# Patient Record
Sex: MALE | Race: WHITE | NOT HISPANIC OR LATINO | Employment: OTHER | URBAN - METROPOLITAN AREA
[De-identification: names, ages, dates, MRNs, and addresses within clinical notes are randomized per-mention and may not be internally consistent; named-entity substitution may affect disease eponyms.]

---

## 2017-01-09 ENCOUNTER — TRANSCRIBE ORDERS (OUTPATIENT)
Dept: WOUND CARE | Facility: HOSPITAL | Age: 72
End: 2017-01-09

## 2017-01-09 DIAGNOSIS — Z43.2 ATTENTION TO ILEOSTOMY (HCC): Primary | ICD-10-CM

## 2017-01-10 ENCOUNTER — APPOINTMENT (OUTPATIENT)
Dept: ONCOLOGY | Facility: HOSPITAL | Age: 72
End: 2017-01-10

## 2017-01-10 ENCOUNTER — APPOINTMENT (OUTPATIENT)
Dept: LAB | Facility: HOSPITAL | Age: 72
End: 2017-01-10

## 2017-01-10 ENCOUNTER — HOSPITAL ENCOUNTER (OUTPATIENT)
Dept: WOUND CARE | Facility: HOSPITAL | Age: 72
Discharge: HOME | End: 2017-01-10

## 2017-01-10 PROCEDURE — 63710000001 SILVER NITRATE 75-25 % MISC

## 2017-01-10 PROCEDURE — A9270 NON-COVERED ITEM OR SERVICE: HCPCS

## 2017-01-10 NOTE — MR AVS SNAPSHOT
[facility] WOUND OSTOMY CONTINENCE NURSE  [phone]                    Bill Landry   1/10/2017  1:00 PM   OSTOMY    Dept Phone:  [phone]   Encounter #:  [id number]    Provider:  ALEX [facility] DEPT   Department:  [facility] WOUND OSTOMY CONTINENCE NURSE                Your Full Care Plan              Your Updated Medication List      ASK your doctor about these medications     aspirin 81 MG tablet       cetirizine 10 MG tablet   Commonly known as:  zyrTEC   Take 0.5 tablets by mouth Daily.       doxazosin 4 MG tablet   Commonly known as:  CARDURA       febuxostat 80 MG tablet tablet   Commonly known as:  ULORIC       FERROUS SULFATE PO       folic acid 1 MG tablet   Commonly known as:  FOLVITE       furosemide 40 MG tablet   Commonly known as:  LASIX       hydrALAZINE 100 MG tablet   Commonly known as:  APRESOLINE       latanoprost 0.005 % ophthalmic solution   Commonly known as:  XALATAN       nebivolol 2.5 MG tablet   Commonly known as:  BYSTOLIC       OMEGA 3 PO       POTASSIUM CHLORIDE PO       raNITIdine 150 MG tablet   Commonly known as:  ZANTAC       Vitamin B-12 500 MCG sublingual tablet               Instructions     None    Patient Instructions History      Upcoming Appointments     Visit Type Date Time Department    OSTOMY 1/10/2017  1:00 PM [facility] WOUND OSTOMY    LAB 1/12/2017  9:10 AM [facility] ONC CBC LAB [facility]    INJECTION - 15 1/12/2017  9:45 AM [facility] OP INFU CBC [facility]    OSTOMY 1/24/2017  1:00 PM [facility] WOUND OSTOMY    FOLLOW UP 1 UNIT 1/25/2017  8:40 AM [facility] ONC CBC [facility]    LAB 1/25/2017  8:10 AM [facility] ONC CBC LAB [facility]    INJECTION - 15 1/25/2017  9:15 AM [facility] OP INFU CBC K[facility]      MyChart Signup     Our records indicate that your [facility] [employer] account has been deactivated. If you would like to reactivate your account, please email [email or web address] or call [phone] to talk to our [employer] staff.             Other Info from Your Visit              Your Appointments     Jan 12, 2017  9:10 AM EST   Lab with LAB CHAIR [facility] ONCOLOGY CBC LAB ([place])    [address]  [facility] [address] [phone]            Jan 12, 2017  9:45 AM EST   INJECTION with INJECTION CHAIR CBC [facility] INFUSION CBC ([place])    [address]  [facility] [address] [phone]            Jan 24, 2017  1:00 PM EST   OSTOMY with ALEX WOCN DEPT   [facility] WOUND OSTOMY CONTINENCE NURSE ([place])    [address] [address] [phone]            Jan 25, 2017  8:10 AM EST   Lab with LAB CHAIR 4 [facility] ONCOLOGY CBC LAB ([place])    [address] [address] [phone]            Jan 25, 2017  8:40 AM EST   FOLLOW UP with Sharif Mckenzie MD   [facility] CBC GROUP: CONSULTANTS IN BLOOD DISORDERS AND CANCER (CBC [place])    [address]  [facility] [address] [phone]              Allergies     Ace Inhibitors      Iodinated Diagnostic Agents      Keflex [Cephalexin]      Other      IVP Dye      Vital Signs     Smoking Status                   Never Smoker

## 2017-01-10 NOTE — NURSING NOTE
"   01/10/17 1410   Ileostomy ileostomy   No Placement Date or Time found.   Existing LDA: present on admission to this facility  Ileostomy Type: ileostomy   Stoma Appearance rosebud appearance;moist;red;protruding above skin level   Appliance 2-piece;drainable pouch;intact;no leakage;changed   Accessories/Skin Care cleansed with water   Stoma Function stool   Stool Color brown   Peristomal Skin ulcerated;shiny;pink;other (see comments)   Tolerance no signs/symptoms of discomfort     Outpatient consult for Ostomy- patient having bleeding at lower edge of stoma. \"I notice it when my pouch is removed.\" Assessed abdomen- patient has a hernia that is soft. Also his stoma is large, round. It functions at the top. Patient reports he has had the ileostomy (on left lower quadrant) since 1968. There is hyperplasia tissue along lower edge- it looks like patient does not center wafer well when he places it and the edge is rubbing on the tissue. The stoma is low on his abdomen. Patient has irregular abdomen and loose skin folds from prior surgeries and weight loss. Reviewed sizing with patient and his wife. Currently they should cut the opening to 2 1/4\" allowing some extra room for the hyperplasia in the lower right side. It might be helpful for his wife to place the wafer as she can see the bottom of the stoma and make sure that the wafer is not sitting on the edge of the stoma. Also instructed them on use of powder and no sting spray.  Silver nitrate applied to hyperplasia. He may need repeat treatment.  Talked to patient about use of hernia belt. He agreed to be measured for it. [id number] is ordering number (3 1/2 ring, 44 1/2\" length, NuForm Cool Comfort, hernia measures 8\") It may not be the most comfortable as his stoma is low and close to the groin but it will help with the hernia which he states has gotten a little larger. Advised that he may need to see a surgeon if it continues to get larger or if he has complications " related to the hernia. He reports no changes in output or pain related to stoma or hernia.  Patient is going to return in the next couple weeks.

## 2017-01-12 ENCOUNTER — LAB (OUTPATIENT)
Dept: LAB | Facility: HOSPITAL | Age: 72
End: 2017-01-12

## 2017-01-12 ENCOUNTER — INFUSION (OUTPATIENT)
Dept: ONCOLOGY | Facility: HOSPITAL | Age: 72
End: 2017-01-12

## 2017-01-12 DIAGNOSIS — D63.1 ANEMIA DUE TO STAGE 3 (MODERATE) CHRONIC RENAL FAILURE TREATED WITH ERYTHROPOIETIN (HCC): ICD-10-CM

## 2017-01-12 DIAGNOSIS — N18.30 ANEMIA DUE TO STAGE 3 (MODERATE) CHRONIC RENAL FAILURE TREATED WITH ERYTHROPOIETIN (HCC): ICD-10-CM

## 2017-01-12 DIAGNOSIS — D72.819 CHRONIC LEUKOPENIA: ICD-10-CM

## 2017-01-12 DIAGNOSIS — D69.6 THROMBOCYTOPENIA (HCC): ICD-10-CM

## 2017-01-12 LAB
BASOPHILS # BLD AUTO: 0.01 10*3/MM3 (ref 0–0.1)
BASOPHILS NFR BLD AUTO: 0.4 % (ref 0–1.1)
DEPRECATED RDW RBC AUTO: 49 FL (ref 37–49)
EOSINOPHIL # BLD AUTO: 0.07 10*3/MM3 (ref 0–0.36)
EOSINOPHIL NFR BLD AUTO: 2.6 % (ref 1–5)
ERYTHROCYTE [DISTWIDTH] IN BLOOD BY AUTOMATED COUNT: 14.5 % (ref 11.7–14.5)
HCT VFR BLD AUTO: 32.6 % (ref 40–49)
HGB BLD-MCNC: 11 G/DL (ref 13.5–16.5)
IMM GRANULOCYTES # BLD: 0.02 10*3/MM3 (ref 0–0.03)
IMM GRANULOCYTES NFR BLD: 0.7 % (ref 0–0.5)
LYMPHOCYTES # BLD AUTO: 0.86 10*3/MM3 (ref 1–3.5)
LYMPHOCYTES NFR BLD AUTO: 32.2 % (ref 20–49)
MCH RBC QN AUTO: 31 PG (ref 27–33)
MCHC RBC AUTO-ENTMCNC: 33.7 G/DL (ref 32–35)
MCV RBC AUTO: 91.8 FL (ref 83–97)
MONOCYTES # BLD AUTO: 0.2 10*3/MM3 (ref 0.25–0.8)
MONOCYTES NFR BLD AUTO: 7.5 % (ref 4–12)
NEUTROPHILS # BLD AUTO: 1.51 10*3/MM3 (ref 1.5–7)
NEUTROPHILS NFR BLD AUTO: 56.6 % (ref 39–75)
NRBC BLD MANUAL-RTO: 0 /100 WBC (ref 0–0)
PLATELET # BLD AUTO: 81 10*3/MM3 (ref 150–375)
PMV BLD AUTO: 9.9 FL (ref 8.9–12.1)
RBC # BLD AUTO: 3.55 10*6/MM3 (ref 4.3–5.5)
WBC NRBC COR # BLD: 2.67 10*3/MM3 (ref 4–10)

## 2017-01-12 PROCEDURE — 85025 COMPLETE CBC W/AUTO DIFF WBC: CPT | Performed by: INTERNAL MEDICINE

## 2017-01-12 PROCEDURE — 36416 COLLJ CAPILLARY BLOOD SPEC: CPT | Performed by: INTERNAL MEDICINE

## 2017-01-12 NOTE — PROGRESS NOTES
Procrit held today for hgb 11. Copy of labs given to pt.  Pt reports having some sinus congestion.  Denies any fevers.  Suggested he use an over the counter decongestant and if symptoms do not resolve in a few day, or if he develops a fever to call us.  He and his wife v/u.

## 2017-01-24 ENCOUNTER — APPOINTMENT (OUTPATIENT)
Dept: WOUND CARE | Facility: HOSPITAL | Age: 72
End: 2017-01-24

## 2017-01-25 ENCOUNTER — OFFICE VISIT (OUTPATIENT)
Dept: ONCOLOGY | Facility: CLINIC | Age: 72
End: 2017-01-25

## 2017-01-25 ENCOUNTER — APPOINTMENT (OUTPATIENT)
Dept: ONCOLOGY | Facility: HOSPITAL | Age: 72
End: 2017-01-25

## 2017-01-25 ENCOUNTER — LAB (OUTPATIENT)
Dept: LAB | Facility: HOSPITAL | Age: 72
End: 2017-01-25

## 2017-01-25 VITALS
SYSTOLIC BLOOD PRESSURE: 150 MMHG | RESPIRATION RATE: 16 BRPM | BODY MASS INDEX: 29.03 KG/M2 | HEART RATE: 86 BPM | DIASTOLIC BLOOD PRESSURE: 82 MMHG | WEIGHT: 202.8 LBS | HEIGHT: 70 IN | TEMPERATURE: 97.7 F

## 2017-01-25 DIAGNOSIS — N18.30 ANEMIA DUE TO STAGE 3 (MODERATE) CHRONIC RENAL FAILURE TREATED WITH ERYTHROPOIETIN (HCC): ICD-10-CM

## 2017-01-25 DIAGNOSIS — D63.1 ANEMIA DUE TO STAGE 3 (MODERATE) CHRONIC RENAL FAILURE TREATED WITH ERYTHROPOIETIN (HCC): ICD-10-CM

## 2017-01-25 DIAGNOSIS — D72.819 CHRONIC LEUKOPENIA: ICD-10-CM

## 2017-01-25 DIAGNOSIS — D69.6 THROMBOCYTOPENIA (HCC): ICD-10-CM

## 2017-01-25 DIAGNOSIS — D72.819 CHRONIC LEUKOPENIA: Primary | ICD-10-CM

## 2017-01-25 LAB
BASOPHILS # BLD AUTO: 0.01 10*3/MM3 (ref 0–0.1)
BASOPHILS NFR BLD AUTO: 0.4 % (ref 0–1.1)
DEPRECATED RDW RBC AUTO: 47.4 FL (ref 37–49)
EOSINOPHIL # BLD AUTO: 0.07 10*3/MM3 (ref 0–0.36)
EOSINOPHIL NFR BLD AUTO: 2.5 % (ref 1–5)
ERYTHROCYTE [DISTWIDTH] IN BLOOD BY AUTOMATED COUNT: 14.2 % (ref 11.7–14.5)
HCT VFR BLD AUTO: 32.6 % (ref 40–49)
HGB BLD-MCNC: 11 G/DL (ref 13.5–16.5)
IMM GRANULOCYTES # BLD: 0.02 10*3/MM3 (ref 0–0.03)
IMM GRANULOCYTES NFR BLD: 0.7 % (ref 0–0.5)
LYMPHOCYTES # BLD AUTO: 0.77 10*3/MM3 (ref 1–3.5)
LYMPHOCYTES NFR BLD AUTO: 28 % (ref 20–49)
MCH RBC QN AUTO: 30.7 PG (ref 27–33)
MCHC RBC AUTO-ENTMCNC: 33.7 G/DL (ref 32–35)
MCV RBC AUTO: 91.1 FL (ref 83–97)
MONOCYTES # BLD AUTO: 0.22 10*3/MM3 (ref 0.25–0.8)
MONOCYTES NFR BLD AUTO: 8 % (ref 4–12)
NEUTROPHILS # BLD AUTO: 1.66 10*3/MM3 (ref 1.5–7)
NEUTROPHILS NFR BLD AUTO: 60.4 % (ref 39–75)
NRBC BLD MANUAL-RTO: 0 /100 WBC (ref 0–0)
PLATELET # BLD AUTO: 72 10*3/MM3 (ref 150–375)
PMV BLD AUTO: 9.8 FL (ref 8.9–12.1)
RBC # BLD AUTO: 3.58 10*6/MM3 (ref 4.3–5.5)
WBC NRBC COR # BLD: 2.75 10*3/MM3 (ref 4–10)

## 2017-01-25 PROCEDURE — 36416 COLLJ CAPILLARY BLOOD SPEC: CPT

## 2017-01-25 PROCEDURE — 85025 COMPLETE CBC W/AUTO DIFF WBC: CPT

## 2017-01-25 PROCEDURE — 99213 OFFICE O/P EST LOW 20 MIN: CPT | Performed by: INTERNAL MEDICINE

## 2017-01-25 NOTE — PROGRESS NOTES
Subjective  REASONS FOR FOLLOWUP:    1. 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  Since discontinuation of anticoagulants the patient's hemoglobin has remained normal. The patient has associated leukopenia and thrombocytopenia due to chronic splenomegaly and remote history of bone marrow testing that suggested myelodysplasia. Under the present circumstances neither the white count nor platelets are changing and hemoglobin remains stable. There is no abnormality in the differential of his white count. There are no symptoms or signs that would suggest any further progression into myelodysplasia. The patient will remain in observation.            History of Present Illness       The patient is here today in the company of his wife specifically with no complaints. His appetite remains wonderful, he has not had any heartburn or indigestion, no abdominal pain, no jaundice, normal bowel activity through his colostomy. Urination is ongoing with no difficulties. He has had a new creatinine done by Dr. Gordon, his nephrologist, being 2.8. The patient is having minimal fluid accumulation in his ankles. No worse than before. He also has had an echocardiogram that documents normal left ventricular ejection fraction 65% and an element of diastolic dysfunction and minimal evidence of pulmonary hypertension with left atrial dilatation.     The patient denies any clinical bleeding at this time. No hematuria, no melena, no enterorrhagia. No epistaxis. He continues taking his sublingual supplementation of B12.           Since the previous visit the patient has received some Procrit and raising the hemoglobin to 11.0 and staying at that level including today. Under these circumstances that patient will return just on monthly basis for a blood  count and doctor visit in 3 months. I doubt that he will require anymore Procrit in a while that happened years back. His white count and platelet count remain stable with no new infections and no clinical bleeding.                  Past Medical History, Past Surgical HistorySignificant for hypertension and also he has history of atrial fibrillation and was chronically anticoagulated with Eliquis in the past. Since January 2015 the patient is no longer receiving this medicine and moved to aspirin during his persistency of GI bleeding. The patient also has a history of chronic kidney disease with creatinine baseline around 2.5. The patient also has a history of inflammatory bowel disease with status post colectomy with ileostomy many years ago in the [facility]. The patient also has a history of pericardial effusion with an GABRIELLE 1:80 homogenous that has never changed or modified. He also has a positive lupus anticoagulant. He has had chronic leukopenia and thrombocytopenia for which he underwent by Dr. Null 10 years ago, bone marrow testing at [facility]. We have requested this information. The patient in the past has had a normal B12 level of 1094, normal folate level and ferritin level of 617 with an iron level of 34. In the past he has received IV iron in the form of Venofer while being at [facility] in January 2015. He has had serum protein electrophoresis at least on 3 different occasions, failing to show any monoclonal proteins. He also has had a CT scan of the abdomen that shows a general spleen anatomy without any retroperitoneal adenopathies and nothing to suggest portal hypertension or cirrhosis of the liver. Kidney anatomy disclosed thinning of the kidney cortexes consistent with chronic medical illness. He has no hydronephrosis. No retroperitoneal adenopathies.   SOCIAL HISTORY:  The patient lives with his wife in [place], [place]. He is retired. He does not smoke and does not  drink any alcohol.   ONCOLOGIC/HEMATOLOGIC HISTORY     On 04/06/2015 the patient’s clinical status has dramatically improved.  He has not had any new episodes of infection, congestive heart failure, GI bleeding, with excellent improvement in hemoglobin.  The patient has been able to walk longer distances without shortness of breath.   He has been able to climb steps without shortness of breath and he feels dramatically better.  Hemoglobin has been stable for the past few weeks at 12.1.  His white count and platelet count remain on the low side with no infection or clinical bleeding.  We found documentation of his bone marrow biopsy performed in 2002 and read at the [facility].  Explicitly it was documented that the patient could have myelodysplastic syndrome.  Procrit, as I pointed out to the patient, is a useful medicine to treat this condition anyway, and given the fact that in 13 years his white count and his platelet count have not changed, makes this diagnosis dubious.  Reading bone marrows for myelodysplasia is one of the most difficult tasks in Hematology.  I suggested to him at some point, if his blood count change, specifically white count modifies or platelet count modifies, to consider repeating the bone marrow testing, flow cytometry and chromosomes.     On 05/26/2015 he was feeling terrific.  He was not having any GI symptomatology, no melena, no enterorrhagia, no abnormal bleeding.  He was functionally perfectly fine.  Urination was ongoing. Uric acid was elevated and I advised him to go back into his Uloric to control this and minimize formation of kidney stones.    On 07/20/2015 the patient had no issues in relationship with anemia. His white count and platelet count remain low associated with his splenomegaly and no issues of consequences related to this. He was advised to remain in observation. He was advised to remain on his folic acid and B12 supplementation.     On 09/14/2015,  hemoglobin was excellent at 12.9, stable weight count at 3400 and stable platelet count at 70,000.  We asked him to remain on observation.     On 11/09/2015 the patient’s hematological parameters remain normal.  He was feeling terrific.  His atrial fibrillation looked like it was very quiet and he had no issues in regard to his aspirin use.  He had no embolic phenomenon.  He had some element of fluid retention associated with his chronic renal disease.  We advised him to monitor his diet properly in regard to sodium ingestion.  Review of Systems   General: no fever, chills, fatigue, weight changes, or lack of appetite.  Eyes: no epiphora, xerophthalmia,conjunctivitis, pain, glaucoma, blurred vision, blindness, secretion, photophobia, proptosis, diplopia.  Ears: no otorrhea, tinnitus, otorrhagia,SOME deafness, pain, vertigo.  Nose: STATED rhinorrhea, epistaxis, alteration in perception of odors, STATED sinuses pressure, SNEEZING AND ITCHING  Mouth: no alteration in gums or teeth,  ulcers, no difficulty with mastication or deglut ion, no odynophagia.  Neck: no masses or pain, no thyroid alterations, no pain in muscles or arteries, no carotid odynia, no crepitation.  Respiratory: no cough, sputum production, dyspnea, trepopnea, pleuritic pain, hemoptysis.  Heart: no syncope, irregularity, palpitations, angina, orthopnea, paroxysmal nocturnal dyspnea.  Vascular Venous: no tenderness,edema, palpable cords, postphlebitic syndrome, skin changes or ulcerations.  Vascular Arterial: no distal ischemia, claudication, gangrene, neuropathic ischemic pain, skin ulcers, paleness or cyanosis.  GI: no dysphagia, odynophagia, no regurgitation, no heartburn,no indigestion,no nausea,no vomiting,no hematemesis ,no melena,no jaundice,no distention, no obstipation,no enterorrhagia,no proctalgia,no anal  lesions, nochanges in bowel habits.  : no frequency, hesitancy, hematuria, discharge, pain.  Musculoskeletal: no muscle or tendon pain  "or inflammation, joint pain, edema, functional limitation, fasciculations, mass.  Neurologic: no headache, seizures, alterations on Craneal nerves, no motor or senssory deficit, normal coordination, no alteration in memory, orientation, calculation,writting, verbal or written language.  Skin: no rashes, pruritus or localized lesions.  Psychiatric: no anxiety, depression, agitation, delusions, proper insight.A comprehensive 14 point review of systems was performed and was negative except as mentioned.    Medications:  The current medication list was reviewed in the EMR    ALLERGIES:    Allergies   Allergen Reactions   • Ace Inhibitors    • Iodinated Diagnostic Agents    • Keflex [Cephalexin]    • Other      IVP Dye       Objective      Vitals:    01/25/17 0854   BP: 150/82   Pulse: 86   Resp: 16   Temp: 97.7 °F (36.5 °C)   Weight: 202 lb 12.8 oz (92 kg)   Height: 70\" (177.8 cm)   PainSc: 0-No pain     Current Status 12/13/2016   ECOG score 0       Physical Exam    GENERAL:  Well-developed, well-nourished  Patient  in no acute distress.   SKIN:  Warm, dry without rashes, purpura or petechiae.  HEENT:  Pupils were equal and reactive to light and accomodation, conjunctivas non injected, no pterigion, normal extraocular movements, normal visual acuity.   Mouth mucosa was moist, no exudates in oropharynx, normal gum line, normal roof of the mouth and pillars, normal papillations of the tongue.Ear canals were FULL OF WAX, NOT VISUALIZED tympanic membranes, normal hearing acuity.No pain in mastoid area or erythema.  NECK:  Supple with good range of motion; no thyromegaly or masses, no JVD or bruits, no cervical adenopathies.No carotid arteries pain, no carotid abnormal pulsation or arterial dance.  LYMPHATICS:  No cervical, supraclavicular, axillary, epitrochlear or inguinal adenopathy.  CHEST:  Normal excursion of both torrey thoraces, normal voice fremitus, no subcutaneous emphysema, normal axillas, no rashes or acanthosis " nigricans. Lungs clear to percussion and auscultation, normal breath sounds bilaterally, no wheezing, crackles or ronchi, no stridor, no rubs.  CARDIAC AND VASCULAR: PMI not displaced,no thrills, normal rate and irrregular rhythm atrial fibrilation controller VR, without murmurs, rubs or S3 or S4 right or left sided gallops. Normal femoral, popliteal, pedis, brachial and carotid pulses.  ABDOMEN:  Soft, nontender with no organomegaly or masses, no ascites, no collateral circulation,no distention,no Sanders sign, no abdominal pain, no inguinal hernias,no umbilical hernias, no abdominal bruits. Normal bowel sounds.Colostomy in place LLQ.  GENITAL: Not  Performed.  EXTREMITIES  AND SPINE:  No clubbing, cyanosis or edema, no deformities or pain .No kyphosis, scoliosis, deformities or pain in spine, ribs or pelvic bone.  NEUROLOGICAL:  Patient was awake, alert, oriented to time, person and place,normal gait and coordination    RECENT LABS:  Hematology WBC   Date Value Ref Range Status   01/25/2017 2.75 (L) 4.00 - 10.00 10*3/mm3 Final   09/24/2015 4.96 4.50 - 10.70 K/Cumm Final     RBC   Date Value Ref Range Status   01/25/2017 3.58 (L) 4.30 - 5.50 10*6/mm3 Final   09/24/2015 4.24 (L) 4.60 - 6.00 Million Final     HEMOGLOBIN   Date Value Ref Range Status   01/25/2017 11.0 (L) 13.5 - 16.5 g/dL Final   09/24/2015 13.4 (L) 13.7 - 17.6 g/dL Final     HEMATOCRIT   Date Value Ref Range Status   01/25/2017 32.6 (L) 40.0 - 49.0 % Final   09/24/2015 39.5 (L) 40.4 - 52.2 % Final     PLATELETS   Date Value Ref Range Status   01/25/2017 72 (L) 150 - 375 10*3/mm3 Final   09/24/2015 68 (L) 140 - 500 K/Cumm Final      Auto Differential   Order: [id number] - Part of Panel Order [id number]   Status:  Final result   Visible to patient:  No (Not Released) Dx:  Thrombocytopenia; Anemia due to stage...      Ref Range & Units 8:17 AM     WBC 4.00 - 10.00 10*3/mm3 2.75 (L)   RBC 4.30 - 5.50 10*6/mm3 3.58 (L)   Hemoglobin 13.5 - 16.5 g/dL 11.0 (L)    Hematocrit 40.0 - 49.0 % 32.6 (L)   MCV 83.0 - 97.0 fL 91.1   MCH 27.0 - 33.0 pg 30.7   MCHC 32.0 - 35.0 g/dL 33.7   RDW 11.7 - 14.5 % 14.2   RDW-SD 37.0 - 49.0 fl 47.4   MPV 8.9 - 12.1 fL 9.8   Platelets 150 - 375 10*3/mm3 72 (L)   Neutrophil % 39.0 - 75.0 % 60.4   Lymphocyte % 20.0 - 49.0 % 28.0   Monocyte % 4.0 - 12.0 % 8.0   Eosinophil % 1.0 - 5.0 % 2.5   Basophil % 0.0 - 1.1 % 0.4   Immature Grans % 0.0 - 0.5 % 0.7 (H)   Neutrophils, Absolute 1.50 - 7.00 10*3/mm3 1.66   Lymphocytes, Absolute 1.00 - 3.50 10*3/mm3 0.77 (L)   Monocytes, Absolute 0.25 - 0.80 10*3/mm3 0.22 (L)   Eosinophils, Absolute 0.00 - 0.36 10*3/mm3 0.07   Basophils, Absolute 0.00 - 0.10 10*3/mm3 0.01   Immature Grans, Absolute 0.00 - 0.03 10*3/mm3 0.02   nRBC 0.0 - 0.0 /100 WBC 0.0   Resulting Agency   CBC LAB      Specimen Collected: 01/25/17  8:17 AM Last Resulted: 01/25/17  8:30 AM                  Assessment/Plan  : This patient has history of multifactorial anemia as well as leukopenia and thrombocytopenia. He is no longer bleeding in the gastrointestinal tract because he is no longer receiving anticoagulants. He remains on a baby aspirin.   In the previous visit, we measured his ferritin, iron, TIBC, B12, folic acid levels, all being normal. We performed a serum protein electrophoresis that is close, a polyclonal expansion of globulins with no monoclonal protein found. We documented a very low erythropoietin level at 14.     He has received erythropoietin raising the hemoglobin to 11.0 and remaining at that level in absence of any of this medication for a while. The patient actually feels much better and energy is much better. He has no shortness of breath and he is capable of doing anything that he pleases. His white count and platelet count remain stable.     RECOMMENDATIONS: Given the fact that his hemoglobin has improved and the other numbers including white count and platelets are stable, the patient will return on monthly basis  for blood counts, RN check and doctor visit in 3 months.                             1/25/2017      CC:

## 2017-01-25 NOTE — MR AVS SNAPSHOT
Bill Landry   1/25/2017 8:40 AM   Office Visit    Dept Phone:  [phone]   Encounter #:  [id number]    Provider:  Sharif Mckenzie MD   Department:  [facility] CBC GROUP: CONSULTANTS IN BLOOD DISORDERS AND CANCER                Your Full Care Plan              Your Updated Medication List          This list is accurate as of: 1/25/17  9:19 AM.  Always use your most recent med list.                aspirin 81 MG tablet       cetirizine 10 MG tablet   Commonly known as:  zyrTEC   Take 0.5 tablets by mouth Daily.       doxazosin 4 MG tablet   Commonly known as:  CARDURA       febuxostat 80 MG tablet tablet   Commonly known as:  ULORIC       FERROUS SULFATE PO       folic acid 1 MG tablet   Commonly known as:  FOLVITE       furosemide 40 MG tablet   Commonly known as:  LASIX       hydrALAZINE 100 MG tablet   Commonly known as:  APRESOLINE       latanoprost 0.005 % ophthalmic solution   Commonly known as:  XALATAN       nebivolol 2.5 MG tablet   Commonly known as:  BYSTOLIC       OMEGA 3 PO       POTASSIUM CHLORIDE PO       raNITIdine 150 MG tablet   Commonly known as:  ZANTAC       Vitamin B-12 500 MCG sublingual tablet               You Were Diagnosed With        Codes Comments    Chronic leukopenia    -  Primary ICD-10-CM: D72.819  ICD-9-CM: 288.50     Anemia due to stage 3 (moderate) chronic renal failure treated with erythropoietin     ICD-10-CM: N18.3, D63.1  ICD-9-CM: 285.21, 585.3     Thrombocytopenia     ICD-10-CM: D69.6  ICD-9-CM: 287.5       Instructions     None    Patient Instructions History      Upcoming Appointments     Visit Type Date Time Department    FOLLOW UP 1 UNIT 1/25/2017  8:40 AM [facility] ONC CBC [facility]    LAB 1/25/2017  8:10 AM [facility] ONC CBC LAB [facility]    INJECTION - 15 1/25/2017  9:15 AM [facility] OP INFU CBC [facility]    OSTOMY 2/7/2017 11:00 AM [facility]LEX WOUND OSTOMY      MyChart Signup     Our records indicate that you have declined [facility]  "signup. If you would like to sign up for [employer], please email [email or web address] or call [phone] to obtain an activation code.             Other Info from Your Visit           Your Appointments     Feb 07, 2017 11:00 AM EST   OSTOMY with ALEX RUDI DEPT   [facility] WOUND OSTOMY CONTINENCE NURSE ([place])    [address] [facility] [address] [phone]              Allergies     Ace Inhibitors      Iodinated Diagnostic Agents      Keflex [Cephalexin]      Other      IVP Dye      Reason for Visit     Follow-up blood work      Vital Signs     Blood Pressure Pulse Temperature Respirations Height Weight    150/82 86 97.7 °F (36.5 °C) 16 70\" (177.8 cm) 202 lb 12.8 oz (92 kg)    Body Mass Index Smoking Status                29.1 kg/m2 Never Smoker          Problems and Diagnoses Noted     Anemia due to stage 3 (moderate) chronic renal failure treated with erythropoietin    Chronic leukopenia    Decreased platelet count        "

## 2017-02-20 ENCOUNTER — CLINICAL SUPPORT (OUTPATIENT)
Dept: ONCOLOGY | Facility: HOSPITAL | Age: 72
End: 2017-02-20

## 2017-02-20 ENCOUNTER — LAB (OUTPATIENT)
Dept: LAB | Facility: HOSPITAL | Age: 72
End: 2017-02-20

## 2017-02-20 DIAGNOSIS — D69.6 THROMBOCYTOPENIA (HCC): ICD-10-CM

## 2017-02-20 DIAGNOSIS — D63.1 ANEMIA DUE TO STAGE 3 (MODERATE) CHRONIC RENAL FAILURE TREATED WITH ERYTHROPOIETIN (HCC): ICD-10-CM

## 2017-02-20 DIAGNOSIS — D72.819 CHRONIC LEUKOPENIA: ICD-10-CM

## 2017-02-20 DIAGNOSIS — N18.30 ANEMIA DUE TO STAGE 3 (MODERATE) CHRONIC RENAL FAILURE TREATED WITH ERYTHROPOIETIN (HCC): ICD-10-CM

## 2017-02-20 LAB
BASOPHILS # BLD AUTO: 0.02 10*3/MM3 (ref 0–0.1)
BASOPHILS NFR BLD AUTO: 0.8 % (ref 0–1.1)
DEPRECATED RDW RBC AUTO: 45.8 FL (ref 37–49)
EOSINOPHIL # BLD AUTO: 0.06 10*3/MM3 (ref 0–0.36)
EOSINOPHIL NFR BLD AUTO: 2.3 % (ref 1–5)
ERYTHROCYTE [DISTWIDTH] IN BLOOD BY AUTOMATED COUNT: 13.9 % (ref 11.7–14.5)
HCT VFR BLD AUTO: 33.3 % (ref 40–49)
HGB BLD-MCNC: 11.4 G/DL (ref 13.5–16.5)
IMM GRANULOCYTES # BLD: 0.04 10*3/MM3 (ref 0–0.03)
IMM GRANULOCYTES NFR BLD: 1.5 % (ref 0–0.5)
LYMPHOCYTES # BLD AUTO: 0.72 10*3/MM3 (ref 1–3.5)
LYMPHOCYTES NFR BLD AUTO: 27.1 % (ref 20–49)
MCH RBC QN AUTO: 31 PG (ref 27–33)
MCHC RBC AUTO-ENTMCNC: 34.2 G/DL (ref 32–35)
MCV RBC AUTO: 90.5 FL (ref 83–97)
MONOCYTES # BLD AUTO: 0.25 10*3/MM3 (ref 0.25–0.8)
MONOCYTES NFR BLD AUTO: 9.4 % (ref 4–12)
NEUTROPHILS # BLD AUTO: 1.57 10*3/MM3 (ref 1.5–7)
NEUTROPHILS NFR BLD AUTO: 58.9 % (ref 39–75)
NRBC BLD MANUAL-RTO: 0 /100 WBC (ref 0–0)
PLATELET # BLD AUTO: 72 10*3/MM3 (ref 150–375)
PMV BLD AUTO: 9.5 FL (ref 8.9–12.1)
RBC # BLD AUTO: 3.68 10*6/MM3 (ref 4.3–5.5)
WBC NRBC COR # BLD: 2.66 10*3/MM3 (ref 4–10)

## 2017-02-20 PROCEDURE — 85025 COMPLETE CBC W/AUTO DIFF WBC: CPT | Performed by: INTERNAL MEDICINE

## 2017-02-20 PROCEDURE — 36416 COLLJ CAPILLARY BLOOD SPEC: CPT | Performed by: INTERNAL MEDICINE

## 2017-02-20 NOTE — PROGRESS NOTES
CBC reviewed with patient. HGB stable 11.4 platlets and ANC stable. To return one month for labs. Pt denies any complaints.

## 2017-03-20 ENCOUNTER — CLINICAL SUPPORT (OUTPATIENT)
Dept: ONCOLOGY | Facility: HOSPITAL | Age: 72
End: 2017-03-20

## 2017-03-20 ENCOUNTER — LAB (OUTPATIENT)
Dept: LAB | Facility: HOSPITAL | Age: 72
End: 2017-03-20

## 2017-03-20 DIAGNOSIS — N18.30 ANEMIA DUE TO STAGE 3 (MODERATE) CHRONIC RENAL FAILURE TREATED WITH ERYTHROPOIETIN (HCC): ICD-10-CM

## 2017-03-20 DIAGNOSIS — D63.1 ANEMIA DUE TO STAGE 3 (MODERATE) CHRONIC RENAL FAILURE TREATED WITH ERYTHROPOIETIN (HCC): ICD-10-CM

## 2017-03-20 DIAGNOSIS — D72.819 CHRONIC LEUKOPENIA: ICD-10-CM

## 2017-03-20 DIAGNOSIS — D69.6 THROMBOCYTOPENIA (HCC): ICD-10-CM

## 2017-03-20 LAB
BASOPHILS # BLD AUTO: 0.02 10*3/MM3 (ref 0–0.1)
BASOPHILS NFR BLD AUTO: 0.8 % (ref 0–1.1)
DEPRECATED RDW RBC AUTO: 46.1 FL (ref 37–49)
EOSINOPHIL # BLD AUTO: 0.08 10*3/MM3 (ref 0–0.36)
EOSINOPHIL NFR BLD AUTO: 3.2 % (ref 1–5)
ERYTHROCYTE [DISTWIDTH] IN BLOOD BY AUTOMATED COUNT: 14 % (ref 11.7–14.5)
HCT VFR BLD AUTO: 32.1 % (ref 40–49)
HGB BLD-MCNC: 10.7 G/DL (ref 13.5–16.5)
IMM GRANULOCYTES # BLD: 0.03 10*3/MM3 (ref 0–0.03)
IMM GRANULOCYTES NFR BLD: 1.2 % (ref 0–0.5)
LYMPHOCYTES # BLD AUTO: 0.67 10*3/MM3 (ref 1–3.5)
LYMPHOCYTES NFR BLD AUTO: 26.9 % (ref 20–49)
MCH RBC QN AUTO: 30.3 PG (ref 27–33)
MCHC RBC AUTO-ENTMCNC: 33.3 G/DL (ref 32–35)
MCV RBC AUTO: 90.9 FL (ref 83–97)
MONOCYTES # BLD AUTO: 0.24 10*3/MM3 (ref 0.25–0.8)
MONOCYTES NFR BLD AUTO: 9.6 % (ref 4–12)
NEUTROPHILS # BLD AUTO: 1.45 10*3/MM3 (ref 1.5–7)
NEUTROPHILS NFR BLD AUTO: 58.3 % (ref 39–75)
NRBC BLD MANUAL-RTO: 0 /100 WBC (ref 0–0)
PLATELET # BLD AUTO: 73 10*3/MM3 (ref 150–375)
PMV BLD AUTO: 9.5 FL (ref 8.9–12.1)
RBC # BLD AUTO: 3.53 10*6/MM3 (ref 4.3–5.5)
WBC NRBC COR # BLD: 2.49 10*3/MM3 (ref 4–10)

## 2017-03-20 PROCEDURE — 85025 COMPLETE CBC W/AUTO DIFF WBC: CPT | Performed by: INTERNAL MEDICINE

## 2017-03-20 PROCEDURE — 36416 COLLJ CAPILLARY BLOOD SPEC: CPT | Performed by: INTERNAL MEDICINE

## 2017-03-20 NOTE — PROGRESS NOTES
Cbc reviewed with pt. hgb down slightly, denies any bleeding or dark tarry stools. Plt count stable. WBC low, denies fevers. Pt doing well, no questions or concerns at this time. Will recheck in a month as scheduled unless having any issues.

## 2017-04-17 ENCOUNTER — APPOINTMENT (OUTPATIENT)
Dept: LAB | Facility: HOSPITAL | Age: 72
End: 2017-04-17

## 2017-04-24 ENCOUNTER — LAB (OUTPATIENT)
Dept: LAB | Facility: HOSPITAL | Age: 72
End: 2017-04-24

## 2017-04-24 ENCOUNTER — OFFICE VISIT (OUTPATIENT)
Dept: ONCOLOGY | Facility: CLINIC | Age: 72
End: 2017-04-24

## 2017-04-24 VITALS
HEART RATE: 83 BPM | RESPIRATION RATE: 12 BRPM | SYSTOLIC BLOOD PRESSURE: 152 MMHG | TEMPERATURE: 97.7 F | BODY MASS INDEX: 29.29 KG/M2 | OXYGEN SATURATION: 98 % | DIASTOLIC BLOOD PRESSURE: 70 MMHG | HEIGHT: 70 IN | WEIGHT: 204.6 LBS

## 2017-04-24 DIAGNOSIS — D72.819 CHRONIC LEUKOPENIA: ICD-10-CM

## 2017-04-24 DIAGNOSIS — D63.1 ANEMIA DUE TO STAGE 3 (MODERATE) CHRONIC RENAL FAILURE TREATED WITH ERYTHROPOIETIN (HCC): Primary | ICD-10-CM

## 2017-04-24 DIAGNOSIS — N18.30 ANEMIA DUE TO STAGE 3 (MODERATE) CHRONIC RENAL FAILURE TREATED WITH ERYTHROPOIETIN (HCC): Primary | ICD-10-CM

## 2017-04-24 DIAGNOSIS — N18.30 ANEMIA DUE TO STAGE 3 (MODERATE) CHRONIC RENAL FAILURE TREATED WITH ERYTHROPOIETIN (HCC): ICD-10-CM

## 2017-04-24 DIAGNOSIS — D69.6 THROMBOCYTOPENIA (HCC): ICD-10-CM

## 2017-04-24 DIAGNOSIS — D63.1 ANEMIA DUE TO STAGE 3 (MODERATE) CHRONIC RENAL FAILURE TREATED WITH ERYTHROPOIETIN (HCC): ICD-10-CM

## 2017-04-24 LAB
BASOPHILS # BLD AUTO: 0.03 10*3/MM3 (ref 0–0.1)
BASOPHILS NFR BLD AUTO: 1.2 % (ref 0–1.1)
DEPRECATED RDW RBC AUTO: 49.5 FL (ref 37–49)
EOSINOPHIL # BLD AUTO: 0.08 10*3/MM3 (ref 0–0.36)
EOSINOPHIL NFR BLD AUTO: 3.2 % (ref 1–5)
ERYTHROCYTE [DISTWIDTH] IN BLOOD BY AUTOMATED COUNT: 14.4 % (ref 11.7–14.5)
HCT VFR BLD AUTO: 31.5 % (ref 40–49)
HGB BLD-MCNC: 10.3 G/DL (ref 13.5–16.5)
IMM GRANULOCYTES # BLD: 0.04 10*3/MM3 (ref 0–0.03)
IMM GRANULOCYTES NFR BLD: 1.6 % (ref 0–0.5)
LYMPHOCYTES # BLD AUTO: 0.68 10*3/MM3 (ref 1–3.5)
LYMPHOCYTES NFR BLD AUTO: 27.4 % (ref 20–49)
MCH RBC QN AUTO: 30.5 PG (ref 27–33)
MCHC RBC AUTO-ENTMCNC: 32.7 G/DL (ref 32–35)
MCV RBC AUTO: 93.2 FL (ref 83–97)
MONOCYTES # BLD AUTO: 0.18 10*3/MM3 (ref 0.25–0.8)
MONOCYTES NFR BLD AUTO: 7.3 % (ref 4–12)
NEUTROPHILS # BLD AUTO: 1.47 10*3/MM3 (ref 1.5–7)
NEUTROPHILS NFR BLD AUTO: 59.3 % (ref 39–75)
NRBC BLD MANUAL-RTO: 0 /100 WBC (ref 0–0)
PLATELET # BLD AUTO: 84 10*3/MM3 (ref 150–375)
PMV BLD AUTO: 10.5 FL (ref 8.9–12.1)
RBC # BLD AUTO: 3.38 10*6/MM3 (ref 4.3–5.5)
WBC NRBC COR # BLD: 2.48 10*3/MM3 (ref 4–10)

## 2017-04-24 PROCEDURE — 99213 OFFICE O/P EST LOW 20 MIN: CPT | Performed by: INTERNAL MEDICINE

## 2017-04-24 PROCEDURE — 85025 COMPLETE CBC W/AUTO DIFF WBC: CPT

## 2017-04-24 PROCEDURE — 36416 COLLJ CAPILLARY BLOOD SPEC: CPT

## 2017-04-26 ENCOUNTER — APPOINTMENT (OUTPATIENT)
Dept: LAB | Facility: HOSPITAL | Age: 72
End: 2017-04-26

## 2017-04-26 ENCOUNTER — APPOINTMENT (OUTPATIENT)
Dept: ONCOLOGY | Facility: CLINIC | Age: 72
End: 2017-04-26

## 2017-05-15 ENCOUNTER — INFUSION (OUTPATIENT)
Dept: ONCOLOGY | Facility: HOSPITAL | Age: 72
End: 2017-05-15

## 2017-05-15 ENCOUNTER — LAB (OUTPATIENT)
Dept: LAB | Facility: HOSPITAL | Age: 72
End: 2017-05-15

## 2017-05-15 DIAGNOSIS — D63.1 ANEMIA DUE TO STAGE 3 (MODERATE) CHRONIC RENAL FAILURE TREATED WITH ERYTHROPOIETIN (HCC): ICD-10-CM

## 2017-05-15 DIAGNOSIS — N18.30 ANEMIA DUE TO STAGE 3 (MODERATE) CHRONIC RENAL FAILURE TREATED WITH ERYTHROPOIETIN (HCC): ICD-10-CM

## 2017-05-15 LAB
BASOPHILS # BLD AUTO: 0.02 10*3/MM3 (ref 0–0.1)
BASOPHILS NFR BLD AUTO: 0.7 % (ref 0–1.1)
DEPRECATED RDW RBC AUTO: 49.5 FL (ref 37–49)
EOSINOPHIL # BLD AUTO: 0.06 10*3/MM3 (ref 0–0.36)
EOSINOPHIL NFR BLD AUTO: 2.2 % (ref 1–5)
ERYTHROCYTE [DISTWIDTH] IN BLOOD BY AUTOMATED COUNT: 14.5 % (ref 11.7–14.5)
HCT VFR BLD AUTO: 29.6 % (ref 40–49)
HGB BLD-MCNC: 10 G/DL (ref 13.5–16.5)
IMM GRANULOCYTES # BLD: 0.05 10*3/MM3 (ref 0–0.03)
IMM GRANULOCYTES NFR BLD: 1.8 % (ref 0–0.5)
LYMPHOCYTES # BLD AUTO: 0.78 10*3/MM3 (ref 1–3.5)
LYMPHOCYTES NFR BLD AUTO: 28.7 % (ref 20–49)
MCH RBC QN AUTO: 31.8 PG (ref 27–33)
MCHC RBC AUTO-ENTMCNC: 33.8 G/DL (ref 32–35)
MCV RBC AUTO: 94.3 FL (ref 83–97)
MONOCYTES # BLD AUTO: 0.3 10*3/MM3 (ref 0.25–0.8)
MONOCYTES NFR BLD AUTO: 11 % (ref 4–12)
NEUTROPHILS # BLD AUTO: 1.51 10*3/MM3 (ref 1.5–7)
NEUTROPHILS NFR BLD AUTO: 55.6 % (ref 39–75)
NRBC BLD MANUAL-RTO: 0 /100 WBC (ref 0–0)
PLATELET # BLD AUTO: 66 10*3/MM3 (ref 150–375)
PMV BLD AUTO: 9.3 FL (ref 8.9–12.1)
RBC # BLD AUTO: 3.14 10*6/MM3 (ref 4.3–5.5)
WBC NRBC COR # BLD: 2.72 10*3/MM3 (ref 4–10)

## 2017-05-15 PROCEDURE — 36416 COLLJ CAPILLARY BLOOD SPEC: CPT

## 2017-05-15 PROCEDURE — 85025 COMPLETE CBC W/AUTO DIFF WBC: CPT

## 2017-06-05 ENCOUNTER — APPOINTMENT (OUTPATIENT)
Dept: LAB | Facility: HOSPITAL | Age: 72
End: 2017-06-05

## 2017-06-05 ENCOUNTER — APPOINTMENT (OUTPATIENT)
Dept: ONCOLOGY | Facility: HOSPITAL | Age: 72
End: 2017-06-05

## 2017-06-06 ENCOUNTER — APPOINTMENT (OUTPATIENT)
Dept: ONCOLOGY | Facility: HOSPITAL | Age: 72
End: 2017-06-06

## 2017-06-06 ENCOUNTER — APPOINTMENT (OUTPATIENT)
Dept: LAB | Facility: HOSPITAL | Age: 72
End: 2017-06-06

## 2017-06-08 ENCOUNTER — TELEPHONE (OUTPATIENT)
Dept: ONCOLOGY | Facility: CLINIC | Age: 72
End: 2017-06-08

## 2017-06-08 ENCOUNTER — HOSPITAL ENCOUNTER (OUTPATIENT)
Facility: HOSPITAL | Age: 72
Setting detail: HOSPITAL OUTPATIENT SURGERY
End: 2017-06-08
Attending: SURGERY | Admitting: SURGERY

## 2017-06-08 ENCOUNTER — TELEPHONE (OUTPATIENT)
Dept: GENERAL RADIOLOGY | Facility: HOSPITAL | Age: 72
End: 2017-06-08

## 2017-06-08 DIAGNOSIS — D64.9 ANEMIA, UNSPECIFIED TYPE: Primary | ICD-10-CM

## 2017-06-08 NOTE — TELEPHONE ENCOUNTER
S/w brittani at dr. Bill luo office.  She states dr. Mckenzie wanted to know when pt. Was having surgery (placement of shunt for hemodialysis) as he will need granix 3 days prior to surgery.  All d/w dr. Mckenzie, informed him the surgery is sched. For 7/18/17.  Pt. Will need to come in here the week before to see a np with a cbc and discuss receiving granix 300mcg 3 days prior to procedure.  Brittani states pt. Is going for pre-op labs on 7/11/17 and dr. Deal has ordered plts to be given prior to procedure if needed.  Dr. Mckenzie feels he should get the plts if plt are below 70,000.  Will call pt. And let him know the appt desk will be calling him with the appts.  Pt v/u.

## 2017-06-08 NOTE — TELEPHONE ENCOUNTER
----- Message from Beena Randhawa RN sent at 2017 12:43 PM EDT -----  Contact: [phone]  -[birth year]--pt. Needs to see a np on  or  to talk about getting 3 days of granix prior to sched. Surgery on 17 per thompson pineda.  Will need a cbc that day to check plt ct.  needs to be above 70,000 for surgery. Thanks, beena

## 2017-06-09 ENCOUNTER — LAB (OUTPATIENT)
Dept: LAB | Facility: HOSPITAL | Age: 72
End: 2017-06-09

## 2017-06-09 ENCOUNTER — INFUSION (OUTPATIENT)
Dept: ONCOLOGY | Facility: HOSPITAL | Age: 72
End: 2017-06-09

## 2017-06-09 DIAGNOSIS — N18.30 ANEMIA DUE TO STAGE 3 (MODERATE) CHRONIC RENAL FAILURE TREATED WITH ERYTHROPOIETIN (HCC): ICD-10-CM

## 2017-06-09 DIAGNOSIS — D63.1 ANEMIA DUE TO STAGE 3 (MODERATE) CHRONIC RENAL FAILURE TREATED WITH ERYTHROPOIETIN (HCC): ICD-10-CM

## 2017-06-09 LAB
BASOPHILS # BLD AUTO: 0.01 10*3/MM3 (ref 0–0.1)
BASOPHILS NFR BLD AUTO: 0.4 % (ref 0–1.1)
DEPRECATED RDW RBC AUTO: 49.1 FL (ref 37–49)
EOSINOPHIL # BLD AUTO: 0.04 10*3/MM3 (ref 0–0.36)
EOSINOPHIL NFR BLD AUTO: 1.7 % (ref 1–5)
ERYTHROCYTE [DISTWIDTH] IN BLOOD BY AUTOMATED COUNT: 14.1 % (ref 11.7–14.5)
HCT VFR BLD AUTO: 30.5 % (ref 40–49)
HGB BLD-MCNC: 10.1 G/DL (ref 13.5–16.5)
IMM GRANULOCYTES # BLD: 0.01 10*3/MM3 (ref 0–0.03)
IMM GRANULOCYTES NFR BLD: 0.4 % (ref 0–0.5)
LYMPHOCYTES # BLD AUTO: 0.59 10*3/MM3 (ref 1–3.5)
LYMPHOCYTES NFR BLD AUTO: 24.8 % (ref 20–49)
MCH RBC QN AUTO: 31.7 PG (ref 27–33)
MCHC RBC AUTO-ENTMCNC: 33.1 G/DL (ref 32–35)
MCV RBC AUTO: 95.6 FL (ref 83–97)
MONOCYTES # BLD AUTO: 0.24 10*3/MM3 (ref 0.25–0.8)
MONOCYTES NFR BLD AUTO: 10.1 % (ref 4–12)
NEUTROPHILS # BLD AUTO: 1.49 10*3/MM3 (ref 1.5–7)
NEUTROPHILS NFR BLD AUTO: 62.6 % (ref 39–75)
NRBC BLD MANUAL-RTO: 0 /100 WBC (ref 0–0)
PLATELET # BLD AUTO: 65 10*3/MM3 (ref 150–375)
PMV BLD AUTO: 9.5 FL (ref 8.9–12.1)
RBC # BLD AUTO: 3.19 10*6/MM3 (ref 4.3–5.5)
WBC NRBC COR # BLD: 2.38 10*3/MM3 (ref 4–10)

## 2017-06-09 PROCEDURE — 36416 COLLJ CAPILLARY BLOOD SPEC: CPT

## 2017-06-09 PROCEDURE — 85025 COMPLETE CBC W/AUTO DIFF WBC: CPT

## 2017-06-26 ENCOUNTER — LAB (OUTPATIENT)
Dept: LAB | Facility: HOSPITAL | Age: 72
End: 2017-06-26

## 2017-06-26 ENCOUNTER — INFUSION (OUTPATIENT)
Dept: ONCOLOGY | Facility: HOSPITAL | Age: 72
End: 2017-06-26

## 2017-06-26 DIAGNOSIS — N18.30 ANEMIA DUE TO STAGE 3 (MODERATE) CHRONIC RENAL FAILURE TREATED WITH ERYTHROPOIETIN (HCC): ICD-10-CM

## 2017-06-26 DIAGNOSIS — D63.1 ANEMIA DUE TO STAGE 3 (MODERATE) CHRONIC RENAL FAILURE TREATED WITH ERYTHROPOIETIN (HCC): ICD-10-CM

## 2017-06-26 LAB
BASOPHILS # BLD AUTO: 0.03 10*3/MM3 (ref 0–0.1)
BASOPHILS NFR BLD AUTO: 1 % (ref 0–1.1)
DEPRECATED RDW RBC AUTO: 47.1 FL (ref 37–49)
EOSINOPHIL # BLD AUTO: 0.06 10*3/MM3 (ref 0–0.36)
EOSINOPHIL NFR BLD AUTO: 2 % (ref 1–5)
ERYTHROCYTE [DISTWIDTH] IN BLOOD BY AUTOMATED COUNT: 13.6 % (ref 11.7–14.5)
HCT VFR BLD AUTO: 29.9 % (ref 40–49)
HGB BLD-MCNC: 10 G/DL (ref 13.5–16.5)
IMM GRANULOCYTES # BLD: 0.1 10*3/MM3 (ref 0–0.03)
IMM GRANULOCYTES NFR BLD: 3.3 % (ref 0–0.5)
LYMPHOCYTES # BLD AUTO: 0.74 10*3/MM3 (ref 1–3.5)
LYMPHOCYTES NFR BLD AUTO: 24.6 % (ref 20–49)
MCH RBC QN AUTO: 31.5 PG (ref 27–33)
MCHC RBC AUTO-ENTMCNC: 33.4 G/DL (ref 32–35)
MCV RBC AUTO: 94.3 FL (ref 83–97)
MONOCYTES # BLD AUTO: 0.27 10*3/MM3 (ref 0.25–0.8)
MONOCYTES NFR BLD AUTO: 9 % (ref 4–12)
NEUTROPHILS # BLD AUTO: 1.81 10*3/MM3 (ref 1.5–7)
NEUTROPHILS NFR BLD AUTO: 60.1 % (ref 39–75)
NRBC BLD MANUAL-RTO: 1 /100 WBC (ref 0–0)
PLATELET # BLD AUTO: 73 10*3/MM3 (ref 150–375)
PMV BLD AUTO: 9.6 FL (ref 8.9–12.1)
RBC # BLD AUTO: 3.17 10*6/MM3 (ref 4.3–5.5)
WBC NRBC COR # BLD: 3.01 10*3/MM3 (ref 4–10)

## 2017-06-26 PROCEDURE — 36416 COLLJ CAPILLARY BLOOD SPEC: CPT | Performed by: INTERNAL MEDICINE

## 2017-06-26 PROCEDURE — 85025 COMPLETE CBC W/AUTO DIFF WBC: CPT | Performed by: INTERNAL MEDICINE

## 2017-07-07 ENCOUNTER — TELEPHONE (OUTPATIENT)
Dept: ONCOLOGY | Facility: HOSPITAL | Age: 72
End: 2017-07-07

## 2017-07-07 NOTE — TELEPHONE ENCOUNTER
Patient calling about maybe needing procrit? He had labs done yesterday and hgb was 9.8. Pt also has some questions before his upcoming surgery. Informed patient that he sees a nurse practitioner Wednesday and we can review the need for procrit then. Also, pt to call his surgeon about some of his questions about the procedure he is having. If they need us to address anything further pt can call us back. Pt v/u

## 2017-07-11 ENCOUNTER — APPOINTMENT (OUTPATIENT)
Dept: PREADMISSION TESTING | Facility: HOSPITAL | Age: 72
End: 2017-07-11

## 2017-07-11 ENCOUNTER — HOSPITAL ENCOUNTER (OUTPATIENT)
Dept: GENERAL RADIOLOGY | Facility: HOSPITAL | Age: 72
Discharge: HOME | End: 2017-07-11
Admitting: SURGERY

## 2017-07-11 VITALS
HEIGHT: 70 IN | RESPIRATION RATE: 16 BRPM | OXYGEN SATURATION: 97 % | TEMPERATURE: 97.2 F | HEART RATE: 68 BPM | BODY MASS INDEX: 29.26 KG/M2 | WEIGHT: 204.4 LBS | SYSTOLIC BLOOD PRESSURE: 143 MMHG | DIASTOLIC BLOOD PRESSURE: 71 MMHG

## 2017-07-11 LAB
ABO GROUP BLD: NORMAL
ALBUMIN SERPL-MCNC: 3.5 G/DL (ref 3.5–5.2)
ALBUMIN/GLOB SERPL: 0.9 G/DL
ALP SERPL-CCNC: 103 U/L (ref 39–117)
ALT SERPL W P-5'-P-CCNC: 13 U/L (ref 1–41)
ANION GAP SERPL CALCULATED.3IONS-SCNC: 15.2 MMOL/L
APTT PPP: 34.7 SECONDS (ref 22.7–35.4)
AST SERPL-CCNC: 23 U/L (ref 1–40)
BACTERIA UR QL AUTO: ABNORMAL /HPF
BILIRUB SERPL-MCNC: 0.5 MG/DL (ref 0.1–1.2)
BILIRUB UR QL STRIP: NEGATIVE
BLD GP AB SCN SERPL QL: NEGATIVE
BUN BLD-MCNC: 43 MG/DL (ref 8–23)
BUN/CREAT SERPL: 12.6 (ref 7–25)
CALCIUM SPEC-SCNC: 8.9 MG/DL (ref 8.6–10.5)
CHLORIDE SERPL-SCNC: 100 MMOL/L (ref 98–107)
CLARITY UR: CLEAR
CO2 SERPL-SCNC: 20.8 MMOL/L (ref 22–29)
COLOR UR: YELLOW
CREAT BLD-MCNC: 3.4 MG/DL (ref 0.76–1.27)
DEPRECATED RDW RBC AUTO: 47.9 FL (ref 37–54)
ERYTHROCYTE [DISTWIDTH] IN BLOOD BY AUTOMATED COUNT: 13.8 % (ref 11.5–14.5)
GFR SERPL CREATININE-BSD FRML MDRD: 18 ML/MIN/1.73
GLOBULIN UR ELPH-MCNC: 4.1 GM/DL
GLUCOSE BLD-MCNC: 111 MG/DL (ref 65–99)
GLUCOSE UR STRIP-MCNC: NEGATIVE MG/DL
HCT VFR BLD AUTO: 30 % (ref 40.4–52.2)
HGB BLD-MCNC: 10.1 G/DL (ref 13.7–17.6)
HGB UR QL STRIP.AUTO: NEGATIVE
HYALINE CASTS UR QL AUTO: ABNORMAL /LPF
INR PPP: 1.14 (ref 0.9–1.1)
KETONES UR QL STRIP: NEGATIVE
LEUKOCYTE ESTERASE UR QL STRIP.AUTO: NEGATIVE
MCH RBC QN AUTO: 32 PG (ref 27–32.7)
MCHC RBC AUTO-ENTMCNC: 33.7 G/DL (ref 32.6–36.4)
MCV RBC AUTO: 94.9 FL (ref 79.8–96.2)
NITRITE UR QL STRIP: NEGATIVE
PH UR STRIP.AUTO: <=5 [PH] (ref 5–8)
PLATELET # BLD AUTO: 56 10*3/MM3 (ref 140–500)
PMV BLD AUTO: 9.5 FL (ref 6–12)
POTASSIUM BLD-SCNC: 4.1 MMOL/L (ref 3.5–5.2)
PROT SERPL-MCNC: 7.6 G/DL (ref 6–8.5)
PROT UR QL STRIP: ABNORMAL
PROTHROMBIN TIME: 14.2 SECONDS (ref 11.7–14.2)
RBC # BLD AUTO: 3.16 10*6/MM3 (ref 4.6–6)
RBC # UR: ABNORMAL /HPF
REF LAB TEST METHOD: ABNORMAL
RH BLD: POSITIVE
SODIUM BLD-SCNC: 136 MMOL/L (ref 136–145)
SP GR UR STRIP: 1.02 (ref 1–1.03)
SQUAMOUS #/AREA URNS HPF: ABNORMAL /HPF
UROBILINOGEN UR QL STRIP: ABNORMAL
WBC NRBC COR # BLD: 2.43 10*3/MM3 (ref 4.5–10.7)
WBC UR QL AUTO: ABNORMAL /HPF

## 2017-07-11 PROCEDURE — 81001 URINALYSIS AUTO W/SCOPE: CPT | Performed by: SURGERY

## 2017-07-11 PROCEDURE — 85027 COMPLETE CBC AUTOMATED: CPT | Performed by: SURGERY

## 2017-07-11 PROCEDURE — 85730 THROMBOPLASTIN TIME PARTIAL: CPT | Performed by: SURGERY

## 2017-07-11 PROCEDURE — 36415 COLL VENOUS BLD VENIPUNCTURE: CPT

## 2017-07-11 PROCEDURE — 80053 COMPREHEN METABOLIC PANEL: CPT | Performed by: SURGERY

## 2017-07-11 PROCEDURE — 86850 RBC ANTIBODY SCREEN: CPT | Performed by: SURGERY

## 2017-07-11 PROCEDURE — 85610 PROTHROMBIN TIME: CPT | Performed by: SURGERY

## 2017-07-11 PROCEDURE — 86900 BLOOD TYPING SEROLOGIC ABO: CPT | Performed by: SURGERY

## 2017-07-11 PROCEDURE — 71020 HC CHEST PA AND LATERAL: CPT

## 2017-07-11 PROCEDURE — 86901 BLOOD TYPING SEROLOGIC RH(D): CPT | Performed by: SURGERY

## 2017-07-11 NOTE — DISCHARGE INSTRUCTIONS
Take the following medications the morning of surgery with a small sip of water:  CARDURA, HYDRALAZINE, RANITIDINE    ARRIVAL TIME 08:30        General Instructions:  • Do not eat or drink anything after midnight the night before surgery.  • If applicable bring your C-PAP/ BI-PAP machine.  • Bring any papers given to you in the doctor’s office.  • Wear clean comfortable clothes and socks.  • Do not wear contact lenses or make-up.  Bring a case for your glasses.   • Leave all other valuables and jewelry at home.      If you were given a blood bank ID arm band remember to bring it with you the day of surgery.    Preventing a Surgical Site Infection:  • For 2 to 3 days before surgery, avoid shaving with a razor because the razor can irritate skin and make it easier to develop an infection.  • The night prior to surgery sleep in a clean bed with clean clothing.  Do not allow pets to sleep with you.  • Shower on the morning of surgery using a fresh bar of anti-bacterial soap (such as Dial) and clean washcloth.  Dry with a clean towel and dress in clean clothing.  • Ask your surgeon if you will be receiving antibiotics prior to surgery.  • Make sure you, your family, and all healthcare providers clean their hands with soap and water or an alcohol based hand  before caring for you or your wound.    Day of surgery:  Upon arrival, a Pre-op nurse and Anesthesiologist will review your health history, obtain vital signs, and answer questions you may have.  The only belongings needed at this time will be your home medications and if applicable your C-PAP/BI-PAP machine.  If you are staying overnight your family can leave the rest of your belongings in the car and bring them to your room later.  A Pre-op nurse will start an IV and you may receive medication in preparation for surgery, including something to help you relax.  Your family will be able to see you in the Pre-op area.  While you are in surgery your family  should notify the waiting room  if they leave the waiting room area and provide a contact phone number.    Please be aware that surgery does come with discomfort.  We want to make every effort to control your discomfort so please discuss any uncontrolled symptoms with your nurse.   Your doctor will most likely have prescribed pain medications.      If you are going home after surgery you will receive individualized written care instructions before being discharged.  A responsible adult must drive you to and from the hospital on the day of your surgery and stay with you for 24 hours.    If you are staying overnight following surgery, you will be transported to your hospital room following the recovery period.  [facility] has all private rooms.    If you have any questions please call Pre-Admission Testing at [phone].  Deductibles and co-payments are collected on the day of service. Please be prepared to pay the required co-pay, deductible or deposit on the day of service as defined by your plan.

## 2017-07-12 ENCOUNTER — APPOINTMENT (OUTPATIENT)
Dept: ONCOLOGY | Facility: HOSPITAL | Age: 72
End: 2017-07-12

## 2017-07-12 ENCOUNTER — OFFICE VISIT (OUTPATIENT)
Dept: ONCOLOGY | Facility: CLINIC | Age: 72
End: 2017-07-12

## 2017-07-12 ENCOUNTER — APPOINTMENT (OUTPATIENT)
Dept: LAB | Facility: HOSPITAL | Age: 72
End: 2017-07-12

## 2017-07-12 VITALS
WEIGHT: 203 LBS | DIASTOLIC BLOOD PRESSURE: 70 MMHG | RESPIRATION RATE: 16 BRPM | OXYGEN SATURATION: 97 % | SYSTOLIC BLOOD PRESSURE: 140 MMHG | HEIGHT: 70 IN | BODY MASS INDEX: 29.06 KG/M2 | HEART RATE: 82 BPM | TEMPERATURE: 97.9 F

## 2017-07-12 DIAGNOSIS — D69.6 THROMBOCYTOPENIA (HCC): Primary | ICD-10-CM

## 2017-07-12 DIAGNOSIS — D72.819 CHRONIC LEUKOPENIA: ICD-10-CM

## 2017-07-12 LAB
BASOPHILS # BLD AUTO: 0.02 10*3/MM3 (ref 0–0.1)
BASOPHILS NFR BLD AUTO: 0.8 % (ref 0–1.1)
DEPRECATED RDW RBC AUTO: 46.7 FL (ref 37–49)
EOSINOPHIL # BLD AUTO: 0.06 10*3/MM3 (ref 0–0.36)
EOSINOPHIL NFR BLD AUTO: 2.4 % (ref 1–5)
ERYTHROCYTE [DISTWIDTH] IN BLOOD BY AUTOMATED COUNT: 13.4 % (ref 11.7–14.5)
HCT VFR BLD AUTO: 30 % (ref 40–49)
HGB BLD-MCNC: 10.2 G/DL (ref 13.5–16.5)
IMM GRANULOCYTES # BLD: 0.02 10*3/MM3 (ref 0–0.03)
IMM GRANULOCYTES NFR BLD: 0.8 % (ref 0–0.5)
LYMPHOCYTES # BLD AUTO: 0.62 10*3/MM3 (ref 1–3.5)
LYMPHOCYTES NFR BLD AUTO: 24.5 % (ref 20–49)
MCH RBC QN AUTO: 32.4 PG (ref 27–33)
MCHC RBC AUTO-ENTMCNC: 34 G/DL (ref 32–35)
MCV RBC AUTO: 95.2 FL (ref 83–97)
MONOCYTES # BLD AUTO: 0.25 10*3/MM3 (ref 0.25–0.8)
MONOCYTES NFR BLD AUTO: 9.9 % (ref 4–12)
NEUTROPHILS # BLD AUTO: 1.56 10*3/MM3 (ref 1.5–7)
NEUTROPHILS NFR BLD AUTO: 61.6 % (ref 39–75)
NRBC BLD MANUAL-RTO: 0 /100 WBC (ref 0–0)
PLATELET # BLD AUTO: 74 10*3/MM3 (ref 150–375)
PMV BLD AUTO: 9.6 FL (ref 8.9–12.1)
RBC # BLD AUTO: 3.15 10*6/MM3 (ref 4.3–5.5)
WBC NRBC COR # BLD: 2.53 10*3/MM3 (ref 4–10)

## 2017-07-12 PROCEDURE — 99213 OFFICE O/P EST LOW 20 MIN: CPT | Performed by: NURSE PRACTITIONER

## 2017-07-12 PROCEDURE — 36416 COLLJ CAPILLARY BLOOD SPEC: CPT | Performed by: INTERNAL MEDICINE

## 2017-07-12 PROCEDURE — 85025 COMPLETE CBC W/AUTO DIFF WBC: CPT | Performed by: INTERNAL MEDICINE

## 2017-07-12 RX ORDER — SODIUM CHLORIDE 9 MG/ML
250 INJECTION, SOLUTION INTRAVENOUS AS NEEDED
Status: CANCELLED | OUTPATIENT
Start: 2017-07-12

## 2017-07-12 NOTE — PROGRESS NOTES
Subjective    REASONS FOR FOLLOWUP:    1. 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  Since discontinuation of anticoagulants the patient's hemoglobin has remained normal.  2. Leukopenia and thrombocytopenia due to chronic splenomegaly and remote history of bone marrow testing that suggested myelodysplasia. Under the present circumstances neither the white count nor platelets are changing and hemoglobin remains stable. There is no abnormality in the differential of his white count. The patient will remain in observation.      History of Present Illness      The patient is a 71 y.o. , with the above-mentioned history, who presents today for lab review prior to upcoming surgery.  He is followed in our office for anemia, leukopenia, thrombocytopenia.  He is scheduled to undergo hemodialysis shunt placement by Dr. Bill Deal, vascular surgery next Tuesday, 7/18/2017.  He has undergone preoperative workup.  The patient reports he continues to feel well.  He does regularly follow up with Dr. Gordon, nephrology.  We do plan to administer Procrit for hemoglobin less than 10, however, his hemoglobin has remained above 10 of recent.  He denies any fevers or chills.  He denies any signs or symptoms of bleeding.   The patient's wife expresses great concern regarding upcoming procedure, and holding aspirin prior to the procedure.  He continues on aspirin 81 mg daily for atrial fibrillation.  He is not a candidate for anticoagulation due to thrombocytopenia, and GI bleeding.  I have encouraged the patient's wife to call Dr. Deal to discuss this.  I have reassured her that Dr. Mckenzie would agree with holding this medication prior to vascular surgery.    Past Medical History:   Diagnosis Date   • Abnormal serum protein electrophoresis    •  Anemia     Multifactorial   • Atrial fibrillation    • Chronic kidney disease    • Chronic leukopenia and thrombocytopenia    • Crohn disease    • Diverticula of colon    • Fatty liver disease, nonalcoholic    • GERD (gastroesophageal reflux disease)    • GI bleed    • H/O colectomy    • H/O Pericardial effusion    • Hx of renal calculi    • Hypertension    • Ileostomy present    • MGUS (monoclonal gammopathy of unknown significance)    • Sleep apnea     CPAP USED     Past Surgical History:   Procedure Laterality Date   • APPENDECTOMY     • CHOLECYSTECTOMY     • COLECTOMY PARTIAL / TOTAL      History of inflammatory bowel disease with status post colectomy with ileostomy many years ago in the [facility]   • COLON RESECTION WITH COLOSTOMY     • COLON SURGERY     • COLONOSCOPY     • CYSTOSCOPY LITHOLAPAXY BLADDER STONE EXTRACTION         Family History   Problem Relation Age of Onset   • Heart failure Mother    • Hypertension Mother    • Hypertension Father    • Malig Hyperthermia Neg Hx      Social History   Substance Use Topics   • Smoking status: Never Smoker   • Smokeless tobacco: None   • Alcohol use No       ONCOLOGIC/HEMATOLOGIC HISTORY   On 04/06/2015 the patient’s clinical status has dramatically improved.  He has not had any new episodes of infection, congestive heart failure, GI bleeding, with excellent improvement in hemoglobin.  The patient has been able to walk longer distances without shortness of breath.   He has been able to climb steps without shortness of breath and he feels dramatically better.  Hemoglobin has been stable for the past few weeks at 12.1.  His white count and platelet count remain on the low side with no infection or clinical bleeding.  We found documentation of his bone marrow biopsy performed in 2002 and read at the [facility].  Explicitly it was documented that the patient could have myelodysplastic syndrome.  Procrit, as I pointed out to the patient, is a  useful medicine to treat this condition anyway, and given the fact that in 13 years his white count and his platelet count have not changed, makes this diagnosis dubious.  Reading bone marrows for myelodysplasia is one of the most difficult tasks in Hematology.  I suggested to him at some point, if his blood count change, specifically white count modifies or platelet count modifies, to consider repeating the bone marrow testing, flow cytometry and chromosomes.     On 05/26/2015 he was feeling terrific.  He was not having any GI symptomatology, no melena, no enterorrhagia, no abnormal bleeding.  He was functionally perfectly fine.  Urination was ongoing. Uric acid was elevated and I advised him to go back into his Uloric to control this and minimize formation of kidney stones.    On 07/20/2015 the patient had no issues in relationship with anemia. His white count and platelet count remain low associated with his splenomegaly and no issues of consequences related to this. He was advised to remain in observation. He was advised to remain on his folic acid and B12 supplementation.     On 09/14/2015, hemoglobin was excellent at 12.9, stable weight count at 3400 and stable platelet count at 70,000.  We asked him to remain on observation.     On 11/09/2015 the patient’s hematological parameters remain normal.  He was feeling terrific.  His atrial fibrillation looked like it was very quiet and he had no issues in regard to his aspirin use.  He had no embolic phenomenon.  He had some element of fluid retention associated with his chronic renal disease.  We advised him to monitor his diet properly in regard to sodium ingestion.    I have reviewed the patient's medical history in detail and updated the computerized patient record.    Review of Systems   Constitutional: Positive for fatigue. Negative for activity change, appetite change, chills and fever.   HENT: Negative for mouth sores and sore throat.    Eyes: Negative for  "visual disturbance.   Respiratory: Negative for cough and shortness of breath.    Cardiovascular: Negative for chest pain and leg swelling.   Gastrointestinal: Negative for abdominal pain, diarrhea, nausea and vomiting.   Genitourinary: Negative for dysuria and frequency.   Musculoskeletal: Negative for arthralgias and joint swelling.   Skin: Negative for pallor and rash.   Neurological: Negative for dizziness and weakness.   Hematological: Does not bruise/bleed easily.   Psychiatric/Behavioral: The patient is not nervous/anxious.    All other systems reviewed and are negative.     Medications:  The current medication list was reviewed in the EMR    ALLERGIES:    Allergies   Allergen Reactions   • Ace Inhibitors Other (See Comments)     RENAL FAILURE   • Contrast Dye Other (See Comments)     RENAL FAILURE   • Eliquis [Apixaban] Other (See Comments)     BLEEDING ISSUES; PT CANNOT TAKE ANY ANTICOAGULANTS DUE TO LOW PLATELETS EXCEPT ASPIRIN   • Iodinated Diagnostic Agents Other (See Comments)     RENAL FAILURE   • Keflex [Cephalexin] Other (See Comments)     RENAL FAILURE     Objective      Vitals:    07/12/17 1400   BP: 140/70   Pulse: 82   Resp: 16   Temp: 97.9 °F (36.6 °C)   TempSrc: Oral   SpO2: 97%   Weight: 203 lb (92.1 kg)   Height: 70\" (177.8 cm)   PainSc: 0-No pain     Current Status 7/12/2017   ECOG score 0     Physical Exam    GENERAL:  Well-developed, well-nourished in no acute distress.   SKIN:  Warm, dry without rashes, purpura or petechiae.  EYES:  Pupils equal, round.  EOMs intact.  Conjunctivae normal.  EARS:  Hearing intact.  NOSE:  Septum midline.  No excoriations or nasal discharge.  CHEST:  Lungs clear to auscultation. Good airflow.  CARDIAC:  Regular rate and rhythm. Normal S1,S2.  EXTREMITIES:  No clubbing, cyanosis or edema.  NEUROLOGICAL: No focal neurological deficits.  PSYCHIATRIC:  Normal affect and mood.      RECENT LABS:  Lab Results   Component Value Date    WBC 2.53 (L) 07/12/2017    HGB " 10.2 (L) 07/12/2017    HCT 30.0 (L) 07/12/2017    MCV 95.2 07/12/2017    PLT 74 (L) 07/12/2017       Assessment/Plan  :   1. Multifactorial anemia, anemia due to stage III chronic kidney disease.  Previously evaluated ferritin, iron, TIBC, B12, folic acid all which were normal.  Erythropoietin level at 14.0. Consider Procrit for Hbg<10.0.    2.  Atrial fibrillation.  Continuing on aspirin 81 mg daily for prophylaxis.  Due to thrombocytopenia, he reports not tolerant to anticoagulation.      3.  Leukopenia, thrombocytopenia.  Counts relatively stable.  Due to upcoming surgery, hemodialysis shunt placement, we will provide Granix support, as well as platelet transfusion prior to surgery.    PLAN:  1. Granix 480mcg x 3 days beginning Saturday 7/15/2017  2. 1 unit platelets to be transfused in preop Tuesday 7/18/2017  3. Hemodialysis shunt placement per Dr. Deal Tuesday 7/18/2017.  4. No Procirt today for hemoglobin 10.2. Plan to repeat CBC with RN Review Monday    The patient's wife express's concern regarding holding his ASA prior to surgery. Given his history of Afib, she is quite fearful of thrombosis.  I have encouraged the patient to call the surgeon's office to discuss this as ultimately it is his decision.   From a hematologic perspective, holding ASA prior to surgery would be the best practice given his thrombocytopenia.    Mellissa Gonzalez, APRN   7/12/2017      CC:

## 2017-07-14 ENCOUNTER — TRANSCRIBE ORDERS (OUTPATIENT)
Dept: ADMINISTRATIVE | Facility: HOSPITAL | Age: 72
End: 2017-07-14

## 2017-07-14 DIAGNOSIS — D69.6 THROMBOCYTOPENIA (HCC): ICD-10-CM

## 2017-07-14 DIAGNOSIS — D69.6 THROMBOCYTOPENIA (HCC): Primary | ICD-10-CM

## 2017-07-14 NOTE — PROGRESS NOTES
I spoken with several people in the surgery scheduling department, as well as preop.  The patient is scheduled at [facility] Tuesday, 7/18/2017 for hemodialysis shunt placement.  1 unit of platelets has been ordered and is on hold.  Platelets will be administered in the preoperative area.     Additionally, orders for platelets were printed and faxed to [facility] ([phone]).

## 2017-07-15 ENCOUNTER — INFUSION (OUTPATIENT)
Dept: ONCOLOGY | Facility: HOSPITAL | Age: 72
End: 2017-07-15

## 2017-07-15 DIAGNOSIS — D72.819 CHRONIC LEUKOPENIA: Primary | ICD-10-CM

## 2017-07-15 PROCEDURE — 25010000002 TBO-FILGRASTIM 480 MCG/0.8ML SOLUTION PREFILLED SYRINGE: Performed by: INTERNAL MEDICINE

## 2017-07-15 PROCEDURE — 96372 THER/PROPH/DIAG INJ SC/IM: CPT

## 2017-07-15 RX ADMIN — TBO-FILGRASTIM 480 MCG: 480 INJECTION, SOLUTION SUBCUTANEOUS at 13:37

## 2017-07-15 NOTE — PATIENT INSTRUCTIONS
Discussion with pt and spouse concerning Granix injection and possible side effects. Verbalized understanding. Printed info from [employer] given.

## 2017-07-16 ENCOUNTER — INFUSION (OUTPATIENT)
Dept: ONCOLOGY | Facility: HOSPITAL | Age: 72
End: 2017-07-16

## 2017-07-16 VITALS
TEMPERATURE: 98.7 F | HEART RATE: 78 BPM | OXYGEN SATURATION: 98 % | SYSTOLIC BLOOD PRESSURE: 135 MMHG | DIASTOLIC BLOOD PRESSURE: 70 MMHG | RESPIRATION RATE: 18 BRPM

## 2017-07-16 DIAGNOSIS — D72.819 CHRONIC LEUKOPENIA: Primary | ICD-10-CM

## 2017-07-16 PROCEDURE — 99283 EMERGENCY DEPT VISIT LOW MDM: CPT

## 2017-07-16 PROCEDURE — 96372 THER/PROPH/DIAG INJ SC/IM: CPT

## 2017-07-16 PROCEDURE — 25010000002 TBO-FILGRASTIM 480 MCG/0.8ML SOLUTION PREFILLED SYRINGE: Performed by: INTERNAL MEDICINE

## 2017-07-16 PROCEDURE — 36415 COLL VENOUS BLD VENIPUNCTURE: CPT

## 2017-07-16 RX ORDER — SODIUM CHLORIDE 0.9 % (FLUSH) 0.9 %
10 SYRINGE (ML) INJECTION AS NEEDED
Status: DISCONTINUED | OUTPATIENT
Start: 2017-07-16 | End: 2017-07-17 | Stop reason: HOSPADM

## 2017-07-16 RX ADMIN — TBO-FILGRASTIM 480 MCG: 480 INJECTION, SOLUTION SUBCUTANEOUS at 13:18

## 2017-07-17 ENCOUNTER — APPOINTMENT (OUTPATIENT)
Dept: ONCOLOGY | Facility: HOSPITAL | Age: 72
End: 2017-07-17

## 2017-07-17 ENCOUNTER — APPOINTMENT (OUTPATIENT)
Dept: LAB | Facility: HOSPITAL | Age: 72
End: 2017-07-17

## 2017-07-17 ENCOUNTER — TELEPHONE (OUTPATIENT)
Dept: ONCOLOGY | Facility: CLINIC | Age: 72
End: 2017-07-17

## 2017-07-17 ENCOUNTER — HOSPITAL ENCOUNTER (EMERGENCY)
Facility: HOSPITAL | Age: 72
Discharge: HOME | End: 2017-07-17
Attending: EMERGENCY MEDICINE | Admitting: EMERGENCY MEDICINE

## 2017-07-17 ENCOUNTER — APPOINTMENT (OUTPATIENT)
Dept: CT IMAGING | Facility: HOSPITAL | Age: 72
End: 2017-07-17

## 2017-07-17 VITALS
TEMPERATURE: 97.6 F | RESPIRATION RATE: 16 BRPM | DIASTOLIC BLOOD PRESSURE: 85 MMHG | BODY MASS INDEX: 29.2 KG/M2 | HEIGHT: 70 IN | SYSTOLIC BLOOD PRESSURE: 139 MMHG | HEART RATE: 78 BPM | OXYGEN SATURATION: 96 % | WEIGHT: 204 LBS

## 2017-07-17 DIAGNOSIS — K85.90 ACUTE PANCREATITIS WITHOUT INFECTION OR NECROSIS, UNSPECIFIED PANCREATITIS TYPE: Primary | ICD-10-CM

## 2017-07-17 LAB
ALBUMIN SERPL-MCNC: 3.9 G/DL (ref 3.5–5.2)
ALBUMIN/GLOB SERPL: 0.9 G/DL
ALP SERPL-CCNC: 127 U/L (ref 39–117)
ALT SERPL W P-5'-P-CCNC: 16 U/L (ref 1–41)
ANION GAP SERPL CALCULATED.3IONS-SCNC: 15.8 MMOL/L
AST SERPL-CCNC: 25 U/L (ref 1–40)
BACTERIA UR QL AUTO: ABNORMAL /HPF
BASOPHILS # BLD AUTO: 0.02 10*3/MM3 (ref 0–0.2)
BASOPHILS NFR BLD AUTO: 0.1 % (ref 0–1.5)
BILIRUB SERPL-MCNC: 1.2 MG/DL (ref 0.1–1.2)
BILIRUB UR QL STRIP: NEGATIVE
BUN BLD-MCNC: 49 MG/DL (ref 8–23)
BUN/CREAT SERPL: 13.4 (ref 7–25)
CALCIUM SPEC-SCNC: 9.5 MG/DL (ref 8.6–10.5)
CHLORIDE SERPL-SCNC: 98 MMOL/L (ref 98–107)
CLARITY UR: CLEAR
CO2 SERPL-SCNC: 19.2 MMOL/L (ref 22–29)
COLOR UR: YELLOW
CREAT BLD-MCNC: 3.67 MG/DL (ref 0.76–1.27)
D-LACTATE SERPL-SCNC: 1.9 MMOL/L (ref 0.5–2)
DEPRECATED RDW RBC AUTO: 49.2 FL (ref 37–54)
EOSINOPHIL # BLD AUTO: 0.07 10*3/MM3 (ref 0–0.7)
EOSINOPHIL NFR BLD AUTO: 0.5 % (ref 0.3–6.2)
ERYTHROCYTE [DISTWIDTH] IN BLOOD BY AUTOMATED COUNT: 13.7 % (ref 11.5–14.5)
GFR SERPL CREATININE-BSD FRML MDRD: 16 ML/MIN/1.73
GLOBULIN UR ELPH-MCNC: 4.2 GM/DL
GLUCOSE BLD-MCNC: 107 MG/DL (ref 65–99)
GLUCOSE UR STRIP-MCNC: NEGATIVE MG/DL
HCT VFR BLD AUTO: 33 % (ref 40.4–52.2)
HGB BLD-MCNC: 10.7 G/DL (ref 13.7–17.6)
HGB UR QL STRIP.AUTO: NEGATIVE
HOLD SPECIMEN: NORMAL
HOLD SPECIMEN: NORMAL
HYALINE CASTS UR QL AUTO: ABNORMAL /LPF
IMM GRANULOCYTES # BLD: 0.35 10*3/MM3 (ref 0–0.03)
IMM GRANULOCYTES NFR BLD: 2.5 % (ref 0–0.5)
KETONES UR QL STRIP: NEGATIVE
LEUKOCYTE ESTERASE UR QL STRIP.AUTO: ABNORMAL
LIPASE SERPL-CCNC: 144 U/L (ref 13–60)
LYMPHOCYTES # BLD AUTO: 0.77 10*3/MM3 (ref 0.9–4.8)
LYMPHOCYTES NFR BLD AUTO: 5.4 % (ref 19.6–45.3)
MCH RBC QN AUTO: 31.7 PG (ref 27–32.7)
MCHC RBC AUTO-ENTMCNC: 32.4 G/DL (ref 32.6–36.4)
MCV RBC AUTO: 97.6 FL (ref 79.8–96.2)
MONOCYTES # BLD AUTO: 0.94 10*3/MM3 (ref 0.2–1.2)
MONOCYTES NFR BLD AUTO: 6.6 % (ref 5–12)
NEUTROPHILS # BLD AUTO: 12.03 10*3/MM3 (ref 1.9–8.1)
NEUTROPHILS NFR BLD AUTO: 84.9 % (ref 42.7–76)
NITRITE UR QL STRIP: NEGATIVE
PH UR STRIP.AUTO: <=5 [PH] (ref 5–8)
PLATELET # BLD AUTO: 57 10*3/MM3 (ref 140–500)
PMV BLD AUTO: 9.7 FL (ref 6–12)
POTASSIUM BLD-SCNC: 4.6 MMOL/L (ref 3.5–5.2)
PROT SERPL-MCNC: 8.1 G/DL (ref 6–8.5)
PROT UR QL STRIP: ABNORMAL
RBC # BLD AUTO: 3.38 10*6/MM3 (ref 4.6–6)
RBC # UR: ABNORMAL /HPF
REF LAB TEST METHOD: ABNORMAL
SODIUM BLD-SCNC: 133 MMOL/L (ref 136–145)
SP GR UR STRIP: 1.02 (ref 1–1.03)
SQUAMOUS #/AREA URNS HPF: ABNORMAL /HPF
UROBILINOGEN UR QL STRIP: ABNORMAL
WBC NRBC COR # BLD: 14.18 10*3/MM3 (ref 4.5–10.7)
WBC UR QL AUTO: ABNORMAL /HPF
WHOLE BLOOD HOLD SPECIMEN: NORMAL
WHOLE BLOOD HOLD SPECIMEN: NORMAL

## 2017-07-17 PROCEDURE — 87086 URINE CULTURE/COLONY COUNT: CPT | Performed by: EMERGENCY MEDICINE

## 2017-07-17 PROCEDURE — 74176 CT ABD & PELVIS W/O CONTRAST: CPT

## 2017-07-17 PROCEDURE — 85025 COMPLETE CBC W/AUTO DIFF WBC: CPT | Performed by: EMERGENCY MEDICINE

## 2017-07-17 PROCEDURE — 83605 ASSAY OF LACTIC ACID: CPT | Performed by: EMERGENCY MEDICINE

## 2017-07-17 PROCEDURE — 81001 URINALYSIS AUTO W/SCOPE: CPT | Performed by: EMERGENCY MEDICINE

## 2017-07-17 PROCEDURE — 83690 ASSAY OF LIPASE: CPT | Performed by: EMERGENCY MEDICINE

## 2017-07-17 PROCEDURE — 80053 COMPREHEN METABOLIC PANEL: CPT | Performed by: EMERGENCY MEDICINE

## 2017-07-17 RX ORDER — HYDROCODONE BITARTRATE AND ACETAMINOPHEN 5; 325 MG/1; MG/1
1 TABLET ORAL EVERY 6 HOURS PRN
Qty: 15 TABLET | Refills: 0 | Status: SHIPPED | OUTPATIENT
Start: 2017-07-17 | End: 2017-07-24 | Stop reason: SDDI

## 2017-07-17 RX ORDER — ONDANSETRON 4 MG/1
4 TABLET, FILM COATED ORAL EVERY 8 HOURS PRN
Qty: 10 TABLET | Refills: 0 | Status: SHIPPED | OUTPATIENT
Start: 2017-07-17 | End: 2017-07-24 | Stop reason: SDDI

## 2017-07-17 NOTE — ED PROVIDER NOTES
EMERGENCY DEPARTMENT ENCOUNTER    CHIEF COMPLAINT  Chief Complaint: Abdominal pain  History given by: patient   History limited by: nothing  Room Number: 17/17  PMD: Nina Tam MD      HPI:  Pt is a 71 y.o. male who presents complaining of upper abdominal pain w/ radiation to the back onset 1 day s/p filgrastim injection. Pt states his pain was an 8 upon arrival but has resolved to a 5 since being in the ED. Pt denies having N/V/D.  Pt's family states he has an appointment this Tuesday for fistula placement.     Duration:  1 day  Onset: gradual  Timing: constant  Location: upper abd  Radiation: back  Quality: unspecified  Intensity/Severity: moderate  Progression: unchanged  Associated Symptoms: unknown  Aggravating Factors: unknown  Alleviating Factors: unknown  Previous Episodes: unknown  Treatment before arrival: Pt states he had filgrastim injections yesterday and today.    PAST MEDICAL HISTORY  Active Ambulatory Problems     Diagnosis Date Noted   • Anemia due to stage 3 (moderate) chronic renal failure treated with erythropoietin 05/02/2016   • Persistent atrial fibrillation 05/02/2016   • Kidney disease, chronic, stage II (mild, EGFR 60+ ml/min) 05/02/2016   • Thrombocytopenia 05/02/2016   • Chronic leukopenia 05/02/2016     Resolved Ambulatory Problems     Diagnosis Date Noted   • No Resolved Ambulatory Problems     Past Medical History:   Diagnosis Date   • Abnormal serum protein electrophoresis    • Anemia    • Atrial fibrillation    • Chronic kidney disease    • Chronic leukopenia and thrombocytopenia    • Crohn disease    • Diverticula of colon    • Fatty liver disease, nonalcoholic    • GERD (gastroesophageal reflux disease)    • GI bleed    • H/O colectomy    • H/O Pericardial effusion    • Hx of renal calculi    • Hypertension    • Ileostomy present    • MGUS (monoclonal gammopathy of unknown significance)    • Sleep apnea        PAST SURGICAL HISTORY  Past Surgical History:   Procedure  Laterality Date   • APPENDECTOMY     • CHOLECYSTECTOMY     • COLECTOMY PARTIAL / TOTAL      History of inflammatory bowel disease with status post colectomy with ileostomy many years ago in the [facility]   • COLON RESECTION WITH COLOSTOMY     • COLON SURGERY     • COLONOSCOPY     • CYSTOSCOPY LITHOLAPAXY BLADDER STONE EXTRACTION         FAMILY HISTORY  Family History   Problem Relation Age of Onset   • Heart failure Mother    • Hypertension Mother    • Hypertension Father    • Malig Hyperthermia Neg Hx        SOCIAL HISTORY  Social History     Social History   • Marital status:      Spouse name: Gillian   • Number of children: N/A   • Years of education: College     Occupational History   •  Retired     Social History Main Topics   • Smoking status: Never Smoker   • Smokeless tobacco: Not on file   • Alcohol use No   • Drug use: No   • Sexual activity: Not on file     Other Topics Concern   • Not on file     Social History Narrative       ALLERGIES  Ace inhibitors; Contrast dye; Eliquis [apixaban]; Iodinated diagnostic agents; and Keflex [cephalexin]    REVIEW OF SYSTEMS  Review of Systems   Constitutional: Negative for activity change, appetite change and fever.   HENT: Negative for congestion and sore throat.    Eyes: Negative.    Respiratory: Negative for cough and shortness of breath.    Cardiovascular: Negative for chest pain and leg swelling.   Gastrointestinal: Positive for abdominal pain (upper w/ radiation to back). Negative for diarrhea and vomiting.   Endocrine: Negative.    Genitourinary: Negative for decreased urine volume and dysuria.   Musculoskeletal: Negative for neck pain.   Skin: Negative for rash and wound.   Allergic/Immunologic: Negative.    Neurological: Negative for weakness, numbness and headaches.   Hematological: Negative.    Psychiatric/Behavioral: Negative.    All other systems reviewed and are negative.      PHYSICAL EXAM  ED Triage Vitals   Temp Heart Rate Resp BP SpO2    07/16/17 2112 07/16/17 2112 07/16/17 2112 07/16/17 2148 07/16/17 2112   97.6 °F (36.4 °C) 101 16 164/71 97 %      Temp src Heart Rate Source Patient Position BP Location FiO2 (%)   07/16/17 2112 07/16/17 2112 07/16/17 2148 07/16/17 2148 --   Tympanic Monitor Lying Right arm        Physical Exam   Constitutional: He is oriented to person, place, and time and well-developed, well-nourished, and in no distress.   HENT:   Head: Normocephalic and atraumatic.   Eyes: EOM are normal. Pupils are equal, round, and reactive to light.   Neck: Normal range of motion. Neck supple.   Cardiovascular: Normal rate, regular rhythm and normal heart sounds.    Pulmonary/Chest: Effort normal and breath sounds normal. No respiratory distress.   Abdominal: Soft. There is tenderness (moderate) in the epigastric area. There is no rebound and no guarding.   Musculoskeletal: Normal range of motion. He exhibits no edema.   Neurological: He is alert and oriented to person, place, and time. He has normal sensation and normal strength.   Skin: Skin is warm and dry.   Psychiatric: Mood and affect normal.   Nursing note and vitals reviewed.      LAB RESULTS  Lab Results (last 24 hours)     Procedure Component Value Units Date/Time    CBC & Differential [[id number]] Collected:  07/17/17 0018    Specimen:  Blood Updated:  07/17/17 0043    Narrative:       The following orders were created for panel order CBC & Differential.  Procedure                               Abnormality         Status                     ---------                               -----------         ------                     CBC Auto Differential[[id number]]        Abnormal            Final result                 Please view results for these tests on the individual orders.    Comprehensive Metabolic Panel [[id number]]  (Abnormal) Collected:  07/17/17 0018    Specimen:  Blood Updated:  07/17/17 0057     Glucose 107 (H) mg/dL      BUN 49 (H) mg/dL      Creatinine 3.67 (H) mg/dL       Sodium 133 (L) mmol/L      Potassium 4.6 mmol/L      Chloride 98 mmol/L      CO2 19.2 (L) mmol/L      Calcium 9.5 mg/dL      Total Protein 8.1 g/dL      Albumin 3.90 g/dL      ALT (SGPT) 16 U/L      AST (SGOT) 25 U/L      Alkaline Phosphatase 127 (H) U/L      Total Bilirubin 1.2 mg/dL      eGFR Non African Amer 16 (L) mL/min/1.73      Globulin 4.2 gm/dL      A/G Ratio 0.9 g/dL      BUN/Creatinine Ratio 13.4     Anion Gap 15.8 mmol/L     Narrative:       The MDRD GFR formula is only valid for adults with stable renal function between ages 18 and 70.    Lipase [[id number]]  (Abnormal) Collected:  07/17/17 0018    Specimen:  Blood Updated:  07/17/17 0057     Lipase 144 (H) U/L     Urinalysis With / Culture If Indicated [[id number]]  (Abnormal) Collected:  07/17/17 0018    Specimen:  Urine from Urine, Clean Catch Updated:  07/17/17 0037     Color, UA Yellow     Appearance, UA Clear     pH, UA <=5.0     Specific Gravity, UA 1.016     Glucose, UA Negative     Ketones, UA Negative     Bilirubin, UA Negative     Blood, UA Negative     Protein, UA >=300 mg/dL (3+) (A)     Leuk Esterase, UA Moderate (2+) (A)     Nitrite, UA Negative     Urobilinogen, UA 0.2 E.U./dL    Lactic Acid, Plasma [[id number]]  (Normal) Collected:  07/17/17 0018    Specimen:  Blood Updated:  07/17/17 0043     Lactate 1.9 mmol/L     CBC Auto Differential [[id number]]  (Abnormal) Collected:  07/17/17 0018    Specimen:  Blood Updated:  07/17/17 0043     WBC 14.18 (H) 10*3/mm3      RBC 3.38 (L) 10*6/mm3      Hemoglobin 10.7 (L) g/dL      Hematocrit 33.0 (L) %      MCV 97.6 (H) fL      MCH 31.7 pg      MCHC 32.4 (L) g/dL      RDW 13.7 %      RDW-SD 49.2 fl      MPV 9.7 fL      Platelets 57 (L) 10*3/mm3      Neutrophil % 84.9 (H) %      Lymphocyte % 5.4 (L) %      Monocyte % 6.6 %      Eosinophil % 0.5 %      Basophil % 0.1 %      Immature Grans % 2.5 (H) %      Neutrophils, Absolute 12.03 (H) 10*3/mm3      Lymphocytes, Absolute 0.77 (L) 10*3/mm3       Monocytes, Absolute 0.94 10*3/mm3      Eosinophils, Absolute 0.07 10*3/mm3      Basophils, Absolute 0.02 10*3/mm3      Immature Grans, Absolute 0.35 (H) 10*3/mm3     Urinalysis, Microscopic Only [[id number]]  (Abnormal) Collected:  07/17/17 0018    Specimen:  Urine from Urine, Clean Catch Updated:  07/17/17 0039     RBC, UA 0-2 /HPF      WBC, UA 6-12 (A) /HPF      Bacteria, UA None Seen /HPF      Squamous Epithelial Cells, UA 0-2 /HPF      Hyaline Casts, UA 7-12 /LPF      Methodology Automated Microscopy    Urine Culture [[id number]] Collected:  07/17/17 0018    Specimen:  Urine from Urine, Clean Catch Updated:  07/17/17 0036          I ordered the above labs and reviewed the results    RADIOLOGY  CT Abdomen Pelvis Without Contrast   Final Result   1.  Resolved pericardial and bilateral pleural effusions.   2. Small renal lesions as discussed, left nephrolithiasis,   nonobstructing.   3. Liver nodularity suggests cirrhosis.   4. Homogeneous splenic enlargement.   5. Slight enlargement of fatty parastomal hernia without inflammation.   6. Colonic diverticulosis.   7. Moderate prostate gland enlargement                   This study was performed with techniques to keep radiation doses as low   as reasonably achievable (ALARA). Individualized dose reduction   techniques using automated exposure control or adjustment of mA and/or   kV according to the patient size were employed.        This report was finalized on 7/17/2017 3:08 AM by Bill Mccarthy MD.               I ordered the above noted radiological studies. Interpreted by radiologist. . Reviewed by me in PACS.       PROCEDURES  Procedures      PROGRESS AND CONSULTS  ED Course   2115- Ordered labs for further evaluation and IVF for hydration.   0036- Ordered labs for further evaluation  0135- Notified pt of elevated lipase levels on initial encounter  0140- Ordered abdomen/pelvis CT for further evaluation.   0322-BP- 150/87 HR- 79 Temp- 97.6 °F (36.4 °C) (Tympanic)  O2 sat- 98%  Rechecked the patient who is in NAD and is resting comfortably. Notified pt of abnormal CT findings including acute pancreatitis. Discussed plan to discharge w/ pt and family. Pt and family are in agreement and all questions and concerns have been addressed at this time.       MEDICAL DECISION MAKING  Results were reviewed/discussed with the patient and they were also made aware of online access. Pt also made aware that some labs, such as cultures, will not be resulted during ER visit and follow up with PMD is necessary.     MDM  Number of Diagnoses or Management Options  Acute pancreatitis without infection or necrosis, unspecified pancreatitis type:      Amount and/or Complexity of Data Reviewed  Clinical lab tests: ordered and reviewed (BUN 49, Creatinine 3.67, Alkaline Phosphatase 127, eGFR Non African Amer 16, WBC 14.18, RBC 3.38, Hemoglobin 10.7, Hematocrit 33.0, MCV 97.6, Platelets 57, Neutrophil % 84.9,  Lymphocyte % 5.4, Immature Grans %  2.5, Neutrophils, Absolute 12.03, Protein, UA >=300 mg/dL (3+), Leuk Esterase, UA Moderate, WBC UA 6-12,)  Tests in the radiology section of CPT®: ordered and reviewed (Abdomen/Pelvis CT results show resolved pericardial and bilateral pleural effusions,small renal lesions as discussed, left nephrolithiasis, nonobstructing, liver nodularity suggests cirrhosis, homogeneous splenic enlargement, slight enlargement of fatty parastomal hernia without inflammation, colonic diverticulosis, and moderate prostate gland enlargement  )  Decide to obtain previous medical records or to obtain history from someone other than the patient: yes  Review and summarize past medical records: yes  Independent visualization of images, tracings, or specimens: yes    Patient Progress  Patient progress: stable         DIAGNOSIS  Final diagnoses:   Acute pancreatitis without infection or necrosis, unspecified pancreatitis type       DISPOSITION  DISCHARGE    Patient discharged in stable  condition.    Reviewed implications of results, diagnosis, meds, responsibility to follow up, warning signs and symptoms of possible worsening, potential complications and reasons to return to ER.    Patient/Family voiced understanding of above instructions.    Discussed plan for discharge, as there is no emergent indication for admission.  Pt/family is agreeable and understands need for follow up and repeat testing.  Pt is aware that discharge does not mean that nothing is wrong but it indicates no emergency is present that requires admission and they must continue care with follow-up as given below or physician of their choice.     FOLLOW-UP  Nina Tam MD  [address] [address]  [phone]    Call           Medication List      New Prescriptions          HYDROcodone-acetaminophen 5-325 MG per tablet   Commonly known as:  NORCO   Take 1 tablet by mouth Every 6 (Six) Hours As Needed for Moderate Pain .       ondansetron 4 MG tablet   Commonly known as:  ZOFRAN   Take 1 tablet by mouth Every 8 (Eight) Hours As Needed for Nausea.         Changed          cetirizine 10 MG tablet   Commonly known as:  zyrTEC   Take 0.5 tablets by mouth Daily.   What changed:    - when to take this  - reasons to take this               Latest Documented Vital Signs:  As of 3:24 AM  BP- 150/87 HR- 79 Temp- 97.6 °F (36.4 °C) (Tympanic) O2 sat- 98%    --  Documentation assistance provided by best Pastrana for Ck Chirinos EMD.  Information recorded by the scribe was done at my direction and has been verified and validated by me.       Sherry Pastrana  07/17/17 0324       Ck Chirinos MD  07/17/17 0706

## 2017-07-17 NOTE — ED TRIAGE NOTES
Pt c/o lower abd pain that radiates to lower back. Denies n/v/d. Pt states he had a filgrastim injection yesterday and today. With a possible sx Tuesday.

## 2017-07-17 NOTE — TELEPHONE ENCOUNTER
Patients wife calling.  He wan in ER for pancreatitis and his surgery was cancelled.  Therefore he does not need to get his granix today.  Will cancel appt for now.  Has f/u with MD next week.

## 2017-07-18 ENCOUNTER — TELEPHONE (OUTPATIENT)
Dept: ONCOLOGY | Facility: CLINIC | Age: 72
End: 2017-07-18

## 2017-07-18 LAB — BACTERIA SPEC AEROBE CULT: NO GROWTH

## 2017-07-18 NOTE — TELEPHONE ENCOUNTER
Wife notified to resume aspirin.     ----- Message from Sharif Mckenzie MD sent at 7/18/2017  8:57 AM EDT -----  OK TO RESUME ASPIRIN  ----- Message -----     From: Mercedez Nassar RN     Sent: 7/17/2017  12:54 PM       To: MD Dr. Jonah Allen, patients surgery was cancelled due to pancreatitis.  Cancelled his granix today.  They are asking if he should resume his aspirin 81mg.  Surgery has not been rescheduled.  I told him to hold off due to platelet count of 57k today.  Let me know if he should restart it.  Thanks.

## 2017-07-24 ENCOUNTER — OFFICE VISIT (OUTPATIENT)
Dept: ONCOLOGY | Facility: CLINIC | Age: 72
End: 2017-07-24

## 2017-07-24 ENCOUNTER — LAB (OUTPATIENT)
Dept: LAB | Facility: HOSPITAL | Age: 72
End: 2017-07-24

## 2017-07-24 ENCOUNTER — APPOINTMENT (OUTPATIENT)
Dept: ONCOLOGY | Facility: HOSPITAL | Age: 72
End: 2017-07-24

## 2017-07-24 VITALS
TEMPERATURE: 98.4 F | BODY MASS INDEX: 28.37 KG/M2 | HEIGHT: 70 IN | HEART RATE: 76 BPM | RESPIRATION RATE: 16 BRPM | DIASTOLIC BLOOD PRESSURE: 80 MMHG | WEIGHT: 198.2 LBS | SYSTOLIC BLOOD PRESSURE: 142 MMHG

## 2017-07-24 DIAGNOSIS — K74.60 CIRRHOSIS OF LIVER WITHOUT ASCITES, UNSPECIFIED HEPATIC CIRRHOSIS TYPE (HCC): ICD-10-CM

## 2017-07-24 DIAGNOSIS — D72.819 CHRONIC LEUKOPENIA: Primary | ICD-10-CM

## 2017-07-24 DIAGNOSIS — N18.2 KIDNEY DISEASE, CHRONIC, STAGE II (MILD, EGFR 60+ ML/MIN): ICD-10-CM

## 2017-07-24 DIAGNOSIS — D73.2 CONGESTIVE SPLENOMEGALY: ICD-10-CM

## 2017-07-24 DIAGNOSIS — D64.9 ANEMIA, UNSPECIFIED TYPE: ICD-10-CM

## 2017-07-24 DIAGNOSIS — D63.1 ANEMIA DUE TO STAGE 3 (MODERATE) CHRONIC RENAL FAILURE TREATED WITH ERYTHROPOIETIN (HCC): ICD-10-CM

## 2017-07-24 DIAGNOSIS — N18.30 ANEMIA DUE TO STAGE 3 (MODERATE) CHRONIC RENAL FAILURE TREATED WITH ERYTHROPOIETIN (HCC): ICD-10-CM

## 2017-07-24 DIAGNOSIS — D69.6 THROMBOCYTOPENIA (HCC): ICD-10-CM

## 2017-07-24 LAB
BASOPHILS # BLD AUTO: 0.02 10*3/MM3 (ref 0–0.1)
BASOPHILS NFR BLD AUTO: 0.6 % (ref 0–1.1)
DEPRECATED RDW RBC AUTO: 44.9 FL (ref 37–49)
EOSINOPHIL # BLD AUTO: 0.07 10*3/MM3 (ref 0–0.36)
EOSINOPHIL NFR BLD AUTO: 2.3 % (ref 1–5)
ERYTHROCYTE [DISTWIDTH] IN BLOOD BY AUTOMATED COUNT: 13.2 % (ref 11.7–14.5)
HCT VFR BLD AUTO: 29.9 % (ref 40–49)
HGB BLD-MCNC: 10.2 G/DL (ref 13.5–16.5)
IMM GRANULOCYTES # BLD: 0.08 10*3/MM3 (ref 0–0.03)
IMM GRANULOCYTES NFR BLD: 2.6 % (ref 0–0.5)
LYMPHOCYTES # BLD AUTO: 0.67 10*3/MM3 (ref 1–3.5)
LYMPHOCYTES NFR BLD AUTO: 21.5 % (ref 20–49)
MCH RBC QN AUTO: 31.9 PG (ref 27–33)
MCHC RBC AUTO-ENTMCNC: 34.1 G/DL (ref 32–35)
MCV RBC AUTO: 93.4 FL (ref 83–97)
MONOCYTES # BLD AUTO: 0.34 10*3/MM3 (ref 0.25–0.8)
MONOCYTES NFR BLD AUTO: 10.9 % (ref 4–12)
NEUTROPHILS # BLD AUTO: 1.93 10*3/MM3 (ref 1.5–7)
NEUTROPHILS NFR BLD AUTO: 62.1 % (ref 39–75)
NRBC BLD MANUAL-RTO: 0 /100 WBC (ref 0–0)
PLATELET # BLD AUTO: 64 10*3/MM3 (ref 150–375)
PMV BLD AUTO: 9.6 FL (ref 8.9–12.1)
RBC # BLD AUTO: 3.2 10*6/MM3 (ref 4.3–5.5)
WBC NRBC COR # BLD: 3.11 10*3/MM3 (ref 4–10)

## 2017-07-24 PROCEDURE — 85025 COMPLETE CBC W/AUTO DIFF WBC: CPT

## 2017-07-24 PROCEDURE — 36416 COLLJ CAPILLARY BLOOD SPEC: CPT

## 2017-07-24 PROCEDURE — 99214 OFFICE O/P EST MOD 30 MIN: CPT | Performed by: INTERNAL MEDICINE

## 2017-07-24 NOTE — PROGRESS NOTES
Subjective  REASONS FOR FOLLOWUP:    1. 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  Since discontinuation of anticoagulants the patient's hemoglobin has remained normal. The patient has associated leukopenia and thrombocytopenia due to chronic splenomegaly and remote history of bone marrow testing that suggested myelodysplasia. Under the present circumstances neither the white count nor platelets are changing and hemoglobin remains stable. There is no abnormality in the differential of his white count. There are no symptoms or signs that would suggest any further progression into myelodysplasia. The patient will remain in observation.            History of Present Illness       The patient is here today in the company of his wife. He got Granix injections at the dose of 480 µg each one of them before and the patient underwent a fistula for a dialysis catheter.  He did develop very significant epigastric pain with the second injection of Granix he was seen in emergency room and he was told that he could have an episode of pancreatitis even though on clinical grounds and radiologically there was no such an evidence of this condition just with the exception of an elevated lipase has remained elevated since the time that the patient had gallbladder pancreatitis in 2011.  Given the patient was seen in the emergency room was thought that because and ambulates was elevated to 144 had another episode of pancreatitis he was treated the emergency room and release with near complete resolution of his abdominal pain before he will release assay.  The symptoms have subsided completely.  He has a new schedule for his fistula on over state 8.  Is very likely that the epigastric pain that he developed were related to the use of Granix  including bone pain and his spleen enlargement and this medicine will not be utilized again on him.   though will undergo platelet transfusion 1 hour before his planned surgery on August 8.               Past Medical History, Past Surgical HistorySignificant for hypertension and also he has history of atrial fibrillation and was chronically anticoagulated with Eliquis in the past. Since January 2015 the patient is no longer receiving this medicine and moved to aspirin during his persistency of GI bleeding. The patient also has a history of chronic kidney disease with creatinine baseline around 2.5. The patient also has a history of inflammatory bowel disease with status post colectomy with ileostomy many years ago in the [facility]. The patient also has a history of pericardial effusion with an GABRIELLE 1:80 homogenous that has never changed or modified. He also has a positive lupus anticoagulant. He has had chronic leukopenia and thrombocytopenia for which he underwent by Dr. Null 10 years ago, bone marrow testing at [facility]. We have requested this information. The patient in the past has had a normal B12 level of 1094, normal folate level and ferritin level of 617 with an iron level of 34. In the past he has received IV iron in the form of Venofer while being at [facility] in January 2015. He has had serum protein electrophoresis at least on 3 different occasions, failing to show any monoclonal proteins. He also has had a CT scan of the abdomen that shows a general spleen anatomy without any retroperitoneal adenopathies and nothing to suggest portal hypertension or cirrhosis of the liver. Kidney anatomy disclosed thinning of the kidney cortexes consistent with chronic medical illness. He has no hydronephrosis. No retroperitoneal adenopathies.   SOCIAL HISTORY:  The patient lives with his wife in [place], [place]. He is retired. He does not smoke and does not drink any alcohol.    ONCOLOGIC/HEMATOLOGIC HISTORY     On 04/06/2015 the patient’s clinical status has dramatically improved.  He has not had any new episodes of infection, congestive heart failure, GI bleeding, with excellent improvement in hemoglobin.  The patient has been able to walk longer distances without shortness of breath.   He has been able to climb steps without shortness of breath and he feels dramatically better.  Hemoglobin has been stable for the past few weeks at 12.1.  His white count and platelet count remain on the low side with no infection or clinical bleeding.  We found documentation of his bone marrow biopsy performed in 2002 and read at the [facility].  Explicitly it was documented that the patient could have myelodysplastic syndrome.  Procrit, as I pointed out to the patient, is a useful medicine to treat this condition anyway, and given the fact that in 13 years his white count and his platelet count have not changed, makes this diagnosis dubious.  Reading bone marrows for myelodysplasia is one of the most difficult tasks in Hematology.  I suggested to him at some point, if his blood count change, specifically white count modifies or platelet count modifies, to consider repeating the bone marrow testing, flow cytometry and chromosomes.     On 05/26/2015 he was feeling terrific.  He was not having any GI symptomatology, no melena, no enterorrhagia, no abnormal bleeding.  He was functionally perfectly fine.  Urination was ongoing. Uric acid was elevated and I advised him to go back into his Uloric to control this and minimize formation of kidney stones.    On 07/20/2015 the patient had no issues in relationship with anemia. His white count and platelet count remain low associated with his splenomegaly and no issues of consequences related to this. He was advised to remain in observation. He was advised to remain on his folic acid and B12 supplementation.     On 09/14/2015, hemoglobin was  excellent at 12.9, stable weight count at 3400 and stable platelet count at 70,000.  We asked him to remain on observation.     On 11/09/2015 the patient’s hematological parameters remain normal.  He was feeling terrific.  His atrial fibrillation looked like it was very quiet and he had no issues in regard to his aspirin use.  He had no embolic phenomenon.  He had some element of fluid retention associated with his chronic renal disease.  We advised him to monitor his diet properly in regard to sodium ingestion.  Review of Systems   General: no fever, chills, fatigue, weight changes, or lack of appetite.  Eyes: no epiphora, xerophthalmia,conjunctivitis, pain, glaucoma, blurred vision, blindness, secretion, photophobia, proptosis, diplopia.  Ears: no otorrhea, tinnitus, otorrhagia,SOME deafness, pain, vertigo.  Nose: STATED rhinorrhea, epistaxis, alteration in perception of odors, STATED sinuses pressure, SNEEZING AND ITCHING  Mouth: no alteration in gums or teeth,  ulcers, no difficulty with mastication or deglut ion, no odynophagia.  Neck: no masses or pain, no thyroid alterations, no pain in muscles or arteries, no carotid odynia, no crepitation.  Respiratory: no cough, sputum production, dyspnea, trepopnea, pleuritic pain, hemoptysis.  Heart: no syncope, irregularity, palpitations, angina, orthopnea, paroxysmal nocturnal dyspnea.  Vascular Venous: no tenderness,edema, palpable cords, postphlebitic syndrome, skin changes or ulcerations.  Vascular Arterial: no distal ischemia, claudication, gangrene, neuropathic ischemic pain, skin ulcers, paleness or cyanosis.  GI: no dysphagia, odynophagia, no regurgitation, no heartburn,no indigestion,no nausea,no vomiting,no hematemesis ,no melena,no jaundice,no distention, no obstipation,no enterorrhagia,no proctalgia,no anal  lesions, nochanges in bowel habits.  : no frequency, hesitancy, hematuria, discharge, pain.  Musculoskeletal: no muscle or tendon pain or  "inflammation, joint pain, edema, functional limitation, fasciculations, mass.  Neurologic: no headache, seizures, alterations on Craneal nerves, no motor or senssory deficit, normal coordination, no alteration in memory, orientation, calculation,writting, verbal or written language.  Skin: no rashes, pruritus or localized lesions.  Psychiatric: no anxiety, depression, agitation, delusions, proper insight.A comprehensive 14 point review of systems was performed and was negative except as mentioned.    Medications:  The current medication list was reviewed in the EMR    ALLERGIES:    Allergies   Allergen Reactions   • Ace Inhibitors Other (See Comments)     RENAL FAILURE   • Contrast Dye Other (See Comments)     RENAL FAILURE   • Eliquis [Apixaban] Other (See Comments)     BLEEDING ISSUES; PT CANNOT TAKE ANY ANTICOAGULANTS DUE TO LOW PLATELETS EXCEPT ASPIRIN   • Iodinated Diagnostic Agents Other (See Comments)     RENAL FAILURE   • Keflex [Cephalexin] Other (See Comments)     RENAL FAILURE       Objective      Vitals:    07/24/17 1404   BP: 142/80   Pulse: 76   Resp: 16   Temp: 98.4 °F (36.9 °C)   Weight: 198 lb 3.2 oz (89.9 kg)   Height: 70\" (177.8 cm)   PainSc: 0-No pain     Current Status 7/24/2017   ECOG score 0       Physical Exam    GENERAL:  Well-developed, well-nourished  Patient  in no acute distress.   SKIN:  Warm, dry without rashes, purpura or petechiae.  HEENT:  Pupils were equal and reactive to light and accomodation, conjunctivas non injected, no pterigion, normal extraocular movements, normal visual acuity.   Mouth mucosa was moist, no exudates in oropharynx, normal gum line, normal roof of the mouth and pillars, normal papillations of the tongue.Ear canals were FULL OF WAX, NOT VISUALIZED tympanic membranes, normal hearing acuity.No pain in mastoid area or erythema.  NECK:  Supple with good range of motion; no thyromegaly or masses, no JVD or bruits, no cervical adenopathies.No carotid arteries pain, " no carotid abnormal pulsation or arterial dance.  LYMPHATICS:  No cervical, supraclavicular, axillary, epitrochlear or inguinal adenopathy.  CHEST:  Normal excursion of both torrey thoraces, normal voice fremitus, no subcutaneous emphysema, normal axillas, no rashes or acanthosis nigricans. Lungs clear to percussion and auscultation, normal breath sounds bilaterally, no wheezing, crackles or ronchi, no stridor, no rubs.  CARDIAC AND VASCULAR: PMI not displaced,no thrills, normal rate and irrregular rhythm atrial fibrilation controller VR, without murmurs, rubs or S3 or S4 right or left sided gallops. Normal femoral, popliteal, pedis, brachial and carotid pulses.  ABDOMEN:  Soft, nontender with no organomegaly or masses, no ascites, no collateral circulation,no distention,no Contreras sign, no abdominal pain, no inguinal hernias,no umbilical hernias, no abdominal bruits. Normal bowel sounds.Colostomy in place LLQ.  GENITAL: Not  Performed.  EXTREMITIES  AND SPINE:  No clubbing, cyanosis or edema, no deformities or pain .No kyphosis, scoliosis, deformities or pain in spine, ribs or pelvic bone.  NEUROLOGICAL:  Patient was awake, alert, oriented to time, person and place,normal gait and coordination    RECENT LABS:  Hematology WBC   Date Value Ref Range Status   07/24/2017 3.11 (L) 4.00 - 10.00 10*3/mm3 Final     RBC   Date Value Ref Range Status   07/24/2017 3.20 (L) 4.30 - 5.50 10*6/mm3 Final     Hemoglobin   Date Value Ref Range Status   07/24/2017 10.2 (L) 13.5 - 16.5 g/dL Final     Hematocrit   Date Value Ref Range Status   07/24/2017 29.9 (L) 40.0 - 49.0 % Final     Platelets   Date Value Ref Range Status   07/24/2017 64 (L) 150 - 375 10*3/mm3 Final          Assessment/Plan  : This patient has history of multifactorial anemia as well as leukopenia and thrombocytopenia. He is no longer bleeding in the gastrointestinal tract because he is no longer receiving anticoagulants. He remains on a baby aspirin.   In the  previous visit, we measured his ferritin, iron, TIBC, B12, folic acid levels, all being normal. We performed a serum protein electrophoresis that is close, a polyclonal expansion of globulins with no monoclonal protein found. We documented a very low erythropoietin level at 14.     He has received erythropoietin raising the hemoglobin to 10.2 and remaining at that level in absence of any of this medication for a while. The patient actually feels much better and energy is much better. He has no shortness of breath and he is capable of doing anything that he pleases. His white count and platelet count remain stable.     RECOMMENDATIONS: I strongly believe us a stated overall that the abdominal pain that the patient developed were related to the use of Granix.  Under no circumstances this medicine will be utilized on this patient again.  In preparation for his upcoming surgery on August 8 the patient will receive transfusion of platelets 1 unit with no IV premedications while waiting in the previous surgical site.  His will be done in the afternoon to be that his planned time for surgery is to be 2pm.  I will see him back in 2 months with a CBC.  AGAIN no more Granix on him ,and now given the excellent hemoglobin there is no need for any more PROCRIT.  Blood count will be done on August 4 and review by a nurse.                            7/24/2017      CC:

## 2017-07-25 ENCOUNTER — TELEPHONE (OUTPATIENT)
Dept: ONCOLOGY | Facility: HOSPITAL | Age: 72
End: 2017-07-25

## 2017-07-25 NOTE — TELEPHONE ENCOUNTER
Per , pt ok to hold aspirin and fish oil as laid out in previous RN note, and to resume them the day after surgery. Notified wife of this information, v/u, no further questions or concerns.

## 2017-07-25 NOTE — TELEPHONE ENCOUNTER
----- Message from Sharif Mckenzie MD sent at 7/25/2017  8:33 AM EDT -----  Follow surgeon instructions, resume the day after surgery  ----- Message -----     From: Emilee Peoples RN     Sent: 7/25/2017   8:16 AM       To: Sharif Mckenzie MD    Wife called and forgot to ask you questions yesterday. Wants to know if ok for him to stop aspirin 81mg 5 days prior to his surgery and is it ok to stop his omega 3 fish oil 2 weeks prior to his surgery and if so when should he restart them both? These were recommended by the surgeon. Thanks!

## 2017-07-25 NOTE — TELEPHONE ENCOUNTER
Wife wanting to know if ok for pt to stop aspirin 81mg 5 days prior to upcoming surgery and if ok to stop omega 3 fish oil 2 weeks prior to surgery and when should he restart both. Msg sent to  for input.

## 2017-08-01 ENCOUNTER — APPOINTMENT (OUTPATIENT)
Dept: PREADMISSION TESTING | Facility: HOSPITAL | Age: 72
End: 2017-08-01

## 2017-08-01 VITALS
RESPIRATION RATE: 16 BRPM | HEART RATE: 66 BPM | WEIGHT: 198 LBS | SYSTOLIC BLOOD PRESSURE: 144 MMHG | OXYGEN SATURATION: 99 % | DIASTOLIC BLOOD PRESSURE: 66 MMHG | TEMPERATURE: 97.8 F | HEIGHT: 70 IN | BODY MASS INDEX: 28.35 KG/M2

## 2017-08-01 LAB
ABO GROUP BLD: NORMAL
ALBUMIN SERPL-MCNC: 3.7 G/DL (ref 3.5–5.2)
ALBUMIN/GLOB SERPL: 1 G/DL
ALP SERPL-CCNC: 104 U/L (ref 39–117)
ALT SERPL W P-5'-P-CCNC: 14 U/L (ref 1–41)
ANION GAP SERPL CALCULATED.3IONS-SCNC: 15 MMOL/L
APTT PPP: 34.5 SECONDS (ref 22.7–35.4)
AST SERPL-CCNC: 19 U/L (ref 1–40)
BACTERIA UR QL AUTO: ABNORMAL /HPF
BILIRUB SERPL-MCNC: 0.5 MG/DL (ref 0.1–1.2)
BILIRUB UR QL STRIP: NEGATIVE
BLD GP AB SCN SERPL QL: NEGATIVE
BUN BLD-MCNC: 67 MG/DL (ref 8–23)
BUN/CREAT SERPL: 18.3 (ref 7–25)
CALCIUM SPEC-SCNC: 9.1 MG/DL (ref 8.6–10.5)
CHLORIDE SERPL-SCNC: 99 MMOL/L (ref 98–107)
CLARITY UR: CLEAR
CO2 SERPL-SCNC: 18 MMOL/L (ref 22–29)
COLOR UR: YELLOW
CREAT BLD-MCNC: 3.66 MG/DL (ref 0.76–1.27)
DEPRECATED RDW RBC AUTO: 47.5 FL (ref 37–54)
ERYTHROCYTE [DISTWIDTH] IN BLOOD BY AUTOMATED COUNT: 13.6 % (ref 11.5–14.5)
GFR SERPL CREATININE-BSD FRML MDRD: 16 ML/MIN/1.73
GLOBULIN UR ELPH-MCNC: 3.8 GM/DL
GLUCOSE BLD-MCNC: 113 MG/DL (ref 65–99)
GLUCOSE UR STRIP-MCNC: NEGATIVE MG/DL
HCT VFR BLD AUTO: 29.9 % (ref 40.4–52.2)
HGB BLD-MCNC: 9.9 G/DL (ref 13.7–17.6)
HGB UR QL STRIP.AUTO: NEGATIVE
HYALINE CASTS UR QL AUTO: ABNORMAL /LPF
INR PPP: 1.27 (ref 0.9–1.1)
KETONES UR QL STRIP: NEGATIVE
LEUKOCYTE ESTERASE UR QL STRIP.AUTO: ABNORMAL
MCH RBC QN AUTO: 31.9 PG (ref 27–32.7)
MCHC RBC AUTO-ENTMCNC: 33.1 G/DL (ref 32.6–36.4)
MCV RBC AUTO: 96.5 FL (ref 79.8–96.2)
NITRITE UR QL STRIP: NEGATIVE
PH UR STRIP.AUTO: <=5 [PH] (ref 5–8)
PLATELET # BLD AUTO: 70 10*3/MM3 (ref 140–500)
PMV BLD AUTO: 9.5 FL (ref 6–12)
POTASSIUM BLD-SCNC: 4.4 MMOL/L (ref 3.5–5.2)
PROT SERPL-MCNC: 7.5 G/DL (ref 6–8.5)
PROT UR QL STRIP: ABNORMAL
PROTHROMBIN TIME: 15.4 SECONDS (ref 11.7–14.2)
RBC # BLD AUTO: 3.1 10*6/MM3 (ref 4.6–6)
RBC # UR: ABNORMAL /HPF
REF LAB TEST METHOD: ABNORMAL
RH BLD: POSITIVE
SODIUM BLD-SCNC: 132 MMOL/L (ref 136–145)
SP GR UR STRIP: 1.02 (ref 1–1.03)
SQUAMOUS #/AREA URNS HPF: ABNORMAL /HPF
UROBILINOGEN UR QL STRIP: ABNORMAL
WBC NRBC COR # BLD: 2.52 10*3/MM3 (ref 4.5–10.7)
WBC UR QL AUTO: ABNORMAL /HPF

## 2017-08-01 PROCEDURE — 85027 COMPLETE CBC AUTOMATED: CPT | Performed by: SURGERY

## 2017-08-01 PROCEDURE — 86900 BLOOD TYPING SEROLOGIC ABO: CPT | Performed by: SURGERY

## 2017-08-01 PROCEDURE — 85610 PROTHROMBIN TIME: CPT | Performed by: SURGERY

## 2017-08-01 PROCEDURE — 36415 COLL VENOUS BLD VENIPUNCTURE: CPT

## 2017-08-01 PROCEDURE — 80053 COMPREHEN METABOLIC PANEL: CPT | Performed by: SURGERY

## 2017-08-01 PROCEDURE — 85730 THROMBOPLASTIN TIME PARTIAL: CPT | Performed by: SURGERY

## 2017-08-01 PROCEDURE — 81001 URINALYSIS AUTO W/SCOPE: CPT | Performed by: SURGERY

## 2017-08-01 PROCEDURE — 86850 RBC ANTIBODY SCREEN: CPT | Performed by: SURGERY

## 2017-08-01 PROCEDURE — 86901 BLOOD TYPING SEROLOGIC RH(D): CPT | Performed by: SURGERY

## 2017-08-01 NOTE — DISCHARGE INSTRUCTIONS
Take the following medications the morning of surgery with a small sip of water: DOXAZOSIN, HYDRALAZINE        General Instructions:  • Do not eat solid food after midnight the night before surgery.  • You may drink clear liquids day of surgery but must stop at least one hour before your hospital arrival time.  • It is beneficial for you to have a clear drink that contains carbohydrates the day of surgery.  We suggest a 20 ounce bottle of Gatorade or Powerade for non-diabetic patients or a 20 ounce bottle of G2 or Powerade Zero for diabetic patients.     Clear liquids are liquids you can see through.  Nothing red in color.     Plain water                               Sports drinks  Sodas                                   Gelatin (Jell-O)  Fruit juices without pulp such as white grape juice and apple juice  Popsicles that contain no fruit or yogurt  Tea or coffee (no cream or milk added)  Gatorade / Powerade  G2 / Powerade Zero    •   • If applicable bring your C-PAP/ BI-PAP machine.  • Bring any papers given to you in the doctor’s office.  • Wear clean comfortable clothes and socks.  • Do not wear contact lenses or make-up.  Bring a case for your glasses.   • Leave all other valuables and jewelry at home.  • The Pre-Admission Testing nurse will instruct you to bring medications if unable to obtain an accurate list in Pre-Admission Testing.        If you were given a blood bank ID arm band remember to bring it with you the day of surgery.    Preventing a Surgical Site Infection:  • For 2 to 3 days before surgery, avoid shaving with a razor because the razor can irritate skin and make it easier to develop an infection.  • The night prior to surgery sleep in a clean bed with clean clothing.  Do not allow pets to sleep with you.  • Shower on the morning of surgery using a fresh bar of anti-bacterial soap (such as Dial) and clean washcloth.  Dry with a clean towel and dress in clean clothing.  • Ask your surgeon if you  will be receiving antibiotics prior to surgery.  • Make sure you, your family, and all healthcare providers clean their hands with soap and water or an alcohol based hand  before caring for you or your wound.    Day of surgery:8/8/2017. MAIN OR. ARRIVAL TIME FOR SURGERY 1130AM . HAVE'S APPOINTMENT 10 AM 3 [facility]  Upon arrival, a Pre-op nurse and Anesthesiologist will review your health history, obtain vital signs, and answer questions you may have.  The only belongings needed at this time will be your home medications and if applicable your C-PAP/BI-PAP machine.  If you are staying overnight your family can leave the rest of your belongings in the car and bring them to your room later.  A Pre-op nurse will start an IV and you may receive medication in preparation for surgery, including something to help you relax.  Your family will be able to see you in the Pre-op area.  While you are in surgery your family should notify the waiting room  if they leave the waiting room area and provide a contact phone number.    Please be aware that surgery does come with discomfort.  We want to make every effort to control your discomfort so please discuss any uncontrolled symptoms with your nurse.   Your doctor will most likely have prescribed pain medications.      If you are going home after surgery you will receive individualized written care instructions before being discharged.  A responsible adult must drive you to and from the hospital on the day of your surgery and stay with you for 24 hours.        If you have any questions please call Pre-Admission Testing at [phone].  Deductibles and co-payments are collected on the day of service. Please be prepared to pay the required co-pay, deductible or deposit on the day of service as defined by your plan.

## 2017-08-04 ENCOUNTER — LAB (OUTPATIENT)
Dept: LAB | Facility: HOSPITAL | Age: 72
End: 2017-08-04

## 2017-08-04 ENCOUNTER — CLINICAL SUPPORT (OUTPATIENT)
Dept: ONCOLOGY | Facility: HOSPITAL | Age: 72
End: 2017-08-04

## 2017-08-04 DIAGNOSIS — N18.30 ANEMIA DUE TO STAGE 3 (MODERATE) CHRONIC RENAL FAILURE TREATED WITH ERYTHROPOIETIN (HCC): ICD-10-CM

## 2017-08-04 DIAGNOSIS — N18.2 KIDNEY DISEASE, CHRONIC, STAGE II (MILD, EGFR 60+ ML/MIN): ICD-10-CM

## 2017-08-04 DIAGNOSIS — D72.819 CHRONIC LEUKOPENIA: ICD-10-CM

## 2017-08-04 DIAGNOSIS — D63.1 ANEMIA DUE TO STAGE 3 (MODERATE) CHRONIC RENAL FAILURE TREATED WITH ERYTHROPOIETIN (HCC): ICD-10-CM

## 2017-08-04 DIAGNOSIS — D69.6 THROMBOCYTOPENIA (HCC): ICD-10-CM

## 2017-08-04 LAB
BASOPHILS # BLD AUTO: 0.01 10*3/MM3 (ref 0–0.1)
BASOPHILS NFR BLD AUTO: 0.4 % (ref 0–1.1)
DEPRECATED RDW RBC AUTO: 45.6 FL (ref 37–49)
EOSINOPHIL # BLD AUTO: 0.04 10*3/MM3 (ref 0–0.36)
EOSINOPHIL NFR BLD AUTO: 1.5 % (ref 1–5)
ERYTHROCYTE [DISTWIDTH] IN BLOOD BY AUTOMATED COUNT: 13.2 % (ref 11.7–14.5)
HCT VFR BLD AUTO: 30.8 % (ref 40–49)
HGB BLD-MCNC: 10.2 G/DL (ref 13.5–16.5)
IMM GRANULOCYTES # BLD: 0.02 10*3/MM3 (ref 0–0.03)
IMM GRANULOCYTES NFR BLD: 0.7 % (ref 0–0.5)
LYMPHOCYTES # BLD AUTO: 0.71 10*3/MM3 (ref 1–3.5)
LYMPHOCYTES NFR BLD AUTO: 25.8 % (ref 20–49)
MCH RBC QN AUTO: 31 PG (ref 27–33)
MCHC RBC AUTO-ENTMCNC: 33.1 G/DL (ref 32–35)
MCV RBC AUTO: 93.6 FL (ref 83–97)
MONOCYTES # BLD AUTO: 0.35 10*3/MM3 (ref 0.25–0.8)
MONOCYTES NFR BLD AUTO: 12.7 % (ref 4–12)
NEUTROPHILS # BLD AUTO: 1.62 10*3/MM3 (ref 1.5–7)
NEUTROPHILS NFR BLD AUTO: 58.9 % (ref 39–75)
NRBC BLD MANUAL-RTO: 0 /100 WBC (ref 0–0)
PLATELET # BLD AUTO: 76 10*3/MM3 (ref 150–375)
PMV BLD AUTO: 9.4 FL (ref 8.9–12.1)
RBC # BLD AUTO: 3.29 10*6/MM3 (ref 4.3–5.5)
WBC NRBC COR # BLD: 2.75 10*3/MM3 (ref 4–10)

## 2017-08-04 PROCEDURE — 36416 COLLJ CAPILLARY BLOOD SPEC: CPT | Performed by: INTERNAL MEDICINE

## 2017-08-04 PROCEDURE — 85025 COMPLETE CBC W/AUTO DIFF WBC: CPT | Performed by: INTERNAL MEDICINE

## 2017-08-04 NOTE — PROGRESS NOTES
Cbc reviewed with pt. Counts stable. Pt due to have surgery, has pre op orders set up for plt transfusion already. Voices no questions or concerns.

## 2017-08-08 ENCOUNTER — HOSPITAL ENCOUNTER (OUTPATIENT)
Facility: HOSPITAL | Age: 72
Setting detail: HOSPITAL OUTPATIENT SURGERY
Discharge: HOME | End: 2017-08-08
Attending: SURGERY | Admitting: SURGERY

## 2017-08-08 ENCOUNTER — ANESTHESIA (OUTPATIENT)
Dept: PERIOP | Facility: HOSPITAL | Age: 72
End: 2017-08-08

## 2017-08-08 ENCOUNTER — ANESTHESIA EVENT (OUTPATIENT)
Dept: PERIOP | Facility: HOSPITAL | Age: 72
End: 2017-08-08

## 2017-08-08 ENCOUNTER — INFUSION (OUTPATIENT)
Dept: ONCOLOGY | Facility: HOSPITAL | Age: 72
End: 2017-08-08

## 2017-08-08 VITALS
DIASTOLIC BLOOD PRESSURE: 78 MMHG | HEART RATE: 70 BPM | HEIGHT: 70 IN | OXYGEN SATURATION: 97 % | RESPIRATION RATE: 16 BRPM | SYSTOLIC BLOOD PRESSURE: 143 MMHG | WEIGHT: 196 LBS | TEMPERATURE: 97.7 F | BODY MASS INDEX: 28.06 KG/M2

## 2017-08-08 VITALS
OXYGEN SATURATION: 98 % | HEART RATE: 89 BPM | DIASTOLIC BLOOD PRESSURE: 68 MMHG | SYSTOLIC BLOOD PRESSURE: 153 MMHG | TEMPERATURE: 97.8 F | RESPIRATION RATE: 18 BRPM

## 2017-08-08 DIAGNOSIS — D69.6 THROMBOCYTOPENIA (HCC): ICD-10-CM

## 2017-08-08 PROBLEM — N18.4 KIDNEY DISEASE, CHRONIC, STAGE IV (GFR 15-29 ML/MIN): Status: ACTIVE | Noted: 2017-08-08

## 2017-08-08 LAB — GLUCOSE BLDC GLUCOMTR-MCNC: 145 MG/DL (ref 70–130)

## 2017-08-08 PROCEDURE — 25010000002 PROPOFOL 10 MG/ML EMULSION: Performed by: NURSE ANESTHETIST, CERTIFIED REGISTERED

## 2017-08-08 PROCEDURE — 82962 GLUCOSE BLOOD TEST: CPT

## 2017-08-08 PROCEDURE — 25010000002 HEPARIN (PORCINE) PER 1000 UNITS: Performed by: SURGERY

## 2017-08-08 PROCEDURE — 25010000002 VANCOMYCIN PER 500 MG: Performed by: SURGERY

## 2017-08-08 PROCEDURE — 25010000002 FENTANYL CITRATE (PF) 100 MCG/2ML SOLUTION: Performed by: NURSE ANESTHETIST, CERTIFIED REGISTERED

## 2017-08-08 PROCEDURE — 36430 TRANSFUSION BLD/BLD COMPNT: CPT

## 2017-08-08 PROCEDURE — 25010000002 MIDAZOLAM PER 1 MG: Performed by: ANESTHESIOLOGY

## 2017-08-08 PROCEDURE — 25010000002 HEPARIN (PORCINE) PER 1000 UNITS: Performed by: NURSE ANESTHETIST, CERTIFIED REGISTERED

## 2017-08-08 PROCEDURE — P9035 PLATELET PHERES LEUKOREDUCED: HCPCS

## 2017-08-08 PROCEDURE — 25010000002 HYDROCORTISONE SODIUM SUCCINATE 100 MG RECONSTITUTED SOLUTION: Performed by: NURSE PRACTITIONER

## 2017-08-08 PROCEDURE — 96374 THER/PROPH/DIAG INJ IV PUSH: CPT

## 2017-08-08 PROCEDURE — 25010000002 MIDAZOLAM PER 1 MG: Performed by: NURSE ANESTHETIST, CERTIFIED REGISTERED

## 2017-08-08 PROCEDURE — 25010000002 PHENYLEPHRINE PER 1 ML: Performed by: NURSE ANESTHETIST, CERTIFIED REGISTERED

## 2017-08-08 RX ORDER — DIPHENHYDRAMINE HYDROCHLORIDE 50 MG/ML
12.5 INJECTION INTRAMUSCULAR; INTRAVENOUS
Status: DISCONTINUED | OUTPATIENT
Start: 2017-08-08 | End: 2017-08-08 | Stop reason: HOSPADM

## 2017-08-08 RX ORDER — PROMETHAZINE HYDROCHLORIDE 25 MG/1
12.5 TABLET ORAL ONCE AS NEEDED
Status: DISCONTINUED | OUTPATIENT
Start: 2017-08-08 | End: 2017-08-08 | Stop reason: HOSPADM

## 2017-08-08 RX ORDER — SODIUM CHLORIDE 9 MG/ML
9 INJECTION, SOLUTION INTRAVENOUS CONTINUOUS PRN
Status: DISCONTINUED | OUTPATIENT
Start: 2017-08-08 | End: 2017-08-08 | Stop reason: HOSPADM

## 2017-08-08 RX ORDER — OXYCODONE AND ACETAMINOPHEN 7.5; 325 MG/1; MG/1
1 TABLET ORAL ONCE AS NEEDED
Status: DISCONTINUED | OUTPATIENT
Start: 2017-08-08 | End: 2017-08-08 | Stop reason: HOSPADM

## 2017-08-08 RX ORDER — LIDOCAINE HYDROCHLORIDE 20 MG/ML
INJECTION, SOLUTION INFILTRATION; PERINEURAL AS NEEDED
Status: DISCONTINUED | OUTPATIENT
Start: 2017-08-08 | End: 2017-08-08 | Stop reason: SURG

## 2017-08-08 RX ORDER — PROMETHAZINE HYDROCHLORIDE 25 MG/1
12.5 TABLET ORAL ONCE AS NEEDED
Status: CANCELLED | OUTPATIENT
Start: 2017-08-08 | End: 2017-08-09

## 2017-08-08 RX ORDER — MIDAZOLAM HYDROCHLORIDE 1 MG/ML
INJECTION INTRAMUSCULAR; INTRAVENOUS AS NEEDED
Status: DISCONTINUED | OUTPATIENT
Start: 2017-08-08 | End: 2017-08-08 | Stop reason: SURG

## 2017-08-08 RX ORDER — FENTANYL CITRATE 50 UG/ML
50 INJECTION, SOLUTION INTRAMUSCULAR; INTRAVENOUS
Status: DISCONTINUED | OUTPATIENT
Start: 2017-08-08 | End: 2017-08-08 | Stop reason: HOSPADM

## 2017-08-08 RX ORDER — PROMETHAZINE HYDROCHLORIDE 25 MG/1
25 SUPPOSITORY RECTAL ONCE AS NEEDED
Status: DISCONTINUED | OUTPATIENT
Start: 2017-08-08 | End: 2017-08-08 | Stop reason: HOSPADM

## 2017-08-08 RX ORDER — PROMETHAZINE HYDROCHLORIDE 25 MG/1
25 TABLET ORAL ONCE AS NEEDED
Status: DISCONTINUED | OUTPATIENT
Start: 2017-08-08 | End: 2017-08-08 | Stop reason: HOSPADM

## 2017-08-08 RX ORDER — LABETALOL HYDROCHLORIDE 5 MG/ML
5 INJECTION, SOLUTION INTRAVENOUS
Status: CANCELLED | OUTPATIENT
Start: 2017-08-08

## 2017-08-08 RX ORDER — HYDRALAZINE HYDROCHLORIDE 20 MG/ML
5 INJECTION INTRAMUSCULAR; INTRAVENOUS
Status: DISCONTINUED | OUTPATIENT
Start: 2017-08-08 | End: 2017-08-08 | Stop reason: HOSPADM

## 2017-08-08 RX ORDER — PROMETHAZINE HYDROCHLORIDE 25 MG/1
25 SUPPOSITORY RECTAL ONCE AS NEEDED
Status: CANCELLED | OUTPATIENT
Start: 2017-08-08

## 2017-08-08 RX ORDER — PROMETHAZINE HYDROCHLORIDE 25 MG/1
25 TABLET ORAL ONCE AS NEEDED
Status: CANCELLED | OUTPATIENT
Start: 2017-08-08

## 2017-08-08 RX ORDER — MIDAZOLAM HYDROCHLORIDE 1 MG/ML
1 INJECTION INTRAMUSCULAR; INTRAVENOUS
Status: DISCONTINUED | OUTPATIENT
Start: 2017-08-08 | End: 2017-08-08 | Stop reason: HOSPADM

## 2017-08-08 RX ORDER — FLUMAZENIL 0.1 MG/ML
0.2 INJECTION INTRAVENOUS AS NEEDED
Status: CANCELLED | OUTPATIENT
Start: 2017-08-08

## 2017-08-08 RX ORDER — ONDANSETRON 2 MG/ML
4 INJECTION INTRAMUSCULAR; INTRAVENOUS ONCE AS NEEDED
Status: CANCELLED | OUTPATIENT
Start: 2017-08-08

## 2017-08-08 RX ORDER — PROPOFOL 10 MG/ML
VIAL (ML) INTRAVENOUS CONTINUOUS PRN
Status: DISCONTINUED | OUTPATIENT
Start: 2017-08-08 | End: 2017-08-08 | Stop reason: SURG

## 2017-08-08 RX ORDER — EPHEDRINE SULFATE 50 MG/ML
5 INJECTION, SOLUTION INTRAVENOUS ONCE AS NEEDED
Status: CANCELLED | OUTPATIENT
Start: 2017-08-08

## 2017-08-08 RX ORDER — HYDROMORPHONE HYDROCHLORIDE 1 MG/ML
0.5 INJECTION, SOLUTION INTRAMUSCULAR; INTRAVENOUS; SUBCUTANEOUS
Status: DISCONTINUED | OUTPATIENT
Start: 2017-08-08 | End: 2017-08-08 | Stop reason: HOSPADM

## 2017-08-08 RX ORDER — HYDROCODONE BITARTRATE AND ACETAMINOPHEN 7.5; 325 MG/1; MG/1
1 TABLET ORAL ONCE AS NEEDED
Status: COMPLETED | OUTPATIENT
Start: 2017-08-08 | End: 2017-08-08

## 2017-08-08 RX ORDER — NALOXONE HCL 0.4 MG/ML
0.2 VIAL (ML) INJECTION AS NEEDED
Status: DISCONTINUED | OUTPATIENT
Start: 2017-08-08 | End: 2017-08-08 | Stop reason: HOSPADM

## 2017-08-08 RX ORDER — HYDROCODONE BITARTRATE AND ACETAMINOPHEN 5; 325 MG/1; MG/1
1 TABLET ORAL ONCE AS NEEDED
Status: DISCONTINUED | OUTPATIENT
Start: 2017-08-08 | End: 2017-08-08 | Stop reason: HOSPADM

## 2017-08-08 RX ORDER — SODIUM CHLORIDE 9 MG/ML
250 INJECTION, SOLUTION INTRAVENOUS AS NEEDED
Status: DISCONTINUED | OUTPATIENT
Start: 2017-08-08 | End: 2017-08-08 | Stop reason: HOSPADM

## 2017-08-08 RX ORDER — EPHEDRINE SULFATE 50 MG/ML
5 INJECTION, SOLUTION INTRAVENOUS ONCE AS NEEDED
Status: DISCONTINUED | OUTPATIENT
Start: 2017-08-08 | End: 2017-08-08 | Stop reason: HOSPADM

## 2017-08-08 RX ORDER — FENTANYL CITRATE 50 UG/ML
50 INJECTION, SOLUTION INTRAMUSCULAR; INTRAVENOUS
Status: CANCELLED | OUTPATIENT
Start: 2017-08-08

## 2017-08-08 RX ORDER — HYDROCODONE BITARTRATE AND ACETAMINOPHEN 7.5; 325 MG/1; MG/1
1 TABLET ORAL ONCE AS NEEDED
Status: CANCELLED | OUTPATIENT
Start: 2017-08-08 | End: 2017-08-09

## 2017-08-08 RX ORDER — ONDANSETRON 2 MG/ML
4 INJECTION INTRAMUSCULAR; INTRAVENOUS ONCE AS NEEDED
Status: DISCONTINUED | OUTPATIENT
Start: 2017-08-08 | End: 2017-08-08 | Stop reason: HOSPADM

## 2017-08-08 RX ORDER — PROMETHAZINE HYDROCHLORIDE 25 MG/ML
12.5 INJECTION, SOLUTION INTRAMUSCULAR; INTRAVENOUS ONCE AS NEEDED
Status: DISCONTINUED | OUTPATIENT
Start: 2017-08-08 | End: 2017-08-08 | Stop reason: HOSPADM

## 2017-08-08 RX ORDER — LABETALOL HYDROCHLORIDE 5 MG/ML
5 INJECTION, SOLUTION INTRAVENOUS
Status: DISCONTINUED | OUTPATIENT
Start: 2017-08-08 | End: 2017-08-08 | Stop reason: HOSPADM

## 2017-08-08 RX ORDER — HYDRALAZINE HYDROCHLORIDE 20 MG/ML
5 INJECTION INTRAMUSCULAR; INTRAVENOUS
Status: CANCELLED | OUTPATIENT
Start: 2017-08-08

## 2017-08-08 RX ORDER — FAMOTIDINE 10 MG/ML
20 INJECTION, SOLUTION INTRAVENOUS ONCE
Status: COMPLETED | OUTPATIENT
Start: 2017-08-08 | End: 2017-08-08

## 2017-08-08 RX ORDER — VANCOMYCIN HYDROCHLORIDE 500 MG/10ML
500 INJECTION, POWDER, LYOPHILIZED, FOR SOLUTION INTRAVENOUS ONCE
Status: COMPLETED | OUTPATIENT
Start: 2017-08-08 | End: 2017-08-08

## 2017-08-08 RX ORDER — DIPHENHYDRAMINE HYDROCHLORIDE 50 MG/ML
12.5 INJECTION INTRAMUSCULAR; INTRAVENOUS
Status: CANCELLED | OUTPATIENT
Start: 2017-08-08

## 2017-08-08 RX ORDER — PROMETHAZINE HYDROCHLORIDE 25 MG/ML
12.5 INJECTION, SOLUTION INTRAMUSCULAR; INTRAVENOUS ONCE AS NEEDED
Status: CANCELLED | OUTPATIENT
Start: 2017-08-08

## 2017-08-08 RX ORDER — MIDAZOLAM HYDROCHLORIDE 1 MG/ML
2 INJECTION INTRAMUSCULAR; INTRAVENOUS
Status: DISCONTINUED | OUTPATIENT
Start: 2017-08-08 | End: 2017-08-08 | Stop reason: HOSPADM

## 2017-08-08 RX ORDER — FENTANYL CITRATE 50 UG/ML
INJECTION, SOLUTION INTRAMUSCULAR; INTRAVENOUS AS NEEDED
Status: DISCONTINUED | OUTPATIENT
Start: 2017-08-08 | End: 2017-08-08 | Stop reason: SURG

## 2017-08-08 RX ORDER — OXYCODONE AND ACETAMINOPHEN 7.5; 325 MG/1; MG/1
1 TABLET ORAL ONCE AS NEEDED
Status: CANCELLED | OUTPATIENT
Start: 2017-08-08 | End: 2017-08-09

## 2017-08-08 RX ORDER — HYDROMORPHONE HYDROCHLORIDE 1 MG/ML
0.5 INJECTION, SOLUTION INTRAMUSCULAR; INTRAVENOUS; SUBCUTANEOUS
Status: CANCELLED | OUTPATIENT
Start: 2017-08-08

## 2017-08-08 RX ORDER — NALOXONE HCL 0.4 MG/ML
0.2 VIAL (ML) INJECTION AS NEEDED
Status: CANCELLED | OUTPATIENT
Start: 2017-08-08

## 2017-08-08 RX ORDER — FLUMAZENIL 0.1 MG/ML
0.2 INJECTION INTRAVENOUS AS NEEDED
Status: DISCONTINUED | OUTPATIENT
Start: 2017-08-08 | End: 2017-08-08 | Stop reason: HOSPADM

## 2017-08-08 RX ORDER — HYDROCODONE BITARTRATE AND ACETAMINOPHEN 5; 325 MG/1; MG/1
1-2 TABLET ORAL EVERY 4 HOURS PRN
Qty: 40 TABLET | Refills: 0 | Status: SHIPPED | OUTPATIENT
Start: 2017-08-08 | End: 2017-08-28

## 2017-08-08 RX ORDER — SODIUM CHLORIDE 0.9 % (FLUSH) 0.9 %
1-10 SYRINGE (ML) INJECTION AS NEEDED
Status: DISCONTINUED | OUTPATIENT
Start: 2017-08-08 | End: 2017-08-08 | Stop reason: HOSPADM

## 2017-08-08 RX ORDER — HEPARIN SODIUM 1000 [USP'U]/ML
INJECTION, SOLUTION INTRAVENOUS; SUBCUTANEOUS AS NEEDED
Status: DISCONTINUED | OUTPATIENT
Start: 2017-08-08 | End: 2017-08-08 | Stop reason: SURG

## 2017-08-08 RX ADMIN — FENTANYL CITRATE 25 MCG: 50 INJECTION INTRAMUSCULAR; INTRAVENOUS at 12:04

## 2017-08-08 RX ADMIN — LIDOCAINE HYDROCHLORIDE 50 MG: 20 INJECTION, SOLUTION INFILTRATION; PERINEURAL at 11:59

## 2017-08-08 RX ADMIN — PHENYLEPHRINE HYDROCHLORIDE 100 MCG: 10 INJECTION INTRAVENOUS at 12:50

## 2017-08-08 RX ADMIN — PHENYLEPHRINE HYDROCHLORIDE 50 MCG: 10 INJECTION INTRAVENOUS at 12:45

## 2017-08-08 RX ADMIN — VANCOMYCIN HYDROCHLORIDE 500 MG: 500 INJECTION, POWDER, LYOPHILIZED, FOR SOLUTION INTRAVENOUS at 11:59

## 2017-08-08 RX ADMIN — MIDAZOLAM HYDROCHLORIDE 0.5 MG: 1 INJECTION, SOLUTION INTRAMUSCULAR; INTRAVENOUS at 12:04

## 2017-08-08 RX ADMIN — PROPOFOL 180 MCG/KG/MIN: 10 INJECTION, EMULSION INTRAVENOUS at 12:00

## 2017-08-08 RX ADMIN — PHENYLEPHRINE HYDROCHLORIDE 500 MCG: 10 INJECTION INTRAVENOUS at 13:14

## 2017-08-08 RX ADMIN — HYDROCODONE BITARTRATE AND ACETAMINOPHEN 1 TABLET: 7.5; 325 TABLET ORAL at 14:25

## 2017-08-08 RX ADMIN — VANCOMYCIN HYDROCHLORIDE 500 MG: 500 INJECTION, POWDER, LYOPHILIZED, FOR SOLUTION INTRAVENOUS at 13:26

## 2017-08-08 RX ADMIN — PHENYLEPHRINE HYDROCHLORIDE 100 MCG: 10 INJECTION INTRAVENOUS at 13:00

## 2017-08-08 RX ADMIN — FAMOTIDINE 20 MG: 10 INJECTION, SOLUTION INTRAVENOUS at 10:25

## 2017-08-08 RX ADMIN — HEPARIN SODIUM 3000 UNITS: 1000 INJECTION, SOLUTION INTRAVENOUS; SUBCUTANEOUS at 12:35

## 2017-08-08 RX ADMIN — MIDAZOLAM HYDROCHLORIDE 0.5 MG: 1 INJECTION, SOLUTION INTRAMUSCULAR; INTRAVENOUS at 12:01

## 2017-08-08 RX ADMIN — HYDROCORTISONE SODIUM SUCCINATE 100 MG: 100 INJECTION, POWDER, FOR SOLUTION INTRAMUSCULAR; INTRAVENOUS at 07:57

## 2017-08-08 RX ADMIN — MIDAZOLAM 0.5 MG: 1 INJECTION INTRAMUSCULAR; INTRAVENOUS at 10:25

## 2017-08-08 RX ADMIN — PHENYLEPHRINE HYDROCHLORIDE 300 MCG: 10 INJECTION INTRAVENOUS at 13:06

## 2017-08-08 RX ADMIN — SODIUM CHLORIDE 9 ML/HR: 9 INJECTION, SOLUTION INTRAVENOUS at 10:24

## 2017-08-08 NOTE — PLAN OF CARE
Problem: Patient Care Overview (Adult)  Goal: Plan of Care Review  Outcome: Outcome(s) achieved Date Met:  08/08/17  Goal: Adult Individualization and Mutuality  Outcome: Outcome(s) achieved Date Met:  08/08/17  Goal: Discharge Needs Assessment  Outcome: Outcome(s) achieved Date Met:  08/08/17    Problem: Perioperative Period (Adult)  Goal: Signs and Symptoms of Listed Potential Problems Will be Absent or Manageable (Perioperative Period)  Outcome: Outcome(s) achieved Date Met:  08/08/17

## 2017-08-08 NOTE — PLAN OF CARE
Problem: Perioperative Period (Adult)  Goal: Signs and Symptoms of Listed Potential Problems Will be Absent or Manageable (Perioperative Period)  Outcome: Ongoing (interventions implemented as appropriate)    08/08/17 1358   Perioperative Period   Problems Assessed (Perioperative Period) all

## 2017-08-08 NOTE — PLAN OF CARE
Problem: Patient Care Overview (Adult)  Goal: Plan of Care Review  Outcome: Ongoing (interventions implemented as appropriate)    08/08/17 1010   Coping/Psychosocial Response Interventions   Plan Of Care Reviewed With patient   Patient Care Overview   Progress no change       Goal: Adult Individualization and Mutuality  Outcome: Ongoing (interventions implemented as appropriate)    08/08/17 1010   Individualization   Patient Specific Preferences prefers to b called swati         08/08/17 1010   Individualization   Patient Specific Preferences prefers to b called swati   Patient Specific Goals plan to be discharged to home today   Mutuality/Individual Preferences   What Anxieties, Fears or Concerns Do You Have About Your Health or Care? very anxious about surgery       Goal: Discharge Needs Assessment  Outcome: Ongoing (interventions implemented as appropriate)    08/08/17 1010   Discharge Needs Assessment   Concerns To Be Addressed no discharge needs identified;denies needs/concerns at this time   Discharge Planning Comments preparing for future dialysis         Problem: Perioperative Period (Adult)  Goal: Signs and Symptoms of Listed Potential Problems Will be Absent or Manageable (Perioperative Period)  Outcome: Ongoing (interventions implemented as appropriate)    08/08/17 1010   Perioperative Period   Problems Assessed (Perioperative Period) all   Problems Present (Perioperative Period) none

## 2017-08-08 NOTE — OP NOTE
Operative Note  Date of Admission:  8/8/2017  OR Date: 8/8/2017    Pre-op Diagnosis:   Chronic renal  failure stage IV    Post-op Diagnosis:     Same    Procedure:   1) left brachiocephalic AV fistula placement with cephalic vein transposition    Surgeon: Feng Deal MD    Assistant: Fide ZUNIGA    Anesthesia: Monitor Anesthesia Care    Staff:   Circulator: Mallika Benitez RN; Nona Hudson RN  Scrub Person: Innadavid Lagos; Shima West; Virgie Estevez  Assistant: Fide Hastings    Estimated Blood Loss: Minimal    Complications: none    Findings: see dictation    Indications:    The patient is an 71 y.o. male seen for evaluation severe renal insufficiency with the need for possible renal dialysis in the future.  Patient and family understand risks benefits complications of this procedure.  He got platelets pre-op.  We will give him vancomycin as well as he was not able to get his granulex         Procedure:    Patient was prepped and draped sterilely.  Using local anesthetic we incised the antecubital space exposing the cephalic vein brachial artery.  With these controlled patient was given 3000 units of heparin and the cephalic vein was divided suture ligated on its endpoint and then opened and was falling down onto the brachial artery where under clamp control longitudinal arteriotomy was performed and then an end to side anastomosis using a running 6-0 Prolene suture was performed.  At completion there was a widely patent fistula with still a palpable radial pulse and a good thrill.  Patient had no reversal the heparin.  Closure of the deep tissue with 3-0 Vicryl and closure of the skin with 4-0 Vicryl subcuticular closure and skin glue was performed.  Patient tolerated the procedure well.          Active Hospital Problems (** Indicates Principal Problem)    Diagnosis Date Noted   • Kidney disease, chronic, stage IV (GFR 15-29 ml/min) [N18.4] 08/08/2017      Resolved Hospital Problems     Diagnosis Date Noted Date Resolved   No resolved problems to display.      Bill Deal MD     Date: 8/8/2017  Time: 2:01 PM

## 2017-08-08 NOTE — ANESTHESIA PREPROCEDURE EVALUATION
Anesthesia Evaluation     Patient summary reviewed   no history of anesthetic complications:  NPO Solid Status: > 8 hours       Airway   Mallampati: II  TM distance: >3 FB  Neck ROM: full  no difficulty expected  Dental - normal exam     Pulmonary - normal exam   (+) sleep apnea on CPAP,   Cardiovascular   Exercise tolerance: good (4-7 METS)    ECG reviewed  Rhythm: irregular  Rate: abnormal    (+) hypertension, valvular problems/murmurs, dysrhythmias Atrial Fib,       Neuro/Psych- negative ROS  GI/Hepatic/Renal/Endo    (+)  GERD well controlled, liver disease, renal disease CRI and stones,     Musculoskeletal (-) negative ROS    Abdominal  - normal exam   Substance History - negative use     OB/GYN          Other   (+) blood dyscrasia   history of cancer      Other Comment: Hc/o anemia                                Anesthesia Plan    ASA 3     MAC     intravenous induction   Anesthetic plan and risks discussed with patient.    Plan discussed with CRNA and attending.

## 2017-08-08 NOTE — DISCHARGE INSTRUCTIONS
Moderate Conscious Sedation, Adult, Care After  Refer to this sheet in the next few weeks. These instructions provide you with information on caring for yourself after your procedure. Your health care provider may also give you more specific instructions. Your treatment has been planned according to current medical practices, but problems sometimes occur. Call your health care provider if you have any problems or questions after your procedure.  WHAT TO EXPECT AFTER THE PROCEDURE   After your procedure:  · You may feel sleepy, clumsy, and have poor balance for several hours.  · Vomiting may occur if you eat too soon after the procedure.  HOME CARE INSTRUCTIONS  · Do not participate in any activities where you could become injured for at least 24 hours. Do not:    Drive.    Swim.    Ride a bicycle.    Operate heavy machinery.    Cook.    Use power tools.    Climb ladders.    Work from a high place.  · Do not make important decisions or sign legal documents until you are improved.  · If you vomit, drink water, juice, or soup when you can drink without vomiting. Make sure you have little or no nausea before eating solid foods.  · Only take over-the-counter or prescription medicines for pain, discomfort, or fever as directed by your health care provider.  · Make sure you and your family fully understand everything about the medicines given to you, including what side effects may occur.  · You should not drink alcohol, take sleeping pills, or take medicines that cause drowsiness for at least 24 hours.  · If you smoke, do not smoke without supervision.  · If you are feeling better, you may resume normal activities 24 hours after you were sedated.  · Keep all appointments with your health care provider.  · You should have a responsible adult stay with you for the first 24 hours post procedure.  SEEK MEDICAL CARE IF:  · Your skin is pale or bluish in color.  · You continue to feel nauseous or vomit.  · Your pain is getting  worse and is not helped by medicine.  · You have bleeding or swelling.  · You are still sleepy or feeling clumsy after 24 hours.  SEEK IMMEDIATE MEDICAL CARE IF:  · You develop a rash.  · You have difficulty breathing.  · You develop any type of allergic problem.  · You have a fever.  MAKE SURE YOU:  · Understand these instructions.  · Will watch your condition.  · Will get help right away if you are not doing well or get worse.     This information is not intended to replace advice given to you by your health care provider. Make sure you discuss any questions you have with your health care provider.     Document Released: 10/08/2014 Document Revised: 01/08/2016 Document Reviewed: 10/08/2014  [employer] Interactive Patient Education ©2016 [employer]        [facility]  [address], [place], [place], Scherrer Thomas  [address], [address]  [phone]    Post-Operative Instructions for AV Fistula / Graft   Diet: Regular Diet    Medications: Take your regularly scheduled medications on the day of your surgery, unless your doctor has directed you otherwise. You may be sent home with a prescription for pain medication, follow the directions as prescribed.    Activity Restrictions / Driving: Avoid lifting more than 15 pounds or other activities that stress or compress the access area. No driving for the remainder of the day after surgery. You may drive when you no longer are taking narcotic pain medications.    Incision Care: Some bruising is normal. If you have drainage from the incision please notify the office. Dressing should be removed in 48 hours. After dressing is removed, it is OK to shower. Do not submerge incision until cleared by your surgeon (bath or swimming).    Bathing and Showering: You may shower after you remove your dressing.    Follow-up Appointments: You will need to return to the office for a follow-up visit within 1-3 weeks after your surgery. Please make sure  "you have your appointment scheduled, call [phone].    The patient (you) should:  1. Avoid wearing tight constrictive clothing over that arm.  2. Avoid wearing jewelry that is tight, such as a watch on the access arm.  3. Avoid carrying heavy objects.  4. Avoid purse straps over the fistula.  5. Avoid sleeping on the arm or keeping it bent for extended periods of time.  6. Each day, using your opposite hand, feel over the fistula for the \"thrill\" or vibration that is normally present.    Fistula Information / Care:  ·  It is normal to have swelling in the surgical area. To help control this swelling, you should elevate your arm on a pillow.  ·  Wiggle your fingers and clinch your fist 10 times every hour, while awake, for the first 5-7 days. Also, bend and straighten at the elbow to regain normal range of motion. These exercises are designed to promote circulation in the fingers and aid in draining away the excess fluid accumulation in the immediate area.  · No blood pressures or needle sticks in the arm with your access.    Call the office for the followin. Fever greater than 101.0  2. Uncontrolled pain. This is on a scale of 1-10 (10 being the worst pain imaginable) your pain is a level 7 or above.  3. It is important that you notify our office if you are having numbness and significant pain in the extremity in which you have just had surgery!  4. Decreased or absent thrill.  5. Nausea, diarrhea, and/or vomiting that continue for 12-24 hours.  6. Signs of an infection: redness, increased swelling, drainage, fever and/or chills.  7. Chest pain or difficulty breathing.    The fistula or graft CAN NOT be used until the MD has given written approval. Generally, a graft will be ready to use in 2 weeks, and a fistula will be ready to use in 6-8 weeks.     If you have further questions after reading this handout, the office is open from 8:30am to 5:00pm Monday through Friday. Call [phone].  "

## 2017-08-08 NOTE — BRIEF OP NOTE
ARTERIOVENOUS FISTULA FORMATION  Procedure Note    Bill Landry  8/8/2017      Pre-op Diagnosis: ESRD      Post-op Diagnosis:     same  Procedure/CPT® Codes:      Procedure(s):  LEFT BRACHIAL CEPHALIC AV FISTULA FORMATION with cephalic vein transposition    Surgeon(s):  Bill Deal MD    Anesthesia: Monitor Anesthesia [facility]    Staff:   Circulator: Mallika Benitez RN; Nona Hudson RN  Scrub Person: Bk Lagos; Shima West; Virgie Estevez  Assistant: Fide Hastings    Estimated Blood Loss: *No blood loss documented*  Urine Voided: * No values recorded between 8/8/2017 11:52 AM and 8/8/2017  1:12 PM *    Specimens:                * No specimens in log *      Drains:   Ileostomy ileostomy (Active)           Findings: see dict    Complications: none      Bill Deal MD     Date: 8/8/2017  Time: 1:12 PM

## 2017-08-09 LAB
ABO + RH BLD: NORMAL
BH BB BLOOD EXPIRATION DATE: NORMAL
BH BB BLOOD TYPE BARCODE: 8400
BH BB DISPENSE STATUS: NORMAL
BH BB PRODUCT CODE: NORMAL
BH BB UNIT NUMBER: NORMAL
UNIT  ABO: NORMAL
UNIT  RH: NORMAL

## 2017-08-09 NOTE — PERIOPERATIVE NURSING NOTE
Talked to Mrs. Barth states the thrill does not feel as strong as it did yesterday. Requested she call the  Office and let him know. They might want to check him. She states she will call.

## 2017-08-16 ENCOUNTER — APPOINTMENT (OUTPATIENT)
Dept: GENERAL RADIOLOGY | Facility: HOSPITAL | Age: 72
End: 2017-08-16

## 2017-08-16 ENCOUNTER — HOSPITAL ENCOUNTER (EMERGENCY)
Facility: HOSPITAL | Age: 72
Discharge: HOME | End: 2017-08-16
Attending: EMERGENCY MEDICINE | Admitting: EMERGENCY MEDICINE

## 2017-08-16 VITALS
BODY MASS INDEX: 28.35 KG/M2 | TEMPERATURE: 98.5 F | HEIGHT: 70 IN | DIASTOLIC BLOOD PRESSURE: 79 MMHG | SYSTOLIC BLOOD PRESSURE: 154 MMHG | OXYGEN SATURATION: 97 % | WEIGHT: 198 LBS | HEART RATE: 80 BPM | RESPIRATION RATE: 18 BRPM

## 2017-08-16 DIAGNOSIS — R53.81 MALAISE AND FATIGUE: Primary | ICD-10-CM

## 2017-08-16 DIAGNOSIS — R11.0 NAUSEA: ICD-10-CM

## 2017-08-16 DIAGNOSIS — R53.83 MALAISE AND FATIGUE: Primary | ICD-10-CM

## 2017-08-16 LAB
ALBUMIN SERPL-MCNC: 3.6 G/DL (ref 3.5–5.2)
ALBUMIN/GLOB SERPL: 0.9 G/DL
ALP SERPL-CCNC: 120 U/L (ref 39–117)
ALT SERPL W P-5'-P-CCNC: 18 U/L (ref 1–41)
ANION GAP SERPL CALCULATED.3IONS-SCNC: 13.6 MMOL/L
AST SERPL-CCNC: 30 U/L (ref 1–40)
BACTERIA UR QL AUTO: NORMAL /HPF
BASOPHILS # BLD AUTO: 0.01 10*3/MM3 (ref 0–0.2)
BASOPHILS NFR BLD AUTO: 0.4 % (ref 0–1.5)
BILIRUB SERPL-MCNC: 0.5 MG/DL (ref 0.1–1.2)
BILIRUB UR QL STRIP: NEGATIVE
BUN BLD-MCNC: 66 MG/DL (ref 8–23)
BUN/CREAT SERPL: 17.6 (ref 7–25)
CALCIUM SPEC-SCNC: 9.2 MG/DL (ref 8.6–10.5)
CHLORIDE SERPL-SCNC: 99 MMOL/L (ref 98–107)
CLARITY UR: CLEAR
CO2 SERPL-SCNC: 17.4 MMOL/L (ref 22–29)
COLOR UR: YELLOW
CREAT BLD-MCNC: 3.75 MG/DL (ref 0.76–1.27)
DEPRECATED RDW RBC AUTO: 48.9 FL (ref 37–54)
EOSINOPHIL # BLD AUTO: 0.06 10*3/MM3 (ref 0–0.7)
EOSINOPHIL NFR BLD AUTO: 2.2 % (ref 0.3–6.2)
ERYTHROCYTE [DISTWIDTH] IN BLOOD BY AUTOMATED COUNT: 14.1 % (ref 11.5–14.5)
GFR SERPL CREATININE-BSD FRML MDRD: 16 ML/MIN/1.73
GLOBULIN UR ELPH-MCNC: 3.8 GM/DL
GLUCOSE BLD-MCNC: 117 MG/DL (ref 65–99)
GLUCOSE UR STRIP-MCNC: NEGATIVE MG/DL
HBV SURFACE AG SERPL QL IA: NORMAL
HCT VFR BLD AUTO: 28.4 % (ref 40.4–52.2)
HCV AB SER DONR QL: NORMAL
HGB BLD-MCNC: 9.6 G/DL (ref 13.7–17.6)
HGB UR QL STRIP.AUTO: NEGATIVE
HIV1 P24 AG SER QL: NORMAL
HIV1+2 AB SER QL: NORMAL
HYALINE CASTS UR QL AUTO: NORMAL /LPF
IMM GRANULOCYTES # BLD: 0.03 10*3/MM3 (ref 0–0.03)
IMM GRANULOCYTES NFR BLD: 1.1 % (ref 0–0.5)
KETONES UR QL STRIP: NEGATIVE
LEUKOCYTE ESTERASE UR QL STRIP.AUTO: NEGATIVE
LYMPHOCYTES # BLD AUTO: 0.52 10*3/MM3 (ref 0.9–4.8)
LYMPHOCYTES NFR BLD AUTO: 19 % (ref 19.6–45.3)
MCH RBC QN AUTO: 32.1 PG (ref 27–32.7)
MCHC RBC AUTO-ENTMCNC: 33.8 G/DL (ref 32.6–36.4)
MCV RBC AUTO: 95 FL (ref 79.8–96.2)
MONOCYTES # BLD AUTO: 0.23 10*3/MM3 (ref 0.2–1.2)
MONOCYTES NFR BLD AUTO: 8.4 % (ref 5–12)
NEUTROPHILS # BLD AUTO: 1.89 10*3/MM3 (ref 1.9–8.1)
NEUTROPHILS NFR BLD AUTO: 68.9 % (ref 42.7–76)
NITRITE UR QL STRIP: NEGATIVE
PH UR STRIP.AUTO: <=5 [PH] (ref 5–8)
PLATELET # BLD AUTO: 57 10*3/MM3 (ref 140–500)
PMV BLD AUTO: 9.3 FL (ref 6–12)
POTASSIUM BLD-SCNC: 4.2 MMOL/L (ref 3.5–5.2)
PROT SERPL-MCNC: 7.4 G/DL (ref 6–8.5)
PROT UR QL STRIP: ABNORMAL
RBC # BLD AUTO: 2.99 10*6/MM3 (ref 4.6–6)
RBC # UR: NORMAL /HPF
REF LAB TEST METHOD: NORMAL
SODIUM BLD-SCNC: 130 MMOL/L (ref 136–145)
SP GR UR STRIP: 1.01 (ref 1–1.03)
SQUAMOUS #/AREA URNS HPF: NORMAL /HPF
UROBILINOGEN UR QL STRIP: ABNORMAL
WBC NRBC COR # BLD: 2.74 10*3/MM3 (ref 4.5–10.7)
WBC UR QL AUTO: NORMAL /HPF

## 2017-08-16 PROCEDURE — 99284 EMERGENCY DEPT VISIT MOD MDM: CPT

## 2017-08-16 PROCEDURE — 86803 HEPATITIS C AB TEST: CPT | Performed by: EMERGENCY MEDICINE

## 2017-08-16 PROCEDURE — 80053 COMPREHEN METABOLIC PANEL: CPT | Performed by: EMERGENCY MEDICINE

## 2017-08-16 PROCEDURE — 81001 URINALYSIS AUTO W/SCOPE: CPT | Performed by: EMERGENCY MEDICINE

## 2017-08-16 PROCEDURE — 87340 HEPATITIS B SURFACE AG IA: CPT | Performed by: EMERGENCY MEDICINE

## 2017-08-16 PROCEDURE — 93010 ELECTROCARDIOGRAM REPORT: CPT | Performed by: INTERNAL MEDICINE

## 2017-08-16 PROCEDURE — 85025 COMPLETE CBC W/AUTO DIFF WBC: CPT | Performed by: EMERGENCY MEDICINE

## 2017-08-16 PROCEDURE — 93005 ELECTROCARDIOGRAM TRACING: CPT | Performed by: EMERGENCY MEDICINE

## 2017-08-16 PROCEDURE — 87521 HEPATITIS C PROBE&RVRS TRNSC: CPT | Performed by: EMERGENCY MEDICINE

## 2017-08-16 PROCEDURE — 71020 HC CHEST PA AND LATERAL: CPT

## 2017-08-16 PROCEDURE — 87040 BLOOD CULTURE FOR BACTERIA: CPT | Performed by: EMERGENCY MEDICINE

## 2017-08-16 PROCEDURE — 87899 AGENT NOS ASSAY W/OPTIC: CPT | Performed by: EMERGENCY MEDICINE

## 2017-08-16 PROCEDURE — G0432 EIA HIV-1/HIV-2 SCREEN: HCPCS | Performed by: EMERGENCY MEDICINE

## 2017-08-16 RX ORDER — ONDANSETRON 4 MG/1
4 TABLET, FILM COATED ORAL EVERY 4 HOURS PRN
Qty: 15 TABLET | Refills: 0 | Status: SHIPPED | OUTPATIENT
Start: 2017-08-16 | End: 2017-08-28

## 2017-08-16 RX ORDER — ONDANSETRON 2 MG/ML
4 INJECTION INTRAMUSCULAR; INTRAVENOUS ONCE
Status: DISCONTINUED | OUTPATIENT
Start: 2017-08-16 | End: 2017-08-17 | Stop reason: HOSPADM

## 2017-08-16 RX ORDER — SODIUM CHLORIDE 0.9 % (FLUSH) 0.9 %
10 SYRINGE (ML) INJECTION AS NEEDED
Status: DISCONTINUED | OUTPATIENT
Start: 2017-08-16 | End: 2017-08-17 | Stop reason: HOSPADM

## 2017-08-16 NOTE — ED PROVIDER NOTES
EMERGENCY DEPARTMENT ENCOUNTER       CHIEF COMPLAINT  Chief Complaint: Generalized Weakness  History given by: Patient, Spouse  History limited by: N/A  Room Number: 19/19  PMD: Nina Tam MD      HPI:  HPI Comments: Pt reports that he has an ileostomy in place due to h/o Crohn's Disease. Pt is followed by the hematologist due to h/o thrombocytopenia. Pt reports that on 08/08/17, pt had an AV fistula inserted to the left arm due to h/o stage IV renal disease. Pt states that as of yet, he does not undergo dialysis. Pt presents to the ED c/o generalized weakness onset about 2-3 days ago. Pt has also had decreased appetite, sinus drainage, and nausea, but denies cough, focal weakness/numbness, abd pain, speech/visual difficulties, vomiting, dyspnea, and CP. There are no other complaints at this time.       Dr. Gordon - nephrologist   Dr. Mckenzie - hematologist     Patient is a 71 y.o. male presenting with weakness.   Weakness - Generalized   Severity:  Moderate  Onset quality:  Gradual  Duration: onset about 2-3 days ago.  Timing:  Intermittent  Progression:  Waxing and waning  Context comment:  Pt reports that on 08/08/17, pt had an AV fistula inserted to the left arm due to h/o stage IV renal disease.   Relieved by:  Nothing  Worsened by:  Nothing  Associated symptoms: nausea    Associated symptoms: no abdominal pain, no chest pain, no cough, no diarrhea, no dizziness, no headaches, no myalgias, no shortness of breath and no vomiting          PAST MEDICAL HISTORY  Active Ambulatory Problems     Diagnosis Date Noted   • Anemia due to stage 3 (moderate) chronic renal failure treated with erythropoietin 05/02/2016   • Persistent atrial fibrillation 05/02/2016   • Kidney disease, chronic, stage II (mild, EGFR 60+ ml/min) 05/02/2016   • Thrombocytopenia 05/02/2016   • Chronic leukopenia 05/02/2016   • Cirrhosis of liver without ascites 07/24/2017   • Congestive splenomegaly 07/24/2017   • Kidney disease, chronic,  stage IV (GFR 15-29 ml/min) 08/08/2017     Resolved Ambulatory Problems     Diagnosis Date Noted   • No Resolved Ambulatory Problems     Past Medical History:   Diagnosis Date   • Abnormal serum protein electrophoresis    • Anemia    • Atrial fibrillation    • Chronic kidney disease    • Chronic leukopenia and thrombocytopenia    • Crohn disease    • Diverticula of colon    • Fatty liver disease, nonalcoholic    • GERD (gastroesophageal reflux disease)    • GI bleed    • Glaucoma    • H/O colectomy    • H/O Pericardial effusion    • Hx of renal calculi    • Hypertension    • Ileostomy present    • MGUS (monoclonal gammopathy of unknown significance)    • Murmur    • Sleep apnea          PAST SURGICAL HISTORY  Past Surgical History:   Procedure Laterality Date   • APPENDECTOMY     • ARTERIOVENOUS FISTULA/SHUNT SURGERY Left 8/8/2017    Procedure: LEFT BRACHIAL CEPHALIC AV FISTULA FORMATION WITH CEPHALIC VEIN TRANSPOSITION ;  Surgeon: Bill Deal MD;  Location: [facility];  Service:    • CHOLECYSTECTOMY     • COLECTOMY PARTIAL / TOTAL      History of inflammatory bowel disease with status post colectomy with ileostomy many years ago in the [facility]   • COLON RESECTION WITH COLOSTOMY     • COLON SURGERY     • COLONOSCOPY     • CYSTOSCOPY LITHOLAPAXY BLADDER STONE EXTRACTION           FAMILY HISTORY  Family History   Problem Relation Age of Onset   • Heart failure Mother    • Hypertension Mother    • Hypertension Father    • Malig Hyperthermia Neg Hx          SOCIAL HISTORY  Social History     Social History   • Marital status:      Spouse name: Gillian   • Number of children: N/A   • Years of education: College     Occupational History   •  Retired     Social History Main Topics   • Smoking status: Never Smoker   • Smokeless tobacco: Not on file   • Alcohol use No   • Drug use: No   • Sexual activity: Defer     Other Topics Concern   • Not on file     Social History Narrative          ALLERGIES  Ace inhibitors; Contrast dye; Eliquis [apixaban]; Granix [filgrastim]; Iodinated diagnostic agents; and Keflex [cephalexin]        REVIEW OF SYSTEMS  Review of Systems   Constitutional: Positive for appetite change (decreased appetite). Negative for chills.   HENT: Negative for sore throat.         Sinus drainage   Eyes: Negative for pain and visual disturbance.   Respiratory: Negative for cough and shortness of breath.    Cardiovascular: Negative for chest pain, palpitations and leg swelling.   Gastrointestinal: Positive for nausea. Negative for abdominal pain, diarrhea and vomiting.   Genitourinary: Negative for flank pain.   Musculoskeletal: Negative for myalgias, neck pain and neck stiffness.   Skin: Negative for rash.   Neurological: Positive for weakness (generalized; not focal). Negative for dizziness, speech difficulty, light-headedness, numbness and headaches.   Psychiatric/Behavioral: Negative.    All other systems reviewed and are negative.           PHYSICAL EXAM  ED Triage Vitals   Temp Heart Rate Resp BP SpO2   08/16/17 1819 08/16/17 1819 08/16/17 1819 08/16/17 1838 08/16/17 1819   97.8 °F (36.6 °C) 110 18 172/90 98 % WNL      Temp src Heart Rate Source Patient Position BP Location FiO2 (%)   -- 08/16/17 1838 08/16/17 1838 08/16/17 1838 --    Monitor Lying Right arm        Physical Exam   Constitutional: He is oriented to person, place, and time. No distress.   HENT:   Head: Normocephalic.   Mouth/Throat: Mucous membranes are normal.   Eyes: EOM are normal. Pupils are equal, round, and reactive to light.   Neck: Normal range of motion. Neck supple.   Cardiovascular: Normal rate and normal heart sounds.  An irregular rhythm present.   Pulmonary/Chest: Effort normal and breath sounds normal. No respiratory distress. He has no decreased breath sounds. He has no wheezes. He has no rhonchi. He has no rales.   Abdominal: Soft. There is no tenderness. There is no rebound and no guarding.    Musculoskeletal: Normal range of motion.   Neurological: He is alert and oriented to person, place, and time. He has normal sensation.   Skin: Skin is warm and dry. Ecchymosis (to the left arm with wound present (pt recently had an AV fistula inserted to the left arm)) noted.   Psychiatric: Mood and affect normal.   Nursing note and vitals reviewed.          LAB RESULTS  Recent Results (from the past 24 hour(s))   Comprehensive Metabolic Panel    Collection Time: 08/16/17  8:05 PM   Result Value Ref Range    Glucose 117 (H) 65 - 99 mg/dL    BUN 66 (H) 8 - 23 mg/dL    Creatinine 3.75 (H) 0.76 - 1.27 mg/dL    Sodium 130 (L) 136 - 145 mmol/L    Potassium 4.2 3.5 - 5.2 mmol/L    Chloride 99 98 - 107 mmol/L    CO2 17.4 (L) 22.0 - 29.0 mmol/L    Calcium 9.2 8.6 - 10.5 mg/dL    Total Protein 7.4 6.0 - 8.5 g/dL    Albumin 3.60 3.50 - 5.20 g/dL    ALT (SGPT) 18 1 - 41 U/L    AST (SGOT) 30 1 - 40 U/L    Alkaline Phosphatase 120 (H) 39 - 117 U/L    Total Bilirubin 0.5 0.1 - 1.2 mg/dL    eGFR Non African Amer 16 (L) >60 mL/min/1.73    Globulin 3.8 gm/dL    A/G Ratio 0.9 g/dL    BUN/Creatinine Ratio 17.6 7.0 - 25.0    Anion Gap 13.6 mmol/L   CBC Auto Differential    Collection Time: 08/16/17  8:05 PM   Result Value Ref Range    WBC 2.74 (L) 4.50 - 10.70 10*3/mm3    RBC 2.99 (L) 4.60 - 6.00 10*6/mm3    Hemoglobin 9.6 (L) 13.7 - 17.6 g/dL    Hematocrit 28.4 (L) 40.4 - 52.2 %    MCV 95.0 79.8 - 96.2 fL    MCH 32.1 27.0 - 32.7 pg    MCHC 33.8 32.6 - 36.4 g/dL    RDW 14.1 11.5 - 14.5 %    RDW-SD 48.9 37.0 - 54.0 fl    MPV 9.3 6.0 - 12.0 fL    Platelets 57 (L) 140 - 500 10*3/mm3    Neutrophil % 68.9 42.7 - 76.0 %    Lymphocyte % 19.0 (L) 19.6 - 45.3 %    Monocyte % 8.4 5.0 - 12.0 %    Eosinophil % 2.2 0.3 - 6.2 %    Basophil % 0.4 0.0 - 1.5 %    Immature Grans % 1.1 (H) 0.0 - 0.5 %    Neutrophils, Absolute 1.89 (L) 1.90 - 8.10 10*3/mm3    Lymphocytes, Absolute 0.52 (L) 0.90 - 4.80 10*3/mm3    Monocytes, Absolute 0.23 0.20 - 1.20  10*3/mm3    Eosinophils, Absolute 0.06 0.00 - 0.70 10*3/mm3    Basophils, Absolute 0.01 0.00 - 0.20 10*3/mm3    Immature Grans, Absolute 0.03 0.00 - 0.03 10*3/mm3   Urinalysis With / Culture If Indicated    Collection Time: 08/16/17  8:21 PM   Result Value Ref Range    Color, UA Yellow Yellow, Straw    Appearance, UA Clear Clear    pH, UA <=5.0 5.0 - 8.0    Specific Gravity, UA 1.011 1.005 - 1.030    Glucose, UA Negative Negative    Ketones, UA Negative Negative    Bilirubin, UA Negative Negative    Blood, UA Negative Negative    Protein,  mg/dL (2+) (A) Negative    Leuk Esterase, UA Negative Negative    Nitrite, UA Negative Negative    Urobilinogen, UA 0.2 E.U./dL 0.2 - 1.0 E.U./dL   Urinalysis, Microscopic Only    Collection Time: 08/16/17  8:21 PM   Result Value Ref Range    RBC, UA 0-2 None Seen, 0-2 /HPF    WBC, UA 0-2 None Seen, 0-2 /HPF    Bacteria, UA None Seen None Seen /HPF    Squamous Epithelial Cells, UA 0-2 None Seen, 0-2 /HPF    Hyaline Casts, UA 0-2 None Seen /LPF    Methodology Automated Microscopy        Ordered the above labs and reviewed the results.        RADIOLOGY  XR Chest 2 View   There is mild cardiomegaly, similar to the prior examination.  The pulmonary vasculature and interstitium are more prominent as  compared to the prior examination in the perihilar regions bilaterally  and there is mild bibasilar atelectasis/infiltrate. There is no evidence  of consolidation or effusion.    Interpreted by radiologist. Independently viewed by me.                Ordered the above noted radiological studies. Reviewed by me in PACS.       PROCEDURES  Procedures    EKG:           EKG time: 19:47  Rhythm/Rate: A-Fib rate 76  P waves and ME: None present  QRS, axis: Normal QRS   ST and T waves: Normal ST     Interpreted Contemporaneously by me, independently viewed  Unchanged compared to prior 10/02/16          PROGRESS AND CONSULTS  ED Course   Comment By Time   10:06 PM  Patient here for fatigue and  decreased appetite.  No fever.  Wound looks normal.  Labs are baseline.  Has no abdominal pain or tenderness.  Discussed with family.  Will discharge home.  Follow up with PMD.  Trial of zofran.  Understands to return for fever, abdominal pain, redness to wound, other concerns. Brenton Lind MD 08/16 2208     7:04 PM:  UA, blood work, and EKG ordered for further evaluation. Zofran ordered to treat for nausea.     7:22 PM  CXR ordered for further evaluation.     9:55 PM:  Rechecked pt. Pt is resting comfortably and appears in no acute distress. Informed pt that his UA is unremarkable. Creatinine is 3.75 as compared to about 2 weeks ago when it was 3.66. K+ is 4.2. CXR shows no evidence of consolidation or effusion. Pt advised to f/u with PMD. Will prescribe pt with rx for antiemetic. RTER warnings given. Pt and family agree with plan for discharge.    Blood cultures will be obtained.           MEDICAL DECISION MAKING      MDM  Number of Diagnoses or Management Options     Amount and/or Complexity of Data Reviewed  Clinical lab tests: ordered and reviewed (Creatinine is 3.75. K+ is 4.2. )  Tests in the radiology section of CPT®: ordered and reviewed (CXR: There is mild cardiomegaly, similar to the prior examination. The pulmonary vasculature and interstitium are more prominent as compared to the prior examination in the perihilar regions bilaterally and there is mild bibasilar atelectasis/infiltrate. There is no evidence  of consolidation or effusion.)  Tests in the medicine section of CPT®: ordered and reviewed (EKG interpreted.   )  Decide to obtain previous medical records or to obtain history from someone other than the patient: yes  Review and summarize past medical records: yes (On 08/08/17, pt had AV fistula placed in the left arm. Pt's creatinine was 3.66 and Hgb was 9.9 about 2 weeks ago. )    Patient Progress  Patient progress: stable             DIAGNOSIS  Final diagnoses:   Malaise and fatigue   Nausea          DISPOSITION  Pt discharged.    DISCHARGE    Patient discharged in stable condition.    Reviewed implications of results, diagnosis, meds, responsibility to follow up, warning signs and symptoms of possible worsening, potential complications and reasons to return to ER.    Patient/Family voiced understanding of above instructions.    Discussed plan for discharge, as there is no emergent indication for admission.  Pt/family is agreeable and understands need for follow up and repeat testing.  Pt is aware that discharge does not mean that nothing is wrong but it indicates no emergency is present that requires admission and they must continue care with follow-up as given below or physician of their choice.     FOLLOW-UP  Nina Tam MD  [address] [address]  [phone]    Schedule an appointment as soon as possible for a visit           Medication List      New Prescriptions          ondansetron 4 MG tablet   Commonly known as:  ZOFRAN   Take 1 tablet by mouth Every 4 (Four) Hours As Needed for Nausea or   Vomiting.         Changed          cetirizine 10 MG tablet   Commonly known as:  zyrTEC   Take 0.5 tablets by mouth Daily.   What changed:    - when to take this  - reasons to take this         Stop          HYDROcodone-acetaminophen 5-325 MG per tablet   Commonly known as:  NORCO                       Latest Documented Vital Signs:  As of 9:56 PM  BP- 163/80 HR- 80 Temp- 98 °F (36.7 °C) (Oral) O2 sat- 96%      --  Documentation assistance provided by best Urbina for Dr. Diogo MD.  Information recorded by the scribe was done at my direction and has been verified and validated by me.              Alfredito Urbina  08/16/17 [id number]       Brenton Lind MD  08/16/17 0899

## 2017-08-16 NOTE — ED NOTES
Fistula on L upper arm with palpable thrill, ausculated with stethoscope.     Oneil Irvin RN  08/16/17 [id number]

## 2017-08-16 NOTE — ED NOTES
Told the pt we needed a urinalysis, he tole me he could get our sample. Kiya walked in to start a IV, told him I would be back to help him when she is finished.     Rogelio Gonzales

## 2017-08-19 LAB — HEPATITIS C RNA-NAA: NEGATIVE

## 2017-08-21 LAB
BACTERIA SPEC AEROBE CULT: NORMAL
BACTERIA SPEC AEROBE CULT: NORMAL

## 2017-08-25 ENCOUNTER — TELEPHONE (OUTPATIENT)
Dept: GENERAL RADIOLOGY | Facility: HOSPITAL | Age: 72
End: 2017-08-25

## 2017-08-25 ENCOUNTER — TELEPHONE (OUTPATIENT)
Dept: ONCOLOGY | Facility: HOSPITAL | Age: 72
End: 2017-08-25

## 2017-08-25 NOTE — TELEPHONE ENCOUNTER
----- Message from Cynthia Hawley RN sent at 8/25/2017  3:53 PM EDT -----  Please schedule patient for a CBC with NP review next week. Pt is hospital return. THanks

## 2017-08-28 ENCOUNTER — LAB (OUTPATIENT)
Dept: LAB | Facility: HOSPITAL | Age: 72
End: 2017-08-28

## 2017-08-28 ENCOUNTER — OFFICE VISIT (OUTPATIENT)
Dept: ONCOLOGY | Facility: CLINIC | Age: 72
End: 2017-08-28

## 2017-08-28 VITALS
WEIGHT: 199.8 LBS | DIASTOLIC BLOOD PRESSURE: 78 MMHG | OXYGEN SATURATION: 97 % | SYSTOLIC BLOOD PRESSURE: 160 MMHG | HEART RATE: 91 BPM | TEMPERATURE: 98.2 F | HEIGHT: 70 IN | RESPIRATION RATE: 12 BRPM | BODY MASS INDEX: 28.6 KG/M2

## 2017-08-28 DIAGNOSIS — D63.1 ANEMIA DUE TO STAGE 3 (MODERATE) CHRONIC RENAL FAILURE TREATED WITH ERYTHROPOIETIN (HCC): ICD-10-CM

## 2017-08-28 DIAGNOSIS — D69.6 THROMBOCYTOPENIA (HCC): ICD-10-CM

## 2017-08-28 DIAGNOSIS — D69.6 THROMBOCYTOPENIA (HCC): Primary | ICD-10-CM

## 2017-08-28 DIAGNOSIS — N18.30 ANEMIA DUE TO STAGE 3 (MODERATE) CHRONIC RENAL FAILURE TREATED WITH ERYTHROPOIETIN (HCC): ICD-10-CM

## 2017-08-28 DIAGNOSIS — D72.819 CHRONIC LEUKOPENIA: ICD-10-CM

## 2017-08-28 DIAGNOSIS — N18.2 KIDNEY DISEASE, CHRONIC, STAGE II (MILD, EGFR 60+ ML/MIN): ICD-10-CM

## 2017-08-28 LAB
BASOPHILS # BLD AUTO: 0.02 10*3/MM3 (ref 0–0.1)
BASOPHILS NFR BLD AUTO: 0.7 % (ref 0–1.1)
DEPRECATED RDW RBC AUTO: 47.6 FL (ref 37–49)
EOSINOPHIL # BLD AUTO: 0.07 10*3/MM3 (ref 0–0.36)
EOSINOPHIL NFR BLD AUTO: 2.5 % (ref 1–5)
ERYTHROCYTE [DISTWIDTH] IN BLOOD BY AUTOMATED COUNT: 13.9 % (ref 11.7–14.5)
HCT VFR BLD AUTO: 28.7 % (ref 40–49)
HGB BLD-MCNC: 9.7 G/DL (ref 13.5–16.5)
IMM GRANULOCYTES # BLD: 0.03 10*3/MM3 (ref 0–0.03)
IMM GRANULOCYTES NFR BLD: 1.1 % (ref 0–0.5)
LYMPHOCYTES # BLD AUTO: 0.72 10*3/MM3 (ref 1–3.5)
LYMPHOCYTES NFR BLD AUTO: 26 % (ref 20–49)
MCH RBC QN AUTO: 31.7 PG (ref 27–33)
MCHC RBC AUTO-ENTMCNC: 33.8 G/DL (ref 32–35)
MCV RBC AUTO: 93.8 FL (ref 83–97)
MONOCYTES # BLD AUTO: 0.29 10*3/MM3 (ref 0.25–0.8)
MONOCYTES NFR BLD AUTO: 10.5 % (ref 4–12)
NEUTROPHILS # BLD AUTO: 1.64 10*3/MM3 (ref 1.5–7)
NEUTROPHILS NFR BLD AUTO: 59.2 % (ref 39–75)
NRBC BLD MANUAL-RTO: 0 /100 WBC (ref 0–0)
PLATELET # BLD AUTO: 74 10*3/MM3 (ref 150–375)
PMV BLD AUTO: 9.5 FL (ref 8.9–12.1)
RBC # BLD AUTO: 3.06 10*6/MM3 (ref 4.3–5.5)
WBC NRBC COR # BLD: 2.77 10*3/MM3 (ref 4–10)

## 2017-08-28 PROCEDURE — 99212-NC PR NO CHARGE CBC OFFICE OUTPATIENT VISIT 10 MINUTES: Performed by: NURSE PRACTITIONER

## 2017-08-28 PROCEDURE — 36415 COLL VENOUS BLD VENIPUNCTURE: CPT | Performed by: INTERNAL MEDICINE

## 2017-08-28 PROCEDURE — 85025 COMPLETE CBC W/AUTO DIFF WBC: CPT | Performed by: INTERNAL MEDICINE

## 2017-08-28 NOTE — PROGRESS NOTES
Patient here for lab review after an ER visit on 8/16/2017 for weakness and dehydration.  He is previously been on Procrit and was questioning if he should reinitiate this.  Just prior to his ER visit, patient had a left upper extremity fistula placed for possible administration dialysis.     He returns today feeling quite well with no chest pain, shortness of breath, or dark, tarry stools.  His hemoglobin has essentially remained stable at 9.7.  Hemoglobin was 9.6 two week ago.  At this time, patient remains asymptomatic therefore we will delay restart  of Procrit.  He is agreeable with this plan.  He will see Dr. Mckenzie in follow-up on September 18, 2017.  He does have persistent thrombocytopenia, though this is actually improved with a platelet count of 74,000 in the absence of excessive bleeding or bruising.  I've encouraged him to call our office with any changes in symptoms or progressive fatigue.  He is agreeable with this plan.  A copy of all of his most recent labs were provided to him. He has no other concerns today.     Vitals:    08/28/17 1412   BP: 160/78   Pulse: 91   Resp: 12   Temp: 98.2 °F (36.8 °C)   SpO2: 97%     Lab Results   Component Value Date    WBC 2.77 (L) 08/28/2017    HGB 9.7 (L) 08/28/2017    HCT 28.7 (L) 08/28/2017    MCV 93.8 08/28/2017    PLT 74 (L) 08/28/2017     JAME Li

## 2017-09-18 ENCOUNTER — LAB (OUTPATIENT)
Dept: LAB | Facility: HOSPITAL | Age: 72
End: 2017-09-18
Attending: HOSPITALIST

## 2017-09-18 ENCOUNTER — INFUSION (OUTPATIENT)
Dept: ONCOLOGY | Facility: HOSPITAL | Age: 72
End: 2017-09-18

## 2017-09-18 ENCOUNTER — OFFICE VISIT (OUTPATIENT)
Dept: ONCOLOGY | Facility: CLINIC | Age: 72
End: 2017-09-18

## 2017-09-18 VITALS
DIASTOLIC BLOOD PRESSURE: 68 MMHG | OXYGEN SATURATION: 98 % | BODY MASS INDEX: 29.35 KG/M2 | HEIGHT: 70 IN | SYSTOLIC BLOOD PRESSURE: 148 MMHG | RESPIRATION RATE: 14 BRPM | HEART RATE: 76 BPM | WEIGHT: 205 LBS | TEMPERATURE: 98.1 F

## 2017-09-18 DIAGNOSIS — N19 KIDNEY FAILURE: ICD-10-CM

## 2017-09-18 DIAGNOSIS — D73.2 CONGESTIVE SPLENOMEGALY: ICD-10-CM

## 2017-09-18 DIAGNOSIS — N18.30 ANEMIA DUE TO STAGE 3 (MODERATE) CHRONIC RENAL FAILURE TREATED WITH ERYTHROPOIETIN (HCC): ICD-10-CM

## 2017-09-18 DIAGNOSIS — N18.4 CHRONIC KIDNEY DISEASE, STAGE IV (SEVERE) (HCC): ICD-10-CM

## 2017-09-18 DIAGNOSIS — D72.819 CHRONIC LEUKOPENIA: Primary | ICD-10-CM

## 2017-09-18 DIAGNOSIS — D63.1 ANEMIA DUE TO STAGE 3 (MODERATE) CHRONIC RENAL FAILURE TREATED WITH ERYTHROPOIETIN (HCC): ICD-10-CM

## 2017-09-18 DIAGNOSIS — N18.3 CHRONIC KIDNEY DISEASE (CKD), STAGE 3 (MODERATE): Primary | ICD-10-CM

## 2017-09-18 DIAGNOSIS — D69.6 THROMBOCYTOPENIA (HCC): ICD-10-CM

## 2017-09-18 DIAGNOSIS — N18.3 CHRONIC KIDNEY DISEASE (CKD), STAGE 3 (MODERATE): ICD-10-CM

## 2017-09-18 DIAGNOSIS — N18.4 KIDNEY DISEASE, CHRONIC, STAGE IV (GFR 15-29 ML/MIN) (HCC): ICD-10-CM

## 2017-09-18 LAB
BASOPHILS # BLD AUTO: 0.02 10*3/MM3 (ref 0–0.1)
BASOPHILS NFR BLD AUTO: 0.7 % (ref 0–1.1)
DEPRECATED RDW RBC AUTO: 50.8 FL (ref 37–49)
EOSINOPHIL # BLD AUTO: 0.06 10*3/MM3 (ref 0–0.36)
EOSINOPHIL NFR BLD AUTO: 2 % (ref 1–5)
ERYTHROCYTE [DISTWIDTH] IN BLOOD BY AUTOMATED COUNT: 14.6 % (ref 11.7–14.5)
FERRITIN SERPL-MCNC: 140.1 NG/ML (ref 30–400)
HCT VFR BLD AUTO: 27.9 % (ref 40–49)
HGB BLD-MCNC: 9.2 G/DL (ref 13.5–16.5)
IMM GRANULOCYTES # BLD: 0.03 10*3/MM3 (ref 0–0.03)
IMM GRANULOCYTES NFR BLD: 1 % (ref 0–0.5)
IRON 24H UR-MRATE: 54 MCG/DL (ref 59–158)
IRON SATN MFR SERPL: 16 % (ref 14–48)
LYMPHOCYTES # BLD AUTO: 0.71 10*3/MM3 (ref 1–3.5)
LYMPHOCYTES NFR BLD AUTO: 24.2 % (ref 20–49)
MCH RBC QN AUTO: 31.3 PG (ref 27–33)
MCHC RBC AUTO-ENTMCNC: 33 G/DL (ref 32–35)
MCV RBC AUTO: 94.9 FL (ref 83–97)
MONOCYTES # BLD AUTO: 0.31 10*3/MM3 (ref 0.25–0.8)
MONOCYTES NFR BLD AUTO: 10.6 % (ref 4–12)
NEUTROPHILS # BLD AUTO: 1.8 10*3/MM3 (ref 1.5–7)
NEUTROPHILS NFR BLD AUTO: 61.5 % (ref 39–75)
NRBC BLD MANUAL-RTO: 0 /100 WBC (ref 0–0)
PLATELET # BLD AUTO: 72 10*3/MM3 (ref 150–375)
PMV BLD AUTO: 9.6 FL (ref 8.9–12.1)
RBC # BLD AUTO: 2.94 10*6/MM3 (ref 4.3–5.5)
TIBC SERPL-MCNC: 328 MCG/DL (ref 249–505)
TRANSFERRIN SERPL-MCNC: 234 MG/DL (ref 200–360)
WBC NRBC COR # BLD: 2.93 10*3/MM3 (ref 4–10)

## 2017-09-18 PROCEDURE — 36415 COLL VENOUS BLD VENIPUNCTURE: CPT | Performed by: INTERNAL MEDICINE

## 2017-09-18 PROCEDURE — 36416 COLLJ CAPILLARY BLOOD SPEC: CPT | Performed by: INTERNAL MEDICINE

## 2017-09-18 PROCEDURE — 83540 ASSAY OF IRON: CPT

## 2017-09-18 PROCEDURE — 96372 THER/PROPH/DIAG INJ SC/IM: CPT

## 2017-09-18 PROCEDURE — 99213 OFFICE O/P EST LOW 20 MIN: CPT | Performed by: INTERNAL MEDICINE

## 2017-09-18 PROCEDURE — 85025 COMPLETE CBC W/AUTO DIFF WBC: CPT | Performed by: INTERNAL MEDICINE

## 2017-09-18 PROCEDURE — 84466 ASSAY OF TRANSFERRIN: CPT

## 2017-09-18 PROCEDURE — 63510000001 EPOETIN ALFA PER 1000 UNITS: Performed by: INTERNAL MEDICINE

## 2017-09-18 PROCEDURE — 82728 ASSAY OF FERRITIN: CPT | Performed by: INTERNAL MEDICINE

## 2017-09-18 RX ADMIN — ERYTHROPOIETIN 10000 UNITS: 10000 INJECTION, SOLUTION INTRAVENOUS; SUBCUTANEOUS at 14:46

## 2017-10-02 ENCOUNTER — INFUSION (OUTPATIENT)
Dept: ONCOLOGY | Facility: HOSPITAL | Age: 72
End: 2017-10-02

## 2017-10-02 ENCOUNTER — LAB (OUTPATIENT)
Dept: LAB | Facility: HOSPITAL | Age: 72
End: 2017-10-02

## 2017-10-02 VITALS — BODY MASS INDEX: 29.33 KG/M2 | WEIGHT: 204.4 LBS

## 2017-10-02 DIAGNOSIS — N18.30 ANEMIA DUE TO STAGE 3 (MODERATE) CHRONIC RENAL FAILURE TREATED WITH ERYTHROPOIETIN (HCC): ICD-10-CM

## 2017-10-02 DIAGNOSIS — D63.1 ANEMIA DUE TO STAGE 3 (MODERATE) CHRONIC RENAL FAILURE TREATED WITH ERYTHROPOIETIN (HCC): ICD-10-CM

## 2017-10-02 DIAGNOSIS — D72.819 CHRONIC LEUKOPENIA: ICD-10-CM

## 2017-10-02 DIAGNOSIS — D69.6 THROMBOCYTOPENIA (HCC): ICD-10-CM

## 2017-10-02 DIAGNOSIS — N18.4 KIDNEY DISEASE, CHRONIC, STAGE IV (GFR 15-29 ML/MIN) (HCC): Primary | ICD-10-CM

## 2017-10-02 DIAGNOSIS — D73.2 CONGESTIVE SPLENOMEGALY: ICD-10-CM

## 2017-10-02 LAB
BASOPHILS # BLD AUTO: 0.01 10*3/MM3 (ref 0–0.1)
BASOPHILS NFR BLD AUTO: 0.4 % (ref 0–1.1)
DEPRECATED RDW RBC AUTO: 50.9 FL (ref 37–49)
EOSINOPHIL # BLD AUTO: 0.06 10*3/MM3 (ref 0–0.36)
EOSINOPHIL NFR BLD AUTO: 2.4 % (ref 1–5)
ERYTHROCYTE [DISTWIDTH] IN BLOOD BY AUTOMATED COUNT: 14.7 % (ref 11.7–14.5)
HCT VFR BLD AUTO: 26.8 % (ref 40–49)
HGB BLD-MCNC: 8.7 G/DL (ref 13.5–16.5)
IMM GRANULOCYTES # BLD: 0.01 10*3/MM3 (ref 0–0.03)
IMM GRANULOCYTES NFR BLD: 0.4 % (ref 0–0.5)
LYMPHOCYTES # BLD AUTO: 0.59 10*3/MM3 (ref 1–3.5)
LYMPHOCYTES NFR BLD AUTO: 23.1 % (ref 20–49)
MCH RBC QN AUTO: 30.5 PG (ref 27–33)
MCHC RBC AUTO-ENTMCNC: 32.5 G/DL (ref 32–35)
MCV RBC AUTO: 94 FL (ref 83–97)
MONOCYTES # BLD AUTO: 0.27 10*3/MM3 (ref 0.25–0.8)
MONOCYTES NFR BLD AUTO: 10.6 % (ref 4–12)
NEUTROPHILS # BLD AUTO: 1.61 10*3/MM3 (ref 1.5–7)
NEUTROPHILS NFR BLD AUTO: 63.1 % (ref 39–75)
NRBC BLD MANUAL-RTO: 0 /100 WBC (ref 0–0)
PLATELET # BLD AUTO: 83 10*3/MM3 (ref 150–375)
PMV BLD AUTO: 9.1 FL (ref 8.9–12.1)
RBC # BLD AUTO: 2.85 10*6/MM3 (ref 4.3–5.5)
WBC NRBC COR # BLD: 2.55 10*3/MM3 (ref 4–10)

## 2017-10-02 PROCEDURE — 36416 COLLJ CAPILLARY BLOOD SPEC: CPT | Performed by: INTERNAL MEDICINE

## 2017-10-02 PROCEDURE — 63510000001 EPOETIN ALFA PER 1000 UNITS: Performed by: INTERNAL MEDICINE

## 2017-10-02 PROCEDURE — 96372 THER/PROPH/DIAG INJ SC/IM: CPT

## 2017-10-02 PROCEDURE — 85025 COMPLETE CBC W/AUTO DIFF WBC: CPT | Performed by: INTERNAL MEDICINE

## 2017-10-02 RX ADMIN — ERYTHROPOIETIN 10000 UNITS: 10000 INJECTION, SOLUTION INTRAVENOUS; SUBCUTANEOUS at 14:13

## 2017-10-04 PROBLEM — D63.1 ANEMIA DUE TO STAGE 4 CHRONIC KIDNEY DISEASE TREATED WITH ERYTHROPOIETIN: Status: ACTIVE | Noted: 2017-10-04

## 2017-10-04 PROBLEM — N18.4 ANEMIA DUE TO STAGE 4 CHRONIC KIDNEY DISEASE TREATED WITH ERYTHROPOIETIN: Status: ACTIVE | Noted: 2017-10-04

## 2017-10-16 ENCOUNTER — LAB (OUTPATIENT)
Dept: LAB | Facility: HOSPITAL | Age: 72
End: 2017-10-16

## 2017-10-16 ENCOUNTER — INFUSION (OUTPATIENT)
Dept: ONCOLOGY | Facility: HOSPITAL | Age: 72
End: 2017-10-16

## 2017-10-16 VITALS — WEIGHT: 206 LBS | BODY MASS INDEX: 29.56 KG/M2

## 2017-10-16 DIAGNOSIS — D63.1 ANEMIA DUE TO STAGE 3 (MODERATE) CHRONIC RENAL FAILURE TREATED WITH ERYTHROPOIETIN (HCC): ICD-10-CM

## 2017-10-16 DIAGNOSIS — N18.30 ANEMIA DUE TO STAGE 3 (MODERATE) CHRONIC RENAL FAILURE TREATED WITH ERYTHROPOIETIN (HCC): ICD-10-CM

## 2017-10-16 DIAGNOSIS — D72.819 CHRONIC LEUKOPENIA: ICD-10-CM

## 2017-10-16 DIAGNOSIS — N18.4 KIDNEY DISEASE, CHRONIC, STAGE IV (GFR 15-29 ML/MIN) (HCC): Primary | ICD-10-CM

## 2017-10-16 DIAGNOSIS — D73.2 CONGESTIVE SPLENOMEGALY: ICD-10-CM

## 2017-10-16 DIAGNOSIS — D69.6 THROMBOCYTOPENIA (HCC): ICD-10-CM

## 2017-10-16 LAB
BASOPHILS # BLD AUTO: 0.02 10*3/MM3 (ref 0–0.1)
BASOPHILS NFR BLD AUTO: 0.8 % (ref 0–1.1)
DEPRECATED RDW RBC AUTO: 51.4 FL (ref 37–49)
EOSINOPHIL # BLD AUTO: 0.05 10*3/MM3 (ref 0–0.36)
EOSINOPHIL NFR BLD AUTO: 1.9 % (ref 1–5)
ERYTHROCYTE [DISTWIDTH] IN BLOOD BY AUTOMATED COUNT: 14.6 % (ref 11.7–14.5)
HCT VFR BLD AUTO: 27.9 % (ref 40–49)
HGB BLD-MCNC: 9 G/DL (ref 13.5–16.5)
IMM GRANULOCYTES # BLD: 0.02 10*3/MM3 (ref 0–0.03)
IMM GRANULOCYTES NFR BLD: 0.8 % (ref 0–0.5)
LYMPHOCYTES # BLD AUTO: 0.56 10*3/MM3 (ref 1–3.5)
LYMPHOCYTES NFR BLD AUTO: 21.1 % (ref 20–49)
MCH RBC QN AUTO: 30.7 PG (ref 27–33)
MCHC RBC AUTO-ENTMCNC: 32.3 G/DL (ref 32–35)
MCV RBC AUTO: 95.2 FL (ref 83–97)
MONOCYTES # BLD AUTO: 0.33 10*3/MM3 (ref 0.25–0.8)
MONOCYTES NFR BLD AUTO: 12.4 % (ref 4–12)
NEUTROPHILS # BLD AUTO: 1.68 10*3/MM3 (ref 1.5–7)
NEUTROPHILS NFR BLD AUTO: 63 % (ref 39–75)
NRBC BLD MANUAL-RTO: 0.8 /100 WBC (ref 0–0)
PLATELET # BLD AUTO: 75 10*3/MM3 (ref 150–375)
PMV BLD AUTO: 9.3 FL (ref 8.9–12.1)
RBC # BLD AUTO: 2.93 10*6/MM3 (ref 4.3–5.5)
WBC NRBC COR # BLD: 2.66 10*3/MM3 (ref 4–10)

## 2017-10-16 PROCEDURE — 36416 COLLJ CAPILLARY BLOOD SPEC: CPT | Performed by: INTERNAL MEDICINE

## 2017-10-16 PROCEDURE — 85025 COMPLETE CBC W/AUTO DIFF WBC: CPT | Performed by: INTERNAL MEDICINE

## 2017-10-16 PROCEDURE — 96372 THER/PROPH/DIAG INJ SC/IM: CPT | Performed by: INTERNAL MEDICINE

## 2017-10-16 PROCEDURE — 63510000001 EPOETIN ALFA PER 1000 UNITS: Performed by: INTERNAL MEDICINE

## 2017-10-16 RX ADMIN — ERYTHROPOIETIN 10000 UNITS: 10000 INJECTION, SOLUTION INTRAVENOUS; SUBCUTANEOUS at 14:11

## 2017-10-30 ENCOUNTER — LAB (OUTPATIENT)
Dept: LAB | Facility: HOSPITAL | Age: 72
End: 2017-10-30

## 2017-10-30 ENCOUNTER — INFUSION (OUTPATIENT)
Dept: ONCOLOGY | Facility: HOSPITAL | Age: 72
End: 2017-10-30

## 2017-10-30 DIAGNOSIS — N18.30 ANEMIA DUE TO STAGE 3 (MODERATE) CHRONIC RENAL FAILURE TREATED WITH ERYTHROPOIETIN (HCC): ICD-10-CM

## 2017-10-30 DIAGNOSIS — D73.2 CONGESTIVE SPLENOMEGALY: ICD-10-CM

## 2017-10-30 DIAGNOSIS — N18.4 KIDNEY DISEASE, CHRONIC, STAGE IV (GFR 15-29 ML/MIN) (HCC): Primary | ICD-10-CM

## 2017-10-30 DIAGNOSIS — D63.1 ANEMIA DUE TO STAGE 3 (MODERATE) CHRONIC RENAL FAILURE TREATED WITH ERYTHROPOIETIN (HCC): ICD-10-CM

## 2017-10-30 DIAGNOSIS — D72.819 CHRONIC LEUKOPENIA: ICD-10-CM

## 2017-10-30 DIAGNOSIS — D69.6 THROMBOCYTOPENIA (HCC): ICD-10-CM

## 2017-10-30 LAB
BASOPHILS # BLD AUTO: 0.02 10*3/MM3 (ref 0–0.1)
BASOPHILS NFR BLD AUTO: 0.7 % (ref 0–1.1)
DEPRECATED RDW RBC AUTO: 52.2 FL (ref 37–49)
EOSINOPHIL # BLD AUTO: 0.06 10*3/MM3 (ref 0–0.36)
EOSINOPHIL NFR BLD AUTO: 2 % (ref 1–5)
ERYTHROCYTE [DISTWIDTH] IN BLOOD BY AUTOMATED COUNT: 15 % (ref 11.7–14.5)
HCT VFR BLD AUTO: 28.1 % (ref 40–49)
HGB BLD-MCNC: 9 G/DL (ref 13.5–16.5)
IMM GRANULOCYTES # BLD: 0.04 10*3/MM3 (ref 0–0.03)
IMM GRANULOCYTES NFR BLD: 1.3 % (ref 0–0.5)
LYMPHOCYTES # BLD AUTO: 0.53 10*3/MM3 (ref 1–3.5)
LYMPHOCYTES NFR BLD AUTO: 17.5 % (ref 20–49)
MCH RBC QN AUTO: 30.3 PG (ref 27–33)
MCHC RBC AUTO-ENTMCNC: 32 G/DL (ref 32–35)
MCV RBC AUTO: 94.6 FL (ref 83–97)
MONOCYTES # BLD AUTO: 0.34 10*3/MM3 (ref 0.25–0.8)
MONOCYTES NFR BLD AUTO: 11.2 % (ref 4–12)
NEUTROPHILS # BLD AUTO: 2.04 10*3/MM3 (ref 1.5–7)
NEUTROPHILS NFR BLD AUTO: 67.3 % (ref 39–75)
NRBC BLD MANUAL-RTO: 0 /100 WBC (ref 0–0)
PLATELET # BLD AUTO: 75 10*3/MM3 (ref 150–375)
PMV BLD AUTO: 9.5 FL (ref 8.9–12.1)
RBC # BLD AUTO: 2.97 10*6/MM3 (ref 4.3–5.5)
WBC NRBC COR # BLD: 3.03 10*3/MM3 (ref 4–10)

## 2017-10-30 PROCEDURE — 85025 COMPLETE CBC W/AUTO DIFF WBC: CPT | Performed by: INTERNAL MEDICINE

## 2017-10-30 PROCEDURE — 63510000001 EPOETIN ALFA PER 1000 UNITS: Performed by: INTERNAL MEDICINE

## 2017-10-30 PROCEDURE — 96372 THER/PROPH/DIAG INJ SC/IM: CPT

## 2017-10-30 PROCEDURE — 36416 COLLJ CAPILLARY BLOOD SPEC: CPT | Performed by: INTERNAL MEDICINE

## 2017-10-30 RX ADMIN — ERYTHROPOIETIN 10000 UNITS: 10000 INJECTION, SOLUTION INTRAVENOUS; SUBCUTANEOUS at 14:10

## 2017-11-09 ENCOUNTER — TELEPHONE (OUTPATIENT)
Dept: ONCOLOGY | Facility: HOSPITAL | Age: 72
End: 2017-11-09

## 2017-11-09 DIAGNOSIS — D63.1 ANEMIA DUE TO STAGE 4 CHRONIC KIDNEY DISEASE TREATED WITH ERYTHROPOIETIN (HCC): Primary | ICD-10-CM

## 2017-11-09 DIAGNOSIS — N18.4 ANEMIA DUE TO STAGE 4 CHRONIC KIDNEY DISEASE TREATED WITH ERYTHROPOIETIN (HCC): Primary | ICD-10-CM

## 2017-11-09 NOTE — TELEPHONE ENCOUNTER
Pt called stating that his Hgb was 8.0 at his nephrologist on Monday. Pt is feeling slightly more SOA and fatigued. He requested that he come in tomorrow for CBC and RN review. Message sent to scheduling.

## 2017-11-10 ENCOUNTER — LAB (OUTPATIENT)
Dept: LAB | Facility: HOSPITAL | Age: 72
End: 2017-11-10

## 2017-11-10 ENCOUNTER — CLINICAL SUPPORT (OUTPATIENT)
Dept: ONCOLOGY | Facility: HOSPITAL | Age: 72
End: 2017-11-10

## 2017-11-10 DIAGNOSIS — N18.4 ANEMIA DUE TO STAGE 4 CHRONIC KIDNEY DISEASE TREATED WITH ERYTHROPOIETIN (HCC): ICD-10-CM

## 2017-11-10 DIAGNOSIS — D63.1 ANEMIA DUE TO STAGE 4 CHRONIC KIDNEY DISEASE TREATED WITH ERYTHROPOIETIN (HCC): ICD-10-CM

## 2017-11-10 LAB
BASOPHILS # BLD AUTO: 0.03 10*3/MM3 (ref 0–0.1)
BASOPHILS NFR BLD AUTO: 1.1 % (ref 0–1.1)
DEPRECATED RDW RBC AUTO: 49.6 FL (ref 37–49)
EOSINOPHIL # BLD AUTO: 0.07 10*3/MM3 (ref 0–0.36)
EOSINOPHIL NFR BLD AUTO: 2.6 % (ref 1–5)
ERYTHROCYTE [DISTWIDTH] IN BLOOD BY AUTOMATED COUNT: 14.6 % (ref 11.7–14.5)
HCT VFR BLD AUTO: 24.1 % (ref 40–49)
HGB BLD-MCNC: 7.9 G/DL (ref 13.5–16.5)
IMM GRANULOCYTES # BLD: 0.04 10*3/MM3 (ref 0–0.03)
IMM GRANULOCYTES NFR BLD: 1.5 % (ref 0–0.5)
LYMPHOCYTES # BLD AUTO: 0.46 10*3/MM3 (ref 1–3.5)
LYMPHOCYTES NFR BLD AUTO: 16.8 % (ref 20–49)
MCH RBC QN AUTO: 30.3 PG (ref 27–33)
MCHC RBC AUTO-ENTMCNC: 32.8 G/DL (ref 32–35)
MCV RBC AUTO: 92.3 FL (ref 83–97)
MONOCYTES # BLD AUTO: 0.23 10*3/MM3 (ref 0.25–0.8)
MONOCYTES NFR BLD AUTO: 8.4 % (ref 4–12)
NEUTROPHILS # BLD AUTO: 1.9 10*3/MM3 (ref 1.5–7)
NEUTROPHILS NFR BLD AUTO: 69.6 % (ref 39–75)
NRBC BLD MANUAL-RTO: 0 /100 WBC (ref 0–0)
PLATELET # BLD AUTO: 121 10*3/MM3 (ref 150–375)
PMV BLD AUTO: 8.4 FL (ref 8.9–12.1)
RBC # BLD AUTO: 2.61 10*6/MM3 (ref 4.3–5.5)
WBC NRBC COR # BLD: 2.73 10*3/MM3 (ref 4–10)

## 2017-11-10 PROCEDURE — 36415 COLL VENOUS BLD VENIPUNCTURE: CPT | Performed by: INTERNAL MEDICINE

## 2017-11-10 PROCEDURE — 85025 COMPLETE CBC W/AUTO DIFF WBC: CPT | Performed by: INTERNAL MEDICINE

## 2017-11-10 PROCEDURE — 63510000001 EPOETIN ALFA PER 1000 UNITS: Performed by: INTERNAL MEDICINE

## 2017-11-10 PROCEDURE — 96372 THER/PROPH/DIAG INJ SC/IM: CPT | Performed by: INTERNAL MEDICINE

## 2017-11-10 RX ADMIN — ERYTHROPOIETIN 20000 UNITS: 20000 INJECTION, SOLUTION INTRAVENOUS; SUBCUTANEOUS at 14:45

## 2017-11-10 NOTE — PROGRESS NOTES
Lab Results   Component Value Date    WBC 2.73 (L) 11/10/2017    HGB 7.9 (C) 11/10/2017    HCT 24.1 (L) 11/10/2017    MCV 92.3 11/10/2017     (L) 11/10/2017     Pt here today for cbc review.  His HGB is 7.9 today.  Pt denies feeling lightheaded or dizzy, no extreme fatigue, no heart palpatations, and no shortness of breath.    VSS.  T 98.6 HR 86 /77    Reviewed all of the above with Dr. Mckenzie.  Per Dr. Mckenzie, no transfusion today.  Give patient 20,000 or 30,000 units of procrit (however much we can give per guidelines).  R&V LORENZO Estevez RN  Called Jonna LUNDBERG RN.  Per Jonna, since patient had been receiving procrit 10,000 units we could have technically started out at 20,000 therefor we can increase to only 20,000 today.      Procrit 20,000 units ordered and given to patient.  Pt will return to office on Monday to see Dr. Mckenzie.    Educated patient on s/s of low HGB and when to call and/or go to the ER.  He v/u

## 2017-11-13 ENCOUNTER — LAB (OUTPATIENT)
Dept: LAB | Facility: HOSPITAL | Age: 72
End: 2017-11-13

## 2017-11-13 ENCOUNTER — INFUSION (OUTPATIENT)
Dept: ONCOLOGY | Facility: HOSPITAL | Age: 72
End: 2017-11-13

## 2017-11-13 ENCOUNTER — OFFICE VISIT (OUTPATIENT)
Dept: ONCOLOGY | Facility: CLINIC | Age: 72
End: 2017-11-13

## 2017-11-13 VITALS
RESPIRATION RATE: 16 BRPM | HEIGHT: 70 IN | SYSTOLIC BLOOD PRESSURE: 142 MMHG | DIASTOLIC BLOOD PRESSURE: 70 MMHG | TEMPERATURE: 97.7 F | HEART RATE: 86 BPM | BODY MASS INDEX: 29.69 KG/M2 | WEIGHT: 207.4 LBS

## 2017-11-13 DIAGNOSIS — D72.819 CHRONIC LEUKOPENIA: ICD-10-CM

## 2017-11-13 DIAGNOSIS — D63.1 ANEMIA DUE TO STAGE 3 (MODERATE) CHRONIC RENAL FAILURE TREATED WITH ERYTHROPOIETIN (HCC): ICD-10-CM

## 2017-11-13 DIAGNOSIS — D69.6 THROMBOCYTOPENIA (HCC): ICD-10-CM

## 2017-11-13 DIAGNOSIS — N18.30 ANEMIA DUE TO STAGE 3 (MODERATE) CHRONIC RENAL FAILURE TREATED WITH ERYTHROPOIETIN (HCC): ICD-10-CM

## 2017-11-13 DIAGNOSIS — D73.2 CONGESTIVE SPLENOMEGALY: ICD-10-CM

## 2017-11-13 DIAGNOSIS — D72.819 CHRONIC LEUKOPENIA: Primary | ICD-10-CM

## 2017-11-13 LAB
BASOPHILS # BLD AUTO: 0.01 10*3/MM3 (ref 0–0.1)
BASOPHILS NFR BLD AUTO: 0.4 % (ref 0–1.1)
DEPRECATED RDW RBC AUTO: 49.3 FL (ref 37–49)
EOSINOPHIL # BLD AUTO: 0.05 10*3/MM3 (ref 0–0.36)
EOSINOPHIL NFR BLD AUTO: 1.9 % (ref 1–5)
ERYTHROCYTE [DISTWIDTH] IN BLOOD BY AUTOMATED COUNT: 14.6 % (ref 11.7–14.5)
HCT VFR BLD AUTO: 24.1 % (ref 40–49)
HGB BLD-MCNC: 7.9 G/DL (ref 13.5–16.5)
HGB RETIC QN: 29.4 PG (ref 29.8–36.1)
IMM GRANULOCYTES # BLD: 0.05 10*3/MM3 (ref 0–0.03)
IMM GRANULOCYTES NFR BLD: 1.9 % (ref 0–0.5)
IMM RETICS NFR: 29.8 % (ref 3–15.8)
LYMPHOCYTES # BLD AUTO: 0.53 10*3/MM3 (ref 1–3.5)
LYMPHOCYTES NFR BLD AUTO: 20.2 % (ref 20–49)
MCH RBC QN AUTO: 30.3 PG (ref 27–33)
MCHC RBC AUTO-ENTMCNC: 32.8 G/DL (ref 32–35)
MCV RBC AUTO: 92.3 FL (ref 83–97)
MONOCYTES # BLD AUTO: 0.26 10*3/MM3 (ref 0.25–0.8)
MONOCYTES NFR BLD AUTO: 9.9 % (ref 4–12)
NEUTROPHILS # BLD AUTO: 1.73 10*3/MM3 (ref 1.5–7)
NEUTROPHILS NFR BLD AUTO: 65.7 % (ref 39–75)
NRBC BLD MANUAL-RTO: 0 /100 WBC (ref 0–0)
PLATELET # BLD AUTO: 132 10*3/MM3 (ref 150–375)
PMV BLD AUTO: 8.7 FL (ref 8.9–12.1)
RBC # BLD AUTO: 2.61 10*6/MM3 (ref 4.3–5.5)
RETICS/RBC NFR AUTO: 2.33 % (ref 0.6–2)
WBC NRBC COR # BLD: 2.63 10*3/MM3 (ref 4–10)

## 2017-11-13 PROCEDURE — 36416 COLLJ CAPILLARY BLOOD SPEC: CPT | Performed by: INTERNAL MEDICINE

## 2017-11-13 PROCEDURE — 99214 OFFICE O/P EST MOD 30 MIN: CPT | Performed by: INTERNAL MEDICINE

## 2017-11-13 PROCEDURE — 85046 RETICYTE/HGB CONCENTRATE: CPT | Performed by: INTERNAL MEDICINE

## 2017-11-13 PROCEDURE — 85025 COMPLETE CBC W/AUTO DIFF WBC: CPT | Performed by: INTERNAL MEDICINE

## 2017-11-13 PROCEDURE — 36415 COLL VENOUS BLD VENIPUNCTURE: CPT | Performed by: INTERNAL MEDICINE

## 2017-11-13 NOTE — PROGRESS NOTES
Pt on injection schedule today after pt saw MD. Per Dr Mckenzie, pt will be back on Friday 11/17 to receive Procrit.

## 2017-11-13 NOTE — PROGRESS NOTES
Subjective  REASONS FOR FOLLOWUP:    1. 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  Since discontinuation of anticoagulants the patient's hemoglobin has remained normal. The patient has associated leukopenia and thrombocytopenia due to chronic splenomegaly and remote history of bone marrow testing that suggested myelodysplasia. Under the present circumstances neither the white count nor platelets are changing and hemoglobin remains stable. There is no abnormality in the differential of his white count. There are no symptoms or signs that would suggest any further progression into myelodysplasia. The patient will remain in observation.            History of Present Illness       The patient is here today in the company of his wife.The patient is receiving Procrit every other week and this last week he was seen by his urologist, Dr. Gordon, because his hemoglobin was 7.9. The patient denies any recent infections and no hematuria with proper diet and no evidence of GI bleeding according to him including no changes in the color of the stool in his colostomy. The patient has not had any other abnormal issues at any level. He feels fatigued and tired and he has no cardiorespiratory issues at this time with no sensation of fluttering given his atrial fibrillation. He has not had any modification in the degree of swelling in his lower extremities.     For that reason the patient was given last Friday, Procrit, and he will receive from now on Procrit on weekly basis with weekly blood counts. Please review below.                 Past Medical History, Past Surgical HistorySignificant for hypertension and also he has history of atrial fibrillation and was chronically anticoagulated with Eliquis in the past. Since January 2015 the patient is  no longer receiving this medicine and moved to aspirin during his persistency of GI bleeding. The patient also has a history of chronic kidney disease with creatinine baseline around 2.5. The patient also has a history of inflammatory bowel disease with status post colectomy with ileostomy many years ago in the [facility]. The patient also has a history of pericardial effusion with an GABRIELLE 1:80 homogenous that has never changed or modified. He also has a positive lupus anticoagulant. He has had chronic leukopenia and thrombocytopenia for which he underwent by Dr. Null 10 years ago, bone marrow testing at [facility]. We have requested this information. The patient in the past has had a normal B12 level of 1094, normal folate level and ferritin level of 617 with an iron level of 34. In the past he has received IV iron in the form of Venofer while being at [facility] in January 2015. He has had serum protein electrophoresis at least on 3 different occasions, failing to show any monoclonal proteins. He also has had a CT scan of the abdomen that shows a general spleen anatomy without any retroperitoneal adenopathies and nothing to suggest portal hypertension or cirrhosis of the liver. Kidney anatomy disclosed thinning of the kidney cortexes consistent with chronic medical illness. He has no hydronephrosis. No retroperitoneal adenopathies.   SOCIAL HISTORY:  The patient lives with his wife in [place], [place]. He is retired. He does not smoke and does not drink any alCOHOL.      ONCOLOGIC/HEMATOLOGIC HISTORY     On 04/06/2015 the patient’s clinical status has dramatically improved.  He has not had any new episodes of infection, congestive heart failure, GI bleeding, with excellent improvement in hemoglobin.  The patient has been able to walk longer distances without shortness of breath.   He has been able to climb steps without shortness of breath and he feels dramatically better.  Hemoglobin has  been stable for the past few weeks at 12.1.  His white count and platelet count remain on the low side with no infection or clinical bleeding.  We found documentation of his bone marrow biopsy performed in 2002 and read at the [facility].  Explicitly it was documented that the patient could have myelodysplastic syndrome.  Procrit, as I pointed out to the patient, is a useful medicine to treat this condition anyway, and given the fact that in 13 years his white count and his platelet count have not changed, makes this diagnosis dubious.  Reading bone marrows for myelodysplasia is one of the most difficult tasks in Hematology.  I suggested to him at some point, if his blood count change, specifically white count modifies or platelet count modifies, to consider repeating the bone marrow testing, flow cytometry and chromosomes.     On 05/26/2015 he was feeling terrific.  He was not having any GI symptomatology, no melena, no enterorrhagia, no abnormal bleeding.  He was functionally perfectly fine.  Urination was ongoing. Uric acid was elevated and I advised him to go back into his Uloric to control this and minimize formation of kidney stones.    On 07/20/2015 the patient had no issues in relationship with anemia. His white count and platelet count remain low associated with his splenomegaly and no issues of consequences related to this. He was advised to remain in observation. He was advised to remain on his folic acid and B12 supplementation.     On 09/14/2015, hemoglobin was excellent at 12.9, stable weight count at 3400 and stable platelet count at 70,000.  We asked him to remain on observation.     On 11/09/2015 the patient’s hematological parameters remain normal.  He was feeling terrific.  His atrial fibrillation looked like it was very quiet and he had no issues in regard to his aspirin use.  He had no embolic phenomenon.  He had some element of fluid retention associated with his chronic renal  disease.  We advised him to monitor his diet properly in regard to sodium ingestion.      Review of Systems   General: no fever, chills, fatigue, weight changes, or lack of appetite.  Eyes: no epiphora, xerophthalmia,conjunctivitis, pain, glaucoma, blurred vision, blindness, secretion, photophobia, proptosis, diplopia.  Ears: no otorrhea, tinnitus, otorrhagia,SOME deafness, pain, vertigo.  Nose: STATED rhinorrhea, epistaxis, alteration in perception of odors, STATED sinuses pressure, SNEEZING AND ITCHING  Mouth: no alteration in gums or teeth,  ulcers, no difficulty with mastication or deglut ion, no odynophagia.  Neck: no masses or pain, no thyroid alterations, no pain in muscles or arteries, no carotid odynia, no crepitation.  Respiratory: no cough, sputum production, dyspnea, trepopnea, pleuritic pain, hemoptysis.  Heart: no syncope, irregularity, palpitations, angina, orthopnea, paroxysmal nocturnal dyspnea.  Vascular Venous: no tenderness,edema, palpable cords, postphlebitic syndrome, skin changes or ulcerations.  Vascular Arterial: no distal ischemia, claudication, gangrene, neuropathic ischemic pain, skin ulcers, paleness or cyanosis.  GI: no dysphagia, odynophagia, no regurgitation, no heartburn,no indigestion,no nausea,no vomiting,no hematemesis ,no melena,no jaundice,no distention, no obstipation,no enterorrhagia,no proctalgia,no anal  lesions, nochanges in bowel habits.  : no frequency, hesitancy, hematuria, discharge, pain.  Musculoskeletal: no muscle or tendon pain or inflammation, joint pain, edema, functional limitation, fasciculations, mass.  Neurologic: no headache, seizures, alterations on Craneal nerves, no motor or senssory deficit, normal coordination, no alteration in memory, orientation, calculation,writting, verbal or written language.  Skin: no rashes, pruritus or localized lesions.  Psychiatric: no anxiety, depression, agitation, delusions, proper insight.A comprehensive 14 point review  "of systems was performed and was negative except as mentioned.    Medications:  The current medication list was reviewed in the EMR    ALLERGIES:    Allergies   Allergen Reactions   • Ace Inhibitors Other (See Comments)     RENAL FAILURE   • Contrast Dye Other (See Comments)     RENAL FAILURE   • Eliquis [Apixaban] Other (See Comments)     BLEEDING ISSUES; PT CANNOT TAKE ANY ANTICOAGULANTS DUE TO LOW PLATELETS EXCEPT ASPIRIN  BLEEDING ISSUES; PT CANNOT TAKE ANY ANTICOAGULANTS DUE TO LOW PLATELETS EXCEPT ASPIRIN   • Granix [Filgrastim]      Chest pain  Chest pain     • Iodinated Diagnostic Agents Other (See Comments)     RENAL FAILURE   • Keflex [Cephalexin] Other (See Comments)     RENAL FAILURE       Objective      Vitals:    11/13/17 1345   Weight: 207 lb 6.4 oz (94.1 kg)   Height: 70\" (177.8 cm)   PainSc: 0-No pain     Current Status 11/13/2017   ECOG score 0     Physical Exam    GENERAL:  Well-developed, well-nourished  Patient  in no acute distress.   SKIN:  Warm, dry without rashes, purpura or petechiae.  HEENT:  Pupils were equal and reactive to light and accomodation, conjunctivas non injected, no pterigion, normal extraocular movements, normal visual acuity.   Mouth mucosa was moist, no exudates in oropharynx, normal gum line, normal roof of the mouth and pillars, normal papillations of the tongue.Ear canals were FULL OF WAX, NOT VISUALIZED tympanic membranes, normal hearing acuity.No pain in mastoid area or erythema.  NECK:  Supple with good range of motion; no thyromegaly or masses, no JVD or bruits, no cervical adenopathies.No carotid arteries pain, no carotid abnormal pulsation or arterial dance.  LYMPHATICS:  No cervical, supraclavicular, axillary, epitrochlear or inguinal adenopathy.  CHEST:  Normal excursion of both torrey thoraces, normal voice fremitus, no subcutaneous emphysema, normal axillas, no rashes or acanthosis nigricans. Lungs clear to percussion and auscultation, normal breath sounds " bilaterally, no wheezing, crackles or ronchi, no stridor, no rubs.  CARDIAC AND VASCULAR: PMI not displaced,no thrills, normal rate and irrregular rhythm atrial fibrilation controller VR, without murmurs, rubs or S3 or S4 right or left sided gallops. Normal femoral, popliteal, pedis, brachial and carotid pulses.  ABDOMEN:  Soft, nontender with no organomegaly or masses, no ascites, no collateral circulation,no distention,no Lombard sign, no abdominal pain, no inguinal hernias,no umbilical hernias, no abdominal bruits. Normal bowel sounds.Colostomy in place LLQ.  GENITAL: Not  Performed.  EXTREMITIES  AND SPINE:  No clubbing, cyanosis or edema, no deformities or pain .No kyphosis, scoliosis, deformities or pain in spine, ribs or pelvic bone.fistula with excellent thrill lue.  NEUROLOGICAL:  Patient was awake, alert, oriented to time, person and place,normal gait and coordination    RECENT LABS:  Hematology WBC   Date Value Ref Range Status   11/10/2017 2.73 (L) 4.00 - 10.00 10*3/mm3 Final     RBC   Date Value Ref Range Status   11/10/2017 2.61 (L) 4.30 - 5.50 10*6/mm3 Final     Hemoglobin   Date Value Ref Range Status   11/10/2017 7.9 (C) 13.5 - 16.5 g/dL Final     Hematocrit   Date Value Ref Range Status   11/10/2017 24.1 (L) 40.0 - 49.0 % Final     Platelets   Date Value Ref Range Status   11/10/2017 121 (L) 150 - 375 10*3/mm3 Final          10/2/2017 10/16/2017 10/30/2017 11/10/2017   epoetin tip (EPOGEN,PROCRIT) SC 10,000 Units 10,000 Units 10,000 Units 20,000 Units     Component      Latest Ref Rng & Units 10/30/2017 11/10/2017 11/13/2017           1:51 PM  2:04 PM  1:38 PM   WBC      4.00 - 10.00 10*3/mm3 3.03 (L) 2.73 (L) 2.63 (L)   RBC      4.30 - 5.50 10*6/mm3 2.97 (L) 2.61 (L) 2.61 (L)   Hemoglobin      13.5 - 16.5 g/dL 9.0 (L) 7.9 (LL) 7.9 (LL)   Hematocrit      40.0 - 49.0 % 28.1 (L) 24.1 (L) 24.1 (L)   MCV      83.0 - 97.0 fL 94.6 92.3 92.3   MCH      27.0 - 33.0 pg 30.3 30.3 30.3   MCHC      32.0 - 35.0  g/dL 32.0 32.8 32.8   RDW      11.7 - 14.5 % 15.0 (H) 14.6 (H) 14.6 (H)   RDW-SD      37.0 - 49.0 fl 52.2 (H) 49.6 (H) 49.3 (H)   MPV      8.9 - 12.1 fL 9.5 8.4 (L) 8.7 (L)   Platelets      150 - 375 10*3/mm3 75 (L) 121 (L) 132 (L)   Neutrophil %      39.0 - 75.0 % 67.3 69.6 65.7   Lymphocyte %      20.0 - 49.0 % 17.5 (L) 16.8 (L) 20.2   Monocyte %      4.0 - 12.0 % 11.2 8.4 9.9   Eosinophil %      1.0 - 5.0 % 2.0 2.6 1.9   Basophil %      0.0 - 1.1 % 0.7 1.1 0.4   Immature Grans %      0.0 - 0.5 % 1.3 (H) 1.5 (H) 1.9 (H)     Assessment/Plan  : This patient has history of multifactorial anemia as well as leukopenia and thrombocytopenia. He is no longer bleeding in the gastrointestinal tract because he is no longer receiving anticoagulants. He remains on a baby aspirin.  Also, the patient had in July, had a CT scan of the abdomen that suggested splenomegaly and cirrhosis of the liver and this probably explains his leukopenia and thrombocytopenia at the same time.It is difficult today to explain how the patient’s platelet count has spontaneously improved without changing lifestyle or medications of any nature. It also difficult to explain why he has become more anemic in absence of any hemodilution because of any evidence of progressive edema. The patient has no obvious jaundice, no obvious hemolysis and no obvious GI bleeding as far as we can tell. Not noticed any modification in the color of the stool. He received Procrit last week and he will remain on Procrit on weekly basis until we are able to recover hemoglobin to a level that is better for him around 10.5 or 10. The white count remains low and this is related to congestive splenomegaly and cirrhosis of the liver due to fatty liver infiltration.     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 I do not believe the infection is related to anemia. Therefore we will see what  happens with the weekly Procrit. I am planning this Friday for the patient to have a CMP, LDH and also he will have a reticulocyte count. He also will have ferritin, iron, TIBC, B12, folic acid levels. I am going to give him some Hemoccult cards to be brought this Friday to check on the stools. Three cards will be utilized.     I will review him back in a month and see how things are going then. Continue checking on weekly basis with nurse to review his laboratory parameters. I find no need for transfusion at this time given the fact that the patient is not having any major symptoms related to anemia.                  11/13/2017      CC:

## 2017-11-17 ENCOUNTER — LAB (OUTPATIENT)
Dept: LAB | Facility: HOSPITAL | Age: 72
End: 2017-11-17

## 2017-11-17 ENCOUNTER — INFUSION (OUTPATIENT)
Dept: ONCOLOGY | Facility: HOSPITAL | Age: 72
End: 2017-11-17

## 2017-11-17 DIAGNOSIS — K92.1 GASTROINTESTINAL HEMORRHAGE WITH MELENA: Primary | ICD-10-CM

## 2017-11-17 DIAGNOSIS — D72.819 CHRONIC LEUKOPENIA: ICD-10-CM

## 2017-11-17 DIAGNOSIS — D73.2 CONGESTIVE SPLENOMEGALY: ICD-10-CM

## 2017-11-17 DIAGNOSIS — D69.6 THROMBOCYTOPENIA (HCC): ICD-10-CM

## 2017-11-17 DIAGNOSIS — N18.30 ANEMIA DUE TO STAGE 3 (MODERATE) CHRONIC RENAL FAILURE TREATED WITH ERYTHROPOIETIN (HCC): ICD-10-CM

## 2017-11-17 DIAGNOSIS — D63.1 ANEMIA DUE TO STAGE 3 (MODERATE) CHRONIC RENAL FAILURE TREATED WITH ERYTHROPOIETIN (HCC): ICD-10-CM

## 2017-11-17 LAB
ABO GROUP BLD: NORMAL
ALBUMIN SERPL-MCNC: 3.2 G/DL (ref 3.5–5.2)
ALBUMIN/GLOB SERPL: 0.8 G/DL (ref 1.1–2.4)
ALP SERPL-CCNC: 125 U/L (ref 38–116)
ALT SERPL W P-5'-P-CCNC: 12 U/L (ref 0–41)
ANION GAP SERPL CALCULATED.3IONS-SCNC: 14.9 MMOL/L
AST SERPL-CCNC: 23 U/L (ref 0–40)
BASOPHILS # BLD AUTO: 0.01 10*3/MM3 (ref 0–0.1)
BASOPHILS NFR BLD AUTO: 0.4 % (ref 0–1.1)
BILIRUB SERPL-MCNC: 0.5 MG/DL (ref 0.1–1.2)
BLD GP AB SCN SERPL QL: NEGATIVE
BUN BLD-MCNC: 64 MG/DL (ref 6–20)
BUN/CREAT SERPL: 17.5 (ref 7.3–30)
CALCIUM SPEC-SCNC: 8.4 MG/DL (ref 8.5–10.2)
CHLORIDE SERPL-SCNC: 101 MMOL/L (ref 98–107)
CO2 SERPL-SCNC: 19.1 MMOL/L (ref 22–29)
COLLECT DATE SP2 STL: ABNORMAL
COLLECT DATE SP3 STL: ABNORMAL
COLLECT DATE STL: ABNORMAL
CREAT BLD-MCNC: 3.65 MG/DL (ref 0.7–1.3)
DEPRECATED RDW RBC AUTO: 52 FL (ref 37–49)
EOSINOPHIL # BLD AUTO: 0.04 10*3/MM3 (ref 0–0.36)
EOSINOPHIL NFR BLD AUTO: 1.6 % (ref 1–5)
ERYTHROCYTE [DISTWIDTH] IN BLOOD BY AUTOMATED COUNT: 15.1 % (ref 11.7–14.5)
FERRITIN SERPL-MCNC: 112.7 NG/ML (ref 30–400)
FOLATE SERPL-MCNC: >20 NG/ML (ref 4.78–24.2)
GFR SERPL CREATININE-BSD FRML MDRD: 16 ML/MIN/1.73
GLOBULIN UR ELPH-MCNC: 3.8 GM/DL (ref 1.8–3.5)
GLUCOSE BLD-MCNC: 122 MG/DL (ref 74–124)
HCT VFR BLD AUTO: 24.6 % (ref 40–49)
HEMOCCULT STL QL: NEGATIVE
HEMOCCULT STL QL: POSITIVE
HEMOCCULT STL QL: POSITIVE
HGB BLD-MCNC: 7.7 G/DL (ref 13.5–16.5)
IMM GRANULOCYTES # BLD: 0.03 10*3/MM3 (ref 0–0.03)
IMM GRANULOCYTES NFR BLD: 1.2 % (ref 0–0.5)
IRON 24H UR-MRATE: 54 MCG/DL (ref 59–158)
IRON SATN MFR SERPL: 17 % (ref 14–48)
LYMPHOCYTES # BLD AUTO: 0.43 10*3/MM3 (ref 1–3.5)
LYMPHOCYTES NFR BLD AUTO: 17.3 % (ref 20–49)
Lab: 800
MCH RBC QN AUTO: 29.7 PG (ref 27–33)
MCHC RBC AUTO-ENTMCNC: 31.3 G/DL (ref 32–35)
MCV RBC AUTO: 95 FL (ref 83–97)
MONOCYTES # BLD AUTO: 0.23 10*3/MM3 (ref 0.25–0.8)
MONOCYTES NFR BLD AUTO: 9.2 % (ref 4–12)
NEUTROPHILS # BLD AUTO: 1.75 10*3/MM3 (ref 1.5–7)
NEUTROPHILS NFR BLD AUTO: 70.3 % (ref 39–75)
NRBC BLD MANUAL-RTO: 0 /100 WBC (ref 0–0)
PLATELET # BLD AUTO: 135 10*3/MM3 (ref 150–375)
PMV BLD AUTO: 7.6 FL (ref 8.9–12.1)
POTASSIUM BLD-SCNC: 4.2 MMOL/L (ref 3.5–4.7)
PROT SERPL-MCNC: 7 G/DL (ref 6.3–8)
RBC # BLD AUTO: 2.59 10*6/MM3 (ref 4.3–5.5)
RH BLD: POSITIVE
SODIUM BLD-SCNC: 135 MMOL/L (ref 134–145)
TIBC SERPL-MCNC: 319 MCG/DL (ref 249–505)
TRANSFERRIN SERPL-MCNC: 228 MG/DL (ref 200–360)
VIT B12 BLD-MCNC: 287 PG/ML (ref 211–946)
WBC NRBC COR # BLD: 2.49 10*3/MM3 (ref 4–10)

## 2017-11-17 PROCEDURE — 82607 VITAMIN B-12: CPT | Performed by: INTERNAL MEDICINE

## 2017-11-17 PROCEDURE — 83540 ASSAY OF IRON: CPT | Performed by: INTERNAL MEDICINE

## 2017-11-17 PROCEDURE — 80053 COMPREHEN METABOLIC PANEL: CPT | Performed by: INTERNAL MEDICINE

## 2017-11-17 PROCEDURE — 36415 COLL VENOUS BLD VENIPUNCTURE: CPT | Performed by: INTERNAL MEDICINE

## 2017-11-17 PROCEDURE — 85025 COMPLETE CBC W/AUTO DIFF WBC: CPT

## 2017-11-17 PROCEDURE — 86900 BLOOD TYPING SEROLOGIC ABO: CPT

## 2017-11-17 PROCEDURE — 84466 ASSAY OF TRANSFERRIN: CPT | Performed by: INTERNAL MEDICINE

## 2017-11-17 PROCEDURE — 86923 COMPATIBILITY TEST ELECTRIC: CPT

## 2017-11-17 PROCEDURE — 82272 OCCULT BLD FECES 1-3 TESTS: CPT

## 2017-11-17 PROCEDURE — 86901 BLOOD TYPING SEROLOGIC RH(D): CPT

## 2017-11-17 PROCEDURE — 82728 ASSAY OF FERRITIN: CPT | Performed by: INTERNAL MEDICINE

## 2017-11-17 PROCEDURE — 86850 RBC ANTIBODY SCREEN: CPT

## 2017-11-17 PROCEDURE — 82746 ASSAY OF FOLIC ACID SERUM: CPT | Performed by: INTERNAL MEDICINE

## 2017-11-17 RX ORDER — ACETAMINOPHEN 325 MG/1
650 TABLET ORAL ONCE
Status: CANCELLED | OUTPATIENT
Start: 2017-11-19 | End: 2017-11-17

## 2017-11-17 RX ORDER — DIPHENHYDRAMINE HCL 25 MG
25 CAPSULE ORAL ONCE
Status: CANCELLED | OUTPATIENT
Start: 2017-11-19 | End: 2017-11-17

## 2017-11-17 RX ORDER — SODIUM CHLORIDE 9 MG/ML
250 INJECTION, SOLUTION INTRAVENOUS AS NEEDED
Status: CANCELLED | OUTPATIENT
Start: 2017-11-19

## 2017-11-17 NOTE — PROGRESS NOTES
CBC reviewed with Dr. Mckenzie. Patient HBG today 7.7. The 2 of 3 occult stool cards were positive for blood. Order placed in epic to transfuse 2 units. Per Dr. Mckenzie no procrit today. Discussed with patient that he may need to stop his aspirin.

## 2017-11-19 ENCOUNTER — INFUSION (OUTPATIENT)
Dept: ONCOLOGY | Facility: HOSPITAL | Age: 72
End: 2017-11-19

## 2017-11-19 VITALS
HEART RATE: 64 BPM | SYSTOLIC BLOOD PRESSURE: 174 MMHG | TEMPERATURE: 96.9 F | DIASTOLIC BLOOD PRESSURE: 77 MMHG | OXYGEN SATURATION: 99 %

## 2017-11-19 DIAGNOSIS — K92.1 GASTROINTESTINAL HEMORRHAGE WITH MELENA: ICD-10-CM

## 2017-11-19 PROCEDURE — P9016 RBC LEUKOCYTES REDUCED: HCPCS

## 2017-11-19 PROCEDURE — 36415 COLL VENOUS BLD VENIPUNCTURE: CPT

## 2017-11-19 PROCEDURE — 63710000001 DIPHENHYDRAMINE PER 50 MG: Performed by: INTERNAL MEDICINE

## 2017-11-19 PROCEDURE — 36430 TRANSFUSION BLD/BLD COMPNT: CPT

## 2017-11-19 PROCEDURE — 86900 BLOOD TYPING SEROLOGIC ABO: CPT

## 2017-11-19 RX ORDER — DIPHENHYDRAMINE HCL 25 MG
25 CAPSULE ORAL ONCE
Status: COMPLETED | OUTPATIENT
Start: 2017-11-19 | End: 2017-11-19

## 2017-11-19 RX ORDER — SODIUM CHLORIDE 9 MG/ML
250 INJECTION, SOLUTION INTRAVENOUS AS NEEDED
Status: DISCONTINUED | OUTPATIENT
Start: 2017-11-19 | End: 2017-11-19 | Stop reason: HOSPADM

## 2017-11-19 RX ORDER — ACETAMINOPHEN 325 MG/1
650 TABLET ORAL ONCE
Status: COMPLETED | OUTPATIENT
Start: 2017-11-19 | End: 2017-11-19

## 2017-11-19 RX ADMIN — ACETAMINOPHEN 650 MG: 325 TABLET ORAL at 10:36

## 2017-11-19 RX ADMIN — DIPHENHYDRAMINE HYDROCHLORIDE 25 MG: 25 CAPSULE ORAL at 10:36

## 2017-11-20 LAB
ABO + RH BLD: NORMAL
ABO + RH BLD: NORMAL
BH BB BLOOD EXPIRATION DATE: NORMAL
BH BB BLOOD EXPIRATION DATE: NORMAL
BH BB BLOOD TYPE BARCODE: 5100
BH BB BLOOD TYPE BARCODE: 5100
BH BB DISPENSE STATUS: NORMAL
BH BB DISPENSE STATUS: NORMAL
BH BB PRODUCT CODE: NORMAL
BH BB PRODUCT CODE: NORMAL
BH BB UNIT NUMBER: NORMAL
BH BB UNIT NUMBER: NORMAL
UNIT  ABO: NORMAL
UNIT  ABO: NORMAL
UNIT  RH: NORMAL
UNIT  RH: NORMAL

## 2017-11-21 ENCOUNTER — CLINICAL SUPPORT (OUTPATIENT)
Dept: ONCOLOGY | Facility: HOSPITAL | Age: 72
End: 2017-11-21

## 2017-11-21 ENCOUNTER — LAB (OUTPATIENT)
Dept: LAB | Facility: HOSPITAL | Age: 72
End: 2017-11-21

## 2017-11-21 ENCOUNTER — TELEPHONE (OUTPATIENT)
Dept: ONCOLOGY | Facility: HOSPITAL | Age: 72
End: 2017-11-21

## 2017-11-21 DIAGNOSIS — D72.819 CHRONIC LEUKOPENIA: ICD-10-CM

## 2017-11-21 DIAGNOSIS — D69.6 THROMBOCYTOPENIA (HCC): ICD-10-CM

## 2017-11-21 DIAGNOSIS — N18.30 ANEMIA DUE TO STAGE 3 (MODERATE) CHRONIC RENAL FAILURE TREATED WITH ERYTHROPOIETIN (HCC): ICD-10-CM

## 2017-11-21 DIAGNOSIS — D73.2 CONGESTIVE SPLENOMEGALY: ICD-10-CM

## 2017-11-21 DIAGNOSIS — D63.1 ANEMIA DUE TO STAGE 3 (MODERATE) CHRONIC RENAL FAILURE TREATED WITH ERYTHROPOIETIN (HCC): ICD-10-CM

## 2017-11-21 DIAGNOSIS — K29.60 REFLUX GASTRITIS: Primary | ICD-10-CM

## 2017-11-21 LAB
BASOPHILS # BLD AUTO: 0.02 10*3/MM3 (ref 0–0.1)
BASOPHILS NFR BLD AUTO: 0.6 % (ref 0–1.1)
DEPRECATED RDW RBC AUTO: 54 FL (ref 37–49)
EOSINOPHIL # BLD AUTO: 0.05 10*3/MM3 (ref 0–0.36)
EOSINOPHIL NFR BLD AUTO: 1.6 % (ref 1–5)
ERYTHROCYTE [DISTWIDTH] IN BLOOD BY AUTOMATED COUNT: 16.3 % (ref 11.7–14.5)
HCT VFR BLD AUTO: 30.8 % (ref 40–49)
HGB BLD-MCNC: 10 G/DL (ref 13.5–16.5)
IMM GRANULOCYTES # BLD: 0.02 10*3/MM3 (ref 0–0.03)
IMM GRANULOCYTES NFR BLD: 0.6 % (ref 0–0.5)
LYMPHOCYTES # BLD AUTO: 0.64 10*3/MM3 (ref 1–3.5)
LYMPHOCYTES NFR BLD AUTO: 19.9 % (ref 20–49)
MCH RBC QN AUTO: 29.4 PG (ref 27–33)
MCHC RBC AUTO-ENTMCNC: 32.5 G/DL (ref 32–35)
MCV RBC AUTO: 90.6 FL (ref 83–97)
MONOCYTES # BLD AUTO: 0.35 10*3/MM3 (ref 0.25–0.8)
MONOCYTES NFR BLD AUTO: 10.9 % (ref 4–12)
NEUTROPHILS # BLD AUTO: 2.13 10*3/MM3 (ref 1.5–7)
NEUTROPHILS NFR BLD AUTO: 66.4 % (ref 39–75)
NRBC BLD MANUAL-RTO: 0 /100 WBC (ref 0–0)
PLATELET # BLD AUTO: 101 10*3/MM3 (ref 150–375)
PMV BLD AUTO: 9.2 FL (ref 8.9–12.1)
RBC # BLD AUTO: 3.4 10*6/MM3 (ref 4.3–5.5)
WBC NRBC COR # BLD: 3.21 10*3/MM3 (ref 4–10)

## 2017-11-21 PROCEDURE — 36416 COLLJ CAPILLARY BLOOD SPEC: CPT | Performed by: INTERNAL MEDICINE

## 2017-11-21 PROCEDURE — 85025 COMPLETE CBC W/AUTO DIFF WBC: CPT | Performed by: INTERNAL MEDICINE

## 2017-11-21 NOTE — PROGRESS NOTES
Pt came in today with wife because pt was feeling SOA, nauseated, fatigued.  Pt had blood transfusion on Sunday and CBC was done today with Hgb of 10.0.  Pt has history of A-fib.  /70 (pt states that he has history of high BP), HR 76, temp 98.0.  Wife concerned about pt may have GI bleed.  Pt did have 2 positives of occult blood test.  Pt denies having dark or black stools.  No blood in urine.     Claribel KILGORE came to see pt and discussed symptoms with pt and wife.  She also spoke with Dr Mckenzie.  Pt will continue taking Zantac 150mg once a day and will add Prevacid 30mg QD.  Claribel CEDEÑO will send this medication to pt's pharmacy. Reviewed with wife and pt. Wife wrote down new med and dosing and frequency and v/u of med.  Pt was on schedule tomorrow for CBC and RN Review - this appt can be cancelled since pt came in today and pt will need to be placed on RN Review schedule either Monday or Tuesday of next week for CBC review.  Pt and wife sent to scheduling to set up appts. Other appts for Fridays for Procrit will remain the same.

## 2017-11-21 NOTE — PROGRESS NOTES
Patient reviewed in RN office while here for CBC results. Hgb up to 10.0 after transfusion for Hgb of 7.7 with Heme + stools. Patient continues to feel weak and has some SOB with exertion. Patient also noting intermittent nausea and abdominal discomfort, alleviated with crackers or other bland food. Concerned patient may have GI ulcer, causing the blood noted in stools. He is currently on Zantac 150 mg daily. Cannot double that due to Stage IV kidney dz. D/w Dr. Mckenzie. Will add Prevacid 30 mg daily. Also added CBC to early next week to keep a close eye on the Hgb. Patient and wife instructed to call or go to ER over the holiday if he develops new or worsening sx.

## 2017-11-21 NOTE — TELEPHONE ENCOUNTER
Patient's wife called stating patient c/o a little SOA and fatigue.  Had transfusion on Sunday at hospital.  Message sent to scheduling to see about getting patient in earlier for CBC.  Wife instructed, if symptoms persist, proceed to ER.

## 2017-11-22 ENCOUNTER — TELEPHONE (OUTPATIENT)
Dept: ONCOLOGY | Facility: HOSPITAL | Age: 72
End: 2017-11-22

## 2017-11-22 ENCOUNTER — APPOINTMENT (OUTPATIENT)
Dept: LAB | Facility: HOSPITAL | Age: 72
End: 2017-11-22

## 2017-11-22 ENCOUNTER — APPOINTMENT (OUTPATIENT)
Dept: ONCOLOGY | Facility: HOSPITAL | Age: 72
End: 2017-11-22

## 2017-11-22 NOTE — TELEPHONE ENCOUNTER
Patient calling because the prevacid ordered was not going to be covered by his insurance and would be $500. Pt wants to know if ok to take 2 of the prevacid OTC 15mg tabs. Reviewed with Claribel KILGORE, ok for patient to take 2 tabs for 30mg total. Pt v/u

## 2017-11-27 ENCOUNTER — LAB (OUTPATIENT)
Dept: LAB | Facility: HOSPITAL | Age: 72
End: 2017-11-27

## 2017-11-27 ENCOUNTER — APPOINTMENT (OUTPATIENT)
Dept: LAB | Facility: HOSPITAL | Age: 72
End: 2017-11-27

## 2017-11-27 ENCOUNTER — CLINICAL SUPPORT (OUTPATIENT)
Dept: ONCOLOGY | Facility: HOSPITAL | Age: 72
End: 2017-11-27

## 2017-11-27 ENCOUNTER — APPOINTMENT (OUTPATIENT)
Dept: ONCOLOGY | Facility: HOSPITAL | Age: 72
End: 2017-11-27

## 2017-11-27 DIAGNOSIS — D69.6 THROMBOCYTOPENIA (HCC): ICD-10-CM

## 2017-11-27 DIAGNOSIS — N18.30 ANEMIA DUE TO STAGE 3 (MODERATE) CHRONIC RENAL FAILURE TREATED WITH ERYTHROPOIETIN (HCC): ICD-10-CM

## 2017-11-27 DIAGNOSIS — D73.2 CONGESTIVE SPLENOMEGALY: ICD-10-CM

## 2017-11-27 DIAGNOSIS — D63.1 ANEMIA DUE TO STAGE 3 (MODERATE) CHRONIC RENAL FAILURE TREATED WITH ERYTHROPOIETIN (HCC): ICD-10-CM

## 2017-11-27 DIAGNOSIS — D72.819 CHRONIC LEUKOPENIA: ICD-10-CM

## 2017-11-27 LAB
BASOPHILS # BLD AUTO: 0.02 10*3/MM3 (ref 0–0.1)
BASOPHILS NFR BLD AUTO: 0.8 % (ref 0–1.1)
DEPRECATED RDW RBC AUTO: 53.4 FL (ref 37–49)
EOSINOPHIL # BLD AUTO: 0.04 10*3/MM3 (ref 0–0.36)
EOSINOPHIL NFR BLD AUTO: 1.6 % (ref 1–5)
ERYTHROCYTE [DISTWIDTH] IN BLOOD BY AUTOMATED COUNT: 15.9 % (ref 11.7–14.5)
HCT VFR BLD AUTO: 31.1 % (ref 40–49)
HGB BLD-MCNC: 9.9 G/DL (ref 13.5–16.5)
IMM GRANULOCYTES # BLD: 0.02 10*3/MM3 (ref 0–0.03)
IMM GRANULOCYTES NFR BLD: 0.8 % (ref 0–0.5)
LYMPHOCYTES # BLD AUTO: 0.61 10*3/MM3 (ref 1–3.5)
LYMPHOCYTES NFR BLD AUTO: 24 % (ref 20–49)
MCH RBC QN AUTO: 29.2 PG (ref 27–33)
MCHC RBC AUTO-ENTMCNC: 31.8 G/DL (ref 32–35)
MCV RBC AUTO: 91.7 FL (ref 83–97)
MONOCYTES # BLD AUTO: 0.27 10*3/MM3 (ref 0.25–0.8)
MONOCYTES NFR BLD AUTO: 10.6 % (ref 4–12)
NEUTROPHILS # BLD AUTO: 1.58 10*3/MM3 (ref 1.5–7)
NEUTROPHILS NFR BLD AUTO: 62.2 % (ref 39–75)
NRBC BLD MANUAL-RTO: 0 /100 WBC (ref 0–0)
PLATELET # BLD AUTO: 69 10*3/MM3 (ref 150–375)
PMV BLD AUTO: 9.1 FL (ref 8.9–12.1)
RBC # BLD AUTO: 3.39 10*6/MM3 (ref 4.3–5.5)
WBC NRBC COR # BLD: 2.54 10*3/MM3 (ref 4–10)

## 2017-11-27 PROCEDURE — 85025 COMPLETE CBC W/AUTO DIFF WBC: CPT | Performed by: INTERNAL MEDICINE

## 2017-11-27 PROCEDURE — 36416 COLLJ CAPILLARY BLOOD SPEC: CPT | Performed by: INTERNAL MEDICINE

## 2017-11-27 NOTE — PROGRESS NOTES
Lab Results   Component Value Date    WBC 2.54 (L) 11/27/2017    HGB 9.9 (L) 11/27/2017    HCT 31.1 (L) 11/27/2017    MCV 91.7 11/27/2017    PLT 69 (L) 11/27/2017       Pt here for cbc and RN review. Pt denies any bleeding.  States that he is eating smaller more frequent meals and this has helped some.  He c/o abdominal bloating and gas when he eats larger meals.  Pt was started on Prevacid last week and is continuing to take this.    Reviewed labs and the above with Dr. Mckenzie.   is not concerned with the drop in his platelet count.    Pt will return later in the week on Friday.    Pt knows to call with any questions or concerns.

## 2017-11-29 ENCOUNTER — DOCUMENTATION (OUTPATIENT)
Dept: ONCOLOGY | Facility: CLINIC | Age: 72
End: 2017-11-29

## 2017-11-29 NOTE — PROGRESS NOTES
Fax rec from [facility] pts plan will not cover the Prevacid Solutabs. PA needed. I submitted a PA to [employer] through [employer]  Request approved until 12/31/2018. Elsa notified [phone]

## 2017-12-01 ENCOUNTER — LAB (OUTPATIENT)
Dept: LAB | Facility: HOSPITAL | Age: 72
End: 2017-12-01

## 2017-12-01 ENCOUNTER — INFUSION (OUTPATIENT)
Dept: ONCOLOGY | Facility: HOSPITAL | Age: 72
End: 2017-12-01

## 2017-12-01 DIAGNOSIS — N18.4 KIDNEY DISEASE, CHRONIC, STAGE IV (GFR 15-29 ML/MIN) (HCC): Primary | ICD-10-CM

## 2017-12-01 DIAGNOSIS — D73.2 CONGESTIVE SPLENOMEGALY: ICD-10-CM

## 2017-12-01 DIAGNOSIS — D63.1 ANEMIA DUE TO STAGE 3 (MODERATE) CHRONIC RENAL FAILURE TREATED WITH ERYTHROPOIETIN (HCC): ICD-10-CM

## 2017-12-01 DIAGNOSIS — N18.30 ANEMIA DUE TO STAGE 3 (MODERATE) CHRONIC RENAL FAILURE TREATED WITH ERYTHROPOIETIN (HCC): ICD-10-CM

## 2017-12-01 DIAGNOSIS — D69.6 THROMBOCYTOPENIA (HCC): ICD-10-CM

## 2017-12-01 DIAGNOSIS — D72.819 CHRONIC LEUKOPENIA: ICD-10-CM

## 2017-12-01 LAB
BASOPHILS # BLD AUTO: 0.01 10*3/MM3 (ref 0–0.1)
BASOPHILS NFR BLD AUTO: 0.4 % (ref 0–1.1)
DEPRECATED RDW RBC AUTO: 53.6 FL (ref 37–49)
EOSINOPHIL # BLD AUTO: 0.04 10*3/MM3 (ref 0–0.36)
EOSINOPHIL NFR BLD AUTO: 1.6 % (ref 1–5)
ERYTHROCYTE [DISTWIDTH] IN BLOOD BY AUTOMATED COUNT: 15.9 % (ref 11.7–14.5)
HCT VFR BLD AUTO: 31.3 % (ref 40–49)
HGB BLD-MCNC: 10.2 G/DL (ref 13.5–16.5)
IMM GRANULOCYTES # BLD: 0.02 10*3/MM3 (ref 0–0.03)
IMM GRANULOCYTES NFR BLD: 0.8 % (ref 0–0.5)
LYMPHOCYTES # BLD AUTO: 0.56 10*3/MM3 (ref 1–3.5)
LYMPHOCYTES NFR BLD AUTO: 21.9 % (ref 20–49)
MCH RBC QN AUTO: 29.7 PG (ref 27–33)
MCHC RBC AUTO-ENTMCNC: 32.6 G/DL (ref 32–35)
MCV RBC AUTO: 91.3 FL (ref 83–97)
MONOCYTES # BLD AUTO: 0.28 10*3/MM3 (ref 0.25–0.8)
MONOCYTES NFR BLD AUTO: 10.9 % (ref 4–12)
NEUTROPHILS # BLD AUTO: 1.65 10*3/MM3 (ref 1.5–7)
NEUTROPHILS NFR BLD AUTO: 64.4 % (ref 39–75)
NRBC BLD MANUAL-RTO: 0 /100 WBC (ref 0–0)
PLATELET # BLD AUTO: 73 10*3/MM3 (ref 150–375)
PMV BLD AUTO: 9.5 FL (ref 8.9–12.1)
RBC # BLD AUTO: 3.43 10*6/MM3 (ref 4.3–5.5)
WBC NRBC COR # BLD: 2.56 10*3/MM3 (ref 4–10)

## 2017-12-01 PROCEDURE — 36416 COLLJ CAPILLARY BLOOD SPEC: CPT | Performed by: INTERNAL MEDICINE

## 2017-12-01 PROCEDURE — 85025 COMPLETE CBC W/AUTO DIFF WBC: CPT | Performed by: INTERNAL MEDICINE

## 2017-12-01 NOTE — PROGRESS NOTES
hgb 10.2 today, procrit dose held.  Labs printed and reviewed with pt, he continues with zantac and prevacid. No c/o voiced.  Pt to return in one week for labs and possible procrit

## 2017-12-08 ENCOUNTER — LAB (OUTPATIENT)
Dept: LAB | Facility: HOSPITAL | Age: 72
End: 2017-12-08

## 2017-12-08 ENCOUNTER — INFUSION (OUTPATIENT)
Dept: ONCOLOGY | Facility: HOSPITAL | Age: 72
End: 2017-12-08

## 2017-12-08 VITALS — BODY MASS INDEX: 28.98 KG/M2 | WEIGHT: 202 LBS

## 2017-12-08 DIAGNOSIS — D73.2 CONGESTIVE SPLENOMEGALY: ICD-10-CM

## 2017-12-08 DIAGNOSIS — D63.1 ANEMIA DUE TO STAGE 4 CHRONIC KIDNEY DISEASE TREATED WITH ERYTHROPOIETIN (HCC): Primary | ICD-10-CM

## 2017-12-08 DIAGNOSIS — N18.4 ANEMIA DUE TO STAGE 4 CHRONIC KIDNEY DISEASE TREATED WITH ERYTHROPOIETIN (HCC): Primary | ICD-10-CM

## 2017-12-08 DIAGNOSIS — D72.819 CHRONIC LEUKOPENIA: ICD-10-CM

## 2017-12-08 DIAGNOSIS — D58.2 ABNORMAL HEMOGLOBIN (HGB) (HCC): ICD-10-CM

## 2017-12-08 DIAGNOSIS — K92.2 GASTROINTESTINAL HEMORRHAGE, UNSPECIFIED GASTROINTESTINAL HEMORRHAGE TYPE: Primary | ICD-10-CM

## 2017-12-08 DIAGNOSIS — D69.6 THROMBOCYTOPENIA (HCC): ICD-10-CM

## 2017-12-08 DIAGNOSIS — N18.30 ANEMIA DUE TO STAGE 3 (MODERATE) CHRONIC RENAL FAILURE TREATED WITH ERYTHROPOIETIN (HCC): ICD-10-CM

## 2017-12-08 DIAGNOSIS — D63.1 ANEMIA DUE TO STAGE 3 (MODERATE) CHRONIC RENAL FAILURE TREATED WITH ERYTHROPOIETIN (HCC): ICD-10-CM

## 2017-12-08 LAB
BASOPHILS # BLD AUTO: 0.02 10*3/MM3 (ref 0–0.1)
BASOPHILS NFR BLD AUTO: 0.8 % (ref 0–1.1)
DEPRECATED RDW RBC AUTO: 53.9 FL (ref 37–49)
EOSINOPHIL # BLD AUTO: 0.06 10*3/MM3 (ref 0–0.36)
EOSINOPHIL NFR BLD AUTO: 2.4 % (ref 1–5)
ERYTHROCYTE [DISTWIDTH] IN BLOOD BY AUTOMATED COUNT: 16.1 % (ref 11.7–14.5)
HCT VFR BLD AUTO: 30.1 % (ref 40–49)
HGB BLD-MCNC: 9.7 G/DL (ref 13.5–16.5)
IMM GRANULOCYTES # BLD: 0.08 10*3/MM3 (ref 0–0.03)
IMM GRANULOCYTES NFR BLD: 3.2 % (ref 0–0.5)
LYMPHOCYTES # BLD AUTO: 0.55 10*3/MM3 (ref 1–3.5)
LYMPHOCYTES NFR BLD AUTO: 22.1 % (ref 20–49)
MCH RBC QN AUTO: 29.5 PG (ref 27–33)
MCHC RBC AUTO-ENTMCNC: 32.2 G/DL (ref 32–35)
MCV RBC AUTO: 91.5 FL (ref 83–97)
MONOCYTES # BLD AUTO: 0.27 10*3/MM3 (ref 0.25–0.8)
MONOCYTES NFR BLD AUTO: 10.8 % (ref 4–12)
NEUTROPHILS # BLD AUTO: 1.51 10*3/MM3 (ref 1.5–7)
NEUTROPHILS NFR BLD AUTO: 60.7 % (ref 39–75)
NRBC BLD MANUAL-RTO: 0 /100 WBC (ref 0–0)
PLATELET # BLD AUTO: 88 10*3/MM3 (ref 150–375)
PMV BLD AUTO: 9.2 FL (ref 8.9–12.1)
RBC # BLD AUTO: 3.29 10*6/MM3 (ref 4.3–5.5)
WBC NRBC COR # BLD: 2.49 10*3/MM3 (ref 4–10)

## 2017-12-08 PROCEDURE — 85025 COMPLETE CBC W/AUTO DIFF WBC: CPT | Performed by: INTERNAL MEDICINE

## 2017-12-08 PROCEDURE — 96372 THER/PROPH/DIAG INJ SC/IM: CPT

## 2017-12-08 PROCEDURE — 63510000001 EPOETIN ALFA PER 1000 UNITS: Performed by: INTERNAL MEDICINE

## 2017-12-08 PROCEDURE — 36416 COLLJ CAPILLARY BLOOD SPEC: CPT | Performed by: INTERNAL MEDICINE

## 2017-12-08 RX ADMIN — ERYTHROPOIETIN 15000 UNITS: 20000 INJECTION, SOLUTION INTRAVENOUS; SUBCUTANEOUS at 09:22

## 2017-12-08 NOTE — PROGRESS NOTES
Per PERFECTO Walton, RN verbal order Dr Mckenzie referral to GI Dr Herrera for posssible GI bleed due to decrease in HgB

## 2017-12-15 ENCOUNTER — INFUSION (OUTPATIENT)
Dept: ONCOLOGY | Facility: HOSPITAL | Age: 72
End: 2017-12-15

## 2017-12-15 ENCOUNTER — TELEPHONE (OUTPATIENT)
Dept: ONCOLOGY | Facility: CLINIC | Age: 72
End: 2017-12-15

## 2017-12-15 ENCOUNTER — LAB (OUTPATIENT)
Dept: LAB | Facility: HOSPITAL | Age: 72
End: 2017-12-15

## 2017-12-15 ENCOUNTER — OFFICE VISIT (OUTPATIENT)
Dept: ONCOLOGY | Facility: CLINIC | Age: 72
End: 2017-12-15

## 2017-12-15 VITALS
DIASTOLIC BLOOD PRESSURE: 70 MMHG | WEIGHT: 201 LBS | BODY MASS INDEX: 28.77 KG/M2 | SYSTOLIC BLOOD PRESSURE: 142 MMHG | HEIGHT: 70 IN | TEMPERATURE: 97.9 F | HEART RATE: 86 BPM | RESPIRATION RATE: 16 BRPM

## 2017-12-15 DIAGNOSIS — D63.1 ANEMIA DUE TO STAGE 3 (MODERATE) CHRONIC RENAL FAILURE TREATED WITH ERYTHROPOIETIN (HCC): ICD-10-CM

## 2017-12-15 DIAGNOSIS — D63.1 ANEMIA DUE TO STAGE 3 (MODERATE) CHRONIC RENAL FAILURE TREATED WITH ERYTHROPOIETIN (HCC): Primary | ICD-10-CM

## 2017-12-15 DIAGNOSIS — D72.819 CHRONIC LEUKOPENIA: ICD-10-CM

## 2017-12-15 DIAGNOSIS — D73.2 CONGESTIVE SPLENOMEGALY: ICD-10-CM

## 2017-12-15 DIAGNOSIS — D63.1 ANEMIA DUE TO STAGE 4 CHRONIC KIDNEY DISEASE TREATED WITH ERYTHROPOIETIN (HCC): Primary | ICD-10-CM

## 2017-12-15 DIAGNOSIS — D69.6 THROMBOCYTOPENIA (HCC): ICD-10-CM

## 2017-12-15 DIAGNOSIS — N18.30 ANEMIA DUE TO STAGE 3 (MODERATE) CHRONIC RENAL FAILURE TREATED WITH ERYTHROPOIETIN (HCC): Primary | ICD-10-CM

## 2017-12-15 DIAGNOSIS — N18.4 ANEMIA DUE TO STAGE 4 CHRONIC KIDNEY DISEASE TREATED WITH ERYTHROPOIETIN (HCC): Primary | ICD-10-CM

## 2017-12-15 DIAGNOSIS — R94.6 ABNORMAL RESULTS OF THYROID FUNCTION STUDIES: ICD-10-CM

## 2017-12-15 DIAGNOSIS — N18.30 ANEMIA DUE TO STAGE 3 (MODERATE) CHRONIC RENAL FAILURE TREATED WITH ERYTHROPOIETIN (HCC): ICD-10-CM

## 2017-12-15 DIAGNOSIS — K74.60 CIRRHOSIS OF LIVER WITHOUT ASCITES, UNSPECIFIED HEPATIC CIRRHOSIS TYPE (HCC): ICD-10-CM

## 2017-12-15 LAB
ALBUMIN SERPL-MCNC: 3.4 G/DL (ref 3.5–5.2)
ALBUMIN/GLOB SERPL: 0.8 G/DL (ref 1.1–2.4)
ALP SERPL-CCNC: 142 U/L (ref 38–116)
ALT SERPL W P-5'-P-CCNC: 20 U/L (ref 0–41)
ANION GAP SERPL CALCULATED.3IONS-SCNC: 14.7 MMOL/L
AST SERPL-CCNC: 43 U/L (ref 0–40)
BASOPHILS # BLD AUTO: 0.01 10*3/MM3 (ref 0–0.1)
BASOPHILS NFR BLD AUTO: 0.4 % (ref 0–1.1)
BILIRUB SERPL-MCNC: 0.5 MG/DL (ref 0.1–1.2)
BUN BLD-MCNC: 76 MG/DL (ref 6–20)
BUN/CREAT SERPL: 15.4 (ref 7.3–30)
CALCIUM SPEC-SCNC: 8.5 MG/DL (ref 8.5–10.2)
CHLORIDE SERPL-SCNC: 104 MMOL/L (ref 98–107)
CO2 SERPL-SCNC: 14.3 MMOL/L (ref 22–29)
CREAT BLD-MCNC: 4.94 MG/DL (ref 0.7–1.3)
DEPRECATED RDW RBC AUTO: 56.4 FL (ref 37–49)
EOSINOPHIL # BLD AUTO: 0.05 10*3/MM3 (ref 0–0.36)
EOSINOPHIL NFR BLD AUTO: 2.1 % (ref 1–5)
ERYTHROCYTE [DISTWIDTH] IN BLOOD BY AUTOMATED COUNT: 16.8 % (ref 11.7–14.5)
ERYTHROCYTE [SEDIMENTATION RATE] IN BLOOD: 65 MM/HR (ref 0–20)
FERRITIN SERPL-MCNC: 131.3 NG/ML (ref 30–400)
GFR SERPL CREATININE-BSD FRML MDRD: 12 ML/MIN/1.73
GLOBULIN UR ELPH-MCNC: 4.2 GM/DL (ref 1.8–3.5)
GLUCOSE BLD-MCNC: 177 MG/DL (ref 74–124)
HCT VFR BLD AUTO: 29.6 % (ref 40–49)
HGB BLD-MCNC: 9.5 G/DL (ref 13.5–16.5)
HGB RETIC QN: 34.6 PG (ref 29.8–36.1)
IMM GRANULOCYTES # BLD: 0.04 10*3/MM3 (ref 0–0.03)
IMM GRANULOCYTES NFR BLD: 1.7 % (ref 0–0.5)
IMM RETICS NFR: 21.9 % (ref 3–15.8)
IRON 24H UR-MRATE: 54 MCG/DL (ref 59–158)
IRON SATN MFR SERPL: 16 % (ref 14–48)
LDH SERPL-CCNC: 215 U/L (ref 99–259)
LYMPHOCYTES # BLD AUTO: 0.54 10*3/MM3 (ref 1–3.5)
LYMPHOCYTES NFR BLD AUTO: 22.8 % (ref 20–49)
MCH RBC QN AUTO: 29.7 PG (ref 27–33)
MCHC RBC AUTO-ENTMCNC: 32.1 G/DL (ref 32–35)
MCV RBC AUTO: 92.5 FL (ref 83–97)
MONOCYTES # BLD AUTO: 0.19 10*3/MM3 (ref 0.25–0.8)
MONOCYTES NFR BLD AUTO: 8 % (ref 4–12)
NEUTROPHILS # BLD AUTO: 1.54 10*3/MM3 (ref 1.5–7)
NEUTROPHILS NFR BLD AUTO: 65 % (ref 39–75)
NRBC BLD MANUAL-RTO: 0 /100 WBC (ref 0–0)
PLATELET # BLD AUTO: 68 10*3/MM3 (ref 150–375)
PMV BLD AUTO: 9.6 FL (ref 8.9–12.1)
POTASSIUM BLD-SCNC: 4.1 MMOL/L (ref 3.5–4.7)
PROT SERPL-MCNC: 7.6 G/DL (ref 6.3–8)
RBC # BLD AUTO: 3.2 10*6/MM3 (ref 4.3–5.5)
RETICS/RBC NFR AUTO: 2.37 % (ref 0.6–2)
SODIUM BLD-SCNC: 133 MMOL/L (ref 134–145)
TIBC SERPL-MCNC: 343 MCG/DL (ref 249–505)
TRANSFERRIN SERPL-MCNC: 245 MG/DL (ref 200–360)
TSH SERPL DL<=0.05 MIU/L-ACNC: 2.13 MIU/ML (ref 0.27–4.2)
WBC NRBC COR # BLD: 2.37 10*3/MM3 (ref 4–10)

## 2017-12-15 PROCEDURE — 82728 ASSAY OF FERRITIN: CPT | Performed by: INTERNAL MEDICINE

## 2017-12-15 PROCEDURE — 84466 ASSAY OF TRANSFERRIN: CPT | Performed by: INTERNAL MEDICINE

## 2017-12-15 PROCEDURE — 80053 COMPREHEN METABOLIC PANEL: CPT | Performed by: INTERNAL MEDICINE

## 2017-12-15 PROCEDURE — 85652 RBC SED RATE AUTOMATED: CPT | Performed by: INTERNAL MEDICINE

## 2017-12-15 PROCEDURE — 85025 COMPLETE CBC W/AUTO DIFF WBC: CPT | Performed by: INTERNAL MEDICINE

## 2017-12-15 PROCEDURE — 84443 ASSAY THYROID STIM HORMONE: CPT | Performed by: INTERNAL MEDICINE

## 2017-12-15 PROCEDURE — 83540 ASSAY OF IRON: CPT | Performed by: INTERNAL MEDICINE

## 2017-12-15 PROCEDURE — 36415 COLL VENOUS BLD VENIPUNCTURE: CPT | Performed by: INTERNAL MEDICINE

## 2017-12-15 PROCEDURE — 85046 RETICYTE/HGB CONCENTRATE: CPT | Performed by: INTERNAL MEDICINE

## 2017-12-15 PROCEDURE — 63510000001 EPOETIN ALFA PER 1000 UNITS: Performed by: INTERNAL MEDICINE

## 2017-12-15 PROCEDURE — 96372 THER/PROPH/DIAG INJ SC/IM: CPT

## 2017-12-15 PROCEDURE — 36416 COLLJ CAPILLARY BLOOD SPEC: CPT | Performed by: INTERNAL MEDICINE

## 2017-12-15 PROCEDURE — 83615 LACTATE (LD) (LDH) ENZYME: CPT | Performed by: INTERNAL MEDICINE

## 2017-12-15 PROCEDURE — 99214 OFFICE O/P EST MOD 30 MIN: CPT | Performed by: INTERNAL MEDICINE

## 2017-12-15 RX ADMIN — ERYTHROPOIETIN 20000 UNITS: 20000 INJECTION, SOLUTION INTRAVENOUS; SUBCUTANEOUS at 08:54

## 2017-12-15 NOTE — TELEPHONE ENCOUNTER
PHONE WITH PT: CREATININE UP, K IS OK, CO2 DOWN, THESE REPORTS WERE FAXED TO DR HOLLY OFFICE TODAY, TSH NORMAL, OTHER LABS OK, KENDY PENDING, DISCUSSED WITH PT, NURSES TO CALL GI TO SEE THEY CAN SEE PT SOONER, I STOPPED PREVACID BECAUSE POTENTIAL KIDNEY DAMAGE AND LEFT HIM ONLY ON ZANTAC

## 2017-12-15 NOTE — TELEPHONE ENCOUNTER
----- Message from Emilee Peoples RN sent at 12/15/2017  1:01 PM EST -----  Sharif Mckenzie MD             PLEASE CALL MECHELLE LEVY OR LUCIA DOCTORS IF THEY CAN SEE HIM IN 1 OR 2 WEEKS OR NP TO SEE HIM NOT 3 MONTHS, LET WIFE KNOW

## 2017-12-15 NOTE — PROGRESS NOTES
Subjective  REASONS FOR FOLLOWUP:    1. 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  Since discontinuation of anticoagulants the patient's hemoglobin has remained normal. The patient has associated leukopenia and thrombocytopenia due to chronic splenomegaly and remote history of bone marrow testing that suggested myelodysplasia. Under the present circumstances neither the white count nor platelets are changing and hemoglobin remains stable. There is no abnormality in the differential of his white count. There are no symptoms or signs that would suggest any further progression into myelodysplasia. The patient will remain in observation.            History of Present Illness       The patient is here today in the company of his wife.He is here complaining of persistent belching and burping and significant ingestion with morning sickness that is bothering him at this time.  He has not had any hematemesis or melena.  The stool in the colostomy is relatively green with on obvious passage of blood on it.  Urinary output is appropriate.  Minimal swelling in the lower extremities is unchanged and his weight is unchanged.  The patient denies any fevers, chills, or infections. He is under a lot of stressors in the family that have produced tremendous difficulties, especially with a son who is going through the process of divorce.  The patient denies any rashes in the skin, no skeletal pain, and no neuropathy.  He will be seen by gastroenterology very soon in order to pursue an upper GI endoscopy given the fact that we documented heme-positive stools and most of the symptoms are related to the upper gastrointestinal tract.  The question is if the patient has inflammatory bowel disease in the small intestine, H. pylori again, portal  hypertensive gastropathy, varices in the esophagus, or a combination of factors.  The patient remains on Prevacid with some mild improvement in regard to acidity.                  Past Medical History, Past Surgical HistorySignificant for hypertension and also he has history of atrial fibrillation and was chronically anticoagulated with Eliquis in the past. Since January 2015 the patient is no longer receiving this medicine and moved to aspirin during his persistency of GI bleeding. The patient also has a history of chronic kidney disease with creatinine baseline around 2.5. The patient also has a history of inflammatory bowel disease with status post colectomy with ileostomy many years ago in the [facility]. The patient also has a history of pericardial effusion with an GABRIELLE 1:80 homogenous that has never changed or modified. He also has a positive lupus anticoagulant. He has had chronic leukopenia and thrombocytopenia for which he underwent by Dr. Null 10 years ago, bone marrow testing at [facility]. We have requested this information. The patient in the past has had a normal B12 level of 1094, normal folate level and ferritin level of 617 with an iron level of 34. In the past he has received IV iron in the form of Venofer while being at [facility] in January 2015. He has had serum protein electrophoresis at least on 3 different occasions, failing to show any monoclonal proteins. He also has had a CT scan of the abdomen that shows a general spleen anatomy without any retroperitoneal adenopathies and nothing to suggest portal hypertension or cirrhosis of the liver. Kidney anatomy disclosed thinning of the kidney cortexes consistent with chronic medical illness. He has no hydronephrosis. No retroperitoneal adenopathies.   SOCIAL HISTORY:  The patient lives with his wife in [place], [place]. He is retired. He does not smoke and does not drink any alCOHOL.      ONCOLOGIC/HEMATOLOGIC HISTORY      On 04/06/2015 the patient’s clinical status has dramatically improved.  He has not had any new episodes of infection, congestive heart failure, GI bleeding, with excellent improvement in hemoglobin.  The patient has been able to walk longer distances without shortness of breath.   He has been able to climb steps without shortness of breath and he feels dramatically better.  Hemoglobin has been stable for the past few weeks at 12.1.  His white count and platelet count remain on the low side with no infection or clinical bleeding.  We found documentation of his bone marrow biopsy performed in 2002 and read at the [facility].  Explicitly it was documented that the patient could have myelodysplastic syndrome.  Procrit, as I pointed out to the patient, is a useful medicine to treat this condition anyway, and given the fact that in 13 years his white count and his platelet count have not changed, makes this diagnosis dubious.  Reading bone marrows for myelodysplasia is one of the most difficult tasks in Hematology.  I suggested to him at some point, if his blood count change, specifically white count modifies or platelet count modifies, to consider repeating the bone marrow testing, flow cytometry and chromosomes.     On 05/26/2015 he was feeling terrific.  He was not having any GI symptomatology, no melena, no enterorrhagia, no abnormal bleeding.  He was functionally perfectly fine.  Urination was ongoing. Uric acid was elevated and I advised him to go back into his Uloric to control this and minimize formation of kidney stones.    On 07/20/2015 the patient had no issues in relationship with anemia. His white count and platelet count remain low associated with his splenomegaly and no issues of consequences related to this. He was advised to remain in observation. He was advised to remain on his folic acid and B12 supplementation.     On 09/14/2015, hemoglobin was excellent at 12.9, stable weight count  at 3400 and stable platelet count at 70,000.  We asked him to remain on observation.     On 11/09/2015 the patient’s hematological parameters remain normal.  He was feeling terrific.  His atrial fibrillation looked like it was very quiet and he had no issues in regard to his aspirin use.  He had no embolic phenomenon.  He had some element of fluid retention associated with his chronic renal disease.  We advised him to monitor his diet properly in regard to sodium ingestion.      Review of Systems   General: no fever, chills, fatigue, weight changes, or lack of appetite.  Eyes: no epiphora, xerophthalmia,conjunctivitis, pain, glaucoma, blurred vision, blindness, secretion, photophobia, proptosis, diplopia.  Ears: no otorrhea, tinnitus, otorrhagia,SOME deafness, pain, vertigo.  Nose: STATED rhinorrhea, epistaxis, alteration in perception of odors, STATED sinuses pressure, SNEEZING AND ITCHING  Mouth: no alteration in gums or teeth,  ulcers, no difficulty with mastication or deglut ion, no odynophagia.  Neck: no masses or pain, no thyroid alterations, no pain in muscles or arteries, no carotid odynia, no crepitation.  Respiratory: no cough, sputum production, dyspnea, trepopnea, pleuritic pain, hemoptysis.  Heart: no syncope, irregularity, palpitations, angina, orthopnea, paroxysmal nocturnal dyspnea.  Vascular Venous: no tenderness,edema, palpable cords, postphlebitic syndrome, skin changes or ulcerations.  Vascular Arterial: no distal ischemia, claudication, gangrene, neuropathic ischemic pain, skin ulcers, paleness or cyanosis.  GI: no dysphagia, odynophagia, no regurgitation, stated heartburn,and indigestion,and morning nausea,no vomiting,no hematemesis ,no melena,no jaundice,no distention, no obstipation,no enterorrhagia,no proctalgia,no anal  lesions, nochanges in bowel habits.  : no frequency, hesitancy, hematuria, discharge, pain.  Musculoskeletal: no muscle or tendon pain or inflammation, joint pain, edema,  functional limitation, fasciculations, mass.  Neurologic: no headache, seizures, alterations on Craneal nerves, no motor or senssory deficit, normal coordination, no alteration in memory, orientation, calculation,writting, verbal or written language.  Skin: no rashes, pruritus or localized lesions.  Psychiatric: significnat anxiety, no depression, agitation, delusions, proper insight.A comprehensive 14 point review of systems was performed and was negative except as mentioned.    Medications:  The current medication list was reviewed in the EMR    ALLERGIES:    Allergies   Allergen Reactions   • Ace Inhibitors Other (See Comments)     RENAL FAILURE   • Contrast Dye Other (See Comments)     RENAL FAILURE   • Eliquis [Apixaban] Other (See Comments)     BLEEDING ISSUES; PT CANNOT TAKE ANY ANTICOAGULANTS DUE TO LOW PLATELETS EXCEPT ASPIRIN  BLEEDING ISSUES; PT CANNOT TAKE ANY ANTICOAGULANTS DUE TO LOW PLATELETS EXCEPT ASPIRIN   • Granix [Filgrastim]      Chest pain  Chest pain     • Iodinated Diagnostic Agents Other (See Comments)     RENAL FAILURE   • Keflex [Cephalexin] Other (See Comments)     RENAL FAILURE       Objective      There were no vitals filed for this visit.  Current Status 11/13/2017   ECOG score 0     Physical Exam    GENERAL:  Well-developed, well-nourished  Patient  in no acute distress.   SKIN:  Warm, dry without rashes, purpura or petechiae.  HEENT:  Pupils were equal and reactive to light and accomodation, conjunctivas non injected, no pterigion, normal extraocular movements, normal visual acuity.   Mouth mucosa was moist, no exudates in oropharynx, normal gum line, normal roof of the mouth and pillars, normal papillations of the tongue.Ear canals were FULL OF WAX, NOT VISUALIZED tympanic membranes, normal hearing acuity.No pain in mastoid area or erythema.  NECK:  Supple with good range of motion; no thyromegaly or masses, no JVD or bruits, no cervical adenopathies.No carotid arteries pain, no  carotid abnormal pulsation or arterial dance.  LYMPHATICS:  No cervical, supraclavicular, axillary, epitrochlear or inguinal adenopathy.  CHEST:  Normal excursion of both torrey thoraces, normal voice fremitus, no subcutaneous emphysema, normal axillas, no rashes or acanthosis nigricans. Lungs clear to percussion and auscultation, normal breath sounds bilaterally, no wheezing, crackles or ronchi, no stridor, no rubs.  CARDIAC AND VASCULAR: PMI not displaced,no thrills, normal rate and irrregular rhythm atrial fibrilation controller VR, without murmurs, rubs or S3 or S4 right or left sided gallops. Normal femoral, popliteal, pedis, brachial and carotid pulses.  ABDOMEN:  Soft, nontender with no organomegaly or masses, no ascites, no collateral circulation,no distention,no Linneus sign, no abdominal pain, no inguinal hernias,no umbilical hernias, no abdominal bruits. Normal bowel sounds.Colostomy in place LLQ.  GENITAL: Not  Performed.  EXTREMITIES  AND SPINE:  No clubbing, cyanosis or edema, no deformities or pain .No kyphosis, scoliosis, deformities or pain in spine, ribs or pelvic bone.fistula with excellent thrill lue.  NEUROLOGICAL:  Patient was awake, alert, oriented to time, person and place,normal gait and coordination    RECENT LABS:  Hematology WBC   Date Value Ref Range Status   12/08/2017 2.49 (L) 4.00 - 10.00 10*3/mm3 Final     RBC   Date Value Ref Range Status   12/08/2017 3.29 (L) 4.30 - 5.50 10*6/mm3 Final     Hemoglobin   Date Value Ref Range Status   12/08/2017 9.7 (L) 13.5 - 16.5 g/dL Final     Hematocrit   Date Value Ref Range Status   12/08/2017 30.1 (L) 40.0 - 49.0 % Final     Platelets   Date Value Ref Range Status   12/08/2017 88 (L) 150 - 375 10*3/mm3 Final           10/30/2017 11/10/2017 12/8/2017   epoetin tip (EPOGEN,PROCRIT) SC 10,000 Units 20,000 Units 15,000 Units       Component      Latest Ref Rng & Units 11/21/2017 11/27/2017 12/1/2017 12/8/2017           3:36 PM  1:36 PM  3:10 PM  8:46  AM   WBC      4.00 - 10.00 10*3/mm3 3.21 (L) 2.54 (L) 2.56 (L) 2.49 (L)   RBC      4.30 - 5.50 10*6/mm3 3.40 (L) 3.39 (L) 3.43 (L) 3.29 (L)   Hemoglobin      13.5 - 16.5 g/dL 10.0 (L) 9.9 (L) 10.2 (L) 9.7 (L)   Hematocrit      40.0 - 49.0 % 30.8 (L) 31.1 (L) 31.3 (L) 30.1 (L)   MCV      83.0 - 97.0 fL 90.6 91.7 91.3 91.5   MCH      27.0 - 33.0 pg 29.4 29.2 29.7 29.5   MCHC      32.0 - 35.0 g/dL 32.5 31.8 (L) 32.6 32.2   RDW      11.7 - 14.5 % 16.3 (H) 15.9 (H) 15.9 (H) 16.1 (H)   RDW-SD      37.0 - 49.0 fl 54.0 (H) 53.4 (H) 53.6 (H) 53.9 (H)   MPV      8.9 - 12.1 fL 9.2 9.1 9.5 9.2   Platelets      150 - 375 10*3/mm3 101 (L) 69 (L) 73 (L) 88 (L)   Neutrophil %      39.0 - 75.0 % 66.4 62.2 64.4 60.7   Lymphocyte %      20.0 - 49.0 % 19.9 (L) 24.0 21.9 22.1   Monocyte %      4.0 - 12.0 % 10.9 10.6 10.9 10.8   Eosinophil %      1.0 - 5.0 % 1.6 1.6 1.6 2.4   Basophil %      0.0 - 1.1 % 0.6 0.8 0.4 0.8   Immature Grans %      0.0 - 0.5 % 0.6 (H) 0.8 (H) 0.8 (H) 3.2 (H)   Neutrophils, Absolute      1.50 - 7.00 10*3/mm3 2.13 1.58 1.65 1.51   Lymphocytes, Absolute      1.00 - 3.50 10*3/mm3 0.64 (L) 0.61 (L) 0.56 (L) 0.55 (L)   Monocytes, Absolute      0.25 - 0.80 10*3/mm3 0.35 0.27 0.28 0.27     Component      Latest Ref Rng & Units 12/15/2017           7:51 AM   WBC      4.00 - 10.00 10*3/mm3 2.37 (L)   RBC      4.30 - 5.50 10*6/mm3 3.20 (L)   Hemoglobin      13.5 - 16.5 g/dL 9.5 (L)   Hematocrit      40.0 - 49.0 % 29.6 (L)   MCV      83.0 - 97.0 fL 92.5   MCH      27.0 - 33.0 pg 29.7   MCHC      32.0 - 35.0 g/dL 32.1   RDW      11.7 - 14.5 % 16.8 (H)   RDW-SD      37.0 - 49.0 fl 56.4 (H)   MPV      8.9 - 12.1 fL 9.6   Platelets      150 - 375 10*3/mm3 68 (L)   Neutrophil %      39.0 - 75.0 % 65.0   Lymphocyte %      20.0 - 49.0 % 22.8   Monocyte %      4.0 - 12.0 % 8.0   Eosinophil %      1.0 - 5.0 % 2.1   Basophil %      0.0 - 1.1 % 0.4   Immature Grans %      0.0 - 0.5 % 1.7 (H)   Neutrophils, Absolute      1.50 - 7.00  10*3/mm3 1.54   Lymphocytes, Absolute      1.00 - 3.50 10*3/mm3 0.54 (L)   Monocytes, Absolute      0.25 - 0.80 10*3/mm3 0.19 (L)     Assessment/Plan  : This patient has history of multifactorial anemia as well as leukopenia and thrombocytopenia. Recently we documented that the patient had heme-positive stool and he was placed on Prevacid.  He continues having upper GI symptomatology with morning sickness that, in my opinion, is more likely related to his chronic kidney disease that is very close for initiation of dialysis.  He has a lot of belching and burping.  The patient has had, in the past, gastritis.  He has had H. pylori and the stressors that he has in the family makes me wonder if he has developed a peptic ulcer, if he has varices, he has portal hypertensive gastropathy, or he has inflammatory bowel disease in the duodenum or some other area of his intestine.  The only way to know this is pursuing an upper GI endoscopy.  The patient already has been scheduled to be seen by Gastroenterology, [facility], in order to pursue this.  In the meantime today, the patient will remain on weekly Procrit injections.  He will remain on weekly CBC.  I am going to go ahead and recheck a ferritin, iron, TIBC, and I am going to do an LDH, sedimentation rate and check monoclonal proteins.  In the past, the patient has had polyclonal expansion of globulins with no monoclonal protein found, at least in the laboratory testing in 2015 and 2016.      Other than that, the patient will be seen again in 3 weeks.  Hopefully, by then he will have an endoscopy.  I encouraged him to remain on Prevacid.      The patient has some information about how to handle all the stressors he has to see if this is able to calm him and allow him to gain back more perspective in regard to the issues that his son is going through.      A lengthy conversation took place with him, his wife                     12/15/2017      CC:

## 2017-12-18 LAB
ALBUMIN SERPL-MCNC: 3.4 G/DL (ref 2.9–4.4)
ALBUMIN/GLOB SERPL: 1 {RATIO} (ref 0.7–1.7)
ALPHA1 GLOB FLD ELPH-MCNC: 0.2 G/DL (ref 0–0.4)
ALPHA2 GLOB SERPL ELPH-MCNC: 0.6 G/DL (ref 0.4–1)
B-GLOBULIN SERPL ELPH-MCNC: 1 G/DL (ref 0.7–1.3)
GAMMA GLOB SERPL ELPH-MCNC: 1.9 G/DL (ref 0.4–1.8)
GLOBULIN SER CALC-MCNC: 3.7 G/DL (ref 2.2–3.9)
IGA SERPL-MCNC: 282 MG/DL (ref 61–437)
IGG SERPL-MCNC: 1717 MG/DL (ref 700–1600)
IGM SERPL-MCNC: 131 MG/DL (ref 15–143)
INTERPRETATION SERPL IEP-IMP: ABNORMAL
KAPPA LC SERPL-MCNC: 114 MG/L (ref 3.3–19.4)
KAPPA LC/LAMBDA SER: 1.24 {RATIO} (ref 0.26–1.65)
LAMBDA LC FREE SERPL-MCNC: 92 MG/L (ref 5.7–26.3)
Lab: ABNORMAL
M-SPIKE: ABNORMAL G/DL
PROT SERPL-MCNC: 7.1 G/DL (ref 6–8.5)

## 2017-12-22 ENCOUNTER — LAB (OUTPATIENT)
Dept: LAB | Facility: HOSPITAL | Age: 72
End: 2017-12-22

## 2017-12-22 ENCOUNTER — INFUSION (OUTPATIENT)
Dept: ONCOLOGY | Facility: HOSPITAL | Age: 72
End: 2017-12-22

## 2017-12-22 DIAGNOSIS — D69.6 THROMBOCYTOPENIA (HCC): ICD-10-CM

## 2017-12-22 DIAGNOSIS — D63.1 ANEMIA DUE TO STAGE 3 (MODERATE) CHRONIC RENAL FAILURE TREATED WITH ERYTHROPOIETIN (HCC): ICD-10-CM

## 2017-12-22 DIAGNOSIS — N18.30 ANEMIA DUE TO STAGE 3 (MODERATE) CHRONIC RENAL FAILURE TREATED WITH ERYTHROPOIETIN (HCC): ICD-10-CM

## 2017-12-22 DIAGNOSIS — D63.1 ANEMIA DUE TO STAGE 4 CHRONIC KIDNEY DISEASE TREATED WITH ERYTHROPOIETIN (HCC): Primary | ICD-10-CM

## 2017-12-22 DIAGNOSIS — D72.819 CHRONIC LEUKOPENIA: ICD-10-CM

## 2017-12-22 DIAGNOSIS — N18.4 ANEMIA DUE TO STAGE 4 CHRONIC KIDNEY DISEASE TREATED WITH ERYTHROPOIETIN (HCC): Primary | ICD-10-CM

## 2017-12-22 DIAGNOSIS — D73.2 CONGESTIVE SPLENOMEGALY: ICD-10-CM

## 2017-12-22 LAB
BASOPHILS # BLD AUTO: 0.01 10*3/MM3 (ref 0–0.1)
BASOPHILS NFR BLD AUTO: 0.4 % (ref 0–1.1)
DEPRECATED RDW RBC AUTO: 58.2 FL (ref 37–49)
EOSINOPHIL # BLD AUTO: 0.05 10*3/MM3 (ref 0–0.36)
EOSINOPHIL NFR BLD AUTO: 1.8 % (ref 1–5)
ERYTHROCYTE [DISTWIDTH] IN BLOOD BY AUTOMATED COUNT: 17.4 % (ref 11.7–14.5)
HCT VFR BLD AUTO: 29.6 % (ref 40–49)
HGB BLD-MCNC: 9.7 G/DL (ref 13.5–16.5)
IMM GRANULOCYTES # BLD: 0.03 10*3/MM3 (ref 0–0.03)
IMM GRANULOCYTES NFR BLD: 1.1 % (ref 0–0.5)
LYMPHOCYTES # BLD AUTO: 0.55 10*3/MM3 (ref 1–3.5)
LYMPHOCYTES NFR BLD AUTO: 19.4 % (ref 20–49)
MCH RBC QN AUTO: 30 PG (ref 27–33)
MCHC RBC AUTO-ENTMCNC: 32.8 G/DL (ref 32–35)
MCV RBC AUTO: 91.6 FL (ref 83–97)
MONOCYTES # BLD AUTO: 0.32 10*3/MM3 (ref 0.25–0.8)
MONOCYTES NFR BLD AUTO: 11.3 % (ref 4–12)
NEUTROPHILS # BLD AUTO: 1.88 10*3/MM3 (ref 1.5–7)
NEUTROPHILS NFR BLD AUTO: 66 % (ref 39–75)
NRBC BLD MANUAL-RTO: 0 /100 WBC (ref 0–0)
PLATELET # BLD AUTO: 74 10*3/MM3 (ref 150–375)
PMV BLD AUTO: 9.4 FL (ref 8.9–12.1)
RBC # BLD AUTO: 3.23 10*6/MM3 (ref 4.3–5.5)
WBC NRBC COR # BLD: 2.84 10*3/MM3 (ref 4–10)

## 2017-12-22 PROCEDURE — 85025 COMPLETE CBC W/AUTO DIFF WBC: CPT | Performed by: INTERNAL MEDICINE

## 2017-12-22 PROCEDURE — 36416 COLLJ CAPILLARY BLOOD SPEC: CPT | Performed by: INTERNAL MEDICINE

## 2017-12-22 PROCEDURE — 63510000001 EPOETIN ALFA PER 1000 UNITS: Performed by: INTERNAL MEDICINE

## 2017-12-22 PROCEDURE — 96372 THER/PROPH/DIAG INJ SC/IM: CPT | Performed by: INTERNAL MEDICINE

## 2017-12-22 RX ADMIN — ERYTHROPOIETIN 20000 UNITS: 20000 INJECTION, SOLUTION INTRAVENOUS; SUBCUTANEOUS at 14:32

## 2017-12-29 ENCOUNTER — INFUSION (OUTPATIENT)
Dept: ONCOLOGY | Facility: HOSPITAL | Age: 72
End: 2017-12-29

## 2017-12-29 ENCOUNTER — LAB (OUTPATIENT)
Dept: LAB | Facility: HOSPITAL | Age: 72
End: 2017-12-29

## 2017-12-29 DIAGNOSIS — D69.6 THROMBOCYTOPENIA (HCC): ICD-10-CM

## 2017-12-29 DIAGNOSIS — D73.2 CONGESTIVE SPLENOMEGALY: ICD-10-CM

## 2017-12-29 DIAGNOSIS — D72.819 CHRONIC LEUKOPENIA: ICD-10-CM

## 2017-12-29 DIAGNOSIS — D63.1 ANEMIA DUE TO STAGE 3 (MODERATE) CHRONIC RENAL FAILURE TREATED WITH ERYTHROPOIETIN (HCC): ICD-10-CM

## 2017-12-29 DIAGNOSIS — D63.1 ANEMIA DUE TO STAGE 4 CHRONIC KIDNEY DISEASE TREATED WITH ERYTHROPOIETIN (HCC): Primary | ICD-10-CM

## 2017-12-29 DIAGNOSIS — N18.30 ANEMIA DUE TO STAGE 3 (MODERATE) CHRONIC RENAL FAILURE TREATED WITH ERYTHROPOIETIN (HCC): ICD-10-CM

## 2017-12-29 DIAGNOSIS — N18.4 ANEMIA DUE TO STAGE 4 CHRONIC KIDNEY DISEASE TREATED WITH ERYTHROPOIETIN (HCC): Primary | ICD-10-CM

## 2017-12-29 LAB
BASOPHILS # BLD AUTO: 0.02 10*3/MM3 (ref 0–0.1)
BASOPHILS NFR BLD AUTO: 0.7 % (ref 0–1.1)
DEPRECATED RDW RBC AUTO: 62.4 FL (ref 37–49)
EOSINOPHIL # BLD AUTO: 0.06 10*3/MM3 (ref 0–0.36)
EOSINOPHIL NFR BLD AUTO: 2.2 % (ref 1–5)
ERYTHROCYTE [DISTWIDTH] IN BLOOD BY AUTOMATED COUNT: 18 % (ref 11.7–14.5)
HCT VFR BLD AUTO: 30.5 % (ref 40–49)
HGB BLD-MCNC: 9.6 G/DL (ref 13.5–16.5)
IMM GRANULOCYTES # BLD: 0.04 10*3/MM3 (ref 0–0.03)
IMM GRANULOCYTES NFR BLD: 1.5 % (ref 0–0.5)
LYMPHOCYTES # BLD AUTO: 0.35 10*3/MM3 (ref 1–3.5)
LYMPHOCYTES NFR BLD AUTO: 12.8 % (ref 20–49)
MCH RBC QN AUTO: 29.7 PG (ref 27–33)
MCHC RBC AUTO-ENTMCNC: 31.5 G/DL (ref 32–35)
MCV RBC AUTO: 94.4 FL (ref 83–97)
MONOCYTES # BLD AUTO: 0.29 10*3/MM3 (ref 0.25–0.8)
MONOCYTES NFR BLD AUTO: 10.6 % (ref 4–12)
NEUTROPHILS # BLD AUTO: 1.97 10*3/MM3 (ref 1.5–7)
NEUTROPHILS NFR BLD AUTO: 72.2 % (ref 39–75)
NRBC BLD MANUAL-RTO: 1.8 /100 WBC (ref 0–0)
PLATELET # BLD AUTO: 76 10*3/MM3 (ref 150–375)
PMV BLD AUTO: 8.6 FL (ref 8.9–12.1)
RBC # BLD AUTO: 3.23 10*6/MM3 (ref 4.3–5.5)
WBC NRBC COR # BLD: 2.73 10*3/MM3 (ref 4–10)

## 2017-12-29 PROCEDURE — 63510000001 EPOETIN ALFA PER 1000 UNITS: Performed by: INTERNAL MEDICINE

## 2017-12-29 PROCEDURE — 96372 THER/PROPH/DIAG INJ SC/IM: CPT

## 2017-12-29 PROCEDURE — 85025 COMPLETE CBC W/AUTO DIFF WBC: CPT | Performed by: INTERNAL MEDICINE

## 2017-12-29 PROCEDURE — 36415 COLL VENOUS BLD VENIPUNCTURE: CPT | Performed by: INTERNAL MEDICINE

## 2017-12-29 RX ADMIN — ERYTHROPOIETIN 20000 UNITS: 20000 INJECTION, SOLUTION INTRAVENOUS; SUBCUTANEOUS at 14:41

## 2018-01-03 ENCOUNTER — OFFICE VISIT (OUTPATIENT)
Dept: ONCOLOGY | Facility: CLINIC | Age: 73
End: 2018-01-03

## 2018-01-03 ENCOUNTER — LAB (OUTPATIENT)
Dept: LAB | Facility: HOSPITAL | Age: 73
End: 2018-01-03

## 2018-01-03 VITALS
SYSTOLIC BLOOD PRESSURE: 132 MMHG | DIASTOLIC BLOOD PRESSURE: 60 MMHG | TEMPERATURE: 97.4 F | WEIGHT: 204.6 LBS | RESPIRATION RATE: 16 BRPM | HEIGHT: 70 IN | BODY MASS INDEX: 29.29 KG/M2 | HEART RATE: 86 BPM

## 2018-01-03 DIAGNOSIS — N18.30 ANEMIA DUE TO STAGE 3 (MODERATE) CHRONIC RENAL FAILURE TREATED WITH ERYTHROPOIETIN (HCC): Primary | ICD-10-CM

## 2018-01-03 DIAGNOSIS — D72.819 CHRONIC LEUKOPENIA: ICD-10-CM

## 2018-01-03 DIAGNOSIS — N18.30 ANEMIA DUE TO STAGE 3 (MODERATE) CHRONIC RENAL FAILURE TREATED WITH ERYTHROPOIETIN (HCC): ICD-10-CM

## 2018-01-03 DIAGNOSIS — D63.1 ANEMIA DUE TO STAGE 3 (MODERATE) CHRONIC RENAL FAILURE TREATED WITH ERYTHROPOIETIN (HCC): ICD-10-CM

## 2018-01-03 DIAGNOSIS — K74.60 CIRRHOSIS OF LIVER WITHOUT ASCITES, UNSPECIFIED HEPATIC CIRRHOSIS TYPE (HCC): ICD-10-CM

## 2018-01-03 DIAGNOSIS — D63.1 ANEMIA DUE TO STAGE 3 (MODERATE) CHRONIC RENAL FAILURE TREATED WITH ERYTHROPOIETIN (HCC): Primary | ICD-10-CM

## 2018-01-03 DIAGNOSIS — D73.2 CONGESTIVE SPLENOMEGALY: ICD-10-CM

## 2018-01-03 DIAGNOSIS — D69.6 THROMBOCYTOPENIA (HCC): ICD-10-CM

## 2018-01-03 LAB
BASOPHILS # BLD AUTO: 0.01 10*3/MM3 (ref 0–0.1)
BASOPHILS NFR BLD AUTO: 0.3 % (ref 0–1.1)
DEPRECATED RDW RBC AUTO: 61.4 FL (ref 37–49)
EOSINOPHIL # BLD AUTO: 0.03 10*3/MM3 (ref 0–0.36)
EOSINOPHIL NFR BLD AUTO: 0.9 % (ref 1–5)
ERYTHROCYTE [DISTWIDTH] IN BLOOD BY AUTOMATED COUNT: 18.2 % (ref 11.7–14.5)
HCT VFR BLD AUTO: 29.2 % (ref 40–49)
HGB BLD-MCNC: 9.3 G/DL (ref 13.5–16.5)
IMM GRANULOCYTES # BLD: 0.03 10*3/MM3 (ref 0–0.03)
IMM GRANULOCYTES NFR BLD: 0.9 % (ref 0–0.5)
LYMPHOCYTES # BLD AUTO: 0.39 10*3/MM3 (ref 1–3.5)
LYMPHOCYTES NFR BLD AUTO: 11.3 % (ref 20–49)
MCH RBC QN AUTO: 29.7 PG (ref 27–33)
MCHC RBC AUTO-ENTMCNC: 31.8 G/DL (ref 32–35)
MCV RBC AUTO: 93.3 FL (ref 83–97)
MONOCYTES # BLD AUTO: 0.36 10*3/MM3 (ref 0.25–0.8)
MONOCYTES NFR BLD AUTO: 10.5 % (ref 4–12)
NEUTROPHILS # BLD AUTO: 2.62 10*3/MM3 (ref 1.5–7)
NEUTROPHILS NFR BLD AUTO: 76.1 % (ref 39–75)
NRBC BLD MANUAL-RTO: 0 /100 WBC (ref 0–0)
PLATELET # BLD AUTO: 87 10*3/MM3 (ref 150–375)
PMV BLD AUTO: 8.9 FL (ref 8.9–12.1)
RBC # BLD AUTO: 3.13 10*6/MM3 (ref 4.3–5.5)
WBC NRBC COR # BLD: 3.44 10*3/MM3 (ref 4–10)

## 2018-01-03 PROCEDURE — 36416 COLLJ CAPILLARY BLOOD SPEC: CPT | Performed by: INTERNAL MEDICINE

## 2018-01-03 PROCEDURE — 99214 OFFICE O/P EST MOD 30 MIN: CPT | Performed by: INTERNAL MEDICINE

## 2018-01-03 PROCEDURE — 85025 COMPLETE CBC W/AUTO DIFF WBC: CPT | Performed by: INTERNAL MEDICINE

## 2018-01-05 ENCOUNTER — INFUSION (OUTPATIENT)
Dept: ONCOLOGY | Facility: HOSPITAL | Age: 73
End: 2018-01-05

## 2018-01-05 DIAGNOSIS — N18.4 ANEMIA DUE TO STAGE 4 CHRONIC KIDNEY DISEASE TREATED WITH ERYTHROPOIETIN (HCC): Primary | ICD-10-CM

## 2018-01-05 DIAGNOSIS — D63.1 ANEMIA DUE TO STAGE 4 CHRONIC KIDNEY DISEASE TREATED WITH ERYTHROPOIETIN (HCC): Primary | ICD-10-CM

## 2018-01-05 PROCEDURE — 96372 THER/PROPH/DIAG INJ SC/IM: CPT

## 2018-01-05 PROCEDURE — 63510000001 EPOETIN ALFA PER 1000 UNITS: Performed by: INTERNAL MEDICINE

## 2018-01-05 RX ADMIN — ERYTHROPOIETIN 20000 UNITS: 20000 INJECTION, SOLUTION INTRAVENOUS; SUBCUTANEOUS at 14:53

## 2018-01-08 NOTE — PROGRESS NOTES
Subjective  REASONS FOR FOLLOWUP:    1. 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  Since discontinuation of anticoagulants the patient's hemoglobin has remained normal. The patient has associated leukopenia and thrombocytopenia due to chronic splenomegaly and remote history of bone marrow testing that suggested myelodysplasia. Under the present circumstances neither the white count nor platelets are changing and hemoglobin remains stable. There is no abnormality in the differential of his white count. There are no symptoms or signs that would suggest any further progression into myelodysplasia. The patient will remain in observation.            History of Present Illness       The patient is here today in the company of his wife.He is here complaining of persistent belching and burping and significant ingestion with morning sickness that is bothering him at this time.  He has not had any hematemesis or melena.  The stool in the colostomy is relatively green with on obvious passage of blood on it.  Urinary output is appropriate.  Minimal swelling in the lower extremities is unchanged and his weight is unchanged.  The patient denies any fevers, chills, or infections. He is under a lot of stressors in the family that have produced tremendous difficulties, especially with a son who is going through the process of divorce.  The patient denies any rashes in the skin, no skeletal pain, and no neuropathy.  He will be seen by gastroenterology very soon in order to pursue an upper GI endoscopy given the fact that we documented heme-positive stools and most of the symptoms are related to the upper gastrointestinal tract.  The question is if the patient has inflammatory bowel disease in the small intestine, H. pylori again, portal  hypertensive gastropathy, varices in the esophagus, or a combination of factors.  The patient remains on Prevacid with some mild improvement in regard to acidity.                  Past Medical History, Past Surgical HistorySignificant for hypertension and also he has history of atrial fibrillation and was chronically anticoagulated with Eliquis in the past. Since January 2015 the patient is no longer receiving this medicine and moved to aspirin during his persistency of GI bleeding. The patient also has a history of chronic kidney disease with creatinine baseline around 2.5. The patient also has a history of inflammatory bowel disease with status post colectomy with ileostomy many years ago in the [facility]. The patient also has a history of pericardial effusion with an GABRIELLE 1:80 homogenous that has never changed or modified. He also has a positive lupus anticoagulant. He has had chronic leukopenia and thrombocytopenia for which he underwent by Dr. Null 10 years ago, bone marrow testing at [facility]. We have requested this information. The patient in the past has had a normal B12 level of 1094, normal folate level and ferritin level of 617 with an iron level of 34. In the past he has received IV iron in the form of Venofer while being at [facility] in January 2015. He has had serum protein electrophoresis at least on 3 different occasions, failing to show any monoclonal proteins. He also has had a CT scan of the abdomen that shows a general spleen anatomy without any retroperitoneal adenopathies and nothing to suggest portal hypertension or cirrhosis of the liver. Kidney anatomy disclosed thinning of the kidney cortexes consistent with chronic medical illness. He has no hydronephrosis. No retroperitoneal adenopathies.   SOCIAL HISTORY:  The patient lives with his wife in [place], [place]. He is retired. He does not smoke and does not drink any alCOHOL.      ONCOLOGIC/HEMATOLOGIC HISTORY      On 04/06/2015 the patient’s clinical status has dramatically improved.  He has not had any new episodes of infection, congestive heart failure, GI bleeding, with excellent improvement in hemoglobin.  The patient has been able to walk longer distances without shortness of breath.   He has been able to climb steps without shortness of breath and he feels dramatically better.  Hemoglobin has been stable for the past few weeks at 12.1.  His white count and platelet count remain on the low side with no infection or clinical bleeding.  We found documentation of his bone marrow biopsy performed in 2002 and read at the [facility].  Explicitly it was documented that the patient could have myelodysplastic syndrome.  Procrit, as I pointed out to the patient, is a useful medicine to treat this condition anyway, and given the fact that in 13 years his white count and his platelet count have not changed, makes this diagnosis dubious.  Reading bone marrows for myelodysplasia is one of the most difficult tasks in Hematology.  I suggested to him at some point, if his blood count change, specifically white count modifies or platelet count modifies, to consider repeating the bone marrow testing, flow cytometry and chromosomes.     On 05/26/2015 he was feeling terrific.  He was not having any GI symptomatology, no melena, no enterorrhagia, no abnormal bleeding.  He was functionally perfectly fine.  Urination was ongoing. Uric acid was elevated and I advised him to go back into his Uloric to control this and minimize formation of kidney stones.    On 07/20/2015 the patient had no issues in relationship with anemia. His white count and platelet count remain low associated with his splenomegaly and no issues of consequences related to this. He was advised to remain in observation. He was advised to remain on his folic acid and B12 supplementation.     On 09/14/2015, hemoglobin was excellent at 12.9, stable weight count  at 3400 and stable platelet count at 70,000.  We asked him to remain on observation.     On 11/09/2015 the patient’s hematological parameters remain normal.  He was feeling terrific.  His atrial fibrillation looked like it was very quiet and he had no issues in regard to his aspirin use.  He had no embolic phenomenon.  He had some element of fluid retention associated with his chronic renal disease.  We advised him to monitor his diet properly in regard to sodium ingestion.      Review of Systems   General: no fever, chills, fatigue, weight changes, or lack of appetite.  Eyes: no epiphora, xerophthalmia,conjunctivitis, pain, glaucoma, blurred vision, blindness, secretion, photophobia, proptosis, diplopia.  Ears: no otorrhea, tinnitus, otorrhagia,SOME deafness, pain, vertigo.  Nose: STATED rhinorrhea, epistaxis, alteration in perception of odors, STATED sinuses pressure, SNEEZING AND ITCHING  Mouth: no alteration in gums or teeth,  ulcers, no difficulty with mastication or deglut ion, no odynophagia.  Neck: no masses or pain, no thyroid alterations, no pain in muscles or arteries, no carotid odynia, no crepitation.  Respiratory: no cough, sputum production, dyspnea, trepopnea, pleuritic pain, hemoptysis.  Heart: no syncope, irregularity, palpitations, angina, orthopnea, paroxysmal nocturnal dyspnea.  Vascular Venous: no tenderness,edema, palpable cords, postphlebitic syndrome, skin changes or ulcerations.  Vascular Arterial: no distal ischemia, claudication, gangrene, neuropathic ischemic pain, skin ulcers, paleness or cyanosis.  GI: no dysphagia, odynophagia, no regurgitation, stated heartburn,and indigestion,and morning nausea,no vomiting,no hematemesis ,no melena,no jaundice,no distention, no obstipation,no enterorrhagia,no proctalgia,no anal  lesions, nochanges in bowel habits.  : no frequency, hesitancy, hematuria, discharge, pain.  Musculoskeletal: no muscle or tendon pain or inflammation, joint pain, edema,  "functional limitation, fasciculations, mass.  Neurologic: no headache, seizures, alterations on Craneal nerves, no motor or senssory deficit, normal coordination, no alteration in memory, orientation, calculation,writting, verbal or written language.  Skin: no rashes, pruritus or localized lesions.  Psychiatric: significnat anxiety, no depression, agitation, delusions, proper insight.A comprehensive 14 point review of systems was performed and was negative except as mentioned.    Medications:  The current medication list was reviewed in the EMR    ALLERGIES:    Allergies   Allergen Reactions   • Ace Inhibitors Other (See Comments)     RENAL FAILURE   • Contrast Dye Other (See Comments)     RENAL FAILURE   • Eliquis [Apixaban] Other (See Comments)     BLEEDING ISSUES; PT CANNOT TAKE ANY ANTICOAGULANTS DUE TO LOW PLATELETS EXCEPT ASPIRIN  BLEEDING ISSUES; PT CANNOT TAKE ANY ANTICOAGULANTS DUE TO LOW PLATELETS EXCEPT ASPIRIN   • Granix [Filgrastim]      Chest pain  Chest pain     • Iodinated Diagnostic Agents Other (See Comments)     RENAL FAILURE   • Keflex [Cephalexin] Other (See Comments)     RENAL FAILURE       Objective      Vitals:    01/03/18 1016   BP: 132/60   Pulse: 86   Resp: 16   Temp: 97.4 °F (36.3 °C)   Weight: 92.8 kg (204 lb 9.6 oz)   Height: 177.8 cm (70\")   PainSc: 0-No pain     Current Status 1/3/2018   ECOG score 0     Physical Exam    GENERAL:  Well-developed, well-nourished  Patient  in no acute distress.   SKIN:  Warm, dry without rashes, purpura or petechiae.  HEENT:  Pupils were equal and reactive to light and accomodation, conjunctivas non injected, no pterigion, normal extraocular movements, normal visual acuity.   Mouth mucosa was moist, no exudates in oropharynx, normal gum line, normal roof of the mouth and pillars, normal papillations of the tongue.Ear canals were FULL OF WAX, NOT VISUALIZED tympanic membranes, normal hearing acuity.No pain in mastoid area or erythema.  NECK:  Supple with " good range of motion; no thyromegaly or masses, no JVD or bruits, no cervical adenopathies.No carotid arteries pain, no carotid abnormal pulsation or arterial dance.  LYMPHATICS:  No cervical, supraclavicular, axillary, epitrochlear or inguinal adenopathy.  CHEST:  Normal excursion of both torrey thoraces, normal voice fremitus, no subcutaneous emphysema, normal axillas, no rashes or acanthosis nigricans. Lungs clear to percussion and auscultation, normal breath sounds bilaterally, no wheezing, crackles or ronchi, no stridor, no rubs.  CARDIAC AND VASCULAR: PMI not displaced,no thrills, normal rate and irrregular rhythm atrial fibrilation controller VR, without murmurs, rubs or S3 or S4 right or left sided gallops. Normal femoral, popliteal, pedis, brachial and carotid pulses.  ABDOMEN:  Soft, nontender with no organomegaly or masses, no ascites, no collateral circulation,no distention,no Contreras sign, no abdominal pain, no inguinal hernias,no umbilical hernias, no abdominal bruits. Normal bowel sounds.Colostomy in place LLQ.  GENITAL: Not  Performed.  EXTREMITIES  AND SPINE:  No clubbing, cyanosis or edema, no deformities or pain .No kyphosis, scoliosis, deformities or pain in spine, ribs or pelvic bone.fistula with excellent thrill lue.  NEUROLOGICAL:  Patient was awake, alert, oriented to time, person and place,normal gait and coordination    RECENT LABS:  Hematology WBC   Date Value Ref Range Status   01/03/2018 3.44 (L) 4.00 - 10.00 10*3/mm3 Final     RBC   Date Value Ref Range Status   01/03/2018 3.13 (L) 4.30 - 5.50 10*6/mm3 Final     Hemoglobin   Date Value Ref Range Status   01/03/2018 9.3 (L) 13.5 - 16.5 g/dL Final     Hematocrit   Date Value Ref Range Status   01/03/2018 29.2 (L) 40.0 - 49.0 % Final     Platelets   Date Value Ref Range Status   01/03/2018 87 (L) 150 - 375 10*3/mm3 Final           10/30/2017 11/10/2017 12/8/2017   epoetin tip (EPOGEN,PROCRIT) SC 10,000 Units 20,000 Units 15,000 Units      Ferritin   Order: [id number]   Status:  Final result   Visible to patient:  No (Not Released) Dx:  Anemia due to stage 3 (moderate) ...      Ref Range & Units 3wk ago     Ferritin 30.00 - 400.00 ng/mL 131.30   Resulting Agency   CBC LAB      Specimen Collected: 12/15/17  8:41 AM Last Resulted: 12/15              Iron Profile   Order: [id number]   Status:  Final result   Visible to patient:  No (Not Released) Dx:  Anemia due to stage 3 (moderate) ...      Ref Range & Units 3wk ago     Iron 59 - 158 mcg/dL 54 (L)   Iron Saturation 14 - 48 % 16   Transferrin 200 - 360 mg/dL 245   TIBC 249 - 505 mcg/dL 343   Resulting Agency   CBC LAB      Specimen Collected: 12/15/17  8:41 AM Last Resulted: 12/15/17                KENDY, PE & Free LT Chains, Ser   Order: [id number]   Status:  Final result   Visible to patient:  No (Not Released) Dx:  Anemia due to stage 3 (moderate) ...      Ref Range & Units 3wk ago     IgG 700 - 1600 mg/dL 1717 (H)   IgA 61 - 437 mg/dL 282   IgM 15 - 143 mg/dL 131   Total Protein 6.0 - 8.5 g/dL 7.1   Albumin 2.9 - 4.4 g/dL 3.4   Alpha-1-Globulin 0.0 - 0.4 g/dL 0.2   Alpha-2-Globulin 0.4 - 1.0 g/dL 0.6   Beta Globulin 0.7 - 1.3 g/dL 1.0   Gamma Globulin 0.4 - 1.8 g/dL 1.9 (H)   M-Yosi Not Observed g/dL Not Observed   Globulin 2.2 - 3.9 g/dL 3.7   A/G Ratio 0.7 - 1.7 1.0   Immunofixation Reflex, Serum  Comment   Comments: No monoclonality detected.   Please note  Comment   Comments: Protein electrophoresis scan will follow via computer, mail, or    delivery.   Free Light Chain, Kappa 3.3 - 19.4 mg/L 114.0 (H)   Free Lambda Light Chains 5.7 - 26.3 mg/L 92.0 (H)   Kappa/Lambda Ratio 0.26 - 1.65 1.24               Assessment/Plan  : This patient has history of multifactorial anemia as well as leukopenia and thrombocytopenia. Recently we documented that the patient had heme-positive stool and he was placed on Prevacid.  He continues having upper GI symptomatology with morning  sickness that, in my opinion, is more likely related to his chronic kidney disease that is very close for initiation of dialysis.  He has a lot of belching and burping.  The patient has had, in the past, gastritis.  He has had H. pylori and the stressors that he has in the family makes me wonder if he has developed a peptic ulcer, if he has varices, he has portal hypertensive gastropathy, or he has inflammatory bowel disease in the duodenum or some other area of his intestine.  The only way to know this is pursuing an upper GI endoscopy.  The patient already has been scheduled to be seen by Gastroenterology, [facility], in order to pursue this.  In the meantime today, the patient will remain on weekly Procrit injections.  He will remain on weekly CBC.Other than that, the patient will be seen again in 4 weeks.  Hopefully, by then he will have an endoscopy.  I encouraged him to remain on Prevacid.        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 This is very much unchanged in comparison with previous monoclonal protein measurement on him. Therefore, under these premises I find no need for this patient to pursue bone marrow testing.     I do not believe the patient will benefit from IV iron infusion at this time given his ferritin and iron profile.     The patient will remain on weekly Procrit.     He will see his nephrologist in the next couple of weeks.     He will proceed with his endoscopy hopefully in the next 2-3 weeks.        A lengthy conversation took place with him, his wife                     1/8/2018      CC:

## 2018-01-09 ENCOUNTER — TELEPHONE (OUTPATIENT)
Dept: ONCOLOGY | Facility: HOSPITAL | Age: 73
End: 2018-01-09

## 2018-01-09 NOTE — TELEPHONE ENCOUNTER
Patients wife called concerned about his hgb. Went to nephrologist office today and hgb was 8.7 per wife. Pt denies any increased fatigue or SOB. Due to come in Friday for labs and procrit. Told wife we would recheck Friday, wouldn't be concerned right now. Denies black tarry stools. Getting in with GI doc Friday. Told wife if he becomes symptomatic let us know. Wife v/u.

## 2018-01-10 ENCOUNTER — APPOINTMENT (OUTPATIENT)
Dept: LAB | Facility: HOSPITAL | Age: 73
End: 2018-01-10

## 2018-01-10 ENCOUNTER — OFFICE VISIT (OUTPATIENT)
Dept: ONCOLOGY | Facility: CLINIC | Age: 73
End: 2018-01-10

## 2018-01-10 VITALS
HEART RATE: 78 BPM | WEIGHT: 202.8 LBS | TEMPERATURE: 97.8 F | DIASTOLIC BLOOD PRESSURE: 62 MMHG | RESPIRATION RATE: 14 BRPM | OXYGEN SATURATION: 97 % | HEIGHT: 70 IN | BODY MASS INDEX: 29.03 KG/M2 | SYSTOLIC BLOOD PRESSURE: 144 MMHG

## 2018-01-10 DIAGNOSIS — N18.4 KIDNEY DISEASE, CHRONIC, STAGE IV (GFR 15-29 ML/MIN) (HCC): ICD-10-CM

## 2018-01-10 DIAGNOSIS — D69.6 THROMBOCYTOPENIA (HCC): ICD-10-CM

## 2018-01-10 DIAGNOSIS — K74.60 CIRRHOSIS OF LIVER WITHOUT ASCITES, UNSPECIFIED HEPATIC CIRRHOSIS TYPE (HCC): ICD-10-CM

## 2018-01-10 DIAGNOSIS — D73.2 CONGESTIVE SPLENOMEGALY: ICD-10-CM

## 2018-01-10 DIAGNOSIS — D72.819 CHRONIC LEUKOPENIA: ICD-10-CM

## 2018-01-10 DIAGNOSIS — N18.30 ANEMIA DUE TO STAGE 3 (MODERATE) CHRONIC RENAL FAILURE TREATED WITH ERYTHROPOIETIN (HCC): Primary | ICD-10-CM

## 2018-01-10 DIAGNOSIS — D63.1 ANEMIA DUE TO STAGE 3 (MODERATE) CHRONIC RENAL FAILURE TREATED WITH ERYTHROPOIETIN (HCC): Primary | ICD-10-CM

## 2018-01-10 PROCEDURE — 99213 OFFICE O/P EST LOW 20 MIN: CPT | Performed by: INTERNAL MEDICINE

## 2018-01-10 PROCEDURE — G0463 HOSPITAL OUTPT CLINIC VISIT: HCPCS | Performed by: INTERNAL MEDICINE

## 2018-01-10 NOTE — PROGRESS NOTES
Subjective  REASONS FOR FOLLOWUP:    1. 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  Since discontinuation of anticoagulants the patient's hemoglobin has remained normal. The patient has associated leukopenia and thrombocytopenia due to chronic splenomegaly and remote history of bone marrow testing that suggested myelodysplasia. Under the present circumstances neither the white count nor platelets are changing and hemoglobin remains stable. There is no abnormality in the differential of his white count. There are no symptoms or signs that would suggest any further progression into myelodysplasia. The patient will remain in observation.            History of Present Illness       The patient is here today in the company of his wife.He is here complaining of persistent belching and burping and significant ingestion with morning sickness that is bothering him at this time.  He has not had any hematemesis or melena.  The stool in the colostomy is relatively green with on obvious passage of blood on it.  Urinary output is appropriate.  Minimal swelling in the lower extremities is unchanged and his weight is unchanged.  The patient denies any fevers, chills, or infections. He is under a lot of stressors in the family that have produced tremendous difficulties, especially with a son who is going through the process of divorce.  The patient denies any rashes in the skin, no skeletal pain, and no neuropathy.  He will be seen by gastroenterology very soon in order to pursue an upper GI endoscopy given the fact that we documented heme-positive stools and most of the symptoms are related to the upper gastrointestinal tract.  The question is if the patient has inflammatory bowel disease in the small intestine, H. pylori again, portal  hypertensive gastropathy, varices in the esophagus, or a combination of factors.  The patient remains on Prevacid with some mild improvement in regard to acidity.       On 01/10/2018, the patient decided to stop along with his wife on the way home because he is more scared about his most recent reading of creatinine and the continued rising and he has been seen by Dr. Gordon. He already has been indicated to proceed with initiation of dialysis in the next 2 weeks. The patient will have his fistula issue corrected this coming Monday and he will have follow-up appointment with Gastroenterology by the end of next week. He continues having morning sickness typical of patients who have chronic renal disease. He has not developed any high potassium and neither has the patient developed fluid retention in his lower extremities. His weight is very much unchanged. On the other hand, the hemoglobin has mildly dropped and I wonder if this is the initial stages of hemodilution associated with kidney disease and inability to remove water out of the system. He has proper urinary output.               Past Medical History, Past Surgical HistorySignificant for hypertension and also he has history of atrial fibrillation and was chronically anticoagulated with Eliquis in the past. Since January 2015 the patient is no longer receiving this medicine and moved to aspirin during his persistency of GI bleeding. The patient also has a history of chronic kidney disease with creatinine baseline around 2.5. The patient also has a history of inflammatory bowel disease with status post colectomy with ileostomy many years ago in the [facility]. The patient also has a history of pericardial effusion with an GABRIELLE 1:80 homogenous that has never changed or modified. He also has a positive lupus anticoagulant. He has had chronic leukopenia and thrombocytopenia for which he underwent by Dr. Null 10 years ago, bone marrow testing at [facility]  [facility]. We have requested this information. The patient in the past has had a normal B12 level of 1094, normal folate level and ferritin level of 617 with an iron level of 34. In the past he has received IV iron in the form of Venofer while being at [facility] in January 2015. He has had serum protein electrophoresis at least on 3 different occasions, failing to show any monoclonal proteins. He also has had a CT scan of the abdomen that shows a general spleen anatomy without any retroperitoneal adenopathies and nothing to suggest portal hypertension or cirrhosis of the liver. Kidney anatomy disclosed thinning of the kidney cortexes consistent with chronic medical illness. He has no hydronephrosis. No retroperitoneal adenopathies.   SOCIAL HISTORY:  The patient lives with his wife in [place], [place]. He is retired. He does not smoke and does not drink any alCOHOL.      ONCOLOGIC/HEMATOLOGIC HISTORY     On 04/06/2015 the patient’s clinical status has dramatically improved.  He has not had any new episodes of infection, congestive heart failure, GI bleeding, with excellent improvement in hemoglobin.  The patient has been able to walk longer distances without shortness of breath.   He has been able to climb steps without shortness of breath and he feels dramatically better.  Hemoglobin has been stable for the past few weeks at 12.1.  His white count and platelet count remain on the low side with no infection or clinical bleeding.  We found documentation of his bone marrow biopsy performed in 2002 and read at the [facility].  Explicitly it was documented that the patient could have myelodysplastic syndrome.  Procrit, as I pointed out to the patient, is a useful medicine to treat this condition anyway, and given the fact that in 13 years his white count and his platelet count have not changed, makes this diagnosis dubious.  Reading bone marrows for myelodysplasia is one of the most difficult  tasks in Hematology.  I suggested to him at some point, if his blood count change, specifically white count modifies or platelet count modifies, to consider repeating the bone marrow testing, flow cytometry and chromosomes.     On 05/26/2015 he was feeling terrific.  He was not having any GI symptomatology, no melena, no enterorrhagia, no abnormal bleeding.  He was functionally perfectly fine.  Urination was ongoing. Uric acid was elevated and I advised him to go back into his Uloric to control this and minimize formation of kidney stones.    On 07/20/2015 the patient had no issues in relationship with anemia. His white count and platelet count remain low associated with his splenomegaly and no issues of consequences related to this. He was advised to remain in observation. He was advised to remain on his folic acid and B12 supplementation.     On 09/14/2015, hemoglobin was excellent at 12.9, stable weight count at 3400 and stable platelet count at 70,000.  We asked him to remain on observation.     On 11/09/2015 the patient’s hematological parameters remain normal.  He was feeling terrific.  His atrial fibrillation looked like it was very quiet and he had no issues in regard to his aspirin use.  He had no embolic phenomenon.  He had some element of fluid retention associated with his chronic renal disease.  We advised him to monitor his diet properly in regard to sodium ingestion.      Review of Systems   General: no fever, chills, fatigue, weight changes, or lack of appetite.  Eyes: no epiphora, xerophthalmia,conjunctivitis, pain, glaucoma, blurred vision, blindness, secretion, photophobia, proptosis, diplopia.  Ears: no otorrhea, tinnitus, otorrhagia,SOME deafness, pain, vertigo.  Nose: STATED rhinorrhea, epistaxis, alteration in perception of odors, STATED sinuses pressure, SNEEZING AND ITCHING  Mouth: no alteration in gums or teeth,  ulcers, no difficulty with mastication or deglut ion, no odynophagia.  Neck:  no masses or pain, no thyroid alterations, no pain in muscles or arteries, no carotid odynia, no crepitation.  Respiratory: no cough, sputum production, dyspnea, trepopnea, pleuritic pain, hemoptysis.  Heart: no syncope, irregularity, palpitations, angina, orthopnea, paroxysmal nocturnal dyspnea.  Vascular Venous: no tenderness,edema, palpable cords, postphlebitic syndrome, skin changes or ulcerations.  Vascular Arterial: no distal ischemia, claudication, gangrene, neuropathic ischemic pain, skin ulcers, paleness or cyanosis.  GI: no dysphagia, odynophagia, no regurgitation, stated heartburn,and indigestion,and morning nausea,no vomiting,no hematemesis ,no melena,no jaundice,no distention, no obstipation,no enterorrhagia,no proctalgia,no anal  lesions, nochanges in bowel habits.  : no frequency, hesitancy, hematuria, discharge, pain.  Musculoskeletal: no muscle or tendon pain or inflammation, joint pain, edema, functional limitation, fasciculations, mass.  Neurologic: no headache, seizures, alterations on Craneal nerves, no motor or senssory deficit, normal coordination, no alteration in memory, orientation, calculation,writting, verbal or written language.  Skin: no rashes, pruritus or localized lesions.  Psychiatric: significnat anxiety, no depression, agitation, delusions, proper insight.A comprehensive 14 point review of systems was performed and was negative except as mentioned.    Medications:  The current medication list was reviewed in the EMR    ALLERGIES:    Allergies   Allergen Reactions   • Ace Inhibitors Other (See Comments)     RENAL FAILURE   • Contrast Dye Other (See Comments)     RENAL FAILURE   • Eliquis [Apixaban] Other (See Comments)     BLEEDING ISSUES; PT CANNOT TAKE ANY ANTICOAGULANTS DUE TO LOW PLATELETS EXCEPT ASPIRIN  BLEEDING ISSUES; PT CANNOT TAKE ANY ANTICOAGULANTS DUE TO LOW PLATELETS EXCEPT ASPIRIN   • Granix [Filgrastim]      Chest pain  Chest pain     • Iodinated Diagnostic Agents  Other (See Comments)     RENAL FAILURE   • Keflex [Cephalexin] Other (See Comments)     RENAL FAILURE       Objective      There were no vitals filed for this visit.  Current Status 1/3/2018   ECOG score 0     Physical Exam    GENERAL:  Well-developed, well-nourished  Patient  in no acute distress.   SKIN:  Warm, dry without rashes, purpura or petechiae.  HEENT:  Pupils were equal and reactive to light and accomodation, conjunctivas non injected, no pterigion, normal extraocular movements, normal visual acuity.   Mouth mucosa was moist, no exudates in oropharynx, normal gum line, normal roof of the mouth and pillars, normal papillations of the tongue.Ear canals were FULL OF WAX, NOT VISUALIZED tympanic membranes, normal hearing acuity.No pain in mastoid area or erythema.  NECK:  Supple with good range of motion; no thyromegaly or masses, no JVD or bruits, no cervical adenopathies.No carotid arteries pain, no carotid abnormal pulsation or arterial dance.  LYMPHATICS:  No cervical, supraclavicular, axillary, epitrochlear or inguinal adenopathy.  CHEST:  Normal excursion of both torrey thoraces, normal voice fremitus, no subcutaneous emphysema, normal axillas, no rashes or acanthosis nigricans. Lungs clear to percussion and auscultation, normal breath sounds bilaterally, no wheezing, crackles or ronchi, no stridor, no rubs.  CARDIAC AND VASCULAR: PMI not displaced,no thrills, normal rate and irrregular rhythm atrial fibrilation controller VR, without murmurs, rubs or S3 or S4 right or left sided gallops. Normal femoral, popliteal, pedis, brachial and carotid pulses.  ABDOMEN:  Soft, nontender with no organomegaly or masses, no ascites, no collateral circulation,no distention,no [employer] sign, no abdominal pain, no inguinal hernias,no umbilical hernias, no abdominal bruits. Normal bowel sounds.Colostomy in place LLQ.  GENITAL: Not  Performed.  EXTREMITIES  AND SPINE:  No clubbing, cyanosis or edema, no deformities or pain  .No kyphosis, scoliosis, deformities or pain in spine, ribs or pelvic bone.fistula with excellent thrill lue.  NEUROLOGICAL:  Patient was awake, alert, oriented to time, person and place,normal gait and coordination    RECENT LABS:  Hematology WBC   Date Value Ref Range Status   01/03/2018 3.44 (L) 4.00 - 10.00 10*3/mm3 Final     RBC   Date Value Ref Range Status   01/03/2018 3.13 (L) 4.30 - 5.50 10*6/mm3 Final     Hemoglobin   Date Value Ref Range Status   01/03/2018 9.3 (L) 13.5 - 16.5 g/dL Final     Hematocrit   Date Value Ref Range Status   01/03/2018 29.2 (L) 40.0 - 49.0 % Final     Platelets   Date Value Ref Range Status   01/03/2018 87 (L) 150 - 375 10*3/mm3 Final                Assessment/Plan  : This patient has history of multifactorial anemia as well as leukopenia and thrombocytopenia. Recently we documented that the patient had heme-positive stool and he was placed on Prevacid.  He continues having upper GI symptomatology with morning sickness that, in my opinion, is more likely related to his chronic kidney disease that is very close for initiation of dialysis.  He has a lot of belching and burping.  The patient has had, in the past, gastritis.  He has had H. pylori and the stressors that he has in the family makes me wonder if he has developed a peptic ulcer, if he has varices, he has portal hypertensive gastropathy, or he has inflammatory bowel disease in the duodenum or some other area of his intestine.  The only way to know this is pursuing an upper GI endoscopy.  The patient already has been scheduled to be seen by Gastroenterology, [facility], in order to pursue this.  In the meantime today, the patient will remain on weekly Procrit injections.  He will remain on weekly CBC.Other than that, the patient will be seen again in 4 weeks.  Hopefully, by then he will have an endoscopy.  I encouraged him to remain on Prevacid.       On 01/10/2018, the patient is almost ready for initiation  of dialysis in 2 weeks after being seen by Nephrology. I pointed out to him that he will be initiating Procrit once that he has dialysis with the nephrologist. He will not need to come to the office for anymore Procrit.     The patient is coming for his Procrit this Friday and if the hemoglobin is below 8, the patient will be scheduled to have transfusion of 1 unit of red cells. I feel afraid of giving him 2 units under the present circumstances of transition from normal volume status to probably hypervolemia associated with inability to eliminate all the waste and water out of his body. The patient agrees with that. He is aware again that the Procrit will be initiated by Nephrology and continued by them in the future. He will follow up with Gastroenterology at the proper timing of the next appointment available. He will remain on his  H2 blocker.                        1/10/2018      CC:

## 2018-01-12 ENCOUNTER — INFUSION (OUTPATIENT)
Dept: ONCOLOGY | Facility: HOSPITAL | Age: 73
End: 2018-01-12

## 2018-01-12 ENCOUNTER — OFFICE VISIT (OUTPATIENT)
Dept: GASTROENTEROLOGY | Facility: CLINIC | Age: 73
End: 2018-01-12

## 2018-01-12 ENCOUNTER — TELEPHONE (OUTPATIENT)
Dept: ONCOLOGY | Facility: HOSPITAL | Age: 73
End: 2018-01-12

## 2018-01-12 ENCOUNTER — LAB (OUTPATIENT)
Dept: LAB | Facility: HOSPITAL | Age: 73
End: 2018-01-12

## 2018-01-12 VITALS
DIASTOLIC BLOOD PRESSURE: 58 MMHG | BODY MASS INDEX: 28.86 KG/M2 | WEIGHT: 201.6 LBS | HEIGHT: 70 IN | SYSTOLIC BLOOD PRESSURE: 138 MMHG | TEMPERATURE: 97.4 F

## 2018-01-12 DIAGNOSIS — N18.4 ANEMIA DUE TO STAGE 4 CHRONIC KIDNEY DISEASE TREATED WITH ERYTHROPOIETIN (HCC): Primary | ICD-10-CM

## 2018-01-12 DIAGNOSIS — D63.1 ANEMIA DUE TO STAGE 3 (MODERATE) CHRONIC RENAL FAILURE TREATED WITH ERYTHROPOIETIN (HCC): ICD-10-CM

## 2018-01-12 DIAGNOSIS — R68.81 EARLY SATIETY: ICD-10-CM

## 2018-01-12 DIAGNOSIS — D72.819 CHRONIC LEUKOPENIA: ICD-10-CM

## 2018-01-12 DIAGNOSIS — K74.60 CIRRHOSIS OF LIVER WITHOUT ASCITES, UNSPECIFIED HEPATIC CIRRHOSIS TYPE (HCC): ICD-10-CM

## 2018-01-12 DIAGNOSIS — K30 FUNCTIONAL DYSPEPSIA: ICD-10-CM

## 2018-01-12 DIAGNOSIS — R14.0 ABDOMINAL BLOATING: ICD-10-CM

## 2018-01-12 DIAGNOSIS — D69.6 THROMBOCYTOPENIA (HCC): ICD-10-CM

## 2018-01-12 DIAGNOSIS — D73.2 CONGESTIVE SPLENOMEGALY: ICD-10-CM

## 2018-01-12 DIAGNOSIS — D64.9 ANEMIA, UNSPECIFIED TYPE: Primary | ICD-10-CM

## 2018-01-12 DIAGNOSIS — D63.1 ANEMIA DUE TO STAGE 4 CHRONIC KIDNEY DISEASE TREATED WITH ERYTHROPOIETIN (HCC): Primary | ICD-10-CM

## 2018-01-12 DIAGNOSIS — N18.30 ANEMIA DUE TO STAGE 3 (MODERATE) CHRONIC RENAL FAILURE TREATED WITH ERYTHROPOIETIN (HCC): ICD-10-CM

## 2018-01-12 LAB
ABO GROUP BLD: NORMAL
ALBUMIN SERPL-MCNC: 3 G/DL (ref 3.5–5.2)
ALBUMIN/GLOB SERPL: 0.8 G/DL (ref 1.1–2.4)
ALP SERPL-CCNC: 136 U/L (ref 38–116)
ALT SERPL W P-5'-P-CCNC: 7 U/L (ref 0–41)
ANION GAP SERPL CALCULATED.3IONS-SCNC: 22 MMOL/L
ANTI-C: NORMAL
ANTI-K: NORMAL
AST SERPL-CCNC: 25 U/L (ref 0–40)
BASOPHILS # BLD AUTO: 0.01 10*3/MM3 (ref 0–0.1)
BASOPHILS NFR BLD AUTO: 0.2 % (ref 0–1.1)
BILIRUB SERPL-MCNC: 1.4 MG/DL (ref 0.1–1.2)
BLD GP AB SCN SERPL QL: POSITIVE
BUN BLD-MCNC: 121 MG/DL (ref 6–20)
BUN/CREAT SERPL: 18.7 (ref 7.3–30)
C AG RBC QL: NEGATIVE
CALCIUM SPEC-SCNC: 8.6 MG/DL (ref 8.5–10.2)
CHLORIDE SERPL-SCNC: 99 MMOL/L (ref 98–107)
CO2 SERPL-SCNC: 10 MMOL/L (ref 22–29)
CREAT BLD-MCNC: 6.47 MG/DL (ref 0.7–1.3)
DAT POLY-SP REAG RBC QL: NEGATIVE
DEPRECATED RDW RBC AUTO: 58.8 FL (ref 37–49)
EOSINOPHIL # BLD AUTO: 0.01 10*3/MM3 (ref 0–0.36)
EOSINOPHIL NFR BLD AUTO: 0.2 % (ref 1–5)
ERYTHROCYTE [DISTWIDTH] IN BLOOD BY AUTOMATED COUNT: 17.3 % (ref 11.7–14.5)
GFR SERPL CREATININE-BSD FRML MDRD: 9 ML/MIN/1.73
GLOBULIN UR ELPH-MCNC: 4 GM/DL (ref 1.8–3.5)
GLUCOSE BLD-MCNC: 206 MG/DL (ref 74–124)
HCT VFR BLD AUTO: 25.1 % (ref 40–49)
HGB BLD-MCNC: 8 G/DL (ref 13.5–16.5)
IMM GRANULOCYTES # BLD: 0.05 10*3/MM3 (ref 0–0.03)
IMM GRANULOCYTES NFR BLD: 1.1 % (ref 0–0.5)
KELL: NEGATIVE
LYMPHOCYTES # BLD AUTO: 0.16 10*3/MM3 (ref 1–3.5)
LYMPHOCYTES NFR BLD AUTO: 3.6 % (ref 20–49)
MCH RBC QN AUTO: 29.4 PG (ref 27–33)
MCHC RBC AUTO-ENTMCNC: 31.9 G/DL (ref 32–35)
MCV RBC AUTO: 92.3 FL (ref 83–97)
MONOCYTES # BLD AUTO: 0.3 10*3/MM3 (ref 0.25–0.8)
MONOCYTES NFR BLD AUTO: 6.8 % (ref 4–12)
NEUTROPHILS # BLD AUTO: 3.87 10*3/MM3 (ref 1.5–7)
NEUTROPHILS NFR BLD AUTO: 88.1 % (ref 39–75)
NONSPECIFIC GEL REACTION: NORMAL
NRBC BLD MANUAL-RTO: 0 /100 WBC (ref 0–0)
PLATELET # BLD AUTO: 106 10*3/MM3 (ref 150–375)
PMV BLD AUTO: 8.1 FL (ref 8.9–12.1)
POTASSIUM BLD-SCNC: 4.3 MMOL/L (ref 3.5–4.7)
PROT SERPL-MCNC: 7 G/DL (ref 6.3–8)
RBC # BLD AUTO: 2.72 10*6/MM3 (ref 4.3–5.5)
RH BLD: POSITIVE
SODIUM BLD-SCNC: 131 MMOL/L (ref 134–145)
WBC NRBC COR # BLD: 4.4 10*3/MM3 (ref 4–10)

## 2018-01-12 PROCEDURE — 86901 BLOOD TYPING SEROLOGIC RH(D): CPT | Performed by: INTERNAL MEDICINE

## 2018-01-12 PROCEDURE — 85025 COMPLETE CBC W/AUTO DIFF WBC: CPT | Performed by: INTERNAL MEDICINE

## 2018-01-12 PROCEDURE — 99213 OFFICE O/P EST LOW 20 MIN: CPT | Performed by: INTERNAL MEDICINE

## 2018-01-12 PROCEDURE — 86870 RBC ANTIBODY IDENTIFICATION: CPT

## 2018-01-12 PROCEDURE — 86880 COOMBS TEST DIRECT: CPT

## 2018-01-12 PROCEDURE — 86900 BLOOD TYPING SEROLOGIC ABO: CPT | Performed by: INTERNAL MEDICINE

## 2018-01-12 PROCEDURE — 36415 COLL VENOUS BLD VENIPUNCTURE: CPT | Performed by: INTERNAL MEDICINE

## 2018-01-12 PROCEDURE — 86922 COMPATIBILITY TEST ANTIGLOB: CPT

## 2018-01-12 PROCEDURE — 86850 RBC ANTIBODY SCREEN: CPT | Performed by: INTERNAL MEDICINE

## 2018-01-12 PROCEDURE — 80053 COMPREHEN METABOLIC PANEL: CPT | Performed by: INTERNAL MEDICINE

## 2018-01-12 PROCEDURE — 86920 COMPATIBILITY TEST SPIN: CPT

## 2018-01-12 PROCEDURE — 86905 BLOOD TYPING RBC ANTIGENS: CPT

## 2018-01-12 PROCEDURE — 63510000001 EPOETIN ALFA PER 1000 UNITS: Performed by: INTERNAL MEDICINE

## 2018-01-12 PROCEDURE — 96372 THER/PROPH/DIAG INJ SC/IM: CPT | Performed by: INTERNAL MEDICINE

## 2018-01-12 PROCEDURE — 86902 BLOOD TYPE ANTIGEN DONOR EA: CPT

## 2018-01-12 RX ORDER — ACETAMINOPHEN 325 MG/1
650 TABLET ORAL ONCE
Status: CANCELLED | OUTPATIENT
Start: 2018-01-12 | End: 2018-01-12

## 2018-01-12 RX ORDER — SODIUM CHLORIDE 9 MG/ML
250 INJECTION, SOLUTION INTRAVENOUS AS NEEDED
Status: CANCELLED | OUTPATIENT
Start: 2018-01-12

## 2018-01-12 RX ORDER — DIPHENHYDRAMINE HCL 25 MG
25 CAPSULE ORAL ONCE
Status: CANCELLED | OUTPATIENT
Start: 2018-01-12 | End: 2018-01-12

## 2018-01-12 RX ADMIN — ERYTHROPOIETIN 20000 UNITS: 20000 INJECTION, SOLUTION INTRAVENOUS; SUBCUTANEOUS at 10:10

## 2018-01-12 NOTE — PROGRESS NOTES
Chief Complaint   Patient presents with   • Rectal Bleeding       Bill Landry is a  72 y.o. male here for a follow up visit for Crohn's disease of the large intestine status post total proctocolectomy, iron deficiency anemia, indigestion    HPI Comments: Dyspepsia, indigestion 6 months.  Worsens with worsening renal function.  Better after taking Zantac.  Takes Zantac once a day.  No vomiting.  No weight loss.  He is about to initiate dialysis.  He has chronic iron deficiency anemia.  His chronic mixed anemia.  Etiology likely from GI tract and renal dysfunction, possible cirrhosis by CT scan.  No abdominal pain.  Positive abdominal bloating.    EGD and ileoscopy -3 years ago.  Capsule endoscopy was never done.      Past Medical History:   Diagnosis Date   • Abnormal serum protein electrophoresis    • Anemia     Multifactorial   • Atrial fibrillation    • Chronic kidney disease     STAGE 4   • Chronic leukopenia and thrombocytopenia    • Crohn disease    • Diverticula of colon    • Fatty liver disease, nonalcoholic    • GERD (gastroesophageal reflux disease)    • GI bleed    • Glaucoma    • H/O colectomy    • H/O Pericardial effusion    • Hx of renal calculi    • Hypertension    • Ileostomy present    • MGUS (monoclonal gammopathy of unknown significance)    • Murmur     FROM RHEUMATIC FEVER AS CHIILD   • Sleep apnea     CPAP USED       Past Surgical History:   Procedure Laterality Date   • APPENDECTOMY     • ARTERIOVENOUS FISTULA/SHUNT SURGERY Left 8/8/2017    Procedure: LEFT BRACHIAL CEPHALIC AV FISTULA FORMATION WITH CEPHALIC VEIN TRANSPOSITION ;  Surgeon: Bill Deal MD;  Location: [facility];  Service:    • CHOLECYSTECTOMY     • COLECTOMY PARTIAL / TOTAL      History of inflammatory bowel disease with status post colectomy with ileostomy many years ago in the [facility]   • COLON RESECTION WITH COLOSTOMY     • COLON SURGERY     • COLONOSCOPY     • CYSTOSCOPY LITHOLAPAXY BLADDER STONE  EXTRACTION     • ENDOSCOPY  01/16/2015    gastritis   • ILEOSCOPY  01/16/2015    normal       Scheduled Meds:    Continuous Infusions:  No current facility-administered medications for this visit.     PRN Meds:.    Allergies   Allergen Reactions   • Ace Inhibitors Other (See Comments)     RENAL FAILURE   • Contrast Dye Other (See Comments)     RENAL FAILURE   • Eliquis [Apixaban] Other (See Comments)     BLEEDING ISSUES; PT CANNOT TAKE ANY ANTICOAGULANTS DUE TO LOW PLATELETS EXCEPT ASPIRIN  BLEEDING ISSUES; PT CANNOT TAKE ANY ANTICOAGULANTS DUE TO LOW PLATELETS EXCEPT ASPIRIN   • Granix [Filgrastim]      Chest pain  Chest pain     • Iodinated Diagnostic Agents Other (See Comments)     RENAL FAILURE   • Keflex [Cephalexin] Other (See Comments)     RENAL FAILURE       Social History     Social History   • Marital status:      Spouse name: Gillian   • Number of children: N/A   • Years of education: College     Occupational History   •  Retired     Social History Main Topics   • Smoking status: Never Smoker   • Smokeless tobacco: Not on file   • Alcohol use No   • Drug use: No   • Sexual activity: Defer     Other Topics Concern   • Not on file     Social History Narrative       Family History   Problem Relation Age of Onset   • Heart failure Mother    • Hypertension Mother    • Hypertension Father    • Malig Hyperthermia Neg Hx        Review of Systems   Gastrointestinal: Positive for abdominal distention, blood in stool and nausea. Negative for constipation and diarrhea.   All other systems reviewed and are negative.      Vitals:    01/12/18 0808   BP: 138/58   Temp: 97.4 °F (36.3 °C)       Physical Exam   Constitutional: He is oriented to person, place, and time. He appears well-developed and well-nourished.   HENT:   Head: Normocephalic and atraumatic.   Eyes: Conjunctivae and EOM are normal.   Neck: Normal range of motion. No tracheal deviation present.   Cardiovascular: Normal rate and regular rhythm.     Pulmonary/Chest: Effort normal and breath sounds normal. No respiratory distress.   Abdominal: Soft. Bowel sounds are normal. He exhibits no distension and no mass. There is no tenderness. There is no rebound and no guarding.   Ostomy appears normal   Musculoskeletal: Normal range of motion.   Neurological: He is alert and oriented to person, place, and time.   Skin: Skin is warm and dry.   Psychiatric: He has a normal mood and affect. Judgment normal.   Nursing note and vitals reviewed.      No images are attached to the encounter.  Problem list    Dyspepsia, indigestion, query worsening uremia  Heartburn, not controlled with once is a Zantac  Iron deficiency anemia  Possible cirrhosis  Pancytopenia  Nausea  Early satiety  Anxiety and stress    Assessment/Plan    We are going to schedule EGD and ileoscopy  I want to look for Crohn's disease, peptic ulcer disease, esophageal varices etc.  I will also see him back in 3 months for routine blood work to include ultrasound of the right upper quadrant, AFP  I'm going to increase his Zantac to twice a day dosing    In the end he does tell me that stress and anxiety has worsened his dyspepsia recently, we do not find anything on EGD and he is not improved after the initiation of dialysis, mood stabilizer in the future may be appropriate

## 2018-01-12 NOTE — PROGRESS NOTES
Patient here for procrit.  Hgb down to 8.  Patient insisted I contact Dr. Mckenzie about hgb to make a decision about transfusion even though his dictation was very clear.  Patient wanted transfusion.  Per Dr. Mckenzie ok for 1 unit prbc's per patient request.

## 2018-01-12 NOTE — TELEPHONE ENCOUNTER
Attempted to fax cmp results x 4.  Called Dr. Gordon's office and confirmed fax # of [phone].  They said to just keep faxing it because that is the only fax # they have.  Then they told me their office closes at 1230.    Called patient and spoke to wife, she stated she had already talked to Dr. Gordon and that he received the labs through [facility].

## 2018-01-13 PROCEDURE — 86870 RBC ANTIBODY IDENTIFICATION: CPT

## 2018-01-14 ENCOUNTER — INFUSION (OUTPATIENT)
Dept: ONCOLOGY | Facility: HOSPITAL | Age: 73
End: 2018-01-14

## 2018-01-14 VITALS
SYSTOLIC BLOOD PRESSURE: 110 MMHG | DIASTOLIC BLOOD PRESSURE: 60 MMHG | TEMPERATURE: 96.2 F | HEART RATE: 72 BPM | RESPIRATION RATE: 18 BRPM | OXYGEN SATURATION: 96 %

## 2018-01-14 DIAGNOSIS — D63.1 ANEMIA DUE TO STAGE 4 CHRONIC KIDNEY DISEASE TREATED WITH ERYTHROPOIETIN (HCC): ICD-10-CM

## 2018-01-14 DIAGNOSIS — N18.4 ANEMIA DUE TO STAGE 4 CHRONIC KIDNEY DISEASE TREATED WITH ERYTHROPOIETIN (HCC): ICD-10-CM

## 2018-01-14 PROCEDURE — P9016 RBC LEUKOCYTES REDUCED: HCPCS

## 2018-01-14 PROCEDURE — 63710000001 DIPHENHYDRAMINE PER 50 MG: Performed by: INTERNAL MEDICINE

## 2018-01-14 PROCEDURE — 36430 TRANSFUSION BLD/BLD COMPNT: CPT

## 2018-01-14 PROCEDURE — 86900 BLOOD TYPING SEROLOGIC ABO: CPT

## 2018-01-14 RX ORDER — ACETAMINOPHEN 325 MG/1
650 TABLET ORAL ONCE
Status: COMPLETED | OUTPATIENT
Start: 2018-01-14 | End: 2018-01-14

## 2018-01-14 RX ORDER — SODIUM CHLORIDE 9 MG/ML
250 INJECTION, SOLUTION INTRAVENOUS AS NEEDED
Status: DISCONTINUED | OUTPATIENT
Start: 2018-01-14 | End: 2018-01-14 | Stop reason: HOSPADM

## 2018-01-14 RX ORDER — DIPHENHYDRAMINE HCL 25 MG
25 CAPSULE ORAL ONCE
Status: COMPLETED | OUTPATIENT
Start: 2018-01-14 | End: 2018-01-14

## 2018-01-14 RX ADMIN — ACETAMINOPHEN 650 MG: 325 TABLET ORAL at 09:54

## 2018-01-14 RX ADMIN — DIPHENHYDRAMINE HYDROCHLORIDE 25 MG: 25 CAPSULE ORAL at 09:54

## 2018-01-15 LAB
ABO + RH BLD: NORMAL
BH BB BLOOD EXPIRATION DATE: NORMAL
BH BB BLOOD TYPE BARCODE: 9500
BH BB DISPENSE STATUS: NORMAL
BH BB PRODUCT CODE: NORMAL
BH BB UNIT NUMBER: NORMAL
CROSSMATCH INTERPRETATION: NORMAL
UNIT  ABO: NORMAL
UNIT  RH: NORMAL

## 2018-01-17 ENCOUNTER — ANESTHESIA (OUTPATIENT)
Dept: GASTROENTEROLOGY | Facility: HOSPITAL | Age: 73
End: 2018-01-17

## 2018-01-17 ENCOUNTER — ANESTHESIA EVENT (OUTPATIENT)
Dept: GASTROENTEROLOGY | Facility: HOSPITAL | Age: 73
End: 2018-01-17

## 2018-01-17 ENCOUNTER — HOSPITAL ENCOUNTER (OUTPATIENT)
Facility: HOSPITAL | Age: 73
Setting detail: HOSPITAL OUTPATIENT SURGERY
Discharge: HOME | End: 2018-01-17
Attending: INTERNAL MEDICINE | Admitting: INTERNAL MEDICINE

## 2018-01-17 VITALS
TEMPERATURE: 97.6 F | OXYGEN SATURATION: 95 % | WEIGHT: 199.13 LBS | BODY MASS INDEX: 28.51 KG/M2 | RESPIRATION RATE: 16 BRPM | HEIGHT: 70 IN | HEART RATE: 86 BPM | SYSTOLIC BLOOD PRESSURE: 122 MMHG | DIASTOLIC BLOOD PRESSURE: 59 MMHG

## 2018-01-17 DIAGNOSIS — D64.9 ANEMIA, UNSPECIFIED TYPE: ICD-10-CM

## 2018-01-17 PROCEDURE — 88305 TISSUE EXAM BY PATHOLOGIST: CPT | Performed by: INTERNAL MEDICINE

## 2018-01-17 PROCEDURE — 43239 EGD BIOPSY SINGLE/MULTIPLE: CPT | Performed by: INTERNAL MEDICINE

## 2018-01-17 PROCEDURE — 88312 SPECIAL STAINS GROUP 1: CPT | Performed by: INTERNAL MEDICINE

## 2018-01-17 PROCEDURE — 25010000002 PROPOFOL 10 MG/ML EMULSION: Performed by: ANESTHESIOLOGY

## 2018-01-17 PROCEDURE — 87102 FUNGUS ISOLATION CULTURE: CPT | Performed by: INTERNAL MEDICINE

## 2018-01-17 PROCEDURE — 87252 VIRUS INOCULATION TISSUE: CPT | Performed by: INTERNAL MEDICINE

## 2018-01-17 PROCEDURE — 44380 SMALL BOWEL ENDOSCOPY BR/WA: CPT | Performed by: INTERNAL MEDICINE

## 2018-01-17 RX ORDER — PROPOFOL 10 MG/ML
VIAL (ML) INTRAVENOUS AS NEEDED
Status: DISCONTINUED | OUTPATIENT
Start: 2018-01-17 | End: 2018-01-17 | Stop reason: SURG

## 2018-01-17 RX ORDER — LIDOCAINE HYDROCHLORIDE 20 MG/ML
INJECTION, SOLUTION INFILTRATION; PERINEURAL AS NEEDED
Status: DISCONTINUED | OUTPATIENT
Start: 2018-01-17 | End: 2018-01-17 | Stop reason: SURG

## 2018-01-17 RX ORDER — SODIUM CHLORIDE 9 MG/ML
30 INJECTION, SOLUTION INTRAVENOUS CONTINUOUS PRN
Status: DISCONTINUED | OUTPATIENT
Start: 2018-01-17 | End: 2018-01-17 | Stop reason: HOSPADM

## 2018-01-17 RX ORDER — SODIUM BICARBONATE 650 MG/1
1300 TABLET ORAL 2 TIMES DAILY
COMMUNITY
End: 2019-11-19

## 2018-01-17 RX ORDER — SODIUM CHLORIDE, SODIUM LACTATE, POTASSIUM CHLORIDE, CALCIUM CHLORIDE 600; 310; 30; 20 MG/100ML; MG/100ML; MG/100ML; MG/100ML
30 INJECTION, SOLUTION INTRAVENOUS CONTINUOUS PRN
Status: DISCONTINUED | OUTPATIENT
Start: 2018-01-17 | End: 2018-01-17 | Stop reason: HOSPADM

## 2018-01-17 RX ADMIN — PROPOFOL 50 MG: 10 INJECTION, EMULSION INTRAVENOUS at 07:20

## 2018-01-17 RX ADMIN — PROPOFOL 50 MG: 10 INJECTION, EMULSION INTRAVENOUS at 07:15

## 2018-01-17 RX ADMIN — LIDOCAINE HYDROCHLORIDE 60 MG: 20 INJECTION, SOLUTION INFILTRATION; PERINEURAL at 07:10

## 2018-01-17 RX ADMIN — PROPOFOL 100 MG: 10 INJECTION, EMULSION INTRAVENOUS at 07:10

## 2018-01-17 RX ADMIN — SODIUM CHLORIDE 30 ML/HR: 9 INJECTION, SOLUTION INTRAVENOUS at 06:53

## 2018-01-17 NOTE — H&P (VIEW-ONLY)
Chief Complaint   Patient presents with   • Rectal Bleeding       Bill Landry is a  72 y.o. male here for a follow up visit for Crohn's disease of the large intestine status post total proctocolectomy, iron deficiency anemia, indigestion    HPI Comments: Dyspepsia, indigestion 6 months.  Worsens with worsening renal function.  Better after taking Zantac.  Takes Zantac once a day.  No vomiting.  No weight loss.  He is about to initiate dialysis.  He has chronic iron deficiency anemia.  His chronic mixed anemia.  Etiology likely from GI tract and renal dysfunction, possible cirrhosis by CT scan.  No abdominal pain.  Positive abdominal bloating.    EGD and ileoscopy -3 years ago.  Capsule endoscopy was never done.      Past Medical History:   Diagnosis Date   • Abnormal serum protein electrophoresis    • Anemia     Multifactorial   • Atrial fibrillation    • Chronic kidney disease     STAGE 4   • Chronic leukopenia and thrombocytopenia    • Crohn disease    • Diverticula of colon    • Fatty liver disease, nonalcoholic    • GERD (gastroesophageal reflux disease)    • GI bleed    • Glaucoma    • H/O colectomy    • H/O Pericardial effusion    • Hx of renal calculi    • Hypertension    • Ileostomy present    • MGUS (monoclonal gammopathy of unknown significance)    • Murmur     FROM RHEUMATIC FEVER AS CHIILD   • Sleep apnea     CPAP USED       Past Surgical History:   Procedure Laterality Date   • APPENDECTOMY     • ARTERIOVENOUS FISTULA/SHUNT SURGERY Left 8/8/2017    Procedure: LEFT BRACHIAL CEPHALIC AV FISTULA FORMATION WITH CEPHALIC VEIN TRANSPOSITION ;  Surgeon: Bill Deal MD;  Location: [facility];  Service:    • CHOLECYSTECTOMY     • COLECTOMY PARTIAL / TOTAL      History of inflammatory bowel disease with status post colectomy with ileostomy many years ago in the [facility]   • COLON RESECTION WITH COLOSTOMY     • COLON SURGERY     • COLONOSCOPY     • CYSTOSCOPY LITHOLAPAXY BLADDER STONE  EXTRACTION     • ENDOSCOPY  01/16/2015    gastritis   • ILEOSCOPY  01/16/2015    normal       Scheduled Meds:    Continuous Infusions:  No current facility-administered medications for this visit.     PRN Meds:.    Allergies   Allergen Reactions   • Ace Inhibitors Other (See Comments)     RENAL FAILURE   • Contrast Dye Other (See Comments)     RENAL FAILURE   • Eliquis [Apixaban] Other (See Comments)     BLEEDING ISSUES; PT CANNOT TAKE ANY ANTICOAGULANTS DUE TO LOW PLATELETS EXCEPT ASPIRIN  BLEEDING ISSUES; PT CANNOT TAKE ANY ANTICOAGULANTS DUE TO LOW PLATELETS EXCEPT ASPIRIN   • Granix [Filgrastim]      Chest pain  Chest pain     • Iodinated Diagnostic Agents Other (See Comments)     RENAL FAILURE   • Keflex [Cephalexin] Other (See Comments)     RENAL FAILURE       Social History     Social History   • Marital status:      Spouse name: Gillian   • Number of children: N/A   • Years of education: College     Occupational History   •  Retired     Social History Main Topics   • Smoking status: Never Smoker   • Smokeless tobacco: Not on file   • Alcohol use No   • Drug use: No   • Sexual activity: Defer     Other Topics Concern   • Not on file     Social History Narrative       Family History   Problem Relation Age of Onset   • Heart failure Mother    • Hypertension Mother    • Hypertension Father    • Malig Hyperthermia Neg Hx        Review of Systems   Gastrointestinal: Positive for abdominal distention, blood in stool and nausea. Negative for constipation and diarrhea.   All other systems reviewed and are negative.      Vitals:    01/12/18 0808   BP: 138/58   Temp: 97.4 °F (36.3 °C)       Physical Exam   Constitutional: He is oriented to person, place, and time. He appears well-developed and well-nourished.   HENT:   Head: Normocephalic and atraumatic.   Eyes: Conjunctivae and EOM are normal.   Neck: Normal range of motion. No tracheal deviation present.   Cardiovascular: Normal rate and regular rhythm.     Pulmonary/Chest: Effort normal and breath sounds normal. No respiratory distress.   Abdominal: Soft. Bowel sounds are normal. He exhibits no distension and no mass. There is no tenderness. There is no rebound and no guarding.   Ostomy appears normal   Musculoskeletal: Normal range of motion.   Neurological: He is alert and oriented to person, place, and time.   Skin: Skin is warm and dry.   Psychiatric: He has a normal mood and affect. Judgment normal.   Nursing note and vitals reviewed.      No images are attached to the encounter.  Problem list    Dyspepsia, indigestion, query worsening uremia  Heartburn, not controlled with once is a Zantac  Iron deficiency anemia  Possible cirrhosis  Pancytopenia  Nausea  Early satiety  Anxiety and stress    Assessment/Plan    We are going to schedule EGD and ileoscopy  I want to look for Crohn's disease, peptic ulcer disease, esophageal varices etc.  I will also see him back in 3 months for routine blood work to include ultrasound of the right upper quadrant, AFP  I'm going to increase his Zantac to twice a day dosing    In the end he does tell me that stress and anxiety has worsened his dyspepsia recently, we do not find anything on EGD and he is not improved after the initiation of dialysis, mood stabilizer in the future may be appropriate

## 2018-01-17 NOTE — PLAN OF CARE
Problem: Patient Care Overview (Adult)  Goal: Plan of Care Review  Outcome: Ongoing (interventions implemented as appropriate)   01/17/18 0621   Coping/Psychosocial Response Interventions   Plan Of Care Reviewed With patient   Patient Care Overview   Progress no change     Goal: Adult Individualization and Mutuality  Outcome: Ongoing (interventions implemented as appropriate)    Goal: Discharge Needs Assessment  Outcome: Ongoing (interventions implemented as appropriate)   01/17/18 0621   Discharge Needs Assessment   Concerns To Be Addressed basic needs concerns   Equipment Needed After Discharge bipap/ cpap   Discharge Disposition home or self-care   Living Environment   Transportation Available car   Self-Care   Equipment Currently Used at Home bipap/ cpap       Problem: GI Endoscopy (Adult)  Goal: Signs and Symptoms of Listed Potential Problems Will be Absent or Manageable (GI Endoscopy)  Outcome: Ongoing (interventions implemented as appropriate)   01/17/18 0621   GI Endoscopy   Problems Assessed (GI Endoscopy) pain   Problems Present (GI Endoscopy) none

## 2018-01-17 NOTE — ANESTHESIA PREPROCEDURE EVALUATION
Anesthesia Evaluation     Patient summary reviewed and Nursing notes reviewed          Airway   Mallampati: I  TM distance: <3 FB  Neck ROM: full  no difficulty expected  Dental - normal exam   (+) poor dentition    Pulmonary - normal exam   (+) sleep apnea,   Cardiovascular - normal exam    Rhythm: irregular    (+) hypertension, valvular problems/murmurs, dysrhythmias, murmur,       Neuro/Psych- negative ROS  GI/Hepatic/Renal/Endo    (+)  GERD, liver disease, renal disease CRI,     Musculoskeletal (-) negative ROS    Abdominal  - normal exam    Bowel sounds: normal.   Substance History - negative use     OB/GYN negative ob/gyn ROS         Other      history of cancer                                            Anesthesia Plan    ASA 3     MAC     Anesthetic plan and risks discussed with patient.

## 2018-01-17 NOTE — ANESTHESIA POSTPROCEDURE EVALUATION
"Patient: Bill Landry    Procedure Summary     Date Anesthesia Start Anesthesia Stop Room / Location    01/17/18 0701 0738  [facility]       Procedure Diagnosis Surgeon Provider    ILEOSCOPY (N/A ); ESOPHAGOGASTRODUODENOSCOPY (Esophagus) Anemia, unspecified type  (Anemia, unspecified type [D64.9]) MD Aguila Mclaughlin MD          Anesthesia Type: MAC  Last vitals  BP   122/59 (01/17/18 0755)   Temp   36.4 °C (97.6 °F) (01/17/18 0633)   Pulse   86 (01/17/18 0755)   Resp   16 (01/17/18 0755)     SpO2   95 % (01/17/18 0755)     Post Anesthesia Care and Evaluation    Patient location during evaluation: PACU  Patient participation: complete - patient participated  Level of consciousness: awake  Pain score: 0  Pain management: adequate  Airway patency: patent  Anesthetic complications: No anesthetic complications  PONV Status: none  Cardiovascular status: acceptable  Respiratory status: acceptable  Hydration status: acceptable    Comments: /59 (BP Location: Right arm, Patient Position: Lying)  Pulse 86  Temp 36.4 °C (97.6 °F) (Oral)   Resp 16  Ht 177.8 cm (70\")  Wt 90.3 kg (199 lb 2 oz)  SpO2 95%  BMI 28.57 kg/m2      "

## 2018-01-18 ENCOUNTER — TELEPHONE (OUTPATIENT)
Dept: GASTROENTEROLOGY | Facility: CLINIC | Age: 73
End: 2018-01-18

## 2018-01-18 ENCOUNTER — PREP FOR SURGERY (OUTPATIENT)
Dept: OTHER | Facility: HOSPITAL | Age: 73
End: 2018-01-18

## 2018-01-18 DIAGNOSIS — K22.9 ESOPHAGEAL ABNORMALITY: Primary | ICD-10-CM

## 2018-01-18 LAB
CYTO UR: NORMAL
LAB AP CASE REPORT: NORMAL
Lab: NORMAL
PATH REPORT.FINAL DX SPEC: NORMAL
PATH REPORT.GROSS SPEC: NORMAL

## 2018-01-18 RX ORDER — FLUCONAZOLE 100 MG/1
100 TABLET ORAL DAILY
Qty: 14 TABLET | Refills: 0 | Status: SHIPPED | OUTPATIENT
Start: 2018-01-18 | End: 2018-01-25 | Stop reason: HOSPADM

## 2018-01-18 NOTE — TELEPHONE ENCOUNTER
----- Message from Warner Neville MD sent at 1/18/2018 11:07 AM EST -----  Fungal esophagitis, call in Candida 100 mg by mouth daily for 14 days  Office visit nurse practitioner 6 weeks  EGD please schedule 3 months, request is in  Questions per patient's spouse: PT IS TAKING ZANTAC. HE HAD ALSO MENTIONED HIM TAKING NEXIUM. PT KIDNEY DR DID SAY IT'S OK FOR PT TO TAKE NEXIUM. PT IS TAKING ZANTAC BID. IF HE START THE NEXIUM WOULD HE CONTINUE WITH THE ZANTAC AND IF SO WOULD GET A SCRIPT OR USE OTC

## 2018-01-18 NOTE — TELEPHONE ENCOUNTER
Patient called, spoke with spouse Gillian. Advised as per Dr. Neville's note, advised he does not need to take Nexium, taking Zantack 150 mg one tablet twice a day will be okay. Advised Diflucan 100 mg once a day for 14 days will be used to treat the patient's fungal infection. She verb understanding and will relay message to the patient.

## 2018-01-18 NOTE — PROGRESS NOTES
Fungal esophagitis, call in Candida 100 mg by mouth daily for 14 days  Office visit nurse practitioner 6 weeks  EGD please schedule 3 months, request is in

## 2018-01-19 ENCOUNTER — HOSPITAL ENCOUNTER (INPATIENT)
Facility: HOSPITAL | Age: 73
LOS: 6 days | Discharge: HOME HEALTH | End: 2018-01-25
Attending: EMERGENCY MEDICINE | Admitting: HOSPITALIST

## 2018-01-19 ENCOUNTER — APPOINTMENT (OUTPATIENT)
Dept: ONCOLOGY | Facility: HOSPITAL | Age: 73
End: 2018-01-19

## 2018-01-19 ENCOUNTER — APPOINTMENT (OUTPATIENT)
Dept: LAB | Facility: HOSPITAL | Age: 73
End: 2018-01-19

## 2018-01-19 ENCOUNTER — APPOINTMENT (OUTPATIENT)
Dept: GENERAL RADIOLOGY | Facility: HOSPITAL | Age: 73
End: 2018-01-19

## 2018-01-19 DIAGNOSIS — D72.819 CHRONIC LEUKOPENIA: ICD-10-CM

## 2018-01-19 DIAGNOSIS — N18.9 ANEMIA IN CHRONIC KIDNEY DISEASE, UNSPECIFIED CKD STAGE: ICD-10-CM

## 2018-01-19 DIAGNOSIS — D69.6 THROMBOCYTOPENIA (HCC): ICD-10-CM

## 2018-01-19 DIAGNOSIS — D63.1 ANEMIA IN CHRONIC KIDNEY DISEASE, UNSPECIFIED CKD STAGE: ICD-10-CM

## 2018-01-19 DIAGNOSIS — M62.81 MUSCLE WEAKNESS (GENERALIZED): ICD-10-CM

## 2018-01-19 DIAGNOSIS — N18.4 ANEMIA DUE TO STAGE 4 CHRONIC KIDNEY DISEASE TREATED WITH ERYTHROPOIETIN (HCC): ICD-10-CM

## 2018-01-19 DIAGNOSIS — D63.1 ANEMIA DUE TO STAGE 4 CHRONIC KIDNEY DISEASE TREATED WITH ERYTHROPOIETIN (HCC): ICD-10-CM

## 2018-01-19 DIAGNOSIS — N18.9 CHRONIC KIDNEY DISEASE, UNSPECIFIED CKD STAGE: ICD-10-CM

## 2018-01-19 DIAGNOSIS — J18.9 PNEUMONIA OF BOTH LUNGS DUE TO INFECTIOUS ORGANISM, UNSPECIFIED PART OF LUNG: Primary | ICD-10-CM

## 2018-01-19 LAB
ABO GROUP BLD: NORMAL
ALBUMIN SERPL-MCNC: 2.8 G/DL (ref 3.5–5.2)
ALBUMIN/GLOB SERPL: 0.7 G/DL
ALP SERPL-CCNC: 136 U/L (ref 39–117)
ALT SERPL W P-5'-P-CCNC: 17 U/L (ref 1–41)
ANION GAP SERPL CALCULATED.3IONS-SCNC: 23.7 MMOL/L
ANTI-C: NORMAL
ANTI-K: NORMAL
APTT PPP: 39 SECONDS (ref 22.7–35.4)
AST SERPL-CCNC: 33 U/L (ref 1–40)
BASOPHILS # BLD AUTO: 0.01 10*3/MM3 (ref 0–0.2)
BASOPHILS NFR BLD AUTO: 0.1 % (ref 0–1.5)
BILIRUB SERPL-MCNC: 1.8 MG/DL (ref 0.1–1.2)
BLD GP AB SCN SERPL QL: POSITIVE
BUN BLD-MCNC: 141 MG/DL (ref 8–23)
BUN/CREAT SERPL: 19 (ref 7–25)
CALCIUM SPEC-SCNC: 8.1 MG/DL (ref 8.6–10.5)
CHLORIDE SERPL-SCNC: 89 MMOL/L (ref 98–107)
CO2 SERPL-SCNC: 10.3 MMOL/L (ref 22–29)
CREAT BLD-MCNC: 7.44 MG/DL (ref 0.76–1.27)
D-LACTATE SERPL-SCNC: 1.7 MMOL/L (ref 0.5–2)
D-LACTATE SERPL-SCNC: 3.7 MMOL/L (ref 0.5–2)
DAT POLY-SP REAG RBC QL: NEGATIVE
DEPRECATED RDW RBC AUTO: 55 FL (ref 37–54)
EOSINOPHIL # BLD AUTO: 0.01 10*3/MM3 (ref 0–0.7)
EOSINOPHIL NFR BLD AUTO: 0.1 % (ref 0.3–6.2)
ERYTHROCYTE [DISTWIDTH] IN BLOOD BY AUTOMATED COUNT: 17.3 % (ref 11.5–14.5)
GFR SERPL CREATININE-BSD FRML MDRD: 7 ML/MIN/1.73
GLOBULIN UR ELPH-MCNC: 4.2 GM/DL
GLUCOSE BLD-MCNC: 165 MG/DL (ref 65–99)
HBV SURFACE AG SERPL QL IA: NORMAL
HCT VFR BLD AUTO: 22.9 % (ref 40.4–52.2)
HGB BLD-MCNC: 7.7 G/DL (ref 13.7–17.6)
HOLD SPECIMEN: NORMAL
IMM GRANULOCYTES # BLD: 0.09 10*3/MM3 (ref 0–0.03)
IMM GRANULOCYTES NFR BLD: 1 % (ref 0–0.5)
INR PPP: 1.91 (ref 0.9–1.1)
LYMPHOCYTES # BLD AUTO: 0.08 10*3/MM3 (ref 0.9–4.8)
LYMPHOCYTES NFR BLD AUTO: 0.9 % (ref 19.6–45.3)
MCH RBC QN AUTO: 29.4 PG (ref 27–32.7)
MCHC RBC AUTO-ENTMCNC: 33.6 G/DL (ref 32.6–36.4)
MCV RBC AUTO: 87.4 FL (ref 79.8–96.2)
MONOCYTES # BLD AUTO: 0.49 10*3/MM3 (ref 0.2–1.2)
MONOCYTES NFR BLD AUTO: 5.5 % (ref 5–12)
NEUTROPHILS # BLD AUTO: 8.25 10*3/MM3 (ref 1.9–8.1)
NEUTROPHILS NFR BLD AUTO: 92.4 % (ref 42.7–76)
NONSPECIFIC GEL REACTION: NORMAL
NT-PROBNP SERPL-MCNC: ABNORMAL PG/ML (ref 0–900)
PLATELET # BLD AUTO: 82 10*3/MM3 (ref 140–500)
PMV BLD AUTO: 8.6 FL (ref 6–12)
POTASSIUM BLD-SCNC: 4.6 MMOL/L (ref 3.5–5.2)
PROCALCITONIN SERPL-MCNC: 1.69 NG/ML (ref 0.1–0.25)
PROT SERPL-MCNC: 7 G/DL (ref 6–8.5)
PROTHROMBIN TIME: 21.3 SECONDS (ref 11.7–14.2)
RBC # BLD AUTO: 2.62 10*6/MM3 (ref 4.6–6)
RH BLD: POSITIVE
SODIUM BLD-SCNC: 123 MMOL/L (ref 136–145)
TROPONIN T SERPL-MCNC: 0.09 NG/ML (ref 0–0.03)
WBC NRBC COR # BLD: 8.93 10*3/MM3 (ref 4.5–10.7)

## 2018-01-19 PROCEDURE — 86850 RBC ANTIBODY SCREEN: CPT | Performed by: INTERNAL MEDICINE

## 2018-01-19 PROCEDURE — 86920 COMPATIBILITY TEST SPIN: CPT

## 2018-01-19 PROCEDURE — 87581 M.PNEUMON DNA AMP PROBE: CPT | Performed by: HOSPITALIST

## 2018-01-19 PROCEDURE — 86901 BLOOD TYPING SEROLOGIC RH(D): CPT | Performed by: INTERNAL MEDICINE

## 2018-01-19 PROCEDURE — 36415 COLL VENOUS BLD VENIPUNCTURE: CPT

## 2018-01-19 PROCEDURE — 93010 ELECTROCARDIOGRAM REPORT: CPT | Performed by: INTERNAL MEDICINE

## 2018-01-19 PROCEDURE — 83880 ASSAY OF NATRIURETIC PEPTIDE: CPT | Performed by: EMERGENCY MEDICINE

## 2018-01-19 PROCEDURE — 71046 X-RAY EXAM CHEST 2 VIEWS: CPT

## 2018-01-19 PROCEDURE — 25010000002 LEVOFLOXACIN PER 250 MG: Performed by: EMERGENCY MEDICINE

## 2018-01-19 PROCEDURE — 86880 COOMBS TEST DIRECT: CPT | Performed by: INTERNAL MEDICINE

## 2018-01-19 PROCEDURE — 87040 BLOOD CULTURE FOR BACTERIA: CPT | Performed by: EMERGENCY MEDICINE

## 2018-01-19 PROCEDURE — 85610 PROTHROMBIN TIME: CPT | Performed by: EMERGENCY MEDICINE

## 2018-01-19 PROCEDURE — 83605 ASSAY OF LACTIC ACID: CPT | Performed by: EMERGENCY MEDICINE

## 2018-01-19 PROCEDURE — 86922 COMPATIBILITY TEST ANTIGLOB: CPT

## 2018-01-19 PROCEDURE — 87486 CHLMYD PNEUM DNA AMP PROBE: CPT | Performed by: HOSPITALIST

## 2018-01-19 PROCEDURE — 80053 COMPREHEN METABOLIC PANEL: CPT | Performed by: EMERGENCY MEDICINE

## 2018-01-19 PROCEDURE — 99285 EMERGENCY DEPT VISIT HI MDM: CPT

## 2018-01-19 PROCEDURE — 87633 RESP VIRUS 12-25 TARGETS: CPT | Performed by: HOSPITALIST

## 2018-01-19 PROCEDURE — 85025 COMPLETE CBC W/AUTO DIFF WBC: CPT | Performed by: EMERGENCY MEDICINE

## 2018-01-19 PROCEDURE — 86870 RBC ANTIBODY IDENTIFICATION: CPT | Performed by: INTERNAL MEDICINE

## 2018-01-19 PROCEDURE — 5A1D70Z PERFORMANCE OF URINARY FILTRATION, INTERMITTENT, LESS THAN 6 HOURS PER DAY: ICD-10-PCS | Performed by: INTERNAL MEDICINE

## 2018-01-19 PROCEDURE — 87798 DETECT AGENT NOS DNA AMP: CPT | Performed by: HOSPITALIST

## 2018-01-19 PROCEDURE — 94799 UNLISTED PULMONARY SVC/PX: CPT

## 2018-01-19 PROCEDURE — 85730 THROMBOPLASTIN TIME PARTIAL: CPT | Performed by: EMERGENCY MEDICINE

## 2018-01-19 PROCEDURE — 87340 HEPATITIS B SURFACE AG IA: CPT | Performed by: INTERNAL MEDICINE

## 2018-01-19 PROCEDURE — 84484 ASSAY OF TROPONIN QUANT: CPT | Performed by: EMERGENCY MEDICINE

## 2018-01-19 PROCEDURE — 84145 PROCALCITONIN (PCT): CPT | Performed by: EMERGENCY MEDICINE

## 2018-01-19 PROCEDURE — 93005 ELECTROCARDIOGRAM TRACING: CPT | Performed by: EMERGENCY MEDICINE

## 2018-01-19 PROCEDURE — 94640 AIRWAY INHALATION TREATMENT: CPT

## 2018-01-19 PROCEDURE — 86900 BLOOD TYPING SEROLOGIC ABO: CPT | Performed by: INTERNAL MEDICINE

## 2018-01-19 RX ORDER — HYDRALAZINE HYDROCHLORIDE 50 MG/1
100 TABLET, FILM COATED ORAL 3 TIMES DAILY
Status: DISCONTINUED | OUTPATIENT
Start: 2018-01-19 | End: 2018-01-25 | Stop reason: HOSPADM

## 2018-01-19 RX ORDER — TERAZOSIN 5 MG/1
5 CAPSULE ORAL NIGHTLY
Status: DISCONTINUED | OUTPATIENT
Start: 2018-01-19 | End: 2018-01-25 | Stop reason: HOSPADM

## 2018-01-19 RX ORDER — SODIUM BICARBONATE 650 MG/1
650 TABLET ORAL 2 TIMES DAILY
Status: DISCONTINUED | OUTPATIENT
Start: 2018-01-19 | End: 2018-01-24

## 2018-01-19 RX ORDER — ASPIRIN 81 MG/1
81 TABLET ORAL DAILY
Status: DISCONTINUED | OUTPATIENT
Start: 2018-01-20 | End: 2018-01-21

## 2018-01-19 RX ORDER — ASPIRIN 81 MG/1
81 TABLET, CHEWABLE ORAL DAILY
Status: DISCONTINUED | OUTPATIENT
Start: 2018-01-19 | End: 2018-01-19 | Stop reason: CLARIF

## 2018-01-19 RX ORDER — GUAIFENESIN 600 MG/1
600 TABLET, EXTENDED RELEASE ORAL EVERY 12 HOURS SCHEDULED
Status: DISCONTINUED | OUTPATIENT
Start: 2018-01-19 | End: 2018-01-25 | Stop reason: HOSPADM

## 2018-01-19 RX ORDER — MANNITOL 250 MG/ML
12.5 INJECTION, SOLUTION INTRAVENOUS AS NEEDED
Status: ACTIVE | OUTPATIENT
Start: 2018-01-19 | End: 2018-01-20

## 2018-01-19 RX ORDER — FOLIC ACID 1 MG/1
1 TABLET ORAL DAILY
Status: DISCONTINUED | OUTPATIENT
Start: 2018-01-19 | End: 2018-01-25 | Stop reason: HOSPADM

## 2018-01-19 RX ORDER — SODIUM CHLORIDE 0.9 % (FLUSH) 0.9 %
10 SYRINGE (ML) INJECTION AS NEEDED
Status: DISCONTINUED | OUTPATIENT
Start: 2018-01-19 | End: 2018-01-25 | Stop reason: HOSPADM

## 2018-01-19 RX ORDER — CETIRIZINE HYDROCHLORIDE 10 MG/1
5 TABLET ORAL DAILY
Status: DISCONTINUED | OUTPATIENT
Start: 2018-01-19 | End: 2018-01-23

## 2018-01-19 RX ORDER — LEVOFLOXACIN 5 MG/ML
750 INJECTION, SOLUTION INTRAVENOUS ONCE
Status: COMPLETED | OUTPATIENT
Start: 2018-01-19 | End: 2018-01-19

## 2018-01-19 RX ORDER — LATANOPROST 50 UG/ML
1 SOLUTION/ DROPS OPHTHALMIC NIGHTLY
Status: DISCONTINUED | OUTPATIENT
Start: 2018-01-19 | End: 2018-01-25 | Stop reason: HOSPADM

## 2018-01-19 RX ORDER — FERROUS SULFATE 325(65) MG
325 TABLET ORAL
Status: DISCONTINUED | OUTPATIENT
Start: 2018-01-19 | End: 2018-01-25 | Stop reason: HOSPADM

## 2018-01-19 RX ORDER — IPRATROPIUM BROMIDE AND ALBUTEROL SULFATE 2.5; .5 MG/3ML; MG/3ML
3 SOLUTION RESPIRATORY (INHALATION)
Status: DISCONTINUED | OUTPATIENT
Start: 2018-01-19 | End: 2018-01-25 | Stop reason: HOSPADM

## 2018-01-19 RX ORDER — NYSTATIN 100000 [USP'U]/G
POWDER TOPICAL EVERY 12 HOURS SCHEDULED
Status: DISCONTINUED | OUTPATIENT
Start: 2018-01-19 | End: 2018-01-25 | Stop reason: HOSPADM

## 2018-01-19 RX ORDER — NEBIVOLOL 5 MG/1
2.5 TABLET ORAL AS NEEDED
Status: DISCONTINUED | OUTPATIENT
Start: 2018-01-19 | End: 2018-01-25

## 2018-01-19 RX ORDER — FLUCONAZOLE 100 MG/1
100 TABLET ORAL DAILY
Status: COMPLETED | OUTPATIENT
Start: 2018-01-19 | End: 2018-01-23

## 2018-01-19 RX ORDER — CHOLECALCIFEROL (VITAMIN D3) 125 MCG
1000 CAPSULE ORAL DAILY
Status: DISCONTINUED | OUTPATIENT
Start: 2018-01-19 | End: 2018-01-25 | Stop reason: HOSPADM

## 2018-01-19 RX ORDER — LEVOFLOXACIN 5 MG/ML
750 INJECTION, SOLUTION INTRAVENOUS
Status: DISCONTINUED | OUTPATIENT
Start: 2018-01-19 | End: 2018-01-19

## 2018-01-19 RX ORDER — LEVOFLOXACIN 5 MG/ML
500 INJECTION, SOLUTION INTRAVENOUS
Status: COMPLETED | OUTPATIENT
Start: 2018-01-21 | End: 2018-01-23

## 2018-01-19 RX ORDER — FEBUXOSTAT 40 MG/1
80 TABLET, FILM COATED ORAL DAILY
Status: DISCONTINUED | OUTPATIENT
Start: 2018-01-19 | End: 2018-01-24

## 2018-01-19 RX ORDER — FAMOTIDINE 20 MG/1
20 TABLET, FILM COATED ORAL 2 TIMES DAILY
Status: DISCONTINUED | OUTPATIENT
Start: 2018-01-19 | End: 2018-01-24

## 2018-01-19 RX ADMIN — TERAZOSIN HYDROCHLORIDE 5 MG: 5 CAPSULE ORAL at 20:24

## 2018-01-19 RX ADMIN — FAMOTIDINE 20 MG: 20 TABLET, FILM COATED ORAL at 14:26

## 2018-01-19 RX ADMIN — NYSTATIN: 100000 POWDER TOPICAL at 20:24

## 2018-01-19 RX ADMIN — IPRATROPIUM BROMIDE AND ALBUTEROL SULFATE 3 ML: .5; 3 SOLUTION RESPIRATORY (INHALATION) at 20:56

## 2018-01-19 RX ADMIN — FAMOTIDINE 20 MG: 20 TABLET, FILM COATED ORAL at 20:24

## 2018-01-19 RX ADMIN — CYANOCOBALAMIN TAB 500 MCG 500 MCG: 500 TAB at 18:24

## 2018-01-19 RX ADMIN — LATANOPROST 1 DROP: 50 SOLUTION OPHTHALMIC at 20:24

## 2018-01-19 RX ADMIN — FEBUXOSTAT 80 MG: 40 TABLET ORAL at 18:21

## 2018-01-19 RX ADMIN — LEVOFLOXACIN 750 MG: 5 INJECTION, SOLUTION INTRAVENOUS at 03:28

## 2018-01-19 RX ADMIN — FOLIC ACID 1 MG: 1 TABLET ORAL at 18:24

## 2018-01-19 RX ADMIN — HYDRALAZINE HYDROCHLORIDE 100 MG: 50 TABLET, FILM COATED ORAL at 23:16

## 2018-01-19 RX ADMIN — GUAIFENESIN 600 MG: 600 TABLET, EXTENDED RELEASE ORAL at 20:24

## 2018-01-19 RX ADMIN — ASPIRIN 81 MG: 81 TABLET, CHEWABLE ORAL at 14:26

## 2018-01-19 RX ADMIN — SODIUM BICARBONATE 650 MG: 650 TABLET ORAL at 20:24

## 2018-01-19 RX ADMIN — FLUCONAZOLE 100 MG: 100 TABLET ORAL at 18:23

## 2018-01-19 RX ADMIN — HYDRALAZINE HYDROCHLORIDE 100 MG: 50 TABLET, FILM COATED ORAL at 18:21

## 2018-01-19 RX ADMIN — FERROUS SULFATE TAB 325 MG (65 MG ELEMENTAL FE) 325 MG: 325 (65 FE) TAB at 18:22

## 2018-01-19 NOTE — CONSULTS
Referring Provider: Marcellus Osborne MD  Reason for Consultation: Management of patient with advanced to the chronic kidney disease needing dialysis    Subjective     Chief complaint   Chief Complaint   Patient presents with   • Shortness of Breath       History of present illness:    This is a very pleasant 72-year-old  male who was admitted with increasing shortness of breath.  Did not have any chest pain associated with that the patient has history of the stage V chronic kidney disease stage the point having uremic symptoms and hemodialysis was to be initiated early this week as an outpatient but it was postponed at the end ~today but he presented with the above noted symptoms and subsequently was admitted.  He had the upper endoscopy and was found to have fungal esophagitis and some bleeding on 1/17/2018.  Also he had yesterday a fistulogram and angioplasty done.  Was found to have pneumonia and subsequently admitted.  Patient has chronic anemia with pancytopenia suggestive probably of myelodysplastic syndrome he has history of abnormal the electrophoresis.  He has A. fib, Crohn's disease, fatty liver, GERD, glaucoma, prior prior colectomy, history of pericardial effusion, prior history of kidney stone, hypertension, ileostomy.  Obstructive sleep apnea.  He has been having decreased appetite and lethargy the metallic taste to the period and shortness of breath as noted above      Past Medical History:   Diagnosis Date   • Abnormal serum protein electrophoresis    • Anemia     Multifactorial   • Atrial fibrillation    • Chronic kidney disease     STAGE 4   • Chronic leukopenia and thrombocytopenia    • Crohn disease    • Diverticula of colon    • Fatty liver disease, nonalcoholic    • GERD (gastroesophageal reflux disease)    • GI bleed    • Glaucoma    • H/O colectomy    • H/O Pericardial effusion    • Hx of renal calculi    • Hypertension    • Ileostomy present    • MGUS (monoclonal gammopathy of unknown  significance)    • Murmur     FROM RHEUMATIC FEVER AS CHIILD   • Sleep apnea     CPAP USED     Past Surgical History:   Procedure Laterality Date   • APPENDECTOMY     • ARTERIOVENOUS FISTULA/SHUNT SURGERY Left 8/8/2017    Procedure: LEFT BRACHIAL CEPHALIC AV FISTULA FORMATION WITH CEPHALIC VEIN TRANSPOSITION ;  Surgeon: Bill Deal MD;  Location: [facility] MAIN OR;  Service:    • CHOLECYSTECTOMY     • COLECTOMY PARTIAL / TOTAL      History of inflammatory bowel disease with status post colectomy with ileostomy many years ago in the [facility]   • COLON RESECTION WITH COLOSTOMY     • COLON SURGERY     • COLONOSCOPY     • CYSTOSCOPY LITHOLAPAXY BLADDER STONE EXTRACTION     • ENDOSCOPY  01/16/2015    gastritis   • ENDOSCOPY  1/17/2018    Procedure: ESOPHAGOGASTRODUODENOSCOPY;  Surgeon: Warner Neville MD;  Location: [facility] ENDOSCOPY;  Service:    • ILEOSCOPY  01/16/2015    normal   • ILEOSCOPY N/A 1/17/2018    Procedure: ILEOSCOPY;  Surgeon: Warner Neville MD;  Location: [facility] ENDOSCOPY;  Service:      Family History   Problem Relation Age of Onset   • Heart failure Mother    • Hypertension Mother    • Hypertension Father    • Malig Hyperthermia Neg Hx      Negative family history for kidney  Social History   Substance Use Topics   • Smoking status: Never Smoker   • Smokeless tobacco: None   • Alcohol use No     Prescriptions Prior to Admission   Medication Sig Dispense Refill Last Dose   • aspirin 81 MG tablet Take 81 mg by mouth Daily. To stop 5 days prior to surgery   1/17/2018 at Unknown time   • Cyanocobalamin (VITAMIN B-12) 500 MCG sublingual tablet Place 1 tablet under the tongue Daily.   1/15/2018   • doxazosin (CARDURA) 4 MG tablet Take 4 mg by mouth 2 (two) times a day.   1/17/2018 at Unknown time   • epoetin tip (PROCRIT) 80547 UNIT/ML injection Inject  under the skin As Needed.   1/12/2018   • febuxostat (ULORIC) 80 MG tablet tablet Take 80 mg by mouth daily.   1/16/2018 at Unknown time   •  "FERROUS SULFATE PO Take 65 mg by mouth daily.   1/16/2018   • fluconazole (DIFLUCAN) 100 MG tablet Take 1 tablet by mouth Daily. 14 tablet 0    • folic acid (FOLVITE) 1 MG tablet Take 1 mg by mouth daily.   1/16/2018 at Unknown time   • hydrALAZINE (APRESOLINE) 100 MG tablet Take 100 mg by mouth 3 (three) times a day.   1/17/2018 at Unknown time   • latanoprost (XALATAN) 0.005 % ophthalmic solution Administer 1 drop to both eyes Every Night. 1 drop each eye    1/16/2018 at Unknown time   • Loratadine (CLARITIN PO) Take  by mouth As Needed. Pt states he is uncertain of dosage   1/3/2018   • nebivolol (BYSTOLIC) 2.5 MG tablet Take 2.5 mg by mouth As Needed (if heart rate stays above 90; PT HASN'T TAKEN FOR 2 YEARS).   Taking   • ranitidine (ZANTAC) 150 MG tablet Take 150 mg by mouth 2 (Two) Times a Day.   1/16/2018 at Unknown time   • sodium bicarbonate 650 MG tablet Take 650 mg by mouth 2 (Two) Times a Day.   1/16/2018 at Unknown time     Allergies:  Ace inhibitors; Contrast dye; Eliquis [apixaban]; Granix [filgrastim]; Iodinated diagnostic agents; and Keflex [cephalexin]    Review of Systems  14 points review of system was essentially negative other than what noted above in the history of present illness    Objective     Vital Signs  Temp:  [95.4 °F (35.2 °C)-97.8 °F (36.6 °C)] 97.3 °F (36.3 °C)  Heart Rate:  [68-83] 75  Resp:  [18-20] 18  BP: (115-142)/(52-71) 121/60    Flowsheet Rows         First Filed Value    Admission Height  177.8 cm (70\") Documented at 01/19/2018 0037    Admission Weight  90.7 kg (200 lb) Documented at 01/19/2018 0037           I/O this shift:  In: 360 [P.O.:360]  Out: 350 [Urine:350]  I/O last 3 completed shifts:  In: 150 [IV Piggyback:150]  Out: -     Intake/Output Summary (Last 24 hours) at 01/19/18 1359  Last data filed at 01/19/18 1336   Gross per 24 hour   Intake              510 ml   Output              350 ml   Net              160 ml       Physical Exam:  General Appearance: alert, " oriented x 3, no acute distress, Appears to be chronically ill,   Skin: warm and dry, pale  HEENT: pupils round and reactive to light, oral mucosa normal,   Neck: supple, no JVD, trachea midline  Lungs: Bilateral rhonchi, unlabored breathing effort  Heart: Irregularly irregular, no rub  Abdomen: soft, non-tender, functional ileoscopy, present bowel sounds to auscultation  : no palpable bladder  Lymphatics: No cervical or supraclavicular lymphadenopathy  Joints: No significant deformities noted, no crepitation over the knees or the ankles  Extremities: no edema, cyanosis or clubbing, functional AV fistula in the left upper  Neuro: normal speech and mental status    Results Review:  Results for orders placed or performed during the hospital encounter of 01/19/18   Blood Culture - Blood,   Result Value Ref Range    Blood Culture No growth at less than 24 hours    Comprehensive Metabolic Panel   Result Value Ref Range    Glucose 165 (H) 65 - 99 mg/dL     (H) 8 - 23 mg/dL    Creatinine 7.44 (H) 0.76 - 1.27 mg/dL    Sodium 123 (L) 136 - 145 mmol/L    Potassium 4.6 3.5 - 5.2 mmol/L    Chloride 89 (L) 98 - 107 mmol/L    CO2 10.3 (L) 22.0 - 29.0 mmol/L    Calcium 8.1 (L) 8.6 - 10.5 mg/dL    Total Protein 7.0 6.0 - 8.5 g/dL    Albumin 2.80 (L) 3.50 - 5.20 g/dL    ALT (SGPT) 17 1 - 41 U/L    AST (SGOT) 33 1 - 40 U/L    Alkaline Phosphatase 136 (H) 39 - 117 U/L    Total Bilirubin 1.8 (H) 0.1 - 1.2 mg/dL    eGFR Non African Amer 7 (L) >60 mL/min/1.73    Globulin 4.2 gm/dL    A/G Ratio 0.7 g/dL    BUN/Creatinine Ratio 19.0 7.0 - 25.0    Anion Gap 23.7 mmol/L   aPTT   Result Value Ref Range    PTT 39.0 (H) 22.7 - 35.4 seconds   Protime-INR   Result Value Ref Range    Protime 21.3 (H) 11.7 - 14.2 Seconds    INR 1.91 (H) 0.90 - 1.10   Troponin   Result Value Ref Range    Troponin T 0.088 (H) 0.000 - 0.030 ng/mL   BNP   Result Value Ref Range    proBNP 64750.0 (H) 0.0 - 900.0 pg/mL   CBC Auto Differential   Result Value Ref  Range    WBC 8.93 4.50 - 10.70 10*3/mm3    RBC 2.62 (L) 4.60 - 6.00 10*6/mm3    Hemoglobin 7.7 (L) 13.7 - 17.6 g/dL    Hematocrit 22.9 (L) 40.4 - 52.2 %    MCV 87.4 79.8 - 96.2 fL    MCH 29.4 27.0 - 32.7 pg    MCHC 33.6 32.6 - 36.4 g/dL    RDW 17.3 (H) 11.5 - 14.5 %    RDW-SD 55.0 (H) 37.0 - 54.0 fl    MPV 8.6 6.0 - 12.0 fL    Platelets 82 (L) 140 - 500 10*3/mm3    Neutrophil % 92.4 (H) 42.7 - 76.0 %    Lymphocyte % 0.9 (L) 19.6 - 45.3 %    Monocyte % 5.5 5.0 - 12.0 %    Eosinophil % 0.1 (L) 0.3 - 6.2 %    Basophil % 0.1 0.0 - 1.5 %    Immature Grans % 1.0 (H) 0.0 - 0.5 %    Neutrophils, Absolute 8.25 (H) 1.90 - 8.10 10*3/mm3    Lymphocytes, Absolute 0.08 (L) 0.90 - 4.80 10*3/mm3    Monocytes, Absolute 0.49 0.20 - 1.20 10*3/mm3    Eosinophils, Absolute 0.01 0.00 - 0.70 10*3/mm3    Basophils, Absolute 0.01 0.00 - 0.20 10*3/mm3    Immature Grans, Absolute 0.09 (H) 0.00 - 0.03 10*3/mm3   Lactic Acid, Plasma   Result Value Ref Range    Lactate 3.7 (C) 0.5 - 2.0 mmol/L   Procalcitonin   Result Value Ref Range    Procalcitonin 1.69 (C) 0.10 - 0.25 ng/mL   Lactic Acid, Reflex Timer   Result Value Ref Range    Extra Tube Hold for add-ons.    Lactic Acid, Reflex   Result Value Ref Range    Lactate 1.7 0.5 - 2.0 mmol/L   Hepatitis B Surface Antigen   Result Value Ref Range    Hepatitis B Surface Ag Non-Reactive Non-Reactive     Imaging Results (last 72 hours)     Procedure Component Value Units Date/Time    XR Chest 2 View [[id number]] Collected:  01/19/18 0111     Updated:  01/19/18 0116    Narrative:       PA AND LATERAL CHEST X-RAY     HISTORY: soa     COMPARISON: 08/16/2017.     FINDINGS: PA and lateral views of the chest were obtained. There are  extensive airspace opacities diffusely bilaterally, more pronounced in  the upper lung zones compared to the lung bases most likely related to  extensive pneumonia. Stable cardiomegaly. Vascularity appears normal.  There are probable tiny effusions posteriorly.              Impression:       Extensive upper lobe predominant pulmonary opacities likely  related to diffuse pneumonia     This report was finalized on 1/19/2018 1:13 AM by Bill Mccarthy MD.                 aspirin 81 mg Oral Daily   cetirizine 5 mg Oral Daily   famotidine 20 mg Oral BID   febuxostat 80 mg Oral Daily   ferrous sulfate 325 mg Oral Daily With Breakfast   fluconazole 100 mg Oral Daily   folic acid 1 mg Oral Daily   guaiFENesin 600 mg Oral Q12H   hydrALAZINE 100 mg Oral TID   ipratropium-albuterol 3 mL Nebulization Q4H - RT   latanoprost 1 drop Both Eyes Nightly   [START ON 1/21/2018] levoFLOXacin 500 mg Intravenous Q48H   sodium bicarbonate 650 mg Oral BID   terazosin 5 mg Oral Nightly   vitamin B-12 1,000 mcg Oral Daily          Assessment/Plan   1.  Chronic kidney disease stage V now end-stage renal disease with uremic symptoms.  2.  Hypertension reasonably controlled.  3.  Anemia and thrombocytopenia, picture suggestive of myelodysplastic syndrome  4.  Crohn's disease with prior colectomy and currently has an ileostomy  5.  History of nephrolithiasis  6.  Pneumonia      Plan:  1.  We'll plan to initiate dialysis today.  2.  Transfuse the 2 units of packed RBCs when blood is available  3.  Surveillance lab  4.  Outpatient dialysis is already arranged and every Monday, Wednesday and Friday at the [facility] dialysis clinic.    Thank you Dr. Osborne for asking me to see this patient in consultation      I discussed the patients findings and my recommendations with the patient and his wife at the bedside.    Kvng Monsivais MD  01/19/18  1:59 PM

## 2018-01-19 NOTE — H&P
History and physical    Primary care physician  Dr. Saha    Chief complaint  Shortness of breath    History of present illness  Pt is a 72 y.o. male who presents complaining of SOA that began tonight at 21:00. Pt denies CP but complains of generalized fatigue. Pt is scheduled to start dialysis tomorrow. Pt has an EGD on 1/17/18, and was dx with a fungal infection and esophageal bleeding. Pt was started on Diflucan at that time. Pt also had a fistulagram on 1/15/18, and was given Valium at that time. Afterwards, they were informed by the pt's nephrologist that the pt may have a hard time metabolizing the medication. On arrival to ED, pt had room air sat's of 88%.   patient alert oriented denies any productive cough fever chills night sweats or weight loss.  Patient stated that he has gained some weight    PAST MEDICAL HISTORY    • Abnormal serum protein electrophoresis     • Anemia     • Atrial fibrillation     • Chronic kidney disease     • Chronic leukopenia and thrombocytopenia     • Crohn disease     • Diverticula of colon     • Fatty liver disease, nonalcoholic     • GERD (gastroesophageal reflux disease)     • GI bleed     • Glaucoma     • H/O colectomy     • H/O Pericardial effusion     • Hx of renal calculi     • Hypertension     • Ileostomy present     • MGUS (monoclonal gammopathy of unknown significance)     • Murmur     • Sleep apnea           PAST SURGICAL HISTORY   Surgical History          Past Surgical History:   Procedure Laterality Date   • APPENDECTOMY       • ARTERIOVENOUS FISTULA/SHUNT SURGERY Left 8/8/2017     Procedure: LEFT BRACHIAL CEPHALIC AV FISTULA FORMATION WITH CEPHALIC VEIN TRANSPOSITION ;  Surgeon: Bill Deal MD;  Location: [facility];  Service:    • CHOLECYSTECTOMY       • COLECTOMY PARTIAL / TOTAL         History of inflammatory bowel disease with status post colectomy with ileostomy many years ago in the [facility]   • COLON RESECTION WITH COLOSTOMY       •  COLON SURGERY       • COLONOSCOPY       • CYSTOSCOPY LITHOLAPAXY BLADDER STONE EXTRACTION       • ENDOSCOPY   01/16/2015     gastritis   • ENDOSCOPY   1/17/2018     Procedure: ESOPHAGOGASTRODUODENOSCOPY;  Surgeon: Warner Neville MD;  Location: [facility] ENDOSCOPY;  Service:    • ILEOSCOPY   01/16/2015     normal   • ILEOSCOPY N/A 1/17/2018     Procedure: ILEOSCOPY;  Surgeon: Warner Neville MD;  Location: [facility] ENDOSCOPY;  Service:             FAMILY HISTORY        Family History   Problem Relation Age of Onset   • Heart failure Mother     • Hypertension Mother     • Hypertension Father     • Malig Hyperthermia Neg Hx           SOCIAL HISTORY   Social History    Social History            Social History   • Marital status:        Spouse name: Gillian   • Number of children: N/A   • Years of education: College           Occupational History   •   Retired           Social History Main Topics   • Smoking status: Never Smoker   • Smokeless tobacco: Not on file   • Alcohol use No   • Drug use: No   • Sexual activity: Defer           Other Topics Concern   • Not on file      Social History Narrative            ALLERGIES  Ace inhibitors; Contrast dye; Eliquis [apixaban]; Granix [filgrastim]; Iodinated diagnostic agents; and Keflex [cephalexin]    Home medications reviewed     REVIEW OF SYSTEMS  Review of Systems   Constitutional: Positive for fatigue. Negative for chills and fever.   HENT: Negative for congestion and sore throat.    Eyes: Negative.    Respiratory: Positive for shortness of breath. Negative for cough.    Cardiovascular: Negative for chest pain and leg swelling.   Gastrointestinal: Negative for abdominal pain, diarrhea and vomiting.   Genitourinary: Negative for difficulty urinating and dysuria.   Musculoskeletal: Negative for back pain and neck pain.   Skin: Negative for rash and wound.   Allergic/Immunologic: Negative.    Neurological: Negative for dizziness, weakness, numbness and headaches.  "  Psychiatric/Behavioral: Negative.    All other systems reviewed and are negative.     PHYSICAL EXAM  Blood pressure 125/63, pulse 69, temperature 97.3 °F (36.3 °C), temperature source Oral, resp. rate 18, height 177.8 cm (70\"), weight 90.7 kg (200 lb), SpO2 96 %.    Constitutional: He is oriented to person, place, and time and well-developed, well-nourished, and in no distress.   Head: Normocephalic and atraumatic.   Eyes: EOM are normal. Pupils are equal, round, and reactive to light.   Neck: Normal range of motion. Neck supple.   Cardiovascular: Normal heart sounds.  An irregularly irregular rhythm present. Tachycardia present.    Pulmonary/Chest: Effort normal. No respiratory distress. He has rhonchi in the left lower field.   Abdominal: Soft. There is no tenderness. There is no rebound and no guarding.   Musculoskeletal: Normal range of motion. He exhibits no edema.   Neurological: He is alert and oriented to person, place, and time. He has normal sensation and normal strength.   Skin: Skin is warm and dry.   Psychiatric: Mood and affect normal.     LAB RESULTS  Lab Results (last 24 hours)     Procedure Component Value Units Date/Time    CBC & Differential [[id number]] Collected:  01/19/18 0054    Specimen:  Blood Updated:  01/19/18 0112    Narrative:       The following orders were created for panel order CBC & Differential.  Procedure                               Abnormality         Status                     ---------                               -----------         ------                     CBC Auto Differential[[id number]]        Abnormal            Final result                 Please view results for these tests on the individual orders.    CBC Auto Differential [[id number]]  (Abnormal) Collected:  01/19/18 0054    Specimen:  Blood Updated:  01/19/18 0112     WBC 8.93 10*3/mm3      RBC 2.62 (L) 10*6/mm3      Hemoglobin 7.7 (L) g/dL      Hematocrit 22.9 (L) %      MCV 87.4 fL      MCH 29.4 pg      MCHC " 33.6 g/dL      RDW 17.3 (H) %      RDW-SD 55.0 (H) fl      MPV 8.6 fL      Platelets 82 (L) 10*3/mm3      Neutrophil % 92.4 (H) %      Lymphocyte % 0.9 (L) %      Monocyte % 5.5 %      Eosinophil % 0.1 (L) %      Basophil % 0.1 %      Immature Grans % 1.0 (H) %      Neutrophils, Absolute 8.25 (H) 10*3/mm3      Lymphocytes, Absolute 0.08 (L) 10*3/mm3      Monocytes, Absolute 0.49 10*3/mm3      Eosinophils, Absolute 0.01 10*3/mm3      Basophils, Absolute 0.01 10*3/mm3      Immature Grans, Absolute 0.09 (H) 10*3/mm3     aPTT [[id number]]  (Abnormal) Collected:  01/19/18 0054    Specimen:  Blood Updated:  01/19/18 0116     PTT 39.0 (H) seconds     Protime-INR [[id number]]  (Abnormal) Collected:  01/19/18 0054    Specimen:  Blood Updated:  01/19/18 0116     Protime 21.3 (H) Seconds      INR 1.91 (H)    Comprehensive Metabolic Panel [[id number]]  (Abnormal) Collected:  01/19/18 0054    Specimen:  Blood Updated:  01/19/18 0137     Glucose 165 (H) mg/dL       (H) mg/dL      Creatinine 7.44 (H) mg/dL      Sodium 123 (L) mmol/L      Potassium 4.6 mmol/L      Chloride 89 (L) mmol/L      CO2 10.3 (L) mmol/L      Calcium 8.1 (L) mg/dL      Total Protein 7.0 g/dL      Albumin 2.80 (L) g/dL      ALT (SGPT) 17 U/L      AST (SGOT) 33 U/L      Alkaline Phosphatase 136 (H) U/L      Total Bilirubin 1.8 (H) mg/dL      eGFR Non African Amer 7 (L) mL/min/1.73      Globulin 4.2 gm/dL      A/G Ratio 0.7 g/dL      BUN/Creatinine Ratio 19.0     Anion Gap 23.7 mmol/L     Narrative:       The MDRD GFR formula is only valid for adults with stable renal function between ages 18 and 70.    Blood Culture - Blood, [[id number]] Collected:  01/19/18 0120    Specimen:  Blood from Arm, Left Updated:  01/19/18 0138    BNP [[id number]]  (Abnormal) Collected:  01/19/18 0054    Specimen:  Blood Updated:  01/19/18 0141     proBNP 58564.0 (H) pg/mL     Narrative:       Among patients with dyspnea, NT-proBNP is highly sensitive for the detection of acute  "congestive heart failure. In addition NT-proBNP of <300 pg/ml effectively rules out acute congestive heart failure with 99% negative predictive value.    Troponin [[id number]]  (Abnormal) Collected:  01/19/18 0054    Specimen:  Blood Updated:  01/19/18 0142     Troponin T 0.088 (H) ng/mL     Narrative:       Troponin T Reference Ranges:  Less than 0.03 ng/mL:    Negative for AMI  0.03 to 0.09 ng/mL:      Indeterminant for AMI  Greater than 0.09 ng/mL: Positive for AMI    Lactic Acid, Plasma [[id number]]  (Abnormal) Collected:  01/19/18 0120    Specimen:  Blood Updated:  01/19/18 0156     Lactate 3.7 (C) mmol/L     Blood Culture - Blood, [[id number]] Collected:  01/19/18 0134    Specimen:  Blood from Arm, Left Updated:  01/19/18 0205    Procalcitonin [[id number]]  (Abnormal) Collected:  01/19/18 0054    Specimen:  Blood Updated:  01/19/18 0253     Procalcitonin 1.69 (C) ng/mL     Narrative:       As a Marker for Sepsis (Non-Neonates):   1. <0.5 ng/mL represents a low risk of severe sepsis and/or septic shock.  1. >2 ng/mL represents a high risk of severe sepsis and/or septic shock.    As a Marker for Lower Respiratory Tract Infections that require antibiotic therapy:  PCT on Admission     Antibiotic Therapy             6-12 Hrs later  > 0.5                Strongly Recommended            >0.25 - <0.5         Recommended  0.1 - 0.25           Discouraged                   Remeasure/reassess PCT  <0.1                 Strongly Discouraged          Remeasure/reassess PCT      As 28 day mortality risk marker: \"Change in Procalcitonin Result\" (> 80 % or <=80 %) if Day 0 (or Day 1) and Day 4 values are available. Refer to http://www.[employer]-pct-calculator.com/   Change in PCT <=80 %   A decrease of PCT levels below or equal to 80 % defines a positive change in PCT test result representing a higher risk for 28-day all-cause mortality of patients diagnosed with severe sepsis or septic shock.  Change in PCT > 80 %   A decrease of " PCT levels of more than 80 % defines a negative change in PCT result representing a lower risk for 28-day all-cause mortality of patients diagnosed with severe sepsis or septic shock.                Lactic Acid, Reflex Timer [[id number]] Collected:  01/19/18 0120    Specimen:  Blood Updated:  01/19/18 0501     Extra Tube Hold for add-ons.      Auto resulted.       Lactic Acid, Reflex [[id number]]  (Normal) Collected:  01/19/18 0534    Specimen:  Blood from Arm, Right Updated:  01/19/18 0557     Lactate 1.7 mmol/L         Imaging Results (last 24 hours)     Procedure Component Value Units Date/Time    XR Chest 2 View [[id number]] Collected:  01/19/18 0111     Updated:  01/19/18 0116    Narrative:       PA AND LATERAL CHEST X-RAY     HISTORY: soa     COMPARISON: 08/16/2017.     FINDINGS: PA and lateral views of the chest were obtained. There are  extensive airspace opacities diffusely bilaterally, more pronounced in  the upper lung zones compared to the lung bases most likely related to  extensive pneumonia. Stable cardiomegaly. Vascularity appears normal.  There are probable tiny effusions posteriorly.             Impression:       Extensive upper lobe predominant pulmonary opacities likely  related to diffuse pneumonia     This report was finalized on 1/19/2018 1:13 AM by Bill Mccarthy MD.                EKG                                                  Rhythm/Rate: Afib 71  P waves and FL: absent   QRS, axis: nml   ST and T waves: nml       Current Facility-Administered Medications:   •  aspirin chewable tablet 81 mg, 81 mg, Oral, Daily, Marcellus Osborne MD  •  cetirizine (zyrTEC) tablet 5 mg, 5 mg, Oral, Daily, Marcellus Osborne MD  •  famotidine (PEPCID) tablet 20 mg, 20 mg, Oral, BID, Marcellus Osborne MD  •  febuxostat (ULORIC) tablet 80 mg, 80 mg, Oral, Daily, Marcellus Osborne MD  •  ferrous sulfate tablet 325 mg, 325 mg, Oral, Daily With Breakfast, Marcellus Osborne MD  •  fluconazole (DIFLUCAN) tablet 100 mg, 100 mg, Oral, Daily,  Lucinda Osborne MD  •  folic acid (FOLVITE) tablet 1 mg, 1 mg, Oral, Daily, Lucinda Osborne MD  •  guaiFENesin (MUCINEX) 12 hr tablet 600 mg, 600 mg, Oral, Q12H, Lucinda Osobrne MD  •  hydrALAZINE (APRESOLINE) tablet 100 mg, 100 mg, Oral, TID, Lucinda Osborne MD  •  ipratropium-albuterol (DUO-NEB) nebulizer solution 3 mL, 3 mL, Nebulization, Q4H - RT, Lucinda Osborne MD  •  latanoprost (XALATAN) 0.005 % ophthalmic solution 1 drop, 1 drop, Both Eyes, Nightly, Lucinda Osborne MD  •  levoFLOXacin (LEVAQUIN) 750 mg/150 mL D5W (premix) (LEVAQUIN) 750 mg, 750 mg, Intravenous, Q48H, Lucinda Osborne MD  •  nebivolol (BYSTOLIC) tablet 2.5 mg, 2.5 mg, Oral, PRN, Lucinda Osborne MD  •  sodium bicarbonate tablet 650 mg, 650 mg, Oral, BID, Lucinda Osborne MD  •  Insert peripheral IV, , , Once **AND** sodium chloride 0.9 % flush 10 mL, 10 mL, Intravenous, PRN, Rick Pisano MD  •  terazosin (HYTRIN) capsule 5 mg, 5 mg, Oral, Nightly, Lucinda Osborne MD  •  vitamin B-12 (CYANOCOBALAMIN) tablet 1,000 mcg, 1,000 mcg, Oral, Daily, Lucinda Osborne MD     ASSESSMENT  Bilateral pneumonia with sepsis  Chronic kidney disease getting close to end-stage renal disease start on hemodialysis on this admission  Chronic anemia  Hypertension  Gastroesophageal reflux disease  Candida esophagitis  COPD  BPH    PLAN  Admit  Start supplement oxygen nebulizer IV antibiotics  Continue home medications including Diflucan  Nephrology consult to start hemodialysis  Supportive care  Stress ulcer DVT prophylaxis  Check viral panel  Follow closely further recommendation according to hospital course    LUCINDA OSBORNE MD

## 2018-01-19 NOTE — ED PROVIDER NOTES
EMERGENCY DEPARTMENT ENCOUNTER    CHIEF COMPLAINT  Chief Complaint: Shortness of air   History given by: patient, family   History limited by: n/a  Room Number: 12/12  PMD: MD Dr Evan Peraza, nephrology  Dr Alex, CBC    HPI:  Pt is a 72 y.o. male who presents complaining of SOA that began tonight at 21:00. Pt denies CP but complains of generalized fatigue. Pt is scheduled to start dialysis tomorrow. Pt has an EGD on 1/17/18, and was dx with a fungal infection and esophageal bleeding. Pt was started on Diflucan at that time. Pt also had a fistulagram on 1/15/18, and was given Valium at that time. Afterwards, they were informed by the pt's nephrologist that the pt may have a hard time metabolizing the medication. On arrival to ED, pt had room air sat's of 88%.      Duration:  5 hours  Onset: gradual  Timing: constant   Location: respiratory   Radiation: none  Quality: SOA  Intensity/Severity: moderate  Progression: unchanged   Associated Symptoms: fatigue   Aggravating Factors: none  Alleviating Factors: none  Treatment before arrival: none    PAST MEDICAL HISTORY  Active Ambulatory Problems     Diagnosis Date Noted   • Anemia due to stage 3 (moderate) chronic renal failure treated with erythropoietin 05/02/2016   • Persistent atrial fibrillation 05/02/2016   • Kidney disease, chronic, stage II (mild, EGFR 60+ ml/min) 05/02/2016   • Thrombocytopenia 05/02/2016   • Chronic leukopenia 05/02/2016   • Cirrhosis of liver without ascites 07/24/2017   • Congestive splenomegaly 07/24/2017   • Kidney disease, chronic, stage IV (GFR 15-29 ml/min) 08/08/2017   • Anemia due to stage 4 chronic kidney disease treated with erythropoietin 10/04/2017   • Anemia 01/12/2018   • Esophageal abnormality 01/18/2018     Resolved Ambulatory Problems     Diagnosis Date Noted   • No Resolved Ambulatory Problems     Past Medical History:   Diagnosis Date   • Abnormal serum protein electrophoresis    • Anemia    • Atrial  fibrillation    • Chronic kidney disease    • Chronic leukopenia and thrombocytopenia    • Crohn disease    • Diverticula of colon    • Fatty liver disease, nonalcoholic    • GERD (gastroesophageal reflux disease)    • GI bleed    • Glaucoma    • H/O colectomy    • H/O Pericardial effusion    • Hx of renal calculi    • Hypertension    • Ileostomy present    • MGUS (monoclonal gammopathy of unknown significance)    • Murmur    • Sleep apnea        PAST SURGICAL HISTORY  Past Surgical History:   Procedure Laterality Date   • APPENDECTOMY     • ARTERIOVENOUS FISTULA/SHUNT SURGERY Left 8/8/2017    Procedure: LEFT BRACHIAL CEPHALIC AV FISTULA FORMATION WITH CEPHALIC VEIN TRANSPOSITION ;  Surgeon: Bill Deal MD;  Location: [facility] OR;  Service:    • CHOLECYSTECTOMY     • COLECTOMY PARTIAL / TOTAL      History of inflammatory bowel disease with status post colectomy with ileostomy many years ago in the [facility]   • COLON RESECTION WITH COLOSTOMY     • COLON SURGERY     • COLONOSCOPY     • CYSTOSCOPY LITHOLAPAXY BLADDER STONE EXTRACTION     • ENDOSCOPY  01/16/2015    gastritis   • ENDOSCOPY  1/17/2018    Procedure: ESOPHAGOGASTRODUODENOSCOPY;  Surgeon: Warner Neville MD;  Location: [facility] ENDOSCOPY;  Service:    • ILEOSCOPY  01/16/2015    normal   • ILEOSCOPY N/A 1/17/2018    Procedure: ILEOSCOPY;  Surgeon: Warner Neville MD;  Location: [facility] ENDOSCOPY;  Service:        FAMILY HISTORY  Family History   Problem Relation Age of Onset   • Heart failure Mother    • Hypertension Mother    • Hypertension Father    • Malig Hyperthermia Neg Hx        SOCIAL HISTORY  Social History     Social History   • Marital status:      Spouse name: Gillian   • Number of children: N/A   • Years of education: College     Occupational History   •  Retired     Social History Main Topics   • Smoking status: Never Smoker   • Smokeless tobacco: Not on file   • Alcohol use No   • Drug use: No   • Sexual activity: Defer      Other Topics Concern   • Not on file     Social History Narrative       ALLERGIES  Ace inhibitors; Contrast dye; Eliquis [apixaban]; Granix [filgrastim]; Iodinated diagnostic agents; and Keflex [cephalexin]    REVIEW OF SYSTEMS  Review of Systems   Constitutional: Positive for fatigue. Negative for chills and fever.   HENT: Negative for congestion and sore throat.    Eyes: Negative.    Respiratory: Positive for shortness of breath. Negative for cough.    Cardiovascular: Negative for chest pain and leg swelling.   Gastrointestinal: Negative for abdominal pain, diarrhea and vomiting.   Genitourinary: Negative for difficulty urinating and dysuria.   Musculoskeletal: Negative for back pain and neck pain.   Skin: Negative for rash and wound.   Allergic/Immunologic: Negative.    Neurological: Negative for dizziness, weakness, numbness and headaches.   Psychiatric/Behavioral: Negative.    All other systems reviewed and are negative.      PHYSICAL EXAM  ED Triage Vitals   Temp Heart Rate Resp BP SpO2   01/19/18 0037 01/19/18 0037 01/19/18 0037 01/19/18 0050 01/19/18 0037   95.4 °F (35.2 °C) 83 20 128/55 90 %      Temp src Heart Rate Source Patient Position BP Location FiO2 (%)   01/19/18 0037 -- 01/19/18 0050 01/19/18 0050 --   Tympanic  Lying Right arm        Physical Exam   Constitutional: He is oriented to person, place, and time and well-developed, well-nourished, and in no distress.   HENT:   Head: Normocephalic and atraumatic.   Eyes: EOM are normal. Pupils are equal, round, and reactive to light.   Neck: Normal range of motion. Neck supple.   Cardiovascular: Normal heart sounds.  An irregularly irregular rhythm present. Tachycardia present.    Pulmonary/Chest: Effort normal. No respiratory distress. He has rhonchi in the left lower field.   Abdominal: Soft. There is no tenderness. There is no rebound and no guarding.   Musculoskeletal: Normal range of motion. He exhibits no edema.   Neurological: He is alert and  oriented to person, place, and time. He has normal sensation and normal strength.   Skin: Skin is warm and dry.   Psychiatric: Mood and affect normal.   Nursing note and vitals reviewed.      LAB RESULTS  Lab Results (last 24 hours)     Procedure Component Value Units Date/Time    CBC & Differential [[id number]] Collected:  01/19/18 0054    Specimen:  Blood Updated:  01/19/18 0112    Narrative:       The following orders were created for panel order CBC & Differential.  Procedure                               Abnormality         Status                     ---------                               -----------         ------                     CBC Auto Differential[[id number]]        Abnormal            Final result                 Please view results for these tests on the individual orders.    Comprehensive Metabolic Panel [[id number]]  (Abnormal) Collected:  01/19/18 0054    Specimen:  Blood Updated:  01/19/18 0137     Glucose 165 (H) mg/dL       (H) mg/dL      Creatinine 7.44 (H) mg/dL      Sodium 123 (L) mmol/L      Potassium 4.6 mmol/L      Chloride 89 (L) mmol/L      CO2 10.3 (L) mmol/L      Calcium 8.1 (L) mg/dL      Total Protein 7.0 g/dL      Albumin 2.80 (L) g/dL      ALT (SGPT) 17 U/L      AST (SGOT) 33 U/L      Alkaline Phosphatase 136 (H) U/L      Total Bilirubin 1.8 (H) mg/dL      eGFR Non African Amer 7 (L) mL/min/1.73      Globulin 4.2 gm/dL      A/G Ratio 0.7 g/dL      BUN/Creatinine Ratio 19.0     Anion Gap 23.7 mmol/L     Narrative:       The MDRD GFR formula is only valid for adults with stable renal function between ages 18 and 70.    aPTT [[id number]]  (Abnormal) Collected:  01/19/18 0054    Specimen:  Blood Updated:  01/19/18 0116     PTT 39.0 (H) seconds     Protime-INR [[id number]]  (Abnormal) Collected:  01/19/18 0054    Specimen:  Blood Updated:  01/19/18 0116     Protime 21.3 (H) Seconds      INR 1.91 (H)    Troponin [[id number]]  (Abnormal) Collected:  01/19/18 0054    Specimen:  Blood  Updated:  01/19/18 0142     Troponin T 0.088 (H) ng/mL     Narrative:       Troponin T Reference Ranges:  Less than 0.03 ng/mL:    Negative for AMI  0.03 to 0.09 ng/mL:      Indeterminant for AMI  Greater than 0.09 ng/mL: Positive for AMI    BNP [[id number]]  (Abnormal) Collected:  01/19/18 0054    Specimen:  Blood Updated:  01/19/18 0141     proBNP 92250.0 (H) pg/mL     Narrative:       Among patients with dyspnea, NT-proBNP is highly sensitive for the detection of acute congestive heart failure. In addition NT-proBNP of <300 pg/ml effectively rules out acute congestive heart failure with 99% negative predictive value.    CBC Auto Differential [[id number]]  (Abnormal) Collected:  01/19/18 0054    Specimen:  Blood Updated:  01/19/18 0112     WBC 8.93 10*3/mm3      RBC 2.62 (L) 10*6/mm3      Hemoglobin 7.7 (L) g/dL      Hematocrit 22.9 (L) %      MCV 87.4 fL      MCH 29.4 pg      MCHC 33.6 g/dL      RDW 17.3 (H) %      RDW-SD 55.0 (H) fl      MPV 8.6 fL      Platelets 82 (L) 10*3/mm3      Neutrophil % 92.4 (H) %      Lymphocyte % 0.9 (L) %      Monocyte % 5.5 %      Eosinophil % 0.1 (L) %      Basophil % 0.1 %      Immature Grans % 1.0 (H) %      Neutrophils, Absolute 8.25 (H) 10*3/mm3      Lymphocytes, Absolute 0.08 (L) 10*3/mm3      Monocytes, Absolute 0.49 10*3/mm3      Eosinophils, Absolute 0.01 10*3/mm3      Basophils, Absolute 0.01 10*3/mm3      Immature Grans, Absolute 0.09 (H) 10*3/mm3     Procalcitonin [[id number]]  (Abnormal) Collected:  01/19/18 0054    Specimen:  Blood Updated:  01/19/18 0253     Procalcitonin 1.69 (C) ng/mL     Narrative:       As a Marker for Sepsis (Non-Neonates):   1. <0.5 ng/mL represents a low risk of severe sepsis and/or septic shock.  1. >2 ng/mL represents a high risk of severe sepsis and/or septic shock.    As a Marker for Lower Respiratory Tract Infections that require antibiotic therapy:  PCT on Admission     Antibiotic Therapy             6-12 Hrs later  > 0.5                 "Strongly Recommended            >0.25 - <0.5         Recommended  0.1 - 0.25           Discouraged                   Remeasure/reassess PCT  <0.1                 Strongly Discouraged          Remeasure/reassess PCT      As 28 day mortality risk marker: \"Change in Procalcitonin Result\" (> 80 % or <=80 %) if Day 0 (or Day 1) and Day 4 values are available. Refer to http://[email or web address]The Backscratcherspct-calculator.com/   Change in PCT <=80 %   A decrease of PCT levels below or equal to 80 % defines a positive change in PCT test result representing a higher risk for 28-day all-cause mortality of patients diagnosed with severe sepsis or septic shock.  Change in PCT > 80 %   A decrease of PCT levels of more than 80 % defines a negative change in PCT result representing a lower risk for 28-day all-cause mortality of patients diagnosed with severe sepsis or septic shock.                Blood Culture - Blood, [[id number]] Collected:  01/19/18 0120    Specimen:  Blood from Arm, Left Updated:  01/19/18 0138    Lactic Acid, Plasma [[id number]]  (Abnormal) Collected:  01/19/18 0120    Specimen:  Blood Updated:  01/19/18 0156     Lactate 3.7 (C) mmol/L     Lactic Acid, Reflex Timer [[id number]] Collected:  01/19/18 0120    Specimen:  Blood Updated:  01/19/18 0501     Extra Tube Hold for add-ons.      Auto resulted.       Blood Culture - Blood, [[id number]] Collected:  01/19/18 0134    Specimen:  Blood from Arm, Left Updated:  01/19/18 0205    Lactic Acid, Reflex [[id number]] Collected:  01/19/18 0534    Specimen:  Blood from Arm, Right Updated:  01/19/18 0541          I ordered the above labs and reviewed the results    RADIOLOGY  XR Chest 2 View   Final Result   Extensive upper lobe predominant pulmonary opacities likely   related to diffuse pneumonia       This report was finalized on 1/19/2018 1:13 AM by Bill Mccarthy MD.               I ordered the above noted radiological studies. Interpreted by radiologist. Reviewed by me in PACS. "       PROCEDURES  Procedures    EKG           EKG time: 03:13  Rhythm/Rate: Afib 71  P waves and NH: absent   QRS, axis: nml   ST and T waves: nml     Interpreted Contemporaneously by me, independently viewed  Unchanged compared to prior 8/16/17    PROGRESS AND CONSULTS  ED Course     00:48  BNP, troponin, pt/inr, APTT, CMP, CBC, CXR, and EKG ordered.     01;15  BP- 128/55 HR- 77 Temp- 95.4 °F (35.2 °C) (Tympanic) O2 sat- 93% on room air  Advised pt of the plan for additional labs. Informed pt and family of the need for admission. Pt understands and agrees with the plan, all questions answered.    01:19  Procalcitonin, lactic acid, and blood cultures ordered.     01:52  BP- 128/55 HR- 77 Temp- 95.4 °F (35.2 °C) (Tympanic) O2 sat- 93%  Rechecked the patient who is in NAD and is resting comfortably. Advised pt and family that the CXR shows pneumonia. Informed them of the plan for admission. Pt understands and agrees with the plan, all questions answered.    03:18  Call placed to [facility] for admission. Levaquin ordered for abx coverage.     04:15  Discussed pt's case with Dr Osborne ([facility]), who agrees to admit the pt.     MEDICAL DECISION MAKING  Results were reviewed/discussed with the patient and they were also made aware of online access. Pt also made aware that some labs, such as cultures, will not be resulted during ER visit and follow up with PMD is necessary.     MDM  Number of Diagnoses or Management Options  Anemia in chronic kidney disease, unspecified CKD stage:   Chronic kidney disease, unspecified CKD stage:   Pneumonia of both lungs due to infectious organism, unspecified part of lung:      Amount and/or Complexity of Data Reviewed  Clinical lab tests: reviewed and ordered (Lactate=3.7, Hgb=7.7, pro calcitonin=1.69)  Tests in the radiology section of CPT®: ordered and reviewed (CXR shows extensive upper lobe predominant pulmonary opacities likely related to diffuse pneumonia)  Tests in the medicine section  of CPT®: ordered and reviewed (See EKG procedure note. )  Decide to obtain previous medical records or to obtain history from someone other than the patient: yes  Review and summarize past medical records: yes (Hgb was 8.0 on 1/12/18. Pt underwent EGD on 1/17/18 secondary to anemia. )  Discuss the patient with other providers: yes (Dr Osborne, [facility])    Patient Progress  Patient progress: stable         DIAGNOSIS  Final diagnoses:   Anemia in chronic kidney disease, unspecified CKD stage   Chronic kidney disease, unspecified CKD stage   Pneumonia of both lungs due to infectious organism, unspecified part of lung       DISPOSITION  ADMISSION    Discussed treatment plan and reason for admission with pt/family and admitting physician.  Pt/family voiced understanding of the plan for admission for further testing/treatment as needed.     Latest Documented Vital Signs:  As of 5:54 AM  BP- 122/52 HR- 76 Temp- 95.8 °F (35.4 °C) (Oral) O2 sat- 93%    --  Documentation assistance provided by best Mckeon for Dr Pisano.  Information recorded by the scribe was done at my direction and has been verified and validated by me.          Brianne Mckeon  01/19/18 9770       Rick Pisano MD  01/19/18 1600

## 2018-01-19 NOTE — ED NOTES
Pt complains of SOA that started 1/18/18 @ 2100. Pt has been more fatigued and confused since his EGD and Ileoscopy on 1/17/18. Pt is scheduled to start Dialysis Friday, 1/19/2018 at 1700. Testing showed bleeding in his esophagus along with a fungal infection. He was placed on Diflucan generic med which he started on 1/18/18 at 1900.     Pt has had a fistula placed since August - fistulagram was performed 1/15/18  to assist with his Dialysis. Dr Gordon informed family that he maybe should not have had the Valium because he would have a hard time metabolizing it.       Abbey Pisano, RN  01/19/18 0114

## 2018-01-19 NOTE — NURSING NOTE
01/19/18 1345   Ileostomy ileostomy   No Placement Date or Time found.   Ileostomy Type: (c) ileostomy   Stoma Appearance round;moist;red;protruding above skin level;other (see comments)  (patient has a large stoma)   Appliance 2-piece;drainable pouch;intact;changed;per protocol/policy   Accessories/Skin Care cleansed with water   Stoma Function stool   Stool Color brown   Peristomal Skin pink;ulcerated;other (see comments)  (ulcer at 9oclock- filled with stomahesive powder)   Tolerance no signs/symptoms of discomfort   Stool output (mL) 100     CWOCN consult. Changed appliance per patient and wife's request. They wanted an ostomy RN to assess pouching and system. I don't see anything they are doing wrong. Stoma is large but healthy. No change in recommendations at this time.

## 2018-01-20 LAB
ALBUMIN SERPL-MCNC: 2.3 G/DL (ref 3.5–5.2)
ALBUMIN/GLOB SERPL: 0.6 G/DL
ALP SERPL-CCNC: 122 U/L (ref 39–117)
ALT SERPL W P-5'-P-CCNC: 20 U/L (ref 1–41)
ANION GAP SERPL CALCULATED.3IONS-SCNC: 19.7 MMOL/L
AST SERPL-CCNC: 36 U/L (ref 1–40)
B PERT DNA SPEC QL NAA+PROBE: NOT DETECTED
BASOPHILS # BLD AUTO: 0 10*3/MM3 (ref 0–0.2)
BASOPHILS NFR BLD AUTO: 0 % (ref 0–1.5)
BILIRUB SERPL-MCNC: 2 MG/DL (ref 0.1–1.2)
BUN BLD-MCNC: 109 MG/DL (ref 8–23)
BUN/CREAT SERPL: 20.7 (ref 7–25)
C PNEUM DNA NPH QL NAA+NON-PROBE: NOT DETECTED
CALCIUM SPEC-SCNC: 7.9 MG/DL (ref 8.6–10.5)
CHLORIDE SERPL-SCNC: 91 MMOL/L (ref 98–107)
CHOLEST SERPL-MCNC: 67 MG/DL (ref 0–200)
CO2 SERPL-SCNC: 14.3 MMOL/L (ref 22–29)
CREAT BLD-MCNC: 5.27 MG/DL (ref 0.76–1.27)
DEPRECATED RDW RBC AUTO: 53.2 FL (ref 37–54)
EOSINOPHIL # BLD AUTO: 0.02 10*3/MM3 (ref 0–0.7)
EOSINOPHIL NFR BLD AUTO: 0.3 % (ref 0.3–6.2)
ERYTHROCYTE [DISTWIDTH] IN BLOOD BY AUTOMATED COUNT: 17.1 % (ref 11.5–14.5)
FLUAV H1 2009 PAND RNA NPH QL NAA+PROBE: NOT DETECTED
FLUAV H1 HA GENE NPH QL NAA+PROBE: NOT DETECTED
FLUAV H3 RNA NPH QL NAA+PROBE: NOT DETECTED
FLUAV SUBTYP SPEC NAA+PROBE: NOT DETECTED
FLUBV RNA ISLT QL NAA+PROBE: NOT DETECTED
GFR SERPL CREATININE-BSD FRML MDRD: 11 ML/MIN/1.73
GLOBULIN UR ELPH-MCNC: 4.1 GM/DL
GLUCOSE BLD-MCNC: 99 MG/DL (ref 65–99)
HADV DNA SPEC NAA+PROBE: NOT DETECTED
HBA1C MFR BLD: 4.8 % (ref 4.8–5.6)
HCOV 229E RNA SPEC QL NAA+PROBE: NOT DETECTED
HCOV HKU1 RNA SPEC QL NAA+PROBE: NOT DETECTED
HCOV NL63 RNA SPEC QL NAA+PROBE: NOT DETECTED
HCOV OC43 RNA SPEC QL NAA+PROBE: NOT DETECTED
HCT VFR BLD AUTO: 18.5 % (ref 40.4–52.2)
HDLC SERPL-MCNC: 28 MG/DL (ref 40–60)
HGB BLD-MCNC: 6.4 G/DL (ref 13.7–17.6)
HMPV RNA NPH QL NAA+NON-PROBE: NOT DETECTED
HPIV1 RNA SPEC QL NAA+PROBE: NOT DETECTED
HPIV2 RNA SPEC QL NAA+PROBE: NOT DETECTED
HPIV3 RNA NPH QL NAA+PROBE: NOT DETECTED
HPIV4 P GENE NPH QL NAA+PROBE: NOT DETECTED
IMM GRANULOCYTES # BLD: 0.05 10*3/MM3 (ref 0–0.03)
IMM GRANULOCYTES NFR BLD: 0.8 % (ref 0–0.5)
LDLC SERPL CALC-MCNC: 31 MG/DL (ref 0–100)
LDLC/HDLC SERPL: 1.09 {RATIO}
LYMPHOCYTES # BLD AUTO: 0.23 10*3/MM3 (ref 0.9–4.8)
LYMPHOCYTES NFR BLD AUTO: 3.8 % (ref 19.6–45.3)
M PNEUMO IGG SER IA-ACNC: NOT DETECTED
MCH RBC QN AUTO: 29.4 PG (ref 27–32.7)
MCHC RBC AUTO-ENTMCNC: 34.6 G/DL (ref 32.6–36.4)
MCV RBC AUTO: 84.9 FL (ref 79.8–96.2)
MONOCYTES # BLD AUTO: 0.3 10*3/MM3 (ref 0.2–1.2)
MONOCYTES NFR BLD AUTO: 5 % (ref 5–12)
NEUTROPHILS # BLD AUTO: 5.46 10*3/MM3 (ref 1.9–8.1)
NEUTROPHILS NFR BLD AUTO: 90.1 % (ref 42.7–76)
NT-PROBNP SERPL-MCNC: ABNORMAL PG/ML (ref 5–900)
PHOSPHATE SERPL-MCNC: 7.2 MG/DL (ref 2.5–4.5)
PLATELET # BLD AUTO: 80 10*3/MM3 (ref 140–500)
PMV BLD AUTO: 8.7 FL (ref 6–12)
POTASSIUM BLD-SCNC: 4 MMOL/L (ref 3.5–5.2)
PROT SERPL-MCNC: 6.4 G/DL (ref 6–8.5)
RBC # BLD AUTO: 2.18 10*6/MM3 (ref 4.6–6)
RHINOVIRUS RNA SPEC NAA+PROBE: NOT DETECTED
RSV RNA NPH QL NAA+NON-PROBE: NOT DETECTED
SODIUM BLD-SCNC: 125 MMOL/L (ref 136–145)
TRIGL SERPL-MCNC: 42 MG/DL (ref 0–150)
TSH SERPL DL<=0.05 MIU/L-ACNC: 2.14 MIU/ML (ref 0.27–4.2)
VLDLC SERPL-MCNC: 8.4 MG/DL (ref 5–40)
WBC NRBC COR # BLD: 6.06 10*3/MM3 (ref 4.5–10.7)

## 2018-01-20 PROCEDURE — 84443 ASSAY THYROID STIM HORMONE: CPT | Performed by: HOSPITALIST

## 2018-01-20 PROCEDURE — 5A1D70Z PERFORMANCE OF URINARY FILTRATION, INTERMITTENT, LESS THAN 6 HOURS PER DAY: ICD-10-PCS | Performed by: INTERNAL MEDICINE

## 2018-01-20 PROCEDURE — 80053 COMPREHEN METABOLIC PANEL: CPT | Performed by: HOSPITALIST

## 2018-01-20 PROCEDURE — 94799 UNLISTED PULMONARY SVC/PX: CPT

## 2018-01-20 PROCEDURE — 80061 LIPID PANEL: CPT | Performed by: HOSPITALIST

## 2018-01-20 PROCEDURE — P9016 RBC LEUKOCYTES REDUCED: HCPCS

## 2018-01-20 PROCEDURE — 83036 HEMOGLOBIN GLYCOSYLATED A1C: CPT | Performed by: HOSPITALIST

## 2018-01-20 PROCEDURE — 84100 ASSAY OF PHOSPHORUS: CPT | Performed by: INTERNAL MEDICINE

## 2018-01-20 PROCEDURE — 85025 COMPLETE CBC W/AUTO DIFF WBC: CPT | Performed by: INTERNAL MEDICINE

## 2018-01-20 PROCEDURE — 86900 BLOOD TYPING SEROLOGIC ABO: CPT

## 2018-01-20 PROCEDURE — 83880 ASSAY OF NATRIURETIC PEPTIDE: CPT | Performed by: HOSPITALIST

## 2018-01-20 RX ADMIN — CYANOCOBALAMIN TAB 500 MCG 1000 MCG: 500 TAB at 08:44

## 2018-01-20 RX ADMIN — ASPIRIN 81 MG: 81 TABLET ORAL at 08:44

## 2018-01-20 RX ADMIN — FOLIC ACID 1 MG: 1 TABLET ORAL at 08:44

## 2018-01-20 RX ADMIN — GUAIFENESIN 600 MG: 600 TABLET, EXTENDED RELEASE ORAL at 08:44

## 2018-01-20 RX ADMIN — SODIUM BICARBONATE 650 MG: 650 TABLET ORAL at 20:41

## 2018-01-20 RX ADMIN — IPRATROPIUM BROMIDE AND ALBUTEROL SULFATE 3 ML: .5; 3 SOLUTION RESPIRATORY (INHALATION) at 19:19

## 2018-01-20 RX ADMIN — NYSTATIN: 100000 POWDER TOPICAL at 20:42

## 2018-01-20 RX ADMIN — NYSTATIN: 100000 POWDER TOPICAL at 08:45

## 2018-01-20 RX ADMIN — FEBUXOSTAT 80 MG: 40 TABLET ORAL at 08:44

## 2018-01-20 RX ADMIN — HYDRALAZINE HYDROCHLORIDE 100 MG: 50 TABLET, FILM COATED ORAL at 18:40

## 2018-01-20 RX ADMIN — FLUCONAZOLE 100 MG: 100 TABLET ORAL at 08:44

## 2018-01-20 RX ADMIN — IPRATROPIUM BROMIDE AND ALBUTEROL SULFATE 3 ML: .5; 3 SOLUTION RESPIRATORY (INHALATION) at 08:07

## 2018-01-20 RX ADMIN — FAMOTIDINE 20 MG: 20 TABLET, FILM COATED ORAL at 08:45

## 2018-01-20 RX ADMIN — IPRATROPIUM BROMIDE AND ALBUTEROL SULFATE 3 ML: .5; 3 SOLUTION RESPIRATORY (INHALATION) at 00:25

## 2018-01-20 RX ADMIN — SODIUM BICARBONATE 650 MG: 650 TABLET ORAL at 08:44

## 2018-01-20 RX ADMIN — FAMOTIDINE 20 MG: 20 TABLET, FILM COATED ORAL at 20:41

## 2018-01-20 RX ADMIN — GUAIFENESIN 600 MG: 600 TABLET, EXTENDED RELEASE ORAL at 20:41

## 2018-01-20 RX ADMIN — IPRATROPIUM BROMIDE AND ALBUTEROL SULFATE 3 ML: .5; 3 SOLUTION RESPIRATORY (INHALATION) at 03:49

## 2018-01-20 RX ADMIN — TERAZOSIN HYDROCHLORIDE 5 MG: 5 CAPSULE ORAL at 20:41

## 2018-01-20 RX ADMIN — IPRATROPIUM BROMIDE AND ALBUTEROL SULFATE 3 ML: .5; 3 SOLUTION RESPIRATORY (INHALATION) at 11:32

## 2018-01-20 RX ADMIN — LATANOPROST 1 DROP: 50 SOLUTION OPHTHALMIC at 20:41

## 2018-01-20 RX ADMIN — FERROUS SULFATE TAB 325 MG (65 MG ELEMENTAL FE) 325 MG: 325 (65 FE) TAB at 08:52

## 2018-01-20 NOTE — PLAN OF CARE
Problem: Patient Care Overview (Adult)  Goal: Plan of Care Review  Outcome: Ongoing (interventions implemented as appropriate)      Problem: Respiratory Insufficiency (Adult)  Goal: Identify Related Risk Factors and Signs and Symptoms  Outcome: Ongoing (interventions implemented as appropriate)      Problem: Fall Risk (Adult)  Goal: Identify Related Risk Factors and Signs and Symptoms  Outcome: Ongoing (interventions implemented as appropriate)    Goal: Absence of Falls  Outcome: Ongoing (interventions implemented as appropriate)

## 2018-01-20 NOTE — PLAN OF CARE
Problem: Patient Care Overview (Adult)  Goal: Plan of Care Review  Outcome: Ongoing (interventions implemented as appropriate)   01/19/18 2100 01/20/18 0022   Coping/Psychosocial Response Interventions   Plan Of Care Reviewed With --  patient   Patient Care Overview   Progress progress toward functional goals as expected --    Outcome Evaluation   Outcome Summary/Follow up Plan --  Pt has no complaints of pain. Pt has kept ice on dialysis shunt site where it inflitrated during dialysis today. Wound care nurse saw pt today for ileostomy. Bag was changed and has been emptied several times on my shift. VSS. Continue to monitor. Safety maintained.        Problem: Respiratory Insufficiency (Adult)  Goal: Identify Related Risk Factors and Signs and Symptoms  Outcome: Outcome(s) achieved Date Met: 01/20/18    Goal: Acid/Base Balance  Outcome: Ongoing (interventions implemented as appropriate)    Goal: Effective Ventilation  Outcome: Ongoing (interventions implemented as appropriate)      Problem: Fall Risk (Adult)  Goal: Identify Related Risk Factors and Signs and Symptoms  Outcome: Outcome(s) achieved Date Met: 01/20/18    Goal: Absence of Falls  Outcome: Ongoing (interventions implemented as appropriate)

## 2018-01-20 NOTE — PROGRESS NOTES
"   LOS: 1 day    Patient Care Team:  Nina Tam MD as PCP - General (Internal Medicine)  Rodolfo Gordon MD as PCP - Claims Attributed  Sharif Mckenzie MD as Consulting Physician (Hematology and Oncology)  Marcellus Osborne MD as Referring Physician (Internal Medicine)  Brenton Aiken MD as Consulting Physician (Cardiology)  Ken Moseley MD as Consulting Physician (Pulmonary Disease)  Cooper Virgen MD as Consulting Physician (Urology)    Chief Complaint:    Chief Complaint   Patient presents with   • Shortness of Breath       Subjective     Interval History:   Seen on hemodialysis.  Receiving blood transfusion.    Review of Systems:    All systems were reviewed and negative except for:  Constitution:  positive for fatigue    Objective     Vital Signs  Temp:  [97.2 °F (36.2 °C)-98.3 °F (36.8 °C)] 97.7 °F (36.5 °C)  Heart Rate:  [] 84  Resp:  [18-22] 21  BP: (120-157)/(59-84) 135/69    Flowsheet Rows         First Filed Value    Admission Height  177.8 cm (70\") Documented at 01/19/2018 0037    Admission Weight  90.7 kg (200 lb) Documented at 01/19/2018 0037          I/O this shift:  In: 660 [P.O.:360; Blood:300]  Out: 200 [Stool:200]  I/O last 3 completed shifts:  In: 870 [P.O.:720; IV Piggyback:150]  Out: 1450 [Urine:750; Stool:700]    Intake/Output Summary (Last 24 hours) at 01/20/18 1539  Last data filed at 01/20/18 1528   Gross per 24 hour   Intake             1020 ml   Output             1200 ml   Net             -180 ml       Physical Exam:  Skin: warm and dry, pale  HEENT: pupils round and reactive to light, oral mucosa normal,   Neck: supple, no JVD, trachea midline  Lungs: Bilateral rhonchi, unlabored breathing effort  Heart: Irregularly irregular, no rub  Abdomen: soft, non-tender, functional ileoscopy, present bowel sounds to auscultation  : no palpable bladder  Lymphatics: No cervical or supraclavicular lymphadenopathy  Joints: No significant deformities noted, no crepitation over the " knees or the ankles  Extremities: no edema, cyanosis or clubbing, functional AV fistula in the left upper  Neuro: normal speech and mental status     Results Review:      Results from last 7 days  Lab Units 01/20/18  0524 01/19/18  0054   SODIUM mmol/L 125* 123*   POTASSIUM mmol/L 4.0 4.6   CHLORIDE mmol/L 91* 89*   CO2 mmol/L 14.3* 10.3*   BUN mg/dL 109* 141*   CREATININE mg/dL 5.27* 7.44*   CALCIUM mg/dL 7.9* 8.1*   BILIRUBIN mg/dL 2.0* 1.8*   ALK PHOS U/L 122* 136*   ALT (SGPT) U/L 20 17   AST (SGOT) U/L 36 33   GLUCOSE mg/dL 99 165*       Estimated Creatinine Clearance: 14.3 mL/min (by C-G formula based on Cr of 5.27).      Results from last 7 days  Lab Units 01/20/18  0524   PHOSPHORUS mg/dL 7.2*               Results from last 7 days  Lab Units 01/20/18  0524 01/19/18  0054   WBC 10*3/mm3 6.06 8.93   HEMOGLOBIN g/dL 6.4* 7.7*   PLATELETS 10*3/mm3 80* 82*         Results from last 7 days  Lab Units 01/19/18  0054   INR  1.91*         Imaging Results (last 24 hours)     ** No results found for the last 24 hours. **          aspirin 81 mg Oral Daily   cetirizine 5 mg Oral Daily   famotidine 20 mg Oral BID   febuxostat 80 mg Oral Daily   ferrous sulfate 325 mg Oral Daily With Breakfast   fluconazole 100 mg Oral Daily   folic acid 1 mg Oral Daily   guaiFENesin 600 mg Oral Q12H   hydrALAZINE 100 mg Oral TID   ipratropium-albuterol 3 mL Nebulization Q4H - RT   latanoprost 1 drop Both Eyes Nightly   [START ON 1/21/2018] levoFLOXacin 500 mg Intravenous Q48H   nystatin  Topical Q12H   sodium bicarbonate 650 mg Oral BID   terazosin 5 mg Oral Nightly   vitamin B-12 1,000 mcg Oral Daily          Medication Review:   Current Facility-Administered Medications   Medication Dose Route Frequency Provider Last Rate Last Dose   • aspirin EC tablet 81 mg  81 mg Oral Daily Marcellus Osborne MD   81 mg at 01/20/18 0844   • cetirizine (zyrTEC) tablet 5 mg  5 mg Oral Daily Marcellus Osborne MD       • famotidine (PEPCID) tablet 20 mg  20 mg  Oral BID Marcellus Osborne MD   20 mg at 01/20/18 0845   • febuxostat (ULORIC) tablet 80 mg  80 mg Oral Daily Marcellus Osborne MD   80 mg at 01/20/18 0844   • ferrous sulfate tablet 325 mg  325 mg Oral Daily With Breakfast Marcellus Osborne MD   325 mg at 01/20/18 0852   • fluconazole (DIFLUCAN) tablet 100 mg  100 mg Oral Daily Marcellus Osborne MD   100 mg at 01/20/18 0844   • folic acid (FOLVITE) tablet 1 mg  1 mg Oral Daily Marcellus Osborne MD   1 mg at 01/20/18 0844   • guaiFENesin (MUCINEX) 12 hr tablet 600 mg  600 mg Oral Q12H Marcellus Osborne MD   600 mg at 01/20/18 0844   • hydrALAZINE (APRESOLINE) tablet 100 mg  100 mg Oral TID Marcellus Osborne MD   100 mg at 01/19/18 2316   • ipratropium-albuterol (DUO-NEB) nebulizer solution 3 mL  3 mL Nebulization Q4H - RT Marcellus Osborne MD   3 mL at 01/20/18 1132   • latanoprost (XALATAN) 0.005 % ophthalmic solution 1 drop  1 drop Both Eyes Nightly Marcellus Osborne MD   1 drop at 01/19/18 2024   • [START ON 1/21/2018] levoFLOXacin (LEVAQUIN) 500 mg/100 mL D5W (premix) (LEVAQUIN) 500 mg  500 mg Intravenous Q48H Kvng Monsivais MD       • mannitol 25% (RENAL) injection  12.5 g Intravenous PRN Kvng Monsivais MD       • nebivolol (BYSTOLIC) tablet 2.5 mg  2.5 mg Oral PRN Marcellus Osborne MD       • nystatin (MYCOSTATIN) powder   Topical Q12H Marcellus Osborne MD       • sodium bicarbonate tablet 650 mg  650 mg Oral BID Marcellus Osborne MD   650 mg at 01/20/18 0844   • sodium chloride 0.9 % bolus 1,000 mL  1,000 mL Intravenous PRN Kvng Monsivais MD       • sodium chloride 0.9 % flush 10 mL  10 mL Intravenous PRN Rick Pisano MD       • terazosin (HYTRIN) capsule 5 mg  5 mg Oral Nightly Marcellus Osborne MD   5 mg at 01/19/18 2024   • vitamin B-12 (CYANOCOBALAMIN) tablet 1,000 mcg  1,000 mcg Oral Daily Marcellus Osborne MD   1,000 mcg at 01/20/18 0897       Assessment/Plan       Active Problems:    Pneumonia of both lungs due to infectious organism    1.  New end-stage renal disease with uremic symptoms.  2.   Hypertension reasonably controlled.  3.  Anemia and thrombocytopenia, picture suggestive of myelodysplastic syndrome and gastritis  4.  Crohn's disease with prior colectomy and currently has an ileostomy  5.  History of nephrolithiasis  6.  Pneumonia  7.  Metabolic acidosis:  Correct with dialysis, and on oral sodium bicarbonate  8.  Hyponatremia:  Due to free water excretion deficit:  Correct with dialysis        Plan:  1.   2 units of packed RBCs with hemodialysis ongoing  2.  Surveillance lab  3.  Outpatient dialysis is already arranged and every Monday, Wednesday and Friday at the [facility] dialysis clinic.             Lisa Osorio MD  01/20/18  3:39 PM

## 2018-01-20 NOTE — PROGRESS NOTES
"Daily progress note    Chief complaint  Doing same  Started on hemodialysis yesterday  No new complaints    History of present illness  Pt is a 72 y.o. male who presents complaining of SOA that began tonight at 21:00. Pt denies CP but complains of generalized fatigue. Pt is scheduled to start dialysis tomorrow. Pt has an EGD on 1/17/18, and was dx with a fungal infection and esophageal bleeding. Pt was started on Diflucan at that time. Pt also had a fistulagram on 1/15/18, and was given Valium at that time. Afterwards, they were informed by the pt's nephrologist that the pt may have a hard time metabolizing the medication. On arrival to ED, pt had room air sat's of 88%.   patient alert oriented denies any productive cough fever chills night sweats or weight loss.  Patient stated that he has gained some weight     REVIEW OF SYSTEMS  Review of Systems   Constitutional: Positive for fatigue. Negative for chills and fever.   HENT: Negative for congestion and sore throat.    Eyes: Negative.    Respiratory: Positive for shortness of breath. Negative for cough.    Cardiovascular: Negative for chest pain and leg swelling.   Gastrointestinal: Negative for abdominal pain, diarrhea and vomiting.   Genitourinary: Negative for difficulty urinating and dysuria.   Musculoskeletal: Negative for back pain and neck pain.   Skin: Negative for rash and wound.   Allergic/Immunologic: Negative.    Neurological: Negative for dizziness, weakness, numbness and headaches.   Psychiatric/Behavioral: Negative.    All other systems reviewed and are negative.     PHYSICAL EXAM  Blood pressure 130/73, pulse 87, temperature 97.5 °F (36.4 °C), temperature source Oral, resp. rate 22, height 177.8 cm (70\"), weight 89.6 kg (197 lb 8 oz), SpO2 95 %.    Constitutional: He is oriented to person, place, and time and well-developed, well-nourished, and in no distress.   Head: Normocephalic and atraumatic.   Eyes: EOM are normal. Pupils are equal, round, and " reactive to light.   Neck: Normal range of motion. Neck supple.   Cardiovascular: Normal heart sounds.  An irregularly irregular rhythm present. Tachycardia present.    Pulmonary/Chest: Effort normal. No respiratory distress. He has rhonchi in the left lower field.   Abdominal: Soft. There is no tenderness. There is no rebound and no guarding.   Musculoskeletal: Normal range of motion. He exhibits no edema.   Neurological: He is alert and oriented to person, place, and time. He has normal sensation and normal strength.   Skin: Skin is warm and dry.   Psychiatric: Mood and affect normal.     LAB RESULTS  Lab Results (last 24 hours)     Procedure Component Value Units Date/Time    Blood Culture - Blood, [[id number]]  (Normal) Collected:  01/19/18 0120    Specimen:  Blood from Arm, Left Updated:  01/20/18 0146     Blood Culture No growth at 24 hours    Blood Culture - Blood, [[id number]]  (Normal) Collected:  01/19/18 0134    Specimen:  Blood from Arm, Left Updated:  01/20/18 0217     Blood Culture No growth at 24 hours    Respiratory Panel, PCR - Swab, Nasopharynx [[id number]]  (Normal) Collected:  01/19/18 2030    Specimen:  Swab from Nasopharynx Updated:  01/20/18 0238     ADENOVIRUS, PCR Not Detected     Coronavirus 229E Not Detected     Coronavirus HKU1 Not Detected     Coronavirus NL63 Not Detected     Coronavirus OC43 Not Detected     Human Metapneumovirus Not Detected     Human Rhinovirus/Enterovirus Not Detected     Influenza B PCR Not Detected     Parainfluenza Virus 1 Not Detected     Parainfluenza Virus 2 Not Detected     Parainfluenza Virus 3 Not Detected     Parainfluenza Virus 4 Not Detected     Bordetella pertussis pcr Not Detected     Influenza A H1N1 2009 PCR Not Detected     Chlamydophila pneumoniae PCR Not Detected     Mycoplasma pneumo by PCR Not Detected     Influenza A PCR Not Detected     Influenza A H3 Not Detected     Influenza A H1 Not Detected     RSV, PCR Not Detected    Lipid Panel  [[id number]]  (Abnormal) Collected:  01/20/18 0524    Specimen:  Blood Updated:  01/20/18 0616     Total Cholesterol 67 mg/dL      Triglycerides 42 mg/dL      HDL Cholesterol 28 (L) mg/dL      LDL Cholesterol  31 mg/dL      VLDL Cholesterol 8.4 mg/dL      LDL/HDL Ratio 1.09    Narrative:       Cholesterol Reference Ranges  (U.S[place] Department of Health and Human Services ATP III Classifications)    Desirable          <200 mg/dL  Borderline High    200-239 mg/dL  High Risk          >240 mg/dL      Triglyceride Reference Ranges  (U.S. Department of Health and Human Services ATP III Classifications)    Normal           <150 mg/dL  Borderline High  150-199 mg/dL  High             200-499 mg/dL  Very High        >500 mg/dL    HDL Reference Ranges  (U.S. Department of Health and Human Services ATP III Classifcations)    Low     <40 mg/dl (major risk factor for CHD)  High    >60 mg/dl ('negative' risk factor for CHD)        LDL Reference Ranges  (U.S. Department of Health and Human Services ATP III Classifcations)    Optimal          <100 mg/dL  Near Optimal     100-129 mg/dL  Borderline High  130-159 mg/dL  High             160-189 mg/dL  Very High        >189 mg/dL    Phosphorus [[id number]]  (Abnormal) Collected:  01/20/18 0524    Specimen:  Blood Updated:  01/20/18 0616     Phosphorus 7.2 (H) mg/dL     CBC & Differential [[id number]] Collected:  01/20/18 0524    Specimen:  Blood Updated:  01/20/18 0617    Narrative:       The following orders were created for panel order CBC & Differential.  Procedure                               Abnormality         Status                     ---------                               -----------         ------                     CBC Auto Differential[[id number]]        Abnormal            Final result                 Please view results for these tests on the individual orders.    CBC Auto Differential [[id number]]  (Abnormal) Collected:  01/20/18 0524    Specimen:  Blood Updated:  01/20/18  0617     WBC 6.06 10*3/mm3      RBC 2.18 (L) 10*6/mm3      Hemoglobin 6.4 (C) g/dL      Hematocrit 18.5 (C) %      MCV 84.9 fL      MCH 29.4 pg      MCHC 34.6 g/dL      RDW 17.1 (H) %      RDW-SD 53.2 fl      MPV 8.7 fL      Platelets 80 (L) 10*3/mm3      Neutrophil % 90.1 (H) %      Lymphocyte % 3.8 (L) %      Monocyte % 5.0 %      Eosinophil % 0.3 %      Basophil % 0.0 %      Immature Grans % 0.8 (H) %      Neutrophils, Absolute 5.46 10*3/mm3      Lymphocytes, Absolute 0.23 (L) 10*3/mm3      Monocytes, Absolute 0.30 10*3/mm3      Eosinophils, Absolute 0.02 10*3/mm3      Basophils, Absolute 0.00 10*3/mm3      Immature Grans, Absolute 0.05 (H) 10*3/mm3     BNP [[id number]]  (Abnormal) Collected:  01/20/18 0524    Specimen:  Blood Updated:  01/20/18 0628     proBNP 26406.0 (H) pg/mL     Narrative:       Among patients with dyspnea, NT-proBNP is highly sensitive for the detection of acute congestive heart failure. In addition NT-proBNP of <300 pg/ml effectively rules out acute congestive heart failure with 99% negative predictive value.    TSH [[id number]]  (Normal) Collected:  01/20/18 0524    Specimen:  Blood Updated:  01/20/18 0628     TSH 2.140 mIU/mL     Comprehensive Metabolic Panel [[id number]]  (Abnormal) Collected:  01/20/18 0524    Specimen:  Blood Updated:  01/20/18 0631     Glucose 99 mg/dL       (H) mg/dL      Creatinine 5.27 (H) mg/dL      Sodium 125 (L) mmol/L      Potassium 4.0 mmol/L      Chloride 91 (L) mmol/L      CO2 14.3 (L) mmol/L      Calcium 7.9 (L) mg/dL      Total Protein 6.4 g/dL      Albumin 2.30 (L) g/dL      ALT (SGPT) 20 U/L      AST (SGOT) 36 U/L      Alkaline Phosphatase 122 (H) U/L      Total Bilirubin 2.0 (H) mg/dL      eGFR Non African Amer 11 (L) mL/min/1.73      Globulin 4.1 gm/dL      A/G Ratio 0.6 g/dL      BUN/Creatinine Ratio 20.7     Anion Gap 19.7 mmol/L     Narrative:       The MDRD GFR formula is only valid for adults with stable renal function between ages 18 and 70.     Hemoglobin A1c [[id number]]  (Normal) Collected:  01/20/18 0524    Specimen:  Blood Updated:  01/20/18 0728     Hemoglobin A1C 4.80 %     Narrative:       Hemoglobin A1C Ranges:    Increased Risk for Diabetes  5.7% to 6.4%  Diabetes                     >= 6.5%  Diabetic Goal                < 7.0%        Imaging Results (last 24 hours)     ** No results found for the last 24 hours. **             EKG                                                  Rhythm/Rate: Afib 71  P waves and UT: absent   QRS, axis: nml   ST and T waves: nml       Current Facility-Administered Medications:   •  aspirin EC tablet 81 mg, 81 mg, Oral, Daily, Marcellus Osborne MD, 81 mg at 01/20/18 0844  •  cetirizine (zyrTEC) tablet 5 mg, 5 mg, Oral, Daily, Marcellus Osborne MD  •  famotidine (PEPCID) tablet 20 mg, 20 mg, Oral, BID, Marcellus Osborne MD, 20 mg at 01/20/18 0845  •  febuxostat (ULORIC) tablet 80 mg, 80 mg, Oral, Daily, Marcellus Osborne MD, 80 mg at 01/20/18 0844  •  ferrous sulfate tablet 325 mg, 325 mg, Oral, Daily With Breakfast, Marcellus Osborne MD, 325 mg at 01/20/18 0852  •  fluconazole (DIFLUCAN) tablet 100 mg, 100 mg, Oral, Daily, Marcellus Osborne MD, 100 mg at 01/20/18 0844  •  folic acid (FOLVITE) tablet 1 mg, 1 mg, Oral, Daily, Marcellus Osborne MD, 1 mg at 01/20/18 0844  •  guaiFENesin (MUCINEX) 12 hr tablet 600 mg, 600 mg, Oral, Q12H, Marcellus Osborne MD, 600 mg at 01/20/18 0844  •  hydrALAZINE (APRESOLINE) tablet 100 mg, 100 mg, Oral, TID, Marcellus Osborne MD, 100 mg at 01/19/18 2316  •  ipratropium-albuterol (DUO-NEB) nebulizer solution 3 mL, 3 mL, Nebulization, Q4H - RT, Marcellus Osborne MD, 3 mL at 01/20/18 1132  •  latanoprost (XALATAN) 0.005 % ophthalmic solution 1 drop, 1 drop, Both Eyes, Nightly, Marcellus Osborne MD, 1 drop at 01/19/18 2024  •  [START ON 1/21/2018] levoFLOXacin (LEVAQUIN) 500 mg/100 mL D5W (premix) (LEVAQUIN) 500 mg, 500 mg, Intravenous, Q48H, Kvng Monsivais MD  •  mannitol 25% (RENAL) injection, 12.5 g, Intravenous, PRN, Kvng Jones  MD Yodit  •  nebivolol (BYSTOLIC) tablet 2.5 mg, 2.5 mg, Oral, PRN, Lucinda Osborne MD  •  nystatin (MYCOSTATIN) powder, , Topical, Q12H, Lucinda Osborne MD  •  sodium bicarbonate tablet 650 mg, 650 mg, Oral, BID, Lucinda Osborne MD, 650 mg at 01/20/18 0844  •  sodium chloride 0.9 % bolus 1,000 mL, 1,000 mL, Intravenous, PRN, Kvng Monsivais MD  •  Insert peripheral IV, , , Once **AND** sodium chloride 0.9 % flush 10 mL, 10 mL, Intravenous, PRN, Rick Pisano MD  •  terazosin (HYTRIN) capsule 5 mg, 5 mg, Oral, Nightly, Lucinda Osborne MD, 5 mg at 01/19/18 2024  •  vitamin B-12 (CYANOCOBALAMIN) tablet 1,000 mcg, 1,000 mcg, Oral, Daily, Lucinda Osborne MD, 1,000 mcg at 01/20/18 0844     ASSESSMENT  Bilateral pneumonia with sepsis  End-stage renal disease started on hemodialysis on this admission  Acute on Chronic anemia  Hypertension  Gastroesophageal reflux disease  Candida esophagitis  COPD  BPH    PLAN  CPM  Supplement oxygen nebulizer IV antibiotics  Transfuse 2 units packed RBC  Continue home medications including Diflucan  Nephrology consult appreciated  Hemodialysis 3 times a week  Supportive care  Stress ulcer DVT prophylaxis  PT/OT  Follow closely further recommendation according to hospital course    LUCINDA OSBORNE MD

## 2018-01-21 LAB
ABO + RH BLD: NORMAL
ABO + RH BLD: NORMAL
ANION GAP SERPL CALCULATED.3IONS-SCNC: 16.9 MMOL/L
BASOPHILS # BLD AUTO: 0 10*3/MM3 (ref 0–0.2)
BASOPHILS NFR BLD AUTO: 0 % (ref 0–1.5)
BH BB BLOOD EXPIRATION DATE: NORMAL
BH BB BLOOD EXPIRATION DATE: NORMAL
BH BB BLOOD TYPE BARCODE: 5100
BH BB BLOOD TYPE BARCODE: 5100
BH BB DISPENSE STATUS: NORMAL
BH BB DISPENSE STATUS: NORMAL
BH BB PRODUCT CODE: NORMAL
BH BB PRODUCT CODE: NORMAL
BH BB UNIT NUMBER: NORMAL
BH BB UNIT NUMBER: NORMAL
BUN BLD-MCNC: 81 MG/DL (ref 8–23)
BUN/CREAT SERPL: 19.1 (ref 7–25)
CALCIUM SPEC-SCNC: 7.9 MG/DL (ref 8.6–10.5)
CHLORIDE SERPL-SCNC: 94 MMOL/L (ref 98–107)
CO2 SERPL-SCNC: 17.1 MMOL/L (ref 22–29)
CREAT BLD-MCNC: 4.24 MG/DL (ref 0.76–1.27)
CROSSMATCH INTERPRETATION: NORMAL
CROSSMATCH INTERPRETATION: NORMAL
DEPRECATED RDW RBC AUTO: 52.4 FL (ref 37–54)
EOSINOPHIL # BLD AUTO: 0.02 10*3/MM3 (ref 0–0.7)
EOSINOPHIL NFR BLD AUTO: 0.4 % (ref 0.3–6.2)
ERYTHROCYTE [DISTWIDTH] IN BLOOD BY AUTOMATED COUNT: 16.9 % (ref 11.5–14.5)
GFR SERPL CREATININE-BSD FRML MDRD: 14 ML/MIN/1.73
GLUCOSE BLD-MCNC: 97 MG/DL (ref 65–99)
HCT VFR BLD AUTO: 22.1 % (ref 40.4–52.2)
HCT VFR BLD AUTO: 22.6 % (ref 40.4–52.2)
HGB BLD-MCNC: 7.6 G/DL (ref 13.7–17.6)
HGB BLD-MCNC: 7.8 G/DL (ref 13.7–17.6)
IMM GRANULOCYTES # BLD: 0.07 10*3/MM3 (ref 0–0.03)
IMM GRANULOCYTES NFR BLD: 1.2 % (ref 0–0.5)
LYMPHOCYTES # BLD AUTO: 0.28 10*3/MM3 (ref 0.9–4.8)
LYMPHOCYTES NFR BLD AUTO: 4.9 % (ref 19.6–45.3)
MCH RBC QN AUTO: 29.1 PG (ref 27–32.7)
MCHC RBC AUTO-ENTMCNC: 34.4 G/DL (ref 32.6–36.4)
MCV RBC AUTO: 84.7 FL (ref 79.8–96.2)
MONOCYTES # BLD AUTO: 0.34 10*3/MM3 (ref 0.2–1.2)
MONOCYTES NFR BLD AUTO: 6 % (ref 5–12)
NEUTROPHILS # BLD AUTO: 4.99 10*3/MM3 (ref 1.9–8.1)
NEUTROPHILS NFR BLD AUTO: 87.5 % (ref 42.7–76)
PLATELET # BLD AUTO: 67 10*3/MM3 (ref 140–500)
PMV BLD AUTO: 9.1 FL (ref 6–12)
POTASSIUM BLD-SCNC: 3.9 MMOL/L (ref 3.5–5.2)
RBC # BLD AUTO: 2.61 10*6/MM3 (ref 4.6–6)
SODIUM BLD-SCNC: 128 MMOL/L (ref 136–145)
UNIT  ABO: NORMAL
UNIT  ABO: NORMAL
UNIT  RH: NORMAL
UNIT  RH: NORMAL
WBC NRBC COR # BLD: 5.7 10*3/MM3 (ref 4.5–10.7)

## 2018-01-21 PROCEDURE — 85018 HEMOGLOBIN: CPT | Performed by: INTERNAL MEDICINE

## 2018-01-21 PROCEDURE — 85025 COMPLETE CBC W/AUTO DIFF WBC: CPT | Performed by: HOSPITALIST

## 2018-01-21 PROCEDURE — 94799 UNLISTED PULMONARY SVC/PX: CPT

## 2018-01-21 PROCEDURE — 0W3P7ZZ CONTROL BLEEDING IN GASTROINTESTINAL TRACT, VIA NATURAL OR ARTIFICIAL OPENING: ICD-10-PCS | Performed by: SURGERY

## 2018-01-21 PROCEDURE — 80048 BASIC METABOLIC PNL TOTAL CA: CPT | Performed by: HOSPITALIST

## 2018-01-21 PROCEDURE — 25010000002 LEVOFLOXACIN PER 250 MG: Performed by: INTERNAL MEDICINE

## 2018-01-21 PROCEDURE — 99223 1ST HOSP IP/OBS HIGH 75: CPT | Performed by: INTERNAL MEDICINE

## 2018-01-21 PROCEDURE — 85014 HEMATOCRIT: CPT | Performed by: INTERNAL MEDICINE

## 2018-01-21 RX ORDER — SEVELAMER CARBONATE 800 MG/1
800 TABLET, FILM COATED ORAL
Status: DISCONTINUED | OUTPATIENT
Start: 2018-01-21 | End: 2018-01-25 | Stop reason: HOSPADM

## 2018-01-21 RX ORDER — SEVELAMER CARBONATE FOR ORAL SUSPENSION 800 MG/1
800 POWDER, FOR SUSPENSION ORAL
Status: DISCONTINUED | OUTPATIENT
Start: 2018-01-21 | End: 2018-01-21

## 2018-01-21 RX ORDER — PANTOPRAZOLE SODIUM 40 MG/10ML
40 INJECTION, POWDER, LYOPHILIZED, FOR SOLUTION INTRAVENOUS
Status: DISCONTINUED | OUTPATIENT
Start: 2018-01-21 | End: 2018-01-23 | Stop reason: ALTCHOICE

## 2018-01-21 RX ORDER — LIDOCAINE HYDROCHLORIDE AND EPINEPHRINE 10; 10 MG/ML; UG/ML
20 INJECTION, SOLUTION INFILTRATION; PERINEURAL ONCE
Status: COMPLETED | OUTPATIENT
Start: 2018-01-21 | End: 2018-01-21

## 2018-01-21 RX ORDER — CALCITRIOL 0.25 UG/1
0.25 CAPSULE, LIQUID FILLED ORAL DAILY
Status: DISCONTINUED | OUTPATIENT
Start: 2018-01-21 | End: 2018-01-25 | Stop reason: HOSPADM

## 2018-01-21 RX ADMIN — FERROUS SULFATE TAB 325 MG (65 MG ELEMENTAL FE) 325 MG: 325 (65 FE) TAB at 09:26

## 2018-01-21 RX ADMIN — NYSTATIN: 100000 POWDER TOPICAL at 09:29

## 2018-01-21 RX ADMIN — HYDRALAZINE HYDROCHLORIDE 100 MG: 50 TABLET, FILM COATED ORAL at 20:58

## 2018-01-21 RX ADMIN — GUAIFENESIN 600 MG: 600 TABLET, EXTENDED RELEASE ORAL at 20:58

## 2018-01-21 RX ADMIN — NYSTATIN 1 APPLICATION: 100000 POWDER TOPICAL at 20:58

## 2018-01-21 RX ADMIN — CYANOCOBALAMIN TAB 500 MCG 1000 MCG: 500 TAB at 09:26

## 2018-01-21 RX ADMIN — SODIUM BICARBONATE 650 MG: 650 TABLET ORAL at 09:26

## 2018-01-21 RX ADMIN — IPRATROPIUM BROMIDE AND ALBUTEROL SULFATE 3 ML: .5; 3 SOLUTION RESPIRATORY (INHALATION) at 19:27

## 2018-01-21 RX ADMIN — LATANOPROST 1 DROP: 50 SOLUTION OPHTHALMIC at 20:58

## 2018-01-21 RX ADMIN — LIDOCAINE HYDROCHLORIDE AND EPINEPHRINE 20 ML: 10; 10 INJECTION, SOLUTION INFILTRATION; PERINEURAL at 17:16

## 2018-01-21 RX ADMIN — IPRATROPIUM BROMIDE AND ALBUTEROL SULFATE 3 ML: .5; 3 SOLUTION RESPIRATORY (INHALATION) at 08:12

## 2018-01-21 RX ADMIN — IPRATROPIUM BROMIDE AND ALBUTEROL SULFATE 3 ML: .5; 3 SOLUTION RESPIRATORY (INHALATION) at 00:33

## 2018-01-21 RX ADMIN — FAMOTIDINE 20 MG: 20 TABLET, FILM COATED ORAL at 20:58

## 2018-01-21 RX ADMIN — SODIUM BICARBONATE 650 MG: 650 TABLET ORAL at 20:58

## 2018-01-21 RX ADMIN — CALCITRIOL 0.25 MCG: 0.25 CAPSULE, LIQUID FILLED ORAL at 13:31

## 2018-01-21 RX ADMIN — IPRATROPIUM BROMIDE AND ALBUTEROL SULFATE 3 ML: .5; 3 SOLUTION RESPIRATORY (INHALATION) at 14:52

## 2018-01-21 RX ADMIN — HYDRALAZINE HYDROCHLORIDE 100 MG: 50 TABLET, FILM COATED ORAL at 17:24

## 2018-01-21 RX ADMIN — HYDRALAZINE HYDROCHLORIDE 100 MG: 50 TABLET, FILM COATED ORAL at 09:26

## 2018-01-21 RX ADMIN — FLUCONAZOLE 100 MG: 100 TABLET ORAL at 09:26

## 2018-01-21 RX ADMIN — FOLIC ACID 1 MG: 1 TABLET ORAL at 09:26

## 2018-01-21 RX ADMIN — GUAIFENESIN 600 MG: 600 TABLET, EXTENDED RELEASE ORAL at 09:26

## 2018-01-21 RX ADMIN — IPRATROPIUM BROMIDE AND ALBUTEROL SULFATE 3 ML: .5; 3 SOLUTION RESPIRATORY (INHALATION) at 04:07

## 2018-01-21 RX ADMIN — TERAZOSIN HYDROCHLORIDE 5 MG: 5 CAPSULE ORAL at 20:58

## 2018-01-21 RX ADMIN — FAMOTIDINE 20 MG: 20 TABLET, FILM COATED ORAL at 09:26

## 2018-01-21 RX ADMIN — PANTOPRAZOLE SODIUM 40 MG: 40 INJECTION, POWDER, FOR SOLUTION INTRAVENOUS at 17:24

## 2018-01-21 RX ADMIN — IPRATROPIUM BROMIDE AND ALBUTEROL SULFATE 3 ML: .5; 3 SOLUTION RESPIRATORY (INHALATION) at 11:04

## 2018-01-21 RX ADMIN — LEVOFLOXACIN 500 MG: 5 INJECTION, SOLUTION INTRAVENOUS at 13:29

## 2018-01-21 RX ADMIN — FEBUXOSTAT 80 MG: 40 TABLET ORAL at 09:26

## 2018-01-21 RX ADMIN — SEVELAMER CARBONATE 800 MG: 800 TABLET, FILM COATED ORAL at 17:24

## 2018-01-21 NOTE — PLAN OF CARE
Problem: Patient Care Overview (Adult)  Goal: Plan of Care Review  Outcome: Ongoing (interventions implemented as appropriate)   01/20/18 1934   Coping/Psychosocial Response Interventions   Plan Of Care Reviewed With patient   Patient Care Overview   Progress no change   Outcome Evaluation   Outcome Summary/Follow up Plan Dialysis today with 2 units PRBC's. Tolerated well. Monitor ileostomy. Monitor vital signs.     Goal: Adult Individualization and Mutuality  Outcome: Ongoing (interventions implemented as appropriate)    Goal: Discharge Needs Assessment  Outcome: Ongoing (interventions implemented as appropriate)

## 2018-01-21 NOTE — CONSULTS
[facility]/[place]              Initial Inpatient Consult Note  Referring Provider: [place]   Reason for Consultation: GI bleed    Subjective     History of present illness:  This is a gentleman with a history of iron deficiency anemia.  He has a remote history of inflammatory bowel disease and had undergone total proctocolectomy years ago.  He underwent EGD and ileoscopy 1/17 per Dr. Neville.  This examination demonstrated gastric retention, fungal esophagitis, and the ileum was normal ×80 cm.  He was admitted on 1/19 with pneumonia.  Plans had been made to initiate hemodialysis on the 20th anyway and he is undergone 2 fairly short dialysis sessions over the past couple days.  He was noted to have black ileostomy outputs over the past day.  His hemoglobin reached a low of 6.4 yesterday and he received transfusions and currently his hemoglobin is in the mid 7 range.  He was noted on prior workups last year to have a somewhat nodular liver and he was believed to likely have cirrhosis at that time.  He is currently not experiencing any abdominal discomfort and he says his breathing is considerably improved from when he was admitted.    Past Medical History:  Past Medical History:   Diagnosis Date   • Abnormal serum protein electrophoresis    • Anemia     Multifactorial   • Atrial fibrillation    • Chronic kidney disease     STAGE 4   • Chronic leukopenia and thrombocytopenia    • Crohn disease    • Diverticula of colon    • Fatty liver disease, nonalcoholic    • GERD (gastroesophageal reflux disease)    • GI bleed    • Glaucoma    • H/O colectomy    • H/O Pericardial effusion    • Hx of renal calculi    • Hypertension    • Ileostomy present    • MGUS (monoclonal gammopathy of unknown significance)    • Murmur     FROM RHEUMATIC FEVER AS CHIILD   • Sleep apnea     CPAP USED       Past Surgical History:  Past Surgical History:   Procedure Laterality Date   • APPENDECTOMY     • ARTERIOVENOUS  FISTULA/SHUNT SURGERY Left 8/8/2017    Procedure: LEFT BRACHIAL CEPHALIC AV FISTULA FORMATION WITH CEPHALIC VEIN TRANSPOSITION ;  Surgeon: Bill Deal MD;  Location: [facility] MAIN OR;  Service:    • CHOLECYSTECTOMY     • COLECTOMY PARTIAL / TOTAL      History of inflammatory bowel disease with status post colectomy with ileostomy many years ago in the [facility]   • COLON RESECTION WITH COLOSTOMY     • COLON SURGERY     • COLONOSCOPY     • CYSTOSCOPY LITHOLAPAXY BLADDER STONE EXTRACTION     • ENDOSCOPY  01/16/2015    gastritis   • ENDOSCOPY  1/17/2018    Procedure: ESOPHAGOGASTRODUODENOSCOPY;  Surgeon: Warner Neville MD;  Location: [facility] ENDOSCOPY;  Service:    • ILEOSCOPY  01/16/2015    normal   • ILEOSCOPY N/A 1/17/2018    Procedure: ILEOSCOPY;  Surgeon: Warner Neville MD;  Location: [facility] ENDOSCOPY;  Service:         Social History:   Social History   Substance Use Topics   • Smoking status: Never Smoker   • Smokeless tobacco: Not on file   • Alcohol use No        Family History:  Family History   Problem Relation Age of Onset   • Heart failure Mother    • Hypertension Mother    • Hypertension Father    • Malig Hyperthermia Neg Hx        Home Meds:  Prescriptions Prior to Admission   Medication Sig Dispense Refill Last Dose   • aspirin 81 MG tablet Take 81 mg by mouth Daily. To stop 5 days prior to surgery   1/17/2018 at Unknown time   • Cyanocobalamin (VITAMIN B-12) 500 MCG sublingual tablet Place 1 tablet under the tongue Daily.   1/15/2018   • doxazosin (CARDURA) 4 MG tablet Take 4 mg by mouth 2 (two) times a day.   1/17/2018 at Unknown time   • epoetin tip (PROCRIT) 65574 UNIT/ML injection Inject  under the skin As Needed.   1/12/2018   • febuxostat (ULORIC) 80 MG tablet tablet Take 80 mg by mouth daily.   1/16/2018 at Unknown time   • FERROUS SULFATE PO Take 65 mg by mouth daily.   1/16/2018   • fluconazole (DIFLUCAN) 100 MG tablet Take 1 tablet by mouth Daily. 14 tablet 0    • folic acid  (FOLVITE) 1 MG tablet Take 1 mg by mouth daily.   1/16/2018 at Unknown time   • hydrALAZINE (APRESOLINE) 100 MG tablet Take 100 mg by mouth 3 (three) times a day.   1/17/2018 at Unknown time   • latanoprost (XALATAN) 0.005 % ophthalmic solution Administer 1 drop to both eyes Every Night. 1 drop each eye    1/16/2018 at Unknown time   • Loratadine (CLARITIN PO) Take  by mouth As Needed. Pt states he is uncertain of dosage   1/3/2018   • nebivolol (BYSTOLIC) 2.5 MG tablet Take 2.5 mg by mouth As Needed (if heart rate stays above 90; PT HASN'T TAKEN FOR 2 YEARS).   Taking   • ranitidine (ZANTAC) 150 MG tablet Take 150 mg by mouth 2 (Two) Times a Day.   1/16/2018 at Unknown time   • sodium bicarbonate 650 MG tablet Take 650 mg by mouth 2 (Two) Times a Day.   1/16/2018 at Unknown time       Current Meds:     calcitriol 0.25 mcg Oral Daily   cetirizine 5 mg Oral Daily   epoetin tip 10,000 Units Intravenous Once per day on Mon Wed Fri   famotidine 20 mg Oral BID   febuxostat 80 mg Oral Daily   ferrous sulfate 325 mg Oral Daily With Breakfast   fluconazole 100 mg Oral Daily   folic acid 1 mg Oral Daily   guaiFENesin 600 mg Oral Q12H   hydrALAZINE 100 mg Oral TID   ipratropium-albuterol 3 mL Nebulization Q4H - RT   latanoprost 1 drop Both Eyes Nightly   levoFLOXacin 500 mg Intravenous Q48H   nystatin  Topical Q12H   sevelamer 800 mg Oral TID With Meals   sodium bicarbonate 650 mg Oral BID   terazosin 5 mg Oral Nightly   vitamin B-12 1,000 mcg Oral Daily       Allergies:  Allergies   Allergen Reactions   • Ace Inhibitors Other (See Comments)     RENAL FAILURE   • Contrast Dye Other (See Comments)     RENAL FAILURE   • Eliquis [Apixaban] Other (See Comments)     BLEEDING ISSUES; PT CANNOT TAKE ANY ANTICOAGULANTS DUE TO LOW PLATELETS EXCEPT ASPIRIN  BLEEDING ISSUES; PT CANNOT TAKE ANY ANTICOAGULANTS DUE TO LOW PLATELETS EXCEPT ASPIRIN   • Granix [Filgrastim]      Chest pain  Chest pain     • Iodinated Diagnostic Agents Other  (See Comments)     RENAL FAILURE   • Keflex [Cephalexin] Other (See Comments)     RENAL FAILURE       Review of Systems  Pertinent items are noted in HPI, all other systems reviewed and negative    Objective     Vital Signs  Temp:  [97.5 °F (36.4 °C)-98.7 °F (37.1 °C)] 98.1 °F (36.7 °C)  Heart Rate:  [77-98] 81  Resp:  [18-21] 20  BP: (122-155)/(63-84) 152/63    Physical Exam:     Pale-appearing white male.  No acute distress.   PERRLA.  Pale conjunctivae.   There is nose and mouth grossly normal in appearance.  Hearing appears to be adequate.   Neck is supple.  There is no thyromegaly.   He is in no respiratory distress.  Auscultation demonstrated scattered rales.   No rubs or gallops.   Abdominal examination demonstrates ileostomy just left of midline.  There is a peristomal hernia present.  Bag is full of black liquid.   There is no palpable lymphadenopathy of the neck or groin.   Skin demonstrates no definite rash or crepitus.   Neurologic examination demonstrates that he appears alert and oriented and appears to have a good memory.                                                                          Results Review:   I reviewed the patient's new clinical results.    WBC No results found for: WBCS   HGB Hemoglobin   Date Value Ref Range Status   01/21/2018 7.8 (L) 13.7 - 17.6 g/dL Final   01/21/2018 7.6 (L) 13.7 - 17.6 g/dL Final   01/20/2018 6.4 (C) 13.7 - 17.6 g/dL Final   01/19/2018 7.7 (L) 13.7 - 17.6 g/dL Final      HCT Hematocrit   Date Value Ref Range Status   01/21/2018 22.6 (L) 40.4 - 52.2 % Final   01/21/2018 22.1 (L) 40.4 - 52.2 % Final   01/20/2018 18.5 (C) 40.4 - 52.2 % Final   01/19/2018 22.9 (L) 40.4 - 52.2 % Final      Platlets No results found for: LABPLAT   MCV MCV   Date Value Ref Range Status   01/21/2018 84.7 79.8 - 96.2 fL Final   01/20/2018 84.9 79.8 - 96.2 fL Final   01/19/2018 87.4 79.8 - 96.2 fL Final          Sodium Sodium   Date Value Ref Range Status   01/21/2018 128 (L) 136 -  145 mmol/L Final   01/20/2018 125 (L) 136 - 145 mmol/L Final   01/19/2018 123 (L) 136 - 145 mmol/L Final      Potassium Potassium   Date Value Ref Range Status   01/21/2018 3.9 3.5 - 5.2 mmol/L Final   01/20/2018 4.0 3.5 - 5.2 mmol/L Final   01/19/2018 4.6 3.5 - 5.2 mmol/L Final      Chloride Chloride   Date Value Ref Range Status   01/21/2018 94 (L) 98 - 107 mmol/L Final   01/20/2018 91 (L) 98 - 107 mmol/L Final   01/19/2018 89 (L) 98 - 107 mmol/L Final      CO2 CO2   Date Value Ref Range Status   01/21/2018 17.1 (L) 22.0 - 29.0 mmol/L Final   01/20/2018 14.3 (L) 22.0 - 29.0 mmol/L Final   01/19/2018 10.3 (L) 22.0 - 29.0 mmol/L Final      BUN BUN   Date Value Ref Range Status   01/21/2018 81 (H) 8 - 23 mg/dL Final   01/20/2018 109 (H) 8 - 23 mg/dL Final   01/19/2018 141 (H) 8 - 23 mg/dL Final      Creatinine Creatinine   Date Value Ref Range Status   01/21/2018 4.24 (H) 0.76 - 1.27 mg/dL Final   01/20/2018 5.27 (H) 0.76 - 1.27 mg/dL Final   01/19/2018 7.44 (H) 0.76 - 1.27 mg/dL Final      Calcium Calcium   Date Value Ref Range Status   01/21/2018 7.9 (L) 8.6 - 10.5 mg/dL Final   01/20/2018 7.9 (L) 8.6 - 10.5 mg/dL Final   01/19/2018 8.1 (L) 8.6 - 10.5 mg/dL Final      Albumin Albumin   Date Value Ref Range Status   01/20/2018 2.30 (L) 3.50 - 5.20 g/dL Final   01/19/2018 2.80 (L) 3.50 - 5.20 g/dL Final      AST  ALT  PT/INR:   AST (SGOT)   Date Value Ref Range Status   01/20/2018 36 1 - 40 U/L Final   01/19/2018 33 1 - 40 U/L Final     ALT (SGPT)   Date Value Ref Range Status   01/20/2018 20 1 - 41 U/L Final   01/19/2018 17 1 - 41 U/L Final     Protime   Date Value Ref Range Status   01/19/2018 21.3 (H) 11.7 - 14.2 Seconds Final   /  INR   Date Value Ref Range Status   01/19/2018 1.91 (H) 0.90 - 1.10 Final            Imaging Results (last 72 hours)     Procedure Component Value Units Date/Time    XR Chest 2 View [[id number]] Collected:  01/19/18 0111     Updated:  01/19/18 0116    Narrative:       PA AND LATERAL  CHEST X-RAY     HISTORY: soa     COMPARISON: 08/16/2017.     FINDINGS: PA and lateral views of the chest were obtained. There are  extensive airspace opacities diffusely bilaterally, more pronounced in  the upper lung zones compared to the lung bases most likely related to  extensive pneumonia. Stable cardiomegaly. Vascularity appears normal.  There are probable tiny effusions posteriorly.             Impression:       Extensive upper lobe predominant pulmonary opacities likely  related to diffuse pneumonia     This report was finalized on 1/19/2018 1:13 AM by Bill Mccarthy MD.             Assessment/Plan     Active Problems:    Pneumonia of both lungs due to infectious organism      The patient appears to have had recent active GI bleeding.  The recent EGD was suboptimal due to gastric retention.      Ordinarily I would be in favor proceeding straight to EGD.  However, he is been on a solid diet all day today.  In addition, given his recent bout of pneumonia his wife and he are understandably quite hesitant to have an EGD repeated.    For now I would treat him with PPI therapy.  I will have him be nothing by mouth after midnight but I will not actually schedule him for an EGD.  I will let the receiving gastroenterologist make that decision upon review tomorrow morning.      I discussed the patients findings and my recommendations with patient and family    Theo Freedman MD  [facility]  01/21/18  3:40 PM

## 2018-01-21 NOTE — PROGRESS NOTES
"   LOS: 2 days    Patient Care Team:  Nina Tam MD as PCP - General (Internal Medicine)  Rodolfo Gordon MD as PCP - Claims Attributed  Sharif Mckenzie MD as Consulting Physician (Hematology and Oncology)  Marcellus Osborne MD as Referring Physician (Internal Medicine)  Brenton Aiken MD as Consulting Physician (Cardiology)  Ken Moseley MD as Consulting Physician (Pulmonary Disease)  Cooper Virgen MD as Consulting Physician (Urology)    Chief Complaint:    Chief Complaint   Patient presents with   • Shortness of Breath       Subjective     Interval History:   Patient is feels better.  There was blood in ileostomy.      Objective     Vital Signs  Temp:  [97.5 °F (36.4 °C)-98.7 °F (37.1 °C)] 98.6 °F (37 °C)  Heart Rate:  [77-98] 88  Resp:  [20-22] 20  BP: (120-155)/(66-84) 155/74    Flowsheet Rows         First Filed Value    Admission Height  177.8 cm (70\") Documented at 01/19/2018 0037    Admission Weight  90.7 kg (200 lb) Documented at 01/19/2018 0037          I/O this shift:  In: -   Out: 975 [Urine:400; Stool:575]  I/O last 3 completed shifts:  In: 1180 [P.O.:580; Blood:600]  Out: 2025 [Urine:525; Stool:1500]    Intake/Output Summary (Last 24 hours) at 01/21/18 1006  Last data filed at 01/21/18 0934   Gross per 24 hour   Intake              940 ml   Output             1800 ml   Net             -860 ml       Physical Exam:   No jvd, no lad.    Chest:  ctab  Cardiac:  s1s2 irreg  abd soft nontender bs+  Ext no c/c/e     Results Review:      Results from last 7 days  Lab Units 01/21/18  0547 01/20/18  0524 01/19/18  0054   SODIUM mmol/L 128* 125* 123*   POTASSIUM mmol/L 3.9 4.0 4.6   CHLORIDE mmol/L 94* 91* 89*   CO2 mmol/L 17.1* 14.3* 10.3*   BUN mg/dL 81* 109* 141*   CREATININE mg/dL 4.24* 5.27* 7.44*   CALCIUM mg/dL 7.9* 7.9* 8.1*   BILIRUBIN mg/dL  --  2.0* 1.8*   ALK PHOS U/L  --  122* 136*   ALT (SGPT) U/L  --  20 17   AST (SGOT) U/L  --  36 33   GLUCOSE mg/dL 97 99 165*       Estimated " Creatinine Clearance: 17.8 mL/min (by C-G formula based on Cr of 4.24).      Results from last 7 days  Lab Units 01/20/18  0524   PHOSPHORUS mg/dL 7.2*               Results from last 7 days  Lab Units 01/21/18  0547 01/20/18 0524 01/19/18 0054   WBC 10*3/mm3 5.70 6.06 8.93   HEMOGLOBIN g/dL 7.6* 6.4* 7.7*   PLATELETS 10*3/mm3 67* 80* 82*         Results from last 7 days  Lab Units 01/19/18  0054   INR  1.91*         Imaging Results (last 24 hours)     ** No results found for the last 24 hours. **          aspirin 81 mg Oral Daily   cetirizine 5 mg Oral Daily   famotidine 20 mg Oral BID   febuxostat 80 mg Oral Daily   ferrous sulfate 325 mg Oral Daily With Breakfast   fluconazole 100 mg Oral Daily   folic acid 1 mg Oral Daily   guaiFENesin 600 mg Oral Q12H   hydrALAZINE 100 mg Oral TID   ipratropium-albuterol 3 mL Nebulization Q4H - RT   latanoprost 1 drop Both Eyes Nightly   levoFLOXacin 500 mg Intravenous Q48H   nystatin  Topical Q12H   sodium bicarbonate 650 mg Oral BID   terazosin 5 mg Oral Nightly   vitamin B-12 1,000 mcg Oral Daily          Medication Review:   Current Facility-Administered Medications   Medication Dose Route Frequency Provider Last Rate Last Dose   • aspirin EC tablet 81 mg  81 mg Oral Daily Marcellus Osborne MD   81 mg at 01/21/18 0926   • cetirizine (zyrTEC) tablet 5 mg  5 mg Oral Daily Marcellus Osborne MD       • famotidine (PEPCID) tablet 20 mg  20 mg Oral BID Marcellus Osborne MD   20 mg at 01/21/18 0926   • febuxostat (ULORIC) tablet 80 mg  80 mg Oral Daily Marcellus Osobrne MD   80 mg at 01/21/18 0926   • ferrous sulfate tablet 325 mg  325 mg Oral Daily With Breakfast Marcellus Osborne MD   325 mg at 01/21/18 0926   • fluconazole (DIFLUCAN) tablet 100 mg  100 mg Oral Daily Marcellus Osborne MD   100 mg at 01/21/18 0926   • folic acid (FOLVITE) tablet 1 mg  1 mg Oral Daily Marcellus Osborne MD   1 mg at 01/21/18 0926   • guaiFENesin (MUCINEX) 12 hr tablet 600 mg  600 mg Oral Q12H Marcellus MD [place]   600 mg at 01/21/18  0926   • hydrALAZINE (APRESOLINE) tablet 100 mg  100 mg Oral TID Marcellus Osborne MD   100 mg at 01/21/18 0926   • ipratropium-albuterol (DUO-NEB) nebulizer solution 3 mL  3 mL Nebulization Q4H - RT Marcellus Osborne MD   3 mL at 01/21/18 0812   • latanoprost (XALATAN) 0.005 % ophthalmic solution 1 drop  1 drop Both Eyes Nightly Marcellus Osborne MD   1 drop at 01/20/18 2041   • levoFLOXacin (LEVAQUIN) 500 mg/100 mL D5W (premix) (LEVAQUIN) 500 mg  500 mg Intravenous Q48H Kvng Monsivais MD       • nebivolol (BYSTOLIC) tablet 2.5 mg  2.5 mg Oral PRN Marcellus Osborne MD       • nystatin (MYCOSTATIN) powder   Topical Q12H Marcellus Osborne MD       • sodium bicarbonate tablet 650 mg  650 mg Oral BID Marcellus Osborne MD   650 mg at 01/21/18 0926   • sodium chloride 0.9 % flush 10 mL  10 mL Intravenous PRN Rick Pisano MD       • terazosin (HYTRIN) capsule 5 mg  5 mg Oral Nightly Marcellus Osborne MD   5 mg at 01/20/18 2041   • vitamin B-12 (CYANOCOBALAMIN) tablet 1,000 mcg  1,000 mcg Oral Daily Marcellus Osborne MD   1,000 mcg at 01/21/18 0926       Assessment/Plan       Active Problems:    Pneumonia of both lungs due to infectious organism    1.  New end-stage renal disease with uremic symptoms.  2.  Hypertension reasonably controlled.  3.  Anemia and thrombocytopenia, picture suggestive of myelodysplastic syndrome and gastritis.  hgb only came up one gram despite giving two units yesterday.  Asa held.  Start procrit  4.  Crohn's disease with prior colectomy and currently has an ileostomy  5.  History of nephrolithiasis  6.  Pneumonia  7.  Metabolic acidosis:  Correct with dialysis, and on oral sodium bicarbonate  8.  Hyponatremia:  Due to free water excretion deficit:  Correct with dialysis  9.  Hyperphosphatemia:  Gently start binder, add calcitriol          Plan:  1.   Recheck stat hgb, if dropping plan 2 units prbcs with dialysis  2.  Start renvela, calcitriol.  3.  Add protein supplements  4.  Outpatient dialysis is already arranged and  every Monday, Wednesday and Friday at the [facility] dialysis clinic.          Lisa Osorio MD  01/21/18  10:06 AM

## 2018-01-21 NOTE — SIGNIFICANT NOTE
"   01/21/18 1515   Rehab Treatment   Discipline physical therapist   Rehab Evaluation   Evaluation Not Performed other (see comments)  (RN Tess states that the pt is \"bleeding,\" thus PT to hold until tomorrow if pt more appropriate. )   Recommendation   PT - Next Appointment 01/22/18     "

## 2018-01-21 NOTE — CONSULTS
Bleeding into ostomy appliance. Recently had endoscopic evaluation that was negative.  There is about 200 mL of blood and clot in an appliance that was replaced 3 hours ago.    There is a 2 cm ulcerated area to the right lower edge of the stoma that is oozing. I sutured it closed with 3-0 chromic suture.  Hemostasis was good.

## 2018-01-21 NOTE — PROGRESS NOTES
"Daily progress note    Chief complaint  Complain of blood in the ileostomy bag    History of present illness  Pt is a 72 y.o. male who presents complaining of SOA that began tonight at 21:00. Pt denies CP but complains of generalized fatigue. Pt is scheduled to start dialysis tomorrow. Pt has an EGD on 1/17/18, and was dx with a fungal infection and esophageal bleeding. Pt was started on Diflucan at that time. Pt also had a fistulagram on 1/15/18, and was given Valium at that time. Afterwards, they were informed by the pt's nephrologist that the pt may have a hard time metabolizing the medication. On arrival to ED, pt had room air sat's of 88%.   patient alert oriented denies any productive cough fever chills night sweats or weight loss.  Patient stated that he has gained some weight     REVIEW OF SYSTEMS  Review of Systems   Constitutional: Positive for fatigue. Negative for chills and fever.   HENT: Negative for congestion and sore throat.    Eyes: Negative.    Respiratory: Positive for shortness of breath. Negative for cough.    Cardiovascular: Negative for chest pain and leg swelling.   Gastrointestinal: Negative for abdominal pain, diarrhea and vomiting.   Genitourinary: Negative for difficulty urinating and dysuria.   Musculoskeletal: Negative for back pain and neck pain.   Skin: Negative for rash and wound.   Allergic/Immunologic: Negative.    Neurological: Negative for dizziness, weakness, numbness and headaches.   Psychiatric/Behavioral: Negative.    All other systems reviewed and are negative.     PHYSICAL EXAM  Blood pressure 152/63, pulse 81, temperature 98.1 °F (36.7 °C), temperature source Oral, resp. rate 20, height 177.8 cm (70\"), weight 89.9 kg (198 lb 4.8 oz), SpO2 96 %.    Constitutional: He is oriented to person, place, and time and well-developed, well-nourished, and in no distress.   Head: Normocephalic and atraumatic.   Eyes: EOM are normal. Pupils are equal, round, and reactive to light. "   Neck: Normal range of motion. Neck supple.   Cardiovascular: Normal heart sounds.  An irregularly irregular rhythm present. Tachycardia present.    Pulmonary/Chest: Effort normal. No respiratory distress. He has rhonchi in the left lower field.   Abdominal: Soft. There is no tenderness. There is no rebound and no guarding.   Musculoskeletal: Normal range of motion. He exhibits no edema.   Neurological: He is alert and oriented to person, place, and time. He has normal sensation and normal strength.   Skin: Skin is warm and dry.   Psychiatric: Mood and affect normal.     LAB RESULTS  Lab Results (last 24 hours)     Procedure Component Value Units Date/Time    Blood Culture - Blood, [[id number]]  (Normal) Collected:  01/19/18 0120    Specimen:  Blood from Arm, Left Updated:  01/21/18 0146     Blood Culture No growth at 2 days    Blood Culture - Blood, [[id number]]  (Normal) Collected:  01/19/18 0134    Specimen:  Blood from Arm, Left Updated:  01/21/18 0217     Blood Culture No growth at 2 days    CBC & Differential [[id number]] Collected:  01/21/18 0547    Specimen:  Blood Updated:  01/21/18 0710    Narrative:       The following orders were created for panel order CBC & Differential.  Procedure                               Abnormality         Status                     ---------                               -----------         ------                     CBC Auto Differential[[id number]]        Abnormal            Final result                 Please view results for these tests on the individual orders.    CBC Auto Differential [[id number]]  (Abnormal) Collected:  01/21/18 0547    Specimen:  Blood Updated:  01/21/18 0710     WBC 5.70 10*3/mm3      RBC 2.61 (L) 10*6/mm3      Hemoglobin 7.6 (L) g/dL      Hematocrit 22.1 (L) %      MCV 84.7 fL      MCH 29.1 pg      MCHC 34.4 g/dL      RDW 16.9 (H) %      RDW-SD 52.4 fl      MPV 9.1 fL      Platelets 67 (L) 10*3/mm3      Neutrophil % 87.5 (H) %      Lymphocyte % 4.9 (L) %       Monocyte % 6.0 %      Eosinophil % 0.4 %      Basophil % 0.0 %      Immature Grans % 1.2 (H) %      Neutrophils, Absolute 4.99 10*3/mm3      Lymphocytes, Absolute 0.28 (L) 10*3/mm3      Monocytes, Absolute 0.34 10*3/mm3      Eosinophils, Absolute 0.02 10*3/mm3      Basophils, Absolute 0.00 10*3/mm3      Immature Grans, Absolute 0.07 (H) 10*3/mm3     Basic Metabolic Panel [[id number]]  (Abnormal) Collected:  01/21/18 0547    Specimen:  Blood Updated:  01/21/18 0723     Glucose 97 mg/dL      BUN 81 (H) mg/dL      Creatinine 4.24 (H) mg/dL      Sodium 128 (L) mmol/L      Potassium 3.9 mmol/L      Chloride 94 (L) mmol/L      CO2 17.1 (L) mmol/L      Calcium 7.9 (L) mg/dL      eGFR Non African Amer 14 (L) mL/min/1.73      BUN/Creatinine Ratio 19.1     Anion Gap 16.9 mmol/L     Narrative:       The MDRD GFR formula is only valid for adults with stable renal function between ages 18 and 70.    Hemoglobin & Hematocrit, Blood [[id number]]  (Abnormal) Collected:  01/21/18 1042    Specimen:  Blood Updated:  01/21/18 1053     Hemoglobin 7.8 (L) g/dL      Hematocrit 22.6 (L) %         Imaging Results (last 24 hours)     ** No results found for the last 24 hours. **             EKG                                                  Rhythm/Rate: Afib 71  P waves and OR: absent   QRS, axis: nml   ST and T waves: nml       Current Facility-Administered Medications:   •  aspirin EC tablet 81 mg, 81 mg, Oral, Daily, Marcellus Osborne MD, 81 mg at 01/20/18 0844  •  calcitriol (ROCALTROL) capsule 0.25 mcg, 0.25 mcg, Oral, Daily, Lisa Osorio MD, 0.25 mcg at 01/21/18 1331  •  cetirizine (zyrTEC) tablet 5 mg, 5 mg, Oral, Daily, Marcellus Osborne MD  •  epoetin tip (EPOGEN,PROCRIT) injection 10,000 Units, 10,000 Units, Intravenous, Once per day on Mon Wed Lisa Adam MD  •  famotidine (PEPCID) tablet 20 mg, 20 mg, Oral, BID, Marcellus Osborne MD, 20 mg at 01/21/18 0926  •  febuxostat (ULORIC) tablet 80 mg, 80 mg, Oral,  Daily, Marcellus Osborne MD, 80 mg at 01/21/18 0926  •  ferrous sulfate tablet 325 mg, 325 mg, Oral, Daily With Breakfast, Marcellus Osborne MD, 325 mg at 01/21/18 0926  •  fluconazole (DIFLUCAN) tablet 100 mg, 100 mg, Oral, Daily, Marcellus Osborne MD, 100 mg at 01/21/18 0926  •  folic acid (FOLVITE) tablet 1 mg, 1 mg, Oral, Daily, Marcellus Osborne MD, 1 mg at 01/21/18 0926  •  guaiFENesin (MUCINEX) 12 hr tablet 600 mg, 600 mg, Oral, Q12H, Marcellus Osborne MD, 600 mg at 01/21/18 0926  •  hydrALAZINE (APRESOLINE) tablet 100 mg, 100 mg, Oral, TID, Marcellus Osborne MD, 100 mg at 01/21/18 0926  •  ipratropium-albuterol (DUO-NEB) nebulizer solution 3 mL, 3 mL, Nebulization, Q4H - RT, Marcellus Osborne MD, 3 mL at 01/21/18 1452  •  latanoprost (XALATAN) 0.005 % ophthalmic solution 1 drop, 1 drop, Both Eyes, Nightly, Marcellus Osborne MD, 1 drop at 01/20/18 2041  •  levoFLOXacin (LEVAQUIN) 500 mg/100 mL D5W (premix) (LEVAQUIN) 500 mg, 500 mg, Intravenous, Q48H, Kvng Monsivais MD, 500 mg at 01/21/18 1329  •  nebivolol (BYSTOLIC) tablet 2.5 mg, 2.5 mg, Oral, PRN, Marcellus Osborne MD  •  nystatin (MYCOSTATIN) powder, , Topical, Q12H, Marcellus Osborne MD  •  sevelamer (RENVELA) tablet 800 mg, 800 mg, Oral, TID With Meals, Lisa Osorio MD  •  sodium bicarbonate tablet 650 mg, 650 mg, Oral, BID, Marcellus Osborne MD, 650 mg at 01/21/18 0926  •  Insert peripheral IV, , , Once **AND** sodium chloride 0.9 % flush 10 mL, 10 mL, Intravenous, PRN, Rick Pisano MD  •  terazosin (HYTRIN) capsule 5 mg, 5 mg, Oral, Nightly, Marcellus Osborne MD, 5 mg at 01/20/18 2041  •  vitamin B-12 (CYANOCOBALAMIN) tablet 1,000 mcg, 1,000 mcg, Oral, Daily, Marcellus Osborne MD, 1,000 mcg at 01/21/18 0926     ASSESSMENT  Bilateral pneumonia with sepsis  End-stage renal disease started on hemodialysis on this admission  Acute on Chronic anemia  Hypertension  Gastroesophageal reflux disease  Candida esophagitis  COPD  BPH    PLAN  CPM  Supplement oxygen nebulizer IV antibiotics  Transfuse 2  units packed RBC with hemodialysis in a.m.  Continue home medications   Gastroenterology consult  Hemodialysis TIW  Supportive care  Stress ulcer DVT prophylaxis  PT/OT  Follow closely further recommendation according to hospital course    LUCINDA RUANO MD

## 2018-01-22 LAB
ANION GAP SERPL CALCULATED.3IONS-SCNC: 17.4 MMOL/L
BASOPHILS # BLD AUTO: 0 10*3/MM3 (ref 0–0.2)
BASOPHILS NFR BLD AUTO: 0 % (ref 0–1.5)
BUN BLD-MCNC: 94 MG/DL (ref 8–23)
BUN/CREAT SERPL: 20.1 (ref 7–25)
CALCIUM SPEC-SCNC: 7.8 MG/DL (ref 8.6–10.5)
CHLORIDE SERPL-SCNC: 95 MMOL/L (ref 98–107)
CO2 SERPL-SCNC: 16.6 MMOL/L (ref 22–29)
CREAT BLD-MCNC: 4.67 MG/DL (ref 0.76–1.27)
DEPRECATED RDW RBC AUTO: 53.3 FL (ref 37–54)
EOSINOPHIL # BLD AUTO: 0.05 10*3/MM3 (ref 0–0.7)
EOSINOPHIL NFR BLD AUTO: 0.6 % (ref 0.3–6.2)
ERYTHROCYTE [DISTWIDTH] IN BLOOD BY AUTOMATED COUNT: 17.1 % (ref 11.5–14.5)
GFR SERPL CREATININE-BSD FRML MDRD: 12 ML/MIN/1.73
GLUCOSE BLD-MCNC: 91 MG/DL (ref 65–99)
HCT VFR BLD AUTO: 22.3 % (ref 40.4–52.2)
HGB BLD-MCNC: 7.5 G/DL (ref 13.7–17.6)
IMM GRANULOCYTES # BLD: 0.06 10*3/MM3 (ref 0–0.03)
IMM GRANULOCYTES NFR BLD: 0.8 % (ref 0–0.5)
LYMPHOCYTES # BLD AUTO: 0.25 10*3/MM3 (ref 0.9–4.8)
LYMPHOCYTES NFR BLD AUTO: 3.2 % (ref 19.6–45.3)
MCH RBC QN AUTO: 28.7 PG (ref 27–32.7)
MCHC RBC AUTO-ENTMCNC: 33.6 G/DL (ref 32.6–36.4)
MCV RBC AUTO: 85.4 FL (ref 79.8–96.2)
MONOCYTES # BLD AUTO: 0.46 10*3/MM3 (ref 0.2–1.2)
MONOCYTES NFR BLD AUTO: 6 % (ref 5–12)
NEUTROPHILS # BLD AUTO: 6.89 10*3/MM3 (ref 1.9–8.1)
NEUTROPHILS NFR BLD AUTO: 89.4 % (ref 42.7–76)
PLATELET # BLD AUTO: 67 10*3/MM3 (ref 140–500)
PMV BLD AUTO: 8.9 FL (ref 6–12)
POTASSIUM BLD-SCNC: 4.4 MMOL/L (ref 3.5–5.2)
RBC # BLD AUTO: 2.61 10*6/MM3 (ref 4.6–6)
SODIUM BLD-SCNC: 129 MMOL/L (ref 136–145)
WBC NRBC COR # BLD: 7.71 10*3/MM3 (ref 4.5–10.7)

## 2018-01-22 PROCEDURE — 85025 COMPLETE CBC W/AUTO DIFF WBC: CPT | Performed by: HOSPITALIST

## 2018-01-22 PROCEDURE — 94799 UNLISTED PULMONARY SVC/PX: CPT

## 2018-01-22 PROCEDURE — 5A1D70Z PERFORMANCE OF URINARY FILTRATION, INTERMITTENT, LESS THAN 6 HOURS PER DAY: ICD-10-PCS | Performed by: INTERNAL MEDICINE

## 2018-01-22 PROCEDURE — P9016 RBC LEUKOCYTES REDUCED: HCPCS

## 2018-01-22 PROCEDURE — 99232 SBSQ HOSP IP/OBS MODERATE 35: CPT | Performed by: INTERNAL MEDICINE

## 2018-01-22 PROCEDURE — 86900 BLOOD TYPING SEROLOGIC ABO: CPT

## 2018-01-22 PROCEDURE — 63510000001 EPOETIN ALFA PER 1000 UNITS: Performed by: INTERNAL MEDICINE

## 2018-01-22 PROCEDURE — 80048 BASIC METABOLIC PNL TOTAL CA: CPT | Performed by: HOSPITALIST

## 2018-01-22 RX ORDER — ASPIRIN 81 MG/1
81 TABLET, CHEWABLE ORAL DAILY
Status: DISCONTINUED | OUTPATIENT
Start: 2018-01-22 | End: 2018-01-25 | Stop reason: HOSPADM

## 2018-01-22 RX ORDER — LIDOCAINE AND PRILOCAINE 25; 25 MG/G; MG/G
CREAM TOPICAL ONCE
Status: COMPLETED | OUTPATIENT
Start: 2018-01-22 | End: 2018-01-22

## 2018-01-22 RX ORDER — PANTOPRAZOLE SODIUM 40 MG/1
40 TABLET, DELAYED RELEASE ORAL
Status: CANCELLED | OUTPATIENT
Start: 2018-01-22

## 2018-01-22 RX ADMIN — CETIRIZINE HYDROCHLORIDE 5 MG: 10 TABLET, FILM COATED ORAL at 18:03

## 2018-01-22 RX ADMIN — FAMOTIDINE 20 MG: 20 TABLET, FILM COATED ORAL at 21:28

## 2018-01-22 RX ADMIN — SODIUM BICARBONATE 650 MG: 650 TABLET ORAL at 10:24

## 2018-01-22 RX ADMIN — IPRATROPIUM BROMIDE AND ALBUTEROL SULFATE 3 ML: .5; 3 SOLUTION RESPIRATORY (INHALATION) at 11:36

## 2018-01-22 RX ADMIN — CYANOCOBALAMIN TAB 500 MCG 1000 MCG: 500 TAB at 18:04

## 2018-01-22 RX ADMIN — FLUCONAZOLE 100 MG: 100 TABLET ORAL at 18:04

## 2018-01-22 RX ADMIN — GUAIFENESIN 600 MG: 600 TABLET, EXTENDED RELEASE ORAL at 10:25

## 2018-01-22 RX ADMIN — HYDRALAZINE HYDROCHLORIDE 100 MG: 50 TABLET, FILM COATED ORAL at 10:24

## 2018-01-22 RX ADMIN — SEVELAMER CARBONATE 800 MG: 800 TABLET, FILM COATED ORAL at 10:24

## 2018-01-22 RX ADMIN — IPRATROPIUM BROMIDE AND ALBUTEROL SULFATE 3 ML: .5; 3 SOLUTION RESPIRATORY (INHALATION) at 19:16

## 2018-01-22 RX ADMIN — FEBUXOSTAT 80 MG: 40 TABLET ORAL at 18:03

## 2018-01-22 RX ADMIN — NYSTATIN 1 APPLICATION: 100000 POWDER TOPICAL at 10:26

## 2018-01-22 RX ADMIN — NYSTATIN: 100000 POWDER TOPICAL at 21:31

## 2018-01-22 RX ADMIN — LIDOCAINE AND PRILOCAINE 1 APPLICATION: 25; 25 CREAM TOPICAL at 11:43

## 2018-01-22 RX ADMIN — ASPIRIN 81 MG: 81 TABLET, CHEWABLE ORAL at 21:28

## 2018-01-22 RX ADMIN — HYDRALAZINE HYDROCHLORIDE 100 MG: 50 TABLET, FILM COATED ORAL at 18:04

## 2018-01-22 RX ADMIN — TERAZOSIN HYDROCHLORIDE 5 MG: 5 CAPSULE ORAL at 21:28

## 2018-01-22 RX ADMIN — SODIUM BICARBONATE 650 MG: 650 TABLET ORAL at 21:27

## 2018-01-22 RX ADMIN — GUAIFENESIN 600 MG: 600 TABLET, EXTENDED RELEASE ORAL at 21:28

## 2018-01-22 RX ADMIN — LATANOPROST 1 DROP: 50 SOLUTION OPHTHALMIC at 21:37

## 2018-01-22 RX ADMIN — IPRATROPIUM BROMIDE AND ALBUTEROL SULFATE 3 ML: .5; 3 SOLUTION RESPIRATORY (INHALATION) at 07:39

## 2018-01-22 RX ADMIN — ERYTHROPOIETIN 10000 UNITS: 10000 INJECTION, SOLUTION INTRAVENOUS; SUBCUTANEOUS at 18:29

## 2018-01-22 RX ADMIN — PANTOPRAZOLE SODIUM 40 MG: 40 INJECTION, POWDER, FOR SOLUTION INTRAVENOUS at 18:05

## 2018-01-22 RX ADMIN — PANTOPRAZOLE SODIUM 40 MG: 40 INJECTION, POWDER, FOR SOLUTION INTRAVENOUS at 07:41

## 2018-01-22 RX ADMIN — FAMOTIDINE 20 MG: 20 TABLET, FILM COATED ORAL at 10:24

## 2018-01-22 RX ADMIN — HYDRALAZINE HYDROCHLORIDE 100 MG: 50 TABLET, FILM COATED ORAL at 21:28

## 2018-01-22 RX ADMIN — FERROUS SULFATE TAB 325 MG (65 MG ELEMENTAL FE) 325 MG: 325 (65 FE) TAB at 18:04

## 2018-01-22 RX ADMIN — FOLIC ACID 1 MG: 1 TABLET ORAL at 18:04

## 2018-01-22 RX ADMIN — CALCITRIOL 0.25 MCG: 0.25 CAPSULE, LIQUID FILLED ORAL at 18:03

## 2018-01-22 RX ADMIN — SEVELAMER CARBONATE 800 MG: 800 TABLET, FILM COATED ORAL at 18:04

## 2018-01-22 NOTE — SIGNIFICANT NOTE
01/22/18 0836   Rehab Treatment   Discipline occupational therapist   Rehab Evaluation   Evaluation Not Performed patient/family declined evaluation  (Pt and spouse decline OT eval this a.m. States possible having procedure today and has not had anything to eat. States also going for dialysis. Request hold OT at this time 562)

## 2018-01-22 NOTE — SIGNIFICANT NOTE
01/22/18 0859   Rehab Evaluation   Evaluation Not Performed patient/family declined evaluation  (Pt./family declining P.T. this AM reporting patient has not been able to eat and will likely be going for procedure and dialysis this day.  Pt./family would like P.T. to check back this PM once they have seen his MD. Will follow up this afternoon.)   Recommendation   PT - Next Appointment 01/22/18

## 2018-01-22 NOTE — SIGNIFICANT NOTE
01/22/18 1536   Rehab Treatment   Discipline occupational therapist   Rehab Evaluation   Evaluation Not Performed patient unavailable for evaluation  (dialysis 1536)

## 2018-01-22 NOTE — PLAN OF CARE
Problem: Patient Care Overview (Adult)  Goal: Plan of Care Review  Outcome: Ongoing (interventions implemented as appropriate)   01/21/18 7826   Coping/Psychosocial Response Interventions   Plan Of Care Reviewed With patient;spouse   Patient Care Overview   Progress no change   Outcome Evaluation   Outcome Summary/Follow up Plan Bleeding from around stoma site. Surgery consulted. Dr. Howard sutured site. Monitor site. Will have dialysis tomorrow and blood to be given with dialysis Monitor labs and monitor for bleeding.     Goal: Adult Individualization and Mutuality  Outcome: Ongoing (interventions implemented as appropriate)    Goal: Discharge Needs Assessment  Outcome: Ongoing (interventions implemented as appropriate)      Problem: Respiratory Insufficiency (Adult)  Goal: Acid/Base Balance  Outcome: Ongoing (interventions implemented as appropriate)    Goal: Effective Ventilation  Outcome: Ongoing (interventions implemented as appropriate)      Problem: Fall Risk (Adult)  Goal: Absence of Falls  Outcome: Ongoing (interventions implemented as appropriate)

## 2018-01-22 NOTE — PROGRESS NOTES
Discharge Planning Assessment  [facility]     Patient Name: Bill Landry  MRN: [id number]  Today's Date: 1/22/2018    Admit Date: 1/19/2018          Discharge Needs Assessment       01/22/18 1401    Discharge Needs Assessment    Equipment Currently Used at Home colostomy/ostomy supplies;bipap/ cpap   ostomy supplies for [facility] in [place]      01/22/18 1355    Living Environment    Lives With spouse    Living Arrangements house    Home Accessibility no concerns    Stair Railings at Home none    Type of Financial/Environmental Concern none    Transportation Available car    Living Environment    Provides Primary Care For no one    Quality Of Family Relationships helpful;involved;supportive    Discharge Needs Assessment    Concerns To Be Addressed no discharge needs identified;denies needs/concerns at this time    Readmission Within The Last 30 Days no previous admission in last 30 days    Equipment Currently Used at Home none            Discharge Plan       01/22/18 1355    Case Management/Social Work Plan    Plan Home with spouse assist    Patient/Family In Agreement With Plan yes    Additional Comments Facesheet verified.  IMM documented.  Patient lives in a house with his spouse.  He is IADLs.  Has no DME or rehab history.  He plans home at DC with his spouse.  Agrees with plan for CCP to assist with needs and DC planning as needed...........................Angelica Hudson RN        Discharge Placement     No information found                Demographic Summary       01/22/18 1354    Primary Care Physician Information    Name Nina Tam MD    Phone [phone]      01/22/18 135    Referral Information    Admission Type inpatient    Arrived From home or self-care    Record Reviewed medical record;history and physical    Contact Information    Permission Granted to Share Information With family/designee    Comments spouse, Gillian Landry            Functional Status       01/22/18 4517     Functional Status Current    Ambulation 2-->assistive person    Transferring 2-->assistive person    Toileting 2-->assistive person    Bathing 2-->assistive person    Dressing 0-->independent    Eating 0-->independent    Communication 0-->understands/communicates without difficulty    Functional Status Prior    Ambulation 0-->independent    Transferring 0-->independent    Toileting 0-->independent    Bathing 0-->independent    Dressing 0-->independent    Eating 0-->independent    Communication 0-->understands/communicates without difficulty    Swallowing 0-->swallows foods/liquids without difficulty    IADL    Medications independent    Meal Preparation independent    Housekeeping independent    Laundry independent    Shopping independent    Oral Care independent    Activity Tolerance    Usual Activity Tolerance good    Current Activity Tolerance fair    Cognitive/Perceptual/Developmental    Current Mental Status/Cognitive Functioning no deficits noted            Psychosocial     None            Abuse/Neglect     None            Legal     None            Substance Abuse     None            Patient Forms     None          Angelica Hudson, RN

## 2018-01-22 NOTE — SIGNIFICANT NOTE
01/22/18 1554   Rehab Evaluation   Evaluation Not Performed patient unavailable for evaluation  (Checked x 2 this PM and pt. off the floor for Dialysis treatment. Will have to follow up tomorrow.)   Recommendation   PT - Next Appointment 01/23/18

## 2018-01-22 NOTE — PROGRESS NOTES
Continued Stay Note  [facility]     Patient Name: Bill Landry  MRN: [id number]  Today's Date: 1/22/2018    Admit Date: 1/19/2018          Discharge Plan       01/22/18 1355    Case Management/Social Work Plan    Plan Home with spouse assist    Patient/Family In Agreement With Plan yes    Additional Comments Facesheet verified.  IMM documented.  Patient lives in a house with his spouse.  He is IADLs.  Has no DME or rehab history.  He plans home at DC with his spouse.  Agrees with plan for CCP to assist with needs and DC planning as needed...........................Angelica Hudson RN              Discharge Codes     None            Angelica Hudson RN

## 2018-01-22 NOTE — PLAN OF CARE
Problem: Patient Care Overview (Adult)  Goal: Plan of Care Review  Outcome: Ongoing (interventions implemented as appropriate)   01/21/18 2055 01/22/18 0335   Coping/Psychosocial Response Interventions   Plan Of Care Reviewed With patient;son --    Patient Care Overview   Progress --  improving   Outcome Evaluation   Outcome Summary/Follow up Plan --  Bleeding discovered around pt's stoma site that had been bleeding into ostomy bag. Dr. Howard sutured the site yesterday-since then no bleeding noticed from stoma site and no blood observed in stoma bag, emptied twice-stool output was brown. Pt to have dialysis tomorrow. Fistula site red and ecchymotic but surrounding tissue feels soft and strong bruit/thrill. Monitor H&H-pt to get 2 units PRBC during dialysis tomorrow. Nystatin powder to abdominal skin folds-area of blanchable redness on L buttock-freqeunt turns. ASA was D/C'd yesterday r/t pt's bleeding. VSS. GI to eval for possibility of EGD today...continue to monitor.      Goal: Adult Individualization and Mutuality  Outcome: Ongoing (interventions implemented as appropriate)      Problem: Respiratory Insufficiency (Adult)  Goal: Acid/Base Balance  Outcome: Ongoing (interventions implemented as appropriate)   01/22/18 0335   Respiratory Insufficiency (Adult)   Acid/Base Balance making progress toward outcome     Goal: Effective Ventilation  Outcome: Ongoing (interventions implemented as appropriate)   01/22/18 0335   Respiratory Insufficiency (Adult)   Effective Ventilation making progress toward outcome  (O2 VIA N/C DURING AWAKE HOURS, BIPAP AT NIGHT)       Problem: Fall Risk (Adult)  Goal: Absence of Falls  Outcome: Outcome(s) achieved Date Met: 01/22/18 01/22/18 0335   Fall Risk (Adult)   Absence of Falls achieves outcome  (SAFETY MAINTAINED-BED ALARM/FAMILY AT BEDSIDE)

## 2018-01-22 NOTE — PROGRESS NOTES
"   LOS: 3 days    Patient Care Team:  Nina Tam MD as PCP - General (Internal Medicine)  Rodolfo Gordon MD as PCP - Claims Attributed  Sharif Mckenzie MD as Consulting Physician (Hematology and Oncology)  Marcellus Osborne MD as Referring Physician (Internal Medicine)  Brenton Aiken MD as Consulting Physician (Cardiology)  Ken Moseley MD as Consulting Physician (Pulmonary Disease)  Cooper Virgen MD as Consulting Physician (Urology)    Chief Complaint:    Chief Complaint   Patient presents with   • Shortness of Breath       Subjective follow-up end-stage renal disease    Interval History:   Patient was found to have bleeding from an ulcer near the stoma of his ileostomy, bleeding has stopped.  He's feeling better, denies any chest pain or shortness of air, no nausea or vomiting.  No dysuria or gross hematuria..      Objective     Vital Signs  Temp:  [98.1 °F (36.7 °C)-98.5 °F (36.9 °C)] 98.3 °F (36.8 °C)  Heart Rate:  [81-93] 90  Resp:  [18-20] 20  BP: (126-152)/(58-75) 126/69    Flowsheet Rows         First Filed Value    Admission Height  177.8 cm (70\") Documented at 01/19/2018 0037    Admission Weight  90.7 kg (200 lb) Documented at 01/19/2018 0037             I/O last 3 completed shifts:  In: 680 [P.O.:680]  Out: 3345 [Urine:1220; Stool:2125]    Intake/Output Summary (Last 24 hours) at 01/22/18 1035  Last data filed at 01/22/18 0538   Gross per 24 hour   Intake              440 ml   Output             1920 ml   Net            -1480 ml       Physical Exam:  Gen. Appearance: Awake and alert, no acute distress  Skin: warm and dry, pale  HEENT: Nonicteric sclerae, oral mucosa normal,   Neck: supple, no JVD, trachea midline  Lungs: Bilateral rhonchi, unlabored breathing effort  Heart: Irregularly irregular, no rub  Abdomen: soft, non-tender, functional ileoscopy, present bowel sounds to auscultation  : no palpable bladder  Extremities: no edema, cyanosis or clubbing, functional AV fistula in the " left upper  Neuro: normal speech and mental status       Results Review:      Results from last 7 days  Lab Units 01/22/18  0350 01/21/18  0547 01/20/18  0524 01/19/18  0054   SODIUM mmol/L 129* 128* 125* 123*   POTASSIUM mmol/L 4.4 3.9 4.0 4.6   CHLORIDE mmol/L 95* 94* 91* 89*   CO2 mmol/L 16.6* 17.1* 14.3* 10.3*   BUN mg/dL 94* 81* 109* 141*   CREATININE mg/dL 4.67* 4.24* 5.27* 7.44*   CALCIUM mg/dL 7.8* 7.9* 7.9* 8.1*   BILIRUBIN mg/dL  --   --  2.0* 1.8*   ALK PHOS U/L  --   --  122* 136*   ALT (SGPT) U/L  --   --  20 17   AST (SGOT) U/L  --   --  36 33   GLUCOSE mg/dL 91 97 99 165*       Estimated Creatinine Clearance: 17.4 mL/min (by C-G formula based on Cr of 4.67).      Results from last 7 days  Lab Units 01/20/18  0524   PHOSPHORUS mg/dL 7.2*               Results from last 7 days  Lab Units 01/22/18  0350 01/21/18  1042 01/21/18  0547 01/20/18  0524 01/19/18  0054   WBC 10*3/mm3 7.71  --  5.70 6.06 8.93   HEMOGLOBIN g/dL 7.5* 7.8* 7.6* 6.4* 7.7*   PLATELETS 10*3/mm3 67*  --  67* 80* 82*         Results from last 7 days  Lab Units 01/19/18  0054   INR  1.91*         Imaging Results (last 24 hours)     ** No results found for the last 24 hours. **          calcitriol 0.25 mcg Oral Daily   cetirizine 5 mg Oral Daily   epoetin tip 10,000 Units Intravenous Once per day on Mon Wed Fri   famotidine 20 mg Oral BID   febuxostat 80 mg Oral Daily   ferrous sulfate 325 mg Oral Daily With Breakfast   fluconazole 100 mg Oral Daily   folic acid 1 mg Oral Daily   guaiFENesin 600 mg Oral Q12H   hydrALAZINE 100 mg Oral TID   ipratropium-albuterol 3 mL Nebulization Q4H - RT   latanoprost 1 drop Both Eyes Nightly   levoFLOXacin 500 mg Intravenous Q48H   nystatin  Topical Q12H   pantoprazole 40 mg Intravenous BID AC   sevelamer 800 mg Oral TID With Meals   sodium bicarbonate 650 mg Oral BID   terazosin 5 mg Oral Nightly   vitamin B-12 1,000 mcg Oral Daily          Medication Review:   Current Facility-Administered Medications    Medication Dose Route Frequency Provider Last Rate Last Dose   • calcitriol (ROCALTROL) capsule 0.25 mcg  0.25 mcg Oral Daily Lisa Osorio MD   0.25 mcg at 01/21/18 1331   • cetirizine (zyrTEC) tablet 5 mg  5 mg Oral Daily Marcellus Osborne MD       • epoetin tip (EPOGEN,PROCRIT) injection 10,000 Units  10,000 Units Intravenous Once per day on Mon Wed Fri Kvng Monsivais MD       • famotidine (PEPCID) tablet 20 mg  20 mg Oral BID Marcellus Osborne MD   20 mg at 01/22/18 1024   • febuxostat (ULORIC) tablet 80 mg  80 mg Oral Daily Marcellus Osborne MD   80 mg at 01/21/18 0926   • ferrous sulfate tablet 325 mg  325 mg Oral Daily With Breakfast Marcellus Osborne MD   325 mg at 01/21/18 0926   • fluconazole (DIFLUCAN) tablet 100 mg  100 mg Oral Daily Marcellus Osborne MD   100 mg at 01/21/18 0926   • folic acid (FOLVITE) tablet 1 mg  1 mg Oral Daily Marcellus Osborne MD   1 mg at 01/21/18 0926   • guaiFENesin (MUCINEX) 12 hr tablet 600 mg  600 mg Oral Q12H Marcellus Osborne MD   600 mg at 01/22/18 1025   • hydrALAZINE (APRESOLINE) tablet 100 mg  100 mg Oral TID Marcellus Osborne MD   100 mg at 01/22/18 1024   • ipratropium-albuterol (DUO-NEB) nebulizer solution 3 mL  3 mL Nebulization Q4H - RT Marcellus Osborne MD   3 mL at 01/22/18 0739   • latanoprost (XALATAN) 0.005 % ophthalmic solution 1 drop  1 drop Both Eyes Nightly Marcellus Osborne MD   1 drop at 01/21/18 2058   • levoFLOXacin (LEVAQUIN) 500 mg/100 mL D5W (premix) (LEVAQUIN) 500 mg  500 mg Intravenous Q48H Kvng Monsivais MD   500 mg at 01/21/18 1329   • nebivolol (BYSTOLIC) tablet 2.5 mg  2.5 mg Oral PRN Marcellus Osborne MD       • nystatin (MYCOSTATIN) powder   Topical Q12H Marcellus Osborne MD   1 application at 01/22/18 1026   • pantoprazole (PROTONIX) injection 40 mg  40 mg Intravenous BID AC Theo Freedman MD   40 mg at 01/22/18 0741   • sevelamer (RENVELA) tablet 800 mg  800 mg Oral TID With Meals Lisa Osorio MD   800 mg at 01/22/18 1024   • sodium bicarbonate tablet 650 mg   650 mg Oral BID Marcellus Osborne MD   650 mg at 01/22/18 1024   • sodium chloride 0.9 % flush 10 mL  10 mL Intravenous PRN Rick Pisano MD       • terazosin (HYTRIN) capsule 5 mg  5 mg Oral Nightly Marcellus Osborne MD   5 mg at 01/21/18 2058   • vitamin B-12 (CYANOCOBALAMIN) tablet 1,000 mcg  1,000 mcg Oral Daily Marcellus Osborne MD   1,000 mcg at 01/21/18 0926       Assessment/Plan   1.  New end-stage renal disease, Anemic symptoms has resolved after the first with dialysis treatment.  He is scheduled for outpatient dialysis every Monday, Wednesday and Friday  2.  Hypertension reasonably controlled.  3.  Anemia and thrombocytopenia, picture suggestive of myelodysplastic syndrome and gastritis.  Also a component of GI bleed related to the ulcer near the stoma.    4.  Crohn's disease with prior colectomy and currently has an ileostomy  5.  History of nephrolithiasis  6.  Pneumonia  7.  Metabolic acidosis:   associated with chronic kidney disease and ileostomy on sodium bicarbonate supplement  8.  Hyponatremia:   sodium 129  9.  Hyperphosphatemia:  Treated          Plan:  1.   Hemodialysis today and transfuse 2 units of packed RBCs  2.  Surveillance labs  3.  Outpatient dialysis is already arranged and every Monday, Wednesday and Friday at the [facility] dialysis clinic.          Kvng Monsivais MD  01/22/18  10:35 AM

## 2018-01-22 NOTE — PROGRESS NOTES
[facility]  Inpatient Progress Note    Reason for Follow Up:  GI bleeding    Subjective     Interval History:   Surgery notes reviewed - 2 cm ulcerated area near stoma edge that was oozing - now sutured closed per Dr Howard.    Nursing reports bag changed twice overnight and stool was brown w/o blood.  He denies any abd pain. No soa or cough    Current Facility-Administered Medications:   •  calcitriol (ROCALTROL) capsule 0.25 mcg, 0.25 mcg, Oral, Daily, Lisa Osorio MD, 0.25 mcg at 01/21/18 1331  •  cetirizine (zyrTEC) tablet 5 mg, 5 mg, Oral, Daily, Marcellus Osborne MD  •  epoetin tip (EPOGEN,PROCRIT) injection 10,000 Units, 10,000 Units, Intravenous, Once per day on Mon Wed Fri, Kvng Monsivais MD  •  famotidine (PEPCID) tablet 20 mg, 20 mg, Oral, BID, Marcellus Osborne MD, 20 mg at 01/21/18 2058  •  febuxostat (ULORIC) tablet 80 mg, 80 mg, Oral, Daily, Marcellus Osborne MD, 80 mg at 01/21/18 0926  •  ferrous sulfate tablet 325 mg, 325 mg, Oral, Daily With Breakfast, Marcellus Osborne MD, 325 mg at 01/21/18 0926  •  fluconazole (DIFLUCAN) tablet 100 mg, 100 mg, Oral, Daily, Marcellus Osborne MD, 100 mg at 01/21/18 0926  •  folic acid (FOLVITE) tablet 1 mg, 1 mg, Oral, Daily, Marcellus Osborne MD, 1 mg at 01/21/18 0926  •  guaiFENesin (MUCINEX) 12 hr tablet 600 mg, 600 mg, Oral, Q12H, Marcellus Osborne MD, 600 mg at 01/21/18 2058  •  hydrALAZINE (APRESOLINE) tablet 100 mg, 100 mg, Oral, TID, Marcellus Osborne MD, 100 mg at 01/21/18 2058  •  ipratropium-albuterol (DUO-NEB) nebulizer solution 3 mL, 3 mL, Nebulization, Q4H - RT, Marcellus Osborne MD, 3 mL at 01/22/18 0773  •  latanoprost (XALATAN) 0.005 % ophthalmic solution 1 drop, 1 drop, Both Eyes, Nightly, Marcellus MD [place], 1 drop at 01/21/18 2089  •  levoFLOXacin (LEVAQUIN) 500 mg/100 mL D5W (premix) (LEVAQUIN) 500 mg, 500 mg, Intravenous, Q48H, Kvng Monsivais MD, 500 mg at 01/21/18 1329  •  nebivolol (BYSTOLIC) tablet 2.5 mg, 2.5 mg, Oral, PRN, Marcellus  MD [place]  •  nystatin (MYCOSTATIN) powder, , Topical, Q12H, Marcellus Osborne MD, 1 application at 01/21/18 2058  •  pantoprazole (PROTONIX) injection 40 mg, 40 mg, Intravenous, BID AC, Theo Freedman MD, 40 mg at 01/22/18 0741  •  sevelamer (RENVELA) tablet 800 mg, 800 mg, Oral, TID With Meals, Lisa Osorio MD, 800 mg at 01/21/18 1724  •  sodium bicarbonate tablet 650 mg, 650 mg, Oral, BID, Marcellus Osborne MD, 650 mg at 01/21/18 2058  •  Insert peripheral IV, , , Once **AND** sodium chloride 0.9 % flush 10 mL, 10 mL, Intravenous, PRN, Rick Pisano MD  •  terazosin (HYTRIN) capsule 5 mg, 5 mg, Oral, Nightly, Marcellus Osborne MD, 5 mg at 01/21/18 2058  •  vitamin B-12 (CYANOCOBALAMIN) tablet 1,000 mcg, 1,000 mcg, Oral, Daily, Marcellus Osborne MD, 1,000 mcg at 01/21/18 0926  Review of Systems:    The following systems were reviewed and negative;  respiratory and cardiovascular    Objective     Vital Signs  Temp:  [98.1 °F (36.7 °C)-98.5 °F (36.9 °C)] 98.3 °F (36.8 °C)  Heart Rate:  [81-93] 90  Resp:  [18-20] 20  BP: (126-152)/(58-75) 126/69  Body mass index is 27.25 kg/(m^2).    Intake/Output Summary (Last 24 hours) at 01/22/18 0945  Last data filed at 01/22/18 0538   Gross per 24 hour   Intake              440 ml   Output             1920 ml   Net            -1480 ml           Physical Exam:   General: patient awake, alert and cooperative   Eyes: Normal lids and lashes, no scleral icterus   Neck: supple, normal ROM   Skin: warm and dry, not jaundiced   Abdomen: soft, nontender, nondistended, llq ostomy ; normal bowel sounds   Psychiatric: Normal mood and behavior; memory intact     Results Review:     I reviewed the patient's new clinical results.      Results from last 7 days  Lab Units 01/22/18  0350 01/21/18  1042 01/21/18  0547 01/20/18  0524   WBC 10*3/mm3 7.71  --  5.70 6.06   HEMOGLOBIN g/dL 7.5* 7.8* 7.6* 6.4*   HEMATOCRIT % 22.3* 22.6* 22.1* 18.5*   PLATELETS 10*3/mm3 67*  --  67* 80*       Results from  last 7 days  Lab Units 01/22/18  0350 01/21/18  0547 01/20/18  0524 01/19/18  0054   SODIUM mmol/L 129* 128* 125* 123*   POTASSIUM mmol/L 4.4 3.9 4.0 4.6   CHLORIDE mmol/L 95* 94* 91* 89*   CO2 mmol/L 16.6* 17.1* 14.3* 10.3*   BUN mg/dL 94* 81* 109* 141*   CREATININE mg/dL 4.67* 4.24* 5.27* 7.44*   CALCIUM mg/dL 7.8* 7.9* 7.9* 8.1*   BILIRUBIN mg/dL  --   --  2.0* 1.8*   ALK PHOS U/L  --   --  122* 136*   ALT (SGPT) U/L  --   --  20 17   AST (SGOT) U/L  --   --  36 33   GLUCOSE mg/dL 91 97 99 165*       Results from last 7 days  Lab Units 01/19/18  0054   INR  1.91*       Lab Results  Lab Value Date/Time   LIPASE 144 (H) 07/17/2017 0018   LIPASE 63 (H) 10/02/2016 0753   LIPASE 79 (H) 09/24/2015 1702   LIPASE 150 (H) 02/05/2015 1618       Radiology:  XR Chest 2 View   Final Result   Extensive upper lobe predominant pulmonary opacities likely   related to diffuse pneumonia       This report was finalized on 1/19/2018 1:13 AM by Bill Mccarthy MD.              Assessment/Plan     Patient Active Problem List   Diagnosis   • Anemia due to stage 3 (moderate) chronic renal failure treated with erythropoietin   • Persistent atrial fibrillation   • Kidney disease, chronic, stage II (mild, EGFR 60+ ml/min)   • Thrombocytopenia   • Chronic leukopenia   • Cirrhosis of liver without ascites   • Congestive splenomegaly   • Kidney disease, chronic, stage IV (GFR 15-29 ml/min)   • Anemia due to stage 4 chronic kidney disease treated with erythropoietin   • Anemia   • Esophageal abnormality   • Pneumonia of both lungs due to infectious organism     Impression:  1. GI bleeding - due to ulcer near stoma - sutured closed by Dr Howard 1/21 - recend neg egd slightly limited by retained gastric contents  2. Anemia  3. esrd  4. Hx IBD s/p total proctocolectomy   5. pneumonia  6. Cirrhosis  7. gerd with esophagitis on recent egd    Plan:  - bleeding has stopped - Hb stable - continue to follow  - cont bid PPI given esophagitis on recent  egd, fungal and viral cx neg to date but fungal organisms seen on bx  - continue fluconazole      I discussed the patients findings and my recommendations with patient, family and nursing staff.    Magali Tamez MD

## 2018-01-23 ENCOUNTER — APPOINTMENT (OUTPATIENT)
Dept: GENERAL RADIOLOGY | Facility: HOSPITAL | Age: 73
End: 2018-01-23
Attending: HOSPITALIST

## 2018-01-23 ENCOUNTER — TELEPHONE (OUTPATIENT)
Dept: ONCOLOGY | Facility: CLINIC | Age: 73
End: 2018-01-23

## 2018-01-23 LAB
ABO + RH BLD: NORMAL
ABO + RH BLD: NORMAL
ALBUMIN SERPL-MCNC: 2.1 G/DL (ref 3.5–5.2)
ANION GAP SERPL CALCULATED.3IONS-SCNC: 15.5 MMOL/L
BASOPHILS # BLD AUTO: 0 10*3/MM3 (ref 0–0.2)
BASOPHILS NFR BLD AUTO: 0 % (ref 0–1.5)
BH BB BLOOD EXPIRATION DATE: NORMAL
BH BB BLOOD EXPIRATION DATE: NORMAL
BH BB BLOOD TYPE BARCODE: 5100
BH BB BLOOD TYPE BARCODE: 5100
BH BB DISPENSE STATUS: NORMAL
BH BB DISPENSE STATUS: NORMAL
BH BB PRODUCT CODE: NORMAL
BH BB PRODUCT CODE: NORMAL
BH BB UNIT NUMBER: NORMAL
BH BB UNIT NUMBER: NORMAL
BUN BLD-MCNC: 64 MG/DL (ref 8–23)
BUN/CREAT SERPL: 18.6 (ref 7–25)
CALCIUM SPEC-SCNC: 7.8 MG/DL (ref 8.6–10.5)
CHLORIDE SERPL-SCNC: 95 MMOL/L (ref 98–107)
CO2 SERPL-SCNC: 20.5 MMOL/L (ref 22–29)
CREAT BLD-MCNC: 3.44 MG/DL (ref 0.76–1.27)
CROSSMATCH INTERPRETATION: NORMAL
CROSSMATCH INTERPRETATION: NORMAL
DEPRECATED RDW RBC AUTO: 51.7 FL (ref 37–54)
EOSINOPHIL # BLD AUTO: 0.05 10*3/MM3 (ref 0–0.7)
EOSINOPHIL NFR BLD AUTO: 0.7 % (ref 0.3–6.2)
ERYTHROCYTE [DISTWIDTH] IN BLOOD BY AUTOMATED COUNT: 16.7 % (ref 11.5–14.5)
GFR SERPL CREATININE-BSD FRML MDRD: 18 ML/MIN/1.73
GLUCOSE BLD-MCNC: 79 MG/DL (ref 65–99)
HCT VFR BLD AUTO: 26.7 % (ref 40.4–52.2)
HGB BLD-MCNC: 9.1 G/DL (ref 13.7–17.6)
IMM GRANULOCYTES # BLD: 0.05 10*3/MM3 (ref 0–0.03)
IMM GRANULOCYTES NFR BLD: 0.7 % (ref 0–0.5)
LYMPHOCYTES # BLD AUTO: 0.3 10*3/MM3 (ref 0.9–4.8)
LYMPHOCYTES NFR BLD AUTO: 4.1 % (ref 19.6–45.3)
MCH RBC QN AUTO: 29.2 PG (ref 27–32.7)
MCHC RBC AUTO-ENTMCNC: 34.1 G/DL (ref 32.6–36.4)
MCV RBC AUTO: 85.6 FL (ref 79.8–96.2)
MONOCYTES # BLD AUTO: 0.39 10*3/MM3 (ref 0.2–1.2)
MONOCYTES NFR BLD AUTO: 5.3 % (ref 5–12)
NEUTROPHILS # BLD AUTO: 6.53 10*3/MM3 (ref 1.9–8.1)
NEUTROPHILS NFR BLD AUTO: 89.2 % (ref 42.7–76)
PHOSPHATE SERPL-MCNC: 5.2 MG/DL (ref 2.5–4.5)
PLATELET # BLD AUTO: 61 10*3/MM3 (ref 140–500)
PMV BLD AUTO: 9.6 FL (ref 6–12)
POTASSIUM BLD-SCNC: 4.1 MMOL/L (ref 3.5–5.2)
RBC # BLD AUTO: 3.12 10*6/MM3 (ref 4.6–6)
SODIUM BLD-SCNC: 131 MMOL/L (ref 136–145)
UNIT  ABO: NORMAL
UNIT  ABO: NORMAL
UNIT  RH: NORMAL
UNIT  RH: NORMAL
WBC NRBC COR # BLD: 7.32 10*3/MM3 (ref 4.5–10.7)

## 2018-01-23 PROCEDURE — 97166 OT EVAL MOD COMPLEX 45 MIN: CPT | Performed by: OCCUPATIONAL THERAPIST

## 2018-01-23 PROCEDURE — 25010000002 LEVOFLOXACIN PER 250 MG: Performed by: INTERNAL MEDICINE

## 2018-01-23 PROCEDURE — 81403 MOPATH PROCEDURE LEVEL 4: CPT

## 2018-01-23 PROCEDURE — 97110 THERAPEUTIC EXERCISES: CPT

## 2018-01-23 PROCEDURE — 71045 X-RAY EXAM CHEST 1 VIEW: CPT

## 2018-01-23 PROCEDURE — 99231 SBSQ HOSP IP/OBS SF/LOW 25: CPT | Performed by: INTERNAL MEDICINE

## 2018-01-23 PROCEDURE — 85025 COMPLETE CBC W/AUTO DIFF WBC: CPT | Performed by: INTERNAL MEDICINE

## 2018-01-23 PROCEDURE — 97162 PT EVAL MOD COMPLEX 30 MIN: CPT

## 2018-01-23 PROCEDURE — 80069 RENAL FUNCTION PANEL: CPT | Performed by: INTERNAL MEDICINE

## 2018-01-23 PROCEDURE — 94799 UNLISTED PULMONARY SVC/PX: CPT

## 2018-01-23 RX ORDER — ECHINACEA PURPUREA EXTRACT 125 MG
1 TABLET ORAL
Status: DISCONTINUED | OUTPATIENT
Start: 2018-01-23 | End: 2018-01-25

## 2018-01-23 RX ORDER — FLUCONAZOLE 100 MG/1
100 TABLET ORAL DAILY
Status: DISCONTINUED | OUTPATIENT
Start: 2018-01-23 | End: 2018-01-23

## 2018-01-23 RX ORDER — LEVOFLOXACIN 750 MG/1
750 TABLET ORAL
Status: DISCONTINUED | OUTPATIENT
Start: 2018-01-23 | End: 2018-01-23

## 2018-01-23 RX ORDER — LEVOFLOXACIN 5 MG/ML
500 INJECTION, SOLUTION INTRAVENOUS
Status: DISCONTINUED | OUTPATIENT
Start: 2018-01-23 | End: 2018-01-23

## 2018-01-23 RX ORDER — PANTOPRAZOLE SODIUM 40 MG/1
40 TABLET, DELAYED RELEASE ORAL
Status: DISCONTINUED | OUTPATIENT
Start: 2018-01-23 | End: 2018-01-25 | Stop reason: HOSPADM

## 2018-01-23 RX ORDER — LEVOFLOXACIN 500 MG/1
500 TABLET, FILM COATED ORAL
Status: DISCONTINUED | OUTPATIENT
Start: 2018-01-25 | End: 2018-01-25 | Stop reason: HOSPADM

## 2018-01-23 RX ADMIN — HYDRALAZINE HYDROCHLORIDE 100 MG: 50 TABLET, FILM COATED ORAL at 21:10

## 2018-01-23 RX ADMIN — FEBUXOSTAT 80 MG: 40 TABLET ORAL at 08:58

## 2018-01-23 RX ADMIN — ASPIRIN 81 MG: 81 TABLET, CHEWABLE ORAL at 08:57

## 2018-01-23 RX ADMIN — HYDRALAZINE HYDROCHLORIDE 100 MG: 50 TABLET, FILM COATED ORAL at 15:50

## 2018-01-23 RX ADMIN — CETIRIZINE HYDROCHLORIDE 5 MG: 10 TABLET, FILM COATED ORAL at 08:58

## 2018-01-23 RX ADMIN — CYANOCOBALAMIN TAB 500 MCG 1000 MCG: 500 TAB at 08:58

## 2018-01-23 RX ADMIN — CALCITRIOL 0.25 MCG: 0.25 CAPSULE, LIQUID FILLED ORAL at 08:58

## 2018-01-23 RX ADMIN — PANTOPRAZOLE SODIUM 40 MG: 40 INJECTION, POWDER, FOR SOLUTION INTRAVENOUS at 08:59

## 2018-01-23 RX ADMIN — FAMOTIDINE 20 MG: 20 TABLET, FILM COATED ORAL at 08:58

## 2018-01-23 RX ADMIN — IPRATROPIUM BROMIDE AND ALBUTEROL SULFATE 3 ML: .5; 3 SOLUTION RESPIRATORY (INHALATION) at 16:15

## 2018-01-23 RX ADMIN — SODIUM BICARBONATE 650 MG: 650 TABLET ORAL at 21:10

## 2018-01-23 RX ADMIN — LATANOPROST 1 DROP: 50 SOLUTION OPHTHALMIC at 21:11

## 2018-01-23 RX ADMIN — IPRATROPIUM BROMIDE AND ALBUTEROL SULFATE 3 ML: .5; 3 SOLUTION RESPIRATORY (INHALATION) at 12:18

## 2018-01-23 RX ADMIN — FERROUS SULFATE TAB 325 MG (65 MG ELEMENTAL FE) 325 MG: 325 (65 FE) TAB at 08:58

## 2018-01-23 RX ADMIN — SEVELAMER CARBONATE 800 MG: 800 TABLET, FILM COATED ORAL at 09:00

## 2018-01-23 RX ADMIN — IPRATROPIUM BROMIDE AND ALBUTEROL SULFATE 3 ML: .5; 3 SOLUTION RESPIRATORY (INHALATION) at 07:59

## 2018-01-23 RX ADMIN — FLUCONAZOLE 100 MG: 100 TABLET ORAL at 08:59

## 2018-01-23 RX ADMIN — TERAZOSIN HYDROCHLORIDE 5 MG: 5 CAPSULE ORAL at 21:10

## 2018-01-23 RX ADMIN — LEVOFLOXACIN 500 MG: 5 INJECTION, SOLUTION INTRAVENOUS at 08:59

## 2018-01-23 RX ADMIN — PANTOPRAZOLE SODIUM 40 MG: 40 TABLET, DELAYED RELEASE ORAL at 18:03

## 2018-01-23 RX ADMIN — NYSTATIN: 100000 POWDER TOPICAL at 21:11

## 2018-01-23 RX ADMIN — HYDRALAZINE HYDROCHLORIDE 100 MG: 50 TABLET, FILM COATED ORAL at 08:58

## 2018-01-23 RX ADMIN — GUAIFENESIN 600 MG: 600 TABLET, EXTENDED RELEASE ORAL at 21:10

## 2018-01-23 RX ADMIN — SEVELAMER CARBONATE 800 MG: 800 TABLET, FILM COATED ORAL at 18:03

## 2018-01-23 RX ADMIN — IPRATROPIUM BROMIDE AND ALBUTEROL SULFATE 3 ML: .5; 3 SOLUTION RESPIRATORY (INHALATION) at 19:28

## 2018-01-23 RX ADMIN — FOLIC ACID 1 MG: 1 TABLET ORAL at 08:58

## 2018-01-23 RX ADMIN — SEVELAMER CARBONATE 800 MG: 800 TABLET, FILM COATED ORAL at 12:02

## 2018-01-23 RX ADMIN — SODIUM BICARBONATE 650 MG: 650 TABLET ORAL at 08:58

## 2018-01-23 RX ADMIN — FAMOTIDINE 20 MG: 20 TABLET, FILM COATED ORAL at 21:10

## 2018-01-23 RX ADMIN — NYSTATIN: 100000 POWDER TOPICAL at 09:00

## 2018-01-23 RX ADMIN — IPRATROPIUM BROMIDE AND ALBUTEROL SULFATE 3 ML: .5; 3 SOLUTION RESPIRATORY (INHALATION) at 23:43

## 2018-01-23 RX ADMIN — GUAIFENESIN 600 MG: 600 TABLET, EXTENDED RELEASE ORAL at 08:58

## 2018-01-23 NOTE — PROGRESS NOTES
[facility]  Inpatient Progress Note    Reason for Follow Up:  GI bleeding    Subjective     Interval History:   No further bleeding - stool is brown - no abd pain or nausea    Current Facility-Administered Medications:   •  aspirin chewable tablet 81 mg, 81 mg, Oral, Daily, Luiz Horan MD, 81 mg at 01/22/18 2128  •  calcitriol (ROCALTROL) capsule 0.25 mcg, 0.25 mcg, Oral, Daily, Lisa Osorio MD, 0.25 mcg at 01/22/18 1803  •  cetirizine (zyrTEC) tablet 5 mg, 5 mg, Oral, Daily, Marcellus Osborne MD, 5 mg at 01/22/18 1803  •  epoetin tip (EPOGEN,PROCRIT) injection 10,000 Units, 10,000 Units, Intravenous, Once per day on Mon Wed Fri, Kvng Monsivais MD  •  famotidine (PEPCID) tablet 20 mg, 20 mg, Oral, BID, Marcellus Osborne MD, 20 mg at 01/22/18 2128  •  febuxostat (ULORIC) tablet 80 mg, 80 mg, Oral, Daily, Marcellus Osborne MD, 80 mg at 01/22/18 1803  •  ferrous sulfate tablet 325 mg, 325 mg, Oral, Daily With Breakfast, Marcellus Osborne MD, 325 mg at 01/22/18 1804  •  fluconazole (DIFLUCAN) tablet 100 mg, 100 mg, Oral, Daily, Marcellus Osborne MD, 100 mg at 01/22/18 1804  •  folic acid (FOLVITE) tablet 1 mg, 1 mg, Oral, Daily, Marcellus Osborne MD, 1 mg at 01/22/18 1804  •  guaiFENesin (MUCINEX) 12 hr tablet 600 mg, 600 mg, Oral, Q12H, Marcellus Osborne MD, 600 mg at 01/22/18 2128  •  hydrALAZINE (APRESOLINE) tablet 100 mg, 100 mg, Oral, TID, Marcellus Osborne MD, 100 mg at 01/22/18 2128  •  ipratropium-albuterol (DUO-NEB) nebulizer solution 3 mL, 3 mL, Nebulization, Q4H - RT, Marcellus Osborne MD, 3 mL at 01/23/18 0335  •  latanoprost (XALATAN) 0.005 % ophthalmic solution 1 drop, 1 drop, Both Eyes, Nightly, Marcellus Osborne MD, 1 drop at 01/22/18 9244  •  levoFLOXacin (LEVAQUIN) 500 mg/100 mL D5W (premix) (LEVAQUIN) 500 mg, 500 mg, Intravenous, Q48H, Kvng Monsivais MD, 500 mg at 01/21/18 1329  •  nebivolol (BYSTOLIC) tablet 2.5 mg, 2.5 mg, Oral, PRN, Marcellus Osborne MD  •  nystatin (MYCOSTATIN) powder, , Topical,  Q12H, Marcellus Osborne MD  •  pantoprazole (PROTONIX) injection 40 mg, 40 mg, Intravenous, BID AC, Theo Freedman MD, 40 mg at 01/22/18 1805  •  sevelamer (RENVELA) tablet 800 mg, 800 mg, Oral, TID With Meals, Lisa Osorio MD, 800 mg at 01/22/18 1804  •  sodium bicarbonate tablet 650 mg, 650 mg, Oral, BID, Marcellus Osborne MD, 650 mg at 01/22/18 2127  •  sodium chloride (OCEAN) nasal spray 1 spray, 1 spray, Each Nare, 5x Daily PRN, Marcellus Osborne MD  •  Insert peripheral IV, , , Once **AND** sodium chloride 0.9 % flush 10 mL, 10 mL, Intravenous, PRN, Rick Pisano MD  •  terazosin (HYTRIN) capsule 5 mg, 5 mg, Oral, Nightly, Marcellus Osborne MD, 5 mg at 01/22/18 2128  •  vitamin B-12 (CYANOCOBALAMIN) tablet 1,000 mcg, 1,000 mcg, Oral, Daily, Marcellus Osborne MD, 1,000 mcg at 01/22/18 1804  Review of Systems:    The following systems were reviewed and negative;  respiratory and cardiovascular    Objective     Vital Signs  Temp:  [98 °F (36.7 °C)-99 °F (37.2 °C)] 98 °F (36.7 °C)  Heart Rate:  [73-91] 88  Resp:  [18-20] 20  BP: (123-141)/(61-79) 141/79  Body mass index is 27.1 kg/(m^2).    Intake/Output Summary (Last 24 hours) at 01/23/18 0847  Last data filed at 01/23/18 0819   Gross per 24 hour   Intake              900 ml   Output             2075 ml   Net            -1175 ml     I/O this shift:  In: 210 [P.O.:210]  Out: 150 [Stool:150]     Physical Exam:   General: patient awake, alert and cooperative   Eyes: Normal lids and lashes, no scleral icterus   Skin: not jaundiced   Abdomen: nondistended    Psychiatric: Normal mood and behavior; memory intact     Results Review:     I reviewed the patient's new clinical results.      Results from last 7 days  Lab Units 01/23/18  0356 01/22/18  0350 01/21/18  1042 01/21/18  0547   WBC 10*3/mm3 7.32 7.71  --  5.70   HEMOGLOBIN g/dL 9.1* 7.5* 7.8* 7.6*   HEMATOCRIT % 26.7* 22.3* 22.6* 22.1*   PLATELETS 10*3/mm3 61* 67*  --  67*       Results from last 7 days  Lab Units  01/23/18  0356 01/22/18  0350 01/21/18  0547 01/20/18  0524 01/19/18  0054   SODIUM mmol/L 131* 129* 128* 125* 123*   POTASSIUM mmol/L 4.1 4.4 3.9 4.0 4.6   CHLORIDE mmol/L 95* 95* 94* 91* 89*   CO2 mmol/L 20.5* 16.6* 17.1* 14.3* 10.3*   BUN mg/dL 64* 94* 81* 109* 141*   CREATININE mg/dL 3.44* 4.67* 4.24* 5.27* 7.44*   CALCIUM mg/dL 7.8* 7.8* 7.9* 7.9* 8.1*   BILIRUBIN mg/dL  --   --   --  2.0* 1.8*   ALK PHOS U/L  --   --   --  122* 136*   ALT (SGPT) U/L  --   --   --  20 17   AST (SGOT) U/L  --   --   --  36 33   GLUCOSE mg/dL 79 91 97 99 165*       Results from last 7 days  Lab Units 01/19/18  0054   INR  1.91*       Lab Results  Lab Value Date/Time   LIPASE 144 (H) 07/17/2017 0018   LIPASE 63 (H) 10/02/2016 0753   LIPASE 79 (H) 09/24/2015 1702   LIPASE 150 (H) 02/05/2015 1618       Radiology:  XR Chest 2 View   Final Result   Extensive upper lobe predominant pulmonary opacities likely   related to diffuse pneumonia       This report was finalized on 1/19/2018 1:13 AM by Bill Mccarthy MD.              Assessment/Plan     Patient Active Problem List   Diagnosis   • Anemia due to stage 3 (moderate) chronic renal failure treated with erythropoietin   • Persistent atrial fibrillation   • Kidney disease, chronic, stage II (mild, EGFR 60+ ml/min)   • Thrombocytopenia   • Chronic leukopenia   • Cirrhosis of liver without ascites   • Congestive splenomegaly   • Kidney disease, chronic, stage IV (GFR 15-29 ml/min)   • Anemia due to stage 4 chronic kidney disease treated with erythropoietin   • Anemia   • Esophageal abnormality   • Pneumonia of both lungs due to infectious organism     Impression:  1. GI bleeding - due to ulcer near stoma - sutured closed by Dr Howard 1/21 - recend neg egd slightly limited by retained gastric contents  2. Anemia  3. esrd  4. Hx IBD s/p total proctocolectomy   5. pneumonia  6. Cirrhosis  7. gerd with esophagitis on recent egd     Plan:  - bleeding has stopped - Hb stable   - cont bid  PPI given esophagitis on recent egd, fungal and viral cx neg to date but fungal organisms seen on bx  - continue fluconazole for 14 days total  - no further recs at this point - will sign off, available prn      I discussed the patients findings and my recommendations with patient, family and nursing staff.    Magali Tamez MD

## 2018-01-23 NOTE — THERAPY EVALUATION
Acute Care - Physical Therapy Initial Evaluation  [facility]     Patient Name: Bill Landry  : 1945  MRN: [id number]  Today's Date: 2018   Onset of Illness/Injury or Date of Surgery Date: 18  Date of Referral to PT: 18  Referring Physician: Marcellus Richards      Admit Date: 2018     Visit Dx:    ICD-10-CM ICD-9-CM   1. Pneumonia of both lungs due to infectious organism, unspecified part of lung J18.9 483.8   2. Anemia in chronic kidney disease, unspecified CKD stage N18.9 285.21    D63.1    3. Chronic kidney disease, unspecified CKD stage N18.9 585.9   4. Muscle weakness (generalized) M62.81 728.87     Patient Active Problem List   Diagnosis   • Anemia due to stage 3 (moderate) chronic renal failure treated with erythropoietin   • Persistent atrial fibrillation   • Kidney disease, chronic, stage II (mild, EGFR 60+ ml/min)   • Thrombocytopenia   • Chronic leukopenia   • Cirrhosis of liver without ascites   • Congestive splenomegaly   • Kidney disease, chronic, stage IV (GFR 15-29 ml/min)   • Anemia due to stage 4 chronic kidney disease treated with erythropoietin   • Anemia   • Esophageal abnormality   • Pneumonia of both lungs due to infectious organism     Past Medical History:   Diagnosis Date   • Abnormal serum protein electrophoresis    • Anemia     Multifactorial   • Atrial fibrillation    • Chronic kidney disease     STAGE 4   • Chronic leukopenia and thrombocytopenia    • Crohn disease    • Diverticula of colon    • Fatty liver disease, nonalcoholic    • GERD (gastroesophageal reflux disease)    • GI bleed    • Glaucoma    • H/O colectomy    • H/O Pericardial effusion    • Hx of renal calculi    • Hypertension    • Ileostomy present    • MGUS (monoclonal gammopathy of unknown significance)    • Murmur     FROM RHEUMATIC FEVER AS CHIILD   • Sleep apnea     CPAP USED     Past Surgical History:   Procedure Laterality Date   • APPENDECTOMY     • ARTERIOVENOUS FISTULA/SHUNT  "SURGERY Left 8/8/2017    Procedure: LEFT BRACHIAL CEPHALIC AV FISTULA FORMATION WITH CEPHALIC VEIN TRANSPOSITION ;  Surgeon: Bill Deal MD;  Location: [facility] MAIN OR;  Service:    • CHOLECYSTECTOMY     • COLECTOMY PARTIAL / TOTAL      History of inflammatory bowel disease with status post colectomy with ileostomy many years ago in the [facility]   • COLON RESECTION WITH COLOSTOMY     • COLON SURGERY     • COLONOSCOPY     • CYSTOSCOPY LITHOLAPAXY BLADDER STONE EXTRACTION     • ENDOSCOPY  01/16/2015    gastritis   • ENDOSCOPY  1/17/2018    Procedure: ESOPHAGOGASTRODUODENOSCOPY;  Surgeon: Warner Neville MD;  Location: [facility] ENDOSCOPY;  Service:    • ILEOSCOPY  01/16/2015    normal   • ILEOSCOPY N/A 1/17/2018    Procedure: ILEOSCOPY;  Surgeon: Warner Neville MD;  Location: [facility] ENDOSCOPY;  Service:           PT ASSESSMENT (last 72 hours)      PT Evaluation       01/23/18 0840 01/22/18 1554    Rehab Evaluation    Document Type evaluation  -MS     Subjective Information agree to therapy;complains of;weakness;fatigue  -MS     Evaluation Not Performed  patient unavailable for evaluation   Checked x 2 this PM and pt. off the floor for Dialysis treatment. Will have to follow up tomorrow.  -MS    Patient Effort, Rehab Treatment adequate  -MS     Symptoms Noted Comment Pt. reports increased fatigue, weakness, as well as Bilateral L.E. \"tightness\" from immobility over the past couple of days.  -MS     General Information    Onset of Illness/Injury or Date of Surgery Date 01/19/18  -MS     Referring Physician Marcellus Richards  -MS     Pertinent History Of Current Problem Pt. admitted with PNA (Bilateral lungs); Bleeding around colostomy stoma site  -MS     Precautions/Limitations fall precautions   Exit alarm; Colostomy  -MS     Prior Level of Function independent:  -MS     Equipment Currently Used at Home none  -MS     Plans/Goals Discussed With patient and family;agreed upon  -MS     Risks Reviewed patient and " family:  -MS     Benefits Reviewed patient and family:  -MS     Barriers to Rehab none identified  -MS     Living Environment    Lives With spouse  -MS     Living Arrangements house  -MS     Home Accessibility bed and bath on same level  -MS     Clinical Impression    Date of Referral to PT 01/20/18  -MS     Criteria for Skilled Therapeutic Interventions Met treatment indicated  -MS     Rehab Potential good, to achieve stated therapy goals  -MS     Vital Signs    Pre SpO2 (%) 94  -MS     O2 Delivery Pre Treatment supplemental O2   2 liters oyxgen  -MS     Post SpO2 (%) 94  -MS     O2 Delivery Post Treatment supplemental O2   2 liters oxygen  -MS     Pain Assessment    Pain Assessment No/denies pain   No verbal/visual signs of pain.  -MS     Cognitive Assessment/Intervention    Current Cognitive/Communication Assessment functional  -MS     Orientation Status oriented x 4  -MS     Follows Commands/Answers Questions 100% of the time  -MS     Personal Safety WNL/WFL  -MS     Personal Safety Interventions fall prevention program maintained;gait belt;nonskid shoes/slippers when out of bed;supervised activity  -MS     ROM (Range of Motion)    General ROM no range of motion deficits identified  -MS     MMT (Manual Muscle Testing)    General MMT Assessment --   BUE/LE MMT (3+/5)  -MS     Bed Mobility, Assessment/Treatment    Bed Mob, Supine to Sit, [place] contact guard assist  -MS     Transfer Assessment/Treatment    Transfers, Sit-Stand [place] contact guard assist;2 person assist required;1 person + 1 person to manage equipment  -MS     Transfers, Stand-Sit [place] contact guard assist;2 person assist required;1 person + 1 person to manage equipment  -MS     Gait Assessment/Treatment    Gait, [place] Level minimum assist (75% patient effort);2 person assist required;1 person + 1 person to manage equipment  -MS     Gait, Distance (Feet) 60  -MS     Gait, Gait Deviations baldomero decreased;narrow  base;step length decreased  -MS     Gait, Safety Issues balance decreased during turns;step length decreased;supplemental O2   Pt. on 2 liters oxygen.  -MS     Gait, Comment Limited by fatigue, weakness due to recent immobility.  -MS     Therapy Exercises    Bilateral Lower Extremities AROM:;10 reps;sitting;ankle pumps/circles;hip flexion;LAQ   Encouraged pt. to continue ther. ex. on his own.  -MS     Positioning and Restraints    Pre-Treatment Position in bed  -MS     Post Treatment Position chair  -MS     In Chair notified nsg;sitting;call light within reach;encouraged to call for assist;exit alarm on;with family/caregiver   All lines intact. V.S.S.  -MS       01/22/18 1536 01/22/18 1401    Rehab Evaluation    Evaluation Not Performed patient unavailable for evaluation   dialysis 1536  -SG     General Information    Equipment Currently Used at Home  colostomy/ostomy supplies;bipap/ cpap   ostomy supplies for [facility] in [place]  -ED      01/22/18 1355 01/22/18 0859    Rehab Evaluation    Evaluation Not Performed  patient/family declined evaluation   Pt./family declining P.T. this AM reporting patient has not been able to eat and will likely be going for procedure and dialysis this day.  Pt./family would like P.T. to check back this PM once they have seen his MD. Will follow up this afternoon.  -MS    General Information    Equipment Currently Used at Home none  -ED     Living Environment    Lives With spouse  -ED     Living Arrangements house  -ED     Home Accessibility no concerns  -ED     Stair Railings at Home none  -ED     Type of Financial/Environmental Concern none  -ED     Transportation Available car  -ED       01/22/18 0836 01/21/18 1515    Rehab Evaluation    Evaluation Not Performed patient/family declined evaluation   Pt and spouse decline OT eval this a.m. States possible having procedure today and has not had anything to eat. States also going for dialysis. Request hold OT at this time 835  -SG other  "(see comments)   RN Tess states that the pt is \"bleeding,\" thus PT to hold until tomorrow if pt more appropriate.   -AA      User Key  (r) = Recorded By, (t) = Taken By, (c) = Cosigned By    Initials Name Provider Type    SG Brittani Mena, OTR Occupational Therapist    TISH Hudson, RN Case Manager    MS Bill Pereyra, PT Physical Therapist    EDGAR Negron, PT Physical Therapist          Physical Therapy Education     Title: PT OT SLP Therapies (Done)     Topic: Physical Therapy (Done)     Point: Mobility training (Done)    Learning Progress Summary    Learner Readiness Method Response Comment Documented by Status   Patient Acceptance E,D VU,NR  MS 01/23/18 0850 Done               Point: Home exercise program (Done)    Learning Progress Summary    Learner Readiness Method Response Comment Documented by Status   Patient Acceptance E,D VU,NR  MS 01/23/18 0850 Done               Point: Body mechanics (Done)    Learning Progress Summary    Learner Readiness Method Response Comment Documented by Status   Patient Acceptance E,D VU,NR  MS 01/23/18 0850 Done               Point: Precautions (Done)    Learning Progress Summary    Learner Readiness Method Response Comment Documented by Status   Patient Acceptance E,D VU,NR  MS 01/23/18 0850 Done                      User Key     Initials Effective Dates Name Provider Type Discipline    MS 12/01/15 -  Bill Pereyra, PT Physical Therapist PT                PT Recommendation and Plan  Anticipated Discharge Disposition: home with assist, home with home health  Planned Therapy Interventions: balance training, bed mobility training, gait training, home exercise program, patient/family education, postural re-education, strengthening, transfer training  PT Frequency: daily  Plan of Care Review  Plan Of Care Reviewed With: patient, family  Outcome Summary/Follow up Plan: Pt. will benefit from skilled inpt. P.T. to address his functional deficits and to assist " pt. in regaining his independence with functional mobility.          IP PT Goals       01/23/18 0851          Bed Mobility PT LTG    Bed Mobility PT LTG, Date Established 01/23/18  -MS      Bed Mobility PT LTG, Time to Achieve 1 wk  -MS      Bed Mobility PT LTG, Activity Type all bed mobility  -MS      Bed Mobility PT LTG, [place] Level independent  -MS      Transfer Training PT LTG    Transfer Training PT LTG, Date Established 01/23/18  -MS      Transfer Training PT LTG, Time to Achieve 1 wk  -MS      Transfer Training PT LTG, Activity Type all transfers  -MS      Transfer Training PT LTG, [place] Level independent  -MS      Gait Training PT LTG    Gait Training Goal PT LTG, Date Established 01/23/18  -MS      Gait Training Goal PT LTG, Time to Achieve 1 wk  -MS      Gait Training Goal PT LTG, [place] Level independent  -MS      Gait Training Goal PT LTG, Distance to Achieve 300 feet  -MS        User Key  (r) = Recorded By, (t) = Taken By, (c) = Cosigned By    Initials Name Provider Type    MS Bill Pereyra, PT Physical Therapist                Outcome Measures       01/23/18 0800          How much help from another person do you currently need...    Turning from your back to your side while in flat bed without using bedrails? 4  -MS      Moving from lying on back to sitting on the side of a flat bed without bedrails? 3  -MS      Moving to and from a bed to a chair (including a wheelchair)? 3  -MS      Standing up from a chair using your arms (e.g., wheelchair, bedside chair)? 3  -MS      Climbing 3-5 steps with a railing? 3  -MS      To walk in hospital room? 3  -MS      AM-PAC 6 Clicks Score 19  -MS      Functional Assessment    Outcome Measure Options AM-PAC 6 Clicks Basic Mobility (PT)  -MS        User Key  (r) = Recorded By, (t) = Taken By, (c) = Cosigned By    Initials Name Provider Type    MS Bill Pereyra, PT Physical Therapist           Time Calculation:         PT Charges        01/23/18 0852          Time Calculation    Start Time 0820  -MS      Stop Time 0838  -MS      Time Calculation (min) 18 min  -MS      PT Received On 01/23/18  -MS      PT - Next Appointment 01/24/18  -MS      PT Goal Re-Cert Due Date 01/30/18  -MS        User Key  (r) = Recorded By, (t) = Taken By, (c) = Cosigned By    Initials Name Provider Type    MS Bill Pereyra, PT Physical Therapist          Therapy Charges for Today     Code Description Service Date Service Provider Modifiers Qty    54343888128 HC PT EVAL MOD COMPLEXITY 2 1/23/2018 Bill Pereyra, PT GP 1    74913576044 HC PT THER PROC EA 15 MIN 1/23/2018 Bill Pereyra, PT GP 1    76953080826 HC PT THER SUPP EA 15 MIN 1/23/2018 Bill Pereyra, PT GP 1          PT G-Codes  Outcome Measure Options: AM-PAC 6 Clicks Basic Mobility (PT)      Bill Pereyra, PT  1/23/2018

## 2018-01-23 NOTE — PLAN OF CARE
Problem: Patient Care Overview (Adult)  Goal: Plan of Care Review  Outcome: Ongoing (interventions implemented as appropriate)   01/23/18 0314   Coping/Psychosocial Response Interventions   Plan Of Care Reviewed With patient;spouse   Patient Care Overview   Progress progress towards functional goals is fair   Outcome Evaluation   Outcome Summary/Follow up Plan NO ACUTE DISTRESS NOTED THIS SHIFT, PT COMPLIANT WITH CPAP AT HS. PT AND WIFE NEED LOTS OF TEACHING, REASSURANCE AND REINFORCEMENT      Goal: Adult Individualization and Mutuality  Outcome: Ongoing (interventions implemented as appropriate)    Goal: Discharge Needs Assessment  Outcome: Ongoing (interventions implemented as appropriate)      Problem: Respiratory Insufficiency (Adult)  Goal: Acid/Base Balance  Outcome: Ongoing (interventions implemented as appropriate)    Goal: Effective Ventilation  Outcome: Ongoing (interventions implemented as appropriate)      Problem: Fall Risk (Adult)  Goal: Identify Related Risk Factors and Signs and Symptoms  Outcome: Outcome(s) achieved Date Met: 01/23/18    Goal: Absence of Falls  Outcome: Ongoing (interventions implemented as appropriate)

## 2018-01-23 NOTE — PLAN OF CARE
Problem: Patient Care Overview (Adult)  Goal: Plan of Care Review   01/23/18 1535   Coping/Psychosocial Response Interventions   Plan Of Care Reviewed With patient   Outcome Evaluation   Outcome Summary/Follow up Plan Pt presents with PNA and overeall generalized weakness and poor endurance. Pt may benefit from OT to address ADLs and UE.       Problem: Inpatient Occupational Therapy  Goal: Transfer Training Goal 1 LTG- OT   01/23/18 1535   Transfer Training OT LTG   Transfer Training OT LTG, Date Established 01/23/18   Transfer Training OT LTG, Time to Achieve 1 wk   Transfer Training OT LTG, Activity Type sit to stand/stand to sit;toilet   Transfer Training OT LTG, [place] Level supervision required     Goal: Strength Goal LTG- OT   01/23/18 1535   Strength OT LTG   Strength Goal OT LTG, Date Established 01/23/18   Strength Goal OT LTG, Time to Achieve 1 wk   Strength Goal OT LTG, Measure to Achieve Pt to increase UE strength to 4/5 to assist with ADLs.     Goal: ADL Goal LTG- OT   01/23/18 1535   ADL OT LTG   ADL OT LTG, Date Established 01/23/18   ADL OT LTG, Time to Achieve 1 wk   ADL OT LTG, Activity Type ADL skills   ADL OT LTG, [place] Level standby assist

## 2018-01-23 NOTE — CONSULTS
"Adult Nutrition  Assessment/PES    Patient Name:  Bill Landry  YOB: 1945  MRN: [id number]  Admit Date:  1/19/2018    Assessment Date:  1/23/2018    Education provided regarding renal diet; provided nutrition therapy- went over ESRD diet-how to watch sodium, potassium and phosphorus intake. Gave education material for reference          Reason for Assessment       01/23/18 1504    Reason for Assessment    Reason For Assessment/Visit patient request;education    Diagnosis   Pneumonia                Anthropometrics       01/23/18 1505    Anthropometrics (Special Considerations)    Height Used for Calculations 1.778 m (5' 10\")    Weight Used for Calculations 85.3 kg (188 lb)    RD Calculated IBW 73    RD Calculated %     RD Calculated BMI (kg/m2) 27.1    Body Mass Index (BMI)    BMI Grade 25 - 29.9 - overweight            Labs/Tests/Procedures/Meds       01/23/18 1505    Labs/Tests/Procedures/Meds    Diagnostic Test/Procedure Review reviewed    Labs/Tests Review Reviewed;Na+;Creat;BUN;Platlets;Phos    Medication Review Reviewed, pertinent   pepcid, iron PPI, vitamin B12    Significant Vitals reviewed            Physical Findings       01/23/18 1506    Physical Findings/Assessment    Additional Documentation --   B=19              Nutrition Prescription Ordered       01/23/18 1506    Nutrition Prescription PO    Common Modifiers Renal            Evaluation of Received Nutrient/Fluid Intake       01/23/18 1506    PO Evaluation    Number of Meals 4    % PO Intake 50            Problem/Interventions:        Problem 1       01/23/18 1506    Nutrition Diagnoses Problem 1    Problem 1 Limited Adherence to Diet Modifications    Etiology (related to) MNT for Treatment/Condition    Signs/Symptoms (evidenced by) Demonstrated Information Deficit;Reported  Information Deficit                    Intervention Goal       01/23/18 1506    Intervention Goal    General Maintain nutrition;Meet nutritional needs " for age/condition    PO Tolerate PO;Maintain intake    Weight Maintain weight            Nutrition Intervention       01/23/18 1506    Nutrition Intervention    RD/Tech Action Interview for preference;Follow Tx progress;Care plan reviewd;Encourage intake            Nutrition Prescription       01/23/18 1506    Nutrition Prescription PO    PO Prescription Begin/change supplement    Supplement Nepro    Supplement Frequency Daily    New PO Prescription Ordered? Yes            Education/Evaluation       01/23/18 1506    Education    Education Provided education regarding    Education Topics Renal diet    Monitor/Evaluation    Monitor Per protocol    Education Follow-up Reinforce PRN        Electronically signed by:  Maile Jose RD  01/23/18 3:07 PM

## 2018-01-23 NOTE — THERAPY EVALUATION
Acute Care - Occupational Therapy Initial Evaluation  [facility]     Patient Name: Bill Landry  : 1945  MRN: [id number]  Today's Date: 2018  Onset of Illness/Injury or Date of Surgery Date: 18  Date of Referral to OT: 18  Referring Physician: [place]    Admit Date: 2018       ICD-10-CM ICD-9-CM   1. Pneumonia of both lungs due to infectious organism, unspecified part of lung J18.9 483.8   2. Anemia in chronic kidney disease, unspecified CKD stage N18.9 285.21    D63.1    3. Chronic kidney disease, unspecified CKD stage N18.9 585.9   4. Muscle weakness (generalized) M62.81 728.87     Patient Active Problem List   Diagnosis   • Anemia due to stage 3 (moderate) chronic renal failure treated with erythropoietin   • Persistent atrial fibrillation   • Kidney disease, chronic, stage II (mild, EGFR 60+ ml/min)   • Thrombocytopenia   • Chronic leukopenia   • Cirrhosis of liver without ascites   • Congestive splenomegaly   • Kidney disease, chronic, stage IV (GFR 15-29 ml/min)   • Anemia due to stage 4 chronic kidney disease treated with erythropoietin   • Anemia   • Esophageal abnormality   • Pneumonia of both lungs due to infectious organism     Past Medical History:   Diagnosis Date   • Abnormal serum protein electrophoresis    • Anemia     Multifactorial   • Atrial fibrillation    • Chronic kidney disease     STAGE 4   • Chronic leukopenia and thrombocytopenia    • Crohn disease    • Diverticula of colon    • Fatty liver disease, nonalcoholic    • GERD (gastroesophageal reflux disease)    • GI bleed    • Glaucoma    • H/O colectomy    • H/O Pericardial effusion    • Hx of renal calculi    • Hypertension    • Ileostomy present    • MGUS (monoclonal gammopathy of unknown significance)    • Murmur     FROM RHEUMATIC FEVER AS CHIILD   • Sleep apnea     CPAP USED     Past Surgical History:   Procedure Laterality Date   • APPENDECTOMY     • ARTERIOVENOUS FISTULA/SHUNT SURGERY Left 2017     "Procedure: LEFT BRACHIAL CEPHALIC AV FISTULA FORMATION WITH CEPHALIC VEIN TRANSPOSITION ;  Surgeon: Bill Deal MD;  Location: [facility] MAIN OR;  Service:    • CHOLECYSTECTOMY     • COLECTOMY PARTIAL / TOTAL      History of inflammatory bowel disease with status post colectomy with ileostomy many years ago in the [facility]   • COLON RESECTION WITH COLOSTOMY     • COLON SURGERY     • COLONOSCOPY     • CYSTOSCOPY LITHOLAPAXY BLADDER STONE EXTRACTION     • ENDOSCOPY  01/16/2015    gastritis   • ENDOSCOPY  1/17/2018    Procedure: ESOPHAGOGASTRODUODENOSCOPY;  Surgeon: Warner Neville MD;  Location: [facility] ENDOSCOPY;  Service:    • ILEOSCOPY  01/16/2015    normal   • ILEOSCOPY N/A 1/17/2018    Procedure: ILEOSCOPY;  Surgeon: Warner Neville MD;  Location: [facility] ENDOSCOPY;  Service:           OT ASSESSMENT FLOWSHEET (last 72 hours)      OT Evaluation       01/23/18 1527 01/23/18 0840 01/22/18 1554 01/22/18 1536 01/22/18 1401    Rehab Evaluation    Document Type evaluation  -SO evaluation  -MS       Subjective Information no complaints;agree to therapy  -SO agree to therapy;complains of;weakness;fatigue  -MS       Evaluation Not Performed   patient unavailable for evaluation   Checked x 2 this PM and pt. off the floor for Dialysis treatment. Will have to follow up tomorrow.  -MS patient unavailable for evaluation   dialysis 1536  -SG     Patient Effort, Rehab Treatment adequate  -SO adequate  -MS       Symptoms Noted During/After Treatment none  -SO        Symptoms Noted Comment  Pt. reports increased fatigue, weakness, as well as Bilateral L.E. \"tightness\" from immobility over the past couple of days.  -MS       General Information    Patient Profile Review yes  -SO        Onset of Illness/Injury or Date of Surgery Date  01/19/18  -MS       Referring Physician [place]  -SO Dr. Osborne, Marcellus  -MS       General Observations Pt sitting up in chair  -SO        Pertinent History Of Current Problem PNA  -SO Pt. admitted " with PNA (Bilateral lungs); Bleeding around colostomy stoma site  -MS       Precautions/Limitations fall precautions  -SO fall precautions   Exit alarm; Colostomy  -MS       Prior Level of Function independent:;ADL's  -SO independent:  -MS       Equipment Currently Used at Home none  -SO none  -MS   colostomy/ostomy supplies;bipap/ cpap   ostomy supplies for [employer] in [place]  -ED    Plans/Goals Discussed With patient and family;agreed upon  -SO patient and family;agreed upon  -MS       Risks Reviewed  patient and family:  -MS       Benefits Reviewed  patient and family:  -MS       Barriers to Rehab  none identified  -MS       Living Environment    Lives With spouse  -SO spouse  -MS       Living Arrangements house  -SO house  -MS       Home Accessibility  bed and bath on same level  -MS       Clinical Impression    Date of Referral to OT 01/23/18  -SO        Criteria for Skilled Therapeutic Interventions Met yes  -SO        Rehab Potential good, to achieve stated therapy goals  -SO        Therapy Frequency 3-5 times/wk  -SO        Anticipated Discharge Disposition home with assist  -SO        Vital Signs    Pre SpO2 (%)  94  -MS       O2 Delivery Pre Treatment  supplemental O2   2 liters oyxgen  -MS       Post SpO2 (%)  94  -MS       O2 Delivery Post Treatment  supplemental O2   2 liters oxygen  -MS       Pain Assessment    Pain Assessment No/denies pain  -SO No/denies pain   No verbal/visual signs of pain.  -MS       Cognitive Assessment/Intervention    Current Cognitive/Communication Assessment functional  -SO functional  -MS       Orientation Status oriented x 4  -SO oriented x 4  -MS       Follows Commands/Answers Questions 100% of the time  -% of the time  -MS       Personal Safety  WNL/WFL  -MS       Personal Safety Interventions  fall prevention program maintained;gait belt;nonskid shoes/slippers when out of bed;supervised activity  -MS       ROM (Range of Motion)    General ROM no range of motion  deficits identified  -SO no range of motion deficits identified  -MS       General ROM Detail BUE WFL  -SO        MMT (Manual Muscle Testing)    General MMT Assessment  --   BUE/LE MMT (3+/5)  -MS       General MMT Assessment Detail Generalized UE weakness  -SO        Bed Mobility, Assessment/Treatment    Bed Mob, Supine to Sit, [place] not tested  -SO contact guard assist  -MS       Bed Mob, Sit to Supine, [place] other (see comments)  -SO        Transfer Assessment/Treatment    Transfers, Sit-Stand [place] contact guard assist  -SO contact guard assist;2 person assist required;1 person + 1 person to manage equipment  -MS       Transfers, Stand-Sit [place] contact guard assist  -SO contact guard assist;2 person assist required;1 person + 1 person to manage equipment  -MS       Toilet Transfer, [place] contact guard assist  -SO        Functional Mobility    Functional Mobility- Ind. Level contact guard assist  -SO        Functional Mobility-Distance (Feet) 15  -SO        Functional Mobility- Comment No AD, 1 LOB  -SO        Lower Body Dressing Assessment/Training    LB Dressing Assess/Train, Clothing Type doffing:;donning:;slipper socks  -SO        LB Dressing Assess/Train, Position sitting  -SO        LB Dressing Assess/Train, [place] minimum assist (75% patient effort)  -SO        Therapy Exercises    Bilateral Lower Extremities  AROM:;10 reps;sitting;ankle pumps/circles;hip flexion;LAQ   Encouraged pt. to continue ther. ex. on his own.  -MS       Bilateral Upper Extremity AROM:;10 reps;sitting;elbow flexion/extension;shoulder extension/flexion   3x10  -SO        Positioning and Restraints    Pre-Treatment Position sitting in chair/recliner  -SO in bed  -MS       Post Treatment Position chair  -SO chair  -MS       In Chair sitting;call light within reach;encouraged to call for assist;exit alarm on;with family/caregiver  -SO notified nsg;sitting;call light within reach;encouraged to  "call for assist;exit alarm on;with family/caregiver   All lines intact. V.S.S.  -MS         01/22/18 1355 01/22/18 1354 01/22/18 0859 01/22/18 0836 01/21/18 1515    Rehab Evaluation    Evaluation Not Performed   patient/family declined evaluation   Pt./family declining P.T. this AM reporting patient has not been able to eat and will likely be going for procedure and dialysis this day.  Pt./family would like P.T. to check back this PM once they have seen his MD. Will follow up this afternoon.  -MS patient/family declined evaluation   Pt and spouse decline OT eval this a.m. States possible having procedure today and has not had anything to eat. States also going for dialysis. Request hold OT at this time 835  -SG other (see comments)   NINOSKA Pulido states that the pt is \"bleeding,\" thus PT to hold until tomorrow if pt more appropriate.   -AA    General Information    Equipment Currently Used at Home none  -ED        Living Environment    Lives With spouse  -ED        Living Arrangements house  -ED        Home Accessibility no concerns  -ED        Stair Railings at Home none  -ED        Type of Financial/Environmental Concern none  -ED        Transportation Available car  -ED        Functional Level Prior    Ambulation  0-->independent  -ED       Transferring  0-->independent  -ED       Toileting  0-->independent  -ED       Bathing  0-->independent  -ED       Dressing  0-->independent  -ED       Eating  0-->independent  -ED       Communication  0-->understands/communicates without difficulty  -ED       Swallowing  0-->swallows foods/liquids without difficulty  -ED         User Key  (r) = Recorded By, (t) = Taken By, (c) = Cosigned By    Initials Name Effective Dates    SG Brittani Trina, OTR 04/13/15 -     SO Viridiana Mendez, OTR 04/13/15 -     ED Angelica Hudson, RN 07/28/16 -     MS Bill Pereyra, PT 12/01/15 -     AA Shannan Negron, PT 09/05/17 -            Occupational Therapy Education     Title: PT OT " SLP Therapies (Active)     Topic: Occupational Therapy (Active)     Point: ADL training (Done)    Description: Instruct learner(s) on proper safety adaptation and remediation techniques during self care or transfers.   Instruct in proper use of assistive devices.    Learning Progress Summary    Learner Readiness Method Response Comment Documented by Status   Patient Acceptance E VU  SO 01/23/18 1534 Done                      User Key     Initials Effective Dates Name Provider Type Discipline    SO 04/13/15 -  MAAME Cisneros Occupational Therapist OT                  OT Recommendation and Plan  Anticipated Discharge Disposition: home with assist  Therapy Frequency: 3-5 times/wk  Plan of Care Review  Plan Of Care Reviewed With: patient  Outcome Summary/Follow up Plan: Pt presents with PNA and overeall generalized weakness and poor endurance. Pt may benefit from OT to address ADLs and UE.          OT Goals       01/23/18 1535          Transfer Training OT LTG    Transfer Training OT LTG, Date Established 01/23/18  -SO      Transfer Training OT LTG, Time to Achieve 1 wk  -SO      Transfer Training OT LTG, Activity Type sit to stand/stand to sit;toilet  -SO      Transfer Training OT LTG, [place] Level supervision required  -SO      Strength OT LTG    Strength Goal OT LTG, Date Established 01/23/18  -SO      Strength Goal OT LTG, Time to Achieve 1 wk  -SO      Strength Goal OT LTG, Measure to Achieve Pt to increase UE strength to 4/5 to assist with ADLs.  -SO      ADL OT LTG    ADL OT LTG, Date Established 01/23/18  -SO      ADL OT LTG, Time to Achieve 1 wk  -SO      ADL OT LTG, Activity Type ADL skills  -SO      ADL OT LTG, [place] Level standby assist  -SO        User Key  (r) = Recorded By, (t) = Taken By, (c) = Cosigned By    Initials Name Provider Type    SO MAAME Cisneros Occupational Therapist                Outcome Measures       01/23/18 1500 01/23/18 0800       How much help  from another person do you currently need...    Turning from your back to your side while in flat bed without using bedrails?  4  -MS     Moving from lying on back to sitting on the side of a flat bed without bedrails?  3  -MS     Moving to and from a bed to a chair (including a wheelchair)?  3  -MS     Standing up from a chair using your arms (e.g., wheelchair, bedside chair)?  3  -MS     Climbing 3-5 steps with a railing?  3  -MS     To walk in hospital room?  3  -MS     AM-PAC 6 Clicks Score  19  -MS     How much help from another is currently needed...    Putting on and taking off regular lower body clothing? 3  -SO      Bathing (including washing, rinsing, and drying) 3  -SO      Toileting (which includes using toilet bed pan or urinal) 3  -SO      Putting on and taking off regular upper body clothing 3  -SO      Taking care of personal grooming (such as brushing teeth) 3  -SO      Eating meals 4  -SO      Score 19  -SO      Functional Assessment    Outcome Measure Options AM-PAC 6 Clicks Daily Activity (OT)  -SO AM-PAC 6 Clicks Basic Mobility (PT)  -MS       User Key  (r) = Recorded By, (t) = Taken By, (c) = Cosigned By    Initials Name Provider Type    SO Viridiana Mendez OTR Occupational Therapist    MS Bill Pereyra, PT Physical Therapist          Time Calculation:   OT Start Time: 1305  OT Stop Time: 1317  OT Time Calculation (min): 12 min    Therapy Charges for Today     Code Description Service Date Service Provider Modifiers Qty    [id number]  OT EVAL MOD COMPLEXITY 2 1/23/2018 MAAME Cisneros GO 1               MAAME Cisneros  1/23/2018

## 2018-01-23 NOTE — PROGRESS NOTES
"Daily progress note    Chief complaint  Doing better  No new complaints  No more bleeding    History of present illness  Pt is a 72 y.o. male who presents complaining of SOA that began tonight at 21:00. Pt denies CP but complains of generalized fatigue. Pt is scheduled to start dialysis tomorrow. Pt has an EGD on 1/17/18, and was dx with a fungal infection and esophageal bleeding. Pt was started on Diflucan at that time. Pt also had a fistulagram on 1/15/18, and was given Valium at that time. Afterwards, they were informed by the pt's nephrologist that the pt may have a hard time metabolizing the medication. On arrival to ED, pt had room air sat's of 88%.   patient alert oriented denies any productive cough fever chills night sweats or weight loss.  Patient stated that he has gained some weight     REVIEW OF SYSTEMS  Review of Systems   Constitutional: Positive for fatigue. Negative for chills and fever.   HENT: Negative for congestion and sore throat.    Eyes: Negative.    Respiratory: Positive for shortness of breath. Negative for cough.    Cardiovascular: Negative for chest pain and leg swelling.   Gastrointestinal: Negative for abdominal pain, diarrhea and vomiting.   Genitourinary: Negative for difficulty urinating and dysuria.   Musculoskeletal: Negative for back pain and neck pain.   Skin: Negative for rash and wound.   Allergic/Immunologic: Negative.    Neurological: Negative for dizziness, weakness, numbness and headaches.   Psychiatric/Behavioral: Negative.    All other systems reviewed and are negative.     PHYSICAL EXAM  Blood pressure 125/60, pulse 81, temperature 97.6 °F (36.4 °C), temperature source Oral, resp. rate 20, height 177.8 cm (70\"), weight 85.7 kg (188 lb 14.4 oz), SpO2 90 %.    Constitutional: He is oriented to person, place, and time and well-developed, well-nourished, and in no distress.   Head: Normocephalic and atraumatic.   Eyes: EOM are normal. Pupils are equal, round, and reactive to " light.   Neck: Normal range of motion. Neck supple.   Cardiovascular: Normal heart sounds.  An irregularly irregular rhythm present. Tachycardia present.    Pulmonary/Chest: Effort normal. No respiratory distress. He has rhonchi in the left lower field.   Abdominal: Soft. There is no tenderness. There is no rebound and no guarding.   Musculoskeletal: Normal range of motion. He exhibits no edema.   Neurological: He is alert and oriented to person, place, and time. He has normal sensation and normal strength.   Skin: Skin is warm and dry.   Psychiatric: Mood and affect normal.     LAB RESULTS  Lab Results (last 24 hours)     Procedure Component Value Units Date/Time    Blood Culture - Blood, [[id number]]  (Normal) Collected:  01/19/18 0120    Specimen:  Blood from Arm, Left Updated:  01/23/18 0146     Blood Culture No growth at 4 days    Blood Culture - Blood, [[id number]]  (Normal) Collected:  01/19/18 0134    Specimen:  Blood from Arm, Left Updated:  01/23/18 0216     Blood Culture No growth at 4 days    CBC & Differential [[id number]] Collected:  01/23/18 0356    Specimen:  Blood Updated:  01/23/18 0520    Narrative:       The following orders were created for panel order CBC & Differential.  Procedure                               Abnormality         Status                     ---------                               -----------         ------                     CBC Auto Differential[[id number]]        Abnormal            Final result                 Please view results for these tests on the individual orders.    CBC Auto Differential [[id number]]  (Abnormal) Collected:  01/23/18 0356    Specimen:  Blood Updated:  01/23/18 0520     WBC 7.32 10*3/mm3      RBC 3.12 (L) 10*6/mm3      Hemoglobin 9.1 (L) g/dL      Hematocrit 26.7 (L) %      MCV 85.6 fL      MCH 29.2 pg      MCHC 34.1 g/dL      RDW 16.7 (H) %      RDW-SD 51.7 fl      MPV 9.6 fL      Platelets 61 (L) 10*3/mm3      Neutrophil % 89.2 (H) %      Lymphocyte %  4.1 (L) %      Monocyte % 5.3 %      Eosinophil % 0.7 %      Basophil % 0.0 %      Immature Grans % 0.7 (H) %      Neutrophils, Absolute 6.53 10*3/mm3      Lymphocytes, Absolute 0.30 (L) 10*3/mm3      Monocytes, Absolute 0.39 10*3/mm3      Eosinophils, Absolute 0.05 10*3/mm3      Basophils, Absolute 0.00 10*3/mm3      Immature Grans, Absolute 0.05 (H) 10*3/mm3     Renal Function Panel [[id number]]  (Abnormal) Collected:  01/23/18 0356    Specimen:  Blood Updated:  01/23/18 0543     Glucose 79 mg/dL      BUN 64 (H) mg/dL      Creatinine 3.44 (H) mg/dL      Sodium 131 (L) mmol/L      Potassium 4.1 mmol/L      Chloride 95 (L) mmol/L      CO2 20.5 (L) mmol/L      Calcium 7.8 (L) mg/dL      Albumin 2.10 (L) g/dL      Phosphorus 5.2 (H) mg/dL      Anion Gap 15.5 mmol/L      BUN/Creatinine Ratio 18.6     eGFR Non African Amer 18 (L) mL/min/1.73     Narrative:       The MDRD GFR formula is only valid for adults with stable renal function between ages 18 and 70.        Imaging Results (last 24 hours)     ** No results found for the last 24 hours. **             EKG                                                  Rhythm/Rate: Afib 71  P waves and MA: absent   QRS, axis: nml   ST and T waves: nml       Current Facility-Administered Medications:   •  aspirin chewable tablet 81 mg, 81 mg, Oral, Daily, Luiz Horan MD, 81 mg at 01/23/18 0857  •  calcitriol (ROCALTROL) capsule 0.25 mcg, 0.25 mcg, Oral, Daily, Lisa Osorio MD, 0.25 mcg at 01/23/18 0858  •  cetirizine (zyrTEC) tablet 5 mg, 5 mg, Oral, Daily, Marcellus Osborne MD, 5 mg at 01/23/18 0858  •  epoetin tip (EPOGEN,PROCRIT) injection 10,000 Units, 10,000 Units, Intravenous, Once per day on Mon Wed Fri, Kvng Monsivais MD  •  famotidine (PEPCID) tablet 20 mg, 20 mg, Oral, BID, Marcellus Osborne MD, 20 mg at 01/23/18 0858  •  febuxostat (ULORIC) tablet 80 mg, 80 mg, Oral, Daily, Marcellus Osborne MD, 80 mg at 01/23/18 0858  •  ferrous sulfate tablet 325 mg, 325 mg,  Oral, Daily With Breakfast, Marcellus Osborne MD, 325 mg at 01/23/18 0858  •  folic acid (FOLVITE) tablet 1 mg, 1 mg, Oral, Daily, Marcellus Osborne MD, 1 mg at 01/23/18 0858  •  guaiFENesin (MUCINEX) 12 hr tablet 600 mg, 600 mg, Oral, Q12H, Marcellus Osborne MD, 600 mg at 01/23/18 0858  •  hydrALAZINE (APRESOLINE) tablet 100 mg, 100 mg, Oral, TID, Marcellus Osborne MD, 100 mg at 01/23/18 0858  •  ipratropium-albuterol (DUO-NEB) nebulizer solution 3 mL, 3 mL, Nebulization, Q4H - RT, Marcellus Osborne MD, 3 mL at 01/23/18 1218  •  latanoprost (XALATAN) 0.005 % ophthalmic solution 1 drop, 1 drop, Both Eyes, Nightly, Marcellus Osborne MD, 1 drop at 01/22/18 2137  •  nebivolol (BYSTOLIC) tablet 2.5 mg, 2.5 mg, Oral, PRN, Marcellus Osborne MD  •  nystatin (MYCOSTATIN) powder, , Topical, Q12H, Marcellus Osborne MD  •  pantoprazole (PROTONIX) EC tablet 40 mg, 40 mg, Oral, BID AC, Theo Freedman MD  •  sevelamer (RENVELA) tablet 800 mg, 800 mg, Oral, TID With Meals, Lisa Osorio MD, 800 mg at 01/23/18 1202  •  sodium bicarbonate tablet 650 mg, 650 mg, Oral, BID, Marcellus Osborne MD, 650 mg at 01/23/18 0858  •  sodium chloride (OCEAN) nasal spray 1 spray, 1 spray, Each Nare, 5x Daily PRN, Marcellus Osborne MD  •  Insert peripheral IV, , , Once **AND** sodium chloride 0.9 % flush 10 mL, 10 mL, Intravenous, PRN, Rick Pisano MD  •  terazosin (HYTRIN) capsule 5 mg, 5 mg, Oral, Nightly, Marcellus Osborne MD, 5 mg at 01/22/18 2128  •  vitamin B-12 (CYANOCOBALAMIN) tablet 1,000 mcg, 1,000 mcg, Oral, Daily, Marcellus Osborne MD, 1,000 mcg at 01/23/18 0858     ASSESSMENT  Bilateral pneumonia with sepsis  End-stage renal disease started on hemodialysis on this admission  Acute on Chronic anemia  Hypertension  Gastroesophageal reflux disease  Candida esophagitis  COPD  BPH  Ulcerated right lower lobe and of the stoma status post suturing    PLAN  CPM  Supplement oxygen nebulizer  antibiotics  Transfuse PRN  Continue home medications   Hemodialysis TIW  Supportive  care  Stress ulcer DVT prophylaxis  PT/OT  Discharge home on Thursday    LUCINDA RUANO MD

## 2018-01-23 NOTE — NURSING NOTE
CWOCN follow up on ostomy. Patient and wife concerned about supplies as their supply company said they will not longer be carrying this supplies. I showed them examples of different options of wafers and bags and provided some for them to take home. I provided booklets with the ordering numbers and a  at Located within [facility]. Also, I emailed reps from [facility] and [place] for samples to be sent to the home.  Patient and wife thankful and agreeable to the above. They can call me if questions arise or they have problems with supplies.

## 2018-01-23 NOTE — PLAN OF CARE
Problem: Patient Care Overview (Adult)  Goal: Plan of Care Review   01/23/18 0851   Coping/Psychosocial Response Interventions   Plan Of Care Reviewed With patient;family   Outcome Evaluation   Outcome Summary/Follow up Plan Pt. will benefit from skilled inpt. P.T. to address his functional deficits and to assist pt. in regaining his independence with functional mobility.       Problem: Inpatient Physical Therapy  Goal: Bed Mobility Goal LTG- PT   01/23/18 0851   Bed Mobility PT LTG   Bed Mobility PT LTG, Date Established 01/23/18   Bed Mobility PT LTG, Time to Achieve 1 wk   Bed Mobility PT LTG, Activity Type all bed mobility   Bed Mobility PT LTG, [place] Level independent     Goal: Transfer Training Goal 1 LTG- PT   01/23/18 0851   Transfer Training PT LTG   Transfer Training PT LTG, Date Established 01/23/18   Transfer Training PT LTG, Time to Achieve 1 wk   Transfer Training PT LTG, Activity Type all transfers   Transfer Training PT LTG, [place] Level independent     Goal: Gait Training Goal LTG- PT   01/23/18 0851   Gait Training PT LTG   Gait Training Goal PT LTG, Date Established 01/23/18   Gait Training Goal PT LTG, Time to Achieve 1 wk   Gait Training Goal PT LTG, [place] Level independent   Gait Training Goal PT LTG, Distance to Achieve 300 feet

## 2018-01-23 NOTE — TELEPHONE ENCOUNTER
Phone with pt's wife 2 days in a row: pt making progress , undergoing dialysis, transfusions, ostomy stitched up because brisk bleeding.

## 2018-01-23 NOTE — PLAN OF CARE
Problem: Patient Care Overview (Adult)  Goal: Plan of Care Review  Outcome: Ongoing (interventions implemented as appropriate)   01/22/18 1930   Coping/Psychosocial Response Interventions   Plan Of Care Reviewed With patient;spouse   Patient Care Overview   Progress improving   Outcome Evaluation   Outcome Summary/Follow up Plan No further bleeding around stoma site, stools green/brown. Recvd 2 units of PRBC's today, cont to monitor labs, vs.       Problem: Respiratory Insufficiency (Adult)  Goal: Acid/Base Balance  Outcome: Ongoing (interventions implemented as appropriate)    Goal: Effective Ventilation  Outcome: Ongoing (interventions implemented as appropriate)      Problem: Fall Risk (Adult)  Goal: Identify Related Risk Factors and Signs and Symptoms  Outcome: Ongoing (interventions implemented as appropriate)    Goal: Absence of Falls  Outcome: Ongoing (interventions implemented as appropriate)

## 2018-01-24 LAB
ANION GAP SERPL CALCULATED.3IONS-SCNC: 17.9 MMOL/L
BACTERIA SPEC AEROBE CULT: NORMAL
BACTERIA SPEC AEROBE CULT: NORMAL
BASOPHILS # BLD AUTO: 0 10*3/MM3 (ref 0–0.2)
BASOPHILS NFR BLD AUTO: 0 % (ref 0–1.5)
BUN BLD-MCNC: 80 MG/DL (ref 8–23)
BUN/CREAT SERPL: 19 (ref 7–25)
CALCIUM SPEC-SCNC: 8.2 MG/DL (ref 8.6–10.5)
CHLORIDE SERPL-SCNC: 92 MMOL/L (ref 98–107)
CO2 SERPL-SCNC: 18.1 MMOL/L (ref 22–29)
CREAT BLD-MCNC: 4.22 MG/DL (ref 0.76–1.27)
DEPRECATED RDW RBC AUTO: 53.2 FL (ref 37–54)
EOSINOPHIL # BLD AUTO: 0.09 10*3/MM3 (ref 0–0.7)
EOSINOPHIL NFR BLD AUTO: 1 % (ref 0.3–6.2)
ERYTHROCYTE [DISTWIDTH] IN BLOOD BY AUTOMATED COUNT: 16.9 % (ref 11.5–14.5)
GFR SERPL CREATININE-BSD FRML MDRD: 14 ML/MIN/1.73
GLUCOSE BLD-MCNC: 118 MG/DL (ref 65–99)
HCT VFR BLD AUTO: 27.9 % (ref 40.4–52.2)
HGB BLD-MCNC: 9.4 G/DL (ref 13.7–17.6)
IMM GRANULOCYTES # BLD: 0.08 10*3/MM3 (ref 0–0.03)
IMM GRANULOCYTES NFR BLD: 0.9 % (ref 0–0.5)
LYMPHOCYTES # BLD AUTO: 0.18 10*3/MM3 (ref 0.9–4.8)
LYMPHOCYTES NFR BLD AUTO: 2 % (ref 19.6–45.3)
MCH RBC QN AUTO: 29 PG (ref 27–32.7)
MCHC RBC AUTO-ENTMCNC: 33.7 G/DL (ref 32.6–36.4)
MCV RBC AUTO: 86.1 FL (ref 79.8–96.2)
MONOCYTES # BLD AUTO: 0.45 10*3/MM3 (ref 0.2–1.2)
MONOCYTES NFR BLD AUTO: 5 % (ref 5–12)
NEUTROPHILS # BLD AUTO: 8.28 10*3/MM3 (ref 1.9–8.1)
NEUTROPHILS NFR BLD AUTO: 91.1 % (ref 42.7–76)
PLATELET # BLD AUTO: 65 10*3/MM3 (ref 140–500)
PMV BLD AUTO: 10.1 FL (ref 6–12)
POTASSIUM BLD-SCNC: 4.3 MMOL/L (ref 3.5–5.2)
RBC # BLD AUTO: 3.24 10*6/MM3 (ref 4.6–6)
SODIUM BLD-SCNC: 128 MMOL/L (ref 136–145)
WBC NRBC COR # BLD: 9.08 10*3/MM3 (ref 4.5–10.7)

## 2018-01-24 PROCEDURE — 5A1D70Z PERFORMANCE OF URINARY FILTRATION, INTERMITTENT, LESS THAN 6 HOURS PER DAY: ICD-10-PCS | Performed by: INTERNAL MEDICINE

## 2018-01-24 PROCEDURE — 85025 COMPLETE CBC W/AUTO DIFF WBC: CPT | Performed by: HOSPITALIST

## 2018-01-24 PROCEDURE — 80048 BASIC METABOLIC PNL TOTAL CA: CPT | Performed by: HOSPITALIST

## 2018-01-24 PROCEDURE — 63510000001 EPOETIN ALFA PER 1000 UNITS: Performed by: INTERNAL MEDICINE

## 2018-01-24 PROCEDURE — 94799 UNLISTED PULMONARY SVC/PX: CPT

## 2018-01-24 RX ORDER — LORATADINE 10 MG/1
10 TABLET ORAL DAILY PRN
Status: DISCONTINUED | OUTPATIENT
Start: 2018-01-24 | End: 2018-01-25

## 2018-01-24 RX ORDER — FEBUXOSTAT 40 MG/1
40 TABLET, FILM COATED ORAL DAILY
Status: DISCONTINUED | OUTPATIENT
Start: 2018-01-25 | End: 2018-01-25 | Stop reason: HOSPADM

## 2018-01-24 RX ORDER — MANNITOL 250 MG/ML
12.5 INJECTION, SOLUTION INTRAVENOUS AS NEEDED
Status: DISCONTINUED | OUTPATIENT
Start: 2018-01-24 | End: 2018-01-25

## 2018-01-24 RX ORDER — ALBUMIN (HUMAN) 12.5 G/50ML
12.5 SOLUTION INTRAVENOUS AS NEEDED
Status: DISCONTINUED | OUTPATIENT
Start: 2018-01-24 | End: 2018-01-25

## 2018-01-24 RX ORDER — SODIUM BICARBONATE 650 MG/1
650 TABLET ORAL 3 TIMES DAILY
Status: DISCONTINUED | OUTPATIENT
Start: 2018-01-24 | End: 2018-01-25 | Stop reason: HOSPADM

## 2018-01-24 RX ADMIN — SEVELAMER CARBONATE 800 MG: 800 TABLET, FILM COATED ORAL at 16:14

## 2018-01-24 RX ADMIN — SODIUM BICARBONATE 650 MG: 650 TABLET ORAL at 16:14

## 2018-01-24 RX ADMIN — SEVELAMER CARBONATE 800 MG: 800 TABLET, FILM COATED ORAL at 08:47

## 2018-01-24 RX ADMIN — CALCITRIOL 0.25 MCG: 0.25 CAPSULE, LIQUID FILLED ORAL at 08:47

## 2018-01-24 RX ADMIN — FERROUS SULFATE TAB 325 MG (65 MG ELEMENTAL FE) 325 MG: 325 (65 FE) TAB at 08:47

## 2018-01-24 RX ADMIN — HYDRALAZINE HYDROCHLORIDE 100 MG: 50 TABLET, FILM COATED ORAL at 16:14

## 2018-01-24 RX ADMIN — FOLIC ACID 1 MG: 1 TABLET ORAL at 08:47

## 2018-01-24 RX ADMIN — SODIUM BICARBONATE 650 MG: 650 TABLET ORAL at 21:45

## 2018-01-24 RX ADMIN — ERYTHROPOIETIN 10000 UNITS: 10000 INJECTION, SOLUTION INTRAVENOUS; SUBCUTANEOUS at 12:10

## 2018-01-24 RX ADMIN — GUAIFENESIN 600 MG: 600 TABLET, EXTENDED RELEASE ORAL at 08:47

## 2018-01-24 RX ADMIN — SODIUM BICARBONATE 650 MG: 650 TABLET ORAL at 08:47

## 2018-01-24 RX ADMIN — PANTOPRAZOLE SODIUM 40 MG: 40 TABLET, DELAYED RELEASE ORAL at 09:01

## 2018-01-24 RX ADMIN — FEBUXOSTAT 80 MG: 40 TABLET ORAL at 08:47

## 2018-01-24 RX ADMIN — IPRATROPIUM BROMIDE AND ALBUTEROL SULFATE 3 ML: .5; 3 SOLUTION RESPIRATORY (INHALATION) at 15:38

## 2018-01-24 RX ADMIN — GUAIFENESIN 600 MG: 600 TABLET, EXTENDED RELEASE ORAL at 21:45

## 2018-01-24 RX ADMIN — NYSTATIN: 100000 POWDER TOPICAL at 16:17

## 2018-01-24 RX ADMIN — LATANOPROST 1 DROP: 50 SOLUTION OPHTHALMIC at 21:42

## 2018-01-24 RX ADMIN — PANTOPRAZOLE SODIUM 40 MG: 40 TABLET, DELAYED RELEASE ORAL at 17:47

## 2018-01-24 RX ADMIN — IPRATROPIUM BROMIDE AND ALBUTEROL SULFATE 3 ML: .5; 3 SOLUTION RESPIRATORY (INHALATION) at 20:16

## 2018-01-24 RX ADMIN — IPRATROPIUM BROMIDE AND ALBUTEROL SULFATE 3 ML: .5; 3 SOLUTION RESPIRATORY (INHALATION) at 07:47

## 2018-01-24 RX ADMIN — ASPIRIN 81 MG: 81 TABLET, CHEWABLE ORAL at 08:47

## 2018-01-24 RX ADMIN — SEVELAMER CARBONATE 800 MG: 800 TABLET, FILM COATED ORAL at 21:45

## 2018-01-24 RX ADMIN — HYDRALAZINE HYDROCHLORIDE 100 MG: 50 TABLET, FILM COATED ORAL at 21:45

## 2018-01-24 RX ADMIN — FAMOTIDINE 20 MG: 20 TABLET, FILM COATED ORAL at 08:42

## 2018-01-24 RX ADMIN — CYANOCOBALAMIN TAB 500 MCG 1000 MCG: 500 TAB at 08:47

## 2018-01-24 RX ADMIN — NYSTATIN 1 APPLICATION: 100000 POWDER TOPICAL at 21:42

## 2018-01-24 RX ADMIN — LORATADINE 10 MG: 10 TABLET ORAL at 19:03

## 2018-01-24 NOTE — PROGRESS NOTES
"Daily progress note    Chief complaint  Doing better  No new complaints    History of present illness  Pt is a 72 y.o. male who presents complaining of SOA that began tonight at 21:00. Pt denies CP but complains of generalized fatigue. Pt is scheduled to start dialysis tomorrow. Pt has an EGD on 1/17/18, and was dx with a fungal infection and esophageal bleeding. Pt was started on Diflucan at that time. Pt also had a fistulagram on 1/15/18, and was given Valium at that time. Afterwards, they were informed by the pt's nephrologist that the pt may have a hard time metabolizing the medication. On arrival to ED, pt had room air sat's of 88%.   patient alert oriented denies any productive cough fever chills night sweats or weight loss.  Patient stated that he has gained some weight     REVIEW OF SYSTEMS  Review of Systems   Constitutional: Positive for fatigue. Negative for chills and fever.   HENT: Negative for congestion and sore throat.    Eyes: Negative.    Respiratory: Positive for shortness of breath. Negative for cough.    Cardiovascular: Negative for chest pain and leg swelling.   Gastrointestinal: Negative for abdominal pain, diarrhea and vomiting.   Genitourinary: Negative for difficulty urinating and dysuria.   Musculoskeletal: Negative for back pain and neck pain.   Skin: Negative for rash and wound.   Allergic/Immunologic: Negative.    Neurological: Negative for dizziness, weakness, numbness and headaches.   Psychiatric/Behavioral: Negative.    All other systems reviewed and are negative.     PHYSICAL EXAM  Blood pressure 130/62, pulse 84, temperature 97.2 °F (36.2 °C), temperature source Oral, resp. rate 18, height 177.8 cm (70\"), weight 86 kg (189 lb 11.2 oz), SpO2 95 %.    Constitutional: He is oriented to person, place, and time and well-developed, well-nourished, and in no distress.   Head: Normocephalic and atraumatic.   Eyes: EOM are normal. Pupils are equal, round, and reactive to light.   Neck: " Normal range of motion. Neck supple.   Cardiovascular: Normal heart sounds.  An irregularly irregular rhythm present. Tachycardia present.    Pulmonary/Chest: Effort normal. No respiratory distress. He has rhonchi in the left lower field.   Abdominal: Soft. There is no tenderness. There is no rebound and no guarding.   Musculoskeletal: Normal range of motion. He exhibits no edema.   Neurological: He is alert and oriented to person, place, and time. He has normal sensation and normal strength.   Skin: Skin is warm and dry.   Psychiatric: Mood and affect normal.     LAB RESULTS  Lab Results (last 24 hours)     Procedure Component Value Units Date/Time    Blood Culture - Blood, [[id number]]  (Normal) Collected:  01/19/18 0120    Specimen:  Blood from Arm, Left Updated:  01/24/18 0146     Blood Culture No growth at 5 days    Blood Culture - Blood, [[id number]]  (Normal) Collected:  01/19/18 0134    Specimen:  Blood from Arm, Left Updated:  01/24/18 0216     Blood Culture No growth at 5 days    CBC & Differential [[id number]] Collected:  01/24/18 0339    Specimen:  Blood Updated:  01/24/18 0417    Narrative:       The following orders were created for panel order CBC & Differential.  Procedure                               Abnormality         Status                     ---------                               -----------         ------                     CBC Auto Differential[[id number]]        Abnormal            Final result                 Please view results for these tests on the individual orders.    CBC Auto Differential [[id number]]  (Abnormal) Collected:  01/24/18 0339    Specimen:  Blood Updated:  01/24/18 0417     WBC 9.08 10*3/mm3      RBC 3.24 (L) 10*6/mm3      Hemoglobin 9.4 (L) g/dL      Hematocrit 27.9 (L) %      MCV 86.1 fL      MCH 29.0 pg      MCHC 33.7 g/dL      RDW 16.9 (H) %      RDW-SD 53.2 fl      MPV 10.1 fL      Platelets 65 (L) 10*3/mm3      Neutrophil % 91.1 (H) %      Lymphocyte % 2.0 (L) %       Monocyte % 5.0 %      Eosinophil % 1.0 %      Basophil % 0.0 %      Immature Grans % 0.9 (H) %      Neutrophils, Absolute 8.28 (H) 10*3/mm3      Lymphocytes, Absolute 0.18 (L) 10*3/mm3      Monocytes, Absolute 0.45 10*3/mm3      Eosinophils, Absolute 0.09 10*3/mm3      Basophils, Absolute 0.00 10*3/mm3      Immature Grans, Absolute 0.08 (H) 10*3/mm3     Basic Metabolic Panel [[id number]]  (Abnormal) Collected:  01/24/18 0339    Specimen:  Blood Updated:  01/24/18 0435     Glucose 118 (H) mg/dL      BUN 80 (H) mg/dL      Creatinine 4.22 (H) mg/dL      Sodium 128 (L) mmol/L      Potassium 4.3 mmol/L      Chloride 92 (L) mmol/L      CO2 18.1 (L) mmol/L      Calcium 8.2 (L) mg/dL      eGFR Non African Amer 14 (L) mL/min/1.73      BUN/Creatinine Ratio 19.0     Anion Gap 17.9 mmol/L     Narrative:       The MDRD GFR formula is only valid for adults with stable renal function between ages 18 and 70.        Imaging Results (last 24 hours)     Procedure Component Value Units Date/Time    XR Chest 1 View [[id number]] Collected:  01/23/18 1611     Updated:  01/23/18 1634    Narrative:       ONE-VIEW PORTABLE CHEST     HISTORY: Follow-up of extensive pneumonia. Shortness of breath.     FINDINGS:  There is considerable generalized pneumonia, particularly in  the upper lobes and this shows improvement from 4 days ago. The heart  remains mildly enlarged.     This report was finalized on 1/23/2018 4:31 PM by Dr. Jason Flores MD.                EKG                                                  Rhythm/Rate: Afib 71  P waves and [place]: absent   QRS, axis: nml   ST and T waves: nml       Current Facility-Administered Medications:   •  albumin human 25 % IV SOLN 12.5 g, 12.5 g, Intravenous, PRN, Keisha Freed MD  •  aspirin chewable tablet 81 mg, 81 mg, Oral, Daily, Luiz Horan MD, 81 mg at 01/24/18 0847  •  calcitriol (ROCALTROL) capsule 0.25 mcg, 0.25 mcg, Oral, Daily, Lisa Osorio MD, 0.25 mcg at 01/24/18  0847  •  epoetin tip (EPOGEN,PROCRIT) injection 10,000 Units, 10,000 Units, Intravenous, Once per day on Mon Wed Fri, Kvng Monsivais MD, 10,000 Units at 01/24/18 1210  •  [START ON 1/25/2018] febuxostat (ULORIC) tablet 40 mg, 40 mg, Oral, Daily, Keisha Freed MD  •  ferrous sulfate tablet 325 mg, 325 mg, Oral, Daily With Breakfast, Marcellus Osborne MD, 325 mg at 01/24/18 0847  •  folic acid (FOLVITE) tablet 1 mg, 1 mg, Oral, Daily, Marcellus Osborne MD, 1 mg at 01/24/18 0847  •  guaiFENesin (MUCINEX) 12 hr tablet 600 mg, 600 mg, Oral, Q12H, Marcellus Osborne MD, 600 mg at 01/24/18 0847  •  hydrALAZINE (APRESOLINE) tablet 100 mg, 100 mg, Oral, TID, Marcellus Osborne MD, 100 mg at 01/23/18 2110  •  ipratropium-albuterol (DUO-NEB) nebulizer solution 3 mL, 3 mL, Nebulization, Q4H - RT, Marcellus Osborne MD, 3 mL at 01/24/18 1538  •  latanoprost (XALATAN) 0.005 % ophthalmic solution 1 drop, 1 drop, Both Eyes, Nightly, Marcellus Osborne MD, 1 drop at 01/23/18 2111  •  [START ON 1/25/2018] levoFLOXacin (LEVAQUIN) tablet 500 mg, 500 mg, Oral, Q48H, Marcellus Osborne MD  •  loratadine (CLARITIN) tablet 10 mg, 10 mg, Oral, Daily PRN, Marcellus Osborne MD  •  mannitol 25% (RENAL) injection, 12.5 g, Intravenous, PRN, Kvng Monsivais MD  •  nebivolol (BYSTOLIC) tablet 2.5 mg, 2.5 mg, Oral, PRN, Marcellus Osborne MD  •  nystatin (MYCOSTATIN) powder, , Topical, Q12H, Marcellus Osborne MD  •  pantoprazole (PROTONIX) EC tablet 40 mg, 40 mg, Oral, BID AC, Theo Freedman MD, 40 mg at 01/24/18 0901  •  sevelamer (RENVELA) tablet 800 mg, 800 mg, Oral, TID With Meals, Lisa Osorio MD, 800 mg at 01/24/18 0847  •  sodium bicarbonate tablet 650 mg, 650 mg, Oral, TID, Keisha Freed MD  •  sodium chloride (OCEAN) nasal spray 1 spray, 1 spray, Each Nare, 5x Daily PRN, Marcellus Osborne MD  •  sodium chloride 0.9 % bolus 1,000 mL, 1,000 mL, Intravenous, PRN, Kvng Monsivais MD  •  Insert peripheral IV, , , Once **AND** sodium chloride 0.9 % flush 10 mL, 10  mL, Intravenous, PRN, Rick Pisano MD  •  terazosin (HYTRIN) capsule 5 mg, 5 mg, Oral, Nightly, Lucinda Osborne MD, 5 mg at 01/23/18 2110  •  vitamin B-12 (CYANOCOBALAMIN) tablet 1,000 mcg, 1,000 mcg, Oral, Daily, Lucinda Osborne MD, 1,000 mcg at 01/24/18 0847     ASSESSMENT  Bilateral pneumonia with sepsis  End-stage renal disease started on hemodialysis on this admission  Acute on Chronic anemia  Hypertension  Gastroesophageal reflux disease  Candida esophagitis  COPD  BPH  Ulcerated right lower lobe and of the stoma status post suturing    PLAN  CPM  Supplement oxygen nebulizer  antibiotics  Transfuse PRN  Continue home medications   Hemodialysis TIW  Supportive care  Stress ulcer DVT prophylaxis  PT/OT  Discharge home in AM    LUCINDA OSBORNE MD

## 2018-01-24 NOTE — PROGRESS NOTES
Continued Stay Note  [facility]     Patient Name: Bill Landry  MRN: [id number]  Today's Date: 1/24/2018    Admit Date: 1/19/2018          Discharge Plan       01/24/18 1630    Case Management/Social Work Plan    Plan Plans are home with spouse.  Is starting HD @ [facility] on M-W-F 1230 chair time.      Additional Comments Spoke with pt and wife @ bedside to discuss need for HH.  They state they already see a nurse @  [facility], and do not feel HH is needed.  Plans are to start HD at [facility] on M-W-F @ 1230.  Pt had been ready to start prior to adm, spoke with clinic and they will get updates from adm.               Discharge Codes     None            Keisha Galindo RN

## 2018-01-24 NOTE — PROGRESS NOTES
"   LOS: 4 days    Patient Care Team:  Nina Tam MD as PCP - General (Internal Medicine)  Rodolfo Gordon MD as PCP - Claims Attributed  Sharif Mckenzie MD as Consulting Physician (Hematology and Oncology)  Marcellus Osborne MD as Referring Physician (Internal Medicine)  Brenton Aiken MD as Consulting Physician (Cardiology)  Ken Moseley MD as Consulting Physician (Pulmonary Disease)  Cooper Virgen MD as Consulting Physician (Urology)    Chief Complaint:    Chief Complaint   Patient presents with   • Shortness of Breath       Subjective follow-up end-stage renal disease    Interval History:   Feels better.  Eating ok, but nepro shakes too filling.  Still urinating.     Objective     Vital Signs  Temp:  [97.6 °F (36.4 °C)-99 °F (37.2 °C)] 97.6 °F (36.4 °C)  Heart Rate:  [74-88] 82  Resp:  [18-20] 20  BP: (125-141)/(60-79) 127/66    Flowsheet Rows         First Filed Value    Admission Height  177.8 cm (70\") Documented at 01/19/2018 0037    Admission Weight  90.7 kg (200 lb) Documented at 01/19/2018 0037             I/O last 3 completed shifts:  In: 1260 [P.O.:910; Blood:350]  Out: 2575 [Urine:900; Other:500; Stool:1175]    Intake/Output Summary (Last 24 hours) at 01/23/18 1954  Last data filed at 01/23/18 1705   Gross per 24 hour   Intake              570 ml   Output             1150 ml   Net             -580 ml       Physical Exam:  Gen. Appearance: Awake and alert, no acute distress  Skin: warm and dry, pale  HEENT: Nonicteric sclerae, oral mucosa normal,   Neck: supple, no JVD, trachea midline  Lungs: Bilateral rhonchi, unlabored breathing effort  Heart: Irregularly irregular, no rub  Abdomen: soft, non-tender, functional ileostomy, + bowel sounds to auscultation  Extremities: 1+ lower ext  edema, cyanosis or clubbing, functional AV fistula in the left upper arm,  Bruised.  Thrill and bruit.   Neuro: normal speech and mental status       Results Review:      Results from last 7 days  Lab Units " 01/23/18  0356 01/22/18  0350 01/21/18  0547 01/20/18  0524 01/19/18  0054   SODIUM mmol/L 131* 129* 128* 125* 123*   POTASSIUM mmol/L 4.1 4.4 3.9 4.0 4.6   CHLORIDE mmol/L 95* 95* 94* 91* 89*   CO2 mmol/L 20.5* 16.6* 17.1* 14.3* 10.3*   BUN mg/dL 64* 94* 81* 109* 141*   CREATININE mg/dL 3.44* 4.67* 4.24* 5.27* 7.44*   CALCIUM mg/dL 7.8* 7.8* 7.9* 7.9* 8.1*   BILIRUBIN mg/dL  --   --   --  2.0* 1.8*   ALK PHOS U/L  --   --   --  122* 136*   ALT (SGPT) U/L  --   --   --  20 17   AST (SGOT) U/L  --   --   --  36 33   GLUCOSE mg/dL 79 91 97 99 165*       Estimated Creatinine Clearance: 23.5 mL/min (by C-G formula based on Cr of 3.44).      Results from last 7 days  Lab Units 01/23/18  0356 01/20/18  0524   PHOSPHORUS mg/dL 5.2* 7.2*               Results from last 7 days  Lab Units 01/23/18  0356 01/22/18  0350 01/21/18  1042 01/21/18  0547 01/20/18  0524 01/19/18  0054   WBC 10*3/mm3 7.32 7.71  --  5.70 6.06 8.93   HEMOGLOBIN g/dL 9.1* 7.5* 7.8* 7.6* 6.4* 7.7*   PLATELETS 10*3/mm3 61* 67*  --  67* 80* 82*         Results from last 7 days  Lab Units 01/19/18  0054   INR  1.91*         Imaging Results (last 24 hours)     Procedure Component Value Units Date/Time    XR Chest 1 View [[id number]] Collected:  01/23/18 1611     Updated:  01/23/18 1634    Narrative:       ONE-VIEW PORTABLE CHEST     HISTORY: Follow-up of extensive pneumonia. Shortness of breath.     FINDINGS:  There is considerable generalized pneumonia, particularly in  the upper lobes and this shows improvement from 4 days ago. The heart  remains mildly enlarged.     This report was finalized on 1/23/2018 4:31 PM by Dr. Jason Flores MD.             aspirin 81 mg Oral Daily   calcitriol 0.25 mcg Oral Daily   epoetin tip 10,000 Units Intravenous Once per day on Mon Wed Fri   famotidine 20 mg Oral BID   febuxostat 80 mg Oral Daily   ferrous sulfate 325 mg Oral Daily With Breakfast   folic acid 1 mg Oral Daily   guaiFENesin 600 mg Oral Q12H   hydrALAZINE 100  mg Oral TID   ipratropium-albuterol 3 mL Nebulization Q4H - RT   latanoprost 1 drop Both Eyes Nightly   [START ON 1/25/2018] levoFLOXacin 500 mg Oral Q48H   nystatin  Topical Q12H   pantoprazole 40 mg Oral BID AC   sevelamer 800 mg Oral TID With Meals   sodium bicarbonate 650 mg Oral BID   terazosin 5 mg Oral Nightly   vitamin B-12 1,000 mcg Oral Daily          Medication Review:   Current Facility-Administered Medications   Medication Dose Route Frequency Provider Last Rate Last Dose   • aspirin chewable tablet 81 mg  81 mg Oral Daily Luiz Horan MD   81 mg at 01/23/18 0857   • calcitriol (ROCALTROL) capsule 0.25 mcg  0.25 mcg Oral Daily Lisa Osorio MD   0.25 mcg at 01/23/18 0858   • epoetin tip (EPOGEN,PROCRIT) injection 10,000 Units  10,000 Units Intravenous Once per day on Mon Wed Fri Calderon Karen Monsivais MD       • famotidine (PEPCID) tablet 20 mg  20 mg Oral BID Marcellus Osborne MD   20 mg at 01/23/18 0858   • febuxostat (ULORIC) tablet 80 mg  80 mg Oral Daily Marcellus Osborne MD   80 mg at 01/23/18 0858   • ferrous sulfate tablet 325 mg  325 mg Oral Daily With Breakfast aMrcellus Osborne MD   325 mg at 01/23/18 0858   • folic acid (FOLVITE) tablet 1 mg  1 mg Oral Daily Marcellus Osborne MD   1 mg at 01/23/18 0858   • guaiFENesin (MUCINEX) 12 hr tablet 600 mg  600 mg Oral Q12H Marcellus Osborne MD   600 mg at 01/23/18 0858   • hydrALAZINE (APRESOLINE) tablet 100 mg  100 mg Oral TID Marcellus Osborne MD   100 mg at 01/23/18 1550   • ipratropium-albuterol (DUO-NEB) nebulizer solution 3 mL  3 mL Nebulization Q4H - RT Marcellus Osborne MD   3 mL at 01/23/18 1928   • latanoprost (XALATAN) 0.005 % ophthalmic solution 1 drop  1 drop Both Eyes Nightly Marcellus Osborne MD   1 drop at 01/22/18 2137   • [START ON 1/25/2018] levoFLOXacin (LEVAQUIN) tablet 500 mg  500 mg Oral Q48H Marcellus Osborne MD       • nebivolol (BYSTOLIC) tablet 2.5 mg  2.5 mg Oral PRN Marcellus Osborne MD       • nystatin (MYCOSTATIN) powder   Topical Q12H Marcellus Osborne MD        • pantoprazole (PROTONIX) EC tablet 40 mg  40 mg Oral BID AC Theo Freedman MD   40 mg at 01/23/18 1803   • sevelamer (RENVELA) tablet 800 mg  800 mg Oral TID With Meals Lisa Osorio MD   800 mg at 01/23/18 1803   • sodium bicarbonate tablet 650 mg  650 mg Oral BID Marcellus Osborne MD   650 mg at 01/23/18 0858   • sodium chloride (OCEAN) nasal spray 1 spray  1 spray Each Nare 5x Daily PRN Marcellus Osborne MD       • sodium chloride 0.9 % flush 10 mL  10 mL Intravenous PRN Rick Pisano MD       • terazosin (HYTRIN) capsule 5 mg  5 mg Oral Nightly Marcellus Osborne MD   5 mg at 01/22/18 2128   • vitamin B-12 (CYANOCOBALAMIN) tablet 1,000 mcg  1,000 mcg Oral Daily Marcellus Osborne MD   1,000 mcg at 01/23/18 0858       Assessment/Plan   1.  New end-stage renal disease, plan dialysis tomorrow.   He is scheduled for outpatient dialysis every Monday, Wednesday and Friday  2.  Hypertension  controlled.  3.  Anemia and thrombocytopenia, picture suggestive of myelodysplastic syndrome and gastritis.  Also a component of GI bleed related to the ulcer near the stoma.  Hg better after PRBC  4.  Crohn's disease with prior colectomy ,ileostomy  5.  History of nephrolithiasis. On uloric.   6.  Pneumonia  7.  Metabolic acidosis:   associated with chronic kidney disease and ileostomy on sodium bicarbonate supplement            Plan:  1.   Hemodialysis tomorrow  2.  Surveillance labs  3.  Outpatient dialysis is already arranged and every Monday, Wednesday and Friday at the [facility] dialysis clinic.          Keisha Freed MD  01/23/18  7:54 PM

## 2018-01-24 NOTE — PLAN OF CARE
Problem: Patient Care Overview (Adult)  Goal: Plan of Care Review  Outcome: Ongoing (interventions implemented as appropriate)   01/23/18 1943   Coping/Psychosocial Response Interventions   Plan Of Care Reviewed With patient;spouse   Patient Care Overview   Progress improving   Outcome Evaluation   Outcome Summary/Follow up Plan UP IN CHAIR , WALKED GOLDBER[place] X1 WITH ASSIST OF RN AND WIFE, CONT WITH WEAKNESS, BUT STATES HE IS IMPROVING.     Goal: Discharge Needs Assessment  Outcome: Ongoing (interventions implemented as appropriate)      Problem: Respiratory Insufficiency (Adult)  Goal: Acid/Base Balance  Outcome: Ongoing (interventions implemented as appropriate)    Goal: Effective Ventilation  Outcome: Ongoing (interventions implemented as appropriate)      Problem: Fall Risk (Adult)  Goal: Absence of Falls  Outcome: Ongoing (interventions implemented as appropriate)

## 2018-01-24 NOTE — SIGNIFICANT NOTE
01/24/18 1517   Rehab Treatment   Discipline physical therapy assistant   Treatment Not Performed patient unavailable for treatment  (Pt off floor at dialysis this pm. Will check back tomorrow.)   Recommendation   PT - Next Appointment 01/25/18

## 2018-01-24 NOTE — SIGNIFICANT NOTE
01/24/18 0824   Rehab Treatment   Discipline physical therapy assistant   Treatment Not Performed patient/family declined treatment  (Pt refused PT this am due to nausea and feeling weak. Tried to encourage pt to participate and explained that pt may not be available later due to dialysis, but still declined. Notified NINOSKA Eid.)   Recommendation   PT - Next Appointment 01/24/18

## 2018-01-24 NOTE — PLAN OF CARE
Problem: Patient Care Overview (Adult)  Goal: Plan of Care Review  Outcome: Ongoing (interventions implemented as appropriate)   01/24/18 0234   Coping/Psychosocial Response Interventions   Plan Of Care Reviewed With patient;spouse   Patient Care Overview   Progress progress towards functional goals is fair   Outcome Evaluation   Outcome Summary/Follow up Plan NO ACUTE DISTRESS NOTED THIS SHIFT, VSS AND WNL, NO C/O PAIN, NO RESP DISTRESS, CONTINUE PLAN OF CARE, DIALYSIS SCHEDULED FOR [facility]     Goal: Adult Individualization and Mutuality  Outcome: Ongoing (interventions implemented as appropriate)    Goal: Discharge Needs Assessment  Outcome: Ongoing (interventions implemented as appropriate)      Problem: Respiratory Insufficiency (Adult)  Goal: Acid/Base Balance  Outcome: Ongoing (interventions implemented as appropriate)    Goal: Effective Ventilation  Outcome: Ongoing (interventions implemented as appropriate)      Problem: Fall Risk (Adult)  Goal: Absence of Falls  Outcome: Ongoing (interventions implemented as appropriate)

## 2018-01-25 ENCOUNTER — TELEPHONE (OUTPATIENT)
Dept: ONCOLOGY | Facility: CLINIC | Age: 73
End: 2018-01-25

## 2018-01-25 VITALS
OXYGEN SATURATION: 92 % | HEIGHT: 70 IN | TEMPERATURE: 97.7 F | HEART RATE: 74 BPM | RESPIRATION RATE: 18 BRPM | WEIGHT: 190.5 LBS | BODY MASS INDEX: 27.27 KG/M2 | SYSTOLIC BLOOD PRESSURE: 113 MMHG | DIASTOLIC BLOOD PRESSURE: 58 MMHG

## 2018-01-25 LAB
ANION GAP SERPL CALCULATED.3IONS-SCNC: 15.2 MMOL/L
BACTERIA UR QL AUTO: NORMAL /HPF
BASOPHILS # BLD AUTO: 0 10*3/MM3 (ref 0–0.2)
BASOPHILS NFR BLD AUTO: 0 % (ref 0–1.5)
BILIRUB UR QL STRIP: NEGATIVE
BUN BLD-MCNC: 49 MG/DL (ref 8–23)
BUN/CREAT SERPL: 16.3 (ref 7–25)
CALCIUM SPEC-SCNC: 8.1 MG/DL (ref 8.6–10.5)
CHLORIDE SERPL-SCNC: 95 MMOL/L (ref 98–107)
CLARITY UR: ABNORMAL
CO2 SERPL-SCNC: 20.8 MMOL/L (ref 22–29)
COLOR UR: ABNORMAL
CREAT BLD-MCNC: 3.01 MG/DL (ref 0.76–1.27)
DEPRECATED RDW RBC AUTO: 54.1 FL (ref 37–54)
EOSINOPHIL # BLD AUTO: 0.12 10*3/MM3 (ref 0–0.7)
EOSINOPHIL NFR BLD AUTO: 1.5 % (ref 0.3–6.2)
ERYTHROCYTE [DISTWIDTH] IN BLOOD BY AUTOMATED COUNT: 17.2 % (ref 11.5–14.5)
GFR SERPL CREATININE-BSD FRML MDRD: 21 ML/MIN/1.73
GLUCOSE BLD-MCNC: 116 MG/DL (ref 65–99)
GLUCOSE UR STRIP-MCNC: NEGATIVE MG/DL
HCT VFR BLD AUTO: 26.8 % (ref 40.4–52.2)
HGB BLD-MCNC: 8.9 G/DL (ref 13.7–17.6)
HGB UR QL STRIP.AUTO: NEGATIVE
HYALINE CASTS UR QL AUTO: NORMAL /LPF
IMM GRANULOCYTES # BLD: 0.07 10*3/MM3 (ref 0–0.03)
IMM GRANULOCYTES NFR BLD: 0.9 % (ref 0–0.5)
KETONES UR QL STRIP: NEGATIVE
LEUKOCYTE ESTERASE UR QL STRIP.AUTO: NEGATIVE
LYMPHOCYTES # BLD AUTO: 0.22 10*3/MM3 (ref 0.9–4.8)
LYMPHOCYTES NFR BLD AUTO: 2.7 % (ref 19.6–45.3)
MCH RBC QN AUTO: 28.9 PG (ref 27–32.7)
MCHC RBC AUTO-ENTMCNC: 33.2 G/DL (ref 32.6–36.4)
MCV RBC AUTO: 87 FL (ref 79.8–96.2)
MONOCYTES # BLD AUTO: 0.47 10*3/MM3 (ref 0.2–1.2)
MONOCYTES NFR BLD AUTO: 5.8 % (ref 5–12)
NEUTROPHILS # BLD AUTO: 7.26 10*3/MM3 (ref 1.9–8.1)
NEUTROPHILS NFR BLD AUTO: 89.1 % (ref 42.7–76)
NITRITE UR QL STRIP: NEGATIVE
PH UR STRIP.AUTO: <=5 [PH] (ref 5–8)
PLATELET # BLD AUTO: 65 10*3/MM3 (ref 140–500)
PMV BLD AUTO: 10.1 FL (ref 6–12)
POTASSIUM BLD-SCNC: 3.8 MMOL/L (ref 3.5–5.2)
PROT UR QL STRIP: ABNORMAL
RBC # BLD AUTO: 3.08 10*6/MM3 (ref 4.6–6)
RBC # UR: NORMAL /HPF
REF LAB TEST METHOD: NORMAL
SODIUM BLD-SCNC: 131 MMOL/L (ref 136–145)
SP GR UR STRIP: 1.01 (ref 1–1.03)
SQUAMOUS #/AREA URNS HPF: NORMAL /HPF
UROBILINOGEN UR QL STRIP: ABNORMAL
VIRAL CULTURE, GENERAL: NORMAL
WBC NRBC COR # BLD: 8.14 10*3/MM3 (ref 4.5–10.7)
WBC UR QL AUTO: NORMAL /HPF

## 2018-01-25 PROCEDURE — 97110 THERAPEUTIC EXERCISES: CPT

## 2018-01-25 PROCEDURE — 85025 COMPLETE CBC W/AUTO DIFF WBC: CPT | Performed by: HOSPITALIST

## 2018-01-25 PROCEDURE — 81001 URINALYSIS AUTO W/SCOPE: CPT | Performed by: INTERNAL MEDICINE

## 2018-01-25 PROCEDURE — 94799 UNLISTED PULMONARY SVC/PX: CPT

## 2018-01-25 PROCEDURE — 87086 URINE CULTURE/COLONY COUNT: CPT | Performed by: INTERNAL MEDICINE

## 2018-01-25 PROCEDURE — 80048 BASIC METABOLIC PNL TOTAL CA: CPT | Performed by: HOSPITALIST

## 2018-01-25 PROCEDURE — 99222 1ST HOSP IP/OBS MODERATE 55: CPT | Performed by: INTERNAL MEDICINE

## 2018-01-25 RX ORDER — GUAIFENESIN 600 MG/1
600 TABLET, EXTENDED RELEASE ORAL EVERY 12 HOURS SCHEDULED
Qty: 60 TABLET | Refills: 0 | Status: SHIPPED | OUTPATIENT
Start: 2018-01-25 | End: 2018-02-22

## 2018-01-25 RX ORDER — CALCITRIOL 0.25 UG/1
0.25 CAPSULE, LIQUID FILLED ORAL DAILY
Qty: 30 CAPSULE | Refills: 0 | Status: SHIPPED | OUTPATIENT
Start: 2018-01-26 | End: 2018-02-22

## 2018-01-25 RX ORDER — LEVOFLOXACIN 500 MG/1
500 TABLET, FILM COATED ORAL
Qty: 2 TABLET | Refills: 0 | Status: SHIPPED | OUTPATIENT
Start: 2018-01-27 | End: 2018-01-30

## 2018-01-25 RX ORDER — SEVELAMER CARBONATE 800 MG/1
800 TABLET, FILM COATED ORAL
Qty: 90 TABLET | Refills: 0 | Status: SHIPPED | OUTPATIENT
Start: 2018-01-25 | End: 2018-02-24

## 2018-01-25 RX ADMIN — NYSTATIN: 100000 POWDER TOPICAL at 09:00

## 2018-01-25 RX ADMIN — HYDRALAZINE HYDROCHLORIDE 100 MG: 50 TABLET, FILM COATED ORAL at 08:58

## 2018-01-25 RX ADMIN — CYANOCOBALAMIN TAB 500 MCG 1000 MCG: 500 TAB at 08:59

## 2018-01-25 RX ADMIN — IPRATROPIUM BROMIDE AND ALBUTEROL SULFATE 3 ML: .5; 3 SOLUTION RESPIRATORY (INHALATION) at 07:50

## 2018-01-25 RX ADMIN — SODIUM BICARBONATE 650 MG: 650 TABLET ORAL at 16:45

## 2018-01-25 RX ADMIN — SEVELAMER CARBONATE 800 MG: 800 TABLET, FILM COATED ORAL at 08:58

## 2018-01-25 RX ADMIN — CALCITRIOL 0.25 MCG: 0.25 CAPSULE, LIQUID FILLED ORAL at 08:59

## 2018-01-25 RX ADMIN — FERROUS SULFATE TAB 325 MG (65 MG ELEMENTAL FE) 325 MG: 325 (65 FE) TAB at 08:58

## 2018-01-25 RX ADMIN — IPRATROPIUM BROMIDE AND ALBUTEROL SULFATE 3 ML: .5; 3 SOLUTION RESPIRATORY (INHALATION) at 14:59

## 2018-01-25 RX ADMIN — LEVOFLOXACIN 500 MG: 500 TABLET, FILM COATED ORAL at 11:52

## 2018-01-25 RX ADMIN — PANTOPRAZOLE SODIUM 40 MG: 40 TABLET, DELAYED RELEASE ORAL at 09:10

## 2018-01-25 RX ADMIN — FOLIC ACID 1 MG: 1 TABLET ORAL at 08:58

## 2018-01-25 RX ADMIN — HYDRALAZINE HYDROCHLORIDE 100 MG: 50 TABLET, FILM COATED ORAL at 16:45

## 2018-01-25 RX ADMIN — ASPIRIN 81 MG: 81 TABLET, CHEWABLE ORAL at 08:59

## 2018-01-25 RX ADMIN — PANTOPRAZOLE SODIUM 40 MG: 40 TABLET, DELAYED RELEASE ORAL at 16:48

## 2018-01-25 RX ADMIN — SODIUM BICARBONATE 650 MG: 650 TABLET ORAL at 08:59

## 2018-01-25 RX ADMIN — FEBUXOSTAT 40 MG: 40 TABLET ORAL at 08:59

## 2018-01-25 RX ADMIN — SEVELAMER CARBONATE 800 MG: 800 TABLET, FILM COATED ORAL at 11:52

## 2018-01-25 RX ADMIN — IPRATROPIUM BROMIDE AND ALBUTEROL SULFATE 3 ML: .5; 3 SOLUTION RESPIRATORY (INHALATION) at 00:08

## 2018-01-25 RX ADMIN — GUAIFENESIN 600 MG: 600 TABLET, EXTENDED RELEASE ORAL at 08:58

## 2018-01-25 RX ADMIN — SEVELAMER CARBONATE 800 MG: 800 TABLET, FILM COATED ORAL at 16:45

## 2018-01-25 NOTE — PLAN OF CARE
Problem: Patient Care Overview (Adult)  Goal: Plan of Care Review  Outcome: Ongoing (interventions implemented as appropriate)   01/24/18 2145 01/25/18 0502   Coping/Psychosocial Response Interventions   Plan Of Care Reviewed With patient;spouse --    Patient Care Overview   Progress --  improving   Outcome Evaluation   Outcome Summary/Follow up Plan --  Pt has been voiding about 125cc at a time, per pt this is about normal for him. UA sent per Dr. Freed's orders, results do not seem indicative of a UTI. Output from ostomy has been brown and liquid. No signs of bleeding. No new complaints from pt. Possible D/C today.      Goal: Adult Individualization and Mutuality  Outcome: Ongoing (interventions implemented as appropriate)      Problem: Respiratory Insufficiency (Adult)  Goal: Acid/Base Balance  Outcome: Ongoing (interventions implemented as appropriate)   01/25/18 0502   Respiratory Insufficiency (Adult)   Acid/Base Balance making progress toward outcome     Goal: Effective Ventilation  Outcome: Ongoing (interventions implemented as appropriate)   01/25/18 0502   Respiratory Insufficiency (Adult)   Effective Ventilation making progress toward outcome  (O2 SATS STABLE ON RA)       Problem: Fall Risk (Adult)  Goal: Absence of Falls  Outcome: Ongoing (interventions implemented as appropriate)   01/25/18 0502   Fall Risk (Adult)   Absence of Falls achieves outcome  (SAFETY MAINTAINED)

## 2018-01-25 NOTE — TELEPHONE ENCOUNTER
Rec. Message to call pt. About hospital records and has questions. 1:05pm-  Attempted to call cell # at [phone] which just gives a busy signal.  L/m on pt. Home # at [phone] to call office and ask for triage.

## 2018-01-25 NOTE — THERAPY TREATMENT NOTE
Acute Care - Physical Therapy Treatment Note  [facility]     Patient Name: Bill Landry  : 1945  MRN: [id number]  Today's Date: 2018  Onset of Illness/Injury or Date of Surgery Date: 18  Date of Referral to PT: 18  Referring Physician: [place]    Admit Date: 2018    Visit Dx:    ICD-10-CM ICD-9-CM   1. Pneumonia of both lungs due to infectious organism, unspecified part of lung J18.9 483.8   2. Anemia in chronic kidney disease, unspecified CKD stage N18.9 285.21    D63.1    3. Chronic kidney disease, unspecified CKD stage N18.9 585.9   4. Muscle weakness (generalized) M62.81 728.87     Patient Active Problem List   Diagnosis   • Anemia due to stage 3 (moderate) chronic renal failure treated with erythropoietin   • Persistent atrial fibrillation   • Kidney disease, chronic, stage II (mild, EGFR 60+ ml/min)   • Thrombocytopenia   • Chronic leukopenia   • Cirrhosis of liver without ascites   • Congestive splenomegaly   • Kidney disease, chronic, stage IV (GFR 15-29 ml/min)   • Anemia due to stage 4 chronic kidney disease treated with erythropoietin   • Anemia   • Esophageal abnormality   • Pneumonia of both lungs due to infectious organism               Adult Rehabilitation Note       18 0800          Rehab Assessment/Intervention    Discipline (P)  physical therapy assistant   student  -      Document Type (P)  therapy note (daily note)  -      Subjective Information (P)  agree to therapy;complains of;fatigue  -SH      Patient Effort, Rehab Treatment (P)  good  -SH      Symptoms Noted During/After Treatment (P)  none  -SH      Precautions/Limitations (P)  fall precautions  -SH      Precautions/Limitations, Vision (P)  WFL  -SH      Recorded by [SH] Shi Kendall, PT Student      Pain Assessment    Pain Assessment (P)  No/denies pain  -SH      Recorded by [SH] Shi Kendall, PT Student      Cognitive Assessment/Intervention    Current Cognitive/Communication Assessment (P)   functional  -      Orientation Status (P)  oriented x 4  -      Follows Commands/Answers Questions (P)  100% of the time  -      Personal Safety (P)  WNL/WFL  -      Personal Safety Interventions (P)  fall prevention program maintained;gait belt;nonskid shoes/slippers when out of bed  -      Recorded by [] Shi Kendall, ERROL Student      Bed Mobility, Assessment/Treatment    Bed Mobility, Assistive Device (P)  bed rails;head of bed elevated  -      Bed Mob, Supine to Sit, [place] (P)  contact guard assist  -      Bed Mob, Sit to Supine, [place] (P)  not tested  -      Bed Mobility, Impairments (P)  strength decreased  -      Bed Mobility, Comment (P)  Pt. up in chair  -      Recorded by [] Shi Kendall PT Student      Transfer Assessment/Treatment    Transfers, Sit-Stand [place] (P)  contact guard assist;verbal cues required  -      Transfers, Stand-Sit [place] (P)  contact guard assist;verbal cues required;nonverbal cues required (demo/gesture)  -      Transfer, Safety Issues (P)  weight-shifting ability decreased  -      Transfer, Impairments (P)  strength decreased  -      Recorded by [] Shi Kendall PT Student      Gait Assessment/Treatment    Gait, [place] Level (P)  minimum assist (75% patient effort);hand held assist  -      Gait, Distance (Feet) (P)  70  -      Gait, Gait Deviations (P)  baldomero decreased;narrow base  -      Gait, Safety Issues (P)  balance decreased during turns;weight-shifting ability decreased  -      Gait, Impairments (P)  strength decreased  -      Gait, Comment (P)  Pt. anxious about falling and required HHA unilaterally.  -      Recorded by [] Shi Kendall PT Student      Therapy Exercises    Bilateral Lower Extremities (P)  PROM:;hamstring stretch;AROM:;10 reps;sitting;ankle pumps/circles;hip flexion;LAQ  -      Recorded by [] Shi Kendall PT Student      Positioning and Restraints    Pre-Treatment  Position (P)  in bed  -SH      Post Treatment Position (P)  chair  -      In Chair (P)  sitting;call light within reach;encouraged to call for assist;exit alarm on;with family/caregiver  -      Recorded by [] Shi Kendall PT Student        User Key  (r) = Recorded By, (t) = Taken By, (c) = Cosigned By    Initials Name Effective Dates     Shi Kendall, PT Student 01/08/18 -                 IP PT Goals       01/23/18 0851          Bed Mobility PT LTG    Bed Mobility PT LTG, Date Established 01/23/18  -MS      Bed Mobility PT LTG, Time to Achieve 1 wk  -MS      Bed Mobility PT LTG, Activity Type all bed mobility  -MS      Bed Mobility PT LTG, [place] Level independent  -MS      Transfer Training PT LTG    Transfer Training PT LTG, Date Established 01/23/18  -MS      Transfer Training PT LTG, Time to Achieve 1 wk  -MS      Transfer Training PT LTG, Activity Type all transfers  -MS      Transfer Training PT LTG, [place] Level independent  -MS      Gait Training PT LTG    Gait Training Goal PT LTG, Date Established 01/23/18  -MS      Gait Training Goal PT LTG, Time to Achieve 1 wk  -MS      Gait Training Goal PT LTG, [place] Level independent  -MS      Gait Training Goal PT LTG, Distance to Achieve 300 feet  -MS        User Key  (r) = Recorded By, (t) = Taken By, (c) = Cosigned By    Initials Name Provider Type    MS Bill PETER Hafsa, PT Physical Therapist          Physical Therapy Education     Title: PT OT SLP Therapies (Active)     Topic: Physical Therapy (Done)     Point: Mobility training (Done)    Learning Progress Summary    Learner Readiness Method Response Comment Documented by Status   Patient Acceptance LOPEZ BERKOWITZ DU   01/25/18 0854 Done    Acceptance LORNA BERKOWITZ NR  MS 01/23/18 0850 Done   Significant Other Acceptance LOPEZ BERKOWITZ DU   01/25/18 0854 Done               Point: Home exercise program (Done)    Learning Progress Summary    Learner Readiness Method Response Comment Documented by  Status   Patient Acceptance E,TB VU,DU   01/25/18 0854 Done    Acceptance E,D VU,NR  MS 01/23/18 0850 Done   Significant Other Acceptance E,TB VU,MARIA LUZ   01/25/18 0854 Done               Point: Body mechanics (Done)    Learning Progress Summary    Learner Readiness Method Response Comment Documented by Status   Patient Acceptance E,TB VU,MARIA LUZ   01/25/18 0854 Done    Acceptance E,D VU,NR  MS 01/23/18 0850 Done   Significant Other Acceptance E,TB VU,MARIA LUZ   01/25/18 0854 Done               Point: Precautions (Done)    Learning Progress Summary    Learner Readiness Method Response Comment Documented by Status   Patient Acceptance E,TB VU,MARIA LUZ   01/25/18 0854 Done    Acceptance E,D VU,NR  MS 01/23/18 0850 Done   Significant Other Acceptance E,TB VU,MARIA LUZ   01/25/18 0854 Done                      User Key     Initials Effective Dates Name Provider Type Discipline    MS 12/01/15 -  Bill Pereyra, PT Physical Therapist PT     01/08/18 -  Shi Kendall, PT Student PT Student PT                    PT Recommendation and Plan  Anticipated Discharge Disposition: home with assist, home with home health  Planned Therapy Interventions: balance training, bed mobility training, gait training, home exercise program, patient/family education, postural re-education, strengthening, transfer training  PT Frequency: daily  Plan of Care Review  Plan Of Care Reviewed With: (P) patient  Outcome Summary/Follow up Plan: (P) Pt. was somewhat anxious about falling while getting up with PT today but was able to ambulate in [place] with HHA on R side; no c/o SOA, but limited by fatigue. Pt. does well with the stability given from HHA, so PT recommended an AD for home use; pt. and family do not think he needs one (john has a walker at home) but understand recommendation. PT also recommended HH for pt., but pt. and wife feel that they could not fit that in with all of his other medical appointments.            Outcome Measures       01/25/18 0800  01/23/18 1500 01/23/18 0800    How much help from another person do you currently need...    Turning from your back to your side while in flat bed without using bedrails? (P)  4  -SH  4  -MS    Moving from lying on back to sitting on the side of a flat bed without bedrails? (P)  3  -SH  3  -MS    Moving to and from a bed to a chair (including a wheelchair)? (P)  4  -SH  3  -MS    Standing up from a chair using your arms (e.g., wheelchair, bedside chair)? (P)  4  -SH  3  -MS    Climbing 3-5 steps with a railing? (P)  3  -SH  3  -MS    To walk in hospital room? (P)  3  -SH  3  -MS    AM-PAC 6 Clicks Score (P)  21  -RW (r) SH (t)  19  -MS    How much help from another is currently needed...    Putting on and taking off regular lower body clothing?  3  -SO     Bathing (including washing, rinsing, and drying)  3  -SO     Toileting (which includes using toilet bed pan or urinal)  3  -SO     Putting on and taking off regular upper body clothing  3  -SO     Taking care of personal grooming (such as brushing teeth)  3  -SO     Eating meals  4  -SO     Score  19  -SO     Functional Assessment    Outcome Measure Options (P)  AM-PAC 6 Clicks Basic Mobility (PT)  -SH AM-PAC 6 Clicks Daily Activity (OT)  -SO AM-PAC 6 Clicks Basic Mobility (PT)  -MS      User Key  (r) = Recorded By, (t) = Taken By, (c) = Cosigned By    Initials Name Provider Type    SO Viridiana Mendez, OTR Occupational Therapist    MS Bill Pereyra, PT Physical Therapist    RW Emilee Diane, PTA Physical Therapy Assistant    REGAN Kendall, PT Student PT Student           Time Calculation:         PT Charges       01/25/18 0852          Time Calculation    Start Time (P)  0831  -SH      Stop Time (P)  0850  -SH      Time Calculation (min) (P)  19 min  -      PT Received On (P)  01/25/18  -      PT - Next Appointment (P)  01/26/18  -        User Key  (r) = Recorded By, (t) = Taken By, (c) = Cosigned By    Initials Name Provider Type    REGAN Osullivan  Enoch, PT Student PT Student          Therapy Charges for Today     Code Description Service Date Service Provider Modifiers Qty    11304563769 HC PT THER SUPP EA 15 MIN 1/25/2018 Shi Kendall, PT Student GP 1    73224141816 HC PT THER PROC EA 15 MIN 1/25/2018 Shi Kendall PT Student GP 1          PT G-Codes  Outcome Measure Options: (P) AM-PAC 6 Clicks Basic Mobility (PT)    Shi Kendall PT Student  1/25/2018

## 2018-01-25 NOTE — PROGRESS NOTES
Continued Stay Note  [facility]     Patient Name: Bill Landry  MRN: [id number]  Today's Date: 1/25/2018    Admit Date: 1/19/2018          Discharge Plan       01/25/18 1448    Case Management/Social Work Plan    Plan Home with wife.     Patient/Family In Agreement With Plan yes    Additional Comments Plan discharge today, family or friends to transport. Patient and wife deny additional needs. [place].               Discharge Codes     None        Expected Discharge Date and Time     Expected Discharge Date Expected Discharge Time    Jan 25, 2018             Dimple Adames RN

## 2018-01-25 NOTE — DISCHARGE SUMMARY
Discharge summary    Date of admission 1/19/2018  Date of discharge 1/25/2018    Final diagnosis  Bilateral pneumonia with sepsis  End-stage renal disease started on hemodialysis   Acute on Chronic anemia  Hypertension  Gastroesophageal reflux disease  Candida esophagitis  COPD  BPH  Ulcerated right lower edge of the stoma status post suturing    Discharge medications    Current Facility-Administered Medications:   •  aspirin chewable tablet 81 mg, 81 mg, Oral, Daily, Luiz Horan MD, 81 mg at 01/25/18 0859  •  calcitriol (ROCALTROL) capsule 0.25 mcg, 0.25 mcg, Oral, Daily, Lisa Osorio MD, 0.25 mcg at 01/25/18 0859  •  epoetin tip (EPOGEN,PROCRIT) injection 10,000 Units, 10,000 Units, Intravenous, Once per day on Mon Wed Fri, Kvng Monsivais MD, 10,000 Units at 01/24/18 1210  •  febuxostat (ULORIC) tablet 40 mg, 40 mg, Oral, Daily, Keisha Freed MD, 40 mg at 01/25/18 0859  •  ferrous sulfate tablet 325 mg, 325 mg, Oral, Daily With Breakfast, Marcellus Osborne MD, 325 mg at 01/25/18 0858  •  folic acid (FOLVITE) tablet 1 mg, 1 mg, Oral, Daily, Marcellus Osborne MD, 1 mg at 01/25/18 0858  •  guaiFENesin (MUCINEX) 12 hr tablet 600 mg, 600 mg, Oral, Q12H, Marcellus Osborne MD, 600 mg at 01/25/18 0858  •  hydrALAZINE (APRESOLINE) tablet 100 mg, 100 mg, Oral, TID, Marcellus Osborne MD, 100 mg at 01/25/18 0858  •  ipratropium-albuterol (DUO-NEB) nebulizer solution 3 mL, 3 mL, Nebulization, Q4H - RT, Marcellus Osborne MD, 3 mL at 01/25/18 0750  •  latanoprost (XALATAN) 0.005 % ophthalmic solution 1 drop, 1 drop, Both Eyes, Nightly, Marcellus Osborne MD, 1 drop at 01/24/18 4644  •  levoFLOXacin (LEVAQUIN) tablet 500 mg, 500 mg, Oral, Q48H, Marcellus Osborne MD, 500 mg at 01/25/18 1152  •  nystatin (MYCOSTATIN) powder, , Topical, Q12H, Marcellus Osborne MD  •  pantoprazole (PROTONIX) EC tablet 40 mg, 40 mg, Oral, BID AC, Theo Freedman MD, 40 mg at 01/25/18 0910  •  sevelamer (RENVELA) tablet 800 mg, 800 mg, Oral, TID With Meals,  Lisa Osorio MD, 800 mg at 01/25/18 1152  •  sodium bicarbonate tablet 650 mg, 650 mg, Oral, TID, Keisha Freed MD, 650 mg at 01/25/18 0859  •  Insert peripheral IV, , , Once **AND** sodium chloride 0.9 % flush 10 mL, 10 mL, Intravenous, PRN, Rick Pisano MD  •  terazosin (HYTRIN) capsule 5 mg, 5 mg, Oral, Nightly, Lucinda Osborne MD, 5 mg at 01/23/18 2110  •  vitamin B-12 (CYANOCOBALAMIN) tablet 1,000 mcg, 1,000 mcg, Oral, Daily, Lucinda Osborne MD, 1,000 mcg at 01/25/18 0859     Consult obtained  Nephrology  Gastroenterology  Neurosurgery  Hematology oncology  Nutrition    Procedure  Status post suturing of stoma secondary to bleeding    Hospital course  72-year-old white male who is well-known to her service admitted through emergency room with shortness of breath.  Patient workup revealed bilateral pneumonia with sepsis admitted for management.  Patient admitted treated with antibiotics and he is getting close to end-stage renal disease started on hemodialysis on this admission.  She'll also have chronic anemia requiring blood transfusion and he was bleeding from the ileostomy site.  Bleeding stopped after suturing.  Patient hemoglobin stable but patient and family wants to see hematology oncology prior to discharge.  Patient was followed by nephrology and they will continue doing hemodialysis 3 times a week and spot is available.  Patient is afebrile vital signs are stable and will be discharged on above medication.      Discharge diet regular    Activity as tolerated    Medication as above    Follow-up with primary doctor in 1 week and follow with nephrology per their instruction and keep the hemodialysis appointment didn't take medications directed    LUCINDA OSBORNE MD

## 2018-01-25 NOTE — PLAN OF CARE
Problem: Patient Care Overview (Adult)  Goal: Plan of Care Review  Outcome: Ongoing (interventions implemented as appropriate)   01/25/18 0900   Coping/Psychosocial Response Interventions   Plan Of Care Reviewed With patient   Outcome Evaluation   Outcome Summary/Follow up Plan Pt. was somewhat anxious about falling while getting up with PT today, but was able to ambulate in [place] with HHA on R side. Pt. had no c/o SOA, but is limited by fatigue. Pt. does well with the stability given from HHA, so PT recommended an AD for home use; pt. and family do not think he needs one (wife has a walker at home) but understand recommendation. PT also recommended HH for pt., but pt. and wife feel that they could not fit that in with all of his other medical appointments.

## 2018-01-25 NOTE — CONSULTS
[facility] CBC GROUP INITIAL INPATIENT CONSULTATION NOTE    REASON FOR CONSULTATION:  Anemia    RECORDS OBTAINED: Records of the patients history including those obtained from the referring provider were reviewed and summarized in detail.    HISTORY OF PRESENT ILLNESS:  Bill Landry is a 72 y.o. male who we are asked to see today in consultation for anemia.  Patient follows with my partner, Dr. Mckenzie in the CBC office for anemia of chronic disease.  He has been maintained on Procrit but Dr. Mckenzie had discussed with the patient that once he initiated dialysis appropriate would be given by nephrology.  Patient also has a leukopenia and thrombocytopenia that's felt chronic due to splenomegaly and remote history of bone marrow testing that suggested myelodysplasia.    Patient presented on January 19th to ED with complaints of chest pain.  Two days before his presentation on the 19th, he had an EGD and was diagnosed with fungal esophagitis and was initiated on Diflucan.  When he presented it was felt that he was presenting with uremic type symptoms and sepsis from pneumonia.  He was noted to be anemic and was transfused 2 units of packed RBCs. He was started on antibiotics and hemodialysis was initiated.  Patient was also found to have a 2 cm ulcer area of the right lower edge of the stoma that was sutured and further bleeding has stopped.    We've been consulted to see patient prior to his discharge home today.  His labs since being here are showing a fairly normal white blood cell count, chronic changes consistent with his anemia with a hemoglobin as low as 6.4 on 1/20/2018 but that's 8.9 today.  His platelet count has been in the 60-80 range which is not far off from his baseline.  He seems to have a chronic waxing and waning pattern.  He is afebrile and is feeling much better than he did when he presented.     Patient and his wife are worried about recurrence of stomal bleeding and recurrent iron deficiency  anemia.  Feeling much better however than he did when he came in.  He complains of significant ecchymosis on the left arm at the dialysis access site.  He has no bleeding from that site.      Past Medical   Past Medical History:   Diagnosis Date   • Abnormal serum protein electrophoresis    • Anemia     Multifactorial   • Atrial fibrillation    • Chronic kidney disease     STAGE 4   • Chronic leukopenia and thrombocytopenia    • Crohn disease    • Diverticula of colon    • Fatty liver disease, nonalcoholic    • GERD (gastroesophageal reflux disease)    • GI bleed    • Glaucoma    • H/O colectomy    • H/O Pericardial effusion    • Hx of renal calculi    • Hypertension    • Ileostomy present    • MGUS (monoclonal gammopathy of unknown significance)    • Murmur     FROM RHEUMATIC FEVER AS CHIILD   • Sleep apnea     CPAP USED     Social History   Social History     Social History   • Marital status:      Spouse name: Gillian   • Number of children: N/A   • Years of education: College     Occupational History   •  Retired     Social History Main Topics   • Smoking status: Never Smoker   • Smokeless tobacco: Not on file   • Alcohol use No   • Drug use: No   • Sexual activity: Defer     Other Topics Concern   • Not on file     Social History Narrative     Family History  Family History   Problem Relation Age of Onset   • Heart failure Mother    • Hypertension Mother    • Hypertension Father    • Malig Hyperthermia Neg Hx      Medications    Current Facility-Administered Medications:   •  aspirin chewable tablet 81 mg, 81 mg, Oral, Daily, Luiz Horan MD, 81 mg at 01/25/18 0859  •  calcitriol (ROCALTROL) capsule 0.25 mcg, 0.25 mcg, Oral, Daily, Lisa Osorio MD, 0.25 mcg at 01/25/18 0859  •  epoetin tip (EPOGEN,PROCRIT) injection 10,000 Units, 10,000 Units, Intravenous, Once per day on Mon Wed Fri, Kvng Monsivais MD, 10,000 Units at 01/24/18 1210  •  febuxostat (ULORIC) tablet 40 mg, 40 mg,  Oral, Daily, Keisha Freed MD, 40 mg at 01/25/18 0859  •  ferrous sulfate tablet 325 mg, 325 mg, Oral, Daily With Breakfast, Marcellus Osborne MD, 325 mg at 01/25/18 0858  •  folic acid (FOLVITE) tablet 1 mg, 1 mg, Oral, Daily, Marcellus Osborne MD, 1 mg at 01/25/18 0858  •  guaiFENesin (MUCINEX) 12 hr tablet 600 mg, 600 mg, Oral, Q12H, Marcellus Osborne MD, 600 mg at 01/25/18 0858  •  hydrALAZINE (APRESOLINE) tablet 100 mg, 100 mg, Oral, TID, Marcellus Osborne MD, 100 mg at 01/25/18 0858  •  ipratropium-albuterol (DUO-NEB) nebulizer solution 3 mL, 3 mL, Nebulization, Q4H - RT, Marcellus Osborne MD, 3 mL at 01/25/18 1459  •  latanoprost (XALATAN) 0.005 % ophthalmic solution 1 drop, 1 drop, Both Eyes, Nightly, Marcellus Osborne MD, 1 drop at 01/24/18 2142  •  levoFLOXacin (LEVAQUIN) tablet 500 mg, 500 mg, Oral, Q48H, Marcellus Osborne MD, 500 mg at 01/25/18 1152  •  nystatin (MYCOSTATIN) powder, , Topical, Q12H, Marcellus Osborne MD  •  pantoprazole (PROTONIX) EC tablet 40 mg, 40 mg, Oral, BID AC, Theo Freedman MD, 40 mg at 01/25/18 0910  •  sevelamer (RENVELA) tablet 800 mg, 800 mg, Oral, TID With Meals, Lisa Osorio MD, 800 mg at 01/25/18 1152  •  sodium bicarbonate tablet 650 mg, 650 mg, Oral, TID, Keisha Freed MD, 650 mg at 01/25/18 0859  •  Insert peripheral IV, , , Once **AND** sodium chloride 0.9 % flush 10 mL, 10 mL, Intravenous, PRN, Rick Pisano MD  •  terazosin (HYTRIN) capsule 5 mg, 5 mg, Oral, Nightly, Marcellus Osborne MD, 5 mg at 01/23/18 2110  •  vitamin B-12 (CYANOCOBALAMIN) tablet 1,000 mcg, 1,000 mcg, Oral, Daily, Marcellus Osborne MD, 1,000 mcg at 01/25/18 0859  Allergies  Allergies   Allergen Reactions   • Ace Inhibitors Other (See Comments)     RENAL FAILURE   • Contrast Dye Other (See Comments)     RENAL FAILURE   • Eliquis [Apixaban] Other (See Comments)     BLEEDING ISSUES; PT CANNOT TAKE ANY ANTICOAGULANTS DUE TO LOW PLATELETS EXCEPT ASPIRIN  BLEEDING ISSUES; PT CANNOT TAKE ANY ANTICOAGULANTS DUE TO LOW  PLATELETS EXCEPT ASPIRIN   • Granix [Filgrastim]      Chest pain  Chest pain     • Iodinated Diagnostic Agents Other (See Comments)     RENAL FAILURE   • Keflex [Cephalexin] Other (See Comments)     RENAL FAILURE     Review of Systems  Fourteen point review of systems performed.  Positive pertinents identified above in history of presenting illness; all remaining systems negative.       Objective:     Vitals:    01/25/18 1459   BP:    Pulse: 74   Resp: 18   Temp:    SpO2:      GENERAL:  Well-developed, well-nourished male in no acute distress.   SKIN:  Warm, dry without rashes,  Petechiae. Significant bruising on left arm around fistula  HEAD:  Normocephalic.  EYES:  Pupils equal, round and reactive to light.  EOMs intact.  Conjunctivae normal.  EARS:  Hearing intact.  NOSE:  Septum midline.  No excoriations or nasal discharge.  MOUTH:  Tongue is well-papillated; no stomatitis or ulcers.  Lips normal.  THROAT:  Oropharynx without lesions or exudates.  NECK:  Supple with good range of motion; no thyromegaly or masses.  LYMPHATICS:  No cervical, supraclavicular adenopathy.  CHEST:  Lungs clear to percussion and auscultation. Good airflow.  CARDIAC:  Regular rate and rhythm without murmurs, rubs or gallops. Normal S1,S2.  ABDOMEN:  Soft, nontender, no hepatosplenomegaly  EXTREMITIES:  No clubbing, cyanosis or edema.  NEUROLOGICAL:  Cranial Nerves II-XII grossly intact.  No focal neurological deficits.  PSYCHIATRIC:  Normal affect and mood.        DIAGNOSTIC DATA:    Results from last 7 days  Lab Units 01/25/18 0358 01/24/18 0339 01/23/18 0356   WBC 10*3/mm3 8.14 9.08 7.32   HEMOGLOBIN g/dL 8.9* 9.4* 9.1*   HEMATOCRIT % 26.8* 27.9* 26.7*   PLATELETS 10*3/mm3 65* 65* 61*       Results from last 7 days  Lab Units 01/25/18 0358 01/24/18 0339 01/23/18 0356  01/20/18  0524 01/19/18  0054   SODIUM mmol/L 131* 128* 131*  < > 125* 123*   POTASSIUM mmol/L 3.8 4.3 4.1  < > 4.0 4.6   CHLORIDE mmol/L 95* 92* 95*  < > 91* 89*    CO2 mmol/L 20.8* 18.1* 20.5*  < > 14.3* 10.3*   BUN mg/dL 49* 80* 64*  < > 109* 141*   CREATININE mg/dL 3.01* 4.22* 3.44*  < > 5.27* 7.44*   CALCIUM mg/dL 8.1* 8.2* 7.8*  < > 7.9* 8.1*   BILIRUBIN mg/dL  --   --   --   --  2.0* 1.8*   ALK PHOS U/L  --   --   --   --  122* 136*   ALT (SGPT) U/L  --   --   --   --  20 17   AST (SGOT) U/L  --   --   --   --  36 33   GLUCOSE mg/dL 116* 118* 79  < > 99 165*   < > = values in this interval not displayed.        Assessment/Plan   Assessment/Plan:   This is a 72 y.o. male with:    1. Chronic anemia secondary to end stage renal disease but also with exacerbation from recent GI stromal bleeding and possible myelodysplasia   * Continue procrit with HD now. Patient and wife will monitor stoma. Offered reassurance today. Will repeat iron studies when he sees Dr Mckenzie.     2. Chronic thrombocytopenia. Waxing and waning pattern felt due to splenomegaly and possible myelodysplasia on prior bone marrow. No significant change.     3. ESRD on hemodialysis  4. Sepsis with bilateral pneumonia. Clinically improving.     OK for discharge from hematology standpoint. Patient has follow up with Dr Mckenzie arranged for 2/1/18.          Ariadna Cartagena MD

## 2018-01-25 NOTE — PROGRESS NOTES
"Daily progress note    Chief complaint  Doing better  No new complaints    History of present illness  Pt is a 72 y.o. male who presents complaining of SOA that began tonight at 21:00. Pt denies CP but complains of generalized fatigue. Pt is scheduled to start dialysis tomorrow. Pt has an EGD on 1/17/18, and was dx with a fungal infection and esophageal bleeding. Pt was started on Diflucan at that time. Pt also had a fistulagram on 1/15/18, and was given Valium at that time. Afterwards, they were informed by the pt's nephrologist that the pt may have a hard time metabolizing the medication. On arrival to ED, pt had room air sat's of 88%.   patient alert oriented denies any productive cough fever chills night sweats or weight loss.  Patient stated that he has gained some weight     REVIEW OF SYSTEMS  Review of Systems   Constitutional: Positive for fatigue. Negative for chills and fever.   HENT: Negative for congestion and sore throat.    Eyes: Negative.    Respiratory: Positive for shortness of breath. Negative for cough.    Cardiovascular: Negative for chest pain and leg swelling.   Gastrointestinal: Negative for abdominal pain, diarrhea and vomiting.   Genitourinary: Negative for difficulty urinating and dysuria.   Musculoskeletal: Negative for back pain and neck pain.   Skin: Negative for rash and wound.   Allergic/Immunologic: Negative.    Neurological: Negative for dizziness, weakness, numbness and headaches.   Psychiatric/Behavioral: Negative.    All other systems reviewed and are negative.     PHYSICAL EXAM  Blood pressure 133/61, pulse 91, temperature 98.1 °F (36.7 °C), temperature source Oral, resp. rate 18, height 177.8 cm (70\"), weight 86.4 kg (190 lb 8 oz), SpO2 96 %.    Constitutional: He is oriented to person, place, and time and well-developed, well-nourished, and in no distress.   Head: Normocephalic and atraumatic.   Eyes: EOM are normal. Pupils are equal, round, and reactive to light.   Neck: " Normal range of motion. Neck supple.   Cardiovascular: Normal heart sounds.  An irregularly irregular rhythm present. Tachycardia present.    Pulmonary/Chest: Effort normal. No respiratory distress. He has rhonchi in the left lower field.   Abdominal: Soft. There is no tenderness. There is no rebound and no guarding.   Musculoskeletal: Normal range of motion. He exhibits no edema.   Neurological: He is alert and oriented to person, place, and time. He has normal sensation and normal strength.   Skin: Skin is warm and dry.   Psychiatric: Mood and affect normal.     LAB RESULTS  Lab Results (last 24 hours)     Procedure Component Value Units Date/Time    Urine Culture - Urine, Urine, Clean Catch [[id number]] Collected:  01/25/18 0425    Specimen:  Urine from Urine, Clean Catch Updated:  01/25/18 0435    Urinalysis With / Culture If Indicated - Urine, Clean Catch [[id number]]  (Abnormal) Collected:  01/25/18 0425    Specimen:  Urine from Urine, Clean Catch Updated:  01/25/18 0443     Color, UA Dark Yellow (A)     Appearance, UA Cloudy (A)     pH, UA <=5.0     Specific Gravity, UA 1.013     Glucose, UA Negative     Ketones, UA Negative     Bilirubin, UA Negative     Blood, UA Negative     Protein, UA 30 mg/dL (1+) (A)     Leuk Esterase, UA Negative     Nitrite, UA Negative     Urobilinogen, UA 0.2 E.U./dL    Urinalysis, Microscopic Only - Urine, Clean Catch [[id number]] Collected:  01/25/18 0425    Specimen:  Urine from Urine, Clean Catch Updated:  01/25/18 0505     RBC, UA 0-2 /HPF      WBC, UA 0-2 /HPF      Bacteria, UA None Seen /HPF      Squamous Epithelial Cells, UA 0-2 /HPF      Hyaline Casts, UA 0-2 /LPF      Methodology Manual Light Microscopy    CBC & Differential [[id number]] Collected:  01/25/18 0358    Specimen:  Blood Updated:  01/25/18 0508    Narrative:       The following orders were created for panel order CBC & Differential.  Procedure                               Abnormality         Status                      ---------                               -----------         ------                     CBC Auto Differential[[id number]]        Abnormal            Final result                 Please view results for these tests on the individual orders.    CBC Auto Differential [[id number]]  (Abnormal) Collected:  01/25/18 0358    Specimen:  Blood Updated:  01/25/18 0508     WBC 8.14 10*3/mm3      RBC 3.08 (L) 10*6/mm3      Hemoglobin 8.9 (L) g/dL      Hematocrit 26.8 (L) %      MCV 87.0 fL      MCH 28.9 pg      MCHC 33.2 g/dL      RDW 17.2 (H) %      RDW-SD 54.1 (H) fl      MPV 10.1 fL      Platelets 65 (L) 10*3/mm3      Neutrophil % 89.1 (H) %      Lymphocyte % 2.7 (L) %      Monocyte % 5.8 %      Eosinophil % 1.5 %      Basophil % 0.0 %      Immature Grans % 0.9 (H) %      Neutrophils, Absolute 7.26 10*3/mm3      Lymphocytes, Absolute 0.22 (L) 10*3/mm3      Monocytes, Absolute 0.47 10*3/mm3      Eosinophils, Absolute 0.12 10*3/mm3      Basophils, Absolute 0.00 10*3/mm3      Immature Grans, Absolute 0.07 (H) 10*3/mm3     Basic Metabolic Panel [[id number]]  (Abnormal) Collected:  01/25/18 0358    Specimen:  Blood Updated:  01/25/18 0516     Glucose 116 (H) mg/dL      BUN 49 (H) mg/dL      Creatinine 3.01 (H) mg/dL      Sodium 131 (L) mmol/L      Potassium 3.8 mmol/L      Chloride 95 (L) mmol/L      CO2 20.8 (L) mmol/L      Calcium 8.1 (L) mg/dL      eGFR Non African Amer 21 (L) mL/min/1.73      BUN/Creatinine Ratio 16.3     Anion Gap 15.2 mmol/L     Narrative:       The MDRD GFR formula is only valid for adults with stable renal function between ages 18 and 70.        Imaging Results (last 24 hours)     ** No results found for the last 24 hours. **             EKG                                                  Rhythm/Rate: Afib 71  P waves and VA: absent   QRS, axis: nml   ST and T waves: nml       Current Facility-Administered Medications:   •  albumin human 25 % IV SOLN 12.5 g, 12.5 g, Intravenous, PRN, Keisha Freed,  MD  •  aspirin chewable tablet 81 mg, 81 mg, Oral, Daily, Luiz Horan MD, 81 mg at 01/25/18 0859  •  calcitriol (ROCALTROL) capsule 0.25 mcg, 0.25 mcg, Oral, Daily, Lisa Osorio MD, 0.25 mcg at 01/25/18 0859  •  epoetin tip (EPOGEN,PROCRIT) injection 10,000 Units, 10,000 Units, Intravenous, Once per day on Mon Wed Fri, Kvng Monsivais MD, 10,000 Units at 01/24/18 1210  •  febuxostat (ULORIC) tablet 40 mg, 40 mg, Oral, Daily, Keisha Freed MD, 40 mg at 01/25/18 0859  •  ferrous sulfate tablet 325 mg, 325 mg, Oral, Daily With Breakfast, Marcellus Osborne MD, 325 mg at 01/25/18 0858  •  folic acid (FOLVITE) tablet 1 mg, 1 mg, Oral, Daily, Marcellus Osborne MD, 1 mg at 01/25/18 0858  •  guaiFENesin (MUCINEX) 12 hr tablet 600 mg, 600 mg, Oral, Q12H, Marcellus Osborne MD, 600 mg at 01/25/18 0858  •  hydrALAZINE (APRESOLINE) tablet 100 mg, 100 mg, Oral, TID, Marcellus Osborne MD, 100 mg at 01/25/18 0858  •  ipratropium-albuterol (DUO-NEB) nebulizer solution 3 mL, 3 mL, Nebulization, Q4H - RT, Marcellus Osborne MD, 3 mL at 01/25/18 0750  •  latanoprost (XALATAN) 0.005 % ophthalmic solution 1 drop, 1 drop, Both Eyes, Nightly, Marcellus Osborne MD, 1 drop at 01/24/18 2142  •  levoFLOXacin (LEVAQUIN) tablet 500 mg, 500 mg, Oral, Q48H, Marcellus Osborne MD, 500 mg at 01/25/18 1152  •  loratadine (CLARITIN) tablet 10 mg, 10 mg, Oral, Daily PRN, Marcellus Osborne MD, 10 mg at 01/24/18 1903  •  mannitol 25% (RENAL) injection, 12.5 g, Intravenous, PRN, Kvng Monsivais MD  •  nebivolol (BYSTOLIC) tablet 2.5 mg, 2.5 mg, Oral, PRN, Marcellus Osborne MD  •  nystatin (MYCOSTATIN) powder, , Topical, Q12H, Marcellus Osborne MD  •  pantoprazole (PROTONIX) EC tablet 40 mg, 40 mg, Oral, BID AC, Theo Freedman MD, 40 mg at 01/25/18 0910  •  sevelamer (RENVELA) tablet 800 mg, 800 mg, Oral, TID With Meals, Lisa Osorio MD, 800 mg at 01/25/18 1152  •  sodium bicarbonate tablet 650 mg, 650 mg, Oral, TID, Keisha Freed MD, 650 mg at 01/25/18  0859  •  sodium chloride (OCEAN) nasal spray 1 spray, 1 spray, Each Nare, 5x Daily PRN, Lucinda Osobrne MD  •  Insert peripheral IV, , , Once **AND** sodium chloride 0.9 % flush 10 mL, 10 mL, Intravenous, PRN, Rick Pisano MD  •  terazosin (HYTRIN) capsule 5 mg, 5 mg, Oral, Nightly, Lucinda Osborne MD, 5 mg at 01/23/18 2110  •  vitamin B-12 (CYANOCOBALAMIN) tablet 1,000 mcg, 1,000 mcg, Oral, Daily, Lucinda Osborne MD, 1,000 mcg at 01/25/18 0859     ASSESSMENT  Bilateral pneumonia with sepsis  End-stage renal disease started on hemodialysis   Acute on Chronic anemia  Hypertension  Gastroesophageal reflux disease  Candida esophagitis  COPD  BPH  Ulcerated right lower lobe and of the stoma status post suturing    PLAN  Discharge home after seen by hematology/oncology and okay with them  Discharge summary dictated    LUCINDA OSBORNE MD

## 2018-01-25 NOTE — PLAN OF CARE
Problem: Patient Care Overview (Adult)  Goal: Plan of Care Review  Outcome: Ongoing (interventions implemented as appropriate)      Problem: Respiratory Insufficiency (Adult)  Goal: Effective Ventilation  Outcome: Ongoing (interventions implemented as appropriate)      Problem: Fall Risk (Adult)  Goal: Absence of Falls  Outcome: Ongoing (interventions implemented as appropriate)

## 2018-01-26 ENCOUNTER — APPOINTMENT (OUTPATIENT)
Dept: LAB | Facility: HOSPITAL | Age: 73
End: 2018-01-26

## 2018-01-26 ENCOUNTER — APPOINTMENT (OUTPATIENT)
Dept: ONCOLOGY | Facility: HOSPITAL | Age: 73
End: 2018-01-26

## 2018-01-26 ENCOUNTER — APPOINTMENT (OUTPATIENT)
Dept: ONCOLOGY | Facility: CLINIC | Age: 73
End: 2018-01-26

## 2018-01-26 LAB — BACTERIA SPEC AEROBE CULT: NO GROWTH

## 2018-01-26 NOTE — PROGRESS NOTES
Continued Stay Note  [facility]     Patient Name: Bill Landry  MRN: [id number]  Today's Date: 1/26/2018    Admit Date: 1/19/2018          Discharge Plan       01/26/18 1441    Case Management/Social Work Plan    Plan short term rehab    Additional Comments Referrals to [facility] and [facility] for onsite dialysis.  [facility] does not have any beds available.  [facility] does not have a dialysis chair but could accept and transport if they can arrange today.  Referrals to [facility] and [facility] as they have the ability to transport.  Await retrun call from [facility].  [facility] can accept and transport.  Spoke with wife by cell phone ([phone]) and they were at dialysis at that time.  Wife Rachel has already spoke with Ally at [facility] and is aware of location.  Wife was going to speak with spouse after he finishes dialysis regarding placement.  Wife asked if they could admit on Saturday and I informed her that she would need to discuss with the facility but as patient has such difficulties last pm that it would be safer to admit today.                Discharge Codes     None        Expected Discharge Date and Time     Expected Discharge Date Expected Discharge Time    Jan 25, 2018             Gabriela Selby RN

## 2018-01-26 NOTE — PROGRESS NOTES
Discharge Planning Assessment  [facility]     Patient Name: Bill Landry  MRN: [id number]  Today's Date: 1/25/2018    Admit Date: 1/19/2018          Discharge Needs Assessment     None            Discharge Plan       01/25/18 2053    Case Management/Social Work Plan    Additional Comments Contacted spouse at home, who stated that her and her sons could not get pt off floor. Advised wife to call 911 if not able to get pt off the floor. Wife asking questions re: rehab placement. Gave wife the case management phone numbetr to contact in am, Continued to advise wife to call 911 if unable to get pt off of floor.        Discharge Placement     No information found        Expected Discharge Date and Time     Expected Discharge Date Expected Discharge Time    Jan 25, 2018               Demographic Summary     None            Functional Status     None            Psychosocial     None            Abuse/Neglect     None            Legal     None            Substance Abuse     None            Patient Forms     None          Brittani Redd RN

## 2018-01-26 NOTE — PROGRESS NOTES
Discharge Planning Assessment  [facility]     Patient Name: Bill Landry  MRN: [id number]  Today's Date: 1/25/2018    Admit Date: 1/19/2018          Discharge Needs Assessment     None            Discharge Plan       01/25/18 2243    Case Management/Social Work Plan    Additional Comments Called wife to check on status of . Spoke with son who stated that they were able to get Mr. Landry up and he is using walker and seems to be better at this time. Informed family that if any further needs arise to contact [facility] for assistance. Family still would like for pt to go to short term rehab that has a dialysis unit. [facility] number given to family to contact on 1-26-18.      01/25/18 2053    Case Management/Social Work Plan    Additional Comments Contacted spouse at home, who stated that her and her sons could not get pt off floor. Advised wife to call 911 if not able to get pt off the floor. Wife asking questions re: rehab placement. Gave wife the case management phone numbetr to contact in am, Continued to advise wife to call 911 if unable to get pt off of floor.        Discharge Placement     No information found        Expected Discharge Date and Time     Expected Discharge Date Expected Discharge Time    Jan 25, 2018               Demographic Summary     None            Functional Status     None            Psychosocial     None            Abuse/Neglect     None            Legal     None            Substance Abuse     None            Patient Forms     None          Brittani Redd RN

## 2018-01-26 NOTE — PROGRESS NOTES
Case Management Discharge Note    Final Note: Home but considering rehab/HH    Discharge Placement     Facility/Agency Request Status Selected? Address Phone Number Fax Number    [facility] Accepted     [address] [facility] [address] [phone]    [facility] Accepted     [address] [facility] [address] [phone]    [facility] Pending - Request Sent     [address] [facility] [address] [phone]    [facility] Declined     [address] [facility] [address] [phone]        Gabriela Selby RN 1/26/2018 12:38    No beds available                   Other: Other (Car)    Discharge Codes: 01  Discharge to home

## 2018-01-26 NOTE — PROGRESS NOTES
Continued Stay Note  [facility]     Patient Name: Bill Landry  MRN: [id number]  Today's Date: 1/26/2018    Admit Date: 1/19/2018          Discharge Plan       01/26/18 1607    Case Management/Social Work Plan    Plan [facility]     Patient/Family In Agreement With Plan yes    Additional Comments [facility]  has obtained orders from PCP and will try to see patient over weekend. Wife does not want a visit on Monday because of dialysis.  Notified facilities of patient/spouse decision.  Wife Rachel ([phone] , [phone])  aware that  she is available to contact [facility] if placement is necessary over weekend.      01/26/18 1543    Case Management/Social Work Plan    Plan     Additional Comments Wife called and now prefers  and would like Rakesh Aguilar with [facility] notified and await acceptance.      01/26/18 1508    Case Management/Social Work Plan    Plan Went home but now considering rehab    Patient/Family In Agreement With Plan yes    Additional Comments Awaiting final disposition. Patient went home with family. but called back to office and considering HH vs rehab.     Final Note    Final Note Home but considering rehab/HH      01/26/18 1441    Case Management/Social Work Plan    Plan short term rehab    Additional Comments Referrals to [facility] and [facility] for onsite dialysis.  [facility] does not have any beds available.  [facility] does not have a dialysis chair but could accept and transport if they can arrange today.  Referrals to [facility] and [facility] as they have the ability to transport.  Await retrun call from [facility].  [facility] can accept and transport.  Spoke with wife by cell phone ([phone]) and they were at dialysis at that time.  Wife Rachel has already spoke with Kelsi at [facility] and is aware of location.  Wife was going to speak with spouse after he finishes dialysis regarding placement.  Wife asked if they could admit on Saturday and  I informed her that she would need to discuss with the facility but as patient has such difficulties last pm that it would be safer to admit today.                Discharge Codes     None        Expected Discharge Date and Time     Expected Discharge Date Expected Discharge Time    Jan 25, 2018             Gabriela Selby RN

## 2018-01-26 NOTE — NURSING NOTE
Gillian Lonnon called and stated  really weak and legs collapsed out from under him after getting home. She is afraid they will not be able to get him to Dialysis tomorrow. She stated he will be ok for the night. Instructed her to call CCP in am for instructions for possible rehab.     Called ER CCP RN Brittani and she is going to call patient tonight and let them know what to do.

## 2018-01-26 NOTE — DISCHARGE PLACEMENT REQUEST
"Bill Landry (72 y.o. Male)     Date of Birth Social Security Number Address Home Phone MRN    1945  [id number] [address] [phone] [id number]    Orthodoxy Marital Status          Unknown        Admission Date Admission Type Admitting Provider Attending Provider Department, Room/Bed    1/19/18 Emergency Marcellus Osborne MD  [address], 436/1    Discharge Date Discharge Disposition Discharge Destination        1/25/2018 Home or Self Care             Attending Provider: (none)    Allergies:  Ace Inhibitors, Contrast Dye, Eliquis [Apixaban], Granix [Filgrastim], Iodinated Diagnostic Agents, Keflex [Cephalexin]    Isolation:  None   Infection:  None   Code Status:  Not on file    Ht:  177.8 cm (70\")   Wt:  86.4 kg (190 lb 8 oz)    Admission Cmt:  None   Principal Problem:  None                Active Insurance as of 1/19/2018     Primary Coverage     Payor Plan Insurance Group Employer/Plan Group    MEDICARE MEDICARE A & B      Payor Plan Address Payor Plan Phone Number Effective From Effective To    [address] [phone] 10/1/2010     [place] [address]       Subscriber Name Subscriber Birth Date Member ID       BILL LANDRY 1945 [id number]           Secondary Coverage     Payor Plan Insurance Group Employer/Plan Group    [employer] [place] [employer] [place] [employer]     Payor Plan Address Payor Plan Phone Number Effective From Effective To    [employer] [place] [address] [phone] 10/1/2010     [place] [address]       Subscriber Name Subscriber Birth Date Member ID       BILL LANDRY 1945 [id number]                 Emergency Contacts      (Rel.) Home Phone Work Phone Mobile Phone    Gillian Landry (Spouse) [phone] -- [phone]            "

## 2018-01-29 LAB
FUNGUS WND CULT: ABNORMAL
FUNGUS WND CULT: ABNORMAL

## 2018-01-31 ENCOUNTER — TELEPHONE (OUTPATIENT)
Dept: ONCOLOGY | Facility: HOSPITAL | Age: 73
End: 2018-01-31

## 2018-01-31 NOTE — TELEPHONE ENCOUNTER
Patient wife calling. He needs to cancel appointment tomorrow with Dr. Mckenzie and reschedule. Had labs today at dialysis and they faxed them here. In basket message sent to scheduling.

## 2018-02-01 ENCOUNTER — APPOINTMENT (OUTPATIENT)
Dept: LAB | Facility: HOSPITAL | Age: 73
End: 2018-02-01

## 2018-02-01 ENCOUNTER — APPOINTMENT (OUTPATIENT)
Dept: ONCOLOGY | Facility: CLINIC | Age: 73
End: 2018-02-01

## 2018-02-07 ENCOUNTER — TELEPHONE (OUTPATIENT)
Dept: GASTROENTEROLOGY | Facility: CLINIC | Age: 73
End: 2018-02-07

## 2018-02-22 ENCOUNTER — APPOINTMENT (OUTPATIENT)
Dept: LAB | Facility: HOSPITAL | Age: 73
End: 2018-02-22

## 2018-02-22 ENCOUNTER — OFFICE VISIT (OUTPATIENT)
Dept: ONCOLOGY | Facility: CLINIC | Age: 73
End: 2018-02-22

## 2018-02-22 ENCOUNTER — APPOINTMENT (OUTPATIENT)
Dept: ONCOLOGY | Facility: HOSPITAL | Age: 73
End: 2018-02-22

## 2018-02-22 VITALS
BODY MASS INDEX: 25.65 KG/M2 | HEART RATE: 82 BPM | TEMPERATURE: 97.9 F | HEIGHT: 70 IN | WEIGHT: 179.2 LBS | SYSTOLIC BLOOD PRESSURE: 120 MMHG | RESPIRATION RATE: 16 BRPM | DIASTOLIC BLOOD PRESSURE: 60 MMHG

## 2018-02-22 DIAGNOSIS — D63.1 ANEMIA DUE TO STAGE 4 CHRONIC KIDNEY DISEASE TREATED WITH ERYTHROPOIETIN (HCC): Primary | ICD-10-CM

## 2018-02-22 DIAGNOSIS — D72.819 CHRONIC LEUKOPENIA: ICD-10-CM

## 2018-02-22 DIAGNOSIS — K74.60 CIRRHOSIS OF LIVER WITHOUT ASCITES, UNSPECIFIED HEPATIC CIRRHOSIS TYPE (HCC): ICD-10-CM

## 2018-02-22 DIAGNOSIS — D73.2 CONGESTIVE SPLENOMEGALY: ICD-10-CM

## 2018-02-22 DIAGNOSIS — N18.4 ANEMIA DUE TO STAGE 4 CHRONIC KIDNEY DISEASE TREATED WITH ERYTHROPOIETIN (HCC): Primary | ICD-10-CM

## 2018-02-22 DIAGNOSIS — D69.6 THROMBOCYTOPENIA (HCC): ICD-10-CM

## 2018-02-22 PROCEDURE — 99213 OFFICE O/P EST LOW 20 MIN: CPT | Performed by: INTERNAL MEDICINE

## 2018-02-22 PROCEDURE — G0463 HOSPITAL OUTPT CLINIC VISIT: HCPCS | Performed by: INTERNAL MEDICINE

## 2018-02-22 RX ORDER — LIDOCAINE AND PRILOCAINE 25; 25 MG/G; MG/G
1 CREAM TOPICAL
Refills: 3 | COMMUNITY
Start: 2018-02-05

## 2018-02-22 NOTE — PROGRESS NOTES
Subjective  REASONS FOR FOLLOWUP:    1. 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  Since discontinuation of anticoagulants the patient's hemoglobin has remained normal. The patient has associated leukopenia and thrombocytopenia due to chronic splenomegaly and remote history of bone marrow testing that suggested myelodysplasia. Under the present circumstances neither the white count nor platelets are changing and hemoglobin remains stable. There is no abnormality in the differential of his white count. There are no symptoms or signs that would suggest any further progression into myelodysplasia. The patient will remain in observation.            History of Present Illness   This patient is here today in the office in company of his wife after he has had a very difficult time during the last several weeks after he required hospitalization on emergency basis at [facility] with pneumonia and respiratory insufficiency that was triggered both by aspiration pneumonia and pulmonary edema in the background of anasarca and in the background of chronic kidney disease that became worse and requiring initiation of dialysis on an urgent basis. He was treated for the pneumonia with antibiotics and at the same time he had massive GI bleeding associated with vascular alteration in the colostomy site. He required transfusion of red cells on many occasions. He had surgery for the colostomy by Dr. Howard stitching the area that was bleeding. Since then he has been dialyzed 3 times a week and he is slowly returning to normality. His energy level is coming back, his appetite is terrific. His bowel function is normal through the colostomy with minimal oozing of blood that will require further assessment by colostomy nurse. He  still has some urinary output. The fistula in the left upper extremity is working well. He has no infection. He has not had any other new problems. He is walking and driving the car and just recovering a little bit at a time.                    Past Medical History, Past Surgical HistorySignificant for hypertension and also he has history of atrial fibrillation and was chronically anticoagulated with Eliquis in the past. Since January 2015 the patient is no longer receiving this medicine and moved to aspirin during his persistency of GI bleeding. The patient also has a history of chronic kidney disease with creatinine baseline around 2.5. The patient also has a history of inflammatory bowel disease with status post colectomy with ileostomy many years ago in the [facility]. The patient also has a history of pericardial effusion with an GABRIELLE 1:80 homogenous that has never changed or modified. He also has a positive lupus anticoagulant. He has had chronic leukopenia and thrombocytopenia for which he underwent by Dr. Null 10 years ago, bone marrow testing at [facility]. We have requested this information. The patient in the past has had a normal B12 level of 1094, normal folate level and ferritin level of 617 with an iron level of 34. In the past he has received IV iron in the form of Venofer while being at [facility] in January 2015. He has had serum protein electrophoresis at least on 3 different occasions, failing to show any monoclonal proteins. He also has had a CT scan of the abdomen that shows a general spleen anatomy without any retroperitoneal adenopathies and nothing to suggest portal hypertension or cirrhosis of the liver. Kidney anatomy disclosed thinning of the kidney cortexes consistent with chronic medical illness. He has no hydronephrosis. No retroperitoneal adenopathies.   SOCIAL HISTORY:  The patient lives with his wife in [place], [place]. He is retired. He does not smoke and  does not drink any alCOHOL.      ONCOLOGIC/HEMATOLOGIC HISTORY     On 04/06/2015 the patient’s clinical status has dramatically improved.  He has not had any new episodes of infection, congestive heart failure, GI bleeding, with excellent improvement in hemoglobin.  The patient has been able to walk longer distances without shortness of breath.   He has been able to climb steps without shortness of breath and he feels dramatically better.  Hemoglobin has been stable for the past few weeks at 12.1.  His white count and platelet count remain on the low side with no infection or clinical bleeding.  We found documentation of his bone marrow biopsy performed in 2002 and read at the [facility].  Explicitly it was documented that the patient could have myelodysplastic syndrome.  Procrit, as I pointed out to the patient, is a useful medicine to treat this condition anyway, and given the fact that in 13 years his white count and his platelet count have not changed, makes this diagnosis dubious.  Reading bone marrows for myelodysplasia is one of the most difficult tasks in Hematology.  I suggested to him at some point, if his blood count change, specifically white count modifies or platelet count modifies, to consider repeating the bone marrow testing, flow cytometry and chromosomes.     On 05/26/2015 he was feeling terrific.  He was not having any GI symptomatology, no melena, no enterorrhagia, no abnormal bleeding.  He was functionally perfectly fine.  Urination was ongoing. Uric acid was elevated and I advised him to go back into his Uloric to control this and minimize formation of kidney stones.    On 07/20/2015 the patient had no issues in relationship with anemia. His white count and platelet count remain low associated with his splenomegaly and no issues of consequences related to this. He was advised to remain in observation. He was advised to remain on his folic acid and B12 supplementation.     On  09/14/2015, hemoglobin was excellent at 12.9, stable weight count at 3400 and stable platelet count at 70,000.  We asked him to remain on observation.     On 11/09/2015 the patient’s hematological parameters remain normal.  He was feeling terrific.  His atrial fibrillation looked like it was very quiet and he had no issues in regard to his aspirin use.  He had no embolic phenomenon.  He had some element of fluid retention associated with his chronic renal disease.  We advised him to monitor his diet properly in regard to sodium ingestion.      Review of Systems   General: no fever, chills, fatigue, weight changes, or lack of appetite.  Eyes: no epiphora, xerophthalmia,conjunctivitis, pain, glaucoma, blurred vision, blindness, secretion, photophobia, proptosis, diplopia.  Ears: no otorrhea, tinnitus, otorrhagia, deafness, pain, vertigo.  Nose: no rhinorrhea, epistaxis, alteration in perception of odors, sinuses pressure.  Mouth: no alteration in gums or teeth,  ulcers, no difficulty with mastication or deglut ion, no odynophagia.  Neck: no masses or pain, no thyroid alterations, no pain in muscles or arteries, no carotid odynia, no crepitation.  Respiratory: no cough, sputum production, dyspnea, trepopnea, pleuritic pain, hemoptysis.  Heart: no syncope, irregularity, palpitations, angina, orthopnea, paroxysmal nocturnal dyspnea.  Vascular Venous: no tenderness,edema, palpable cords, postphlebitic syndrome, skin changes or ulcerations.  Vascular Arterial: no distal ischemia, claudication, gangrene, neuropathic ischemic pain, skin ulcers, paleness or cyanosis.  GI: no dysphagia, odynophagia, no regurgitation, no heartburn,no indigestion,no nausea,no vomiting,no hematemesis ,no melena,no jaundice,no distention, no obstipation,no enterorrhagia,no proctalgia,no anal  lesions, nochanges in bowel habits.  : no frequency, hesitancy, hematuria, discharge, pain.still uo   Musculoskeletal: no muscle or tendon pain or  "inflammation, joint pain, edema, functional limitation, fasciculations, mass.  Neurologic: no headache, seizures, alterations on Craneal nerves, no motor or senssory deficit, normal coordination, no alteration in memory, orientation, calculation,writting, verbal or written language.  Skin: no rashes, pruritus or localized lesions.  Psychiatric: no anxiety, depression, agitation, delusions, proper insight.  Medications:  The current medication list was reviewed in the EMR    ALLERGIES:    Allergies   Allergen Reactions   • Ace Inhibitors Other (See Comments)     RENAL FAILURE   • Contrast Dye Other (See Comments)     RENAL FAILURE   • Eliquis [Apixaban] Other (See Comments)     BLEEDING ISSUES; PT CANNOT TAKE ANY ANTICOAGULANTS DUE TO LOW PLATELETS EXCEPT ASPIRIN  BLEEDING ISSUES; PT CANNOT TAKE ANY ANTICOAGULANTS DUE TO LOW PLATELETS EXCEPT ASPIRIN   • Granix [Filgrastim]      Chest pain  Chest pain     • Iodinated Diagnostic Agents Other (See Comments)     RENAL FAILURE   • Keflex [Cephalexin] Other (See Comments)     RENAL FAILURE       Objective      Vitals:    02/22/18 0856   BP: 120/60   Pulse: 82   Resp: 16   Temp: 97.9 °F (36.6 °C)   Weight: 81.3 kg (179 lb 3.2 oz)   Height: 177.8 cm (70\")   PainSc: 0-No pain     Current Status 1/10/2018   ECOG score 0     Physical Exam    GENERAL:  Well-developed, well-nourished  Patient  in no acute distress.   SKIN:  Warm, dry without rashes, purpura or petechiae.  HEENT:  Pupils were equal and reactive to light and accomodation, conjunctivas non injected, no pterigion, normal extraocular movements, normal visual acuity.   Mouth mucosa was moist, no exudates in oropharynx, normal gum line, normal roof of the mouth and pillars, normal papillations of the tongue.Ear canals were FULL OF WAX, NOT VISUALIZED tympanic membranes, normal hearing acuity.No pain in mastoid area or erythema.  NECK:  Supple with good range of motion; no thyromegaly or masses, no JVD or bruits, no " cervical adenopathies.No carotid arteries pain, no carotid abnormal pulsation or arterial dance.  LYMPHATICS:  No cervical, supraclavicular, axillary, epitrochlear or inguinal adenopathy.  CHEST:  Normal excursion of both torrey thoraces, normal voice fremitus, no subcutaneous emphysema, normal axillas, no rashes or acanthosis nigricans. Lungs clear to percussion and auscultation, normal breath sounds bilaterally, no wheezing, crackles or ronchi, no stridor, no rubs.  CARDIAC AND VASCULAR: PMI not displaced,no thrills, normal rate and irrregular rhythm atrial fibrilation controller VR, without murmurs, rubs or S3 or S4 right or left sided gallops. Normal femoral, popliteal, pedis, brachial and carotid pulses.  ABDOMEN:  Soft, nontender with no organomegaly or masses, no ascites, no collateral circulation,no distention,no Contreras sign, no abdominal pain, no inguinal hernias,no umbilical hernias, no abdominal bruits. Normal bowel sounds.Colostomy in place LLQ.  GENITAL: Not  Performed.  EXTREMITIES  AND SPINE:  No clubbing, cyanosis or edema, no deformities or pain .No kyphosis, scoliosis, deformities or pain in spine, ribs or pelvic bone.fistula with excellent thrill lue.  NEUROLOGICAL:  Patient was awake, alert, oriented to time, person and place,normal gait and coordination    RECENT LABS:  Hematology WBC   Date Value Ref Range Status   01/25/2018 8.14 4.50 - 10.70 10*3/mm3 Final     RBC   Date Value Ref Range Status   01/25/2018 3.08 (L) 4.60 - 6.00 10*6/mm3 Final     Hemoglobin   Date Value Ref Range Status   01/25/2018 8.9 (L) 13.7 - 17.6 g/dL Final     Hematocrit   Date Value Ref Range Status   01/25/2018 26.8 (L) 40.4 - 52.2 % Final     Platelets   Date Value Ref Range Status   01/25/2018 65 (L) 140 - 500 10*3/mm3 Final                Assessment/Plan  : 1. This patient has had multifactorial anemia basically on the basis of GI bleeding as well as anemia and chronic renal disease. He is undergoing at this time  Procrit by nephrology on a weekly basis at the time that he gets his dialysis and he has received IV iron by Dr. Monsivais as well. His ferritin level recently has been in the 900 category.  2. The patient has had GI bleeding in the colostomy site requiring stitches by Dr. Howard and will require further cauterization by colostomy nurse.   3. 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 These numbers are not going to change and in my opinion it will be okay if necessary to use heparin while on dialysis to minimize any problems in clotting in the vasculature.   4. From my point of view it will be okay for the patient to come back every 3 months for review just seeing how things are going in general. He looks remarkably better today.     The same statement that I made in the past many years that I have been his doctor there is no role for anticoagulation in this patient in the background of chronic atrial fibrillation given the massive GI bleeding that he had before when he was receiving anticoagulants. 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 The patient recognized this and this has been discussed on multiple occasions in the last many years.                       2/22/2018      CC:

## 2018-06-21 ENCOUNTER — LAB (OUTPATIENT)
Dept: LAB | Facility: HOSPITAL | Age: 73
End: 2018-06-21

## 2018-06-21 ENCOUNTER — OFFICE VISIT (OUTPATIENT)
Dept: ONCOLOGY | Facility: CLINIC | Age: 73
End: 2018-06-21

## 2018-06-21 VITALS
HEART RATE: 81 BPM | DIASTOLIC BLOOD PRESSURE: 60 MMHG | RESPIRATION RATE: 16 BRPM | SYSTOLIC BLOOD PRESSURE: 110 MMHG | HEIGHT: 70 IN | OXYGEN SATURATION: 98 % | WEIGHT: 189.2 LBS | TEMPERATURE: 99 F | BODY MASS INDEX: 27.09 KG/M2

## 2018-06-21 DIAGNOSIS — D69.6 THROMBOCYTOPENIA (HCC): ICD-10-CM

## 2018-06-21 DIAGNOSIS — N18.4 ANEMIA DUE TO STAGE 4 CHRONIC KIDNEY DISEASE TREATED WITH ERYTHROPOIETIN (HCC): ICD-10-CM

## 2018-06-21 DIAGNOSIS — I48.21 PERMANENT ATRIAL FIBRILLATION (HCC): ICD-10-CM

## 2018-06-21 DIAGNOSIS — D63.1 ANEMIA DUE TO STAGE 4 CHRONIC KIDNEY DISEASE TREATED WITH ERYTHROPOIETIN (HCC): ICD-10-CM

## 2018-06-21 DIAGNOSIS — D63.1 ANEMIA DUE TO STAGE 3 (MODERATE) CHRONIC RENAL FAILURE TREATED WITH ERYTHROPOIETIN (HCC): ICD-10-CM

## 2018-06-21 DIAGNOSIS — N18.30 ANEMIA DUE TO STAGE 3 (MODERATE) CHRONIC RENAL FAILURE TREATED WITH ERYTHROPOIETIN (HCC): ICD-10-CM

## 2018-06-21 DIAGNOSIS — D72.819 CHRONIC LEUKOPENIA: ICD-10-CM

## 2018-06-21 DIAGNOSIS — D73.2 CONGESTIVE SPLENOMEGALY: Primary | ICD-10-CM

## 2018-06-21 DIAGNOSIS — D73.2 CONGESTIVE SPLENOMEGALY: ICD-10-CM

## 2018-06-21 DIAGNOSIS — K74.60 CIRRHOSIS OF LIVER WITHOUT ASCITES, UNSPECIFIED HEPATIC CIRRHOSIS TYPE (HCC): ICD-10-CM

## 2018-06-21 PROBLEM — J18.9 PNEUMONIA OF BOTH LUNGS DUE TO INFECTIOUS ORGANISM: Status: RESOLVED | Noted: 2018-01-19 | Resolved: 2018-06-21

## 2018-06-21 PROCEDURE — 36415 COLL VENOUS BLD VENIPUNCTURE: CPT | Performed by: INTERNAL MEDICINE

## 2018-06-21 PROCEDURE — 99214 OFFICE O/P EST MOD 30 MIN: CPT | Performed by: INTERNAL MEDICINE

## 2018-06-21 NOTE — PROGRESS NOTES
Subjective  REASONS FOR FOLLOWUP:    1. 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  Since discontinuation of anticoagulants the patient's hemoglobin has remained normal. The patient has associated leukopenia and thrombocytopenia due to chronic splenomegaly and remote history of bone marrow testing that suggested myelodysplasia. Under the present circumstances neither the white count nor platelets are changing and hemoglobin remains stable. There is no abnormality in the differential of his white count. There are no symptoms or signs that would suggest any further progression into myelodysplasia. The patient will remain in observation.            History of Present Illness   This patient returns today to the office accompanied by his wife stating that he has not had any issues in regard to dialysis and actually he has been feeling dramatically better since the initiation of this. He has not encountered any major problems with his fistula. He has had fistulograms on 2 different occasions. His appetite is terrific. His weight is stable. He has no fluid accumulation and he has not developed any bowel or urinary difficulties. He has minimal urinary output. He has not had any passage of blood in the stool and he has not had any clinical bleeding. He remains in atrial fibrillation. Dr. Aiken, his cardiologist who is retiring, has suggested some sort of procedure for his heart to keep him from developing embolic phenomenon systemically. 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 Under  no circumstances will this patient be able to handle anticoagulation. Please review below, conversation with Dr. Aiken.                   Past Medical History, Past Surgical HistorySignificant for hypertension and also he has history of atrial fibrillation and was chronically anticoagulated with Eliquis in the past. Since January 2015 the patient is no longer receiving this medicine and moved to aspirin during his persistency of GI bleeding. The patient also has a history of chronic kidney disease with creatinine baseline around 2.5. The patient also has a history of inflammatory bowel disease with status post colectomy with ileostomy many years ago in the [facility]. The patient also has a history of pericardial effusion with an GABRIELLE 1:80 homogenous that has never changed or modified. He also has a positive lupus anticoagulant. He has had chronic leukopenia and thrombocytopenia for which he underwent by Dr. Null 10 years ago, bone marrow testing at [facility]. We have requested this information. The patient in the past has had a normal B12 level of 1094, normal folate level and ferritin level of 617 with an iron level of 34. In the past he has received IV iron in the form of Venofer while being at [facility] in January 2015. He has had serum protein electrophoresis at least on 3 different occasions, failing to show any monoclonal proteins. He also has had a CT scan of the abdomen that shows a general spleen anatomy without any retroperitoneal adenopathies and nothing to suggest portal hypertension or cirrhosis of the liver. Kidney anatomy disclosed thinning of the kidney cortexes consistent with chronic medical illness. He has no hydronephrosis. No retroperitoneal adenopathies.   SOCIAL HISTORY:  The patient lives with his wife in [place], [place]. He is retired. He does not smoke and does not drink any alCOHOL.      ONCOLOGIC/HEMATOLOGIC HISTORY     On 04/06/2015 the patient’s clinical  status has dramatically improved.  He has not had any new episodes of infection, congestive heart failure, GI bleeding, with excellent improvement in hemoglobin.  The patient has been able to walk longer distances without shortness of breath.   He has been able to climb steps without shortness of breath and he feels dramatically better.  Hemoglobin has been stable for the past few weeks at 12.1.  His white count and platelet count remain on the low side with no infection or clinical bleeding.  We found documentation of his bone marrow biopsy performed in 2002 and read at the [facility].  Explicitly it was documented that the patient could have myelodysplastic syndrome.  Procrit, as I pointed out to the patient, is a useful medicine to treat this condition anyway, and given the fact that in 13 years his white count and his platelet count have not changed, makes this diagnosis dubious.  Reading bone marrows for myelodysplasia is one of the most difficult tasks in Hematology.  I suggested to him at some point, if his blood count change, specifically white count modifies or platelet count modifies, to consider repeating the bone marrow testing, flow cytometry and chromosomes.     On 05/26/2015 he was feeling terrific.  He was not having any GI symptomatology, no melena, no enterorrhagia, no abnormal bleeding.  He was functionally perfectly fine.  Urination was ongoing. Uric acid was elevated and I advised him to go back into his Uloric to control this and minimize formation of kidney stones.    On 07/20/2015 the patient had no issues in relationship with anemia. His white count and platelet count remain low associated with his splenomegaly and no issues of consequences related to this. He was advised to remain in observation. He was advised to remain on his folic acid and B12 supplementation.     On 09/14/2015, hemoglobin was excellent at 12.9, stable weight count at 3400 and stable platelet count at  70,000.  We asked him to remain on observation.     On 11/09/2015 the patient’s hematological parameters remain normal.  He was feeling terrific.  His atrial fibrillation looked like it was very quiet and he had no issues in regard to his aspirin use.  He had no embolic phenomenon.  He had some element of fluid retention associated with his chronic renal disease.  We advised him to monitor his diet properly in regard to sodium ingestion.      Review of Systems   General: no fever, chills, fatigue, weight changes, or lack of appetite.  Eyes: no epiphora, xerophthalmia,conjunctivitis, pain, glaucoma, blurred vision, blindness, secretion, photophobia, proptosis, diplopia.  Ears: no otorrhea, tinnitus, otorrhagia, deafness, pain, vertigo.  Nose: no rhinorrhea, epistaxis, alteration in perception of odors, sinuses pressure.  Mouth: no alteration in gums or teeth,  ulcers, no difficulty with mastication or deglut ion, no odynophagia.  Neck: no masses or pain, no thyroid alterations, no pain in muscles or arteries, no carotid odynia, no crepitation.  Respiratory: no cough, sputum production, dyspnea, trepopnea, pleuritic pain, hemoptysis.  Heart: no syncope, irregularity, palpitations, angina, orthopnea, paroxysmal nocturnal dyspnea.  Vascular Venous: no tenderness,edema, palpable cords, postphlebitic syndrome, skin changes or ulcerations.  Vascular Arterial: no distal ischemia, claudication, gangrene, neuropathic ischemic pain, skin ulcers, paleness or cyanosis.  GI: no dysphagia, odynophagia, no regurgitation, no heartburn,no indigestion,no nausea,no vomiting,no hematemesis ,no melena,no jaundice,no distention, no obstipation,no enterorrhagia,no proctalgia,no anal  lesions, nochanges in bowel habits.  : no frequency, hesitancy, hematuria, discharge, pain.  Musculoskeletal: no muscle or tendon pain or inflammation, joint pain, edema, functional limitation, fasciculations, mass.  Neurologic: no headache, seizures,  "alterations on Craneal nerves, no motor or senssory deficit, normal coordination, no alteration in memory, orientation, calculation,writting, verbal or written language.  Skin: no rashes, pruritus or localized lesions.  Psychiatric: no anxiety, depression, agitation, delusions, proper insight.  GPsychiatric: no anxiety, depression, agitation, delusions, proper insight.  Medications:  The current medication list was reviewed in the EMR    ALLERGIES:    Allergies   Allergen Reactions   • Ace Inhibitors Other (See Comments)     RENAL FAILURE   • Contrast Dye Other (See Comments)     RENAL FAILURE   • Eliquis [Apixaban] Other (See Comments)     BLEEDING ISSUES; PT CANNOT TAKE ANY ANTICOAGULANTS DUE TO LOW PLATELETS EXCEPT ASPIRIN  BLEEDING ISSUES; PT CANNOT TAKE ANY ANTICOAGULANTS DUE TO LOW PLATELETS EXCEPT ASPIRIN   • Granix [Filgrastim]      Chest pain  Chest pain     • Iodinated Diagnostic Agents Other (See Comments)     RENAL FAILURE   • Keflex [Cephalexin] Other (See Comments)     RENAL FAILURE       Objective      Vitals:    06/21/18 1430   BP: 110/60   Pulse: 81   Resp: 16   Temp: 99 °F (37.2 °C)   SpO2: 98%  Comment: at rest   Weight: 85.8 kg (189 lb 3.2 oz)   Height: 177.8 cm (70\")   PainSc: 0-No pain     Current Status 6/21/2018   ECOG score 0     Physical Exam    GENERAL:  Well-developed, well-nourished  Patient  in no acute distress.   SKIN:  Warm, dry without rashes, purpura or petechiae.  HEENT:  Pupils were equal and reactive to light and accomodation, conjunctivas non injected, no pterigion, normal extraocular movements, normal visual acuity.   Mouth mucosa was moist, no exudates in oropharynx, normal gum line, normal roof of the mouth and pillars, normal papillations of the tongue  NECK:  Supple with good range of motion; no thyromegaly or masses, no JVD or bruits, no cervical adenopathies.No carotid arteries pain, no carotid abnormal pulsation or arterial dance.  LYMPHATICS:  No cervical, " supraclavicular, axillary, epitrochlear or inguinal adenopathy.  CHEST:  Normal excursion of both torrey thoraces, normal voice fremitus, no subcutaneous emphysema, normal axillas, no rashes or acanthosis nigricans. Lungs clear to percussion and auscultation, normal breath sounds bilaterally, no wheezing, crackles or ronchi, no stridor, no rubs.  CARDIAC AND VASCULAR:normal rate and IRRregular rhythm A FIB CVR, without murmurs, rubs or S3 or S4 right or left sided gallops. Normal femoral, popliteal, pedis, brachial and carotid pulses.  ABDOMEN:  Soft, nontender with no organomegaly or masses, no ascites, no collateral circulation,no distention,no Contreras sign, no abdominal pain, no inguinal hernias,no umbilical hernias, no abdominal bruits. Normal bowel sounds.  GENITAL: Not  Performed.  EXTREMITIES  AND SPINE:  No clubbing, cyanosis or edema, no deformities or pain .No kyphosis, scoliosis, deformities or pain in spine, ribs or pelvic bone.FISTULA THRILL LUE  NEUROLOGICAL:  Patient was awake, alert, oriented to time, person and place      RECENT LABS:  Hematology WBC   Date Value Ref Range Status   01/25/2018 8.14 4.50 - 10.70 10*3/mm3 Final     RBC   Date Value Ref Range Status   01/25/2018 3.08 (L) 4.60 - 6.00 10*6/mm3 Final     Hemoglobin   Date Value Ref Range Status   01/25/2018 8.9 (L) 13.7 - 17.6 g/dL Final     Hematocrit   Date Value Ref Range Status   01/25/2018 26.8 (L) 40.4 - 52.2 % Final     Platelets   Date Value Ref Range Status   01/25/2018 65 (L) 140 - 500 10*3/mm3 Final                Assessment/Plan  : 1. This patient has had multifactorial anemia basically on the basis of GI bleeding as well as anemia and chronic renal disease. He is undergoing at this time Procrit by nephrology on a weekly basis at the time that he gets his dialysis and he has received IV iron by Dr. Monsivais as well. His ferritin level recently has been in the 700 category.  2. The patient has had GI bleeding in the colostomy site  requiring stitches by Dr. Howard and will require further cauterization by colostomy nurse.   3. 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 These numbers are not going to change and in my opinion it will be okay if necessary to use heparin while on dialysis to minimize any problems in clotting in the vasculature.   4. From my point of view it will be okay for the patient to come back every 3 months for review just seeing how things are going in general. He looks remarkably better today.     The same statement that I made in the past many years that I have been his doctor there is no role for anticoagulation in this patient in the background of chronic atrial fibrillation given the massive GI bleeding that he had before when he was receiving anticoagulants. 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 The patient recognized this and this has been discussed on multiple occasions in the last many years. I placed a phone call to Dr. Aiken and I discussed these facts with him to discourage any other procedures from the point of view of his AFib and discourage any form of anticoagulation on him. Now that he recognizes this risk, he agrees with me.     I spent quite amount of time with the patient and his wife going through the conversation and the logistics of all this.     I reviewed the laboratory parameters provided to the patient and wife that he gets on weekly basis on dialysis and all his numbers including his white count, hemoglobin and platelets remain stable. White count was 4000, hemoglobin 11.7, platelet count 87,000. A recent ferritin was in the 700 category.                          6/21/2018      CC:

## 2018-09-13 ENCOUNTER — APPOINTMENT (OUTPATIENT)
Dept: LAB | Facility: HOSPITAL | Age: 73
End: 2018-09-13

## 2018-09-13 ENCOUNTER — APPOINTMENT (OUTPATIENT)
Dept: ONCOLOGY | Facility: CLINIC | Age: 73
End: 2018-09-13

## 2018-09-14 NOTE — PROGRESS NOTES
Date of Office Visit: 2018  Encounter Provider: Dale Vázquez MD  Place of Service: [facility] CARDIOLOGY  Patient Name: Bill Landry  :1945    Chief Complaint   Patient presents with   • Atrial Fibrillation   :     HPI: Bill Landry is a 72 y.o. male who presents today to establish care. He was previously followed by Dr. Aiken, who has retired.    He has numerous chronic medical conditions.  He has Crohns and had a colectomy/ileostomy in .  He has ESRD which is multifactorial (Crohns, huge kidney stones, hypertension).  He is on hemodialysis.  He has chronic leukopenia, anemia, and thrombocytopenia.  He has permanent atrial fibrillation and has a history of a slow GI bleed when anticoagulated.  He has never had a TIA or stroke.  He previously was diagnosed with diastolic CHF and pulmonary hypertension, but it would seem that this was in the setting of volume overload from CKD prior to being started on dialysis.  Since this occurred, he has had no problems with this, and his RVSP has normalized.      An echo in May 2018 showed normal LVSF, 55-60%, mild RVE, normal RVSP, and no significant valvular disease (there was aortic sclerosis).    He feels well.  He denies chest pain, dyspnea, orthopnea, edema, palpitations, or syncope.     Past Medical History:   Diagnosis Date   • Abnormal serum protein electrophoresis    • Anemia     Multifactorial   • Chronic leukopenia and thrombocytopenia    • Cirrhosis of liver without ascites (CMS/HCC) 2017   • Congestive splenomegaly 2017   • Crohn disease (CMS/HCC)     with ileostomy   • Diastolic CHF (CMS/HCC)     although it was likely from volume overload due to ESRD   • Diverticula of colon    • ESRD on hemodialysis (CMS/HCC)    • Fatty liver disease, nonalcoholic    • GERD (gastroesophageal reflux disease)    • GI bleed    • Glaucoma    • H/O colectomy    • H/O Gallstone pancreatitis    • H/O Pericardial effusion     • H/O sinus bradycardia    • History of hypertension     resolved   • Hx of renal calculi    • Hyperlipidemia    • Ileostomy present (CMS/HCC)    • Iron deficiency    • MGUS (monoclonal gammopathy of unknown significance)    • Permanent atrial fibrillation (CMS/HCC)    • Sleep apnea     CPAP USED   • Thrombocytopenia (CMS/HCC)    • Type 2 diabetes mellitus (CMS/HCC)        Past Surgical History:   Procedure Laterality Date   • APPENDECTOMY     • ARTERIOVENOUS FISTULA/SHUNT SURGERY Left 8/8/2017    Procedure: LEFT BRACHIAL CEPHALIC AV FISTULA FORMATION WITH CEPHALIC VEIN TRANSPOSITION ;  Surgeon: Bill Deal MD;  Location: [facility] MAIN OR;  Service:    • CHOLECYSTECTOMY     • COLECTOMY PARTIAL / TOTAL      History of inflammatory bowel disease with status post colectomy with ileostomy many years ago in the [facility]   • COLON RESECTION WITH COLOSTOMY     • COLON SURGERY     • COLONOSCOPY     • CYSTOSCOPY LITHOLAPAXY BLADDER STONE EXTRACTION     • ENDOSCOPY  01/16/2015    gastritis   • ENDOSCOPY  1/17/2018    Procedure: ESOPHAGOGASTRODUODENOSCOPY;  Surgeon: Warner Neville MD;  Location: [facility] ENDOSCOPY;  Service:    • ILEOSCOPY  01/16/2015    normal   • ILEOSCOPY N/A 1/17/2018    Procedure: ILEOSCOPY;  Surgeon: Warner Neville MD;  Location: [facility] ENDOSCOPY;  Service:        Social History     Social History   • Marital status:      Spouse name: Gillian   • Number of children: 2   • Years of education: College     Occupational History   •  Retired     Social History Main Topics   • Smoking status: Never Smoker   • Smokeless tobacco: Never Used      Comment: caffeine use: none   • Alcohol use No   • Drug use: No   • Sexual activity: Defer     Other Topics Concern   • Not on file     Social History Narrative   • No narrative on file       Family History   Problem Relation Age of Onset   • Heart failure Mother    • Hypertension Mother    • Heart disease Mother    • Hyperlipidemia Mother    •  Hypertension Father    • Malig Hyperthermia Neg Hx        Review of Systems   Cardiovascular: Negative for chest pain and palpitations.   Respiratory: Positive for snoring. Negative for shortness of breath.    Skin: Positive for unusual hair distribution.   Neurological: Negative for dizziness and light-headedness.   All other systems reviewed and are negative.      Allergies   Allergen Reactions   • Ace Inhibitors Other (See Comments)     RENAL FAILURE   • Contrast Dye Other (See Comments)     RENAL FAILURE   • Eliquis [Apixaban] Other (See Comments)     BLEEDING ISSUES; PT CANNOT TAKE ANY ANTICOAGULANTS DUE TO LOW PLATELETS EXCEPT ASPIRIN  BLEEDING ISSUES; PT CANNOT TAKE ANY ANTICOAGULANTS DUE TO LOW PLATELETS EXCEPT ASPIRIN   • Granix [Filgrastim]      Chest pain  Chest pain     • Iodinated Diagnostic Agents Other (See Comments)     RENAL FAILURE   • Keflex [Cephalexin] Other (See Comments)     RENAL FAILURE         Current Outpatient Prescriptions:   •  aspirin 81 MG tablet, Take 81 mg by mouth Daily. To stop 5 days prior to surgery, Disp: , Rfl:   •  B Complex-C-Folic Acid (RENAL-FREDDY PO), Take  by mouth., Disp: , Rfl:   •  febuxostat (ULORIC) 40 MG tablet, Take 40 mg by mouth Daily., Disp: , Rfl:   •  Iron Sucrose (VENOFER IV), Infuse  into a venous catheter., Disp: , Rfl:   •  latanoprost (XALATAN) 0.005 % ophthalmic solution, Administer 1 drop to both eyes Every Night. 1 drop each eye , Disp: , Rfl:   •  lidocaine-prilocaine (EMLA) 2.5-2.5 % cream, APPLY A SMALL AMOUNT TO DIALYSIS ACCESS SITE 1 HOUR PRIOR AND COVER WITH AN OCCLUSIVE DRESSING, Disp: , Rfl: 3  •  Loratadine (CLARITIN PO), Take  by mouth As Needed. Pt states he is uncertain of dosage, Disp: , Rfl:   •  Methoxy PEG-Epoetin Beta (MIRCERA IJ), Inject  as directed., Disp: , Rfl:   •  ranitidine (ZANTAC) 150 MG tablet, Take 150 mg by mouth Daily., Disp: , Rfl:   •  sodium bicarbonate 650 MG tablet, Take 650 mg by mouth 2 (Two) Times a Day. 2 tabs in  "the morning and 2 tabs in the evening, Disp: , Rfl:   •  Sucroferric Oxyhydroxide (VELPHORO PO), Take  by mouth., Disp: , Rfl:       Objective:     Vitals:    09/20/18 1420   BP: 122/72   BP Location: Left arm   Pulse: 84   Weight: 88 kg (194 lb)   Height: 177.8 cm (70\")     Body mass index is 27.84 kg/m².    Physical Exam   Constitutional: He is oriented to person, place, and time. He appears well-developed and well-nourished.   HENT:   Head: Normocephalic.   Nose: Nose normal.   Mouth/Throat: Oropharynx is clear and moist.   Eyes: Pupils are equal, round, and reactive to light. Conjunctivae and EOM are normal.   Neck: Normal range of motion. No JVD present.   Cardiovascular: Normal rate, regular rhythm, normal heart sounds and intact distal pulses.    No murmur heard.  Pulmonary/Chest: Effort normal and breath sounds normal.   Abdominal: Soft. He exhibits no mass. There is no tenderness.   Ostomy in place   Musculoskeletal: Normal range of motion. He exhibits no edema.   Lymphadenopathy:     He has no cervical adenopathy.   Neurological: He is alert and oriented to person, place, and time. No cranial nerve deficit.   Skin: Skin is warm and dry. No rash noted.   Psychiatric: He has a normal mood and affect. His behavior is normal. Judgment and thought content normal.   Vitals reviewed.        ECG 12 Lead  Date/Time: 9/20/2018 4:23 PM  Performed by: NIKITA MAGANA  Authorized by: NIKITA MAGANA   Comparison: compared with previous ECG   Similar to previous ECG  Rhythm: atrial fibrillation  Conduction: conduction normal  ST Segments: ST segments normal  T Waves: T waves normal  QRS axis: normal  Other: no other findings  Clinical impression: abnormal ECG              Assessment:       Diagnosis Plan   1. Persistent atrial fibrillation (CMS/HCC)     2. Diastolic congestive heart failure, unspecified congestive heart failure chronicity (CMS/HCC)     3. ESRD (end stage renal disease) (CMS/HCC)            Plan:       1.  " Atrial Fibrillation and Atrial Flutter  Assessment  • The patient has permanent atrial fibrillation  • This is non-valvular in etiology  • The patient's CHADS2-VASc score is 2  • A [id number] score of 2 or more is considered a high risk for a thromboembolic event    Plan  • Continue in atrial fibrillation with rate control  • He is rate controlled on his own.  He cannot be anticoagulated due to GI AVM/GI bleeding/thrombocytopenia.  A left atrial occluder has been considered, but it is not felt to be safe to have him on DAPT or anticoagulation for even the short while after the procedure.  He knows to call 911 for ANY neurological symptoms.    2/3.  It would seem that he had volume overload from progressive renal dysfunction.  He is euvolemic on dialysis.  His last echo showed normal systolic function and normal RVSP.    Sincerely,       Dale Vázquez MD

## 2018-09-20 ENCOUNTER — OFFICE VISIT (OUTPATIENT)
Dept: CARDIOLOGY | Facility: CLINIC | Age: 73
End: 2018-09-20

## 2018-09-20 VITALS
BODY MASS INDEX: 27.77 KG/M2 | WEIGHT: 194 LBS | HEART RATE: 84 BPM | DIASTOLIC BLOOD PRESSURE: 72 MMHG | HEIGHT: 70 IN | SYSTOLIC BLOOD PRESSURE: 122 MMHG

## 2018-09-20 DIAGNOSIS — I50.30 DIASTOLIC CONGESTIVE HEART FAILURE, UNSPECIFIED CONGESTIVE HEART FAILURE CHRONICITY: ICD-10-CM

## 2018-09-20 DIAGNOSIS — I48.19 PERSISTENT ATRIAL FIBRILLATION (HCC): Primary | ICD-10-CM

## 2018-09-20 DIAGNOSIS — N18.6 ESRD (END STAGE RENAL DISEASE) (HCC): ICD-10-CM

## 2018-09-20 PROCEDURE — 99204 OFFICE O/P NEW MOD 45 MIN: CPT | Performed by: INTERNAL MEDICINE

## 2018-09-20 PROCEDURE — 93000 ELECTROCARDIOGRAM COMPLETE: CPT | Performed by: INTERNAL MEDICINE

## 2018-09-20 RX ORDER — FEBUXOSTAT 40 MG/1
40 TABLET, FILM COATED ORAL DAILY
COMMUNITY
End: 2019-11-19

## 2018-10-23 ENCOUNTER — OFFICE VISIT (OUTPATIENT)
Dept: ONCOLOGY | Facility: CLINIC | Age: 73
End: 2018-10-23

## 2018-10-23 ENCOUNTER — APPOINTMENT (OUTPATIENT)
Dept: LAB | Facility: HOSPITAL | Age: 73
End: 2018-10-23

## 2018-10-23 VITALS
BODY MASS INDEX: 27.8 KG/M2 | OXYGEN SATURATION: 97 % | HEIGHT: 70 IN | SYSTOLIC BLOOD PRESSURE: 118 MMHG | TEMPERATURE: 98.5 F | RESPIRATION RATE: 14 BRPM | HEART RATE: 88 BPM | WEIGHT: 194.2 LBS | DIASTOLIC BLOOD PRESSURE: 64 MMHG

## 2018-10-23 DIAGNOSIS — K74.60 CIRRHOSIS OF LIVER WITHOUT ASCITES, UNSPECIFIED HEPATIC CIRRHOSIS TYPE (HCC): ICD-10-CM

## 2018-10-23 DIAGNOSIS — D72.819 CHRONIC LEUKOPENIA: Primary | ICD-10-CM

## 2018-10-23 DIAGNOSIS — D73.2 CONGESTIVE SPLENOMEGALY: ICD-10-CM

## 2018-10-23 DIAGNOSIS — D69.6 THROMBOCYTOPENIA (HCC): ICD-10-CM

## 2018-10-23 LAB
BASOPHILS # BLD AUTO: 0.05 10*3/MM3 (ref 0–0.1)
BASOPHILS NFR BLD AUTO: 1 % (ref 0–1.1)
DEPRECATED RDW RBC AUTO: 46.5 FL (ref 37–49)
EOSINOPHIL # BLD AUTO: 0.13 10*3/MM3 (ref 0–0.36)
EOSINOPHIL NFR BLD AUTO: 2.6 % (ref 1–5)
ERYTHROCYTE [DISTWIDTH] IN BLOOD BY AUTOMATED COUNT: 12.9 % (ref 11.7–14.5)
HCT VFR BLD AUTO: 33.1 % (ref 40–49)
HGB BLD-MCNC: 11.1 G/DL (ref 13.5–16.5)
IMM GRANULOCYTES # BLD: 0.04 10*3/MM3 (ref 0–0.03)
IMM GRANULOCYTES NFR BLD: 0.8 % (ref 0–0.5)
LYMPHOCYTES # BLD AUTO: 1.56 10*3/MM3 (ref 1–3.5)
LYMPHOCYTES NFR BLD AUTO: 31.1 % (ref 20–49)
MCH RBC QN AUTO: 33.1 PG (ref 27–33)
MCHC RBC AUTO-ENTMCNC: 33.5 G/DL (ref 32–35)
MCV RBC AUTO: 98.8 FL (ref 83–97)
MONOCYTES # BLD AUTO: 0.6 10*3/MM3 (ref 0.25–0.8)
MONOCYTES NFR BLD AUTO: 12 % (ref 4–12)
NEUTROPHILS # BLD AUTO: 2.64 10*3/MM3 (ref 1.5–7)
NEUTROPHILS NFR BLD AUTO: 52.5 % (ref 39–75)
NRBC BLD MANUAL-RTO: 0 /100 WBC (ref 0–0)
PLATELET # BLD AUTO: 103 10*3/MM3 (ref 150–375)
PMV BLD AUTO: 9.4 FL (ref 8.9–12.1)
RBC # BLD AUTO: 3.35 10*6/MM3 (ref 4.3–5.5)
WBC NRBC COR # BLD: 5.02 10*3/MM3 (ref 4–10)

## 2018-10-23 PROCEDURE — 99214 OFFICE O/P EST MOD 30 MIN: CPT | Performed by: INTERNAL MEDICINE

## 2018-10-23 PROCEDURE — 36415 COLL VENOUS BLD VENIPUNCTURE: CPT | Performed by: INTERNAL MEDICINE

## 2018-10-23 PROCEDURE — 85025 COMPLETE CBC W/AUTO DIFF WBC: CPT | Performed by: INTERNAL MEDICINE

## 2018-10-23 RX ORDER — FEBUXOSTAT 80 MG/1
40 TABLET ORAL DAILY
Refills: 10 | COMMUNITY
Start: 2018-10-16 | End: 2019-11-19

## 2018-10-23 NOTE — PROGRESS NOTES
Subjective  REASONS FOR FOLLOWUP:    1. 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  Since discontinuation of anticoagulants the patient's hemoglobin has remained normal. The patient has associated leukopenia and thrombocytopenia due to chronic splenomegaly and remote history of bone marrow testing that suggested myelodysplasia. Under the present circumstances neither the white count nor platelets are changing and hemoglobin remains stable. There is no abnormality in the differential of his white count. There are no symptoms or signs that would suggest any further progression into myelodysplasia. The patient will remain in observation.            History of Present Illness   This patient returns today to the office accompanied by his wife stating that he has not had any issues in regard to dialysis and actually he has been feeling dramatically better since the initiation of this. He has not encountered any major problems with his fistula. He has had fistulograms on 2 different occasions. His appetite is terrific. His weight is stable. He has no fluid accumulation and he has not developed any bowel or urinary difficulties. He has minimal urinary output. He has not had any passage of blood in the stool and he has not had any clinical bleeding. He remains in atrial fibrillation. Dr. Aiken, his cardiologist who is retiring, has suggested some sort of procedure for his heart to keep him from developing embolic phenomenon systemically. 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 Under  no circumstances will this patient be able to handle anticoagulation. Please review below, conversation with Dr. Aiken.                   Past Medical History, Past Surgical HistorySignificant for hypertension and also he has history of atrial fibrillation and was chronically anticoagulated with Eliquis in the past. Since January 2015 the patient is no longer receiving this medicine and moved to aspirin during his persistency of GI bleeding. The patient also has a history of chronic kidney disease with creatinine baseline around 2.5. The patient also has a history of inflammatory bowel disease with status post colectomy with ileostomy many years ago in the [facility]. The patient also has a history of pericardial effusion with an GABRIELLE 1:80 homogenous that has never changed or modified. He also has a positive lupus anticoagulant. He has had chronic leukopenia and thrombocytopenia for which he underwent by Dr. Null 10 years ago, bone marrow testing at [facility]. We have requested this information. The patient in the past has had a normal B12 level of 1094, normal folate level and ferritin level of 617 with an iron level of 34. In the past he has received IV iron in the form of Venofer while being at [facility] in January 2015. He has had serum protein electrophoresis at least on 3 different occasions, failing to show any monoclonal proteins. He also has had a CT scan of the abdomen that shows a general spleen anatomy without any retroperitoneal adenopathies and nothing to suggest portal hypertension or cirrhosis of the liver. Kidney anatomy disclosed thinning of the kidney cortexes consistent with chronic medical illness. He has no hydronephrosis. No retroperitoneal adenopathies.   SOCIAL HISTORY:  The patient lives with his wife in [place], [place]. He is retired. He does not smoke and does not drink any alCOHOL.      ONCOLOGIC/HEMATOLOGIC HISTORY     On 04/06/2015 the patient’s clinical  status has dramatically improved.  He has not had any new episodes of infection, congestive heart failure, GI bleeding, with excellent improvement in hemoglobin.  The patient has been able to walk longer distances without shortness of breath.   He has been able to climb steps without shortness of breath and he feels dramatically better.  Hemoglobin has been stable for the past few weeks at 12.1.  His white count and platelet count remain on the low side with no infection or clinical bleeding.  We found documentation of his bone marrow biopsy performed in 2002 and read at the [facility].  Explicitly it was documented that the patient could have myelodysplastic syndrome.  Procrit, as I pointed out to the patient, is a useful medicine to treat this condition anyway, and given the fact that in 13 years his white count and his platelet count have not changed, makes this diagnosis dubious.  Reading bone marrows for myelodysplasia is one of the most difficult tasks in Hematology.  I suggested to him at some point, if his blood count change, specifically white count modifies or platelet count modifies, to consider repeating the bone marrow testing, flow cytometry and chromosomes.     On 05/26/2015 he was feeling terrific.  He was not having any GI symptomatology, no melena, no enterorrhagia, no abnormal bleeding.  He was functionally perfectly fine.  Urination was ongoing. Uric acid was elevated and I advised him to go back into his Uloric to control this and minimize formation of kidney stones.    On 07/20/2015 the patient had no issues in relationship with anemia. His white count and platelet count remain low associated with his splenomegaly and no issues of consequences related to this. He was advised to remain in observation. He was advised to remain on his folic acid and B12 supplementation.     On 09/14/2015, hemoglobin was excellent at 12.9, stable weight count at 3400 and stable platelet count at  70,000.  We asked him to remain on observation.     On 11/09/2015 the patient’s hematological parameters remain normal.  He was feeling terrific.  His atrial fibrillation looked like it was very quiet and he had no issues in regard to his aspirin use.  He had no embolic phenomenon.  He had some element of fluid retention associated with his chronic renal disease.  We advised him to monitor his diet properly in regard to sodium ingestion.      Review of Systems     General: no fever, no chills, no fatigue,no weight changes, no lack of appetite.  Eyes: no epiphora, xerophthalmia,conjunctivitis, pain, glaucoma, blurred vision, blindness, secretion, photophobia, proptosis, diplopia.  Ears: no otorrhea, tinnitus, otorrhagia, deafness, pain, vertigo.  Nose: no rhinorrhea, no epistaxis, no alteration in perception of odors, no sinuses pressure.  Mouth: no alteration in gums or teeth,  No ulcers, no difficulty with mastication or deglut ion, no odynophagia.  Neck: no masses or pain, no thyroid alterations, no pain in muscles or arteries, no carotid odynia, no crepitation.  Respiratory: no cough, no sputum production,no dyspnea,no trepopnea, no pleuritic pain,no hemoptysis.  Heart: no syncope, no irregularity, no palpitations, no angina,no orthopnea,no paroxysmal nocturnal dyspnea.  Vascular Venous: no tenderness,no edema,no palpable cords,no postphlebitic syndrome, no skin changes no ulcerations.  Vascular Arterial: no distal ischemia, noclaudication, no gangrene, no neuropathic ischemic pain, no skin ulcers, no paleness no cyanosis.  GI: no dysphagia, no odynophagia, no regurgitation, no heartburn,no indigestion,no nausea,no vomiting,no hematemesis ,no melena,no jaundice,no distention, no obstipation,no enterorrhagia,no proctalgia,no anal  lesions, no changes in bowel habits.  : no frequency, no hesitancy, no hematuria, no discharge,no  pain.  Musculoskeletal: no muscle or tendon pain or inflammation,no  joint pain, no  "edema, no functional limitation,no fasciculations, no mass.  Neurologic: no headache, no seizures, noalterations on Craneal nerves, no motor deficit, no sensory deficit, normal coordination, no alteration in memory,normal orientation, calculation,normal writting, verbal and written language.  Skin: no rashes,no pruritus no localized lesions.  Psychiatric: no anxiety, no depression,no agitation, no delusions, proper insight.    Medications:  The current medication list was reviewed in the EMR    ALLERGIES:    Allergies   Allergen Reactions   • Ace Inhibitors Other (See Comments)     RENAL FAILURE   • Contrast Dye Other (See Comments)     RENAL FAILURE   • Eliquis [Apixaban] Other (See Comments)     BLEEDING ISSUES; PT CANNOT TAKE ANY ANTICOAGULANTS DUE TO LOW PLATELETS EXCEPT ASPIRIN  BLEEDING ISSUES; PT CANNOT TAKE ANY ANTICOAGULANTS DUE TO LOW PLATELETS EXCEPT ASPIRIN   • Granix [Filgrastim]      Chest pain  Chest pain     • Iodinated Diagnostic Agents Other (See Comments)     RENAL FAILURE   • Keflex [Cephalexin] Other (See Comments)     RENAL FAILURE       Objective      Vitals:    10/23/18 1516   BP: 118/64   Pulse: 88   Resp: 14   Temp: 98.5 °F (36.9 °C)   TempSrc: Oral   SpO2: 97%   Weight: 88.1 kg (194 lb 3.2 oz)   Height: 177.8 cm (70\")   PainSc: 0-No pain     Current Status 10/23/2018   ECOG score 0     Physical Exam    GENERAL:  Well-developed, well-nourished  Patient  in no acute distress.   SKIN:  Warm, dry without rashes, purpura or petechiae.  HEENT:  Pupils were equal and reactive to light and accomodation, conjunctivas non injected, no pterigion, normal extraocular movements, normal visual acuity.   Mouth mucosa was moist, no exudates in oropharynx, normal gum line, normal roof of the mouth and pillars, normal papillations of the tongue  NECK:  Supple with good range of motion; no thyromegaly or masses, no JVD or bruits, no cervical adenopathies.No carotid arteries pain, no carotid abnormal pulsation " or arterial dance.  LYMPHATICS:  No cervical, supraclavicular, axillary, epitrochlear or inguinal adenopathy.  CHEST:  Normal excursion of both torrey thoraces, normal voice fremitus, no subcutaneous emphysema, normal axillas, no rashes or acanthosis nigricans. Lungs clear to percussion and auscultation, normal breath sounds bilaterally, no wheezing, crackles or ronchi, no stridor, no rubs.  CARDIAC AND VASCULAR:normal rate and IRRregular rhythm A FIB CVR, without murmurs, rubs or S3 or S4 right or left sided gallops. Normal femoral, popliteal, pedis, brachial and carotid pulses.  ABDOMEN:  Soft, nontender with no organomegaly or masses, no ascites, no collateral circulation,no distention,no [place] sign, no abdominal pain, no inguinal hernias,no umbilical hernias, no abdominal bruits. Normal bowel sounds.  GENITAL: Not  Performed.  EXTREMITIES  AND SPINE:  No clubbing, cyanosis or edema, no deformities or pain .No kyphosis, scoliosis, deformities or pain in spine, ribs or pelvic bone.FISTULA THRILL LUE  NEUROLOGICAL:  Patient was awake, alert, oriented to time, person and place      RECENT LABS:  Hematology WBC   Date Value Ref Range Status   10/23/2018 5.02 4.00 - 10.00 10*3/mm3 Final     RBC   Date Value Ref Range Status   10/23/2018 3.35 (L) 4.30 - 5.50 10*6/mm3 Final     Hemoglobin   Date Value Ref Range Status   10/23/2018 11.1 (L) 13.5 - 16.5 g/dL Final     Hematocrit   Date Value Ref Range Status   10/23/2018 33.1 (L) 40.0 - 49.0 % Final     Platelets   Date Value Ref Range Status   10/23/2018 103 (L) 150 - 375 10*3/mm3 Final                Assessment/Plan  : 1. This patient has had multifactorial anemia basically on the basis of GI bleeding as well as anemia and chronic renal disease. He is undergoing at this time Procrit by nephrology on a weekly basis at the time that he gets his dialysis and he has received IV iron by Dr. Monsivais as well. His ferritin level recently has been in the 700 category.  2. The  patient has had GI bleeding in the colostomy site requiring stitches by Dr. Howard and will require further cauterization by colostomy nurse.   3. 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 These numbers are not going to change and in my opinion it will be okay if necessary to use heparin while on dialysis to minimize any problems in clotting in the vasculature.   4. From my point of view it will be okay for the patient to come back every 3 months for review just seeing how things are going in general. He looks remarkably better today.     The same statement that I made in the past many years that I have been his doctor there is no role for anticoagulation in this patient in the background of chronic atrial fibrillation given the massive GI bleeding that he had before when he was receiving anticoagulants. 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 The patient recognized this and this has been discussed on multiple occasions in the last many years. I placed a phone call to Dr. Aiken and I discussed these facts with him to discourage any other procedures from the point of view of his AFib and discourage any form of anticoagulation on him. Now that he recognizes this risk, he agrees with me.     I spent quite amount of time with the patient and his wife going through the conversation and the logistics of all this.     I reviewed the laboratory parameters provided to the patient and wife that he gets on weekly basis on dialysis and all his numbers including his white count, hemoglobin and platelets remain stable. White count was 4000, hemoglobin 11.7, platelet count 87,000. A recent ferritin was in the 700 category.                          10/23/2018      CC:

## 2018-10-23 NOTE — PROGRESS NOTES
Subjective  REASONS FOR FOLLOWUP:    1. 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  Since discontinuation of anticoagulants the patient's hemoglobin has remained normal. The patient has associated leukopenia and thrombocytopenia due to chronic splenomegaly and remote history of bone marrow testing that suggested BUT NOT CONFIRMED myelodysplasia. Under the present circumstances neither the white count nor platelets are changing and hemoglobin remains stable. There is no abnormality in the differential of his white count. There are no symptoms or signs that would suggest any further progression into myelodysplasia. The patient will remain in observation. ALL THIS IN THE BACKGROUND OF CIRRHOSIS AND SPLENOMEGALY.           History of Present Illness   This patient returns today to the office in company of his wife stating that he has had a great 4 months undergoing dialysis 3 times a week, not having any infections, any clinical bleeding. Excellent control of volume and blood pressure and no issues in regard to his chronic atrial fibrillation. He has not had any embolic phenomenon. He remains on aspirin. As I mentioned to him before, he is not a candidate to take any form of anticoagulation given the vascular malformation of the gastrointestinal tract, portal hypertensive gastropathy and thrombocytopenia associated with cirrhosis. Other than that, the patient feels well and he has gained energy, strength and ability to function dramatically.                   Past Medical History, Past Surgical HistorySignificant for hypertension and also he has history of atrial fibrillation and was chronically anticoagulated with Eliquis in the past. Since January 2015 the patient is no longer receiving this medicine and moved to aspirin during  his persistency of GI bleeding. The patient also has a history of chronic kidney disease with creatinine baseline around 2.5. The patient also has a history of inflammatory bowel disease with status post colectomy with ileostomy many years ago in the [facility]. The patient also has a history of pericardial effusion with an GABRIELLE 1:80 homogenous that has never changed or modified. He also has a positive lupus anticoagulant. He has had chronic leukopenia and thrombocytopenia for which he underwent by Dr. Null 10 years ago, bone marrow testing at [facility]. We have requested this information. The patient in the past has had a normal B12 level of 1094, normal folate level and ferritin level of 617 with an iron level of 34. In the past he has received IV iron in the form of Venofer while being at [facility] in January 2015. He has had serum protein electrophoresis at least on 3 different occasions, failing to show any monoclonal proteins. He also has had a CT scan of the abdomen that shows a general spleen anatomy without any retroperitoneal adenopathies and nothing to suggest portal hypertension or cirrhosis of the liver. Kidney anatomy disclosed thinning of the kidney cortexes consistent with chronic medical illness. He has no hydronephrosis. No retroperitoneal adenopathies.   SOCIAL HISTORY:  The patient lives with his wife in [place], [place]. He is retired. He does not smoke and does not drink any alCOHOL.      ONCOLOGIC/HEMATOLOGIC HISTORY     On 04/06/2015 the patient’s clinical status has dramatically improved.  He has not had any new episodes of infection, congestive heart failure, GI bleeding, with excellent improvement in hemoglobin.  The patient has been able to walk longer distances without shortness of breath.   He has been able to climb steps without shortness of breath and he feels dramatically better.  Hemoglobin has been stable for the past few weeks at 12.1.  His white count  and platelet count remain on the low side with no infection or clinical bleeding.  We found documentation of his bone marrow biopsy performed in 2002 and read at the [facility].  Explicitly it was documented that the patient could have myelodysplastic syndrome.  Procrit, as I pointed out to the patient, is a useful medicine to treat this condition anyway, and given the fact that in 13 years his white count and his platelet count have not changed, makes this diagnosis dubious.  Reading bone marrows for myelodysplasia is one of the most difficult tasks in Hematology.  I suggested to him at some point, if his blood count change, specifically white count modifies or platelet count modifies, to consider repeating the bone marrow testing, flow cytometry and chromosomes.     On 05/26/2015 he was feeling terrific.  He was not having any GI symptomatology, no melena, no enterorrhagia, no abnormal bleeding.  He was functionally perfectly fine.  Urination was ongoing. Uric acid was elevated and I advised him to go back into his Uloric to control this and minimize formation of kidney stones.    On 07/20/2015 the patient had no issues in relationship with anemia. His white count and platelet count remain low associated with his splenomegaly and no issues of consequences related to this. He was advised to remain in observation. He was advised to remain on his folic acid and B12 supplementation.     On 09/14/2015, hemoglobin was excellent at 12.9, stable weight count at 3400 and stable platelet count at 70,000.  We asked him to remain on observation.     On 11/09/2015 the patient’s hematological parameters remain normal.  He was feeling terrific.  His atrial fibrillation looked like it was very quiet and he had no issues in regard to his aspirin use.  He had no embolic phenomenon.  He had some element of fluid retention associated with his chronic renal disease.  We advised him to monitor his diet properly in regard  to sodium ingestion.      Review of Systems   General: no fever, no chills, no fatigue,no weight changes, no lack of appetite.  Eyes: no epiphora, xerophthalmia,conjunctivitis, pain, glaucoma, blurred vision, blindness, secretion, photophobia, proptosis, diplopia.  Ears: no otorrhea, tinnitus, otorrhagia, deafness, pain, vertigo.  Nose: no rhinorrhea, no epistaxis, no alteration in perception of odors, no sinuses pressure.  Mouth: no alteration in gums or teeth,  No ulcers, no difficulty with mastication or deglut ion, no odynophagia.  Neck: no masses or pain, no thyroid alterations, no pain in muscles or arteries, no carotid odynia, no crepitation.  Respiratory: no cough, no sputum production,no dyspnea,no trepopnea, no pleuritic pain,no hemoptysis.  Heart: no syncope, no irregularity, no palpitations, no angina,no orthopnea,no paroxysmal nocturnal dyspnea.  Vascular Venous: no tenderness,no edema,no palpable cords,no postphlebitic syndrome, no skin changes no ulcerations.  Vascular Arterial: no distal ischemia, noclaudication, no gangrene, no neuropathic ischemic pain, no skin ulcers, no paleness no cyanosis.  GI: no dysphagia, no odynophagia, no regurgitation, no heartburn,no indigestion,no nausea,no vomiting,no hematemesis ,no melena,no jaundice,no distention, no obstipation,no enterorrhagia,no proctalgia,no anal  lesions, no changes in bowel habits.  : no frequency, no hesitancy, no hematuria, no discharge,no  pain.  Musculoskeletal: no muscle or tendon pain or inflammation,no  joint pain, no edema, no functional limitation,no fasciculations, no mass.  Neurologic: no headache, no seizures, noalterations on Craneal nerves, no motor deficit, no sensory deficit, normal coordination, no alteration in memory,normal orientation, calculation,normal writting, verbal and written language.  Skin: no rashes,no pruritus no localized lesions.  Psychiatric: no anxiety, no depression,no agitation, no delusions, proper  "insight.    Medications:  The current medication list was reviewed in the EMR    ALLERGIES:    Allergies   Allergen Reactions   • Ace Inhibitors Other (See Comments)     RENAL FAILURE   • Contrast Dye Other (See Comments)     RENAL FAILURE   • Eliquis [Apixaban] Other (See Comments)     BLEEDING ISSUES; PT CANNOT TAKE ANY ANTICOAGULANTS DUE TO LOW PLATELETS EXCEPT ASPIRIN  BLEEDING ISSUES; PT CANNOT TAKE ANY ANTICOAGULANTS DUE TO LOW PLATELETS EXCEPT ASPIRIN   • Granix [Filgrastim]      Chest pain  Chest pain     • Iodinated Diagnostic Agents Other (See Comments)     RENAL FAILURE   • Keflex [Cephalexin] Other (See Comments)     RENAL FAILURE       Objective      Vitals:    10/23/18 1516   BP: 118/64   Pulse: 88   Resp: 14   Temp: 98.5 °F (36.9 °C)   TempSrc: Oral   SpO2: 97%   Weight: 88.1 kg (194 lb 3.2 oz)   Height: 177.8 cm (70\")   PainSc: 0-No pain     Current Status 10/23/2018   ECOG score 0     Physical Exam    GENERAL:  Well-developed, well-nourished  Patient  in no acute distress.   SKIN:  Warm, dry ,NO rashes,NO purpura ,NO petechiae.  HEENT:  Pupils were equal and reactive to light and accomodation, conjunctivas non injected, no pterigion, normal extraocular movements, normal visual acuity.   Mouth mucosa was moist, no exudates in oropharynx, normal gum line, normal roof of the mouth and pillars, normal papillations of the tongue.  NECK:  Supple with good range of motion; no thyromegaly or masses, no JVD or bruits, no cervical adenopathies.No carotid arteries pain, no carotid abnormal pulsation , NO arterial dance.  LYMPHATICS:  No cervical, NO supraclavicular, NO axillary,NO epitrochlear , NO inguinal adenopathy.  CHEST:  Normal excursion of both torrey thoraces, normal voice fremitus, no subcutaneous emphysema, normal axillas, no rashes or acanthosis nigricans. Lungs clear to percussion and auscultation, normal breath sounds bilaterally, no wheezing,NO crackles NO ronchi, NO stridor, NO rubs.  CARDIAC AND " VASCULAR:  normal rate and irregular rhythm a fib cvr, without murmurs,NO rubs NO S3 NO S4 right or left . Normal femoral, popliteal, pedis, brachial and carotid pulses.  ABDOMEN:  Soft, nontender with no organomegaly or masses, no ascites, no collateral circulation,no distention,no Contreras sign, no abdominal pain, no inguinal hernias,no umbilical hernia, no abdominal bruits. Normal bowel sounds.  GENITAL: Not  Performed.  EXTREMITIES  AND SPINE:  No clubbing, cyanosis or edema, no deformities or pain .No kyphosis, scoliosis, deformities or pain in spine, ribs or pelvic bone. Dialysis fistula thrill is excellent  NEUROLOGICAL:  Patient was awake, alert, oriented to time, person and place.          RECENT LABS:  Hematology WBC   Date Value Ref Range Status   10/23/2018 5.02 4.00 - 10.00 10*3/mm3 Final     RBC   Date Value Ref Range Status   10/23/2018 3.35 (L) 4.30 - 5.50 10*6/mm3 Final     Hemoglobin   Date Value Ref Range Status   10/23/2018 11.1 (L) 13.5 - 16.5 g/dL Final     Hematocrit   Date Value Ref Range Status   10/23/2018 33.1 (L) 40.0 - 49.0 % Final     Platelets   Date Value Ref Range Status   10/23/2018 103 (L) 150 - 375 10*3/mm3 Final                Assessment/Plan  : This patient has history of multifactorial anemia associated with chronic renal disease and also chronic gastrointestinal blood loss when he used to have anticoagulation in the background of atrial fibrillation. Since anticoagulation has been discontinued and the GI bleeding has stopped, the patient’s hemoglobin has remained very stable. He continues receiving Procrit through dialysis and he has not had any issues. Today he has an excellent hemoglobin.   2. The patient has had leukopenia and thrombocytopenia on the basis of passive congestion associated with splenomegaly, portal hypertension and cirrhosis of the liver. These numbers remain about the same and I do not expect that they will ever change. He will remain in observation from this  point of view.   3. Previous remote history of bone marrow testing suggesting myelodysplasia. I do not believe that diagnosis. The reason why not is because he has had this bone marrow test done more than 10 years ago. His white count differential never has changed. The hemoglobin has remained stable. The platelet count has remained stable and doubt that somebody will have myelodysplasia for 10 years and not have any major modifications in his hematological parameters. Therefore, to me that diagnosis was a fluke and that bone marrow was done in [facility] when the patient used to be seen by another hematologist.     RECOMMENDATIONS: I discussed all these facts with the patient and his wife today in detail; reviewed his laboratory parameters. I think he is looking and feeling terrific and I advised him to remain in observation, returning to see me back in 4 months.      As I pointed out to the patient and wife today, never this patient will be a candidate to receive anticoagulants of any nature given the fact that he has vascular malformation and portal hypertensive gastropathy in the gastrointestinal tract. Any form of anticoagulant like we tested on him before put him in the hospital on repetitive occasions with dramatic amount of melena and terrible bleeding with profound anemia requiring frequent transfusions. We are not going to go to that crossroads again. The patient is aware of that. I do believe that he is doing fantastic and I will review him back in 4 months as stated. I discussed all these facts with the patient and his wife.                         10/23/2018      CC:

## 2019-03-21 ENCOUNTER — OFFICE VISIT (OUTPATIENT)
Dept: CARDIOLOGY | Facility: CLINIC | Age: 74
End: 2019-03-21

## 2019-03-21 VITALS
WEIGHT: 202.4 LBS | HEART RATE: 80 BPM | SYSTOLIC BLOOD PRESSURE: 122 MMHG | BODY MASS INDEX: 28.98 KG/M2 | DIASTOLIC BLOOD PRESSURE: 60 MMHG | HEIGHT: 70 IN

## 2019-03-21 DIAGNOSIS — Z91.89 AT RISK FOR FLUID VOLUME OVERLOAD: ICD-10-CM

## 2019-03-21 DIAGNOSIS — N18.6 ESRD (END STAGE RENAL DISEASE) (HCC): ICD-10-CM

## 2019-03-21 DIAGNOSIS — I48.21 PERMANENT ATRIAL FIBRILLATION (HCC): Primary | ICD-10-CM

## 2019-03-21 PROCEDURE — 99213 OFFICE O/P EST LOW 20 MIN: CPT | Performed by: INTERNAL MEDICINE

## 2019-03-21 PROCEDURE — 93000 ELECTROCARDIOGRAM COMPLETE: CPT | Performed by: INTERNAL MEDICINE

## 2019-03-21 NOTE — PROGRESS NOTES
Date of Office Visit: 2019  Encounter Provider: Dale Vázquez MD  Place of Service: [facility] CARDIOLOGY  Patient Name: Bill Landry  :1945    Chief Complaint   Patient presents with   • Atrial Fibrillation   :     HPI: Bill Landry is a 73 y.o. male who presents today to follow up.  He established care with me in 2018.     He has numerous chronic medical conditions.  He has Crohns and had a colectomy/ileostomy in .  He has ESRD which is multifactorial (Crohns, huge kidney stones, hypertension).  He is on hemodialysis.  He has chronic leukopenia, anemia, and thrombocytopenia.  He has permanent atrial fibrillation and has a history of a slow GI bleed when anticoagulated.  He has never had a TIA or stroke.  He previously was diagnosed with diastolic CHF and pulmonary hypertension, but it would seem that this was in the setting of volume overload from CKD prior to being started on dialysis.  Since this occurred, he has had no problems with this, and his RVSP has normalized.      An echo in May 2018 showed normal LVSF, 55-60%, mild RVE, normal RVSP, and no significant valvular disease (there was aortic sclerosis).    He feels well.  He denies chest pain, dyspnea, orthopnea, edema, palpitations, or syncope.     Past Medical History:   Diagnosis Date   • Abnormal serum protein electrophoresis    • Anemia     Multifactorial   • Chronic leukopenia and thrombocytopenia    • Cirrhosis of liver without ascites (CMS/HCC) 2017   • Congestive splenomegaly 2017   • Crohn disease (CMS/HCC)     with ileostomy   • Diastolic CHF (CMS/HCC)     although it was likely from volume overload due to ESRD   • Diverticula of colon    • ESRD on hemodialysis (CMS/HCC)    • Fatty liver disease, nonalcoholic    • GERD (gastroesophageal reflux disease)    • GI bleed    • Glaucoma    • H/O colectomy    • H/O Gallstone pancreatitis    • H/O Pericardial effusion    • H/O sinus  bradycardia    • History of hypertension     resolved   • Hx of renal calculi    • Hyperlipidemia    • Ileostomy present (CMS/HCC)    • Iron deficiency    • MGUS (monoclonal gammopathy of unknown significance)    • Permanent atrial fibrillation (CMS/HCC)    • Sleep apnea     CPAP USED   • Thrombocytopenia (CMS/HCC)    • Type 2 diabetes mellitus (CMS/HCC)        Past Surgical History:   Procedure Laterality Date   • APPENDECTOMY     • ARTERIOVENOUS FISTULA/SHUNT SURGERY Left 8/8/2017    Procedure: LEFT BRACHIAL CEPHALIC AV FISTULA FORMATION WITH CEPHALIC VEIN TRANSPOSITION ;  Surgeon: Bill Deal MD;  Location: [facility] OR;  Service:    • CHOLECYSTECTOMY     • COLECTOMY PARTIAL / TOTAL      History of inflammatory bowel disease with status post colectomy with ileostomy many years ago in the [facility]   • COLON RESECTION WITH COLOSTOMY     • COLON SURGERY     • COLONOSCOPY     • CYSTOSCOPY LITHOLAPAXY BLADDER STONE EXTRACTION     • ENDOSCOPY  01/16/2015    gastritis   • ENDOSCOPY  1/17/2018    Procedure: ESOPHAGOGASTRODUODENOSCOPY;  Surgeon: Warner Neville MD;  Location: [facility] ENDOSCOPY;  Service:    • ILEOSCOPY  01/16/2015    normal   • ILEOSCOPY N/A 1/17/2018    Procedure: ILEOSCOPY;  Surgeon: Warner Neville MD;  Location: [facility] ENDOSCOPY;  Service:        Social History     Socioeconomic History   • Marital status:      Spouse name: Gillian   • Number of children: 2   • Years of education: College   • Highest education level: Not on file   Occupational History     Employer: RETIRED   Tobacco Use   • Smoking status: Never Smoker   • Smokeless tobacco: Never Used   Substance and Sexual Activity   • Alcohol use: No     Comment: caffeine use: none   • Drug use: No   • Sexual activity: Defer       Family History   Problem Relation Age of Onset   • Heart failure Mother    • Hypertension Mother    • Heart disease Mother    • Hyperlipidemia Mother    • Hypertension Father    • Malig Hyperthermia  Neg Hx        Review of Systems   Cardiovascular: Negative for chest pain and palpitations.   Respiratory: Positive for snoring. Negative for shortness of breath.    Neurological: Negative for dizziness and light-headedness.   All other systems reviewed and are negative.      Allergies   Allergen Reactions   • Ace Inhibitors Other (See Comments)     RENAL FAILURE   • Contrast Dye Other (See Comments)     RENAL FAILURE   • Eliquis [Apixaban] Other (See Comments)     BLEEDING ISSUES; PT CANNOT TAKE ANY ANTICOAGULANTS DUE TO LOW PLATELETS EXCEPT ASPIRIN  BLEEDING ISSUES; PT CANNOT TAKE ANY ANTICOAGULANTS DUE TO LOW PLATELETS EXCEPT ASPIRIN   • Granix [Filgrastim]      Chest pain  Chest pain     • Iodinated Diagnostic Agents Other (See Comments)     RENAL FAILURE   • Keflex [Cephalexin] Other (See Comments)     RENAL FAILURE         Current Outpatient Medications:   •  aspirin 81 MG tablet, Take 81 mg by mouth Daily. To stop 5 days prior to surgery, Disp: , Rfl:   •  B Complex-C-Folic Acid (RENAL-FREDDY PO), Take  by mouth., Disp: , Rfl:   •  Cholecalciferol (VITAMIN D3) 1000 units capsule, Take  by mouth Daily., Disp: , Rfl:   •  latanoprost (XALATAN) 0.005 % ophthalmic solution, Administer 1 drop to both eyes Every Night. 1 drop each eye , Disp: , Rfl:   •  lidocaine-prilocaine (EMLA) 2.5-2.5 % cream, APPLY A SMALL AMOUNT TO DIALYSIS ACCESS SITE 1 HOUR PRIOR AND COVER WITH AN OCCLUSIVE DRESSING, Disp: , Rfl: 3  •  Loratadine (CLARITIN PO), Take  by mouth As Needed. Pt states he is uncertain of dosage, Disp: , Rfl:   •  ranitidine (ZANTAC) 150 MG tablet, Take 150 mg by mouth Daily., Disp: , Rfl:   •  sodium bicarbonate 650 MG tablet, Take 650 mg by mouth 2 (Two) Times a Day. 2 tabs in the morning and 2 tabs in the evening, Disp: , Rfl:   •  Sucroferric Oxyhydroxide (VELPHORO PO), Take  by mouth., Disp: , Rfl:   •  ULORIC 80 MG tablet tablet, Take  by mouth Daily., Disp: , Rfl: 10  •  febuxostat (ULORIC) 40 MG tablet,  "Take 40 mg by mouth Daily., Disp: , Rfl:   •  Iron Sucrose (VENOFER IV), Infuse  into a venous catheter., Disp: , Rfl:   •  Methoxy PEG-Epoetin Beta (MIRCERA IJ), Inject  as directed., Disp: , Rfl:       Objective:     Vitals:    03/21/19 1342   BP: 122/60   BP Location: Right arm   Pulse: 80   Weight: 91.8 kg (202 lb 6.4 oz)   Height: 177.8 cm (70\")     Body mass index is 29.04 kg/m².    Physical Exam   Constitutional: He is oriented to person, place, and time. He appears well-developed and well-nourished.   HENT:   Head: Normocephalic.   Nose: Nose normal.   Mouth/Throat: Oropharynx is clear and moist.   Eyes: Conjunctivae and EOM are normal. Pupils are equal, round, and reactive to light.   Neck: Normal range of motion. No JVD present.   Cardiovascular: Normal rate and intact distal pulses. An irregularly irregular rhythm present.   Murmur heard.   Systolic murmur is present with a grade of 1/6 at the upper left sternal border.  Pulmonary/Chest: Effort normal and breath sounds normal.   Abdominal: Soft. There is no tenderness.   Ostomy in place   Musculoskeletal: Normal range of motion. He exhibits no edema.   Lymphadenopathy:     He has no cervical adenopathy.   Neurological: He is alert and oriented to person, place, and time. No cranial nerve deficit.   Skin: Skin is warm and dry. No rash noted.   Psychiatric: He has a normal mood and affect. His behavior is normal. Judgment and thought content normal.   Vitals reviewed.        ECG 12 Lead  Date/Time: 3/21/2019 3:09 PM  Performed by: Dale Vázquez MD  Authorized by: Dale Vázquez MD   Comparison: compared with previous ECG   Similar to previous ECG  Rhythm: atrial fibrillation  Conduction: conduction normal  ST Segments: ST segments normal  T Waves: T waves normal  Other: no other findings    Clinical impression: abnormal EKG              Assessment:       Diagnosis Plan   1. Permanent atrial fibrillation (CMS/HCC)     2. At risk for fluid volume overload   "   3. ESRD (end stage renal disease) (CMS/[facility])            Plan:       1.  Atrial Fibrillation and Atrial Flutter  Assessment  • The patient has permanent atrial fibrillation  • This is non-valvular in etiology  • The patient's CHADS2-VASc score is 2  • A [id number] score of 2 or more is considered a high risk for a thromboembolic event    Plan  • Continue in atrial fibrillation with rate control  • He is rate controlled on his own.  He cannot be anticoagulated due to GI AVM/GI bleeding/thrombocytopenia.  A left atrial occluder has been considered, but it is not felt to be safe to have him on DAPT or anticoagulation for even the short while after the procedure.  He knows to call 911 for ANY neurological symptoms.    2/3.  It would seem that he had volume overload from progressive renal dysfunction.  He is euvolemic on dialysis.  His last echo showed normal systolic function and normal RVSP.  His wife asked about repeating it as it was previously performed yearly; as the last one was in May 2018 and was very close to normal, I don't think this is indicated per ACC guidelines.      Sincerely,       Dale Vázquez MD

## 2019-04-16 ENCOUNTER — OFFICE VISIT (OUTPATIENT)
Dept: ONCOLOGY | Facility: CLINIC | Age: 74
End: 2019-04-16

## 2019-04-16 ENCOUNTER — LAB (OUTPATIENT)
Dept: LAB | Facility: HOSPITAL | Age: 74
End: 2019-04-16

## 2019-04-16 VITALS
SYSTOLIC BLOOD PRESSURE: 117 MMHG | DIASTOLIC BLOOD PRESSURE: 65 MMHG | HEIGHT: 70 IN | OXYGEN SATURATION: 95 % | BODY MASS INDEX: 29.02 KG/M2 | TEMPERATURE: 98.4 F | RESPIRATION RATE: 16 BRPM | HEART RATE: 87 BPM | WEIGHT: 202.7 LBS

## 2019-04-16 DIAGNOSIS — D72.819 CHRONIC LEUKOPENIA: ICD-10-CM

## 2019-04-16 DIAGNOSIS — D73.2 CONGESTIVE SPLENOMEGALY: ICD-10-CM

## 2019-04-16 DIAGNOSIS — N18.4 ANEMIA DUE TO STAGE 4 CHRONIC KIDNEY DISEASE TREATED WITH ERYTHROPOIETIN (HCC): ICD-10-CM

## 2019-04-16 DIAGNOSIS — K74.60 CIRRHOSIS OF LIVER WITHOUT ASCITES, UNSPECIFIED HEPATIC CIRRHOSIS TYPE (HCC): ICD-10-CM

## 2019-04-16 DIAGNOSIS — D69.6 THROMBOCYTOPENIA (HCC): ICD-10-CM

## 2019-04-16 DIAGNOSIS — D63.1 ANEMIA DUE TO STAGE 4 CHRONIC KIDNEY DISEASE TREATED WITH ERYTHROPOIETIN (HCC): ICD-10-CM

## 2019-04-16 DIAGNOSIS — D72.819 CHRONIC LEUKOPENIA: Primary | ICD-10-CM

## 2019-04-16 LAB
BASOPHILS # BLD AUTO: 0.05 10*3/MM3 (ref 0–0.2)
BASOPHILS NFR BLD AUTO: 0.9 % (ref 0–1.5)
DEPRECATED RDW RBC AUTO: 46.1 FL (ref 37–54)
EOSINOPHIL # BLD AUTO: 0.08 10*3/MM3 (ref 0–0.4)
EOSINOPHIL NFR BLD AUTO: 1.4 % (ref 0.3–6.2)
ERYTHROCYTE [DISTWIDTH] IN BLOOD BY AUTOMATED COUNT: 13 % (ref 12.3–15.4)
HCT VFR BLD AUTO: 33.1 % (ref 37.5–51)
HGB BLD-MCNC: 11.6 G/DL (ref 13–17.7)
IMM GRANULOCYTES # BLD AUTO: 0.04 10*3/MM3 (ref 0–0.05)
IMM GRANULOCYTES NFR BLD AUTO: 0.7 % (ref 0–0.5)
LYMPHOCYTES # BLD AUTO: 1.54 10*3/MM3 (ref 0.7–3.1)
LYMPHOCYTES NFR BLD AUTO: 27.7 % (ref 19.6–45.3)
MCH RBC QN AUTO: 34 PG (ref 26.6–33)
MCHC RBC AUTO-ENTMCNC: 35 G/DL (ref 31.5–35.7)
MCV RBC AUTO: 97.1 FL (ref 79–97)
MONOCYTES # BLD AUTO: 0.54 10*3/MM3 (ref 0.1–0.9)
MONOCYTES NFR BLD AUTO: 9.7 % (ref 5–12)
NEUTROPHILS # BLD AUTO: 3.31 10*3/MM3 (ref 1.4–7)
NEUTROPHILS NFR BLD AUTO: 59.6 % (ref 42.7–76)
NRBC BLD AUTO-RTO: 0 /100 WBC (ref 0–0)
PLATELET # BLD AUTO: 101 10*3/MM3 (ref 140–450)
PMV BLD AUTO: 9.6 FL (ref 6–12)
RBC # BLD AUTO: 3.41 10*6/MM3 (ref 4.14–5.8)
WBC NRBC COR # BLD: 5.56 10*3/MM3 (ref 3.4–10.8)

## 2019-04-16 PROCEDURE — 85025 COMPLETE CBC W/AUTO DIFF WBC: CPT | Performed by: INTERNAL MEDICINE

## 2019-04-16 PROCEDURE — 36415 COLL VENOUS BLD VENIPUNCTURE: CPT | Performed by: INTERNAL MEDICINE

## 2019-04-16 PROCEDURE — 99213 OFFICE O/P EST LOW 20 MIN: CPT | Performed by: INTERNAL MEDICINE

## 2019-04-16 RX ORDER — FOLIC ACID/VIT B COMPLEX AND C 0.8 MG
1 TABLET ORAL DAILY
Refills: 3 | COMMUNITY
Start: 2019-02-27

## 2019-04-16 NOTE — PROGRESS NOTES
Subjective  REASONS FOR FOLLOWUP:    1. 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  Since discontinuation of anticoagulants the patient's hemoglobin has remained normal. The patient has associated leukopenia and thrombocytopenia due to chronic splenomegaly and remote history of bone marrow testing that suggested BUT NOT CONFIRMED myelodysplasia. Under the present circumstances neither the white count nor platelets are changing and hemoglobin remains stable. There is no abnormality in the differential of his white count. There are no symptoms or signs that would suggest any further progression into myelodysplasia. The patient will remain in observation. ALL THIS IN THE BACKGROUND OF CIRRHOSIS AND SPLENOMEGALY.           History of Present Illness   This patient returns today to the office in company of his wife stating dialysis has made a miracle on him. He has not had any further fluid accumulation. His lifestyle has modified positively. He has energy that he did not have for months and he feels the best that he has felt in the last couple of years. He has not had any complications from dialysis. His fistula is working well and he has not had any infections. He has not had any clinical bleeding. His appetite is good. His colostomy is working well and he has no urinary output. He has no cardiovascular or respiratory issues. Otherwise, no other new problems.                       Past Medical History, Past Surgical HistorySignificant for hypertension and also he has history of atrial fibrillation and was chronically anticoagulated with Eliquis in the past. Since January 2015 the patient is no longer receiving this medicine and moved to aspirin during his persistency of GI bleeding. The patient also has a history of chronic  kidney disease with creatinine baseline around 2.5. The patient also has a history of inflammatory bowel disease with status post colectomy with ileostomy many years ago in the [facility]. The patient also has a history of pericardial effusion with an GABRIELLE 1:80 homogenous that has never changed or modified. He also has a positive lupus anticoagulant. He has had chronic leukopenia and thrombocytopenia for which he underwent by Dr. Null 10 years ago, bone marrow testing at [facility]. We have requested this information. The patient in the past has had a normal B12 level of 1094, normal folate level and ferritin level of 617 with an iron level of 34. In the past he has received IV iron in the form of Venofer while being at [facility] in January 2015. He has had serum protein electrophoresis at least on 3 different occasions, failing to show any monoclonal proteins. He also has had a CT scan of the abdomen that shows a general spleen anatomy without any retroperitoneal adenopathies and nothing to suggest portal hypertension or cirrhosis of the liver. Kidney anatomy disclosed thinning of the kidney cortexes consistent with chronic medical illness. He has no hydronephrosis. No retroperitoneal adenopathies.   SOCIAL HISTORY:  The patient lives with his wife in [place], [place]. He is retired. He does not smoke and does not drink any alCOHOL.      ONCOLOGIC/HEMATOLOGIC HISTORY     On 04/06/2015 the patient’s clinical status has dramatically improved.  He has not had any new episodes of infection, congestive heart failure, GI bleeding, with excellent improvement in hemoglobin.  The patient has been able to walk longer distances without shortness of breath.   He has been able to climb steps without shortness of breath and he feels dramatically better.  Hemoglobin has been stable for the past few weeks at 12.1.  His white count and platelet count remain on the low side with no infection or clinical  bleeding.  We found documentation of his bone marrow biopsy performed in 2002 and read at the [facility].  Explicitly it was documented that the patient could have myelodysplastic syndrome.  Procrit, as I pointed out to the patient, is a useful medicine to treat this condition anyway, and given the fact that in 13 years his white count and his platelet count have not changed, makes this diagnosis dubious.  Reading bone marrows for myelodysplasia is one of the most difficult tasks in Hematology.  I suggested to him at some point, if his blood count change, specifically white count modifies or platelet count modifies, to consider repeating the bone marrow testing, flow cytometry and chromosomes.     On 05/26/2015 he was feeling terrific.  He was not having any GI symptomatology, no melena, no enterorrhagia, no abnormal bleeding.  He was functionally perfectly fine.  Urination was ongoing. Uric acid was elevated and I advised him to go back into his Uloric to control this and minimize formation of kidney stones.    On 07/20/2015 the patient had no issues in relationship with anemia. His white count and platelet count remain low associated with his splenomegaly and no issues of consequences related to this. He was advised to remain in observation. He was advised to remain on his folic acid and B12 supplementation.     On 09/14/2015, hemoglobin was excellent at 12.9, stable weight count at 3400 and stable platelet count at 70,000.  We asked him to remain on observation.     On 11/09/2015 the patient’s hematological parameters remain normal.  He was feeling terrific.  His atrial fibrillation looked like it was very quiet and he had no issues in regard to his aspirin use.  He had no embolic phenomenon.  He had some element of fluid retention associated with his chronic renal disease.  We advised him to monitor his diet properly in regard to sodium ingestion.      Review of Systems     General: no fever, no  chills, no fatigue,no weight changes, no lack of appetite.  Eyes: no epiphora, xerophthalmia,conjunctivitis, pain, glaucoma, blurred vision, blindness, secretion, photophobia, proptosis, diplopia.  Ears: no otorrhea, tinnitus, otorrhagia, deafness, pain, vertigo.  Nose: no rhinorrhea, no epistaxis, no alteration in perception of odors, no sinuses pressure.  Mouth: no alteration in gums or teeth,  No ulcers, no difficulty with mastication or deglut ion, no odynophagia.  Neck: no masses or pain, no thyroid alterations, no pain in muscles or arteries, no carotid odynia, no crepitation.  Respiratory: no cough, no sputum production,no dyspnea,no trepopnea, no pleuritic pain,no hemoptysis.  Heart: no syncope, no irregularity, no palpitations, no angina,no orthopnea,no paroxysmal nocturnal dyspnea.  Vascular Venous: no tenderness,no edema,no palpable cords,no postphlebitic syndrome, no skin changes no ulcerations.  Vascular Arterial: no distal ischemia, noclaudication, no gangrene, no neuropathic ischemic pain, no skin ulcers, no paleness no cyanosis.  GI: no dysphagia, no odynophagia, no regurgitation, no heartburn,no indigestion,no nausea,no vomiting,no hematemesis ,no melena,no jaundice,no distention, no obstipation,no enterorrhagia,no proctalgia,no anal  lesions, no changes in bowel habits.  Musculoskeletal: no muscle or tendon pain or inflammation,no  joint pain, no edema, no functional limitation,no fasciculations, no mass.  Neurologic: no headache, no seizures, noalterations on Craneal nerves, no motor deficit, no sensory deficit, normal coordination, no alteration in memory,normal orientation, calculation,normal writting, verbal and written language.  Skin: no rashes,no pruritus no localized lesions.  Psychiatric: no anxiety, no depression,no agitation, no delusions, proper insight.      Medications:  The current medication list was reviewed in the EMR    ALLERGIES:    Allergies   Allergen Reactions   • Ace  Inhibitors Other (See Comments)     RENAL FAILURE   • Contrast Dye Other (See Comments)     RENAL FAILURE   • Eliquis [Apixaban] Other (See Comments)     BLEEDING ISSUES; PT CANNOT TAKE ANY ANTICOAGULANTS DUE TO LOW PLATELETS EXCEPT ASPIRIN  BLEEDING ISSUES; PT CANNOT TAKE ANY ANTICOAGULANTS DUE TO LOW PLATELETS EXCEPT ASPIRIN   • Granix [Filgrastim]      Chest pain  Chest pain     • Iodinated Diagnostic Agents Other (See Comments)     RENAL FAILURE   • Keflex [Cephalexin] Other (See Comments)     RENAL FAILURE       Objective      There were no vitals filed for this visit.  Current Status 10/23/2018   ECOG score 0     Physical Exam    GENERAL:  Well-developed, well-nourished  Patient  in no acute distress.   SKIN:  Warm, dry ,NO rashes,NO purpura ,NO petechiae.  HEENT:  Pupils were equal and reactive to light and accomodation, conjunctivas non injected, no pterigion, normal extraocular movements, normal visual acuity.   Mouth mucosa was moist, no exudates in oropharynx, normal gum line, normal roof of the mouth and pillars, normal papillations of the tongue.  NECK:  Supple with good range of motion; no thyromegaly or masses, no JVD or bruits, no cervical adenopathies.No carotid arteries pain, no carotid abnormal pulsation , NO arterial dance.  LYMPHATICS:  No cervical, NO supraclavicular, NO axillary,NO epitrochlear , NO inguinal adenopathy.  CHEST:  Normal excursion of both torrey thoraces, normal voice fremitus, no subcutaneous emphysema, normal axillas, no rashes or acanthosis nigricans. Lungs clear to percussion and auscultation, normal breath sounds bilaterally, no wheezing,NO crackles NO ronchi, NO stridor, NO rubs.  CARDIAC AND VASCULAR:  normal rate and regular rhythm, without murmurs,NO rubs NO S3 NO S4 right or left . Normal femoral, popliteal, pedis, brachial and carotid pulses.  ABDOMEN:  Soft, nontender with no organomegaly or masses, no ascites, no collateral circulation,no distention,no Contreras sign, no  abdominal pain, no inguinal hernias,no umbilical hernia, no abdominal bruits. Normal bowel sounds.OSTOMY LLQ  GENITAL: Not  Performed.  EXTREMITIES  AND SPINE:  No clubbing, cyanosis or edema, no deformities or pain .No kyphosis, scoliosis, deformities or pain in spine, ribs or pelvic bone.  NEUROLOGICAL:  Patient was awake, alert, oriented to time, person and place.              RECENT LABS:  Hematology WBC   Date Value Ref Range Status   10/23/2018 5.02 4.00 - 10.00 10*3/mm3 Final     RBC   Date Value Ref Range Status   10/23/2018 3.35 (L) 4.30 - 5.50 10*6/mm3 Final     Hemoglobin   Date Value Ref Range Status   10/23/2018 11.1 (L) 13.5 - 16.5 g/dL Final     Hematocrit   Date Value Ref Range Status   10/23/2018 33.1 (L) 40.0 - 49.0 % Final     Platelets   Date Value Ref Range Status   10/23/2018 103 (L) 150 - 375 10*3/mm3 Final                Assessment/Plan  : This patient has history of multifactorial anemia associated with chronic renal disease and also chronic gastrointestinal blood loss when he used to have anticoagulation in the background of atrial fibrillation. Since anticoagulation has been discontinued and the GI bleeding has stopped, the patient’s hemoglobin has remained very stable. He continues receiving Procrit through dialysis and he has not had any issues. Today he has an excellent hemoglobin.   2. The patient has had leukopenia and thrombocytopenia on the basis of passive congestion associated with splenomegaly, portal hypertension and cirrhosis of the liver. These numbers remain about the same and I do not expect that they will ever change. He will remain in observation from this point of view.   3. Previous remote history of bone marrow testing suggesting myelodysplasia. I do not believe that diagnosis. The reason why not is because he has had this bone marrow test done more than 10 years ago. His white count differential never has changed. The hemoglobin has remained stable. The platelet count  has remained stable and doubt that somebody will have myelodysplasia for 10 years and not have any major modifications in his hematological parameters. Therefore, to me that diagnosis was a fluke and that bone marrow was done in [facility] when the patient used to be seen by another hematologist.     RECOMMENDATIONS: I discussed all these facts with the patient and his wife today in detail; reviewed his laboratory parameters. I think he is looking and feeling terrific and I advised him to remain in observation, returning to see me back in 6 months.      As I pointed out to the patient and wife today, never this patient will be a candidate to receive anticoagulants of any nature given the fact that he has vascular malformation and portal hypertensive gastropathy in the gastrointestinal tract. Any form of anticoagulant like we tested on him before put him in the hospital on repetitive occasions with dramatic amount of melena and terrible bleeding with profound anemia requiring frequent transfusions. We are not going to go to that crossroads again.                          4/16/2019      CC:

## 2019-05-21 ENCOUNTER — OFFICE VISIT (OUTPATIENT)
Dept: FAMILY MEDICINE CLINIC | Facility: CLINIC | Age: 74
End: 2019-05-21

## 2019-05-21 VITALS
SYSTOLIC BLOOD PRESSURE: 104 MMHG | RESPIRATION RATE: 20 BRPM | HEART RATE: 94 BPM | DIASTOLIC BLOOD PRESSURE: 66 MMHG | WEIGHT: 201 LBS | BODY MASS INDEX: 28.77 KG/M2 | OXYGEN SATURATION: 98 % | TEMPERATURE: 98.5 F | HEIGHT: 70 IN

## 2019-05-21 DIAGNOSIS — Z00.00 MEDICARE ANNUAL WELLNESS VISIT, SUBSEQUENT: Primary | ICD-10-CM

## 2019-05-21 DIAGNOSIS — S93.401A SPRAIN OF RIGHT ANKLE, UNSPECIFIED LIGAMENT, INITIAL ENCOUNTER: ICD-10-CM

## 2019-05-21 PROBLEM — H40.89 OTHER SPECIFIED GLAUCOMA: Status: ACTIVE | Noted: 2019-05-21

## 2019-05-21 PROCEDURE — G0439 PPPS, SUBSEQ VISIT: HCPCS | Performed by: FAMILY MEDICINE

## 2019-05-21 PROCEDURE — 99213 OFFICE O/P EST LOW 20 MIN: CPT | Performed by: FAMILY MEDICINE

## 2019-05-21 NOTE — PROGRESS NOTES
QUICK REFERENCE INFORMATION:  The ABCs of the Annual Wellness Visit    Subsequent Medicare Wellness Visit     HEALTH RISK ASSESSMENT    : 1945    Recent Hospitalizations:  No hospitalization(s) within the last year..  ccc  Previously seen by Dr. Dowell    Current Medical Providers:  Patient Care Team:  Mecca Roe MD as PCP - General (Family Medicine)  Sharif Mckenzie MD as PCP - Claims Attributed  Sharif Mckenzie MD as Consulting Physician (Hematology and Oncology)  Marcellus Osborne MD as Referring Physician (Internal Medicine)  Brenton Aiken MD as Consulting Physician (Cardiology)  Ken Moseley MD as Consulting Physician (Pulmonary Disease)  Cooper Virgen MD as Consulting Physician (Urology)  Kvng Monsivais MD as Consulting Physician (Nephrology)        Smoking Status:  Social History     Tobacco Use   Smoking Status Never Smoker   Smokeless Tobacco Never Used       Alcohol Consumption:  Social History     Substance and Sexual Activity   Alcohol Use No    Comment: caffeine use: none       Depression Screen:   PHQ-2/PHQ-9 Depression Screening 2019   Little interest or pleasure in doing things 0   Feeling down, depressed, or hopeless 0   Total Score 0       Health Habits and Functional and Cognitive Screening:  Functional & Cognitive Status 2019   Do you have difficulty preparing food and eating? No   Do you have difficulty bathing yourself, getting dressed or grooming yourself? No   Do you have difficulty using the toilet? No   Do you have difficulty moving around from place to place? No   Do you have trouble with steps or getting out of a bed or a chair? No   In the past year have you fallen or experienced a near fall? No   Current Diet Well Balanced Diet   Dental Exam Not up to date   Eye Exam Up to date   Exercise (times per week) 7 times per week   Current Exercise Activities Include Housecleaning   Do you need help using the phone?  No   Are you deaf or do you have  serious difficulty hearing?  No   Do you need help with transportation? No   Do you need help shopping? No   Do you need help preparing meals?  No   Do you need help with housework?  No   Do you need help with laundry? No   Do you need help taking your medications? No   Do you need help managing money? No   Do you ever drive or ride in a car without wearing a seat belt? No   Have you felt unusual stress, anger or loneliness in the last month? No   Who do you live with? Spouse   If you need help, do you have trouble finding someone available to you? No   Have you been bothered in the last four weeks by sexual problems? No   Do you have difficulty concentrating, remembering or making decisions? No           Does the patient have evidence of cognitive impairment? No    Asiprin use counseling: Taking ASA appropriately as indicated      Recent Lab Results:       Lab Results   Component Value Date    HGBA1C 4.80 01/20/2018     Lab Results   Component Value Date    CHOL 67 01/20/2018    TRIG 42 01/20/2018    HDL 28 (L) 01/20/2018    VLDL 8.4 01/20/2018    LDLHDL 1.09 01/20/2018           Age-appropriate Screening Schedule:  Refer to the list below for future screening recommendations based on patient's age, sex and/or medical conditions. Orders for these recommended tests are listed in the plan section. The patient has been provided with a written plan.    Health Maintenance   Topic Date Due   • TDAP/TD VACCINES (1 - Tdap) 10/10/1964   • ZOSTER VACCINE (1 of 2) 10/10/1995   • DIABETIC FOOT EXAM  04/20/2017   • COLONOSCOPY  04/20/2017   • URINE MICROALBUMIN  08/16/2017   • HEMOGLOBIN A1C  07/20/2018   • LIPID PANEL  01/20/2019   • PNEUMOCOCCAL VACCINE (65+ HIGH RISK) (2 of 2 - PCV13) 02/21/2019   • DIABETIC EYE EXAM  04/26/2019   • INFLUENZA VACCINE  08/01/2019        Subjective   History of Present Illness    Bill Landry is a 73 y.o. male who presents for an Annual Wellness Visit.    The following portions of the  patient's history were reviewed and updated as appropriate: allergies, current medications, past family history, past medical history, past social history, past surgical history and problem list.    Outpatient Medications Prior to Visit   Medication Sig Dispense Refill   • aspirin 81 MG tablet Take 81 mg by mouth Daily. To stop 5 days prior to surgery     • B Complex-C-Folic Acid (EMILIE-FREDDY) tablet Take 1 tablet by mouth Daily.  3   • Cholecalciferol (VITAMIN D3) 1000 units capsule Take  by mouth Daily.     • febuxostat (ULORIC) 40 MG tablet Take 40 mg by mouth Daily.     • latanoprost (XALATAN) 0.005 % ophthalmic solution Administer 1 drop to both eyes Every Night. 1 drop each eye      • lidocaine-prilocaine (EMLA) 2.5-2.5 % cream APPLY A SMALL AMOUNT TO DIALYSIS ACCESS SITE 1 HOUR PRIOR AND COVER WITH AN OCCLUSIVE DRESSING  3   • Loratadine (CLARITIN PO) Take  by mouth As Needed. Pt states he is uncertain of dosage     • ranitidine (ZANTAC) 150 MG tablet Take 150 mg by mouth Daily.     • sodium bicarbonate 650 MG tablet Take 1,300 mg by mouth 2 (Two) Times a Day. 2 tabs in the morning and 2 tabs in the evening     • Sucroferric Oxyhydroxide (VELPHORO PO) Take 2 tablets by mouth 3 (Three) Times a Day.     • ULORIC 80 MG tablet tablet Take 40 mg by mouth Daily.  10   • Iron Sucrose (VENOFER IV) Infuse  into a venous catheter.     • Methoxy PEG-Epoetin Beta (MIRCERA IJ) Inject  as directed.     • B Complex-C-Folic Acid (RENAL-FREDDY PO) Take  by mouth.       No facility-administered medications prior to visit.        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    Review of Systems   Constitutional: Negative for activity change, appetite change and unexpected weight change.   Respiratory: Negative for shortness of breath.    Cardiovascular: Negative for chest pain and leg swelling.   Gastrointestinal: Negative for blood in stool.        Ileostomy output is liquid, mostly clear, sometimes has whole pieces of vegetables. No blood.   Musculoskeletal: Positive for arthralgias.   Psychiatric/Behavioral: Negative for sleep disturbance.       Compared to one year ago, the patient feels his physical health is better.  Compared to one year ago, the patient feels his mental health is better.    Objective     Physical Exam   Constitutional: He is oriented to person, place, and time. He appears well-nourished. No distress.   HENT:   Right Ear: External ear normal.   Left Ear: External ear normal.   Nose: Nose normal.   Mouth/Throat: Oropharynx is clear and moist. No oropharyngeal exudate.   Bilateral ear canals and tympanic membranes appear normal.   Eyes: Conjunctivae and EOM are normal. Right eye exhibits no discharge. Left eye exhibits no discharge. No scleral icterus.   Neck: Neck supple. No thyromegaly present.   Cardiovascular: Normal rate, regular rhythm, normal heart sounds and intact distal pulses. Exam reveals no gallop and no friction rub.   No murmur heard.  Pulmonary/Chest: Effort normal and breath sounds normal. No respiratory distress. He has no wheezes. He has no rales. He exhibits no tenderness.   Abdominal: Soft. Bowel sounds are normal. He exhibits no distension and no mass. There is no tenderness. There is no guarding.   Musculoskeletal: He exhibits edema and tenderness. He exhibits no deformity.  "  Lymphadenopathy:     He has no cervical adenopathy.   Neurological: He is alert and oriented to person, place, and time. He exhibits normal muscle tone. Coordination normal.   Normal gait.   Skin: Skin is warm and dry.   Psychiatric: He has a normal mood and affect. His behavior is normal.   Vitals reviewed.      Vitals:    05/21/19 1452   BP: 104/66   BP Location: Right arm   Patient Position: Sitting   Cuff Size: Adult   Pulse: 94   Resp: 20   Temp: 98.5 °F (36.9 °C)   TempSrc: Oral   SpO2: 98%   Weight: 91.2 kg (201 lb)   Height: 177.8 cm (70\")       Patient's Body mass index is 28.84 kg/m². BMI is above normal parameters. Recommendations include: exercise counseling and nutrition counseling.  [place]    Assessment/Plan   Patient Self-Management and Personalized Health Advice  The patient has been provided with information about: diet, exercise, weight management and designing advance directives and preventive services including:   · Advance directive, Exercise counseling provided, Fall Risk assessment done, Nutrition counseling provided.    Hep B 4/11/18, 5/9/18, 6/13/18  10/10/18  prevnar 13 7/17/18  TdaP 11/16/12    Visit Diagnoses:    ICD-10-CM ICD-9-CM   1. Medicare annual wellness visit, subsequent Z00.00 V70.0   2. Sprain of right ankle, unspecified ligament, initial encounter S93.401A 845.00       Orders Placed This Encounter   Procedures   • Orthotics Lower Limb Ankle-Foot     Order Specific Question:   Orthotics lower limb ankle-foot type(s)     Answer:    Multilagamentous Ankle Support     Order Specific Question:   Length of Need (99 Months = Lifetime)     Answer:   99 Months = Lifetime       Outpatient Encounter Medications as of 5/21/2019   Medication Sig Dispense Refill   • aspirin 81 MG tablet Take 81 mg by mouth Daily. To stop 5 days prior to surgery     • B Complex-C-Folic Acid (EMILIE-FREDDY) tablet Take 1 tablet by mouth Daily.  3   • Cholecalciferol (VITAMIN D3) 1000 units capsule Take  by " mouth Daily.     • febuxostat (ULORIC) 40 MG tablet Take 40 mg by mouth Daily.     • latanoprost (XALATAN) 0.005 % ophthalmic solution Administer 1 drop to both eyes Every Night. 1 drop each eye      • lidocaine-prilocaine (EMLA) 2.5-2.5 % cream APPLY A SMALL AMOUNT TO DIALYSIS ACCESS SITE 1 HOUR PRIOR AND COVER WITH AN OCCLUSIVE DRESSING  3   • Loratadine (CLARITIN PO) Take  by mouth As Needed. Pt states he is uncertain of dosage     • ranitidine (ZANTAC) 150 MG tablet Take 150 mg by mouth Daily.     • sodium bicarbonate 650 MG tablet Take 1,300 mg by mouth 2 (Two) Times a Day. 2 tabs in the morning and 2 tabs in the evening     • Sucroferric Oxyhydroxide (VELPHORO PO) Take 2 tablets by mouth 3 (Three) Times a Day.     • ULORIC 80 MG tablet tablet Take 40 mg by mouth Daily.  10   • Iron Sucrose (VENOFER IV) Infuse  into a venous catheter.     • Methoxy PEG-Epoetin Beta (MIRCERA IJ) Inject  as directed.     • [DISCONTINUED] B Complex-C-Folic Acid (RENAL-FREDDY PO) Take  by mouth.       No facility-administered encounter medications on file as of 5/21/2019.        Reviewed use of high risk medication in the elderly: yes  Reviewed for potential of harmful drug interactions in the elderly: yes  Avoid all NSAIDs and anticoagulants per hem onc and cardiology related to severe anemia 2/2 GI bleeding. He has chronic a fib that is managed with low dose asa and rate controlled without medication.  Follow Up:  No Follow-up on file.     An After Visit Summary and PPPS with all of these plans were given to the patient.         PCP care he will need ostomy supplies. Through [facility]. They have the number so we can continue with getting his supplies. He is going to call with list of supplies and will notify that we are seeing now and can write the supplies. He does have ostomy. Was followed by Dr. Neville for his scopes but not by routine. He is not on any medication for Crohn's since the ileostomy.    Right ankle pain for a bout 2  weeks. No major injury but thought he rolled the ankle. He has had intermittent swelling but no bruising, when swollen gets pink but both improve with elevation.  He does not have to walk differently, no problem with bearing weight but gets the pain and swelling over the medial malleolus when on his feet for long periods of time. He has on compression stockings to his knees. There is mild swelling and tenderness over the right medial malleolus vs left foot. We will get ankle bracing like lace up for support ordered from [facility]. I don't think he needs imaging as he has been weight bearing without known injury for two weeks now.     Reviewed records from Dr. Vázquez with cardiology and Dr. Mckenzie with hem-onc  He sees Dr. Monsivais with nephrology. Pt does make urine but very little. He is restricted to 32 oz of fluids daily. I have his most recently labs from 5/10/19 and will have them scanned in, will have to request notes. And he sees Dr. Deal with vascular, did his AV fistula.

## 2019-06-14 ENCOUNTER — TELEPHONE (OUTPATIENT)
Dept: ONCOLOGY | Facility: HOSPITAL | Age: 74
End: 2019-06-14

## 2019-06-14 NOTE — TELEPHONE ENCOUNTER
Wife called concerned bc patients platelets this month were in the 160s and usually are right around 100 and  has told them lower is better for him since he cannot be on a blood thinner. And also pt is in a walking boot from foot fx so limited mobility currently. I told them they should request another cbc earlier than a month if they are concerned but also sent a msg to  for his opinion.     ----- Message from Brittani Ramirez sent at 6/14/2019  2:16 PM EDT -----  [phone]   having several issues and  Blood is in normal range

## 2019-09-30 ENCOUNTER — TELEPHONE (OUTPATIENT)
Dept: GASTROENTEROLOGY | Facility: CLINIC | Age: 74
End: 2019-09-30

## 2019-09-30 NOTE — TELEPHONE ENCOUNTER
Returned patient's phone call. He states he saw on TV he needs to stop taking Ranitidine because it could cause cancer and wants to know what medication he can take. Advised since he has not been seen since 1/2018 he will need to schedule an office visit with the NP to discuss his options. Patient states he will call back to schedule.

## 2019-09-30 NOTE — TELEPHONE ENCOUNTER
----- Message from Yoly Seaman sent at 9/30/2019  2:24 PM EDT -----  Regarding: meds  Contact: [phone]  Pt is calling because he seen the zantac causes cancer and wants to know what else he can take.

## 2019-10-15 ENCOUNTER — APPOINTMENT (OUTPATIENT)
Dept: ONCOLOGY | Facility: CLINIC | Age: 74
End: 2019-10-15

## 2019-10-15 ENCOUNTER — APPOINTMENT (OUTPATIENT)
Dept: LAB | Facility: HOSPITAL | Age: 74
End: 2019-10-15

## 2019-10-31 ENCOUNTER — OFFICE VISIT (OUTPATIENT)
Dept: ONCOLOGY | Facility: CLINIC | Age: 74
End: 2019-10-31

## 2019-10-31 ENCOUNTER — LAB (OUTPATIENT)
Dept: LAB | Facility: HOSPITAL | Age: 74
End: 2019-10-31

## 2019-10-31 ENCOUNTER — APPOINTMENT (OUTPATIENT)
Dept: LAB | Facility: HOSPITAL | Age: 74
End: 2019-10-31

## 2019-10-31 VITALS
BODY MASS INDEX: 28.93 KG/M2 | OXYGEN SATURATION: 96 % | SYSTOLIC BLOOD PRESSURE: 135 MMHG | TEMPERATURE: 98.7 F | WEIGHT: 202.1 LBS | HEART RATE: 88 BPM | DIASTOLIC BLOOD PRESSURE: 72 MMHG | RESPIRATION RATE: 16 BRPM | HEIGHT: 70 IN

## 2019-10-31 DIAGNOSIS — D69.6 THROMBOCYTOPENIA (HCC): ICD-10-CM

## 2019-10-31 DIAGNOSIS — D63.1 ANEMIA DUE TO STAGE 4 CHRONIC KIDNEY DISEASE TREATED WITH ERYTHROPOIETIN (HCC): ICD-10-CM

## 2019-10-31 DIAGNOSIS — K74.60 CIRRHOSIS OF LIVER WITHOUT ASCITES, UNSPECIFIED HEPATIC CIRRHOSIS TYPE (HCC): ICD-10-CM

## 2019-10-31 DIAGNOSIS — N18.4 ANEMIA DUE TO STAGE 4 CHRONIC KIDNEY DISEASE TREATED WITH ERYTHROPOIETIN (HCC): ICD-10-CM

## 2019-10-31 DIAGNOSIS — D69.6 THROMBOCYTOPENIA (HCC): Primary | ICD-10-CM

## 2019-10-31 DIAGNOSIS — D72.819 CHRONIC LEUKOPENIA: ICD-10-CM

## 2019-10-31 DIAGNOSIS — D73.2 CONGESTIVE SPLENOMEGALY: ICD-10-CM

## 2019-10-31 LAB
BASOPHILS # BLD AUTO: 0.03 10*3/MM3 (ref 0–0.2)
BASOPHILS NFR BLD AUTO: 0.7 % (ref 0–1.5)
DEPRECATED RDW RBC AUTO: 46.4 FL (ref 37–54)
EOSINOPHIL # BLD AUTO: 0.07 10*3/MM3 (ref 0–0.4)
EOSINOPHIL NFR BLD AUTO: 1.5 % (ref 0.3–6.2)
ERYTHROCYTE [DISTWIDTH] IN BLOOD BY AUTOMATED COUNT: 12.9 % (ref 12.3–15.4)
HCT VFR BLD AUTO: 36.3 % (ref 37.5–51)
HGB BLD-MCNC: 12.2 G/DL (ref 13–17.7)
IMM GRANULOCYTES # BLD AUTO: 0.03 10*3/MM3 (ref 0–0.05)
IMM GRANULOCYTES NFR BLD AUTO: 0.7 % (ref 0–0.5)
LYMPHOCYTES # BLD AUTO: 1.14 10*3/MM3 (ref 0.7–3.1)
LYMPHOCYTES NFR BLD AUTO: 24.8 % (ref 19.6–45.3)
MCH RBC QN AUTO: 33.2 PG (ref 26.6–33)
MCHC RBC AUTO-ENTMCNC: 33.6 G/DL (ref 31.5–35.7)
MCV RBC AUTO: 98.9 FL (ref 79–97)
MONOCYTES # BLD AUTO: 0.52 10*3/MM3 (ref 0.1–0.9)
MONOCYTES NFR BLD AUTO: 11.3 % (ref 5–12)
NEUTROPHILS # BLD AUTO: 2.8 10*3/MM3 (ref 1.7–7)
NEUTROPHILS NFR BLD AUTO: 61 % (ref 42.7–76)
NRBC BLD AUTO-RTO: 0 /100 WBC (ref 0–0.2)
PLATELET # BLD AUTO: 102 10*3/MM3 (ref 140–450)
PMV BLD AUTO: 8.9 FL (ref 6–12)
RBC # BLD AUTO: 3.67 10*6/MM3 (ref 4.14–5.8)
WBC NRBC COR # BLD: 4.59 10*3/MM3 (ref 3.4–10.8)

## 2019-10-31 PROCEDURE — 36416 COLLJ CAPILLARY BLOOD SPEC: CPT

## 2019-10-31 PROCEDURE — 85025 COMPLETE CBC W/AUTO DIFF WBC: CPT

## 2019-10-31 PROCEDURE — G0463 HOSPITAL OUTPT CLINIC VISIT: HCPCS | Performed by: INTERNAL MEDICINE

## 2019-10-31 PROCEDURE — 99213 OFFICE O/P EST LOW 20 MIN: CPT | Performed by: INTERNAL MEDICINE

## 2019-10-31 NOTE — PROGRESS NOTES
Subjective  REASONS FOR FOLLOWUP:    1. 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  Since discontinuation of anticoagulants the patient's hemoglobin has remained normal. The patient has associated leukopenia and thrombocytopenia due to chronic splenomegaly and remote history of bone marrow testing that suggested BUT NOT CONFIRMED myelodysplasia. Under the present circumstances neither the white count nor platelets are changing and hemoglobin remains stable. There is no abnormality in the differential of his white count. There are no symptoms or signs that would suggest any further progression into myelodysplasia. The patient will remain in observation. ALL THIS IN THE BACKGROUND OF CIRRHOSIS AND SPLENOMEGALY.           History of Present Illness                        Past Medical History, Past Surgical HistorySignificant for hypertension and also he has history of atrial fibrillation and was chronically anticoagulated with Eliquis in the past. Since January 2015 the patient is no longer receiving this medicine and moved to aspirin during his persistency of GI bleeding. The patient also has a history of chronic kidney disease with creatinine baseline around 2.5. The patient also has a history of inflammatory bowel disease with status post colectomy with ileostomy many years ago in the [facility]. The patient also has a history of pericardial effusion with an GABRIELLE 1:80 homogenous that has never changed or modified. He also has a positive lupus anticoagulant. He has had chronic leukopenia and thrombocytopenia for which he underwent by Dr. Null 10 years ago, bone marrow testing at [facility]. We have requested this information. The patient in the past has had a normal B12 level of 1094, normal folate level and  ferritin level of 617 with an iron level of 34. In the past he has received IV iron in the form of Venofer while being at [facility] in January 2015. He has had serum protein electrophoresis at least on 3 different occasions, failing to show any monoclonal proteins. He also has had a CT scan of the abdomen that shows a general spleen anatomy without any retroperitoneal adenopathies and nothing to suggest portal hypertension or cirrhosis of the liver. Kidney anatomy disclosed thinning of the kidney cortexes consistent with chronic medical illness. He has no hydronephrosis. No retroperitoneal adenopathies.   SOCIAL HISTORY:  The patient lives with his wife in [place], [place]. He is retired. He does not smoke and does not drink any alCOHOL.      ONCOLOGIC/HEMATOLOGIC HISTORY     On 04/06/2015 the patient’s clinical status has dramatically improved.  He has not had any new episodes of infection, congestive heart failure, GI bleeding, with excellent improvement in hemoglobin.  The patient has been able to walk longer distances without shortness of breath.   He has been able to climb steps without shortness of breath and he feels dramatically better.  Hemoglobin has been stable for the past few weeks at 12.1.  His white count and platelet count remain on the low side with no infection or clinical bleeding.  We found documentation of his bone marrow biopsy performed in 2002 and read at the [facility].  Explicitly it was documented that the patient could have myelodysplastic syndrome.  Procrit, as I pointed out to the patient, is a useful medicine to treat this condition anyway, and given the fact that in 13 years his white count and his platelet count have not changed, makes this diagnosis dubious.  Reading bone marrows for myelodysplasia is one of the most difficult tasks in Hematology.  I suggested to him at some point, if his blood count change, specifically white count modifies or platelet  count modifies, to consider repeating the bone marrow testing, flow cytometry and chromosomes.     On 05/26/2015 he was feeling terrific.  He was not having any GI symptomatology, no melena, no enterorrhagia, no abnormal bleeding.  He was functionally perfectly fine.  Urination was ongoing. Uric acid was elevated and I advised him to go back into his Uloric to control this and minimize formation of kidney stones.    On 07/20/2015 the patient had no issues in relationship with anemia. His white count and platelet count remain low associated with his splenomegaly and no issues of consequences related to this. He was advised to remain in observation. He was advised to remain on his folic acid and B12 supplementation.     On 09/14/2015, hemoglobin was excellent at 12.9, stable weight count at 3400 and stable platelet count at 70,000.  We asked him to remain on observation.     On 11/09/2015 the patient’s hematological parameters remain normal.  He was feeling terrific.  His atrial fibrillation looked like it was very quiet and he had no issues in regard to his aspirin use.  He had no embolic phenomenon.  He had some element of fluid retention associated with his chronic renal disease.  We advised him to monitor his diet properly in regard to sodium ingestion.      Review of Systems       General: no fever, no chills, no fatigue,no weight changes, no lack of appetite.  Eyes: no epiphora, xerophthalmia,conjunctivitis, pain, glaucoma, blurred vision, blindness, secretion, photophobia, proptosis, diplopia.  Ears: no otorrhea, tinnitus, otorrhagia, deafness, pain, vertigo.  Nose: no rhinorrhea, no epistaxis, no alteration in perception of odors, no sinuses pressure.  Mouth: no alteration in gums or teeth,  No ulcers, no difficulty with mastication or deglut ion, no odynophagia.  Neck: no masses or pain, no thyroid alterations, no pain in muscles or arteries, no carotid odynia, no crepitation.  Respiratory: no cough, no  sputum production,no dyspnea,no trepopnea, no pleuritic pain,no hemoptysis.  Heart: no syncope, no irregularity, no palpitations, no angina,no orthopnea,no paroxysmal nocturnal dyspnea.  Vascular Venous: no tenderness,no edema,no palpable cords,no postphlebitic syndrome, no skin changes no ulcerations.  Vascular Arterial: no distal ischemia, noclaudication, no gangrene, no neuropathic ischemic pain, no skin ulcers, no paleness no cyanosis.  GI: no dysphagia, no odynophagia, no regurgitation, no heartburn,no indigestion,no nausea,no vomiting,no hematemesis ,no melena,no jaundice,no distention, no obstipation,no enterorrhagia,no proctalgia,no anal  lesions, no changes in bowel habits.  : no frequency, no hesitancy, no hematuria, no discharge,no  pain.  Musculoskeletal: no muscle or tendon pain or inflammation,no  joint pain, no edema, no functional limitation,no fasciculations, no mass.  Neurologic: no headache, no seizures, noalterations on Craneal nerves, no motor deficit, no sensory deficit, normal coordination, no alteration in memory,normal orientation, calculation,normal writting, verbal and written language.  Skin: no rashes,no pruritus no localized lesions.  Psychiatric: no anxiety, no depression,no agitation, no delusions, proper insight.        Medications:  The current medication list was reviewed in the EMR    ALLERGIES:    Allergies   Allergen Reactions   • Ace Inhibitors Other (See Comments)     RENAL FAILURE   • Contrast Dye Other (See Comments)     RENAL FAILURE   • Eliquis [Apixaban] Other (See Comments)     BLEEDING ISSUES; PT CANNOT TAKE ANY ANTICOAGULANTS DUE TO LOW PLATELETS EXCEPT ASPIRIN  BLEEDING ISSUES; PT CANNOT TAKE ANY ANTICOAGULANTS DUE TO LOW PLATELETS EXCEPT ASPIRIN   • Granix [Filgrastim]      Chest pain  Chest pain     • Iodinated Diagnostic Agents Other (See Comments)     RENAL FAILURE   • Keflex [Cephalexin] Other (See Comments)     RENAL FAILURE       Objective      There were no  vitals filed for this visit.  Current Status 4/16/2019   ECOG score 0     Physical Exam                RECENT LABS:  Hematology WBC   Date Value Ref Range Status   10/31/2019 4.59 3.40 - 10.80 10*3/mm3 Final     RBC   Date Value Ref Range Status   10/31/2019 3.67 (L) 4.14 - 5.80 10*6/mm3 Final     Hemoglobin   Date Value Ref Range Status   10/31/2019 12.2 (L) 13.0 - 17.7 g/dL Final     Hematocrit   Date Value Ref Range Status   10/31/2019 36.3 (L) 37.5 - 51.0 % Final     Platelets   Date Value Ref Range Status   10/31/2019 102 (L) 140 - 450 10*3/mm3 Final                Assessment/Plan  : This patient has history of multifactorial anemia associated with chronic renal disease and also chronic gastrointestinal blood loss when he used to have anticoagulation in the background of atrial fibrillation. Since anticoagulation has been discontinued and the GI bleeding has stopped, the patient’s hemoglobin has remained very stable. He continues receiving Procrit through dialysis and he has not had any issues. Today he has an excellent hemoglobin.   2. The patient has had leukopenia and thrombocytopenia on the basis of passive congestion associated with splenomegaly, portal hypertension and cirrhosis of the liver. These numbers remain about the same and I do not expect that they will ever change. He will remain in observation from this point of view.   3. Previous remote history of bone marrow testing suggesting myelodysplasia. I do not believe that diagnosis. The reason why not is because he has had this bone marrow test done more than 10 years ago. His white count differential never has changed. The hemoglobin has remained stable. The platelet count has remained stable and doubt that somebody will have myelodysplasia for 10 years and not have any major modifications in his hematological parameters. Therefore, to me that diagnosis was a fluke and that bone marrow was done in [facility] when the patient used to be  seen by another hematologist.     RECOMMENDATIONS: I discussed all these facts with the patient and his wife today in detail; reviewed his laboratory parameters. I think he is looking and feeling terrific and I advised him to remain in observation, returning to see me back in 6 months.      As I pointed out to the patient and wife today, never this patient will be a candidate to receive anticoagulants of any nature given the fact that he has vascular malformation and portal hypertensive gastropathy in the gastrointestinal tract. Any form of anticoagulant like we tested on him before put him in the hospital on repetitive occasions with dramatic amount of melena and terrible bleeding with profound anemia requiring frequent transfusions. We are not going to go to that crossroads again.                          10/31/2019      CC:

## 2019-10-31 NOTE — PROGRESS NOTES
Subjective  REASONS FOR FOLLOWUP:    1. 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  Since discontinuation of anticoagulants the patient's hemoglobin has remained normal. The patient has associated leukopenia and thrombocytopenia due to chronic splenomegaly and remote history of bone marrow testing that suggested BUT NOT CONFIRMED myelodysplasia. Under the present circumstances neither the white count nor platelets are changing and hemoglobin remains stable. There is no abnormality in the differential of his white count. There are no symptoms or signs that would suggest any further progression into myelodysplasia. The patient will remain in observation. ALL THIS IN THE BACKGROUND OF CIRRHOSIS AND SPLENOMEGALY.           History of Present Illness     This patient returns today to the office for followup in company of his wife stating that he has had a very good 6 months. His appetite has been good, his weight is stable, bowel function and urination remain normal. Obvious no urinary output minimal because of his dialysis. He had a stress fracture in his tibia resolved with a cast. He has not had any other health issues since the previous visit. His hemoglobin, platelet count and white count remain stable, no infections, no bleeding and no need for transfusions.                          Past Medical History, Past Surgical HistorySignificant for hypertension and also he has history of atrial fibrillation and was chronically anticoagulated with Eliquis in the past. Since January 2015 the patient is no longer receiving this medicine and moved to aspirin during his persistency of GI bleeding. The patient also has a history of chronic kidney disease with creatinine baseline around 2.5. The patient also has a history of inflammatory  bowel disease with status post colectomy with ileostomy many years ago in the [facility]. The patient also has a history of pericardial effusion with an GABRIELLE 1:80 homogenous that has never changed or modified. He also has a positive lupus anticoagulant. He has had chronic leukopenia and thrombocytopenia for which he underwent by Dr. Null 10 years ago, bone marrow testing at [facility]. We have requested this information. The patient in the past has had a normal B12 level of 1094, normal folate level and ferritin level of 617 with an iron level of 34. In the past he has received IV iron in the form of Venofer while being at [facility] in January 2015. He has had serum protein electrophoresis at least on 3 different occasions, failing to show any monoclonal proteins. He also has had a CT scan of the abdomen that shows a ENLARGED spleen anatomy without any retroperitoneal adenopathies and nothing to suggest portal hypertension  Kidney anatomy disclosed thinning of the kidney cortexes consistent with chronic medical illness. He has no hydronephrosis. No retroperitoneal adenopathies.   SOCIAL HISTORY:  The patient lives with his wife in [place], [place]. He is retired. He does not smoke and does not drink any alCOHOL.      ONCOLOGIC/HEMATOLOGIC HISTORY     On 04/06/2015 the patient’s clinical status has dramatically improved.  He has not had any new episodes of infection, congestive heart failure, GI bleeding, with excellent improvement in hemoglobin.  The patient has been able to walk longer distances without shortness of breath.   He has been able to climb steps without shortness of breath and he feels dramatically better.  Hemoglobin has been stable for the past few weeks at 12.1.  His white count and platelet count remain on the low side with no infection or clinical bleeding.  We found documentation of his bone marrow biopsy performed in 2002 and read at the [facility].   Explicitly it was documented that the patient could have myelodysplastic syndrome.  Procrit, as I pointed out to the patient, is a useful medicine to treat this condition anyway, and given the fact that in 13 years his white count and his platelet count have not changed, makes this diagnosis dubious.  Reading bone marrows for myelodysplasia is one of the most difficult tasks in Hematology.  I suggested to him at some point, if his blood count change, specifically white count modifies or platelet count modifies, to consider repeating the bone marrow testing, flow cytometry and chromosomes.     On 05/26/2015 he was feeling terrific.  He was not having any GI symptomatology, no melena, no enterorrhagia, no abnormal bleeding.  He was functionally perfectly fine.  Urination was ongoing. Uric acid was elevated and I advised him to go back into his Uloric to control this and minimize formation of kidney stones.    On 07/20/2015 the patient had no issues in relationship with anemia. His white count and platelet count remain low associated with his splenomegaly and no issues of consequences related to this. He was advised to remain in observation. He was advised to remain on his folic acid and B12 supplementation.     On 09/14/2015, hemoglobin was excellent at 12.9, stable weight count at 3400 and stable platelet count at 70,000.  We asked him to remain on observation.     On 11/09/2015 the patient’s hematological parameters remain normal.  He was feeling terrific.  His atrial fibrillation looked like it was very quiet and he had no issues in regard to his aspirin use.  He had no embolic phenomenon.  He had some element of fluid retention associated with his chronic renal disease.  We advised him to monitor his diet properly in regard to sodium ingestion.      Review of Systems         General: no fever, no chills, no fatigue,no weight changes, no lack of appetite.  Eyes: no epiphora, xerophthalmia,conjunctivitis, pain,  glaucoma, blurred vision, blindness, secretion, photophobia, proptosis, diplopia.  Ears: no otorrhea, tinnitus, otorrhagia, deafness, pain, vertigo.  Nose: no rhinorrhea, no epistaxis, no alteration in perception of odors, no sinuses pressure.  Mouth: no alteration in gums or teeth,  No ulcers, no difficulty with mastication or deglut ion, no odynophagia.  Neck: no masses or pain, no thyroid alterations, no pain in muscles or arteries, no carotid odynia, no crepitation.  Respiratory: no cough, no sputum production,no dyspnea,no trepopnea, no pleuritic pain,no hemoptysis.  Heart: no syncope, no irregularity, no palpitations, no angina,no orthopnea,no paroxysmal nocturnal dyspnea.  Vascular Venous: no tenderness,no edema,no palpable cords,no postphlebitic syndrome, no skin changes no ulcerations.  Vascular Arterial: no distal ischemia, noclaudication, no gangrene, no neuropathic ischemic pain, no skin ulcers, no paleness no cyanosis.  GI: no dysphagia, no odynophagia, no regurgitation, no heartburn,no indigestion,no nausea,no vomiting,no hematemesis ,no melena,no jaundice,no distention, no obstipation,no enterorrhagia,no proctalgia,no anal  lesions, no changes in bowel habits.  : no frequency, no hesitancy, no hematuria, no discharge,no  pain.  Musculoskeletal: no muscle or tendon pain or inflammation,no  joint pain, no edema, no functional limitation,no fasciculations, no mass.  Neurologic: no headache, no seizures, noalterations on Craneal nerves, no motor deficit, no sensory deficit, normal coordination, no alteration in memory,normal orientation, calculation,normal writting, verbal and written language.  Skin: no rashes,no pruritus no localized lesions.  Psychiatric: no anxiety, no depression,no agitation, no delusions, proper insight.      Medications:  The current medication list was reviewed in the EMR    ALLERGIES:    Allergies   Allergen Reactions   • Ace Inhibitors Other (See Comments)     RENAL FAILURE  "  • Contrast Dye Other (See Comments)     RENAL FAILURE   • Eliquis [Apixaban] Other (See Comments)     BLEEDING ISSUES; PT CANNOT TAKE ANY ANTICOAGULANTS DUE TO LOW PLATELETS EXCEPT ASPIRIN  BLEEDING ISSUES; PT CANNOT TAKE ANY ANTICOAGULANTS DUE TO LOW PLATELETS EXCEPT ASPIRIN   • Granix [Filgrastim]      Chest pain  Chest pain     • Iodinated Diagnostic Agents Other (See Comments)     RENAL FAILURE   • Keflex [Cephalexin] Other (See Comments)     RENAL FAILURE       Objective      Vitals:    10/31/19 0806   BP: 135/72   Pulse: 88   Resp: 16   Temp: 98.7 °F (37.1 °C)   TempSrc: Oral   SpO2: 96%   Weight: 91.7 kg (202 lb 1.6 oz)   Height: 178 cm (70.08\")  Comment: new height   PainSc: 0-No pain     Current Status 10/31/2019   ECOG score 0     Physical Exam        GENERAL:  Well-developed, well-nourished  Patient  in no acute distress.   SKIN:  Warm, dry ,NO rashes,NO purpura ,NO petechiae.  HEENT:  Pupils were equal and reactive to light and accomodation, conjunctivas non injected, no pterigion, normal extraocular movements, normal visual acuity.   Mouth mucosa was moist, no exudates in oropharynx, normal gum line, normal roof of the mouth and pillars, normal papillations of the tongue.  NECK:  Supple with good range of motion; no thyromegaly or masses, no JVD or bruits, no cervical adenopathies.No carotid arteries pain, no carotid abnormal pulsation , NO arterial dance.  LYMPHATICS:  No cervical, NO supraclavicular, NO axillary,NO epitrochlear , NO inguinal adenopathy.  CHEST:  Normal excursion of both torrey thoraces, normal voice fremitus, no subcutaneous emphysema, normal axillas, no rashes or acanthosis nigricans. Lungs clear to percussion and auscultation, normal breath sounds bilaterally, no wheezing,NO crackles NO ronchi, NO stridor, NO rubs.  CARDIAC AND VASCULAR:  normal rate and regular rhythm, without murmurs,NO rubs NO S3 NO S4 right or left . Normal femoral, popliteal, pedis, brachial and carotid " pulses.  ABDOMEN:  Soft, nontender with no organomegaly or masses, no ascites, no collateral circulation,no distention,no [place] sign, no abdominal pain, no inguinal hernias,no umbilical hernia, no abdominal bruits. Normal bowel sounds.  GENITAL: Not  Performed.  EXTREMITIES  AND SPINE:  No clubbing, cyanosis or edema, no deformities or pain .No kyphosis, scoliosis, deformities or pain in spine, ribs or pelvic bone.  NEUROLOGICAL:  Patient was awake, alert, oriented to time, person and place.              RECENT LABS:  Hematology WBC   Date Value Ref Range Status   10/31/2019 4.59 3.40 - 10.80 10*3/mm3 Final     RBC   Date Value Ref Range Status   10/31/2019 3.67 (L) 4.14 - 5.80 10*6/mm3 Final     Hemoglobin   Date Value Ref Range Status   10/31/2019 12.2 (L) 13.0 - 17.7 g/dL Final     Hematocrit   Date Value Ref Range Status   10/31/2019 36.3 (L) 37.5 - 51.0 % Final     Platelets   Date Value Ref Range Status   10/31/2019 102 (L) 140 - 450 10*3/mm3 Final                Assessment/Plan  : This patient has history of multifactorial anemia associated with chronic renal disease and also chronic gastrointestinal blood loss when he used to have anticoagulation in the background of atrial fibrillation. Since anticoagulation has been discontinued and the GI bleeding has stopped, the patient’s hemoglobin has remained very stable. He continues receiving Procrit through dialysis and he has not had any issues. Today he has an excellent hemoglobin.   2. The patient has had leukopenia and thrombocytopenia on the basis of passive congestion associated with splenomegaly, portal hypertension and cirrhosis of the liver. These numbers remain about the same and I do not expect that they will ever change. He will remain in observation from this point of view.   3. Previous remote history of bone marrow testing suggesting myelodysplasia. I do not believe that diagnosis. The reason why not is because he has had this bone marrow test done  more than 10 years ago. His white count differential never has changed. The hemoglobin has remained stable. The platelet count has remained stable and doubt that somebody will have myelodysplasia for 10 years and not have any major modifications in his hematological parameters. Therefore, to me that diagnosis was a fluke and that bone marrow was done in [facility] when the patient used to be seen by another hematologist.     RECOMMENDATIONS: I discussed all these facts with the patient and his wife today in detail; reviewed his laboratory parameters. I think he is looking and feeling terrific and I advised him to remain in observation, returning to see me back in 6 months.      As I pointed out to the patient and wife today, never this patient will be a candidate to receive anticoagulants of any nature given the fact that he has vascular malformation and portal hypertensive gastropathy in the gastrointestinal tract. Any form of anticoagulant like we tested on him before put him in the hospital on repetitive occasions with dramatic amount of melena and terrible bleeding with profound anemia requiring frequent transfusions. We are not going to go to that crossroads again.                          10/31/2019      CC:

## 2019-11-19 ENCOUNTER — OFFICE VISIT (OUTPATIENT)
Dept: FAMILY MEDICINE CLINIC | Facility: CLINIC | Age: 74
End: 2019-11-19

## 2019-11-19 VITALS
OXYGEN SATURATION: 98 % | HEART RATE: 78 BPM | TEMPERATURE: 98.4 F | WEIGHT: 202.8 LBS | BODY MASS INDEX: 29.03 KG/M2 | DIASTOLIC BLOOD PRESSURE: 70 MMHG | HEIGHT: 70 IN | RESPIRATION RATE: 13 BRPM | SYSTOLIC BLOOD PRESSURE: 110 MMHG

## 2019-11-19 DIAGNOSIS — N18.6 ESRD (END STAGE RENAL DISEASE) (HCC): ICD-10-CM

## 2019-11-19 DIAGNOSIS — K21.9 GASTROESOPHAGEAL REFLUX DISEASE, ESOPHAGITIS PRESENCE NOT SPECIFIED: Primary | ICD-10-CM

## 2019-11-19 PROCEDURE — 99213 OFFICE O/P EST LOW 20 MIN: CPT | Performed by: FAMILY MEDICINE

## 2019-11-19 RX ORDER — FAMOTIDINE 10 MG
10 TABLET ORAL AS NEEDED
COMMUNITY

## 2019-11-19 RX ORDER — FEBUXOSTAT 40 MG/1
40 TABLET, FILM COATED ORAL DAILY
Qty: 30 TABLET | Refills: 5 | Status: SHIPPED | OUTPATIENT
Start: 2019-11-19 | End: 2020-05-19 | Stop reason: SDUPTHER

## 2019-11-19 RX ORDER — FEBUXOSTAT 40 MG/1
40 TABLET, FILM COATED ORAL DAILY
Qty: 90 TABLET | Refills: 1 | Status: SHIPPED | OUTPATIENT
Start: 2019-11-19 | End: 2019-11-19 | Stop reason: SDUPTHER

## 2019-11-19 NOTE — PROGRESS NOTES
"Chief Complaint   Patient presents with   • Heartburn       Subjective   Bill Landry is a 74 y.o. male.     History of Present Illness   GERD   Smaller meals not after 6 pm. He can take pepcid has only needed one time related to lie down too soon after eating. He really is controlling for the most part with his habits and he no longer takes the ranitidine since the recall. He does have hx of GI bleed but this is related to the pt going on chronic AC for a fib and he also has AVM and portal hypertension and hx of thrombocytopenia. He is recommended to never be on AC again.    ESRD  He is on dialysis. He follows with Dr. Monsivais. Pt reported he is doing well. Has good tolerance. already has gotten his flu shot. Presenting with his two most recent sets of lab work. Weight is stable.     The following portions of the patient's history were reviewed and updated as appropriate: allergies, current medications, past medical history, past social history and problem list.    Review of Systems   Respiratory: Negative.    Cardiovascular: Negative.    Neurological: Negative.        /70 (BP Location: Left arm, Patient Position: Sitting, Cuff Size: Adult)   Pulse 78   Temp 98.4 °F (36.9 °C) (Oral)   Resp 13   Ht 178 cm (70.08\")   Wt 92 kg (202 lb 12.8 oz)   SpO2 98%   BMI 29.03 kg/m²       Objective   Physical Exam   Constitutional: He is oriented to person, place, and time. He appears well-nourished. No distress.   Eyes: Conjunctivae are normal. Right eye exhibits no discharge. Left eye exhibits no discharge. No scleral icterus.   Cardiovascular: Normal rate, regular rhythm, normal heart sounds and intact distal pulses. Exam reveals no gallop and no friction rub.   No murmur heard.  Pulmonary/Chest: Effort normal and breath sounds normal. No respiratory distress. He has no wheezes.   Musculoskeletal: He exhibits no edema.   Neurological: He is alert and oriented to person, place, and time.   Psychiatric: He has a " normal mood and affect. His behavior is normal.   Vitals reviewed.      Assessment/Plan   Bill was seen today for heartburn.    Diagnoses and all orders for this visit:    Gastroesophageal reflux disease, esophagitis presence not specified    ESRD (end stage renal disease) (CMS/[facility])    Other orders  -     Discontinue: febuxostat (ULORIC) 40 MG tablet; Take 1 tablet by mouth Daily.  -     febuxostat (ULORIC) 40 MG tablet; Take 1 tablet by mouth Daily.      He is doing great. No changes. Labs reviewed. Copied and ready to scan to chart.

## 2020-01-09 ENCOUNTER — CLINICAL SUPPORT (OUTPATIENT)
Dept: FAMILY MEDICINE CLINIC | Facility: CLINIC | Age: 75
End: 2020-01-09

## 2020-01-09 DIAGNOSIS — Z20.828 EXPOSURE TO THE FLU: Primary | ICD-10-CM

## 2020-01-09 LAB
EXPIRATION DATE: NORMAL
FLUAV AG NPH QL: NEGATIVE
FLUBV AG NPH QL: NEGATIVE
INTERNAL CONTROL: NORMAL
Lab: NORMAL

## 2020-01-09 PROCEDURE — 87804 INFLUENZA ASSAY W/OPTIC: CPT | Performed by: FAMILY MEDICINE

## 2020-01-09 RX ORDER — OSELTAMIVIR PHOSPHATE 30 MG/1
CAPSULE ORAL
Qty: 4 CAPSULE | Refills: 0 | Status: SHIPPED | OUTPATIENT
Start: 2020-01-09 | End: 2020-05-19

## 2020-05-07 ENCOUNTER — APPOINTMENT (OUTPATIENT)
Dept: LAB | Facility: HOSPITAL | Age: 75
End: 2020-05-07

## 2020-05-07 ENCOUNTER — OFFICE VISIT (OUTPATIENT)
Dept: ONCOLOGY | Facility: CLINIC | Age: 75
End: 2020-05-07

## 2020-05-07 VITALS
DIASTOLIC BLOOD PRESSURE: 80 MMHG | SYSTOLIC BLOOD PRESSURE: 120 MMHG | HEART RATE: 87 BPM | TEMPERATURE: 97.6 F | OXYGEN SATURATION: 99 %

## 2020-05-07 DIAGNOSIS — D61.818 ACQUIRED PANCYTOPENIA (HCC): Primary | ICD-10-CM

## 2020-05-07 DIAGNOSIS — K74.60 CIRRHOSIS OF LIVER WITHOUT ASCITES, UNSPECIFIED HEPATIC CIRRHOSIS TYPE (HCC): ICD-10-CM

## 2020-05-07 DIAGNOSIS — D69.6 THROMBOCYTOPENIA (HCC): ICD-10-CM

## 2020-05-07 DIAGNOSIS — D72.819 CHRONIC LEUKOPENIA: ICD-10-CM

## 2020-05-07 DIAGNOSIS — D73.2 CONGESTIVE SPLENOMEGALY: ICD-10-CM

## 2020-05-07 PROCEDURE — 99441 PR PHYS/QHP TELEPHONE EVALUATION 5-10 MIN: CPT | Performed by: INTERNAL MEDICINE

## 2020-05-07 NOTE — PROGRESS NOTES
Subjective  REASONS FOR FOLLOWUP:    1. 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  Since discontinuation of anticoagulants the patient's hemoglobin has remained normal. The patient has associated leukopenia and thrombocytopenia due to chronic splenomegaly and remote history of bone marrow testing that suggested BUT NOT CONFIRMED myelodysplasia. Under the present circumstances neither the white count nor platelets are changing and hemoglobin remains stable. There is no abnormality in the differential of his white count. There are no symptoms or signs that would suggest any further progression into myelodysplasia. The patient will remain in observation. ALL THIS IN THE BACKGROUND OF CIRRHOSIS AND SPLENOMEGALY.           History of Present Illness       I HAVE CALLED THIS PATIENT ON THE PHONE TODAY AND VERBALLY HAS CONSENTED ABOUT TELEMEDICINE VISIT           I have reviewed this patient on the telephone today. He states that he has been feeling terrific going through his hemodialysis 3 times a week with no difficulties or problems, no volume overload. He has been replaced with IV iron and oral iron in that facility and his laboratory parameters remain stable. He has not had any GI bleeding. He is not anticoagulated at this time. His appetite and weight are stable, bowel function is normal. Minimal urinary output. His dialysis catheter is not giving him any difficulties. He has not developed any infections. He is functioning perfectly fine. He has no pain.                Past Medical History, Past Surgical HistorySignificant for hypertension and also he has history of atrial fibrillation and was chronically anticoagulated with Eliquis in the past. Since January 2015 the patient is no longer receiving this medicine and moved to  aspirin during his persistency of GI bleeding. The patient also has a history of chronic kidney disease with creatinine baseline around 2.5. The patient also has a history of inflammatory bowel disease with status post colectomy with ileostomy many years ago in the [facility]. The patient also has a history of pericardial effusion with an GABRIELLE 1:80 homogenous that has never changed or modified. He also has a positive lupus anticoagulant. He has had chronic leukopenia and thrombocytopenia for which he underwent by Dr. Null 10 years ago, bone marrow testing at [facility]. We have requested this information. The patient in the past has had a normal B12 level of 1094, normal folate level and ferritin level of 617 with an iron level of 34. In the past he has received IV iron in the form of Venofer while being at [facility] in January 2015. He has had serum protein electrophoresis at least on 3 different occasions, failing to show any monoclonal proteins. He also has had a CT scan of the abdomen that shows a ENLARGED spleen anatomy without any retroperitoneal adenopathies and nothing to suggest portal hypertension  Kidney anatomy disclosed thinning of the kidney cortexes consistent with chronic medical illness. He has no hydronephrosis. No retroperitoneal adenopathies.   SOCIAL HISTORY:  The patient lives with his wife in [place], [place]. He is retired. He does not smoke and does not drink any alCOHOL.      ONCOLOGIC/HEMATOLOGIC HISTORY     On 04/06/2015 the patient’s clinical status has dramatically improved.  He has not had any new episodes of infection, congestive heart failure, GI bleeding, with excellent improvement in hemoglobin.  The patient has been able to walk longer distances without shortness of breath.   He has been able to climb steps without shortness of breath and he feels dramatically better.  Hemoglobin has been stable for the past few weeks at 12.1.  His white count and  platelet count remain on the low side with no infection or clinical bleeding.  We found documentation of his bone marrow biopsy performed in 2002 and read at the [facility].  Explicitly it was documented that the patient could have myelodysplastic syndrome.  Procrit, as I pointed out to the patient, is a useful medicine to treat this condition anyway, and given the fact that in 13 years his white count and his platelet count have not changed, makes this diagnosis dubious.  Reading bone marrows for myelodysplasia is one of the most difficult tasks in Hematology.  I suggested to him at some point, if his blood count change, specifically white count modifies or platelet count modifies, to consider repeating the bone marrow testing, flow cytometry and chromosomes.     On 05/26/2015 he was feeling terrific.  He was not having any GI symptomatology, no melena, no enterorrhagia, no abnormal bleeding.  He was functionally perfectly fine.  Urination was ongoing. Uric acid was elevated and I advised him to go back into his Uloric to control this and minimize formation of kidney stones.    On 07/20/2015 the patient had no issues in relationship with anemia. His white count and platelet count remain low associated with his splenomegaly and no issues of consequences related to this. He was advised to remain in observation. He was advised to remain on his folic acid and B12 supplementation.     On 09/14/2015, hemoglobin was excellent at 12.9, stable weight count at 3400 and stable platelet count at 70,000.  We asked him to remain on observation.     On 11/09/2015 the patient’s hematological parameters remain normal.  He was feeling terrific.  His atrial fibrillation looked like it was very quiet and he had no issues in regard to his aspirin use.  He had no embolic phenomenon.  He had some element of fluid retention associated with his chronic renal disease.  We advised him to monitor his diet properly in regard to  sodium ingestion.      Review of Systems             General: no fever, no chills, no fatigue,no weight changes, no lack of appetite.  Eyes: no epiphora, xerophthalmia,conjunctivitis, pain, glaucoma, blurred vision, blindness, secretion, photophobia, proptosis, diplopia.  Ears: no otorrhea, tinnitus, otorrhagia, deafness, pain, vertigo.  Nose: no rhinorrhea, no epistaxis, no alteration in perception of odors, no sinuses pressure.  Mouth: no alteration in gums or teeth,  No ulcers, no difficulty with mastication or deglut ion, no odynophagia.  Neck: no masses or pain, no thyroid alterations, no pain in muscles or arteries, no carotid odynia, no crepitation.  Respiratory: no cough, no sputum production,no dyspnea,no trepopnea, no pleuritic pain,no hemoptysis.  Heart: no syncope, no irregularity, no palpitations, no angina,no orthopnea,no paroxysmal nocturnal dyspnea.  Vascular Venous: no tenderness,no edema,no palpable cords,no postphlebitic syndrome, no skin changes no ulcerations.  Vascular Arterial: no distal ischemia, noclaudication, no gangrene, no neuropathic ischemic pain, no skin ulcers, no paleness no cyanosis.  GI: no dysphagia, no odynophagia, no regurgitation, no heartburn,no indigestion,no nausea,no vomiting,no hematemesis ,no melena,no jaundice,no distention, no obstipation,no enterorrhagia,no proctalgia,no anal  lesions, no changes in bowel habits.  : no frequency, no hesitancy, no hematuria, no discharge,no  pain.  Musculoskeletal: no muscle or tendon pain or inflammation,no  joint pain, no edema, no functional limitation,no fasciculations, no mass.  Neurologic: no headache, no seizures, noalterations on Craneal nerves, no motor deficit, no sensory deficit, normal coordination, no alteration in memory,normal orientation, calculation,normal writting, verbal and written language.  Skin: no rashes,no pruritus no localized lesions.  Psychiatric: no anxiety, no depression,no agitation, no delusions,  proper insight.      Medications:  The current medication list was reviewed in the EMR    ALLERGIES:    Allergies   Allergen Reactions   • Ace Inhibitors Other (See Comments)     RENAL FAILURE/ raised creatinine   • Contrast Dye Other (See Comments)     RENAL FAILURE   • Eliquis [Apixaban] Arrhythmia     BLEEDING ISSUES; PT CANNOT TAKE ANY ANTICOAGULANTS DUE TO LOW PLATELETS EXCEPT ASPIRIN  BLEEDING ISSUES; PT CANNOT TAKE ANY ANTICOAGULANTS DUE TO LOW PLATELETS EXCEPT ASPIRIN   • Granix [Filgrastim] GI Intolerance     Chest pain  Chest pain     • Keflex [Cephalexin] Diarrhea     RENAL FAILURE       Objective      Vitals:    05/07/20 1108   BP: 120/80   Pulse: 87   Temp: 97.6 °F (36.4 °C)   SpO2: 99%   PainSc: 0-No pain     Current Status 10/31/2019   ECOG score 0     Physical Exam    He sounds perfectly normal on the telephone, he is doing normal activities of daily living with no limitations. He is driving the car. He is going to dialysis on his own and he is taking care of his wife and family at this time.                       RECENT LABS:  Hematology WBC   Date Value Ref Range Status   10/31/2019 4.59 3.40 - 10.80 10*3/mm3 Final     RBC   Date Value Ref Range Status   10/31/2019 3.67 (L) 4.14 - 5.80 10*6/mm3 Final     Hemoglobin   Date Value Ref Range Status   10/31/2019 12.2 (L) 13.0 - 17.7 g/dL Final     Hematocrit   Date Value Ref Range Status   10/31/2019 36.3 (L) 37.5 - 51.0 % Final     Platelets   Date Value Ref Range Status   10/31/2019 102 (L) 140 - 450 10*3/mm3 Final                Assessment/Plan  : This patient has history of multifactorial anemia associated with chronic renal disease and also chronic gastrointestinal blood loss when he used to have anticoagulation in the background of atrial fibrillation. Since anticoagulation has been discontinued and the GI bleeding has stopped, the patient’s hemoglobin has remained very stable. He continues receiving Procrit through dialysis and he has not had any  issues. Today he has an excellent hemoglobin.   2. The patient has had leukopenia and thrombocytopenia on the basis of passive congestion associated with splenomegaly, portal hypertension and cirrhosis of the liver. These numbers remain about the same and I do not expect that they will ever change. He will remain in observation from this point of view.   3. Previous remote history of bone marrow testing suggesting myelodysplasia. I do not believe that diagnosis. The reason why not is because he has had this bone marrow test done more than 10 years ago. His white count differential never has changed. The hemoglobin has remained stable. The platelet count has remained stable and doubt that somebody will have myelodysplasia for 10 years and not have any major modifications in his hematological parameters. Therefore, to me that diagnosis was a fluke and that bone marrow was done in [facility] when the patient used to be seen by another hematologist.       The patient was reviewed in telemedicine on 05/07/2020. His chronic kidney disease is being treated with hemodialysis with no difficulties whatsoever. He has not developed any infections. He remains fully functional driving himself to dialysis, taking care of his family and doing all activities at home. He has not had any issues pertinent to coronavirus infection neither anybody in his family has had issues pertinent to this.     I would like to review him back in 3 months with a CBC.     I reviewed his laboratory parameters coming from [facility] and his white count, hemoglobin and platelets are according to what I expected given his previous history of leukopenia and thrombocytopenia associated with splenomegaly and this is associated with cirrhosis of the liver.     I reviewed the medications the patient is taking, a new preparation of iron that is called sucroferric oxyhydroxide and he is tolerating this fine and the hemoglobin is  fantastic    DURATION OF PHONE CONVERSATION 7 MIN 30 SECS.    I DISCUSSED WITH PATIENT IN DETAIL FORMS TO DECREASE CHANCES OF CORONAVIRUS INFECTION INCLUDING ISOLATION, PROPER HAND HIGIENE, AVOID PUBLIC PLACES  WITH CROWDS, FOLLOW  CDC RECOMENDATIONS, AND KEEP PERSONAL AND SOCIAL RESPONSIBILITY, WARE A MASK IN PUBLIC PLACES.  PATIENT IS AWARE THIS INFECTION COULD HAVE SEVERE CONSEQUENCES TO PERSONAL HEALTH AND FAMILY RAMIFICATIONS OF THIS.                     5/7/2020      CC:

## 2020-05-19 ENCOUNTER — OFFICE VISIT (OUTPATIENT)
Dept: FAMILY MEDICINE CLINIC | Facility: CLINIC | Age: 75
End: 2020-05-19

## 2020-05-19 DIAGNOSIS — E78.1 PURE HYPERTRIGLYCERIDEMIA: Primary | ICD-10-CM

## 2020-05-19 DIAGNOSIS — D69.6 THROMBOCYTOPENIA (HCC): ICD-10-CM

## 2020-05-19 PROCEDURE — 99442 PR PHYS/QHP TELEPHONE EVALUATION 11-20 MIN: CPT | Performed by: FAMILY MEDICINE

## 2020-05-19 RX ORDER — FEBUXOSTAT 40 MG/1
40 TABLET, FILM COATED ORAL DAILY
Qty: 30 TABLET | Refills: 5 | Status: SHIPPED | OUTPATIENT
Start: 2020-05-19 | End: 2020-12-22

## 2020-05-19 NOTE — PROGRESS NOTES
Subjective   Bill Landry is a 74 y.o. male.     Chief Complaint   Patient presents with   • Hyperlipidemia        History of Present Illness  Says his weight is 198 lbs today. He keeps track for dialysis.   125/78, HR 74 standing BP yesterday at dialysis.   Different readings as dialysis progressed. Normally goes low and he tolerates this well.   124/82 HR 74   112/64  108/64  102/64  He is watching his water intake. Drinking less than he used to. This helps a lot with his energy and  BP during dialysis. They don't have to pull as much off.  He is able to drive himself home and he used to not do that because he was tired. Also because his wife's legs are not as good he has been doing more around the house to help with laundry and taking things up and down the stairs. Before she didn't let him and he didn't know he could do it so well. He is feeling good and more active at home.   Says he is feeling really good. He has his labs from 5/13/20 and asked them to be faxed but they are not yet available in his chart.     HLD  Hypertriglyceridemia is one of the things he is worried about. Level most recently was 260. He used to be on fish oil with prior PCP but was d/c'd because of his low plt and being on dialysis. Did not want anything for thinning blood further.   Asking about his diet. He used to drink the norvosource energy drinks as he calls them, recommended by renal dietician. He stopped taking them 2/2 diarrhea. He should be taking about 3 times per week. Since stopped his albumin is going down again to about 3. He discussed with them trying 1/2 can twice daily.     The following portions of the patient's history were reviewed and updated as appropriate: allergies, current medications, past medical history, past social history and problem list.      Review of Systems   Constitutional: Negative for activity change, appetite change and unexpected weight change.   Respiratory: Negative for shortness of breath.     Cardiovascular: Negative for chest pain and leg swelling.   Gastrointestinal: Negative for blood in stool, constipation and diarrhea.   Musculoskeletal: Negative for gait problem.       Objective   There were no vitals taken for this visit.  Physical Exam  Not available phone visit.   Assessment/Plan   Bill was seen today for hyperlipidemia.    Diagnoses and all orders for this visit:    Pure hypertriglyceridemia    Thrombocytopenia (CMS/HCC)    Other orders  -     febuxostat (ULORIC) 40 MG tablet; Take 1 tablet by mouth Daily.    he is feeling quite well.   Discussed his lipid profile. Looks quite good with , HDL 38 and TC 77. We discussed activity and exercise as well as omega three fatty acids in the diet can improve the HDL and TAGs, maintaining weight. His TAGs aren't to the level where they require treatment and treatment for him is a risk related to his low normal platelet count that sometimes goes below 100.   We did discuss diet and safe proteins for renal diet.     Patient gave consent today for a telehealth telephone visit as following recommendations of our governor and CDC during the COVID-19 pandemic.    30 min was spent in discussion with pt and greater than 50% of that time was spent counseling.

## 2020-08-04 ENCOUNTER — TELEPHONE (OUTPATIENT)
Dept: ONCOLOGY | Facility: CLINIC | Age: 75
End: 2020-08-04

## 2020-08-04 NOTE — TELEPHONE ENCOUNTER
Caller: Bill Landry    Relationship to patient: Self    Best call back number: [phone]    Chief complaint: Pt asked to change appt for 8.6.20    Type of visit: F/U and Lab    Requested date:  8.6.20    Additional notes:  Pt had labs sent from June and July and also would like confirmation that labs were receiv'd for 8.6.20 appt

## 2020-08-06 ENCOUNTER — OFFICE VISIT (OUTPATIENT)
Dept: ONCOLOGY | Facility: CLINIC | Age: 75
End: 2020-08-06

## 2020-08-06 ENCOUNTER — APPOINTMENT (OUTPATIENT)
Dept: LAB | Facility: HOSPITAL | Age: 75
End: 2020-08-06

## 2020-08-06 VITALS
WEIGHT: 193 LBS | DIASTOLIC BLOOD PRESSURE: 80 MMHG | TEMPERATURE: 97.9 F | HEART RATE: 80 BPM | SYSTOLIC BLOOD PRESSURE: 130 MMHG | BODY MASS INDEX: 27.63 KG/M2

## 2020-08-06 DIAGNOSIS — K74.60 CIRRHOSIS OF LIVER WITHOUT ASCITES, UNSPECIFIED HEPATIC CIRRHOSIS TYPE (HCC): ICD-10-CM

## 2020-08-06 DIAGNOSIS — D50.8 OTHER IRON DEFICIENCY ANEMIA: ICD-10-CM

## 2020-08-06 DIAGNOSIS — D73.2 CONGESTIVE SPLENOMEGALY: Primary | ICD-10-CM

## 2020-08-06 PROCEDURE — 99442 PR PHYS/QHP TELEPHONE EVALUATION 11-20 MIN: CPT | Performed by: INTERNAL MEDICINE

## 2020-09-03 ENCOUNTER — OFFICE VISIT (OUTPATIENT)
Dept: CARDIOLOGY | Facility: CLINIC | Age: 75
End: 2020-09-03

## 2020-09-03 VITALS
WEIGHT: 198.5 LBS | HEART RATE: 67 BPM | DIASTOLIC BLOOD PRESSURE: 65 MMHG | BODY MASS INDEX: 28.42 KG/M2 | HEIGHT: 70 IN | SYSTOLIC BLOOD PRESSURE: 126 MMHG

## 2020-09-03 DIAGNOSIS — Z91.89 AT RISK FOR FLUID VOLUME OVERLOAD: ICD-10-CM

## 2020-09-03 DIAGNOSIS — I48.21 PERMANENT ATRIAL FIBRILLATION (HCC): Primary | ICD-10-CM

## 2020-09-03 DIAGNOSIS — Z99.2 DEPENDENCE ON RENAL DIALYSIS (HCC): ICD-10-CM

## 2020-09-03 DIAGNOSIS — N18.6 ESRD (END STAGE RENAL DISEASE) (HCC): ICD-10-CM

## 2020-09-03 PROCEDURE — 99441 PR PHYS/QHP TELEPHONE EVALUATION 5-10 MIN: CPT | Performed by: NURSE PRACTITIONER

## 2020-09-03 NOTE — PROGRESS NOTES
Telehealth Visit    Date of Visit: 2020  Encounter Provider: JAME Key  Place of Service: [facility] CARDIOLOGY  Patient Name: Bill Landry  :1945  Primary Cardiologist: Dr. Dale Vázquez     Chief Complaint   Patient presents with   • Atrial Fibrillation   • Follow-up   :     Dear Dr. Davis,     HPI: Bill Landry is a pleasant 74 y.o. male who is an established patient of our practice. Due to COVID-19 virus, I am conducting a telehealth visit via telephone with patient and he has consented to this visit today. He is a new patient to me and his previous records have been reviewed.    He has Crohn's disease and in  he underwent colectomy/ileostomy.  He has been diagnosed with end-stage renal disease which is multifactorial from Crohn's, kidney stones, and hypertension.  He is on hemodialysis.  He has been diagnosed with chronic leukopenia, anemia, and thrombocytopenia.    We see him for a history of permanent atrial fibrillation.  He has had a history of slow GI bleed when anticoagulated and never had a TIA or stroke.  He has known diastolic heart failure and pulmonary hypertension previously in the setting of volume overload from chronic kidney disease.  In May 2018 echocardiogram showed normal EF of 55-60% and aortic valve sclerosis.    In 2019, he followed up in the office with Dr. Dale Vázquez and was doing well at that time.  Dr. Vázquez did not feel that he needed a repeat echocardiogram.    He states that he is doing well overall.  He stayed very safe with COVID-19 and never goes out except for his dialysis sessions.  He denies any symptoms of fluid volume overload and his weights have been stable.  He has dark stools from 1 of his medications.  He denies any bleeding.  He further denies chest pain, shortness of breath, palpitations, edema, dizziness, or syncope.  His blood pressure and heart rate are both normal today.  I reviewed his lipid  panel from March 2020.    Past Medical History:   Diagnosis Date   • Abnormal serum protein electrophoresis    • Anemia     Multifactorial   • Chronic leukopenia and thrombocytopenia    • Cirrhosis of liver without ascites (CMS/HCC) 7/24/2017   • Congestive splenomegaly 7/24/2017   • Crohn disease (CMS/HCC)     with ileostomy   • Diastolic CHF (CMS/HCC)     although it was likely from volume overload due to ESRD   • Diverticula of colon    • ESRD on hemodialysis (CMS/HCC)    • Fatty liver disease, nonalcoholic    • GERD (gastroesophageal reflux disease)    • GI bleed    • Glaucoma    • H/O colectomy    • H/O Gallstone pancreatitis    • H/O Pericardial effusion    • H/O sinus bradycardia    • History of hypertension     resolved   • Hx of renal calculi    • Hyperlipidemia    • Ileostomy present (CMS/HCC)    • Iron deficiency    • MGUS (monoclonal gammopathy of unknown significance)    • Permanent atrial fibrillation (CMS/HCC)    • Sleep apnea     CPAP USED   • Thrombocytopenia (CMS/HCC)    • Type 2 diabetes mellitus (CMS/HCC)        Past Surgical History:   Procedure Laterality Date   • APPENDECTOMY     • ARTERIOVENOUS FISTULA/SHUNT SURGERY Left 8/8/2017    Procedure: LEFT BRACHIAL CEPHALIC AV FISTULA FORMATION WITH CEPHALIC VEIN TRANSPOSITION ;  Surgeon: Bill Deal MD;  Location: [facility] OR;  Service:    • CHOLECYSTECTOMY     • COLECTOMY PARTIAL / TOTAL      History of inflammatory bowel disease with status post colectomy with ileostomy many years ago in the [facility]   • COLON RESECTION WITH COLOSTOMY     • COLON SURGERY     • COLONOSCOPY     • CYSTOSCOPY LITHOLAPAXY BLADDER STONE EXTRACTION     • ENDOSCOPY  01/16/2015    gastritis   • ENDOSCOPY  1/17/2018    Procedure: ESOPHAGOGASTRODUODENOSCOPY;  Surgeon: Warner Neville MD;  Location: [facility] ENDOSCOPY;  Service:    • ILEOSCOPY  01/16/2015    normal   • ILEOSCOPY N/A 1/17/2018    Procedure: ILEOSCOPY;  Surgeon: Warner Neville MD;  Location:   [facility] ENDOSCOPY;  Service:        Social History     Socioeconomic History   • Marital status:      Spouse name: Gillian   • Number of children: 2   • Years of education: College   • Highest education level: Not on file   Occupational History     Employer: RETIRED   Tobacco Use   • Smoking status: Never Smoker   • Smokeless tobacco: Never Used   Substance and Sexual Activity   • Alcohol use: No     Comment: caffeine use: none   • Drug use: No   • Sexual activity: Defer       Family History   Problem Relation Age of Onset   • Heart failure Mother    • Hypertension Mother    • Heart disease Mother    • Hyperlipidemia Mother    • Hypertension Father    • Malig Hyperthermia Neg Hx        The following portion of the patient's history were reviewed and updated as appropriate: past medical history, past surgical history, past social history, past family history, allergies, current medications, and problem list.    Review of Systems   Constitution: Negative for chills, diaphoresis, fever, malaise/fatigue, night sweats, weight gain and weight loss.   HENT: Negative for hearing loss, nosebleeds, sore throat and tinnitus.    Eyes: Negative for blurred vision, double vision, pain and visual disturbance.   Cardiovascular: Negative for chest pain, claudication, cyanosis, dyspnea on exertion, irregular heartbeat, leg swelling, near-syncope, orthopnea, palpitations, paroxysmal nocturnal dyspnea and syncope.   Respiratory: Negative for cough, hemoptysis, shortness of breath, snoring and wheezing.    Endocrine: Negative for cold intolerance, heat intolerance and polyuria.   Hematologic/Lymphatic: Negative for bleeding problem. Does not bruise/bleed easily.   Skin: Negative for color change, dry skin, flushing and itching.   Musculoskeletal: Negative for falls, joint pain, joint swelling, muscle cramps, muscle weakness and myalgias.   Gastrointestinal: Negative for abdominal pain, constipation, heartburn, melena, nausea and  vomiting.   Genitourinary: Negative for dysuria and hematuria.   Neurological: Negative for excessive daytime sleepiness, dizziness, light-headedness, loss of balance, numbness, paresthesias, seizures and vertigo.   Psychiatric/Behavioral: Negative for altered mental status, depression, memory loss and substance abuse. The patient does not have insomnia and is not nervous/anxious.    Allergic/Immunologic: Negative for environmental allergies.       Allergies   Allergen Reactions   • Ace Inhibitors Other (See Comments)     RENAL FAILURE/ raised creatinine   • Contrast Dye Other (See Comments)     RENAL FAILURE   • Eliquis [Apixaban] Arrhythmia     BLEEDING ISSUES; PT CANNOT TAKE ANY ANTICOAGULANTS DUE TO LOW PLATELETS EXCEPT ASPIRIN  BLEEDING ISSUES; PT CANNOT TAKE ANY ANTICOAGULANTS DUE TO LOW PLATELETS EXCEPT ASPIRIN   • Granix [Filgrastim] GI Intolerance     Chest pain  Chest pain     • Keflex [Cephalexin] Diarrhea     RENAL FAILURE         Current Outpatient Medications:   •  aspirin 81 MG tablet, Take 81 mg by mouth Daily. To stop 5 days prior to surgery, Disp: , Rfl:   •  B Complex-C-Folic Acid (EMILIE-FREDDY) tablet, Take 1 tablet by mouth Daily., Disp: , Rfl: 3  •  Cholecalciferol (VITAMIN D3) 1000 units capsule, Take  by mouth Daily., Disp: , Rfl:   •  famotidine (PEPCID) 10 MG tablet, Take 10 mg by mouth As Needed for Heartburn., Disp: , Rfl:   •  febuxostat (ULORIC) 40 MG tablet, Take 1 tablet by mouth Daily., Disp: 30 tablet, Rfl: 5  •  Iron Sucrose (VENOFER IV), Infuse  into a venous catheter As Needed., Disp: , Rfl:   •  latanoprost (XALATAN) 0.005 % ophthalmic solution, Administer 1 drop to both eyes Every Night. 1 drop each eye , Disp: , Rfl:   •  lidocaine-prilocaine (EMLA) 2.5-2.5 % cream, APPLY A SMALL AMOUNT TO DIALYSIS ACCESS SITE 1 HOUR PRIOR AND COVER WITH AN OCCLUSIVE DRESSING, Disp: , Rfl: 3  •  Loratadine (CLARITIN PO), Take  by mouth As Needed. Pt states he is uncertain of dosage, Disp: , Rfl:  "  •  Methoxy PEG-Epoetin Beta (MIRCERA IJ), Inject  as directed. Taken as need thru dialysis, Disp: , Rfl:   •  Sucroferric Oxyhydroxide (VELPHORO PO), Take 2 tablets by mouth 3 (Three) Times a Day., Disp: , Rfl:         Objective:     Vitals:    09/03/20 0910 09/03/20 0939   BP: 143/73 126/65   Pulse: 71 67   Weight: 90 kg (198 lb 8 oz)    Height: 178 cm (70.08\")      Body mass index is 28.42 kg/m².    Due to telehealth visit, there was no EKG, vitals, or weight performed in our office.  Vitals/Weight were reported by the patient and conducted at home.       Assessment:       Diagnosis Plan   1. Permanent atrial fibrillation (CMS/HCC)     2. At risk for fluid volume overload     3. ESRD (end stage renal disease) (CMS/HCC)     4. Dependence on renal dialysis (CMS/[facility])            Plan:       1.  Permanent Atrial Fibrillation: We did not obtain an EKG today because is a telehealth visit.  He says his heart rates and blood pressures have been well controlled on dialysis.  He has a [id number] score of 3 putting him at risk for stroke. He cannot be anticoagulated due to history of GI bleed and thrombocytopenia.  A left atrial occluder was considered, but not felt to be safe because he cannot be on anticoagulation before the procedure.  We reviewed potential strokelike symptoms and to call 911 immediately.    2/3/4.  He has a history of fluid volume overload due to his kidney disease before he was on dialysis.  He has been stable and dialysis is going very well.    5.  Hypertension: Blood pressure is been well controlled.    6.  I have recommended a one-year follow-up visit with Dr. Dale Vázquez.    This patient has consented to a telehealth visit via telephone.  The visit was scheduled as a telephone visit to comply with patient safety concerns in accordance with CDC recommendations.  All vitals recorded within this visit are reported by the patient.  I spent 25 minutes in total including but not limited to the 10 minutes " spent in direct conversation with this patient.      As always, it has been a pleasure to participate in your patient's care. Thank you.       Sincerely,         JAME Solis        Dictated utilizing Dragon dictation

## 2020-12-02 NOTE — PROGRESS NOTES
Subjective  REASONS FOR FOLLOWUP:    1. 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  Since discontinuation of anticoagulants the patient's hemoglobin has remained normal. The patient has associated leukopenia and thrombocytopenia due to chronic splenomegaly and remote history of bone marrow testing that suggested BUT NOT CONFIRMED myelodysplasia. Under the present circumstances neither the white count nor platelets are changing and hemoglobin remains stable. There is no abnormality in the differential of his white count. There are no symptoms or signs that would suggest any further progression into myelodysplasia. The patient will remain in observation. ALL THIS IN THE BACKGROUND OF CIRRHOSIS AND SPLENOMEGALY.           History of Present Illness         I HAVE CALLED THIS PATIENT ON THE PHONE TODAY AND VERBALLY HAS CONSENTED ABOUT TELEMEDICINE VISIT     This patient states today that he has been feeling terrific. He has found perfect balance in regard volume status in his dialysis and his blood pressure remains under excellent control as well as his energy level. His appetite is good, his weight is stable, he has no swelling in his lower extremities. He has not had a fever or infection. No problems in regards to clots in the fistula. He is not having any other new health issues or problems. He is supposed to see his ophthalmologist next week for glaucoma check and he is extremely scared about being tested in that office with different material that have been utilized on other people. Please review below.         Past Medical History:   Diagnosis Date   • Abnormal serum protein electrophoresis    • Anemia     Multifactorial   • Chronic leukopenia and thrombocytopenia    • Cirrhosis of liver without ascites (CMS/HCC) 7/24/2017    • Congestive splenomegaly 7/24/2017   • Crohn disease (CMS/HCC)     with ileostomy   • Diastolic CHF (CMS/HCC)     although it was likely from volume overload due to ESRD   • Diverticula of colon    • ESRD on hemodialysis (CMS/HCC)    • Fatty liver disease, nonalcoholic    • GERD (gastroesophageal reflux disease)    • GI bleed    • Glaucoma    • H/O colectomy    • H/O Gallstone pancreatitis    • H/O Pericardial effusion    • H/O sinus bradycardia    • History of hypertension     resolved   • Hx of renal calculi    • Hyperlipidemia    • Ileostomy present (CMS/HCC)    • Iron deficiency    • MGUS (monoclonal gammopathy of unknown significance)    • Permanent atrial fibrillation (CMS/HCC)    • Sleep apnea     CPAP USED   • Thrombocytopenia (CMS/HCC)    • Type 2 diabetes mellitus (CMS/HCC)      Social History     Socioeconomic History   • Marital status:      Spouse name: Gillian   • Number of children: 2   • Years of education: College   • Highest education level: Not on file   Occupational History     Employer: RETIRED   Tobacco Use   • Smoking status: Never Smoker   • Smokeless tobacco: Never Used   Substance and Sexual Activity   • Alcohol use: No     Comment: caffeine use: none   • Drug use: No   • Sexual activity: Defer     Family History   Problem Relation Age of Onset   • Heart failure Mother    • Hypertension Mother    • Heart disease Mother    • Hyperlipidemia Mother    • Hypertension Father    • Malig Hyperthermia Neg Hx      Current Outpatient Medications on File Prior to Visit   Medication Sig Dispense Refill   • aspirin 81 MG tablet Take 81 mg by mouth Daily. To stop 5 days prior to surgery     • B Complex-C-Folic Acid (EMILIE-FREDDY) tablet Take 1 tablet by mouth Daily.  3   • Cholecalciferol (VITAMIN D3) 1000 units capsule Take  by mouth Daily.     • famotidine (PEPCID) 10 MG tablet Take 10 mg by mouth As Needed for Heartburn.     • febuxostat (ULORIC) 40 MG tablet Take 1 tablet by mouth Daily. 30  tablet 5   • Iron Sucrose (VENOFER IV) Infuse  into a venous catheter As Needed.     • latanoprost (XALATAN) 0.005 % ophthalmic solution Administer 1 drop to both eyes Every Night. 1 drop each eye      • lidocaine-prilocaine (EMLA) 2.5-2.5 % cream APPLY A SMALL AMOUNT TO DIALYSIS ACCESS SITE 1 HOUR PRIOR AND COVER WITH AN OCCLUSIVE DRESSING  3   • Loratadine (CLARITIN PO) Take  by mouth As Needed. Pt states he is uncertain of dosage     • Methoxy PEG-Epoetin Beta (MIRCERA IJ) Inject  as directed. Taken as need thru dialysis     • Sucroferric Oxyhydroxide (VELPHORO PO) Take 2 tablets by mouth 3 (Three) Times a Day.       No current facility-administered medications on file prior to visit.                 ONCOLOGIC/HEMATOLOGIC HISTORY     On 04/06/2015 the patient’s clinical status has dramatically improved.  He has not had any new episodes of infection, congestive heart failure, GI bleeding, with excellent improvement in hemoglobin.  The patient has been able to walk longer distances without shortness of breath.   He has been able to climb steps without shortness of breath and he feels dramatically better.  Hemoglobin has been stable for the past few weeks at 12.1.  His white count and platelet count remain on the low side with no infection or clinical bleeding.  We found documentation of his bone marrow biopsy performed in 2002 and read at the [facility].  Explicitly it was documented that the patient could have myelodysplastic syndrome.  Procrit, as I pointed out to the patient, is a useful medicine to treat this condition anyway, and given the fact that in 13 years his white count and his platelet count have not changed, makes this diagnosis dubious.  Reading bone marrows for myelodysplasia is one of the most difficult tasks in Hematology.  I suggested to him at some point, if his blood count change, specifically white count modifies or platelet count modifies, to consider repeating the bone marrow  testing, flow cytometry and chromosomes.           Review of Systems                 General: no fever, no chills, no fatigue,no weight changes, no lack of appetite.  Eyes: no epiphora, xerophthalmia,conjunctivitis, pain, glaucoma, blurred vision, blindness, secretion, photophobia, proptosis, diplopia.  Ears: no otorrhea, tinnitus, otorrhagia, deafness, pain, vertigo.  Nose: no rhinorrhea, no epistaxis, no alteration in perception of odors, no sinuses pressure.  Mouth: no alteration in gums or teeth,  No ulcers, no difficulty with mastication or deglut ion, no odynophagia.  Neck: no masses or pain, no thyroid alterations, no pain in muscles or arteries, no carotid odynia, no crepitation.  Respiratory: no cough, no sputum production,no dyspnea,no trepopnea, no pleuritic pain,no hemoptysis.  Heart: no syncope, no irregularity, no palpitations, no angina,no orthopnea,no paroxysmal nocturnal dyspnea.  Vascular Venous: no tenderness,no edema,no palpable cords,no postphlebitic syndrome, no skin changes no ulcerations.  Vascular Arterial: no distal ischemia, noclaudication, no gangrene, no neuropathic ischemic pain, no skin ulcers, no paleness no cyanosis.  GI: no dysphagia, no odynophagia, no regurgitation, no heartburn,no indigestion,no nausea,no vomiting,no hematemesis ,no melena,no jaundice,no distention, no obstipation,no enterorrhagia,no proctalgia,no anal  lesions, no changes in bowel habits.  : no frequency, no hesitancy, no hematuria, no discharge,no  pain.  Musculoskeletal: no muscle or tendon pain or inflammation,no  joint pain, no edema, no functional limitation,no fasciculations, no mass.  Neurologic: no headache, no seizures, noalterations on Craneal nerves, no motor deficit, no sensory deficit, normal coordination, no alteration in memory,normal orientation, calculation,normal writting, verbal and written language.  Skin: no rashes,no pruritus no localized lesions.  Psychiatric: no anxiety, no  depression,no agitation, no delusions, proper insight.            ALLERGIES:    Allergies   Allergen Reactions   • Ace Inhibitors Other (See Comments)     RENAL FAILURE/ raised creatinine   • Contrast Dye Other (See Comments)     RENAL FAILURE   • Eliquis [Apixaban] Arrhythmia     BLEEDING ISSUES; PT CANNOT TAKE ANY ANTICOAGULANTS DUE TO LOW PLATELETS EXCEPT ASPIRIN  BLEEDING ISSUES; PT CANNOT TAKE ANY ANTICOAGULANTS DUE TO LOW PLATELETS EXCEPT ASPIRIN   • Granix [Filgrastim] GI Intolerance     Chest pain  Chest pain     • Keflex [Cephalexin] Diarrhea     RENAL FAILURE       Objective      There were no vitals filed for this visit.  Current Status 10/31/2019   ECOG score 0     Physical Exam    NoneHe sounds completely normal on the telephone with normal speech, no shortness of breath, no pain, no fever, no infections, stable vital signs. He is driving his car after dialysis with no difficulties whatsoever.                       RECENT LABS:  Hematology WBC   Date Value Ref Range Status   10/31/2019 4.59 3.40 - 10.80 10*3/mm3 Final     RBC   Date Value Ref Range Status   10/31/2019 3.67 (L) 4.14 - 5.80 10*6/mm3 Final     Hemoglobin   Date Value Ref Range Status   10/31/2019 12.2 (L) 13.0 - 17.7 g/dL Final     Hematocrit   Date Value Ref Range Status   10/31/2019 36.3 (L) 37.5 - 51.0 % Final     Platelets   Date Value Ref Range Status   10/31/2019 102 (L) 140 - 450 10*3/mm3 Final                Assessment/Plan  : This patient has history of multifactorial anemia associated with chronic renal disease and also chronic gastrointestinal blood loss when he used to have anticoagulation in the background of atrial fibrillation. Since anticoagulation has been discontinued and the GI bleeding has stopped, the patient’s hemoglobin has remained very stable. He continues receiving Procrit through dialysis and he has not had any issues. Today he has an excellent hemoglobin.   2. The patient has had leukopenia and thrombocytopenia  on the basis of passive congestion associated with splenomegaly, portal hypertension and cirrhosis of the liver. These numbers remain about the same and I do not expect that they will ever change. He will remain in observation from this point of view.   3. Previous remote history of bone marrow testing suggesting myelodysplasia. I do not believe that diagnosis. The reason why not is because he has had this bone marrow test done more than 10 years ago. His white count differential never has changed. The hemoglobin has remained stable. The platelet count has remained stable and doubt that somebody will have myelodysplasia for 10 years and not have any major modifications in his hematological parameters. Therefore, to me that diagnosis was a fluke and that bone marrow was done in [facility] when the patient used to be seen by another hematologist.         The patient was reviewed by Tatiana on 08/06/2020. His clinical circumstances are stable in regard to dialysis. He has not had any infections, his fistula remains functional, his volume status is normal. The reviewed white count, hemoglobin and platelets remain stable for him.     The patient raised the question about visiting his ophthalmologist next week in regard to glaucoma check. I asked him to call them in regard what is the protocol they are using to take care of patients and measure eye pressure with drops that have been used in different individuals.    I discussed with the patient the fact that he would like to be reviewed again every 6 months. He will be sure that his laboratory workup is sent to me on a monthly basis.    Otherwise no other intervention from my point of view. Total duration of the phone visit 11 minutes 20 seconds.       I DISCUSSED WITH PATIENT IN DETAIL FORMS TO DECREASE CHANCES OF CORONAVIRUS INFECTION INCLUDING ISOLATION, PROPER HAND HIGIENE, AVOID PUBLIC PLACES  WITH CROWDS, FOLLOW  CDC RECOMENDATIONS, AND KEEP PERSONAL AND  SOCIAL RESPONSIBILITY, WARE A MASK IN PUBLIC PLACES.  PATIENT IS AWARE THIS INFECTION COULD HAVE SEVERE CONSEQUENCES TO PERSONAL HEALTH AND FAMILY RAMIFICATIONS OF THIS.                     8/6/2020      CC:           no

## 2020-12-17 ENCOUNTER — TELEPHONE (OUTPATIENT)
Dept: ONCOLOGY | Facility: CLINIC | Age: 75
End: 2020-12-17

## 2020-12-17 NOTE — TELEPHONE ENCOUNTER
Caller: Bill     Relationship to patient: Pt    Best call back number: [phone]    Type of visit: 6 month f/u Televisit    Requested date: February, Tuesday or Thursday except for 02/23

## 2020-12-22 RX ORDER — FEBUXOSTAT 40 MG/1
40 TABLET, FILM COATED ORAL DAILY
Qty: 30 TABLET | Refills: 5 | Status: SHIPPED | OUTPATIENT
Start: 2020-12-22 | End: 2021-05-06 | Stop reason: SDUPTHER

## 2021-02-03 ENCOUNTER — TELEPHONE (OUTPATIENT)
Dept: ONCOLOGY | Facility: HOSPITAL | Age: 76
End: 2021-02-03

## 2021-02-03 ENCOUNTER — TELEPHONE (OUTPATIENT)
Dept: ONCOLOGY | Facility: CLINIC | Age: 76
End: 2021-02-03

## 2021-02-03 NOTE — TELEPHONE ENCOUNTER
Per NINOSKA Drew, it is ok with Dr Mckenzie for pt to get the COVID vaccine. Spoke with pt's wife and made aware.

## 2021-02-03 NOTE — TELEPHONE ENCOUNTER
Spoke with this patient's wife and told her, as a general rule, Dr Mckenzie is allowing his patient's to get the vaccine. She said he is on dialysis and renal said it was ok from their standpoint, but they have questions regarding his Crohn's disease. She said since his Crohns elevates his immune system, would the vaccine cause a worse immune response? Message sent to NINOSKA Drew for Dr Mckenzie.

## 2021-02-03 NOTE — TELEPHONE ENCOUNTER
Caller: PT    Relationship to patient:   PT  Best call back number: [phone]  [phone]    PT HAS SEVERAL QUESTIONS REGARDING COVID VACCINE AND HIS OWN HEALTH.    PLEASE RETURN CALL THANK YOU

## 2021-02-09 ENCOUNTER — IMMUNIZATION (OUTPATIENT)
Dept: VACCINE CLINIC | Facility: HOSPITAL | Age: 76
End: 2021-02-09

## 2021-02-09 PROCEDURE — 91300 HC SARSCOV02 VAC 30MCG/0.3ML IM: CPT | Performed by: INTERNAL MEDICINE

## 2021-02-09 PROCEDURE — 0001A: CPT | Performed by: INTERNAL MEDICINE

## 2021-02-25 ENCOUNTER — OFFICE VISIT (OUTPATIENT)
Dept: ONCOLOGY | Facility: CLINIC | Age: 76
End: 2021-02-25

## 2021-02-25 ENCOUNTER — TELEPHONE (OUTPATIENT)
Dept: ONCOLOGY | Facility: CLINIC | Age: 76
End: 2021-02-25

## 2021-02-25 DIAGNOSIS — D69.6 THROMBOCYTOPENIA (HCC): ICD-10-CM

## 2021-02-25 DIAGNOSIS — D73.2 CONGESTIVE SPLENOMEGALY: Primary | ICD-10-CM

## 2021-02-25 PROCEDURE — 99441 PR PHYS/QHP TELEPHONE EVALUATION 5-10 MIN: CPT | Performed by: INTERNAL MEDICINE

## 2021-02-25 NOTE — PROGRESS NOTES
Subjective  REASONS FOR FOLLOWUP:    1. 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  Since discontinuation of anticoagulants the patient's hemoglobin has remained normal. The patient has associated leukopenia and thrombocytopenia due to chronic splenomegaly and remote history of bone marrow testing that suggested BUT NOT CONFIRMED myelodysplasia. Under the present circumstances neither the white count nor platelets are changing and hemoglobin remains stable. There is no abnormality in the differential of his white count. There are no symptoms or signs that would suggest any further progression into myelodysplasia. The patient will remain in observation. ALL THIS IN THE BACKGROUND OF CIRRHOSIS AND SPLENOMEGALY.           History of Present Illness     I HAVE CALLED THIS PATIENT ON THE PHONE TODAY AND VERBALLY HAS CONSENTED ABOUT TELEMEDICINE VISIT     This patient has continued his dialysis ongoing with no difficulties whatsoever with volume status or alteration in his hematological parameters. No bleeding or thrombosis or difficulties with his fistula. He has not had any infections. He will have his 1st dose of COVID vaccination next week. The patient otherwise feels well. He is fully functional and he is able to drive himself to dialysis center. His family remains in good shape with no COVID infection and no other new issues.        Past Medical History:   Diagnosis Date   • Abnormal serum protein electrophoresis    • Anemia     Multifactorial   • Chronic leukopenia and thrombocytopenia    • Cirrhosis of liver without ascites (CMS/HCC) 7/24/2017   • Congestive splenomegaly 7/24/2017   • Crohn disease (CMS/HCC)     with ileostomy   • Diastolic CHF (CMS/HCC)     although it was likely from volume overload due to ESRD   •  Diverticula of colon    • ESRD on hemodialysis (CMS/HCC)    • Fatty liver disease, nonalcoholic    • GERD (gastroesophageal reflux disease)    • GI bleed    • Glaucoma    • H/O colectomy    • H/O Gallstone pancreatitis    • H/O Pericardial effusion    • H/O sinus bradycardia    • History of hypertension     resolved   • Hx of renal calculi    • Hyperlipidemia    • Ileostomy present (CMS/HCC)    • Iron deficiency    • MGUS (monoclonal gammopathy of unknown significance)    • Permanent atrial fibrillation (CMS/HCC)    • Sleep apnea     CPAP USED   • Thrombocytopenia (CMS/HCC)    • Type 2 diabetes mellitus (CMS/HCC)      Social History     Socioeconomic History   • Marital status:      Spouse name: Gillian   • Number of children: 2   • Years of education: College   • Highest education level: Not on file   Occupational History     Employer: RETIRED   Tobacco Use   • Smoking status: Never Smoker   • Smokeless tobacco: Never Used   Substance and Sexual Activity   • Alcohol use: No     Comment: caffeine use: none   • Drug use: No   • Sexual activity: Defer     Family History   Problem Relation Age of Onset   • Heart failure Mother    • Hypertension Mother    • Heart disease Mother    • Hyperlipidemia Mother    • Hypertension Father    • Malig Hyperthermia Neg Hx      Current Outpatient Medications on File Prior to Visit   Medication Sig Dispense Refill   • aspirin 81 MG tablet Take 81 mg by mouth Daily. To stop 5 days prior to surgery     • B Complex-C-Folic Acid (EMILIE-FREDDY) tablet Take 1 tablet by mouth Daily.  3   • Cholecalciferol (VITAMIN D3) 1000 units capsule Take  by mouth Daily.     • famotidine (PEPCID) 10 MG tablet Take 10 mg by mouth As Needed for Heartburn.     • febuxostat (ULORIC) 40 MG tablet TAKE 1 TABLET BY MOUTH DAILY 30 tablet 5   • Iron Sucrose (VENOFER IV) Infuse  into a venous catheter As Needed.     • latanoprost (XALATAN) 0.005 % ophthalmic solution Administer 1 drop to both eyes Every  Night. 1 drop each eye      • lidocaine-prilocaine (EMLA) 2.5-2.5 % cream APPLY A SMALL AMOUNT TO DIALYSIS ACCESS SITE 1 HOUR PRIOR AND COVER WITH AN OCCLUSIVE DRESSING  3   • Loratadine (CLARITIN PO) Take  by mouth As Needed. Pt states he is uncertain of dosage     • Methoxy PEG-Epoetin Beta (MIRCERA IJ) Inject  as directed. Taken as need thru dialysis     • Sucroferric Oxyhydroxide (VELPHORO PO) Take 2 tablets by mouth 3 (Three) Times a Day.       No current facility-administered medications on file prior to visit.                 ONCOLOGIC/HEMATOLOGIC HISTORY     On 04/06/2015 the patient’s clinical status has dramatically improved.  He has not had any new episodes of infection, congestive heart failure, GI bleeding, with excellent improvement in hemoglobin.  The patient has been able to walk longer distances without shortness of breath.   He has been able to climb steps without shortness of breath and he feels dramatically better.  Hemoglobin has been stable for the past few weeks at 12.1.  His white count and platelet count remain on the low side with no infection or clinical bleeding.  We found documentation of his bone marrow biopsy performed in 2002 and read at the [facility].  Explicitly it was documented that the patient could have myelodysplastic syndrome.  Procrit, as I pointed out to the patient, is a useful medicine to treat this condition anyway, and given the fact that in 13 years his white count and his platelet count have not changed, makes this diagnosis dubious.  Reading bone marrows for myelodysplasia is one of the most difficult tasks in Hematology.  I suggested to him at some point, if his blood count change, specifically white count modifies or platelet count modifies, to consider repeating the bone marrow testing, flow cytometry and chromosomes.               ALLERGIES:    Allergies   Allergen Reactions   • Ace Inhibitors Other (See Comments)     RENAL FAILURE/ raised  creatinine   • Contrast Dye Other (See Comments)     RENAL FAILURE   • Eliquis [Apixaban] Arrhythmia     BLEEDING ISSUES; PT CANNOT TAKE ANY ANTICOAGULANTS DUE TO LOW PLATELETS EXCEPT ASPIRIN  BLEEDING ISSUES; PT CANNOT TAKE ANY ANTICOAGULANTS DUE TO LOW PLATELETS EXCEPT ASPIRIN   • Granix [Filgrastim] GI Intolerance     Chest pain  Chest pain     • Keflex [Cephalexin] Diarrhea     RENAL FAILURE       Objective      There were no vitals filed for this visit.  Current Status 10/31/2019   ECOG score 0     Physical Exam    NoneHe sounds completely normal on the telephone with normal speech, no shortness of breath, no pain, no fever, no infections, stable vital signs. He is driving his car after dialysis with no difficulties whatsoever.                       RECENT LABS:  Hematology WBC   Date Value Ref Range Status   10/31/2019 4.59 3.40 - 10.80 10*3/mm3 Final     RBC   Date Value Ref Range Status   10/31/2019 3.67 (L) 4.14 - 5.80 10*6/mm3 Final     Hemoglobin   Date Value Ref Range Status   10/31/2019 12.2 (L) 13.0 - 17.7 g/dL Final     Hematocrit   Date Value Ref Range Status   10/31/2019 36.3 (L) 37.5 - 51.0 % Final     Platelets   Date Value Ref Range Status   10/31/2019 102 (L) 140 - 450 10*3/mm3 Final        SCANNED - LABS [[id number]] (Order [id number])  Order  Date: 2/4/2021 Department: [facility] PRIMARY CARE Ordering: Interface, Scans Incoming Authorizing: Mecca Roe MD   Reprint Order Requisition    SCANNED - LABS (Order #[id number]) on 2/4/21       SCANNED - LABS  Order: [id number]  Status:  Final result   Visible to patient:  No (not released)   Next appt:  03/02/2021 at 09:35 AM in Family Medicine (C19 VACCINE CLIN ALEX 2ND DOSE)        Last Resulted: 02/04/21   Order Details View Encounter Lab and Collection Details Routing Result History         Scans on Order [id number]    Scan on 2/4/2021 by Mecca Roe MD: OUTSIDE LAB RESULTS, 02/04/2021               SCANNED - LABS [[id number]] (Order  [id number])  Order  Date: 2/10/2021 Department: [facility] HEMATOLOGY AND ONCOLOGY Ordering: Interface, Scans Incoming Authorizing: Sharif Mckenzie MD   Reprint Order Requisition    SCANNED - LABS (Order #[id number]) on 2/10/21       SCANNED - LABS  Order: [id number]  Status:  Final result   Visible to patient:  No (not released)   Next appt:  03/02/2021 at 09:35 AM in Family Medicine (C19 VACCINE CLIN ALEX 2ND DOSE)        Last Resulted: 02/10/21   Order Details View Encounter Lab and Collection Details Routing Result History         Scans on Order [id number]    Scan on 2/10/2021 by Sharif Mckenzie MD: OUTSIDE LAB RESULTS             Routing History    Priority Sent On From To Message Type    2/19/2021  8:23 AM Interface, Scans Incoming Sharif Mckenzie MD Results   All Reviewers List    Sharif Mckenzie MD on 2/19/2021 08:36   Lab Component SmartPhrase Guide    SCANNED - LABS (Order #[id number]) on 2/10/21                 Assessment/Plan  : This patient has history of multifactorial anemia associated with chronic renal disease and also chronic gastrointestinal blood loss when he used to have anticoagulation in the background of atrial fibrillation. Since anticoagulation has been discontinued and the GI bleeding has stopped, the patient’s hemoglobin has remained very stable. He continues receiving Procrit through dialysis and he has not had any issues. Today he has an excellent hemoglobin.   2. The patient has had leukopenia and thrombocytopenia on the basis of passive congestion associated with splenomegaly, portal hypertension and cirrhosis of the liver. These numbers remain about the same and I do not expect that they will ever change. He will remain in observation from this point of view.   3. Previous remote history of bone marrow testing suggesting myelodysplasia. I do not believe that diagnosis. The reason why not is because he has had this bone marrow test done more than 10 years ago. His  white count differential never has changed. The hemoglobin has remained stable. The platelet count has remained stable and doubt that somebody will have myelodysplasia for 10 years and not have any major modifications in his hematological parameters. Therefore, to me that diagnosis was a fluke and that bone marrow was done in [facility] when the patient used to be seen by another hematologist.     The patient was reviewed by Telemedicine on 02/25/2021. He sounds terrific. He is full of energy. He has a good appetite, stable weight. His fistula for dialysis is working fine. He has not had any bleeding or thrombosis. No recent infections of any nature. His white count is 4000, hemoglobin is 11, platelet count is 100,000. Those numbers are terrific for him knowing that he has splenomegaly sequestration, fatty liver infiltration.     I do believe that the patient will be glad to come back and check us by VideoMedicine or Telemedicine in 6 months. Otherwise, no other intervention from my point of view.           Total duration of the phone visit 10 MINUTES      I DISCUSSED WITH PATIENT IN DETAIL FORMS TO DECREASE CHANCES OF CORONAVIRUS INFECTION INCLUDING ISOLATION, PROPER HAND HIGIENE, AVOID PUBLIC PLACES  WITH CROWDS, FOLLOW  CDC RECOMENDATIONS, AND KEEP PERSONAL AND SOCIAL RESPONSIBILITY, WARE A MASK IN PUBLIC PLACES.  PATIENT IS AWARE THIS INFECTION COULD HAVE SEVERE CONSEQUENCES TO PERSONAL HEALTH AND FAMILY RAMIFICATIONS OF THIS.                     2/25/2021      CC:

## 2021-02-25 NOTE — TELEPHONE ENCOUNTER
Pt had labs drawn at his dialysis clinic.  They were to fax results to Dr. Mckenzie. (Jan and Feb)  He wants to be sure that Dr. Mckenzie has these results for his telephone visit this afternoon.    [phone]

## 2021-03-02 ENCOUNTER — IMMUNIZATION (OUTPATIENT)
Dept: VACCINE CLINIC | Facility: HOSPITAL | Age: 76
End: 2021-03-02

## 2021-03-02 PROCEDURE — 0002A: CPT | Performed by: INTERNAL MEDICINE

## 2021-03-02 PROCEDURE — 91300 HC SARSCOV02 VAC 30MCG/0.3ML IM: CPT | Performed by: INTERNAL MEDICINE

## 2021-04-10 NOTE — PLAN OF CARE
Problem: Respiratory Insufficiency (Adult)  Goal: Acid/Base Balance  Outcome: Ongoing (interventions implemented as appropriate)    Goal: Effective Ventilation  Outcome: Ongoing (interventions implemented as appropriate)      Problem: Fall Risk (Adult)  Goal: Absence of Falls  Outcome: Ongoing (interventions implemented as appropriate)         Pregnancy

## 2021-04-15 ENCOUNTER — TELEPHONE (OUTPATIENT)
Dept: ONCOLOGY | Facility: CLINIC | Age: 76
End: 2021-04-15

## 2021-04-15 NOTE — TELEPHONE ENCOUNTER
Called patients wife back after reviewing with Dr. Mckenzie to let her know that he will be ok to receive the hep b booster. She v/u. No further questions.

## 2021-04-15 NOTE — TELEPHONE ENCOUNTER
Caller: AI    Relationship: WIFE    Best call back number: [phone]    What is the best time to reach you: ANYTIME    What was the call regarding: GAEL HAD BLOOD WORK DONE AT THE DIALYSIS CLINIC IN MARCH WHICH THEY FOUND HIS ANTIBODIES WERE LOW SO THEY'D LIKE TO GIVE HIM A HEPATITIS B BOOSTER VACCINE. SHE'S WANTING TO KNOW WHAT DR COBIAN THINKS ABOUT HIM GETTING THE BOOSTER SINCE HE HAD HIS 2ND COVID PFIZER VACCINE ON 03/02    Do you require a callback: YES

## 2021-05-06 ENCOUNTER — OFFICE VISIT (OUTPATIENT)
Dept: FAMILY MEDICINE CLINIC | Facility: CLINIC | Age: 76
End: 2021-05-06

## 2021-05-06 VITALS
RESPIRATION RATE: 17 BRPM | WEIGHT: 199.5 LBS | BODY MASS INDEX: 28.56 KG/M2 | DIASTOLIC BLOOD PRESSURE: 74 MMHG | HEIGHT: 70 IN | OXYGEN SATURATION: 99 % | SYSTOLIC BLOOD PRESSURE: 134 MMHG | TEMPERATURE: 96.9 F | HEART RATE: 74 BPM

## 2021-05-06 DIAGNOSIS — E11.29 TYPE 2 DIABETES MELLITUS WITH OTHER DIABETIC KIDNEY COMPLICATION (HCC): ICD-10-CM

## 2021-05-06 DIAGNOSIS — I10 ESSENTIAL (PRIMARY) HYPERTENSION: ICD-10-CM

## 2021-05-06 DIAGNOSIS — E78.1 PURE HYPERTRIGLYCERIDEMIA: ICD-10-CM

## 2021-05-06 DIAGNOSIS — Z00.00 MEDICARE ANNUAL WELLNESS VISIT, SUBSEQUENT: Primary | ICD-10-CM

## 2021-05-06 PROBLEM — E53.8 DEFICIENCY OF OTHER SPECIFIED B GROUP VITAMINS: Status: ACTIVE | Noted: 2018-01-19

## 2021-05-06 PROBLEM — G47.30 SLEEP APNEA, UNSPECIFIED: Status: ACTIVE | Noted: 2018-01-18

## 2021-05-06 PROBLEM — Z87.442 HISTORY OF URINARY STONE: Status: ACTIVE | Noted: 2018-01-18

## 2021-05-06 PROBLEM — I48.91 UNSPECIFIED ATRIAL FIBRILLATION: Status: ACTIVE | Noted: 2018-01-19

## 2021-05-06 PROBLEM — I31.39 PERICARDIAL EFFUSION (NONINFLAMMATORY): Status: ACTIVE | Noted: 2018-01-18

## 2021-05-06 PROBLEM — K50.90 CROHN'S DISEASE, UNSPECIFIED, WITHOUT COMPLICATIONS: Status: ACTIVE | Noted: 2018-01-18

## 2021-05-06 PROBLEM — H02.839 DERMATOCHALASIS OF EYELID: Status: ACTIVE | Noted: 2021-05-05

## 2021-05-06 PROBLEM — N25.81 SECONDARY HYPERPARATHYROIDISM OF RENAL ORIGIN (HCC): Status: ACTIVE | Noted: 2018-01-18

## 2021-05-06 PROBLEM — Z23 ENCOUNTER FOR IMMUNIZATION: Status: ACTIVE | Noted: 2018-02-19

## 2021-05-06 PROBLEM — T78.40XA ALLERGY, UNSPECIFIED, INITIAL ENCOUNTER: Status: ACTIVE | Noted: 2020-10-09

## 2021-05-06 PROBLEM — R16.1 SPLENOMEGALY, NOT ELSEWHERE CLASSIFIED: Status: ACTIVE | Noted: 2018-01-18

## 2021-05-06 PROBLEM — H26.40 SECONDARY CATARACT: Status: ACTIVE | Noted: 2021-05-05

## 2021-05-06 PROBLEM — E21.3 HYPERPARATHYROIDISM, UNSPECIFIED: Status: ACTIVE | Noted: 2018-01-19

## 2021-05-06 PROBLEM — E83.39 OTHER DISORDERS OF PHOSPHORUS METABOLISM: Status: ACTIVE | Noted: 2021-02-11

## 2021-05-06 PROBLEM — N25.0 RENAL OSTEODYSTROPHY: Status: ACTIVE | Noted: 2018-01-18

## 2021-05-06 PROBLEM — D50.8 OTHER IRON DEFICIENCY ANEMIAS: Status: ACTIVE | Noted: 2018-02-10

## 2021-05-06 PROBLEM — T78.2XXA ANAPHYLACTIC SHOCK, UNSPECIFIED, INITIAL ENCOUNTER: Status: ACTIVE | Noted: 2020-10-09

## 2021-05-06 PROBLEM — L30.9 DERMATITIS, UNSPECIFIED: Status: ACTIVE | Noted: 2018-01-18

## 2021-05-06 PROBLEM — H40.1132 PRIMARY OPEN-ANGLE GLAUCOMA, BILATERAL, MODERATE STAGE: Status: ACTIVE | Noted: 2021-05-05

## 2021-05-06 PROBLEM — E44.0 MODERATE PROTEIN-CALORIE MALNUTRITION (HCC): Status: ACTIVE | Noted: 2020-12-16

## 2021-05-06 PROBLEM — H35.372 PUCKERING OF MACULA, LEFT EYE: Status: ACTIVE | Noted: 2021-05-05

## 2021-05-06 PROBLEM — I13.10 HYPERTENSIVE HEART AND CHRONIC KIDNEY DISEASE WITHOUT HEART FAILURE, WITH STAGE 1 THROUGH STAGE 4 CHRONIC KIDNEY DISEASE, OR UNSPECIFIED CHRONIC KIDNEY DISEASE: Status: ACTIVE | Noted: 2019-10-18

## 2021-05-06 PROBLEM — Z96.1 BILATERAL PSEUDOPHAKIA: Status: ACTIVE | Noted: 2021-05-05

## 2021-05-06 PROCEDURE — G0439 PPPS, SUBSEQ VISIT: HCPCS | Performed by: FAMILY MEDICINE

## 2021-05-06 RX ORDER — FEBUXOSTAT 40 MG/1
40 TABLET, FILM COATED ORAL DAILY
Qty: 30 TABLET | Refills: 5 | Status: SHIPPED | OUTPATIENT
Start: 2021-05-06 | End: 2022-01-17

## 2021-08-25 ENCOUNTER — TELEPHONE (OUTPATIENT)
Dept: ONCOLOGY | Facility: CLINIC | Age: 76
End: 2021-08-25

## 2021-08-25 NOTE — TELEPHONE ENCOUNTER
Caller: Tiago Landry    Relationship: Emergency Contact    Best call back number: [phone]    IF CANT GET THRU PHONE   CAN CALL CELL [phone]     What is the best time to reach you:   ANYTIME    Who are you requesting to speak with (clinical staff, provider,  specific staff member): DR COBIAN OR NURSE    Do you know the name of the person who called: TIAGO (HUGH)    What was the call regarding:   HAD THE PAST 6 MONTHS OF BLOOD WORK SENT TO DR COBIAN OFFICE  FROM [employer], WANTED TO CHECK MAKES SURE WAS RECEIVED, DID NOT GET SENT OVER UNTIL 08/23 MON OF THIS WEEK OR Tuesday 08/24. WANTED TO MAKE SURE HAD PRIOR TO TOMORROWS APPT    Do you require a callback: YES

## 2021-08-25 NOTE — TELEPHONE ENCOUNTER
Patient wife wanting to know is latest labs have been scanned in for past 4 months  I instructed her that the labs have been scanned in up to the end of July  She V/U

## 2021-08-26 ENCOUNTER — OFFICE VISIT (OUTPATIENT)
Dept: ONCOLOGY | Facility: CLINIC | Age: 76
End: 2021-08-26

## 2021-08-26 DIAGNOSIS — D69.6 THROMBOCYTOPENIA (HCC): Primary | ICD-10-CM

## 2021-08-26 DIAGNOSIS — D50.8 OTHER IRON DEFICIENCY ANEMIAS: ICD-10-CM

## 2021-08-26 DIAGNOSIS — R16.1 SPLENOMEGALY, NOT ELSEWHERE CLASSIFIED: ICD-10-CM

## 2021-08-26 PROBLEM — E44.0 MODERATE PROTEIN-CALORIE MALNUTRITION (HCC): Status: RESOLVED | Noted: 2020-12-16 | Resolved: 2021-08-26

## 2021-08-26 PROCEDURE — 99214 OFFICE O/P EST MOD 30 MIN: CPT | Performed by: INTERNAL MEDICINE

## 2021-08-26 NOTE — PROGRESS NOTES
Subjective  REASONS FOR FOLLOWUP:    1. 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  Since discontinuation of anticoagulants the patient's hemoglobin has remained normal. The patient has associated leukopenia and thrombocytopenia due to chronic splenomegaly and remote history of bone marrow testing that suggested BUT NOT CONFIRMED myelodysplasia. Under the present circumstances neither the white count nor platelets are changing and hemoglobin remains stable. There is no abnormality in the differential of his white count. There are no symptoms or signs that would suggest any further progression into myelodysplasia. The patient will remain in observation. ALL THIS IN THE BACKGROUND OF CIRRHOSIS AND SPLENOMEGALY.           History of Present Illness     I HAVE CALLED THIS PATIENT ON THE PHONE TODAY AND VERBALLY HAS CONSENTED ABOUT TELEMEDICINE VISIT       This patient has been reviewed today by Telemedicine. They have been very concerned given the fact that his white count seems to be dropping but unfortunately his dialysis center has not seen us those laboratory parameters. He says that his white count is around 3.1 in the last month or so every week. The platelet count has remained stable around 90,000 to 100,000. He has not had any fever or infections. He is not taking any medicines or alcohol that would be suppressing the production of white cells. He is not having any joint pain or bone pain. Dialysis is ongoing with no inconveniences. His appetite is good. His weight is stable. His bowel function is normal. Urination with minimal volume.     He has not noticed any changes in his overall performance status. He has had new boosters for hepatitis B vaccination. His level of antibody was not appropriate.        Past Medical  History:   Diagnosis Date   • Abnormal serum protein electrophoresis    • Anemia     Multifactorial   • Chronic leukopenia and thrombocytopenia    • Cirrhosis of liver without ascites (CMS/HCC) 7/24/2017   • Congestive splenomegaly 7/24/2017   • Crohn disease (CMS/HCC)     with ileostomy   • Diastolic CHF (CMS/HCC)     although it was likely from volume overload due to ESRD   • Diverticula of colon    • ESRD on hemodialysis (CMS/HCC)    • Fatty liver disease, nonalcoholic    • GERD (gastroesophageal reflux disease)    • GI bleed    • Glaucoma    • H/O colectomy    • H/O Gallstone pancreatitis    • H/O Pericardial effusion    • H/O sinus bradycardia    • History of hypertension     resolved   • Hx of renal calculi    • Hyperlipidemia    • Ileostomy present (CMS/HCC)    • Iron deficiency    • MGUS (monoclonal gammopathy of unknown significance)    • Permanent atrial fibrillation (CMS/HCC)    • Sleep apnea     CPAP USED   • Thrombocytopenia (CMS/HCC)    • Type 2 diabetes mellitus (CMS/HCC)      Social History     Socioeconomic History   • Marital status:      Spouse name: Gillian   • Number of children: 2   • Years of education: College   • Highest education level: Not on file   Tobacco Use   • Smoking status: Never Smoker   • Smokeless tobacco: Never Used   Substance and Sexual Activity   • Alcohol use: No     Comment: caffeine use: none   • Drug use: No   • Sexual activity: Defer     Family History   Problem Relation Age of Onset   • Heart failure Mother    • Hypertension Mother    • Heart disease Mother    • Hyperlipidemia Mother    • Hypertension Father    • Malig Hyperthermia Neg Hx      Current Outpatient Medications on File Prior to Visit   Medication Sig Dispense Refill   • aspirin 81 MG tablet Take 81 mg by mouth Daily. To stop 5 days prior to surgery     • B Complex-C-Folic Acid (EMILIE-FREDDY) tablet Take 1 tablet by mouth Daily. 0.8 mg  3   • Calcium Citrate (CALCITRATE PO) Take  by mouth. Takes at  dialysis     • Cholecalciferol (VITAMIN D3) 1000 units capsule Take  by mouth Daily.     • famotidine (PEPCID) 10 MG tablet Take 10 mg by mouth As Needed for Heartburn.     • febuxostat (ULORIC) 40 MG tablet Take 1 tablet by mouth Daily. 30 tablet 5   • Iron Sucrose (VENOFER IV) Infuse  into a venous catheter As Needed.     • latanoprost (XALATAN) 0.005 % ophthalmic solution Administer 1 drop to both eyes Every Night. 1 drop each eye      • lidocaine-prilocaine (EMLA) 2.5-2.5 % cream APPLY A SMALL AMOUNT TO DIALYSIS ACCESS SITE 1 HOUR PRIOR AND COVER WITH AN OCCLUSIVE DRESSING  3   • Loratadine (CLARITIN PO) Take  by mouth As Needed. Pt states he is uncertain of dosage     • Methoxy PEG-Epoetin Beta (MIRCERA IJ) 30 mcg Every 28 (Twenty-Eight) Days.     • Sucroferric Oxyhydroxide (VELPHORO PO) Take 2 tablets by mouth 3 (Three) Times a Day.       No current facility-administered medications on file prior to visit.                ONCOLOGIC/HEMATOLOGIC HISTORY     On 04/06/2015 the patient’s clinical status has dramatically improved.  He has not had any new episodes of infection, congestive heart failure, GI bleeding, with excellent improvement in hemoglobin.  The patient has been able to walk longer distances without shortness of breath.   He has been able to climb steps without shortness of breath and he feels dramatically better.  Hemoglobin has been stable for the past few weeks at 12.1.  His white count and platelet count remain on the low side with no infection or clinical bleeding.  We found documentation of his bone marrow biopsy performed in 2002 and read at the [facility].  Explicitly it was documented that the patient could have myelodysplastic syndrome.  Procrit, as I pointed out to the patient, is a useful medicine to treat this condition anyway, and given the fact that in 13 years his white count and his platelet count have not changed, makes this diagnosis dubious.  Reading bone marrows for  myelodysplasia is one of the most difficult tasks in Hematology.  I suggested to him at some point, if his blood count change, specifically white count modifies or platelet count modifies, to consider repeating the bone marrow testing, flow cytometry and chromosomes.               ALLERGIES:    Allergies   Allergen Reactions   • Ace Inhibitors Other (See Comments)     RENAL FAILURE/ raised creatinine   • Contrast Dye Other (See Comments)     RENAL FAILURE   • Eliquis [Apixaban] Arrhythmia     BLEEDING ISSUES; PT CANNOT TAKE ANY ANTICOAGULANTS DUE TO LOW PLATELETS EXCEPT ASPIRIN  BLEEDING ISSUES; PT CANNOT TAKE ANY ANTICOAGULANTS DUE TO LOW PLATELETS EXCEPT ASPIRIN   • Granix [Filgrastim] GI Intolerance     Chest pain  Chest pain     • Keflex [Cephalexin] Diarrhea     RENAL FAILURE       Objective      There were no vitals filed for this visit.  Current Status 10/31/2019   ECOG score 0     Physical Exam      none                  RECENT LABS:requested                  Assessment/Plan  : This patient has history of multifactorial anemia associated with chronic renal disease and also chronic gastrointestinal blood loss when he used to have anticoagulation in the background of atrial fibrillation. Since anticoagulation has been discontinued and the GI bleeding has stopped, the patient’s hemoglobin has remained very stable. He continues receiving Procrit through dialysis and he has not had any issues. Today he has an excellent hemoglobin.   As far as I can tell on 08/26/2021 the last hemoglobin that we are aware of was on 07/26/2021 and was 10.6. I am sure that Dr. Monsivais, his nephrologist, is giving him IV iron and also Procrit or equivalent. We will ask the local dialysis center to provide laboratory testing for us in the next month or so.    ·   2. The patient has had leukopenia and thrombocytopenia on the basis of passive congestion associated with splenomegaly, portal hypertension and cirrhosis of the liver. These  numbers remain about the same and I do not expect that they will ever change. He will remain in observation from this point of view.   The patient is very concerned about leukopenia that he has had before and now it seems to be a little bit worse. We have requested this information. He is not having any infection and neither is he taking any medicine that will trigger this phenomenon. I would not be surprised if we need to see him in the office in a couple of weeks in order to review his status and review his peripheral blood. I would not be surprised either if we need to pursue an ultrasound of his abdomen in order to review the status of his splenomegaly.    ·   3. Previous remote history of bone marrow testing suggesting myelodysplasia. I do not believe that diagnosis. The reason why not is because he has had this bone marrow test done more than 10 years ago. His white count differential never has changed. The hemoglobin has remained stable. The platelet count has remained stable and doubt that somebody will have myelodysplasia for 10 years and not have any major modifications in his hematological parameters. Therefore, to me that diagnosis was a fluke and that bone marrow was done in [facility] when the patient used to be seen by another hematologist.   Given the recent leukopenia and the modification of his white count I think I will require to see him in the office in a couple of weeks in order to see where we are and how to proceed thereafter. He has had bone marrow test a long time ago as stated above and maybe we will need to do further analysis depending on what the numbers look like.      We will collect the information from his dialysis center in regard to his laboratory parameters but no matter what I will need to review him in the office in 2 weeks in order to proceed with laboratory testing here, peripheral blood examination and go from there.     I discussed all these facts with the patient  and also most importantly with his wife who was present in 3 different locations during the visit today.    Laboratory tests requested for the visit in 2 weeks will include a CBC with differential, serum protein immunoelectrophoresis, immunofixation, B12 and folic acid levels as well as quantitative immunoglobulins.    LONG DISCUSSION ABOUT COVID VACCINATION BOOSTER TOOK PLACE AND CONVERSATION ABOUT IMMUNOSUPPRESSION, I POINT OUT HE IS IMMUNOSUPPRESSED BECAUSE HIS CHRONIC KIDNEY DISEASE.    DURATION OF THE PHONE VISIT 25 MINUTES                  8/26/2021      CC:

## 2021-09-20 ENCOUNTER — TELEPHONE (OUTPATIENT)
Dept: ONCOLOGY | Facility: CLINIC | Age: 76
End: 2021-09-20

## 2021-09-20 ENCOUNTER — TELEPHONE (OUTPATIENT)
Dept: GENERAL RADIOLOGY | Facility: HOSPITAL | Age: 76
End: 2021-09-20

## 2021-09-20 NOTE — TELEPHONE ENCOUNTER
----- Message from Sharif Mckenzie MD sent at 9/19/2021  1:30 PM EDT -----  CALL HIM I REVIEWED ALL HIS LAB FROM [facility], THEY LOOK GREAT, NO NEED TO REPEAT ANY OTHER FOR NOW

## 2021-09-20 NOTE — TELEPHONE ENCOUNTER
----- Message from Mellissa Kyle RN sent at 9/20/2021 10:29 AM EDT -----  Please change appt on Tuesday to Televisit.    Thank you

## 2021-09-20 NOTE — TELEPHONE ENCOUNTER
Reviewed Dr Mckenzie note with pt's wife. She v/u. She requested Tuesday appt be changed to televisit. Message to appt desk to change appt.

## 2021-09-21 NOTE — TELEPHONE ENCOUNTER
Caller: TIAGO    Relationship to patient: WIFE    Best call back number: [phone] (HOME) [phone] (CELL)    Patient is needing: TO MAKE SURE THIS APPT ON 9- IS A TELEPHONE VISIT. HE DOES NOT HAVE MYCHART.

## 2021-09-21 NOTE — TELEPHONE ENCOUNTER
Called pt back to let them know that this would be a telephone visit as pt does not have a mychart set up.

## 2021-09-23 ENCOUNTER — OFFICE VISIT (OUTPATIENT)
Dept: CARDIOLOGY | Facility: CLINIC | Age: 76
End: 2021-09-23

## 2021-09-23 VITALS
HEIGHT: 70 IN | HEART RATE: 93 BPM | BODY MASS INDEX: 27.8 KG/M2 | SYSTOLIC BLOOD PRESSURE: 172 MMHG | WEIGHT: 194.2 LBS | DIASTOLIC BLOOD PRESSURE: 70 MMHG

## 2021-09-23 DIAGNOSIS — N18.6 ESRD (END STAGE RENAL DISEASE) (HCC): ICD-10-CM

## 2021-09-23 DIAGNOSIS — I10 ESSENTIAL (PRIMARY) HYPERTENSION: ICD-10-CM

## 2021-09-23 DIAGNOSIS — I48.21 PERMANENT ATRIAL FIBRILLATION (HCC): Primary | ICD-10-CM

## 2021-09-23 PROBLEM — T78.2XXA ANAPHYLACTIC SHOCK, UNSPECIFIED, INITIAL ENCOUNTER: Status: RESOLVED | Noted: 2020-10-09 | Resolved: 2021-09-23

## 2021-09-23 PROCEDURE — 93000 ELECTROCARDIOGRAM COMPLETE: CPT | Performed by: INTERNAL MEDICINE

## 2021-09-23 PROCEDURE — 99214 OFFICE O/P EST MOD 30 MIN: CPT | Performed by: INTERNAL MEDICINE

## 2021-09-23 RX ORDER — ATENOLOL 25 MG/1
25 TABLET ORAL DAILY
Qty: 90 TABLET | Refills: 1 | Status: SHIPPED | OUTPATIENT
Start: 2021-09-23 | End: 2022-02-15 | Stop reason: HOSPADM

## 2021-09-23 NOTE — PROGRESS NOTES
Date of Office Visit: 2021  Encounter Provider: Dale Vázquez MD  Place of Service: [facility] CARDIOLOGY  Patient Name: Bill Landry  :1945    Chief Complaint   Patient presents with   • Atrial Fibrillation   :     HPI: Bill Landry is a 75 y.o. male who presents today to follow up.  He established care with me in 2018. I have reviewed prior notes and there are no changes except for any new updates described below. I have also reviewed any information entered into the medical record by the patient or by ancillary staff.     He has numerous chronic medical conditions.  He has Crohns and had a colectomy/ileostomy in .  He has ESRD which is multifactorial (Crohns, huge kidney stones, hypertension).  He is on hemodialysis.  He has chronic leukopenia, anemia, and thrombocytopenia.  He has permanent atrial fibrillation and has a history of a slow GI bleed when anticoagulated.  He has never had a TIA or stroke.  He previously was diagnosed with diastolic CHF and pulmonary hypertension, but it would seem that this was in the setting of volume overload from CKD prior to being started on dialysis.  Since this occurred, he has had no problems with this, and his RVSP has normalized.      An echo in May 2018 showed normal LVSF, 55-60%, mild RVE, normal RVSP, and no significant valvular disease (there was aortic sclerosis).    He feels well.  He denies chest pain, dyspnea, orthopnea, edema, palpitations, or syncope. His BP has been creeping up, though, which concerns him.     Past Medical History:   Diagnosis Date   • Abnormal serum protein electrophoresis    • Anemia     Multifactorial   • Chronic leukopenia and thrombocytopenia    • Cirrhosis of liver without ascites (CMS/HCC) 2017   • Congestive splenomegaly 2017   • Crohn disease (CMS/HCC)     with ileostomy   • Diastolic CHF (CMS/HCC)     although it was likely from volume overload due to ESRD   •  Diverticula of colon    • ESRD on hemodialysis (CMS/HCC)    • Fatty liver disease, nonalcoholic    • GERD (gastroesophageal reflux disease)    • GI bleed    • Glaucoma    • H/O colectomy    • H/O Gallstone pancreatitis    • H/O Pericardial effusion    • H/O sinus bradycardia    • History of hypertension     resolved   • Hx of renal calculi    • Hyperlipidemia    • Ileostomy present (CMS/HCC)    • Iron deficiency    • MGUS (monoclonal gammopathy of unknown significance)    • Permanent atrial fibrillation (CMS/HCC)    • Sleep apnea     CPAP USED   • Thrombocytopenia (CMS/HCC)    • Type 2 diabetes mellitus (CMS/HCC)        Past Surgical History:   Procedure Laterality Date   • APPENDECTOMY     • ARTERIOVENOUS FISTULA/SHUNT SURGERY Left 8/8/2017    Procedure: LEFT BRACHIAL CEPHALIC AV FISTULA FORMATION WITH CEPHALIC VEIN TRANSPOSITION ;  Surgeon: Bill Deal MD;  Location: [facility] OR;  Service:    • CHOLECYSTECTOMY     • COLECTOMY PARTIAL / TOTAL      History of inflammatory bowel disease with status post colectomy with ileostomy many years ago in the [facility]   • COLON RESECTION WITH COLOSTOMY     • COLON SURGERY     • COLONOSCOPY     • CYSTOSCOPY LITHOLAPAXY BLADDER STONE EXTRACTION     • ENDOSCOPY  01/16/2015    gastritis   • ENDOSCOPY  1/17/2018    Procedure: ESOPHAGOGASTRODUODENOSCOPY;  Surgeon: Warner Neville MD;  Location: [facility] ENDOSCOPY;  Service:    • ILEOSCOPY  01/16/2015    normal   • ILEOSCOPY N/A 1/17/2018    Procedure: ILEOSCOPY;  Surgeon: Warner Neville MD;  Location: [facility] ENDOSCOPY;  Service:        Social History     Socioeconomic History   • Marital status:      Spouse name: Gillian   • Number of children: 2   • Years of education: College   • Highest education level: Not on file   Tobacco Use   • Smoking status: Never Smoker   • Smokeless tobacco: Never Used   Vaping Use   • Vaping Use: Never used   Substance and Sexual Activity   • Alcohol use: No     Comment: caffeine  use: none   • Drug use: No   • Sexual activity: Defer       Family History   Problem Relation Age of Onset   • Heart failure Mother    • Hypertension Mother    • Heart disease Mother    • Hyperlipidemia Mother    • Hypertension Father    • Malig Hyperthermia Neg Hx        Review of Systems   Cardiovascular: Negative for chest pain and palpitations.   Respiratory: Positive for snoring. Negative for shortness of breath.    Neurological: Negative for dizziness and light-headedness.   All other systems reviewed and are negative.      Allergies   Allergen Reactions   • Ace Inhibitors Other (See Comments)     RENAL FAILURE/ raised creatinine   • Contrast Dye Other (See Comments)     RENAL FAILURE   • Iodine Anaphylaxis   • Angiotensin Receptor Blockers Swelling   • Eliquis [Apixaban] Arrhythmia     BLEEDING ISSUES; PT CANNOT TAKE ANY ANTICOAGULANTS DUE TO LOW PLATELETS EXCEPT ASPIRIN  BLEEDING ISSUES; PT CANNOT TAKE ANY ANTICOAGULANTS DUE TO LOW PLATELETS EXCEPT ASPIRIN   • Granix [Filgrastim] GI Intolerance     Chest pain  Chest pain     • Keflex [Cephalexin] Diarrhea     RENAL FAILURE         Current Outpatient Medications:   •  aspirin 81 MG tablet, Take 81 mg by mouth Daily. To stop 5 days prior to surgery, Disp: , Rfl:   •  B Complex-C-Folic Acid (EMILIE-FREDDY) tablet, Take 1 tablet by mouth Daily. 0.8 mg, Disp: , Rfl: 3  •  Calcium Citrate (CALCITRATE PO), Take  by mouth. Takes at dialysis, Disp: , Rfl:   •  Cholecalciferol (VITAMIN D3) 1000 units capsule, Take  by mouth Daily., Disp: , Rfl:   •  famotidine (PEPCID) 10 MG tablet, Take 10 mg by mouth As Needed for Heartburn., Disp: , Rfl:   •  febuxostat (ULORIC) 40 MG tablet, Take 1 tablet by mouth Daily., Disp: 30 tablet, Rfl: 5  •  Iron Sucrose (VENOFER IV), Infuse  into a venous catheter As Needed., Disp: , Rfl:   •  latanoprost (XALATAN) 0.005 % ophthalmic solution, Administer 1 drop to both eyes Every Night. 1 drop each eye , Disp: , Rfl:   •  lidocaine-prilocaine  "(EMLA) 2.5-2.5 % cream, APPLY A SMALL AMOUNT TO DIALYSIS ACCESS SITE 1 HOUR PRIOR AND COVER WITH AN OCCLUSIVE DRESSING, Disp: , Rfl: 3  •  Loratadine (CLARITIN PO), Take  by mouth As Needed. Pt states he is uncertain of dosage, Disp: , Rfl:   •  Methoxy PEG-Epoetin Beta (MIRCERA IJ), 30 mcg Every 28 (Twenty-Eight) Days., Disp: , Rfl:   •  Sucroferric Oxyhydroxide (VELPHORO PO), Take 2 tablets by mouth 3 (Three) Times a Day., Disp: , Rfl:   •  atenolol (TENORMIN) 25 MG tablet, Take 1 tablet by mouth Daily., Disp: 90 tablet, Rfl: 1      Objective:     Vitals:    09/23/21 0912   BP: 172/70   BP Location: Right arm   Pulse: 93   Weight: 88.1 kg (194 lb 3.2 oz)   Height: 178 cm (70.08\")     Body mass index is 27.8 kg/m².    Physical Exam  Vitals reviewed.   Constitutional:       Appearance: He is well-developed.   HENT:      Head: Normocephalic.      Nose: Nose normal.   Eyes:      Conjunctiva/sclera: Conjunctivae normal.   Neck:      Vascular: No JVD.   Cardiovascular:      Rate and Rhythm: Normal rate. Rhythm irregularly irregular.      Pulses: Normal pulses and intact distal pulses.      Heart sounds: Murmur heard.   Systolic murmur is present with a grade of 1/6 at the upper left sternal border.     Pulmonary:      Effort: Pulmonary effort is normal.      Breath sounds: Normal breath sounds.   Abdominal:      Palpations: Abdomen is soft.      Tenderness: There is no abdominal tenderness.      Comments: Ostomy in place   Musculoskeletal:         General: Normal range of motion.      Cervical back: Normal range of motion.   Lymphadenopathy:      Cervical: No cervical adenopathy.   Skin:     General: Skin is warm and dry.      Findings: No rash.   Neurological:      General: No focal deficit present.      Mental Status: He is alert and oriented to person, place, and time.      Cranial Nerves: No cranial nerve deficit.   Psychiatric:         Mood and Affect: Mood normal.         Behavior: Behavior normal.         Thought " Content: Thought content normal.           ECG 12 Lead    Date/Time: 9/23/2021 12:37 PM  Performed by: Dale Vázquez MD  Authorized by: Dale Vázquez MD   Comparison: compared with previous ECG   Similar to previous ECG  Rhythm: atrial fibrillation  Conduction: conduction normal  ST Segments: ST segments normal  T Waves: T waves normal  QRS axis: normal  Other findings: left ventricular hypertrophy    Clinical impression: abnormal EKG              Assessment:       Diagnosis Plan   1. Permanent atrial fibrillation (CMS/HCC)     2. Essential (primary) hypertension     3. ESRD (end stage renal disease) (CMS/HCC)            Plan:       1.  Atrial Fibrillation and Atrial Flutter  Assessment  • The patient has permanent atrial fibrillation  • This is non-valvular in etiology  • The patient's CHADS2-VASc score is 4  • A [id number] score of 2 or more is considered a high risk for a thromboembolic event    Plan  • Continue in atrial fibrillation with rate control    He is rate controlled on his own.  He cannot be anticoagulated due to GI AVM/GI bleeding/thrombocytopenia.  A left atrial occluder has been considered, but it is not felt to be safe to have him on DAPT or anticoagulation for even the short while after the procedure.  He knows to call 911 for ANY neurological symptoms.    2/3. His BP is too high.  I started atenolol 25mg daily.     Sincerely,       Dale Vázquez MD

## 2021-09-28 ENCOUNTER — TELEPHONE (OUTPATIENT)
Dept: CARDIOLOGY | Facility: CLINIC | Age: 76
End: 2021-09-28

## 2021-09-28 ENCOUNTER — APPOINTMENT (OUTPATIENT)
Dept: LAB | Facility: HOSPITAL | Age: 76
End: 2021-09-28

## 2021-09-28 ENCOUNTER — TELEMEDICINE (OUTPATIENT)
Dept: ONCOLOGY | Facility: CLINIC | Age: 76
End: 2021-09-28

## 2021-09-28 DIAGNOSIS — D73.2 CONGESTIVE SPLENOMEGALY: ICD-10-CM

## 2021-09-28 DIAGNOSIS — D69.6 THROMBOCYTOPENIA (HCC): Primary | ICD-10-CM

## 2021-09-28 DIAGNOSIS — D50.8 OTHER IRON DEFICIENCY ANEMIA: ICD-10-CM

## 2021-09-28 DIAGNOSIS — K74.60 CIRRHOSIS OF LIVER WITHOUT ASCITES, UNSPECIFIED HEPATIC CIRRHOSIS TYPE (HCC): ICD-10-CM

## 2021-09-28 DIAGNOSIS — D61.818 ACQUIRED PANCYTOPENIA (HCC): ICD-10-CM

## 2021-09-28 DIAGNOSIS — R16.1 SPLENOMEGALY, NOT ELSEWHERE CLASSIFIED: ICD-10-CM

## 2021-09-28 PROCEDURE — 99214 OFFICE O/P EST MOD 30 MIN: CPT | Performed by: INTERNAL MEDICINE

## 2021-09-28 NOTE — TELEPHONE ENCOUNTER
Patient called this evening---recently prescribed atenolol 25 mg daily---did not start it until today because did not want to start it on a dialysis day---did not check b/p prior to taking but has checked it multiple times since taking dose this morning and b/p was initially about 130 systolic last several readings 107-108 systolic---he feels fine--maybe a little tired this afternoon but they were concerned about him taking it tomorrow and going to dialysis, she had called a left a message with Dr. Vázquez's MA earlier but had not heard back. Told them it was fine to hold until they heard back from Dr. Vázquez.     She also mentioned that HR was 40-50's but again no dizziness or near syncope.     She also said they failed to mention that he was having some trouble with CPAP and Dr. Moseley is evaluating and she wonders if they may have been part of why his b/p was elevated when he was seen.     Told her to hold am atenolol and I would forward message to Dr. Vázquez.

## 2021-09-28 NOTE — TELEPHONE ENCOUNTER
Received the message.  Pt's wife states all in previous encounter.  She also states that the CPAP issue has been corrected and they fix his machine.  It wasn't working properly which was increasing his bp as well.  Pt would like to know if he should continue with this medication based off these measurements.

## 2021-09-28 NOTE — PROGRESS NOTES
Subjective         REASONS FOR FOLLOWUP:   ACQUIRED PANCYTOPENIA FROM SPLENOMEGALY AND LIVER CIRRHOSIS, IRON DEF ANEMIA FROM PREVIOUS GI BLEEDING, PATIENT CAN NOT TAKE ANTICOAGULANTS DUE TO TRIGGERING OF SEVERE GI BLEEDING  I HAVE CALLED THIS PATIENT ON THE PHONE TODAY AND VERBALLY HAS CONSENTED ABOUT TELEMEDICINE VISIT   HISTORY OF PRESENT ILLNESS:  The patient is a 75 y.o. year old male  who is here for follow-up with the above-mentioned history.  The proper blood count that he has had done at the dialysis center and these numbers have been repeated and sent to me in order to review them today. Physically the only new problem that he has is hypertension. His cardiologist has suggested for him to take Atenolol, he has not started the medicine yet. He is also adjusting his CPAP machine because he is still having sleep apnea and maybe adjustment of the CPAP machine will decrease the sleep apnea and will help the blood pressure to be under better control. In any event his appetite is good, his bowel activity and urination are fine, the urine is minimal because of his chronic kidney disease. He has not had any hematuria. He has not had any clinical bleeding and he has not had any infection. He feels well, he is able to function at normality, driving the car and going with his wife to different chores. They remain isolated given the pandemia and the only place where he goes is his dialysis center. He also raised the question in regard further vaccination for COVID.         Past Medical History:   Diagnosis Date   • Abnormal serum protein electrophoresis    • Anemia     Multifactorial   • Chronic leukopenia and thrombocytopenia    • Cirrhosis of liver without ascites (CMS/HCC) 7/24/2017   • Congestive splenomegaly 7/24/2017   • Crohn disease (CMS/HCC)     with ileostomy   • Diastolic CHF (CMS/HCC)     although it was likely from volume overload due to ESRD   • Diverticula of colon    • ESRD on hemodialysis (CMS/HCC)     • Fatty liver disease, nonalcoholic    • GERD (gastroesophageal reflux disease)    • GI bleed    • Glaucoma    • H/O colectomy    • H/O Gallstone pancreatitis    • H/O Pericardial effusion    • H/O sinus bradycardia    • History of hypertension     resolved   • Hx of renal calculi    • Hyperlipidemia    • Ileostomy present (CMS/HCC)    • Iron deficiency    • MGUS (monoclonal gammopathy of unknown significance)    • Permanent atrial fibrillation (CMS/HCC)    • Sleep apnea     CPAP USED   • Thrombocytopenia (CMS/HCC)    • Type 2 diabetes mellitus (CMS/HCC)         Past Surgical History:   Procedure Laterality Date   • APPENDECTOMY     • ARTERIOVENOUS FISTULA/SHUNT SURGERY Left 8/8/2017    Procedure: LEFT BRACHIAL CEPHALIC AV FISTULA FORMATION WITH CEPHALIC VEIN TRANSPOSITION ;  Surgeon: Bill Deal MD;  Location: [facility] OR;  Service:    • CHOLECYSTECTOMY     • COLECTOMY PARTIAL / TOTAL      History of inflammatory bowel disease with status post colectomy with ileostomy many years ago in the [facility]   • COLON RESECTION WITH COLOSTOMY     • COLON SURGERY     • COLONOSCOPY     • CYSTOSCOPY LITHOLAPAXY BLADDER STONE EXTRACTION     • ENDOSCOPY  01/16/2015    gastritis   • ENDOSCOPY  1/17/2018    Procedure: ESOPHAGOGASTRODUODENOSCOPY;  Surgeon: Warner Neville MD;  Location: [facility] ENDOSCOPY;  Service:    • ILEOSCOPY  01/16/2015    normal   • ILEOSCOPY N/A 1/17/2018    Procedure: ILEOSCOPY;  Surgeon: Warner Neville MD;  Location: [facility] ENDOSCOPY;  Service:         Current Outpatient Medications on File Prior to Visit   Medication Sig Dispense Refill   • aspirin 81 MG tablet Take 81 mg by mouth Daily. To stop 5 days prior to surgery     • atenolol (TENORMIN) 25 MG tablet Take 1 tablet by mouth Daily. 90 tablet 1   • B Complex-C-Folic Acid (EMILIE-FREDDY) tablet Take 1 tablet by mouth Daily. 0.8 mg  3   • Calcium Citrate (CALCITRATE PO) Take  by mouth. Takes at dialysis     • Cholecalciferol (VITAMIN D3)  1000 units capsule Take  by mouth Daily.     • famotidine (PEPCID) 10 MG tablet Take 10 mg by mouth As Needed for Heartburn.     • febuxostat (ULORIC) 40 MG tablet Take 1 tablet by mouth Daily. 30 tablet 5   • Iron Sucrose (VENOFER IV) Infuse  into a venous catheter As Needed.     • latanoprost (XALATAN) 0.005 % ophthalmic solution Administer 1 drop to both eyes Every Night. 1 drop each eye      • lidocaine-prilocaine (EMLA) 2.5-2.5 % cream APPLY A SMALL AMOUNT TO DIALYSIS ACCESS SITE 1 HOUR PRIOR AND COVER WITH AN OCCLUSIVE DRESSING  3   • Loratadine (CLARITIN PO) Take  by mouth As Needed. Pt states he is uncertain of dosage     • Methoxy PEG-Epoetin Beta (MIRCERA IJ) 30 mcg Every 28 (Twenty-Eight) Days.     • Sucroferric Oxyhydroxide (VELPHORO PO) Take 2 tablets by mouth 3 (Three) Times a Day.       No current facility-administered medications on file prior to visit.        ALLERGIES:    Allergies   Allergen Reactions   • Ace Inhibitors Other (See Comments)     RENAL FAILURE/ raised creatinine   • Contrast Dye Other (See Comments)     RENAL FAILURE   • Iodine Anaphylaxis   • Angiotensin Receptor Blockers Swelling   • Eliquis [Apixaban] Arrhythmia     BLEEDING ISSUES; PT CANNOT TAKE ANY ANTICOAGULANTS DUE TO LOW PLATELETS EXCEPT ASPIRIN  BLEEDING ISSUES; PT CANNOT TAKE ANY ANTICOAGULANTS DUE TO LOW PLATELETS EXCEPT ASPIRIN   • Granix [Filgrastim] GI Intolerance     Chest pain  Chest pain     • Keflex [Cephalexin] Diarrhea     RENAL FAILURE        Social History     Socioeconomic History   • Marital status:      Spouse name: Gillian   • Number of children: 2   • Years of education: College   • Highest education level: Not on file   Tobacco Use   • Smoking status: Never Smoker   • Smokeless tobacco: Never Used   Vaping Use   • Vaping Use: Never used   Substance and Sexual Activity   • Alcohol use: No     Comment: caffeine use: none   • Drug use: No   • Sexual activity: Defer        Family History   Problem  Relation Age of Onset   • Heart failure Mother    • Hypertension Mother    • Heart disease Mother    • Hyperlipidemia Mother    • Hypertension Father    • Malig Hyperthermia Neg Hx           Objective     There were no vitals filed for this visit.  Current Status 10/31/2019   ECOG score 0       Physical Exam  NONE    RECENT LABS:SCANNED - LABS (09/08/2021)        Recent imaging:    No results found.     Assessment/Plan     Diagnoses and all orders for this visit:    1. Thrombocytopenia (HCC) (Primary)    2. Splenomegaly, not elsewhere classified    3. Congestive splenomegaly    4. Cirrhosis of liver without ascites, unspecified hepatic cirrhosis type (HCC)    5. Other iron deficiency anemia    6. Acquired pancytopenia (HCC)         I reviewed the recent laboratory parameters on this patient and his white count remains around 300, his platelet count was 104,000, the hemoglobin around 11.  These numbers for him are very good. He has received IV iron and his ferritin level and iron profile are appropriate at this time. He has no clinical bleeding.     He raised the question about these numbers. He knows that these numbers are related to his chronic liver disease and splenomegaly. This has been present for years and years since he has been my patient. These numbers fluctuate but never have changed substantially. I made him aware that if he has a white count less than 2000 and platelet count less than 30,000 he will need to call me immediately.    He raised the question about the Atenolol if this is prudent to take. I pointed out to him that it could be beneficial to control his blood pressure, it will be beneficial to decrease his portal pressure too and it could be beneficial to minimize in case of any atrial fibrillation, rapid ventricular response. He would like to adjust his CPAP machine first to see if he has lesser episodes of sleep apnea and if this does not correct the problem he will start the blood pressure  medication under the direction of cardiology.     Finally they raises the question him and his wife in regard to COVID vaccination booster shot. He is ready from this point of view from my situation. He has as we know patient's on dialysis chronic kidney disease are immunosuppressed, he must have his COVID vaccination booster and I asked him to go ahead and proceed with this at this time. Three to 4 weeks later he would like to proceed with his flu shot.     Otherwise we will review him by telemedicine phone visit in 6 months.    He will continue sending reports of his laboratory parameters from his dialysis center.     Duration of the phone visit 12 minutes.

## 2021-09-29 NOTE — TELEPHONE ENCOUNTER
Have them continue to hold it, and see how his BP is in the next week. If it stays controlled, then we don't need it!  If it creeps back up, we can always resume it at 1/2 tab daily.    JEYSON

## 2021-09-29 NOTE — TELEPHONE ENCOUNTER
I spoke to the pt and he is aware to hold it, and see how his BP is in the next week. If it stays controlled, then we don't need it!  If it creeps back up, we can always resume it at 1/2 tab daily.    PT #: [phone]

## 2021-11-02 ENCOUNTER — IMMUNIZATION (OUTPATIENT)
Dept: VACCINE CLINIC | Facility: HOSPITAL | Age: 76
End: 2021-11-02

## 2021-11-02 PROCEDURE — 91300 HC SARSCOV02 VAC 30MCG/0.3ML IM: CPT | Performed by: INTERNAL MEDICINE

## 2021-11-02 PROCEDURE — 0004A ADM SARSCOV2 30MCG/0.3ML BOOSTER: CPT | Performed by: INTERNAL MEDICINE

## 2022-01-17 RX ORDER — FEBUXOSTAT 40 MG/1
40 TABLET, FILM COATED ORAL DAILY
Qty: 30 TABLET | Refills: 5 | Status: SHIPPED | OUTPATIENT
Start: 2022-01-17 | End: 2022-07-28

## 2022-02-14 ENCOUNTER — TRANSCRIBE ORDERS (OUTPATIENT)
Dept: ADMINISTRATIVE | Facility: HOSPITAL | Age: 77
End: 2022-02-14

## 2022-02-14 ENCOUNTER — HOSPITAL ENCOUNTER (OUTPATIENT)
Dept: CARDIOLOGY | Facility: HOSPITAL | Age: 77
Discharge: HOME | End: 2022-02-14

## 2022-02-14 ENCOUNTER — HOSPITAL ENCOUNTER (OUTPATIENT)
Dept: GENERAL RADIOLOGY | Facility: HOSPITAL | Age: 77
Discharge: HOME | End: 2022-02-14

## 2022-02-14 ENCOUNTER — LAB (OUTPATIENT)
Dept: LAB | Facility: HOSPITAL | Age: 77
End: 2022-02-14

## 2022-02-14 DIAGNOSIS — N18.6 END STAGE RENAL DISEASE: ICD-10-CM

## 2022-02-14 DIAGNOSIS — N18.6 END STAGE RENAL DISEASE: Primary | ICD-10-CM

## 2022-02-14 DIAGNOSIS — Z01.818 PRE-OP TESTING: ICD-10-CM

## 2022-02-14 LAB
ALBUMIN SERPL-MCNC: 3.8 G/DL (ref 3.5–5.2)
ALBUMIN/GLOB SERPL: 0.8 G/DL
ALP SERPL-CCNC: 170 U/L (ref 39–117)
ALT SERPL W P-5'-P-CCNC: 16 U/L (ref 1–41)
ANION GAP SERPL CALCULATED.3IONS-SCNC: 16 MMOL/L (ref 5–15)
APTT PPP: 34.3 SECONDS (ref 22.7–35.4)
AST SERPL-CCNC: 22 U/L (ref 1–40)
BILIRUB SERPL-MCNC: 0.7 MG/DL (ref 0–1.2)
BUN SERPL-MCNC: 73 MG/DL (ref 8–23)
BUN/CREAT SERPL: 9.3 (ref 7–25)
CALCIUM SPEC-SCNC: 8.9 MG/DL (ref 8.6–10.5)
CHLORIDE SERPL-SCNC: 97 MMOL/L (ref 98–107)
CO2 SERPL-SCNC: 22 MMOL/L (ref 22–29)
CREAT SERPL-MCNC: 7.86 MG/DL (ref 0.76–1.27)
DEPRECATED RDW RBC AUTO: 45.6 FL (ref 37–54)
ERYTHROCYTE [DISTWIDTH] IN BLOOD BY AUTOMATED COUNT: 12.8 % (ref 12.3–15.4)
GFR SERPL CREATININE-BSD FRML MDRD: 7 ML/MIN/1.73
GFR SERPL CREATININE-BSD FRML MDRD: ABNORMAL ML/MIN/{1.73_M2}
GLOBULIN UR ELPH-MCNC: 4.6 GM/DL
GLUCOSE SERPL-MCNC: 97 MG/DL (ref 65–99)
HCT VFR BLD AUTO: 34.3 % (ref 37.5–51)
HGB BLD-MCNC: 11.4 G/DL (ref 13–17.7)
INR PPP: 1.22 (ref 0.9–1.1)
MCH RBC QN AUTO: 32.8 PG (ref 26.6–33)
MCHC RBC AUTO-ENTMCNC: 33.2 G/DL (ref 31.5–35.7)
MCV RBC AUTO: 98.6 FL (ref 79–97)
PLATELET # BLD AUTO: 91 10*3/MM3 (ref 140–450)
PMV BLD AUTO: 8.9 FL (ref 6–12)
POTASSIUM SERPL-SCNC: 5.2 MMOL/L (ref 3.5–5.2)
PROT SERPL-MCNC: 8.4 G/DL (ref 6–8.5)
PROTHROMBIN TIME: 15.4 SECONDS (ref 11.7–14.2)
QT INTERVAL: 378 MS
RBC # BLD AUTO: 3.48 10*6/MM3 (ref 4.14–5.8)
SARS-COV-2 RNA RESP QL NAA+PROBE: NOT DETECTED
SODIUM SERPL-SCNC: 135 MMOL/L (ref 136–145)
WBC NRBC COR # BLD: 4.02 10*3/MM3 (ref 3.4–10.8)

## 2022-02-14 PROCEDURE — U0003 INFECTIOUS AGENT DETECTION BY NUCLEIC ACID (DNA OR RNA); SEVERE ACUTE RESPIRATORY SYNDROME CORONAVIRUS 2 (SARS-COV-2) (CORONAVIRUS DISEASE [COVID-19]), AMPLIFIED PROBE TECHNIQUE, MAKING USE OF HIGH THROUGHPUT TECHNOLOGIES AS DESCRIBED BY CMS-2020-01-R: HCPCS

## 2022-02-14 PROCEDURE — 85027 COMPLETE CBC AUTOMATED: CPT

## 2022-02-14 PROCEDURE — 71046 X-RAY EXAM CHEST 2 VIEWS: CPT

## 2022-02-14 PROCEDURE — 93005 ELECTROCARDIOGRAM TRACING: CPT | Performed by: SURGERY

## 2022-02-14 PROCEDURE — 85610 PROTHROMBIN TIME: CPT

## 2022-02-14 PROCEDURE — U0005 INFEC AGEN DETEC AMPLI PROBE: HCPCS

## 2022-02-14 PROCEDURE — 80053 COMPREHEN METABOLIC PANEL: CPT

## 2022-02-14 PROCEDURE — 36415 COLL VENOUS BLD VENIPUNCTURE: CPT

## 2022-02-14 PROCEDURE — C9803 HOPD COVID-19 SPEC COLLECT: HCPCS

## 2022-02-14 PROCEDURE — 85730 THROMBOPLASTIN TIME PARTIAL: CPT

## 2022-02-14 PROCEDURE — 93010 ELECTROCARDIOGRAM REPORT: CPT | Performed by: INTERNAL MEDICINE

## 2022-02-15 ENCOUNTER — APPOINTMENT (OUTPATIENT)
Dept: GENERAL RADIOLOGY | Facility: HOSPITAL | Age: 77
End: 2022-02-15

## 2022-02-15 ENCOUNTER — HOSPITAL ENCOUNTER (OUTPATIENT)
Facility: HOSPITAL | Age: 77
Setting detail: HOSPITAL OUTPATIENT SURGERY
Discharge: HOME | End: 2022-02-15
Attending: SURGERY | Admitting: SURGERY

## 2022-02-15 ENCOUNTER — ANESTHESIA EVENT (OUTPATIENT)
Dept: PERIOP | Facility: HOSPITAL | Age: 77
End: 2022-02-15

## 2022-02-15 ENCOUNTER — ANESTHESIA (OUTPATIENT)
Dept: PERIOP | Facility: HOSPITAL | Age: 77
End: 2022-02-15

## 2022-02-15 VITALS
SYSTOLIC BLOOD PRESSURE: 130 MMHG | RESPIRATION RATE: 16 BRPM | WEIGHT: 186.07 LBS | OXYGEN SATURATION: 96 % | TEMPERATURE: 96.9 F | HEART RATE: 76 BPM | HEIGHT: 70 IN | DIASTOLIC BLOOD PRESSURE: 83 MMHG | BODY MASS INDEX: 26.64 KG/M2

## 2022-02-15 DIAGNOSIS — T82.898A AV FISTULA OCCLUSION, INITIAL ENCOUNTER: Primary | ICD-10-CM

## 2022-02-15 LAB
GLUCOSE BLDC GLUCOMTR-MCNC: 100 MG/DL (ref 70–130)
GLUCOSE BLDC GLUCOMTR-MCNC: 94 MG/DL (ref 70–130)

## 2022-02-15 PROCEDURE — C1894 INTRO/SHEATH, NON-LASER: HCPCS | Performed by: SURGERY

## 2022-02-15 PROCEDURE — 25010000002 PROTAMINE SULFATE PER 10 MG: Performed by: ANESTHESIOLOGY

## 2022-02-15 PROCEDURE — 25010000002 HEPARIN (PORCINE) PER 1000 UNITS: Performed by: STUDENT IN AN ORGANIZED HEALTH CARE EDUCATION/TRAINING PROGRAM

## 2022-02-15 PROCEDURE — 82962 GLUCOSE BLOOD TEST: CPT

## 2022-02-15 PROCEDURE — C1769 GUIDE WIRE: HCPCS | Performed by: SURGERY

## 2022-02-15 PROCEDURE — 25010000002 VANCOMYCIN PER 500 MG: Performed by: SURGERY

## 2022-02-15 PROCEDURE — C1757 CATH, THROMBECTOMY/EMBOLECT: HCPCS | Performed by: SURGERY

## 2022-02-15 PROCEDURE — 25010000002 HEPARIN (PORCINE) PER 1000 UNITS: Performed by: SURGERY

## 2022-02-15 PROCEDURE — 25010000002 PROPOFOL 10 MG/ML EMULSION: Performed by: STUDENT IN AN ORGANIZED HEALTH CARE EDUCATION/TRAINING PROGRAM

## 2022-02-15 PROCEDURE — C1874 STENT, COATED/COV W/DEL SYS: HCPCS | Performed by: SURGERY

## 2022-02-15 PROCEDURE — 0 IOPAMIDOL PER 1 ML: Performed by: SURGERY

## 2022-02-15 PROCEDURE — C1725 CATH, TRANSLUMIN NON-LASER: HCPCS | Performed by: SURGERY

## 2022-02-15 PROCEDURE — 25010000002 PHENYLEPHRINE 10 MG/ML SOLUTION: Performed by: ANESTHESIOLOGY

## 2022-02-15 PROCEDURE — 25010000002 FENTANYL CITRATE (PF) 50 MCG/ML SOLUTION: Performed by: STUDENT IN AN ORGANIZED HEALTH CARE EDUCATION/TRAINING PROGRAM

## 2022-02-15 PROCEDURE — C1889 IMPLANT/INSERT DEVICE, NOC: HCPCS | Performed by: SURGERY

## 2022-02-15 PROCEDURE — 25010000002 MIDAZOLAM PER 1 MG: Performed by: ANESTHESIOLOGY

## 2022-02-15 PROCEDURE — 25010000002 ONDANSETRON PER 1 MG: Performed by: ANESTHESIOLOGY

## 2022-02-15 PROCEDURE — 0 IOTHALAMATE 60 % SOLUTION: Performed by: SURGERY

## 2022-02-15 DEVICE — LIGACLIP MCA MULTIPLE CLIP APPLIERS, 20 SMALL CLIPS
Type: IMPLANTABLE DEVICE | Site: ARM | Status: FUNCTIONAL
Brand: LIGACLIP

## 2022-02-15 DEVICE — STENTGR ENDOPROSTH VIABAHN RO HEP 7F 8MM 10X120CM: Type: IMPLANTABLE DEVICE | Site: ARM | Status: FUNCTIONAL

## 2022-02-15 RX ORDER — HYDROCODONE BITARTRATE AND ACETAMINOPHEN 5; 325 MG/1; MG/1
1 TABLET ORAL ONCE AS NEEDED
Status: DISCONTINUED | OUTPATIENT
Start: 2022-02-15 | End: 2022-02-15 | Stop reason: HOSPADM

## 2022-02-15 RX ORDER — FAMOTIDINE 10 MG/ML
20 INJECTION, SOLUTION INTRAVENOUS ONCE
Status: COMPLETED | OUTPATIENT
Start: 2022-02-15 | End: 2022-02-15

## 2022-02-15 RX ORDER — DIPHENHYDRAMINE HYDROCHLORIDE 50 MG/ML
12.5 INJECTION INTRAMUSCULAR; INTRAVENOUS
Status: DISCONTINUED | OUTPATIENT
Start: 2022-02-15 | End: 2022-02-15 | Stop reason: HOSPADM

## 2022-02-15 RX ORDER — LABETALOL HYDROCHLORIDE 5 MG/ML
5 INJECTION, SOLUTION INTRAVENOUS
Status: DISCONTINUED | OUTPATIENT
Start: 2022-02-15 | End: 2022-02-15 | Stop reason: HOSPADM

## 2022-02-15 RX ORDER — PROMETHAZINE HYDROCHLORIDE 25 MG/1
25 SUPPOSITORY RECTAL ONCE AS NEEDED
Status: DISCONTINUED | OUTPATIENT
Start: 2022-02-15 | End: 2022-02-15 | Stop reason: HOSPADM

## 2022-02-15 RX ORDER — PHENYLEPHRINE HYDROCHLORIDE 10 MG/ML
INJECTION INTRAVENOUS AS NEEDED
Status: DISCONTINUED | OUTPATIENT
Start: 2022-02-15 | End: 2022-02-15 | Stop reason: SURG

## 2022-02-15 RX ORDER — PROMETHAZINE HYDROCHLORIDE 25 MG/1
25 TABLET ORAL ONCE AS NEEDED
Status: DISCONTINUED | OUTPATIENT
Start: 2022-02-15 | End: 2022-02-15 | Stop reason: HOSPADM

## 2022-02-15 RX ORDER — FLUMAZENIL 0.1 MG/ML
0.2 INJECTION INTRAVENOUS AS NEEDED
Status: DISCONTINUED | OUTPATIENT
Start: 2022-02-15 | End: 2022-02-15 | Stop reason: HOSPADM

## 2022-02-15 RX ORDER — OXYCODONE AND ACETAMINOPHEN 7.5; 325 MG/1; MG/1
1 TABLET ORAL EVERY 4 HOURS PRN
Status: DISCONTINUED | OUTPATIENT
Start: 2022-02-15 | End: 2022-02-15 | Stop reason: HOSPADM

## 2022-02-15 RX ORDER — SODIUM CHLORIDE 0.9 % (FLUSH) 0.9 %
3-10 SYRINGE (ML) INJECTION AS NEEDED
Status: DISCONTINUED | OUTPATIENT
Start: 2022-02-15 | End: 2022-02-15 | Stop reason: HOSPADM

## 2022-02-15 RX ORDER — HYDROCODONE BITARTRATE AND ACETAMINOPHEN 5; 325 MG/1; MG/1
1-2 TABLET ORAL EVERY 6 HOURS PRN
Qty: 30 TABLET | Refills: 0 | Status: SHIPPED | OUTPATIENT
Start: 2022-02-15 | End: 2022-04-26 | Stop reason: ALTCHOICE

## 2022-02-15 RX ORDER — FENTANYL CITRATE 50 UG/ML
50 INJECTION, SOLUTION INTRAMUSCULAR; INTRAVENOUS
Status: DISCONTINUED | OUTPATIENT
Start: 2022-02-15 | End: 2022-02-15 | Stop reason: HOSPADM

## 2022-02-15 RX ORDER — ONDANSETRON 2 MG/ML
4 INJECTION INTRAMUSCULAR; INTRAVENOUS ONCE AS NEEDED
Status: DISCONTINUED | OUTPATIENT
Start: 2022-02-15 | End: 2022-02-15 | Stop reason: HOSPADM

## 2022-02-15 RX ORDER — SODIUM CHLORIDE 9 MG/ML
9 INJECTION, SOLUTION INTRAVENOUS CONTINUOUS PRN
Status: DISCONTINUED | OUTPATIENT
Start: 2022-02-15 | End: 2022-02-15 | Stop reason: HOSPADM

## 2022-02-15 RX ORDER — HYDROCODONE BITARTRATE AND ACETAMINOPHEN 7.5; 325 MG/1; MG/1
1 TABLET ORAL ONCE AS NEEDED
Status: DISCONTINUED | OUTPATIENT
Start: 2022-02-15 | End: 2022-02-15 | Stop reason: HOSPADM

## 2022-02-15 RX ORDER — DIPHENHYDRAMINE HCL 25 MG
25 CAPSULE ORAL
Status: DISCONTINUED | OUTPATIENT
Start: 2022-02-15 | End: 2022-02-15 | Stop reason: HOSPADM

## 2022-02-15 RX ORDER — MIDAZOLAM HYDROCHLORIDE 1 MG/ML
0.5 INJECTION INTRAMUSCULAR; INTRAVENOUS
Status: DISCONTINUED | OUTPATIENT
Start: 2022-02-15 | End: 2022-02-15 | Stop reason: HOSPADM

## 2022-02-15 RX ORDER — HYDRALAZINE HYDROCHLORIDE 20 MG/ML
5 INJECTION INTRAMUSCULAR; INTRAVENOUS
Status: DISCONTINUED | OUTPATIENT
Start: 2022-02-15 | End: 2022-02-15 | Stop reason: HOSPADM

## 2022-02-15 RX ORDER — LIDOCAINE HYDROCHLORIDE 10 MG/ML
0.5 INJECTION, SOLUTION EPIDURAL; INFILTRATION; INTRACAUDAL; PERINEURAL ONCE AS NEEDED
Status: DISCONTINUED | OUTPATIENT
Start: 2022-02-15 | End: 2022-02-15 | Stop reason: HOSPADM

## 2022-02-15 RX ORDER — PROPOFOL 10 MG/ML
VIAL (ML) INTRAVENOUS AS NEEDED
Status: DISCONTINUED | OUTPATIENT
Start: 2022-02-15 | End: 2022-02-15 | Stop reason: SURG

## 2022-02-15 RX ORDER — HYDROMORPHONE HYDROCHLORIDE 1 MG/ML
0.5 INJECTION, SOLUTION INTRAMUSCULAR; INTRAVENOUS; SUBCUTANEOUS
Status: DISCONTINUED | OUTPATIENT
Start: 2022-02-15 | End: 2022-02-15 | Stop reason: HOSPADM

## 2022-02-15 RX ORDER — EPHEDRINE SULFATE 50 MG/ML
INJECTION, SOLUTION INTRAVENOUS AS NEEDED
Status: DISCONTINUED | OUTPATIENT
Start: 2022-02-15 | End: 2022-02-15 | Stop reason: SURG

## 2022-02-15 RX ORDER — SODIUM CHLORIDE 0.9 % (FLUSH) 0.9 %
3 SYRINGE (ML) INJECTION EVERY 12 HOURS SCHEDULED
Status: DISCONTINUED | OUTPATIENT
Start: 2022-02-15 | End: 2022-02-15 | Stop reason: HOSPADM

## 2022-02-15 RX ORDER — LIDOCAINE HYDROCHLORIDE 20 MG/ML
INJECTION, SOLUTION INFILTRATION; PERINEURAL AS NEEDED
Status: DISCONTINUED | OUTPATIENT
Start: 2022-02-15 | End: 2022-02-15 | Stop reason: SURG

## 2022-02-15 RX ORDER — VANCOMYCIN HYDROCHLORIDE 1 G/200ML
1 INJECTION, SOLUTION INTRAVENOUS ONCE
Status: COMPLETED | OUTPATIENT
Start: 2022-02-15 | End: 2022-02-15

## 2022-02-15 RX ORDER — EPHEDRINE SULFATE 50 MG/ML
5 INJECTION, SOLUTION INTRAVENOUS ONCE AS NEEDED
Status: DISCONTINUED | OUTPATIENT
Start: 2022-02-15 | End: 2022-02-15 | Stop reason: HOSPADM

## 2022-02-15 RX ORDER — PROTAMINE SULFATE 10 MG/ML
INJECTION, SOLUTION INTRAVENOUS AS NEEDED
Status: DISCONTINUED | OUTPATIENT
Start: 2022-02-15 | End: 2022-02-15 | Stop reason: SURG

## 2022-02-15 RX ORDER — HEPARIN SODIUM 1000 [USP'U]/ML
INJECTION, SOLUTION INTRAVENOUS; SUBCUTANEOUS AS NEEDED
Status: DISCONTINUED | OUTPATIENT
Start: 2022-02-15 | End: 2022-02-15 | Stop reason: SURG

## 2022-02-15 RX ORDER — NALOXONE HCL 0.4 MG/ML
0.2 VIAL (ML) INJECTION AS NEEDED
Status: DISCONTINUED | OUTPATIENT
Start: 2022-02-15 | End: 2022-02-15 | Stop reason: HOSPADM

## 2022-02-15 RX ORDER — FENTANYL CITRATE 50 UG/ML
INJECTION, SOLUTION INTRAMUSCULAR; INTRAVENOUS AS NEEDED
Status: DISCONTINUED | OUTPATIENT
Start: 2022-02-15 | End: 2022-02-15 | Stop reason: SURG

## 2022-02-15 RX ORDER — IBUPROFEN 600 MG/1
600 TABLET ORAL ONCE AS NEEDED
Status: DISCONTINUED | OUTPATIENT
Start: 2022-02-15 | End: 2022-02-15 | Stop reason: HOSPADM

## 2022-02-15 RX ORDER — ONDANSETRON 2 MG/ML
INJECTION INTRAMUSCULAR; INTRAVENOUS AS NEEDED
Status: DISCONTINUED | OUTPATIENT
Start: 2022-02-15 | End: 2022-02-15 | Stop reason: SURG

## 2022-02-15 RX ADMIN — FENTANYL CITRATE 25 MCG: 0.05 INJECTION, SOLUTION INTRAMUSCULAR; INTRAVENOUS at 16:18

## 2022-02-15 RX ADMIN — EPHEDRINE SULFATE 10 MG: 50 INJECTION INTRAVENOUS at 15:19

## 2022-02-15 RX ADMIN — SODIUM CHLORIDE 9 ML/HR: 9 INJECTION, SOLUTION INTRAVENOUS at 12:12

## 2022-02-15 RX ADMIN — FENTANYL CITRATE 25 MCG: 0.05 INJECTION, SOLUTION INTRAMUSCULAR; INTRAVENOUS at 15:40

## 2022-02-15 RX ADMIN — HEPARIN SODIUM 10000 UNITS: 1000 INJECTION INTRAVENOUS; SUBCUTANEOUS at 15:09

## 2022-02-15 RX ADMIN — PROPOFOL 200 MG: 10 INJECTION, EMULSION INTRAVENOUS at 14:35

## 2022-02-15 RX ADMIN — ONDANSETRON 4 MG: 2 INJECTION INTRAMUSCULAR; INTRAVENOUS at 16:22

## 2022-02-15 RX ADMIN — FENTANYL CITRATE 25 MCG: 0.05 INJECTION, SOLUTION INTRAMUSCULAR; INTRAVENOUS at 15:00

## 2022-02-15 RX ADMIN — PHENYLEPHRINE HYDROCHLORIDE 100 MCG: 10 INJECTION, SOLUTION INTRAVENOUS at 15:16

## 2022-02-15 RX ADMIN — LIDOCAINE HYDROCHLORIDE 100 MG: 20 INJECTION, SOLUTION INFILTRATION; PERINEURAL at 14:35

## 2022-02-15 RX ADMIN — PROTAMINE SULFATE 40 MG: 10 INJECTION, SOLUTION INTRAVENOUS at 16:04

## 2022-02-15 RX ADMIN — PHENYLEPHRINE HYDROCHLORIDE 100 MCG: 10 INJECTION, SOLUTION INTRAVENOUS at 16:08

## 2022-02-15 RX ADMIN — FAMOTIDINE 20 MG: 10 INJECTION INTRAVENOUS at 12:12

## 2022-02-15 RX ADMIN — VANCOMYCIN HYDROCHLORIDE 1 G: 1 INJECTION, SOLUTION INTRAVENOUS at 13:38

## 2022-02-15 RX ADMIN — FENTANYL CITRATE 25 MCG: 0.05 INJECTION, SOLUTION INTRAMUSCULAR; INTRAVENOUS at 15:45

## 2022-02-15 RX ADMIN — IOPAMIDOL 25 ML: 510 INJECTION, SOLUTION INTRAVASCULAR at 16:29

## 2022-02-15 RX ADMIN — HYDROCODONE BITARTRATE AND ACETAMINOPHEN 1 TABLET: 5; 325 TABLET ORAL at 17:33

## 2022-02-15 RX ADMIN — PHENYLEPHRINE HYDROCHLORIDE 100 MCG: 10 INJECTION, SOLUTION INTRAVENOUS at 16:06

## 2022-02-15 RX ADMIN — PHENYLEPHRINE HYDROCHLORIDE 100 MCG: 10 INJECTION, SOLUTION INTRAVENOUS at 16:04

## 2022-02-15 RX ADMIN — MIDAZOLAM 0.5 MG: 1 INJECTION INTRAMUSCULAR; INTRAVENOUS at 13:38

## 2022-02-15 RX ADMIN — PROTAMINE SULFATE 10 MG: 10 INJECTION, SOLUTION INTRAVENOUS at 16:13

## 2022-02-15 NOTE — H&P
[facility]   HISTORY AND PHYSICAL    Patient Name:Bill Landry  : 1945  MRN: [id number]  Primary Care Physician: Mecca Roe MD  Date of admission: 2/15/2022    Subjective   Subjective     Chief Complaint: Thrombosed left AV fistula    History of Present Illness   Bill Landry is a 76 y.o. 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  Likely cephalic arch thrombosis is the cause given recurrent stenoses within the duplicated cephalic arch system.  We are unable to open the cephalic arch then possible jump graft to the axillary vein will be considered.  Attempts to salvage the the fistula are being made with the possibility of tunnel catheter placement also.  Patient understands risk benefits complications and agrees to procedure    Review of Systems patient is anxious at baseline    Personal History     Past Medical History:   Diagnosis Date   • Abnormal serum protein electrophoresis    • Anemia     Multifactorial   • Chronic leukopenia and thrombocytopenia    • Cirrhosis of liver without ascites (HCC) 2017   • Congestive splenomegaly 2017   • Crohn disease (HCC)     with ileostomy   • Diastolic CHF (HCC)     although it was likely from volume overload due to ESRD   • Diverticula of colon    • ESRD on hemodialysis (HCC)    • Fatty liver disease, nonalcoholic    • GERD (gastroesophageal reflux disease)    • GI bleed    • Glaucoma    • H/O colectomy    • H/O Gallstone pancreatitis    • H/O Pericardial effusion    • H/O sinus bradycardia    • History of hypertension     resolved   • Hx of renal calculi    • Hyperlipidemia    • Ileostomy present (HCC)    • Iron deficiency    • MGUS (monoclonal gammopathy of unknown significance)    • Permanent atrial fibrillation (HCC)    • Sleep apnea     CPAP USED   • Thrombocytopenia (HCC)    • Type 2 diabetes mellitus (HCC)        Past Surgical History:   Procedure Laterality Date   • APPENDECTOMY     •  ARTERIOVENOUS FISTULA/SHUNT SURGERY Left 8/8/2017    Procedure: LEFT BRACHIAL CEPHALIC AV FISTULA FORMATION WITH CEPHALIC VEIN TRANSPOSITION ;  Surgeon: Bill Deal MD;  Location: [facility] OR;  Service:    • CHOLECYSTECTOMY     • COLECTOMY PARTIAL / TOTAL      History of inflammatory bowel disease with status post colectomy with ileostomy many years ago in the [facility]   • COLON RESECTION WITH COLOSTOMY     • COLON SURGERY     • COLONOSCOPY     • CYSTOSCOPY LITHOLAPAXY BLADDER STONE EXTRACTION     • ENDOSCOPY  01/16/2015    gastritis   • ENDOSCOPY  1/17/2018    Procedure: ESOPHAGOGASTRODUODENOSCOPY;  Surgeon: Warner Neville MD;  Location: [facility] ENDOSCOPY;  Service:    • ILEOSCOPY  01/16/2015    normal   • ILEOSCOPY N/A 1/17/2018    Procedure: ILEOSCOPY;  Surgeon: Warner Neville MD;  Location: [facility] ENDOSCOPY;  Service:        Family History: His family history includes Heart disease in his mother; Heart failure in his mother; Hyperlipidemia in his mother; Hypertension in his father and mother.     Social History: He  reports that he has never smoked. He has never used smokeless tobacco. He reports that he does not drink alcohol and does not use drugs.    Home Medications:  Calcium Citrate, Iron Sucrose, Loratadine, Methoxy PEG-Epoetin Beta, Umm-Peter, Sucroferric Oxyhydroxide, Vitamin D3, aspirin, atenolol, famotidine, febuxostat, latanoprost, and lidocaine-prilocaine    Allergies:  He is allergic to ace inhibitors, contrast dye, iodine, angiotensin receptor blockers, eliquis [apixaban], granix [filgrastim], and keflex [cephalexin].    Objective    Objective     Vitals:    Temp:  [98 °F (36.7 °C)] 98 °F (36.7 °C)  Heart Rate:  [81] 81  Resp:  [17] 17  BP: (180)/(101) 180/101  Output by Drain (mL) 02/14/22 0701 - 02/14/22 1900 02/14/22 1901 - 02/15/22 0700 02/15/22 0701 - 02/15/22 1340 Range Total   Requested LDAs do not have output data documented.       Physical Exam   Lungs clear and  equal  Heart regular rate and rhythm  Abdomen benign  Left AV fistula still pulsatile proximally.  Large aneurysmal changes noted.    Result Review    Result Review:  I have personally reviewed the results from the time of this admission to 2/15/2022 13:40 EST and agree with these findings:  []  Laboratory  []  Microbiology  []  Radiology  []  EKG/Telemetry   []  Cardiology/Vascular   []  Pathology  []  Old records  []  Other:  Most notable findings include: Platelet count of 91    Assessment/Plan   Assessment / Plan     Brief Patient Summary:  Bill Landry is a 76 y.o. male with thrombosed left AV fistula undergoing thrombectomy and possible revision possible tunnel catheter placement    Active Hospital Problems:  Active Hospital Problems    Diagnosis    • AV fistula occlusion, initial encounter ([facility])    • ESRD (end stage renal disease) ([facility])    • Thrombocytopenia ([facility])      Plan:   Left AV fistula open thrombectomy with possible revision and possible tunneled dialysis catheter placement    DVT prophylaxis:  Intraoperative heparin    Bill Deal MD

## 2022-02-15 NOTE — ANESTHESIA PROCEDURE NOTES
Airway  Urgency: elective    Date/Time: 2/15/2022 2:37 PM  Airway not difficult    General Information and Staff    Patient location during procedure: OR  Anesthesiologist: Reginald Arreaga MD  CRNA: Garrett Harvey CRNA    Indications and Patient Condition  Indications for airway management: airway protection    Preoxygenated: yes  MILS not maintained throughout  Mask difficulty assessment: 0 - not attempted    Final Airway Details  Final airway type: supraglottic airway      Successful airway: classic  Size 5    Number of attempts at approach: 1  Assessment: lips, teeth, and gum same as pre-op

## 2022-02-15 NOTE — ANESTHESIA PREPROCEDURE EVALUATION
Anesthesia Evaluation     Patient summary reviewed and Nursing notes reviewed                Airway   Mallampati: II  TM distance: >3 FB  Neck ROM: limited  Dental      Pulmonary    (+) recent URI, sleep apnea on CPAP,   Cardiovascular     ECG reviewed  PT is on anticoagulation therapy  Patient on routine beta blocker and Beta blocker given within 24 hours of surgery  Rhythm: irregular  Rate: normal    (+) hypertension, dysrhythmias Atrial Fib, CHF , pericardial effusion, hyperlipidemia,       Neuro/Psych- negative ROS  GI/Hepatic/Renal/Endo    (+)  GERD, GI bleeding , liver disease fatty liver disease, renal disease stones, diabetes mellitus type 2,     Musculoskeletal (-) negative ROS    Abdominal    Substance History - negative use     OB/GYN negative ob/gyn ROS         Other                        Anesthesia Plan    ASA 4     general   (I have reviewed the patient's history with the patient and the chart, including all pertinent laboratory results and imaging. I have explained the risks of anesthesia including but not limited to dental damage, corneal abrasion, nerve injury, MI, stroke, and death. Questions asked and answered. Anesthetic plan discussed with patient and team as indicated. Patient expressed understanding of the above.  )  intravenous induction     Anesthetic plan, all risks, benefits, and alternatives have been provided, discussed and informed consent has been obtained with: patient.        CODE STATUS:

## 2022-02-15 NOTE — OP NOTE
Operative Note  Date of Admission:  2/15/2022  OR Date: 2/15/2022    Pre-op Diagnosis:   Thrombosed left arteriovenous fistula    Post-Op Diagnosis Codes:   same    Procedure:   1) open thrombectomy of thrombosed left AV fistula with AV fistulogram, cephalic arch vein angioplasty and Viabahn stent graft placement, secondary thrombectomy site for revision thrombectomy of aneurysmal segments of mid AV fistula.    Surgeon: Feng Deal MD    Assistant: Renee Valente RN and they provided critical assistance during the case including suctioning, exposure, retraction, and reduction of blood loss.    Anesthesia: Monitored Anesthesia Care with Regional    Staff:   Circulator: Jes Mallory RN; Ame Perry RN  Scrub Person: Renee Valente; Eddie Velez  Vascular Radiology Technician: Mark Wsahington    Estimated Blood Loss: 200ml    Specimens: * No orders in the log *     Complications: none    Findings: see dict    Indications:    The patient is an 76 y.o. male seen for evaluation of thrombosed arteriovenous fistula.  Thrombectomy is being attempted through open technique with possibility of tunnel catheter if needed.  Patient understands risk benefits complications and agrees to procedure.       Procedure:    The patient was prepped and draped sterilely.  Using incision above the largest of the more distal aneurysmal segments of the arm we exposed the thrombosed cephalic vein segment of outflow and performed transverse venotomy.  Using a 4 and 5 Eva thrombectomy catheters we thrombectomized the venous outflow over the cephalic arch into the subclavian veins.  Subclavian and central veins are widely patent.  The cephalic arch was highly stenosed and diseased.  We balloon angioplastied it with 8 mm x 80 mm [employer] balloon to 26 jia and then used a Bard conquest 8 x 40 balloon all the way to 30 jia without ability to relieving the complete stenosis but 90% improvement was seen we then stent grafted the area  with a Viabahn stent graft 8 mm x 10 cm with postdilatation to 8 mm.  With the outflow widely patent thrombectomy of the 2 aneurysmal segments of the AV fistula were performed through a cutdown in the mid segment of the fistula at the aneurysm junction where we open the artery with pressure control of the inflow and physically thrombectomized massive amounts of thrombus and mural clot from these aneurysmal segments.  With this removed we primarily closed the area of venotomy with 5-0 Prolene and 4-0 Prolene sutures.  We then completed the thrombectomy proximally and repaired the cephalic vein vein anatomy with 6-0 Prolene suture.  Following this reversal heparin effect was given with 50 of protamine the 10,000 units of been given intraoperatively.  On table duplex confirmed wide patency and there was good thrill in the proximal and distal fistulas.  The patient had FloSeal placed and closure of the deep tissue with 3-0 Vicryl followed by closure subcutaneous tissue with 3-0 Vicryl and closure of the skin with 4-0 Vicryl subcuticular closure and skin glue was performed.  Patient tolerated the procedure well was awakened from LMA anesthetic.    Radiographic Findings:  Angiographic findings include cephalic arch thrombectomy with flow remaining but large amounts of scar throughout the entire cephalic arch duplicate arch was not seen but the primary arch was crossed with a wire.  Outflow from the subclavian and innominate veins appear widely patent.  Retained thrombus in the cephalic arch is also noted.  Subsequent angioplasty with 8 mm balloons relieves 90% of the stenosis with some retained thrombus remaining.  Stent graft placement with Viabahn 8 mm x 10 cm segment and postdilatation leaves no significant residual stenosis and good venous outflow from the proximal portion of the AV fistula seen.  No central venous disease is seen.      Active Hospital Problems    Diagnosis  POA   • AV fistula occlusion, initial  encounter (HCC) [T82.898A]  Unknown   • ESRD (end stage renal disease) (HCC) [N18.6]  Yes   • Thrombocytopenia (HCC) [D69.6]  Yes      Resolved Hospital Problems   No resolved problems to display.      Bill Deal MD     Date: 2/15/2022  Time: 16:45 EST

## 2022-02-15 NOTE — DISCHARGE INSTRUCTIONS
[facility]  Byron Hunt, Akanksha Deal Scherrer ,[place], [place]  [address], [address]  [phone]    Post-Operative Instructions for AV Fistula / Graft   Diet: Regular Diet    Medications: Take your regularly scheduled medications on the day of your surgery, unless your doctor has directed you otherwise. You may be sent home with a prescription for pain medication, follow the directions as prescribed.    Activity Restrictions / Driving: Avoid lifting more than 15 pounds or other activities that stress or compress the access area. No driving for the remainder of the day after surgery. You may drive when you no longer are taking narcotic pain medications. If a nerve block was done to numb your arm for surgery, you will be placed in an arm sling.  This numbness and inability to move the arm can last for as little as 6 hours but as many as 18.  The sling should be used during this time but can be removed when sensation and movement of your arm is normal and does not need to be used after that. Use of the arm is encouraged after the surgery.    Incision Care: Some bruising is normal. If you have drainage from the incision please notify the office. Dressing should be removed in 48 hours. After dressing is removed, it is OK to shower. Do not submerge incision until cleared by your surgeon (bath or swimming).    Bathing and Showering: You may shower after you remove your dressing.    Follow-up Appointments: You will need to return to the office for a follow-up visit within 1-3 weeks after your surgery. Please make sure you have your appointment scheduled, call [phone].    The patient (you) should:  1. Avoid wearing tight constrictive clothing over that arm.  2. Avoid wearing jewelry that is tight, such as a watch on the access arm.  3. Avoid carrying heavy objects.  4. Avoid purse straps over the fistula.  5. Avoid sleeping on the arm or keeping it bent for extended periods of time.  6.  "Each day, using your opposite hand, feel over the fistula for the \"thrill\" or vibration that is normally present.    Fistula Information / Care:  ·  It is normal to have swelling in the surgical area. To help control this swelling, you should elevate your arm on a pillow.  ·  Wiggle your fingers and clinch your fist 10 times every hour, while awake, for the first 5-7 days. Also, bend and straighten at the elbow to regain normal range of motion. These exercises are designed to promote circulation in the fingers and aid in draining away the excess fluid accumulation in the immediate area.  · No blood pressures or needle sticks in the arm with your access.    Call the office for the followin. Fever greater than 101.0  2. Uncontrolled pain. This is on a scale of 1-10 (10 being the worst pain imaginable) your pain is a level 7 or above.  3. It is important that you notify our office if you are having numbness and significant pain in the extremity in which you have just had surgery!  4. Decreased or absent thrill.  5. Nausea, diarrhea, and/or vomiting that continue for 12-24 hours.  6. Signs of an infection: redness, increased swelling, drainage, fever and/or chills.  7. Chest pain or difficulty breathing.    The fistula or graft CAN NOT be used until the MD has given written approval. Generally, a graft will be ready to use in 2 weeks, and a fistula will be ready to use in 6-8 weeks.     If you have further questions after reading this handout, the office is open from 8:30am to 5:00pm Monday through Friday. Call [phone].  "

## 2022-02-15 NOTE — ANESTHESIA POSTPROCEDURE EVALUATION
Patient: Bill Landry    Procedure Summary     Date: 02/15/22 [facility] / [facility]    Anesthesia Start: 1430 Anesthesia Stop: 1640    Procedure: OPEN LEFT ARM ARTERIOVENOUS FISTULA THROMBECTOMY WITH CEPHALIC VEIN ARTHROPLASTY AND STENT/GRAFT PLACEMENT (Left ) Diagnosis:     Surgeons: Bill Deal MD Provider: Ken Forman MD    Anesthesia Type: general ASA Status: 4          Anesthesia Type: general    Vitals  Vitals Value Taken Time   /109 02/15/22 1701   Temp 36.1 °C (96.9 °F) 02/15/22 1640   Pulse 92 02/15/22 1710   Resp 20 02/15/22 1640   SpO2 91 % 02/15/22 1710   Vitals shown include unvalidated device data.        Post Anesthesia Care and Evaluation    Patient location during evaluation: bedside  Patient participation: complete - patient participated  Level of consciousness: awake  Pain management: adequate  Airway patency: patent  Anesthetic complications: No anesthetic complications  PONV Status: none  Cardiovascular status: acceptable  Respiratory status: acceptable  Hydration status: acceptable  Post Neuraxial Block status: Motor and sensory function returned to baseline

## 2022-03-11 ENCOUNTER — TELEPHONE (OUTPATIENT)
Dept: CARDIOLOGY | Facility: CLINIC | Age: 77
End: 2022-03-11

## 2022-03-11 ENCOUNTER — APPOINTMENT (OUTPATIENT)
Dept: GENERAL RADIOLOGY | Facility: HOSPITAL | Age: 77
End: 2022-03-11

## 2022-03-11 ENCOUNTER — HOSPITAL ENCOUNTER (EMERGENCY)
Facility: HOSPITAL | Age: 77
Discharge: HOME | End: 2022-03-11
Attending: EMERGENCY MEDICINE | Admitting: EMERGENCY MEDICINE

## 2022-03-11 VITALS
HEIGHT: 70 IN | HEART RATE: 82 BPM | BODY MASS INDEX: 26.63 KG/M2 | TEMPERATURE: 99.5 F | WEIGHT: 186 LBS | OXYGEN SATURATION: 93 % | DIASTOLIC BLOOD PRESSURE: 71 MMHG | SYSTOLIC BLOOD PRESSURE: 151 MMHG | RESPIRATION RATE: 17 BRPM

## 2022-03-11 DIAGNOSIS — Z99.2 ESRD ON HEMODIALYSIS: ICD-10-CM

## 2022-03-11 DIAGNOSIS — N18.6 ESRD ON HEMODIALYSIS: ICD-10-CM

## 2022-03-11 DIAGNOSIS — I48.21 PERMANENT ATRIAL FIBRILLATION: ICD-10-CM

## 2022-03-11 DIAGNOSIS — R09.89 LABILE BLOOD PRESSURE: Primary | ICD-10-CM

## 2022-03-11 LAB
ALBUMIN SERPL-MCNC: 3.6 G/DL (ref 3.5–5.2)
ALBUMIN/GLOB SERPL: 0.8 G/DL
ALP SERPL-CCNC: 193 U/L (ref 39–117)
ALT SERPL W P-5'-P-CCNC: 20 U/L (ref 1–41)
ANION GAP SERPL CALCULATED.3IONS-SCNC: 12 MMOL/L (ref 5–15)
AST SERPL-CCNC: 36 U/L (ref 1–40)
BASOPHILS # BLD AUTO: 0.01 10*3/MM3 (ref 0–0.2)
BASOPHILS NFR BLD AUTO: 0.2 % (ref 0–1.5)
BILIRUB SERPL-MCNC: 0.6 MG/DL (ref 0–1.2)
BUN SERPL-MCNC: 37 MG/DL (ref 8–23)
BUN/CREAT SERPL: 9.3 (ref 7–25)
CALCIUM SPEC-SCNC: 8.7 MG/DL (ref 8.6–10.5)
CHLORIDE SERPL-SCNC: 95 MMOL/L (ref 98–107)
CO2 SERPL-SCNC: 27 MMOL/L (ref 22–29)
CREAT SERPL-MCNC: 3.96 MG/DL (ref 0.76–1.27)
DEPRECATED RDW RBC AUTO: 43.1 FL (ref 37–54)
EGFRCR SERPLBLD CKD-EPI 2021: 15 ML/MIN/1.73
EOSINOPHIL # BLD AUTO: 0.01 10*3/MM3 (ref 0–0.4)
EOSINOPHIL NFR BLD AUTO: 0.2 % (ref 0.3–6.2)
ERYTHROCYTE [DISTWIDTH] IN BLOOD BY AUTOMATED COUNT: 12.7 % (ref 12.3–15.4)
GLOBULIN UR ELPH-MCNC: 4.4 GM/DL
GLUCOSE SERPL-MCNC: 162 MG/DL (ref 65–99)
HCT VFR BLD AUTO: 29.6 % (ref 37.5–51)
HGB BLD-MCNC: 10.2 G/DL (ref 13–17.7)
IMM GRANULOCYTES # BLD AUTO: 0.04 10*3/MM3 (ref 0–0.05)
IMM GRANULOCYTES NFR BLD AUTO: 0.7 % (ref 0–0.5)
LYMPHOCYTES # BLD AUTO: 0.4 10*3/MM3 (ref 0.7–3.1)
LYMPHOCYTES NFR BLD AUTO: 7.3 % (ref 19.6–45.3)
MCH RBC QN AUTO: 33 PG (ref 26.6–33)
MCHC RBC AUTO-ENTMCNC: 34.5 G/DL (ref 31.5–35.7)
MCV RBC AUTO: 95.8 FL (ref 79–97)
MONOCYTES # BLD AUTO: 0.51 10*3/MM3 (ref 0.1–0.9)
MONOCYTES NFR BLD AUTO: 9.3 % (ref 5–12)
NEUTROPHILS NFR BLD AUTO: 4.5 10*3/MM3 (ref 1.7–7)
NEUTROPHILS NFR BLD AUTO: 82.3 % (ref 42.7–76)
NRBC BLD AUTO-RTO: 0 /100 WBC (ref 0–0.2)
PLATELET # BLD AUTO: 86 10*3/MM3 (ref 140–450)
PMV BLD AUTO: 9.4 FL (ref 6–12)
POTASSIUM SERPL-SCNC: 4.1 MMOL/L (ref 3.5–5.2)
PROT SERPL-MCNC: 8 G/DL (ref 6–8.5)
QT INTERVAL: 333 MS
RBC # BLD AUTO: 3.09 10*6/MM3 (ref 4.14–5.8)
SODIUM SERPL-SCNC: 134 MMOL/L (ref 136–145)
WBC NRBC COR # BLD: 5.47 10*3/MM3 (ref 3.4–10.8)

## 2022-03-11 PROCEDURE — 36415 COLL VENOUS BLD VENIPUNCTURE: CPT

## 2022-03-11 PROCEDURE — 80053 COMPREHEN METABOLIC PANEL: CPT | Performed by: EMERGENCY MEDICINE

## 2022-03-11 PROCEDURE — 99283 EMERGENCY DEPT VISIT LOW MDM: CPT

## 2022-03-11 PROCEDURE — 93005 ELECTROCARDIOGRAM TRACING: CPT | Performed by: EMERGENCY MEDICINE

## 2022-03-11 PROCEDURE — 93010 ELECTROCARDIOGRAM REPORT: CPT | Performed by: INTERNAL MEDICINE

## 2022-03-11 PROCEDURE — 93005 ELECTROCARDIOGRAM TRACING: CPT

## 2022-03-11 PROCEDURE — 71045 X-RAY EXAM CHEST 1 VIEW: CPT

## 2022-03-11 PROCEDURE — 87040 BLOOD CULTURE FOR BACTERIA: CPT | Performed by: EMERGENCY MEDICINE

## 2022-03-11 PROCEDURE — 85025 COMPLETE CBC W/AUTO DIFF WBC: CPT | Performed by: EMERGENCY MEDICINE

## 2022-03-11 RX ORDER — ALFUZOSIN HYDROCHLORIDE 10 MG/1
10 TABLET, EXTENDED RELEASE ORAL EVERY OTHER DAY
COMMUNITY

## 2022-03-11 RX ORDER — SODIUM CHLORIDE 0.9 % (FLUSH) 0.9 %
10 SYRINGE (ML) INJECTION AS NEEDED
Status: DISCONTINUED | OUTPATIENT
Start: 2022-03-11 | End: 2022-03-11 | Stop reason: HOSPADM

## 2022-03-11 NOTE — TELEPHONE ENCOUNTER
I also discussed this with patient's wife. She verbalized understanding.    Thank you,    Claribel Cosme RN  Triage [facility]

## 2022-03-11 NOTE — TELEPHONE ENCOUNTER
Dr. Vázquez,    Pt's wife called this afternoon. She said two weeks ago, pt had surgery on his dialysis fistula. After the surgery he could not empty his bladder. So, they started pt on Alfuzosin ER 10 mg and placed a catheter. Pt was finally able to void this week and the catheter was removed, but they kept him on the medication.    Today pt is struggling with HTN and tachycardia.    140/87,   161/75, HR 99  176/89,     His wife spoke to one of the vascular surgery nurses. She told pt's wife that Alfuzosin can cause and increased HR and B/P. Pt's wife wants to know if you recommend him staying on that medication or coming off of the med.    Thank you,    Claribel Cosme, RN  Triage [facility]

## 2022-03-11 NOTE — TELEPHONE ENCOUNTER
No, alfuzosin causes DROPS in blood pressure, not an increase. Urinary retention will raise both HR and BP but it sounds as though that is improved. If this is symptomatic, or if it persists over 24 hours, he should go to the ED given his high HR.    luann

## 2022-03-12 NOTE — ED PROVIDER NOTES
EMERGENCY DEPARTMENT ENCOUNTER    Room Number:  15/15  Date seen:  3/11/2022  PCP: Mecca Roe MD  Historian: Patient, spouse      HPI:  Chief Complaint: High heart rate and high blood pressure  A complete HPI/ROS/PMH/PSH/SH/FH are unobtainable due to: Nothing  Context: Bill Landry is a 76 y.o. male who presents to the ED c/o episode of high heart rate and high blood pressure that occurred at home today.  He reports that he left his hemodialysis session this afternoon and afterwards he felt really drained.  Upon returning home his heart rate apparently went up as did his blood pressure.  Blood pressure was reportedly in the 160s over 90s.  He also reports that he felt really cold and shaky.  He called his cardiologist and neurologist who are both concerned that he may have acute urinary retention causing his high blood pressure and high heart rate and advised him to come to the emergency department.  He reports that he recently had an episode of urinary retention requiring a Khan catheter replaced.  He was recently placed on medication after having his Khan catheter removed to help with urination.  He has ESRD on hemodialysis and only voids once or twice a day.  He had voided just prior to coming in today.  He denies dysuria.  He denies cough.  He reports a little nausea but no abdominal pain, no diarrhea.  He had recent revision of a left upper extremity fistula due to development of stenosis.  He denies chest pain or shortness of breath.  He has a history of atrial fibrillation but is not anticoagulated due to history of GI bleeding on anticoagulation.            PAST MEDICAL HISTORY  Active Ambulatory Problems     Diagnosis Date Noted   • Permanent atrial fibrillation (HCC) 05/02/2016   • Thrombocytopenia (HCC) 05/02/2016   • Chronic leukopenia 05/02/2016   • Cirrhosis of liver without ascites (HCC) 07/24/2017   • Congestive splenomegaly 07/24/2017   • Anemia due to stage 4 chronic kidney disease  treated with erythropoietin ([facility]) 10/04/2017   • Anemia 01/12/2018   • Esophageal abnormality 01/18/2018   • At risk for fluid volume overload 03/21/2019   • ESRD (end stage renal disease) ([facility]) 03/21/2019   • Other specified glaucoma 05/21/2019   • Dependence on renal dialysis ([facility]) 09/03/2020   • Allergy, unspecified, initial encounter 10/09/2020   • Crohn's disease, unspecified, without complications ([facility]) 01/18/2018   • Deficiency of other specified B group vitamins 01/19/2018   • Dermatitis, unspecified 01/18/2018   • Encounter for immunization 02/19/2018   • Essential (primary) hypertension 01/18/2018   • History of urinary stone 01/18/2018   • Hyperparathyroidism, unspecified ([facility]) 01/19/2018   • 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, with stage 1 through stage 4 chronic kidney disease, or unspecified chronic kidney disease 10/18/2019   • Other disorders of phosphorus metabolism 02/11/2021   • Other iron deficiency anemias 02/10/2018   • Pericardial effusion (noninflammatory) 01/18/2018   • Pure hypertriglyceridemia 01/18/2018   • Renal osteodystrophy 01/18/2018   • Secondary hyperparathyroidism of renal origin ([facility]) 01/18/2018   • Sleep apnea, unspecified 01/18/2018   • Type 2 diabetes mellitus with other diabetic kidney complication ([facility]) 01/19/2018   • Splenomegaly, not elsewhere classified 01/18/2018   • Unspecified atrial fibrillation ([facility]) 01/19/2018   • Bilateral pseudophakia 05/05/2021   • Dermatochalasis of eyelid 05/05/2021   • Primary open-angle glaucoma, bilateral, moderate stage 05/05/2021   • Puckering of macula, left eye 05/05/2021   • Secondary cataract 05/05/2021   • AV fistula occlusion, initial encounter ([facility]) 02/15/2022     Resolved Ambulatory Problems     Diagnosis Date Noted   • Pneumonia of both lungs due to infectious organism 01/19/2018   • Anaphylactic shock, unspecified, initial encounter 10/09/2020   • Moderate protein-calorie malnutrition ([facility])  12/16/2020     Past Medical History:   Diagnosis Date   • Abnormal serum protein electrophoresis    • Crohn disease (HCC)    • Diastolic CHF (HCC)    • Diverticula of colon    • ESRD on hemodialysis (HCC)    • Fatty liver disease, nonalcoholic    • GERD (gastroesophageal reflux disease)    • GI bleed    • Glaucoma    • H/O colectomy    • H/O Gallstone pancreatitis    • H/O Pericardial effusion    • H/O sinus bradycardia    • History of hypertension    • Hx of renal calculi    • Hyperlipidemia    • Ileostomy present (HCC)    • Iron deficiency    • MGUS (monoclonal gammopathy of unknown significance)    • Sleep apnea    • Type 2 diabetes mellitus (HCC)          PAST SURGICAL HISTORY  Past Surgical History:   Procedure Laterality Date   • APPENDECTOMY     • ARTERIOVENOUS FISTULA/SHUNT SURGERY Left 8/8/2017    Procedure: LEFT BRACHIAL CEPHALIC AV FISTULA FORMATION WITH CEPHALIC VEIN TRANSPOSITION ;  Surgeon: Bill Deal MD;  Location: [facility] OR;  Service:    • ARTERIOVENOUS FISTULA/SHUNT SURGERY W/ HEMODIALYSIS CATHETER INSERTION Left 2/15/2022    Procedure: OPEN LEFT ARM ARTERIOVENOUS FISTULA THROMBECTOMY WITH CEPHALIC VEIN ARTHROPLASTY AND STENT/GRAFT PLACEMENT;  Surgeon: Bill Deal MD;  Location: [facility] OR 18/19;  Service: Vascular;  Laterality: Left;   • CHOLECYSTECTOMY     • COLECTOMY PARTIAL / TOTAL      History of inflammatory bowel disease with status post colectomy with ileostomy many years ago in the [facility]   • COLON RESECTION WITH COLOSTOMY     • COLON SURGERY     • COLONOSCOPY     • CYSTOSCOPY LITHOLAPAXY BLADDER STONE EXTRACTION     • ENDOSCOPY  01/16/2015    gastritis   • ENDOSCOPY  1/17/2018    Procedure: ESOPHAGOGASTRODUODENOSCOPY;  Surgeon: Warner Neville MD;  Location: [facility] ENDOSCOPY;  Service:    • ILEOSCOPY  01/16/2015    normal   • ILEOSCOPY N/A 1/17/2018    Procedure: ILEOSCOPY;  Surgeon: Warner Neville MD;  Location: [facility] ENDOSCOPY;  Service:          FAMILY  HISTORY  Family History   Problem Relation Age of Onset   • Heart failure Mother    • Hypertension Mother    • Heart disease Mother    • Hyperlipidemia Mother    • Hypertension Father    • Malig Hyperthermia Neg Hx          SOCIAL HISTORY  Social History     Socioeconomic History   • Marital status:      Spouse name: Gillian   • Number of children: 2   • Years of education: College   Tobacco Use   • Smoking status: Never Smoker   • Smokeless tobacco: Never Used   Vaping Use   • Vaping Use: Never used   Substance and Sexual Activity   • Alcohol use: No     Comment: caffeine use: none   • Drug use: No   • Sexual activity: Defer         ALLERGIES  Ace inhibitors, Contrast dye, Iodine, Angiotensin receptor blockers, Eliquis [apixaban], Granix [filgrastim], and Keflex [cephalexin]        REVIEW OF SYSTEMS  Review of Systems   Review of all 14 systems is negative other than stated in the HPI above.      PHYSICAL EXAM  ED Triage Vitals   Temp Heart Rate Resp BP SpO2   03/11/22 1814 03/11/22 1814 03/11/22 1814 03/11/22 1830 03/11/22 1814   100 °F (37.8 °C) 100 18 160/69 98 %      Temp src Heart Rate Source Patient Position BP Location FiO2 (%)   03/11/22 2045 03/11/22 1901 03/11/22 1901 03/11/22 1901 --   Tympanic Monitor Lying Right arm          GENERAL: Awake and alert, well-appearing, no acute distress  HENT: nares patent  EYES: no scleral icterus, pupils 3 mm reactive bilaterally  CV: irregular rhythm, normal rate  RESPIRATORY: normal effort, lungs clear auscultation bilaterally  ABDOMEN: soft, nondistended, ileostomy present, nontender throughout  MUSCULOSKELETAL: no deformity  NEURO: alert, moves all extremities, follows commands, cranial nerves II through XII grossly intact  PSYCH:  calm, cooperative  SKIN: warm, dry, normal to inspection, no rash    Vital signs and nursing notes reviewed.          LAB RESULTS  Recent Results (from the past 24 hour(s))   ECG 12 Lead    Collection Time: 03/11/22  6:19 PM    Result Value Ref Range    QT Interval 333 ms   Comprehensive Metabolic Panel    Collection Time: 03/11/22  6:45 PM    Specimen: Blood   Result Value Ref Range    Glucose 162 (H) 65 - 99 mg/dL    BUN 37 (H) 8 - 23 mg/dL    Creatinine 3.96 (H) 0.76 - 1.27 mg/dL    Sodium 134 (L) 136 - 145 mmol/L    Potassium 4.1 3.5 - 5.2 mmol/L    Chloride 95 (L) 98 - 107 mmol/L    CO2 27.0 22.0 - 29.0 mmol/L    Calcium 8.7 8.6 - 10.5 mg/dL    Total Protein 8.0 6.0 - 8.5 g/dL    Albumin 3.60 3.50 - 5.20 g/dL    ALT (SGPT) 20 1 - 41 U/L    AST (SGOT) 36 1 - 40 U/L    Alkaline Phosphatase 193 (H) 39 - 117 U/L    Total Bilirubin 0.6 0.0 - 1.2 mg/dL    Globulin 4.4 gm/dL    A/G Ratio 0.8 g/dL    BUN/Creatinine Ratio 9.3 7.0 - 25.0    Anion Gap 12.0 5.0 - 15.0 mmol/L    eGFR 15.0 (L) >60.0 mL/min/1.73   CBC Auto Differential    Collection Time: 03/11/22  6:45 PM    Specimen: Blood   Result Value Ref Range    WBC 5.47 3.40 - 10.80 10*3/mm3    RBC 3.09 (L) 4.14 - 5.80 10*6/mm3    Hemoglobin 10.2 (L) 13.0 - 17.7 g/dL    Hematocrit 29.6 (L) 37.5 - 51.0 %    MCV 95.8 79.0 - 97.0 fL    MCH 33.0 26.6 - 33.0 pg    MCHC 34.5 31.5 - 35.7 g/dL    RDW 12.7 12.3 - 15.4 %    RDW-SD 43.1 37.0 - 54.0 fl    MPV 9.4 6.0 - 12.0 fL    Platelets 86 (L) 140 - 450 10*3/mm3    Neutrophil % 82.3 (H) 42.7 - 76.0 %    Lymphocyte % 7.3 (L) 19.6 - 45.3 %    Monocyte % 9.3 5.0 - 12.0 %    Eosinophil % 0.2 (L) 0.3 - 6.2 %    Basophil % 0.2 0.0 - 1.5 %    Immature Grans % 0.7 (H) 0.0 - 0.5 %    Neutrophils, Absolute 4.50 1.70 - 7.00 10*3/mm3    Lymphocytes, Absolute 0.40 (L) 0.70 - 3.10 10*3/mm3    Monocytes, Absolute 0.51 0.10 - 0.90 10*3/mm3    Eosinophils, Absolute 0.01 0.00 - 0.40 10*3/mm3    Basophils, Absolute 0.01 0.00 - 0.20 10*3/mm3    Immature Grans, Absolute 0.04 0.00 - 0.05 10*3/mm3    nRBC 0.0 0.0 - 0.2 /100 WBC       Ordered the above labs and reviewed the results.        RADIOLOGY  XR Chest 1 View    Result Date: 3/11/2022  Portable chest radiograph   HISTORY:Chills  TECHNIQUE: Single AP portable radiograph of the chest  COMPARISON:Chest radiograph dating back to 01/20/2018      FINDINGS AND IMPRESSION: Severe asymmetric elevation right hemidiaphragm is present and has mildly worsened since 02/14/2022. Overlying atelectasis and/or pleural parenchymal scarring is present, as before. No new pulmonary consolidation, pleural effusion or pneumothorax is seen. A vascular stent overlies the left axilla. There are right quadrant surgical clips. Cardiac silhouette is mild to moderately enlarged. A rounded hyperdensity projects over the thoracic inlet and has been seen since 01/23/2018, likely benign.  This report was finalized on 3/11/2022 8:19 PM by Dr. Marvin Longoria M.D.        Ordered the above noted radiological studies. Reviewed by me in PACS.            PROCEDURES  Procedures              MEDICATIONS GIVEN IN ER  Medications   sodium chloride 0.9 % flush 10 mL (has no administration in time range)                   MEDICAL DECISION MAKING, PROGRESS, and CONSULTS    All labs have been independently reviewed by me.  All radiology studies have been reviewed by me and discussed with radiologist dictating the report.   EKG's independently viewed and interpreted by me.  Discussion below represents my analysis of pertinent findings related to patient's condition, differential diagnosis, treatment plan and final disposition.      Differential diagnosis includes but is not limited to:  Sepsis  Urinary retention  Dysautonomia  Pneumonia  Acute UTI  Viral syndrome      ED Course as of 03/11/22 2142   Fri Mar 11, 2022   1837 EKG interpreted by me:  Time: 1819  Rate and rhythm: A. fib, 91  AK: N/A  QRS: Borderline left axis  ST T waves: No acute ischemic changes [JR]   2043 Chest x-ray reviewed in PACS.  There is mild elevation of the right hemidiaphragm, no infiltrate, no pleural effusion. [JR]   2108 EKG          EKG time: 1819  Rhythm/Rate: A. fib, 91  P waves and AK:  N/A  QRS, axis: Borderline left axis  ST and T waves: No acute ischemic changes    Interpreted Contemporaneously by me, independently viewed  Similar compared to prior 2/14/2022       [JR]   2111 Bedside bladder ultrasound performed and shows no distention of the urinary bladder.  T-max here is 100.  Sound like he may have had chills prior to arrival.  Is unclear why his blood pressure and heart rate spiked.  They have returned to normal now.  He is in atrial fibrillation but rate controlled.  Chest x-ray shows chronic elevation of right diaphragm without infiltrate.  I elected not to attempt to obtain catheterized urine specimen is in his urinary bladder is collapsed and he voided just prior to arrival.  He does not make much urine being on hemodialysis.  I discussed plan to obtain blood cultures prior to discharge out of precaution.  My blood cell count within normal limits at 5.4 however he expressed concern as his normal blood count is between 3 and 4.  Patient [place] discharge home at this time with return precautions. [JR]      ED Course User Index  [JR] Mark Escobar MD              I wore an N95 mask, face shield, and gloves during this patient encounter.  Patient also wearing a surgical mask.  Hand hygeine performed before and after seeing the patient.    DIAGNOSIS  Final diagnoses:   Labile blood pressure   Permanent atrial fibrillation (HCC)   ESRD on hemodialysis (HCC)         DISPOSITION  DISCHARGE    Patient discharged in stable condition.    Reviewed implications of results, diagnosis, meds, responsibility to follow up, warning signs and symptoms of possible worsening, potential complications and reasons to return to ER.    Patient/Family voiced understanding of above instructions.    Discussed plan for discharge, as there is no emergent indication for admission. Patient referred to primary care provider for BP management due to today's BP. Pt/family is agreeable and understands need for  follow up and repeat testing.  Pt is aware that discharge does not mean that nothing is wrong but it indicates no emergency is present that requires admission and they must continue care with follow-up as given below or physician of their choice.     FOLLOW-UP  Mecca Roe MD  [address] [id number] [phone]    Schedule an appointment as soon as possible for a visit            Medication List      No changes were made to your prescriptions during this visit.                   Latest Documented Vital Signs:  As of 21:42 EST  BP- 151/71 HR- 82 Temp- 99.5 °F (37.5 °C) (Tympanic) O2 sat- 93%        --    Please note that portions of this were completed with a voice recognition program.          Mark Escobar MD  03/11/22 7425

## 2022-03-15 ENCOUNTER — OFFICE VISIT (OUTPATIENT)
Dept: ONCOLOGY | Facility: CLINIC | Age: 77
End: 2022-03-15

## 2022-03-15 DIAGNOSIS — D50.8 OTHER IRON DEFICIENCY ANEMIA: ICD-10-CM

## 2022-03-15 DIAGNOSIS — N18.4 ANEMIA DUE TO STAGE 4 CHRONIC KIDNEY DISEASE TREATED WITH ERYTHROPOIETIN: ICD-10-CM

## 2022-03-15 DIAGNOSIS — K74.60 CIRRHOSIS OF LIVER WITHOUT ASCITES, UNSPECIFIED HEPATIC CIRRHOSIS TYPE: ICD-10-CM

## 2022-03-15 DIAGNOSIS — Z91.89 AT RISK FOR FLUID VOLUME OVERLOAD: ICD-10-CM

## 2022-03-15 DIAGNOSIS — D63.1 ANEMIA DUE TO STAGE 4 CHRONIC KIDNEY DISEASE TREATED WITH ERYTHROPOIETIN: ICD-10-CM

## 2022-03-15 DIAGNOSIS — D72.819 CHRONIC LEUKOPENIA: ICD-10-CM

## 2022-03-15 DIAGNOSIS — D69.6 THROMBOCYTOPENIA: Primary | ICD-10-CM

## 2022-03-15 DIAGNOSIS — R16.1 SPLENOMEGALY, NOT ELSEWHERE CLASSIFIED: ICD-10-CM

## 2022-03-15 PROCEDURE — 99214 OFFICE O/P EST MOD 30 MIN: CPT | Performed by: INTERNAL MEDICINE

## 2022-03-15 NOTE — PROGRESS NOTES
Subjective         REASONS FOR FOLLOWUP:   ACQUIRED PANCYTOPENIA FROM SPLENOMEGALY AND LIVER CIRRHOSIS, IRON DEF ANEMIA FROM PREVIOUS GI BLEEDING, PATIENT CAN NOT TAKE ANTICOAGULANTS DUE TO TRIGGERING OF SEVERE GI BLEEDING    HISTORY OF PRESENT ILLNESS:   I have called this patient on 03/15/2022 and he has accepted Telemedicine visit. He has provided me with acceptance. The patient overall states that he has had vascular surgery by Dr. Bill Deal a few weeks ago, placing a stent in the left subclavian area, trying to keep his fistula from further clogging. He has continued dialysis with no inconveniences through this. He had a full bladder a few days ago requiring Khan catheter placement in the emergency room and therapy with Uroxatral by urologist. The Khan catheter is now removed. He had low-grade temperature for 24 hours, this disappeared. He is undergoing dialysis and his urinary output is very minimal otherwise. The patient otherwise has not had any problems with appetite. His weight remains stable at 175 more or less. His wife is keeping him on a diet. His bowel activity is normal. He has no cardiovascular or respiratory issues. No recent infections and no other new health problems.          Past Medical History:   Diagnosis Date   • Abnormal serum protein electrophoresis    • Anemia     Multifactorial   • Chronic leukopenia and thrombocytopenia    • Cirrhosis of liver without ascites (HCC) 7/24/2017   • Congestive splenomegaly 7/24/2017   • Crohn disease (HCC)     with ileostomy   • Diastolic CHF (HCC)     although it was likely from volume overload due to ESRD   • Diverticula of colon    • ESRD on hemodialysis (HCC)    • Fatty liver disease, nonalcoholic    • GERD (gastroesophageal reflux disease)    • GI bleed    • Glaucoma    • H/O colectomy    • H/O Gallstone pancreatitis    • H/O Pericardial effusion    • H/O sinus bradycardia    • History of hypertension     resolved   • Hx of renal calculi    •  Hyperlipidemia    • Ileostomy present (HCC)    • Iron deficiency    • MGUS (monoclonal gammopathy of unknown significance)    • Permanent atrial fibrillation (HCC)    • Sleep apnea     CPAP USED   • Thrombocytopenia (HCC)    • Type 2 diabetes mellitus (HCC)         Past Surgical History:   Procedure Laterality Date   • APPENDECTOMY     • ARTERIOVENOUS FISTULA/SHUNT SURGERY Left 8/8/2017    Procedure: LEFT BRACHIAL CEPHALIC AV FISTULA FORMATION WITH CEPHALIC VEIN TRANSPOSITION ;  Surgeon: Bill Deal MD;  Location: [facility] OR;  Service:    • ARTERIOVENOUS FISTULA/SHUNT SURGERY W/ HEMODIALYSIS CATHETER INSERTION Left 2/15/2022    Procedure: OPEN LEFT ARM ARTERIOVENOUS FISTULA THROMBECTOMY WITH CEPHALIC VEIN ARTHROPLASTY AND STENT/GRAFT PLACEMENT;  Surgeon: Bill Deal MD;  Location: [facility] OR 18/19;  Service: Vascular;  Laterality: Left;   • CHOLECYSTECTOMY     • COLECTOMY PARTIAL / TOTAL      History of inflammatory bowel disease with status post colectomy with ileostomy many years ago in the [facility]   • COLON RESECTION WITH COLOSTOMY     • COLON SURGERY     • COLONOSCOPY     • CYSTOSCOPY LITHOLAPAXY BLADDER STONE EXTRACTION     • ENDOSCOPY  01/16/2015    gastritis   • ENDOSCOPY  1/17/2018    Procedure: ESOPHAGOGASTRODUODENOSCOPY;  Surgeon: Warner Neville MD;  Location: [facility] ENDOSCOPY;  Service:    • ILEOSCOPY  01/16/2015    normal   • ILEOSCOPY N/A 1/17/2018    Procedure: ILEOSCOPY;  Surgeon: Warner Neville MD;  Location: [facility] ENDOSCOPY;  Service:         Current Outpatient Medications on File Prior to Visit   Medication Sig Dispense Refill   • alfuzosin (UROXATRAL) 10 MG 24 hr tablet Take 10 mg by mouth Daily.     • aspirin 81 MG tablet Take 81 mg by mouth Daily.     • B Complex-C-Folic Acid (EMILIE-FREDDY) tablet Take 1 tablet by mouth Daily. 0.8 mg  3   • Cholecalciferol (VITAMIN D3) 1000 units capsule Take 1,000 Units by mouth Daily.     • famotidine (PEPCID) 10 MG tablet Take 10  mg by mouth As Needed for Heartburn.     • febuxostat (ULORIC) 40 MG tablet TAKE 1 TABLET BY MOUTH DAILY 30 tablet 5   • HYDROcodone-acetaminophen (NORCO) 5-325 MG per tablet Take 1-2 tablets by mouth Every 6 (Six) Hours As Needed for Severe Pain  (Pain). 30 tablet 0   • Iron Sucrose (VENOFER IV) Infuse  into a venous catheter As Needed.     • latanoprost (XALATAN) 0.005 % ophthalmic solution Administer 1 drop to both eyes Every Night. 1 drop each eye      • lidocaine-prilocaine (EMLA) 2.5-2.5 % cream APPLY A SMALL AMOUNT TO DIALYSIS ACCESS SITE 1 HOUR PRIOR AND COVER WITH AN OCCLUSIVE DRESSING  3   • Loratadine (CLARITIN PO) Take 1 tablet by mouth As Needed. Pt states he is uncertain of dosage      • Sucroferric Oxyhydroxide (VELPHORO PO) Take 2 tablets by mouth 3 (Three) Times a Day.       No current facility-administered medications on file prior to visit.        ALLERGIES:    Allergies   Allergen Reactions   • Ace Inhibitors Other (See Comments)     RENAL FAILURE/ raised creatinine   • Contrast Dye Other (See Comments)     RENAL FAILURE   • Iodine Anaphylaxis   • Angiotensin Receptor Blockers Swelling   • Eliquis [Apixaban] Arrhythmia     BLEEDING ISSUES; PT CANNOT TAKE ANY ANTICOAGULANTS DUE TO LOW PLATELETS EXCEPT ASPIRIN  BLEEDING ISSUES; PT CANNOT TAKE ANY ANTICOAGULANTS DUE TO LOW PLATELETS EXCEPT ASPIRIN   • Granix [Filgrastim] GI Intolerance     Chest pain  Chest pain     • Keflex [Cephalexin] Diarrhea     RENAL FAILURE        Social History     Socioeconomic History   • Marital status:      Spouse name: Gillian   • Number of children: 2   • Years of education: College   Tobacco Use   • Smoking status: Never Smoker   • Smokeless tobacco: Never Used   Vaping Use   • Vaping Use: Never used   Substance and Sexual Activity   • Alcohol use: No     Comment: caffeine use: none   • Drug use: No   • Sexual activity: Defer        Family History   Problem Relation Age of Onset   • Heart failure Mother    •  Hypertension Mother    • Heart disease Mother    • Hyperlipidemia Mother    • Hypertension Father    • Malig Hyperthermia Neg Hx           Objective     There were no vitals filed for this visit.  Current Status 10/31/2019   ECOG score 0       Physical Exam  NONE    RECENT LABS:SCANNED - LABS (09/08/2021)        Recent imaging:    XR Chest 2 View    Result Date: 2/14/2022  Narrative: TWO-VIEW CHEST  HISTORY: Preop for surgery. Cirrhosis. Hypertension.  FINDINGS: There is slight elevation of the right hemidiaphragm when compared to the study of 01/23/2018 and there is some associated atelectasis and possible tiny pleural effusion at the right base. The lungs are otherwise clear. The heart is mildly enlarged without change.  This report was finalized on 2/14/2022 2:37 PM by Dr. Jason Flores M.D.      XR Chest 1 View    Result Date: 3/11/2022  Narrative: Portable chest radiograph  HISTORY:Chills  TECHNIQUE: Single AP portable radiograph of the chest  COMPARISON:Chest radiograph dating back to 01/20/2018      Impression: FINDINGS AND IMPRESSION: Severe asymmetric elevation right hemidiaphragm is present and has mildly worsened since 02/14/2022. Overlying atelectasis and/or pleural parenchymal scarring is present, as before. No new pulmonary consolidation, pleural effusion or pneumothorax is seen. A vascular stent overlies the left axilla. There are right quadrant surgical clips. Cardiac silhouette is mild to moderately enlarged. A rounded hyperdensity projects over the thoracic inlet and has been seen since 01/23/2018, likely benign.  This report was finalized on 3/11/2022 8:19 PM by Dr. Marvin Longoria M.D.      Arteriogram (Autofinalize)    Result Date: 2/15/2022  Narrative: This procedure was auto-finalized with no dictation required.       Assessment/Plan     Diagnoses and all orders for this visit:    1. Thrombocytopenia (HCC) (Primary)    2. Chronic leukopenia    3. Cirrhosis of liver without ascites, unspecified  hepatic cirrhosis type (HCC)    4. At risk for fluid volume overload    5. Splenomegaly, not elsewhere classified    6. Other iron deficiency anemia    7. Anemia due to stage 4 chronic kidney disease treated with erythropoietin (HCC)         I reviewed the recent laboratory parameters on this patient and his white count remains around 300, his platelet count was 104,000, the hemoglobin around 11.  These numbers for him are very good. He has received IV iron and his ferritin level and iron profile are appropriate at this time. He has no clinical bleeding.     He raised the question about these numbers. He knows that these numbers are related to his chronic liver disease and splenomegaly. This has been present for years and years since he has been my patient. These numbers fluctuate but never have changed substantially. I made him aware that if he has a white count less than 2000 and platelet count less than 30,000 he will need to call me immediately.    He raised the question about the Atenolol if this is prudent to take. I pointed out to him that it could be beneficial to control his blood pressure, it will be beneficial to decrease his portal pressure too and it could be beneficial to minimize in case of any atrial fibrillation, rapid ventricular response. He would like to adjust his CPAP machine first to see if he has lesser episodes of sleep apnea and if this does not correct the problem he will start the blood pressure medication under the direction of cardiology.     Finally they raises the question him and his wife in regard to COVID vaccination booster shot. He is ready from this point of view from my situation. He has as we know patient's on dialysis chronic kidney disease are immunosuppressed, he must have his COVID vaccination booster and I asked him to go ahead and proceed with this at this time. Three to 4 weeks later he would like to proceed with his flu shot.     Otherwise we will review him by  telemedicine phone visit in 6 months.    He will continue sending reports of his laboratory parameters from his dialysis center.     Duration of the phone visit 12 minutes.   The patient was reviewed by Telemedicine on 03/15/2022. Since the previous visit the patient has undergone the repair of his fistula in the left upper extremity and a stent was placed in the left subclavian vein. This is visible in the most recent chest x-ray. Now the fistula for dialysis is working fine. He had also an episode of urine collection in the bladder, phenomenon that is unusual in chronic kidney disease. He had probably some discomfort and minimal fever. He had a Khan catheter placed, now has been removed by Urology. He is no longer having a fever and he has minimal urinary output. The patient otherwise feels well. The most recent laboratory testing that has been reviewed above shows chronic leukopenia and chronic thrombocytopenia without any changes. His white count is typically around 3500, platelet count around 110,000, stable hemoglobin. Given the above circumstances, I do not believe that we need to pursue any other intervention. The dialysis team, Dr. Monsivais and associates, will continue doing his laboratory testing and they will let us know if any need arises in regard to any other intervention.     I related to the patient and his wife his numbers are doing well and I find no need for any other intervention. I will review him back here in the office in 4 months with a CBC.    Duration of the phone visit: 15 minutes.    ·

## 2022-03-16 ENCOUNTER — TELEPHONE (OUTPATIENT)
Dept: ONCOLOGY | Facility: CLINIC | Age: 77
End: 2022-03-16

## 2022-03-16 LAB
BACTERIA SPEC AEROBE CULT: NORMAL
BACTERIA SPEC AEROBE CULT: NORMAL

## 2022-03-16 NOTE — TELEPHONE ENCOUNTER
Caller: Gillian Landry    Relationship to patient: Emergency Contact    Best call back number: [phone]    Chief complaint: WANTS EARLIER TIME   HUB UNABLE TO RESCHEDULE DUE TO PATIENT BEING IN ACTIVE TREATMENT    Type of visit: LAB AND FOLLOW UP    Requested date: 7-12-22 EARLY IN THE MORNING     If rescheduling, when is the original appointment: 7-12-22

## 2022-03-18 NOTE — TELEPHONE ENCOUNTER
Caller: Gillian Landry    Relationship: Emergency Contact    Best call back number: [phone]    What is the best time to reach you: ANYTIME    Who are you requesting to speak with (clinical staff, provider,  specific staff member): SCHEDULING    What was the call regarding: PTS WIFE CALLING TO CHECK TO SEE IF PT HAD BEEN R/S YET. THEY WOULD LIKE A TUES OR THURS EARLY MORNING APPT FOR PTS NEXT F/U IN July. PLEASE R/S THE 7/12 APPT, PT DOES NOT WANT TO COME IN AT 1250, HE PREFERS EARLY MORNING. CAN CHANGE DAY IF NEEDED AS LONG AS IT IS TUES OR THURS EARLY MORNING. THEY CALLED 3/16 TO R/S THIS APPT AND STILL HADNT RECVD A CALLBACK YET.    Do you require a callback: YES

## 2022-04-26 ENCOUNTER — OFFICE VISIT (OUTPATIENT)
Dept: CARDIOLOGY | Facility: CLINIC | Age: 77
End: 2022-04-26

## 2022-04-26 VITALS
HEART RATE: 82 BPM | HEIGHT: 70 IN | BODY MASS INDEX: 26.05 KG/M2 | SYSTOLIC BLOOD PRESSURE: 140 MMHG | WEIGHT: 182 LBS | DIASTOLIC BLOOD PRESSURE: 70 MMHG

## 2022-04-26 DIAGNOSIS — I10 ESSENTIAL (PRIMARY) HYPERTENSION: ICD-10-CM

## 2022-04-26 DIAGNOSIS — I35.8 AORTIC VALVE SCLEROSIS: ICD-10-CM

## 2022-04-26 DIAGNOSIS — N18.6 ESRD (END STAGE RENAL DISEASE): ICD-10-CM

## 2022-04-26 DIAGNOSIS — I48.21 PERMANENT ATRIAL FIBRILLATION: Primary | ICD-10-CM

## 2022-04-26 DIAGNOSIS — G47.33 OSA (OBSTRUCTIVE SLEEP APNEA): ICD-10-CM

## 2022-04-26 PROBLEM — G47.30 SLEEP APNEA, UNSPECIFIED: Status: RESOLVED | Noted: 2018-01-18 | Resolved: 2022-04-26

## 2022-04-26 PROBLEM — D64.9 ANEMIA: Status: RESOLVED | Noted: 2018-01-12 | Resolved: 2022-04-26

## 2022-04-26 PROBLEM — I48.91 UNSPECIFIED ATRIAL FIBRILLATION: Status: RESOLVED | Noted: 2018-01-19 | Resolved: 2022-04-26

## 2022-04-26 PROCEDURE — 99214 OFFICE O/P EST MOD 30 MIN: CPT | Performed by: NURSE PRACTITIONER

## 2022-04-26 PROCEDURE — 93000 ELECTROCARDIOGRAM COMPLETE: CPT | Performed by: NURSE PRACTITIONER

## 2022-04-26 NOTE — PROGRESS NOTES
Date of Office Visit: 2022  Encounter Provider: JAME Key  Place of Service: [facility] CARDIOLOGY  Patient Name: Bill Landry  :1945  Primary Cardiologist: Dr. Dale Vázquez     Chief Complaint   Patient presents with   • Atrial Fibrillation   • Follow-up   :     HPI: Bill Landry is a pleasant 76 y.o. male who presents today for follow-up on atrial fibrillation.  I am meeting him for the first time in the office.  In , I conducted a telehealth visit with him via phone.    He has a long history of Crohn's disease and underwent colectomy/ileostomy in .  He was diagnosed with ESRD from Crohn's disease, kidney stones, and hypertension and has been on hemodialysis.  He has chronic leukopenia, thrombocytopenia, and anemia.  At one point he had a GI bleed and he is not anticoagulated.    He has known permanent atrial fibrillation.  He had an episode of diastolic heart failure and pulmonary hypertension in the setting of volume overload from his chronic kidney disease.      In May 2018, echocardiogram showed normal EF, mild RVE, and aortic valve sclerosis.    In 2022, patient's wife called our office stating that he had just had surgery on his dialysis fistula.  He was having trouble emptying his bladder and was started on alfuzosin and received a Khan catheter.  His blood pressure and heart rates were elevated.  His wife felt like that was a result from the alfuzosin and Dr. Vázquez explained that medication actually drops his blood pressure not increases it.    He presents today for his follow-up visit.  He is accompanied today by his wife and unfortunately she fell and hit her head in the parking lot.  She will be going to the ED.  Patient explained that his atenolol was discontinued by Dr. Monsivais because his blood pressure was getting too low.  He still urinates while on dialysis.  He denies chest pain, shortness of air, palpitations, edema,  dizziness, syncope, or bleeding.  He has obstructive sleep apnea and is waiting for a new CPAP machine.      Past Medical History:   Diagnosis Date   • Abnormal serum protein electrophoresis    • Anemia     Multifactorial   • Chronic leukopenia and thrombocytopenia    • Cirrhosis of liver without ascites (HCC) 7/24/2017   • Congestive splenomegaly 7/24/2017   • Crohn disease (HCC)     with ileostomy   • Diastolic CHF (HCC)     although it was likely from volume overload due to ESRD   • Diverticula of colon    • ESRD on hemodialysis (HCC)    • Fatty liver disease, nonalcoholic    • GERD (gastroesophageal reflux disease)    • GI bleed    • Glaucoma    • H/O colectomy    • H/O Gallstone pancreatitis    • H/O Pericardial effusion    • H/O sinus bradycardia    • History of hypertension     resolved   • Hx of renal calculi    • Hyperlipidemia    • Ileostomy present (HCC)    • Iron deficiency    • MGUS (monoclonal gammopathy of unknown significance)    • TATE (obstructive sleep apnea)    • Permanent atrial fibrillation (HCC)    • Sleep apnea     CPAP USED   • Thrombocytopenia (HCC)    • Type 2 diabetes mellitus (HCC)        Past Surgical History:   Procedure Laterality Date   • APPENDECTOMY     • ARTERIOVENOUS FISTULA/SHUNT SURGERY Left 8/8/2017    Procedure: LEFT BRACHIAL CEPHALIC AV FISTULA FORMATION WITH CEPHALIC VEIN TRANSPOSITION ;  Surgeon: Bill Deal MD;  Location: [facility];  Service:    • ARTERIOVENOUS FISTULA/SHUNT SURGERY W/ HEMODIALYSIS CATHETER INSERTION Left 2/15/2022    Procedure: OPEN LEFT ARM ARTERIOVENOUS FISTULA THROMBECTOMY WITH CEPHALIC VEIN ARTHROPLASTY AND STENT/GRAFT PLACEMENT;  Surgeon: Bill Deal MD;  Location: [facility] OR 18/19;  Service: Vascular;  Laterality: Left;   • CHOLECYSTECTOMY     • COLECTOMY PARTIAL / TOTAL      History of inflammatory bowel disease with status post colectomy with ileostomy many years ago in the [facility]   • COLON RESECTION WITH COLOSTOMY      • COLON SURGERY     • COLONOSCOPY     • CYSTOSCOPY LITHOLAPAXY BLADDER STONE EXTRACTION     • ENDOSCOPY  01/16/2015    gastritis   • ENDOSCOPY  1/17/2018    Procedure: ESOPHAGOGASTRODUODENOSCOPY;  Surgeon: Warner Neville MD;  Location: [facility] ENDOSCOPY;  Service:    • ILEOSCOPY  01/16/2015    normal   • ILEOSCOPY N/A 1/17/2018    Procedure: ILEOSCOPY;  Surgeon: Warner Neville MD;  Location: [facility] ENDOSCOPY;  Service:        Social History     Socioeconomic History   • Marital status:      Spouse name: Gillian   • Number of children: 2   • Years of education: College   Tobacco Use   • Smoking status: Never Smoker   • Smokeless tobacco: Never Used   Vaping Use   • Vaping Use: Never used   Substance and Sexual Activity   • Alcohol use: No     Comment: caffeine use: none   • Drug use: No   • Sexual activity: Defer       Family History   Problem Relation Age of Onset   • Heart failure Mother    • Hypertension Mother    • Heart disease Mother    • Hyperlipidemia Mother    • Hypertension Father    • Malig Hyperthermia Neg Hx        The following portion of the patient's history were reviewed and updated as appropriate: past medical history, past surgical history, past social history, past family history, allergies, current medications, and problem list.    Review of Systems   Constitutional: Negative.   Cardiovascular: Negative.    Respiratory: Negative.    Hematologic/Lymphatic: Negative.    Neurological: Negative.        Allergies   Allergen Reactions   • Ace Inhibitors Other (See Comments)     RENAL FAILURE/ raised creatinine   • Contrast Dye Other (See Comments)     RENAL FAILURE   • Iodine Anaphylaxis   • Angiotensin Receptor Blockers Swelling   • Eliquis [Apixaban] Arrhythmia     BLEEDING ISSUES; PT CANNOT TAKE ANY ANTICOAGULANTS DUE TO LOW PLATELETS EXCEPT ASPIRIN  BLEEDING ISSUES; PT CANNOT TAKE ANY ANTICOAGULANTS DUE TO LOW PLATELETS EXCEPT ASPIRIN   • Granix [Filgrastim] GI Intolerance     Chest  "pain  Chest pain     • Keflex [Cephalexin] Diarrhea     RENAL FAILURE         Current Outpatient Medications:   •  alfuzosin (UROXATRAL) 10 MG 24 hr tablet, Take 10 mg by mouth Daily., Disp: , Rfl:   •  aspirin 81 MG tablet, Take 81 mg by mouth Daily., Disp: , Rfl:   •  B Complex-C-Folic Acid (EMILIE-FREDDY) tablet, Take 1 tablet by mouth Daily. 0.8 mg, Disp: , Rfl: 3  •  Cholecalciferol (VITAMIN D3) 1000 units capsule, Take 1,000 Units by mouth Daily., Disp: , Rfl:   •  famotidine (PEPCID) 10 MG tablet, Take 10 mg by mouth As Needed for Heartburn., Disp: , Rfl:   •  febuxostat (ULORIC) 40 MG tablet, TAKE 1 TABLET BY MOUTH DAILY, Disp: 30 tablet, Rfl: 5  •  Iron Sucrose (VENOFER IV), Infuse  into a venous catheter As Needed., Disp: , Rfl:   •  latanoprost (XALATAN) 0.005 % ophthalmic solution, Administer 1 drop to both eyes Every Night. 1 drop each eye, Disp: , Rfl:   •  lidocaine-prilocaine (EMLA) 2.5-2.5 % cream, APPLY A SMALL AMOUNT TO DIALYSIS ACCESS SITE 1 HOUR PRIOR AND COVER WITH AN OCCLUSIVE DRESSING, Disp: , Rfl: 3  •  Loratadine (CLARITIN PO), Take 1 tablet by mouth As Needed. Pt states he is uncertain of dosage, Disp: , Rfl:   •  Methoxy PEG-Epoetin Beta (MIRCERA IJ), 50 mcg Every 28 (Twenty-Eight) Days., Disp: , Rfl:   •  Sucroferric Oxyhydroxide (VELPHORO PO), Take 2 tablets by mouth 3 (Three) Times a Day., Disp: , Rfl:         Objective:     Vitals:    04/26/22 0828   BP: 140/70   BP Location: Right arm   Pulse: 82   Weight: 82.6 kg (182 lb)   Height: 177.8 cm (70\")     Body mass index is 26.11 kg/m².    PHYSICAL EXAM:    Vitals Reviewed.   General Appearance: No acute distress, well developed and well nourished.   Eyes: Conjunctiva and lids: No erythema, swelling, or discharge. Sclera non-icteric. Glasses.   HENT: Atraumatic, normocephalic. External eyes, ears, and nose normal. No hearing loss noted. Mucous membranes normal. Lips not cyanotic. Neck supple with no tenderness. Wearing mask.   Respiratory: " No signs of respiratory distress. Respiration rhythm and depth normal.   Clear to auscultation. No rales, crackles, rhonchi, or wheezing auscultated.   Cardiovascular:  Jugular Venous Pressure: Normal  Heart Rate and Rhythm: Irregularly, irregular.  Heart Sounds: Normal S1 and S2. No S3 or S4 noted.  Murmurs: No murmurs noted. No rubs, thrills, or gallops.   Lower Extremities: No edema noted.  Gastrointestinal:  Abdomen soft, non-distended, non-tender.    Musculoskeletal: Normal movement of extremities.  Skin and Nails: General appearance normal. No pallor, cyanosis, diaphoresis. Skin temperature normal. No clubbing of fingernails.   Psychiatric: Patient alert and oriented to person, place, and time. Speech and behavior appropriate. Normal mood and affect.       ECG 12 Lead    Date/Time: 4/26/2022 8:40 AM  Performed by: Radha Rojo APRN  Authorized by: Radha Rojo APRN   Comparison: compared with previous ECG from 3/11/2022  Similar to previous ECG  Rhythm: atrial fibrillation  Rate: normal  BPM: 82  Conduction: conduction normal  ST Segments: ST segments normal  T Waves: T waves normal  QRS axis: normal    Clinical impression: abnormal EKG              Assessment:       Diagnosis Plan   1. Permanent atrial fibrillation (HCC)  Adult Transthoracic Echo Complete W/ Cont if Necessary Per Protocol   2. Aortic valve sclerosis  Adult Transthoracic Echo Complete W/ Cont if Necessary Per Protocol   3. ESRD (end stage renal disease) (HCC)     4. Essential (primary) hypertension     5. TATE (obstructive sleep apnea)            Plan:       1.  Permanent Atrial Fibrillation: Naturally rate controlled and asymptomatic.  He is not on anticoagulation due to history of GI bleed and blood abnormalities.    Atrial Fibrillation and Atrial Flutter  Assessment  • The patient has permanent atrial fibrillation  • This is non-valvular in etiology  • The patient's CHADS2-VASc score is 3  • A [id number] score of 2 or more is  considered a high risk for a thromboembolic event      2.  Aortic Sclerosis: Noted on last echocardiogram.  The patient's wife said that Dr. Vázquez likes him to have an echocardiogram completed every 5 years so he will be due in 2023.    3.  ESRD: On hemodialysis.    4.  Hypertension: Followed by nephrology.    5.  Obstructive Sleep Apnea: Compliant with CPAP machine and he is waiting on a new machine.    6.  I recommended a 1 year follow-up visit with Dr. Vázquez and same-day echocardiogram.    His wife was taken to the first floor of our building via wheelchair.  Her  will drive her around to the ED.  I dressed her hands.  I talked with the triage ED nurse so that she was aware.  A safe report was filled out by Dimple MATAMOROS.    ADDENDUM 7/20/2022:    Patient was seen in the office in April 2022.  I ordered a echocardiogram to be completed in 1 year.      Patient actually had it completed yesterday with the following results:      Echocardiogram Interpretation Summary    · Estimated left ventricular EF = 60% Left ventricular systolic function is normal. Normal global longitudinal LV strain (GLS) = -22.1%. Left ventricle strain data was reviewed by the physician and found to be accurate. Normal left ventricular cavity size noted. Left ventricular wall thickness is consistent with borderline concentric hypertrophy. All left ventricular wall segments contract normally. Left ventricular diastolic function was indeterminate.  · The left atrial cavity is moderately dilated.  · The aortic valve is abnormal in structure. A functionally bicuspid aortic valve. There is moderate to severe thickening of the aortic valve. Trace aortic valve regurgitation is present. Mild aortic valve stenosis is present.     Echocardiogram stable.  He will follow-up with Dr. Vázquez in 2023.        As always, it has been a pleasure to participate in your patient's care. Thank you.       Sincerely,         Radha Rojo [facility]  [facility]      · Dictated utilizing Dragon Dictation  · COVID-19 Precautions - Patient was compliant in wearing a mask. When I saw the patient, I used appropriate personal protective equipment (PPE) including mask and eye shield (standard procedure).  Additionally, I used gown and gloves if indicated.  Hand hygiene was completed before and after seeing the patient.  · I spent 30 minutes reviewing her medical records/testing/previous office notes/labs, face-to-face interaction with patient, physical examination, formulating the plan of care, and discussion of plan of care with patient.

## 2022-05-25 ENCOUNTER — TELEPHONE (OUTPATIENT)
Dept: ONCOLOGY | Facility: CLINIC | Age: 77
End: 2022-05-25

## 2022-05-25 ENCOUNTER — PRE-ADMISSION TESTING (OUTPATIENT)
Dept: PREADMISSION TESTING | Facility: HOSPITAL | Age: 77
End: 2022-05-25

## 2022-05-25 VITALS
RESPIRATION RATE: 20 BRPM | HEART RATE: 97 BPM | SYSTOLIC BLOOD PRESSURE: 116 MMHG | WEIGHT: 183.3 LBS | TEMPERATURE: 99.5 F | OXYGEN SATURATION: 96 % | HEIGHT: 70 IN | BODY MASS INDEX: 26.24 KG/M2 | DIASTOLIC BLOOD PRESSURE: 59 MMHG

## 2022-05-25 LAB
ANION GAP SERPL CALCULATED.3IONS-SCNC: 15 MMOL/L (ref 5–15)
BUN SERPL-MCNC: 64 MG/DL (ref 8–23)
BUN/CREAT SERPL: 9.5 (ref 7–25)
CALCIUM SPEC-SCNC: 8.7 MG/DL (ref 8.6–10.5)
CHLORIDE SERPL-SCNC: 99 MMOL/L (ref 98–107)
CO2 SERPL-SCNC: 21 MMOL/L (ref 22–29)
CREAT SERPL-MCNC: 6.71 MG/DL (ref 0.76–1.27)
DEPRECATED RDW RBC AUTO: 50.1 FL (ref 37–54)
EGFRCR SERPLBLD CKD-EPI 2021: 7.9 ML/MIN/1.73
ERYTHROCYTE [DISTWIDTH] IN BLOOD BY AUTOMATED COUNT: 13.9 % (ref 12.3–15.4)
GLUCOSE SERPL-MCNC: 165 MG/DL (ref 65–99)
HCT VFR BLD AUTO: 32.4 % (ref 37.5–51)
HGB BLD-MCNC: 11 G/DL (ref 13–17.7)
MCH RBC QN AUTO: 33.1 PG (ref 26.6–33)
MCHC RBC AUTO-ENTMCNC: 34 G/DL (ref 31.5–35.7)
MCV RBC AUTO: 97.6 FL (ref 79–97)
PLATELET # BLD AUTO: 75 10*3/MM3 (ref 140–450)
PMV BLD AUTO: 9.1 FL (ref 6–12)
POTASSIUM SERPL-SCNC: 4.3 MMOL/L (ref 3.5–5.2)
RBC # BLD AUTO: 3.32 10*6/MM3 (ref 4.14–5.8)
SARS-COV-2 ORF1AB RESP QL NAA+PROBE: NOT DETECTED
SODIUM SERPL-SCNC: 135 MMOL/L (ref 136–145)
WBC NRBC COR # BLD: 3.07 10*3/MM3 (ref 3.4–10.8)

## 2022-05-25 PROCEDURE — 36415 COLL VENOUS BLD VENIPUNCTURE: CPT

## 2022-05-25 PROCEDURE — 80048 BASIC METABOLIC PNL TOTAL CA: CPT

## 2022-05-25 PROCEDURE — C9803 HOPD COVID-19 SPEC COLLECT: HCPCS

## 2022-05-25 PROCEDURE — 85027 COMPLETE CBC AUTOMATED: CPT

## 2022-05-25 PROCEDURE — U0005 INFEC AGEN DETEC AMPLI PROBE: HCPCS

## 2022-05-25 PROCEDURE — U0004 COV-19 TEST NON-CDC HGH THRU: HCPCS

## 2022-05-25 RX ORDER — ECHINACEA PURPUREA EXTRACT 125 MG
2 TABLET ORAL NIGHTLY
COMMUNITY

## 2022-05-25 RX ORDER — SUCROFERRIC OXYHYDROXIDE 500 MG/1
1000 TABLET, CHEWABLE ORAL 3 TIMES DAILY
COMMUNITY

## 2022-05-25 NOTE — TELEPHONE ENCOUNTER
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    PATIENT RECEIVED LABS FROM KIDNEY SPECIALIST TAKEN 5/11/22.    PATIENT PLATELETS WERE AT 55   AND WBC WAS 2.59.    PATIENT IS CONCERNED WITH HOW SURGERY MAY AFFECT COUNTS ON OR AFTER Friday AND ARE LOOKING TO VERIFY WHAT SHOULD BE CONSIDERED IN PREPARATION FOR THIS PROCEDURE    Do you require a callback: YES

## 2022-05-25 NOTE — TELEPHONE ENCOUNTER
Returned call to patient's wife who is concerned because his Platelets and WBC count are down again.  WBC is 2.59 Platelet count is 55.  Patient is scheduled to have repeat lab work today and to have a fistula aneurysm repair with re-routing of some blood vessels to restore his dialysis fistula.  His wife is concerned because his lab work is low.  I explained that the lab work from today, will be a better indicator of how he is doing, she wants Dr. Mckenzie to be aware of the labs from 5/11/2022.  If he needs to call her back, call her cell phone at [phone].  This phone does not have voicemail set up.  I also explained to her that Dr. Deal will be the one who will need that lab work also.  She v/u.  She just wants to make sure Dr. Mckenzie is aware of the situation.

## 2022-05-25 NOTE — DISCHARGE INSTRUCTIONS
Take the following medications the morning of surgery:  PEPCID    ARRIVE TO MAIN OR DESK 5/27/22 AT 7:30AM      If you are on prescription narcotic pain medication to control your pain you may also take that medication the morning of surgery.    General Instructions:  Do not eat solid food after midnight the night before surgery.  You may drink clear liquids day of surgery but must stop at least one hour before your hospital arrival time.  It is beneficial for you to have a clear drink that contains carbohydrates the day of surgery.  We suggest a 12 to 20 ounce bottle of Gatorade or Powerade for non-diabetic patients or a 12 to 20 ounce bottle of G2 or Powerade Zero for diabetic patients. (Pediatric patients, are not advised to drink a 12 to 20 ounce carbohydrate drink)    Clear liquids are liquids you can see through.  Nothing red in color.     Plain water                               Sports drinks  Sodas                                   Gelatin (Jell-O)  Fruit juices without pulp such as white grape juice and apple juice  Popsicles that contain no fruit or yogurt  Tea or coffee (no cream or milk added)  Gatorade / Powerade  G2 / Powerade Zero    Infants may have breast milk up to four hours before surgery.  Infants drinking formula may drink formula up to six hours before surgery.   Patients who avoid smoking, chewing tobacco and alcohol for 4 weeks prior to surgery have a reduced risk of post-operative complications.  Quit smoking as many days before surgery as you can.  Do not smoke, use chewing tobacco or drink alcohol the day of surgery.   If applicable bring your C-PAP/ BI-PAP machine.  Bring any papers given to you in the doctor’s office.  Wear clean comfortable clothes.  Do not wear contact lenses, false eyelashes or make-up.  Bring a case for your glasses.   Bring crutches or walker if applicable.  Remove all piercings.  Leave jewelry and any other valuables at home.  Hair extensions with metal clips  must be removed prior to surgery.  The Pre-Admission Testing nurse will instruct you to bring medications if unable to obtain an accurate list in Pre-Admission Testing.        Preventing a Surgical Site Infection:  For 2 to 3 days before surgery, avoid shaving with a razor because the razor can irritate skin and make it easier to develop an infection.    Any areas of open skin can increase the risk of a post-operative wound infection by allowing bacteria to enter and travel throughout the body.  Notify your surgeon if you have any skin wounds / rashes even if it is not near the expected surgical site.  The area will need assessed to determine if surgery should be delayed until it is healed.  The night prior to surgery shower using a fresh bar of anti-bacterial soap (such as Dial) and clean washcloth.  Sleep in a clean bed with clean clothing.  Do not allow pets to sleep with you.  Shower on the morning of surgery using a fresh bar of anti-bacterial soap (such as Dial) and clean washcloth.  Dry with a clean towel and dress in clean clothing.  Ask your surgeon if you will be receiving antibiotics prior to surgery.  Make sure you, your family, and all healthcare providers clean their hands with soap and water or an alcohol based hand  before caring for you or your wound.    Day of surgery:  Your arrival time is approximately two hours before your scheduled surgery time.  Upon arrival, a Pre-op nurse and Anesthesiologist will review your health history, obtain vital signs, and answer questions you may have.  The only belongings needed at this time will be a list of your home medications and if applicable your C-PAP/BI-PAP machine.  A Pre-op nurse will start an IV and you may receive medication in preparation for surgery, including something to help you relax.     Please be aware that surgery does come with discomfort.  We want to make every effort to control your discomfort so please discuss any uncontrolled  symptoms with your nurse.   Your doctor will most likely have prescribed pain medications.      If you are going home after surgery you will receive individualized written care instructions before being discharged.  A responsible adult must drive you to and from the hospital on the day of your surgery and stay with you for 24 hours.  Discharge prescriptions can be filled by the hospital pharmacy during regular pharmacy hours.  If you are having surgery late in the day/evening your prescription may be e-prescribed to your pharmacy.  Please verify your pharmacy hours or chose a 24 hour pharmacy to avoid not having access to your prescription because your pharmacy has closed for the day.    If you are staying overnight following surgery, you will be transported to your hospital room following the recovery period.  [facility] has all private rooms.    If you have any questions please call Pre-Admission Testing at [phone].  Deductibles and co-payments are collected on the day of service. Please be prepared to pay the required co-pay, deductible or deposit on the day of service as defined by your plan.    Patient Education for Self-Quarantine Process    Following your COVID testing, we strongly recommend that you wear a mask when you are with other people and practice social distancing.   Limit your activities to only required outings.  Wash your hands with soap and water frequently for at least 20 seconds.   Avoid touching your eyes, nose and mouth with unwashed hands.  Do not share anything - utensils, drinking glasses, food from the same bowl.   Sanitize household surfaces daily. Include all high touch areas (door handles, light switches, phones, countertops, etc.)    Call your surgeon immediately if you experience any of the following symptoms:  Sore Throat  Shortness of Breath or difficulty breathing  Cough  Chills  Body soreness or muscle pain  Headache  Fever  New loss of taste or smell  Do not  arrive for your surgery ill.  Your procedure will need to be rescheduled to another time.  You will need to call your physician before the day of surgery to avoid any unnecessary exposure to hospital staff as well as other patients.       CHLORHEXIDINE CLOTH INSTRUCTIONS  The morning of surgery follow these instructions using the Chlorhexidine cloths you've been given.  These steps reduce bacteria on the body.  Do not use the cloths near your eyes, ears mouth, genitalia or on open wounds.  Throw the cloths away after use but do not try to flush them down a toilet.      Open and remove one cloth at a time from the package.    Leave the cloth unfolded and begin the bathing.  Massage the skin with the cloths using gentle pressure to remove bacteria.  Do not scrub harshly.   Follow the steps below with one 2% CHG cloth per area (6 total cloths).  One cloth for neck, shoulders and chest.  One cloth for both arms, hands, fingers and underarms (do underarms last).  One cloth for the abdomen followed by groin.  One cloth for right leg and foot including between the toes.  One cloth for left leg and foot including between the toes.  The last cloth is to be used for the back of the neck, back and buttocks.    Allow the CHG to air dry 3 minutes on the skin which will give it time to work and decrease the chance of irritation.  The skin may feel sticky until it is dry.  Do not rinse with water or any other liquid or you will lose the beneficial effects of the CHG.  If mild skin irritation occurs, do rinse the skin to remove the CHG.  Report this to the nurse at time of admission.  Do not apply lotions, creams, ointments, deodorants or perfumes after using the clothes. Dress in clean clothes before coming to the hospital.

## 2022-05-26 ENCOUNTER — ANESTHESIA EVENT (OUTPATIENT)
Dept: PERIOP | Facility: HOSPITAL | Age: 77
End: 2022-05-26

## 2022-05-26 ENCOUNTER — TELEPHONE (OUTPATIENT)
Dept: ONCOLOGY | Facility: CLINIC | Age: 77
End: 2022-05-26

## 2022-05-26 DIAGNOSIS — T82.898A AV FISTULA OCCLUSION, INITIAL ENCOUNTER: ICD-10-CM

## 2022-05-26 DIAGNOSIS — D69.6 THROMBOCYTOPENIA: Primary | ICD-10-CM

## 2022-05-26 NOTE — TELEPHONE ENCOUNTER
Placed call out to Dr. Deal and gave Dr. Mckenzie cell phone number so they could discuss patient. Akila Bauman RN

## 2022-05-26 NOTE — TELEPHONE ENCOUNTER
Spoke with Johanna in pre-op. Faxed orders for one unit of platelets to [phone]. Akila Bauman RN

## 2022-05-26 NOTE — TELEPHONE ENCOUNTER
Spoke w/ pt and his wife and informed them of plan moving forward. Pt will get one unit of platelets in pre-op. Dr. Mckenzie will also speak with Dr. Deal today. Pt and his wife v/u and appreciation. Akila Bauman RN

## 2022-05-26 NOTE — PROGRESS NOTES
PATIENT NAME:  Bill Landry  YOB: 1945  PATIENT'S SEX:  male    PATIENT'S ADDRESS:  [address] [place] [address]  PROVIDER:      Encounter Diagnoses   Name Primary?   • Thrombocytopenia (HCC) Yes   • AV fistula occlusion, initial encounter (HCC)      Allergies   Allergen Reactions   • Ace Inhibitors Other (See Comments)     RENAL FAILURE/ raised creatinine   • Contrast Dye Other (See Comments)     RENAL FAILURE   • Iodine Anaphylaxis   • Angiotensin Receptor Blockers Swelling   • Eliquis [Apixaban] Arrhythmia     BLEEDING ISSUES; PT CANNOT TAKE ANY ANTICOAGULANTS DUE TO LOW PLATELETS EXCEPT ASPIRIN     • Granix [Filgrastim] GI Intolerance     Chest pain  Chest pain     • Keflex [Cephalexin] Diarrhea     RENAL FAILURE         SURGERY ORDERS    DATE      5/26/2022     Please transfuse 1 unit of platelets one hour prior to surgery on 5/27/22.    Electronically Signed By: Sharif Mckenzie MD  (NPI: [id number])

## 2022-05-26 NOTE — TELEPHONE ENCOUNTER
Wants Dr. Mckenzie to look at blood work and let them know if it's ok for him to have surgery tomorrow.

## 2022-05-27 ENCOUNTER — ANESTHESIA (OUTPATIENT)
Dept: PERIOP | Facility: HOSPITAL | Age: 77
End: 2022-05-27

## 2022-05-27 ENCOUNTER — HOSPITAL ENCOUNTER (OUTPATIENT)
Facility: HOSPITAL | Age: 77
Setting detail: HOSPITAL OUTPATIENT SURGERY
Discharge: HOME | End: 2022-05-27
Attending: SURGERY | Admitting: SURGERY

## 2022-05-27 VITALS
OXYGEN SATURATION: 93 % | WEIGHT: 180 LBS | HEART RATE: 91 BPM | TEMPERATURE: 98.5 F | HEIGHT: 70 IN | BODY MASS INDEX: 25.77 KG/M2 | SYSTOLIC BLOOD PRESSURE: 150 MMHG | DIASTOLIC BLOOD PRESSURE: 88 MMHG | RESPIRATION RATE: 16 BRPM

## 2022-05-27 DIAGNOSIS — I72.1: Primary | ICD-10-CM

## 2022-05-27 LAB
ABO GROUP BLD: NORMAL
ANION GAP SERPL CALCULATED.3IONS-SCNC: 14.6 MMOL/L (ref 5–15)
BASOPHILS # BLD AUTO: 0.02 10*3/MM3 (ref 0–0.2)
BASOPHILS NFR BLD AUTO: 0.7 % (ref 0–1.5)
BLD GP AB SCN SERPL QL: NEGATIVE
BUN SERPL-MCNC: 48 MG/DL (ref 8–23)
BUN/CREAT SERPL: 9.6 (ref 7–25)
CALCIUM SPEC-SCNC: 9.2 MG/DL (ref 8.6–10.5)
CHLORIDE SERPL-SCNC: 95 MMOL/L (ref 98–107)
CO2 SERPL-SCNC: 22.4 MMOL/L (ref 22–29)
CREAT SERPL-MCNC: 5.01 MG/DL (ref 0.76–1.27)
DEPRECATED RDW RBC AUTO: 51.7 FL (ref 37–54)
EGFRCR SERPLBLD CKD-EPI 2021: 11.3 ML/MIN/1.73
EOSINOPHIL # BLD AUTO: 0.04 10*3/MM3 (ref 0–0.4)
EOSINOPHIL NFR BLD AUTO: 1.4 % (ref 0.3–6.2)
ERYTHROCYTE [DISTWIDTH] IN BLOOD BY AUTOMATED COUNT: 14.3 % (ref 12.3–15.4)
GLUCOSE BLDC GLUCOMTR-MCNC: 133 MG/DL (ref 70–130)
GLUCOSE SERPL-MCNC: 150 MG/DL (ref 65–99)
HCT VFR BLD AUTO: 34.2 % (ref 37.5–51)
HGB BLD-MCNC: 11.4 G/DL (ref 13–17.7)
IMM GRANULOCYTES # BLD AUTO: 0.01 10*3/MM3 (ref 0–0.05)
IMM GRANULOCYTES NFR BLD AUTO: 0.3 % (ref 0–0.5)
LYMPHOCYTES # BLD AUTO: 0.54 10*3/MM3 (ref 0.7–3.1)
LYMPHOCYTES NFR BLD AUTO: 18.7 % (ref 19.6–45.3)
MCH RBC QN AUTO: 32.8 PG (ref 26.6–33)
MCHC RBC AUTO-ENTMCNC: 33.3 G/DL (ref 31.5–35.7)
MCV RBC AUTO: 98.3 FL (ref 79–97)
MONOCYTES # BLD AUTO: 0.28 10*3/MM3 (ref 0.1–0.9)
MONOCYTES NFR BLD AUTO: 9.7 % (ref 5–12)
NEUTROPHILS NFR BLD AUTO: 2 10*3/MM3 (ref 1.7–7)
NEUTROPHILS NFR BLD AUTO: 69.2 % (ref 42.7–76)
NRBC BLD AUTO-RTO: 0 /100 WBC (ref 0–0.2)
PLATELET # BLD AUTO: 75 10*3/MM3 (ref 140–450)
PMV BLD AUTO: 9.1 FL (ref 6–12)
POTASSIUM SERPL-SCNC: 3.9 MMOL/L (ref 3.5–5.2)
RBC # BLD AUTO: 3.48 10*6/MM3 (ref 4.14–5.8)
RH BLD: POSITIVE
SODIUM SERPL-SCNC: 132 MMOL/L (ref 136–145)
T&S EXPIRATION DATE: NORMAL
WBC NRBC COR # BLD: 2.89 10*3/MM3 (ref 3.4–10.8)

## 2022-05-27 PROCEDURE — 25010000002 HEPARIN (PORCINE) PER 1000 UNITS

## 2022-05-27 PROCEDURE — 86922 COMPATIBILITY TEST ANTIGLOB: CPT

## 2022-05-27 PROCEDURE — 25010000002 CEFAZOLIN PER 500 MG: Performed by: SURGERY

## 2022-05-27 PROCEDURE — 86900 BLOOD TYPING SEROLOGIC ABO: CPT | Performed by: SURGERY

## 2022-05-27 PROCEDURE — 25010000002 ONDANSETRON PER 1 MG

## 2022-05-27 PROCEDURE — 25010000002 HEPARIN (PORCINE) PER 1000 UNITS: Performed by: SURGERY

## 2022-05-27 PROCEDURE — 86850 RBC ANTIBODY SCREEN: CPT | Performed by: SURGERY

## 2022-05-27 PROCEDURE — 25010000002 DEXAMETHASONE PER 1 MG

## 2022-05-27 PROCEDURE — 25010000002 PROPOFOL 10 MG/ML EMULSION

## 2022-05-27 PROCEDURE — 82962 GLUCOSE BLOOD TEST: CPT

## 2022-05-27 PROCEDURE — 86901 BLOOD TYPING SEROLOGIC RH(D): CPT | Performed by: SURGERY

## 2022-05-27 PROCEDURE — 25010000002 FENTANYL CITRATE (PF) 50 MCG/ML SOLUTION

## 2022-05-27 PROCEDURE — 36430 TRANSFUSION BLD/BLD COMPNT: CPT

## 2022-05-27 PROCEDURE — 25010000002 PHENYLEPHRINE 10 MG/ML SOLUTION

## 2022-05-27 PROCEDURE — P9035 PLATELET PHERES LEUKOREDUCED: HCPCS

## 2022-05-27 PROCEDURE — 85025 COMPLETE CBC W/AUTO DIFF WBC: CPT | Performed by: SURGERY

## 2022-05-27 PROCEDURE — 80048 BASIC METABOLIC PNL TOTAL CA: CPT | Performed by: SURGERY

## 2022-05-27 PROCEDURE — P9100 PATHOGEN TEST FOR PLATELETS: HCPCS

## 2022-05-27 PROCEDURE — P9037 PLATE PHERES LEUKOREDU IRRAD: HCPCS

## 2022-05-27 PROCEDURE — 25010000002 PROTAMINE SULFATE PER 10 MG

## 2022-05-27 PROCEDURE — 25010000002 CEFAZOLIN IN DEXTROSE 2-4 GM/100ML-% SOLUTION: Performed by: SURGERY

## 2022-05-27 PROCEDURE — 86920 COMPATIBILITY TEST SPIN: CPT

## 2022-05-27 PROCEDURE — C1768 GRAFT, VASCULAR: HCPCS | Performed by: SURGERY

## 2022-05-27 PROCEDURE — 86902 BLOOD TYPE ANTIGEN DONOR EA: CPT

## 2022-05-27 DEVICE — IMPLANTABLE DEVICE: Type: IMPLANTABLE DEVICE | Site: ARM | Status: FUNCTIONAL

## 2022-05-27 DEVICE — FLOSEAL HEMOSTATIC MATRIX, 10ML
Type: IMPLANTABLE DEVICE | Site: ARM | Status: FUNCTIONAL
Brand: FLOSEAL HEMOSTATIC MATRIX

## 2022-05-27 DEVICE — ABSORBABLE HEMOSTAT (OXIDIZED REGENERATED CELLULOSE, U.S.P.)
Type: IMPLANTABLE DEVICE | Site: ARM | Status: FUNCTIONAL
Brand: SURGICEL

## 2022-05-27 RX ORDER — HYDRALAZINE HYDROCHLORIDE 20 MG/ML
5 INJECTION INTRAMUSCULAR; INTRAVENOUS
Status: DISCONTINUED | OUTPATIENT
Start: 2022-05-27 | End: 2022-05-27 | Stop reason: HOSPADM

## 2022-05-27 RX ORDER — HYDROMORPHONE HYDROCHLORIDE 1 MG/ML
0.5 INJECTION, SOLUTION INTRAMUSCULAR; INTRAVENOUS; SUBCUTANEOUS
Status: DISCONTINUED | OUTPATIENT
Start: 2022-05-27 | End: 2022-05-27 | Stop reason: HOSPADM

## 2022-05-27 RX ORDER — MAGNESIUM HYDROXIDE 1200 MG/15ML
LIQUID ORAL AS NEEDED
Status: DISCONTINUED | OUTPATIENT
Start: 2022-05-27 | End: 2022-05-27 | Stop reason: HOSPADM

## 2022-05-27 RX ORDER — SODIUM CHLORIDE, SODIUM LACTATE, POTASSIUM CHLORIDE, CALCIUM CHLORIDE 600; 310; 30; 20 MG/100ML; MG/100ML; MG/100ML; MG/100ML
9 INJECTION, SOLUTION INTRAVENOUS CONTINUOUS
Status: DISCONTINUED | OUTPATIENT
Start: 2022-05-27 | End: 2022-05-27 | Stop reason: HOSPADM

## 2022-05-27 RX ORDER — LIDOCAINE HYDROCHLORIDE 20 MG/ML
INJECTION, SOLUTION INFILTRATION; PERINEURAL AS NEEDED
Status: DISCONTINUED | OUTPATIENT
Start: 2022-05-27 | End: 2022-05-27 | Stop reason: SURG

## 2022-05-27 RX ORDER — DEXAMETHASONE SODIUM PHOSPHATE 10 MG/ML
INJECTION INTRAMUSCULAR; INTRAVENOUS AS NEEDED
Status: DISCONTINUED | OUTPATIENT
Start: 2022-05-27 | End: 2022-05-27 | Stop reason: SURG

## 2022-05-27 RX ORDER — HEPARIN SODIUM 1000 [USP'U]/ML
INJECTION, SOLUTION INTRAVENOUS; SUBCUTANEOUS AS NEEDED
Status: DISCONTINUED | OUTPATIENT
Start: 2022-05-27 | End: 2022-05-27 | Stop reason: SURG

## 2022-05-27 RX ORDER — HYDROCODONE BITARTRATE AND ACETAMINOPHEN 5; 325 MG/1; MG/1
1-2 TABLET ORAL EVERY 6 HOURS PRN
Qty: 40 TABLET | Refills: 0 | Status: SHIPPED | OUTPATIENT
Start: 2022-05-27 | End: 2022-08-25

## 2022-05-27 RX ORDER — FENTANYL CITRATE 50 UG/ML
50 INJECTION, SOLUTION INTRAMUSCULAR; INTRAVENOUS
Status: DISCONTINUED | OUTPATIENT
Start: 2022-05-27 | End: 2022-05-27 | Stop reason: HOSPADM

## 2022-05-27 RX ORDER — PHENYLEPHRINE HYDROCHLORIDE 10 MG/ML
INJECTION INTRAVENOUS AS NEEDED
Status: DISCONTINUED | OUTPATIENT
Start: 2022-05-27 | End: 2022-05-27 | Stop reason: SURG

## 2022-05-27 RX ORDER — FAMOTIDINE 10 MG/ML
20 INJECTION, SOLUTION INTRAVENOUS ONCE
Status: COMPLETED | OUTPATIENT
Start: 2022-05-27 | End: 2022-05-27

## 2022-05-27 RX ORDER — DIPHENHYDRAMINE HYDROCHLORIDE 50 MG/ML
12.5 INJECTION INTRAMUSCULAR; INTRAVENOUS
Status: DISCONTINUED | OUTPATIENT
Start: 2022-05-27 | End: 2022-05-27 | Stop reason: HOSPADM

## 2022-05-27 RX ORDER — NALOXONE HCL 0.4 MG/ML
0.2 VIAL (ML) INJECTION AS NEEDED
Status: DISCONTINUED | OUTPATIENT
Start: 2022-05-27 | End: 2022-05-27 | Stop reason: HOSPADM

## 2022-05-27 RX ORDER — PROTAMINE SULFATE 10 MG/ML
INJECTION, SOLUTION INTRAVENOUS AS NEEDED
Status: DISCONTINUED | OUTPATIENT
Start: 2022-05-27 | End: 2022-05-27 | Stop reason: SURG

## 2022-05-27 RX ORDER — CEFAZOLIN SODIUM 2 G/100ML
2 INJECTION, SOLUTION INTRAVENOUS ONCE
Status: COMPLETED | OUTPATIENT
Start: 2022-05-27 | End: 2022-05-27

## 2022-05-27 RX ORDER — GLYCOPYRROLATE 0.2 MG/ML
INJECTION INTRAMUSCULAR; INTRAVENOUS AS NEEDED
Status: DISCONTINUED | OUTPATIENT
Start: 2022-05-27 | End: 2022-05-27 | Stop reason: SURG

## 2022-05-27 RX ORDER — SODIUM CHLORIDE 0.9 % (FLUSH) 0.9 %
3-10 SYRINGE (ML) INJECTION AS NEEDED
Status: DISCONTINUED | OUTPATIENT
Start: 2022-05-27 | End: 2022-05-27 | Stop reason: HOSPADM

## 2022-05-27 RX ORDER — FENTANYL CITRATE 50 UG/ML
INJECTION, SOLUTION INTRAMUSCULAR; INTRAVENOUS AS NEEDED
Status: DISCONTINUED | OUTPATIENT
Start: 2022-05-27 | End: 2022-05-27 | Stop reason: SURG

## 2022-05-27 RX ORDER — ONDANSETRON 2 MG/ML
4 INJECTION INTRAMUSCULAR; INTRAVENOUS ONCE AS NEEDED
Status: DISCONTINUED | OUTPATIENT
Start: 2022-05-27 | End: 2022-05-27 | Stop reason: HOSPADM

## 2022-05-27 RX ORDER — ONDANSETRON 2 MG/ML
INJECTION INTRAMUSCULAR; INTRAVENOUS AS NEEDED
Status: DISCONTINUED | OUTPATIENT
Start: 2022-05-27 | End: 2022-05-27 | Stop reason: SURG

## 2022-05-27 RX ORDER — HYDROCODONE BITARTRATE AND ACETAMINOPHEN 7.5; 325 MG/1; MG/1
1 TABLET ORAL ONCE AS NEEDED
Status: DISCONTINUED | OUTPATIENT
Start: 2022-05-27 | End: 2022-05-27 | Stop reason: HOSPADM

## 2022-05-27 RX ORDER — LABETALOL HYDROCHLORIDE 5 MG/ML
5 INJECTION, SOLUTION INTRAVENOUS
Status: DISCONTINUED | OUTPATIENT
Start: 2022-05-27 | End: 2022-05-27 | Stop reason: HOSPADM

## 2022-05-27 RX ORDER — OXYCODONE AND ACETAMINOPHEN 7.5; 325 MG/1; MG/1
1 TABLET ORAL EVERY 4 HOURS PRN
Status: DISCONTINUED | OUTPATIENT
Start: 2022-05-27 | End: 2022-05-27 | Stop reason: HOSPADM

## 2022-05-27 RX ORDER — DIPHENHYDRAMINE HCL 25 MG
25 CAPSULE ORAL
Status: DISCONTINUED | OUTPATIENT
Start: 2022-05-27 | End: 2022-05-27 | Stop reason: HOSPADM

## 2022-05-27 RX ORDER — LIDOCAINE HYDROCHLORIDE 10 MG/ML
0.5 INJECTION, SOLUTION EPIDURAL; INFILTRATION; INTRACAUDAL; PERINEURAL ONCE AS NEEDED
Status: DISCONTINUED | OUTPATIENT
Start: 2022-05-27 | End: 2022-05-27 | Stop reason: HOSPADM

## 2022-05-27 RX ORDER — MIDAZOLAM HYDROCHLORIDE 1 MG/ML
0.5 INJECTION INTRAMUSCULAR; INTRAVENOUS
Status: DISCONTINUED | OUTPATIENT
Start: 2022-05-27 | End: 2022-05-27 | Stop reason: HOSPADM

## 2022-05-27 RX ORDER — IBUPROFEN 600 MG/1
600 TABLET ORAL ONCE AS NEEDED
Status: DISCONTINUED | OUTPATIENT
Start: 2022-05-27 | End: 2022-05-27 | Stop reason: HOSPADM

## 2022-05-27 RX ORDER — FLUMAZENIL 0.1 MG/ML
0.2 INJECTION INTRAVENOUS AS NEEDED
Status: DISCONTINUED | OUTPATIENT
Start: 2022-05-27 | End: 2022-05-27 | Stop reason: HOSPADM

## 2022-05-27 RX ORDER — SODIUM CHLORIDE 0.9 % (FLUSH) 0.9 %
3 SYRINGE (ML) INJECTION EVERY 12 HOURS SCHEDULED
Status: DISCONTINUED | OUTPATIENT
Start: 2022-05-27 | End: 2022-05-27 | Stop reason: HOSPADM

## 2022-05-27 RX ORDER — PROPOFOL 10 MG/ML
VIAL (ML) INTRAVENOUS AS NEEDED
Status: DISCONTINUED | OUTPATIENT
Start: 2022-05-27 | End: 2022-05-27 | Stop reason: SURG

## 2022-05-27 RX ORDER — SODIUM CHLORIDE 9 MG/ML
INJECTION, SOLUTION INTRAVENOUS CONTINUOUS PRN
Status: DISCONTINUED | OUTPATIENT
Start: 2022-05-27 | End: 2022-05-27 | Stop reason: SURG

## 2022-05-27 RX ORDER — EPHEDRINE SULFATE 50 MG/ML
5 INJECTION, SOLUTION INTRAVENOUS ONCE AS NEEDED
Status: DISCONTINUED | OUTPATIENT
Start: 2022-05-27 | End: 2022-05-27 | Stop reason: HOSPADM

## 2022-05-27 RX ADMIN — FENTANYL CITRATE 50 MCG: 0.05 INJECTION, SOLUTION INTRAMUSCULAR; INTRAVENOUS at 10:40

## 2022-05-27 RX ADMIN — PROTAMINE SULFATE 40 MG: 10 INJECTION, SOLUTION INTRAVENOUS at 11:59

## 2022-05-27 RX ADMIN — FENTANYL CITRATE 25 MCG: 0.05 INJECTION, SOLUTION INTRAMUSCULAR; INTRAVENOUS at 12:15

## 2022-05-27 RX ADMIN — PHENYLEPHRINE HYDROCHLORIDE 100 MCG: 10 INJECTION, SOLUTION INTRAVENOUS at 10:47

## 2022-05-27 RX ADMIN — LIDOCAINE HYDROCHLORIDE 100 MG: 20 INJECTION, SOLUTION INFILTRATION; PERINEURAL at 10:10

## 2022-05-27 RX ADMIN — SODIUM CHLORIDE: 9 INJECTION, SOLUTION INTRAVENOUS at 10:00

## 2022-05-27 RX ADMIN — CEFAZOLIN SODIUM 2 G: 2 INJECTION, SOLUTION INTRAVENOUS at 09:56

## 2022-05-27 RX ADMIN — PROPOFOL 200 MG: 10 INJECTION, EMULSION INTRAVENOUS at 10:10

## 2022-05-27 RX ADMIN — PHENYLEPHRINE HYDROCHLORIDE 100 MCG: 10 INJECTION, SOLUTION INTRAVENOUS at 10:51

## 2022-05-27 RX ADMIN — DEXAMETHASONE SODIUM PHOSPHATE 8 MG: 10 INJECTION INTRAMUSCULAR; INTRAVENOUS at 10:13

## 2022-05-27 RX ADMIN — PHENYLEPHRINE HYDROCHLORIDE 100 MCG: 10 INJECTION, SOLUTION INTRAVENOUS at 11:00

## 2022-05-27 RX ADMIN — FAMOTIDINE 20 MG: 10 INJECTION INTRAVENOUS at 08:58

## 2022-05-27 RX ADMIN — ONDANSETRON 4 MG: 2 INJECTION INTRAMUSCULAR; INTRAVENOUS at 12:20

## 2022-05-27 RX ADMIN — HEPARIN SODIUM 5000 UNITS: 1000 INJECTION INTRAVENOUS; SUBCUTANEOUS at 10:59

## 2022-05-27 RX ADMIN — FENTANYL CITRATE 25 MCG: 0.05 INJECTION, SOLUTION INTRAMUSCULAR; INTRAVENOUS at 11:33

## 2022-05-27 RX ADMIN — PHENYLEPHRINE HYDROCHLORIDE 100 MCG: 10 INJECTION, SOLUTION INTRAVENOUS at 11:59

## 2022-05-27 RX ADMIN — GLYCOPYRROLATE 0.2 MG: 0.2 INJECTION INTRAMUSCULAR; INTRAVENOUS at 10:13

## 2022-05-27 RX ADMIN — FENTANYL CITRATE 50 MCG: 0.05 INJECTION, SOLUTION INTRAMUSCULAR; INTRAVENOUS at 10:10

## 2022-05-27 NOTE — ANESTHESIA PREPROCEDURE EVALUATION
Anesthesia Evaluation     Patient summary reviewed and Nursing notes reviewed   NPO Solid Status: > 8 hours             Airway   Mallampati: II  TM distance: >3 FB  Neck ROM: limited  Dental      Pulmonary    (+) recent URI, sleep apnea on CPAP,   Cardiovascular     ECG reviewed  PT is on anticoagulation therapy  Patient on routine beta blocker and Beta blocker given within 24 hours of surgery  Rhythm: irregular  Rate: normal    (+) hypertension, dysrhythmias Atrial Fib, CHF , pericardial effusion, hyperlipidemia,       Neuro/Psych- negative ROS  GI/Hepatic/Renal/Endo    (+)  GERD, GI bleeding , liver disease fatty liver disease, renal disease stones, diabetes mellitus type 2,     Musculoskeletal (-) negative ROS    Abdominal    Substance History - negative use     OB/GYN negative ob/gyn ROS         Other                          Anesthesia Plan    ASA 4     general   (I have reviewed the patient's history with the patient and the chart, including all pertinent laboratory results and imaging. I have explained the risks of anesthesia including but not limited to dental damage, corneal abrasion, nerve injury, MI, stroke, and death. Questions asked and answered. Anesthetic plan discussed with patient and team as indicated. Patient expressed understanding of the above.  )    Anesthetic plan, all risks, benefits, and alternatives have been provided, discussed and informed consent has been obtained with: patient.        CODE STATUS:

## 2022-05-27 NOTE — ANESTHESIA PROCEDURE NOTES
Airway  Urgency: elective    Date/Time: 5/27/2022 10:12 AM  Airway not difficult    General Information and Staff    Patient location during procedure: OR  Anesthesiologist: Ken Keys MD  CRNA/CAA: Luisa Forde CRNA    Indications and Patient Condition  Indications for airway management: airway protection    Preoxygenated: yes  MILS not maintained throughout  Mask difficulty assessment: 0 - not attempted    Final Airway Details  Final airway type: supraglottic airway      Successful airway: LMA and unique  Size 5    Number of attempts at approach: 1  Assessment: lips, teeth, and gum same as pre-op and atraumatic intubation    Additional Comments  Airway exam prior to airway device insertion. Teeth/lips inspected. Preoxygenated with 100% O2; sniffing position, easy mask ventilation. Eyes taped. Atraumatic device insertion. Airway device connected to anesthesia machine. Confirmed EBBS, +EtCO2. Lips and teeth intact, no damage.

## 2022-05-27 NOTE — ANESTHESIA POSTPROCEDURE EVALUATION
"Patient: Bill Landry    Procedure Summary     Date: 05/27/22 Room / Location: [facility] OR  / [facility] MAIN OR    Anesthesia Start: 1001 Anesthesia Stop: 1247    Procedure: OPEN REVISION LEFT ARTERIOVENOUS INTERPOSITION GRAFT PLACEMENT (Left Head) Diagnosis:     Surgeons: Bill Deal MD Provider: Ken Keys MD    Anesthesia Type: regional ASA Status: 4          Anesthesia Type: regional    Vitals  Vitals Value Taken Time   /76 05/27/22 1401   Temp 36.9 °C (98.5 °F) 05/27/22 1245   Pulse 90 05/27/22 1411   Resp 18 05/27/22 1400   SpO2 91 % 05/27/22 1411   Vitals shown include unvalidated device data.        Post Anesthesia Care and Evaluation    Patient location during evaluation: bedside  Patient participation: complete - patient participated  Level of consciousness: awake and alert  Pain management: adequate  Airway patency: patent  Anesthetic complications: No anesthetic complications    Cardiovascular status: acceptable  Respiratory status: acceptable  Hydration status: acceptable    Comments: /88   Pulse 91   Temp 36.9 °C (98.5 °F) (Oral)   Resp 16   Ht 177.8 cm (70\")   Wt 81.6 kg (180 lb)   SpO2 93%   BMI 25.83 kg/m²       "

## 2022-05-28 LAB
BH BB BLOOD EXPIRATION DATE: NORMAL
BH BB BLOOD TYPE BARCODE: 7300
BH BB DISPENSE STATUS: NORMAL
BH BB PRODUCT CODE: NORMAL
BH BB UNIT NUMBER: NORMAL
UNIT  ABO: NORMAL
UNIT  RH: NORMAL

## 2022-05-31 LAB
BH BB BLOOD EXPIRATION DATE: NORMAL
BH BB BLOOD EXPIRATION DATE: NORMAL
BH BB BLOOD TYPE BARCODE: 5100
BH BB BLOOD TYPE BARCODE: 9500
BH BB DISPENSE STATUS: NORMAL
BH BB DISPENSE STATUS: NORMAL
BH BB PRODUCT CODE: NORMAL
BH BB PRODUCT CODE: NORMAL
BH BB UNIT NUMBER: NORMAL
BH BB UNIT NUMBER: NORMAL
CROSSMATCH INTERPRETATION: NORMAL
CROSSMATCH INTERPRETATION: NORMAL
UNIT  ABO: NORMAL
UNIT  ABO: NORMAL
UNIT  RH: NORMAL
UNIT  RH: NORMAL

## 2022-07-12 ENCOUNTER — APPOINTMENT (OUTPATIENT)
Dept: LAB | Facility: HOSPITAL | Age: 77
End: 2022-07-12

## 2022-07-19 ENCOUNTER — HOSPITAL ENCOUNTER (OUTPATIENT)
Dept: CARDIOLOGY | Facility: HOSPITAL | Age: 77
Discharge: HOME | End: 2022-07-19
Admitting: NURSE PRACTITIONER

## 2022-07-19 VITALS
SYSTOLIC BLOOD PRESSURE: 106 MMHG | BODY MASS INDEX: 25.77 KG/M2 | WEIGHT: 180 LBS | HEIGHT: 70 IN | DIASTOLIC BLOOD PRESSURE: 60 MMHG | HEART RATE: 76 BPM

## 2022-07-19 DIAGNOSIS — I48.21 PERMANENT ATRIAL FIBRILLATION: ICD-10-CM

## 2022-07-19 DIAGNOSIS — I35.8 AORTIC VALVE SCLEROSIS: ICD-10-CM

## 2022-07-19 LAB
AORTIC ARCH: 2.7 CM
AORTIC DIMENSIONLESS INDEX: 0.4 (DI)
ASCENDING AORTA: 3.4 CM
BH CV ECHO LEFT VENTRICLE GLOBAL LONGITUDINAL STRAIN: -22.1 %
BH CV ECHO MEAS - ACS: 1.26 CM
BH CV ECHO MEAS - AO MAX PG: 21.3 MMHG
BH CV ECHO MEAS - AO MEAN PG: 12.6 MMHG
BH CV ECHO MEAS - AO ROOT DIAM: 3.4 CM
BH CV ECHO MEAS - AO V2 MAX: 230.8 CM/SEC
BH CV ECHO MEAS - AO V2 VTI: 52.1 CM
BH CV ECHO MEAS - AVA(I,D): 1.28 CM2
BH CV ECHO MEAS - EDV(CUBED): 113.9 ML
BH CV ECHO MEAS - EDV(MOD-SP2): 81 ML
BH CV ECHO MEAS - EDV(MOD-SP4): 97 ML
BH CV ECHO MEAS - EF(MOD-BP): 58.3 %
BH CV ECHO MEAS - EF(MOD-SP2): 56.8 %
BH CV ECHO MEAS - EF(MOD-SP4): 59.8 %
BH CV ECHO MEAS - EF_3D-VOL: 62 %
BH CV ECHO MEAS - ESV(CUBED): 29.5 ML
BH CV ECHO MEAS - ESV(MOD-SP2): 35 ML
BH CV ECHO MEAS - ESV(MOD-SP4): 39 ML
BH CV ECHO MEAS - FS: 36.3 %
BH CV ECHO MEAS - IVS/LVPW: 0.98 CM
BH CV ECHO MEAS - IVSD: 1.16 CM
BH CV ECHO MEAS - LA 3D VOL INDEX: 41
BH CV ECHO MEAS - LAT PEAK E' VEL: 15.6 CM/SEC
BH CV ECHO MEAS - LV DIASTOLIC VOL/BSA (35-75): 48.6 CM2
BH CV ECHO MEAS - LV MASS(C)D: 216.2 GRAMS
BH CV ECHO MEAS - LV MAX PG: 3.2 MMHG
BH CV ECHO MEAS - LV MEAN PG: 2.1 MMHG
BH CV ECHO MEAS - LV SYSTOLIC VOL/BSA (12-30): 19.5 CM2
BH CV ECHO MEAS - LV V1 MAX: 89.7 CM/SEC
BH CV ECHO MEAS - LV V1 VTI: 19.7 CM
BH CV ECHO MEAS - LVIDD: 4.8 CM
BH CV ECHO MEAS - LVIDS: 3.1 CM
BH CV ECHO MEAS - LVOT AREA: 3.4 CM2
BH CV ECHO MEAS - LVOT DIAM: 2.07 CM
BH CV ECHO MEAS - LVPWD: 1.19 CM
BH CV ECHO MEAS - MED PEAK E' VEL: 13.7 CM/SEC
BH CV ECHO MEAS - MV DEC SLOPE: 590.4 CM/SEC2
BH CV ECHO MEAS - MV DEC TIME: 0.19 MSEC
BH CV ECHO MEAS - MV E MAX VEL: 135 CM/SEC
BH CV ECHO MEAS - MV MAX PG: 7.2 MMHG
BH CV ECHO MEAS - MV MEAN PG: 2.6 MMHG
BH CV ECHO MEAS - MV P1/2T: 66.7 MSEC
BH CV ECHO MEAS - MV V2 VTI: 28.2 CM
BH CV ECHO MEAS - MVA(P1/2T): 3.3 CM2
BH CV ECHO MEAS - MVA(VTI): 2.37 CM2
BH CV ECHO MEAS - PA ACC TIME: 0.1 SEC
BH CV ECHO MEAS - PA PR(ACCEL): 33.8 MMHG
BH CV ECHO MEAS - PA V2 MAX: 88.2 CM/SEC
BH CV ECHO MEAS - QP/QS: 0.63
BH CV ECHO MEAS - RAP SYSTOLE: 3 MMHG
BH CV ECHO MEAS - RV MAX PG: 2.41 MMHG
BH CV ECHO MEAS - RV V1 MAX: 77.6 CM/SEC
BH CV ECHO MEAS - RV V1 VTI: 17.3 CM
BH CV ECHO MEAS - RVOT DIAM: 1.75 CM
BH CV ECHO MEAS - SI(MOD-SP2): 23 ML/M2
BH CV ECHO MEAS - SI(MOD-SP4): 29.1 ML/M2
BH CV ECHO MEAS - SV(LVOT): 66.7 ML
BH CV ECHO MEAS - SV(MOD-SP2): 46 ML
BH CV ECHO MEAS - SV(MOD-SP4): 58 ML
BH CV ECHO MEAS - SV(RVOT): 41.7 ML
BH CV ECHO MEAS - TAPSE (>1.6): 2.04 CM
BH CV ECHO MEAS - TR MAX PG: 33.2 MMHG
BH CV ECHO MEAS - TR MAX VEL: 288.1 CM/SEC
BH CV ECHO MEASUREMENTS AVERAGE E/E' RATIO: 9.22
BH CV XLRA - RV BASE: 3.8 CM
BH CV XLRA - RV LENGTH: 7.6 CM
BH CV XLRA - RV MID: 2.9 CM
BH CV XLRA - TDI S': 14.9 CM/SEC
LEFT ATRIUM VOLUME INDEX: 42.4 ML/M2
LV EF 2D ECHO EST: 60 %
MAXIMAL PREDICTED HEART RATE: 144 BPM
SINUS: 3 CM
STJ: 2.8 CM
STRESS TARGET HR: 122 BPM

## 2022-07-19 PROCEDURE — 93356 MYOCRD STRAIN IMG SPCKL TRCK: CPT

## 2022-07-19 PROCEDURE — 93356 MYOCRD STRAIN IMG SPCKL TRCK: CPT | Performed by: INTERNAL MEDICINE

## 2022-07-19 PROCEDURE — 93306 TTE W/DOPPLER COMPLETE: CPT | Performed by: INTERNAL MEDICINE

## 2022-07-19 PROCEDURE — 93306 TTE W/DOPPLER COMPLETE: CPT

## 2022-07-20 ENCOUNTER — TELEPHONE (OUTPATIENT)
Dept: CARDIOLOGY | Facility: CLINIC | Age: 77
End: 2022-07-20

## 2022-07-20 PROBLEM — Q23.1 BICUSPID AORTIC VALVE: Status: ACTIVE | Noted: 2022-07-20

## 2022-07-20 PROBLEM — I35.0 AORTIC STENOSIS, MILD: Status: ACTIVE | Noted: 2022-07-20

## 2022-07-20 PROBLEM — Q23.81 BICUSPID AORTIC VALVE: Status: ACTIVE | Noted: 2022-07-20

## 2022-07-20 NOTE — TELEPHONE ENCOUNTER
Patient was seen in the office in April 2022.  I ordered a echocardiogram to be completed in 1 year.      Patient actually had it completed yesterday with the following results:      Echocardiogram Interpretation Summary    · Estimated left ventricular EF = 60% Left ventricular systolic function is normal. Normal global longitudinal LV strain (GLS) = -22.1%. Left ventricle strain data was reviewed by the physician and found to be accurate. Normal left ventricular cavity size noted. Left ventricular wall thickness is consistent with borderline concentric hypertrophy. All left ventricular wall segments contract normally. Left ventricular diastolic function was indeterminate.  · The left atrial cavity is moderately dilated.  · The aortic valve is abnormal in structure. A functionally bicuspid aortic valve. There is moderate to severe thickening of the aortic valve. Trace aortic valve regurgitation is present. Mild aortic valve stenosis is present.     Echocardiogram stable.  He will follow-up with Dr. Vázquez in 2023.

## 2022-07-26 ENCOUNTER — IMMUNIZATION (OUTPATIENT)
Dept: VACCINE CLINIC | Facility: HOSPITAL | Age: 77
End: 2022-07-26

## 2022-07-26 DIAGNOSIS — Z23 NEED FOR VACCINATION: Primary | ICD-10-CM

## 2022-07-26 PROCEDURE — 0054A HC ADM SARSCV2 30MCG TRS-SUCR BOOSTER: CPT | Performed by: INTERNAL MEDICINE

## 2022-07-26 PROCEDURE — 91305 HC SARSCOV2 VAC 30 MCG TRS-SUCR PFIZER: CPT | Performed by: INTERNAL MEDICINE

## 2022-07-28 RX ORDER — FEBUXOSTAT 40 MG/1
40 TABLET, FILM COATED ORAL EVERY EVENING
Qty: 30 TABLET | Refills: 0 | Status: SHIPPED | OUTPATIENT
Start: 2022-07-28 | End: 2022-07-29 | Stop reason: SDUPTHER

## 2022-07-28 NOTE — TELEPHONE ENCOUNTER
Rx Refill Note  Requested Prescriptions     Pending Prescriptions Disp Refills   • febuxostat (ULORIC) 40 MG tablet [Pharmacy Med Name: FEBUXOSTAT 40MG TABLETS] 30 tablet 5     Sig: TAKE 1 TABLET BY MOUTH DAILY      Last office visit with prescribing clinician: 5/6/2021      Next office visit with prescribing clinician: Visit date not found            Briana Beauchamp LPN  07/28/22, 16:23 EDT

## 2022-07-29 ENCOUNTER — TELEPHONE (OUTPATIENT)
Dept: FAMILY MEDICINE CLINIC | Facility: CLINIC | Age: 77
End: 2022-07-29

## 2022-07-29 RX ORDER — FEBUXOSTAT 40 MG/1
40 TABLET, FILM COATED ORAL EVERY EVENING
Qty: 90 TABLET | Refills: 1 | Status: SHIPPED | OUTPATIENT
Start: 2022-07-29

## 2022-07-29 NOTE — TELEPHONE ENCOUNTER
Pt is scheduled for AWV on Jonathan 3, 2023. He would like to be added to Jyothi's cancellation list.    He would like the medicine sent to the Pharmacy on file.    [facility] [place] [address]/ [place] [phone]    Please advise

## 2022-08-18 ENCOUNTER — LAB (OUTPATIENT)
Dept: LAB | Facility: HOSPITAL | Age: 77
End: 2022-08-18

## 2022-08-18 ENCOUNTER — OFFICE VISIT (OUTPATIENT)
Dept: ONCOLOGY | Facility: CLINIC | Age: 77
End: 2022-08-18

## 2022-08-18 VITALS
HEART RATE: 80 BPM | TEMPERATURE: 97.8 F | WEIGHT: 184.3 LBS | BODY MASS INDEX: 26.39 KG/M2 | RESPIRATION RATE: 16 BRPM | DIASTOLIC BLOOD PRESSURE: 70 MMHG | SYSTOLIC BLOOD PRESSURE: 133 MMHG | HEIGHT: 70 IN | OXYGEN SATURATION: 96 %

## 2022-08-18 DIAGNOSIS — D73.2 CONGESTIVE SPLENOMEGALY: ICD-10-CM

## 2022-08-18 DIAGNOSIS — D69.6 THROMBOCYTOPENIA: ICD-10-CM

## 2022-08-18 DIAGNOSIS — D72.819 CHRONIC LEUKOPENIA: Primary | ICD-10-CM

## 2022-08-18 DIAGNOSIS — R16.1 SPLENOMEGALY, NOT ELSEWHERE CLASSIFIED: ICD-10-CM

## 2022-08-18 DIAGNOSIS — D50.8 OTHER IRON DEFICIENCY ANEMIAS: ICD-10-CM

## 2022-08-18 DIAGNOSIS — K74.60 CIRRHOSIS OF LIVER WITHOUT ASCITES, UNSPECIFIED HEPATIC CIRRHOSIS TYPE: ICD-10-CM

## 2022-08-18 LAB
BASOPHILS # BLD AUTO: 0.01 10*3/MM3 (ref 0–0.2)
BASOPHILS NFR BLD AUTO: 0.3 % (ref 0–1.5)
DEPRECATED RDW RBC AUTO: 52.1 FL (ref 37–54)
EOSINOPHIL # BLD AUTO: 0.13 10*3/MM3 (ref 0–0.4)
EOSINOPHIL NFR BLD AUTO: 3.7 % (ref 0.3–6.2)
ERYTHROCYTE [DISTWIDTH] IN BLOOD BY AUTOMATED COUNT: 13.6 % (ref 12.3–15.4)
HCT VFR BLD AUTO: 34.4 % (ref 37.5–51)
HGB BLD-MCNC: 11.3 G/DL (ref 13–17.7)
IMM GRANULOCYTES # BLD AUTO: 0.02 10*3/MM3 (ref 0–0.05)
IMM GRANULOCYTES NFR BLD AUTO: 0.6 % (ref 0–0.5)
LYMPHOCYTES # BLD AUTO: 0.97 10*3/MM3 (ref 0.7–3.1)
LYMPHOCYTES NFR BLD AUTO: 27.3 % (ref 19.6–45.3)
MCH RBC QN AUTO: 33.9 PG (ref 26.6–33)
MCHC RBC AUTO-ENTMCNC: 32.8 G/DL (ref 31.5–35.7)
MCV RBC AUTO: 103.3 FL (ref 79–97)
MONOCYTES # BLD AUTO: 0.37 10*3/MM3 (ref 0.1–0.9)
MONOCYTES NFR BLD AUTO: 10.4 % (ref 5–12)
NEUTROPHILS NFR BLD AUTO: 2.05 10*3/MM3 (ref 1.7–7)
NEUTROPHILS NFR BLD AUTO: 57.7 % (ref 42.7–76)
NRBC BLD AUTO-RTO: 0 /100 WBC (ref 0–0.2)
PLATELET # BLD AUTO: 85 10*3/MM3 (ref 140–450)
PMV BLD AUTO: 8.3 FL (ref 6–12)
RBC # BLD AUTO: 3.33 10*6/MM3 (ref 4.14–5.8)
VIT B12 BLD-MCNC: 499 PG/ML (ref 211–946)
WBC NRBC COR # BLD: 3.55 10*3/MM3 (ref 3.4–10.8)

## 2022-08-18 PROCEDURE — 36415 COLL VENOUS BLD VENIPUNCTURE: CPT

## 2022-08-18 PROCEDURE — 99213 OFFICE O/P EST LOW 20 MIN: CPT | Performed by: INTERNAL MEDICINE

## 2022-08-18 PROCEDURE — 82607 VITAMIN B-12: CPT | Performed by: INTERNAL MEDICINE

## 2022-08-18 PROCEDURE — 85025 COMPLETE CBC W/AUTO DIFF WBC: CPT

## 2022-08-18 NOTE — PROGRESS NOTES
Subjective         REASONS FOR FOLLOWUP:   ACQUIRED PANCYTOPENIA FROM SPLENOMEGALY AND LIVER CIRRHOSIS, IRON DEF ANEMIA FROM PREVIOUS GI BLEEDING, PATIENT CAN NOT TAKE ANTICOAGULANTS DUE TO TRIGGERING OF SEVERE GI BLEEDING    HISTORY OF PRESENT ILLNESS:   On 08/18/2022 I have seen this patient in the office along with his wife, a 76-year-old male who has history of pancytopenia associated with hypersplenism and anemia associated with chronic kidney disease receiving Procrit administration and IV iron through the Nephrology team. The patient fortunately has not had any new areas of bleeding even though he has undergone surgery with a bovine implant in the fistula in his left upper extremity that was necessary given all the complications of his access for dialysis. He had no abnormal bleeding through the procedure or after the procedure.     He developed after this a urinary tract infection requiring a Khan catheter. This was treated aggressively with resolution. He was placed on medication to try to relax the prostate. He has still urinary output even though he is on dialysis and it seems that this has triggered some component of hypotension. On the day that he has dialysis if his blood pressure is low they need to decrease the amount of volume removed and they need to provide him probably some infusion with saline to compensate. Besides this he is eating well. His weight is stable. His bowel activity is ongoing through his colostomy and again he continues having some urinary output. He has no swelling in his lower extremities. He has not had any cardiovascular or lung problems.          Past Medical History:   Diagnosis Date   • Abnormal serum protein electrophoresis    • Anemia     Multifactorial   • Bicuspid aortic valve 7/20/2022   • Chronic leukopenia and thrombocytopenia    • Cirrhosis of liver without ascites (HCC) 07/24/2017   • Congestive splenomegaly 07/24/2017   • Crohn disease (HCC)     with ileostomy    • Diastolic CHF (HCC)     although it was likely from volume overload due to ESRD   • Diverticula of colon    • ESRD on hemodialysis (HCC)     M-W-F [facility]   • Fatty liver disease, nonalcoholic    • GERD (gastroesophageal reflux disease)    • GI bleed    • Glaucoma    • H/O Gallstone pancreatitis    • H/O Pericardial effusion    • H/O sinus bradycardia    • Heart murmur    • History of hypertension     resolved   • History of UTI    • Hx of renal calculi    • Ileostomy present (HCC)    • Iron deficiency    • MGUS (monoclonal gammopathy of unknown significance)    • TATE (obstructive sleep apnea)    • Permanent atrial fibrillation (HCC)    • Sleep apnea     CPAP USED   • Type 2 diabetes mellitus (HCC)     CURRENTLY TAKES NO MED        Past Surgical History:   Procedure Laterality Date   • APPENDECTOMY     • ARTERIOVENOUS FISTULA/SHUNT SURGERY Left 08/08/2017    Procedure: LEFT BRACHIAL CEPHALIC AV FISTULA FORMATION WITH CEPHALIC VEIN TRANSPOSITION ;  Surgeon: Bill Deal MD;  Location: [facility];  Service:    • ARTERIOVENOUS FISTULA/SHUNT SURGERY Left 5/27/2022    Procedure: OPEN REVISION LEFT ARTERIOVENOUS INTERPOSITION GRAFT PLACEMENT;  Surgeon: Bill Deal MD;  Location: [facility];  Service: Vascular;  Laterality: Left;   • ARTERIOVENOUS FISTULA/SHUNT SURGERY W/ HEMODIALYSIS CATHETER INSERTION Left 02/15/2022    Procedure: OPEN LEFT ARM ARTERIOVENOUS FISTULA THROMBECTOMY WITH CEPHALIC VEIN ARTHROPLASTY AND STENT/GRAFT PLACEMENT;  Surgeon: Bill Deal MD;  Location: [facility] 18/19;  Service: Vascular;  Laterality: Left;   • CATARACT EXTRACTION WITH INTRAOCULAR LENS IMPLANT Bilateral    • CHOLECYSTECTOMY     • COLECTOMY PARTIAL / TOTAL      History of inflammatory bowel disease with status post colectomy with ileostomy many years ago in the [facility],  18 YEARS OF AGE   • COLONOSCOPY     • CYSTOSCOPY LITHOLAPAXY BLADDER STONE EXTRACTION     • ENDOSCOPY  01/16/2015     gastritis   • ENDOSCOPY  01/17/2018    Procedure: ESOPHAGOGASTRODUODENOSCOPY;  Surgeon: Warner Neville MD;  Location: [facility] ENDOSCOPY;  Service:    • ILEOSCOPY  01/16/2015    normal   • ILEOSCOPY N/A 01/17/2018    Procedure: ILEOSCOPY;  Surgeon: Warner Neville MD;  Location: [facility] ENDOSCOPY;  Service:    • TONSILLECTOMY          Current Outpatient Medications on File Prior to Visit   Medication Sig Dispense Refill   • ALFUZOSIN HCL ER PO Take 10 mg by mouth.     • aspirin 81 MG tablet Take 81 mg by mouth Daily. INSTRUCTED TO CONTINUE THIS FOR SURGERY PER DR. BERMEO'S OFFICE     • B Complex-C-Folic Acid (EMILIE-FREDDY) tablet Take 1 tablet by mouth Daily. 0.8 mg  3   • Chlorhexidine Gluconate 2 % pads Apply  topically. AS DIRECTED PAT     • Cholecalciferol (VITAMIN D3) 1000 units capsule Take 1,000 Units by mouth Every Night.     • famotidine (PEPCID) 10 MG tablet Take 10 mg by mouth As Needed for Heartburn.     • HYDROcodone-acetaminophen (NORCO) 5-325 MG per tablet Take 1-2 tablets by mouth Every 6 (Six) Hours As Needed (Pain). 40 tablet 0   • Iron Sucrose (VENOFER IV) Infuse  into a venous catheter As Needed. USUALLY ONCE MONTH     • latanoprost (XALATAN) 0.005 % ophthalmic solution Administer 1 drop to both eyes Every Night. 1 drop each eye     • lidocaine-prilocaine (EMLA) 2.5-2.5 % cream Apply 1 application topically to the appropriate area as directed. Kxlfee-Btnflyals-Sybvmp:  ONE HOUR PRIOR TO DIALYSIS  3   • Loratadine (CLARITIN PO) Take 1 tablet by mouth Every Evening. Pt states he is uncertain of dosage     • Methoxy PEG-Epoetin Beta (MIRCERA IJ) 50 mcg Every 28 (Twenty-Eight) Days. AT DIALYSIS     • Methoxy PEG-Epoetin Beta (MIRCERA IJ) 50 mcg Every 28 (Twenty-Eight) Days.     • sodium chloride 0.65 % nasal spray 2 sprays into the nostril(s) as directed by provider Every Night.     • Sucroferric Oxyhydroxide (Velphoro) 500 MG chewable tablet Chew 1,000 mg 3 (Three) Times a Day. AFTER EACH  MEAL     •  "alfuzosin (UROXATRAL) 10 MG 24 hr tablet Take 10 mg by mouth Daily With Dinner.     • febuxostat (ULORIC) 40 MG tablet Take 1 tablet by mouth Every Evening. 90 tablet 1     No current facility-administered medications on file prior to visit.        ALLERGIES:    Allergies   Allergen Reactions   • Ace Inhibitors Other (See Comments)     RENAL FAILURE/ raised creatinine   • Contrast Dye Other (See Comments)     RENAL FAILURE   • Iodine Anaphylaxis   • Angiotensin Receptor Blockers Swelling   • Eliquis [Apixaban] Arrhythmia     BLEEDING ISSUES; PT CANNOT TAKE ANY ANTICOAGULANTS DUE TO LOW PLATELETS EXCEPT ASPIRIN     • Granix [Filgrastim] GI Intolerance     Chest pain  Chest pain     • Keflex [Cephalexin] Diarrhea     RENAL FAILURE        Social History     Socioeconomic History   • Marital status:      Spouse name: Gillian   • Number of children: 2   • Years of education: College   Tobacco Use   • Smoking status: Never Smoker   • Smokeless tobacco: Never Used   Vaping Use   • Vaping Use: Never used   Substance and Sexual Activity   • Alcohol use: No     Comment: caffeine use: none   • Drug use: No   • Sexual activity: Defer        Family History   Problem Relation Age of Onset   • Heart failure Mother    • Hypertension Mother    • Heart disease Mother    • Hyperlipidemia Mother    • Hypertension Father    • Malig Hyperthermia Neg Hx           Objective     Vitals:    08/18/22 1050   BP: 133/70   Pulse: 80   Resp: 16   Temp: 97.8 °F (36.6 °C)   TempSrc: Temporal   SpO2: 96%   Weight: 83.6 kg (184 lb 4.8 oz)   Height: 177 cm (69.69\")   PainSc: 0-No pain     Current Status 8/18/2022   ECOG score 0       Physical Exam        GENERAL:  Well-developed, well-nourished  Patient  in no acute distress.   SKIN:  Warm, dry ,NO purpura ,no rash.  HEENT:  Pupils were equal and reactive to light and accomodation, conjunctivae noninjected, normal extraocular movements, normal visual acuity.   NECK:  Supple with good range of " motion; no thyromegaly , no JVD or bruits,.No carotid artery pain, no carotid abnormal pulsation   LYMPHATICS:  No cervical, NO supraclavicular, NO axillary, NO inguinal adenopathies.  CARDIAC   normal rate , regular rhythm, without murmur,NO rubs NO S3 NO S4   LUNGS: normal breath sounds bilateral, no wheezing, NO rhonchi, NO crackles ,NO rubs.  VASCULAR VENOUS: no cyanosis, NO collateral circulation, NO varicosities, NO edema, NO palpable cords, NO pain,NO erythema, NO pigmentation of the skin.  ABDOMEN:  Soft, NO pain,no hepatomegaly, no splenomegaly,no masses, no ascites, no collateral circulation,no distention.COLOSTOMT LLQ  EXTREMITIES  AND SPINE:  No clubbing, no cyanosis ,no deformities , no pain .No kyphosis,  no pain in spine, no pain in ribs , no pain in pelvic bone.FISTULA FOR DIALYSIS LUE  NEUROLOGICAL:  Patient was awake, alert, oriented to time, person and place.        RECENT LABS:  Lab Results   Component Value Date    WBC 3.55 08/18/2022    HGB 11.3 (L) 08/18/2022    HCT 34.4 (L) 08/18/2022    .3 (H) 08/18/2022    PLT 85 (L) 08/18/2022           Assessment & Plan     Diagnoses and all orders for this visit:    1. Chronic leukopenia (Primary)  -     CBC & Differential; Future  -     Retic With IRF & RET-He; Future    2. Thrombocytopenia (HCC)  -     CBC & Differential; Future  -     Retic With IRF & RET-He; Future    3. Cirrhosis of liver without ascites, unspecified hepatic cirrhosis type (HCC)  -     CBC & Differential; Future  -     Retic With IRF & RET-He; Future    4. Congestive splenomegaly  -     CBC & Differential; Future  -     Retic With IRF & RET-He; Future         I reviewed the recent laboratory parameters on this patient and his white count remains around 300, his platelet count was 104,000, the hemoglobin around 11.  These numbers for him are very good. He has received IV iron and his ferritin level and iron profile are appropriate at this time. He has no clinical bleeding.      He raised the question about these numbers. He knows that these numbers are related to his chronic liver disease and splenomegaly. This has been present for years and years since he has been my patient. These numbers fluctuate but never have changed substantially. I made him aware that if he has a white count less than 2000 and platelet count less than 30,000 he will need to call me immediately.    He raised the question about the Atenolol if this is prudent to take. I pointed out to him that it could be beneficial to control his blood pressure, it will be beneficial to decrease his portal pressure too and it could be beneficial to minimize in case of any atrial fibrillation, rapid ventricular response. He would like to adjust his CPAP machine first to see if he has lesser episodes of sleep apnea and if this does not correct the problem he will start the blood pressure medication under the direction of cardiology.     Finally they raises the question him and his wife in regard to COVID vaccination booster shot. He is ready from this point of view from my situation. He has as we know patient's on dialysis chronic kidney disease are immunosuppressed, he must have his COVID vaccination booster and I asked him to go ahead and proceed with this at this time. Three to 4 weeks later he would like to proceed with his flu shot.     Otherwise we will review him by telemedicine phone visit in 6 months.    He will continue sending reports of his laboratory parameters from his dialysis center.     Duration of the phone visit 12 minutes.   The patient was reviewed by Telemedicine on 03/15/2022. Since the previous visit the patient has undergone the repair of his fistula in the left upper extremity and a stent was placed in the left subclavian vein. This is visible in the most recent chest x-ray. Now the fistula for dialysis is working fine. He had also an episode of urine collection in the bladder, phenomenon that is unusual  in chronic kidney disease. He had probably some discomfort and minimal fever. He had a Khan catheter placed, now has been removed by Urology. He is no longer having a fever and he has minimal urinary output. The patient otherwise feels well. The most recent laboratory testing that has been reviewed above shows chronic leukopenia and chronic thrombocytopenia without any changes. His white count is typically around 3500, platelet count around 110,000, stable hemoglobin. Given the above circumstances, I do not believe that we need to pursue any other intervention. The dialysis team, Dr. Monsivais and associates, will continue doing his laboratory testing and they will let us know if any need arises in regard to any other intervention.     I related to the patient and his wife his numbers are doing well and I find no need for any other intervention. I will review him back here in the office in 4 months with a CBC.    Duration of the phone visit: 15 minutes.  The patient was reviewed on 08/18/2022. Since the previous visit he was treated for a urinary tract infection successfully. He has not had any further issues there. He has not had any bleeding after surgical intervention to correct issues in regard to his fistula for dialysis in the left upper extremity by Vascular Surgery. We provided platelet transfusion for that.     His white count, hemoglobin and platelets today are no different than what they have been for the last many years. His white count is low and low platelet count is evading to splenomegaly and hypersplenism with sequestration. Actually the hemoglobin is very good for somebody undergoing dialysis because he also receives erythropoietin and he receives iron therapy. I encouraged him to remain on this.     He raised the question in regard of what to do with the alfuzosin that he receives every night at dinner time in regard to prostate relaxation and allow him to have proper urination. I pointed out to him  that if this medicine triggers low blood pressure he could avoid this medication utilization the night before dialysis and that way he only takes it 4 days of the week instead of every day of the week. Maybe this will be corrected.     Otherwise I would like to review him back in 4 months with a CBC here in the office.     I discussed all these facts with the patient and his wife. They provided me multiple laboratories from the nephrology team that will be scanned into Epic system, all of them consistent more or less with laboratory testing that we have today including proper ferritin level.       ·

## 2022-08-19 LAB
ALBUMIN SERPL ELPH-MCNC: 3.4 G/DL (ref 2.9–4.4)
ALBUMIN/GLOB SERPL: 0.9 {RATIO} (ref 0.7–1.7)
ALPHA1 GLOB SERPL ELPH-MCNC: 0.2 G/DL (ref 0–0.4)
ALPHA2 GLOB SERPL ELPH-MCNC: 0.6 G/DL (ref 0.4–1)
B-GLOBULIN SERPL ELPH-MCNC: 0.9 G/DL (ref 0.7–1.3)
FOLATE BLD-MCNC: 415 NG/ML
FOLATE RBC-MCNC: 1224 NG/ML
GAMMA GLOB SERPL ELPH-MCNC: 2.4 G/DL (ref 0.4–1.8)
GLOBULIN SER-MCNC: 4 G/DL (ref 2.2–3.9)
HCT VFR BLD AUTO: 33.9 % (ref 37.5–51)
IGA SERPL-MCNC: 471 MG/DL (ref 61–437)
IGG SERPL-MCNC: 2445 MG/DL (ref 603–1613)
IGM SERPL-MCNC: 195 MG/DL (ref 15–143)
INTERPRETATION SERPL IEP-IMP: ABNORMAL
KAPPA LC FREE SER-MCNC: 264.7 MG/L (ref 3.3–19.4)
KAPPA LC FREE/LAMBDA FREE SER: 1.01 {RATIO} (ref 0.26–1.65)
LABORATORY COMMENT REPORT: ABNORMAL
LAMBDA LC FREE SERPL-MCNC: 261.1 MG/L (ref 5.7–26.3)
M PROTEIN SERPL ELPH-MCNC: ABNORMAL G/DL
PROT SERPL-MCNC: 7.4 G/DL (ref 6–8.5)

## 2022-08-25 ENCOUNTER — OFFICE VISIT (OUTPATIENT)
Dept: FAMILY MEDICINE CLINIC | Facility: CLINIC | Age: 77
End: 2022-08-25

## 2022-08-25 VITALS
BODY MASS INDEX: 26.37 KG/M2 | RESPIRATION RATE: 18 BRPM | WEIGHT: 184.2 LBS | SYSTOLIC BLOOD PRESSURE: 124 MMHG | DIASTOLIC BLOOD PRESSURE: 58 MMHG | HEIGHT: 70 IN | TEMPERATURE: 97.1 F | HEART RATE: 83 BPM | OXYGEN SATURATION: 97 %

## 2022-08-25 DIAGNOSIS — Z00.00 MEDICARE ANNUAL WELLNESS VISIT, SUBSEQUENT: Primary | ICD-10-CM

## 2022-08-25 DIAGNOSIS — E11.29 TYPE 2 DIABETES MELLITUS WITH OTHER DIABETIC KIDNEY COMPLICATION: ICD-10-CM

## 2022-08-25 PROBLEM — H52.4 PRESBYOPIA: Status: ACTIVE | Noted: 2021-05-13

## 2022-08-25 PROBLEM — R06.02 SHORTNESS OF BREATH: Status: ACTIVE | Noted: 2022-02-19

## 2022-08-25 PROBLEM — E79.0 HYPERURICEMIA WITHOUT SIGNS OF INFLAMMATORY ARTHRITIS AND TOPHACEOUS DISEASE: Status: ACTIVE | Noted: 2022-08-03

## 2022-08-25 PROCEDURE — 1170F FXNL STATUS ASSESSED: CPT | Performed by: FAMILY MEDICINE

## 2022-08-25 PROCEDURE — 1160F RVW MEDS BY RX/DR IN RCRD: CPT | Performed by: FAMILY MEDICINE

## 2022-08-25 PROCEDURE — G0439 PPPS, SUBSEQ VISIT: HCPCS | Performed by: FAMILY MEDICINE

## 2022-08-25 NOTE — PROGRESS NOTES
The ABCs of the Annual Wellness Visit  Subsequent Medicare Wellness Visit    Chief Complaint   Patient presents with   • Medicare Wellness-subsequent      Subjective    History of Present Illness:  Bill Landry is a 76 y.o. male who presents for a Subsequent Medicare Wellness Visit.    The following portions of the patient's history were reviewed and   updated as appropriate: allergies, current medications, past family history, past medical history, past social history, past surgical history and problem list.    Compared to one year ago, the patient feels his physical   health is the same.    Compared to one year ago, the patient feels his mental   health is the same.    Recent Hospitalizations:  He was admitted within the past 365 days at [facility]. Fistula revision complicated by urinary retention, then new fistula.      Current Medical Providers:  Patient Care Team:  Mecca Roe MD as PCP - General (Family Medicine)  Sharif Mckenzie MD as Consulting Physician (Hematology and Oncology)  Ken Moseley MD as Consulting Physician (Pulmonary Disease)  Cooper Virgen MD as Consulting Physician (Urology)  Dale Vázquez MD as Consulting Physician (Cardiology)  Myra Moore MD as Consulting Physician (Nephrology)    Outpatient Medications Prior to Visit   Medication Sig Dispense Refill   • alfuzosin (UROXATRAL) 10 MG 24 hr tablet Take 10 mg by mouth Daily With Dinner. Does not take on tues sun or thurs     • aspirin 81 MG tablet Take 81 mg by mouth Daily. INSTRUCTED TO CONTINUE THIS FOR SURGERY PER DR. BERMEO'S OFFICE     • B Complex-C-Folic Acid (EMILIE-FREDDY) tablet Take 1 tablet by mouth Daily. 0.8 mg  3   • Cholecalciferol (VITAMIN D3) 1000 units capsule Take 1,000 Units by mouth Every Night.     • famotidine (PEPCID) 10 MG tablet Take 10 mg by mouth As Needed for Heartburn.     • febuxostat (ULORIC) 40 MG tablet Take 1 tablet by mouth Every Evening. 90 tablet 1   • latanoprost (XALATAN) 0.005  % ophthalmic solution Administer 1 drop to both eyes Every Night. 1 drop each eye     • lidocaine-prilocaine (EMLA) 2.5-2.5 % cream Apply 1 application topically to the appropriate area as directed. Piooqo-Nfiitzjel-Gcviqh:  ONE HOUR PRIOR TO DIALYSIS  3   • Loratadine (CLARITIN PO) Take 1 tablet by mouth As Needed. Pt states he is uncertain of dosage     • Methoxy PEG-Epoetin Beta (MIRCERA IJ) 50 mcg Every 28 (Twenty-Eight) Days. AT DIALYSIS     • sodium chloride 0.65 % nasal spray 2 sprays into the nostril(s) as directed by provider Every Night.     • Sucroferric Oxyhydroxide (Velphoro) 500 MG chewable tablet Chew 1,000 mg 3 (Three) Times a Day. AFTER EACH  MEAL     • ALFUZOSIN HCL ER PO Take 10 mg by mouth.     • Chlorhexidine Gluconate 2 % pads Apply  topically. AS DIRECTED PAT     • HYDROcodone-acetaminophen (NORCO) 5-325 MG per tablet Take 1-2 tablets by mouth Every 6 (Six) Hours As Needed (Pain). 40 tablet 0   • Iron Sucrose (VENOFER IV) Infuse  into a venous catheter As Needed. USUALLY ONCE MONTH     • Methoxy PEG-Epoetin Beta (MIRCERA IJ) 50 mcg Every 28 (Twenty-Eight) Days.       No facility-administered medications prior to visit.       Opioid medication/s are on active medication list.  and I have evaluated his active treatment plan and pain score trends (see table).  Vitals:    08/25/22 0804   PainSc: 0-No pain     I have reviewed the chart for potential of high risk medication and harmful drug interactions in the elderly.            Aspirin is on active medication list. Aspirin use is indicated based on review of current medical condition/s. Pros and cons of this therapy have been discussed today. Benefits of this medication outweigh potential harm.  Patient has been encouraged to continue taking this medication.  .      Patient Active Problem List   Diagnosis   • Permanent atrial fibrillation (HCC)   • Thrombocytopenia (HCC)   • Chronic leukopenia   • Cirrhosis of liver without ascites (HCC)   •  Congestive splenomegaly   • Anemia due to stage 4 chronic kidney disease treated with erythropoietin ([facility])   • Esophageal abnormality   • At risk for fluid volume overload   • End stage kidney disease ([facility])   • Other specified glaucoma   • Dependence on renal dialysis ([facility])   • Allergy, unspecified, initial encounter   • Crohn's disease, unspecified, without complications ([facility])   • Deficiency of other specified B group vitamins   • Dermatitis, unspecified   • Encounter for immunization   • Essential (primary) hypertension   • History of urinary stone   • Hyperparathyroidism, unspecified ([facility])   • 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, with stage 1 through stage 4 chronic kidney disease, or unspecified chronic kidney disease   • Other disorders of phosphorus metabolism   • Other iron deficiency anemias   • Pericardial effusion (noninflammatory)   • Pure hypertriglyceridemia   • Renal osteodystrophy   • Secondary hyperparathyroidism of renal origin ([facility])   • Type 2 diabetes mellitus with other diabetic kidney complication ([facility])   • Splenomegaly, not elsewhere classified   • Bilateral pseudophakia   • Dermatochalasis of eyelid   • Primary open-angle glaucoma, bilateral, moderate stage   • Puckering of macula, left eye   • Secondary cataract   • AV fistula occlusion, initial encounter ([facility])   • TATE (obstructive sleep apnea)   • 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  ACP discussion was held with the patient during this visit. Patient does not have an advance directive, declines further assistance.          Objective    Vitals:    08/25/22 0804   BP: 124/58   BP Location: Right arm   Patient Position: Sitting   Cuff Size: Adult   Pulse: 83   Resp: 18   Temp: 97.1 °F  "(36.2 °C)   TempSrc: Infrared   SpO2: 97%   Weight: 83.6 kg (184 lb 3.2 oz)   Height: 177 cm (69.69\")   PainSc: 0-No pain     Estimated body mass index is 26.67 kg/m² as calculated from the following:    Height as of this encounter: 177 cm (69.69\").    Weight as of this encounter: 83.6 kg (184 lb 3.2 oz).    BMI is >= 25 and <30. (Overweight) The following options were offered after discussion;: follows renal diet      Does the patient have evidence of cognitive impairment? No    Physical Exam  Vitals reviewed.   Constitutional:       General: He is not in acute distress.     Appearance: Normal appearance. He is not ill-appearing or toxic-appearing.   HENT:      Head: Normocephalic and atraumatic.      Right Ear: Tympanic membrane, ear canal and external ear normal. There is no impacted cerumen.      Left Ear: Tympanic membrane, ear canal and external ear normal. There is no impacted cerumen.      Nose: Nose normal.      Mouth/Throat:      Mouth: Mucous membranes are moist.      Pharynx: Oropharynx is clear. No oropharyngeal exudate.   Eyes:      General: No scleral icterus.        Right eye: No discharge.         Left eye: No discharge.      Conjunctiva/sclera: Conjunctivae normal.   Neck:      Thyroid: No thyromegaly.      Vascular: No carotid bruit.   Cardiovascular:      Rate and Rhythm: Normal rate and regular rhythm.      Heart sounds: Normal heart sounds. No murmur heard.    No friction rub. No gallop.   Pulmonary:      Effort: Pulmonary effort is normal. No respiratory distress.      Breath sounds: Normal breath sounds. No wheezing or rales.   Chest:      Chest wall: No tenderness.   Abdominal:      General: Bowel sounds are normal. There is no distension.      Palpations: Abdomen is soft. There is no mass.      Tenderness: There is no abdominal tenderness. There is no guarding.      Comments: Ileostomy    Musculoskeletal:         General: No swelling or deformity.      Cervical back: Neck supple.      Right " lower leg: No edema.      Left lower leg: No edema.   Lymphadenopathy:      Cervical: No cervical adenopathy.   Skin:     Coloration: Skin is not jaundiced or pale.   Neurological:      Mental Status: He is alert and oriented to person, place, and time.      Motor: No abnormal muscle tone.      Coordination: Coordination normal.   Psychiatric:         Mood and Affect: Mood normal.         Behavior: Behavior normal.                 HEALTH RISK ASSESSMENT    Smoking Status:  Social History     Tobacco Use   Smoking Status Never Smoker   Smokeless Tobacco Never Used     Alcohol Consumption:  Social History     Substance and Sexual Activity   Alcohol Use No    Comment: caffeine use: none     Fall Risk Screen:    [facility] Fall Risk Assessment has not been completed.    Depression Screening:  PHQ-2/PHQ-9 Depression Screening 8/25/2022   Retired PHQ-9 Total Score -   Retired Total Score -   Little Interest or Pleasure in Doing Things -   Feeling Down, Depressed or Hopeless 0-->not at all   PHQ-9: Brief Depression Severity Measure Score 0       Health Habits and Functional and Cognitive Screening:  Functional & Cognitive Status 8/25/2022   Do you have difficulty preparing food and eating? No   Do you have difficulty bathing yourself, getting dressed or grooming yourself? No   Do you have difficulty using the toilet? No   Do you have difficulty moving around from place to place? No   Do you have trouble with steps or getting out of a bed or a chair? No   Current Diet Well Balanced Diet   Dental Exam Not up to date   Eye Exam Up to date   Exercise (times per week) 7 times per week   Current Exercises Include House Cleaning   Current Exercise Activities Include -   Do you need help using the phone?  No   Are you deaf or do you have serious difficulty hearing?  No   Do you need help with transportation? No   Do you need help shopping? No   Do you need help preparing meals?  No   Do you need help with housework?  No   Do you need  help with laundry? No   Do you need help taking your medications? No   Do you need help managing money? No   Do you ever drive or ride in a car without wearing a seat belt? No   Have you felt unusual stress, anger or loneliness in the last month? No   Who do you live with? Spouse   If you need help, do you have trouble finding someone available to you? No   Have you been bothered in the last four weeks by sexual problems? No   Do you have difficulty concentrating, remembering or making decisions? No       Age-appropriate Screening Schedule:  Refer to the list below for future screening recommendations based on patient's age, sex and/or medical conditions. Orders for these recommended tests are listed in the plan section. The patient has been provided with a written plan.    Health Maintenance   Topic Date Due   • COLONOSCOPY  Never done   • TDAP/TD VACCINES (1 - Tdap) Never done   • ZOSTER VACCINE (1 of 2) Never done   • DIABETIC FOOT EXAM  Never done   • URINE MICROALBUMIN  Never done   • HEMOGLOBIN A1C  07/20/2018   • LIPID PANEL  09/08/2022   • INFLUENZA VACCINE  10/01/2022   • DIABETIC EYE EXAM  08/02/2023              Assessment & Plan   CMS Preventative Services Quick Reference  Risk Factors Identified During Encounter  Cardiovascular Disease  Immunizations Discussed/Encouraged (specific Immunizations; Shingrix  Inactivity/Sedentary  The above risks/problems have been discussed with the patient.  Follow up actions/plans if indicated are seen below in the Assessment/Plan Section.  Pertinent information has been shared with the patient in the After Visit Summary.    There are no diagnoses linked to this encounter.    Follow Up:   No follow-ups on file.     An After Visit Summary and PPPS were made available to the patient.               Mr. Bill Arana is 76 years old and has a history of chronic kidney disease, end-stage renal failure dependent on dialysis, open-angle glaucoma, permanent A. fib and aortic  valve sclerosis followed by nephrology, ophthalmology, and cardiology.  He was recently seen by all groups.  He has an updated echo on the chart that is stable.    Off uloric  Trouble with sleeping  Has GERD symptoms

## 2022-11-07 ENCOUNTER — TELEPHONE (OUTPATIENT)
Dept: FAMILY MEDICINE CLINIC | Facility: CLINIC | Age: 77
End: 2022-11-07

## 2022-11-07 NOTE — TELEPHONE ENCOUNTER
Caller: Gillian Landry    Relationship: Emergency Contact    Best call back number: [phone]    What orders are you requesting (i.e. lab or imaging): TRIGLYCERIDE, CHOLESTEROL, A1C    In what timeframe would the patient need to come in: ASAP    Where will you receive your lab/imaging services: IN OFFICE     Additional notes: PATIENT IS NEEDING TO HAVE THESE ORDERS DONE IN OFFICE. PATIENT'S DIALYSIS PLACE NO LONGER OFFERS THESE LABS. PATIENT'S WIFE IS COMING IN FOR AN APPOINTMENT ON 11.10.22 AT 7:45 AM. PATIENT IS WONDERING IF HE IS ABLE TO HAVE THESE LABS DONE WHILE THEY ARE IN THE OFFICE THAT DAY. SPOUSE STATES IT IS A FARTHER DRIVE FOR THEM.     PLEASE CALL PATIENT TO LET HIM KNOW IF IT'S POSSIBLE OR NOT

## 2022-11-07 NOTE — TELEPHONE ENCOUNTER
Spoke with wife in detail. Patient only has A1C ordered. Is needing lipid panel ordered as dialysis is no longer providing this service. Please advise.

## 2022-11-08 DIAGNOSIS — E11.29 TYPE 2 DIABETES MELLITUS WITH OTHER DIABETIC KIDNEY COMPLICATION: Primary | ICD-10-CM

## 2022-11-08 DIAGNOSIS — E78.1 PURE HYPERTRIGLYCERIDEMIA: ICD-10-CM

## 2023-01-06 NOTE — PROGRESS NOTES
VSS, afebrile, PO medications given. POC reviewed with mother. DC instructions reviewed. EEG removed. Tele and pulse ox removed. DC 1/6.   hgb 10.1  Procrit held per guidelines  No c/o noted  Copy of labs to pt

## 2023-02-23 ENCOUNTER — LAB (OUTPATIENT)
Dept: LAB | Facility: HOSPITAL | Age: 78
End: 2023-02-23
Payer: MEDICARE

## 2023-02-23 ENCOUNTER — OFFICE VISIT (OUTPATIENT)
Dept: ONCOLOGY | Facility: CLINIC | Age: 78
End: 2023-02-23
Payer: MEDICARE

## 2023-02-23 VITALS
TEMPERATURE: 97.1 F | DIASTOLIC BLOOD PRESSURE: 65 MMHG | BODY MASS INDEX: 26.14 KG/M2 | HEART RATE: 73 BPM | OXYGEN SATURATION: 97 % | HEIGHT: 70 IN | SYSTOLIC BLOOD PRESSURE: 119 MMHG | WEIGHT: 182.6 LBS | RESPIRATION RATE: 16 BRPM

## 2023-02-23 DIAGNOSIS — D72.819 CHRONIC LEUKOPENIA: ICD-10-CM

## 2023-02-23 DIAGNOSIS — D69.6 THROMBOCYTOPENIA: ICD-10-CM

## 2023-02-23 DIAGNOSIS — D69.6 THROMBOCYTOPENIA: Primary | ICD-10-CM

## 2023-02-23 DIAGNOSIS — K74.60 CIRRHOSIS OF LIVER WITHOUT ASCITES, UNSPECIFIED HEPATIC CIRRHOSIS TYPE: ICD-10-CM

## 2023-02-23 DIAGNOSIS — D73.2 CONGESTIVE SPLENOMEGALY: ICD-10-CM

## 2023-02-23 LAB
BASOPHILS # BLD AUTO: 0.02 10*3/MM3 (ref 0–0.2)
BASOPHILS NFR BLD AUTO: 0.5 % (ref 0–1.5)
DEPRECATED RDW RBC AUTO: 48.4 FL (ref 37–54)
EOSINOPHIL # BLD AUTO: 0.08 10*3/MM3 (ref 0–0.4)
EOSINOPHIL NFR BLD AUTO: 2.2 % (ref 0.3–6.2)
ERYTHROCYTE [DISTWIDTH] IN BLOOD BY AUTOMATED COUNT: 13.4 % (ref 12.3–15.4)
HCT VFR BLD AUTO: 35.3 % (ref 37.5–51)
HGB BLD-MCNC: 12 G/DL (ref 13–17.7)
HGB RETIC QN AUTO: 37.7 PG (ref 29.8–36.1)
IMM GRANULOCYTES # BLD AUTO: 0.03 10*3/MM3 (ref 0–0.05)
IMM GRANULOCYTES NFR BLD AUTO: 0.8 % (ref 0–0.5)
IMM RETICS NFR: 10.9 % (ref 3–15.8)
LYMPHOCYTES # BLD AUTO: 0.93 10*3/MM3 (ref 0.7–3.1)
LYMPHOCYTES NFR BLD AUTO: 25.3 % (ref 19.6–45.3)
MCH RBC QN AUTO: 33.3 PG (ref 26.6–33)
MCHC RBC AUTO-ENTMCNC: 34 G/DL (ref 31.5–35.7)
MCV RBC AUTO: 98.1 FL (ref 79–97)
MONOCYTES # BLD AUTO: 0.42 10*3/MM3 (ref 0.1–0.9)
MONOCYTES NFR BLD AUTO: 11.4 % (ref 5–12)
NEUTROPHILS NFR BLD AUTO: 2.2 10*3/MM3 (ref 1.7–7)
NEUTROPHILS NFR BLD AUTO: 59.8 % (ref 42.7–76)
NRBC BLD AUTO-RTO: 0 /100 WBC (ref 0–0.2)
PLATELET # BLD AUTO: 79 10*3/MM3 (ref 140–450)
PMV BLD AUTO: 9.5 FL (ref 6–12)
RBC # BLD AUTO: 3.6 10*6/MM3 (ref 4.14–5.8)
RETICS # AUTO: 0.05 10*6/MM3 (ref 0.02–0.13)
RETICS/RBC NFR AUTO: 1.38 % (ref 0.7–1.9)
WBC NRBC COR # BLD: 3.68 10*3/MM3 (ref 3.4–10.8)

## 2023-02-23 PROCEDURE — 99213 OFFICE O/P EST LOW 20 MIN: CPT | Performed by: INTERNAL MEDICINE

## 2023-02-23 PROCEDURE — 85025 COMPLETE CBC W/AUTO DIFF WBC: CPT

## 2023-02-23 PROCEDURE — 36415 COLL VENOUS BLD VENIPUNCTURE: CPT

## 2023-02-23 PROCEDURE — 85046 RETICYTE/HGB CONCENTRATE: CPT

## 2023-02-23 NOTE — PROGRESS NOTES
Subjective         REASONS FOR FOLLOWUP:   ACQUIRED PANCYTOPENIA FROM SPLENOMEGALY AND LIVER CIRRHOSIS, IRON DEF ANEMIA FROM PREVIOUS GI BLEEDING, PATIENT CAN NOT TAKE ANTICOAGULANTS DUE TO TRIGGERING OF SEVERE GI BLEEDING    HISTORY OF PRESENT ILLNESS:     On 02/23/2023 this 77-year-old who has history of splenomegaly due to cirrhosis ad associated leukopenia and thrombocytopenia returns to the office. He is undergoing hemodialysis at this time 3 times a week with no complications. He has not developed any new vascular events or infections of any nature. His fistula has been recreated by vascular surgeon in the left upper extremity after the previous one failed. He has had a good appetite, no fluid retention, normal bowel activity through his colostomy. Urination is sometimes present, sometimes is not. He has not had anymore kidney stones. Besides this no other new problems.             Past Medical History:   Diagnosis Date   • Abnormal serum protein electrophoresis    • Anemia     Multifactorial   • Bicuspid aortic valve 7/20/2022   • Chronic leukopenia and thrombocytopenia    • Cirrhosis of liver without ascites (HCC) 07/24/2017   • Congestive splenomegaly 07/24/2017   • Crohn disease (HCC)     with ileostomy   • Diastolic CHF (HCC)     although it was likely from volume overload due to ESRD   • Diverticula of colon    • ESRD on hemodialysis (HCC)     M-W-F FRESENIUS   • Fatty liver disease, nonalcoholic    • GERD (gastroesophageal reflux disease)    • GI bleed    • Glaucoma    • H/O Gallstone pancreatitis    • H/O Pericardial effusion    • H/O sinus bradycardia    • Heart murmur    • History of hypertension     resolved   • History of UTI    • Hx of renal calculi    • Ileostomy present (HCC)    • Iron deficiency    • MGUS (monoclonal gammopathy of unknown significance)    • TATE (obstructive sleep apnea)    • Permanent atrial fibrillation (HCC)    • Sleep apnea     CPAP USED   • Type 2 diabetes mellitus (HCC)      CURRENTLY TAKES NO MED        Past Surgical History:   Procedure Laterality Date   • APPENDECTOMY     • ARTERIOVENOUS FISTULA/SHUNT SURGERY Left 08/08/2017    Procedure: LEFT BRACHIAL CEPHALIC AV FISTULA FORMATION WITH CEPHALIC VEIN TRANSPOSITION ;  Surgeon: Bill Bermeo MD;  Location: [facility] OR;  Service:    • ARTERIOVENOUS FISTULA/SHUNT SURGERY Left 5/27/2022    Procedure: OPEN REVISION LEFT ARTERIOVENOUS INTERPOSITION GRAFT PLACEMENT;  Surgeon: Blil Bermeo MD;  Location: [facility] OR;  Service: Vascular;  Laterality: Left;   • ARTERIOVENOUS FISTULA/SHUNT SURGERY W/ HEMODIALYSIS CATHETER INSERTION Left 02/15/2022    Procedure: OPEN LEFT ARM ARTERIOVENOUS FISTULA THROMBECTOMY WITH CEPHALIC VEIN ARTHROPLASTY AND STENT/GRAFT PLACEMENT;  Surgeon: Bill Bermeo MD;  Location: [facility] OR 18/19;  Service: Vascular;  Laterality: Left;   • CATARACT EXTRACTION WITH INTRAOCULAR LENS IMPLANT Bilateral    • CHOLECYSTECTOMY     • COLECTOMY PARTIAL / TOTAL      History of inflammatory bowel disease with status post colectomy with ileostomy many years ago in the [facility],  18 YEARS OF AGE   • COLONOSCOPY     • CYSTOSCOPY LITHOLAPAXY BLADDER STONE EXTRACTION     • ENDOSCOPY  01/16/2015    gastritis   • ENDOSCOPY  01/17/2018    Procedure: ESOPHAGOGASTRODUODENOSCOPY;  Surgeon: Warner Neville MD;  Location: [facility] ENDOSCOPY;  Service:    • ILEOSCOPY  01/16/2015    normal   • ILEOSCOPY N/A 01/17/2018    Procedure: ILEOSCOPY;  Surgeon: Warner Neville MD;  Location: [facility] ENDOSCOPY;  Service:    • TONSILLECTOMY          Current Outpatient Medications on File Prior to Visit   Medication Sig Dispense Refill   • alfuzosin (UROXATRAL) 10 MG 24 hr tablet Take 10 mg by mouth Every Other Day. Does not take on tues sun or thurs     • aspirin 81 MG tablet Take 81 mg by mouth Daily. INSTRUCTED TO CONTINUE THIS FOR SURGERY PER DR. BERMEO'S OFFICE     • B Complex-C-Folic Acid (EMILIE-FREDDY) tablet Take 1 tablet by  mouth Daily. 0.8 mg  3   • famotidine (PEPCID) 10 MG tablet Take 10 mg by mouth As Needed for Heartburn.     • Iron Sucrose (VENOFER IV) Infuse  into a venous catheter As Needed. USUALLY ONCE MONTH     • latanoprost (XALATAN) 0.005 % ophthalmic solution Administer 1 drop to both eyes Every Night. 1 drop each eye     • lidocaine-prilocaine (EMLA) 2.5-2.5 % cream Apply 1 application topically to the appropriate area as directed. [id number]-[id number]-[id number]:  ONE HOUR PRIOR TO DIALYSIS  3   • Loratadine (CLARITIN PO) Take 1 tablet by mouth As Needed. Pt states he is uncertain of dosage     • Methoxy PEG-Epoetin Beta (MIRCERA IJ) 50 mcg Every 28 (Twenty-Eight) Days. AT DIALYSIS     • sodium chloride 0.65 % nasal spray 2 sprays into the nostril(s) as directed by provider Every Night.     • Sucroferric Oxyhydroxide (Velphoro) 500 MG chewable tablet Chew 1,000 mg 3 (Three) Times a Day. AFTER EACH  MEAL     • Cholecalciferol (VITAMIN D3) 1000 units capsule Take 1,000 Units by mouth Every Night.     • febuxostat (ULORIC) 40 MG tablet Take 1 tablet by mouth Every Evening. 90 tablet 1     No current facility-administered medications on file prior to visit.        ALLERGIES:    Allergies   Allergen Reactions   • Ace Inhibitors Other (See Comments)     RENAL FAILURE/ raised creatinine   • Contrast Dye (Echo Or Unknown Ct/Mr) Other (See Comments)     RENAL FAILURE   • Iodine Anaphylaxis   • Angiotensin Receptor Blockers Swelling   • Eliquis [Apixaban] Arrhythmia     BLEEDING ISSUES; PT CANNOT TAKE ANY ANTICOAGULANTS DUE TO LOW PLATELETS EXCEPT ASPIRIN     • Granix [Filgrastim] GI Intolerance     Chest pain  Chest pain     • Keflex [Cephalexin] Diarrhea     RENAL FAILURE        Social History     Socioeconomic History   • Marital status:      Spouse name: Gillian   • Number of children: 2   • Years of education: College   Tobacco Use   • Smoking status: Never   • Smokeless tobacco: Never   Vaping Use   • Vaping Use: Never used  "  Substance and Sexual Activity   • Alcohol use: No     Comment: caffeine use: none   • Drug use: No   • Sexual activity: Defer        Family History   Problem Relation Age of Onset   • Heart failure Mother    • Hypertension Mother    • Heart disease Mother    • Hyperlipidemia Mother    • Hypertension Father    • Malig Hyperthermia Neg Hx           Objective     Vitals:    02/23/23 1434   BP: 119/65   Pulse: 73   Resp: 16   Temp: 97.1 °F (36.2 °C)   TempSrc: Temporal   SpO2: 97%   Weight: 82.8 kg (182 lb 9.6 oz)   Height: 177 cm (69.69\")   PainSc: 0-No pain     Current Status 2/23/2023   ECOG score 0       Physical Exam              GENERAL:  Well-developed, Patient  in no acute distress.   SKIN:  Warm, dry ,NO purpura ,no rash.  HEENT:  Pupils were equal and reactive to light and accomodation, conjunctivae noninjected,  normal visual acuity.   NECK:  Supple with good range of motion; no thyromegaly , no JVD or bruits,.No carotid artery pain, no carotid abnormal pulsation   LYMPHATICS:  No cervical, NO supraclavicular, NO axillary, NO inguinal adenopathies.  CARDIAC   normal rate , regular rhythm, without murmur,NO rubs NO S3 NO S4   LUNGS: normal breath sounds bilateral, no wheezing, NO rhonchi, NO crackles ,NO rubs.  VASCULAR VENOUS: no cyanosis, NO collateral circulation, NO varicosities, NO edema, NO palpable cords, NO pain,NO erythema, NO pigmentation of the skin.  ABDOMEN:  Soft, NO pain,no hepatomegaly, no splenomegaly,no masses, no ascites, no collateral circulation,no distention.colostomy llq  EXTREMITIES  AND SPINE:  No clubbing, no cyanosis ,no deformities , no pain .No kyphosis,  no pain in spine, no pain in ribs , no pain in pelvic bone.Fistula for dialysis left upper extremity with positive thrill.      NEUROLOGICAL:  Patient was awake, alert, oriented to time, person and place.          RECENT LABS:  Lab Results   Component Value Date    WBC 3.68 02/23/2023    HGB 12.0 (L) 02/23/2023    HCT 35.3 (L) " 02/23/2023    MCV 98.1 (H) 02/23/2023    PLT 79 (L) 02/23/2023           Assessment & Plan     Diagnoses and all orders for this visit:    1. Thrombocytopenia (HCC) (Primary)    2. Chronic leukopenia    3. Cirrhosis of liver without ascites, unspecified hepatic cirrhosis type (HCC)    4. Congestive splenomegaly         I reviewed the recent laboratory parameters on this patient and his white count remains around 300, his platelet count was 104,000, the hemoglobin around 11.  These numbers for him are very good. He has received IV iron and his ferritin level and iron profile are appropriate at this time. He has no clinical bleeding.     He raised the question about these numbers. He knows that these numbers are related to his chronic liver disease and splenomegaly. This has been present for years and years since he has been my patient. These numbers fluctuate but never have changed substantially. I made him aware that if he has a white count less than 2000 and platelet count less than 30,000 he will need to call me immediately.    He raised the question about the Atenolol if this is prudent to take. I pointed out to him that it could be beneficial to control his blood pressure, it will be beneficial to decrease his portal pressure too and it could be beneficial to minimize in case of any atrial fibrillation, rapid ventricular response. He would like to adjust his CPAP machine first to see if he has lesser episodes of sleep apnea and if this does not correct the problem he will start the blood pressure medication under the direction of cardiology.     Finally they raises the question him and his wife in regard to COVID vaccination booster shot. He is ready from this point of view from my situation. He has as we know patient's on dialysis chronic kidney disease are immunosuppressed, he must have his COVID vaccination booster and I asked him to go ahead and proceed with this at this time. Three to 4 weeks later he would  like to proceed with his flu shot.     Otherwise we will review him by telemedicine phone visit in 6 months.    He will continue sending reports of his laboratory parameters from his dialysis center.     Duration of the phone visit 12 minutes.   The patient was reviewed by Telemedicine on 03/15/2022. Since the previous visit the patient has undergone the repair of his fistula in the left upper extremity and a stent was placed in the left subclavian vein. This is visible in the most recent chest x-ray. Now the fistula for dialysis is working fine. He had also an episode of urine collection in the bladder, phenomenon that is unusual in chronic kidney disease. He had probably some discomfort and minimal fever. He had a Khan catheter placed, now has been removed by Urology. He is no longer having a fever and he has minimal urinary output. The patient otherwise feels well. The most recent laboratory testing that has been reviewed above shows chronic leukopenia and chronic thrombocytopenia without any changes. His white count is typically around 3500, platelet count around 110,000, stable hemoglobin. Given the above circumstances, I do not believe that we need to pursue any other intervention. The dialysis team, Dr. Monsivais and associates, will continue doing his laboratory testing and they will let us know if any need arises in regard to any other intervention.     I related to the patient and his wife his numbers are doing well and I find no need for any other intervention. I will review him back here in the office in 4 months with a CBC.    Duration of the phone visit: 15 minutes.  The patient was reviewed on 08/18/2022. Since the previous visit he was treated for a urinary tract infection successfully. He has not had any further issues there. He has not had any bleeding after surgical intervention to correct issues in regard to his fistula for dialysis in the left upper extremity by Vascular Surgery. We provided  platelet transfusion for that.     His white count, hemoglobin and platelets today are no different than what they have been for the last many years. His white count is low and low platelet count is evading to splenomegaly and hypersplenism with sequestration. Actually the hemoglobin is very good for somebody undergoing dialysis because he also receives erythropoietin and he receives iron therapy. I encouraged him to remain on this.     He raised the question in regard of what to do with the alfuzosin that he receives every night at dinner time in regard to prostate relaxation and allow him to have proper urination. I pointed out to him that if this medicine triggers low blood pressure he could avoid this medication utilization the night before dialysis and that way he only takes it 4 days of the week instead of every day of the week. Maybe this will be corrected.     Otherwise I would like to review him back in 4 months with a CBC here in the office.     I discussed all these facts with the patient and his wife. They provided me multiple laboratories from the nephrology team that will be scanned into Epic system, all of them consistent more or less with laboratory testing that we have today including proper ferritin level.   On 02/23/2023 the patient feels very well today. He looks good. Clinical examination disclosed normal sinus, clear lung auscultation, normal colostomy location in the left lower quadrant, no abdominal ascites, no collateral circulation, no palpable splenomegaly. His white count, hemoglobin and platelets are no different than before. He has no clinical bleeding and no infection.    The rest of his chemistry profiles have been provided to me. Most of his labs are doing very well through dialysis including his uric acid.     I recommended for him to come back and see us in 6 months. Otherwise he will remain under the direction of the Nephrology team through his dialysis.         ·

## 2023-04-18 ENCOUNTER — OFFICE VISIT (OUTPATIENT)
Dept: FAMILY MEDICINE CLINIC | Facility: CLINIC | Age: 78
End: 2023-04-18
Payer: MEDICARE

## 2023-04-18 VITALS
TEMPERATURE: 97.3 F | RESPIRATION RATE: 18 BRPM | OXYGEN SATURATION: 98 % | WEIGHT: 176 LBS | DIASTOLIC BLOOD PRESSURE: 62 MMHG | SYSTOLIC BLOOD PRESSURE: 132 MMHG | HEIGHT: 70 IN | HEART RATE: 92 BPM | BODY MASS INDEX: 25.2 KG/M2

## 2023-04-18 DIAGNOSIS — R09.89 BRUIT OF RIGHT CAROTID ARTERY: Primary | ICD-10-CM

## 2023-04-18 PROCEDURE — 90715 TDAP VACCINE 7 YRS/> IM: CPT | Performed by: FAMILY MEDICINE

## 2023-04-18 PROCEDURE — 3078F DIAST BP <80 MM HG: CPT | Performed by: FAMILY MEDICINE

## 2023-04-18 PROCEDURE — 3075F SYST BP GE 130 - 139MM HG: CPT | Performed by: FAMILY MEDICINE

## 2023-04-18 PROCEDURE — 90471 IMMUNIZATION ADMIN: CPT | Performed by: FAMILY MEDICINE

## 2023-04-18 PROCEDURE — 99214 OFFICE O/P EST MOD 30 MIN: CPT | Performed by: FAMILY MEDICINE

## 2023-04-18 NOTE — PROGRESS NOTES
"Chief Complaint  No chief complaint on file.    Subjective        Bill Landry presents to [facility]  History of Present Illness  Mr. Landry is 77 years old with past medical history significant for end-stage chronic kidney disease dialysis dependent, permanent A-fib, aortic valve sclerosis, and open angle glaucoma.  He also has diabetes that became significantly well controlled after 100 pound weight loss.  He continues to be diet controlled.  He follows with nephrology, cardiology, and ophthalmology.  Also follows with hematology related to congestive splenomegaly thrombocytopenia and chronic leukopenia.  Recently there with hematology, about a month ago, and labs are stable.  Hemoglobin is mildly improved but still anemia.    Dr. Virgen is his urologist. He is gettnig CT scan to make sure on his kidney stones. Had a kidney stone that was the size of a goose egg. Promise is going to go into research and pt will then go to see Dr. Aramis Kelsey.     Objective   Vital Signs:  /62 (BP Location: Right arm, Patient Position: Sitting, Cuff Size: Adult)   Pulse 92   Temp 97.3 °F (36.3 °C) (Infrared)   Resp 18   Ht 177 cm (69.69\")   Wt 79.8 kg (176 lb)   SpO2 98%   BMI 25.48 kg/m²   Estimated body mass index is 25.48 kg/m² as calculated from the following:    Height as of this encounter: 177 cm (69.69\").    Weight as of this encounter: 79.8 kg (176 lb).             Physical Exam  Vitals reviewed.   Constitutional:       General: He is not in acute distress.     Appearance: Normal appearance. He is normal weight. He is not ill-appearing or toxic-appearing.   HENT:      Mouth/Throat:      Mouth: Mucous membranes are moist.      Pharynx: Oropharynx is clear. No oropharyngeal exudate.   Eyes:      General: No scleral icterus.        Right eye: No discharge.         Left eye: No discharge.      Conjunctiva/sclera: Conjunctivae normal.   Neck:      Vascular: Carotid bruit present. "   Cardiovascular:      Rate and Rhythm: Normal rate and regular rhythm.      Heart sounds: Normal heart sounds. No murmur heard.  Pulmonary:      Effort: Pulmonary effort is normal. No respiratory distress.      Breath sounds: Normal breath sounds. No wheezing.   Abdominal:      Comments: Ileostomy bag.   Musculoskeletal:      Cervical back: Neck supple. No muscular tenderness.      Right lower leg: No edema.      Left lower leg: No edema.   Lymphadenopathy:      Cervical: No cervical adenopathy.      Right cervical: No superficial or deep cervical adenopathy.     Left cervical: No superficial or deep cervical adenopathy.   Neurological:      General: No focal deficit present.      Mental Status: He is alert and oriented to person, place, and time.      Motor: No abnormal muscle tone.   Psychiatric:         Mood and Affect: Mood normal.         Behavior: Behavior normal.        Result Review :                   Assessment and Plan   Diagnoses and all orders for this visit:    1. Bruit of right carotid artery (Primary)  -     Duplex Carotid Ultrasound CAR; Future    Other orders  -     Tdap Vaccine Greater Than or Equal To 8yo IM             Follow Up   No follow-ups on file.  Patient was given instructions and counseling regarding his condition or for health maintenance advice. Please see specific information pulled into the AVS if appropriate.     Discussed labs not drawn by nephro or hem. Cholesterol. Has been well controlled. No overt concerns. Can wait for when convenient to draw.   We will go ahead and order the carotid doppler. New bruit on exam. He is not on a statin. Depending on findings would change recommendation for that.     Also giving TDaP. He is expecting a grandbaby.

## 2023-04-22 NOTE — ANESTHESIA POSTPROCEDURE EVALUATION
"Patient: Bill Landry    Procedure Summary     Date Anesthesia Start Anesthesia Stop Room / Location    08/08/17 1152 1336 [facility] OR [facility] / [facility]       Procedure Diagnosis Surgeon Provider    LEFT BRACHIAL CEPHALIC AV FISTULA FORMATION WITH CEPHALIC VEIN TRANSPOSITION  (Left ) No diagnosis on file. MD Feng Santamaria MD          Anesthesia Type: MAC  Last vitals  BP   143/78 (08/08/17 1540)    Temp   36.5 °C (97.7 °F) (08/08/17 1510)    Pulse   70 (08/08/17 1540)   Resp   16 (08/08/17 1540)    SpO2   97 % (08/08/17 1540)      Post Anesthesia Care and Evaluation    Patient location during evaluation: PACU  Patient participation: complete - patient participated  Level of consciousness: sleepy but conscious  Pain score: 0  Pain management: adequate  Airway patency: patent  Anesthetic complications: No anesthetic complications    Cardiovascular status: acceptable  Respiratory status: acceptable  Hydration status: acceptable    Comments: /78  Pulse 70  Temp 36.5 °C (97.7 °F) (Oral)   Resp 16  Ht 70\" (177.8 cm)  Wt 196 lb (88.9 kg)  SpO2 97%  BMI 28.12 kg/m2        " n/a n/a

## 2023-04-25 ENCOUNTER — TELEPHONE (OUTPATIENT)
Dept: CARDIOLOGY | Facility: CLINIC | Age: 78
End: 2023-04-25
Payer: MEDICARE

## 2023-04-25 NOTE — TELEPHONE ENCOUNTER
Received a fax from [facility] requesting a cardiac/surgery clearance for pt to have an extracorporeal shock wave lithotripsy on 5/15/23 @[facility].  They would like the pt to be off his aspirin for 7 days prior to the procedure.    LOV 4/26/22 with SVETLANA

## 2023-04-25 NOTE — PROGRESS NOTES
RM:________     PCP: Mecca Roe MD    : 1945  AGE: 77 y.o.  EST PATIENT   REASON FOR VISIT/  CC:    BP Readings from Last 3 Encounters:   23 132/62   23 119/65   22 124/58        WT: ____________ BP: __________L __________R HR______    CHEST PAIN: _____________    SOA: _____________PALPS: _______________     LIGHTHEADED: ___________FATIGUE: ________________ EDEMA __________    ALLERGIES:Ace inhibitors, Contrast dye (echo or unknown ct/mr), Iodine, Angiotensin receptor blockers, Eliquis [apixaban], Granix [filgrastim], and Keflex [cephalexin] SMOKING HISTORY:  Social History     Tobacco Use   • Smoking status: Never   • Smokeless tobacco: Never   Vaping Use   • Vaping Use: Never used   Substance Use Topics   • Alcohol use: No     Comment: caffeine use: none   • Drug use: No     CAFFEINE USE_________________  ALCOHOL ______________________    Below is the patient's most recent value for Albumin, ALT, AST, BUN, Calcium, Chloride, Cholesterol, CO2, Creatinine, GFR, Glucose, HDL, Hematocrit, Hemoglobin, Hemoglobin A1C, LDL, Magnesium, Phosphorus, Platelets, Potassium, PSA, Sodium, Triglycerides, TSH and WBC.   Lab Results   Component Value Date    ALBUMIN 3.4 2022    ALT 20 2022    AST 36 2022    BUN 48 (H) 2022    CALCIUM 9.2 2022    CL 95 (L) 2022    CHOL 67 2018    CO2 22.4 2022    CREATININE 5.01 (H) 2022    HDL 28 (L) 2018    HCT 35.3 (L) 2023    HGB 12.0 (L) 2023    HGBA1C 4.80 2018    LDL 31 2018    MG 1.9 10/02/2016    PHOS 5.2 (H) 2018    PLT 79 (L) 2023    K 3.9 2022     (L) 2022    TRIG 42 2018    TSH 2.140 2018    WBC 3.68 2023          NEW DIAGNOSIS/ SURGERY/ HOSP OR ED VISITS: ______________________    __________________________________________________________________      RECENT LABS OR DIAGNOSTIC TESTING:   _____________________________    __________________________________________________________________      ASSESSMENT/ PLAN: _______________________________________________    __________________________________________________________________

## 2023-04-25 NOTE — TELEPHONE ENCOUNTER
Date of Office Visit:  2023  Encounter Provider:  Dale Vázquez MD  Place of Service:  [facility]  Patient Name: Bill Landry  :  1945      To Whom it May Concern:    Mr Landry has permanent atrial fibrillation, HTN, and ESRD.  He is not anticoagulated because of a previous GI bleed.  He is on aspirin but that is not for cardiac reasons, and I am not the primary provider that is in charge of this medication.  Therefore I cannot give any instructions on how long to hold it prior to a procedure.    His operative risk is intermediate due to his numerous comorbid medical conditions, but not necessarily due to his cardiac diagnoses.    Please contact our office with any questions or concerns. As always, it has been a pleasure to participate in your patient's care.      Dale Vázquez MD  [facility]

## 2023-04-27 ENCOUNTER — OFFICE VISIT (OUTPATIENT)
Dept: CARDIOLOGY | Facility: CLINIC | Age: 78
End: 2023-04-27
Payer: MEDICARE

## 2023-04-27 VITALS
BODY MASS INDEX: 25.48 KG/M2 | HEIGHT: 70 IN | WEIGHT: 178 LBS | DIASTOLIC BLOOD PRESSURE: 82 MMHG | HEART RATE: 67 BPM | SYSTOLIC BLOOD PRESSURE: 138 MMHG

## 2023-04-27 DIAGNOSIS — Q23.1 BICUSPID AORTIC VALVE: ICD-10-CM

## 2023-04-27 DIAGNOSIS — N18.6 END STAGE KIDNEY DISEASE: ICD-10-CM

## 2023-04-27 DIAGNOSIS — I48.21 PERMANENT ATRIAL FIBRILLATION: Primary | ICD-10-CM

## 2023-04-27 DIAGNOSIS — I10 ESSENTIAL (PRIMARY) HYPERTENSION: ICD-10-CM

## 2023-04-27 DIAGNOSIS — I35.0 AORTIC STENOSIS, MILD: ICD-10-CM

## 2023-04-27 NOTE — PROGRESS NOTES
Date of Office Visit: 2023  Encounter Provider: Dale Vázquez MD  Place of Service: [facility] CARDIOLOGY  Patient Name: Bill Landry  :1945    Chief Complaint   Patient presents with   • Atrial Fibrillation   :     HPI: Bill Landry is a 77 y.o. male who presents today to follow up. He established care with me in 2018. I have reviewed prior notes and there are no changes except for any new updates described below. I have also reviewed any information entered into the medical record by the patient or by ancillary staff.     He has numerous chronic medical conditions.  He has Crohns and had a colectomy/ileostomy in .  He has ESRD which is multifactorial (Crohns, huge kidney stones, hypertension).  He is on hemodialysis.  He has chronic leukopenia, anemia, and thrombocytopenia.  He has permanent atrial fibrillation and has a history of a slow GI bleed when anticoagulated.  He has never had a TIA or stroke.  He previously was diagnosed with diastolic CHF and pulmonary hypertension, but it would seem that this was in the setting of volume overload from CKD prior to being started on dialysis.  Since this occurred, he has had no problems with this, and his RVSP has normalized.      An echo in  showed normal LVSF, indeterminate diastolic function, GLS -22%, and mild AS.     He feels well.  He denies chest pain, dyspnea, orthopnea, edema, palpitations, or syncope.     Past Medical History:   Diagnosis Date   • Abnormal serum protein electrophoresis    • Anemia     Multifactorial   • Bicuspid aortic valve 2022   • Chronic leukopenia and thrombocytopenia    • Cirrhosis of liver without ascites 2017   • Congestive splenomegaly 2017   • Crohn disease     with ileostomy   • Diastolic CHF     although it was likely from volume overload due to ESRD   • Diverticula of colon    • ESRD on hemodialysis     M-W-F FRESENIUS   • Fatty liver disease,  nonalcoholic    • GERD (gastroesophageal reflux disease)    • GI bleed    • Glaucoma    • H/O Gallstone pancreatitis    • H/O Pericardial effusion    • H/O sinus bradycardia    • Heart murmur    • History of hypertension     resolved   • History of UTI    • Hx of renal calculi    • Ileostomy present    • Iron deficiency    • MGUS (monoclonal gammopathy of unknown significance)    • TATE (obstructive sleep apnea)    • Permanent atrial fibrillation    • Sleep apnea     CPAP USED   • Type 2 diabetes mellitus     CURRENTLY TAKES NO MED       Past Surgical History:   Procedure Laterality Date   • APPENDECTOMY     • ARTERIOVENOUS FISTULA/SHUNT SURGERY Left 08/08/2017    Procedure: LEFT BRACHIAL CEPHALIC AV FISTULA FORMATION WITH CEPHALIC VEIN TRANSPOSITION ;  Surgeon: Bill Deal MD;  Location: [facility] OR;  Service:    • ARTERIOVENOUS FISTULA/SHUNT SURGERY Left 5/27/2022    Procedure: OPEN REVISION LEFT ARTERIOVENOUS INTERPOSITION GRAFT PLACEMENT;  Surgeon: Bill Deal MD;  Location: [facility] OR;  Service: Vascular;  Laterality: Left;   • ARTERIOVENOUS FISTULA/SHUNT SURGERY W/ HEMODIALYSIS CATHETER INSERTION Left 02/15/2022    Procedure: OPEN LEFT ARM ARTERIOVENOUS FISTULA THROMBECTOMY WITH CEPHALIC VEIN ARTHROPLASTY AND STENT/GRAFT PLACEMENT;  Surgeon: Bill Deal MD;  Location: [facility] OR 18/19;  Service: Vascular;  Laterality: Left;   • CATARACT EXTRACTION WITH INTRAOCULAR LENS IMPLANT Bilateral    • CHOLECYSTECTOMY     • COLECTOMY PARTIAL / TOTAL      History of inflammatory bowel disease with status post colectomy with ileostomy many years ago in the [facility],  18 YEARS OF AGE   • COLONOSCOPY     • CYSTOSCOPY LITHOLAPAXY BLADDER STONE EXTRACTION     • ENDOSCOPY  01/16/2015    gastritis   • ENDOSCOPY  01/17/2018    Procedure: ESOPHAGOGASTRODUODENOSCOPY;  Surgeon: Warner Neville MD;  Location: [facility] ENDOSCOPY;  Service:    • ILEOSCOPY  01/16/2015    normal   • ILEOSCOPY N/A  01/17/2018    Procedure: ILEOSCOPY;  Surgeon: Warner Neville MD;  Location: [facility] ENDOSCOPY;  Service:    • TONSILLECTOMY         Social History     Socioeconomic History   • Marital status:      Spouse name: Gillian   • Number of children: 2   • Years of education: College   Tobacco Use   • Smoking status: Never   • Smokeless tobacco: Never   Vaping Use   • Vaping Use: Never used   Substance and Sexual Activity   • Alcohol use: No     Comment: caffeine use: none   • Drug use: No   • Sexual activity: Defer       Family History   Problem Relation Age of Onset   • Heart failure Mother    • Hypertension Mother    • Heart disease Mother    • Hyperlipidemia Mother    • Hypertension Father    • Malig Hyperthermia Neg Hx        Review of Systems   Cardiovascular: Negative for chest pain and palpitations.   Respiratory: Positive for snoring. Negative for shortness of breath.    Neurological: Negative for dizziness and light-headedness.   All other systems reviewed and are negative.      Allergies   Allergen Reactions   • Ace Inhibitors Other (See Comments)     RENAL FAILURE/ raised creatinine   • Contrast Dye (Echo Or Unknown Ct/Mr) Other (See Comments)     RENAL FAILURE   • Iodine Anaphylaxis   • Angiotensin Receptor Blockers Swelling   • Eliquis [Apixaban] Arrhythmia     BLEEDING ISSUES; PT CANNOT TAKE ANY ANTICOAGULANTS DUE TO LOW PLATELETS EXCEPT ASPIRIN     • Granix [Filgrastim] GI Intolerance     Chest pain  Chest pain     • Keflex [Cephalexin] Diarrhea     RENAL FAILURE         Current Outpatient Medications:   •  alfuzosin (UROXATRAL) 10 MG 24 hr tablet, Take 1 tablet by mouth Every Other Day. Does not take on tues sun or thurs, Disp: , Rfl:   •  aspirin 81 MG tablet, Take 1 tablet by mouth Daily. INSTRUCTED TO CONTINUE THIS FOR SURGERY PER DR. BERMEO'S OFFICE, Disp: , Rfl:   •  B Complex-C-Folic Acid (EMILIE-FREDDY) tablet, Take 1 tablet by mouth Daily. 0.8 mg, Disp: , Rfl: 3  •  famotidine (PEPCID) 10 MG  "tablet, Take 1 tablet by mouth As Needed for Heartburn., Disp: , Rfl:   •  Iron Sucrose (VENOFER IV), Infuse  into a venous catheter As Needed. USUALLY ONCE MONTH, Disp: , Rfl:   •  latanoprost (XALATAN) 0.005 % ophthalmic solution, Administer 1 drop to both eyes Every Night. 1 drop each eye, Disp: , Rfl:   •  lidocaine-prilocaine (EMLA) 2.5-2.5 % cream, Apply 1 application topically to the appropriate area as directed. Rrmdur-Dgywttfeu-Hlaplc:  ONE HOUR PRIOR TO DIALYSIS, Disp: , Rfl: 3  •  Loratadine (CLARITIN PO), Take 1 tablet by mouth As Needed. Pt states he is uncertain of dosage, Disp: , Rfl:   •  sodium chloride 0.65 % nasal spray, 2 sprays into the nostril(s) as directed by provider Every Night., Disp: , Rfl:   •  Sucroferric Oxyhydroxide (Velphoro) 500 MG chewable tablet, Chew 1 tablet 3 (Three) Times a Day. AFTER EACH  MEAL, Disp: , Rfl:       Objective:     Vitals:    04/27/23 0915   BP: 138/82   BP Location: Right arm   Pulse: 67   Weight: 80.7 kg (178 lb)   Height: 177 cm (69.69\")     Body mass index is 25.77 kg/m².    Physical Exam  Vitals reviewed.   Constitutional:       Appearance: He is well-developed.   HENT:      Head: Normocephalic.      Nose: Nose normal.   Eyes:      Conjunctiva/sclera: Conjunctivae normal.   Neck:      Vascular: No JVD.   Cardiovascular:      Rate and Rhythm: Normal rate. Rhythm irregularly irregular.      Pulses: Normal pulses and intact distal pulses.      Heart sounds: Murmur heard.    Systolic murmur is present with a grade of 1/6 at the upper left sternal border.  Pulmonary:      Effort: Pulmonary effort is normal.      Breath sounds: Normal breath sounds.   Abdominal:      Palpations: Abdomen is soft.      Tenderness: There is no abdominal tenderness.      Comments: Ostomy in place   Musculoskeletal:         General: No swelling. Normal range of motion.      Cervical back: Normal range of motion.   Lymphadenopathy:      Cervical: No cervical adenopathy.   Skin:     " General: Skin is warm and dry.      Findings: No rash.   Neurological:      General: No focal deficit present.      Mental Status: He is alert and oriented to person, place, and time.      Cranial Nerves: No cranial nerve deficit.   Psychiatric:         Mood and Affect: Mood normal.         Behavior: Behavior normal.           ECG 12 Lead    Date/Time: 4/27/2023 9:30 AM  Performed by: Dale Vázquez MD  Authorized by: Dale Vázquez MD   Comparison: compared with previous ECG   Similar to previous ECG  Rhythm: atrial fibrillation  Conduction: conduction normal  ST Segments: ST segments normal  T Waves: T waves normal  QRS axis: left  Other: no other findings    Clinical impression: abnormal EKG              Assessment:       Diagnosis Plan   1. Permanent atrial fibrillation        2. Bicuspid aortic valve        3. Aortic stenosis, mild        4. Essential (primary) hypertension        5. End stage kidney disease (HCC)               Plan:       1.  Atrial Fibrillation and Atrial Flutter  Assessment  • The patient has permanent atrial fibrillation  • This is non-valvular in etiology  • The patient's CHADS2-VASc score is 4  • A DNQ2PM6-SZEf score of 2 or more is considered a high risk for a thromboembolic event    Plan  • Continue in atrial fibrillation with rate control    He is rate controlled on his own.  He cannot be anticoagulated due to GI AVM/GI bleeding/thrombocytopenia.  A left atrial occluder has been considered, but it is not felt to be safe to have him on DAPT or anticoagulation for even the short while after the procedure.  He knows to call 911 for ANY neurological symptoms.    2/3. He had mild AS in July 2022. I'll repeat an echo at our next visit in six months.    4/5. His BP is within goal for age/ESRD.    Sincerely,       Dale Vázquez MD

## 2023-04-28 ENCOUNTER — TELEPHONE (OUTPATIENT)
Dept: ONCOLOGY | Facility: CLINIC | Age: 78
End: 2023-04-28
Payer: MEDICARE

## 2023-04-28 NOTE — TELEPHONE ENCOUNTER
Caller: Tiago Landry    Relationship: Emergency Contact    Best call back number: [phone]    What is the best time to reach you: ANY    Who are you requesting to speak with (clinical staff, provider,  specific staff member): CLINICAL    What was the call regarding: TIAGO IS CALLING WANTING DR COBIAN TO KNOW THAT THEY ARE SWITCHING UROLOGIST.    HE WAS GOING TO HAVE A NON INVASIVE SURGERY AND THE UROLOGIST  WANTED HIM TO STOP HIS aspirin 81 MG tablet   TIAGO CANCELED THE SURGERY UNTIL THEY SEE THE OTHER UROLOGIST.    TIAGO HAD SPOKEN TO SOMEONE TODAY THAT WERE GOING TO CONTACT DR COBIAN TO TAKE HIM OFF THE ASPIRIN. TIAGO DOESN'T WANT HIM TO DISCONTINUE THE ASPIRN. SO SHE SAID IF THAT OFFICE CALLED TO DISREGARD.  TIAGO ALSO STATED GAEL HAS HAD OTHER SURGERIES  IN THE PAST AND WAS NEVER TAKEN OFF THE ASPIRIN            Do you require a callback: YES

## 2023-04-28 NOTE — TELEPHONE ENCOUNTER
Patient calling to inform Dr. Vázquez would recommend [facility], Urology. So they have an appt on 5-9. Dr. Mckenzie agrees patient should not come off of aspirin. Pt's spouse v/u. Akila Bauman RN

## 2023-05-04 ENCOUNTER — OFFICE VISIT (OUTPATIENT)
Dept: FAMILY MEDICINE CLINIC | Facility: CLINIC | Age: 78
End: 2023-05-04
Payer: MEDICARE

## 2023-05-04 VITALS
RESPIRATION RATE: 20 BRPM | WEIGHT: 183 LBS | BODY MASS INDEX: 26.2 KG/M2 | OXYGEN SATURATION: 95 % | TEMPERATURE: 98.2 F | HEIGHT: 70 IN | HEART RATE: 106 BPM | DIASTOLIC BLOOD PRESSURE: 68 MMHG | SYSTOLIC BLOOD PRESSURE: 138 MMHG

## 2023-05-04 DIAGNOSIS — R05.1 ACUTE COUGH: Primary | ICD-10-CM

## 2023-05-04 LAB
EXPIRATION DATE: ABNORMAL
FLUAV AG UPPER RESP QL IA.RAPID: NOT DETECTED
FLUBV AG UPPER RESP QL IA.RAPID: NOT DETECTED
INTERNAL CONTROL: ABNORMAL
Lab: ABNORMAL
SARS-COV-2 AG UPPER RESP QL IA.RAPID: DETECTED

## 2023-05-04 PROCEDURE — 99213 OFFICE O/P EST LOW 20 MIN: CPT | Performed by: FAMILY MEDICINE

## 2023-05-04 PROCEDURE — 87428 SARSCOV & INF VIR A&B AG IA: CPT | Performed by: FAMILY MEDICINE

## 2023-05-04 PROCEDURE — 3078F DIAST BP <80 MM HG: CPT | Performed by: FAMILY MEDICINE

## 2023-05-04 PROCEDURE — 3075F SYST BP GE 130 - 139MM HG: CPT | Performed by: FAMILY MEDICINE

## 2023-05-04 RX ORDER — BENZONATATE 200 MG/1
200 CAPSULE ORAL 3 TIMES DAILY PRN
Qty: 30 CAPSULE | Refills: 0 | Status: SHIPPED | OUTPATIENT
Start: 2023-05-04

## 2023-05-04 NOTE — PROGRESS NOTES
"Chief Complaint  Chief Complaint   Patient presents with   • Cough     Pt c/o cough,sore throat, aches, loss of appetite, and fever x 6 days       Subjective    History of Present Illness        Bill Landry presents to [facility] for   Cough  This is a new problem. The current episode started in the past 7 days. The problem has been gradually improving. The cough is non-productive. Associated symptoms include nasal congestion and postnasal drip. Pertinent negatives include no fever, headaches, myalgias, rash, sore throat, shortness of breath or sweats. He has tried OTC cough suppressant for the symptoms. The treatment provided no relief. There is no history of bronchiectasis, bronchitis, emphysema or pneumonia.        Objective   Vital Signs:   Visit Vitals  /68   Pulse 106   Temp 98.2 °F (36.8 °C)   Resp 20   Ht 177 cm (69.69\")   Wt 83 kg (183 lb)   SpO2 95%   BMI 26.49 kg/m²          Physical Exam  Vitals reviewed.   Constitutional:       Appearance: He is well-developed.   HENT:      Head: Normocephalic.      Right Ear: External ear normal.      Left Ear: External ear normal.      Nose: Nose normal.   Eyes:      Conjunctiva/sclera: Conjunctivae normal.   Cardiovascular:      Rate and Rhythm: Normal rate and regular rhythm.   Pulmonary:      Effort: Pulmonary effort is normal.      Breath sounds: Normal breath sounds.   Musculoskeletal:         General: Normal range of motion.      Cervical back: Normal range of motion and neck supple.   Skin:     General: Skin is warm and dry.      Capillary Refill: Capillary refill takes less than 2 seconds.   Neurological:      Mental Status: He is alert and oriented to person, place, and time.            Result Review :                    Assessment and Plan      Diagnoses and all orders for this visit:    1. Acute cough (Primary)  Assessment & Plan:  Patient is positive for COVID but his symptoms are improving.  Patient was given " prescription for Tessalon Perles.  Patient was advised to take vitamin D, vitamin C and zinc.    Orders:  -     POCT SARS-CoV-2 Antigen KEYLA  -     benzonatate (TESSALON) 200 MG capsule; Take 1 capsule by mouth 3 (Three) Times a Day As Needed for Cough.  Dispense: 30 capsule; Refill: 0           Follow Up   No follow-ups on file.  Patient was given instructions and counseling regarding his condition or for health maintenance advice. Please see specific information pulled into the AVS if appropriate.

## 2023-05-11 ENCOUNTER — HOSPITAL ENCOUNTER (INPATIENT)
Facility: HOSPITAL | Age: 78
LOS: 9 days | Discharge: HOME | DRG: 177 | End: 2023-05-20
Attending: EMERGENCY MEDICINE | Admitting: HOSPITALIST
Payer: MEDICARE

## 2023-05-11 ENCOUNTER — APPOINTMENT (OUTPATIENT)
Dept: GENERAL RADIOLOGY | Facility: HOSPITAL | Age: 78
DRG: 177 | End: 2023-05-11
Payer: MEDICARE

## 2023-05-11 DIAGNOSIS — U07.1 ACUTE HYPOXEMIC RESPIRATORY FAILURE DUE TO COVID-19: Primary | ICD-10-CM

## 2023-05-11 DIAGNOSIS — N18.6 ESRD (END STAGE RENAL DISEASE): ICD-10-CM

## 2023-05-11 DIAGNOSIS — R53.83 OTHER FATIGUE: ICD-10-CM

## 2023-05-11 DIAGNOSIS — J96.01 ACUTE HYPOXEMIC RESPIRATORY FAILURE DUE TO COVID-19: Primary | ICD-10-CM

## 2023-05-11 PROBLEM — I50.33 ACUTE ON CHRONIC DIASTOLIC HEART FAILURE: Status: ACTIVE | Noted: 2023-05-11

## 2023-05-11 LAB
ALBUMIN SERPL-MCNC: 2.9 G/DL (ref 3.5–5.2)
ALBUMIN/GLOB SERPL: 0.7 G/DL
ALP SERPL-CCNC: 170 U/L (ref 39–117)
ALT SERPL W P-5'-P-CCNC: 40 U/L (ref 1–41)
ANION GAP SERPL CALCULATED.3IONS-SCNC: 10.8 MMOL/L (ref 5–15)
ANION GAP SERPL CALCULATED.3IONS-SCNC: 14.1 MMOL/L (ref 5–15)
AST SERPL-CCNC: 63 U/L (ref 1–40)
BASOPHILS # BLD AUTO: 0.02 10*3/MM3 (ref 0–0.2)
BASOPHILS NFR BLD AUTO: 0.4 % (ref 0–1.5)
BILIRUB SERPL-MCNC: 1.6 MG/DL (ref 0–1.2)
BUN SERPL-MCNC: 69 MG/DL (ref 8–23)
BUN SERPL-MCNC: 73 MG/DL (ref 8–23)
BUN/CREAT SERPL: 8.3 (ref 7–25)
BUN/CREAT SERPL: 9.6 (ref 7–25)
CALCIUM SPEC-SCNC: 8.2 MG/DL (ref 8.6–10.5)
CALCIUM SPEC-SCNC: 8.7 MG/DL (ref 8.6–10.5)
CHLORIDE SERPL-SCNC: 92 MMOL/L (ref 98–107)
CHLORIDE SERPL-SCNC: 94 MMOL/L (ref 98–107)
CO2 SERPL-SCNC: 20.9 MMOL/L (ref 22–29)
CO2 SERPL-SCNC: 24.2 MMOL/L (ref 22–29)
CREAT SERPL-MCNC: 7.63 MG/DL (ref 0.76–1.27)
CREAT SERPL-MCNC: 8.3 MG/DL (ref 0.76–1.27)
DEPRECATED RDW RBC AUTO: 41.9 FL (ref 37–54)
DEPRECATED RDW RBC AUTO: 44.2 FL (ref 37–54)
EGFRCR SERPLBLD CKD-EPI 2021: 6.1 ML/MIN/1.73
EGFRCR SERPLBLD CKD-EPI 2021: 6.8 ML/MIN/1.73
EOSINOPHIL # BLD AUTO: 0.07 10*3/MM3 (ref 0–0.4)
EOSINOPHIL NFR BLD AUTO: 1.2 % (ref 0.3–6.2)
ERYTHROCYTE [DISTWIDTH] IN BLOOD BY AUTOMATED COUNT: 12.5 % (ref 12.3–15.4)
ERYTHROCYTE [DISTWIDTH] IN BLOOD BY AUTOMATED COUNT: 12.8 % (ref 12.3–15.4)
FERRITIN SERPL-MCNC: 2281 NG/ML (ref 30–400)
FERRITIN SERPL-MCNC: 2419 NG/ML (ref 30–400)
FLUAV SUBTYP SPEC NAA+PROBE: NOT DETECTED
FLUBV RNA ISLT QL NAA+PROBE: NOT DETECTED
GEN 5 2HR TROPONIN T REFLEX: 119 NG/L
GLOBULIN UR ELPH-MCNC: 4.4 GM/DL
GLUCOSE BLDC GLUCOMTR-MCNC: 140 MG/DL (ref 70–130)
GLUCOSE BLDC GLUCOMTR-MCNC: 155 MG/DL (ref 70–130)
GLUCOSE BLDC GLUCOMTR-MCNC: 161 MG/DL (ref 70–130)
GLUCOSE BLDC GLUCOMTR-MCNC: 251 MG/DL (ref 70–130)
GLUCOSE BLDC GLUCOMTR-MCNC: 265 MG/DL (ref 70–130)
GLUCOSE SERPL-MCNC: 192 MG/DL (ref 65–99)
GLUCOSE SERPL-MCNC: 220 MG/DL (ref 65–99)
HAPTOGLOB SERPL-MCNC: 169 MG/DL (ref 30–200)
HCT VFR BLD AUTO: 30.6 % (ref 37.5–51)
HCT VFR BLD AUTO: 32.8 % (ref 37.5–51)
HGB BLD-MCNC: 10.5 G/DL (ref 13–17.7)
HGB BLD-MCNC: 11.5 G/DL (ref 13–17.7)
IMM GRANULOCYTES # BLD AUTO: 0.14 10*3/MM3 (ref 0–0.05)
IMM GRANULOCYTES NFR BLD AUTO: 2.5 % (ref 0–0.5)
IRON 24H UR-MRATE: 36 MCG/DL (ref 59–158)
IRON SATN MFR SERPL: 21 % (ref 20–50)
LDH SERPL-CCNC: 380 U/L (ref 135–225)
LYMPHOCYTES # BLD AUTO: 0.29 10*3/MM3 (ref 0.7–3.1)
LYMPHOCYTES NFR BLD AUTO: 5.2 % (ref 19.6–45.3)
MCH RBC QN AUTO: 32.1 PG (ref 26.6–33)
MCH RBC QN AUTO: 32.3 PG (ref 26.6–33)
MCHC RBC AUTO-ENTMCNC: 34.3 G/DL (ref 31.5–35.7)
MCHC RBC AUTO-ENTMCNC: 35.1 G/DL (ref 31.5–35.7)
MCV RBC AUTO: 91.6 FL (ref 79–97)
MCV RBC AUTO: 94.2 FL (ref 79–97)
MONOCYTES # BLD AUTO: 0.33 10*3/MM3 (ref 0.1–0.9)
MONOCYTES NFR BLD AUTO: 5.9 % (ref 5–12)
NEUTROPHILS NFR BLD AUTO: 4.77 10*3/MM3 (ref 1.7–7)
NEUTROPHILS NFR BLD AUTO: 84.8 % (ref 42.7–76)
NRBC BLD AUTO-RTO: 0 /100 WBC (ref 0–0.2)
NT-PROBNP SERPL-MCNC: ABNORMAL PG/ML (ref 0–1800)
PLATELET # BLD AUTO: 114 10*3/MM3 (ref 140–450)
PLATELET # BLD AUTO: 117 10*3/MM3 (ref 140–450)
PMV BLD AUTO: 8.9 FL (ref 6–12)
PMV BLD AUTO: 8.9 FL (ref 6–12)
POTASSIUM SERPL-SCNC: 4 MMOL/L (ref 3.5–5.2)
POTASSIUM SERPL-SCNC: 4.3 MMOL/L (ref 3.5–5.2)
PROCALCITONIN SERPL-MCNC: 2.38 NG/ML (ref 0–0.25)
PROT SERPL-MCNC: 7.3 G/DL (ref 6–8.5)
QT INTERVAL: 377 MS
RBC # BLD AUTO: 3.25 10*6/MM3 (ref 4.14–5.8)
RBC # BLD AUTO: 3.58 10*6/MM3 (ref 4.14–5.8)
SARS-COV-2 RNA RESP QL NAA+PROBE: DETECTED
SODIUM SERPL-SCNC: 127 MMOL/L (ref 136–145)
SODIUM SERPL-SCNC: 129 MMOL/L (ref 136–145)
TIBC SERPL-MCNC: 170 MCG/DL (ref 298–536)
TRANSFERRIN SERPL-MCNC: 114 MG/DL (ref 200–360)
TROPONIN T DELTA: 3 NG/L
TROPONIN T SERPL HS-MCNC: 116 NG/L
WBC NRBC COR # BLD: 5.62 10*3/MM3 (ref 3.4–10.8)
WBC NRBC COR # BLD: 6.54 10*3/MM3 (ref 3.4–10.8)

## 2023-05-11 PROCEDURE — 84466 ASSAY OF TRANSFERRIN: CPT | Performed by: INTERNAL MEDICINE

## 2023-05-11 PROCEDURE — 82948 REAGENT STRIP/BLOOD GLUCOSE: CPT

## 2023-05-11 PROCEDURE — 99222 1ST HOSP IP/OBS MODERATE 55: CPT | Performed by: INTERNAL MEDICINE

## 2023-05-11 PROCEDURE — 71045 X-RAY EXAM CHEST 1 VIEW: CPT

## 2023-05-11 PROCEDURE — 85027 COMPLETE CBC AUTOMATED: CPT | Performed by: NURSE PRACTITIONER

## 2023-05-11 PROCEDURE — 82728 ASSAY OF FERRITIN: CPT | Performed by: HOSPITALIST

## 2023-05-11 PROCEDURE — 93005 ELECTROCARDIOGRAM TRACING: CPT | Performed by: EMERGENCY MEDICINE

## 2023-05-11 PROCEDURE — 93010 ELECTROCARDIOGRAM REPORT: CPT | Performed by: INTERNAL MEDICINE

## 2023-05-11 PROCEDURE — 63710000001 DEXAMETHASONE PER 0.25 MG: Performed by: NURSE PRACTITIONER

## 2023-05-11 PROCEDURE — 83880 ASSAY OF NATRIURETIC PEPTIDE: CPT | Performed by: EMERGENCY MEDICINE

## 2023-05-11 PROCEDURE — 83615 LACTATE (LD) (LDH) ENZYME: CPT | Performed by: INTERNAL MEDICINE

## 2023-05-11 PROCEDURE — 83540 ASSAY OF IRON: CPT | Performed by: INTERNAL MEDICINE

## 2023-05-11 PROCEDURE — 25010000002 DEXAMETHASONE PER 1 MG: Performed by: EMERGENCY MEDICINE

## 2023-05-11 PROCEDURE — 84145 PROCALCITONIN (PCT): CPT | Performed by: EMERGENCY MEDICINE

## 2023-05-11 PROCEDURE — 82728 ASSAY OF FERRITIN: CPT | Performed by: INTERNAL MEDICINE

## 2023-05-11 PROCEDURE — 99285 EMERGENCY DEPT VISIT HI MDM: CPT

## 2023-05-11 PROCEDURE — 36415 COLL VENOUS BLD VENIPUNCTURE: CPT | Performed by: NURSE PRACTITIONER

## 2023-05-11 PROCEDURE — 25010000002 HEPARIN (PORCINE) PER 1000 UNITS: Performed by: NURSE PRACTITIONER

## 2023-05-11 PROCEDURE — 83010 ASSAY OF HAPTOGLOBIN QUANT: CPT | Performed by: INTERNAL MEDICINE

## 2023-05-11 PROCEDURE — 85025 COMPLETE CBC W/AUTO DIFF WBC: CPT | Performed by: EMERGENCY MEDICINE

## 2023-05-11 PROCEDURE — 80053 COMPREHEN METABOLIC PANEL: CPT | Performed by: EMERGENCY MEDICINE

## 2023-05-11 PROCEDURE — 87636 SARSCOV2 & INF A&B AMP PRB: CPT | Performed by: EMERGENCY MEDICINE

## 2023-05-11 PROCEDURE — 84484 ASSAY OF TROPONIN QUANT: CPT | Performed by: EMERGENCY MEDICINE

## 2023-05-11 RX ORDER — HEPARIN SODIUM 5000 [USP'U]/ML
5000 INJECTION, SOLUTION INTRAVENOUS; SUBCUTANEOUS EVERY 8 HOURS SCHEDULED
Status: DISCONTINUED | OUTPATIENT
Start: 2023-05-11 | End: 2023-05-15

## 2023-05-11 RX ORDER — ACETAMINOPHEN 325 MG/1
650 TABLET ORAL EVERY 4 HOURS PRN
Status: DISCONTINUED | OUTPATIENT
Start: 2023-05-11 | End: 2023-05-20 | Stop reason: HOSPADM

## 2023-05-11 RX ORDER — DEXTROSE MONOHYDRATE 25 G/50ML
25 INJECTION, SOLUTION INTRAVENOUS
Status: DISCONTINUED | OUTPATIENT
Start: 2023-05-11 | End: 2023-05-20 | Stop reason: HOSPADM

## 2023-05-11 RX ORDER — ACETAMINOPHEN 650 MG/1
650 SUPPOSITORY RECTAL EVERY 4 HOURS PRN
Status: DISCONTINUED | OUTPATIENT
Start: 2023-05-11 | End: 2023-05-20 | Stop reason: HOSPADM

## 2023-05-11 RX ORDER — ACETAMINOPHEN 160 MG/5ML
650 SOLUTION ORAL EVERY 4 HOURS PRN
Status: DISCONTINUED | OUTPATIENT
Start: 2023-05-11 | End: 2023-05-20 | Stop reason: HOSPADM

## 2023-05-11 RX ORDER — DEXAMETHASONE SODIUM PHOSPHATE 10 MG/ML
6 INJECTION INTRAMUSCULAR; INTRAVENOUS ONCE
Status: COMPLETED | OUTPATIENT
Start: 2023-05-11 | End: 2023-05-11

## 2023-05-11 RX ORDER — CETIRIZINE HYDROCHLORIDE 10 MG/1
10 TABLET ORAL DAILY
Status: DISCONTINUED | OUTPATIENT
Start: 2023-05-11 | End: 2023-05-20 | Stop reason: HOSPADM

## 2023-05-11 RX ORDER — ENOXAPARIN SODIUM 100 MG/ML
30 INJECTION SUBCUTANEOUS DAILY
Status: DISCONTINUED | OUTPATIENT
Start: 2023-05-11 | End: 2023-05-11

## 2023-05-11 RX ORDER — IBUPROFEN 600 MG/1
1 TABLET ORAL
Status: DISCONTINUED | OUTPATIENT
Start: 2023-05-11 | End: 2023-05-20 | Stop reason: HOSPADM

## 2023-05-11 RX ORDER — ONDANSETRON 2 MG/ML
4 INJECTION INTRAMUSCULAR; INTRAVENOUS EVERY 6 HOURS PRN
Status: DISCONTINUED | OUTPATIENT
Start: 2023-05-11 | End: 2023-05-20 | Stop reason: HOSPADM

## 2023-05-11 RX ORDER — LATANOPROST 50 UG/ML
1 SOLUTION/ DROPS OPHTHALMIC NIGHTLY
Status: DISCONTINUED | OUTPATIENT
Start: 2023-05-11 | End: 2023-05-20 | Stop reason: HOSPADM

## 2023-05-11 RX ORDER — INSULIN LISPRO 100 [IU]/ML
2-7 INJECTION, SOLUTION INTRAVENOUS; SUBCUTANEOUS
Status: DISCONTINUED | OUTPATIENT
Start: 2023-05-11 | End: 2023-05-20 | Stop reason: HOSPADM

## 2023-05-11 RX ORDER — FAMOTIDINE 20 MG/1
20 TABLET, FILM COATED ORAL
Status: DISCONTINUED | OUTPATIENT
Start: 2023-05-11 | End: 2023-05-20 | Stop reason: HOSPADM

## 2023-05-11 RX ORDER — NITROGLYCERIN 0.4 MG/1
0.4 TABLET SUBLINGUAL
Status: DISCONTINUED | OUTPATIENT
Start: 2023-05-11 | End: 2023-05-20 | Stop reason: HOSPADM

## 2023-05-11 RX ORDER — FAMOTIDINE 20 MG/1
10 TABLET, FILM COATED ORAL DAILY PRN
Status: DISCONTINUED | OUTPATIENT
Start: 2023-05-11 | End: 2023-05-20 | Stop reason: HOSPADM

## 2023-05-11 RX ORDER — ONDANSETRON 4 MG/1
4 TABLET, FILM COATED ORAL EVERY 6 HOURS PRN
Status: DISCONTINUED | OUTPATIENT
Start: 2023-05-11 | End: 2023-05-20 | Stop reason: HOSPADM

## 2023-05-11 RX ORDER — BENZONATATE 100 MG/1
200 CAPSULE ORAL 3 TIMES DAILY PRN
Status: DISCONTINUED | OUTPATIENT
Start: 2023-05-11 | End: 2023-05-20 | Stop reason: HOSPADM

## 2023-05-11 RX ORDER — TAMSULOSIN HYDROCHLORIDE 0.4 MG/1
0.4 CAPSULE ORAL NIGHTLY
Status: DISCONTINUED | OUTPATIENT
Start: 2023-05-11 | End: 2023-05-20 | Stop reason: HOSPADM

## 2023-05-11 RX ORDER — CALCIUM CARBONATE 500 MG/1
2 TABLET, CHEWABLE ORAL 2 TIMES DAILY PRN
Status: DISCONTINUED | OUTPATIENT
Start: 2023-05-11 | End: 2023-05-20 | Stop reason: HOSPADM

## 2023-05-11 RX ORDER — LIDOCAINE AND PRILOCAINE 25; 25 MG/G; MG/G
1 CREAM TOPICAL AS NEEDED
Status: DISCONTINUED | OUTPATIENT
Start: 2023-05-11 | End: 2023-05-20 | Stop reason: HOSPADM

## 2023-05-11 RX ORDER — DEXAMETHASONE SODIUM PHOSPHATE 10 MG/ML
6 INJECTION INTRAMUSCULAR; INTRAVENOUS DAILY
Status: DISCONTINUED | OUTPATIENT
Start: 2023-05-11 | End: 2023-05-14

## 2023-05-11 RX ORDER — SODIUM CHLORIDE 0.9 % (FLUSH) 0.9 %
10 SYRINGE (ML) INJECTION AS NEEDED
Status: DISCONTINUED | OUTPATIENT
Start: 2023-05-11 | End: 2023-05-20 | Stop reason: HOSPADM

## 2023-05-11 RX ORDER — NICOTINE POLACRILEX 4 MG
15 LOZENGE BUCCAL
Status: DISCONTINUED | OUTPATIENT
Start: 2023-05-11 | End: 2023-05-20 | Stop reason: HOSPADM

## 2023-05-11 RX ORDER — SODIUM CHLORIDE 0.9 % (FLUSH) 0.9 %
10 SYRINGE (ML) INJECTION EVERY 12 HOURS SCHEDULED
Status: DISCONTINUED | OUTPATIENT
Start: 2023-05-11 | End: 2023-05-20 | Stop reason: HOSPADM

## 2023-05-11 RX ORDER — FOLIC ACID/VIT B COMPLEX AND C 0.8 MG
1 TABLET ORAL DAILY
Status: DISCONTINUED | OUTPATIENT
Start: 2023-05-11 | End: 2023-05-20 | Stop reason: HOSPADM

## 2023-05-11 RX ORDER — SODIUM CHLORIDE 9 MG/ML
40 INJECTION, SOLUTION INTRAVENOUS AS NEEDED
Status: DISCONTINUED | OUTPATIENT
Start: 2023-05-11 | End: 2023-05-20 | Stop reason: HOSPADM

## 2023-05-11 RX ADMIN — TAMSULOSIN HYDROCHLORIDE 0.4 MG: 0.4 CAPSULE ORAL at 21:44

## 2023-05-11 RX ADMIN — Medication 10 ML: at 21:44

## 2023-05-11 RX ADMIN — FAMOTIDINE 20 MG: 20 TABLET, FILM COATED ORAL at 16:25

## 2023-05-11 RX ADMIN — FAMOTIDINE 20 MG: 20 TABLET, FILM COATED ORAL at 06:41

## 2023-05-11 RX ADMIN — HEPARIN SODIUM 5000 UNITS: 5000 INJECTION INTRAVENOUS; SUBCUTANEOUS at 13:41

## 2023-05-11 RX ADMIN — HEPARIN SODIUM 5000 UNITS: 5000 INJECTION INTRAVENOUS; SUBCUTANEOUS at 21:43

## 2023-05-11 RX ADMIN — Medication 10 ML: at 12:16

## 2023-05-11 RX ADMIN — CETIRIZINE HYDROCHLORIDE 10 MG: 10 TABLET ORAL at 13:40

## 2023-05-11 RX ADMIN — DEXAMETHASONE SODIUM PHOSPHATE 6 MG: 10 INJECTION INTRAMUSCULAR; INTRAVENOUS at 01:43

## 2023-05-11 RX ADMIN — LATANOPROST 1 DROP: 50 SOLUTION OPHTHALMIC at 21:52

## 2023-05-11 RX ADMIN — Medication 1 TABLET: at 13:40

## 2023-05-11 RX ADMIN — DEXAMETHASONE 6 MG: 4 TABLET ORAL at 12:15

## 2023-05-11 RX ADMIN — ASPIRIN 81 MG: 81 TABLET, COATED ORAL at 12:15

## 2023-05-11 NOTE — NURSING NOTE
PRE VITALS   /105  P 82  R 16  T 96.8   Wt 83.1kg      POST VITALS   /103  P 83  R16  T 97.2  WT 80.1 kg          TOLERATED TX WELL ACCEESS FLOW GOOD REMOVED 3000 ml net   BVP     70.0

## 2023-05-11 NOTE — CONSULTS
.     REASON FOR CONSULTATION:     Provide an opinion on any further workup or treatment of anemia and thrombocytopenia.                             REQUESTING PHYSICIAN: Ken Salas MD    RECORDS OBTAINED:  Records of the patient's history including those obtained from the referring provider were reviewed and summarized in detail.    HISTORY OF PRESENT ILLNESS:  The patient is a 77 y.o. year old male with end-stage renal disease on hemodialysis, liver cirrhosis, splenomegaly, chronic thrombocytopenia and anemia, Crohn's disease, who was admitted to [facility] at this time because of volume overload due to missed hemodialysis because of COVID-19 infection.    Patient presented to emergency room earlier this morning shortly after midnight, because of fatigue, and the 10 days of respiratory symptoms with cough.  Patient was diagnosed of COVID-19 infection on 5/4/2023.    Patient was admitted to hospital for further evaluation and management.    Today on 5/11/2023 laboratory study reports hemoglobin 11.5, platelets 117,000, and a WBC 5620 including neutrophils 4770, procalcitonin 2.38, creatinine 8.30 BUN 69, AST 63, ALT 40, total bilirubin 1.6, and alk phosphatase 170.  Sodium 129, calcium 8.7.  proBNP 42,114.      We were consulted because patient and the family member requested we see patient, because he follows by my colleague Dr. Mckenzie in the clinical for anemia and thrombocytopenia.          Past Medical History:   Diagnosis Date   • Abnormal serum protein electrophoresis    • Anemia     Multifactorial   • Bicuspid aortic valve 7/20/2022   • Chronic leukopenia and thrombocytopenia    • Cirrhosis of liver without ascites 07/24/2017   • Congestive splenomegaly 07/24/2017   • Crohn disease     with ileostomy   • Diastolic CHF     although it was likely from volume overload due to ESRD   • Diverticula of colon    • ESRD on hemodialysis     M-W-F FRESENIUS   • Fatty liver disease, nonalcoholic     • GERD (gastroesophageal reflux disease)    • GI bleed    • Glaucoma    • H/O Gallstone pancreatitis    • H/O Pericardial effusion    • H/O sinus bradycardia    • Heart murmur    • History of hypertension     resolved   • History of UTI    • Hx of renal calculi    • Ileostomy present    • Iron deficiency    • MGUS (monoclonal gammopathy of unknown significance)    • TATE (obstructive sleep apnea)    • Permanent atrial fibrillation    • Sleep apnea     CPAP USED   • Type 2 diabetes mellitus     CURRENTLY TAKES NO MED     Past Surgical History:   Procedure Laterality Date   • APPENDECTOMY     • ARTERIOVENOUS FISTULA/SHUNT SURGERY Left 08/08/2017    Procedure: LEFT BRACHIAL CEPHALIC AV FISTULA FORMATION WITH CEPHALIC VEIN TRANSPOSITION ;  Surgeon: Bill Deal MD;  Location: [facility] OR;  Service:    • ARTERIOVENOUS FISTULA/SHUNT SURGERY Left 5/27/2022    Procedure: OPEN REVISION LEFT ARTERIOVENOUS INTERPOSITION GRAFT PLACEMENT;  Surgeon: Bill Deal MD;  Location: [facility] OR;  Service: Vascular;  Laterality: Left;   • ARTERIOVENOUS FISTULA/SHUNT SURGERY W/ HEMODIALYSIS CATHETER INSERTION Left 02/15/2022    Procedure: OPEN LEFT ARM ARTERIOVENOUS FISTULA THROMBECTOMY WITH CEPHALIC VEIN ARTHROPLASTY AND STENT/GRAFT PLACEMENT;  Surgeon: Bill Deal MD;  Location: [facility] OR 18/19;  Service: Vascular;  Laterality: Left;   • CATARACT EXTRACTION WITH INTRAOCULAR LENS IMPLANT Bilateral    • CHOLECYSTECTOMY     • COLECTOMY PARTIAL / TOTAL      History of inflammatory bowel disease with status post colectomy with ileostomy many years ago in the [facility],  18 YEARS OF AGE   • COLONOSCOPY     • CYSTOSCOPY LITHOLAPAXY BLADDER STONE EXTRACTION     • ENDOSCOPY  01/16/2015    gastritis   • ENDOSCOPY  01/17/2018    Procedure: ESOPHAGOGASTRODUODENOSCOPY;  Surgeon: Warner Neville MD;  Location: [facility] ENDOSCOPY;  Service:    • ILEOSCOPY  01/16/2015    normal   • ILEOSCOPY N/A 01/17/2018    Procedure:  ILEOSCOPY;  Surgeon: Warner Neville MD;  Location: [facility] ENDOSCOPY;  Service:    • TONSILLECTOMY         MEDICATIONS    Current Facility-Administered Medications:   •  acetaminophen (TYLENOL) tablet 650 mg, 650 mg, Oral, Q4H PRN **OR** acetaminophen (TYLENOL) 160 MG/5ML solution 650 mg, 650 mg, Oral, Q4H PRN **OR** acetaminophen (TYLENOL) suppository 650 mg, 650 mg, Rectal, Q4H PRN, Lisa Yates APRN  •  aspirin EC tablet 81 mg, 81 mg, Oral, Daily, Mercedez Pisano APRN, 81 mg at 05/11/23 1215  •  b complex-vitamin c-folic acid (NEPHRO-FREDDY) tablet 1 tablet, 1 tablet, Oral, Daily, Mercedez Pisano APRN, 1 tablet at 05/11/23 1340  •  benzonatate (TESSALON) capsule 200 mg, 200 mg, Oral, TID PRN, Mercedez Pisano APRN  •  calcium carbonate (TUMS) chewable tablet 500 mg (200 mg elemental), 2 tablet, Oral, BID PRN, Lisa Yates APRN  •  cetirizine (zyrTEC) tablet 10 mg, 10 mg, Oral, Daily, Mercedez Pisano APRN, 10 mg at 05/11/23 1340  •  dexamethasone (DECADRON) tablet 6 mg, 6 mg, Oral, Daily, 6 mg at 05/11/23 1215 **OR** dexamethasone (DECADRON) injection 6 mg, 6 mg, Intravenous, Daily, Lisa Yates APRN  •  dextrose (D50W) (25 g/50 mL) IV injection 25 g, 25 g, Intravenous, Q15 Min PRN, Lisa Yates APRN  •  dextrose (GLUTOSE) oral gel 15 g, 15 g, Oral, Q15 Min PRN, Lisa Yates APRN  •  famotidine (PEPCID) tablet 10 mg, 10 mg, Oral, Daily PRN, Mercedez Pisano APRN  •  famotidine (PEPCID) tablet 20 mg, 20 mg, Oral, BID AC, Lisa Yates APRN, 20 mg at 05/11/23 1625  •  glucagon (GLUCAGEN) injection 1 mg, 1 mg, Intramuscular, Q15 Min PRN, Lisa Yates APRN  •  heparin (porcine) 5000 UNIT/ML injection 5,000 Units, 5,000 Units, Subcutaneous, Q8H, Merecdez Pisano APRN, 5,000 Units at 05/11/23 1341  •  insulin lispro (HUMALOG/ADMELOG) injection 2-7 Units, 2-7 Units, Subcutaneous, TID With Meals, Lisa Yates APRN  •   latanoprost (XALATAN) 0.005 % ophthalmic solution 1 drop, 1 drop, Both Eyes, Nightly, Mercedez Pisano APRN  •  lidocaine-prilocaine (EMLA) 2.5-2.5 % cream 1 application, 1 application, Topical, PRN, Mercedez Pisano APRN  •  nitroglycerin (NITROSTAT) SL tablet 0.4 mg, 0.4 mg, Sublingual, Q5 Min PRN, Lisa Yates APRN  •  ondansetron (ZOFRAN) tablet 4 mg, 4 mg, Oral, Q6H PRN **OR** ondansetron (ZOFRAN) injection 4 mg, 4 mg, Intravenous, Q6H PRN, Lisa Yates APRN  •  [COMPLETED] Insert Peripheral IV, , , Once **AND** sodium chloride 0.9 % flush 10 mL, 10 mL, Intravenous, PRN, Tab Ferrer II, MD  •  sodium chloride 0.9 % flush 10 mL, 10 mL, Intravenous, Q12H, Lisa Yates APRN, 10 mL at 05/11/23 1216  •  sodium chloride 0.9 % flush 10 mL, 10 mL, Intravenous, PRN, Lisa Yates APRN  •  sodium chloride 0.9 % infusion 40 mL, 40 mL, Intravenous, PRN, Lisa Yates APRN  •  Sucroferric Oxyhydroxide chewable tablet 500 mg, 500 mg, Oral, TID PC, Mercedez Pisano APRN  •  tamsulosin (FLOMAX) 24 hr capsule 0.4 mg, 0.4 mg, Oral, Nightly, Mercedez Pisano APRN    ALLERGIES:     Allergies   Allergen Reactions   • Ace Inhibitors Other (See Comments)     RENAL FAILURE/ raised creatinine   • Contrast Dye (Echo Or Unknown Ct/Mr) Other (See Comments)     RENAL FAILURE   • Iodine Anaphylaxis   • Angiotensin Receptor Blockers Swelling   • Eliquis [Apixaban] Arrhythmia     BLEEDING ISSUES; PT CANNOT TAKE ANY ANTICOAGULANTS DUE TO LOW PLATELETS EXCEPT ASPIRIN     • Granix [Filgrastim] GI Intolerance     Chest pain  Chest pain     • Keflex [Cephalexin] Diarrhea     RENAL FAILURE       SOCIAL HISTORY:       Social History     Socioeconomic History   • Marital status:      Spouse name: Gillian   • Number of children: 2   • Years of education: College   Tobacco Use   • Smoking status: Never   • Smokeless tobacco: Never   Vaping Use   • Vaping Use: Never used    Substance and Sexual Activity   • Alcohol use: No     Comment: caffeine use: none   • Drug use: No   • Sexual activity: Defer         FAMILY HISTORY:  Family History   Problem Relation Age of Onset   • Heart failure Mother    • Hypertension Mother    • Heart disease Mother    • Hyperlipidemia Mother    • Hypertension Father    • Malig Hyperthermia Neg Hx        REVIEW OF SYSTEMS:  Review of Systems   Constitutional: Positive for fatigue. Negative for appetite change, diaphoresis, fever and unexpected weight change.   HENT: Negative for nosebleeds and sore throat.    Eyes: Negative for photophobia.   Respiratory: Negative for shortness of breath.    Cardiovascular: Negative for chest pain and leg swelling.   Gastrointestinal: Negative for anal bleeding and blood in stool.   Endocrine: Negative for cold intolerance.   Genitourinary: Negative for dysuria.   Allergic/Immunologic: Positive for immunocompromised state.   Neurological: Positive for weakness and numbness. Negative for syncope and light-headedness.   Hematological: Negative for adenopathy.              Vitals:    05/11/23 1217 05/11/23 1347 05/11/23 1445 05/11/23 1448   BP: 138/82      BP Location: Right arm      Patient Position: Lying      Pulse: 88      Resp: 18      Temp: 96.3 °F (35.7 °C)      TempSrc: Oral      SpO2: 92% 90% (!) 87% 90%   Weight:       Height:             2/23/2023     2:31 PM   Current Status   ECOG score 0      PHYSICAL EXAM:    This is a telemedicine visit, since we do not have a active role this patient's care, I did not go into his room.      RECENT LABS:        WBC   Date Value Ref Range Status   05/11/2023 6.54 3.40 - 10.80 10*3/mm3 Final   05/11/2023 5.62 3.40 - 10.80 10*3/mm3 Final     Hemoglobin   Date Value Ref Range Status   05/11/2023 10.5 (L) 13.0 - 17.7 g/dL Final   05/11/2023 11.5 (L) 13.0 - 17.7 g/dL Final     Platelets   Date Value Ref Range Status   05/11/2023 114 (L) 140 - 450 10*3/mm3 Final   05/11/2023 117 (L)  140 - 450 10*3/mm3 Final     Lab Results   Component Value Date    GLUCOSE 220 (H) 05/11/2023    BUN 73 (H) 05/11/2023    CREATININE 7.63 (H) 05/11/2023    EGFR 6.8 (L) 05/11/2023    BCR 9.6 05/11/2023    K 4.3 05/11/2023    CO2 20.9 (L) 05/11/2023    CALCIUM 8.2 (L) 05/11/2023    PROTENTOTREF 7.4 08/18/2022    ALBUMIN 2.9 (L) 05/11/2023    BILITOT 1.6 (H) 05/11/2023    AST 63 (H) 05/11/2023    ALT 40 05/11/2023     Lab Results   Component Value Date    IRON 36 (L) 05/11/2023    TIBC 170 (L) 05/11/2023    FERRITIN 2,419.00 (H) 05/11/2023     Lab Results   Component Value Date    ZVJZEVTF14 499 08/18/2022     Lab Results   Component Value Date    FOLATE >20.00 11/17/2017         Assessment & Plan   *  COVID-19 infection.  Management per primary team.    *.  End-stage renal disease on hemodialysis.  We will continue managed by nephrology service.  ·     *  Thrombocytopenia in the setting of liver cirrhosis and congestive splenomegaly.   · Baseline platelets 79,000 on 2/23/2023, and 85,000 8/18/2022.   · Today on 5/11/2023 platelets 114,000.  This is better than his baseline level.    *Chronic anemia on hemodialysis..  · Baseline hemoglobin between 11.0-12.0, most recent baseline Hb 12.0 on 2/23/2023.  · Today on 5/11/2023 hemoglobin 11.5 and 10.5 respectively.  Iron study showed ferritin 2419, free iron 46 TIBC 170 and iron saturation 21%.  This clearly is reactive due to his COVID-19 infection.  Normal haptoglobin, mildly elevated total bilirubin and LDH.  So his anemia is due to inflammatory condition.        PLAN:  1. Continue treatment for COVID-19 infection.  2. Continue hemodialysis.  3. Monitor CBC.    I spoke with the patient by telephone, reviewed his lab results.  He voiced understanding.      We do not have active role in this patient's care.  We will sign off.    HARDEEP OLIVEIRA M.D., Ph.D.

## 2023-05-11 NOTE — CONSULTS
[facility] Consult Note      Patient Name: Bill Landry  : 1945  MRN: [id number]  Primary Care Physician:  Bharathi De La Torre MD  Referring Physician: No ref. provider found  Date of admission: 2023    Subjective     Reason for Consult: End-stage renal disease    HPI:   Bill Landry is a 77 y.o. male with above past medical history of bicuspid arctic valve, diastolic congestive heart failure, chronic normocytic anemia,  cirrhosis,  Crohn's disease status post ileostomy, fatty liver, gastroesophageal flux disease, monoclonal gammopathy of unknown significance with pancytopenia , obstructive sleep apnea, persistent atrial fibrillation, diabetes mellitus type 2,  end-stage renal disease for the last 7 years status post left upper extremity AV fistula/AV graft placement followed by Dr. Paul De La Garza .  On a Monday, Wednesday and Friday schedule.    Patient started to develop fatigue and weakness and progressive shortness of breath, he missed his a Wednesday hemodialysis session and presented emergency department where he was found to have hypoxia and tested positive for COVID.  Patient was admitted for further management     Nephrology consultation been requested to arrange for hemodialysis    Review of Systems:   14 point review of systems is otherwise negative except for mentioned above on HPI    Personal History     Past Medical History:   Diagnosis Date   • Abnormal serum protein electrophoresis    • Anemia     Multifactorial   • Bicuspid aortic valve 2022   • Chronic leukopenia and thrombocytopenia    • Cirrhosis of liver without ascites 2017   • Congestive splenomegaly 2017   • Crohn disease     with ileostomy   • Diastolic CHF     although it was likely from volume overload due to ESRD   • Diverticula of colon    • ESRD on hemodialysis      RM   • Fatty liver disease, nonalcoholic    • GERD (gastroesophageal reflux disease)    • GI bleed    •  Glaucoma    • H/O Gallstone pancreatitis    • H/O Pericardial effusion    • H/O sinus bradycardia    • Heart murmur    • History of hypertension     resolved   • History of UTI    • Hx of renal calculi    • Ileostomy present    • Iron deficiency    • MGUS (monoclonal gammopathy of unknown significance)    • TATE (obstructive sleep apnea)    • Permanent atrial fibrillation    • Sleep apnea     CPAP USED   • Type 2 diabetes mellitus     CURRENTLY TAKES NO MED       Past Surgical History:   Procedure Laterality Date   • APPENDECTOMY     • ARTERIOVENOUS FISTULA/SHUNT SURGERY Left 08/08/2017    Procedure: LEFT BRACHIAL CEPHALIC AV FISTULA FORMATION WITH CEPHALIC VEIN TRANSPOSITION ;  Surgeon: Bill Deal MD;  Location: [facility] OR;  Service:    • ARTERIOVENOUS FISTULA/SHUNT SURGERY Left 5/27/2022    Procedure: OPEN REVISION LEFT ARTERIOVENOUS INTERPOSITION GRAFT PLACEMENT;  Surgeon: Bill Deal MD;  Location: [facility] MAIN OR;  Service: Vascular;  Laterality: Left;   • ARTERIOVENOUS FISTULA/SHUNT SURGERY W/ HEMODIALYSIS CATHETER INSERTION Left 02/15/2022    Procedure: OPEN LEFT ARM ARTERIOVENOUS FISTULA THROMBECTOMY WITH CEPHALIC VEIN ARTHROPLASTY AND STENT/GRAFT PLACEMENT;  Surgeon: Bill Deal MD;  Location: [facility] HYBRID OR 18/19;  Service: Vascular;  Laterality: Left;   • CATARACT EXTRACTION WITH INTRAOCULAR LENS IMPLANT Bilateral    • CHOLECYSTECTOMY     • COLECTOMY PARTIAL / TOTAL      History of inflammatory bowel disease with status post colectomy with ileostomy many years ago in the [facility],  18 YEARS OF AGE   • COLONOSCOPY     • CYSTOSCOPY LITHOLAPAXY BLADDER STONE EXTRACTION     • ENDOSCOPY  01/16/2015    gastritis   • ENDOSCOPY  01/17/2018    Procedure: ESOPHAGOGASTRODUODENOSCOPY;  Surgeon: Warner Neville MD;  Location: [facility] ENDOSCOPY;  Service:    • ILEOSCOPY  01/16/2015    normal   • ILEOSCOPY N/A 01/17/2018    Procedure: ILEOSCOPY;  Surgeon: Warner Neville MD;  Location: [facility]  ENDOSCOPY;  Service:    • TONSILLECTOMY         Family History: family history includes Heart disease in his mother; Heart failure in his mother; Hyperlipidemia in his mother; Hypertension in his father and mother.    Social History:  reports that he has never smoked. He has never used smokeless tobacco. He reports that he does not drink alcohol and does not use drugs.    Home Medications:  Prior to Admission medications    Medication Sig Start Date End Date Taking? Authorizing Provider   alfuzosin (UROXATRAL) 10 MG 24 hr tablet Take 1 tablet by mouth Every Other Day. Does not take on tues sun or thurs   Yes Elvia Nicholson MD   aspirin 81 MG tablet Take 1 tablet by mouth Daily. INSTRUCTED TO CONTINUE THIS FOR SURGERY PER DR. BERMEO'S OFFICE   Yes Elvia Nicholson MD   B Complex-C-Folic Acid (EMILIE-FREDDY) tablet Take 1 tablet by mouth Daily. 0.8 mg 2/27/19  Yes Elvia Nicholson MD   benzonatate (TESSALON) 200 MG capsule Take 1 capsule by mouth 3 (Three) Times a Day As Needed for Cough. 5/4/23  Yes Ck Palumbo Sr., MD   famotidine (PEPCID) 10 MG tablet Take 1 tablet by mouth As Needed for Heartburn.   Yes Elvia Nicholson MD   Iron Sucrose (VENOFER IV) Infuse  into a venous catheter As Needed. USUALLY ONCE MONTH   Yes Elvia Nicholson MD   latanoprost (XALATAN) 0.005 % ophthalmic solution Administer 1 drop to both eyes Every Night. 1 drop each eye   Yes Elvia Nicholson MD   Loratadine (CLARITIN PO) Take 1 tablet by mouth As Needed. Pt states he is uncertain of dosage   Yes Elvia Nicholson MD   sodium chloride 0.65 % nasal spray 2 sprays into the nostril(s) as directed by provider Every Night.   Yes Elvia Nicholson MD   Sucroferric Oxyhydroxide (Velphoro) 500 MG chewable tablet Chew 1 tablet 3 (Three) Times a Day. AFTER EACH  MEAL   Yes Elvia Nicholson MD   lidocaine-prilocaine (EMLA) 2.5-2.5 % cream Apply 1 application topically to the appropriate area as directed.  [id number]:  ONE HOUR PRIOR TO DIALYSIS 2/5/18   Provider, MD Elvia       Allergies:  Allergies   Allergen Reactions   • Ace Inhibitors Other (See Comments)     RENAL FAILURE/ raised creatinine   • Contrast Dye (Echo Or Unknown Ct/Mr) Other (See Comments)     RENAL FAILURE   • Iodine Anaphylaxis   • Angiotensin Receptor Blockers Swelling   • Eliquis [Apixaban] Arrhythmia     BLEEDING ISSUES; PT CANNOT TAKE ANY ANTICOAGULANTS DUE TO LOW PLATELETS EXCEPT ASPIRIN     • Granix [Filgrastim] GI Intolerance     Chest pain  Chest pain     • Keflex [Cephalexin] Diarrhea     RENAL FAILURE       Objective     Vitals:   Temp:  [99.3 °F (37.4 °C)] 99.3 °F (37.4 °C)  Heart Rate:  [] 79  Resp:  [16] 16  BP: (128-150)/(79-88) 128/79  Flow (L/min):  [3] 3    Intake/Output Summary (Last 24 hours) at 5/11/2023 0605  Last data filed at 5/11/2023 0559  Gross per 24 hour   Intake 0 ml   Output --   Net 0 ml       Physical Exam:   Constitutional: Awake, alert, no acute distress.  HEENT: Sclera anicteric, no conjunctival injection  Neck: Supple, no thyromegaly, no lymphadenopathy, trachea at midline, no JVD  Respiratory: Fine crackles bibasal  Cardiovascular: RRR, systolic ejection murmur  Gastrointestinal: Positive bowel sounds, abdomen is soft, nontender and nondistended.  Ostomy bag in place   : No palpable bladder  Musculoskeletal: No edema, no clubbing or cyanosis.  Left upper extremity  AVF/ AVG   Psychiatric: Appropriate affect, cooperative  Neurologic: Oriented x3, moving all extremities, normal speech and mental status  Skin: Warm and dry       Scheduled Meds:     dexamethasone, 6 mg, Oral, Daily   Or  dexamethasone, 6 mg, Intravenous, Daily  enoxaparin, 30 mg, Subcutaneous, Daily  famotidine, 20 mg, Oral, BID AC  insulin lispro, 2-7 Units, Subcutaneous, TID With Meals  sodium chloride, 10 mL, Intravenous, Q12H      IV Meds:        Results Reviewed:   I have personally reviewed the results from the time  of this admission to 5/11/2023 06:05 EDT     Results from last 7 days   Lab Units 05/11/23  0132   WBC 10*3/mm3 5.62   HEMOGLOBIN g/dL 11.5*   HEMATOCRIT % 32.8*   PLATELETS 10*3/mm3 117*       Lab Results   Component Value Date    GLUCOSE 192 (H) 05/11/2023    CALCIUM 8.7 05/11/2023     (L) 05/11/2023    K 4.0 05/11/2023    CO2 24.2 05/11/2023    CL 94 (L) 05/11/2023    BUN 69 (H) 05/11/2023    CREATININE 8.30 (H) 05/11/2023    EGFRIFAFRI  02/14/2022      Comment:      <15 Indicative of kidney failure.    EGFRIFNONA 7 (L) 02/14/2022    BCR 8.3 05/11/2023    ANIONGAP 10.8 05/11/2023      Lab Results   Component Value Date    MG 1.9 10/02/2016    PHOS 5.2 (H) 01/23/2018    ALBUMIN 2.9 (L) 05/11/2023           Assessment / Plan     ASSESSMENT:    -End Stage Renal Disease ( ESRD ) on Hemodialysis .s/p  LUE AVF s/p LUE AVG  ( by rebecca Singh )  functional .and Monday, Wednesday and Friday schedule.  Patient is history of hemodialysis yesterday we will arrange for hemodialysis today and will continue to arrange hemodialysis treatment during his admission. Continue renal diet   -Chronic normocytic anemia. On Epogen protocol.  On hold hemoglobin more than 11. we will continue to follow H&H closely   -Chronic hypervolemic hyponatremia.  Clinically with volume overload we will attempt to optimize volume status with hemodialysis we will continue to follow sodium trend closely  -Hyperphosphatemia. Continue binders  with meals, Continue renal diet. We will follow phosphorus to make further adjustmentst   -Diabetes Mellitus type 2 ..Continue insulin regimen / hypoglycemic regimen .Hypoglycemic protocol . POC glucose 4 x day .Diabetic diet  -COVID-19 infection currently on isolation on dexamethasone.,  Inhalers and supplemental oxygen followed closely by primary team  -Chronic diastolic congestive heart failure .  We will continue hemodialysis to keep with normovolemic  -Crohn disease a status post ostomy  placement    PLAN:  -We will arrange for hemodialysis today,  patient missed his treatment yesterday  -We will continue surveillance labs    Thank you for involving us in the care of Bill Landry.  Please feel free to call with any questions.    Yonatan Almanza MD  05/11/23  06:05 EDT    [facility]  [phone]      Please note that portions of this note were completed with a voice recognition program.

## 2023-05-11 NOTE — PROGRESS NOTES
Nutrition Services    Patient Name:  Bill Landry  YOB: 1945  MRN: [id number]  Admit Date:  5/11/2023    Assessment Date:  05/11/23    Comment: Consult per RN Admit Screen for unsure wt loss/GLEN:  Consulted per RN Admit Screen for unsure wt loss and GLEN. Pt's wt stable x 1 year. Pt tolerating Renal diet with 100% po intake at lunch today. + ileostomy output 5/11.   Will follow per protocol.    CLINICAL NUTRITION ASSESSMENT      Reason for Assessment MST score 2+, Nurse Admission Screen     Diagnosis/Problem   CC: weakness, low spO2  Dx: acute hypoxemia respiratory failure due to COVID-19, Crohn's dz, DM, TATE, HTN, Afib   Medical/Surgical History Past Medical History:   Diagnosis Date   • Abnormal serum protein electrophoresis    • Anemia     Multifactorial   • Bicuspid aortic valve 7/20/2022   • Chronic leukopenia and thrombocytopenia    • Cirrhosis of liver without ascites 07/24/2017   • Congestive splenomegaly 07/24/2017   • Crohn disease     with ileostomy   • Diastolic CHF     although it was likely from volume overload due to ESRD   • Diverticula of colon    • ESRD on hemodialysis     M-W-F FRESENIUS   • Fatty liver disease, nonalcoholic    • GERD (gastroesophageal reflux disease)    • GI bleed    • Glaucoma    • H/O Gallstone pancreatitis    • H/O Pericardial effusion    • H/O sinus bradycardia    • Heart murmur    • History of hypertension     resolved   • History of UTI    • Hx of renal calculi    • Ileostomy present    • Iron deficiency    • MGUS (monoclonal gammopathy of unknown significance)    • TATE (obstructive sleep apnea)    • Permanent atrial fibrillation    • Sleep apnea     CPAP USED   • Type 2 diabetes mellitus     CURRENTLY TAKES NO MED       Past Surgical History:   Procedure Laterality Date   • APPENDECTOMY     • ARTERIOVENOUS FISTULA/SHUNT SURGERY Left 08/08/2017    Procedure: LEFT BRACHIAL CEPHALIC AV FISTULA FORMATION WITH CEPHALIC VEIN TRANSPOSITION ;  Surgeon: Bill WILKINS  "MD Say;  [place] [facility] [place];  Service:    • ARTERIOVENOUS FISTULA/SHUNT SURGERY Left 5/27/2022    Procedure: OPEN REVISION LEFT ARTERIOVENOUS INTERPOSITION GRAFT PLACEMENT;  Surgeon: Bill Deal MD;  [place] [facility] [place] [facility];  Service: Vascular;  Laterality: Left;   • ARTERIOVENOUS FISTULA/SHUNT SURGERY W/ HEMODIALYSIS CATHETER INSERTION Left 02/15/2022    Procedure: OPEN LEFT ARM ARTERIOVENOUS FISTULA THROMBECTOMY WITH CEPHALIC VEIN ARTHROPLASTY AND STENT/GRAFT PLACEMENT;  Surgeon: Bill Deal MD;  Location: [facility] OR 18/19;  Service: Vascular;  Laterality: Left;   • CATARACT EXTRACTION WITH INTRAOCULAR LENS IMPLANT Bilateral    • CHOLECYSTECTOMY     • COLECTOMY PARTIAL / TOTAL      History of inflammatory bowel disease with status post colectomy with ileostomy many years ago in the [facility],  18 YEARS OF AGE   • COLONOSCOPY     • CYSTOSCOPY LITHOLAPAXY BLADDER STONE EXTRACTION     • ENDOSCOPY  01/16/2015    gastritis   • ENDOSCOPY  01/17/2018    Procedure: ESOPHAGOGASTRODUODENOSCOPY;  Surgeon: Warner Neville MD;  Location: [facility];  Service:    • ILEOSCOPY  01/16/2015    normal   • ILEOSCOPY N/A 01/17/2018    Procedure: ILEOSCOPY;  Surgeon: Warner Neville MD;  [place] [facility] [place];  Service:    • TONSILLECTOMY          Encounter Information        Nutrition History:  Consulted per RN Admit Screen for unsure wt loss and GLEN. Pt's wt stable x 1 year. Pt tolerating Renal diet with 100% po intake at lunch today. + ileostomy output 5/11.    Food Preferences:    Supplements:    Factors Affecting Intake: altered respiratory status     Anthropometrics        Current Height  Current Weight  BMI kg/m2 Height: 177.8 cm (70\")  Weight: 81.1 kg (178 lb 11.2 oz) (05/11/23 0409)  Body mass index is 25.64 kg/m².   Adjusted BMI (if applicable)    BMI Category Overweight (25 - 29.9)       Admission Weight Weight: 81.1 kg (178 lb 11.2 oz)       Ideal Body Weight (IBW) 160 lb "   Adjusted IBW (if applicable)        Usual Body Weight (UBW) 178-186 lb   Weight Change/Trend Stable       Weight History Wt Readings from Last 30 Encounters:   05/11/23 0409 81.1 kg (178 lb 11.2 oz)   05/11/23 0030 83 kg (183 lb)   05/04/23 1051 83 kg (183 lb)   04/27/23 0915 80.7 kg (178 lb)   04/18/23 0855 79.8 kg (176 lb)   02/23/23 1434 82.8 kg (182 lb 9.6 oz)   08/25/22 0804 83.6 kg (184 lb 3.2 oz)   08/18/22 1050 83.6 kg (184 lb 4.8 oz)   07/19/22 0657 81.6 kg (180 lb)   05/27/22 0807 81.6 kg (180 lb)   05/25/22 1522 83.1 kg (183 lb 4.8 oz)   04/26/22 0828 82.6 kg (182 lb)   03/11/22 1934 84.4 kg (186 lb)   02/15/22 1120 84.4 kg (186 lb 1.1 oz)   02/14/22 1423 88 kg (194 lb)   09/23/21 0912 88.1 kg (194 lb 3.2 oz)   05/06/21 1011 90.5 kg (199 lb 8 oz)   09/03/20 0910 90 kg (198 lb 8 oz)   08/06/20 1009 87.5 kg (193 lb)   11/19/19 1530 92 kg (202 lb 12.8 oz)   10/31/19 0806 91.7 kg (202 lb 1.6 oz)   05/21/19 1452 91.2 kg (201 lb)   04/16/19 1616 91.9 kg (202 lb 11.2 oz)   03/21/19 1342 91.8 kg (202 lb 6.4 oz)   10/23/18 1516 88.1 kg (194 lb 3.2 oz)   09/20/18 1420 88 kg (194 lb)   06/21/18 1430 85.8 kg (189 lb 3.2 oz)   02/22/18 0856 81.3 kg (179 lb 3.2 oz)   01/25/18 0609 86.4 kg (190 lb 8 oz)   01/24/18 0557 86 kg (189 lb 11.2 oz)   01/23/18 0646 85.7 kg (188 lb 14.4 oz)   01/22/18 1445 88.3 kg (194 lb 10.7 oz)   01/22/18 0649 86.1 kg (189 lb 14.4 oz)   01/21/18 0705 89.9 kg (198 lb 4.8 oz)   01/20/18 0749 89.6 kg (197 lb 8 oz)   01/19/18 0037 90.7 kg (200 lb)   01/17/18 0633 90.3 kg (199 lb 2 oz)   01/12/18 0808 91.4 kg (201 lb 9.6 oz)   01/10/18 1539 92 kg (202 lb 12.8 oz)           --  Tests/Procedures        Tests/Procedures X-Ray     Labs       Pertinent Labs    Results from last 7 days   Lab Units 05/11/23  0617 05/11/23  0132   SODIUM mmol/L 127* 129*   POTASSIUM mmol/L 4.3 4.0   CHLORIDE mmol/L 92* 94*   CO2 mmol/L 20.9* 24.2   BUN mg/dL 73* 69*   CREATININE mg/dL 7.63* 8.30*   CALCIUM mg/dL 8.2*  8.7   BILIRUBIN mg/dL  --  1.6*   ALK PHOS U/L  --  170*   ALT (SGPT) U/L  --  40   AST (SGOT) U/L  --  63*   GLUCOSE mg/dL 220* 192*     Results from last 7 days   Lab Units 05/11/23  0617 05/11/23  0132   HEMOGLOBIN g/dL 10.5* 11.5*   HEMATOCRIT % 30.6* 32.8*   WBC 10*3/mm3 6.54 5.62   ALBUMIN g/dL  --  2.9*     Results from last 7 days   Lab Units 05/11/23  0617 05/11/23  0132   PLATELETS 10*3/mm3 114* 117*     COVID19   Date Value Ref Range Status   05/11/2023 Detected (C) Not Detected - Ref. Range Final     Lab Results   Component Value Date    HGBA1C 4.80 01/20/2018          Medications           Scheduled Medications aspirin, 81 mg, Oral, Daily  b complex-vitamin c-folic acid, 1 tablet, Oral, Daily  cetirizine, 10 mg, Oral, Daily  dexamethasone, 6 mg, Oral, Daily   Or  dexamethasone, 6 mg, Intravenous, Daily  famotidine, 20 mg, Oral, BID AC  heparin (porcine), 5,000 Units, Subcutaneous, Q8H  insulin lispro, 2-7 Units, Subcutaneous, TID With Meals  latanoprost, 1 drop, Both Eyes, Nightly  sodium chloride, 10 mL, Intravenous, Q12H  Sucroferric Oxyhydroxide, 500 mg, Oral, TID PC  tamsulosin, 0.4 mg, Oral, Nightly       Infusions     PRN Medications •  acetaminophen **OR** acetaminophen **OR** acetaminophen  •  benzonatate  •  calcium carbonate  •  dextrose  •  dextrose  •  famotidine  •  glucagon (human recombinant)  •  lidocaine-prilocaine  •  nitroglycerin  •  ondansetron **OR** ondansetron  •  [COMPLETED] Insert Peripheral IV **AND** sodium chloride  •  sodium chloride  •  sodium chloride     Physical Findings          Physical Appearance alert, disoriented, on oxygen therapy, short of breath   Oral/Mouth Cavity WNL   Edema  no edema   Gastrointestinal ileostomy, last bowel movement:5/11   Skin  skin intact, bruising   Tubes/Drains/Lines ileostomy   NFPE Not applicable at this time   --  Current Nutrition Orders & Evaluation of Intake       Oral Nutrition     Food Allergies NKFA   Current PO Diet Diet: Renal  Diets; Low Sodium (2-3g), Low Potassium, Low Phosphorus; Texture: Regular Texture (IDDSI 7); Fluid Consistency: Thin (IDDSI 0)   Supplement n/a   PO Evaluation     % PO Intake 100% at lunch    # of Days Evaluated 1   --  PES STATEMENT / NUTRITION DIAGNOSIS      Nutrition Dx Problem  Problem: Nutrition Appropriate for Condition at this Time  Etiology: MNT for Treatment/Condition ESRD  Signs/Symptoms: PO intake    Comment:    --  NUTRITION INTERVENTION / PLAN OF CARE      Intervention Goal(s) Maintain nutrition status, Meet estimated needs, Disease management/therapy and Maintain intake         RD Intervention/Action Follow Tx Progress and Care plan reviewed         Prescription/Orders:       PO Diet       Supplements       Snacks       Enteral Nutrition       Parenteral Nutrition    New Prescription Ordered? No changes at this time   --      Monitor/Evaluation Per protocol   Discharge Plan/Needs Pending clinical course   Education Will instruct as appropriate   --    RD to follow per protocol.      Electronically signed by:  Nasreen Cavazos RD  05/11/23 16:35 EDT

## 2023-05-11 NOTE — CONSULTS
Referring Provider: Dr. Salas  Reason for Consultation: Respiratory failure    Patient Care Team:  Bharathi De La Torre MD as PCP - General (Internal Medicine)  Sharif Mckenzie MD as Consulting Physician (Hematology and Oncology)  Marcellus Osborne MD as Referring Physician (Internal Medicine)  Ken Moseley MD as Consulting Physician (Pulmonary Disease)  Cooper Virgen MD as Consulting Physician (Urology)  Dale Vázquez MD as Consulting Physician (Cardiology)  Myra Moore MD as Consulting Physician (Nephrology)    Chief complaint:   Shortness of breath    History of present illness:    Subjective   This is a 77-year-old male patient, lifelong non-smoker with history of CHF, ESRD and sleep apnea.    He presented to the hospital for fatigue.  He reported symptoms of sinus congestion which started Saturday 4/29.  This was followed by fatigue and dry cough.  He also had shortness of breath on activities.  He tested positive for COVID-19 last Thursday.  He reported exposure to his wife who had similar symptoms and tested positive as well but she got better quickly.    Due to symptoms of fatigue and generalized weakness, he skipped dialysis yesterday.      Echo 7/19/2022: Normal EF.  Mildly dilated LA.    Review of Systems  Constitutional: No fever or chills.   ENMT: No sinus congestion  Cardiovascular: No chest pain, palpitation or legs swelling.    Respiratory: Shortness of breath.  Dry cough.  Gastrointestinal: He has loose stool attributed to prior ileostomy.  No change from baseline.  No nausea, vomiting or diarrhea.   Neurology: No headache,  numbness or dizziness but generalized weakness.   Musculoskeletal: No joints pain, stiffness or swelling.   Psychiatry: No depression.  Genitourinary: No dysuria or frequent urination  Endo: No weight changes. No cold or warm intolerance.  Lymphatic: No swollen glands.  Integumentary: No rash.    History  Past Medical History:   Diagnosis Date   •  Abnormal serum protein electrophoresis    • Anemia     Multifactorial   • Bicuspid aortic valve 7/20/2022   • Chronic leukopenia and thrombocytopenia    • Cirrhosis of liver without ascites 07/24/2017   • Congestive splenomegaly 07/24/2017   • Crohn disease     with ileostomy   • Diastolic CHF     although it was likely from volume overload due to ESRD   • Diverticula of colon    • ESRD on hemodialysis     M-W-F FRESENIUS   • Fatty liver disease, nonalcoholic    • GERD (gastroesophageal reflux disease)    • GI bleed    • Glaucoma    • H/O Gallstone pancreatitis    • H/O Pericardial effusion    • H/O sinus bradycardia    • Heart murmur    • History of hypertension     resolved   • History of UTI    • Hx of renal calculi    • Ileostomy present    • Iron deficiency    • MGUS (monoclonal gammopathy of unknown significance)    • TATE (obstructive sleep apnea)    • Permanent atrial fibrillation    • Sleep apnea     CPAP USED   • Type 2 diabetes mellitus     CURRENTLY TAKES NO MED   ,   Past Surgical History:   Procedure Laterality Date   • APPENDECTOMY     • ARTERIOVENOUS FISTULA/SHUNT SURGERY Left 08/08/2017    Procedure: LEFT BRACHIAL CEPHALIC AV FISTULA FORMATION WITH CEPHALIC VEIN TRANSPOSITION ;  Surgeon: Bill Dela MD;  Location: [facility];  Service:    • ARTERIOVENOUS FISTULA/SHUNT SURGERY Left 5/27/2022    Procedure: OPEN REVISION LEFT ARTERIOVENOUS INTERPOSITION GRAFT PLACEMENT;  Surgeon: Bill Deal MD;  Location: [facility];  Service: Vascular;  Laterality: Left;   • ARTERIOVENOUS FISTULA/SHUNT SURGERY W/ HEMODIALYSIS CATHETER INSERTION Left 02/15/2022    Procedure: OPEN LEFT ARM ARTERIOVENOUS FISTULA THROMBECTOMY WITH CEPHALIC VEIN ARTHROPLASTY AND STENT/GRAFT PLACEMENT;  Surgeon: Bill Deal MD;  Location: [facility] 18/19;  Service: Vascular;  Laterality: Left;   • CATARACT EXTRACTION WITH INTRAOCULAR LENS IMPLANT Bilateral    • CHOLECYSTECTOMY     • COLECTOMY PARTIAL / TOTAL       History of inflammatory bowel disease with status post colectomy with ileostomy many years ago in the [facility],  18 YEARS OF AGE   • COLONOSCOPY     • CYSTOSCOPY LITHOLAPAXY BLADDER STONE EXTRACTION     • ENDOSCOPY  01/16/2015    gastritis   • ENDOSCOPY  01/17/2018    Procedure: ESOPHAGOGASTRODUODENOSCOPY;  Surgeon: Warner Neville MD;  Location: [facility] ENDOSCOPY;  Service:    • ILEOSCOPY  01/16/2015    normal   • ILEOSCOPY N/A 01/17/2018    Procedure: ILEOSCOPY;  Surgeon: Warner Neville MD;  Location: [facility] ENDOSCOPY;  Service:    • TONSILLECTOMY     ,   Family History   Problem Relation Age of Onset   • Heart failure Mother    • Hypertension Mother    • Heart disease Mother    • Hyperlipidemia Mother    • Hypertension Father    • Malig Hyperthermia Neg Hx    ,   Social History     Socioeconomic History   • Marital status:      Spouse name: Gillian   • Number of children: 2   • Years of education: College   Tobacco Use   • Smoking status: Never   • Smokeless tobacco: Never   Vaping Use   • Vaping Use: Never used   Substance and Sexual Activity   • Alcohol use: No     Comment: caffeine use: none   • Drug use: No   • Sexual activity: Defer     E-cigarette/Vaping   • E-cigarette/Vaping Use Never User      E-cigarette/Vaping Substances   • Nicotine No    • THC No    • CBD No    • Flavoring No      E-cigarette/Vaping Devices   • Disposable No    • Pre-filled or Refillable Cartridge No    • Refillable Tank No    • Pre-filled Pod No        ,   Medications Prior to Admission   Medication Sig Dispense Refill Last Dose   • alfuzosin (UROXATRAL) 10 MG 24 hr tablet Take 1 tablet by mouth Every Other Day. Does not take on tues sun or thurs   5/10/2023   • aspirin 81 MG tablet Take 1 tablet by mouth Daily. INSTRUCTED TO CONTINUE THIS FOR SURGERY PER DR. BERMEO'S OFFICE   5/10/2023   • B Complex-C-Folic Acid (EMILIE-FREDDY) tablet Take 1 tablet by mouth Daily. 0.8 mg  3 5/10/2023   • benzonatate (TESSALON) 200 MG  capsule Take 1 capsule by mouth 3 (Three) Times a Day As Needed for Cough. 30 capsule 0 Past Week   • famotidine (PEPCID) 10 MG tablet Take 1 tablet by mouth As Needed for Heartburn.   5/10/2023   • Iron Sucrose (VENOFER IV) Infuse  into a venous catheter As Needed. USUALLY ONCE MONTH   5/10/2023   • latanoprost (XALATAN) 0.005 % ophthalmic solution Administer 1 drop to both eyes Every Night. 1 drop each eye   5/10/2023   • Loratadine (CLARITIN PO) Take 1 tablet by mouth As Needed. Pt states he is uncertain of dosage   5/10/2023   • sodium chloride 0.65 % nasal spray 2 sprays into the nostril(s) as directed by provider Every Night.   5/10/2023   • Sucroferric Oxyhydroxide (Velphoro) 500 MG chewable tablet Chew 1 tablet 3 (Three) Times a Day. AFTER EACH  MEAL   5/10/2023   • lidocaine-prilocaine (EMLA) 2.5-2.5 % cream Apply 1 application topically to the appropriate area as directed. [id number]:  ONE HOUR PRIOR TO DIALYSIS  3    , Scheduled Meds:  aspirin, 81 mg, Oral, Daily  b complex-vitamin c-folic acid, 1 tablet, Oral, Daily  cetirizine, 10 mg, Oral, Daily  dexamethasone, 6 mg, Oral, Daily   Or  dexamethasone, 6 mg, Intravenous, Daily  famotidine, 20 mg, Oral, BID AC  heparin (porcine), 5,000 Units, Subcutaneous, Q8H  insulin lispro, 2-7 Units, Subcutaneous, TID With Meals  latanoprost, 1 drop, Both Eyes, Nightly  PATIENT SUPPLIED MEDICATION, 500 mg, Oral, TID PC  sodium chloride, 10 mL, Intravenous, Q12H  tamsulosin, 0.4 mg, Oral, Nightly     and Allergies:  Ace inhibitors, Contrast dye (echo or unknown ct/mr), Iodine, Angiotensin receptor blockers, Eliquis [apixaban], Granix [filgrastim], and Keflex [cephalexin]    Objective     Vital Signs   Temp:  [96.8 °F (36 °C)-99.3 °F (37.4 °C)] 96.8 °F (36 °C)  Heart Rate:  [] 75  Resp:  [16-18] 18  BP: (128-177)/() 158/98    PPE used per hospital policy    Physical Exam:  Constitutional: Not in acute distress.  Eyes: Injected conjunctivae, EOMI.  pupils equal reactive to light.  ENMT: Love 3. No oral thrush.  Moist tongue.    Neck: Trachea midline. No thyromegaly  Heart: Regular rhythm but normal rate.  Systolic murmur over the left sternal border.  Lungs: Equal but slightly diminished air entry bilaterally.  Bilateral crackles more prominent on the left.  No wheezing.  No use of accessory muscles.  Abdomen: Obese. Soft. No tenderness or dullness. No HSM.  Extremities: No cyanosis, clubbing or pitting edema.  Warm extremities and well-perfused.  Neuro: Conscious, alert, oriented x3.  Strength 5/5 in arms.  Psych: Appropriate mood and affect.    Integumentary: No rash.  Normal skin turgor  Lymphatic: No palpable cervical or supraclavicular lymph nodes.      Diagnostic imaging:  I personally and independently reviewed the following images:   CXR 5/11/2023: Diffuse pulmonary infiltrates bilaterally.  Elevated right hemidiaphragm, chronic.    Laboratory workup:    Results from last 7 days   Lab Units 05/11/23  0617 05/11/23  0132   SODIUM mmol/L 127* 129*   POTASSIUM mmol/L 4.3 4.0   CHLORIDE mmol/L 92* 94*   CO2 mmol/L 20.9* 24.2   BUN mg/dL 73* 69*   CREATININE mg/dL 7.63* 8.30*   GLUCOSE mg/dL 220* 192*   CALCIUM mg/dL 8.2* 8.7     Results from last 7 days   Lab Units 05/11/23  0424 05/11/23  0132   HSTROP T ng/L 119* 116*     Results from last 7 days   Lab Units 05/11/23  0617 05/11/23  0132   WBC 10*3/mm3 6.54 5.62   HEMOGLOBIN g/dL 10.5* 11.5*   HEMATOCRIT % 30.6* 32.8*   PLATELETS 10*3/mm3 114* 117*         Results from last 7 days   Lab Units 05/11/23  0132   PROBNP pg/mL 42,114.0*       Assessment   1. Acute pulmonary edema  2. COVID-19 infection: Difficult to determine whether it is just URI or pneumonia due to fluid overload.  I suspect there is an element of pneumonia due to the infiltrates being more prominent on the left side which is not typical for fluid overload  3. Acute hypoxic respiratory failure, secondary to above  4. Acute on chronic  diastolic CHF  5. Chronically elevated right hemidiaphragm could be suggestive of diaphragmatic paralysis    · ESRD  · Hyponatremia  · Chronic normocytic anemia    Recommendations:    · Agree with dialysis.  Nephrology following.  · Dexamethasone 6 mg daily.  Check CRP  · Would not benefit from Remdesevir now due to late presentation.  · Oxygen by NC and titrate keep SPO2 >90%  · DVT prophylaxis with heparin        Briana Humphrey MD  05/11/23  11:40 EDT

## 2023-05-11 NOTE — PLAN OF CARE
Goal Outcome Evaluation:      A/O x3, pt occasionally disoriented to time. Easily reoriented. HD completed this AM with 3L off. O2 weaned to 1L NC. ECHO ordered. Patient's wife to bring in patient's home CPAP and patient supplied medication this evening. VSS.

## 2023-05-11 NOTE — ED TRIAGE NOTES
Pt to triage from home with c/o lethargy and low spo2 - initially 85% on ra in triage, placed on o2 and spo2 came up to 90's on 3lpm.  Pt is m/w/f dialysis, but didn't do dialysis today due to lethargy.  Pt tested positive for covid on 5/4.  Pt wearing mask in triage. Triage personnel wore appropriate PPE

## 2023-05-11 NOTE — NURSING NOTE
CWOCN- consult for colostomy. Patient well known to me from prior ileostomy consultation. His pouch is intact. He wears 2 piece Convatec. We have an equivalent of Brian here so I left a couple in the room. Patient may bring some of his own, depending on his expected length of stay. He has not had any recent ileostomy issues. Will see PRN, staff can call if follow up or supplies are needed. Patient, wife should be independent with ostomy care.

## 2023-05-11 NOTE — CONSULTS
I was requested to see patient to provide spiritual support.  Patient was getting his dialysis treatment when I saw him.  He told me that he gets his treatment 3x a week.  His wife was present as well.  They are members of the [employer] and it sounds like they get good support.  Patient was appreciative of the visit and we concluded with prayer.

## 2023-05-11 NOTE — PLAN OF CARE
Goal Outcome Evaluation:         Patient alert and oriented, 3L NC, wife at bedside. Troponin Delta increased by 3, LHA notified. Order for cardiology consult. Consult called in. No complaints of pain. Order for wound/ostomy evaluation.    0636: Dialysis orders called in.

## 2023-05-11 NOTE — NURSING NOTE
Nursing report ED to floor  Bill Landry  77 y.o.  male    HPI :   Chief Complaint   Patient presents with   • Weakness - Generalized   • low spo2       Admitting doctor:   Bill Stover MD    Admitting diagnosis:   The primary encounter diagnosis was Acute hypoxemic respiratory failure due to COVID-19. Diagnoses of Other fatigue and ESRD (end stage renal disease) were also pertinent to this visit.    Code status:   Current Code Status     Date Active Code Status Order ID Comments User Context       5/11/2023 0239 CPR (Attempt to Resuscitate) [id number]  Lisa Yates APRN ED      Question Answer    Code Status (Patient has no pulse and is not breathing) CPR (Attempt to Resuscitate)    Medical Interventions (Patient has pulse or is breathing) Full Support                Allergies:   Ace inhibitors, Contrast dye (echo or unknown ct/mr), Iodine, Angiotensin receptor blockers, Eliquis [apixaban], Granix [filgrastim], and Keflex [cephalexin]    Isolation:   No active isolations    Intake and Output  No intake or output data in the 24 hours ending 05/11/23 0401    Weight:       05/11/23  0030   Weight: 83 kg (183 lb)       Most recent vitals:   Vitals:    05/11/23 0033 05/11/23 0044 05/11/23 0131 05/11/23 0231   BP:  141/88 150/82 145/82   Pulse:   93 90   Resp:       Temp: 99.3 °F (37.4 °C)      TempSrc: Tympanic      SpO2:   94% 95%   Weight:       Height:           Active LDAs/IV Access:   Lines, Drains & Airways     Active LDAs     Name Placement date Placement time Site Days    Peripheral IV 05/11/23 0132 Right Antecubital 05/11/23 0132  Antecubital  less than 1    Ileostomy LLQ --  present upon arrival to [facility]  --  LLQ  --                Labs (abnormal labs have a star):   Labs Reviewed   COVID-19 AND FLU A/B, NP SWAB IN TRANSPORT MEDIA 8-12 HR TAT - Abnormal; Notable for the following components:       Result Value    COVID19 Detected (*)     All other components within normal limits     Narrative:     Fact sheet for providers: https://www.fda.gov/media/333509/download    Fact sheet for patients: https://www.fda.gov/media/909002/download    Test performed by PCR.  Influenza A and Influenza B negative results should be considered presumptive in samples that have a positive SARS-CoV-2 result.    Competitive inhibition studies showed that SARS-CoV-2 virus, when present at concentrations above 3.6E+04 copies/mL, can inhibit the detection and amplification of influenza A and influenza B virus RNA if present at or below 1.8E+02 copies/mL or 4.9E+02 copies/mL, respectively, and may lead to false negative influenza virus results. If co-infection with influenza A or influenza B virus is suspected in samples with a positive SARS-CoV-2 result, the sample should be re-tested with another FDA cleared, approved, or authorized influenza test, if influenza virus detection would change clinical management.   COMPREHENSIVE METABOLIC PANEL - Abnormal; Notable for the following components:    Glucose 192 (*)     BUN 69 (*)     Creatinine 8.30 (*)     Sodium 129 (*)     Chloride 94 (*)     Albumin 2.9 (*)     AST (SGOT) 63 (*)     Alkaline Phosphatase 170 (*)     Total Bilirubin 1.6 (*)     eGFR 6.1 (*)     All other components within normal limits    Narrative:     GFR Normal >60  Chronic Kidney Disease <60  Kidney Failure <15    The GFR formula is only valid for adults with stable renal function between ages 18 and 70.   TROPONIN - Abnormal; Notable for the following components:    HS Troponin T 116 (*)     All other components within normal limits    Narrative:     High Sensitive Troponin T Reference Range:  <10.0 ng/L- Negative Female for AMI  <15.0 ng/L- Negative Male for AMI  >=10 - Abnormal Female indicating possible myocardial injury.  >=15 - Abnormal Male indicating possible myocardial injury.   Clinicians would have to utilize clinical acumen, EKG, Troponin, and serial changes to determine if it is an Acute  "Myocardial Infarction or myocardial injury due to an underlying chronic condition.        CBC WITH AUTO DIFFERENTIAL - Abnormal; Notable for the following components:    RBC 3.58 (*)     Hemoglobin 11.5 (*)     Hematocrit 32.8 (*)     Platelets 117 (*)     Neutrophil % 84.8 (*)     Lymphocyte % 5.2 (*)     Immature Grans % 2.5 (*)     Lymphocytes, Absolute 0.29 (*)     Immature Grans, Absolute 0.14 (*)     All other components within normal limits   PROCALCITONIN - Abnormal; Notable for the following components:    Procalcitonin 2.38 (*)     All other components within normal limits    Narrative:     As a Marker for Sepsis (Non-Neonates):    1. <0.5 ng/mL represents a low risk of severe sepsis and/or septic shock.  2. >2 ng/mL represents a high risk of severe sepsis and/or septic shock.    As a Marker for Lower Respiratory Tract Infections that require antibiotic therapy:    PCT on Admission    Antibiotic Therapy       6-12 Hrs later    >0.5                Strongly Recommended  >0.25 - <0.5        Recommended   0.1 - 0.25          Discouraged              Remeasure/reassess PCT  <0.1                Strongly Discouraged     Remeasure/reassess PCT    As 28 day mortality risk marker: \"Change in Procalcitonin Result\" (>80% or <=80%) if Day 0 (or Day 1) and Day 4 values are available. Refer to http://www.[facility]-pct-calculator.com    Change in PCT <=80%  A decrease of PCT levels below or equal to 80% defines a positive change in PCT test result representing a higher risk for 28-day all-cause mortality of patients diagnosed with severe sepsis for septic shock.    Change in PCT >80%  A decrease of PCT levels of more than 80% defines a negative change in PCT result representing a lower risk for 28-day all-cause mortality of patients diagnosed with severe sepsis or septic shock.      BNP (IN-HOUSE)   HIGH SENSITIVITIY TROPONIN T 2HR   BASIC METABOLIC PANEL   CBC (NO DIFF)   TROPONIN   POCT GLUCOSE FINGERSTICK   POCT GLUCOSE " FINGERSTICK   POCT GLUCOSE FINGERSTICK   CBC AND DIFFERENTIAL    Narrative:     The following orders were created for panel order CBC & Differential.  Procedure                               Abnormality         Status                     ---------                               -----------         ------                     CBC Auto Differential[[id number]]        Abnormal            Final result                 Please view results for these tests on the individual orders.       EKG:   ECG 12 Lead Dyspnea   Preliminary Result   HEART RATE= 92  bpm   RR Interval= 652  ms   MO Interval=   ms   P Horizontal Axis=   deg   P Front Axis=   deg   QRSD Interval= 97  ms   QT Interval= 377  ms   QRS Axis= -28  deg   T Wave Axis= 57  deg   - ABNORMAL ECG -   Atrial fibrillation   Borderline left axis deviation   Low voltage, extremity leads   Electronically Signed By:    Date and Time of Study: 2023-05-11 01:24:45          Meds given in ED:   Medications   sodium chloride 0.9 % flush 10 mL (has no administration in time range)   sodium chloride 0.9 % flush 10 mL (0 mL Intravenous Hold 5/11/23 0329)   sodium chloride 0.9 % flush 10 mL (has no administration in time range)   sodium chloride 0.9 % infusion 40 mL (has no administration in time range)   nitroglycerin (NITROSTAT) SL tablet 0.4 mg (has no administration in time range)   acetaminophen (TYLENOL) tablet 650 mg (has no administration in time range)     Or   acetaminophen (TYLENOL) 160 MG/5ML solution 650 mg (has no administration in time range)     Or   acetaminophen (TYLENOL) suppository 650 mg (has no administration in time range)   ondansetron (ZOFRAN) tablet 4 mg (has no administration in time range)     Or   ondansetron (ZOFRAN) injection 4 mg (has no administration in time range)   calcium carbonate (TUMS) chewable tablet 500 mg (200 mg elemental) (has no administration in time range)   Enoxaparin Sodium (LOVENOX) syringe 30 mg (has no administration in time range)    dexamethasone (DECADRON) tablet 6 mg (has no administration in time range)     Or   dexamethasone (DECADRON) injection 6 mg (has no administration in time range)   famotidine (PEPCID) tablet 20 mg (has no administration in time range)   dextrose (GLUTOSE) oral gel 15 g (has no administration in time range)   dextrose (D50W) (25 g/50 mL) IV injection 25 g (has no administration in time range)   glucagon (GLUCAGEN) injection 1 mg (has no administration in time range)   insulin lispro (HUMALOG/ADMELOG) injection 2-7 Units (has no administration in time range)   dexamethasone (DECADRON) injection 6 mg (6 mg Intravenous Given 5/11/23 0143)       Imaging results:  XR Chest 1 View    Result Date: 5/11/2023  Electronically signed by Jonn Alcala D.O. on 05-11-23 at 0200      Ambulatory status:   - br    Social issues:   Social History     Socioeconomic History   • Marital status:      Spouse name: Gillian   • Number of children: 2   • Years of education: College   Tobacco Use   • Smoking status: Never   • Smokeless tobacco: Never   Vaping Use   • Vaping Use: Never used   Substance and Sexual Activity   • Alcohol use: No     Comment: caffeine use: none   • Drug use: No   • Sexual activity: Defer       NIH Stroke Scale:         Corey Leal RN  05/11/23 04:01 EDT

## 2023-05-11 NOTE — ED PROVIDER NOTES
EMERGENCY DEPARTMENT ENCOUNTER    Room Number:  12/12  Date seen:  5/11/2023  PCP: Bharathi De La Torre MD      HPI:  Chief Complaint: Fatigue  A complete HPI/ROS/PMH/PSH/SH/FH are unobtainable due to: None  Context: Bill Landry is a 77 y.o. male who presents to the ED c/o fatigue.  He has had actually 10 days of respiratory symptoms with cough.  He was diagnosed with COVID-19 1 week ago on 5/4/2023.  In the last 24 hours, he has been feeling increasingly fatigued and ended up skipping dialysis yesterday.  He does not normally wear oxygen at baseline.        PAST MEDICAL HISTORY  Active Ambulatory Problems     Diagnosis Date Noted   • Permanent atrial fibrillation 05/02/2016   • Thrombocytopenia (HCC) 05/02/2016   • Chronic leukopenia 05/02/2016   • Cirrhosis of liver without ascites 07/24/2017   • Congestive splenomegaly 07/24/2017   • Anemia due to stage 4 chronic kidney disease treated with erythropoietin 10/04/2017   • Esophageal abnormality 01/18/2018   • At risk for fluid volume overload 03/21/2019   • End stage kidney disease (HCC) 03/21/2019   • Other specified glaucoma 05/21/2019   • Dependence on renal dialysis 09/03/2020   • Allergy, unspecified, initial encounter 10/09/2020   • Crohn's disease, unspecified, without complications 01/18/2018   • Deficiency of other specified B group vitamins 01/19/2018   • Dermatitis, unspecified 01/18/2018   • Encounter for immunization 02/19/2018   • Essential (primary) hypertension 01/18/2018   • History of urinary stone 01/18/2018   • Hyperparathyroidism, unspecified 01/19/2018   • 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, with stage 1 through stage 4 chronic kidney disease, or unspecified chronic kidney disease 10/18/2019   • Other disorders of phosphorus metabolism 02/11/2021   • Other iron deficiency anemias 02/10/2018   • Pericardial effusion (noninflammatory) 01/18/2018   • Pure hypertriglyceridemia 01/18/2018   • Renal osteodystrophy  01/18/2018   • Secondary hyperparathyroidism of renal origin 01/18/2018   • 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) 01/19/2018   • Splenomegaly, not elsewhere classified 01/18/2018   • Bilateral pseudophakia 05/05/2021   • Dermatochalasis of eyelid 05/05/2021   • Primary open-angle glaucoma, bilateral, moderate stage 05/05/2021   • Puckering of macula, left eye 05/05/2021   • Secondary cataract 05/05/2021   • AV fistula occlusion, initial encounter 02/15/2022   • TATE (obstructive sleep apnea)    • Upper extremity aneurysm 05/27/2022   • Aortic stenosis, mild 07/20/2022   • Bicuspid aortic valve 07/20/2022   • Hyperuricemia without signs of inflammatory arthritis and tophaceous disease 08/03/2022   • Presbyopia 05/13/2021   • Shortness of breath 02/19/2022     Resolved Ambulatory Problems     Diagnosis Date Noted   • Anemia 01/12/2018   • Pneumonia of both lungs due to infectious organism 01/19/2018   • Anaphylactic shock, unspecified, initial encounter 10/09/2020   • Moderate protein-calorie malnutrition 12/16/2020   • Sleep apnea, unspecified 01/18/2018   • Unspecified atrial fibrillation (HCC) 01/19/2018     Past Medical History:   Diagnosis Date   • Abnormal serum protein electrophoresis    • Crohn disease    • Diastolic CHF    • Diverticula of colon    • ESRD on hemodialysis    • Fatty liver disease, nonalcoholic    • GERD (gastroesophageal reflux disease)    • GI bleed    • Glaucoma    • H/O Gallstone pancreatitis    • H/O Pericardial effusion    • H/O sinus bradycardia    • Heart murmur    • History of hypertension    • History of UTI    • Hx of renal calculi    • Ileostomy present    • Iron deficiency    • MGUS (monoclonal gammopathy of unknown significance)    • Sleep apnea    • Type 2 diabetes mellitus          PAST SURGICAL HISTORY  Past Surgical History:   Procedure Laterality Date   • APPENDECTOMY     • ARTERIOVENOUS FISTULA/SHUNT SURGERY Left 08/08/2017    Procedure: LEFT  BRACHIAL CEPHALIC AV FISTULA FORMATION WITH CEPHALIC VEIN TRANSPOSITION ;  Surgeon: Bill Deal MD;  Location: [facility] OR;  Service:    • ARTERIOVENOUS FISTULA/SHUNT SURGERY Left 5/27/2022    Procedure: OPEN REVISION LEFT ARTERIOVENOUS INTERPOSITION GRAFT PLACEMENT;  Surgeon: Bill Deal MD;  Location: [facility] OR;  Service: Vascular;  Laterality: Left;   • ARTERIOVENOUS FISTULA/SHUNT SURGERY W/ HEMODIALYSIS CATHETER INSERTION Left 02/15/2022    Procedure: OPEN LEFT ARM ARTERIOVENOUS FISTULA THROMBECTOMY WITH CEPHALIC VEIN ARTHROPLASTY AND STENT/GRAFT PLACEMENT;  Surgeon: Bill Deal MD;  Location: [facility] OR 18/19;  Service: Vascular;  Laterality: Left;   • CATARACT EXTRACTION WITH INTRAOCULAR LENS IMPLANT Bilateral    • CHOLECYSTECTOMY     • COLECTOMY PARTIAL / TOTAL      History of inflammatory bowel disease with status post colectomy with ileostomy many years ago in the [facility] [place],  18 YEARS OF AGE   • COLONOSCOPY     • CYSTOSCOPY LITHOLAPAXY BLADDER STONE EXTRACTION     • ENDOSCOPY  01/16/2015    gastritis   • ENDOSCOPY  01/17/2018    Procedure: ESOPHAGOGASTRODUODENOSCOPY;  Surgeon: Warner Neville MD;  Location: [facility] ENDOSCOPY;  Service:    • ILEOSCOPY  01/16/2015    normal   • ILEOSCOPY N/A 01/17/2018    Procedure: ILEOSCOPY;  Surgeon: Warner Neville MD;  Location: [facility] ENDOSCOPY;  Service:    • TONSILLECTOMY           FAMILY HISTORY  Family History   Problem Relation Age of Onset   • Heart failure Mother    • Hypertension Mother    • Heart disease Mother    • Hyperlipidemia Mother    • Hypertension Father    • Malig Hyperthermia Neg Hx          SOCIAL HISTORY  Social History     Socioeconomic History   • Marital status:      Spouse name: Gillian   • Number of children: 2   • Years of education: College   Tobacco Use   • Smoking status: Never   • Smokeless tobacco: Never   Vaping Use   • Vaping Use: Never used   Substance and Sexual Activity   • Alcohol use: No      Comment: caffeine use: none   • Drug use: No   • Sexual activity: Defer         ALLERGIES  Ace inhibitors, Contrast dye (echo or unknown ct/mr), Iodine, Angiotensin receptor blockers, Eliquis [apixaban], Granix [filgrastim], and Keflex [cephalexin]        REVIEW OF SYSTEMS  Review of Systems     All systems reviewed and negative except for those discussed in HPI.       PHYSICAL EXAM  ED Triage Vitals   Temp Heart Rate Resp BP SpO2   05/11/23 0033 05/11/23 0029 05/11/23 0029 05/11/23 0044 05/11/23 0029   99.3 °F (37.4 °C) 109 16 141/88 (!) 85 %      Temp src Heart Rate Source Patient Position BP Location FiO2 (%)   05/11/23 0033 05/11/23 0029 -- -- --   Tympanic Monitor          Physical Exam      GENERAL: no acute distress  HENT: nares patent  EYES: no scleral icterus  CV: regular rhythm, normal rate  RESPIRATORY: normal effort, bibasilar lung crackles more prominent in the left, satting 92% on 3 L nasal cannula  ABDOMEN: soft, nontender  MUSCULOSKELETAL: no deformity  NEURO: alert, moves all extremities, follows commands  PSYCH:  calm, cooperative  SKIN: warm, dry    Vital signs and nursing notes reviewed.          LAB RESULTS  Recent Results (from the past 24 hour(s))   ECG 12 Lead Dyspnea    Collection Time: 05/11/23  1:24 AM   Result Value Ref Range    QT Interval 377 ms   COVID-19 and FLU A/B PCR - Swab, Nasopharynx    Collection Time: 05/11/23  1:31 AM    Specimen: Nasopharynx; Swab   Result Value Ref Range    COVID19 Detected (C) Not Detected - Ref. Range    Influenza A PCR Not Detected Not Detected    Influenza B PCR Not Detected Not Detected   Comprehensive Metabolic Panel    Collection Time: 05/11/23  1:32 AM    Specimen: Blood   Result Value Ref Range    Glucose 192 (H) 65 - 99 mg/dL    BUN 69 (H) 8 - 23 mg/dL    Creatinine 8.30 (H) 0.76 - 1.27 mg/dL    Sodium 129 (L) 136 - 145 mmol/L    Potassium 4.0 3.5 - 5.2 mmol/L    Chloride 94 (L) 98 - 107 mmol/L    CO2 24.2 22.0 - 29.0 mmol/L    Calcium 8.7 8.6 -  10.5 mg/dL    Total Protein 7.3 6.0 - 8.5 g/dL    Albumin 2.9 (L) 3.5 - 5.2 g/dL    ALT (SGPT) 40 1 - 41 U/L    AST (SGOT) 63 (H) 1 - 40 U/L    Alkaline Phosphatase 170 (H) 39 - 117 U/L    Total Bilirubin 1.6 (H) 0.0 - 1.2 mg/dL    Globulin 4.4 gm/dL    A/G Ratio 0.7 g/dL    BUN/Creatinine Ratio 8.3 7.0 - 25.0    Anion Gap 10.8 5.0 - 15.0 mmol/L    eGFR 6.1 (L) >60.0 mL/min/1.73   High Sensitivity Troponin T    Collection Time: 05/11/23  1:32 AM    Specimen: Blood   Result Value Ref Range    HS Troponin T 116 (C) <15 ng/L   CBC Auto Differential    Collection Time: 05/11/23  1:32 AM    Specimen: Blood   Result Value Ref Range    WBC 5.62 3.40 - 10.80 10*3/mm3    RBC 3.58 (L) 4.14 - 5.80 10*6/mm3    Hemoglobin 11.5 (L) 13.0 - 17.7 g/dL    Hematocrit 32.8 (L) 37.5 - 51.0 %    MCV 91.6 79.0 - 97.0 fL    MCH 32.1 26.6 - 33.0 pg    MCHC 35.1 31.5 - 35.7 g/dL    RDW 12.5 12.3 - 15.4 %    RDW-SD 41.9 37.0 - 54.0 fl    MPV 8.9 6.0 - 12.0 fL    Platelets 117 (L) 140 - 450 10*3/mm3    Neutrophil % 84.8 (H) 42.7 - 76.0 %    Lymphocyte % 5.2 (L) 19.6 - 45.3 %    Monocyte % 5.9 5.0 - 12.0 %    Eosinophil % 1.2 0.3 - 6.2 %    Basophil % 0.4 0.0 - 1.5 %    Immature Grans % 2.5 (H) 0.0 - 0.5 %    Neutrophils, Absolute 4.77 1.70 - 7.00 10*3/mm3    Lymphocytes, Absolute 0.29 (L) 0.70 - 3.10 10*3/mm3    Monocytes, Absolute 0.33 0.10 - 0.90 10*3/mm3    Eosinophils, Absolute 0.07 0.00 - 0.40 10*3/mm3    Basophils, Absolute 0.02 0.00 - 0.20 10*3/mm3    Immature Grans, Absolute 0.14 (H) 0.00 - 0.05 10*3/mm3    nRBC 0.0 0.0 - 0.2 /100 WBC       Ordered the above labs and reviewed the results.        RADIOLOGY  XR Chest 1 View    Result Date: 5/11/2023  Patient: GAEL ZARATE  Time Out: 02:00 Exam(s): XR CXR 1 VIEW EXAM:   XR Chest, 1 View CLINICAL HISTORY:    Reason for exam: low oxygen. TECHNIQUE:   Frontal view of the chest. COMPARISON: January 11, 2022. FINDINGS:   Lungs: Bibasilar pulmonary infiltrates are present, more prominent on  the left.  Pulmonary edema is present.  Right hemidiaphragm elevation present, unchanged.   Pleural space:  Unremarkable.  No pneumothorax.   Heart: Cardiomegaly is present.   Mediastinum:  Unremarkable.   Bones joints: Degenerative disc disease present. IMPRESSION:     Findings are concerning for congestive heart failure, but also identified are scattered pulmonary infiltrates, most prominent on the left.     Electronically signed by Jonn Alcala D.O. on 05-11-23 at 0200     Ordered the above noted radiological studies. Reviewed by me in PACS.          PROCEDURES  Procedures          MEDICATIONS GIVEN IN ER  Medications   sodium chloride 0.9 % flush 10 mL (has no administration in time range)   dexamethasone (DECADRON) injection 6 mg (6 mg Intravenous Given 5/11/23 0143)         MEDICAL DECISION MAKING, PROGRESS, and CONSULTS    Discussion below represents my analysis of pertinent findings related to patient's condition, differential diagnosis, treatment plan and final disposition.      Orders placed during this visit:  Orders Placed This Encounter   Procedures   • COVID-19 and FLU A/B PCR - Swab, Nasopharynx   • XR Chest 1 View   • Comprehensive Metabolic Panel   • High Sensitivity Troponin T   • BNP   • CBC Auto Differential   • High Sensitivity Troponin T 2Hr   • Monitor Blood Pressure   • Cardiac Monitoring   • Pulse Oximetry, Continuous   • LHA (on-call MD unless specified) Details   • ECG 12 Lead Dyspnea   • Insert Peripheral IV   • Inpatient Admission   • CBC & Differential         Additional sources:  - Discussed/obtained information from independent historians: Wife and son at bedside  Additional information was obtained to confirm the patient's history.      Differential diagnosis:    Pneumonia, viral syndrome, COVID-19, pulmonary edema      Independent interpretation of labs, radiology studies, and discussions with consultants:  ED Course as of 05/11/23 0320   u May 11, 2023   0129 EKG independently  interpreted by myself.  Time 1:24 AM.  Atrial fibrillation.  Heart rate 92.  Normal axis.  Low voltage in the limb leads.  No acute ST abnormality. [TD]   0213 Sodium(!): 129 [TD]   0213 Potassium: 4.0 [TD]   0213 SpO2(!): 85 % [TD]   0227 HS Troponin T(!!): 116 [TD]      ED Course User Index  [TD] Tab Ferrer II, MD         I discussed the case with JAME Sloan for hospitalist medicine.  We reviewed patient's labs and history.  She will admit.    Patient did miss dialysis.  I think that any concern for vascular congestion will improve with his dialysis.  I think his primary issue is worsening COVID-19.      DIAGNOSIS  Final diagnoses:   Acute hypoxemic respiratory failure due to COVID-19   Other fatigue   ESRD (end stage renal disease)         DISPOSITION  Admit      Latest Documented Vital Signs:  As of 02:32 EDT  BP- 150/82 HR- 93 Temp- 99.3 °F (37.4 °C) (Tympanic) O2 sat- 94%      --    Please note that portions of this were completed with a voice recognition program.       Note Disclaimer: At [facility], we believe that sharing information builds trust and better relationships. You are receiving this note because you are receiving care at [facility] or recently visited. It is possible you will see health information before a provider has talked with you about it. This kind of information can be easy to misunderstand. To help you fully understand what it means for your health, we urge you to discuss this note with your provider.         Tab Ferrer II, MD  05/11/23 9545       Tab Ferrer II, MD  05/11/23 6765

## 2023-05-11 NOTE — CASE MANAGEMENT/SOCIAL WORK
Discharge Planning Assessment  [facility]     Patient Name: Bill Landry  MRN: [id number]  Today's Date: 5/11/2023    Admit Date: 5/11/2023    Plan: Return home with spouse   Discharge Needs Assessment     Row Name 05/11/23 1249       Living Environment    People in Home spouse    Name(s) of People in Home Maikol Ramos    Current Living Arrangements home    Primary Care Provided by self    Provides Primary Care For no one    Family Caregiver if Needed spouse    Quality of Family Relationships helpful    Able to Return to Prior Arrangements yes       Resource/Environmental Concerns    Resource/Environmental Concerns none    Transportation Concerns none       Food Insecurity    Within the past 12 months, you worried that your food would run out before you got the money to buy more. Never true    Within the past 12 months, the food you bought just didn't last and you didn't have money to get more. Never true       Transition Planning    Patient/Family Anticipates Transition to home with family    Patient/Family Anticipated Services at Transition none    Transportation Anticipated family or friend will provide       Discharge Needs Assessment    Readmission Within the Last 30 Days no previous admission in last 30 days    Equipment Currently Used at Home shower chair;cpap    Concerns to be Addressed denies needs/concerns at this time    Anticipated Changes Related to Illness none    Equipment Needed After Discharge none               Discharge Plan     Row Name 05/11/23 1251       Plan    Plan Return home with spouse    Patient/Family in Agreement with Plan yes    Plan Comments Spoke with patient and wife Rachel [phone].  Patient is IADL, uses a C-pap from [employer], has a shower chair.  He has used HH in the past and has never been to SNF.  PCp is Dr. Rafat De La Torre and pharmacy is [facility].  Patient drives himself to HD at [facility] on MWF.  Wife does not drive, son Jann [phone]  would help if needed.  He plans to return home at DC and does not anticipate any DC needs.  CCP will follow.  Ariane XIAO              Continued Care and Services - Admitted Since 5/11/2023    Coordination has not been started for this encounter.       Expected Discharge Date and Time     Expected Discharge Date Expected Discharge Time    May 15, 2023          Demographic Summary     Row Name 05/11/23 1249       General Information    Admission Type inpatient    Arrived From home    Referral Source admission list    Reason for Consult discharge planning    Preferred Language English               Functional Status     Row Name 05/11/23 1249       Functional Status    Usual Activity Tolerance moderate    Current Activity Tolerance moderate       Functional Status, IADL    Medications independent    Meal Preparation independent;assistive person  Wife    Housekeeping assistive person    Laundry assistive person    Shopping independent       Mental Status    General Appearance WDL WDL       Mental Status Summary    Recent Changes in Mental Status/Cognitive Functioning no changes                         Becky S. [place], RN

## 2023-05-11 NOTE — CONSULTS
Patient Name: Bill Landry  Age/Sex: 77 y.o. male  : 1945  MRN: [id number]    Date of Admission: 2023  Date of Encounter Visit: 23  Encounter Provider: Preston Toledo MD  Place of Service: [facility] CARDIOLOGY      Referring Provider: No ref. provider found  Patient Care Team:  Bharathi De La Torre MD as PCP - General (Internal Medicine)  Sharif Mckenzie MD as Consulting Physician (Hematology and Oncology)  Marcellus Osborne MD as Referring Physician (Internal Medicine)  Ken Moseley MD as Consulting Physician (Pulmonary Disease)  Cooper Virgen MD as Consulting Physician (Urology)  Dale Vázquez MD as Consulting Physician (Cardiology)  Myra Moore MD as Consulting Physician (Nephrology)    Subjective:     Admitted/Consulted for: Elevated troponin    Chief Complaint: Fatigue, shortness of air       History of Present Illness:   77 y.o. male who sees Dr Vázquez in our office.  He has a PMH of chronic heart failure with preserved ejection fraction, bicuspid aortic valve, cirrhosis, renal failure on dialysis, Crohns disease with ileostomy, TATE, atrial fibrillation (not on AC due to GIB), and DM type II.    He presented to the ED with fatigue and shortness of air.  He was diagnosed with Covid  1 week ago and skipped dialysis yesterday.      He was last seen in the office in April by Dr Vázquez. His atrial fibrillation was rate controlled at that time.    Initial proBNP was >42,000, HS troponin was 116 with a repeat delta of 3. Creatinine is 7.63 and HGB is 10.5.   Patient has been seen by nephrology and pulmonology.  3 L removed with dialysis today and patient states that he is breathing easier.  Denies any chest pain.      Previous testing:    ECHO 22   • Estimated left ventricular EF = 60% Left ventricular systolic function is normal. Normal global longitudinal LV strain (GLS) = -22.1%. Left ventricle strain data was reviewed by the  physician and found to be accurate. Normal left ventricular cavity size noted. Left ventricular wall thickness is consistent with borderline concentric hypertrophy. All left ventricular wall segments contract normally. Left ventricular diastolic function was indeterminate.  • The left atrial cavity is moderately dilated.  • The aortic valve is abnormal in structure. A functionally bicuspid aortic valve. There is moderate to severe thickening of the aortic valve. Trace aortic valve regurgitation is present. Mild aortic valve stenosis is present    Past Medical History:  Past Medical History:   Diagnosis Date   • Abnormal serum protein electrophoresis    • Anemia     Multifactorial   • Bicuspid aortic valve 7/20/2022   • Chronic leukopenia and thrombocytopenia    • Cirrhosis of liver without ascites 07/24/2017   • Congestive splenomegaly 07/24/2017   • Crohn disease     with ileostomy   • Diastolic CHF     although it was likely from volume overload due to ESRD   • Diverticula of colon    • ESRD on hemodialysis     M-W-F [facility]   • Fatty liver disease, nonalcoholic    • GERD (gastroesophageal reflux disease)    • GI bleed    • Glaucoma    • H/O Gallstone pancreatitis    • H/O Pericardial effusion    • H/O sinus bradycardia    • Heart murmur    • History of hypertension     resolved   • History of UTI    • Hx of renal calculi    • Ileostomy present    • Iron deficiency    • MGUS (monoclonal gammopathy of unknown significance)    • TATE (obstructive sleep apnea)    • Permanent atrial fibrillation    • Sleep apnea     CPAP USED   • Type 2 diabetes mellitus     CURRENTLY TAKES NO MED       Past Surgical History:   Procedure Laterality Date   • APPENDECTOMY     • ARTERIOVENOUS FISTULA/SHUNT SURGERY Left 08/08/2017    Procedure: LEFT BRACHIAL CEPHALIC AV FISTULA FORMATION WITH CEPHALIC VEIN TRANSPOSITION ;  Surgeon: Bill Deal MD;  Location: [facility];  Service:    • ARTERIOVENOUS FISTULA/SHUNT SURGERY Left  5/27/2022    Procedure: OPEN REVISION LEFT ARTERIOVENOUS INTERPOSITION GRAFT PLACEMENT;  Surgeon: Bill Bermeo MD;  Location: [facility] MAIN OR;  Service: Vascular;  Laterality: Left;   • ARTERIOVENOUS FISTULA/SHUNT SURGERY W/ HEMODIALYSIS CATHETER INSERTION Left 02/15/2022    Procedure: OPEN LEFT ARM ARTERIOVENOUS FISTULA THROMBECTOMY WITH CEPHALIC VEIN ARTHROPLASTY AND STENT/GRAFT PLACEMENT;  Surgeon: Bill Bermeo MD;  Location: [facility] OR 18/19;  Service: Vascular;  Laterality: Left;   • CATARACT EXTRACTION WITH INTRAOCULAR LENS IMPLANT Bilateral    • CHOLECYSTECTOMY     • COLECTOMY PARTIAL / TOTAL      History of inflammatory bowel disease with status post colectomy with ileostomy many years ago in the [facility],  18 YEARS OF AGE   • COLONOSCOPY     • CYSTOSCOPY LITHOLAPAXY BLADDER STONE EXTRACTION     • ENDOSCOPY  01/16/2015    gastritis   • ENDOSCOPY  01/17/2018    Procedure: ESOPHAGOGASTRODUODENOSCOPY;  Surgeon: Warner Neville MD;  Location: [facility] ENDOSCOPY;  Service:    • ILEOSCOPY  01/16/2015    normal   • ILEOSCOPY N/A 01/17/2018    Procedure: ILEOSCOPY;  Surgeon: Warner Neville MD;  Location: [facility] ENDOSCOPY;  Service:    • TONSILLECTOMY         Home Medications:   Medications Prior to Admission   Medication Sig Dispense Refill Last Dose   • alfuzosin (UROXATRAL) 10 MG 24 hr tablet Take 1 tablet by mouth Every Other Day. Does not take on tues sun or thurs   5/10/2023   • aspirin 81 MG tablet Take 1 tablet by mouth Daily. INSTRUCTED TO CONTINUE THIS FOR SURGERY PER DR. BERMEO'S OFFICE   5/10/2023   • B Complex-C-Folic Acid (EMILIE-FREDDY) tablet Take 1 tablet by mouth Daily. 0.8 mg  3 5/10/2023   • benzonatate (TESSALON) 200 MG capsule Take 1 capsule by mouth 3 (Three) Times a Day As Needed for Cough. 30 capsule 0 Past Week   • famotidine (PEPCID) 10 MG tablet Take 1 tablet by mouth As Needed for Heartburn.   5/10/2023   • Iron Sucrose (VENOFER IV) Infuse  into a venous catheter As Needed.  USUALLY ONCE MONTH   5/10/2023   • latanoprost (XALATAN) 0.005 % ophthalmic solution Administer 1 drop to both eyes Every Night. 1 drop each eye   5/10/2023   • Loratadine (CLARITIN PO) Take 1 tablet by mouth As Needed. Pt states he is uncertain of dosage   5/10/2023   • sodium chloride 0.65 % nasal spray 2 sprays into the nostril(s) as directed by provider Every Night.   5/10/2023   • Sucroferric Oxyhydroxide (Velphoro) 500 MG chewable tablet Chew 1 tablet 3 (Three) Times a Day. AFTER EACH  MEAL   5/10/2023   • lidocaine-prilocaine (EMLA) 2.5-2.5 % cream Apply 1 application topically to the appropriate area as directed. Ooyiad-Mvwkzivso-Lbqfpn:  ONE HOUR PRIOR TO DIALYSIS  3        Allergies:  Allergies   Allergen Reactions   • Ace Inhibitors Other (See Comments)     RENAL FAILURE/ raised creatinine   • Contrast Dye (Echo Or Unknown Ct/Mr) Other (See Comments)     RENAL FAILURE   • Iodine Anaphylaxis   • Angiotensin Receptor Blockers Swelling   • Eliquis [Apixaban] Arrhythmia     BLEEDING ISSUES; PT CANNOT TAKE ANY ANTICOAGULANTS DUE TO LOW PLATELETS EXCEPT ASPIRIN     • Granix [Filgrastim] GI Intolerance     Chest pain  Chest pain     • Keflex [Cephalexin] Diarrhea     RENAL FAILURE       Past Social History:  Social History     Socioeconomic History   • Marital status:      Spouse name: Gillian   • Number of children: 2   • Years of education: College   Tobacco Use   • Smoking status: Never   • Smokeless tobacco: Never   Vaping Use   • Vaping Use: Never used   Substance and Sexual Activity   • Alcohol use: No     Comment: caffeine use: none   • Drug use: No   • Sexual activity: Defer        Past Family History:  Family History   Problem Relation Age of Onset   • Heart failure Mother    • Hypertension Mother    • Heart disease Mother    • Hyperlipidemia Mother    • Hypertension Father    • Malig Hyperthermia Neg Hx          Review of Systems:  All systems reviewed. Pertinent positives identified in HPI.  "All other systems are negative.       Objective:     Objective:  Temp:  [96.3 °F (35.7 °C)-99.3 °F (37.4 °C)] 96.3 °F (35.7 °C)  Heart Rate:  [] 88  Resp:  [16-18] 18  BP: (128-177)/() 138/82    Intake/Output Summary (Last 24 hours) at 5/11/2023 1429  Last data filed at 5/11/2023 1209  Gross per 24 hour   Intake 0 ml   Output 3000 ml   Net -3000 ml     Body mass index is 25.64 kg/m².      05/11/23  0030 05/11/23  0409   Weight: 83 kg (183 lb) 81.1 kg (178 lb 11.2 oz)           Physical Exam:   Temp:  [96.3 °F (35.7 °C)-99.3 °F (37.4 °C)] 96.3 °F (35.7 °C)  Heart Rate:  [] 88  Resp:  [16-18] 18  BP: (128-177)/() 138/82    Intake/Output Summary (Last 24 hours) at 5/11/2023 1429  Last data filed at 5/11/2023 1209  Gross per 24 hour   Intake 0 ml   Output 3000 ml   Net -3000 ml     Flowsheet Rows    Flowsheet Row First Filed Value   Admission Height 177.8 cm (70\") Documented at 05/11/2023 0030   Admission Weight 83 kg (183 lb) Documented at 05/11/2023 0030          General Appearance:    Alert, cooperative, in no acute distress   Head:    Normocephalic, without obvious abnormality, atraumatic       Neck/Lymph   No adenopathy, supple, no thyromegaly, no carotid bruit, no    JVD   Lungs:     Clear to auscultation bilaterally, no wheezes, rales, or     rhonchi    Cardiac:    Normal rate, regular rhythm, no murmur, no rub, no gallop   Chest Wall:    No abnormalities observed   GI:     Normal bowel sounds, soft, nontender, nondistended,            no rebound tenderness   Extremities:   No cyanosis, clubbing, or edema   Circulatory/Peripheral Vascular :   Pulses palpable and equal bilaterally   Integumentary:   No bleeding or rash. Normal temperature         Lab Review:     Results from last 7 days   Lab Units 05/11/23  0617 05/11/23  0132   SODIUM mmol/L 127* 129*   POTASSIUM mmol/L 4.3 4.0   CHLORIDE mmol/L 92* 94*   CO2 mmol/L 20.9* 24.2   BUN mg/dL 73* 69*   CREATININE mg/dL 7.63* 8.30*   GLUCOSE " mg/dL 220* 192*   CALCIUM mg/dL 8.2* 8.7       Results from last 7 days   Lab Units 05/11/23  0424 05/11/23  0132   HSTROP T ng/L 119* 116*     Results from last 7 days   Lab Units 05/11/23  0617   WBC 10*3/mm3 6.54   HEMOGLOBIN g/dL 10.5*   HEMATOCRIT % 30.6*   PLATELETS 10*3/mm3 114*                     Results from last 7 days   Lab Units 05/11/23  0132   PROBNP pg/mL 42,114.0*               Imaging:    Imaging Results (Most Recent)     Procedure Component Value Units Date/Time    XR Chest 1 View [[id number]] Collected: 05/11/23 0200     Updated: 05/11/23 0200    Narrative:        Patient: GAEL ZARATE  Time Out: 02:00  Exam(s): XR CXR 1 VIEW     EXAM:    XR Chest, 1 View    CLINICAL HISTORY:     Reason for exam: low oxygen.    TECHNIQUE:    Frontal view of the chest.    COMPARISON:  January 11, 2022.    FINDINGS:    Lungs: Bibasilar pulmonary infiltrates are present, more prominent on   the left.  Pulmonary edema is present.  Right hemidiaphragm elevation   present, unchanged.    Pleural space:  Unremarkable.  No pneumothorax.    Heart: Cardiomegaly is present.    Mediastinum:  Unremarkable.    Bones joints: Degenerative disc disease present.    IMPRESSION:       Findings are concerning for congestive heart failure, but also identified   are scattered pulmonary infiltrates, most prominent on the left.      Impression:          Electronically signed by Jonn Alcala D.O. on 05-11-23 at 0200          Results for orders placed during the hospital encounter of 07/19/22    Adult Transthoracic Echo Complete W/ Cont if Necessary Per Protocol    Interpretation Summary  · Estimated left ventricular EF = 60% Left ventricular systolic function is normal. Normal global longitudinal LV strain (GLS) = -22.1%. Left ventricle strain data was reviewed by the physician and found to be accurate. Normal left ventricular cavity size noted. Left ventricular wall thickness is consistent with borderline concentric hypertrophy. All left  ventricular wall segments contract normally. Left ventricular diastolic function was indeterminate.  · The left atrial cavity is moderately dilated.  · The aortic valve is abnormal in structure. A functionally bicuspid aortic valve. There is moderate to severe thickening of the aortic valve. Trace aortic valve regurgitation is present. Mild aortic valve stenosis is present.      EKG:             BASELINE:     I personally viewed and interpreted the patient's EKG/Telemetry data.    Assessment:   Assessment & Plan   1.  COVID-pneumonia  2   Acute hypoxic respiratory failure: Multifactorial.  COVID and volume overload with pulmonary edema  3.  Non-ST elevation MI: Type II  4.  Chronic atrial fibrillation: Rate controlled  5.  End-stage renal disease on hemodialysis    -Patient is currently doing well.  -Continue aspirin 81 mg p.o. daily.  -No ischemic work-up indicated  -Repeat echo ordered    Thank you for allowing me to participate in the care of Bill Landry. Feel free to contact me directly with any further questions or concerns.    Preston Toledo MD  [facility]  05/11/23  14:29 EDT

## 2023-05-11 NOTE — H&P
Patient Name:  Bill Landry  YOB: 1945  MRN:  [id number]  Admit Date:  5/11/2023  Patient Care Team:  Bharathi De La Torre MD as PCP - General (Internal Medicine)  Sharif Mckenzie MD as Consulting Physician (Hematology and Oncology)  Marcellus Osborne MD as Referring Physician (Internal Medicine)  Ken Moseley MD as Consulting Physician (Pulmonary Disease)  Cooper Virgen MD as Consulting Physician (Urology)  Dale Vázquez MD as Consulting Physician (Cardiology)  Myra Moore MD as Consulting Physician (Nephrology)      Subjective   History Present Illness     Chief Complaint   Patient presents with   • Weakness - Generalized   • low spo2       Mr. Landry is a 77 y.o. history of numerous chronic medical conditions including Crohn's status post colectomy/ileostomy 1968, cirrhosis, ESRD, HTN, monoclonal gammopathy with pancytopenia, a, permanent atrial fibrillation, prior GI bleed on OAC, diastolic CHF, pulmonary HTN, bicuspid aortic valve that presents with COVID-19.  He was diagnosed with COVID-19 on 5/4 but has had symptoms for 10 days.  Complains of shortness of breath, mild cough, lethargy, decreased appetite.  He denies fever, chills, nausea, vomiting, diarrhea, abdominal pain, chest pain palpitations.  He has hypoxia with oxygen saturations 85% on room air, lethargy, weakness.  He missed dialysis yesterday due to his illness.      History of Present Illness  Review of Systems   Constitutional: Positive for activity change, appetite change and chills. Negative for unexpected weight change.   HENT: Negative for trouble swallowing.    Respiratory: Positive for cough and shortness of breath. Negative for choking and chest tightness.    Cardiovascular: Negative for chest pain, palpitations and leg swelling.   Gastrointestinal: Negative for abdominal distention, abdominal pain, blood in stool, constipation, diarrhea, nausea, rectal pain and vomiting.   Genitourinary: Negative for  hematuria.   Musculoskeletal: Negative for back pain.   Skin: Negative for color change.   Neurological: Negative for dizziness.   Hematological: Does not bruise/bleed easily.   Psychiatric/Behavioral: Negative.  Negative for confusion.        Personal History     Past Medical History:   Diagnosis Date   • Abnormal serum protein electrophoresis    • Anemia     Multifactorial   • Bicuspid aortic valve 7/20/2022   • Chronic leukopenia and thrombocytopenia    • Cirrhosis of liver without ascites 07/24/2017   • Congestive splenomegaly 07/24/2017   • Crohn disease     with ileostomy   • Diastolic CHF     although it was likely from volume overload due to ESRD   • Diverticula of colon    • ESRD on hemodialysis     M-W-F [facility]   • Fatty liver disease, nonalcoholic    • GERD (gastroesophageal reflux disease)    • GI bleed    • Glaucoma    • H/O Gallstone pancreatitis    • H/O Pericardial effusion    • H/O sinus bradycardia    • Heart murmur    • History of hypertension     resolved   • History of UTI    • Hx of renal calculi    • Ileostomy present    • Iron deficiency    • MGUS (monoclonal gammopathy of unknown significance)    • TATE (obstructive sleep apnea)    • Permanent atrial fibrillation    • Sleep apnea     CPAP USED   • Type 2 diabetes mellitus     CURRENTLY TAKES NO MED     Past Surgical History:   Procedure Laterality Date   • APPENDECTOMY     • ARTERIOVENOUS FISTULA/SHUNT SURGERY Left 08/08/2017    Procedure: LEFT BRACHIAL CEPHALIC AV FISTULA FORMATION WITH CEPHALIC VEIN TRANSPOSITION ;  Surgeon: Bill Deal MD;  Location: [facility];  Service:    • ARTERIOVENOUS FISTULA/SHUNT SURGERY Left 5/27/2022    Procedure: OPEN REVISION LEFT ARTERIOVENOUS INTERPOSITION GRAFT PLACEMENT;  Surgeon: Bill Deal MD;  Location: [facility];  Service: Vascular;  Laterality: Left;   • ARTERIOVENOUS FISTULA/SHUNT SURGERY W/ HEMODIALYSIS CATHETER INSERTION Left 02/15/2022    Procedure: OPEN LEFT ARM ARTERIOVENOUS  FISTULA THROMBECTOMY WITH CEPHALIC VEIN ARTHROPLASTY AND STENT/GRAFT PLACEMENT;  Surgeon: Bill Bermeo MD;  Location: [facility] OR 18/19;  Service: Vascular;  Laterality: Left;   • CATARACT EXTRACTION WITH INTRAOCULAR LENS IMPLANT Bilateral    • CHOLECYSTECTOMY     • COLECTOMY PARTIAL / TOTAL      History of inflammatory bowel disease with status post colectomy with ileostomy many years ago in the [facility],  18 YEARS OF AGE   • COLONOSCOPY     • CYSTOSCOPY LITHOLAPAXY BLADDER STONE EXTRACTION     • ENDOSCOPY  01/16/2015    gastritis   • ENDOSCOPY  01/17/2018    Procedure: ESOPHAGOGASTRODUODENOSCOPY;  Surgeon: Warner Neville MD;  Location: [facility] ENDOSCOPY;  Service:    • ILEOSCOPY  01/16/2015    normal   • ILEOSCOPY N/A 01/17/2018    Procedure: ILEOSCOPY;  Surgeon: Warner Neville MD;  Location: [facility] ENDOSCOPY;  Service:    • TONSILLECTOMY       Family History   Problem Relation Age of Onset   • Heart failure Mother    • Hypertension Mother    • Heart disease Mother    • Hyperlipidemia Mother    • Hypertension Father    • Malig Hyperthermia Neg Hx      Social History     Tobacco Use   • Smoking status: Never   • Smokeless tobacco: Never   Vaping Use   • Vaping Use: Never used   Substance Use Topics   • Alcohol use: No     Comment: caffeine use: none   • Drug use: No     No current facility-administered medications on file prior to encounter.     Current Outpatient Medications on File Prior to Encounter   Medication Sig Dispense Refill   • alfuzosin (UROXATRAL) 10 MG 24 hr tablet Take 1 tablet by mouth Every Other Day. Does not take on tues sun or thurs     • aspirin 81 MG tablet Take 1 tablet by mouth Daily. INSTRUCTED TO CONTINUE THIS FOR SURGERY PER DR. BERMEO'S OFFICE     • B Complex-C-Folic Acid (EMILIE-FREDDY) tablet Take 1 tablet by mouth Daily. 0.8 mg  3   • benzonatate (TESSALON) 200 MG capsule Take 1 capsule by mouth 3 (Three) Times a Day As Needed for Cough. 30 capsule 0   • famotidine  (PEPCID) 10 MG tablet Take 1 tablet by mouth As Needed for Heartburn.     • Iron Sucrose (VENOFER IV) Infuse  into a venous catheter As Needed. USUALLY ONCE MONTH     • latanoprost (XALATAN) 0.005 % ophthalmic solution Administer 1 drop to both eyes Every Night. 1 drop each eye     • Loratadine (CLARITIN PO) Take 1 tablet by mouth As Needed. Pt states he is uncertain of dosage     • sodium chloride 0.65 % nasal spray 2 sprays into the nostril(s) as directed by provider Every Night.     • Sucroferric Oxyhydroxide (Velphoro) 500 MG chewable tablet Chew 1 tablet 3 (Three) Times a Day. AFTER EACH  MEAL     • lidocaine-prilocaine (EMLA) 2.5-2.5 % cream Apply 1 application topically to the appropriate area as directed. [id number]-[id number]-Pbiwqk:  ONE HOUR PRIOR TO DIALYSIS  3     Allergies   Allergen Reactions   • Ace Inhibitors Other (See Comments)     RENAL FAILURE/ raised creatinine   • Contrast Dye (Echo Or Unknown Ct/Mr) Other (See Comments)     RENAL FAILURE   • Iodine Anaphylaxis   • Angiotensin Receptor Blockers Swelling   • Eliquis [Apixaban] Arrhythmia     BLEEDING ISSUES; PT CANNOT TAKE ANY ANTICOAGULANTS DUE TO LOW PLATELETS EXCEPT ASPIRIN     • Granix [Filgrastim] GI Intolerance     Chest pain  Chest pain     • Keflex [Cephalexin] Diarrhea     RENAL FAILURE       Objective    Objective     Vital Signs  Temp:  [96.8 °F (36 °C)-99.3 °F (37.4 °C)] 96.8 °F (36 °C)  Heart Rate:  [] 75  Resp:  [16-18] 18  BP: (128-158)/(79-98) 158/98  SpO2:  [85 %-95 %] 95 %  on  Flow (L/min):  [2-3] 2;   Device (Oxygen Therapy): nasal cannula  Body mass index is 25.64 kg/m².    Physical Exam  Vitals and nursing note reviewed.   Constitutional:       Appearance: He is well-developed.   HENT:      Head: Normocephalic and atraumatic.   Eyes:      Conjunctiva/sclera: Conjunctivae normal.   Neck:      Trachea: No tracheal deviation.   Cardiovascular:      Rate and Rhythm: Normal rate and regular rhythm.   Pulmonary:      Effort:  Pulmonary effort is normal. No respiratory distress.      Breath sounds: Normal breath sounds.      Comments: 2L NC. Decreased breath sounds  Abdominal:      General: Bowel sounds are normal. There is no distension.      Palpations: Abdomen is soft. There is no mass.      Tenderness: There is no abdominal tenderness. There is no guarding or rebound.   Musculoskeletal:         General: Normal range of motion.      Cervical back: Normal range of motion.   Skin:     General: Skin is warm and dry.   Neurological:      Mental Status: He is alert and oriented to person, place, and time.   Psychiatric:         Judgment: Judgment normal.         Results Review:  I reviewed the patient's new clinical results.  I reviewed the patient's new imaging results and agree with the interpretation.  I reviewed the patient's other test results and agree with the interpretation  I personally viewed and interpreted the patient's EKG/Telemetry data  Discussed with ED provider.    Lab Results (last 24 hours)     Procedure Component Value Units Date/Time    COVID-19 and FLU A/B PCR - Swab, Nasopharynx [[id number]]  (Abnormal) Collected: 05/11/23 0131    Specimen: Swab from Nasopharynx Updated: 05/11/23 0210     COVID19 Detected     Influenza A PCR Not Detected     Influenza B PCR Not Detected    Narrative:      Fact sheet for providers: https://www.fda.gov/media/967718/download    Fact sheet for patients: https://www.fda.gov/media/404210/download    Test performed by PCR.  Influenza A and Influenza B negative results should be considered presumptive in samples that have a positive SARS-CoV-2 result.    Competitive inhibition studies showed that SARS-CoV-2 virus, when present at concentrations above 3.6E+04 copies/mL, can inhibit the detection and amplification of influenza A and influenza B virus RNA if present at or below 1.8E+02 copies/mL or 4.9E+02 copies/mL, respectively, and may lead to false negative influenza virus results. If  co-infection with influenza A or influenza B virus is suspected in samples with a positive SARS-CoV-2 result, the sample should be re-tested with another FDA cleared, approved, or authorized influenza test, if influenza virus detection would change clinical management.    CBC & Differential [[id number]]  (Abnormal) Collected: 05/11/23 0132    Specimen: Blood Updated: 05/11/23 0155    Narrative:      The following orders were created for panel order CBC & Differential.  Procedure                               Abnormality         Status                     ---------                               -----------         ------                     CBC Auto Differential[[id number]]        Abnormal            Final result                 Please view results for these tests on the individual orders.    Comprehensive Metabolic Panel [[id number]]  (Abnormal) Collected: 05/11/23 0132    Specimen: Blood Updated: 05/11/23 0212     Glucose 192 mg/dL      BUN 69 mg/dL      Creatinine 8.30 mg/dL      Sodium 129 mmol/L      Potassium 4.0 mmol/L      Chloride 94 mmol/L      CO2 24.2 mmol/L      Calcium 8.7 mg/dL      Total Protein 7.3 g/dL      Albumin 2.9 g/dL      ALT (SGPT) 40 U/L      AST (SGOT) 63 U/L      Alkaline Phosphatase 170 U/L      Total Bilirubin 1.6 mg/dL      Globulin 4.4 gm/dL      A/G Ratio 0.7 g/dL      BUN/Creatinine Ratio 8.3     Anion Gap 10.8 mmol/L      eGFR 6.1 mL/min/1.73      Comment: <15 Indicative of kidney failure       Narrative:      GFR Normal >60  Chronic Kidney Disease <60  Kidney Failure <15    The GFR formula is only valid for adults with stable renal function between ages 18 and 70.    High Sensitivity Troponin T [[id number]]  (Abnormal) Collected: 05/11/23 0132    Specimen: Blood Updated: 05/11/23 0227     HS Troponin T 116 ng/L     Narrative:      High Sensitive Troponin T Reference Range:  <10.0 ng/L- Negative Female for AMI  <15.0 ng/L- Negative Male for AMI  >=10 - Abnormal Female indicating  possible myocardial injury.  >=15 - Abnormal Male indicating possible myocardial injury.   Clinicians would have to utilize clinical acumen, EKG, Troponin, and serial changes to determine if it is an Acute Myocardial Infarction or myocardial injury due to an underlying chronic condition.         BNP [[id number]]  (Abnormal) Collected: 05/11/23 0132    Specimen: Blood Updated: 05/11/23 0411     proBNP 42,114.0 pg/mL     Narrative:      Among patients with dyspnea, NT-proBNP is highly sensitive for the detection of acute congestive heart failure. In addition NT-proBNP of <300 pg/ml effectively rules out acute congestive heart failure with 99% negative predictive value.    Results may be falsely decreased if patient taking Biotin.      CBC Auto Differential [[id number]]  (Abnormal) Collected: 05/11/23 0132    Specimen: Blood Updated: 05/11/23 0155     WBC 5.62 10*3/mm3      RBC 3.58 10*6/mm3      Hemoglobin 11.5 g/dL      Hematocrit 32.8 %      MCV 91.6 fL      MCH 32.1 pg      MCHC 35.1 g/dL      RDW 12.5 %      RDW-SD 41.9 fl      MPV 8.9 fL      Platelets 117 10*3/mm3      Neutrophil % 84.8 %      Lymphocyte % 5.2 %      Monocyte % 5.9 %      Eosinophil % 1.2 %      Basophil % 0.4 %      Immature Grans % 2.5 %      Neutrophils, Absolute 4.77 10*3/mm3      Lymphocytes, Absolute 0.29 10*3/mm3      Monocytes, Absolute 0.33 10*3/mm3      Eosinophils, Absolute 0.07 10*3/mm3      Basophils, Absolute 0.02 10*3/mm3      Immature Grans, Absolute 0.14 10*3/mm3      nRBC 0.0 /100 WBC     Procalcitonin [[id number]]  (Abnormal) Collected: 05/11/23 0132    Specimen: Blood Updated: 05/11/23 0302     Procalcitonin 2.38 ng/mL     Narrative:      As a Marker for Sepsis (Non-Neonates):    1. <0.5 ng/mL represents a low risk of severe sepsis and/or septic shock.  2. >2 ng/mL represents a high risk of severe sepsis and/or septic shock.    As a Marker for Lower Respiratory Tract Infections that require antibiotic therapy:    PCT on  "Admission    Antibiotic Therapy       6-12 Hrs later    >0.5                Strongly Recommended  >0.25 - <0.5        Recommended   0.1 - 0.25          Discouraged              Remeasure/reassess PCT  <0.1                Strongly Discouraged     Remeasure/reassess PCT    As 28 day mortality risk marker: \"Change in Procalcitonin Result\" (>80% or <=80%) if Day 0 (or Day 1) and Day 4 values are available. Refer to http://[email or web address]-pct-calculator.com    Change in PCT <=80%  A decrease of PCT levels below or equal to 80% defines a positive change in PCT test result representing a higher risk for 28-day all-cause mortality of patients diagnosed with severe sepsis for septic shock.    Change in PCT >80%  A decrease of PCT levels of more than 80% defines a negative change in PCT result representing a lower risk for 28-day all-cause mortality of patients diagnosed with severe sepsis or septic shock.       High Sensitivity Troponin T 2Hr [[id number]]  (Abnormal) Collected: 05/11/23 0424    Specimen: Blood Updated: 05/11/23 0502     HS Troponin T 119 ng/L      Troponin T Delta 3 ng/L     Narrative:      High Sensitive Troponin T Reference Range:  <10.0 ng/L- Negative Female for AMI  <15.0 ng/L- Negative Male for AMI  >=10 - Abnormal Female indicating possible myocardial injury.  >=15 - Abnormal Male indicating possible myocardial injury.   Clinicians would have to utilize clinical acumen, EKG, Troponin, and serial changes to determine if it is an Acute Myocardial Infarction or myocardial injury due to an underlying chronic condition.         POC Glucose Once [[id number]]  (Abnormal) Collected: 05/11/23 0507    Specimen: Blood Updated: 05/11/23 0514     Glucose 155 mg/dL      Comment: Meter: [id number] : [email or web address] Ronna Marte RN       Basic Metabolic Panel [[id number]]  (Abnormal) Collected: 05/11/23 0617    Specimen: Blood Updated: 05/11/23 0654     Glucose 220 mg/dL      BUN 73 mg/dL      Creatinine 7.63 mg/dL      Sodium " 127 mmol/L      Potassium 4.3 mmol/L      Chloride 92 mmol/L      CO2 20.9 mmol/L      Calcium 8.2 mg/dL      BUN/Creatinine Ratio 9.6     Anion Gap 14.1 mmol/L      eGFR 6.8 mL/min/1.73      Comment: <15 Indicative of kidney failure       Narrative:      GFR Normal >60  Chronic Kidney Disease <60  Kidney Failure <15    The GFR formula is only valid for adults with stable renal function between ages 18 and 70.    CBC (No Diff) [[id number]]  (Abnormal) Collected: 05/11/23 0617    Specimen: Blood Updated: 05/11/23 0638     WBC 6.54 10*3/mm3      RBC 3.25 10*6/mm3      Hemoglobin 10.5 g/dL      Hematocrit 30.6 %      MCV 94.2 fL      MCH 32.3 pg      MCHC 34.3 g/dL      RDW 12.8 %      RDW-SD 44.2 fl      MPV 8.9 fL      Platelets 114 10*3/mm3     POC Glucose Once [[id number]]  (Abnormal) Collected: 05/11/23 0850    Specimen: Blood Updated: 05/11/23 0851     Glucose 161 mg/dL      Comment: Meter: [id number] : [id number] [facility]             Imaging Results (Last 24 Hours)     Procedure Component Value Units Date/Time    XR Chest 1 View [[id number]] Collected: 05/11/23 0200     Updated: 05/11/23 0200    Narrative:        Patient: GAEL ZARATE  Time Out: 02:00  Exam(s): XR CXR 1 VIEW     EXAM:    XR Chest, 1 View    CLINICAL HISTORY:     Reason for exam: low oxygen.    TECHNIQUE:    Frontal view of the chest.    COMPARISON:  January 11, 2022.    FINDINGS:    Lungs: Bibasilar pulmonary infiltrates are present, more prominent on   the left.  Pulmonary edema is present.  Right hemidiaphragm elevation   present, unchanged.    Pleural space:  Unremarkable.  No pneumothorax.    Heart: Cardiomegaly is present.    Mediastinum:  Unremarkable.    Bones joints: Degenerative disc disease present.    IMPRESSION:       Findings are concerning for congestive heart failure, but also identified   are scattered pulmonary infiltrates, most prominent on the left.      Impression:          Electronically signed by Jonn Alcala D.O.  on 05-11-23 at 0200          Results for orders placed during the hospital encounter of 07/19/22    Adult Transthoracic Echo Complete W/ Cont if Necessary Per Protocol    Interpretation Summary  · Estimated left ventricular EF = 60% Left ventricular systolic function is normal. Normal global longitudinal LV strain (GLS) = -22.1%. Left ventricle strain data was reviewed by the physician and found to be accurate. Normal left ventricular cavity size noted. Left ventricular wall thickness is consistent with borderline concentric hypertrophy. All left ventricular wall segments contract normally. Left ventricular diastolic function was indeterminate.  · The left atrial cavity is moderately dilated.  · The aortic valve is abnormal in structure. A functionally bicuspid aortic valve. There is moderate to severe thickening of the aortic valve. Trace aortic valve regurgitation is present. Mild aortic valve stenosis is present.      ECG 12 Lead Dyspnea   Final Result   HEART RATE= 92  bpm   RR Interval= 652  ms   UT Interval=   ms   P Horizontal Axis=   deg   P Front Axis=   deg   QRSD Interval= 97  ms   QT Interval= 377  ms   QRS Axis= -28  deg   T Wave Axis= 57  deg   - ABNORMAL ECG -   Atrial fibrillation   Borderline left axis deviation   Low voltage, extremity leads   No change from previous tracing   Electronically Signed By: Yassine Nickerson ([facility]) 11-May-2023 08:33:55   Date and Time of Study: 2023-05-11 01:24:45           Assessment/Plan     Active Hospital Problems    Diagnosis  POA   • **Acute hypoxemic respiratory failure due to COVID-19 [U07.1, J96.01]  Yes   • Bicuspid aortic valve [Q23.1]  Not Applicable   • TATE (obstructive sleep apnea) [G47.33]  Yes   • 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) [E11.29]  Yes   • Crohn's disease, unspecified, without complications [K50.90]  Yes   • Essential (primary) hypertension [I10]  Yes   • Secondary hyperparathyroidism of renal origin  [N25.81]  Yes   • Permanent atrial fibrillation [I48.21]  Yes      Resolved Hospital Problems   No resolved problems to display.       Mr. Landry is a 77 y.o. admitted with acute hypoxia multifactorial in the setting of COVID-19, volume overload with ESRD and CHF.    · COVID-19 with hypoxia/ acute on chronic dCHF  · Hypoxia multifactorial in the setting of COVID-19, volume overload missing dialysis with CHF  Onset of symptoms 10 days ago.  Chest x-ray consistent with infiltrates on the left and pulmonary edema  Continue Decadron.  No Remdesivir given duration of symptoms greater than 10 days. Supportive care.   Elevated procalcitonin 2.38 but elevation also could be in part due to ESRD. Pulmonary consulted for input and need for antibiotics.   BNP elevated with pulmonary edema- Continue dialysis to maintain volume status.    ·  ESRD/ electrolyte disturbance  Patient hypervolemic consult nephrology for dialysis.  Dialysis Monday Wednesday Friday.  ESRD with left upper extremity AV fistula AV graft placement.  Patient is currently volume overloaded    · Elevated troponin / atrial fibrillation  Troponin elevation and the setting of renal disease and COVID19.  No chest pain, EKG without acute ischemic change, delta change and significant.  Atrial fibrillation with rate stable.  Continue home medications.  Not on anticoagulation due to history of GIB  Hyperphosphatemia/ hyponatreamia-management per neurology    · Transaminitis  Possibly due to COVID-19 and/or hepatic congestion from CHF volume overload. Monitor with multifactorial treatment    · Chronic anemia, multifactorial with ESRD  On Epogen but hemoglobin stable    · DM2  Correctional insulin while on decadron for covid19.  Check A1c.  Avoid hypoglycemia    · Crohn's disease-status post colectomy and ileostomy    · I discussed the patient's findings and my recommendations with patient, family, nursing staff and Dr thompson .    VTE Prophylaxis - Heparin with  ESRD.  Code Status - Full code.       JAME Conner  [facility]  05/11/23  09:59 EDT

## 2023-05-12 ENCOUNTER — APPOINTMENT (OUTPATIENT)
Dept: CARDIOLOGY | Facility: HOSPITAL | Age: 78
DRG: 177 | End: 2023-05-12
Payer: MEDICARE

## 2023-05-12 PROBLEM — R73.02 IMPAIRED GLUCOSE TOLERANCE: Chronic | Status: ACTIVE | Noted: 2023-05-12

## 2023-05-12 LAB
ALBUMIN SERPL-MCNC: 2.8 G/DL (ref 3.5–5.2)
ALBUMIN/GLOB SERPL: 0.6 G/DL
ALP SERPL-CCNC: 154 U/L (ref 39–117)
ALT SERPL W P-5'-P-CCNC: 35 U/L (ref 1–41)
ANION GAP SERPL CALCULATED.3IONS-SCNC: 12.1 MMOL/L (ref 5–15)
AORTIC DIMENSIONLESS INDEX: 0.3 (DI)
ASCENDING AORTA: 3.7 CM
AST SERPL-CCNC: 38 U/L (ref 1–40)
BASOPHILS # BLD AUTO: 0.02 10*3/MM3 (ref 0–0.2)
BASOPHILS NFR BLD AUTO: 0.4 % (ref 0–1.5)
BH CV ECHO MEAS - ACS: 1.61 CM
BH CV ECHO MEAS - AO MAX PG: 16.5 MMHG
BH CV ECHO MEAS - AO MEAN PG: 8.7 MMHG
BH CV ECHO MEAS - AO ROOT DIAM: 3.1 CM
BH CV ECHO MEAS - AO V2 MAX: 203.2 CM/SEC
BH CV ECHO MEAS - AO V2 VTI: 45 CM
BH CV ECHO MEAS - AVA(I,D): 1.52 CM2
BH CV ECHO MEAS - CONTRAST EF 4CH: 61 CM2
BH CV ECHO MEAS - EDV(CUBED): 116 ML
BH CV ECHO MEAS - EDV(MOD-SP2): 100 ML
BH CV ECHO MEAS - EDV(MOD-SP4): 101 ML
BH CV ECHO MEAS - EF(MOD-BP): 61 %
BH CV ECHO MEAS - EF(MOD-SP2): 57 %
BH CV ECHO MEAS - EF(MOD-SP4): 65.3 %
BH CV ECHO MEAS - ESV(CUBED): 37.1 ML
BH CV ECHO MEAS - ESV(MOD-SP2): 43 ML
BH CV ECHO MEAS - ESV(MOD-SP4): 35 ML
BH CV ECHO MEAS - FS: 31.6 %
BH CV ECHO MEAS - IVS/LVPW: 1.08 CM
BH CV ECHO MEAS - IVSD: 1.22 CM
BH CV ECHO MEAS - LAT PEAK E' VEL: 7.4 CM/SEC
BH CV ECHO MEAS - LV MASS(C)D: 217.8 GRAMS
BH CV ECHO MEAS - LV MAX PG: 2.22 MMHG
BH CV ECHO MEAS - LV MEAN PG: 1.1 MMHG
BH CV ECHO MEAS - LV V1 MAX: 74.6 CM/SEC
BH CV ECHO MEAS - LV V1 VTI: 15.3 CM
BH CV ECHO MEAS - LVIDD: 4.9 CM
BH CV ECHO MEAS - LVIDS: 3.3 CM
BH CV ECHO MEAS - LVOT AREA: 4.5 CM2
BH CV ECHO MEAS - LVOT DIAM: 2.38 CM
BH CV ECHO MEAS - LVPWD: 1.13 CM
BH CV ECHO MEAS - MED PEAK E' VEL: 7.6 CM/SEC
BH CV ECHO MEAS - MV DEC SLOPE: 419.9 CM/SEC2
BH CV ECHO MEAS - MV MAX PG: 5.8 MMHG
BH CV ECHO MEAS - MV MEAN PG: 1.56 MMHG
BH CV ECHO MEAS - MV P1/2T: 88.5 MSEC
BH CV ECHO MEAS - MV V2 VTI: 30.8 CM
BH CV ECHO MEAS - MVA(P1/2T): 2.49 CM2
BH CV ECHO MEAS - MVA(VTI): 2.21 CM2
BH CV ECHO MEAS - PA ACC TIME: 0.09 SEC
BH CV ECHO MEAS - PA PR(ACCEL): 39.8 MMHG
BH CV ECHO MEAS - PA V2 MAX: 80.1 CM/SEC
BH CV ECHO MEAS - QP/QS: 0.43
BH CV ECHO MEAS - RAP SYSTOLE: 3 MMHG
BH CV ECHO MEAS - RV MAX PG: 1.4 MMHG
BH CV ECHO MEAS - RV V1 MAX: 59.1 CM/SEC
BH CV ECHO MEAS - RV V1 VTI: 10.8 CM
BH CV ECHO MEAS - RVOT DIAM: 1.85 CM
BH CV ECHO MEAS - RVSP: 29.9 MMHG
BH CV ECHO MEAS - SV(LVOT): 68.2 ML
BH CV ECHO MEAS - SV(MOD-SP2): 57 ML
BH CV ECHO MEAS - SV(MOD-SP4): 66 ML
BH CV ECHO MEAS - SV(RVOT): 29.2 ML
BH CV ECHO MEAS - TAPSE (>1.6): 2.2 CM
BH CV ECHO MEAS - TR MAX PG: 26.9 MMHG
BH CV ECHO MEAS - TR MAX VEL: 259.4 CM/SEC
BH CV VAS BP LEFT ARM: NORMAL MMHG
BH CV XLRA - RV BASE: 4.4 CM
BH CV XLRA - RV LENGTH: 7 CM
BH CV XLRA - RV MID: 3.6 CM
BH CV XLRA - TDI S': 8.7 CM/SEC
BILIRUB SERPL-MCNC: 1.2 MG/DL (ref 0–1.2)
BUN SERPL-MCNC: 58 MG/DL (ref 8–23)
BUN/CREAT SERPL: 10.4 (ref 7–25)
CALCIUM SPEC-SCNC: 8.7 MG/DL (ref 8.6–10.5)
CHLORIDE SERPL-SCNC: 98 MMOL/L (ref 98–107)
CO2 SERPL-SCNC: 22.9 MMOL/L (ref 22–29)
CREAT SERPL-MCNC: 5.57 MG/DL (ref 0.76–1.27)
CRP SERPL-MCNC: 6.67 MG/DL (ref 0–0.5)
DEPRECATED RDW RBC AUTO: 43.7 FL (ref 37–54)
EGFRCR SERPLBLD CKD-EPI 2021: 9.9 ML/MIN/1.73
EOSINOPHIL # BLD AUTO: 0 10*3/MM3 (ref 0–0.4)
EOSINOPHIL NFR BLD AUTO: 0 % (ref 0.3–6.2)
ERYTHROCYTE [DISTWIDTH] IN BLOOD BY AUTOMATED COUNT: 12.8 % (ref 12.3–15.4)
FERRITIN SERPL-MCNC: 2111 NG/ML (ref 30–400)
FOLATE SERPL-MCNC: >20 NG/ML (ref 4.78–24.2)
GLOBULIN UR ELPH-MCNC: 4.4 GM/DL
GLUCOSE BLDC GLUCOMTR-MCNC: 231 MG/DL (ref 70–130)
GLUCOSE BLDC GLUCOMTR-MCNC: 244 MG/DL (ref 70–130)
GLUCOSE BLDC GLUCOMTR-MCNC: 291 MG/DL (ref 70–130)
GLUCOSE BLDC GLUCOMTR-MCNC: 329 MG/DL (ref 70–130)
GLUCOSE SERPL-MCNC: 237 MG/DL (ref 65–99)
HBA1C MFR BLD: 6.2 % (ref 4.8–5.6)
HCT VFR BLD AUTO: 32 % (ref 37.5–51)
HGB BLD-MCNC: 10.7 G/DL (ref 13–17.7)
IMM GRANULOCYTES # BLD AUTO: 0.11 10*3/MM3 (ref 0–0.05)
IMM GRANULOCYTES NFR BLD AUTO: 2 % (ref 0–0.5)
LEFT ATRIUM VOLUME INDEX: 55.8 ML/M2
LYMPHOCYTES # BLD AUTO: 0.33 10*3/MM3 (ref 0.7–3.1)
LYMPHOCYTES NFR BLD AUTO: 5.9 % (ref 19.6–45.3)
MAGNESIUM SERPL-MCNC: 2.4 MG/DL (ref 1.6–2.4)
MAXIMAL PREDICTED HEART RATE: 143 BPM
MCH RBC QN AUTO: 31.4 PG (ref 26.6–33)
MCHC RBC AUTO-ENTMCNC: 33.4 G/DL (ref 31.5–35.7)
MCV RBC AUTO: 93.8 FL (ref 79–97)
MONOCYTES # BLD AUTO: 0.22 10*3/MM3 (ref 0.1–0.9)
MONOCYTES NFR BLD AUTO: 3.9 % (ref 5–12)
NEUTROPHILS NFR BLD AUTO: 4.89 10*3/MM3 (ref 1.7–7)
NEUTROPHILS NFR BLD AUTO: 87.8 % (ref 42.7–76)
NRBC BLD AUTO-RTO: 0 /100 WBC (ref 0–0.2)
PLATELET # BLD AUTO: 138 10*3/MM3 (ref 140–450)
PMV BLD AUTO: 8.9 FL (ref 6–12)
POTASSIUM SERPL-SCNC: 4.3 MMOL/L (ref 3.5–5.2)
PROCALCITONIN SERPL-MCNC: 1.94 NG/ML (ref 0–0.25)
PROT SERPL-MCNC: 7.2 G/DL (ref 6–8.5)
RBC # BLD AUTO: 3.41 10*6/MM3 (ref 4.14–5.8)
SINUS: 3.6 CM
SODIUM SERPL-SCNC: 133 MMOL/L (ref 136–145)
STJ: 3.3 CM
STRESS TARGET HR: 122 BPM
VIT B12 BLD-MCNC: 886 PG/ML (ref 211–946)
WBC NRBC COR # BLD: 5.57 10*3/MM3 (ref 3.4–10.8)

## 2023-05-12 PROCEDURE — 99232 SBSQ HOSP IP/OBS MODERATE 35: CPT | Performed by: NURSE PRACTITIONER

## 2023-05-12 PROCEDURE — 83735 ASSAY OF MAGNESIUM: CPT | Performed by: HOSPITALIST

## 2023-05-12 PROCEDURE — 82746 ASSAY OF FOLIC ACID SERUM: CPT | Performed by: INTERNAL MEDICINE

## 2023-05-12 PROCEDURE — 84145 PROCALCITONIN (PCT): CPT | Performed by: NURSE PRACTITIONER

## 2023-05-12 PROCEDURE — 82607 VITAMIN B-12: CPT | Performed by: INTERNAL MEDICINE

## 2023-05-12 PROCEDURE — 82728 ASSAY OF FERRITIN: CPT | Performed by: HOSPITALIST

## 2023-05-12 PROCEDURE — 82948 REAGENT STRIP/BLOOD GLUCOSE: CPT

## 2023-05-12 PROCEDURE — 97162 PT EVAL MOD COMPLEX 30 MIN: CPT

## 2023-05-12 PROCEDURE — 63710000001 DEXAMETHASONE PER 0.25 MG: Performed by: NURSE PRACTITIONER

## 2023-05-12 PROCEDURE — 25010000002 HEPARIN (PORCINE) PER 1000 UNITS: Performed by: NURSE PRACTITIONER

## 2023-05-12 PROCEDURE — 97110 THERAPEUTIC EXERCISES: CPT

## 2023-05-12 PROCEDURE — 93306 TTE W/DOPPLER COMPLETE: CPT

## 2023-05-12 PROCEDURE — 80053 COMPREHEN METABOLIC PANEL: CPT | Performed by: NURSE PRACTITIONER

## 2023-05-12 PROCEDURE — 36415 COLL VENOUS BLD VENIPUNCTURE: CPT | Performed by: NURSE PRACTITIONER

## 2023-05-12 PROCEDURE — 63710000001 INSULIN LISPRO (HUMAN) PER 5 UNITS: Performed by: NURSE PRACTITIONER

## 2023-05-12 PROCEDURE — 86140 C-REACTIVE PROTEIN: CPT | Performed by: NURSE PRACTITIONER

## 2023-05-12 PROCEDURE — 83036 HEMOGLOBIN GLYCOSYLATED A1C: CPT | Performed by: HOSPITALIST

## 2023-05-12 PROCEDURE — 85025 COMPLETE CBC W/AUTO DIFF WBC: CPT | Performed by: NURSE PRACTITIONER

## 2023-05-12 PROCEDURE — 93306 TTE W/DOPPLER COMPLETE: CPT | Performed by: INTERNAL MEDICINE

## 2023-05-12 RX ADMIN — ASPIRIN 81 MG: 81 TABLET, COATED ORAL at 09:01

## 2023-05-12 RX ADMIN — TAMSULOSIN HYDROCHLORIDE 0.4 MG: 0.4 CAPSULE ORAL at 21:14

## 2023-05-12 RX ADMIN — DEXAMETHASONE 6 MG: 4 TABLET ORAL at 09:01

## 2023-05-12 RX ADMIN — FAMOTIDINE 20 MG: 20 TABLET, FILM COATED ORAL at 17:41

## 2023-05-12 RX ADMIN — HEPARIN SODIUM 5000 UNITS: 5000 INJECTION INTRAVENOUS; SUBCUTANEOUS at 14:04

## 2023-05-12 RX ADMIN — HEPARIN SODIUM 5000 UNITS: 5000 INJECTION INTRAVENOUS; SUBCUTANEOUS at 06:50

## 2023-05-12 RX ADMIN — Medication 1 TABLET: at 09:01

## 2023-05-12 RX ADMIN — LATANOPROST 1 DROP: 50 SOLUTION OPHTHALMIC at 21:15

## 2023-05-12 RX ADMIN — Medication 10 ML: at 21:15

## 2023-05-12 RX ADMIN — FAMOTIDINE 20 MG: 20 TABLET, FILM COATED ORAL at 06:51

## 2023-05-12 RX ADMIN — CETIRIZINE HYDROCHLORIDE 10 MG: 10 TABLET ORAL at 09:01

## 2023-05-12 RX ADMIN — Medication 10 ML: at 09:02

## 2023-05-12 RX ADMIN — INSULIN LISPRO 5 UNITS: 100 INJECTION, SOLUTION INTRAVENOUS; SUBCUTANEOUS at 17:41

## 2023-05-12 RX ADMIN — HEPARIN SODIUM 5000 UNITS: 5000 INJECTION INTRAVENOUS; SUBCUTANEOUS at 21:14

## 2023-05-12 RX ADMIN — SUCROFERRIC OXYHYDROXIDE 500 MG: 500 TABLET, CHEWABLE ORAL at 18:06

## 2023-05-12 NOTE — PROGRESS NOTES
Name: Bill Landry ADMIT: 2023   : 1945  PCP: No primary care provider on file.    MRN: [id number] LOS: 1 days   AGE/SEX: 77 y.o. male  ROOM: [facility]     Subjective   Subjective   Feeling better today. No cough or SOA. Ambulating with PT. Adamant that he does not have DM and does not want to take any insulin.    Review of Systems   No N/V/D/abd pain/F/C/NS/HA/vis changes    Objective   Objective   Vital Signs  Temp:  [94.7 °F (34.8 °C)-97.6 °F (36.4 °C)] 97.6 °F (36.4 °C)  Heart Rate:  [67-77] 67  Resp:  [18] 18  BP: (129-150)/(48-95) 136/72  SpO2:  [92 %-100 %] 93 %  on  Flow (L/min):  [1-2] 1;   Device (Oxygen Therapy): room air  Body mass index is 25.54 kg/m².  Physical Exam  Vitals and nursing note reviewed. Exam conducted with a chaperone present (Wife).   Constitutional:       General: He is not in acute distress.     Appearance: He is ill-appearing (chronically). He is not toxic-appearing or diaphoretic.   HENT:      Head: Normocephalic.      Nose: Nose normal.      Mouth/Throat:      Mouth: Mucous membranes are moist.      Pharynx: Oropharynx is clear.   Eyes:      General: No scleral icterus.        Right eye: No discharge.         Left eye: No discharge.      Extraocular Movements: Extraocular movements intact.      Conjunctiva/sclera: Conjunctivae normal.   Cardiovascular:      Rate and Rhythm: Normal rate and regular rhythm.      Pulses: Normal pulses.   Pulmonary:      Effort: Pulmonary effort is normal. No respiratory distress.      Breath sounds: Normal breath sounds. No wheezing or rales.      Comments: Anteriorly  Abdominal:      General: Bowel sounds are normal. There is no distension.      Palpations: Abdomen is soft.      Tenderness: There is no abdominal tenderness.   Musculoskeletal:         General: No swelling or deformity. Normal range of motion.      Cervical back: Neck supple. No rigidity.   Lymphadenopathy:      Cervical: No cervical adenopathy.   Skin:     General:  Skin is warm and dry.      Capillary Refill: Capillary refill takes less than 2 seconds.      Coloration: Skin is pale. Skin is not jaundiced.   Neurological:      General: No focal deficit present.      Mental Status: He is alert and oriented to person, place, and time. Mental status is at baseline.      Cranial Nerves: No cranial nerve deficit.      Coordination: Coordination normal.   Psychiatric:         Mood and Affect: Mood normal.         Behavior: Behavior normal.       Results Review     I reviewed the patient's new clinical results.  Results from last 7 days   Lab Units 05/12/23  0659 05/11/23  0617 05/11/23  0132   WBC 10*3/mm3 5.57 6.54 5.62   HEMOGLOBIN g/dL 10.7* 10.5* 11.5*   PLATELETS 10*3/mm3 138* 114* 117*     Results from last 7 days   Lab Units 05/12/23  0658 05/11/23  0617 05/11/23  0132   SODIUM mmol/L 133* 127* 129*   POTASSIUM mmol/L 4.3 4.3 4.0   CHLORIDE mmol/L 98 92* 94*   CO2 mmol/L 22.9 20.9* 24.2   BUN mg/dL 58* 73* 69*   CREATININE mg/dL 5.57* 7.63* 8.30*   GLUCOSE mg/dL 237* 220* 192*   EGFR mL/min/1.73 9.9* 6.8* 6.1*     Results from last 7 days   Lab Units 05/12/23  0658 05/11/23  0132   ALBUMIN g/dL 2.8* 2.9*   BILIRUBIN mg/dL 1.2 1.6*   ALK PHOS U/L 154* 170*   AST (SGOT) U/L 38 63*   ALT (SGPT) U/L 35 40     Results from last 7 days   Lab Units 05/12/23  0658 05/11/23  0617 05/11/23  0132   CALCIUM mg/dL 8.7 8.2* 8.7   ALBUMIN g/dL 2.8*  --  2.9*   MAGNESIUM mg/dL 2.4  --   --      Results from last 7 days   Lab Units 05/12/23  0658 05/11/23  0132   PROCALCITONIN ng/mL 1.94* 2.38*     Hemoglobin A1C   Date/Time Value Ref Range Status   05/12/2023 0659 6.20 (H) 4.80 - 5.60 % Final     Glucose   Date/Time Value Ref Range Status   05/12/2023 1119 244 (H) 70 - 130 mg/dL Final     Comment:     Meter: [id number] : [id number] Tao ANDREWS   05/12/2023 0701 231 (H) 70 - 130 mg/dL Final     Comment:     Meter: [id number] : [id number] Ralf ANDREWS   05/11/2023 1943 251 (H) 70  - 130 mg/dL Final     Comment:     Meter: [id number] : [id number] Ralf Osborne NA   05/11/2023 1622 265 (H) 70 - 130 mg/dL Final     Comment:     Meter: [id number] : [id number] Tao Iniguez NA   05/11/2023 1126 140 (H) 70 - 130 mg/dL Final     Comment:     Meter: [id number] : [id number] Tao Iniguez NA   05/11/2023 0850 161 (H) 70 - 130 mg/dL Final     Comment:     Meter: [id number] : [id number] Tao Iniguez NA   05/11/2023 0507 155 (H) 70 - 130 mg/dL Final     Comment:     Meter: [id number] : [id number] Ronna Marte RN       XR Chest 1 View    Result Date: 5/11/2023  Electronically signed by Jonn Alcala D.O. on 05-11-23 at 0200    I have personally reviewed all medications:  Scheduled Medications  aspirin, 81 mg, Oral, Daily  b complex-vitamin c-folic acid, 1 tablet, Oral, Daily  cetirizine, 10 mg, Oral, Daily  dexamethasone, 6 mg, Oral, Daily   Or  dexamethasone, 6 mg, Intravenous, Daily  famotidine, 20 mg, Oral, BID AC  heparin (porcine), 5,000 Units, Subcutaneous, Q8H  insulin lispro, 2-7 Units, Subcutaneous, TID With Meals  latanoprost, 1 drop, Both Eyes, Nightly  sodium chloride, 10 mL, Intravenous, Q12H  Sucroferric Oxyhydroxide, 500 mg, Oral, TID PC  tamsulosin, 0.4 mg, Oral, Nightly    Infusions   Diet  Diet: Renal Diets; Low Sodium (2-3g), Low Potassium, Low Phosphorus; Texture: Regular Texture (IDDSI 7); Fluid Consistency: Thin (IDDSI 0)    I have personally reviewed:  [x]  Laboratory   []  Microbiology   []  Radiology   [x]  EKG/Telemetry  [x]  Cardiology/Vascular   []  Pathology    [x]  Records       Assessment/Plan     Active Hospital Problems    Diagnosis  POA   • **Acute hypoxemic respiratory failure due to COVID-19 [U07.1, J96.01]  Yes   • Impaired glucose tolerance [R73.02]  Yes   • Acute on chronic diastolic heart failure [I50.33]  Yes   • Bicuspid aortic valve [Q23.1]  Not Applicable   • Aortic stenosis, mild [I35.0]  Yes   • TATE (obstructive sleep apnea) [G47.33]  Yes   •  End stage kidney disease (HCC) [N18.6]  Yes   • Crohn's disease, unspecified, without complications [K50.90]  Yes   • Essential (primary) hypertension [I10]  Yes   • Secondary hyperparathyroidism of renal origin [N25.81]  Yes   • Anemia due to stage 4 chronic kidney disease treated with erythropoietin [N18.4, D63.1]  Yes   • Cirrhosis of liver without ascites [K74.60]  Yes   • Congestive splenomegaly [D73.2]  Yes   • Permanent atrial fibrillation [I48.21]  Yes   • Thrombocytopenia (HCC) [D69.6]  Yes      Resolved Hospital Problems   No resolved problems to display.       76yo gentleman with acute hypoxia due to COViD-19 and volume overload in setting of ESRD and diastolic CHF.      Acute hypoxia: due to volume overload most likely, though COViD PNA also playing role, weaned to RA now, desat'd a little with ambulation, continue to monitor     COViD pneumonia: symptomatic for >6d so not a candidate for Remdesivir, continue Decadron and supplemental O2 as needed, trending inflammatory markers, pharmacologic DVT ppx, appreciate Pulm attention to pt, do not suspect bacterial PNA--elevated PCT likely due to ESRD and is falling     Acute on chronic diastolic CHF: defer to Card and Renal, volume mgmt with HD, Echo looks good     ESRD with volume overload: as above, got HD yesterday, no need to dialyze today per Renal     Permanent AFib: HRs fine on no rate-control meds, not on AC due to h/o GIB     Elevated Trop: suspect due to ESRD and not ACS, EKG fine, Card does not recommend any further w/u     Anemia of CKD  MGUS with pancytopenia: Hgb stable, platelets better than baseline, WBC wnl, consulted Heme/Onc at family's request--they have seen and signed off     DM2?: A1c 6.2, not on meds for this PTA, covering with SSI here while on steroids, at this time would say he has borderline DM, long d/w pt and wife regarding this, encouraged him to permit temporary use of SSI here as his sugars are pretty high due to  steroids     Crohn's with ileostomy: stable, Wound/Ostomy RN has seen     Elevated LFTs: possibly due to viral infection, do not suspect obstruction, pt is asymptomatic, numbers normalized today      · Heparin SC for DVT prophylaxis.  · Full code.  · Discussed with patient, spouse and nursing staff.  · Anticipate discharge home with family in 1-2 days. PT eval noted.    During all interaction with pt proper PPE was utilized (gown, gloves, mask, goggles, and face shield) and was donned/doffed according to recommendations. Hands were cleaned before and after interaction.    eKn Salas MD  [facility]  05/12/23  15:29 EDT

## 2023-05-12 NOTE — PROGRESS NOTES
"                                              LOS: 1 day   Patient Care Team:  Sharif Mckenzie MD as Consulting Physician (Hematology and Oncology)  Marcellus Osborne MD as Referring Physician (Internal Medicine)  Ken Moseley MD as Consulting Physician (Pulmonary Disease)  Cooper Virgen MD as Consulting Physician (Urology)  Dale Vázquez MD as Consulting Physician (Cardiology)  Myra Moore MD as Consulting Physician (Nephrology)    Chief Complaint:  F/up respiratory failure, COVID infection    Subjective   Interval History  Dyspnea improved.  On RA.  SPO2 93%.  Underwent dialysis yesterday and 3 L of fluid were removed.    He reported only mild cough without significant phlegm.    REVIEW OF SYSTEMS:     GASTROINTESTINAL: No anorexia, nausea, vomiting or diarrhea. No abdominal pain.  CONSTITUTIONAL: No fever or chills.     Ventilator/Non-Invasive Ventilation Settings (From admission, onward)    None                Physical Exam:     Vital Signs  Temp:  [94.7 °F (34.8 °C)-97.6 °F (36.4 °C)] 97.6 °F (36.4 °C)  Heart Rate:  [67-77] 67  Resp:  [18] 18  BP: (129-150)/(48-95) 136/72    Intake/Output Summary (Last 24 hours) at 5/12/2023 1555  Last data filed at 5/12/2023 0859  Gross per 24 hour   Intake 240 ml   Output --   Net 240 ml     Flowsheet Rows    Flowsheet Row First Filed Value   Admission Height 177.8 cm (70\") Documented at 05/11/2023 0030   Admission Weight 83 kg (183 lb) Documented at 05/11/2023 0030          PPE used per hospital policy    General Appearance:   Alert, cooperative, in no acute distress   ENMT:  Mallampati score 3, moist mucous membrane   Eyes:  Pupils equal and reactive to light. EOMI   Neck:    Trachea midline. No thyromegaly.   Lungs:    Equal but diminished air entry throughout.  Very faint crackles at the bases but no wheezing.  No labored breathing.    Heart:   Regular rhythm and normal rate, normal S1 and S2, no         murmur   Skin:   No rash or ecchymosis   Abdomen: "    Obese. Soft. No tenderness. No HSM.   Neuro/psych:  Conscious, alert, oriented x3. Strength 5/5 in upper and lower  ext.  Appropriate mood and affect   Extremities:  No cyanosis, clubbing or edema.  Warm extremities and well-perfused          Results Review:        Results from last 7 days   Lab Units 05/12/23  0658 05/11/23  0617 05/11/23  0132   SODIUM mmol/L 133* 127* 129*   POTASSIUM mmol/L 4.3 4.3 4.0   CHLORIDE mmol/L 98 92* 94*   CO2 mmol/L 22.9 20.9* 24.2   BUN mg/dL 58* 73* 69*   CREATININE mg/dL 5.57* 7.63* 8.30*   GLUCOSE mg/dL 237* 220* 192*   CALCIUM mg/dL 8.7 8.2* 8.7     Results from last 7 days   Lab Units 05/11/23  0424 05/11/23  0132   HSTROP T ng/L 119* 116*     Results from last 7 days   Lab Units 05/12/23  0659 05/11/23  0617 05/11/23  0132   WBC 10*3/mm3 5.57 6.54 5.62   HEMOGLOBIN g/dL 10.7* 10.5* 11.5*   HEMATOCRIT % 32.0* 30.6* 32.8*   PLATELETS 10*3/mm3 138* 114* 117*         Results from last 7 days   Lab Units 05/11/23  0132   PROBNP pg/mL 42,114.0*             Results from last 7 days   Lab Units 05/12/23  0658   CRP mg/dL 6.67*               I reviewed the patient's new clinical results.        Medication Review:   aspirin, 81 mg, Oral, Daily  b complex-vitamin c-folic acid, 1 tablet, Oral, Daily  cetirizine, 10 mg, Oral, Daily  dexamethasone, 6 mg, Oral, Daily   Or  dexamethasone, 6 mg, Intravenous, Daily  famotidine, 20 mg, Oral, BID AC  heparin (porcine), 5,000 Units, Subcutaneous, Q8H  insulin lispro, 2-7 Units, Subcutaneous, TID With Meals  latanoprost, 1 drop, Both Eyes, Nightly  sodium chloride, 10 mL, Intravenous, Q12H  Sucroferric Oxyhydroxide, 500 mg, Oral, TID PC  tamsulosin, 0.4 mg, Oral, Nightly          Assessment     1. Acute pulmonary edema  2. COVID-19 infection: Difficult to determine whether it is just URI or pneumonia due to fluid overload.  I suspect there is an element of pneumonia due to the infiltrates being more prominent on the left side which is not typical  for fluid overload.  He does seem to have improved significantly after dialysis and fluid removal.  3. Acute hypoxic respiratory failure, secondary to above  4. Acute on chronic diastolic CHF  5. Chronically elevated right hemidiaphragm could be suggestive of diaphragmatic paralysis  6. Elevated CRP suggestive of cytokine syndrome     • ESRD, skipped 1 session of dialysis prior to admission  • Hyponatremia  • Chronic normocytic anemia    Plan     · Continue dexamethasone 6 mg daily for total of 10 days  · Not a candidate for remdesivir due to late presentation  · Walking oximetry prior to DC to determine oxygen requirement  · Follow-up CRP in a.m.  · DVT prophylaxis with heparin    Okay to discharge from pulmonary perspective.  Discussed with his wife    Briana Humphrey MD  05/12/23  15:55 EDT            This note was dictated utilizing Dragon dictation

## 2023-05-12 NOTE — CASE MANAGEMENT/SOCIAL WORK
Continued Stay Note  [facility]     Patient Name: Bill Landry  MRN: [id number]  Today's Date: 5/12/2023    Admit Date: 5/11/2023    Plan: Return home with spouse - HD at [facility]   Discharge Plan     Row Name 05/12/23 1538       Plan    Plan Return home with spouse - HD at [facility]    Plan Comments Call to [facility] - patient can continue his HD at [facility] even though had + COVID.  Plan remains to return home.  Ariane XIAO                   Expected Discharge Date and Time     Expected Discharge Date Expected Discharge Time    May 15, 2023             Becky S. [place], RN

## 2023-05-12 NOTE — PROGRESS NOTES
HOSPITAL FOLLOW UP NOTE    Patient Name: Bill Landry  Patient : 1945        Date of Service:23  Provider of Service: JAME Horton  Place of Service: [facility]  Referral Provider: No ref. provider found          Follow Up: elevated troponin    Interval Hx: no issues overnight, echo pending, VSS, controlled afib on tele        OBJECTIVE  Temp:  [95.2 °F (35.1 °C)-97.2 °F (36.2 °C)] 95.2 °F (35.1 °C)  Heart Rate:  [69-88] 69  Resp:  [16-18] 18  BP: (129-158)/() 129/84     Intake/Output Summary (Last 24 hours) at 2023 0832  Last data filed at 2023 1300  Gross per 24 hour   Intake 240 ml   Output 3000 ml   Net -2760 ml     Body mass index is 25.64 kg/m².      23  0030 23  0409   Weight: 83 kg (183 lb) 81.1 kg (178 lb 11.2 oz)         Physical Exam:     General Appearance:    Alert, cooperative, in no acute distress   Head:    Normocephalic, without obvious abnormality, atraumatic   Eyes:            Conjunctivae and sclerae normal, no   icterus, no pallor, corneas clear, PERRLA   Neck:   No adenopathy, supple, trachea midline, no thyromegaly, no   carotid bruit, no JVD   Lungs:     Crackles elsa bases,respirations regular, even and unlabored    Heart:    Regular rhythm and normal rate, normal S1 and S2, no murmur, no gallop, no rub, no click   Chest Wall:    No abnormalities observed   Abdomen:     Normal bowel sounds, no masses, no organomegaly, soft, nontender, nondistended, no guarding, no rebound  tenderness   Extremities:   Moves all extremities well, no edema, no cyanosis, no redness   Pulses:   Pulses palpable and equal bilaterally.          CURRENT MEDS    Scheduled Meds:aspirin, 81 mg, Oral, Daily  b complex-vitamin c-folic acid, 1 tablet, Oral, Daily  cetirizine, 10 mg, Oral, Daily  dexamethasone, 6 mg, Oral, Daily   Or  dexamethasone, 6 mg, Intravenous, Daily  famotidine, 20 mg, Oral, BID AC  heparin (porcine), 5,000 Units,  Subcutaneous, Q8H  insulin lispro, 2-7 Units, Subcutaneous, TID With Meals  latanoprost, 1 drop, Both Eyes, Nightly  sodium chloride, 10 mL, Intravenous, Q12H  Sucroferric Oxyhydroxide, 500 mg, Oral, TID PC  tamsulosin, 0.4 mg, Oral, Nightly      Continuous Infusions:       Lab Review:   Results from last 7 days   Lab Units 05/12/23  0658 05/11/23  0617 05/11/23  0132   SODIUM mmol/L 133* 127* 129*   POTASSIUM mmol/L 4.3 4.3 4.0   CHLORIDE mmol/L 98 92* 94*   CO2 mmol/L 22.9 20.9* 24.2   BUN mg/dL 58* 73* 69*   CREATININE mg/dL 5.57* 7.63* 8.30*   GLUCOSE mg/dL 237* 220* 192*   CALCIUM mg/dL 8.7 8.2* 8.7   AST (SGOT) U/L 38  --  63*   ALT (SGPT) U/L 35  --  40         Results from last 7 days   Lab Units 05/12/23  0659 05/11/23  0617   WBC 10*3/mm3 5.57 6.54   HEMOGLOBIN g/dL 10.7* 10.5*   HEMATOCRIT % 32.0* 30.6*   PLATELETS 10*3/mm3 138* 114*         Results from last 7 days   Lab Units 05/12/23  0658   MAGNESIUM mg/dL 2.4         Results from last 7 days   Lab Units 05/11/23  0132   PROBNP pg/mL 42,114.0*               ASSESSMENT & PLAN    Acute hypoxemic respiratory failure due to COVID-19    Permanent atrial fibrillation    Thrombocytopenia (HCC)    Cirrhosis of liver without ascites    Congestive splenomegaly    Anemia due to stage 4 chronic kidney disease treated with erythropoietin    End stage kidney disease (HCC)    Crohn's disease, unspecified, without complications    Essential (primary) hypertension    Secondary hyperparathyroidism of renal origin    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 Covid PNA  2.  Acute hypoxic respiratory failure: Multifactorial.  COVID-pneumonia and volume overload with pulmonary edema.  Improved after HD yesterday with 3 L removed  3.  NSTEMI: Type II secondary to volume overload, supply demand mismatch.    4.   Chronic atrial fibrillation: Rate controlled.  Not on anticoagulation due to history of GI bleed  5.  End-stage renal disease: On hemodialysis.    No ischemic work-up.  2D echocardiogram ordered yesterday.          Nita Mireles, APRN  05/12/23

## 2023-05-12 NOTE — PLAN OF CARE
Goal Outcome Evaluation:      VSS, O2 needs fluctuating between 1-2L NC. No complaints of pain. Patient forgetful at times/makes illogical comments. Wife concerned. Nurse assessed patient. He is alert to self, location and sometimes situation. This is consistent with how he was on admission. Day shift nurse also noted that this was his state throughout the day yesterday. Nurse advised wife will continue to monitor and that patient has had little sleep and would benefit from resting this shift. Wife verbalized agreement.

## 2023-05-12 NOTE — PROGRESS NOTES
[facility] Progress Note      Patient Name: Bill Landry  : 1945  MRN: [id number]  Primary Care Physician:  No primary care provider on file.  Date of admission: 2023    Subjective     Interval History:   Overnight event  Occasionally disoriented   Decreased appetite    Underwent hemodialysis with 3 L of fluid removal    Review of Systems:   As noted above    Objective     Vitals:   Temp:  [95.2 °F (35.1 °C)-97.2 °F (36.2 °C)] 95.2 °F (35.1 °C)  Heart Rate:  [69-88] 69  Resp:  [16-18] 18  BP: (133-177)/() 144/48  Flow (L/min):  [1-2] 1    Intake/Output Summary (Last 24 hours) at 2023 0722  Last data filed at 2023 1300  Gross per 24 hour   Intake 240 ml   Output 3000 ml   Net -2760 ml       Physical Exam:      Constitutional:  In bed   HEENT: Sclera anicteric, no conjunctival injection  Neck: Supple, no thyromegaly, no lymphadenopathy, trachea at midline, no JVD  Respiratory: Fine crackles bibasal  Cardiovascular: RRR, systolic ejection murmur  Gastrointestinal: Positive bowel sounds, abdomen is soft, nontender and nondistended.  Ostomy bag in place   : No palpable bladder  Musculoskeletal: No edema, no clubbing or cyanosis.  Left upper extremity  AVF/ AVG   Psychiatric: Appropriate affect, cooperative  Neurologic: Oriented x2, moving all extremities, normal speech and mental status  Skin: Warm and dry        Scheduled Meds:     aspirin, 81 mg, Oral, Daily  b complex-vitamin c-folic acid, 1 tablet, Oral, Daily  cetirizine, 10 mg, Oral, Daily  dexamethasone, 6 mg, Oral, Daily   Or  dexamethasone, 6 mg, Intravenous, Daily  famotidine, 20 mg, Oral, BID AC  heparin (porcine), 5,000 Units, Subcutaneous, Q8H  insulin lispro, 2-7 Units, Subcutaneous, TID With Meals  latanoprost, 1 drop, Both Eyes, Nightly  sodium chloride, 10 mL, Intravenous, Q12H  Sucroferric Oxyhydroxide, 500 mg, Oral, TID PC  tamsulosin, 0.4 mg, Oral, Nightly      IV Meds:        Results  Reviewed:   I have personally reviewed the results from the time of this admission to 5/12/2023 07:22 EDT     Results from last 7 days   Lab Units 05/11/23  0617 05/11/23  0132   SODIUM mmol/L 127* 129*   POTASSIUM mmol/L 4.3 4.0   CHLORIDE mmol/L 92* 94*   CO2 mmol/L 20.9* 24.2   BUN mg/dL 73* 69*   CREATININE mg/dL 7.63* 8.30*   CALCIUM mg/dL 8.2* 8.7   BILIRUBIN mg/dL  --  1.6*   ALK PHOS U/L  --  170*   ALT (SGPT) U/L  --  40   AST (SGOT) U/L  --  63*   GLUCOSE mg/dL 220* 192*       Estimated Creatinine Clearance: 9.3 mL/min (A) (by C-G formula based on SCr of 7.63 mg/dL (H)).                Results from last 7 days   Lab Units 05/11/23 0617 05/11/23 0132   WBC 10*3/mm3 6.54 5.62   HEMOGLOBIN g/dL 10.5* 11.5*   PLATELETS 10*3/mm3 114* 117*             Assessment / Plan     ASSESSMENT:     -End Stage Renal Disease ( ESRD ) on Hemodialysis .s/p  LUE AVF s/p LUE AVG  ( by rebecca Singh )  functional .and Monday, Wednesday and Friday schedule.  Patient is history of hemodialysis yesterday we will arrange for hemodialysis today and will continue to arrange hemodialysis treatment during his admission. Continue renal diet   -Chronic normocytic anemia. On Epogen protocol.  On hold hemoglobin more than 11. we will continue to follow H&H closely   -Chronic hypervolemic hyponatremia.  Clinically with volume overload we will attempt to optimize volume status with hemodialysis we will continue to follow sodium trend closely  -Hyperphosphatemia. Continue binders  with meals, Continue renal diet. We will follow phosphorus to make further adjustmentst   -Diabetes Mellitus type 2 ..Continue insulin regimen / hypoglycemic regimen .Hypoglycemic protocol . POC glucose 4 x day .Diabetic diet  -COVID-19 infection currently on isolation on dexamethasone.,  Inhalers and supplemental oxygen followed closely by primary team  -Chronic diastolic congestive heart failure .  We will continue hemodialysis to reach normovolemic  -Crohn disease a  status post ostomy placement       PLAN:  -Patient undergoes hemodialysis MWF  -Underwent hemodialysis yesterday without any complications and ultrafiltration of 3 L  -No acute indication for hemodialysis today  -We will continue surveillance labs      Thank you for involving us in the care of Bill Landry.  Please feel free to call with any questions.    Yonatan Almanza MD  05/12/23  07:22 EDT    [facility]  [phone]    Please note that portions of this note were completed with a voice recognition program.

## 2023-05-12 NOTE — NURSING NOTE
"Diabetes Education  Assessment/Teaching    Patient Name:  Bill Landry  YOB: 1945  MRN: [id number]  Admit Date:  5/11/2023      Assessment Date:  5/12/2023  Flowsheet Row Most Recent Value   General Information     Referral From: Other -RN. Pt is in isolation for covid. Call pt room. Speak to pt spouse. Spouse did not put pt on phone but spoke to me.    Height 177.8 cm (70\")   Height Method Stated   Weight 80.7 kg (178 lb)   Weight Method Standing scale   Diabetes History    What type of diabetes do you have? -- pt has T2DM listed in hospital problem list. Pt spouse denies pt having DM.   Do you test your blood sugar at home? -- -no. Pt spouse describes home BG checks \"years and years\" ago and pt being started on an oral DM agent.   Education Preferences    Barriers to Learning -- pt belief about getting \"stuck on insulin\" and also having a low BG episode.   Assessment Topics    Taking Medication - Assessment Needs education -pt is reportedly refusing insulin - (steroid-induced hyperglycemia with decadron use)   Healthy Coping - Assessment Competent -supportive spouse at bedside.   DM Goals    Contact Plan Follow-up medical care          Flowsheet Row Most Recent Value   DM Education Needs    Medication Insulin -rationale for use of insulin in the hospital and with steroid use.    Healthy Coping Appropriate   Discharge Plan Follow-up with PCP   Teaching Method Discussion   Patient Response -- [Pt, spouse are not receptive at this time to explanation of insulin use.]      Other Comments:  Encourage them to speak to MD.  Electronically signed by:  Megan Alvarez RN  05/12/23 13:21 EDT  "

## 2023-05-12 NOTE — THERAPY EVALUATION
Patient Name: Bill Landry  : 1945    MRN: [id number]                              Today's Date: 2023       Admit Date: 2023    Visit Dx:     ICD-10-CM ICD-9-CM   1. Acute hypoxemic respiratory failure due to COVID-19  U07.1 518.81    J96.01 079.89     799.02   2. Other fatigue  R53.83 780.79   3. ESRD (end stage renal disease)  N18.6 585.6     Patient Active Problem List   Diagnosis   • Permanent atrial fibrillation   • Thrombocytopenia (HCC)   • Chronic leukopenia   • Cirrhosis of liver without ascites   • Congestive splenomegaly   • Anemia due to stage 4 chronic kidney disease treated with erythropoietin   • Esophageal abnormality   • At risk for fluid volume overload   • End stage kidney disease (HCC)   • Other specified glaucoma   • Dependence on renal dialysis   • Allergy, unspecified, initial encounter   • Crohn's disease, unspecified, without complications   • Deficiency of other specified B group vitamins   • Dermatitis, unspecified   • Encounter for immunization   • Essential (primary) hypertension   • History of urinary stone   • Hyperparathyroidism, unspecified   • 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, with stage 1 through stage 4 chronic kidney disease, or unspecified chronic kidney disease   • Other disorders of phosphorus metabolism   • Other iron deficiency anemias   • Pericardial effusion (noninflammatory)   • Pure hypertriglyceridemia   • Renal osteodystrophy   • Secondary hyperparathyroidism of renal origin   • Splenomegaly, not elsewhere classified   • Bilateral pseudophakia   • Dermatochalasis of eyelid   • Primary open-angle glaucoma, bilateral, moderate stage   • Puckering of macula, left eye   • Secondary cataract   • AV fistula occlusion, initial encounter   • TATE (obstructive sleep apnea)   • Upper extremity aneurysm   • Aortic stenosis, mild   • Bicuspid aortic valve   • Hyperuricemia without signs of inflammatory arthritis and  tophaceous disease   • Presbyopia   • Shortness of breath   • Acute hypoxemic respiratory failure due to COVID-19   • Acute on chronic diastolic heart failure   • Impaired glucose tolerance     Past Medical History:   Diagnosis Date   • Abnormal serum protein electrophoresis    • Anemia     Multifactorial   • Bicuspid aortic valve 7/20/2022   • Chronic leukopenia and thrombocytopenia    • Cirrhosis of liver without ascites 07/24/2017   • Congestive splenomegaly 07/24/2017   • Crohn disease     with ileostomy   • Diastolic CHF     although it was likely from volume overload due to ESRD   • Diverticula of colon    • ESRD on hemodialysis     M-W-F [facility]   • Fatty liver disease, nonalcoholic    • GERD (gastroesophageal reflux disease)    • GI bleed    • Glaucoma    • H/O Gallstone pancreatitis    • H/O Pericardial effusion    • H/O sinus bradycardia    • Heart murmur    • History of hypertension     resolved   • History of UTI    • Hx of renal calculi    • Ileostomy present    • Iron deficiency    • MGUS (monoclonal gammopathy of unknown significance)    • TATE (obstructive sleep apnea)    • Permanent atrial fibrillation    • Sleep apnea     CPAP USED   • Type 2 diabetes mellitus     CURRENTLY TAKES NO MED     Past Surgical History:   Procedure Laterality Date   • APPENDECTOMY     • ARTERIOVENOUS FISTULA/SHUNT SURGERY Left 08/08/2017    Procedure: LEFT BRACHIAL CEPHALIC AV FISTULA FORMATION WITH CEPHALIC VEIN TRANSPOSITION ;  Surgeon: Bill Deal MD;  Location: [facility] OR;  Service:    • ARTERIOVENOUS FISTULA/SHUNT SURGERY Left 5/27/2022    Procedure: OPEN REVISION LEFT ARTERIOVENOUS INTERPOSITION GRAFT PLACEMENT;  Surgeon: Bill Deal MD;  Location: [facility] OR;  Service: Vascular;  Laterality: Left;   • ARTERIOVENOUS FISTULA/SHUNT SURGERY W/ HEMODIALYSIS CATHETER INSERTION Left 02/15/2022    Procedure: OPEN LEFT ARM ARTERIOVENOUS FISTULA THROMBECTOMY WITH CEPHALIC VEIN ARTHROPLASTY AND STENT/GRAFT  PLACEMENT;  Surgeon: Bill Deal MD;  Location: [facility] HYBRID OR 18/19;  Service: Vascular;  Laterality: Left;   • CATARACT EXTRACTION WITH INTRAOCULAR LENS IMPLANT Bilateral    • CHOLECYSTECTOMY     • COLECTOMY PARTIAL / TOTAL      History of inflammatory bowel disease with status post colectomy with ileostomy many years ago in the [facility],  18 YEARS OF AGE   • COLONOSCOPY     • CYSTOSCOPY LITHOLAPAXY BLADDER STONE EXTRACTION     • ENDOSCOPY  01/16/2015    gastritis   • ENDOSCOPY  01/17/2018    Procedure: ESOPHAGOGASTRODUODENOSCOPY;  Surgeon: Warner Neville MD;  Location: [facility] ENDOSCOPY;  Service:    • ILEOSCOPY  01/16/2015    normal   • ILEOSCOPY N/A 01/17/2018    Procedure: ILEOSCOPY;  Surgeon: Warner Neville MD;  Location: [facility] ENDOSCOPY;  Service:    • TONSILLECTOMY        General Information     Row Name 05/12/23 1502          Physical Therapy Time and Intention    Document Type evaluation  -AR     Mode of Treatment physical therapy  -AR     Row Name 05/12/23 1502          General Information    Patient Profile Reviewed yes  -AR     Prior Level of Function min assist:  no AD, denies falls, drive self to HD  -AR     Existing Precautions/Restrictions fall  -AR     Barriers to Rehab none identified  -AR     Row Name 05/12/23 1502          Living Environment    People in Home spouse  -AR     Row Name 05/12/23 1502          Home Main Entrance    Number of Stairs, Main Entrance four  -AR     Stair Railings, Main Entrance railings safe and in good condition  -AR     Row Name 05/12/23 1502          Cognition    Orientation Status (Cognition) oriented to;person;place  -AR     Row Name 05/12/23 1502          Safety Issues, Functional Mobility    Impairments Affecting Function (Mobility) balance;cognition;strength;endurance/activity tolerance;pain  -AR           User Key  (r) = Recorded By, (t) = Taken By, (c) = Cosigned By    Initials Name Provider Type    AR Akila Branham PT Physical Therapist                Mobility     Row Name 05/12/23 1504          Bed Mobility    Bed Mobility sit-supine;supine-sit  -AR     Supine-Sit Webb (Bed Mobility) standby assist  -AR     Sit-Supine Webb (Bed Mobility) not tested  -AR     Assistive Device (Bed Mobility) bed rails;head of bed elevated  -AR     Row Name 05/12/23 1504          Sit-Stand Transfer    Sit-Stand Webb (Transfers) contact guard  -AR     Row Name 05/12/23 1504          Gait/Stairs (Locomotion)    Webb Level (Gait) minimum assist (75% patient effort);contact guard  -AR     Distance in Feet (Gait) 15' +15' min A improving to CGA; SP02 to 80% on room air  -AR     Deviations/Abnormal Patterns (Gait) festinating/shuffling;gait speed decreased  -AR     Bilateral Gait Deviations forward flexed posture;heel strike decreased  -AR           User Key  (r) = Recorded By, (t) = Taken By, (c) = Cosigned By    Initials Name Provider Type    AR Akila Branham, PT Physical Therapist               Obj/Interventions     Row Name 05/12/23 1506          Range of Motion Comprehensive    Comment, General Range of Motion B LE WFL  -AR     [facility] Name 05/12/23 1506          Strength Comprehensive (MMT)    Comment, General Manual Muscle Testing (MMT) Assessment B LE 4/5  -AR     Row Name 05/12/23 1506          Balance    Balance Assessment standing dynamic balance  -AR     Dynamic Standing Balance contact guard  -AR           User Key  (r) = Recorded By, (t) = Taken By, (c) = Cosigned By    Initials Name Provider Type    AR Akila Branham, PT Physical Therapist               Goals/Plan     Row Name 05/12/23 1513          Transfer Goal 1 (PT)    Activity/Assistive Device (Transfer Goal 1, PT) sit-to-stand/stand-to-sit;bed-to-chair/chair-to-bed  -AR     Webb Level/Cues Needed (Transfer Goal 1, PT) standby assist  -AR     Time Frame (Transfer Goal 1, PT) 1 week  -AR     Row Name 05/12/23 1513          Gait Training Goal 1 (PT)     Activity/Assistive Device (Gait Training Goal 1, PT) gait (walking locomotion)  -AR     Nye Level (Gait Training Goal 1, PT) standby assist  -AR     Distance (Gait Training Goal 1, PT) 150  -AR     Time Frame (Gait Training Goal 1, PT) 1 week  -AR     Row Name 05/12/23 6069          Therapy Assessment/Plan (PT)    Planned Therapy Interventions (PT) balance training;bed mobility training;gait training;home exercise program;patient/family education;transfer training;ROM (range of motion);stair training;strengthening  -AR           User Key  (r) = Recorded By, (t) = Taken By, (c) = Cosigned By    Initials Name Provider Type    AR Akila Branham, PT Physical Therapist               Clinical Impression     Row Name 05/12/23 8743          Pain    Pretreatment Pain Rating 0/10 - no pain  -AR     Posttreatment Pain Rating 0/10 - no pain  -AR     Pain Intervention(s) Repositioned  -AR     Row Name 05/12/23 7520          Plan of Care Review    Plan of Care Reviewed With patient  -AR     Outcome Evaluation Pt admitted with +covid, +pna; h/o ESRD on HD, cirrhosis.  Pt reports normally does not use AD, denies falls, drives himself to HD.  Today pt presents with some confusion, generalized weakness, limited activity toleralnce and ind. w/ mobility but able to ambulate 15' +15' w/ min A improving to CGA, no assistive device; SP02 to 80% on room air.  Educated on activity/walking recommendations  while inpatient. Anticipate progress for DC to home with home PT but currently not ready and would need SNU;plan to update as pt progresses.  -AR     Row Name 05/12/23 1861          Therapy Assessment/Plan (PT)    Rehab Potential (PT) good, to achieve stated therapy goals  -AR     Criteria for Skilled Interventions Met (PT) yes  -AR     Therapy Frequency (PT) 6 times/wk  -AR     Row Name 05/12/23 7349          Vital Signs    Pre SpO2 (%) 95  -AR     O2 Delivery Pre Treatment room air  -AR     Intra SpO2 (%) 80  -AR     O2  Delivery Intra Treatment supplemental O2  -AR     Post SpO2 (%) 94  -AR     O2 Delivery Post Treatment room air  -AR     Row Name 05/12/23 1507          Positioning and Restraints    Pre-Treatment Position in bed  -AR     Post Treatment Position chair  -AR     In Chair notified nsg;reclined;sitting;call light within reach;encouraged to call for assist;with family/caregiver  alarm pad, no box.   RN and aid aware  -AR           User Key  (r) = Recorded By, (t) = Taken By, (c) = Cosigned By    Initials Name Provider Type    Akila Ferguson PT Physical Therapist               Outcome Measures     Row Name 05/12/23 1514 05/12/23 0859       How much help from another person do you currently need...    Turning from your back to your side while in flat bed without using bedrails? 4  -AR 4  -KE    Moving from lying on back to sitting on the side of a flat bed without bedrails? 3  -AR 4  -KE    Moving to and from a bed to a chair (including a wheelchair)? 3  -AR 3  -KE    Standing up from a chair using your arms (e.g., wheelchair, bedside chair)? 3  -AR 2  -KE    Climbing 3-5 steps with a railing? 2  -AR 2  -KE    To walk in hospital room? 3  -AR 2  -KE    AM-PAC 6 Clicks Score (PT) 18  -AR 17  -KE    Highest level of mobility 6 --> Walked 10 steps or more  -AR 5 --> Static standing  -KE    Row Name 05/12/23 1514          Functional Assessment    Outcome Measure Options AM-PAC 6 Clicks Basic Mobility (PT)  -AR           User Key  (r) = Recorded By, (t) = Taken By, (c) = Cosigned By    Initials Name Provider Type    Akila Ferguson, PT Physical Therapist    Pamela Ferro, RN Registered Nurse                             Physical Therapy Education     Title: PT OT SLP Therapies (In Progress)     Topic: Physical Therapy (In Progress)     Point: Mobility training (In Progress)     Learning Progress Summary           Patient Acceptance, E, NR by AR at 5/12/2023 1514                   Point: Home exercise program (In  Progress)     Learning Progress Summary           Patient Acceptance, E, NR by AR at 5/12/2023 1514                   Point: Body mechanics (In Progress)     Learning Progress Summary           Patient Acceptance, E, NR by AR at 5/12/2023 1514                   Point: Precautions (In Progress)     Learning Progress Summary           Patient Acceptance, E, NR by AR at 5/12/2023 1514                               User Key     Initials Effective Dates Name Provider Type Discipline    AR 06/16/21 -  Akila Branham, PT Physical Therapist PT              PT Recommendation and Plan  Planned Therapy Interventions (PT): balance training, bed mobility training, gait training, home exercise program, patient/family education, transfer training, ROM (range of motion), stair training, strengthening  Plan of Care Reviewed With: patient  Outcome Evaluation: Pt admitted with +covid, +pna; h/o ESRD on HD, cirrhosis.  Pt reports normally does not use AD, denies falls, drives himself to HD.  Today pt presents with some confusion, generalized weakness, limited activity toleralnce and ind. w/ mobility but able to ambulate 15' +15' w/ min A improving to CGA, no assistive device; SP02 to 80% on room air.  Educated on activity/walking recommendations  while inpatient. Anticipate progress for DC to home with home PT but currently not ready and would need SNU;plan to update as pt progresses.     Time Calculation:    PT Charges     Row Name 05/12/23 1502             Time Calculation    Start Time 1330  -AR      Stop Time 1402  -AR      Time Calculation (min) 32 min  -AR      PT Received On 05/12/23  -AR      PT - Next Appointment 05/14/23  -AR      PT Goal Re-Cert Due Date 05/19/23  -AR            User Key  (r) = Recorded By, (t) = Taken By, (c) = Cosigned By    Initials Name Provider Type    AR Akila Branham, PT Physical Therapist              Therapy Charges for Today     Code Description Service Date Service Provider Modifiers Qty     30559746453 HC PT EVAL MOD COMPLEXITY 4 5/12/2023 Akila Branham, PT GP 1    26853520603 HC PT THER PROC EA 15 MIN 5/12/2023 Akila Branham, PT GP 1          PT G-Codes  Outcome Measure Options: AM-PAC 6 Clicks Basic Mobility (PT)  AM-PAC 6 Clicks Score (PT): 18  PT Discharge Summary  Anticipated Discharge Disposition (PT): home with assist, skilled nursing facility    Akila Branham PT  5/12/2023

## 2023-05-12 NOTE — PLAN OF CARE
Goal Outcome Evaluation:  Plan of Care Reviewed With: patient           Outcome Evaluation: Pt admitted with +covid, +pna; h/o ESRD on HD, cirrhosis.  Pt reports normally does not use AD, denies falls, drives himself to HD.  Today pt presents with some confusion, generalized weakness, limited activity toleralnce and ind. w/ mobility but able to ambulate 15' +15' w/ min A improving to CGA, no assistive device; SP02 to 80% on room air.  Educated on activity/walking recommendations  while inpatient. Anticipate progress for DC to home with home PT but currently not ready and would need SNU;plan to update as pt progresses.

## 2023-05-13 LAB
ALBUMIN SERPL-MCNC: 3 G/DL (ref 3.5–5.2)
ALBUMIN/GLOB SERPL: 0.7 G/DL
ALP SERPL-CCNC: 151 U/L (ref 39–117)
ALT SERPL W P-5'-P-CCNC: 31 U/L (ref 1–41)
ANION GAP SERPL CALCULATED.3IONS-SCNC: 14 MMOL/L (ref 5–15)
AST SERPL-CCNC: 32 U/L (ref 1–40)
BASOPHILS # BLD AUTO: 0.01 10*3/MM3 (ref 0–0.2)
BASOPHILS NFR BLD AUTO: 0.1 % (ref 0–1.5)
BILIRUB SERPL-MCNC: 0.9 MG/DL (ref 0–1.2)
BUN SERPL-MCNC: 89 MG/DL (ref 8–23)
BUN/CREAT SERPL: 12.1 (ref 7–25)
CALCIUM SPEC-SCNC: 8.1 MG/DL (ref 8.6–10.5)
CHLORIDE SERPL-SCNC: 92 MMOL/L (ref 98–107)
CO2 SERPL-SCNC: 20 MMOL/L (ref 22–29)
CREAT SERPL-MCNC: 7.38 MG/DL (ref 0.76–1.27)
CRP SERPL-MCNC: 4.19 MG/DL (ref 0–0.5)
DEPRECATED RDW RBC AUTO: 46.6 FL (ref 37–54)
EGFRCR SERPLBLD CKD-EPI 2021: 7 ML/MIN/1.73
EOSINOPHIL # BLD AUTO: 0 10*3/MM3 (ref 0–0.4)
EOSINOPHIL NFR BLD AUTO: 0 % (ref 0.3–6.2)
ERYTHROCYTE [DISTWIDTH] IN BLOOD BY AUTOMATED COUNT: 13.1 % (ref 12.3–15.4)
FERRITIN SERPL-MCNC: 2099 NG/ML (ref 30–400)
GLOBULIN UR ELPH-MCNC: 4.1 GM/DL
GLUCOSE BLDC GLUCOMTR-MCNC: 279 MG/DL (ref 70–130)
GLUCOSE BLDC GLUCOMTR-MCNC: 281 MG/DL (ref 70–130)
GLUCOSE BLDC GLUCOMTR-MCNC: 298 MG/DL (ref 70–130)
GLUCOSE BLDC GLUCOMTR-MCNC: 306 MG/DL (ref 70–130)
GLUCOSE SERPL-MCNC: 277 MG/DL (ref 65–99)
HCT VFR BLD AUTO: 31.5 % (ref 37.5–51)
HGB BLD-MCNC: 10.3 G/DL (ref 13–17.7)
IMM GRANULOCYTES # BLD AUTO: 0.1 10*3/MM3 (ref 0–0.05)
IMM GRANULOCYTES NFR BLD AUTO: 1.4 % (ref 0–0.5)
LYMPHOCYTES # BLD AUTO: 0.28 10*3/MM3 (ref 0.7–3.1)
LYMPHOCYTES NFR BLD AUTO: 3.9 % (ref 19.6–45.3)
MAGNESIUM SERPL-MCNC: 2.3 MG/DL (ref 1.6–2.4)
MCH RBC QN AUTO: 31.4 PG (ref 26.6–33)
MCHC RBC AUTO-ENTMCNC: 32.7 G/DL (ref 31.5–35.7)
MCV RBC AUTO: 96 FL (ref 79–97)
MONOCYTES # BLD AUTO: 0.26 10*3/MM3 (ref 0.1–0.9)
MONOCYTES NFR BLD AUTO: 3.6 % (ref 5–12)
NEUTROPHILS NFR BLD AUTO: 6.57 10*3/MM3 (ref 1.7–7)
NEUTROPHILS NFR BLD AUTO: 91 % (ref 42.7–76)
NRBC BLD AUTO-RTO: 0 /100 WBC (ref 0–0.2)
PLATELET # BLD AUTO: 155 10*3/MM3 (ref 140–450)
PMV BLD AUTO: 8.9 FL (ref 6–12)
POTASSIUM SERPL-SCNC: 4.8 MMOL/L (ref 3.5–5.2)
PROT SERPL-MCNC: 7.1 G/DL (ref 6–8.5)
RBC # BLD AUTO: 3.28 10*6/MM3 (ref 4.14–5.8)
SODIUM SERPL-SCNC: 126 MMOL/L (ref 136–145)
WBC NRBC COR # BLD: 7.22 10*3/MM3 (ref 3.4–10.8)

## 2023-05-13 PROCEDURE — 63710000001 DEXAMETHASONE PER 0.25 MG: Performed by: NURSE PRACTITIONER

## 2023-05-13 PROCEDURE — 25010000002 ONDANSETRON PER 1 MG: Performed by: NURSE PRACTITIONER

## 2023-05-13 PROCEDURE — 82948 REAGENT STRIP/BLOOD GLUCOSE: CPT

## 2023-05-13 PROCEDURE — 82728 ASSAY OF FERRITIN: CPT | Performed by: HOSPITALIST

## 2023-05-13 PROCEDURE — 85025 COMPLETE CBC W/AUTO DIFF WBC: CPT | Performed by: NURSE PRACTITIONER

## 2023-05-13 PROCEDURE — 83735 ASSAY OF MAGNESIUM: CPT | Performed by: HOSPITALIST

## 2023-05-13 PROCEDURE — 80053 COMPREHEN METABOLIC PANEL: CPT | Performed by: NURSE PRACTITIONER

## 2023-05-13 PROCEDURE — 25010000002 HEPARIN (PORCINE) PER 1000 UNITS: Performed by: NURSE PRACTITIONER

## 2023-05-13 PROCEDURE — 63710000001 INSULIN LISPRO (HUMAN) PER 5 UNITS: Performed by: NURSE PRACTITIONER

## 2023-05-13 PROCEDURE — 97530 THERAPEUTIC ACTIVITIES: CPT

## 2023-05-13 PROCEDURE — 86140 C-REACTIVE PROTEIN: CPT | Performed by: NURSE PRACTITIONER

## 2023-05-13 RX ORDER — LIDOCAINE AND PRILOCAINE 25; 25 MG/G; MG/G
1 CREAM TOPICAL ONCE
OUTPATIENT
Start: 2023-05-13

## 2023-05-13 RX ORDER — MANNITOL 250 MG/ML
12.5 INJECTION, SOLUTION INTRAVENOUS AS NEEDED
OUTPATIENT
Start: 2023-05-13 | End: 2023-05-14

## 2023-05-13 RX ADMIN — HEPARIN SODIUM 5000 UNITS: 5000 INJECTION INTRAVENOUS; SUBCUTANEOUS at 06:46

## 2023-05-13 RX ADMIN — HEPARIN SODIUM 5000 UNITS: 5000 INJECTION INTRAVENOUS; SUBCUTANEOUS at 15:27

## 2023-05-13 RX ADMIN — INSULIN LISPRO 4 UNITS: 100 INJECTION, SOLUTION INTRAVENOUS; SUBCUTANEOUS at 18:22

## 2023-05-13 RX ADMIN — TAMSULOSIN HYDROCHLORIDE 0.4 MG: 0.4 CAPSULE ORAL at 21:28

## 2023-05-13 RX ADMIN — HEPARIN SODIUM 5000 UNITS: 5000 INJECTION INTRAVENOUS; SUBCUTANEOUS at 21:27

## 2023-05-13 RX ADMIN — INSULIN LISPRO 4 UNITS: 100 INJECTION, SOLUTION INTRAVENOUS; SUBCUTANEOUS at 08:52

## 2023-05-13 RX ADMIN — LATANOPROST 1 DROP: 50 SOLUTION OPHTHALMIC at 21:28

## 2023-05-13 RX ADMIN — Medication 10 ML: at 08:52

## 2023-05-13 RX ADMIN — FAMOTIDINE 20 MG: 20 TABLET, FILM COATED ORAL at 06:46

## 2023-05-13 RX ADMIN — INSULIN LISPRO 5 UNITS: 100 INJECTION, SOLUTION INTRAVENOUS; SUBCUTANEOUS at 12:28

## 2023-05-13 RX ADMIN — Medication 1 TABLET: at 08:51

## 2023-05-13 RX ADMIN — ASPIRIN 81 MG: 81 TABLET, COATED ORAL at 08:51

## 2023-05-13 RX ADMIN — FAMOTIDINE 20 MG: 20 TABLET, FILM COATED ORAL at 18:23

## 2023-05-13 RX ADMIN — ONDANSETRON 4 MG: 2 INJECTION INTRAMUSCULAR; INTRAVENOUS at 21:27

## 2023-05-13 RX ADMIN — SUCROFERRIC OXYHYDROXIDE 500 MG: 500 TABLET, CHEWABLE ORAL at 12:29

## 2023-05-13 RX ADMIN — Medication 10 ML: at 21:28

## 2023-05-13 RX ADMIN — SUCROFERRIC OXYHYDROXIDE 500 MG: 500 TABLET, CHEWABLE ORAL at 08:52

## 2023-05-13 RX ADMIN — DEXAMETHASONE 6 MG: 4 TABLET ORAL at 08:51

## 2023-05-13 RX ADMIN — CETIRIZINE HYDROCHLORIDE 10 MG: 10 TABLET ORAL at 08:51

## 2023-05-13 NOTE — PLAN OF CARE
Goal Outcome Evaluation:  Plan of Care Reviewed With: patient        Progress: no change  Outcome Evaluation: Pt disoriented to time, c/o SOA, on 2L NC. Spouse at bedside. VSS. Will continue to monitor.

## 2023-05-13 NOTE — PROGRESS NOTES
[facility]  [phone]  Dr. Andres Noguera     Subjective:  LOS: 2    Chief Complaint:  F/up respiratory failure, COVID infection    Patient states that he is doing well today.  States that he is feeling better.  Denies any acute concerns or complaints.    Objective   Vital Signs past 24hrs  Temp range: Temp (24hrs), Av.4 °F (35.8 °C), Min:94.8 °F (34.9 °C), Max:98.1 °F (36.7 °C)    BP range: BP: (136-148)/(72-92) 137/77  Pulse range: Heart Rate:  [67-80] 80  Resp rate range: Resp:  [16-18] 18  Device (Oxygen Therapy): nasal cannulaFlow (L/min):  [2] 2  Oxygen range:SpO2:  [93 %-95 %] 93 %        Physical Exam  Constitutional:       General: He is not in acute distress.     Appearance: Normal appearance.   HENT:      Head: Normocephalic and atraumatic.   Eyes:      Extraocular Movements: Extraocular movements intact.      Conjunctiva/sclera: Conjunctivae normal.   Cardiovascular:      Rate and Rhythm: Normal rate and regular rhythm.      Heart sounds: No murmur heard.    No gallop.   Pulmonary:      Effort: Pulmonary effort is normal. No respiratory distress.      Breath sounds: Normal breath sounds. No wheezing, rhonchi or rales.   Abdominal:      General: Bowel sounds are normal.      Palpations: Abdomen is soft.   Musculoskeletal:         General: No swelling or tenderness. Normal range of motion.   Skin:     General: Skin is warm and dry.      Findings: No rash.   Neurological:      General: No focal deficit present.      Mental Status: He is alert and oriented to person, place, and time.   Psychiatric:         Mood and Affect: Mood normal.         Behavior: Behavior normal.       Results Review:    I have reviewed the laboratory and imaging data since the last note by [facility] physician.  My annotations are noted in assessment and plan.      Result Review:  I have personally reviewed the results from last note by [facility] physician to 2023 09:28 EDT and agree with these findings:  [x]   Laboratory list / accordion  [x]  Microbiology  [x]  Radiology  [x]  EKG/Telemetry   [x]  Cardiology/Vascular   [x]  Pathology  [x]  Old records  []  Other:    Medication Review:  I have reviewed the current MAR.  My annotations are noted in assessment and plan.    aspirin, 81 mg, Oral, Daily  b complex-vitamin c-folic acid, 1 tablet, Oral, Daily  cetirizine, 10 mg, Oral, Daily  dexamethasone, 6 mg, Oral, Daily   Or  dexamethasone, 6 mg, Intravenous, Daily  famotidine, 20 mg, Oral, BID AC  heparin (porcine), 5,000 Units, Subcutaneous, Q8H  insulin lispro, 2-7 Units, Subcutaneous, TID With Meals  latanoprost, 1 drop, Both Eyes, Nightly  sodium chloride, 10 mL, Intravenous, Q12H  Sucroferric Oxyhydroxide, 500 mg, Oral, TID PC  tamsulosin, 0.4 mg, Oral, Nightly           Lines, Drains & Airways     Active LDAs     Name Placement date Placement time Site Days    Peripheral IV 05/13/23 0017 Right;Posterior Forearm 05/13/23 0017  Forearm  less than 1    Ileostomy LLQ --  present upon arrival to [facility]  --  LLQ  --              Enhanced Droplet/Contact   Diet Orders (active) (From admission, onward)     Start     Ordered    05/11/23 0234  Diet: Renal Diets; Low Sodium (2-3g), Low Potassium, Low Phosphorus; Texture: Regular Texture (IDDSI 7); Fluid Consistency: Thin (IDDSI 0)  Diet Effective Now         05/11/23 0236                  Assessment  1. Acute pulmonary edema  2. COVID-19 infection: Difficult to determine whether it is just URI or pneumonia due to fluid overload.  I suspect there is an element of pneumonia due to the infiltrates being more prominent on the left side which is not typical for fluid overload.  He does seem to have improved significantly after dialysis and fluid removal.   3. Acute hypoxic respiratory failure, secondary to above  4. Acute on chronic diastolic CHF  5. Chronically elevated right hemidiaphragm could be suggestive of diaphragmatic paralysis  6. Elevated CRP suggestive of cytokine  syndrome  7. ESRD, skipped 1 session of dialysis prior to admission  8. Hyponatremia  9. Chronic normocytic anemia    Plan  • Continue dexamethasone 6 mg daily for total of 10 days or until time of discharge  • Not a candidate for remdesivir due to late presentation  • Walking oximetry prior to DC to determine oxygen requirement  • Wean oxygen for goal O2 saturation greater than 90%  • Dialysis and fluid removal per nephrology  • DVT prophylaxis with heparin        THESE ARE NEW MEDICAL PROBLEMS TO ME.      Andres Noguera DO   05/13/23  09:28 EDT      Part of this note may be an electronic transcription/translation of spoken language to printed text using the Dragon Dictation System.

## 2023-05-13 NOTE — PROGRESS NOTES
Name: Bill Landry ADMIT: 2023   : 1945  PCP: No primary care provider on file.    MRN: [id number] LOS: 2 days   AGE/SEX: 77 y.o. male  ROOM: [facility]     Subjective   Subjective   Feeling better again today. No SOA. Mild cough. Ambulating with PT. Some mild confusion noted overnight but resolved today.    Review of Systems     No N/V/D/abd pain/F/C/NS/HA/vis changes    Objective   Objective   Vital Signs  Temp:  [94.8 °F (34.9 °C)-98.1 °F (36.7 °C)] 98.1 °F (36.7 °C)  Heart Rate:  [79-80] 80  Resp:  [16-18] 18  BP: (137-148)/(77-92) 137/77  SpO2:  [93 %-95 %] 93 %  on  Flow (L/min):  [2] 2;   Device (Oxygen Therapy): room air  Body mass index is 25.54 kg/m².     (No change in exam today)    Physical Exam  Vitals and nursing note reviewed. Exam conducted with a chaperone present (Wife).   Constitutional:       General: He is not in acute distress.     Appearance: He is ill-appearing (chronically). He is not toxic-appearing or diaphoretic.   HENT:      Head: Normocephalic.      Nose: Nose normal.      Mouth/Throat:      Mouth: Mucous membranes are moist.      Pharynx: Oropharynx is clear.   Eyes:      General: No scleral icterus.        Right eye: No discharge.         Left eye: No discharge.      Extraocular Movements: Extraocular movements intact.      Conjunctiva/sclera: Conjunctivae normal.   Cardiovascular:      Rate and Rhythm: Normal rate and regular rhythm.      Pulses: Normal pulses.   Pulmonary:      Effort: Pulmonary effort is normal. No respiratory distress.      Breath sounds: Normal breath sounds. No wheezing or rales.      Comments: Anteriorly  Abdominal:      General: Bowel sounds are normal. There is no distension.      Palpations: Abdomen is soft.      Tenderness: There is no abdominal tenderness.   Musculoskeletal:         General: No swelling or deformity. Normal range of motion.      Cervical back: Neck supple. No rigidity.   Lymphadenopathy:      Cervical: No cervical  adenopathy.   Skin:     General: Skin is warm and dry.      Capillary Refill: Capillary refill takes less than 2 seconds.      Coloration: Skin is pale. Skin is not jaundiced.   Neurological:      General: No focal deficit present.      Mental Status: He is alert and oriented to person, place, and time. Mental status is at baseline.      Cranial Nerves: No cranial nerve deficit.      Coordination: Coordination normal.   Psychiatric:         Mood and Affect: Mood normal.         Behavior: Behavior normal.       Results Review     I reviewed the patient's new clinical results.  Results from last 7 days   Lab Units 05/13/23  0658 05/12/23  0659 05/11/23  0617 05/11/23  0132   WBC 10*3/mm3 7.22 5.57 6.54 5.62   HEMOGLOBIN g/dL 10.3* 10.7* 10.5* 11.5*   PLATELETS 10*3/mm3 155 138* 114* 117*     Results from last 7 days   Lab Units 05/13/23 0658 05/12/23 0658 05/11/23 0617 05/11/23  0132   SODIUM mmol/L 126* 133* 127* 129*   POTASSIUM mmol/L 4.8 4.3 4.3 4.0   CHLORIDE mmol/L 92* 98 92* 94*   CO2 mmol/L 20.0* 22.9 20.9* 24.2   BUN mg/dL 89* 58* 73* 69*   CREATININE mg/dL 7.38* 5.57* 7.63* 8.30*   GLUCOSE mg/dL 277* 237* 220* 192*   EGFR mL/min/1.73 7.0* 9.9* 6.8* 6.1*     Results from last 7 days   Lab Units 05/13/23 0658 05/12/23 0658 05/11/23  0132   ALBUMIN g/dL 3.0* 2.8* 2.9*   BILIRUBIN mg/dL 0.9 1.2 1.6*   ALK PHOS U/L 151* 154* 170*   AST (SGOT) U/L 32 38 63*   ALT (SGPT) U/L 31 35 40     Results from last 7 days   Lab Units 05/13/23 0658 05/12/23  0658 05/11/23 0617 05/11/23  0132   CALCIUM mg/dL 8.1* 8.7 8.2* 8.7   ALBUMIN g/dL 3.0* 2.8*  --  2.9*   MAGNESIUM mg/dL 2.3 2.4  --   --      Results from last 7 days   Lab Units 05/12/23  0658 05/11/23  0132   PROCALCITONIN ng/mL 1.94* 2.38*     Hemoglobin A1C   Date/Time Value Ref Range Status   05/12/2023 0659 6.20 (H) 4.80 - 5.60 % Final     Glucose   Date/Time Value Ref Range Status   05/13/2023 1113 306 (H) 70 - 130 mg/dL Final     Comment:     Meter:  [id number] : [id number] Chirag Hollingsworth CNA   05/13/2023 0620 279 (H) 70 - 130 mg/dL Final     Comment:     Meter: [id number] : [id number] Arvind Hernandez NA   05/12/2023 2126 291 (H) 70 - 130 mg/dL Final     Comment:     Meter: [id number] : [id number] Arvind Kuoelin NA   05/12/2023 1722 329 (H) 70 - 130 mg/dL Final     Comment:     Meter: [id number] : [id number] Miguelina German NA   05/12/2023 1119 244 (H) 70 - 130 mg/dL Final     Comment:     Meter: [id number] : [id number] Tao Iniguez NA   05/12/2023 0701 231 (H) 70 - 130 mg/dL Final     Comment:     Meter: [id number] : [id number] Ralf Osborne NA   05/11/2023 1943 251 (H) 70 - 130 mg/dL Final     Comment:     Meter: [id number] : [id number] Ralf Osborne NA       No radiology results for the last day  I have personally reviewed all medications:  Scheduled Medications  aspirin, 81 mg, Oral, Daily  b complex-vitamin c-folic acid, 1 tablet, Oral, Daily  cetirizine, 10 mg, Oral, Daily  dexamethasone, 6 mg, Oral, Daily   Or  dexamethasone, 6 mg, Intravenous, Daily  famotidine, 20 mg, Oral, BID AC  heparin (porcine), 5,000 Units, Subcutaneous, Q8H  insulin lispro, 2-7 Units, Subcutaneous, TID With Meals  latanoprost, 1 drop, Both Eyes, Nightly  sodium chloride, 10 mL, Intravenous, Q12H  Sucroferric Oxyhydroxide, 500 mg, Oral, TID PC  tamsulosin, 0.4 mg, Oral, Nightly    Infusions   Diet  Diet: Renal Diets; Low Sodium (2-3g), Low Potassium, Low Phosphorus; Texture: Regular Texture (IDDSI 7); Fluid Consistency: Thin (IDDSI 0)    I have personally reviewed:  [x]  Laboratory   []  Microbiology   []  Radiology   [x]  EKG/Telemetry  []  Cardiology/Vascular   []  Pathology    []  Records       Assessment/Plan     Active Hospital Problems    Diagnosis  POA   • **Acute hypoxemic respiratory failure due to COVID-19 [U07.1, J96.01]  Yes   • Impaired glucose tolerance [R73.02]  Yes   • Acute on chronic diastolic heart failure [I50.33]  Yes   • Bicuspid aortic  valve [Q23.1]  Not Applicable   • Aortic stenosis, mild [I35.0]  Yes   • TATE (obstructive sleep apnea) [G47.33]  Yes   • End stage kidney disease (HCC) [N18.6]  Yes   • Crohn's disease, unspecified, without complications [K50.90]  Yes   • Essential (primary) hypertension [I10]  Yes   • Secondary hyperparathyroidism of renal origin [N25.81]  Yes   • Anemia due to stage 4 chronic kidney disease treated with erythropoietin [N18.4, D63.1]  Yes   • Cirrhosis of liver without ascites [K74.60]  Yes   • Congestive splenomegaly [D73.2]  Yes   • Permanent atrial fibrillation [I48.21]  Yes   • Thrombocytopenia (HCC) [D69.6]  Yes      Resolved Hospital Problems   No resolved problems to display.       76yo gentleman with acute hypoxia due to COViD-19 and volume overload in setting of ESRD and diastolic CHF.      Acute hypoxia: due to volume overload most likely, though COViD PNA also playing role, weaned to RA now, desats a little with ambulation and while sleeping, continue to monitor, will need walking oximetry prior to dc     COViD pneumonia: symptomatic for >6d so not a candidate for Remdesivir, continue Decadron and supplemental O2 as needed, trending inflammatory markers, pharmacologic DVT ppx, appreciate Pulm attention to pt, do not suspect bacterial PNA--elevated PCT likely due to ESRD and is falling     Acute on chronic diastolic CHF: defer to Card and Renal, volume mgmt with HD, Echo looks good     ESRD with volume overload: as above, usually gets HD MWF, got HD 5/11, await Renal recs today--suspect will need HD today or tomorrow.     Permanent AFib: HRs fine on no rate-control meds, not on AC due to h/o GIB     Elevated Trop: suspect due to ESRD and not ACS, EKG fine, Card does not recommend any further w/u     Anemia of CKD  MGUS with pancytopenia: Hgb stable, platelets better than baseline, WBC wnl, consulted Heme/Onc at family's request--they have seen and signed off     DM2?: A1c 6.2, not on meds for this PTA,  covering with SSI here while on steroids, at this time would say he has borderline DM, long d/w pt and wife regarding this, encouraged him to permit temporary use of SSI here as his sugars are pretty high due to steroids--he is agreeable     Crohn's with ileostomy: stable, Wound/Ostomy RN has seen     Elevated LFTs: possibly due to viral infection, do not suspect obstruction, pt is asymptomatic, numbers normalized as of 5/12      · Heparin SC for DVT prophylaxis.  · Full code.  · Discussed with patient, spouse and nursing staff.  · Anticipate discharge home with family in 1-2 days. PT eval noted.    During all interaction with pt proper PPE was utilized (gown, gloves, mask, goggles, and face shield) and was donned/doffed according to recommendations. Hands were cleaned before and after interaction.    Ken Salas MD  [facility]  05/13/23  13:46 EDT

## 2023-05-13 NOTE — THERAPY TREATMENT NOTE
Patient Name: Bill Landry  : 1945    MRN: [id number]                              Today's Date: 2023       Admit Date: 2023    Visit Dx:     ICD-10-CM ICD-9-CM   1. Acute hypoxemic respiratory failure due to COVID-19  U07.1 518.81    J96.01 079.89     799.02   2. Other fatigue  R53.83 780.79   3. ESRD (end stage renal disease)  N18.6 585.6     Patient Active Problem List   Diagnosis   • Permanent atrial fibrillation   • Thrombocytopenia (HCC)   • Chronic leukopenia   • Cirrhosis of liver without ascites   • Congestive splenomegaly   • Anemia due to stage 4 chronic kidney disease treated with erythropoietin   • Esophageal abnormality   • At risk for fluid volume overload   • End stage kidney disease (HCC)   • Other specified glaucoma   • Dependence on renal dialysis   • Allergy, unspecified, initial encounter   • Crohn's disease, unspecified, without complications   • Deficiency of other specified B group vitamins   • Dermatitis, unspecified   • Encounter for immunization   • Essential (primary) hypertension   • History of urinary stone   • Hyperparathyroidism, unspecified   • 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, with stage 1 through stage 4 chronic kidney disease, or unspecified chronic kidney disease   • Other disorders of phosphorus metabolism   • Other iron deficiency anemias   • Pericardial effusion (noninflammatory)   • Pure hypertriglyceridemia   • Renal osteodystrophy   • Secondary hyperparathyroidism of renal origin   • Splenomegaly, not elsewhere classified   • Bilateral pseudophakia   • Dermatochalasis of eyelid   • Primary open-angle glaucoma, bilateral, moderate stage   • Puckering of macula, left eye   • Secondary cataract   • AV fistula occlusion, initial encounter   • TATE (obstructive sleep apnea)   • Upper extremity aneurysm   • Aortic stenosis, mild   • Bicuspid aortic valve   • Hyperuricemia without signs of inflammatory arthritis and  tophaceous disease   • Presbyopia   • Shortness of breath   • Acute hypoxemic respiratory failure due to COVID-19   • Acute on chronic diastolic heart failure   • Impaired glucose tolerance     Past Medical History:   Diagnosis Date   • Abnormal serum protein electrophoresis    • Anemia     Multifactorial   • Bicuspid aortic valve 7/20/2022   • Chronic leukopenia and thrombocytopenia    • Cirrhosis of liver without ascites 07/24/2017   • Congestive splenomegaly 07/24/2017   • Crohn disease     with ileostomy   • Diastolic CHF     although it was likely from volume overload due to ESRD   • Diverticula of colon    • ESRD on hemodialysis     M-W-F [facility]   • Fatty liver disease, nonalcoholic    • GERD (gastroesophageal reflux disease)    • GI bleed    • Glaucoma    • H/O Gallstone pancreatitis    • H/O Pericardial effusion    • H/O sinus bradycardia    • Heart murmur    • History of hypertension     resolved   • History of UTI    • Hx of renal calculi    • Ileostomy present    • Iron deficiency    • MGUS (monoclonal gammopathy of unknown significance)    • TATE (obstructive sleep apnea)    • Permanent atrial fibrillation    • Sleep apnea     CPAP USED   • Type 2 diabetes mellitus     CURRENTLY TAKES NO MED     Past Surgical History:   Procedure Laterality Date   • APPENDECTOMY     • ARTERIOVENOUS FISTULA/SHUNT SURGERY Left 08/08/2017    Procedure: LEFT BRACHIAL CEPHALIC AV FISTULA FORMATION WITH CEPHALIC VEIN TRANSPOSITION ;  Surgeon: Bill Deal MD;  Location: [facility] OR;  Service:    • ARTERIOVENOUS FISTULA/SHUNT SURGERY Left 5/27/2022    Procedure: OPEN REVISION LEFT ARTERIOVENOUS INTERPOSITION GRAFT PLACEMENT;  Surgeon: Bill Deal MD;  Location: [facility] OR;  Service: Vascular;  Laterality: Left;   • ARTERIOVENOUS FISTULA/SHUNT SURGERY W/ HEMODIALYSIS CATHETER INSERTION Left 02/15/2022    Procedure: OPEN LEFT ARM ARTERIOVENOUS FISTULA THROMBECTOMY WITH CEPHALIC VEIN ARTHROPLASTY AND STENT/GRAFT  PLACEMENT;  Surgeon: Bill Deal MD;  Location: [facility] HYBRID OR 18/19;  Service: Vascular;  Laterality: Left;   • CATARACT EXTRACTION WITH INTRAOCULAR LENS IMPLANT Bilateral    • CHOLECYSTECTOMY     • COLECTOMY PARTIAL / TOTAL      History of inflammatory bowel disease with status post colectomy with ileostomy many years ago in the [facility] [place],  18 YEARS OF AGE   • COLONOSCOPY     • CYSTOSCOPY LITHOLAPAXY BLADDER STONE EXTRACTION     • ENDOSCOPY  01/16/2015    gastritis   • ENDOSCOPY  01/17/2018    Procedure: ESOPHAGOGASTRODUODENOSCOPY;  Surgeon: Warner Neville MD;  Location: [facility] ENDOSCOPY;  Service:    • ILEOSCOPY  01/16/2015    normal   • ILEOSCOPY N/A 01/17/2018    Procedure: ILEOSCOPY;  Surgeon: Warner Neville MD;  Location: [facility] ENDOSCOPY;  Service:    • TONSILLECTOMY        General Information     Row Name 05/13/23 1333          Physical Therapy Time and Intention    Document Type therapy note (daily note)  -     Mode of Treatment individual therapy;physical therapy  -     Row Name 05/13/23 8173          General Information    Patient Profile Reviewed yes  -     Existing Precautions/Restrictions fall  -     Row Name 05/13/23 7623          Cognition    Orientation Status (Cognition) oriented to;person;place  -     Row Name 05/13/23 0143          Safety Issues, Functional Mobility    Impairments Affecting Function (Mobility) balance;cognition;strength;endurance/activity tolerance;pain  -     Cognitive Impairments, Mobility Safety/Performance awareness, need for assistance  -           User Key  (r) = Recorded By, (t) = Taken By, (c) = Cosigned By    Initials Name Provider Type     Alida Delgado PT Physical Therapist               Mobility     Row Name 05/13/23 3790          Bed Mobility    Bed Mobility supine-sit  -     Supine-Sit [place] (Bed Mobility) standby assist  -     Assistive Device (Bed Mobility) bed rails;head of bed elevated  -     Row Name 05/13/23  1333          Sit-Stand Transfer    Sit-Stand [place] (Transfers) contact guard  -SM     Row Name 05/13/23 1333          Gait/Stairs (Locomotion)    [place] Level (Gait) minimum assist (75% patient effort);contact guard  -SM     Assistive Device (Gait) --  no AD  -SM     Distance in Feet (Gait) 30ft 2x  -     Deviations/Abnormal Patterns (Gait) festinating/shuffling;gait speed decreased  -     Bilateral Gait Deviations forward flexed posture;heel strike decreased  -     Comment, (Gait/Stairs) Gait very slow and shuffled. Patient reaching out to walls for support at times.  -SM           User Key  (r) = Recorded By, (t) = Taken By, (c) = Cosigned By    Initials Name Provider Type    Alida Ramirez PT Physical Therapist               Obj/Interventions     Row Name 05/13/23 1334          Balance    Balance Assessment sitting static balance;sitting dynamic balance;sit to stand dynamic balance;standing static balance;standing dynamic balance  -     Static Sitting Balance supervision  -     Dynamic Sitting Balance standby assist  -     Position, Sitting Balance sitting edge of bed  -     Sit to Stand Dynamic Balance contact guard  -SM     Static Standing Balance contact guard  -SM     Dynamic Standing Balance contact guard;minimal assist  -     Position/Device Used, Standing Balance unsupported  -     Balance Interventions sitting;standing;sit to stand;static;dynamic  -           User Key  (r) = Recorded By, (t) = Taken By, (c) = Cosigned By    Initials Name Provider Type    Alida Ramirez PT Physical Therapist               Goals/Plan    No documentation.                Clinical Impression     Row Name 05/13/23 1334          Pain    Pretreatment Pain Rating 0/10 - no pain  -SM     Posttreatment Pain Rating 0/10 - no pain  -SM     Row Name 05/13/23 1334          Plan of Care Review    Plan of Care Reviewed With patient  -     Outcome Evaluation Patient seen for PT session this  AM. Patient supine in bed upon arrival. Patient alert to self and place this date. Patient with moments of confusion throughout session. Patient sat up to EOB with SBA. Patient sat at EOB and donned shoes with some assistance. patient then performed STS from EOB with CGA. Patient ambulated 30ft in room 2x with CGA-Aparna. Gait very slow and shuffled. Patient reaching out to walls for support at times. Distance limited by fatigue and feeling SOA. Patient reclined in chair at end of session. Patient would continue to benefit form skilled PT intervention. PT will continue to monitor.  -     Row Name 05/13/23 1334          Vital Signs    O2 Delivery Pre Treatment room air  -SM     O2 Delivery Intra Treatment room air  -SM     O2 Delivery Post Treatment room air  -SM     Pre Patient Position Supine  -SM     Intra Patient Position Standing  -SM     Post Patient Position Sitting  -     Row Name 05/13/23 1334          Positioning and Restraints    Pre-Treatment Position in bed  -SM     Post Treatment Position chair  -SM     In Chair notified nsg;reclined;call light within reach;encouraged to call for assist;exit alarm on;with family/caregiver  Alarm pad in chair. no box in room. RN and aide both notified.  -SM           User Key  (r) = Recorded By, (t) = Taken By, (c) = Cosigned By    Initials Name Provider Type    Alida Ramirez PT Physical Therapist               Outcome Measures     Row Name 05/13/23 1336          How much help from another person do you currently need...    Turning from your back to your side while in flat bed without using bedrails? 4  -SM     Moving from lying on back to sitting on the side of a flat bed without bedrails? 4  -SM     Moving to and from a bed to a chair (including a wheelchair)? 3  -SM     Standing up from a chair using your arms (e.g., wheelchair, bedside chair)? 3  -SM     Climbing 3-5 steps with a railing? 3  -SM     To walk in hospital room? 3  -SM     AM-PAC 6 Clicks Score  (PT) 20  -     Highest level of mobility 6 --> Walked 10 steps or more  -     Row Name 05/13/23 1338          Functional Assessment    Outcome Measure Options AM-PAC 6 Clicks Basic Mobility (PT)  -           User Key  (r) = Recorded By, (t) = Taken By, (c) = Cosigned By    Initials Name Provider Type     Alida Delgado, ERROL Physical Therapist                             Physical Therapy Education     Title: PT OT SLP Therapies (Done)     Topic: Physical Therapy (Done)     Point: Mobility training (Done)     Learning Progress Summary           Patient Acceptance, E, VU,NR by  at 5/13/2023 1339    Acceptance, E, NR by AR at 5/12/2023 1514                   Point: Home exercise program (Done)     Learning Progress Summary           Patient Acceptance, E, VU,NR by  at 5/13/2023 1339    Acceptance, E, NR by AR at 5/12/2023 1514                   Point: Body mechanics (Done)     Learning Progress Summary           Patient Acceptance, E, VU,NR by  at 5/13/2023 1339    Acceptance, E, NR by AR at 5/12/2023 1514                   Point: Precautions (Done)     Learning Progress Summary           Patient Acceptance, E, VU,NR by  at 5/13/2023 1339    Acceptance, E, NR by AR at 5/12/2023 1514                               User Key     Initials Effective Dates Name Provider Type Discipline    AR 06/16/21 -  Akila Branham, PT Physical Therapist PT     05/02/22 -  Alida Delgado PT Physical Therapist PT              PT Recommendation and Plan     Plan of Care Reviewed With: patient  Outcome Evaluation: Patient seen for PT session this AM. Patient supine in bed upon arrival. Patient alert to self and place this date. Patient with moments of confusion throughout session. Patient sat up to EOB with SBA. Patient sat at EOB and donned shoes with some assistance. patient then performed STS from EOB with CGA. Patient ambulated 30ft in room 2x with CGA-Aparna. Gait very slow and shuffled. Patient reaching out to  walls for support at times. Distance limited by fatigue and feeling SOA. Patient reclined in chair at end of session. Patient would continue to benefit form skilled PT intervention. PT will continue to monitor.     Time Calculation:    PT Charges     Row Name 05/13/23 1340             Time Calculation    Start Time 1017  -SM      Stop Time 1028  -SM      Time Calculation (min) 11 min  -SM      PT Received On 05/13/23  -SM      PT - Next Appointment 05/15/23  -SM         Time Calculation- PT    Total Timed Code Minutes- PT 11 minute(s)  -SM         Timed Charges    30775 - PT Therapeutic Activity Minutes 11  -SM         Total Minutes    Timed Charges Total Minutes 11  -SM       Total Minutes 11  -SM            User Key  (r) = Recorded By, (t) = Taken By, (c) = Cosigned By    Initials Name Provider Type     Alida Delgado PT Physical Therapist              Therapy Charges for Today     Code Description Service Date Service Provider Modifiers Qty    [id number]  PT THERAPEUTIC ACT EA 15 MIN 5/13/2023 Alida Delgado PT GP 1          PT G-Codes  Outcome Measure Options: AM-PAC 6 Clicks Basic Mobility (PT)  AM-PAC 6 Clicks Score (PT): 20       Alida Delgado PT  5/13/2023

## 2023-05-13 NOTE — PLAN OF CARE
Goal Outcome Evaluation:  Plan of Care Reviewed With: patient           Outcome Evaluation: Patient seen for PT session this AM. Patient supine in bed upon arrival. Patient alert to self and place this date. Patient with moments of confusion throughout session. Patient sat up to EOB with SBA. Patient sat at EOB and donned shoes with some assistance. patient then performed STS from EOB with CGA. Patient ambulated 30ft in room 2x with CGA-Aparna. Gait very slow and shuffled. Patient reaching out to walls for support at times. Distance limited by fatigue and feeling SOA. Patient reclined in chair at end of session. Patient would continue to benefit form skilled PT intervention. PT will continue to monitor.

## 2023-05-13 NOTE — PROGRESS NOTES
[facility] Progress Note      Patient Name: Bill Landry  : 1945  MRN: [id number]  Primary Care Physician:  No primary care provider on file.  Date of admission: 2023    Subjective     Interval History:   Follow-up end-stage renal disease  He had dialysis on Thursday 3 L fluid removed, currently feeling better but he had episodes of confusion according to his wife, denies chest pain or shortness of air, no orthopnea or PND, no nausea or vomiting he has functional ileostomy.  He normally gets dialysis every Monday, Wednesday and Friday.  Currently on room air    Review of Systems:   As noted above    Objective     Vitals:   Temp:  [94.8 °F (34.9 °C)-98.1 °F (36.7 °C)] 98.1 °F (36.7 °C)  Heart Rate:  [79-80] 80  Resp:  [16-18] 18  BP: (137-148)/(77-92) 137/77  Flow (L/min):  [2] 2    Intake/Output Summary (Last 24 hours) at 2023 1414  Last data filed at 2023 0900  Gross per 24 hour   Intake 220 ml   Output --   Net 220 ml       Physical Exam:      Constitutional: Awake, alert, chronically ill, no acute distress  HEENT: Sclera anicteric, no conjunctival injection, oral mucosa is no  Neck: Supple, no thyromegaly, no lymphadenopathy, trachea at midline, no JVD  Respiratory:  Bilateral rhonchi, breathing effort not labored  Cardiovascular: RRR, systolic ejection murmur, no rub  Gastrointestinal: Positive bowel sounds, abdomen is soft, nontender and nondistended.  Ostomy bag in place   : No palpable bladder  Musculoskeletal: No edema, no clubbing or cyanosis.  Left upper extremity  AVF/ AVG   Neurologic: Oriented x2, moving all extremities, normal speech and mental status  Skin: Warm and dry        Scheduled Meds:     aspirin, 81 mg, Oral, Daily  b complex-vitamin c-folic acid, 1 tablet, Oral, Daily  cetirizine, 10 mg, Oral, Daily  dexamethasone, 6 mg, Oral, Daily   Or  dexamethasone, 6 mg, Intravenous, Daily  famotidine, 20 mg, Oral, BID AC  heparin (porcine),  5,000 Units, Subcutaneous, Q8H  insulin lispro, 2-7 Units, Subcutaneous, TID With Meals  latanoprost, 1 drop, Both Eyes, Nightly  sodium chloride, 10 mL, Intravenous, Q12H  Sucroferric Oxyhydroxide, 500 mg, Oral, TID PC  tamsulosin, 0.4 mg, Oral, Nightly      IV Meds:        Results Reviewed:   I have personally reviewed the results from the time of this admission to 5/13/2023 14:14 EDT     Results from last 7 days   Lab Units 05/13/23  0658 05/12/23  0658 05/11/23  0617 05/11/23  0132   SODIUM mmol/L 126* 133* 127* 129*   POTASSIUM mmol/L 4.8 4.3 4.3 4.0   CHLORIDE mmol/L 92* 98 92* 94*   CO2 mmol/L 20.0* 22.9 20.9* 24.2   BUN mg/dL 89* 58* 73* 69*   CREATININE mg/dL 7.38* 5.57* 7.63* 8.30*   CALCIUM mg/dL 8.1* 8.7 8.2* 8.7   BILIRUBIN mg/dL 0.9 1.2  --  1.6*   ALK PHOS U/L 151* 154*  --  170*   ALT (SGPT) U/L 31 35  --  40   AST (SGOT) U/L 32 38  --  63*   GLUCOSE mg/dL 277* 237* 220* 192*       Estimated Creatinine Clearance: 9.6 mL/min (A) (by C-G formula based on SCr of 7.38 mg/dL (H)).    Results from last 7 days   Lab Units 05/13/23  0658 05/12/23  0658   MAGNESIUM mg/dL 2.3 2.4             Results from last 7 days   Lab Units 05/13/23  0658 05/12/23  0659 05/11/23  0617 05/11/23  0132   WBC 10*3/mm3 7.22 5.57 6.54 5.62   HEMOGLOBIN g/dL 10.3* 10.7* 10.5* 11.5*   PLATELETS 10*3/mm3 155 138* 114* 117*             Assessment / Plan     ASSESSMENT:     -End Stage Renal Disease ( ESRD ) on Hemodialysis .s/p  LUE AVF s/p LUE AVG  ( by rebecca Singh )  functional .and Monday, Wednesday and Friday schedule.    Sodium is 126, creatinine up to 7.38, his last dialysis was on Thursday  -Chronic normocytic anemia. On Epogen protocol.  On hold hemoglobin more than 10.3,  treated with long-acting WENDY as an outpatient  -Hypervolemia with hyponatremia we will challenge fluid removal dialysis  -Hyperphosphatemia. Continue binders  with meals, Continue renal diet. We will follow phosphorus to make further adjustmentst   -Diabetes  Mellitus type 2 ..Continue insulin regimen / hypoglycemic regimen .Hypoglycemic protocol . POC glucose 4 x day .Diabetic diet  -COVID-19 infection currently on isolation on dexamethasone.,  Inhalers and he is on room air  -Chronic diastolic congestive heart failure .  We will continue hemodialysis to reach normovolemic  -Crohn disease a status post ostomy placement       PLAN:  -Hemodialysis today  -Challenge fluid removal  -Surveillance labs    I discussed the case with the patient and his wife at the bedside  Copied text in this note has been reviewed and is accurate as of 05/13/23.         Thank you for involving us in the care of Bill Landry.  Please feel free to call with any questions.    Kvng Monsivais MD  05/13/23  14:14 EDT    [facility]  [phone]    Please note that portions of this note were completed with a voice recognition program.

## 2023-05-14 LAB
ALBUMIN SERPL-MCNC: 3.1 G/DL (ref 3.5–5.2)
ALBUMIN/GLOB SERPL: 0.8 G/DL
ALP SERPL-CCNC: 155 U/L (ref 39–117)
ALT SERPL W P-5'-P-CCNC: 43 U/L (ref 1–41)
ANION GAP SERPL CALCULATED.3IONS-SCNC: 13.1 MMOL/L (ref 5–15)
AST SERPL-CCNC: 37 U/L (ref 1–40)
BASOPHILS # BLD AUTO: 0.01 10*3/MM3 (ref 0–0.2)
BASOPHILS NFR BLD AUTO: 0.1 % (ref 0–1.5)
BILIRUB SERPL-MCNC: 1.5 MG/DL (ref 0–1.2)
BUN SERPL-MCNC: 50 MG/DL (ref 8–23)
BUN/CREAT SERPL: 10.7 (ref 7–25)
CALCIUM SPEC-SCNC: 8.4 MG/DL (ref 8.6–10.5)
CHLORIDE SERPL-SCNC: 95 MMOL/L (ref 98–107)
CO2 SERPL-SCNC: 21.9 MMOL/L (ref 22–29)
CREAT SERPL-MCNC: 4.67 MG/DL (ref 0.76–1.27)
CRP SERPL-MCNC: 3.17 MG/DL (ref 0–0.5)
DEPRECATED RDW RBC AUTO: 46.3 FL (ref 37–54)
EGFRCR SERPLBLD CKD-EPI 2021: 12.2 ML/MIN/1.73
EOSINOPHIL # BLD AUTO: 0 10*3/MM3 (ref 0–0.4)
EOSINOPHIL NFR BLD AUTO: 0 % (ref 0.3–6.2)
ERYTHROCYTE [DISTWIDTH] IN BLOOD BY AUTOMATED COUNT: 12.9 % (ref 12.3–15.4)
FERRITIN SERPL-MCNC: 2791 NG/ML (ref 30–400)
GLOBULIN UR ELPH-MCNC: 3.9 GM/DL
GLUCOSE BLDC GLUCOMTR-MCNC: 154 MG/DL (ref 70–130)
GLUCOSE BLDC GLUCOMTR-MCNC: 186 MG/DL (ref 70–130)
GLUCOSE BLDC GLUCOMTR-MCNC: 196 MG/DL (ref 70–130)
GLUCOSE BLDC GLUCOMTR-MCNC: 259 MG/DL (ref 70–130)
GLUCOSE SERPL-MCNC: 186 MG/DL (ref 65–99)
HCT VFR BLD AUTO: 35.2 % (ref 37.5–51)
HGB BLD-MCNC: 11.5 G/DL (ref 13–17.7)
IMM GRANULOCYTES # BLD AUTO: 0.14 10*3/MM3 (ref 0–0.05)
IMM GRANULOCYTES NFR BLD AUTO: 2 % (ref 0–0.5)
LYMPHOCYTES # BLD AUTO: 0.26 10*3/MM3 (ref 0.7–3.1)
LYMPHOCYTES NFR BLD AUTO: 3.8 % (ref 19.6–45.3)
MAGNESIUM SERPL-MCNC: 2.1 MG/DL (ref 1.6–2.4)
MCH RBC QN AUTO: 31.7 PG (ref 26.6–33)
MCHC RBC AUTO-ENTMCNC: 32.7 G/DL (ref 31.5–35.7)
MCV RBC AUTO: 97 FL (ref 79–97)
MONOCYTES # BLD AUTO: 0.41 10*3/MM3 (ref 0.1–0.9)
MONOCYTES NFR BLD AUTO: 5.9 % (ref 5–12)
NEUTROPHILS NFR BLD AUTO: 6.09 10*3/MM3 (ref 1.7–7)
NEUTROPHILS NFR BLD AUTO: 88.2 % (ref 42.7–76)
NRBC BLD AUTO-RTO: 0 /100 WBC (ref 0–0.2)
PHOSPHATE SERPL-MCNC: 3.8 MG/DL (ref 2.5–4.5)
PLATELET # BLD AUTO: 155 10*3/MM3 (ref 140–450)
PMV BLD AUTO: 9 FL (ref 6–12)
POTASSIUM SERPL-SCNC: 4.2 MMOL/L (ref 3.5–5.2)
PROT SERPL-MCNC: 7 G/DL (ref 6–8.5)
RBC # BLD AUTO: 3.63 10*6/MM3 (ref 4.14–5.8)
SODIUM SERPL-SCNC: 130 MMOL/L (ref 136–145)
WBC NRBC COR # BLD: 6.91 10*3/MM3 (ref 3.4–10.8)

## 2023-05-14 PROCEDURE — 82948 REAGENT STRIP/BLOOD GLUCOSE: CPT

## 2023-05-14 PROCEDURE — 25010000002 HEPARIN (PORCINE) PER 1000 UNITS: Performed by: NURSE PRACTITIONER

## 2023-05-14 PROCEDURE — 82728 ASSAY OF FERRITIN: CPT | Performed by: HOSPITALIST

## 2023-05-14 PROCEDURE — 85025 COMPLETE CBC W/AUTO DIFF WBC: CPT | Performed by: INTERNAL MEDICINE

## 2023-05-14 PROCEDURE — 86140 C-REACTIVE PROTEIN: CPT | Performed by: NURSE PRACTITIONER

## 2023-05-14 PROCEDURE — 63710000001 DEXAMETHASONE PER 0.25 MG: Performed by: NURSE PRACTITIONER

## 2023-05-14 PROCEDURE — 36415 COLL VENOUS BLD VENIPUNCTURE: CPT | Performed by: NURSE PRACTITIONER

## 2023-05-14 PROCEDURE — 83735 ASSAY OF MAGNESIUM: CPT | Performed by: HOSPITALIST

## 2023-05-14 PROCEDURE — 25010000002 ONDANSETRON PER 1 MG: Performed by: NURSE PRACTITIONER

## 2023-05-14 PROCEDURE — 84100 ASSAY OF PHOSPHORUS: CPT | Performed by: INTERNAL MEDICINE

## 2023-05-14 PROCEDURE — 80053 COMPREHEN METABOLIC PANEL: CPT | Performed by: NURSE PRACTITIONER

## 2023-05-14 PROCEDURE — 63710000001 INSULIN LISPRO (HUMAN) PER 5 UNITS: Performed by: NURSE PRACTITIONER

## 2023-05-14 RX ORDER — MANNITOL 250 MG/ML
12.5 INJECTION, SOLUTION INTRAVENOUS AS NEEDED
OUTPATIENT
Start: 2023-05-15 | End: 2023-05-15

## 2023-05-14 RX ORDER — LORAZEPAM 1 MG/1
1 TABLET ORAL ONCE
Status: COMPLETED | OUTPATIENT
Start: 2023-05-14 | End: 2023-05-14

## 2023-05-14 RX ADMIN — HEPARIN SODIUM 5000 UNITS: 5000 INJECTION INTRAVENOUS; SUBCUTANEOUS at 06:39

## 2023-05-14 RX ADMIN — LATANOPROST 1 DROP: 50 SOLUTION OPHTHALMIC at 21:16

## 2023-05-14 RX ADMIN — TAMSULOSIN HYDROCHLORIDE 0.4 MG: 0.4 CAPSULE ORAL at 21:15

## 2023-05-14 RX ADMIN — INSULIN LISPRO 2 UNITS: 100 INJECTION, SOLUTION INTRAVENOUS; SUBCUTANEOUS at 17:28

## 2023-05-14 RX ADMIN — Medication 10 ML: at 21:15

## 2023-05-14 RX ADMIN — FAMOTIDINE 20 MG: 20 TABLET, FILM COATED ORAL at 17:28

## 2023-05-14 RX ADMIN — Medication 1 TABLET: at 08:58

## 2023-05-14 RX ADMIN — FAMOTIDINE 20 MG: 20 TABLET, FILM COATED ORAL at 06:39

## 2023-05-14 RX ADMIN — HEPARIN SODIUM 5000 UNITS: 5000 INJECTION INTRAVENOUS; SUBCUTANEOUS at 21:15

## 2023-05-14 RX ADMIN — Medication 10 ML: at 08:59

## 2023-05-14 RX ADMIN — CETIRIZINE HYDROCHLORIDE 10 MG: 10 TABLET ORAL at 08:58

## 2023-05-14 RX ADMIN — INSULIN LISPRO 2 UNITS: 100 INJECTION, SOLUTION INTRAVENOUS; SUBCUTANEOUS at 08:59

## 2023-05-14 RX ADMIN — LORAZEPAM 1 MG: 1 TABLET ORAL at 11:43

## 2023-05-14 RX ADMIN — ASPIRIN 81 MG: 81 TABLET, COATED ORAL at 08:59

## 2023-05-14 RX ADMIN — ONDANSETRON 4 MG: 2 INJECTION INTRAMUSCULAR; INTRAVENOUS at 06:39

## 2023-05-14 RX ADMIN — HEPARIN SODIUM 5000 UNITS: 5000 INJECTION INTRAVENOUS; SUBCUTANEOUS at 17:28

## 2023-05-14 RX ADMIN — DEXAMETHASONE 6 MG: 4 TABLET ORAL at 08:58

## 2023-05-14 NOTE — NURSING NOTE
Pt tolerated tx well,  confused and very restless. And wife at the bedside and also very anxious.  Fistula worked well.  VSS. And u/f. 3 liters.  tx terminated 10 min early due to pt c/o severe nausea and very restless.     Post tx,  VSS. Slightly hypertensive.    Dressing clean dry and intact.   wife at the bedside.  pt resting with eyes closed      U/f   3600

## 2023-05-14 NOTE — PROGRESS NOTES
[facility] Progress Note      Patient Name: Bill Landry  : 1945  MRN: [id number]  Primary Care Physician:  No primary care provider on file.  Date of admission: 2023    Subjective     Interval History:   Follow-up end-stage renal disease  The patient had dialysis early this a.m., he has been very confused his steroids were discontinued currently sound asleep I did not wake him up because of his agitation according to his wife.    Review of Systems:   Did not obtain    Objective     Vitals:   Temp:  [96.4 °F (35.8 °C)-98.2 °F (36.8 °C)] 97.3 °F (36.3 °C)  Heart Rate:  [73-92] 92  Resp:  [16-20] 20  BP: (130-170)/(68-90) 130/73  Flow (L/min):  [1] 1    Intake/Output Summary (Last 24 hours) at 2023 1353  Last data filed at 2023 0900  Gross per 24 hour   Intake 780 ml   Output 3600 ml   Net -2820 ml       Physical Exam:      Constitutional: [place], chronically ill, no acute distress  HEENT: Sclera anicteric, no conjunctival injection, oral mucosa is no  Neck: Supple, no thyromegaly, no lymphadenopathy, trachea at midline, no JVD  Respiratory:  Bilateral rhonchi, breathing effort not labored  Cardiovascular: RRR, systolic ejection murmur, no rub  Gastrointestinal: Positive bowel sounds, abdomen is soft, nontender and nondistended.  Ostomy bag in place   : No palpable bladder  Musculoskeletal: No edema, no clubbing or cyanosis.  Left upper extremity  AVF/ AVG   Neurologic: Unable to assess now  Skin: Warm and dry        Scheduled Meds:     aspirin, 81 mg, Oral, Daily  b complex-vitamin c-folic acid, 1 tablet, Oral, Daily  cetirizine, 10 mg, Oral, Daily  famotidine, 20 mg, Oral, BID AC  heparin (porcine), 5,000 Units, Subcutaneous, Q8H  insulin lispro, 2-7 Units, Subcutaneous, TID With Meals  latanoprost, 1 drop, Both Eyes, Nightly  sodium chloride, 10 mL, Intravenous, Q12H  Sucroferric Oxyhydroxide, 500 mg, Oral, TID PC  tamsulosin, 0.4 mg, Oral,  Nightly      IV Meds:        Results Reviewed:   I have personally reviewed the results from the time of this admission to 5/14/2023 13:53 EDT     Results from last 7 days   Lab Units 05/14/23  0921 05/13/23  0658 05/12/23  0658   SODIUM mmol/L 130* 126* 133*   POTASSIUM mmol/L 4.2 4.8 4.3   CHLORIDE mmol/L 95* 92* 98   CO2 mmol/L 21.9* 20.0* 22.9   BUN mg/dL 50* 89* 58*   CREATININE mg/dL 4.67* 7.38* 5.57*   CALCIUM mg/dL 8.4* 8.1* 8.7   BILIRUBIN mg/dL 1.5* 0.9 1.2   ALK PHOS U/L 155* 151* 154*   ALT (SGPT) U/L 43* 31 35   AST (SGOT) U/L 37 32 38   GLUCOSE mg/dL 186* 277* 237*       Estimated Creatinine Clearance: 15.1 mL/min (A) (by C-G formula based on SCr of 4.67 mg/dL (H)).    Results from last 7 days   Lab Units 05/14/23  0921 05/14/23  0731 05/13/23  0658 05/12/23  0658   MAGNESIUM mg/dL 2.1  --  2.3 2.4   PHOSPHORUS mg/dL  --  3.8  --   --              Results from last 7 days   Lab Units 05/14/23  0731 05/13/23  0658 05/12/23  0659 05/11/23  0617 05/11/23  0132   WBC 10*3/mm3 6.91 7.22 5.57 6.54 5.62   HEMOGLOBIN g/dL 11.5* 10.3* 10.7* 10.5* 11.5*   PLATELETS 10*3/mm3 155 155 138* 114* 117*             Assessment / Plan     ASSESSMENT:     -End Stage Renal Disease ( ESRD ) on Hemodialysis .s/p  LUE AVF s/p LUE AVG  ( by rebecca Singh )  functional .and Monday, Wednesday and Friday schedule.    Sodium today 130, volume status is improving  -Chronic normocytic anemia. On Epogen protocol.  On hold hemoglobin more than 11.5,  treated with long-acting WENDY as an outpatient  -Hypervolemia with hyponatremia we will challenge fluid removal dialysis  -Hyperphosphatemia. Continue binders  with meals, Continue renal diet. We will follow phosphorus to make further adjustmentst   -Diabetes Mellitus type 2  with renal complication treated by primary  -COVID-19 infection currently on isolation on dexamethasone.,  Inhalers and he is on room air  -Chronic diastolic congestive heart failure .   Volume status improving  -Crohn  disease a status post ostomy placement       PLAN:  -Hemodialysis tomorrow  -Continue to challenge fluid removal until getting his volume status improved  -Surveillance labs    I discussed the case with the patient's wife at the bed  Copied text in this note has been reviewed and is accurate as of 05/14/23.         Thank you for involving us in the care of Bill Landry.  Please feel free to call with any questions.    Kvng Monsivais MD  05/14/23  13:53 EDT    [facility]  [phone]    Please note that portions of this note were completed with a voice recognition program.

## 2023-05-14 NOTE — PROGRESS NOTES
[facility]  [phone]  Dr. Andres Noguera     Subjective:  LOS: 3    Chief Complaint:   F/up respiratory failure, COVID infection    Patient much more confused today.  Not oriented to place or time.  Per wife at bedside patient agitated overnight.  He had dialysis around 2 AM and has been restless ever since.    Objective   Vital Signs past 24hrs  Temp range: Temp (24hrs), Av.2 °F (36.2 °C), Min:96.4 °F (35.8 °C), Max:98.2 °F (36.8 °C)    BP range: BP: (130-170)/(68-90) 130/73  Pulse range: Heart Rate:  [73-92] 92  Resp rate range: Resp:  [16-20] 20  Device (Oxygen Therapy): nasal cannulaFlow (L/min):  [1] 1  Oxygen range:SpO2:  [93 %-98 %] 98 %     Physical Exam  Constitutional:       General: He is not in acute distress.     Appearance: Normal appearance.   HENT:      Head: Normocephalic and atraumatic.   Eyes:      Extraocular Movements: Extraocular movements intact.      Conjunctiva/sclera: Conjunctivae normal.   Cardiovascular:      Rate and Rhythm: Normal rate. Rhythm irregular.      Heart sounds: No murmur heard.    No gallop.   Pulmonary:      Effort: Pulmonary effort is normal. No respiratory distress.      Breath sounds: Normal breath sounds. No wheezing, rhonchi or rales.   Abdominal:      General: Bowel sounds are normal.      Palpations: Abdomen is soft.   Musculoskeletal:         General: No swelling or tenderness. Normal range of motion.   Skin:     General: Skin is warm and dry.      Findings: No rash.   Neurological:      General: No focal deficit present.      Mental Status: He is alert and oriented to person, place, and time.   Psychiatric:         Mood and Affect: Mood normal.         Behavior: Behavior normal.       Results Review:    I have reviewed the laboratory and imaging data since the last note by [facility] physician.  My annotations are noted in assessment and plan.      Result Review:  I have personally reviewed the results from last note by [facility] physician to 2023  10:29 EDT and agree with these findings:  [x]  Laboratory list / accordion  [x]  Microbiology  [x]  Radiology  [x]  EKG/Telemetry   [x]  Cardiology/Vascular   [x]  Pathology  [x]  Old records  []  Other:    Medication Review:  I have reviewed the current MAR.  My annotations are noted in assessment and plan.    aspirin, 81 mg, Oral, Daily  b complex-vitamin c-folic acid, 1 tablet, Oral, Daily  cetirizine, 10 mg, Oral, Daily  dexamethasone, 6 mg, Oral, Daily   Or  dexamethasone, 6 mg, Intravenous, Daily  famotidine, 20 mg, Oral, BID AC  heparin (porcine), 5,000 Units, Subcutaneous, Q8H  insulin lispro, 2-7 Units, Subcutaneous, TID With Meals  latanoprost, 1 drop, Both Eyes, Nightly  sodium chloride, 10 mL, Intravenous, Q12H  Sucroferric Oxyhydroxide, 500 mg, Oral, TID PC  tamsulosin, 0.4 mg, Oral, Nightly           Lines, Drains & Airways     Active LDAs     Name Placement date Placement time Site Days    Peripheral IV 05/13/23 0017 Right;Posterior Forearm 05/13/23  0017  Forearm  1    Ileostomy LLQ --  present upon arrival to Holding  --  LLQ  --              Enhanced Droplet/Contact   Diet Orders (active) (From admission, onward)     Start     Ordered    05/11/23 0234  Diet: Renal Diets; Low Sodium (2-3g), Low Potassium, Low Phosphorus; Texture: Regular Texture (IDDSI 7); Fluid Consistency: Thin (IDDSI 0)  Diet Effective Now         05/11/23 0236                  Assessment  1. Acute pulmonary edema  2. COVID-19 infection: Difficult to determine whether it is just URI or pneumonia due to fluid overload.  I suspect there is an element of pneumonia due to the infiltrates being more prominent on the left side which is not typical for fluid overload.  He does seem to have improved significantly after dialysis and fluid removal.   3. Acute hypoxic respiratory failure, secondary to above  4. Acute on chronic diastolic CHF  5. Delirium  6. Chronically elevated right hemidiaphragm could be suggestive of diaphragmatic  paralysis  7. Elevated CRP suggestive of cytokine syndrome  8. ESRD, skipped 1 session of dialysis prior to admission  9. Hyponatremia  10. Chronic normocytic anemia    Plan  • Will stop Dexamethasone early given pt continued worsening delirium and likely contribution of steroid. He is now on room air and wife was requesting early discontinuation.   • Not a candidate for remdesivir due to late presentation  • Walking oximetry prior to DC to determine oxygen requirement  • Currently saturating well on room air   • Dialysis and fluid removal per nephrology  • DVT prophylaxis with heparin      Andres Noguera DO   05/14/23  10:29 EDT      Part of this note may be an electronic transcription/translation of spoken language to printed text using the Dragon Dictation System.

## 2023-05-14 NOTE — PROGRESS NOTES
Name: Bill Landry ADMIT: 2023   : 1945  PCP: No primary care provider on file.    MRN: [id number] LOS: 3 days   AGE/SEX: 77 y.o. male  ROOM: [facility]/1     Subjective   Subjective   Persistent confusion overnight, worsened by dialysis in the early morning hours. Pt has been agitated all morning. D/w RN and ordered single dose of Ativan. Pt now much more calm and able to rest. Wife at bedside feeling better now that he's calm.    Review of Systems      Pt sedated    Objective   Objective   Vital Signs  Temp:  [96.4 °F (35.8 °C)-98.2 °F (36.8 °C)] 97.3 °F (36.3 °C)  Heart Rate:  [73-92] 92  Resp:  [16-20] 20  BP: (130-170)/(68-90) 130/73  SpO2:  [93 %-98 %] 98 %  on  Flow (L/min):  [1] 1;   Device (Oxygen Therapy): nasal cannula  Body mass index is 25.54 kg/m².     (No change in exam today)    Physical Exam  Vitals and nursing note reviewed. Exam conducted with a chaperone present (Wife).   Constitutional:       General: He is not in acute distress.     Appearance: He is ill-appearing (chronically). He is not toxic-appearing or diaphoretic.      Comments: Sleeping   HENT:      Head: Normocephalic.      Nose: Nose normal.      Mouth/Throat:      Mouth: Mucous membranes are moist.      Pharynx: Oropharynx is clear.   Eyes:      General: No scleral icterus.        Right eye: No discharge.         Left eye: No discharge.      Extraocular Movements: Extraocular movements intact.      Conjunctiva/sclera: Conjunctivae normal.   Cardiovascular:      Rate and Rhythm: Normal rate and regular rhythm.      Pulses: Normal pulses.   Pulmonary:      Effort: Pulmonary effort is normal. No respiratory distress.      Breath sounds: Normal breath sounds. No wheezing or rales.      Comments: Anteriorly  Abdominal:      General: Bowel sounds are normal. There is no distension.      Palpations: Abdomen is soft.      Tenderness: There is no abdominal tenderness.   Musculoskeletal:         General: No swelling or deformity.  Normal range of motion.      Cervical back: Neck supple. No rigidity.   Lymphadenopathy:      Cervical: No cervical adenopathy.   Skin:     General: Skin is warm and dry.      Capillary Refill: Capillary refill takes less than 2 seconds.      Coloration: Skin is pale. Skin is not jaundiced.   Neurological:      Comments: Sleeping, moves all 4 equally   Psychiatric:      Comments: Unable to assess       Results Review     I reviewed the patient's new clinical results.  Results from last 7 days   Lab Units 05/14/23  0731 05/13/23  0658 05/12/23  0659 05/11/23  0617   WBC 10*3/mm3 6.91 7.22 5.57 6.54   HEMOGLOBIN g/dL 11.5* 10.3* 10.7* 10.5*   PLATELETS 10*3/mm3 155 155 138* 114*     Results from last 7 days   Lab Units 05/14/23  0921 05/13/23  0658 05/12/23  0658 05/11/23  0617   SODIUM mmol/L 130* 126* 133* 127*   POTASSIUM mmol/L 4.2 4.8 4.3 4.3   CHLORIDE mmol/L 95* 92* 98 92*   CO2 mmol/L 21.9* 20.0* 22.9 20.9*   BUN mg/dL 50* 89* 58* 73*   CREATININE mg/dL 4.67* 7.38* 5.57* 7.63*   GLUCOSE mg/dL 186* 277* 237* 220*   EGFR mL/min/1.73 12.2* 7.0* 9.9* 6.8*     Results from last 7 days   Lab Units 05/14/23  0921 05/13/23 0658 05/12/23 0658 05/11/23  0132   ALBUMIN g/dL 3.1* 3.0* 2.8* 2.9*   BILIRUBIN mg/dL 1.5* 0.9 1.2 1.6*   ALK PHOS U/L 155* 151* 154* 170*   AST (SGOT) U/L 37 32 38 63*   ALT (SGPT) U/L 43* 31 35 40     Results from last 7 days   Lab Units 05/14/23  0921 05/14/23  0731 05/13/23 0658 05/12/23  0658 05/11/23  0617 05/11/23  0132   CALCIUM mg/dL 8.4*  --  8.1* 8.7 8.2* 8.7   ALBUMIN g/dL 3.1*  --  3.0* 2.8*  --  2.9*   MAGNESIUM mg/dL 2.1  --  2.3 2.4  --   --    PHOSPHORUS mg/dL  --  3.8  --   --   --   --      Results from last 7 days   Lab Units 05/12/23  0658 05/11/23  0132   PROCALCITONIN ng/mL 1.94* 2.38*     Hemoglobin A1C   Date/Time Value Ref Range Status   05/12/2023 0659 6.20 (H) 4.80 - 5.60 % Final     Glucose   Date/Time Value Ref Range Status   05/14/2023 1151 186 (H) 70 - 130 mg/dL  Final     Comment:     Meter: [id number] : [id number] Ronald Read NA   05/14/2023 0638 154 (H) 70 - 130 mg/dL Final     Comment:     Meter: [id number] : [id number] Ralf Osborne NA   05/13/2023 2009 298 (H) 70 - 130 mg/dL Final     Comment:     Meter: [id number] : [id number] Ralf Osborne NA   05/13/2023 1606 281 (H) 70 - 130 mg/dL Final     Comment:     Meter: [id number] : [id number] Jose Miller NA   05/13/2023 1113 306 (H) 70 - 130 mg/dL Final     Comment:     Meter: [id number] : [id number] Chirag MICHELLEA   05/13/2023 0620 279 (H) 70 - 130 mg/dL Final     Comment:     Meter: [id number] : [id number] Arvind Hernandez NA   05/12/2023 2126 291 (H) 70 - 130 mg/dL Final     Comment:     Meter: [id number] : [id number] Samuels Mary NA       No radiology results for the last day  I have personally reviewed all medications:  Scheduled Medications  aspirin, 81 mg, Oral, Daily  b complex-vitamin c-folic acid, 1 tablet, Oral, Daily  cetirizine, 10 mg, Oral, Daily  famotidine, 20 mg, Oral, BID AC  heparin (porcine), 5,000 Units, Subcutaneous, Q8H  insulin lispro, 2-7 Units, Subcutaneous, TID With Meals  latanoprost, 1 drop, Both Eyes, Nightly  sodium chloride, 10 mL, Intravenous, Q12H  Sucroferric Oxyhydroxide, 500 mg, Oral, TID PC  tamsulosin, 0.4 mg, Oral, Nightly    Infusions   Diet  Diet: Renal Diets; Low Sodium (2-3g), Low Potassium, Low Phosphorus; Texture: Regular Texture (IDDSI 7); Fluid Consistency: Thin (IDDSI 0)    I have personally reviewed:  [x]  Laboratory   []  Microbiology   []  Radiology   [x]  EKG/Telemetry  []  Cardiology/Vascular   []  Pathology    []  Records       Assessment/Plan     Active Hospital Problems    Diagnosis  POA   • **Acute hypoxemic respiratory failure due to COVID-19 [U07.1, J96.01]  Yes   • Impaired glucose tolerance [R73.02]  Yes   • Acute on chronic diastolic heart failure [I50.33]  Yes   • Bicuspid aortic valve [Q23.1]  Not Applicable   • Aortic stenosis, mild  [I35.0]  Yes   • TATE (obstructive sleep apnea) [G47.33]  Yes   • End stage kidney disease (HCC) [N18.6]  Yes   • Crohn's disease, unspecified, without complications [K50.90]  Yes   • Essential (primary) hypertension [I10]  Yes   • Secondary hyperparathyroidism of renal origin [N25.81]  Yes   • Anemia due to stage 4 chronic kidney disease treated with erythropoietin [N18.4, D63.1]  Yes   • Cirrhosis of liver without ascites [K74.60]  Yes   • Congestive splenomegaly [D73.2]  Yes   • Permanent atrial fibrillation [I48.21]  Yes   • Thrombocytopenia (HCC) [D69.6]  Yes      Resolved Hospital Problems   No resolved problems to display.       76yo gentleman with acute hypoxia due to COViD-19 and volume overload in setting of ESRD and diastolic CHF.      Acute hypoxia: due to volume overload most likely, though COViD PNA also playing role, weaned to RA now, desats a little with ambulation and while sleeping, continue to monitor, will need walking oximetry prior to dc     COViD pneumonia: symptomatic for >6d so not a candidate for Remdesivir, treated with Decadron and supplemental O2 as needed, trending inflammatory markers, pharmacologic DVT ppx, appreciate Pulm attention to pt, do not suspect bacterial PNA--elevated PCT likely due to ESRD and is falling  Decadron stopped today due to his agitation (see below)     Agitation, confusion: has been at night mostly the past couple days, but now confused and agitated the AM, improved after single dose of oral Ativan, have stopped steroids--suspect this is chief cause     Acute on chronic diastolic CHF: defer to Card and Renal, volume mgmt with HD, Echo looks good     ESRD with volume overload: as above, usually gets HD MWF, got HD 5/11, HD again today per Renal.     Permanent AFib: HRs fine on no rate-control meds, not on AC due to h/o GIB     Elevated Trop: suspect due to ESRD and not ACS, EKG fine, Card does not recommend any further w/u     Anemia of CKD  MGUS with  pancytopenia: Hgb stable, platelets better than baseline, WBC wnl, consulted Heme/Onc at family's request--they have seen and signed off     DM2?: A1c 6.2, not on meds for this PTA, covering with SSI here while on steroids, at this time would say he has borderline DM, long d/w pt and wife regarding this, encouraged him to permit temporary use of SSI here as his sugars are pretty high due to steroids, expect sugars to fall now that Decadron stopped     Crohn's with ileostomy: stable, Wound/Ostomy RN has seen     Elevated LFTs: possibly due to viral infection, do not suspect obstruction, pt is asymptomatic, numbers normalized as of 5/12      · Heparin SC for DVT prophylaxis.  · Full code.  · Discussed with patient, spouse and nursing staff.  · Anticipate discharge home with family when mental status clears. PT eval noted.    During all interaction with pt proper PPE was utilized (gown, gloves, mask, goggles, and face shield) and was donned/doffed according to recommendations. Hands were cleaned before and after interaction.    Ken Salas MD  [facility]  05/14/23  12:44 EDT

## 2023-05-15 LAB
ALBUMIN SERPL-MCNC: 3.1 G/DL (ref 3.5–5.2)
ALBUMIN/GLOB SERPL: 0.8 G/DL
ALP SERPL-CCNC: 141 U/L (ref 39–117)
ALT SERPL W P-5'-P-CCNC: 42 U/L (ref 1–41)
ANION GAP SERPL CALCULATED.3IONS-SCNC: 14 MMOL/L (ref 5–15)
AST SERPL-CCNC: 29 U/L (ref 1–40)
BILIRUB SERPL-MCNC: 1 MG/DL (ref 0–1.2)
BUN SERPL-MCNC: 79 MG/DL (ref 8–23)
BUN/CREAT SERPL: 13.1 (ref 7–25)
CALCIUM SPEC-SCNC: 8 MG/DL (ref 8.6–10.5)
CHLORIDE SERPL-SCNC: 95 MMOL/L (ref 98–107)
CO2 SERPL-SCNC: 20 MMOL/L (ref 22–29)
CREAT SERPL-MCNC: 6.01 MG/DL (ref 0.76–1.27)
CRP SERPL-MCNC: 2.09 MG/DL (ref 0–0.5)
DEPRECATED RDW RBC AUTO: 49.4 FL (ref 37–54)
EGFRCR SERPLBLD CKD-EPI 2021: 9 ML/MIN/1.73
ERYTHROCYTE [DISTWIDTH] IN BLOOD BY AUTOMATED COUNT: 13.5 % (ref 12.3–15.4)
FERRITIN SERPL-MCNC: 2086 NG/ML (ref 30–400)
GLOBULIN UR ELPH-MCNC: 3.9 GM/DL
GLUCOSE BLDC GLUCOMTR-MCNC: 130 MG/DL (ref 70–130)
GLUCOSE BLDC GLUCOMTR-MCNC: 170 MG/DL (ref 70–130)
GLUCOSE BLDC GLUCOMTR-MCNC: 192 MG/DL (ref 70–130)
GLUCOSE BLDC GLUCOMTR-MCNC: 258 MG/DL (ref 70–130)
GLUCOSE SERPL-MCNC: 189 MG/DL (ref 65–99)
HCT VFR BLD AUTO: 32.9 % (ref 37.5–51)
HGB BLD-MCNC: 10.9 G/DL (ref 13–17.7)
MAGNESIUM SERPL-MCNC: 2.2 MG/DL (ref 1.6–2.4)
MCH RBC QN AUTO: 32.4 PG (ref 26.6–33)
MCHC RBC AUTO-ENTMCNC: 33.1 G/DL (ref 31.5–35.7)
MCV RBC AUTO: 97.9 FL (ref 79–97)
PHOSPHATE SERPL-MCNC: 6.1 MG/DL (ref 2.5–4.5)
PLATELET # BLD AUTO: 144 10*3/MM3 (ref 140–450)
PMV BLD AUTO: 8.8 FL (ref 6–12)
POTASSIUM SERPL-SCNC: 4.9 MMOL/L (ref 3.5–5.2)
PROT SERPL-MCNC: 7 G/DL (ref 6–8.5)
RBC # BLD AUTO: 3.36 10*6/MM3 (ref 4.14–5.8)
SARS-COV-2 AG RESP QL IA.RAPID: NORMAL
SODIUM SERPL-SCNC: 129 MMOL/L (ref 136–145)
WBC NRBC COR # BLD: 4.8 10*3/MM3 (ref 3.4–10.8)

## 2023-05-15 PROCEDURE — 87426 SARSCOV CORONAVIRUS AG IA: CPT | Performed by: HOSPITALIST

## 2023-05-15 PROCEDURE — 80053 COMPREHEN METABOLIC PANEL: CPT | Performed by: HOSPITALIST

## 2023-05-15 PROCEDURE — 25010000002 HEPARIN (PORCINE) PER 1000 UNITS: Performed by: NURSE PRACTITIONER

## 2023-05-15 PROCEDURE — 83735 ASSAY OF MAGNESIUM: CPT | Performed by: HOSPITALIST

## 2023-05-15 PROCEDURE — 97530 THERAPEUTIC ACTIVITIES: CPT

## 2023-05-15 PROCEDURE — 85027 COMPLETE CBC AUTOMATED: CPT | Performed by: HOSPITALIST

## 2023-05-15 PROCEDURE — 82728 ASSAY OF FERRITIN: CPT | Performed by: HOSPITALIST

## 2023-05-15 PROCEDURE — 86140 C-REACTIVE PROTEIN: CPT | Performed by: HOSPITALIST

## 2023-05-15 PROCEDURE — 82948 REAGENT STRIP/BLOOD GLUCOSE: CPT

## 2023-05-15 PROCEDURE — 63710000001 INSULIN LISPRO (HUMAN) PER 5 UNITS: Performed by: NURSE PRACTITIONER

## 2023-05-15 PROCEDURE — 84100 ASSAY OF PHOSPHORUS: CPT | Performed by: INTERNAL MEDICINE

## 2023-05-15 RX ORDER — CYCLOBENZAPRINE HCL 10 MG
5 TABLET ORAL ONCE
Status: DISCONTINUED | OUTPATIENT
Start: 2023-05-15 | End: 2023-05-20 | Stop reason: HOSPADM

## 2023-05-15 RX ADMIN — TAMSULOSIN HYDROCHLORIDE 0.4 MG: 0.4 CAPSULE ORAL at 21:23

## 2023-05-15 RX ADMIN — FAMOTIDINE 20 MG: 20 TABLET, FILM COATED ORAL at 17:22

## 2023-05-15 RX ADMIN — HEPARIN SODIUM 5000 UNITS: 5000 INJECTION INTRAVENOUS; SUBCUTANEOUS at 14:52

## 2023-05-15 RX ADMIN — Medication 10 ML: at 12:19

## 2023-05-15 RX ADMIN — ASPIRIN 81 MG: 81 TABLET, COATED ORAL at 12:20

## 2023-05-15 RX ADMIN — INSULIN LISPRO 4 UNITS: 100 INJECTION, SOLUTION INTRAVENOUS; SUBCUTANEOUS at 17:22

## 2023-05-15 RX ADMIN — Medication 10 ML: at 21:23

## 2023-05-15 RX ADMIN — CETIRIZINE HYDROCHLORIDE 10 MG: 10 TABLET ORAL at 12:19

## 2023-05-15 RX ADMIN — LATANOPROST 1 DROP: 50 SOLUTION OPHTHALMIC at 21:23

## 2023-05-15 RX ADMIN — Medication 1 TABLET: at 12:19

## 2023-05-15 NOTE — CASE MANAGEMENT/SOCIAL WORK
Continued Stay Note  [facility]     Patient Name: Bill Landry  MRN: [id number]  Today's Date: 5/15/2023    Admit Date: 5/11/2023    Plan: Return home with spouse.  HD at [facility]   Discharge Plan     Row Name 05/15/23 1507       Plan    Plan Return home with spouse.  HD at [facility]    Patient/Family in Agreement with Plan yes    Plan Comments Spoke with wife Gillian by telephone.  She is not interested in SNF for patient.  She states sons will help with transportation to HD  until patient is strong enough to drive.  She did not want referral to  at present.  CCP will continue to follow.  Ariane XIAO                   Expected Discharge Date and Time     Expected Discharge Date Expected Discharge Time    May 16, 2023             Becky S. [place], RN

## 2023-05-15 NOTE — PLAN OF CARE
Goal Outcome Evaluation:  Plan of Care Reviewed With: patient           Outcome Evaluation: Patient seen for PT session this PM. Patient supine in bed upon arrival. Patient reports he is very tired following dialysis but wants to get OOB. Patient sat up to EOB with SBA. patient performed STS 5x from EOB with CGA. patient ambulated 10ft in room took a seated rest break and then ambulated an additional 20ft. Gait very slow and shuffled. Patient reports he is very fatigued this date limiting further distance. Patient then requested to return to bed. Patient would continue to benefit from skilled PT intervention to address deficits in functional mobilitly. PT will continue to monitor.

## 2023-05-15 NOTE — THERAPY TREATMENT NOTE
Patient Name: Bill Landry  : 1945    MRN: [id number]                              Today's Date: 5/15/2023       Admit Date: 2023    Visit Dx:     ICD-10-CM ICD-9-CM   1. Acute hypoxemic respiratory failure due to COVID-19  U07.1 518.81    J96.01 079.89     799.02   2. Other fatigue  R53.83 780.79   3. ESRD (end stage renal disease)  N18.6 585.6     Patient Active Problem List   Diagnosis   • Permanent atrial fibrillation   • Thrombocytopenia (HCC)   • Chronic leukopenia   • Cirrhosis of liver without ascites   • Congestive splenomegaly   • Anemia due to stage 4 chronic kidney disease treated with erythropoietin   • Esophageal abnormality   • At risk for fluid volume overload   • End stage kidney disease (HCC)   • Other specified glaucoma   • Dependence on renal dialysis   • Allergy, unspecified, initial encounter   • Crohn's disease, unspecified, without complications   • Deficiency of other specified B group vitamins   • Dermatitis, unspecified   • Encounter for immunization   • Essential (primary) hypertension   • History of urinary stone   • Hyperparathyroidism, unspecified   • 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, with stage 1 through stage 4 chronic kidney disease, or unspecified chronic kidney disease   • Other disorders of phosphorus metabolism   • Other iron deficiency anemias   • Pericardial effusion (noninflammatory)   • Pure hypertriglyceridemia   • Renal osteodystrophy   • Secondary hyperparathyroidism of renal origin   • Splenomegaly, not elsewhere classified   • Bilateral pseudophakia   • Dermatochalasis of eyelid   • Primary open-angle glaucoma, bilateral, moderate stage   • Puckering of macula, left eye   • Secondary cataract   • AV fistula occlusion, initial encounter   • TATE (obstructive sleep apnea)   • Upper extremity aneurysm   • Aortic stenosis, mild   • Bicuspid aortic valve   • Hyperuricemia without signs of inflammatory arthritis and  Pediatric Physical Therapy Outpatient Progress Note    Name: Viktor Burnette  Date: 8/15/2018  Clinic #: [id number]  Time in: 5:30 pm  Time out: 6:15 pm    Subjective:  Viktor arrived to therapy session with his mother and brother.   Parent/Caregiver reports: no new updates or concerns this date.     Pain: Viktor is unable to rate pain on numeric scale.  No pain behaviors noted this date     Objective:  Parent/Caregiver remained present and interactive for the duration of the session.  Viktor was seen for 45 minutes of physical therapy services; including: therapeutic exercise, neuromuscular re-ed, gait training, sensory integration, therapeutic activities, fit/training of orthotic.    Education:  Patient's mother was educated on patient's current functional status and progress.  Patient's mother was educated on updated HEP.  Patient's mother verbalized understanding.  7/25/2018: LE stretches 8/8/2018 Standing on one leg    Treatment:  Session focused on: exercises to develop LE strength and muscular endurance, LE range of motion and flexibility, sitting balance, standing balance, coordination, posture, kinesthetic sense and proprioception, desensitization techniques, facilitation of gait, stair negotiation, enhancement of sensory processing, promotion of adaptive responses to environmental demands, gross motor stimulation, cardiovascular endurance training, parent education and training, initiation/progression of HEP eye-hand coordination, core muscle activation.    Activities included:   - backward walking to improve heel strike, 10ft x5 with SBA  - ambulation on treadmill at 5.0 incline, 1.0 mph for 5 minutes to increase ankle DF activation for strengthening.   - Lifting bean bags on dorsum of foot for ankle DF strengthening and to improve single leg stance with CGA for balance.   - stretches to bilateral heel cords in prone, 30 sec x3 on each LE  - squats on wobble board for LE  strengthening, min A for balance. Sufficient ankle ROM to attain full squat position.   - SLS on each LE with SBA to improve balance: 10 seconds on R LE and 8 seconds on L LE  - ambulation on balance beam with HHA x1.     Treatment was tolerated: good    Assessment:  Viktor demonstrated good participation in all activities with encouragement. He continues to demonstrate toe walking, L>R. He would benefit from wearing bilateral AFOs to decrease toe walking and to improve ankle stability. Viktor benefits from skilled PT intervention to facilitate the development of age appropriate gross motor skills and movement patterns.     Progress Toward Goals:  Short term 3 weeks (08/08/2018):   1. Pt will demonstrate ankle DF ROM to 15 degrees bilaterally 8/8/2018: Goal Met. 15 degrees on L LE and 20 degrees on R LE.   2. Pt will tolerate sensory brushing to bilateral foot/ankles for 30 sec 8/8/2018: Goal Met. Tolerated for 60 seconds on each LE.   3. Pt will demonstrate balance beam walking with 4 consecutive steps prior to LOB 8/8/2018: Ambulated with HHA x1 on balance beam   4. Pt and family will be compliant with HEP 8/8/2018: Ongoing, family reports compliance      Long term 6 weeks (09/05/2018):   1. Pt will demonstrate active ankle DF to ~15 degrees bilaterally 8/8/2018: Not formally measured. Viktor is able to attain heel strike while walking on level ground and on inclined treadmill.   2. Pt will tolerate sensory brushing to bilateral foot/ankles for 60 seconds 8/8/2018: Goal Met. Tolerated for 60 seconds on each LE.   3. Pt will demonstrate LE SLS x 10 sec R/L LE 8/8/2018: Progressing, maintained on R LE for 10 seconds and on L LE for 8 seconds.   4. Pt and family will be independent with HEP 8/8/2018: ongoing     Plan:  Continue PT treatments 2-4 times/month with current POC to progress toward goals.    Nely Cormier, PT, DPT  8/15/2018   tophaceous disease   • Presbyopia   • Shortness of breath   • Acute hypoxemic respiratory failure due to COVID-19   • Acute on chronic diastolic heart failure   • Impaired glucose tolerance     Past Medical History:   Diagnosis Date   • Abnormal serum protein electrophoresis    • Anemia     Multifactorial   • Bicuspid aortic valve 7/20/2022   • Chronic leukopenia and thrombocytopenia    • Cirrhosis of liver without ascites 07/24/2017   • Congestive splenomegaly 07/24/2017   • Crohn disease     with ileostomy   • Diastolic CHF     although it was likely from volume overload due to ESRD   • Diverticula of colon    • ESRD on hemodialysis     M-W-F [facility]   • Fatty liver disease, nonalcoholic    • GERD (gastroesophageal reflux disease)    • GI bleed    • Glaucoma    • H/O Gallstone pancreatitis    • H/O Pericardial effusion    • H/O sinus bradycardia    • Heart murmur    • History of hypertension     resolved   • History of UTI    • Hx of renal calculi    • Ileostomy present    • Iron deficiency    • MGUS (monoclonal gammopathy of unknown significance)    • TATE (obstructive sleep apnea)    • Permanent atrial fibrillation    • Sleep apnea     CPAP USED   • Type 2 diabetes mellitus     CURRENTLY TAKES NO MED     Past Surgical History:   Procedure Laterality Date   • APPENDECTOMY     • ARTERIOVENOUS FISTULA/SHUNT SURGERY Left 08/08/2017    Procedure: LEFT BRACHIAL CEPHALIC AV FISTULA FORMATION WITH CEPHALIC VEIN TRANSPOSITION ;  Surgeon: Bill Deal MD;  Location: [facility] OR;  Service:    • ARTERIOVENOUS FISTULA/SHUNT SURGERY Left 5/27/2022    Procedure: OPEN REVISION LEFT ARTERIOVENOUS INTERPOSITION GRAFT PLACEMENT;  Surgeon: Bill Deal MD;  Location: [facility] OR;  Service: Vascular;  Laterality: Left;   • ARTERIOVENOUS FISTULA/SHUNT SURGERY W/ HEMODIALYSIS CATHETER INSERTION Left 02/15/2022    Procedure: OPEN LEFT ARM ARTERIOVENOUS FISTULA THROMBECTOMY WITH CEPHALIC VEIN ARTHROPLASTY AND STENT/GRAFT  PLACEMENT;  Surgeon: Bill Deal MD;  Location: [facility] HYBRID OR 18/19;  Service: Vascular;  Laterality: Left;   • CATARACT EXTRACTION WITH INTRAOCULAR LENS IMPLANT Bilateral    • CHOLECYSTECTOMY     • COLECTOMY PARTIAL / TOTAL      History of inflammatory bowel disease with status post colectomy with ileostomy many years ago in the [facility],  18 YEARS OF AGE   • COLONOSCOPY     • CYSTOSCOPY LITHOLAPAXY BLADDER STONE EXTRACTION     • ENDOSCOPY  01/16/2015    gastritis   • ENDOSCOPY  01/17/2018    Procedure: ESOPHAGOGASTRODUODENOSCOPY;  Surgeon: Warner Neville MD;  Location: [facility] ENDOSCOPY;  Service:    • ILEOSCOPY  01/16/2015    normal   • ILEOSCOPY N/A 01/17/2018    Procedure: ILEOSCOPY;  Surgeon: Warner Neville MD;  Location: [facility] ENDOSCOPY;  Service:    • TONSILLECTOMY        General Information     Row Name 05/15/23 1521          Physical Therapy Time and Intention    Document Type therapy note (daily note)  -     Mode of Treatment individual therapy;physical therapy  -     Row Name 05/15/23 1521          General Information    Patient Profile Reviewed yes  -     Existing Precautions/Restrictions fall  -     Row Name 05/15/23 1521          Cognition    Orientation Status (Cognition) oriented to;person;place  -     Row Name 05/15/23 1521          Safety Issues, Functional Mobility    Impairments Affecting Function (Mobility) balance;cognition;strength;endurance/activity tolerance;pain  -           User Key  (r) = Recorded By, (t) = Taken By, (c) = Cosigned By    Initials Name Provider Type     Alida Delgado PT Physical Therapist               Mobility     Row Name 05/15/23 1521          Bed Mobility    Bed Mobility supine-sit;sit-supine  -     Supine-Sit [place] (Bed Mobility) standby assist  -     Sit-Supine [place] (Bed Mobility) standby assist  -     Assistive Device (Bed Mobility) bed rails;head of bed elevated  -     Row Name 05/15/23 1529           Sit-Stand Transfer    Sit-Stand [place] (Transfers) contact guard  -     Assistive Device (Sit-Stand Transfers) walker, front-wheeled  -SM     Row Name 05/15/23 1521          Gait/Stairs (Locomotion)    [place] Level (Gait) contact guard  -SM     Assistive Device (Gait) other (see comments)  no AD  -SM     Distance in Feet (Gait) 10ft seated rest 20ft  -SM     Deviations/Abnormal Patterns (Gait) festinating/shuffling;gait speed decreased  -SM     Bilateral Gait Deviations forward flexed posture;heel strike decreased  -SM     Comment, (Gait/Stairs) Gait very slow and shuffled. Patient reports he is very fatigued this date limiting further distance.  -SM           User Key  (r) = Recorded By, (t) = Taken By, (c) = Cosigned By    Initials Name Provider Type    Alida Ramirez PT Physical Therapist               Obj/Interventions     Row Name 05/15/23 1522          Motor Skills    Therapeutic Exercise other (see comments)  STS 5x  -     Row Name 05/15/23 1522          Balance    Balance Assessment sitting static balance;sitting dynamic balance;sit to stand dynamic balance;standing static balance;standing dynamic balance  -     Static Sitting Balance supervision  -     Dynamic Sitting Balance standby assist  -SM     Position, Sitting Balance sitting edge of bed  -     Sit to Stand Dynamic Balance contact guard  -SM     Static Standing Balance contact guard  -SM     Dynamic Standing Balance contact guard  -SM     Position/Device Used, Standing Balance unsupported  -     Balance Interventions sitting;sit to stand;standing;static;dynamic  -           User Key  (r) = Recorded By, (t) = Taken By, (c) = Cosigned By    Initials Name Provider Type    Alida Ramirez PT Physical Therapist               Goals/Plan    No documentation.                Clinical Impression     Row Name 05/15/23 1523          Pain    Pretreatment Pain Rating 0/10 - no pain  -     Posttreatment Pain Rating 0/10 - no  pain  -     Row Name 05/15/23 1523          Plan of Care Review    Plan of Care Reviewed With patient  -SM     Outcome Evaluation Patient seen for PT session this PM. Patient supine in bed upon arrival. Patient reports he is very tired following dialysis but wants to get OOB. Patient sat up to EOB with SBA. patient performed STS 5x from EOB with CGA. patient ambulated 10ft in room took a seated rest break and then ambulated an additional 20ft. Gait very slow and shuffled. Patient reports he is very fatigued this date limiting further distance. Patient then requested to return to bed. Patient would continue to benefit from skilled PT intervention to address deficits in functional mobilitly. PT will continue to monitor.  -     Row Name 05/15/23 1523          Vital Signs    O2 Delivery Pre Treatment room air  -SM     O2 Delivery Intra Treatment room air  -SM     O2 Delivery Post Treatment room air  -SM     Pre Patient Position Supine  -SM     Intra Patient Position Standing  -SM     Post Patient Position Supine  -SM     Row Name 05/15/23 1523          Positioning and Restraints    Pre-Treatment Position in bed  -SM     Post Treatment Position bed  -SM     In Bed notified nsg;call light within reach;encouraged to call for assist;exit alarm on;with family/caregiver  -           User Key  (r) = Recorded By, (t) = Taken By, (c) = Cosigned By    Initials Name Provider Type    Alida Ramirez, ERROL Physical Therapist               Outcome Measures     Row Name 05/15/23 1525          How much help from another person do you currently need...    Turning from your back to your side while in flat bed without using bedrails? 4  -SM     Moving from lying on back to sitting on the side of a flat bed without bedrails? 4  -SM     Moving to and from a bed to a chair (including a wheelchair)? 3  -SM     Standing up from a chair using your arms (e.g., wheelchair, bedside chair)? 3  -SM     Climbing 3-5 steps with a railing? 2   -     To walk in hospital room? 3  -     AM-PAC 6 Clicks Score (PT) 19  -     Highest level of mobility 6 --> Walked 10 steps or more  -     Row Name 05/15/23 1525          Functional Assessment    Outcome Measure Options AM-PAC 6 Clicks Basic Mobility (PT)  -           User Key  (r) = Recorded By, (t) = Taken By, (c) = Cosigned By    Initials Name Provider Type     Alida Delgado, ERROL Physical Therapist                             Physical Therapy Education     Title: PT OT SLP Therapies (Done)     Topic: Physical Therapy (Done)     Point: Mobility training (Done)     Learning Progress Summary           Patient Acceptance, E, VU by  at 5/15/2023 1525    Acceptance, E, VU,NR by  at 5/13/2023 1339    Acceptance, E, NR by AR at 5/12/2023 1514                   Point: Home exercise program (Done)     Learning Progress Summary           Patient Acceptance, E, VU by  at 5/15/2023 1525    Acceptance, E, VU,NR by  at 5/13/2023 1339    Acceptance, E, NR by AR at 5/12/2023 1514                   Point: Body mechanics (Done)     Learning Progress Summary           Patient Acceptance, E, VU by  at 5/15/2023 1525    Acceptance, E, VU,NR by  at 5/13/2023 1339    Acceptance, E, NR by AR at 5/12/2023 1514                   Point: Precautions (Done)     Learning Progress Summary           Patient Acceptance, E, VU by  at 5/15/2023 1525    Acceptance, E, VU,NR by  at 5/13/2023 1339    Acceptance, E, NR by AR at 5/12/2023 1514                               User Key     Initials Effective Dates Name Provider Type Discipline    AR 06/16/21 -  Akila Branham, PT Physical Therapist PT     05/02/22 -  Alida Delgado PT Physical Therapist PT              PT Recommendation and Plan     Plan of Care Reviewed With: patient  Outcome Evaluation: Patient seen for PT session this PM. Patient supine in bed upon arrival. Patient reports he is very tired following dialysis but wants to get OOB. Patient sat up to  EOB with SBA. patient performed STS 5x from EOB with CGA. patient ambulated 10ft in room took a seated rest break and then ambulated an additional 20ft. Gait very slow and shuffled. Patient reports he is very fatigued this date limiting further distance. Patient then requested to return to bed. Patient would continue to benefit from skilled PT intervention to address deficits in functional mobilitly. PT will continue to monitor.     Time Calculation:    PT Charges     Row Name 05/15/23 1525             Time Calculation    Start Time 1423  -SM      Stop Time 1434  -SM      Time Calculation (min) 11 min  -SM      PT Received On 05/15/23  -SM      PT - Next Appointment 05/16/23  -SM         Time Calculation- PT    Total Timed Code Minutes- PT 11 minute(s)  -SM         Timed Charges    64402 - PT Therapeutic Activity Minutes 11  -SM         Total Minutes    Timed Charges Total Minutes 11  -SM       Total Minutes 11  -SM            User Key  (r) = Recorded By, (t) = Taken By, (c) = Cosigned By    Initials Name Provider Type     Alida Delgado PT Physical Therapist              Therapy Charges for Today     Code Description Service Date Service Provider Modifiers Qty    [id number]  PT THERAPEUTIC ACT EA 15 MIN 5/15/2023 Alida Delgado PT GP 1          PT G-Codes  Outcome Measure Options: AM-PAC 6 Clicks Basic Mobility (PT)  AM-PAC 6 Clicks Score (PT): 19       Alida Delgado PT  5/15/2023

## 2023-05-15 NOTE — NURSING NOTE
HD completed as ordered without complication.  1.5L removed.  VS stable throughout.  Needles pulled and hemostasis achieved, 2x2 dressings applied.  Report given to staff RN.

## 2023-05-15 NOTE — PROGRESS NOTES
Patient currently on room air with respiratory status stable  Nothing further to add we will sign off

## 2023-05-15 NOTE — PLAN OF CARE
Goal Outcome Evaluation:  Plan of Care Reviewed With: patient        Progress: no change  Outcome Evaluation: Pt has had no complaints of pain. HD this AM at bedside. Tolerated well. Pt alert to self and place, and seems less confused then previous day according to pt's wife. Stool output from ileostomy noted to have blood in it. Dr. Salas aware. Heparin d/c'd. Plan to re-check hgb in AM. Worked w/ PT. Covid precautions. Plan for antigen testing. VSS. Will continue to monitor.

## 2023-05-15 NOTE — PROGRESS NOTES
Name: Bill Landry ADMIT: 2023   : 1945  PCP: No primary care provider on file.    MRN: [id number] LOS: 4 days   AGE/SEX: 77 y.o. male  ROOM: [facility]     Subjective   Subjective   Pt doing a little better since steroids stopped. Not as agitated or restless as yesterday. Currently getting HD in room. C/o pain in back muscles--wife thinks it's from laying in bed so long. No N/V/D/F/C/NS/CP/palp/HA/vis changes.    Review of Systems      As above    Objective   Objective   Vital Signs  Temp:  [96.1 °F (35.6 °C)-97.5 °F (36.4 °C)] 96.5 °F (35.8 °C)  Heart Rate:  [71-87] 87  Resp:  [16-20] 16  BP: (136-140)/(73-78) 136/73  SpO2:  [93 %-94 %] 94 %  on   ;   Device (Oxygen Therapy): room air  Body mass index is 25.54 kg/m².    Physical Exam  Vitals and nursing note reviewed. Exam conducted with a chaperone present (Wife and HD tech).   Constitutional:       General: He is not in acute distress.     Appearance: He is ill-appearing (chronically). He is not toxic-appearing or diaphoretic.      Comments: Sleeping   HENT:      Head: Normocephalic.      Nose: Nose normal.      Mouth/Throat:      Mouth: Mucous membranes are moist.      Pharynx: Oropharynx is clear.   Eyes:      General: No scleral icterus.        Right eye: No discharge.         Left eye: No discharge.      Extraocular Movements: Extraocular movements intact.      Conjunctiva/sclera: Conjunctivae normal.   Cardiovascular:      Rate and Rhythm: Normal rate and regular rhythm.      Pulses: Normal pulses.   Pulmonary:      Effort: Pulmonary effort is normal. No respiratory distress.      Breath sounds: Normal breath sounds. No wheezing or rales.      Comments: Anteriorly  Abdominal:      General: Bowel sounds are normal. There is no distension.      Palpations: Abdomen is soft.      Tenderness: There is no abdominal tenderness.   Musculoskeletal:         General: No swelling or deformity. Normal range of motion.      Cervical back: Neck supple. No  rigidity.   Lymphadenopathy:      Cervical: No cervical adenopathy.   Skin:     General: Skin is warm and dry.      Capillary Refill: Capillary refill takes less than 2 seconds.      Coloration: Skin is pale. Skin is not jaundiced.   Neurological:      Comments: Moves all 4 equally  Speech clear  Oriented   Psychiatric:      Comments: Irritable       Results Review     I reviewed the patient's new clinical results.  Results from last 7 days   Lab Units 05/15/23  0803 05/14/23  0731 05/13/23  0658 05/12/23  0659   WBC 10*3/mm3 4.80 6.91 7.22 5.57   HEMOGLOBIN g/dL 10.9* 11.5* 10.3* 10.7*   PLATELETS 10*3/mm3 144 155 155 138*     Results from last 7 days   Lab Units 05/15/23  0803 05/14/23  0921 05/13/23 0658 05/12/23  0658   SODIUM mmol/L 129* 130* 126* 133*   POTASSIUM mmol/L 4.9 4.2 4.8 4.3   CHLORIDE mmol/L 95* 95* 92* 98   CO2 mmol/L 20.0* 21.9* 20.0* 22.9   BUN mg/dL 79* 50* 89* 58*   CREATININE mg/dL 6.01* 4.67* 7.38* 5.57*   GLUCOSE mg/dL 189* 186* 277* 237*   EGFR mL/min/1.73 9.0* 12.2* 7.0* 9.9*     Results from last 7 days   Lab Units 05/15/23  0803 05/14/23  0921 05/13/23 0658 05/12/23  0658   ALBUMIN g/dL 3.1* 3.1* 3.0* 2.8*   BILIRUBIN mg/dL 1.0 1.5* 0.9 1.2   ALK PHOS U/L 141* 155* 151* 154*   AST (SGOT) U/L 29 37 32 38   ALT (SGPT) U/L 42* 43* 31 35     Results from last 7 days   Lab Units 05/15/23  0803 05/14/23  0921 05/14/23  0731 05/13/23  0658 05/12/23  0658   CALCIUM mg/dL 8.0* 8.4*  --  8.1* 8.7   ALBUMIN g/dL 3.1* 3.1*  --  3.0* 2.8*   MAGNESIUM mg/dL 2.2 2.1  --  2.3 2.4   PHOSPHORUS mg/dL 6.1*  --  3.8  --   --      Results from last 7 days   Lab Units 05/12/23  0658 05/11/23  0132   PROCALCITONIN ng/mL 1.94* 2.38*     Glucose   Date/Time Value Ref Range Status   05/15/2023 0634 170 (H) 70 - 130 mg/dL Final     Comment:     Meter: [id number] : [id number] Ralf ANDREWS   05/14/2023 2027 259 (H) 70 - 130 mg/dL Final     Comment:     Meter: [id number] : [id number] Ralf ANDREWS    05/14/2023 1620 196 (H) 70 - 130 mg/dL Final     Comment:     Meter: [id number] : [id number] Ronald Read NA   05/14/2023 1151 186 (H) 70 - 130 mg/dL Final     Comment:     Meter: [id number] : [id number] Ronald Read NA   05/14/2023 0638 154 (H) 70 - 130 mg/dL Final     Comment:     Meter: [id number] : [id number] Ralf Osborne NA   05/13/2023 2009 298 (H) 70 - 130 mg/dL Final     Comment:     Meter: [id number] : [id number] Ralf Osborne NA   05/13/2023 1606 281 (H) 70 - 130 mg/dL Final     Comment:     Meter: [id number] : [id number] Jose ANDREWS       No radiology results for the last day  I have personally reviewed all medications:  Scheduled Medications  aspirin, 81 mg, Oral, Daily  b complex-vitamin c-folic acid, 1 tablet, Oral, Daily  cetirizine, 10 mg, Oral, Daily  famotidine, 20 mg, Oral, BID AC  heparin (porcine), 5,000 Units, Subcutaneous, Q8H  insulin lispro, 2-7 Units, Subcutaneous, TID With Meals  latanoprost, 1 drop, Both Eyes, Nightly  sodium chloride, 10 mL, Intravenous, Q12H  Sucroferric Oxyhydroxide, 500 mg, Oral, TID PC  tamsulosin, 0.4 mg, Oral, Nightly    Infusions   Diet  Diet: Renal Diets; Low Sodium (2-3g), Low Potassium, Low Phosphorus; Texture: Regular Texture (IDDSI 7); Fluid Consistency: Thin (IDDSI 0)    I have personally reviewed:  [x]  Laboratory   []  Microbiology   []  Radiology   [x]  EKG/Telemetry  []  Cardiology/Vascular   []  Pathology    []  Records       Assessment/Plan     Active Hospital Problems    Diagnosis  POA   • **Acute hypoxemic respiratory failure due to COVID-19 [U07.1, J96.01]  Yes   • Impaired glucose tolerance [R73.02]  Yes   • Acute on chronic diastolic heart failure [I50.33]  Yes   • Bicuspid aortic valve [Q23.1]  Not Applicable   • Aortic stenosis, mild [I35.0]  Yes   • TATE (obstructive sleep apnea) [G47.33]  Yes   • End stage kidney disease (HCC) [N18.6]  Yes   • Crohn's disease, unspecified, without complications [K50.90]  Yes   •  Essential (primary) hypertension [I10]  Yes   • Secondary hyperparathyroidism of renal origin [N25.81]  Yes   • Anemia due to stage 4 chronic kidney disease treated with erythropoietin [N18.4, D63.1]  Yes   • Cirrhosis of liver without ascites [K74.60]  Yes   • Congestive splenomegaly [D73.2]  Yes   • Permanent atrial fibrillation [I48.21]  Yes   • Thrombocytopenia (HCC) [D69.6]  Yes      Resolved Hospital Problems   No resolved problems to display.       76yo gentleman with acute hypoxia due to COViD-19 and volume overload in setting of ESRD and diastolic CHF.      Acute hypoxia: due to volume overload most likely, though COViD PNA also playing role, weaned to RA now, desats a little with ambulation and while sleeping, continue to monitor     COViD pneumonia: symptomatic for >6d so not a candidate for Remdesivir, treated with Decadron and supplemental O2 as needed, inflammatory markers trending down now, pharmacologic DVT ppx, appreciate Pulm attention to pt, do not suspect bacterial PNA--elevated PCT likely due to ESRD and fell without any abx tx  Decadron stopped yesterday due to his agitation (see below)     Agitation, confusion: has been at night mostly the past couple days, but then became quite confused and agitated yesterday AM, improved after single dose of oral Ativan, have stopped steroids--suspect this is chief cause, behavior somewhat improved today, continue to monitor, will give a single dose of Flexeril for his c/o back pain     Acute on chronic diastolic CHF: defer to Card and Renal, volume mgmt with HD, Echo looks good     ESRD with volume overload: as above, usually gets HD MWF, got HD yesterday, HD again today per Renal.     Permanent AFib: HRs fine on no rate-control meds, not on AC due to h/o GIB     Elevated Trop: suspect due to ESRD and not ACS, EKG fine, Card does not recommend any further w/u     Anemia of CKD  MGUS with pancytopenia: Hgb stable, platelets better than baseline, WBC wnl,  consulted Heme/Onc at family's request--they have seen and signed off     DM2?: A1c 6.2, not on meds for this PTA, covering with SSI here while on steroids, at this time would say he has borderline DM, long d/w pt and wife regarding this, encouraged him to permit temporary use of SSI here as his sugars were pretty high due to steroids, sugars falling now that Decadron stopped     Crohn's with ileostomy: stable, Wound/Ostomy RN has seen     Elevated LFTs: possibly due to viral infection, do not suspect obstruction, pt is asymptomatic, numbers normalized as of 5/12      · Heparin SC for DVT prophylaxis.  · Full code.  · Discussed with patient, spouse and nursing staff.  · Anticipate discharge home with family vs SNF when mental status clears and okay with consultants. PT eval noted.    During all interaction with pt proper PPE was utilized (gown, gloves, mask, goggles, and face shield) and was donned/doffed according to recommendations. Hands were cleaned before and after interaction.    Ken Salas MD  [facility]  05/15/23  09:53 EDT

## 2023-05-15 NOTE — PROGRESS NOTES
[facility] Progress Note      Patient Name: Bill Landry  : 1945  MRN: [id number]  Primary Care Physician:  No primary care provider on file.  Date of admission: 2023    Subjective     Interval History:   Follow-up end-stage renal disease  Continues to be confused.  Wife at bedside.  No new events overnight.  Review of Systems:   Did not obtain    Objective     Vitals:   Temp:  [96.1 °F (35.6 °C)-97.5 °F (36.4 °C)] 96.5 °F (35.8 °C)  Heart Rate:  [71-87] 87  Resp:  [16-20] 16  BP: (136-140)/(73-78) 136/73    Intake/Output Summary (Last 24 hours) at 5/15/2023 0856  Last data filed at 5/15/2023 0437  Gross per 24 hour   Intake 60 ml   Output 450 ml   Net -390 ml       Physical Exam:      Constitutional: No symptoms  , chronically ill, no acute distress  HEENT: Sclera anicteric, no conjunctival injection, oral mucosa is no  Neck: Supple, no thyromegaly, no lymphadenopathy, trachea at midline, no JVD  Respiratory:  Bilateral rhonchi, breathing effort not labored  Cardiovascular: RRR, systolic ejection murmur, no rub  Gastrointestinal: Positive bowel sounds, abdomen is soft, nontender and nondistended.  Ostomy bag in place   : No palpable bladder  Musculoskeletal: No edema, no clubbing or cyanosis.  Left upper extremity  AVF/ AVG   Neurologic: Unable to assess now  Skin: Warm and dry        Scheduled Meds:     aspirin, 81 mg, Oral, Daily  b complex-vitamin c-folic acid, 1 tablet, Oral, Daily  cetirizine, 10 mg, Oral, Daily  famotidine, 20 mg, Oral, BID AC  heparin (porcine), 5,000 Units, Subcutaneous, Q8H  insulin lispro, 2-7 Units, Subcutaneous, TID With Meals  latanoprost, 1 drop, Both Eyes, Nightly  sodium chloride, 10 mL, Intravenous, Q12H  Sucroferric Oxyhydroxide, 500 mg, Oral, TID PC  tamsulosin, 0.4 mg, Oral, Nightly      IV Meds:        Results Reviewed:   I have personally reviewed the results from the time of this admission to 5/15/2023 08:56 EDT     Results  from last 7 days   Lab Units 05/15/23  0803 05/14/23  0921 05/13/23  0658   SODIUM mmol/L 129* 130* 126*   POTASSIUM mmol/L 4.9 4.2 4.8   CHLORIDE mmol/L 95* 95* 92*   CO2 mmol/L 20.0* 21.9* 20.0*   BUN mg/dL 79* 50* 89*   CREATININE mg/dL 6.01* 4.67* 7.38*   CALCIUM mg/dL 8.0* 8.4* 8.1*   BILIRUBIN mg/dL 1.0 1.5* 0.9   ALK PHOS U/L 141* 155* 151*   ALT (SGPT) U/L 42* 43* 31   AST (SGOT) U/L 29 37 32   GLUCOSE mg/dL 189* 186* 277*       Estimated Creatinine Clearance: 11.7 mL/min (A) (by C-G formula based on SCr of 6.01 mg/dL (H)).    Results from last 7 days   Lab Units 05/15/23  0803 05/14/23  0921 05/14/23  0731 05/13/23  0658   MAGNESIUM mg/dL 2.2 2.1  --  2.3   PHOSPHORUS mg/dL 6.1*  --  3.8  --              Results from last 7 days   Lab Units 05/15/23  0803 05/14/23  0731 05/13/23  0658 05/12/23  0659 05/11/23  0617   WBC 10*3/mm3 4.80 6.91 7.22 5.57 6.54   HEMOGLOBIN g/dL 10.9* 11.5* 10.3* 10.7* 10.5*   PLATELETS 10*3/mm3 144 155 155 138* 114*             Assessment / Plan     ASSESSMENT:     -End Stage Renal Disease ( ESRD ) on Hemodialysis .s/p  LUE AVF s/p LUE AVG  ( by rebecca Singh )  functional .and Monday, Wednesday and Friday schedule.     -Chronic normocytic anemia. On Epogen protocol.    -Hypervolemia with hyponatremia we will challenge fluid removal dialysis  -Hyperphosphatemia. Continue binders  with meals, Continue renal diet. We will follow phosphorus to make further adjustmentst   -Diabetes Mellitus type 2  with renal complication treated by primary  -COVID-19 infection currently on isolation on dexamethasone.,  Inhalers and he is on room air  -Chronic diastolic congestive heart failure .   Volume status improving  -Crohn disease a status post ostomy placement       PLAN:  -Continue dialysis on Monday Wednesday Friday.  Plan for dialysis today.  -We will UF as tolerated  -Surveillance labs    I discussed the case with the patient's wife at the bed  Copied text in this note has been reviewed and is  accurate as of 05/15/23.         Thank you for involving us in the care of Bill Landry.  Please feel free to call with any questions.    Myra Moore MD  05/15/23  08:56 EDT    [facility]  [phone]    Please note that portions of this note were completed with a voice recognition program.

## 2023-05-16 LAB
ALBUMIN SERPL-MCNC: 3.1 G/DL (ref 3.5–5.2)
ANION GAP SERPL CALCULATED.3IONS-SCNC: 12 MMOL/L (ref 5–15)
BUN SERPL-MCNC: 52 MG/DL (ref 8–23)
BUN/CREAT SERPL: 10.7 (ref 7–25)
CALCIUM SPEC-SCNC: 8.3 MG/DL (ref 8.6–10.5)
CHLORIDE SERPL-SCNC: 98 MMOL/L (ref 98–107)
CO2 SERPL-SCNC: 23 MMOL/L (ref 22–29)
CREAT SERPL-MCNC: 4.88 MG/DL (ref 0.76–1.27)
CRP SERPL-MCNC: 2.6 MG/DL (ref 0–0.5)
DEPRECATED RDW RBC AUTO: 45.2 FL (ref 37–54)
EGFRCR SERPLBLD CKD-EPI 2021: 11.6 ML/MIN/1.73
ERYTHROCYTE [DISTWIDTH] IN BLOOD BY AUTOMATED COUNT: 13.3 % (ref 12.3–15.4)
FERRITIN SERPL-MCNC: 1998 NG/ML (ref 30–400)
GLUCOSE BLDC GLUCOMTR-MCNC: 147 MG/DL (ref 70–130)
GLUCOSE BLDC GLUCOMTR-MCNC: 158 MG/DL (ref 70–130)
GLUCOSE BLDC GLUCOMTR-MCNC: 188 MG/DL (ref 70–130)
GLUCOSE BLDC GLUCOMTR-MCNC: 232 MG/DL (ref 70–130)
GLUCOSE SERPL-MCNC: 160 MG/DL (ref 65–99)
HBV SURFACE AG SERPL QL IA: NORMAL
HCT VFR BLD AUTO: 33.2 % (ref 37.5–51)
HGB BLD-MCNC: 11.4 G/DL (ref 13–17.7)
MAGNESIUM SERPL-MCNC: 2 MG/DL (ref 1.6–2.4)
MCH RBC QN AUTO: 32.4 PG (ref 26.6–33)
MCHC RBC AUTO-ENTMCNC: 34.3 G/DL (ref 31.5–35.7)
MCV RBC AUTO: 94.3 FL (ref 79–97)
PHOSPHATE SERPL-MCNC: 3 MG/DL (ref 2.5–4.5)
PLATELET # BLD AUTO: 133 10*3/MM3 (ref 140–450)
PMV BLD AUTO: 8.7 FL (ref 6–12)
POTASSIUM SERPL-SCNC: 4.6 MMOL/L (ref 3.5–5.2)
RBC # BLD AUTO: 3.52 10*6/MM3 (ref 4.14–5.8)
SODIUM SERPL-SCNC: 133 MMOL/L (ref 136–145)
WBC NRBC COR # BLD: 5.66 10*3/MM3 (ref 3.4–10.8)

## 2023-05-16 PROCEDURE — 83735 ASSAY OF MAGNESIUM: CPT | Performed by: HOSPITALIST

## 2023-05-16 PROCEDURE — 80069 RENAL FUNCTION PANEL: CPT | Performed by: HOSPITALIST

## 2023-05-16 PROCEDURE — 63710000001 INSULIN LISPRO (HUMAN) PER 5 UNITS: Performed by: NURSE PRACTITIONER

## 2023-05-16 PROCEDURE — 87340 HEPATITIS B SURFACE AG IA: CPT | Performed by: INTERNAL MEDICINE

## 2023-05-16 PROCEDURE — 86140 C-REACTIVE PROTEIN: CPT | Performed by: HOSPITALIST

## 2023-05-16 PROCEDURE — 85027 COMPLETE CBC AUTOMATED: CPT | Performed by: HOSPITALIST

## 2023-05-16 PROCEDURE — 82728 ASSAY OF FERRITIN: CPT | Performed by: HOSPITALIST

## 2023-05-16 PROCEDURE — 82948 REAGENT STRIP/BLOOD GLUCOSE: CPT

## 2023-05-16 RX ADMIN — SUCROFERRIC OXYHYDROXIDE 500 MG: 500 TABLET, CHEWABLE ORAL at 09:14

## 2023-05-16 RX ADMIN — ASPIRIN 81 MG: 81 TABLET, COATED ORAL at 09:14

## 2023-05-16 RX ADMIN — FAMOTIDINE 20 MG: 20 TABLET, FILM COATED ORAL at 17:46

## 2023-05-16 RX ADMIN — FAMOTIDINE 20 MG: 20 TABLET, FILM COATED ORAL at 09:13

## 2023-05-16 RX ADMIN — INSULIN LISPRO 2 UNITS: 100 INJECTION, SOLUTION INTRAVENOUS; SUBCUTANEOUS at 12:13

## 2023-05-16 RX ADMIN — Medication 10 ML: at 09:14

## 2023-05-16 RX ADMIN — Medication 1 TABLET: at 09:14

## 2023-05-16 RX ADMIN — CETIRIZINE HYDROCHLORIDE 10 MG: 10 TABLET ORAL at 09:14

## 2023-05-16 RX ADMIN — SUCROFERRIC OXYHYDROXIDE 500 MG: 500 TABLET, CHEWABLE ORAL at 18:00

## 2023-05-16 RX ADMIN — INSULIN LISPRO 3 UNITS: 100 INJECTION, SOLUTION INTRAVENOUS; SUBCUTANEOUS at 17:46

## 2023-05-16 RX ADMIN — LATANOPROST 1 DROP: 50 SOLUTION OPHTHALMIC at 20:45

## 2023-05-16 RX ADMIN — Medication 10 ML: at 20:46

## 2023-05-16 RX ADMIN — TAMSULOSIN HYDROCHLORIDE 0.4 MG: 0.4 CAPSULE ORAL at 20:45

## 2023-05-16 RX ADMIN — SUCROFERRIC OXYHYDROXIDE 500 MG: 500 TABLET, CHEWABLE ORAL at 12:13

## 2023-05-16 NOTE — PLAN OF CARE
Goal Outcome Evaluation:  Pt still on contact precautions. Had a rather quiet night. Spouse at the bedside.VSS.  He was AO X4.

## 2023-05-16 NOTE — PROGRESS NOTES
[facility] Progress Note      Patient Name: Bill Landry  : 1945  MRN: [id number]  Primary Care Physician:  No primary care provider on file.  Date of admission: 2023    Subjective     Interval History:   Follow-up end-stage renal disease  Seems to be better today.  Continues to be confused.  Denies any nausea or vomiting.  Dialyzed yesterday with no reported problems.  1.5 L of fluid removed his dialysis was well-tolerated.    Review of Systems:   Did not obtain    Objective     Vitals:   Temp:  [96.8 °F (36 °C)-97.8 °F (36.6 °C)] 97.8 °F (36.6 °C)  Heart Rate:  [78-90] 86  Resp:  [16-18] 16  BP: (126-144)/(66-84) 131/66    Intake/Output Summary (Last 24 hours) at 2023 0848  Last data filed at 5/15/2023 1610  Gross per 24 hour   Intake --   Output 1800 ml   Net -1800 ml       Physical Exam:      Constitutional: No symptoms  , chronically ill, no acute distress  HEENT: Sclera anicteric, no conjunctival injection, oral mucosa is no  Neck: Supple, no thyromegaly, no lymphadenopathy, trachea at midline, no JVD  Respiratory:  Bilateral rhonchi, breathing effort not labored  Cardiovascular: RRR, systolic ejection murmur, no rub  Gastrointestinal: Positive bowel sounds, abdomen is soft, nontender and nondistended.  Ostomy bag in place   : No palpable bladder  Musculoskeletal: No edema, no clubbing or cyanosis.  Left upper extremity  AVF/ AVG   Neurologic: Awake but confused  Skin: Warm and dry        Scheduled Meds:     aspirin, 81 mg, Oral, Daily  b complex-vitamin c-folic acid, 1 tablet, Oral, Daily  cetirizine, 10 mg, Oral, Daily  cyclobenzaprine, 5 mg, Oral, Once  famotidine, 20 mg, Oral, BID AC  insulin lispro, 2-7 Units, Subcutaneous, TID With Meals  latanoprost, 1 drop, Both Eyes, Nightly  sodium chloride, 10 mL, Intravenous, Q12H  Sucroferric Oxyhydroxide, 500 mg, Oral, TID PC  tamsulosin, 0.4 mg, Oral, Nightly      IV Meds:        Results Reviewed:   I have  personally reviewed the results from the time of this admission to 5/16/2023 08:48 EDT     Results from last 7 days   Lab Units 05/16/23  0632 05/15/23  0803 05/14/23  0921 05/13/23  0658   SODIUM mmol/L 133* 129* 130* 126*   POTASSIUM mmol/L 4.6 4.9 4.2 4.8   CHLORIDE mmol/L 98 95* 95* 92*   CO2 mmol/L 23.0 20.0* 21.9* 20.0*   BUN mg/dL 52* 79* 50* 89*   CREATININE mg/dL 4.88* 6.01* 4.67* 7.38*   CALCIUM mg/dL 8.3* 8.0* 8.4* 8.1*   BILIRUBIN mg/dL  --  1.0 1.5* 0.9   ALK PHOS U/L  --  141* 155* 151*   ALT (SGPT) U/L  --  42* 43* 31   AST (SGOT) U/L  --  29 37 32   GLUCOSE mg/dL 160* 189* 186* 277*       Estimated Creatinine Clearance: 14.5 mL/min (A) (by C-G formula based on SCr of 4.88 mg/dL (H)).    Results from last 7 days   Lab Units 05/16/23  0632 05/15/23  0803 05/14/23  0921 05/14/23  0731   MAGNESIUM mg/dL 2.0 2.2 2.1  --    PHOSPHORUS mg/dL 3.0 6.1*  --  3.8             Results from last 7 days   Lab Units 05/16/23  0632 05/15/23  0803 05/14/23  0731 05/13/23  0658 05/12/23  0659   WBC 10*3/mm3 5.66 4.80 6.91 7.22 5.57   HEMOGLOBIN g/dL 11.4* 10.9* 11.5* 10.3* 10.7*   PLATELETS 10*3/mm3 133* 144 155 155 138*             Assessment / Plan     ASSESSMENT:     -End Stage Renal Disease ( ESRD ) on Hemodialysis .s/p  LUE AVF s/p LUE AVG  ( by rebecca Singh )  functional .and Monday, Wednesday and Friday schedule.     -Chronic normocytic anemia. On Epogen protocol.    -Hypervolemia with hyponatremia we will challenge fluid removal dialysis  -Hyperphosphatemia. Continue binders  with meals, Continue renal diet. We will follow phosphorus to make further adjustmentst   -Diabetes Mellitus type 2  with renal complication treated by primary  -COVID-19 infection currently on isolation on dexamethasone.,  Inhalers and he is on room air  -Chronic diastolic congestive heart failure .   Volume status improving  -Crohn disease a status post ostomy placement       PLAN:  -Continue dialysis on Monday Wednesday Friday.  Plan for  dialysis tomorrow   -Surveillance labs    I discussed the case with the patient's wife at the bed  Copied text in this note has been reviewed and is accurate as of 05/16/23.         Thank you for involving us in the care of Bill Landry.  Please feel free to call with any questions.    Myra Moore MD  05/16/23  08:48 EDT    [facility]  [phone]    Please note that portions of this note were completed with a voice recognition program.

## 2023-05-16 NOTE — PROGRESS NOTES
Name: Bill Landry ADMIT: 2023   : 1945  PCP: No primary care provider on file.    MRN: [id number] LOS: 5 days   AGE/SEX: 77 y.o. male  ROOM: [facility]     Subjective   Subjective   Pt doing a little better since HD yesterday. Not as agitated or restless as yesterday. Back pain resolved No N/V/D/F/C/NS/CP/palp/HA/vis changes.    Review of Systems      As above    Objective   Objective   Vital Signs  Temp:  [96.8 °F (36 °C)-97.8 °F (36.6 °C)] 97.8 °F (36.6 °C)  Heart Rate:  [78-86] 86  Resp:  [16-18] 16  BP: (131-144)/(66-70) 131/66  SpO2:  [92 %-93 %] 93 %  on   ;   Device (Oxygen Therapy): room air  Body mass index is 25.54 kg/m².     (No change in exam today)    Physical Exam  Vitals and nursing note reviewed. Exam conducted with a chaperone present (Wife).   Constitutional:       General: He is not in acute distress.     Appearance: He is ill-appearing (chronically). He is not toxic-appearing or diaphoretic.      Comments: Sleeping   HENT:      Head: Normocephalic.      Nose: Nose normal.      Mouth/Throat:      Mouth: Mucous membranes are moist.      Pharynx: Oropharynx is clear.   Eyes:      General: No scleral icterus.        Right eye: No discharge.         Left eye: No discharge.      Extraocular Movements: Extraocular movements intact.      Conjunctiva/sclera: Conjunctivae normal.   Cardiovascular:      Rate and Rhythm: Normal rate and regular rhythm.      Pulses: Normal pulses.   Pulmonary:      Effort: Pulmonary effort is normal. No respiratory distress.      Breath sounds: Normal breath sounds. No wheezing or rales.      Comments: Anteriorly  Abdominal:      General: Bowel sounds are normal. There is no distension.      Palpations: Abdomen is soft.      Tenderness: There is no abdominal tenderness.   Musculoskeletal:         General: No swelling or deformity. Normal range of motion.      Cervical back: Neck supple. No rigidity.   Lymphadenopathy:      Cervical: No cervical adenopathy.    Skin:     General: Skin is warm and dry.      Capillary Refill: Capillary refill takes less than 2 seconds.      Coloration: Skin is pale. Skin is not jaundiced.   Neurological:      Comments: Moves all 4 equally  Speech clear  Oriented   Psychiatric:      Comments: Irritable       Results Review     I reviewed the patient's new clinical results.  Results from last 7 days   Lab Units 05/16/23  0632 05/15/23  0803 05/14/23  0731 05/13/23  0658   WBC 10*3/mm3 5.66 4.80 6.91 7.22   HEMOGLOBIN g/dL 11.4* 10.9* 11.5* 10.3*   PLATELETS 10*3/mm3 133* 144 155 155     Results from last 7 days   Lab Units 05/16/23  0632 05/15/23  0803 05/14/23  0921 05/13/23  0658   SODIUM mmol/L 133* 129* 130* 126*   POTASSIUM mmol/L 4.6 4.9 4.2 4.8   CHLORIDE mmol/L 98 95* 95* 92*   CO2 mmol/L 23.0 20.0* 21.9* 20.0*   BUN mg/dL 52* 79* 50* 89*   CREATININE mg/dL 4.88* 6.01* 4.67* 7.38*   GLUCOSE mg/dL 160* 189* 186* 277*   EGFR mL/min/1.73 11.6* 9.0* 12.2* 7.0*     Results from last 7 days   Lab Units 05/16/23  0632 05/15/23  0803 05/14/23  0921 05/13/23  0658 05/12/23  0658   ALBUMIN g/dL 3.1* 3.1* 3.1* 3.0* 2.8*   BILIRUBIN mg/dL  --  1.0 1.5* 0.9 1.2   ALK PHOS U/L  --  141* 155* 151* 154*   AST (SGOT) U/L  --  29 37 32 38   ALT (SGPT) U/L  --  42* 43* 31 35     Results from last 7 days   Lab Units 05/16/23  0632 05/15/23  0803 05/14/23  0921 05/14/23  0731 05/13/23  0658   CALCIUM mg/dL 8.3* 8.0* 8.4*  --  8.1*   ALBUMIN g/dL 3.1* 3.1* 3.1*  --  3.0*   MAGNESIUM mg/dL 2.0 2.2 2.1  --  2.3   PHOSPHORUS mg/dL 3.0 6.1*  --  3.8  --      Results from last 7 days   Lab Units 05/12/23  0658 05/11/23  0132   PROCALCITONIN ng/mL 1.94* 2.38*     Glucose   Date/Time Value Ref Range Status   05/16/2023 0626 147 (H) 70 - 130 mg/dL Final     Comment:     Meter: [id number] : [id number] Arvind ANDREWS   05/15/2023 2058 192 (H) 70 - 130 mg/dL Final     Comment:     Meter: [id number] : [id number] Arvind ANDREWS   05/15/2023 1707 258 (H) 70  - 130 mg/dL Final     Comment:     Meter: [id number] : [id number] Walt ANDREWS   05/15/2023 1112 130 70 - 130 mg/dL Final     Comment:     Meter: [id number] : [id number] Walt ANDREWS   05/15/2023 0634 170 (H) 70 - 130 mg/dL Final     Comment:     Meter: [id number] : [id number] Ralf ANDREWS   05/14/2023 2027 259 (H) 70 - 130 mg/dL Final     Comment:     Meter: [id number] : [id number] Ralf Osborne NA   05/14/2023 1620 196 (H) 70 - 130 mg/dL Final     Comment:     Meter: [id number] : [id number] Ronald ANDREWS       No radiology results for the last day  I have personally reviewed all medications:  Scheduled Medications  aspirin, 81 mg, Oral, Daily  b complex-vitamin c-folic acid, 1 tablet, Oral, Daily  cetirizine, 10 mg, Oral, Daily  cyclobenzaprine, 5 mg, Oral, Once  famotidine, 20 mg, Oral, BID AC  insulin lispro, 2-7 Units, Subcutaneous, TID With Meals  latanoprost, 1 drop, Both Eyes, Nightly  sodium chloride, 10 mL, Intravenous, Q12H  Sucroferric Oxyhydroxide, 500 mg, Oral, TID PC  tamsulosin, 0.4 mg, Oral, Nightly    Infusions   Diet  Diet: Renal Diets; Low Sodium (2-3g), Low Potassium, Low Phosphorus; Texture: Regular Texture (IDDSI 7); Fluid Consistency: Thin (IDDSI 0)    I have personally reviewed:  [x]  Laboratory   []  Microbiology   []  Radiology   [x]  EKG/Telemetry  []  Cardiology/Vascular   []  Pathology    []  Records       Assessment/Plan     Active Hospital Problems    Diagnosis  POA   • **Acute hypoxemic respiratory failure due to COVID-19 [U07.1, J96.01]  Yes   • Impaired glucose tolerance [R73.02]  Yes   • Acute on chronic diastolic heart failure [I50.33]  Yes   • Bicuspid aortic valve [Q23.1]  Not Applicable   • Aortic stenosis, mild [I35.0]  Yes   • TATE (obstructive sleep apnea) [G47.33]  Yes   • End stage kidney disease (HCC) [N18.6]  Yes   • Crohn's disease, unspecified, without complications [K50.90]  Yes   • Essential (primary) hypertension [I10]  Yes   •  Secondary hyperparathyroidism of renal origin [N25.81]  Yes   • Anemia due to stage 4 chronic kidney disease treated with erythropoietin [N18.4, D63.1]  Yes   • Cirrhosis of liver without ascites [K74.60]  Yes   • Congestive splenomegaly [D73.2]  Yes   • Permanent atrial fibrillation [I48.21]  Yes   • Thrombocytopenia (HCC) [D69.6]  Yes      Resolved Hospital Problems   No resolved problems to display.       78yo gentleman with acute hypoxia due to COViD-19 and volume overload in setting of ESRD and diastolic CHF.      Acute hypoxia: due to volume overload most likely, though COViD PNA also playing role, weaned to RA now, desats a little with ambulation and while sleeping, continue to monitor     COViD pneumonia: symptomatic for >6d so not a candidate for Remdesivir, treated with Decadron and supplemental O2 as needed, inflammatory markers trending down now, pharmacologic DVT ppx stopped when pt developed some GI bleeding (see below), appreciate Pulm attention to pt, do not suspect bacterial PNA--elevated PCT likely due to ESRD and fell without any abx tx  Decadron stopped now due to his agitation (see below)     Agitation, confusion: had been at night mostly for a few days, but then became quite confused and agitated, improved after single dose of oral Ativan, have now stopped steroids--suspect this is chief cause, behavior somewhat improved again today, continue to monitor     Acute on chronic diastolic CHF: defer to Card and Renal, volume mgmt with HD, Echo looks good     ESRD with volume overload: as above, HD MWF per Renal.     Permanent AFib: HRs fine on no rate-control meds, not on AC due to h/o GIB, had to stop SQ Heparin VTE ppx due to GI bleed here     Elevated Trop: suspect due to ESRD and not ACS, EKG fine, Card does not recommend any further w/u     Anemia of CKD  MGUS with pancytopenia: Hgb stable, platelets better than baseline, WBC wnl, consulted Heme/Onc at family's request--they have seen and  signed off    GI bleed: bright red blood in ileostomy bag yesterday, have stopped ppx dose of SQ Heparin, Hgb quite stable, continue to monitor     DM2?: A1c 6.2, not on meds for this PTA, covering with SSI here while on steroids, at this time would say he has borderline DM, long d/w pt and wife regarding this, encouraged him to permit temporary use of SSI here as his sugars were pretty high due to steroids, sugars falling now that Decadron stopped     Crohn's with ileostomy: stable, Wound/Ostomy RN has seen     Elevated LFTs: possibly due to viral infection, do not suspect obstruction, pt is asymptomatic, numbers normalized as of 5/12      · SCDs for DVT prophylaxis.  · Full code.  · Discussed with patient, spouse and nursing staff.  · Anticipate discharge home with family when mental status clears and okay with consultants. PT eval noted. Wife refusing to consider SNF at [place].    During all interaction with pt proper PPE was utilized (gown, gloves, mask, goggles, and face shield) and was donned/doffed according to recommendations. Hands were cleaned before and after interaction.    Ken Salas MD  [facility]  05/16/23  11:32 EDT

## 2023-05-16 NOTE — PROGRESS NOTES
"Nutrition Services    Patient Name:  Bill Landry  YOB: 1945  MRN: [id number]  Admit Date:  5/11/2023    FOLLOW UP - CLINICAL NUTRITION    Assessment Date:  05/16/23    Encounter Information         Reason for Encounter Follow up    Current Issues % x 4 meals.  Continues to be confused.  No N/V.  S/p HD yesterday with 1.5 L removed.  Plans to return home with spouse.     Current Nutrition Orders & Evaluation of Intake       Oral Nutrition     Current PO Diet Diet: Renal Diets; Low Sodium (2-3g), Low Potassium, Low Phosphorus; Texture: Regular Texture (IDDSI 7); Fluid Consistency: Thin (IDDSI 0)   Supplement n/a   PO Evaluation     % PO Intake % x 4 meals    # of Days Evaluated 4    Factors Affecting Intake  altered mental status   --  Anthropometrics          Height    Weight Height: 177.8 cm (70\")  Weight: 80.7 kg (178 lb) (05/12/23 1009)    BMI kg/m2 Body mass index is 25.54 kg/m².  Overweight (25 - 29.9)    Weight trend Gain     Labs        Pertinent Labs Reviewed, listed below     Results from last 7 days   Lab Units 05/16/23  0632 05/15/23  0803 05/14/23  0921 05/13/23  0658   SODIUM mmol/L 133* 129* 130* 126*   POTASSIUM mmol/L 4.6 4.9 4.2 4.8   CHLORIDE mmol/L 98 95* 95* 92*   CO2 mmol/L 23.0 20.0* 21.9* 20.0*   BUN mg/dL 52* 79* 50* 89*   CREATININE mg/dL 4.88* 6.01* 4.67* 7.38*   CALCIUM mg/dL 8.3* 8.0* 8.4* 8.1*   BILIRUBIN mg/dL  --  1.0 1.5* 0.9   ALK PHOS U/L  --  141* 155* 151*   ALT (SGPT) U/L  --  42* 43* 31   AST (SGOT) U/L  --  29 37 32   GLUCOSE mg/dL 160* 189* 186* 277*     Results from last 7 days   Lab Units 05/16/23  0632 05/15/23  0803 05/14/23  0921   MAGNESIUM mg/dL 2.0 2.2 2.1   PHOSPHORUS mg/dL 3.0 6.1*  --    HEMOGLOBIN g/dL 11.4* 10.9*  --    HEMATOCRIT % 33.2* 32.9*  --    WBC 10*3/mm3 5.66 4.80  --    ALBUMIN g/dL 3.1* 3.1* 3.1*     Results from last 7 days   Lab Units 05/16/23  0632 05/15/23  0803 05/14/23  0731 05/13/23  0658 05/12/23  0659 "   PLATELETS 10*3/mm3 133* 144 155 155 138*     COVID19   Date Value Ref Range Status   05/15/2023 Presumptive Negative Presumptive Negative - Ref. Range Final     Lab Results   Component Value Date    HGBA1C 6.20 (H) 05/12/2023          Medications            Scheduled Medications aspirin, 81 mg, Oral, Daily  b complex-vitamin c-folic acid, 1 tablet, Oral, Daily  cetirizine, 10 mg, Oral, Daily  cyclobenzaprine, 5 mg, Oral, Once  famotidine, 20 mg, Oral, BID AC  insulin lispro, 2-7 Units, Subcutaneous, TID With Meals  latanoprost, 1 drop, Both Eyes, Nightly  sodium chloride, 10 mL, Intravenous, Q12H  Sucroferric Oxyhydroxide, 500 mg, Oral, TID PC  tamsulosin, 0.4 mg, Oral, Nightly        Infusions      PRN Medications •  acetaminophen **OR** acetaminophen **OR** acetaminophen  •  benzonatate  •  calcium carbonate  •  dextrose  •  dextrose  •  famotidine  •  glucagon (human recombinant)  •  lidocaine-prilocaine  •  nitroglycerin  •  ondansetron **OR** ondansetron  •  [COMPLETED] Insert Peripheral IV **AND** sodium chloride  •  sodium chloride  •  sodium chloride     Physical Findings          Physical Appearance disoriented, overweight, room air   Oral/Mouth Cavity WNL   Edema  no edema   Gastrointestinal ileostomy, last bowel movement:5/16   Skin  bruising   Tubes/Drains/Lines ileostomy   NFPE Not applicable at this time   --  NUTRITION INTERVENTION / PLAN OF CARE  Intervention Goal         Intervention Goal(s) Maintain nutrition status, Reduce/improve symptoms, Disease management/therapy, Maintain intake and No significant weight loss     Nutrition Intervention         RD Action Follow Tx Progress and Care plan reviewed     Nutrition Prescription         Diet Prescription     Supplement Prescription n/a   EN/PN Prescription    New Prescription Ordered? No changes at this time   --  Monitor/Evaluation        Monitor Per protocol, PO intake, Pertinent labs, Weight, Symptoms   Discharge Needs Pending clinical course    Education Will instruct as appropriate   --    RD to follow up per protocol.    Electronically signed by:  Sarah Singh RD  05/16/23 14:14 EDT

## 2023-05-17 PROBLEM — R05.1 ACUTE COUGH: Status: ACTIVE | Noted: 2023-05-17

## 2023-05-17 LAB
ALBUMIN SERPL-MCNC: 2.8 G/DL (ref 3.5–5.2)
ALBUMIN/GLOB SERPL: 0.7 G/DL
ALP SERPL-CCNC: 135 U/L (ref 39–117)
ALT SERPL W P-5'-P-CCNC: 38 U/L (ref 1–41)
ANION GAP SERPL CALCULATED.3IONS-SCNC: 13 MMOL/L (ref 5–15)
AST SERPL-CCNC: 21 U/L (ref 1–40)
BILIRUB SERPL-MCNC: 1.2 MG/DL (ref 0–1.2)
BUN SERPL-MCNC: 75 MG/DL (ref 8–23)
BUN/CREAT SERPL: 11.1 (ref 7–25)
CALCIUM SPEC-SCNC: 8.4 MG/DL (ref 8.6–10.5)
CHLORIDE SERPL-SCNC: 98 MMOL/L (ref 98–107)
CO2 SERPL-SCNC: 20 MMOL/L (ref 22–29)
CREAT SERPL-MCNC: 6.77 MG/DL (ref 0.76–1.27)
CRP SERPL-MCNC: 5.19 MG/DL (ref 0–0.5)
DEPRECATED RDW RBC AUTO: 44 FL (ref 37–54)
EGFRCR SERPLBLD CKD-EPI 2021: 7.8 ML/MIN/1.73
ERYTHROCYTE [DISTWIDTH] IN BLOOD BY AUTOMATED COUNT: 13 % (ref 12.3–15.4)
FERRITIN SERPL-MCNC: 1644 NG/ML (ref 30–400)
GLOBULIN UR ELPH-MCNC: 3.9 GM/DL
GLUCOSE BLDC GLUCOMTR-MCNC: 125 MG/DL (ref 70–130)
GLUCOSE BLDC GLUCOMTR-MCNC: 144 MG/DL (ref 70–130)
GLUCOSE BLDC GLUCOMTR-MCNC: 225 MG/DL (ref 70–130)
GLUCOSE BLDC GLUCOMTR-MCNC: 97 MG/DL (ref 70–130)
GLUCOSE SERPL-MCNC: 131 MG/DL (ref 65–99)
HCT VFR BLD AUTO: 32.8 % (ref 37.5–51)
HGB BLD-MCNC: 11.3 G/DL (ref 13–17.7)
MAGNESIUM SERPL-MCNC: 2 MG/DL (ref 1.6–2.4)
MCH RBC QN AUTO: 31.9 PG (ref 26.6–33)
MCHC RBC AUTO-ENTMCNC: 34.5 G/DL (ref 31.5–35.7)
MCV RBC AUTO: 92.7 FL (ref 79–97)
PLATELET # BLD AUTO: 120 10*3/MM3 (ref 140–450)
PMV BLD AUTO: 8.8 FL (ref 6–12)
POTASSIUM SERPL-SCNC: 5.1 MMOL/L (ref 3.5–5.2)
PROT SERPL-MCNC: 6.7 G/DL (ref 6–8.5)
RBC # BLD AUTO: 3.54 10*6/MM3 (ref 4.14–5.8)
SARS-COV-2 AG RESP QL IA.RAPID: NORMAL
SODIUM SERPL-SCNC: 131 MMOL/L (ref 136–145)
WBC NRBC COR # BLD: 4.36 10*3/MM3 (ref 3.4–10.8)

## 2023-05-17 PROCEDURE — 86140 C-REACTIVE PROTEIN: CPT | Performed by: HOSPITALIST

## 2023-05-17 PROCEDURE — 83735 ASSAY OF MAGNESIUM: CPT | Performed by: HOSPITALIST

## 2023-05-17 PROCEDURE — 80053 COMPREHEN METABOLIC PANEL: CPT | Performed by: HOSPITALIST

## 2023-05-17 PROCEDURE — 85027 COMPLETE CBC AUTOMATED: CPT | Performed by: HOSPITALIST

## 2023-05-17 PROCEDURE — 82948 REAGENT STRIP/BLOOD GLUCOSE: CPT

## 2023-05-17 PROCEDURE — 87426 SARSCOV CORONAVIRUS AG IA: CPT | Performed by: HOSPITALIST

## 2023-05-17 PROCEDURE — 63710000001 INSULIN LISPRO (HUMAN) PER 5 UNITS: Performed by: NURSE PRACTITIONER

## 2023-05-17 PROCEDURE — 82728 ASSAY OF FERRITIN: CPT | Performed by: HOSPITALIST

## 2023-05-17 RX ADMIN — SUCROFERRIC OXYHYDROXIDE 500 MG: 500 TABLET, CHEWABLE ORAL at 18:26

## 2023-05-17 RX ADMIN — ACETAMINOPHEN 650 MG: 325 TABLET ORAL at 00:53

## 2023-05-17 RX ADMIN — INSULIN LISPRO 3 UNITS: 100 INJECTION, SOLUTION INTRAVENOUS; SUBCUTANEOUS at 18:25

## 2023-05-17 RX ADMIN — SUCROFERRIC OXYHYDROXIDE 500 MG: 500 TABLET, CHEWABLE ORAL at 13:12

## 2023-05-17 RX ADMIN — LATANOPROST 1 DROP: 50 SOLUTION OPHTHALMIC at 21:54

## 2023-05-17 RX ADMIN — TAMSULOSIN HYDROCHLORIDE 0.4 MG: 0.4 CAPSULE ORAL at 21:54

## 2023-05-17 RX ADMIN — FAMOTIDINE 20 MG: 20 TABLET, FILM COATED ORAL at 18:26

## 2023-05-17 RX ADMIN — ASPIRIN 81 MG: 81 TABLET, COATED ORAL at 13:12

## 2023-05-17 RX ADMIN — Medication 10 ML: at 21:54

## 2023-05-17 RX ADMIN — CETIRIZINE HYDROCHLORIDE 10 MG: 10 TABLET ORAL at 13:12

## 2023-05-17 RX ADMIN — Medication 10 ML: at 13:13

## 2023-05-17 RX ADMIN — Medication 1 TABLET: at 13:12

## 2023-05-17 NOTE — PLAN OF CARE
Goal Outcome Evaluation:      Pt was AO x 3.VSS. Complained of headache and prn tylenol given with positive results. Colostomy bag leaking and was changed.

## 2023-05-17 NOTE — NURSING NOTE
HD treatment completed per md order. 2.0 L of fluids removed without difficulty. Post treatment wt 76.0 kg, /75. Family member at bedside. Report given to floor RN.

## 2023-05-17 NOTE — PROGRESS NOTES
[facility] Progress Note      Patient Name: Bill Landry  : 1945  MRN: [id number]  Primary Care Physician:  No primary care provider on file.  Date of admission: 2023    Subjective     Interval History:   Follow-up end-stage renal disease  Patient was seen today on dialysis.  Has no new complaints.  Denies any nausea or vomiting.  No fever no chills continues to be slightly confused  Slightly improved compared to yesterday.  Review of Systems:   Did not obtain    Objective     Vitals:   Temp:  [96.3 °F (35.7 °C)-98.2 °F (36.8 °C)] 96.3 °F (35.7 °C)  Heart Rate:  [79-86] 86  Resp:  [16] 16  BP: (135-157)/(75-92) 149/75    Intake/Output Summary (Last 24 hours) at 2023 1107  Last data filed at 2023 0326  Gross per 24 hour   Intake 420 ml   Output 250 ml   Net 170 ml       Physical Exam:      Constitutional: No symptoms  , chronically ill, no acute distress  HEENT: Sclera anicteric, no conjunctival injection, oral mucosa is no  Neck: Supple, no thyromegaly, no lymphadenopathy, trachea at midline, no JVD  Respiratory:  Bilateral rhonchi, breathing effort not labored  Cardiovascular: RRR, systolic ejection murmur, no rub  Gastrointestinal: Positive bowel sounds, abdomen is soft, nontender and nondistended.  Ostomy bag in place   : No palpable bladder  Musculoskeletal: No edema, no clubbing or cyanosis.  Left upper extremity  AVF/ AVG   Neurologic: Awake but confused  Skin: Warm and dry        Scheduled Meds:     aspirin, 81 mg, Oral, Daily  b complex-vitamin c-folic acid, 1 tablet, Oral, Daily  cetirizine, 10 mg, Oral, Daily  cyclobenzaprine, 5 mg, Oral, Once  famotidine, 20 mg, Oral, BID AC  insulin lispro, 2-7 Units, Subcutaneous, TID With Meals  latanoprost, 1 drop, Both Eyes, Nightly  sodium chloride, 10 mL, Intravenous, Q12H  Sucroferric Oxyhydroxide, 500 mg, Oral, TID PC  tamsulosin, 0.4 mg, Oral, Nightly      IV Meds:        Results Reviewed:   I have  personally reviewed the results from the time of this admission to 5/17/2023 11:07 EDT     Results from last 7 days   Lab Units 05/17/23  0818 05/16/23  0632 05/15/23  0803 05/14/23  0921   SODIUM mmol/L 131* 133* 129* 130*   POTASSIUM mmol/L 5.1 4.6 4.9 4.2   CHLORIDE mmol/L 98 98 95* 95*   CO2 mmol/L 20.0* 23.0 20.0* 21.9*   BUN mg/dL 75* 52* 79* 50*   CREATININE mg/dL 6.77* 4.88* 6.01* 4.67*   CALCIUM mg/dL 8.4* 8.3* 8.0* 8.4*   BILIRUBIN mg/dL 1.2  --  1.0 1.5*   ALK PHOS U/L 135*  --  141* 155*   ALT (SGPT) U/L 38  --  42* 43*   AST (SGOT) U/L 21  --  29 37   GLUCOSE mg/dL 131* 160* 189* 186*       Estimated Creatinine Clearance: 10.4 mL/min (A) (by C-G formula based on SCr of 6.77 mg/dL (H)).    Results from last 7 days   Lab Units 05/17/23  0818 05/16/23  0632 05/15/23  0803 05/14/23  0921 05/14/23  0731   MAGNESIUM mg/dL 2.0 2.0 2.2   < >  --    PHOSPHORUS mg/dL  --  3.0 6.1*  --  3.8    < > = values in this interval not displayed.             Results from last 7 days   Lab Units 05/17/23  0818 05/16/23  0632 05/15/23  0803 05/14/23  0731 05/13/23  0658   WBC 10*3/mm3 4.36 5.66 4.80 6.91 7.22   HEMOGLOBIN g/dL 11.3* 11.4* 10.9* 11.5* 10.3*   PLATELETS 10*3/mm3 120* 133* 144 155 155             Assessment / Plan     ASSESSMENT:     -End Stage Renal Disease ( ESRD ) on Hemodialysis .s/p  LUE AVF s/p LUE AVG  ( by rebecca Singh )  functional .and Monday, Wednesday and Friday schedule.     -Chronic normocytic anemia. On Epogen protocol.    -Hypervolemia with hyponatremia we will challenge fluid removal dialysis  -Hyperphosphatemia. Continue binders  with meals, Continue renal diet. We will follow phosphorus to make further adjustmentst   -Diabetes Mellitus type 2  with renal complication treated by primary  -COVID-19 infection currently on isolation on dexamethasone.,  Inhalers and he is on room air  -Chronic diastolic congestive heart failure .   Volume status improving  -Crohn disease a status post ostomy  placement       PLAN:  -Continue dialysis on Monday Wednesday Friday.  - Dialyzing today.  -Surveillance labs    I discussed the case with the patient's wife at the bed  Copied text in this note has been reviewed and is accurate as of 05/17/23.         Thank you for involving us in the care of Bill Landry.  Please feel free to call with any questions.    Myra Moore MD  05/17/23  11:07 EDT    [facility]  [phone]    Please note that portions of this note were completed with a voice recognition program.

## 2023-05-17 NOTE — PROGRESS NOTES
Name: Bill Landry ADMIT: 2023   : 1945  PCP: No primary care provider on file.    MRN: [id number] LOS: 6 days   AGE/SEX: 77 y.o. male  ROOM: [facility]     Subjective   Subjective   Currently alert and appropriate. Wife at bedside. Patient is without complaint currently.      Objective   Objective   Vital Signs  Temp:  [96.3 °F (35.7 °C)-98.2 °F (36.8 °C)] 97.2 °F (36.2 °C)  Heart Rate:  [76-86] 76  Resp:  [16] 16  BP: (139-157)/(75-92) 142/75  SpO2:  [96 %-98 %] 96 %  on   ;   Device (Oxygen Therapy): room air  Body mass index is 24.04 kg/m².     Physical Exam  Constitutional:       General: He is not in acute distress.     Appearance: Normal appearance. He is ill-appearing (chronic). He is not toxic-appearing.   HENT:      Head: Normocephalic and atraumatic.   Cardiovascular:      Rate and Rhythm: Normal rate and regular rhythm.   Pulmonary:      Effort: Pulmonary effort is normal. No respiratory distress.      Breath sounds: Normal breath sounds. No wheezing or rhonchi.   Abdominal:      General: Bowel sounds are normal.      Palpations: Abdomen is soft.      Tenderness: There is no abdominal tenderness. There is no guarding or rebound.   Musculoskeletal:         General: No swelling.      Cervical back: Normal range of motion.   Skin:     General: Skin is warm and dry.   Neurological:      General: No focal deficit present.      Mental Status: He is alert.   Psychiatric:         Mood and Affect: Mood normal.         Behavior: Behavior normal.         Thought Content: Thought content normal.         Results Review     I reviewed the patient's new clinical results.  Results from last 7 days   Lab Units 23  0818 23  0632 05/15/23  0803 23  0731   WBC 10*3/mm3 4.36 5.66 4.80 6.91   HEMOGLOBIN g/dL 11.3* 11.4* 10.9* 11.5*   PLATELETS 10*3/mm3 120* 133* 144 155     Results from last 7 days   Lab Units 23  0818 23  0632 05/15/23  0803 23  0921   SODIUM mmol/L 131*  133* 129* 130*   POTASSIUM mmol/L 5.1 4.6 4.9 4.2   CHLORIDE mmol/L 98 98 95* 95*   CO2 mmol/L 20.0* 23.0 20.0* 21.9*   BUN mg/dL 75* 52* 79* 50*   CREATININE mg/dL 6.77* 4.88* 6.01* 4.67*   GLUCOSE mg/dL 131* 160* 189* 186*   EGFR mL/min/1.73 7.8* 11.6* 9.0* 12.2*     Results from last 7 days   Lab Units 05/17/23  0818 05/16/23  0632 05/15/23  0803 05/14/23  0921 05/13/23  0658   ALBUMIN g/dL 2.8* 3.1* 3.1* 3.1* 3.0*   BILIRUBIN mg/dL 1.2  --  1.0 1.5* 0.9   ALK PHOS U/L 135*  --  141* 155* 151*   AST (SGOT) U/L 21  --  29 37 32   ALT (SGPT) U/L 38  --  42* 43* 31     Results from last 7 days   Lab Units 05/17/23  0818 05/16/23  0632 05/15/23  0803 05/14/23  0921 05/14/23  0731   CALCIUM mg/dL 8.4* 8.3* 8.0* 8.4*  --    ALBUMIN g/dL 2.8* 3.1* 3.1* 3.1*  --    MAGNESIUM mg/dL 2.0 2.0 2.2 2.1  --    PHOSPHORUS mg/dL  --  3.0 6.1*  --  3.8     Results from last 7 days   Lab Units 05/12/23  0658 05/11/23  0132   PROCALCITONIN ng/mL 1.94* 2.38*     Glucose   Date/Time Value Ref Range Status   05/17/2023 1155 97 70 - 130 mg/dL Final     Comment:     Meter: [id number] : [id number] Frankie Alas    05/17/2023 0620 125 70 - 130 mg/dL Final     Comment:     Meter: [id number] : [id number] Arvind Hernandez    05/16/2023 2037 158 (H) 70 - 130 mg/dL Final     Comment:     Meter: [id number] : [id number] Arvind Hernandez    05/16/2023 1641 232 (H) 70 - 130 mg/dL Final     Comment:     Meter: [id number] : [id number] [facility]   05/16/2023 1148 188 (H) 70 - 130 mg/dL Final     Comment:     Meter: [id number] : [id number] [facility]   05/16/2023 0626 147 (H) 70 - 130 mg/dL Final     Comment:     Meter: [id number] : [id number] Arvind Hernandez    05/15/2023 2058 192 (H) 70 - 130 mg/dL Final     Comment:     Meter: [id number] : [id number] Arvind Hernandez        No radiology results for the last day  Scheduled Meds  aspirin, 81 mg, Oral, Daily  b complex-vitamin c-folic acid, 1 tablet, Oral,  Daily  cetirizine, 10 mg, Oral, Daily  cyclobenzaprine, 5 mg, Oral, Once  famotidine, 20 mg, Oral, BID AC  insulin lispro, 2-7 Units, Subcutaneous, TID With Meals  latanoprost, 1 drop, Both Eyes, Nightly  sodium chloride, 10 mL, Intravenous, Q12H  Sucroferric Oxyhydroxide, 500 mg, Oral, TID PC  tamsulosin, 0.4 mg, Oral, Nightly    Continuous Infusions   PRN Meds  •  acetaminophen **OR** acetaminophen **OR** acetaminophen  •  benzonatate  •  calcium carbonate  •  dextrose  •  dextrose  •  famotidine  •  glucagon (human recombinant)  •  lidocaine-prilocaine  •  nitroglycerin  •  ondansetron **OR** ondansetron  •  [COMPLETED] Insert Peripheral IV **AND** sodium chloride  •  sodium chloride  •  sodium chloride    Diet: Renal Diets; Low Sodium (2-3g), Low Potassium, Low Phosphorus; Texture: Regular Texture (IDDSI 7); Fluid Consistency: Thin (IDDSI 0)    I have personally reviewed:  [x]  Medications  [x]  Laboratory ferritin improved, CRP higher, platelets 120 K  [x]  Microbiology   [x]  Radiology   [x]  EKG/Telemetry atrial fibrillation on telemetry  []  Cardiology/Vascular   []  Pathology   []  Records    Results for orders placed during the hospital encounter of 05/11/23    Adult Transthoracic Echo Complete W/ Cont if Necessary Per Protocol    Interpretation Summary  •  Left ventricular systolic function is normal. Calculated left ventricular EF = 61%  •  Left ventricular wall thickness is consistent with mild concentric hypertrophy.  •  Left ventricular diastolic function was indeterminate.  •  The left atrial cavity is severely dilated.  •  Mild aortic valve stenosis is present. Aortic valve area is 1.5 cm2.  •  Peak velocity of the flow distal to the aortic valve is 203.2 cm/s. Aortic valve maximum pressure gradient is 17 mmHg. Aortic valve mean pressure gradient is 9 mmHg. Aortic valve dimensionless index is 0.3 .  •  Estimated right ventricular systolic pressure from tricuspid regurgitation is normal (<35 mmHg).         Assessment/Plan     Active Hospital Problems    Diagnosis  POA   • **Acute hypoxemic respiratory failure due to COVID-19 [U07.1, J96.01]  Yes   • Impaired glucose tolerance [R73.02]  Yes   • Acute on chronic diastolic heart failure [I50.33]  Yes   • Bicuspid aortic valve [Q23.1]  Not Applicable   • Aortic stenosis, mild [I35.0]  Yes   • TATE (obstructive sleep apnea) [G47.33]  Yes   • End stage kidney disease (HCC) [N18.6]  Yes   • Crohn's disease, unspecified, without complications [K50.90]  Yes   • Essential (primary) hypertension [I10]  Yes   • Secondary hyperparathyroidism of renal origin [N25.81]  Yes   • Anemia due to stage 4 chronic kidney disease treated with erythropoietin [N18.4, D63.1]  Yes   • Cirrhosis of liver without ascites [K74.60]  Yes   • Congestive splenomegaly [D73.2]  Yes   • Permanent atrial fibrillation [I48.21]  Yes   • Thrombocytopenia (HCC) [D69.6]  Yes      Resolved Hospital Problems   No resolved problems to display.     78yo gentleman with acute hypoxia due to COViD-19 and volume overload in setting of ESRD and diastolic CHF.      Acute hypoxia: due to volume overload most and COVID PNA also playing role, weaned to RA now.        COVID pneumonia: symptomatic for >6d so not a candidate for Remdesivir, treated with Decadron and supplemental O2 as needed, following inflammatory markers, pharmacologic DVT ppx stopped when pt developed some GI bleeding (see below), appreciate Pulm attention to pt, do not suspect bacterial PNA--elevated PCT likely due to ESRD and fell without any abx tx  Decadron stopped now due to his agitation (see below)     Agitation, confusion: had been at night mostly for a few days, but then became quite confused and agitated, improved after single dose of oral Ativan, have now stopped steroids--suspect this is chief cause. Seems improved today continue to monitor.     Acute on chronic diastolic CHF: defer to Card and Renal, volume mgmt with HD, Echo above     ESRD  with volume overload: as above, HD MWF per Renal.     Permanent AFib: HRs controlled on no rate-control meds, not on AC due to h/o GIB, had to stop SQ Heparin VTE ppx due to GI bleed here     Elevated Trop: suspect due to ESRD and not ACS, EKG fine, Card does not recommend any further w/u     Anemia of CKD  MGUS with pancytopenia: Hgb stable, platelets loiw but better than baseline, WBC wnl, consulted Heme/Onc at family's request--they have seen and signed off    GI bleed: bright red blood in ileostomy bag yesterday, have stopped ppx dose of SQ Heparin, Hgb remains stable, continue to monitor      borderline DM2: A1c 6.2, not on meds for this PTA, covering with SSI here while on steroids and improved now that he is off steroids     Crohn's with ileostomy: stable, Wound/Ostomy RN has seen     Elevated LFTs: possibly due to viral infection, do not suspect obstruction, pt is asymptomatic, numbers normalized as of 5/12    · SCDs for DVT prophylaxis.  · Full code.  · Discussed with patient, spouse and nursing staff.  · Anticipate discharge home with family 1-2 days. Wife declines SNF. May consider home health     Rustam Singleton MD  [facility]  05/17/23

## 2023-05-17 NOTE — SIGNIFICANT NOTE
05/17/23 1458   OTHER   Discipline physical therapy assistant   Rehab Time/Intention   Session Not Performed patient/family declined treatment  (pt's spouse declined PT treatment this PM due to she is wanting pt to rest after dialysis. Informed pt's spouse that PT would not be back today but nsg can assist pt at least to the chair. Pt's spouse verbalized understanding and ok to f/u tomorrow.)   Recommendation   PT - Next Appointment 05/18/23

## 2023-05-17 NOTE — ASSESSMENT & PLAN NOTE
Patient is positive for COVID but his symptoms are improving.  Patient was given prescription for Tessalon Perles.  Patient was advised to take vitamin D, vitamin C and zinc.

## 2023-05-18 LAB
ALBUMIN SERPL-MCNC: 2.6 G/DL (ref 3.5–5.2)
ANION GAP SERPL CALCULATED.3IONS-SCNC: 8 MMOL/L (ref 5–15)
BUN SERPL-MCNC: 53 MG/DL (ref 8–23)
BUN/CREAT SERPL: 9.4 (ref 7–25)
CALCIUM SPEC-SCNC: 8 MG/DL (ref 8.6–10.5)
CHLORIDE SERPL-SCNC: 102 MMOL/L (ref 98–107)
CO2 SERPL-SCNC: 22 MMOL/L (ref 22–29)
CREAT SERPL-MCNC: 5.65 MG/DL (ref 0.76–1.27)
EGFRCR SERPLBLD CKD-EPI 2021: 9.7 ML/MIN/1.73
FERRITIN SERPL-MCNC: 1892 NG/ML (ref 30–400)
GLUCOSE BLDC GLUCOMTR-MCNC: 132 MG/DL (ref 70–130)
GLUCOSE BLDC GLUCOMTR-MCNC: 136 MG/DL (ref 70–130)
GLUCOSE BLDC GLUCOMTR-MCNC: 200 MG/DL (ref 70–130)
GLUCOSE BLDC GLUCOMTR-MCNC: 231 MG/DL (ref 70–130)
GLUCOSE SERPL-MCNC: 141 MG/DL (ref 65–99)
MAGNESIUM SERPL-MCNC: 2 MG/DL (ref 1.6–2.4)
PHOSPHATE SERPL-MCNC: 3.6 MG/DL (ref 2.5–4.5)
POTASSIUM SERPL-SCNC: 4.7 MMOL/L (ref 3.5–5.2)
SODIUM SERPL-SCNC: 132 MMOL/L (ref 136–145)

## 2023-05-18 PROCEDURE — 97530 THERAPEUTIC ACTIVITIES: CPT

## 2023-05-18 PROCEDURE — 83735 ASSAY OF MAGNESIUM: CPT | Performed by: HOSPITALIST

## 2023-05-18 PROCEDURE — 80069 RENAL FUNCTION PANEL: CPT | Performed by: HOSPITALIST

## 2023-05-18 PROCEDURE — 63710000001 INSULIN LISPRO (HUMAN) PER 5 UNITS: Performed by: NURSE PRACTITIONER

## 2023-05-18 PROCEDURE — 82728 ASSAY OF FERRITIN: CPT | Performed by: HOSPITALIST

## 2023-05-18 PROCEDURE — 82948 REAGENT STRIP/BLOOD GLUCOSE: CPT

## 2023-05-18 RX ADMIN — Medication 10 ML: at 21:15

## 2023-05-18 RX ADMIN — SUCROFERRIC OXYHYDROXIDE 500 MG: 500 TABLET, CHEWABLE ORAL at 12:56

## 2023-05-18 RX ADMIN — FAMOTIDINE 20 MG: 20 TABLET, FILM COATED ORAL at 18:04

## 2023-05-18 RX ADMIN — Medication 10 ML: at 08:04

## 2023-05-18 RX ADMIN — LATANOPROST 1 DROP: 50 SOLUTION OPHTHALMIC at 21:15

## 2023-05-18 RX ADMIN — TAMSULOSIN HYDROCHLORIDE 0.4 MG: 0.4 CAPSULE ORAL at 21:13

## 2023-05-18 RX ADMIN — CETIRIZINE HYDROCHLORIDE 10 MG: 10 TABLET ORAL at 08:04

## 2023-05-18 RX ADMIN — ASPIRIN 81 MG: 81 TABLET, COATED ORAL at 08:04

## 2023-05-18 RX ADMIN — FAMOTIDINE 20 MG: 20 TABLET, FILM COATED ORAL at 08:04

## 2023-05-18 RX ADMIN — INSULIN LISPRO 3 UNITS: 100 INJECTION, SOLUTION INTRAVENOUS; SUBCUTANEOUS at 12:56

## 2023-05-18 RX ADMIN — SUCROFERRIC OXYHYDROXIDE 500 MG: 500 TABLET, CHEWABLE ORAL at 08:04

## 2023-05-18 RX ADMIN — SUCROFERRIC OXYHYDROXIDE 500 MG: 500 TABLET, CHEWABLE ORAL at 18:04

## 2023-05-18 RX ADMIN — Medication 1 TABLET: at 08:04

## 2023-05-18 NOTE — PROGRESS NOTES
Name: Bill Landry ADMIT: 2023   : 1945  PCP: No primary care provider on file.    MRN: [id number] LOS: 7 days   AGE/SEX: 77 y.o. male  ROOM: [facility]     Subjective   Subjective   Patient currently alert and appropriate. Wife at bedside worried about mild confusion asking for urinalysis. Discussed with her that may not be helpful with ESRD and he does not make much urine. He denies any dysuria.      Objective   Objective   Vital Signs  Temp:  [97.7 °F (36.5 °C)-98.5 °F (36.9 °C)] 98.5 °F (36.9 °C)  Heart Rate:  [80-89] 83  Resp:  [16] 16  BP: (129-167)/(82-95) 142/82  SpO2:  [92 %-97 %] 97 %  on   ;   Device (Oxygen Therapy): room air  Body mass index is 24.04 kg/m².     Physical Exam  Constitutional:       General: He is not in acute distress.     Appearance: Normal appearance. He is ill-appearing (chronic). He is not toxic-appearing.   HENT:      Head: Normocephalic and atraumatic.   Cardiovascular:      Rate and Rhythm: Normal rate and regular rhythm.   Pulmonary:      Effort: Pulmonary effort is normal. No respiratory distress.      Breath sounds: Normal breath sounds. No wheezing or rhonchi.   Abdominal:      General: Bowel sounds are normal.      Palpations: Abdomen is soft.      Tenderness: There is no abdominal tenderness. There is no guarding or rebound.   Musculoskeletal:         General: No swelling.      Cervical back: Normal range of motion.   Skin:     General: Skin is warm and dry.   Neurological:      General: No focal deficit present.      Mental Status: He is alert.   Psychiatric:         Mood and Affect: Mood normal.         Behavior: Behavior normal.         Thought Content: Thought content normal.         Results Review     I reviewed the patient's new clinical results.  Results from last 7 days   Lab Units 23  0818 23  0632 05/15/23  0803 23  0731   WBC 10*3/mm3 4.36 5.66 4.80 6.91   HEMOGLOBIN g/dL 11.3* 11.4* 10.9* 11.5*   PLATELETS 10*3/mm3 120* 133* 144  155     Results from last 7 days   Lab Units 05/18/23 0717 05/17/23  0818 05/16/23  0632 05/15/23  0803   SODIUM mmol/L 132* 131* 133* 129*   POTASSIUM mmol/L 4.7 5.1 4.6 4.9   CHLORIDE mmol/L 102 98 98 95*   CO2 mmol/L 22.0 20.0* 23.0 20.0*   BUN mg/dL 53* 75* 52* 79*   CREATININE mg/dL 5.65* 6.77* 4.88* 6.01*   GLUCOSE mg/dL 141* 131* 160* 189*   EGFR mL/min/1.73 9.7* 7.8* 11.6* 9.0*     Results from last 7 days   Lab Units 05/18/23 0717 05/17/23  0818 05/16/23 0632 05/15/23  0803 05/14/23  0921 05/13/23  0658   ALBUMIN g/dL 2.6* 2.8* 3.1* 3.1* 3.1* 3.0*   BILIRUBIN mg/dL  --  1.2  --  1.0 1.5* 0.9   ALK PHOS U/L  --  135*  --  141* 155* 151*   AST (SGOT) U/L  --  21  --  29 37 32   ALT (SGPT) U/L  --  38  --  42* 43* 31     Results from last 7 days   Lab Units 05/18/23  0717 05/17/23  0818 05/16/23  0632 05/15/23  0803 05/14/23  0921 05/14/23  0731   CALCIUM mg/dL 8.0* 8.4* 8.3* 8.0*   < >  --    ALBUMIN g/dL 2.6* 2.8* 3.1* 3.1*   < >  --    MAGNESIUM mg/dL 2.0 2.0 2.0 2.2   < >  --    PHOSPHORUS mg/dL 3.6  --  3.0 6.1*  --  3.8    < > = values in this interval not displayed.     Results from last 7 days   Lab Units 05/12/23  0658   PROCALCITONIN ng/mL 1.94*     Glucose   Date/Time Value Ref Range Status   05/18/2023 1613 132 (H) 70 - 130 mg/dL Final     Comment:     Meter: [id number] : [id number] [facility]   05/18/2023 1120 200 (H) 70 - 130 mg/dL Final     Comment:     Meter: [id number] : [id number] [facility]   05/18/2023 0640 136 (H) 70 - 130 mg/dL Final     Comment:     Meter: [id number] : [id number] Ralf Osborne    05/17/2023 2115 144 (H) 70 - 130 mg/dL Final     Comment:     Meter: [id number] : [id number] Ralf Osborne    05/17/2023 1744 225 (H) 70 - 130 mg/dL Final     Comment:     Meter: [id number] : [id number] Bev Scott    05/17/2023 1155 97 70 - 130 mg/dL Final     Comment:     Meter: [id number] : [id number] Frankie Alas    05/17/2023 0620 125 70 -  130 mg/dL Final     Comment:     Meter: [id number] : [id number] Arvind ANDREWS       No radiology results for the last day  Scheduled Meds  aspirin, 81 mg, Oral, Daily  b complex-vitamin c-folic acid, 1 tablet, Oral, Daily  cetirizine, 10 mg, Oral, Daily  cyclobenzaprine, 5 mg, Oral, Once  famotidine, 20 mg, Oral, BID AC  insulin lispro, 2-7 Units, Subcutaneous, TID With Meals  latanoprost, 1 drop, Both Eyes, Nightly  sodium chloride, 10 mL, Intravenous, Q12H  Sucroferric Oxyhydroxide, 500 mg, Oral, TID PC  tamsulosin, 0.4 mg, Oral, Nightly    Continuous Infusions   PRN Meds  •  acetaminophen **OR** acetaminophen **OR** acetaminophen  •  benzonatate  •  calcium carbonate  •  dextrose  •  dextrose  •  famotidine  •  glucagon (human recombinant)  •  lidocaine-prilocaine  •  nitroglycerin  •  ondansetron **OR** ondansetron  •  [COMPLETED] Insert Peripheral IV **AND** sodium chloride  •  sodium chloride  •  sodium chloride    Diet: Renal Diets; Low Sodium (2-3g), Low Potassium, Low Phosphorus; Texture: Regular Texture (IDDSI 7); Fluid Consistency: Thin (IDDSI 0)    I have personally reviewed:  [x]  Medications  [x]  Laboratory   [x]  Microbiology    []  Radiology   [x]  EKG/Telemetry atrial fibrillation on telemetry  []  Cardiology/Vascular   []  Pathology   []  Records    Results for orders placed during the hospital encounter of 05/11/23    Adult Transthoracic Echo Complete W/ Cont if Necessary Per Protocol    Interpretation Summary  •  Left ventricular systolic function is normal. Calculated left ventricular EF = 61%  •  Left ventricular wall thickness is consistent with mild concentric hypertrophy.  •  Left ventricular diastolic function was indeterminate.  •  The left atrial cavity is severely dilated.  •  Mild aortic valve stenosis is present. Aortic valve area is 1.5 cm2.  •  Peak velocity of the flow distal to the aortic valve is 203.2 cm/s. Aortic valve maximum pressure gradient is 17 mmHg. Aortic valve  mean pressure gradient is 9 mmHg. Aortic valve dimensionless index is 0.3 .  •  Estimated right ventricular systolic pressure from tricuspid regurgitation is normal (<35 mmHg).        Assessment/Plan     Active Hospital Problems    Diagnosis  POA   • **Acute hypoxemic respiratory failure due to COVID-19 [U07.1, J96.01]  Yes   • Impaired glucose tolerance [R73.02]  Yes   • Acute on chronic diastolic heart failure [I50.33]  Yes   • Bicuspid aortic valve [Q23.1]  Not Applicable   • Aortic stenosis, mild [I35.0]  Yes   • TATE (obstructive sleep apnea) [G47.33]  Yes   • End stage kidney disease (HCC) [N18.6]  Yes   • Crohn's disease, unspecified, without complications [K50.90]  Yes   • Essential (primary) hypertension [I10]  Yes   • Secondary hyperparathyroidism of renal origin [N25.81]  Yes   • Anemia due to stage 4 chronic kidney disease treated with erythropoietin [N18.4, D63.1]  Yes   • Cirrhosis of liver without ascites [K74.60]  Yes   • Congestive splenomegaly [D73.2]  Yes   • Permanent atrial fibrillation [I48.21]  Yes   • Thrombocytopenia (HCC) [D69.6]  Yes      Resolved Hospital Problems   No resolved problems to display.     76yo gentleman with acute hypoxia due to COViD-19 and volume overload in setting of ESRD and diastolic CHF.      Acute hypoxia: due to volume overload most and COVID PNA also playing role, weaned to RA now.        COVID pneumonia: symptomatic for >6d so not a candidate for Remdesivir, treated with Decadron and supplemental O2 as needed, following inflammatory markers, pharmacologic DVT ppx stopped when pt developed some GI bleeding (see below), appreciate Pulm attention to pt, do not suspect bacterial PNA--elevated PCT likely due to ESRD and fell without any abx tx  Decadron stopped now due to his agitation (see below)   2 antigen test negative he is out of isolation now    Agitation, confusion: had been at night mostly for a few days, but then became quite confused and agitated, improved  after single dose of oral Ativan, have now stopped steroids--suspect this is chief cause. Seems improved today continue to monitor. Wife requesting urinalysis. Discussed with her may not be very helpful since he does not make much urine due to ESRD     Acute on chronic diastolic CHF: defer to Card and Renal, volume mgmt with HD, Echo above     ESRD with volume overload: as above, HD MWF per Renal.     Permanent AFib: HRs controlled on no rate-control meds, not on AC due to h/o GIB, had to stop SQ Heparin VTE ppx due to GI bleed here     Elevated Trop: suspect due to ESRD and not ACS, EKG fine, Card does not recommend any further w/u     Anemia of CKD  MGUS with pancytopenia: Hgb stable, platelets loiw but better than baseline, WBC wnl, consulted Heme/Onc at family's request--they have seen and signed off    GI bleed: bright red blood in ileostomy bag previously, have stopped ppx dose of SQ Heparin, Hgb remains stable, continue to monitor      borderline DM2: A1c 6.2, not on meds for this PTA, covering with SSI here while on steroids and improved now that he is off steroids     Crohn's with ileostomy: stable, Wound/Ostomy RN has seen     Elevated LFTs: possibly due to viral infection, do not suspect obstruction, pt is asymptomatic, numbers normalized as of 5/12    · SCDs for DVT prophylaxis.  · Full code.  · Discussed with patient, spouse and nursing staff.  · Anticipate discharge home with family 1-2 days. Wife declines SNF. May consider home health patient ambulated 40 feet with PT today.    Rustam Singleton MD  [facility]  05/18/23

## 2023-05-18 NOTE — PROGRESS NOTES
[facility] Progress Note      Patient Name: Bill Landry  : 1945  MRN: [id number]  Primary Care Physician:  No primary care provider on file.  Date of admission: 2023    Subjective     Interval History:   Follow-up end-stage renal disease   continues to be confused but improved   No issues overnight slept well by wife  Review of Systems:   Did not obtain    Objective     Vitals:   Temp:  [97.7 °F (36.5 °C)-98.5 °F (36.9 °C)] 98.5 °F (36.9 °C)  Heart Rate:  [80-89] 83  Resp:  [16] 16  BP: (129-167)/(82-95) 142/82    Intake/Output Summary (Last 24 hours) at 2023 1624  Last data filed at 2023 1607  Gross per 24 hour   Intake 720 ml   Output 700 ml   Net 20 ml       Physical Exam:      Constitutional:  chronically ill, no acute distress  HEENT: Sclera anicteric, no conjunctival injection, oral mucosa is no  Neck: Supple, no thyromegaly, no lymphadenopathy, trachea at midline, no JVD  Respiratory:  Bilateral rhonchi, breathing effort not labored  Cardiovascular: RRR, systolic ejection murmur, no rub  Gastrointestinal: Positive bowel sounds, abdomen is soft, nontender and nondistended.  Ostomy bag in place   : No palpable bladder  Musculoskeletal: No edema, no clubbing or cyanosis.  Left upper extremity  AVF/ AVG   Neurologic: Awake   Skin: Warm and dry        Scheduled Meds:     aspirin, 81 mg, Oral, Daily  b complex-vitamin c-folic acid, 1 tablet, Oral, Daily  cetirizine, 10 mg, Oral, Daily  cyclobenzaprine, 5 mg, Oral, Once  famotidine, 20 mg, Oral, BID AC  insulin lispro, 2-7 Units, Subcutaneous, TID With Meals  latanoprost, 1 drop, Both Eyes, Nightly  sodium chloride, 10 mL, Intravenous, Q12H  Sucroferric Oxyhydroxide, 500 mg, Oral, TID PC  tamsulosin, 0.4 mg, Oral, Nightly      IV Meds:        Results Reviewed:   I have personally reviewed the results from the time of this admission to 2023 16:24 EDT     Results from last 7 days   Lab Units  05/18/23  0717 05/17/23  0818 05/16/23  0632 05/15/23  0803 05/14/23  0921   SODIUM mmol/L 132* 131* 133* 129* 130*   POTASSIUM mmol/L 4.7 5.1 4.6 4.9 4.2   CHLORIDE mmol/L 102 98 98 95* 95*   CO2 mmol/L 22.0 20.0* 23.0 20.0* 21.9*   BUN mg/dL 53* 75* 52* 79* 50*   CREATININE mg/dL 5.65* 6.77* 4.88* 6.01* 4.67*   CALCIUM mg/dL 8.0* 8.4* 8.3* 8.0* 8.4*   BILIRUBIN mg/dL  --  1.2  --  1.0 1.5*   ALK PHOS U/L  --  135*  --  141* 155*   ALT (SGPT) U/L  --  38  --  42* 43*   AST (SGOT) U/L  --  21  --  29 37   GLUCOSE mg/dL 141* 131* 160* 189* 186*       Estimated Creatinine Clearance: 11.8 mL/min (A) (by C-G formula based on SCr of 5.65 mg/dL (H)).    Results from last 7 days   Lab Units 05/18/23  0717 05/17/23  0818 05/16/23  0632 05/15/23  0803   MAGNESIUM mg/dL 2.0 2.0 2.0 2.2   PHOSPHORUS mg/dL 3.6  --  3.0 6.1*             Results from last 7 days   Lab Units 05/17/23  0818 05/16/23  0632 05/15/23  0803 05/14/23  0731 05/13/23  0658   WBC 10*3/mm3 4.36 5.66 4.80 6.91 7.22   HEMOGLOBIN g/dL 11.3* 11.4* 10.9* 11.5* 10.3*   PLATELETS 10*3/mm3 120* 133* 144 155 155             Assessment / Plan     ASSESSMENT:     -End Stage Renal Disease ( ESRD ) on Hemodialysis .s/p  LUE AVF s/p LUE AVG  ( by rebecca Singh )  functional On  Monday, Wednesday and Friday schedule.     -Chronic normocytic anemia. On Epogen protocol.  Hb is at goal   -Hypervolemia with hyponatremia . Much improved   -Hyperphosphatemia. Continue binders  with meals, Continue renal diet. We will follow phosphorus to make further adjustmentst   -Diabetes Mellitus type 2  with renal complication treated by primary  -COVID-19 infection currently on isolation on dexamethasone.,  Inhalers and he is on room air  -Chronic diastolic congestive heart failure .   Volume status improving  -Crohn disease a status post ostomy placement       PLAN:  -Continue dialysis on Monday Wednesday Friday. Tentative plan for dialysis tomorrow   -Surveillance labs    I discussed the  case with the patient's wife at the bed  Copied text in this note has been reviewed and is accurate as of 05/18/23.         Thank you for involving us in the care of Bill Landry.  Please feel free to call with any questions.    Myra Moore MD  05/18/23  16:24 EDT    [facility]  [phone]    Please note that portions of this note were completed with a voice recognition program.

## 2023-05-18 NOTE — PLAN OF CARE
Goal Outcome Evaluation:  Plan of Care Reviewed With: patient           Outcome Evaluation: Patient seen for PT session this PM. Patient AOx2 supine in bed upon arrival. Patient sat up to EOB with CGA. Patient performed STS 5x from EOB with HHA and CGA. Patient ambulated 40ft in room, took a seated rest break, and then ambulated an additional 15ft with HHA and CGA. Gait slow, shuffled and mildly uncoordinated. Patient fatigued limiting further distance this date. Patient sitting UIC at end of session. Patient would continue to benefit from skilled PT intervention to address deficits in mobility. PT will continue to monitor.

## 2023-05-18 NOTE — PLAN OF CARE
Problem: Adult Inpatient Plan of Care  Goal: Plan of Care Review  Outcome: Ongoing, Progressing  Flowsheets (Taken 5/18/2023 2278)  Progress: no change  Plan of Care Reviewed With: patient  Outcome Evaluation: Patient had no complaints overnight. A&Ox2. Disoriented to time, situation. VSS. COVID anitgen negative x2. Wife remains at bedside. HD done with 2L removed. Ileostomy with good output.  All needs met.

## 2023-05-18 NOTE — THERAPY TREATMENT NOTE
Patient Name: Bill Landry  : 1945    MRN: [id number]                              Today's Date: 2023       Admit Date: 2023    Visit Dx:     ICD-10-CM ICD-9-CM   1. Acute hypoxemic respiratory failure due to COVID-19  U07.1 518.81    J96.01 079.89     799.02   2. Other fatigue  R53.83 780.79   3. ESRD (end stage renal disease)  N18.6 585.6     Patient Active Problem List   Diagnosis   • Permanent atrial fibrillation   • Thrombocytopenia (HCC)   • Chronic leukopenia   • Cirrhosis of liver without ascites   • Congestive splenomegaly   • Anemia due to stage 4 chronic kidney disease treated with erythropoietin   • Esophageal abnormality   • At risk for fluid volume overload   • End stage kidney disease (HCC)   • Other specified glaucoma   • Dependence on renal dialysis   • Allergy, unspecified, initial encounter   • Crohn's disease, unspecified, without complications   • Deficiency of other specified B group vitamins   • Dermatitis, unspecified   • Encounter for immunization   • Essential (primary) hypertension   • History of urinary stone   • Hyperparathyroidism, unspecified   • 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, with stage 1 through stage 4 chronic kidney disease, or unspecified chronic kidney disease   • Other disorders of phosphorus metabolism   • Other iron deficiency anemias   • Pericardial effusion (noninflammatory)   • Pure hypertriglyceridemia   • Renal osteodystrophy   • Secondary hyperparathyroidism of renal origin   • Splenomegaly, not elsewhere classified   • Bilateral pseudophakia   • Dermatochalasis of eyelid   • Primary open-angle glaucoma, bilateral, moderate stage   • Puckering of macula, left eye   • Secondary cataract   • AV fistula occlusion, initial encounter   • TATE (obstructive sleep apnea)   • Upper extremity aneurysm   • Aortic stenosis, mild   • Bicuspid aortic valve   • Hyperuricemia without signs of inflammatory arthritis and  tophaceous disease   • Presbyopia   • Shortness of breath   • Acute hypoxemic respiratory failure due to COVID-19   • Acute on chronic diastolic heart failure   • Impaired glucose tolerance   • Acute cough     Past Medical History:   Diagnosis Date   • Abnormal serum protein electrophoresis    • Anemia     Multifactorial   • Bicuspid aortic valve 7/20/2022   • Chronic leukopenia and thrombocytopenia    • Cirrhosis of liver without ascites 07/24/2017   • Congestive splenomegaly 07/24/2017   • Crohn disease     with ileostomy   • Diastolic CHF     although it was likely from volume overload due to ESRD   • Diverticula of colon    • ESRD on hemodialysis     M-W-F [facility]   • Fatty liver disease, nonalcoholic    • GERD (gastroesophageal reflux disease)    • GI bleed    • Glaucoma    • H/O Gallstone pancreatitis    • H/O Pericardial effusion    • H/O sinus bradycardia    • Heart murmur    • History of hypertension     resolved   • History of UTI    • Hx of renal calculi    • Ileostomy present    • Iron deficiency    • MGUS (monoclonal gammopathy of unknown significance)    • TATE (obstructive sleep apnea)    • Permanent atrial fibrillation    • Sleep apnea     CPAP USED   • Type 2 diabetes mellitus     CURRENTLY TAKES NO MED     Past Surgical History:   Procedure Laterality Date   • APPENDECTOMY     • ARTERIOVENOUS FISTULA/SHUNT SURGERY Left 08/08/2017    Procedure: LEFT BRACHIAL CEPHALIC AV FISTULA FORMATION WITH CEPHALIC VEIN TRANSPOSITION ;  Surgeon: Bill Deal MD;  Location: [facility] OR;  Service:    • ARTERIOVENOUS FISTULA/SHUNT SURGERY Left 5/27/2022    Procedure: OPEN REVISION LEFT ARTERIOVENOUS INTERPOSITION GRAFT PLACEMENT;  Surgeon: Bill Deal MD;  Location: [facility] OR;  Service: Vascular;  Laterality: Left;   • ARTERIOVENOUS FISTULA/SHUNT SURGERY W/ HEMODIALYSIS CATHETER INSERTION Left 02/15/2022    Procedure: OPEN LEFT ARM ARTERIOVENOUS FISTULA THROMBECTOMY WITH CEPHALIC VEIN ARTHROPLASTY  AND STENT/GRAFT PLACEMENT;  Surgeon: Bill Deal MD;  Location: [facility] HYBRID OR 18/19;  Service: Vascular;  Laterality: Left;   • CATARACT EXTRACTION WITH INTRAOCULAR LENS IMPLANT Bilateral    • CHOLECYSTECTOMY     • COLECTOMY PARTIAL / TOTAL      History of inflammatory bowel disease with status post colectomy with ileostomy many years ago in the [facility],  18 YEARS OF AGE   • COLONOSCOPY     • CYSTOSCOPY LITHOLAPAXY BLADDER STONE EXTRACTION     • ENDOSCOPY  01/16/2015    gastritis   • ENDOSCOPY  01/17/2018    Procedure: ESOPHAGOGASTRODUODENOSCOPY;  Surgeon: Warner Neville MD;  Location: [facility] ENDOSCOPY;  Service:    • ILEOSCOPY  01/16/2015    normal   • ILEOSCOPY N/A 01/17/2018    Procedure: ILEOSCOPY;  Surgeon: Warner Neville MD;  Location: [facility] ENDOSCOPY;  Service:    • TONSILLECTOMY        General Information     Row Name 05/18/23 1523          Physical Therapy Time and Intention    Document Type therapy note (daily note)  -     Mode of Treatment individual therapy;physical therapy  -     Row Name 05/18/23 1523          General Information    Patient Profile Reviewed yes  -     Existing Precautions/Restrictions fall  -     Row Name 05/18/23 1523          Cognition    Orientation Status (Cognition) oriented to;person;place  -     Row Name 05/18/23 1523          Safety Issues, Functional Mobility    Impairments Affecting Function (Mobility) balance;cognition;strength;endurance/activity tolerance;pain  -     Cognitive Impairments, Mobility Safety/Performance awareness, need for assistance  -           User Key  (r) = Recorded By, (t) = Taken By, (c) = Cosigned By    Initials Name Provider Type     Alida Delgado PT Physical Therapist               Mobility     Row Name 05/18/23 1524          Bed Mobility    Bed Mobility supine-sit  -     Supine-Sit [place] (Bed Mobility) contact guard  -     Assistive Device (Bed Mobility) bed rails;head of bed elevated  -      Comment, (Bed Mobility) Patient UIC upon arrival  -     Row Name 05/18/23 1524          Sit-Stand Transfer    Sit-Stand [place] (Transfers) contact guard  -     Assistive Device (Sit-Stand Transfers) other (see comments)  A  -     Row Name 05/18/23 1524          Gait/Stairs (Locomotion)    [place] Level (Gait) contact guard  -     Assistive Device (Gait) other (see comments)  [facility]     Distance in Feet (Gait) 40ft, seated rest 15ft  -     Deviations/Abnormal Patterns (Gait) festinating/shuffling;gait speed decreased  -     Bilateral Gait Deviations forward flexed posture;heel strike decreased  -     Comment, (Gait/Stairs) Gait slow, shuffled and mildly uncoordinated. Patient fatigued limiting further distance this date.  -           User Key  (r) = Recorded By, (t) = Taken By, (c) = Cosigned By    Initials Name Provider Type     Alida Delgado PT Physical Therapist               Obj/Interventions     Row Name 05/18/23 1527          Motor Skills    Therapeutic Exercise --  AP, LAQ, seated march  -     Row Name 05/18/23 1527          Balance    Balance Assessment sitting static balance;sitting dynamic balance;sit to stand dynamic balance;standing static balance;standing dynamic balance  -     Static Sitting Balance supervision  -     Dynamic Sitting Balance standby assist  -     Position, Sitting Balance sitting edge of bed  -     Sit to Stand Dynamic Balance contact guard;verbal cues  -     Static Standing Balance contact guard  -     Dynamic Standing Balance contact guard  -     Position/Device Used, Standing Balance supported;other (see comments)  [facility]     Balance Interventions sitting;standing;sit to stand;supported;static;dynamic  -           User Key  (r) = Recorded By, (t) = Taken By, (c) = Cosigned By    Initials Name Provider Type     Alida Delgado PT Physical Therapist               Goals/Plan    No documentation.                Clinical  Impression     [facility] Name 05/18/23 1527          Pain    Pretreatment Pain Rating 0/10 - no pain  -SM     Posttreatment Pain Rating 0/10 - no pain  -SM     Row Name 05/18/23 1527          Plan of Care Review    Plan of Care Reviewed With patient  -SM     Outcome Evaluation Patient seen for PT session this PM. Patient AOx2 supine in bed upon arrival. Patient sat up to EOB with CGA. Patient performed STS 5x from EOB with HHA and CGA. Patient ambulated 40ft in room, took a seated rest break, and then ambulated an additional 15ft with HHA and CGA. Gait slow, shuffled and mildly uncoordinated. Patient fatigued limiting further distance this date. Patient sitting UIC at end of session. Patient would continue to benefit from skilled PT intervention to address deficits in mobility. PT will continue to monitor.  -[facility] Name 05/18/23 1527          Therapy Assessment/Plan (PT)    Therapy Frequency (PT) 5 times/wk  -[facility] Name 05/18/23 1527          Vital Signs    Pre Patient Position Supine  -SM     Intra Patient Position Standing  -SM     Post Patient Position Sitting  -[facility] Name 05/18/23 1527          Positioning and Restraints    Pre-Treatment Position in bed  -SM     Post Treatment Position chair  -SM     In Chair notified nsg;encouraged to call for assist;call light within reach  chair alarm in chair. No box in room. nsg and nurse manager aware.  -           User Key  (r) = Recorded By, (t) = Taken By, (c) = Cosigned By    Initials Name Provider Type     Alida Delgado, PT Physical Therapist               Outcome Measures     Row Name 05/18/23 1537 05/18/23 0816       How much help from another person do you currently need...    Turning from your back to your side while in flat bed without using bedrails? 4  -SM 4  -MS    Moving from lying on back to sitting on the side of a flat bed without bedrails? 3  -SM 4  -MS    Moving to and from a bed to a chair (including a wheelchair)? 3  -SM 3  -MS     Standing up from a chair using your arms (e.g., wheelchair, bedside chair)? 3  -SM 3  -MS    Climbing 3-5 steps with a railing? 2  -SM 2  -MS    To walk in hospital room? 3  -SM 3  -MS    AM-PAC 6 Clicks Score (PT) 18  -SM 19  -MS    Highest level of mobility 6 --> Walked 10 steps or more  -SM 6 --> Walked 10 steps or more  -MS    Row Name 05/18/23 1537          Functional Assessment    Outcome Measure Options AM-PAC 6 Clicks Basic Mobility (PT)  -           User Key  (r) = Recorded By, (t) = Taken By, (c) = Cosigned By    Initials Name Provider Type    Patsy Ross, RN Registered Nurse    Alida Ramirez, ERROL Physical Therapist                             Physical Therapy Education     Title: PT OT SLP Therapies (Done)     Topic: Physical Therapy (Done)     Point: Mobility training (Done)     Learning Progress Summary           Patient Acceptance, E, VU,NR by  at 5/18/2023 1539    Acceptance, E, VU by  at 5/15/2023 1525    Acceptance, E, VU,NR by  at 5/13/2023 1339    Acceptance, E, NR by AR at 5/12/2023 1514                   Point: Home exercise program (Done)     Learning Progress Summary           Patient Acceptance, E, VU,NR by  at 5/18/2023 1539    Acceptance, E, VU by  at 5/15/2023 1525    Acceptance, E, VU,NR by  at 5/13/2023 1339    Acceptance, E, NR by AR at 5/12/2023 1514                   Point: Body mechanics (Done)     Learning Progress Summary           Patient Acceptance, E, VU,NR by  at 5/18/2023 1539    Acceptance, E, VU by  at 5/15/2023 1525    Acceptance, E, VU,NR by  at 5/13/2023 1339    Acceptance, E, NR by AR at 5/12/2023 1514                   Point: Precautions (Done)     Learning Progress Summary           Patient Acceptance, E, VU,NR by  at 5/18/2023 1539    Acceptance, E, VU by  at 5/15/2023 1525    Acceptance, E, VU,NR by  at 5/13/2023 1339    Acceptance, E, NR by AR at 5/12/2023 1516                               User Key     Initials Effective  Dates Name Provider Type Discipline    AR 06/16/21 -  Akila Branham PT Physical Therapist PT     05/02/22 -  Alida Delgado PT Physical Therapist PT              PT Recommendation and Plan     Plan of Care Reviewed With: patient  Outcome Evaluation: Patient seen for PT session this PM. Patient AOx2 supine in bed upon arrival. Patient sat up to EOB with CGA. Patient performed STS 5x from EOB with HHA and CGA. Patient ambulated 40ft in room, took a seated rest break, and then ambulated an additional 15ft with HHA and CGA. Gait slow, shuffled and mildly uncoordinated. Patient fatigued limiting further distance this date. Patient sitting UIC at end of session. Patient would continue to benefit from skilled PT intervention to address deficits in mobility. PT will continue to monitor.     Time Calculation:    PT Charges     Row Name 05/18/23 1540             Time Calculation    Start Time 1411  -SM      Stop Time 1424  -SM      Time Calculation (min) 13 min  -SM      PT Received On 05/18/23  -      PT - Next Appointment 05/19/23  -         Time Calculation- PT    Total Timed Code Minutes- PT 13 minute(s)  -SM         Timed Charges    90358 - PT Therapeutic Exercise Minutes 3  -SM      83992 - PT Therapeutic Activity Minutes 10  -SM         Total Minutes    Timed Charges Total Minutes 13  -SM       Total Minutes 13  -SM            User Key  (r) = Recorded By, (t) = Taken By, (c) = Cosigned By    Initials Name Provider Type     Alida Delgado PT Physical Therapist              Therapy Charges for Today     Code Description Service Date Service Provider Modifiers Qty    09407556618 HC PT THERAPEUTIC ACT EA 15 MIN 5/18/2023 Alida Delgado PT GP 1          PT G-Codes  Outcome Measure Options: AM-PAC 6 Clicks Basic Mobility (PT)  AM-PAC 6 Clicks Score (PT): Flavio Delgado PT  5/18/2023

## 2023-05-18 NOTE — PROGRESS NOTES
Enter Query Response Below      Query Response:     yes- During this admission the patient's O2 sats dropped twice on  to 85% and 87% on room air. They were briefly on 3L with sats above 92% and have been on room air and sats above 90% since         If applicable, please update the problem list.   Patient: Bill Landry        : 1945  Account: [id number]           Admit Date: 23        How to Respond to this query:       a. Click New Note     b. Answer query within the yellow box.                c. Update the Problem List, if applicable.      If you have any questions about this query contact me at: [email or web address]    ,  Patient has a diagnosis of acute hypoxic respiratory failure due to covid pneumonia. During this admission the patient's O2 sats dropped twice on  to 85% and 87% on room air. They were briefly on 3L with sats above 92% and have been on room air and sats above 90% since. RR 16-20. Notes state breathing effort not labored and no respiratory distress. No ABG's.     After study, was acute respiratory failure clinically supported during this admission?    -Yes, please include additional clinical indicators: ____________  -No  -Other- specify________  -Unable to determine    By submitting this query, we are merely seeking further clarification of documentation to accurately reflect all conditions that you are monitoring, evaluating, treating or that extend the hospitalization or utilize additional resources of care. Please utilize your independent clinical judgment when addressing the question(s) above.     This query and your response, once completed, will be entered into the legal medical record.    Sincerely,  Coral Patrick  Clinical Documentation Integrity Program

## 2023-05-19 LAB
ALBUMIN SERPL-MCNC: 2.6 G/DL (ref 3.5–5.2)
ANION GAP SERPL CALCULATED.3IONS-SCNC: 11.1 MMOL/L (ref 5–15)
BUN SERPL-MCNC: 67 MG/DL (ref 8–23)
BUN/CREAT SERPL: 9.4 (ref 7–25)
CALCIUM SPEC-SCNC: 8.4 MG/DL (ref 8.6–10.5)
CHLORIDE SERPL-SCNC: 98 MMOL/L (ref 98–107)
CO2 SERPL-SCNC: 18.9 MMOL/L (ref 22–29)
CREAT SERPL-MCNC: 7.15 MG/DL (ref 0.76–1.27)
DEPRECATED RDW RBC AUTO: 46.3 FL (ref 37–54)
EGFRCR SERPLBLD CKD-EPI 2021: 7.3 ML/MIN/1.73
ERYTHROCYTE [DISTWIDTH] IN BLOOD BY AUTOMATED COUNT: 13.5 % (ref 12.3–15.4)
GLUCOSE BLDC GLUCOMTR-MCNC: 109 MG/DL (ref 70–130)
GLUCOSE BLDC GLUCOMTR-MCNC: 110 MG/DL (ref 70–130)
GLUCOSE BLDC GLUCOMTR-MCNC: 163 MG/DL (ref 70–130)
GLUCOSE BLDC GLUCOMTR-MCNC: 214 MG/DL (ref 70–130)
GLUCOSE SERPL-MCNC: 110 MG/DL (ref 65–99)
HCT VFR BLD AUTO: 33.7 % (ref 37.5–51)
HGB BLD-MCNC: 11.5 G/DL (ref 13–17.7)
MAGNESIUM SERPL-MCNC: 2 MG/DL (ref 1.6–2.4)
MCH RBC QN AUTO: 32.2 PG (ref 26.6–33)
MCHC RBC AUTO-ENTMCNC: 34.1 G/DL (ref 31.5–35.7)
MCV RBC AUTO: 94.4 FL (ref 79–97)
PHOSPHATE SERPL-MCNC: 3.6 MG/DL (ref 2.5–4.5)
PLATELET # BLD AUTO: 102 10*3/MM3 (ref 140–450)
PMV BLD AUTO: 9.3 FL (ref 6–12)
POTASSIUM SERPL-SCNC: 5.4 MMOL/L (ref 3.5–5.2)
RBC # BLD AUTO: 3.57 10*6/MM3 (ref 4.14–5.8)
SODIUM SERPL-SCNC: 128 MMOL/L (ref 136–145)
WBC NRBC COR # BLD: 6.22 10*3/MM3 (ref 3.4–10.8)

## 2023-05-19 PROCEDURE — 80069 RENAL FUNCTION PANEL: CPT | Performed by: HOSPITALIST

## 2023-05-19 PROCEDURE — 82948 REAGENT STRIP/BLOOD GLUCOSE: CPT

## 2023-05-19 PROCEDURE — 63710000001 INSULIN LISPRO (HUMAN) PER 5 UNITS: Performed by: NURSE PRACTITIONER

## 2023-05-19 PROCEDURE — 83735 ASSAY OF MAGNESIUM: CPT | Performed by: HOSPITALIST

## 2023-05-19 PROCEDURE — 85027 COMPLETE CBC AUTOMATED: CPT | Performed by: HOSPITALIST

## 2023-05-19 RX ADMIN — FAMOTIDINE 20 MG: 20 TABLET, FILM COATED ORAL at 07:27

## 2023-05-19 RX ADMIN — SUCROFERRIC OXYHYDROXIDE 500 MG: 500 TABLET, CHEWABLE ORAL at 18:54

## 2023-05-19 RX ADMIN — LATANOPROST 1 DROP: 50 SOLUTION OPHTHALMIC at 21:48

## 2023-05-19 RX ADMIN — FAMOTIDINE 20 MG: 20 TABLET, FILM COATED ORAL at 17:36

## 2023-05-19 RX ADMIN — INSULIN LISPRO 3 UNITS: 100 INJECTION, SOLUTION INTRAVENOUS; SUBCUTANEOUS at 17:36

## 2023-05-19 RX ADMIN — SUCROFERRIC OXYHYDROXIDE 500 MG: 500 TABLET, CHEWABLE ORAL at 14:16

## 2023-05-19 RX ADMIN — TAMSULOSIN HYDROCHLORIDE 0.4 MG: 0.4 CAPSULE ORAL at 21:48

## 2023-05-19 RX ADMIN — SUCROFERRIC OXYHYDROXIDE 500 MG: 500 TABLET, CHEWABLE ORAL at 07:32

## 2023-05-19 RX ADMIN — ASPIRIN 81 MG: 81 TABLET, COATED ORAL at 14:16

## 2023-05-19 RX ADMIN — Medication 1 TABLET: at 14:16

## 2023-05-19 RX ADMIN — Medication 10 ML: at 21:49

## 2023-05-19 RX ADMIN — CETIRIZINE HYDROCHLORIDE 10 MG: 10 TABLET ORAL at 14:16

## 2023-05-19 NOTE — PLAN OF CARE
Goal Outcome Evaluation:           Progress: improving  Outcome Evaluation: VSS, no complaints of pain.  Pt had dialysis this am, 2.5L removed.  Pt is alert and oriented x3, disoriented to time.  Plan is to discharge home tomorrow.

## 2023-05-19 NOTE — PLAN OF CARE
Problem: Adult Inpatient Plan of Care  Goal: Plan of Care Review  Outcome: Ongoing, Progressing  Flowsheets  Taken 5/19/2023 0426  Plan of Care Reviewed With: patient  Outcome Evaluation: Patient slept well overnight. No complaints of pain. Remains on RA. A&Ox2. Disoriented to time,situation. VSS. Plan for HD today. Possible D/C 1-2 days.  Taken 5/18/2023 0510  Progress: no change

## 2023-05-19 NOTE — PROGRESS NOTES
Name: Bill Landry ADMIT: 2023   : 1945  PCP: No primary care provider on file.    MRN: [id number] LOS: 8 days   AGE/SEX: 77 y.o. male  ROOM: [facility]     Subjective   Subjective   Patient is seen at bedside, no new complaints.       Objective   Objective   Vital Signs  Temp:  [97.6 °F (36.4 °C)-98.2 °F (36.8 °C)] 97.8 °F (36.6 °C)  Heart Rate:  [78-95] 87  Resp:  [16-18] 18  BP: (150-158)/(84-92) 150/86  SpO2:  [93 %-96 %] 95 %  on   ;   Device (Oxygen Therapy): room air  Body mass index is 24.04 kg/m².  Physical Exam   General, awake and alert.  Head and ENT, normocephalic and atraumatic.  Lungs, symmetric expansion, equal air entry bilaterally.  Heart, regular rate and rhythm.  Abdomen, soft and nontender.  Extremities, no clubbing or cyanosis.  Neuro, no focal deficits.  Skin: Warm and no rash.  Psych, normal mood and affect.  Musculoskeletal, joint examination is grossly normal.      Results Review     I reviewed the patient's new clinical results.  Results from last 7 days   Lab Units 23  0604 23  0818 23  0632 05/15/23  0803   WBC 10*3/mm3 6.22 4.36 5.66 4.80   HEMOGLOBIN g/dL 11.5* 11.3* 11.4* 10.9*   PLATELETS 10*3/mm3 102* 120* 133* 144     Results from last 7 days   Lab Units 23  0604 23  0717 23  0818 23  0632   SODIUM mmol/L 128* 132* 131* 133*   POTASSIUM mmol/L 5.4* 4.7 5.1 4.6   CHLORIDE mmol/L 98 102 98 98   CO2 mmol/L 18.9* 22.0 20.0* 23.0   BUN mg/dL 67* 53* 75* 52*   CREATININE mg/dL 7.15* 5.65* 6.77* 4.88*   GLUCOSE mg/dL 110* 141* 131* 160*   EGFR mL/min/1.73 7.3* 9.7* 7.8* 11.6*     Results from last 7 days   Lab Units 23  0604 23  0717 23  0818 23  0632 05/15/23  0803 23  0921 23  0658   ALBUMIN g/dL 2.6* 2.6* 2.8* 3.1* 3.1* 3.1* 3.0*   BILIRUBIN mg/dL  --   --  1.2  --  1.0 1.5* 0.9   ALK PHOS U/L  --   --  135*  --  141* 155* 151*   AST (SGOT) U/L  --   --  21  --  29 37 32   ALT (SGPT) U/L  --    --  38  --  42* 43* 31     Results from last 7 days   Lab Units 05/19/23  0604 05/18/23  0717 05/17/23  0818 05/16/23  0632 05/15/23  0803   CALCIUM mg/dL 8.4* 8.0* 8.4* 8.3* 8.0*   ALBUMIN g/dL 2.6* 2.6* 2.8* 3.1* 3.1*   MAGNESIUM mg/dL 2.0 2.0 2.0 2.0 2.2   PHOSPHORUS mg/dL 3.6 3.6  --  3.0 6.1*       Glucose   Date/Time Value Ref Range Status   05/19/2023 1341 110 70 - 130 mg/dL Final     Comment:     Meter: [id number] : [id number] Dax Shashankdyana ANDREWS   05/19/2023 0621 109 70 - 130 mg/dL Final     Comment:     Meter: [id number] : [id number] Rasmussen Raven    05/18/2023 2033 231 (H) 70 - 130 mg/dL Final     Comment:     Meter: [id number] : [id number] Rasmussen Raven    05/18/2023 1613 132 (H) 70 - 130 mg/dL Final     Comment:     Meter: [id number] : [id number] Dax Shashankdyana ANDREWS   05/18/2023 1120 200 (H) 70 - 130 mg/dL Final     Comment:     Meter: [id number] : [id number] Dax Shashankydana ANDREWS   05/18/2023 0640 136 (H) 70 - 130 mg/dL Final     Comment:     Meter: [id number] : [id number] Ralf ANDREWS   05/17/2023 2115 144 (H) 70 - 130 mg/dL Final     Comment:     Meter: [id number] : [id number] Ralf ANDREWS       No radiology results for the last day  I have personally reviewed all medications:  Scheduled Medications  aspirin, 81 mg, Oral, Daily  b complex-vitamin c-folic acid, 1 tablet, Oral, Daily  cetirizine, 10 mg, Oral, Daily  cyclobenzaprine, 5 mg, Oral, Once  famotidine, 20 mg, Oral, BID AC  insulin lispro, 2-7 Units, Subcutaneous, TID With Meals  latanoprost, 1 drop, Both Eyes, Nightly  sodium chloride, 10 mL, Intravenous, Q12H  Sucroferric Oxyhydroxide, 500 mg, Oral, TID PC  tamsulosin, 0.4 mg, Oral, Nightly    Infusions   Diet  Diet: Renal Diets; Low Sodium (2-3g), Low Potassium, Low Phosphorus; Texture: Regular Texture (IDDSI 7); Fluid Consistency: Thin (IDDSI 0)    I have personally reviewed:  [x]  Laboratory   [x]  Microbiology   [x]  Radiology   [x]  EKG/Telemetry  [x]   Cardiology/Vascular   [x]  Pathology    [x]  Records       Assessment/Plan     Active Hospital Problems    Diagnosis  POA   • **Acute hypoxemic respiratory failure due to COVID-19 [U07.1, J96.01]  Yes   • Impaired glucose tolerance [R73.02]  Yes   • Acute on chronic diastolic heart failure [I50.33]  Yes   • Bicuspid aortic valve [Q23.1]  Not Applicable   • Aortic stenosis, mild [I35.0]  Yes   • TATE (obstructive sleep apnea) [G47.33]  Yes   • End stage kidney disease (HCC) [N18.6]  Yes   • Crohn's disease, unspecified, without complications [K50.90]  Yes   • Essential (primary) hypertension [I10]  Yes   • Secondary hyperparathyroidism of renal origin [N25.81]  Yes   • Anemia due to stage 4 chronic kidney disease treated with erythropoietin [N18.4, D63.1]  Yes   • Cirrhosis of liver without ascites [K74.60]  Yes   • Congestive splenomegaly [D73.2]  Yes   • Permanent atrial fibrillation [I48.21]  Yes   • Thrombocytopenia (HCC) [D69.6]  Yes      Resolved Hospital Problems   No resolved problems to display.       77 y.o. male admitted with Acute hypoxemic respiratory failure due to COVID-19.    Assessment and plan:  1.  Acute respiratory failure with hypoxia due to volume overload and COVID pneumonia, patient currently on room air.  He is not a candidate for remdesivir, did receive Decadron.    2.  Agitation and confusion, was likely thought to be due to steroids which have been stopped.    3.  Acute on chronic diastolic CHF, volume status is improving.  Cardiology and nephrology followed the patient.    4.  Atrial fibrillation, patient is currently rate controlled, not on anticoagulation due to GI bleed.    5.  End-stage renal disease, continue hemodialysis based on nephrology recommendations.    6.  Anemia of chronic kidney disease, MGUS with pancytopenia, monitor labs.    7.  Borderline diabetes, continue Accu-Cheks and sliding scale insulin coverage.    8.  Crohn's disease with ileostomy.  Chronic condition.    9.   CODE STATUS is full code.  Further plans based on hospital course.      Goran Baptiste MD  [facility]  05/19/23  14:07 EDT

## 2023-05-19 NOTE — PROGRESS NOTES
Continued Stay Note  [facility]     Patient Name: Bill Landry  MRN: [id number]  Today's Date: 5/19/2023    Admit Date: 5/11/2023    Plan: Home with family support, continue OP HD @ [facility] MWF   Discharge Plan     Row Name 05/19/23 1657       Plan    Plan Home with family support, [facility]    Plan Comments Follow up with patient and spouse at the bedside. Plan is for patient to return home family support, potential dc tomorrow 5/20. Spouse denies HH/SNF. Spouse stated two sons are involved and will assist as needed. Sons will provide dc transportation and they will transport to OP HD. [facility].               Discharge Codes    No documentation.               Expected Discharge Date and Time     Expected Discharge Date Expected Discharge Time    May 20, 2023             Janeth Casey RN

## 2023-05-19 NOTE — PROGRESS NOTES
[facility] Progress Note      Patient Name: Bill Landry  : 1945  MRN: [id number]  Primary Care Physician:  No primary care provider on file.  Date of admission: 2023    Subjective     Interval History:   Follow-up end-stage renal disease  The patient was seen on dialysis today.  Doing well overall.  Denies any nausea no vomiting.  Patient seen yes yes  Review of Systems:   Did not obtain    Objective     Vitals:   Temp:  [97.6 °F (36.4 °C)-98.5 °F (36.9 °C)] 97.9 °F (36.6 °C)  Heart Rate:  [78-95] 95  Resp:  [16-18] 18  BP: (142-158)/(82-92) 158/92    Intake/Output Summary (Last 24 hours) at 2023 1120  Last data filed at 2023 0829  Gross per 24 hour   Intake 690 ml   Output 600 ml   Net 90 ml       Physical Exam:      Constitutional:  chronically ill, no acute distress. More awake today   HEENT: Sclera anicteric, no conjunctival injection, oral mucosa is no  Neck: Supple, no thyromegaly, no lymphadenopathy, trachea at midline, no JVD  Respiratory:  Bilateral rhonchi, breathing effort not labored  Cardiovascular: RRR, systolic ejection murmur, no rub  Gastrointestinal: Positive bowel sounds, abdomen is soft, nontender and nondistended.  Ostomy bag in place   : No palpable bladder  Musculoskeletal: No edema, no clubbing or cyanosis.  Left upper extremity  AVF  Neurologic: Awake   Skin: Warm and dry        Scheduled Meds:     aspirin, 81 mg, Oral, Daily  b complex-vitamin c-folic acid, 1 tablet, Oral, Daily  cetirizine, 10 mg, Oral, Daily  cyclobenzaprine, 5 mg, Oral, Once  famotidine, 20 mg, Oral, BID AC  insulin lispro, 2-7 Units, Subcutaneous, TID With Meals  latanoprost, 1 drop, Both Eyes, Nightly  sodium chloride, 10 mL, Intravenous, Q12H  Sucroferric Oxyhydroxide, 500 mg, Oral, TID PC  tamsulosin, 0.4 mg, Oral, Nightly      IV Meds:        Results Reviewed:   I have personally reviewed the results from the time of this admission to 2023 11:20 EDT      Results from last 7 days   Lab Units 05/19/23  0604 05/18/23  0717 05/17/23  0818 05/16/23  0632 05/15/23  0803 05/14/23  0921   SODIUM mmol/L 128* 132* 131*   < > 129* 130*   POTASSIUM mmol/L 5.4* 4.7 5.1   < > 4.9 4.2   CHLORIDE mmol/L 98 102 98   < > 95* 95*   CO2 mmol/L 18.9* 22.0 20.0*   < > 20.0* 21.9*   BUN mg/dL 67* 53* 75*   < > 79* 50*   CREATININE mg/dL 7.15* 5.65* 6.77*   < > 6.01* 4.67*   CALCIUM mg/dL 8.4* 8.0* 8.4*   < > 8.0* 8.4*   BILIRUBIN mg/dL  --   --  1.2  --  1.0 1.5*   ALK PHOS U/L  --   --  135*  --  141* 155*   ALT (SGPT) U/L  --   --  38  --  42* 43*   AST (SGOT) U/L  --   --  21  --  29 37   GLUCOSE mg/dL 110* 141* 131*   < > 189* 186*    < > = values in this interval not displayed.       Estimated Creatinine Clearance: 9.3 mL/min (A) (by C-G formula based on SCr of 7.15 mg/dL (H)).    Results from last 7 days   Lab Units 05/19/23  0604 05/18/23  0717 05/17/23  0818 05/16/23  0632   MAGNESIUM mg/dL 2.0 2.0 2.0 2.0   PHOSPHORUS mg/dL 3.6 3.6  --  3.0             Results from last 7 days   Lab Units 05/19/23  0604 05/17/23  0818 05/16/23  0632 05/15/23  0803 05/14/23  0731   WBC 10*3/mm3 6.22 4.36 5.66 4.80 6.91   HEMOGLOBIN g/dL 11.5* 11.3* 11.4* 10.9* 11.5*   PLATELETS 10*3/mm3 102* 120* 133* 144 155             Assessment / Plan     ASSESSMENT:     -End Stage Renal Disease ( ESRD ) on Hemodialysis .s/p  LUE AVF s/p LUE AVG  ( by rebecca Singh )  functional On  Monday, Wednesday and Friday schedule.     -Chronic normocytic anemia. On Epogen protocol.  Hb is at goal   -Hypervolemia with hyponatremia .   -Hyperphosphatemia.  On binders   -Diabetes Mellitus type 2  with renal complication treated by primary  -COVID-19 infection currently on isolation on dexamethasone.,  Inhalers and he is on room air  -Chronic diastolic congestive heart failure .   Volume status improving  -Crohn disease a status post ostomy placement       PLAN:  -Continue dialysis on Monday Wednesday Friday.  Dialyzing  today   -Surveillance labs    I discussed the case with the patient's wife at the bed  Copied text in this note has been reviewed and is accurate as of 05/19/23.         Thank you for involving us in the care of Bill Landry.  Please feel free to call with any questions.    Myra Moore MD  05/19/23  11:20 EDT    [facility]  [phone]    Please note that portions of this note were completed with a voice recognition program.

## 2023-05-20 ENCOUNTER — READMISSION MANAGEMENT (OUTPATIENT)
Dept: CALL CENTER | Facility: HOSPITAL | Age: 78
End: 2023-05-20
Payer: MEDICARE

## 2023-05-20 VITALS
TEMPERATURE: 97.2 F | OXYGEN SATURATION: 97 % | SYSTOLIC BLOOD PRESSURE: 139 MMHG | HEART RATE: 85 BPM | WEIGHT: 167.55 LBS | HEIGHT: 70 IN | RESPIRATION RATE: 16 BRPM | BODY MASS INDEX: 23.99 KG/M2 | DIASTOLIC BLOOD PRESSURE: 79 MMHG

## 2023-05-20 LAB
ALBUMIN SERPL-MCNC: 2.6 G/DL (ref 3.5–5.2)
ANION GAP SERPL CALCULATED.3IONS-SCNC: 9.5 MMOL/L (ref 5–15)
BUN SERPL-MCNC: 46 MG/DL (ref 8–23)
BUN/CREAT SERPL: 8.8 (ref 7–25)
CALCIUM SPEC-SCNC: 8.7 MG/DL (ref 8.6–10.5)
CHLORIDE SERPL-SCNC: 99 MMOL/L (ref 98–107)
CO2 SERPL-SCNC: 22.5 MMOL/L (ref 22–29)
CREAT SERPL-MCNC: 5.21 MG/DL (ref 0.76–1.27)
DEPRECATED RDW RBC AUTO: 46.3 FL (ref 37–54)
EGFRCR SERPLBLD CKD-EPI 2021: 10.7 ML/MIN/1.73
ERYTHROCYTE [DISTWIDTH] IN BLOOD BY AUTOMATED COUNT: 13.3 % (ref 12.3–15.4)
GLUCOSE BLDC GLUCOMTR-MCNC: 103 MG/DL (ref 70–130)
GLUCOSE BLDC GLUCOMTR-MCNC: 166 MG/DL (ref 70–130)
GLUCOSE SERPL-MCNC: 121 MG/DL (ref 65–99)
HCT VFR BLD AUTO: 34.4 % (ref 37.5–51)
HGB BLD-MCNC: 11.5 G/DL (ref 13–17.7)
MAGNESIUM SERPL-MCNC: 2 MG/DL (ref 1.6–2.4)
MCH RBC QN AUTO: 31.8 PG (ref 26.6–33)
MCHC RBC AUTO-ENTMCNC: 33.4 G/DL (ref 31.5–35.7)
MCV RBC AUTO: 95 FL (ref 79–97)
PHOSPHATE SERPL-MCNC: 3.8 MG/DL (ref 2.5–4.5)
PLATELET # BLD AUTO: 93 10*3/MM3 (ref 140–450)
PMV BLD AUTO: 8.8 FL (ref 6–12)
POTASSIUM SERPL-SCNC: 5 MMOL/L (ref 3.5–5.2)
RBC # BLD AUTO: 3.62 10*6/MM3 (ref 4.14–5.8)
SODIUM SERPL-SCNC: 131 MMOL/L (ref 136–145)
WBC NRBC COR # BLD: 6.83 10*3/MM3 (ref 3.4–10.8)

## 2023-05-20 PROCEDURE — 85027 COMPLETE CBC AUTOMATED: CPT | Performed by: HOSPITALIST

## 2023-05-20 PROCEDURE — 63710000001 INSULIN LISPRO (HUMAN) PER 5 UNITS: Performed by: NURSE PRACTITIONER

## 2023-05-20 PROCEDURE — 82948 REAGENT STRIP/BLOOD GLUCOSE: CPT

## 2023-05-20 PROCEDURE — 83735 ASSAY OF MAGNESIUM: CPT | Performed by: HOSPITALIST

## 2023-05-20 PROCEDURE — 80069 RENAL FUNCTION PANEL: CPT | Performed by: HOSPITALIST

## 2023-05-20 RX ADMIN — ASPIRIN 81 MG: 81 TABLET, COATED ORAL at 09:35

## 2023-05-20 RX ADMIN — SUCROFERRIC OXYHYDROXIDE 500 MG: 500 TABLET, CHEWABLE ORAL at 12:25

## 2023-05-20 RX ADMIN — Medication 1 TABLET: at 09:35

## 2023-05-20 RX ADMIN — FAMOTIDINE 20 MG: 20 TABLET, FILM COATED ORAL at 06:42

## 2023-05-20 RX ADMIN — CETIRIZINE HYDROCHLORIDE 10 MG: 10 TABLET ORAL at 09:35

## 2023-05-20 RX ADMIN — INSULIN LISPRO 2 UNITS: 100 INJECTION, SOLUTION INTRAVENOUS; SUBCUTANEOUS at 12:23

## 2023-05-20 RX ADMIN — Medication 10 ML: at 09:35

## 2023-05-20 RX ADMIN — SUCROFERRIC OXYHYDROXIDE 500 MG: 500 TABLET, CHEWABLE ORAL at 09:37

## 2023-05-20 NOTE — PLAN OF CARE
Goal Outcome Evaluation:  Plan of Care Reviewed With: patient, spouse        Progress: improving  Outcome Evaluation: VSS. Appetite good. No complaints of pain. Skin intact.Discharge instructions and education sheets provided. Pt to discharge home with spouse.

## 2023-05-20 NOTE — DISCHARGE SUMMARY
[facility]    Date of Discharge:  5/20/2023    PCP: No primary care provider on file.    Discharge Diagnosis:   Active Hospital Problems    Diagnosis  POA   • **Acute hypoxemic respiratory failure due to COVID-19 [U07.1, J96.01]  Yes   • Impaired glucose tolerance [R73.02]  Yes   • Acute on chronic diastolic heart failure [I50.33]  Yes   • Bicuspid aortic valve [Q23.1]  Not Applicable   • Aortic stenosis, mild [I35.0]  Yes   • TATE (obstructive sleep apnea) [G47.33]  Yes   • End stage kidney disease (HCC) [N18.6]  Yes   • Crohn's disease, unspecified, without complications [K50.90]  Yes   • Essential (primary) hypertension [I10]  Yes   • Secondary hyperparathyroidism of renal origin [N25.81]  Yes   • Anemia due to stage 4 chronic kidney disease treated with erythropoietin [N18.4, D63.1]  Yes   • Cirrhosis of liver without ascites [K74.60]  Yes   • Congestive splenomegaly [D73.2]  Yes   • Permanent atrial fibrillation [I48.21]  Yes   • Thrombocytopenia (HCC) [D69.6]  Yes      Resolved Hospital Problems   No resolved problems to display.          Consults     Date and Time Order Name Status Description    5/11/2023 11:16 AM Hematology & Oncology Inpatient Consult Completed     5/11/2023  8:02 AM Inpatient Pulmonology Consult Completed     5/11/2023  5:25 AM Inpatient Cardiology Consult Completed     5/11/2023  2:36 AM Inpatient Nephrology Consult Completed     5/11/2023  2:14 AM [facility] (on-call MD unless specified) Details          Hospital Course  77 y.o. male with past medical history significant for end-stage renal disease, had missed hemodialysis, was diagnosed with COVID on 5/4, complaint of lethargy, weakness and hypoxia.  Patient was given Decadron, but he had some behavioral issues associated with it, he has otherwise completed the course of steroids, currently he is on room air.  Patient tolerates his dialysis sessions well, his blood pressure has been  slightly elevated but overall appears to be stable.  I have seen and examined patient at bedside, patient will be discharged home in a stable condition.  Total time spent on discharge management for this patient is more than 30 minutes.  Plan of care discussed with patient and his wife at bedside, they have verbalized understanding.        Temp:  [97.2 °F (36.2 °C)-98.3 °F (36.8 °C)] 97.2 °F (36.2 °C)  Heart Rate:  [73-85] 85  Resp:  [16-18] 16  BP: (131-151)/(79-90) 139/79  Body mass index is 24.04 kg/m².    Physical Exam   General, awake and alert.  Head and ENT, normocephalic and atraumatic.  Lungs, symmetric expansion, equal air entry bilaterally.  Heart, regular rate and rhythm.  Abdomen, soft and nontender.  Extremities, no clubbing or cyanosis.  Neuro, no focal deficits.  Skin: Warm and no rash.  Psych, normal mood and affect.  Musculoskeletal, joint examination is grossly normal.      Disposition: Home or Self Care       Discharge Medications      Continue These Medications      Instructions Start Date   alfuzosin 10 MG 24 hr tablet  Commonly known as: UROXATRAL   10 mg, Oral, Every Other Day, Does not take on tues sun or thurs      aspirin 81 MG tablet   81 mg, Oral, Daily, INSTRUCTED TO CONTINUE THIS FOR SURGERY PER DR. BERMEO'S OFFICE      benzonatate 200 MG capsule  Commonly known as: TESSALON   200 mg, Oral, 3 Times Daily PRN      CLARITIN PO   1 tablet, Oral, As Needed, Pt states he is uncertain of dosage       famotidine 10 MG tablet  Commonly known as: PEPCID   10 mg, Oral, As Needed      latanoprost 0.005 % ophthalmic solution  Commonly known as: XALATAN   1 drop, Both Eyes, Nightly, 1 drop each eye       lidocaine-prilocaine 2.5-2.5 % cream  Commonly known as: EMLA   Apply 1 application topically to the appropriate area as directed. Atqnbd-Rowzqzxfu-Lrrifg:  ONE HOUR PRIOR TO DIALYSIS      Umm-Peter tablet   1 tablet, Oral, Daily, 0.8 mg      sodium chloride 0.65 % nasal spray   2 sprays, Nasal,  Nightly      Velphoro 500 MG chewable tablet  Generic drug: Sucroferric Oxyhydroxide   500 mg, Oral, 3 Times Daily, AFTER EACH  MEAL      VENOFER IV   Intravenous, As Needed, USUALLY ONCE MONTH              Additional Instructions for the Follow-ups that You Need to Schedule     Discharge Follow-up with PCP   As directed       Currently Documented PCP:    No primary care provider on file.    PCP Phone Number:    None     Follow Up Details: Follow-up with PCP and nephrology in 7 to 10 days.              Future Appointments   Date Time Provider Department Center   6/29/2023  8:45 AM Bharathi De La Torre MD [facility]   8/31/2023  1:20 PM LAB CHAIR [facility]  LAB [facility] [place]   8/31/2023  1:40 PM Sharif Mckenzie MD [facility] [place]   11/9/2023 10:00 AM [facility]   11/9/2023 11:00 AM Dale Vázquez MD [facility]  [facility]        Goran Baptiste MD  [facility]  05/20/23    Discharge time spent greater than 30 minutes.

## 2023-05-20 NOTE — SIGNIFICANT NOTE
05/20/23 1048   OTHER   Discipline physical therapy assistant   Rehab Time/Intention   Session Not Performed other (see comments)  (Pt is going home and wants to save energy for the trip home)

## 2023-05-20 NOTE — PROGRESS NOTES
[facility] Progress Note      Patient Name: Bill Landry  : 1945  MRN: [id number]  Primary Care Physician:  No primary care provider on file.  Date of admission: 2023    Subjective     Interval History:   Follow-up end-stage renal disease  Dialyzed last pm without incident;  Doing well overall.  Denies any vomiting but had a little nausea this am  Review of Systems:   Did not obtain    Objective     Vitals:   Temp:  [97.6 °F (36.4 °C)-98.3 °F (36.8 °C)] 97.6 °F (36.4 °C)  Heart Rate:  [73-87] 73  Resp:  [18] 18  BP: (131-151)/(81-86) 151/84    Intake/Output Summary (Last 24 hours) at 2023 0810  Last data filed at 2023 1755  Gross per 24 hour   Intake 480 ml   Output 2650 ml   Net -2170 ml       Physical Exam:      Constitutional:  chronically ill, no acute distress. Awake and alert  HEENT: Sclera anicteric, no conjunctival injection, oral mucosa is moist  Neck: Supple, no thyromegaly, no lymphadenopathy, trachea at midline, no JVD  Respiratory:  Bilateral rhonchi, breathing effort not labored  Cardiovascular: RRR, systolic ejection murmur, no rub  Gastrointestinal: soft,  Ostomy bag in place   : No palpable bladder  Musculoskeletal: No edema.  Left upper extremity  AVF  Neurologic: Awake   Skin: Warm and dry        Scheduled Meds:     aspirin, 81 mg, Oral, Daily  b complex-vitamin c-folic acid, 1 tablet, Oral, Daily  cetirizine, 10 mg, Oral, Daily  cyclobenzaprine, 5 mg, Oral, Once  famotidine, 20 mg, Oral, BID AC  insulin lispro, 2-7 Units, Subcutaneous, TID With Meals  latanoprost, 1 drop, Both Eyes, Nightly  sodium chloride, 10 mL, Intravenous, Q12H  Sucroferric Oxyhydroxide, 500 mg, Oral, TID PC  tamsulosin, 0.4 mg, Oral, Nightly      IV Meds:        Results Reviewed:   I have personally reviewed the results from the time of this admission to 2023 08:10 EDT     Results from last 7 days   Lab Units 23  0711 23  0604 23  0717  05/17/23  0818 05/16/23  0632 05/15/23  0803 05/14/23  0921   SODIUM mmol/L 131* 128* 132* 131*   < > 129* 130*   POTASSIUM mmol/L 5.0 5.4* 4.7 5.1   < > 4.9 4.2   CHLORIDE mmol/L 99 98 102 98   < > 95* 95*   CO2 mmol/L 22.5 18.9* 22.0 20.0*   < > 20.0* 21.9*   BUN mg/dL 46* 67* 53* 75*   < > 79* 50*   CREATININE mg/dL 5.21* 7.15* 5.65* 6.77*   < > 6.01* 4.67*   CALCIUM mg/dL 8.7 8.4* 8.0* 8.4*   < > 8.0* 8.4*   BILIRUBIN mg/dL  --   --   --  1.2  --  1.0 1.5*   ALK PHOS U/L  --   --   --  135*  --  141* 155*   ALT (SGPT) U/L  --   --   --  38  --  42* 43*   AST (SGOT) U/L  --   --   --  21  --  29 37   GLUCOSE mg/dL 121* 110* 141* 131*   < > 189* 186*    < > = values in this interval not displayed.       Estimated Creatinine Clearance: 12.8 mL/min (A) (by C-G formula based on SCr of 5.21 mg/dL (H)).    Results from last 7 days   Lab Units 05/20/23  0711 05/19/23  0604 05/18/23  0717   MAGNESIUM mg/dL 2.0 2.0 2.0   PHOSPHORUS mg/dL 3.8 3.6 3.6             Results from last 7 days   Lab Units 05/20/23  0711 05/19/23  0604 05/17/23  0818 05/16/23  0632 05/15/23  0803   WBC 10*3/mm3 6.83 6.22 4.36 5.66 4.80   HEMOGLOBIN g/dL 11.5* 11.5* 11.3* 11.4* 10.9*   PLATELETS 10*3/mm3 93* 102* 120* 133* 144             Assessment / Plan     ASSESSMENT:     -End Stage Renal Disease ( ESRD ) on Hemodialysis .s/p  LUE AVF s/p LUE AVG  ( by rebecca Singh )  functional On  Monday, Wednesday and Friday schedule.     -Chronic normocytic anemia. On Epogen protocol.  Hb is at goal   -Hypervolemia with hyponatremia .   -Hyperphosphatemia.  On binders   -Diabetes Mellitus type 2  with renal complication treated by primary  -COVID-19 infection currently on isolation on dexamethasone.,  Inhalers and he is on room air  -Chronic diastolic congestive heart failure .   Volume status improving  -Crohn disease a status post ostomy placement       PLAN:  -Continue dialysis on Monday Wednesday Friday  -Surveillance labs  -agree with dc plans    I  discussed the case with the patient's wife at the bed  Copied text in this note has been reviewed and is accurate as of 05/20/23.         Thank you for involving us in the care of Bill Landry.  Please feel free to call with any questions.    Luiz Rodrigues MD  05/20/23  08:10 EDT    [facility]  [phone]

## 2023-05-20 NOTE — OUTREACH NOTE
Prep Survey    Flowsheet Row Responses   [facility] facility patient discharged from? [place]   Is LACE score < 7 ? No   Eligibility Readm Mgmt   Discharge diagnosis Acute hypoxemic respiratory failure due to COVID-19   Does the patient have one of the following disease processes/diagnoses(primary or secondary)? Other   Does the patient have Home health ordered? No   Is there a DME ordered? No   Comments regarding appointments OP HD @ [facility]   Prep survey completed? Yes          Ronna WILKINS - Registered Nurse

## 2023-05-21 NOTE — CASE MANAGEMENT/SOCIAL WORK
Case Management Discharge Note      Final Note: home with OP HD         Selected Continued Care - Discharged on 5/20/2023 Admission date: 5/11/2023 - Discharge disposition: Home or Self Care    Destination    No services have been selected for the patient.              Durable Medical Equipment    No services have been selected for the patient.              Dialysis/Infusion    No services have been selected for the patient.              Home Medical Care    No services have been selected for the patient.              Therapy    No services have been selected for the patient.              Community Resources    No services have been selected for the patient.              Community & DME    No services have been selected for the patient.                       Final Discharge Disposition Code: 01 - home or self-care

## 2023-05-24 ENCOUNTER — READMISSION MANAGEMENT (OUTPATIENT)
Dept: CALL CENTER | Facility: HOSPITAL | Age: 78
End: 2023-05-24
Payer: MEDICARE

## 2023-05-24 NOTE — OUTREACH NOTE
Medical Week 1 Survey    Flowsheet Row Responses   [facility] patient discharged from? [place]   Does the patient have one of the following disease processes/diagnoses(primary or secondary)? Other   Week 1 attempt successful? Yes   Call start time 1739   Call end time 1742   Discharge diagnosis Acute hypoxemic respiratory failure due to COVID-19   Person spoke with today (if not patient) and relationship Wife   Is the patient taking all medications as directed (includes completed medication regime)? Yes   Medication comments No changes   Comments regarding appointments OP HD @ [facility]   Does the patient have a primary care provider?  Yes   Does the patient have an appointment with their PCP within 7 days of discharge? Greater than 7 days   Comments regarding PCP New pt appt with Bharathi De La Torre MD on 6/29 @ 8:45   What is preventing the patient from scheduling follow up appointments within 7 days of discharge? Earlier appointment not available   Has the patient kept scheduled appointments due by today? N/A   Psychosocial issues? No   Did the patient receive a copy of their discharge instructions? Yes   Nursing interventions Reviewed instructions with patient   What is the patient's perception of their health status since discharge? Improving   Is the patient/caregiver able to teach back signs and symptoms related to disease process for when to call PCP? Yes   Is the patient/caregiver able to teach back signs and symptoms related to disease process for when to call 911? Yes   Is the patient/caregiver able to teach back the hierarchy of who to call/visit for symptoms/problems? PCP, Specialist, Home health nurse, Urgent Care, ED, 911 Yes   If the patient is a current smoker, are they able to teach back resources for cessation? Not a smoker   Additional teach back comments Wife states he is doing very well.  Her son's have been taking him to dialysis.  He is getting stronger every day.  Appetite has  been good and no issues with his breathing.  He is using a walker to ambulate.   Week 1 call completed? Yes   Graduated Yes   Graduated/Revoked comments Denies questions or needs at this time.          Keisha FLORES - Licensed Nurse

## 2023-05-25 ENCOUNTER — TELEPHONE (OUTPATIENT)
Dept: ONCOLOGY | Facility: CLINIC | Age: 78
End: 2023-05-25
Payer: MEDICARE

## 2023-05-25 NOTE — TELEPHONE ENCOUNTER
Returned call to patient's spouse who is concerned about his white count which is higher than normal. Patient received Steroids during his hospital stay and wife v/u that this can raise white count. No signs of infection. Not urgent and patient's wife was okay with waiting for me to review this w/ Dr. Mckenzie when he returns. Akila Bauman RN

## 2023-05-25 NOTE — TELEPHONE ENCOUNTER
Caller: Tiago Landry    Relationship: Emergency Contact    Best call back number: [phone]      What is the best time to reach you: ANY.LEAVE VM    Who are you requesting to speak with (clinical staff, provider,  specific staff member):EDWARDO/DR COBIAN'S NURSE      What was the call regarding: PATIENT'S WIFE TIAGO CALLED TO SPEAK TO DR COBIAN'S NURSE ABOUT PATIENT GAEL. PATIENT WAS IN HOSPITAL FOR 10 DAYS WITH COVID AND TIAGO WANTED TO GO OVER HIS WHITE BLOOD COUNT AND BLOOD WORK.    Do you require a callback:YES

## 2023-08-15 ENCOUNTER — OFFICE VISIT (OUTPATIENT)
Dept: INTERNAL MEDICINE | Facility: CLINIC | Age: 78
End: 2023-08-15
Payer: MEDICARE

## 2023-08-15 VITALS
BODY MASS INDEX: 24.68 KG/M2 | OXYGEN SATURATION: 97 % | HEIGHT: 70 IN | SYSTOLIC BLOOD PRESSURE: 120 MMHG | WEIGHT: 172.4 LBS | DIASTOLIC BLOOD PRESSURE: 60 MMHG | HEART RATE: 77 BPM

## 2023-08-15 DIAGNOSIS — I13.10 HYPERTENSIVE HEART AND CHRONIC KIDNEY DISEASE WITHOUT HEART FAILURE, WITH STAGE 1 THROUGH STAGE 4 CHRONIC KIDNEY DISEASE, OR UNSPECIFIED CHRONIC KIDNEY DISEASE: Primary | ICD-10-CM

## 2023-08-15 DIAGNOSIS — K74.60 CIRRHOSIS OF LIVER WITHOUT ASCITES, UNSPECIFIED HEPATIC CIRRHOSIS TYPE: ICD-10-CM

## 2023-08-15 DIAGNOSIS — Q23.1 BICUSPID AORTIC VALVE: ICD-10-CM

## 2023-08-15 DIAGNOSIS — E11.65 TYPE 2 DIABETES MELLITUS WITH HYPERGLYCEMIA, WITHOUT LONG-TERM CURRENT USE OF INSULIN: ICD-10-CM

## 2023-08-15 DIAGNOSIS — K50.90 CROHN'S DISEASE WITHOUT COMPLICATION, UNSPECIFIED GASTROINTESTINAL TRACT LOCATION: ICD-10-CM

## 2023-08-15 DIAGNOSIS — E78.1 PURE HYPERTRIGLYCERIDEMIA: ICD-10-CM

## 2023-08-15 DIAGNOSIS — Z99.2 DEPENDENCE ON RENAL DIALYSIS: ICD-10-CM

## 2023-08-15 DIAGNOSIS — I10 ESSENTIAL (PRIMARY) HYPERTENSION: ICD-10-CM

## 2023-08-15 DIAGNOSIS — I48.21 PERMANENT ATRIAL FIBRILLATION: ICD-10-CM

## 2023-08-15 DIAGNOSIS — R09.89 BILATERAL CAROTID BRUITS: ICD-10-CM

## 2023-08-15 DIAGNOSIS — I35.0 AORTIC STENOSIS, MILD: ICD-10-CM

## 2023-08-15 NOTE — PROGRESS NOTES
Bharathi De La Torre M.D.  Internal Medicine  [facility]  [address], [address]  [place] [address]  [phone]      Chief Complaint  Establish Care and Hypertension    SUBJECTIVE    History of Present Illness    Bill Landry is a 77 y.o. male who presents to the office today as a new patient to establish care.     Permanent A-fib    Hypertension-resolved while on dialysis.     Chronic kidney disease, stage IV-secondary to hypertension.  He is on dialysis for past 7 years. He has a bovine graft in his left arm for access. He goes to dialysis [facility].     He is trying to gain weight. States he eats well. Lost weight when he had COVID.     History of heart failure-secondary to hypertension. Follows with Dr. Stephen. BP usually in 120s/60s.    Bicuspid aortic valve    Secondary hyperparathyroidism    History of Crohn's disease with ileostomy. Takes Pepcid daily for acid and gas. Follows with Dr. Jackson but has not seen recently.     He has chronic anemia and leukopenia.  He has iron deficiency anemia.  He is treated with erythropoietin for anemia of chronic kidney disease. Follows with Dr. Mckenzie.     TATE-he has insomnia. He thinks this is related to eating late at night.     History of kidney stones and BPH-follows with urology. Still makes some urine.     Social-active around the house and spends time with grandkids.     Review of Systems    Allergies   Allergen Reactions    Ace Inhibitors Other (See Comments)     RENAL FAILURE/ raised creatinine    Contrast Dye (Echo Or Unknown Ct/Mr) Other (See Comments) and Anaphylaxis     RENAL FAILURE    Iodine Anaphylaxis    Angiotensin Receptor Blockers Swelling    Eliquis [Apixaban] Arrhythmia     BLEEDING ISSUES; PT CANNOT TAKE ANY ANTICOAGULANTS DUE TO LOW PLATELETS EXCEPT ASPIRIN      Filgrastim GI Intolerance and Confusion     Chest pain    Keflex [Cephalexin] Diarrhea     RENAL FAILURE    Other Angioedema     IVP Dye        Outpatient Medications  Marked as Taking for the 8/15/23 encounter (Office Visit) with Bharathi De La Torre MD   Medication Sig Dispense Refill    alfuzosin (UROXATRAL) 10 MG 24 hr tablet Take 1 tablet by mouth Every Other Day. Does not take on tues sun or thurs      aspirin 81 MG tablet Take 1 tablet by mouth Daily. INSTRUCTED TO CONTINUE THIS FOR SURGERY PER DR. BERMEO'S OFFICE      B Complex-C-Folic Acid (EMILIE-FREDDY) tablet Take 1 tablet by mouth Daily. 0.8 mg  3    famotidine (PEPCID) 10 MG tablet Take 1 tablet by mouth As Needed for Heartburn.      Iron Sucrose (VENOFER IV) Infuse  into a venous catheter As Needed. USUALLY ONCE MONTH      latanoprost (XALATAN) 0.005 % ophthalmic solution Administer 1 drop to both eyes Every Night. 1 drop each eye      lidocaine-prilocaine (EMLA) 2.5-2.5 % cream Apply 1 application  topically to the appropriate area as directed. Xcxqir-Ulaxahcny-Nhvptd:  ONE HOUR PRIOR TO DIALYSIS  3    Loratadine (CLARITIN PO) Take 1 tablet by mouth As Needed. Pt states he is uncertain of dosage      Methoxy PEG-Epoetin Beta (MIRCERA IJ) Inject  as directed Every 30 (Thirty) Days.      sodium chloride 0.65 % nasal spray 2 sprays into the nostril(s) as directed by provider Every Night.      Sucroferric Oxyhydroxide (Velphoro) 500 MG chewable tablet Chew 1 tablet 3 (Three) Times a Day. AFTER EACH  MEAL          Past Medical History:   Diagnosis Date    Abnormal serum protein electrophoresis     Anemia     Multifactorial    Bicuspid aortic valve 7/20/2022    Chronic leukopenia and thrombocytopenia     Cirrhosis of liver without ascites 07/24/2017    Congestive splenomegaly 07/24/2017    Crohn disease     with ileostomy    Diastolic CHF     although it was likely from volume overload due to ESRD    Diverticula of colon     ESRD on hemodialysis     M-W-F [facility]    Fatty liver disease, nonalcoholic     GERD (gastroesophageal reflux disease)     GI bleed     Glaucoma     H/O Gallstone pancreatitis     H/O Pericardial effusion      H/O sinus bradycardia     Heart murmur     History of hypertension     resolved    History of UTI     Hx of renal calculi     Ileostomy present     Iron deficiency     MGUS (monoclonal gammopathy of unknown significance)     TATE (obstructive sleep apnea)     Permanent atrial fibrillation     Sleep apnea     CPAP USED    Type 2 diabetes mellitus     CURRENTLY TAKES NO MED     Past Surgical History:   Procedure Laterality Date    APPENDECTOMY      ARTERIOVENOUS FISTULA/SHUNT SURGERY Left 08/08/2017    Procedure: LEFT BRACHIAL CEPHALIC AV FISTULA FORMATION WITH CEPHALIC VEIN TRANSPOSITION ;  Surgeon: Bill Deal MD;  Location: [facility] OR;  Service:     ARTERIOVENOUS FISTULA/SHUNT SURGERY Left 5/27/2022    Procedure: OPEN REVISION LEFT ARTERIOVENOUS INTERPOSITION GRAFT PLACEMENT;  Surgeon: Bill Deal MD;  Location: [facility] OR;  Service: Vascular;  Laterality: Left;    ARTERIOVENOUS FISTULA/SHUNT SURGERY W/ HEMODIALYSIS CATHETER INSERTION Left 02/15/2022    Procedure: OPEN LEFT ARM ARTERIOVENOUS FISTULA THROMBECTOMY WITH CEPHALIC VEIN ARTHROPLASTY AND STENT/GRAFT PLACEMENT;  Surgeon: Bill Deal MD;  Location: [facility] OR 18/19;  Service: Vascular;  Laterality: Left;    CATARACT EXTRACTION WITH INTRAOCULAR LENS IMPLANT Bilateral     CHOLECYSTECTOMY      COLECTOMY PARTIAL / TOTAL      History of inflammatory bowel disease with status post colectomy with ileostomy many years ago in the [facility],  18 YEARS OF AGE    COLONOSCOPY      CYSTOSCOPY LITHOLAPAXY BLADDER STONE EXTRACTION      ENDOSCOPY  01/16/2015    gastritis    ENDOSCOPY  01/17/2018    Procedure: ESOPHAGOGASTRODUODENOSCOPY;  Surgeon: Warner Neville MD;  Location: [facility] ENDOSCOPY;  Service:     ILEOSCOPY  01/16/2015    normal    ILEOSCOPY N/A 01/17/2018    Procedure: ILEOSCOPY;  Surgeon: Warner Neville MD;  Location: [facility] ENDOSCOPY;  Service:     TONSILLECTOMY       Family History   Problem Relation Age of Onset    Heart  "failure Mother     Hypertension Mother     Heart disease Mother     Hyperlipidemia Mother     Hypertension Father     Malig Hyperthermia Neg Hx     reports that he has never smoked. He has never used smokeless tobacco. He reports that he does not drink alcohol and does not use drugs.    OBJECTIVE    Vital Signs:   /60   Pulse 77   Ht 177.8 cm (70\")   Wt 78.2 kg (172 lb 6.4 oz)   SpO2 97%   BMI 24.74 kg/mý     Physical Exam       Physical Exam  Constitutional:       Appearance: Normal appearance. He is normal weight.   HENT:      Right Ear: Tympanic membrane normal.      Left Ear: Tympanic membrane normal.   Neck:      Comments: Bruit - left carotid  Cardiovascular:      Rate and Rhythm: Normal rate and regular rhythm.      Heart sounds: Normal heart sounds. No murmur heard.  Pulmonary:      Effort: Pulmonary effort is normal.      Breath sounds: Normal breath sounds.   Abdominal:      General: Abdomen is flat. There is no distension.      Palpations: Abdomen is soft.      Tenderness: There is no abdominal tenderness.   Musculoskeletal:      Right lower leg: No edema.      Left lower leg: No edema.   Skin:     General: Skin is warm and dry.   Neurological:      Mental Status: He is alert.   Psychiatric:         Mood and Affect: Mood normal.         Behavior: Behavior normal.         Thought Content: Thought content normal.      The following data was reviewed by: Bharathi De La Torre MD on 08/15/2023:  Common labs          5/18/2023    07:17 5/19/2023    06:04 5/20/2023    07:11   Common Labs   Glucose 141  110  121    BUN 53  67  46    Creatinine 5.65  7.15  5.21    Sodium 132  128  131    Potassium 4.7  5.4  5.0    Chloride 102  98  99    Calcium 8.0  8.4  8.7    Albumin 2.6  2.6  2.6    WBC  6.22  6.83    Hemoglobin  11.5  11.5    Hematocrit  33.7  34.4    Platelets  102  93    Data reviewed : I recent oncology, gastroenterology and previous PCP note.              ASSESSMENT & PLAN     Diagnoses and all orders " for this visit:    1. 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 Essential (primary) hypertension    Blood pressure controlled off medication.    3. Permanent atrial fibrillation    Rate controlled today    4. Pure hypertriglyceridemia  -     Lipid Panel With LDL / HDL Ratio; Future    Has not had recent lipid panel.  Checking today    5. Bicuspid aortic valve    6. Aortic stenosis, mild    7. Cirrhosis of liver without ascites, unspecified hepatic cirrhosis type    Cirrhosis is compensated.  He has splenomegaly and low platelets due to this.    8. Crohn's disease without complication, unspecified gastrointestinal tract location    Status post ileostomy.  Denies issues currently    9. Dependence on renal dialysis    10. Type 2 diabetes mellitus with hyperglycemia, without long-term current use of insulin  -     Hemoglobin A1c; Future    States diabetes resolved after weight loss.  He had A1c recently at was higher than previous.  Rechecking today.  Elevated blood glucose could be related to recent steroids.    11. Bilateral carotid bruits  -     Duplex Carotid Ultrasound CAR; Future    We will order ultrasound for this.  Auscultated today and by previous PCP.    Health Maintenance Due   Topic Date Due    COLONOSCOPY  Never done    ZOSTER VACCINE (1 of 2) Never done    URINE MICROALBUMIN  Never done    LIPID PANEL  09/11/2020    COVID-19 Vaccine (5 - Pfizer series) 09/20/2022        Follow Up  Return in about 3 months (around 11/15/2023) for Recheck.    Patient/family had no further questions at this time and verbalized understanding of the plan discussed today.

## 2023-09-28 ENCOUNTER — LAB (OUTPATIENT)
Dept: LAB | Facility: HOSPITAL | Age: 78
End: 2023-09-28
Payer: MEDICARE

## 2023-09-28 ENCOUNTER — OFFICE VISIT (OUTPATIENT)
Dept: ONCOLOGY | Facility: CLINIC | Age: 78
End: 2023-09-28
Payer: MEDICARE

## 2023-09-28 VITALS
TEMPERATURE: 98.4 F | WEIGHT: 174.4 LBS | DIASTOLIC BLOOD PRESSURE: 70 MMHG | HEART RATE: 81 BPM | BODY MASS INDEX: 24.97 KG/M2 | SYSTOLIC BLOOD PRESSURE: 133 MMHG | OXYGEN SATURATION: 93 % | HEIGHT: 70 IN | RESPIRATION RATE: 18 BRPM

## 2023-09-28 DIAGNOSIS — D72.819 CHRONIC LEUKOPENIA: ICD-10-CM

## 2023-09-28 DIAGNOSIS — K74.60 CIRRHOSIS OF LIVER WITHOUT ASCITES, UNSPECIFIED HEPATIC CIRRHOSIS TYPE: ICD-10-CM

## 2023-09-28 DIAGNOSIS — D69.6 THROMBOCYTOPENIA: Primary | ICD-10-CM

## 2023-09-28 DIAGNOSIS — D69.6 THROMBOCYTOPENIA: ICD-10-CM

## 2023-09-28 DIAGNOSIS — D73.2 CONGESTIVE SPLENOMEGALY: ICD-10-CM

## 2023-09-28 LAB
BASOPHILS # BLD AUTO: 0.02 10*3/MM3 (ref 0–0.2)
BASOPHILS NFR BLD AUTO: 0.7 % (ref 0–1.5)
DEPRECATED RDW RBC AUTO: 59.3 FL (ref 37–54)
EOSINOPHIL # BLD AUTO: 0.06 10*3/MM3 (ref 0–0.4)
EOSINOPHIL NFR BLD AUTO: 2.1 % (ref 0.3–6.2)
ERYTHROCYTE [DISTWIDTH] IN BLOOD BY AUTOMATED COUNT: 16 % (ref 12.3–15.4)
HCT VFR BLD AUTO: 33.7 % (ref 37.5–51)
HGB BLD-MCNC: 10.8 G/DL (ref 13–17.7)
IMM GRANULOCYTES # BLD AUTO: 0.01 10*3/MM3 (ref 0–0.05)
IMM GRANULOCYTES NFR BLD AUTO: 0.3 % (ref 0–0.5)
LYMPHOCYTES # BLD AUTO: 0.77 10*3/MM3 (ref 0.7–3.1)
LYMPHOCYTES NFR BLD AUTO: 26.6 % (ref 19.6–45.3)
MCH RBC QN AUTO: 32.2 PG (ref 26.6–33)
MCHC RBC AUTO-ENTMCNC: 32 G/DL (ref 31.5–35.7)
MCV RBC AUTO: 100.6 FL (ref 79–97)
MONOCYTES # BLD AUTO: 0.35 10*3/MM3 (ref 0.1–0.9)
MONOCYTES NFR BLD AUTO: 12.1 % (ref 5–12)
NEUTROPHILS NFR BLD AUTO: 1.68 10*3/MM3 (ref 1.7–7)
NEUTROPHILS NFR BLD AUTO: 58.2 % (ref 42.7–76)
NRBC BLD AUTO-RTO: 0 /100 WBC (ref 0–0.2)
PLATELET # BLD AUTO: 74 10*3/MM3 (ref 140–450)
PMV BLD AUTO: 9.1 FL (ref 6–12)
RBC # BLD AUTO: 3.35 10*6/MM3 (ref 4.14–5.8)
WBC NRBC COR # BLD: 2.89 10*3/MM3 (ref 3.4–10.8)

## 2023-09-28 PROCEDURE — 85025 COMPLETE CBC W/AUTO DIFF WBC: CPT

## 2023-09-28 PROCEDURE — 36415 COLL VENOUS BLD VENIPUNCTURE: CPT

## 2023-09-28 NOTE — PROGRESS NOTES
Subjective         REASONS FOR FOLLOWUP:   ACQUIRED PANCYTOPENIA FROM SPLENOMEGALY AND LIVER CIRRHOSIS, IRON DEF ANEMIA FROM PREVIOUS GI BLEEDING, PATIENT CAN NOT TAKE ANTICOAGULANTS DUE TO TRIGGERING OF SEVERE GI BLEEDING    HISTORY OF PRESENT ILLNESS:     On 2023 this 77-year-old male returns to the office in company of his wife. in the interim of time, he has had COVID infection that was very difficult on him with very significant respiratory insufficiency requiring hospitalization but no mechanical ventilation. He pulled through. Subsequently he developed complications of the fistula in the left upper extremity and he has required new surgery by Vascular Surgery.     Physically the patient is doing relatively well. His appetite is acceptable. Weight is stable. Bowel activity is normal. Urine volume is minimal if any. No skeletal pain. No cardiac or respiratory issues. No fluid accumulation. He has not had any other new infections. His laboratory parameters in regard to his pancytopenia have remained very stable.     He was very worried for his wife who had a syncopal episode in front of him, he thought that she . She survived the event and was hospitalized. He depends on her completely.          Past Medical History:   Diagnosis Date    Abnormal serum protein electrophoresis     Anemia     Multifactorial    Bicuspid aortic valve 2022    Chronic leukopenia and thrombocytopenia     Cirrhosis of liver without ascites 2017    Congestive splenomegaly 2017    Crohn disease     with ileostomy    Diastolic CHF     although it was likely from volume overload due to ESRD    Diverticula of colon     ESRD on hemodialysis     M-W-F [facility]    Fatty liver disease, nonalcoholic     GERD (gastroesophageal reflux disease)     GI bleed     Glaucoma     H/O Gallstone pancreatitis     H/O Pericardial effusion     H/O sinus bradycardia     Heart murmur     History of hypertension     resolved     History of UTI     Hx of renal calculi     Ileostomy present     Iron deficiency     MGUS (monoclonal gammopathy of unknown significance)     TATE (obstructive sleep apnea)     Permanent atrial fibrillation     Sleep apnea     CPAP USED    Type 2 diabetes mellitus     CURRENTLY TAKES NO MED        Past Surgical History:   Procedure Laterality Date    APPENDECTOMY      ARTERIOVENOUS FISTULA/SHUNT SURGERY Left 08/08/2017    Procedure: LEFT BRACHIAL CEPHALIC AV FISTULA FORMATION WITH CEPHALIC VEIN TRANSPOSITION ;  Surgeon: Bill Deal MD;  Location: [facility] OR;  Service:     ARTERIOVENOUS FISTULA/SHUNT SURGERY Left 5/27/2022    Procedure: OPEN REVISION LEFT ARTERIOVENOUS INTERPOSITION GRAFT PLACEMENT;  Surgeon: Bill Deal MD;  Location: [facility] OR;  Service: Vascular;  Laterality: Left;    ARTERIOVENOUS FISTULA/SHUNT SURGERY W/ HEMODIALYSIS CATHETER INSERTION Left 02/15/2022    Procedure: OPEN LEFT ARM ARTERIOVENOUS FISTULA THROMBECTOMY WITH CEPHALIC VEIN ARTHROPLASTY AND STENT/GRAFT PLACEMENT;  Surgeon: Bill Deal MD;  [facility];  Service: Vascular;  Laterality: Left;    CATARACT EXTRACTION WITH INTRAOCULAR LENS IMPLANT Bilateral     CHOLECYSTECTOMY      COLECTOMY PARTIAL / TOTAL      History of inflammatory bowel disease with status post colectomy with ileostomy many years ago in the [facility],  18 YEARS OF AGE    COLONOSCOPY      CYSTOSCOPY LITHOLAPAXY BLADDER STONE EXTRACTION      ENDOSCOPY  01/16/2015    gastritis    ENDOSCOPY  01/17/2018    Procedure: ESOPHAGOGASTRODUODENOSCOPY;  Surgeon: Warner Neville MD;  Location: [facility] ENDOSCOPY;  Service:     ILEOSCOPY  01/16/2015    normal    ILEOSCOPY N/A 01/17/2018    Procedure: ILEOSCOPY;  Surgeon: Warner Neville MD;  Location: [facility] ENDOSCOPY;  Service:     TONSILLECTOMY          Current Outpatient Medications on File Prior to Visit   Medication Sig Dispense Refill    alfuzosin (UROXATRAL) 10 MG 24 hr tablet Take  1 tablet by mouth Every Other Day. Does not take on tues sun or thurs      aspirin 81 MG tablet Take 1 tablet by mouth Daily. INSTRUCTED TO CONTINUE THIS FOR SURGERY PER DR. BERMEO'S OFFICE      famotidine (PEPCID) 10 MG tablet Take 1 tablet by mouth As Needed for Heartburn.      Ferrous Sulfate (IRON PO) 50 mg 1 (One) Time Per Week.      Iron Sucrose (VENOFER IV) Infuse  into a venous catheter As Needed. USUALLY ONCE MONTH      latanoprost (XALATAN) 0.005 % ophthalmic solution Administer 1 drop to both eyes Every Night. 1 drop each eye      lidocaine-prilocaine (EMLA) 2.5-2.5 % cream Apply 1 application  topically to the appropriate area as directed. Vznvnv-Lllmuoszd-Bxxcaj:  ONE HOUR PRIOR TO DIALYSIS  3    Loratadine (CLARITIN PO) Take 1 tablet by mouth As Needed. Pt states he is uncertain of dosage      Methoxy PEG-Epoetin Beta (MIRCERA IJ) Inject  as directed Every 30 (Thirty) Days.      sodium chloride 0.65 % nasal spray 2 sprays into the nostril(s) as directed by provider Every Night.      Sucroferric Oxyhydroxide (Velphoro) 500 MG chewable tablet Chew 1 tablet 3 (Three) Times a Day. AFTER EACH  MEAL       No current facility-administered medications on file prior to visit.        ALLERGIES:    Allergies   Allergen Reactions    Ace Inhibitors Other (See Comments)     RENAL FAILURE/ raised creatinine    Contrast Dye (Echo Or Unknown Ct/Mr) Other (See Comments) and Anaphylaxis     RENAL FAILURE    Iodine Anaphylaxis    Angiotensin Receptor Blockers Swelling    Eliquis [Apixaban] Arrhythmia     BLEEDING ISSUES; PT CANNOT TAKE ANY ANTICOAGULANTS DUE TO LOW PLATELETS EXCEPT ASPIRIN      Filgrastim GI Intolerance and Confusion     Chest pain    Keflex [Cephalexin] Diarrhea     RENAL FAILURE    Other Angioedema     IVP Dye        Social History     Socioeconomic History    Marital status:      Spouse name: Gillian    Number of children: 2    Years of education: College   Tobacco Use    Smoking status: Never     "Smokeless tobacco: Never   Vaping Use    Vaping Use: Never used   Substance and Sexual Activity    Alcohol use: No     Comment: caffeine use: none    Drug use: No    Sexual activity: Defer        Family History   Problem Relation Age of Onset    Heart failure Mother     Hypertension Mother     Heart disease Mother     Hyperlipidemia Mother     Hypertension Father     Malig Hyperthermia Neg Hx           Objective     Vitals:    09/28/23 1522   BP: 133/70   Pulse: 81   Resp: 18   Temp: 98.4 °F (36.9 °C)   TempSrc: Temporal   SpO2: 93%   Weight: 79.1 kg (174 lb 6.4 oz)   Height: 177.8 cm (70\")   PainSc: 0-No pain         9/28/2023     3:30 PM   Current Status   ECOG score 0       Physical Exam                  GENERAL:  Well-developed, Patient  in no acute distress.   SKIN:  Warm, dry ,NO purpura ,no rash.  HEENT:  Pupils were equal and reactive to light and accomodation, conjunctivae noninjected,  normal visual acuity.   NECK:  Supple with good range of motion; no thyromegaly , no JVD or bruits,.No carotid artery pain, no carotid abnormal pulsation   LYMPHATICS:  No cervical, NO supraclavicular, NO axillary, NO inguinal adenopathies.  CARDIAC   normal rate , irregular rhythm, afib cvr.,  without murmur,NO rubs NO S3 NO S4   LUNGS: normal breath sounds bilateral, no wheezing, NO rhonchi, NO crackles ,NO rubs.  VASCULAR VENOUS: no cyanosis, NO collateral circulation, NO varicosities, NO edema, NO palpable cords, NO pain,NO erythema, NO pigmentation of the skin.  ABDOMEN:  Soft, NO pain,no hepatomegaly, no splenomegaly,no masses, no ascites, no collateral circulation,no distention.  EXTREMITIES  AND SPINE:  No clubbing, no cyanosis ,no deformities , no pain .No kyphosis,  no pain in spine, no pain in ribs , no pain in pelvic bone.AREAS OF FISTULAS IN LUE WITH BULGING LESION 10 CM NO THRILL, SECOND 3 CM WITH THRILL  NEUROLOGICAL:  Patient was awake, alert, oriented to time, person and place.        RECENT LABS:  Lab " Results   Component Value Date    WBC 2.89 (L) 09/28/2023    HGB 10.8 (L) 09/28/2023    HCT 33.7 (L) 09/28/2023    .6 (H) 09/28/2023    PLT 74 (L) 09/28/2023           Assessment & Plan     Diagnoses and all orders for this visit:    1. Thrombocytopenia (Primary)  -     CBC & Differential; Future    2. Chronic leukopenia  -     CBC & Differential; Future    3. Cirrhosis of liver without ascites, unspecified hepatic cirrhosis type  -     CBC & Differential; Future    4. Congestive splenomegaly  -     CBC & Differential; Future         I reviewed the recent laboratory parameters on this patient and his white count remains around 300, his platelet count was 104,000, the hemoglobin around 11.  These numbers for him are very good. He has received IV iron and his ferritin level and iron profile are appropriate at this time. He has no clinical bleeding.     He raised the question about these numbers. He knows that these numbers are related to his chronic liver disease and splenomegaly. This has been present for years and years since he has been my patient. These numbers fluctuate but never have changed substantially. I made him aware that if he has a white count less than 2000 and platelet count less than 30,000 he will need to call me immediately.    He raised the question about the Atenolol if this is prudent to take. I pointed out to him that it could be beneficial to control his blood pressure, it will be beneficial to decrease his portal pressure too and it could be beneficial to minimize in case of any atrial fibrillation, rapid ventricular response. He would like to adjust his CPAP machine first to see if he has lesser episodes of sleep apnea and if this does not correct the problem he will start the blood pressure medication under the direction of cardiology.     Finally they raises the question him and his wife in regard to COVID vaccination booster shot. He is ready from this point of view from my  situation. He has as we know patient's on dialysis chronic kidney disease are immunosuppressed, he must have his COVID vaccination booster and I asked him to go ahead and proceed with this at this time. Three to 4 weeks later he would like to proceed with his flu shot.     Otherwise we will review him by telemedicine phone visit in 6 months.    He will continue sending reports of his laboratory parameters from his dialysis center.     Duration of the phone visit 12 minutes.   The patient was reviewed by Telemedicine on 03/15/2022. Since the previous visit the patient has undergone the repair of his fistula in the left upper extremity and a stent was placed in the left subclavian vein. This is visible in the most recent chest x-ray. Now the fistula for dialysis is working fine. He had also an episode of urine collection in the bladder, phenomenon that is unusual in chronic kidney disease. He had probably some discomfort and minimal fever. He had a Khan catheter placed, now has been removed by Urology. He is no longer having a fever and he has minimal urinary output. The patient otherwise feels well. The most recent laboratory testing that has been reviewed above shows chronic leukopenia and chronic thrombocytopenia without any changes. His white count is typically around 3500, platelet count around 110,000, stable hemoglobin. Given the above circumstances, I do not believe that we need to pursue any other intervention. The dialysis team, Dr. Monsivais and associates, will continue doing his laboratory testing and they will let us know if any need arises in regard to any other intervention.     I related to the patient and his wife his numbers are doing well and I find no need for any other intervention. I will review him back here in the office in 4 months with a CBC.    Duration of the phone visit: 15 minutes.  The patient was reviewed on 08/18/2022. Since the previous visit he was treated for a urinary tract  infection successfully. He has not had any further issues there. He has not had any bleeding after surgical intervention to correct issues in regard to his fistula for dialysis in the left upper extremity by Vascular Surgery. We provided platelet transfusion for that.     His white count, hemoglobin and platelets today are no different than what they have been for the last many years. His white count is low and low platelet count is evading to splenomegaly and hypersplenism with sequestration. Actually the hemoglobin is very good for somebody undergoing dialysis because he also receives erythropoietin and he receives iron therapy. I encouraged him to remain on this.     He raised the question in regard of what to do with the alfuzosin that he receives every night at dinner time in regard to prostate relaxation and allow him to have proper urination. I pointed out to him that if this medicine triggers low blood pressure he could avoid this medication utilization the night before dialysis and that way he only takes it 4 days of the week instead of every day of the week. Maybe this will be corrected.     Otherwise I would like to review him back in 4 months with a CBC here in the office.     I discussed all these facts with the patient and his wife. They provided me multiple laboratories from the nephrology team that will be scanned into Epic system, all of them consistent more or less with laboratory testing that we have today including proper ferritin level.   On 02/23/2023 the patient feels very well today. He looks good. Clinical examination disclosed normal sinus, clear lung auscultation, normal colostomy location in the left lower quadrant, no abdominal ascites, no collateral circulation, no palpable splenomegaly. His white count, hemoglobin and platelets are no different than before. He has no clinical bleeding and no infection.    The rest of his chemistry profiles have been provided to me. Most of his labs are  doing very well through dialysis including his uric acid.     I recommended for him to come back and see us in 6 months. Otherwise he will remain under the direction of the Nephrology team through his dialysis.   On 09/28/2023 the patient has not had any new modification in his clinical picture. He remains on dialysis, that is his main issue day by day. His diet is appropriate. He has no obvious blood loss. His abdominal exam is no different today than what has been before. No obvious palpable spleen clinically. No obvious ascites.     He has not been taking any new medicine. He remains on erythropoietin through his nephrologist and he remains on Venofer through his nephrologist as well.     Therefore, I do believe that his blood counts today are reflection of his hypersplenism and no more than that. His platelet count always has been low in the 70,000s. His white count always is low in the 3000s and these numbers today are reflection of this. Nothing that I can do for him at this point. Therefore, he will remain on his medicines through the rest of his physicians, I am not providing any medicine for him. I have not stopped or modified any of the medicines that he takes.     I would like to review him back in 6 months with a CBC.

## 2023-10-26 ENCOUNTER — TELEPHONE (OUTPATIENT)
Dept: ONCOLOGY | Facility: CLINIC | Age: 78
End: 2023-10-26
Payer: MEDICARE

## 2023-10-26 RX ORDER — FOLIC ACID/VIT B COMPLEX AND C 0.8 MG
1 TABLET ORAL DAILY
COMMUNITY
Start: 2023-09-27

## 2023-10-26 RX ORDER — CEFAZOLIN SODIUM 2 G/100ML
2000 INJECTION, SOLUTION INTRAVENOUS ONCE
Status: CANCELLED | OUTPATIENT
Start: 2023-10-27 | End: 2023-10-26

## 2023-10-26 NOTE — TELEPHONE ENCOUNTER
Caller: Gillian Landry    Relationship: Emergency Contact    Best call back number: [phone]    OR CELL: [phone]    PLEASE CALL BACK TO LET KNOW STATUS ON THIS.     What orders are you requesting (i.e. lab or imaging): PLATELETS    In what timeframe would the patient need to come in:TOMORROW MORNING     Where will you receive your lab/imaging services: [facility]     Additional notes: GAEL SCHEDULED TO HAVE EMERGENCY SURGERY TOMORROW AT 11AM NEEDING PLATELETS    DUE TO PLATELETS IS LOW 52    DR COBIAN ORDERS PLATELETS EVERY TIME HAS SURGERY PLEASE NOTIFY DR COBIAN. SO CAN ORDERED PRIOR TO THIS SURGERY .

## 2023-10-26 NOTE — TELEPHONE ENCOUNTER
Called the wife back and she stated that he was having a surgery tomorrow on an aneurysm that is in his arm with Dr. Deal and that his last PLT count from 10/11/23 was 52. She stated that previously he has needed plt adminitered preop and she wanted to see what he thought. Patients wife v/u.

## 2023-10-26 NOTE — TELEPHONE ENCOUNTER
Called the patients wife after talking with Dr. Mckenzie and letting her know that Dr. Mckenzie thought she should talk with Dr. Deal and see if he thought the I&D would require plt and that if he needed anything else to call us. Patients wife v/u.

## 2023-10-27 ENCOUNTER — ANESTHESIA EVENT (OUTPATIENT)
Dept: PERIOP | Facility: HOSPITAL | Age: 78
End: 2023-10-27
Payer: MEDICARE

## 2023-10-27 ENCOUNTER — ANESTHESIA (OUTPATIENT)
Dept: PERIOP | Facility: HOSPITAL | Age: 78
End: 2023-10-27
Payer: MEDICARE

## 2023-10-27 ENCOUNTER — HOSPITAL ENCOUNTER (OUTPATIENT)
Facility: HOSPITAL | Age: 78
Setting detail: HOSPITAL OUTPATIENT SURGERY
Discharge: HOME | DRG: 314 | End: 2023-10-27
Attending: SURGERY | Admitting: SURGERY
Payer: MEDICARE

## 2023-10-27 VITALS
TEMPERATURE: 97.5 F | SYSTOLIC BLOOD PRESSURE: 125 MMHG | RESPIRATION RATE: 16 BRPM | OXYGEN SATURATION: 96 % | HEART RATE: 71 BPM | DIASTOLIC BLOOD PRESSURE: 69 MMHG

## 2023-10-27 DIAGNOSIS — I72.1: ICD-10-CM

## 2023-10-27 LAB
ANION GAP SERPL CALCULATED.3IONS-SCNC: 11.9 MMOL/L (ref 5–15)
BUN SERPL-MCNC: 58 MG/DL (ref 8–23)
BUN/CREAT SERPL: 8.3 (ref 7–25)
CALCIUM SPEC-SCNC: 9 MG/DL (ref 8.6–10.5)
CHLORIDE SERPL-SCNC: 93 MMOL/L (ref 98–107)
CO2 SERPL-SCNC: 22.1 MMOL/L (ref 22–29)
CREAT SERPL-MCNC: 6.95 MG/DL (ref 0.76–1.27)
DEPRECATED RDW RBC AUTO: 54.5 FL (ref 37–54)
EGFRCR SERPLBLD CKD-EPI 2021: 7.5 ML/MIN/1.73
ERYTHROCYTE [DISTWIDTH] IN BLOOD BY AUTOMATED COUNT: 15 % (ref 12.3–15.4)
GLUCOSE BLDC GLUCOMTR-MCNC: 73 MG/DL (ref 70–130)
GLUCOSE BLDC GLUCOMTR-MCNC: 88 MG/DL (ref 70–130)
GLUCOSE SERPL-MCNC: 101 MG/DL (ref 65–99)
HCT VFR BLD AUTO: 36.4 % (ref 37.5–51)
HGB BLD-MCNC: 11.9 G/DL (ref 13–17.7)
MCH RBC QN AUTO: 32.5 PG (ref 26.6–33)
MCHC RBC AUTO-ENTMCNC: 32.7 G/DL (ref 31.5–35.7)
MCV RBC AUTO: 99.5 FL (ref 79–97)
PLATELET # BLD AUTO: 92 10*3/MM3 (ref 140–450)
PMV BLD AUTO: 9 FL (ref 6–12)
POTASSIUM SERPL-SCNC: 4 MMOL/L (ref 3.5–5.2)
RBC # BLD AUTO: 3.66 10*6/MM3 (ref 4.14–5.8)
SODIUM SERPL-SCNC: 127 MMOL/L (ref 136–145)
WBC NRBC COR # BLD: 4.73 10*3/MM3 (ref 3.4–10.8)

## 2023-10-27 PROCEDURE — 87070 CULTURE OTHR SPECIMN AEROBIC: CPT | Performed by: SURGERY

## 2023-10-27 PROCEDURE — 88304 TISSUE EXAM BY PATHOLOGIST: CPT | Performed by: SURGERY

## 2023-10-27 PROCEDURE — 80048 BASIC METABOLIC PNL TOTAL CA: CPT | Performed by: SURGERY

## 2023-10-27 PROCEDURE — 85027 COMPLETE CBC AUTOMATED: CPT | Performed by: SURGERY

## 2023-10-27 PROCEDURE — 87205 SMEAR GRAM STAIN: CPT | Performed by: SURGERY

## 2023-10-27 PROCEDURE — 25010000002 CEFAZOLIN IN DEXTROSE 2-4 GM/100ML-% SOLUTION: Performed by: SURGERY

## 2023-10-27 PROCEDURE — 82948 REAGENT STRIP/BLOOD GLUCOSE: CPT

## 2023-10-27 PROCEDURE — 87075 CULTR BACTERIA EXCEPT BLOOD: CPT | Performed by: SURGERY

## 2023-10-27 PROCEDURE — 25810000003 SODIUM CHLORIDE 0.9 % SOLUTION: Performed by: SURGERY

## 2023-10-27 PROCEDURE — 25010000002 CEFAZOLIN PER 500 MG: Performed by: SURGERY

## 2023-10-27 PROCEDURE — 25010000002 PROPOFOL 200 MG/20ML EMULSION: Performed by: NURSE ANESTHETIST, CERTIFIED REGISTERED

## 2023-10-27 PROCEDURE — 25010000002 FENTANYL CITRATE (PF) 50 MCG/ML SOLUTION: Performed by: NURSE ANESTHETIST, CERTIFIED REGISTERED

## 2023-10-27 DEVICE — FLOSEAL WITH RECOTHROM - 10ML.
Type: IMPLANTABLE DEVICE | Site: ARM | Status: FUNCTIONAL
Brand: FLOSEAL HEMOSTATIC MATRIX

## 2023-10-27 RX ORDER — LIDOCAINE HYDROCHLORIDE 20 MG/ML
INJECTION, SOLUTION INFILTRATION; PERINEURAL AS NEEDED
Status: DISCONTINUED | OUTPATIENT
Start: 2023-10-27 | End: 2023-10-27 | Stop reason: SURG

## 2023-10-27 RX ORDER — HYDRALAZINE HYDROCHLORIDE 20 MG/ML
5 INJECTION INTRAMUSCULAR; INTRAVENOUS
Status: DISCONTINUED | OUTPATIENT
Start: 2023-10-27 | End: 2023-10-27 | Stop reason: HOSPADM

## 2023-10-27 RX ORDER — PROMETHAZINE HYDROCHLORIDE 25 MG/1
25 TABLET ORAL ONCE AS NEEDED
Status: DISCONTINUED | OUTPATIENT
Start: 2023-10-27 | End: 2023-10-27 | Stop reason: HOSPADM

## 2023-10-27 RX ORDER — LIDOCAINE HYDROCHLORIDE 10 MG/ML
0.5 INJECTION, SOLUTION INFILTRATION; PERINEURAL ONCE AS NEEDED
Status: DISCONTINUED | OUTPATIENT
Start: 2023-10-27 | End: 2023-10-27 | Stop reason: HOSPADM

## 2023-10-27 RX ORDER — PROPOFOL 10 MG/ML
INJECTION, EMULSION INTRAVENOUS AS NEEDED
Status: DISCONTINUED | OUTPATIENT
Start: 2023-10-27 | End: 2023-10-27 | Stop reason: SURG

## 2023-10-27 RX ORDER — SODIUM CHLORIDE 0.9 % (FLUSH) 0.9 %
3-10 SYRINGE (ML) INJECTION AS NEEDED
Status: DISCONTINUED | OUTPATIENT
Start: 2023-10-27 | End: 2023-10-27 | Stop reason: HOSPADM

## 2023-10-27 RX ORDER — FENTANYL CITRATE 50 UG/ML
25 INJECTION, SOLUTION INTRAMUSCULAR; INTRAVENOUS
Status: DISCONTINUED | OUTPATIENT
Start: 2023-10-27 | End: 2023-10-27 | Stop reason: HOSPADM

## 2023-10-27 RX ORDER — FLUMAZENIL 0.1 MG/ML
0.2 INJECTION INTRAVENOUS AS NEEDED
Status: DISCONTINUED | OUTPATIENT
Start: 2023-10-27 | End: 2023-10-27 | Stop reason: HOSPADM

## 2023-10-27 RX ORDER — HYDROCODONE BITARTRATE AND ACETAMINOPHEN 7.5; 325 MG/1; MG/1
1 TABLET ORAL EVERY 4 HOURS PRN
Status: DISCONTINUED | OUTPATIENT
Start: 2023-10-27 | End: 2023-10-27 | Stop reason: HOSPADM

## 2023-10-27 RX ORDER — SODIUM CHLORIDE 0.9 % (FLUSH) 0.9 %
3 SYRINGE (ML) INJECTION EVERY 12 HOURS SCHEDULED
Status: DISCONTINUED | OUTPATIENT
Start: 2023-10-27 | End: 2023-10-27 | Stop reason: HOSPADM

## 2023-10-27 RX ORDER — FENTANYL CITRATE 50 UG/ML
50 INJECTION, SOLUTION INTRAMUSCULAR; INTRAVENOUS ONCE AS NEEDED
Status: DISCONTINUED | OUTPATIENT
Start: 2023-10-27 | End: 2023-10-27 | Stop reason: HOSPADM

## 2023-10-27 RX ORDER — SODIUM CHLORIDE 9 MG/ML
9 INJECTION, SOLUTION INTRAVENOUS CONTINUOUS
Status: DISCONTINUED | OUTPATIENT
Start: 2023-10-27 | End: 2023-10-27 | Stop reason: HOSPADM

## 2023-10-27 RX ORDER — DROPERIDOL 2.5 MG/ML
0.62 INJECTION, SOLUTION INTRAMUSCULAR; INTRAVENOUS
Status: DISCONTINUED | OUTPATIENT
Start: 2023-10-27 | End: 2023-10-27 | Stop reason: HOSPADM

## 2023-10-27 RX ORDER — NALOXONE HCL 0.4 MG/ML
0.2 VIAL (ML) INJECTION AS NEEDED
Status: DISCONTINUED | OUTPATIENT
Start: 2023-10-27 | End: 2023-10-27 | Stop reason: HOSPADM

## 2023-10-27 RX ORDER — ONDANSETRON 2 MG/ML
4 INJECTION INTRAMUSCULAR; INTRAVENOUS ONCE AS NEEDED
Status: DISCONTINUED | OUTPATIENT
Start: 2023-10-27 | End: 2023-10-27 | Stop reason: HOSPADM

## 2023-10-27 RX ORDER — MIDAZOLAM HYDROCHLORIDE 1 MG/ML
0.5 INJECTION INTRAMUSCULAR; INTRAVENOUS
Status: DISCONTINUED | OUTPATIENT
Start: 2023-10-27 | End: 2023-10-27 | Stop reason: HOSPADM

## 2023-10-27 RX ORDER — SODIUM CHLORIDE, SODIUM LACTATE, POTASSIUM CHLORIDE, CALCIUM CHLORIDE 600; 310; 30; 20 MG/100ML; MG/100ML; MG/100ML; MG/100ML
9 INJECTION, SOLUTION INTRAVENOUS CONTINUOUS
Status: DISCONTINUED | OUTPATIENT
Start: 2023-10-27 | End: 2023-10-27

## 2023-10-27 RX ORDER — HYDROCODONE BITARTRATE AND ACETAMINOPHEN 5; 325 MG/1; MG/1
1 TABLET ORAL ONCE AS NEEDED
Status: DISCONTINUED | OUTPATIENT
Start: 2023-10-27 | End: 2023-10-27 | Stop reason: HOSPADM

## 2023-10-27 RX ORDER — IPRATROPIUM BROMIDE AND ALBUTEROL SULFATE 2.5; .5 MG/3ML; MG/3ML
3 SOLUTION RESPIRATORY (INHALATION) ONCE AS NEEDED
Status: DISCONTINUED | OUTPATIENT
Start: 2023-10-27 | End: 2023-10-27 | Stop reason: HOSPADM

## 2023-10-27 RX ORDER — FAMOTIDINE 10 MG/ML
20 INJECTION, SOLUTION INTRAVENOUS ONCE
Status: COMPLETED | OUTPATIENT
Start: 2023-10-27 | End: 2023-10-27

## 2023-10-27 RX ORDER — FENTANYL CITRATE 50 UG/ML
INJECTION, SOLUTION INTRAMUSCULAR; INTRAVENOUS AS NEEDED
Status: DISCONTINUED | OUTPATIENT
Start: 2023-10-27 | End: 2023-10-27 | Stop reason: SURG

## 2023-10-27 RX ORDER — DIPHENHYDRAMINE HYDROCHLORIDE 50 MG/ML
12.5 INJECTION INTRAMUSCULAR; INTRAVENOUS
Status: DISCONTINUED | OUTPATIENT
Start: 2023-10-27 | End: 2023-10-27 | Stop reason: HOSPADM

## 2023-10-27 RX ORDER — LABETALOL HYDROCHLORIDE 5 MG/ML
5 INJECTION, SOLUTION INTRAVENOUS
Status: DISCONTINUED | OUTPATIENT
Start: 2023-10-27 | End: 2023-10-27 | Stop reason: HOSPADM

## 2023-10-27 RX ORDER — PROMETHAZINE HYDROCHLORIDE 25 MG/1
25 SUPPOSITORY RECTAL ONCE AS NEEDED
Status: DISCONTINUED | OUTPATIENT
Start: 2023-10-27 | End: 2023-10-27 | Stop reason: HOSPADM

## 2023-10-27 RX ORDER — EPHEDRINE SULFATE 50 MG/ML
5 INJECTION, SOLUTION INTRAVENOUS ONCE AS NEEDED
Status: DISCONTINUED | OUTPATIENT
Start: 2023-10-27 | End: 2023-10-27 | Stop reason: HOSPADM

## 2023-10-27 RX ORDER — HYDROMORPHONE HYDROCHLORIDE 1 MG/ML
0.25 INJECTION, SOLUTION INTRAMUSCULAR; INTRAVENOUS; SUBCUTANEOUS
Status: DISCONTINUED | OUTPATIENT
Start: 2023-10-27 | End: 2023-10-27 | Stop reason: HOSPADM

## 2023-10-27 RX ORDER — CEFAZOLIN SODIUM 2 G/100ML
2000 INJECTION, SOLUTION INTRAVENOUS ONCE
Status: COMPLETED | OUTPATIENT
Start: 2023-10-27 | End: 2023-10-27

## 2023-10-27 RX ORDER — HYDROCODONE BITARTRATE AND ACETAMINOPHEN 7.5; 325 MG/1; MG/1
1 TABLET ORAL EVERY 4 HOURS PRN
Qty: 30 TABLET | Refills: 0 | Status: ON HOLD | OUTPATIENT
Start: 2023-10-27 | End: 2023-10-29

## 2023-10-27 RX ADMIN — FAMOTIDINE 20 MG: 10 INJECTION INTRAVENOUS at 09:48

## 2023-10-27 RX ADMIN — SODIUM CHLORIDE 9 ML/HR: 9 INJECTION, SOLUTION INTRAVENOUS at 11:28

## 2023-10-27 RX ADMIN — PROPOFOL 100 MG: 10 INJECTION, EMULSION INTRAVENOUS at 11:45

## 2023-10-27 RX ADMIN — CEFAZOLIN SODIUM 2000 MG: 2 INJECTION, SOLUTION INTRAVENOUS at 11:27

## 2023-10-27 RX ADMIN — FENTANYL CITRATE 50 MCG: 50 INJECTION, SOLUTION INTRAMUSCULAR; INTRAVENOUS at 11:45

## 2023-10-27 RX ADMIN — LIDOCAINE HYDROCHLORIDE 100 MG: 20 INJECTION, SOLUTION INFILTRATION; PERINEURAL at 11:45

## 2023-10-27 NOTE — ANESTHESIA POSTPROCEDURE EVALUATION
Patient: Bill Landry    Procedure Summary       Date: 10/27/23 Room / Location: [facility] OR [address] [place] [address] MAIN OR    Anesthesia Start: 1134 Anesthesia Stop: 1324    Procedure: LEFT ARM INCISION AND DRAINAGE (Left: Arm Upper) Diagnosis:     Surgeons: Bill Deal MD Provider: Cassie Smith MD    Anesthesia Type: general ASA Status: 4            Anesthesia Type: general    Vitals  Vitals Value Taken Time   /71 10/27/23 1345   Temp 36.3 °C (97.4 °F) 10/27/23 1321   Pulse 71 10/27/23 1400   Resp 12 10/27/23 1345   SpO2 97 % 10/27/23 1400   Vitals shown include unfiled device data.        Post Anesthesia Care and Evaluation    Patient location during evaluation: bedside  Patient participation: complete - patient participated  Level of consciousness: awake and alert  Pain score: 0  Pain management: adequate    Airway patency: patent  Anesthetic complications: No anesthetic complications    Cardiovascular status: acceptable  Respiratory status: acceptable  Hydration status: acceptable    Comments: /74   Pulse 62   Temp 36.3 °C (97.4 °F) (Oral)   Resp 16   SpO2 95%

## 2023-10-27 NOTE — ANESTHESIA PROCEDURE NOTES
Airway  Urgency: elective    Date/Time: 10/27/2023 11:49 AM    General Information and Staff    Patient location during procedure: OR  Anesthesiologist: Feng Mercer MD  CRNA/CAA: Ronna Edward CRNA    Indications and Patient Condition    Preoxygenated: yes  Mask difficulty assessment: 0 - not attempted    Final Airway Details  Final airway type: supraglottic airway      Successful airway: unique  Size 5     Number of attempts at approach: 1  Assessment: lips, teeth, and gum same as pre-op and atraumatic intubation    Additional Comments  Atraumatic, adeq seal, secured, MV/AV until SV

## 2023-10-27 NOTE — ANESTHESIA PREPROCEDURE EVALUATION
Anesthesia Evaluation     Patient summary reviewed and Nursing notes reviewed   NPO Solid Status: > 8 hours             Airway   Mallampati: II  TM distance: >3 FB  Neck ROM: limited  Dental      Pulmonary    (+) ,recent URI, sleep apnea on CPAP  Cardiovascular     ECG reviewed  PT is on anticoagulation therapy  Patient on routine beta blocker and Beta blocker given within 24 hours of surgery  Rhythm: irregular  Rate: normal    (+) hypertension, dysrhythmias Atrial Fib, CHF , pericardial effusion, hyperlipidemia      Neuro/Psych- negative ROS  GI/Hepatic/Renal/Endo    (+) GERD, GI bleeding , liver disease fatty liver disease, renal disease- stones, diabetes mellitus type 2    Musculoskeletal (-) negative ROS    Abdominal    Substance History - negative use     OB/GYN negative ob/gyn ROS         Other                          Anesthesia Plan    ASA 4     general     (I have reviewed the patient's history with the patient and the chart, including all pertinent laboratory results and imaging. I have explained the risks of anesthesia including but not limited to dental damage, corneal abrasion, nerve injury, MI, stroke, and death. Questions asked and answered. Anesthetic plan discussed with patient and team as indicated. Patient expressed understanding of the above.  )    Anesthetic plan, risks, benefits, and alternatives have been provided, discussed and informed consent has been obtained with: patient.        CODE STATUS:

## 2023-10-27 NOTE — OP NOTE
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    Post-Op Diagnosis Codes:  Same    Procedure:   1) exploration and resection of left arm pseudoaneurysm with primary closure    Surgeon: Feng Deal MD    Assistant: None    Anesthesia: General    Staff:   Circulator: Anyi Ruiz RN; Harvey Pizano RN  Scrub Person: Kelly Tomlinson    Estimated Blood Loss: 200ml    Specimens:   Order Name Source Comment Collection Info Order Time   ANAEROBIC CULTURE Arm, Left  Collected By: Bill Deal MD 10/27/2023 12:55 PM     Release to patient   Routine Release        CBC (NO DIFF)   Collected By: Lucy Addison RN 10/27/2023  9:08 AM     Release to patient   Routine Release        BASIC METABOLIC PANEL   Collected By: Lucy Addison RN 10/27/2023  9:08 AM     Release to patient   Routine Release        TISSUE PATHOLOGY EXAM Arm, Left  Collected By: Bill Deal MD 10/27/2023 12:55 PM     Release to patient   Routine Release             Complications: None    Findings: See dictation    Indications:    The patient is an 78 y.o. male seen for evaluation with an expanding pseudoaneurysms of the defunctionalized segment of his left AV fistula that is now grown despite being nonpressurized.  Skin is now eroding and resection is required.  Patient understands risk benefits complications of the procedure and consents to the procedure.         Procedure:    The patient was prepped and draped sterilely.  With general anesthetic via LMA induced the patient had incision made encompassing the necrotic tissue area of injury is seen and then up onto the area of large aneurysmal change on the upper medial arm.  Care was taken to stay away from the newly routed AV shunt sections.  With entry into the pseudoaneurysm again there is a large amount of liquid old venous appearing blood was drained.  No pulsatile  or significant venous bleeding site was seen but there is significant ooze from all sites.  Patient runs a low platelet count but is adequate today at 92.  Patient then had resection of the lips of skin that would allow us to close the other skin with out much tension.  We irrigated and placed Floseal in the nasal wound as well as performed Bovie cautery of any bleeding sites.  With this completed in the wound site debrided and large amounts of thrombus both mural and subacute removed we were able to close the deep tissue with a running 2-0 Vicryl suture followed by 3-0 Vicryl subcuticular closure and staples.  The patient tolerated the procedure well.  The aneurysm resected measured initially 10 x 11 cm in nature.  It was resected completely down to flush with the arm.          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      Bill Deal MD     Date: 10/27/2023  Time: 13:56 EDT

## 2023-10-27 NOTE — H&P
[facility]   HISTORY AND PHYSICAL    Patient Name:Bill Landry  : 1945  MRN: [id number]  Primary Care Physician: Bharathi De La Torre MD  Date of admission: 10/27/2023    Subjective   Subjective     Chief Complaint: left arm thrombosed aneurysm with skin erosion and bleeding    History of Present Illness   Bill Landry is a 78 y.o. 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  Decompression and resection of the injured skin is required to prevent significant infection of the thrombotic material.  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    Bicuspid aortic valve 2022    Chronic leukopenia and thrombocytopenia     Cirrhosis of liver without ascites 2017    Congestive splenomegaly 2017    Crohn disease     with ileostomy    Diastolic CHF     although it was likely from volume overload due to ESRD    Diverticula of colon     ESRD on hemodialysis     M-W-F [facility]    Fatty liver disease, nonalcoholic     GERD (gastroesophageal reflux disease)     GI bleed     Glaucoma     H/O Gallstone pancreatitis     H/O Pericardial effusion     H/O sinus bradycardia     Heart murmur     History of hypertension     resolved    History of UTI     Hx of renal calculi     Ileostomy present     Iron deficiency     MGUS (monoclonal gammopathy of unknown significance)     TATE (obstructive sleep apnea)     Permanent atrial fibrillation     Sleep apnea     CPAP USED    Thrombocytopenia     Type 2 diabetes mellitus     CURRENTLY TAKES NO MED    Urinary retention     Post-OP       Past Surgical History:   Procedure Laterality Date    APPENDECTOMY      ARTERIOVENOUS FISTULA/SHUNT  SURGERY Left 08/08/2017    Procedure: LEFT BRACHIAL CEPHALIC AV FISTULA FORMATION WITH CEPHALIC VEIN TRANSPOSITION ;  Surgeon: Bill Deal MD;  Location: [facility] MAIN OR;  Service:     ARTERIOVENOUS FISTULA/SHUNT SURGERY Left 5/27/2022    Procedure: OPEN REVISION LEFT ARTERIOVENOUS INTERPOSITION GRAFT PLACEMENT;  Surgeon: Bill Deal MD;  Location: [facility] MAIN OR;  Service: Vascular;  Laterality: Left;    ARTERIOVENOUS FISTULA/SHUNT SURGERY W/ HEMODIALYSIS CATHETER INSERTION Left 02/15/2022    Procedure: OPEN LEFT ARM ARTERIOVENOUS FISTULA THROMBECTOMY WITH CEPHALIC VEIN ARTHROPLASTY AND STENT/GRAFT PLACEMENT;  Surgeon: Bill Deal MD;  Location: [facility] OR 18/19;  Service: Vascular;  Laterality: Left;    CATARACT EXTRACTION WITH INTRAOCULAR LENS IMPLANT Bilateral     CHOLECYSTECTOMY      COLECTOMY PARTIAL / TOTAL      History of inflammatory bowel disease with status post colectomy with ileostomy many years ago in the [facility],  18 YEARS OF AGE    COLONOSCOPY      CYSTOSCOPY LITHOLAPAXY BLADDER STONE EXTRACTION      ENDOSCOPY  01/16/2015    gastritis    ENDOSCOPY  01/17/2018    Procedure: ESOPHAGOGASTRODUODENOSCOPY;  Surgeon: Warner Neville MD;  Location: [facility] ENDOSCOPY;  Service:     ILEOSCOPY  01/16/2015    normal    ILEOSCOPY N/A 01/17/2018    Procedure: ILEOSCOPY;  Surgeon: Warner Neville MD;  Location: [facility] ENDOSCOPY;  Service:     TONSILLECTOMY         Family History: His family history includes Heart disease in his mother; Heart failure in his mother; Hyperlipidemia in his mother; Hypertension in his father and mother.     Social History: He  reports that he has never smoked. He has never used smokeless tobacco. He reports that he does not drink alcohol and does not use drugs.    Home Medications:  Ferrous Sulfate, Iron Sucrose, Loratadine, Methoxy PEG-Epoetin Beta, Umm-Peter, Sucroferric Oxyhydroxide, alfuzosin, aspirin, famotidine, latanoprost, lidocaine-prilocaine, and  sodium chloride    Allergies:  He is allergic to ace inhibitors, contrast dye (echo or unknown ct/mr), iodine, angiotensin receptor blockers, eliquis [apixaban], filgrastim, keflex [cephalexin], and other.    Objective    Objective     Vitals:       Output by Drain (mL) 10/26/23 0701 - 10/26/23 1900 10/26/23 1901 - 10/27/23 0700 10/27/23 0701 - 10/27/23 0929 Range Total   Requested LDAs do not have output data documented.       Physical Exam   Left arm with draining area of aneurysmal change.  No obvious infection at this time.  Lungs coarse but equal  Abdomen benign    Result Review    Result Review:  I have personally reviewed the results from the time of this admission to 10/27/2023 09:29 EDT and agree with these findings:  []  Laboratory list / accordion  []  Microbiology  []  Radiology  []  EKG/Telemetry   []  Cardiology/Vascular   []  Pathology  []  Old records  []  Other:  Most notable findings include: none      Assessment & Plan   Assessment / Plan     Brief Patient Summary:  Bill Landry is a 78 y.o. male with eroded skin at large aneurysm of left AV fistula that is now defunctionalized.  Patient patient has the need for resection and decompression of this with closure of skin over a drain.  This will be done as an outpatient he understands risk benefits complications and agrees to procedure    Active Hospital Problems:  Active Hospital Problems    Diagnosis     Aneurysm artery, upper extremity      Plan:   Aneurysmectomy of left upper extremity thrombosed aneurysm    DVT prophylaxis:  SCDs    Bill Deal MD

## 2023-10-27 NOTE — BRIEF OP NOTE
INCISION AND DRAINAGE UPPER EXTREMITY  Progress Note    Bill Landry  10/27/2023    Pre-op Diagnosis:   Expanding left upper extremity arteriovenous pseudoaneurysm with skin necrosis       Post-Op Diagnosis Codes:  same    Procedure/CPT® Codes:        Procedure(s):  LEFT ARM resection of left arteriovenous fistula pseudoaneurysm with skin debridement and primary closure              Surgeon(s):  Bill Deal MD    Anesthesia: General    Staff:   Circulator: Anyi Ruiz RN; Harvey Pizano RN  Scrub Person: Kelly Tomlinson         Estimated Blood Loss: 200 cc    Urine Voided: * No values recorded between 10/27/2023 11:34 AM and 10/27/2023 12:57 PM *    Specimens:                Specimens       ID Source Type Tests Collected By Collected At Frozen?    A Arm, Left Tissue TISSUE PATHOLOGY EXAM   Bill Deal MD 10/27/23 1226     Description: Pseudoaneurysm wall and mural thrombus Left Arm    This specimen was not marked as sent.    B Arm, Left Tissue ANAEROBIC CULTURE  TISSUE PATHOLOGY EXAM   Bill Deal MD 10/27/23 1254     This specimen was not marked as sent.                  Drains:   Ileostomy LLQ (Active)       Findings: see dictation        Complications: None          Bill Deal MD     Date: 10/27/2023  Time: 13:11 EDT

## 2023-10-28 ENCOUNTER — HOSPITAL ENCOUNTER (EMERGENCY)
Facility: HOSPITAL | Age: 78
Discharge: HOME | DRG: 314 | End: 2023-10-28
Attending: EMERGENCY MEDICINE
Payer: MEDICARE

## 2023-10-28 VITALS
HEART RATE: 66 BPM | SYSTOLIC BLOOD PRESSURE: 132 MMHG | OXYGEN SATURATION: 98 % | RESPIRATION RATE: 16 BRPM | BODY MASS INDEX: 25.05 KG/M2 | DIASTOLIC BLOOD PRESSURE: 71 MMHG | HEIGHT: 70 IN | WEIGHT: 175 LBS | TEMPERATURE: 97.4 F

## 2023-10-28 DIAGNOSIS — R58 BLEEDING: Primary | ICD-10-CM

## 2023-10-28 LAB
LAB AP CASE REPORT: NORMAL
PATH REPORT.FINAL DX SPEC: NORMAL
PATH REPORT.GROSS SPEC: NORMAL

## 2023-10-28 PROCEDURE — 99282 EMERGENCY DEPT VISIT SF MDM: CPT

## 2023-10-28 NOTE — CONSULTS
Name: Bill Landry ADMIT: 10/28/2023   : 1945  PCP: Bharathi De La Torre MD    MRN: [id number] LOS: 0 days   AGE/SEX: 78 y.o. male  ROOM:    [facility]      Patient Care Team:  Bharathi De La Torre MD as PCP - General (Internal Medicine)  Sharif Mckenzie MD as Consulting Physician (Hematology and Oncology)  Marcellus Osborne MD as Referring Physician (Internal Medicine)  Ken Moseley MD as Consulting Physician (Pulmonary Disease)  Cooper Virgen MD as Consulting Physician (Urology)  Dale Vázquez MD as Consulting Physician (Cardiology)  Myra Moore MD as Consulting Physician (Nephrology)  Chief Complaint   Patient presents with    Post-op Problem     CC: Bleeding from left arm incision    Subjective     Inpatient Vascular Surgery Consult  Consult performed by: Boom Olivares II, MD  Consult ordered by: Tab Ferrer II, MD        History of Present Illness    Patient is a pleasant 78-year-old gentleman history of end-stage renal disease requiring dialysis who is now postop day 1 status post open left arm arteriovenous fistula pseudoaneurysm repair by Dr. Deal.  The patient was on route to dialysis today when he noted bleeding from his dressing.  They came to the ER on the way to dialysis.  The patient reports history of thrombocytopenia and per Dr. Singh's note had platelet level of 92 preoperatively.  Patient is also on aspirin.   The patient has no other acute complaints at this time but is quite anxious.   The patient fistula has a great thrill in it and the incision appears to be in good working order however there is some oozing from the incision.  There is no palpable pulsatile mass or large hematoma or any other sign of significant surgical complication.    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    Bicuspid aortic valve 2022    Chronic  leukopenia and thrombocytopenia     Cirrhosis of liver without ascites 07/24/2017    Congestive splenomegaly 07/24/2017    Crohn disease     with ileostomy    Diastolic CHF     although it was likely from volume overload due to ESRD    Diverticula of colon     ESRD on hemodialysis     M-W-F [facility]    Fatty liver disease, nonalcoholic     GERD (gastroesophageal reflux disease)     GI bleed     Glaucoma     H/O Gallstone pancreatitis     H/O Pericardial effusion     H/O sinus bradycardia     Heart murmur     History of hypertension     resolved    History of UTI     Hx of renal calculi     Ileostomy present     Iron deficiency     MGUS (monoclonal gammopathy of unknown significance)     TATE (obstructive sleep apnea)     Permanent atrial fibrillation     Sleep apnea     CPAP USED    Thrombocytopenia     Type 2 diabetes mellitus     CURRENTLY TAKES NO MED    Urinary retention     Post-OP     Past Surgical History:   Procedure Laterality Date    APPENDECTOMY      ARTERIOVENOUS FISTULA/SHUNT SURGERY Left 08/08/2017    Procedure: LEFT BRACHIAL CEPHALIC AV FISTULA FORMATION WITH CEPHALIC VEIN TRANSPOSITION ;  Surgeon: Bill Deal MD;  Location: [facility];  Service:     ARTERIOVENOUS FISTULA/SHUNT SURGERY Left 5/27/2022    Procedure: OPEN REVISION LEFT ARTERIOVENOUS INTERPOSITION GRAFT PLACEMENT;  Surgeon: Bill Deal MD;  Location: [facility];  Service: Vascular;  Laterality: Left;    ARTERIOVENOUS FISTULA/SHUNT SURGERY W/ HEMODIALYSIS CATHETER INSERTION Left 02/15/2022    Procedure: OPEN LEFT ARM ARTERIOVENOUS FISTULA THROMBECTOMY WITH CEPHALIC VEIN ARTHROPLASTY AND STENT/GRAFT PLACEMENT;  Surgeon: iBll Deal MD;  Location: [facility] OR 18/19;  Service: Vascular;  Laterality: Left;    CATARACT EXTRACTION WITH INTRAOCULAR LENS IMPLANT Bilateral     CHOLECYSTECTOMY      COLECTOMY PARTIAL / TOTAL      History of inflammatory bowel disease with status post colectomy with ileostomy many years ago  in the [facility],  18 YEARS OF AGE    COLONOSCOPY      CYSTOSCOPY LITHOLAPAXY BLADDER STONE EXTRACTION      ENDOSCOPY  01/16/2015    gastritis    ENDOSCOPY  01/17/2018    Procedure: ESOPHAGOGASTRODUODENOSCOPY;  Surgeon: Warner Neville MD;  Location: [facility] ENDOSCOPY;  Service:     ILEOSCOPY  01/16/2015    normal    ILEOSCOPY N/A 01/17/2018    Procedure: ILEOSCOPY;  Surgeon: Warner Neville MD;  Location: [facility] ENDOSCOPY;  Service:     TONSILLECTOMY       Family History   Problem Relation Age of Onset    Heart failure Mother     Hypertension Mother     Heart disease Mother     Hyperlipidemia Mother     Hypertension Father     Malig Hyperthermia Neg Hx        Social History     Tobacco Use    Smoking status: Never    Smokeless tobacco: Never   Vaping Use    Vaping Use: Never used   Substance Use Topics    Alcohol use: No     Comment: caffeine use: none    Drug use: No     (Not in a hospital admission)      No current facility-administered medications for this encounter.      Ace inhibitors, Contrast dye (echo or unknown ct/mr), Iodine, Angiotensin receptor blockers, Eliquis [apixaban], Filgrastim, Keflex [cephalexin], and Other    Objective     Physical Exam:  Physical Exam  Vitals reviewed.   Constitutional:       General: He is not in acute distress.     Comments: Anxious   Cardiovascular:      Rate and Rhythm: Normal rate and regular rhythm.   Pulmonary:      Effort: Pulmonary effort is normal. No respiratory distress.   Musculoskeletal:      Comments: Left arm arteriovenous fistula with excellent thrill   Skin:     Comments: Left arm arteriovenous fistula incision intact with staples.  Very mild ooze from skin edges.  No hematoma or pulsatile mass concerning for surgical complication.   Neurological:      Mental Status: He is alert.            Vital Signs and Labs:  Vital Signs Patient Vitals for the past 24 hrs:   BP Temp Temp src Pulse Resp SpO2 Height Weight   10/28/23 1203 132/71 -- -- 66 -- 98 % --  "--   10/28/23 1157 -- -- -- 70 -- 100 % -- --   10/28/23 1152 -- -- -- 65 -- 99 % -- --   10/28/23 1151 140/65 -- -- -- -- -- -- --   10/28/23 1147 140/65 -- -- -- -- -- 177.8 cm (70\") 79.4 kg (175 lb)   10/28/23 1145 -- 97.4 °F (36.3 °C) Tympanic 76 16 96 % -- --     I/O:  No intake/output data recorded.    CBC    Results from last 7 days   Lab Units 10/27/23  0938   WBC 10*3/mm3 4.73   HEMOGLOBIN g/dL 11.9*   PLATELETS 10*3/mm3 92*     BMP   Results from last 7 days   Lab Units 10/27/23  0938   SODIUM mmol/L 127*   POTASSIUM mmol/L 4.0   CHLORIDE mmol/L 93*   CO2 mmol/L 22.1   BUN mg/dL 58*   CREATININE mg/dL 6.95*   GLUCOSE mg/dL 101*     Cr Clearance Estimated Creatinine Clearance: 9.8 mL/min (A) (by C-G formula based on SCr of 6.95 mg/dL (H)).  Coag     HbA1C   Lab Results   Component Value Date    HGBA1C 5.20 09/26/2023    HGBA1C 6.20 (H) 05/12/2023    HGBA1C 4.80 01/20/2018     Blood Glucose   Glucose   Date/Time Value Ref Range Status   10/27/2023 1338 88 70 - 130 mg/dL Final   10/27/2023 0859 73 70 - 130 mg/dL Final     Infection   Results from last 7 days   Lab Units 10/27/23  1459   WOUNDCX  No growth     CMP   Results from last 7 days   Lab Units 10/27/23  0938   SODIUM mmol/L 127*   POTASSIUM mmol/L 4.0   CHLORIDE mmol/L 93*   CO2 mmol/L 22.1   BUN mg/dL 58*   CREATININE mg/dL 6.95*   GLUCOSE mg/dL 101*     ABG      UA      JODY  No results found for: \"POCMETH\", \"POCAMPHET\", \"POCBARBITUR\", \"POCBENZO\", \"POCCOCAINE\", \"POCOPIATES\", \"POCOXYCODO\", \"POCPHENCYC\", \"POCPROPOXY\", \"POCTHC\", \"POCTRICYC\"  [facility]   Results from last 7 days   Lab Units 10/27/23  0938   HEMOGLOBIN g/dL 11.9*   PLATELETS 10*3/mm3 92*   CREATININE mg/dL 6.95*     Radiology(recent) No radiology results for the last day    There are no hospital problems to display for this patient.    Problem Points:  3:  Patient has a new problem, with no additional work-up planned (max of 1)  Total problem points:3    Data Points:  1:  I have reviewed " or order clinical lab test  2:  I have reviewed and summation of old records and/or discussed the patients care with another health care provider  Total data points:3    Risk Points:  Moderate:  Minor surgery with risk factors, major elective surgery without risk factors, perscription medications, or IVF with additives    MDM requires 2/3 (Problem points, Data points and Risk)  MDM Prob point Data point Risk   SF 1 1 Minimal   Low 2 2 Low   Mod 3 3 Moderate   High 4 4 High     Code requires 3/3 (MDM, History and Exam)  Code MDM History Exam Time   09014 SF/Low Detailed Detailed 30   47020 Mod Comprehensive Comprehensive 50   73079 High Comprehensive Comprehensive 70     Detailed history:  4 elements HPI or status of 3 chronic problems; 2-9 system ROS  Comprehensive:  4 elements HPI or status of 3 chronic problems;  10 system ROS    Detailed Exam:  12 findings from any organ system  Comprehensive Exam:  2 findings from each of 9 systems.   03684    Assessment & Plan       * No active hospital problems. *      78 y.o. male with some bleeding from his surgical incision from the repair of a left arm arteriovenous fistula pseudoaneurysm yesterday.  He does not appear to have had any significant surgical complication.  There is no hematoma or evidence of new or ongoing pseudoaneurysm.  The incision appears to be in good working order with only some slight oozing from the skin edge likely due to the patient's kidney dysfunction, thrombocytopenia, and aspirin use.  The patient's arm was cleaned and then dressed with 4 x 4 gauze and island dressing and wrapped with Kerlix.  Patient able to proceed to dialysis.  Patient and his son were instructed to call the office if they have any ongoing problems.  They are giving some dressing change supplies to use if needed.  They were in agreement with this plan.    I discussed the patients findings and my recommendations with patient and nursing staff.    Boom Olivares II,  MD  10/28/23  12:38 EDT    Please call my office with any question: [phone]

## 2023-10-28 NOTE — ED PROVIDER NOTES
EMERGENCY DEPARTMENT ENCOUNTER    Room Number:  09/09  PCP: Bharathi De La Torre MD      HPI:  Chief Complaint: Postoperative bleeding  A complete HPI/ROS/PMH/PSH/SH/FH are unobtainable due to: None  Context: Bill Landry is a 78 y.o. male who presents to the ED c/o postoperative bleeding.  Patient had pseudoaneurysm repaired yesterday in his left arm.  About 20 minutes prior to arrival he had some bleeding to the inferior portion of the dressing.  He was on his way to dialysis.  He was instructed not to change his dressing until Monday which is why presents here today.          PAST MEDICAL HISTORY  Active Ambulatory Problems     Diagnosis Date Noted    Permanent atrial fibrillation 05/02/2016    Thrombocytopenia 05/02/2016    Chronic leukopenia 05/02/2016    Cirrhosis of liver without ascites 07/24/2017    Congestive splenomegaly 07/24/2017    Anemia due to stage 4 chronic kidney disease treated with erythropoietin 10/04/2017    Esophageal abnormality 01/18/2018    At risk for fluid volume overload 03/21/2019    End stage kidney disease 03/21/2019    Other specified glaucoma 05/21/2019    Dependence on renal dialysis 09/03/2020    Allergy, unspecified, initial encounter 10/09/2020    Crohn's disease, unspecified, without complications 01/18/2018    Deficiency of other specified B group vitamins 01/19/2018    Dermatitis, unspecified 01/18/2018    Encounter for immunization 02/19/2018    Essential (primary) hypertension 01/18/2018    History of urinary stone 01/18/2018    Hyperparathyroidism, unspecified 01/19/2018    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, with stage 1 through stage 4 chronic kidney disease, or unspecified chronic kidney disease 10/18/2019    Other disorders of phosphorus metabolism 02/11/2021    Other iron deficiency anemias 02/10/2018    Pericardial effusion (noninflammatory) 01/18/2018    Pure hypertriglyceridemia 01/18/2018    Renal osteodystrophy 01/18/2018     Secondary hyperparathyroidism of renal origin 01/18/2018    Splenomegaly, not elsewhere classified 01/18/2018    Bilateral pseudophakia 05/05/2021    Dermatochalasis of eyelid 05/05/2021    Primary open-angle glaucoma, bilateral, moderate stage 05/05/2021    Puckering of macula, left eye 05/05/2021    Secondary cataract 05/05/2021    AV fistula occlusion, initial encounter 02/15/2022    TATE (obstructive sleep apnea)     Aneurysm artery, 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    Aortic stenosis, mild 07/20/2022    Bicuspid aortic valve 07/20/2022    Hyperuricemia without signs of inflammatory arthritis and tophaceous disease 08/03/2022    Presbyopia 05/13/2021    Shortness of breath 02/19/2022    Acute hypoxemic respiratory failure due to COVID-19 05/11/2023    Acute on chronic diastolic heart failure 05/11/2023    Impaired glucose tolerance 05/12/2023    Acute cough 05/17/2023     Resolved Ambulatory Problems     Diagnosis Date Noted    Anemia 01/12/2018    Pneumonia of both lungs due to infectious organism 01/19/2018    Anaphylactic shock, unspecified, initial encounter 10/09/2020    Moderate protein-calorie malnutrition 12/16/2020    Sleep apnea, unspecified 01/18/2018    Unspecified atrial fibrillation 01/19/2018     Past Medical History:   Diagnosis Date    Abnormal serum protein electrophoresis     Aneurysm of artery of arm     Crohn disease     Diastolic CHF     Diverticula of colon     ESRD on hemodialysis     Fatty liver disease, nonalcoholic     GERD (gastroesophageal reflux disease)     GI bleed     Glaucoma     H/O Gallstone pancreatitis     H/O Pericardial effusion     H/O sinus bradycardia     Heart murmur     History of hypertension     History of UTI     Hx of renal calculi     Ileostomy present     Iron deficiency     MGUS (monoclonal gammopathy of unknown significance)     Sleep apnea     Type 2 diabetes mellitus     Urinary retention          PAST SURGICAL HISTORY  Past Surgical History:   Procedure Laterality  Date    APPENDECTOMY      ARTERIOVENOUS FISTULA/SHUNT SURGERY Left 08/08/2017    Procedure: LEFT BRACHIAL CEPHALIC AV FISTULA FORMATION WITH CEPHALIC VEIN TRANSPOSITION ;  Surgeon: Bill Deal MD;  Location: [facility] OR;  Service:     ARTERIOVENOUS FISTULA/SHUNT SURGERY Left 5/27/2022    Procedure: OPEN REVISION LEFT ARTERIOVENOUS INTERPOSITION GRAFT PLACEMENT;  Surgeon: Bill Deal MD;  Location: [facility] MAIN OR;  Service: Vascular;  Laterality: Left;    ARTERIOVENOUS FISTULA/SHUNT SURGERY W/ HEMODIALYSIS CATHETER INSERTION Left 02/15/2022    Procedure: OPEN LEFT ARM ARTERIOVENOUS FISTULA THROMBECTOMY WITH CEPHALIC VEIN ARTHROPLASTY AND STENT/GRAFT PLACEMENT;  Surgeon: Bill Deal MD;  Location: [facility] OR 18/19;  Service: Vascular;  Laterality: Left;    CATARACT EXTRACTION WITH INTRAOCULAR LENS IMPLANT Bilateral     CHOLECYSTECTOMY      COLECTOMY PARTIAL / TOTAL      History of inflammatory bowel disease with status post colectomy with ileostomy many years ago in the [facility],  18 YEARS OF AGE    COLONOSCOPY      CYSTOSCOPY LITHOLAPAXY BLADDER STONE EXTRACTION      ENDOSCOPY  01/16/2015    gastritis    ENDOSCOPY  01/17/2018    Procedure: ESOPHAGOGASTRODUODENOSCOPY;  Surgeon: Warner Neville MD;  Location: [facility] ENDOSCOPY;  Service:     ILEOSCOPY  01/16/2015    normal    ILEOSCOPY N/A 01/17/2018    Procedure: ILEOSCOPY;  Surgeon: Warner Neville MD;  Location: [facility] ENDOSCOPY;  Service:     TONSILLECTOMY           FAMILY HISTORY  Family History   Problem Relation Age of Onset    Heart failure Mother     Hypertension Mother     Heart disease Mother     Hyperlipidemia Mother     Hypertension Father     Malig Hyperthermia Neg Hx          SOCIAL HISTORY  Social History     Socioeconomic History    Marital status:      Spouse name: Gillian    Number of children: 2    Years of education: College   Tobacco Use    Smoking status: Never    Smokeless tobacco: Never   Vaping Use     Vaping Use: Never used   Substance and Sexual Activity    Alcohol use: No     Comment: caffeine use: none    Drug use: No    Sexual activity: Defer         ALLERGIES  Ace inhibitors, Contrast dye (echo or unknown ct/mr), Iodine, Angiotensin receptor blockers, Eliquis [apixaban], Filgrastim, Keflex [cephalexin], and Other        REVIEW OF SYSTEMS  Review of Systems     All systems reviewed and negative except for those discussed in HPI.       PHYSICAL EXAM  ED Triage Vitals   Temp Heart Rate Resp BP SpO2   10/28/23 1145 10/28/23 1145 10/28/23 1145 10/28/23 1151 10/28/23 1145   97.4 °F (36.3 °C) 76 16 140/65 96 %      Temp src Heart Rate Source Patient Position BP Location FiO2 (%)   10/28/23 1145 10/28/23 1145 -- -- --   Tympanic Monitor          Physical Exam      GENERAL: no acute distress  HENT: nares patent  EYES: no scleral icterus  CV: regular rhythm, normal rate, 2+ left radial pulse, good capillary refill to the digits of the left hand, palpable thrill to the left AV fistula  RESPIRATORY: normal effort  MUSCULOSKELETAL: no deformity  NEURO: alert, moves all extremities, follows commands  PSYCH:  calm, cooperative  SKIN: warm, dry      Vital signs and nursing notes reviewed.          LAB RESULTS  Recent Results (from the past 24 hour(s))   POC Glucose Once    Collection Time: 10/27/23  1:38 PM    Specimen: Blood   Result Value Ref Range    Glucose 88 70 - 130 mg/dL   Wound Culture - Wound, Arm, Left    Collection Time: 10/27/23  2:59 PM    Specimen: Arm, Left; Wound   Result Value Ref Range    Wound Culture No growth     Gram Stain No WBCs seen     Gram Stain No organisms seen        Ordered the above labs and reviewed the results.        RADIOLOGY  No Radiology Exams Resulted Within Past 24 Hours    Ordered the above noted radiological studies. Reviewed by me in PACS.          PROCEDURES  Procedures        MEDICATIONS GIVEN IN ER  Medications - No data to display      MEDICAL DECISION MAKING, PROGRESS, and  CONSULTS    Discussion below represents my analysis of pertinent findings related to patient's condition, differential diagnosis, treatment plan and final disposition.      Orders placed during this visit:  Orders Placed This Encounter   Procedures    Vascular Surgery Consult               Differential diagnosis:    Bleeding diathesis, wound dehiscence          Independent interpretation of labs, radiology studies, and discussions with consultants:  ED Course as of 10/28/23 1302   Sat Oct 28, 2023   1148 On medical chart review, reviewed the operative note from yesterday by Dr. Deal, vascular surgery.  Patient had exploration and resection of left arm pseudoaneurysm with primary closure. [TD]      ED Course User Index  [TD] Tab Ferrer II, MD     I discussed the case with Dr. Olivares, vascular surgery.  He came down at bedside to see the patient.  Dressings were changed.  Okay for discharge home.    I see no need for lab work as his bleeding is very minimal as shown in the photo above.      DIAGNOSIS  Final diagnoses:   Bleeding         DISPOSITION  DISCHARGE    FOLLOW-UP  Bill Deal MD  [address] [address]  [phone]    Call   As needed         Medication List      No changes were made to your prescriptions during this visit.             Latest Documented Vital Signs:  As of 13:02 EDT  BP- 132/71 HR- 66 Temp- 97.4 °F (36.3 °C) (Tympanic) O2 sat- 98%      --    Please note that portions of this were completed with a voice recognition program.       Note Disclaimer: At [facility], we believe that sharing information builds trust and better relationships. You are receiving this note because you are receiving care at [facility] or recently visited. It is possible you will see health information before a provider has talked with you about it. This kind of information can be easy to misunderstand. To help you fully understand what it means for your health, we urge you to  discuss this note with your provider.         Tab Ferrer II, MD  10/28/23 [id number]

## 2023-10-29 ENCOUNTER — HOSPITAL ENCOUNTER (INPATIENT)
Facility: HOSPITAL | Age: 78
LOS: 2 days | Discharge: HOME | DRG: 314 | End: 2023-11-01
Attending: EMERGENCY MEDICINE | Admitting: STUDENT IN AN ORGANIZED HEALTH CARE EDUCATION/TRAINING PROGRAM
Payer: MEDICARE

## 2023-10-29 DIAGNOSIS — D64.9 CHRONIC ANEMIA: ICD-10-CM

## 2023-10-29 DIAGNOSIS — Z99.2 END-STAGE RENAL DISEASE ON HEMODIALYSIS: ICD-10-CM

## 2023-10-29 DIAGNOSIS — R58 BLEEDING: Primary | ICD-10-CM

## 2023-10-29 DIAGNOSIS — D69.6 THROMBOCYTOPENIA: ICD-10-CM

## 2023-10-29 DIAGNOSIS — N18.6 END-STAGE RENAL DISEASE ON HEMODIALYSIS: ICD-10-CM

## 2023-10-29 PROBLEM — D68.8 HYPOFIBRINOGENEMIA: Status: ACTIVE | Noted: 2023-10-29

## 2023-10-29 LAB
ALBUMIN SERPL-MCNC: 3 G/DL (ref 3.5–5.2)
ALP SERPL-CCNC: 134 U/L (ref 39–117)
ALT SERPL W P-5'-P-CCNC: 10 U/L (ref 1–41)
ANION GAP SERPL CALCULATED.3IONS-SCNC: 11 MMOL/L (ref 5–15)
ANION GAP SERPL CALCULATED.3IONS-SCNC: 12 MMOL/L (ref 5–15)
AST SERPL-CCNC: 26 U/L (ref 1–40)
BASOPHILS # BLD AUTO: 0.02 10*3/MM3 (ref 0–0.2)
BASOPHILS NFR BLD AUTO: 0.7 % (ref 0–1.5)
BILIRUB CONJ SERPL-MCNC: 0.4 MG/DL (ref 0–0.3)
BILIRUB INDIRECT SERPL-MCNC: 0.5 MG/DL
BILIRUB SERPL-MCNC: 0.9 MG/DL (ref 0–1.2)
BUN SERPL-MCNC: 45 MG/DL (ref 8–23)
BUN SERPL-MCNC: 45 MG/DL (ref 8–23)
BUN/CREAT SERPL: 7.7 (ref 7–25)
BUN/CREAT SERPL: 7.7 (ref 7–25)
CALCIUM SPEC-SCNC: 8.5 MG/DL (ref 8.6–10.5)
CALCIUM SPEC-SCNC: 8.6 MG/DL (ref 8.6–10.5)
CHLORIDE SERPL-SCNC: 101 MMOL/L (ref 98–107)
CHLORIDE SERPL-SCNC: 101 MMOL/L (ref 98–107)
CLOSURE TME COLL+ADP BLD: >300 SECONDS (ref 52–122)
CLOSURE TME COLL+EPINEP BLD: >300 SECONDS (ref 68–148)
CO2 SERPL-SCNC: 28 MMOL/L (ref 22–29)
CO2 SERPL-SCNC: 29 MMOL/L (ref 22–29)
CREAT SERPL-MCNC: 5.81 MG/DL (ref 0.76–1.27)
CREAT SERPL-MCNC: 5.82 MG/DL (ref 0.76–1.27)
DEPRECATED RDW RBC AUTO: 52.9 FL (ref 37–54)
DEPRECATED RDW RBC AUTO: 53.2 FL (ref 37–54)
EGFRCR SERPLBLD CKD-EPI 2021: 9.3 ML/MIN/1.73
EGFRCR SERPLBLD CKD-EPI 2021: 9.3 ML/MIN/1.73
EOSINOPHIL # BLD AUTO: 0.06 10*3/MM3 (ref 0–0.4)
EOSINOPHIL NFR BLD AUTO: 2 % (ref 0.3–6.2)
ERYTHROCYTE [DISTWIDTH] IN BLOOD BY AUTOMATED COUNT: 14.7 % (ref 12.3–15.4)
ERYTHROCYTE [DISTWIDTH] IN BLOOD BY AUTOMATED COUNT: 14.8 % (ref 12.3–15.4)
FERRITIN SERPL-MCNC: 1331 NG/ML (ref 30–400)
FIBRINOGEN PPP-MCNC: 187 MG/DL (ref 219–464)
FOLATE SERPL-MCNC: >20 NG/ML (ref 4.78–24.2)
GLUCOSE BLDC GLUCOMTR-MCNC: 119 MG/DL (ref 70–130)
GLUCOSE BLDC GLUCOMTR-MCNC: 143 MG/DL (ref 70–130)
GLUCOSE SERPL-MCNC: 96 MG/DL (ref 65–99)
GLUCOSE SERPL-MCNC: 99 MG/DL (ref 65–99)
HCT VFR BLD AUTO: 29.4 % (ref 37.5–51)
HCT VFR BLD AUTO: 30.1 % (ref 37.5–51)
HCT VFR BLD AUTO: 31.2 % (ref 37.5–51)
HGB BLD-MCNC: 10.3 G/DL (ref 13–17.7)
HGB BLD-MCNC: 9.6 G/DL (ref 13–17.7)
HGB BLD-MCNC: 9.8 G/DL (ref 13–17.7)
IMM GRANULOCYTES # BLD AUTO: 0.01 10*3/MM3 (ref 0–0.05)
IMM GRANULOCYTES NFR BLD AUTO: 0.3 % (ref 0–0.5)
INR PPP: 1.34 (ref 0.9–1.1)
IRON 24H UR-MRATE: 69 MCG/DL (ref 59–158)
IRON SATN MFR SERPL: 26 % (ref 20–50)
LYMPHOCYTES # BLD AUTO: 0.59 10*3/MM3 (ref 0.7–3.1)
LYMPHOCYTES NFR BLD AUTO: 20.1 % (ref 19.6–45.3)
MAGNESIUM SERPL-MCNC: 2.1 MG/DL (ref 1.6–2.4)
MCH RBC QN AUTO: 32.5 PG (ref 26.6–33)
MCH RBC QN AUTO: 32.6 PG (ref 26.6–33)
MCHC RBC AUTO-ENTMCNC: 32.7 G/DL (ref 31.5–35.7)
MCHC RBC AUTO-ENTMCNC: 33 G/DL (ref 31.5–35.7)
MCV RBC AUTO: 98.7 FL (ref 79–97)
MCV RBC AUTO: 99.7 FL (ref 79–97)
MONOCYTES # BLD AUTO: 0.37 10*3/MM3 (ref 0.1–0.9)
MONOCYTES NFR BLD AUTO: 12.6 % (ref 5–12)
NEUTROPHILS NFR BLD AUTO: 1.89 10*3/MM3 (ref 1.7–7)
NEUTROPHILS NFR BLD AUTO: 64.3 % (ref 42.7–76)
NRBC BLD AUTO-RTO: 0 /100 WBC (ref 0–0.2)
PHOSPHATE SERPL-MCNC: 4.3 MG/DL (ref 2.5–4.5)
PLATELET # BLD AUTO: 73 10*3/MM3 (ref 140–450)
PLATELET # BLD AUTO: 75 10*3/MM3 (ref 140–450)
PLATELET # BLD AUTO: 75 10*3/MM3 (ref 140–450)
PLATELETS.RETICULATED NFR BLD AUTO: 1.8 % (ref 0.9–6.5)
PMV BLD AUTO: 8.9 FL (ref 6–12)
PMV BLD AUTO: 9.4 FL (ref 6–12)
POTASSIUM SERPL-SCNC: 3.7 MMOL/L (ref 3.5–5.2)
POTASSIUM SERPL-SCNC: 4.2 MMOL/L (ref 3.5–5.2)
PROT SERPL-MCNC: 6.4 G/DL (ref 6–8.5)
PROTHROMBIN TIME: 16.7 SECONDS (ref 11.7–14.2)
RBC # BLD AUTO: 2.95 10*6/MM3 (ref 4.14–5.8)
RBC # BLD AUTO: 3.16 10*6/MM3 (ref 4.14–5.8)
SODIUM SERPL-SCNC: 141 MMOL/L (ref 136–145)
SODIUM SERPL-SCNC: 141 MMOL/L (ref 136–145)
TIBC SERPL-MCNC: 262 MCG/DL (ref 298–536)
TRANSFERRIN SERPL-MCNC: 176 MG/DL (ref 200–360)
VIT B12 BLD-MCNC: 563 PG/ML (ref 211–946)
WBC NRBC COR # BLD: 2.8 10*3/MM3 (ref 3.4–10.8)
WBC NRBC COR # BLD: 2.94 10*3/MM3 (ref 3.4–10.8)

## 2023-10-29 PROCEDURE — 84466 ASSAY OF TRANSFERRIN: CPT | Performed by: INTERNAL MEDICINE

## 2023-10-29 PROCEDURE — 85245 CLOT FACTOR VIII VW RISTOCTN: CPT | Performed by: INTERNAL MEDICINE

## 2023-10-29 PROCEDURE — 82746 ASSAY OF FOLIC ACID SERUM: CPT | Performed by: INTERNAL MEDICINE

## 2023-10-29 PROCEDURE — 99215 OFFICE O/P EST HI 40 MIN: CPT | Performed by: INTERNAL MEDICINE

## 2023-10-29 PROCEDURE — 85247 CLOT FACTOR VIII MULTIMETRIC: CPT | Performed by: INTERNAL MEDICINE

## 2023-10-29 PROCEDURE — P9100 PATHOGEN TEST FOR PLATELETS: HCPCS

## 2023-10-29 PROCEDURE — 85610 PROTHROMBIN TIME: CPT | Performed by: STUDENT IN AN ORGANIZED HEALTH CARE EDUCATION/TRAINING PROGRAM

## 2023-10-29 PROCEDURE — 63710000001 DIPHENHYDRAMINE PER 50 MG: Performed by: INTERNAL MEDICINE

## 2023-10-29 PROCEDURE — 85576 BLOOD PLATELET AGGREGATION: CPT | Performed by: INTERNAL MEDICINE

## 2023-10-29 PROCEDURE — 82948 REAGENT STRIP/BLOOD GLUCOSE: CPT

## 2023-10-29 PROCEDURE — P9035 PLATELET PHERES LEUKOREDUCED: HCPCS

## 2023-10-29 PROCEDURE — 30233R1 TRANSFUSION OF NONAUTOLOGOUS PLATELETS INTO PERIPHERAL VEIN, PERCUTANEOUS APPROACH: ICD-10-PCS | Performed by: STUDENT IN AN ORGANIZED HEALTH CARE EDUCATION/TRAINING PROGRAM

## 2023-10-29 PROCEDURE — 85014 HEMATOCRIT: CPT | Performed by: NURSE PRACTITIONER

## 2023-10-29 PROCEDURE — G0378 HOSPITAL OBSERVATION PER HR: HCPCS

## 2023-10-29 PROCEDURE — 85240 CLOT FACTOR VIII AHG 1 STAGE: CPT | Performed by: INTERNAL MEDICINE

## 2023-10-29 PROCEDURE — 84100 ASSAY OF PHOSPHORUS: CPT | Performed by: STUDENT IN AN ORGANIZED HEALTH CARE EDUCATION/TRAINING PROGRAM

## 2023-10-29 PROCEDURE — 85018 HEMOGLOBIN: CPT | Performed by: NURSE PRACTITIONER

## 2023-10-29 PROCEDURE — 80048 BASIC METABOLIC PNL TOTAL CA: CPT | Performed by: EMERGENCY MEDICINE

## 2023-10-29 PROCEDURE — 86927 PLASMA FRESH FROZEN: CPT

## 2023-10-29 PROCEDURE — 85027 COMPLETE CBC AUTOMATED: CPT | Performed by: INTERNAL MEDICINE

## 2023-10-29 PROCEDURE — 85246 CLOT FACTOR VIII VW ANTIGEN: CPT | Performed by: INTERNAL MEDICINE

## 2023-10-29 PROCEDURE — 36430 TRANSFUSION BLD/BLD COMPNT: CPT

## 2023-10-29 PROCEDURE — 85025 COMPLETE CBC W/AUTO DIFF WBC: CPT | Performed by: EMERGENCY MEDICINE

## 2023-10-29 PROCEDURE — 83540 ASSAY OF IRON: CPT | Performed by: INTERNAL MEDICINE

## 2023-10-29 PROCEDURE — 80076 HEPATIC FUNCTION PANEL: CPT | Performed by: STUDENT IN AN ORGANIZED HEALTH CARE EDUCATION/TRAINING PROGRAM

## 2023-10-29 PROCEDURE — 80048 BASIC METABOLIC PNL TOTAL CA: CPT | Performed by: STUDENT IN AN ORGANIZED HEALTH CARE EDUCATION/TRAINING PROGRAM

## 2023-10-29 PROCEDURE — 99285 EMERGENCY DEPT VISIT HI MDM: CPT

## 2023-10-29 PROCEDURE — 25010000002 DESMOPRESSIN ACETATE PF 4 MCG/ML SOLUTION 1 ML VIAL: Performed by: STUDENT IN AN ORGANIZED HEALTH CARE EDUCATION/TRAINING PROGRAM

## 2023-10-29 PROCEDURE — 82728 ASSAY OF FERRITIN: CPT | Performed by: INTERNAL MEDICINE

## 2023-10-29 PROCEDURE — 82607 VITAMIN B-12: CPT | Performed by: INTERNAL MEDICINE

## 2023-10-29 PROCEDURE — 85055 RETICULATED PLATELET ASSAY: CPT | Performed by: INTERNAL MEDICINE

## 2023-10-29 PROCEDURE — P9012 CRYOPRECIPITATE EACH UNIT: HCPCS

## 2023-10-29 PROCEDURE — 85250 CLOT FACTOR IX PTC/CHRSTMAS: CPT | Performed by: INTERNAL MEDICINE

## 2023-10-29 PROCEDURE — 85384 FIBRINOGEN ACTIVITY: CPT | Performed by: INTERNAL MEDICINE

## 2023-10-29 PROCEDURE — 83735 ASSAY OF MAGNESIUM: CPT | Performed by: STUDENT IN AN ORGANIZED HEALTH CARE EDUCATION/TRAINING PROGRAM

## 2023-10-29 RX ORDER — LATANOPROST 50 UG/ML
1 SOLUTION/ DROPS OPHTHALMIC NIGHTLY
Status: DISCONTINUED | OUTPATIENT
Start: 2023-10-29 | End: 2023-11-01 | Stop reason: HOSPADM

## 2023-10-29 RX ORDER — AMOXICILLIN 250 MG
2 CAPSULE ORAL 2 TIMES DAILY
Status: DISCONTINUED | OUTPATIENT
Start: 2023-10-29 | End: 2023-10-30

## 2023-10-29 RX ORDER — SODIUM CHLORIDE 0.9 % (FLUSH) 0.9 %
10 SYRINGE (ML) INJECTION AS NEEDED
Status: DISCONTINUED | OUTPATIENT
Start: 2023-10-29 | End: 2023-11-01 | Stop reason: HOSPADM

## 2023-10-29 RX ORDER — BISACODYL 10 MG
10 SUPPOSITORY, RECTAL RECTAL DAILY PRN
Status: DISCONTINUED | OUTPATIENT
Start: 2023-10-29 | End: 2023-11-01 | Stop reason: HOSPADM

## 2023-10-29 RX ORDER — DIPHENHYDRAMINE HCL 25 MG
25 CAPSULE ORAL ONCE
Status: COMPLETED | OUTPATIENT
Start: 2023-10-29 | End: 2023-10-29

## 2023-10-29 RX ORDER — DIPHENHYDRAMINE HYDROCHLORIDE 50 MG/ML
25 INJECTION INTRAMUSCULAR; INTRAVENOUS ONCE
Status: COMPLETED | OUTPATIENT
Start: 2023-10-29 | End: 2023-10-29

## 2023-10-29 RX ORDER — SODIUM CHLORIDE 0.9 % (FLUSH) 0.9 %
10 SYRINGE (ML) INJECTION EVERY 12 HOURS SCHEDULED
Status: DISCONTINUED | OUTPATIENT
Start: 2023-10-29 | End: 2023-11-01 | Stop reason: HOSPADM

## 2023-10-29 RX ORDER — BISACODYL 5 MG/1
5 TABLET, DELAYED RELEASE ORAL DAILY PRN
Status: DISCONTINUED | OUTPATIENT
Start: 2023-10-29 | End: 2023-11-01 | Stop reason: HOSPADM

## 2023-10-29 RX ORDER — ACETAMINOPHEN 650 MG/1
650 SUPPOSITORY RECTAL ONCE
Status: COMPLETED | OUTPATIENT
Start: 2023-10-29 | End: 2023-10-29

## 2023-10-29 RX ORDER — FAMOTIDINE 20 MG/1
10 TABLET, FILM COATED ORAL AS NEEDED
Status: DISCONTINUED | OUTPATIENT
Start: 2023-10-29 | End: 2023-10-30

## 2023-10-29 RX ORDER — SODIUM CHLORIDE 9 MG/ML
40 INJECTION, SOLUTION INTRAVENOUS AS NEEDED
Status: DISCONTINUED | OUTPATIENT
Start: 2023-10-29 | End: 2023-11-01 | Stop reason: HOSPADM

## 2023-10-29 RX ORDER — POLYETHYLENE GLYCOL 3350 17 G/17G
17 POWDER, FOR SOLUTION ORAL DAILY PRN
Status: DISCONTINUED | OUTPATIENT
Start: 2023-10-29 | End: 2023-11-01 | Stop reason: HOSPADM

## 2023-10-29 RX ORDER — NITROGLYCERIN 0.4 MG/1
0.4 TABLET SUBLINGUAL
Status: DISCONTINUED | OUTPATIENT
Start: 2023-10-29 | End: 2023-11-01 | Stop reason: HOSPADM

## 2023-10-29 RX ORDER — ACETAMINOPHEN 325 MG/1
650 TABLET ORAL ONCE
Status: COMPLETED | OUTPATIENT
Start: 2023-10-29 | End: 2023-10-29

## 2023-10-29 RX ORDER — ACETAMINOPHEN 160 MG/5ML
650 SOLUTION ORAL ONCE
Status: COMPLETED | OUTPATIENT
Start: 2023-10-29 | End: 2023-10-29

## 2023-10-29 RX ORDER — HYDROCODONE BITARTRATE AND ACETAMINOPHEN 7.5; 325 MG/1; MG/1
1 TABLET ORAL EVERY 4 HOURS PRN
Status: DISCONTINUED | OUTPATIENT
Start: 2023-10-29 | End: 2023-11-01 | Stop reason: HOSPADM

## 2023-10-29 RX ADMIN — LATANOPROST 1 DROP: 50 SOLUTION OPHTHALMIC at 20:22

## 2023-10-29 RX ADMIN — DESMOPRESSIN ACETATE 24 MCG: 4 INJECTION, SOLUTION INTRAVENOUS; SUBCUTANEOUS at 09:43

## 2023-10-29 RX ADMIN — Medication 10 ML: at 20:22

## 2023-10-29 RX ADMIN — ACETAMINOPHEN 650 MG: 325 TABLET ORAL at 18:44

## 2023-10-29 RX ADMIN — DIPHENHYDRAMINE HYDROCHLORIDE 25 MG: 25 CAPSULE ORAL at 18:44

## 2023-10-29 RX ADMIN — Medication 10 ML: at 09:44

## 2023-10-29 NOTE — ED NOTES
"Nursing report ED to floor  Bill Landry  78 y.o.  male    HPI :   Chief Complaint   Patient presents with    Vascular Access Problem     Bill Landry is a 78 y.o. male, with a history of end-stage renal disease, who presents to the ED c/o bleeding from his left upper arm.  Patient had a pseudoaneurysm repair in his left upper arm 2 days ago.  He was dialyzed yesterday and had some bleeding coming from his incision.  He was sent to the ED where he was evaluated by Dr. Olivares, vascular surgery.  A new dressing was applied at that time.  Patient reports he began bleeding from his incision again sometime last night.  Bleeding persisted throughout the night and this morning.  He spoke with the on-call vascular surgeon and was advised to come to the ED.  He denies numbness/tingling/weakness in his left arm, chest pain, shortness of breath, dizziness, or syncope.  Patient has a history of thrombocytopenia.  He takes aspirin daily but is not on anticoagulants.       Admitting doctor:   Boom Olivares II, MD    Admitting diagnosis:   There were no encounter diagnoses.    Code status:   Current Code Status       Date Active Code Status Order ID Comments User Context       Prior            Allergies:   Ace inhibitors, Contrast dye (echo or unknown ct/mr), Iodine, Angiotensin receptor blockers, Eliquis [apixaban], Filgrastim, Keflex [cephalexin], and Other    Isolation:   No active isolations    Intake and Output  No intake or output data in the 24 hours ending 10/29/23 0904    Weight:       10/29/23  0814   Weight: 79.4 kg (175 lb)       Most recent vitals:   Vitals:    10/29/23 0803 10/29/23 0814 10/29/23 0831 10/29/23 0901   BP:   130/63 128/61   Pulse: 63  59 58   Resp:       Temp:       TempSrc:       SpO2: 100%  100% 94%   Weight:  79.4 kg (175 lb)     Height:  177.8 cm (70\")         Active LDAs/IV Access:   Lines, Drains & Airways       Active LDAs       Name Placement date Placement time Site Days    Ileostomy LLQ " --  present upon arrival to [facility]  --  LLQ  --                    Labs (abnormal labs have a star):   Labs Reviewed   BASIC METABOLIC PANEL - Abnormal; Notable for the following components:       Result Value    BUN 45 (*)     Creatinine 5.82 (*)     Calcium 8.5 (*)     eGFR 9.3 (*)     All other components within normal limits    Narrative:     GFR Normal >60  Chronic Kidney Disease <60  Kidney Failure <15    The GFR formula is only valid for adults with stable renal function between ages 18 and 70.   CBC WITH AUTO DIFFERENTIAL - Abnormal; Notable for the following components:    WBC 2.94 (*)     RBC 3.16 (*)     Hemoglobin 10.3 (*)     Hematocrit 31.2 (*)     MCV 98.7 (*)     Platelets 73 (*)     Monocyte % 12.6 (*)     Lymphocytes, Absolute 0.59 (*)     All other components within normal limits   CBC AND DIFFERENTIAL    Narrative:     The following orders were created for panel order CBC & Differential.  Procedure                               Abnormality         Status                     ---------                               -----------         ------                     CBC Auto Differential[[id number]]        Abnormal            Final result                 Please view results for these tests on the individual orders.       EKG:   No orders to display       Meds given in ED:   Medications - No data to display    Imaging results:  No radiology results for the last day    Ambulatory status:   - standby    Social issues:   Social History     Socioeconomic History    Marital status:      Spouse name: Gillian    Number of children: 2    Years of education: College   Tobacco Use    Smoking status: Never    Smokeless tobacco: Never   Vaping Use    Vaping Use: Never used   Substance and Sexual Activity    Alcohol use: No     Comment: caffeine use: none    Drug use: No    Sexual activity: Defer       NIH Stroke Scale:       Micaela Diaz RN  10/29/23 09:04 EDT

## 2023-10-29 NOTE — CONSULTS
.     REASON FOR CONSULTATION:   Provide an opinion on any further workup or treatment due to bleeding from the left upper arm surgical site.                             REQUESTING PHYSICIAN: Boom Olivares II, MD    RECORDS OBTAINED:  Records of the patient's history including those obtained from the referring provider were reviewed and summarized in detail.    HISTORY OF PRESENT ILLNESS:  The patient is a 78 y.o. year old male with Crohn's disease status post pancolectomy with ileostomy, chronic renal failure on hemodialysis, liver cirrhosis secondary to [place], splenomegaly and chronic thrombocytopenia, type 2 diabetes, who presented with bleeding from the left upper arm surgical wound starting yesterday.  This patient had surgical procedure to have left arm pseudoaneurysm resection with primary closure 2 days ago on 10/27/2023.  He did well postop, and also had a hemodialysis yesterday, nevertheless sometime yesterday he started having second blood from the fresh wound.  He otherwise has been doing well denies any fever sweating chills.  No other bleeding.    Patient has been on low-dose aspirin 81 mg daily however he did not take it yesterday and today.  He does have easy bruising is a chronic problem.  He takes baby aspirin because his heart murmur.  He never had history of venous thrombosis or heart attack.    Laboratory study today on 10/29/2023 in the ER reported platelets 73,000, in the hemoglobin 10.3, WBC 2940 including ANC 1890.  Patient was given 1 unit platelets transfusion in the ER requested by vascular surgeon Dr. Olivares.  Laboratory study reported creatinine 5.82, BUN 45, normal electrolytes except calcium 8.5, and normal liver function panel except for alk phosphatase 134.  Also showed INR 1.34, PT 16.7 seconds.    Lab study on 10/27/2023 reported platelets 92,000, hemoglobin 11.9, MCV 99.5, MCHC 32.7, and WBC 4730 without differentiation.    This patient has been followed by my colleague   Jonah in clinic most recent visit was on 9/28/2023 for chronic thrombocytopenia, chronic leukopenia due to liver cirrhosis and congestive splenomegaly.  At that time he had a platelets 74,000 .6, hemoglobin 10.8, MCHC 32.0, WBC 2890 including neutrophils 6080 lymphocytes 770.         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    Bicuspid aortic valve 07/20/2022    Chronic leukopenia and thrombocytopenia     Cirrhosis of liver without ascites 07/24/2017    Congestive splenomegaly 07/24/2017    Crohn disease     with ileostomy    Diastolic CHF     although it was likely from volume overload due to ESRD    Diverticula of colon     ESRD on hemodialysis     M-W-F [facility]    Fatty liver disease, nonalcoholic     GERD (gastroesophageal reflux disease)     GI bleed     Glaucoma     H/O Gallstone pancreatitis     H/O Pericardial effusion     H/O sinus bradycardia     Heart murmur     History of hypertension     resolved    History of UTI     Hx of renal calculi     Ileostomy present     Iron deficiency     MGUS (monoclonal gammopathy of unknown significance)     TATE (obstructive sleep apnea)     Permanent atrial fibrillation     Sleep apnea     CPAP USED    Thrombocytopenia     Type 2 diabetes mellitus     CURRENTLY TAKES NO MED    Urinary retention     Post-OP     Past Surgical History:   Procedure Laterality Date    APPENDECTOMY      ARTERIOVENOUS FISTULA/SHUNT SURGERY Left 08/08/2017    Procedure: LEFT BRACHIAL CEPHALIC AV FISTULA FORMATION WITH CEPHALIC VEIN TRANSPOSITION ;  Surgeon: Bill Deal MD;  Location: [facility];  Service:     ARTERIOVENOUS FISTULA/SHUNT SURGERY Left 5/27/2022    Procedure: OPEN REVISION LEFT ARTERIOVENOUS INTERPOSITION GRAFT PLACEMENT;  Surgeon: Bill Deal MD;  Location: [facility];  Service: Vascular;  Laterality: Left;    ARTERIOVENOUS FISTULA/SHUNT SURGERY  W/ HEMODIALYSIS CATHETER INSERTION Left 02/15/2022    Procedure: OPEN LEFT ARM ARTERIOVENOUS FISTULA THROMBECTOMY WITH CEPHALIC VEIN ARTHROPLASTY AND STENT/GRAFT PLACEMENT;  Surgeon: Bill Deal MD;  Location: [facility] OR 18/19;  Service: Vascular;  Laterality: Left;    CATARACT EXTRACTION WITH INTRAOCULAR LENS IMPLANT Bilateral     CHOLECYSTECTOMY      COLECTOMY PARTIAL / TOTAL      History of inflammatory bowel disease with status post colectomy with ileostomy many years ago in the [facility],  18 YEARS OF AGE    COLONOSCOPY      CYSTOSCOPY LITHOLAPAXY BLADDER STONE EXTRACTION      ENDOSCOPY  01/16/2015    gastritis    ENDOSCOPY  01/17/2018    Procedure: ESOPHAGOGASTRODUODENOSCOPY;  Surgeon: Warner Neville MD;  Location: [facility] ENDOSCOPY;  Service:     ILEOSCOPY  01/16/2015    normal    ILEOSCOPY N/A 01/17/2018    Procedure: ILEOSCOPY;  Surgeon: Warner Neville MD;  Location: [facility] ENDOSCOPY;  Service:     TONSILLECTOMY         MEDICATIONS    Current Facility-Administered Medications:     sennosides-docusate (PERICOLACE) 8.6-50 MG per tablet 2 tablet, 2 tablet, Oral, BID **AND** polyethylene glycol (MIRALAX) packet 17 g, 17 g, Oral, Daily PRN **AND** bisacodyl (DULCOLAX) EC tablet 5 mg, 5 mg, Oral, Daily PRN **AND** bisacodyl (DULCOLAX) suppository 10 mg, 10 mg, Rectal, Daily PRN, Boom Olivares II, MD    famotidine (PEPCID) tablet 10 mg, 10 mg, Oral, PRN, Boom Olivares II, MD    HYDROcodone-acetaminophen (NORCO) 7.5-325 MG per tablet 1 tablet, 1 tablet, Oral, Q4H PRN, Boom Olivares II, MD    latanoprost (XALATAN) 0.005 % ophthalmic solution 1 drop, 1 drop, Both Eyes, Nightly, Boom Olivares II, MD    nitroglycerin (NITROSTAT) SL tablet 0.4 mg, 0.4 mg, Sublingual, Q5 Min PRN, Boom Olivares II, MD    sodium chloride 0.9 % flush 10 mL, 10 mL, Intravenous, Q12H, Boom Olivares II, MD, 10 mL at 10/29/23 0944    sodium chloride 0.9 % flush 10 mL, 10 mL, Intravenous, PRN, Boom Olivares II,  MD    sodium chloride 0.9 % infusion 40 mL, 40 mL, Intravenous, PRN, Boom Olivares II, MD    ALLERGIES:     Allergies   Allergen Reactions    Ace Inhibitors Other (See Comments)     RENAL FAILURE/ raised creatinine    Contrast Dye (Echo Or Unknown Ct/Mr) Other (See Comments) and Anaphylaxis     RENAL FAILURE    Iodine Anaphylaxis    Angiotensin Receptor Blockers Swelling    Eliquis [Apixaban] Arrhythmia     BLEEDING ISSUES; PT CANNOT TAKE ANY ANTICOAGULANTS DUE TO LOW PLATELETS EXCEPT ASPIRIN      Filgrastim GI Intolerance and Confusion     Chest pain    Keflex [Cephalexin] Diarrhea     RENAL FAILURE    Other Angioedema     IVP Dye       SOCIAL HISTORY:       Social History     Socioeconomic History    Marital status:      Spouse name: Gillian    Number of children: 2    Years of education: College   Tobacco Use    Smoking status: Never    Smokeless tobacco: Never   Vaping Use    Vaping Use: Never used   Substance and Sexual Activity    Alcohol use: No     Comment: caffeine use: none    Drug use: No    Sexual activity: Defer         FAMILY HISTORY:  Family History   Problem Relation Age of Onset    Heart failure Mother     Hypertension Mother     Heart disease Mother     Hyperlipidemia Mother     Hypertension Father     Malig Hyperthermia Neg Hx        REVIEW OF SYSTEMS:  Review of Systems   Constitutional:  Negative for appetite change, fatigue, fever and unexpected weight change.   HENT:  Negative for nosebleeds and sore throat.    Eyes:  Negative for visual disturbance.   Respiratory:  Negative for cough and shortness of breath.    Cardiovascular:  Negative for chest pain and leg swelling.   Gastrointestinal:  Negative for abdominal pain, blood in stool and diarrhea.   Musculoskeletal:  Negative for joint swelling.   Skin:  Positive for wound.   Allergic/Immunologic: Positive for immunocompromised state (Chronic hemodialysis).   Neurological:  Negative for light-headedness.   Hematological:  Negative  "for adenopathy. Bruises/bleeds easily.   Psychiatric/Behavioral:  Negative for confusion.               Vitals:    10/29/23 1013 10/29/23 1101 10/29/23 1106 10/29/23 1158   BP: 113/55 117/67 117/60 112/60   Pulse: 70 71 68 75   Resp: 16   16   Temp: 97.4 °F (36.3 °C)   97.8 °F (36.6 °C)   TempSrc:    Oral   SpO2: 98% 99% 97% 98%   Weight:    77.1 kg (170 lb)   Height:    177.8 cm (70\")         9/28/2023     3:30 PM   Current Status   ECOG score 0      PHYSICAL EXAM:      CONSTITUTIONAL:  Vital signs reviewed.  Well-developed well-nourished male lying in bed.  No distress, looks comfortable.  EYES:  Conjunctivae and lids unremarkable.    EARS,NOSE,MOUTH,THROAT:  Ears and nose appear unremarkable.  Lips, teeth, gums appear unremarkable.  RESPIRATORY:  Normal respiratory effort.  Lungs clear to auscultation bilaterally.  CARDIOVASCULAR:  Normal S1, S2.  No murmurs rubs or gallops.  No significant lower extremity edema.  GASTROINTESTINAL: Abdomen appears unremarkable.  Nontender.  No hepatomegaly.  No splenomegaly.  LYMPHATIC:  No cervical, supraclavicular, axillary lymphadenopathy.  MUSCULOSKELETAL:  Unremarkable digits/nails.  Left upper arm has a compression dressing at the site of the surgery.  I did not open the dressing which was freshly changed.  I do not see blood on the dressing..  SKIN:  Warm.  No rashes.  PSYCHIATRIC:  Normal judgment and insight.  Normal mood and affect.      RECENT LABS:  CBC:      Lab 10/29/23  0814 10/27/23  0938   WBC 2.94* 4.73   HEMOGLOBIN 10.3* 11.9*   HEMATOCRIT 31.2* 36.4*   PLATELETS 73* 92*   NEUTROS ABS 1.89  --    IMMATURE GRANS (ABS) 0.01  --    LYMPHS ABS 0.59*  --    MONOS ABS 0.37  --    EOS ABS 0.06  --    MCV 98.7* 99.5*     CMP:        Lab 10/29/23  0936 10/29/23  0814 10/27/23  0938   SODIUM 141 141 127*   POTASSIUM 4.2 3.7 4.0   CHLORIDE 101 101 93*   CO2 29.0 28.0 22.1   ANION GAP 11.0 12.0 11.9   BUN 45* 45* 58*   CREATININE 5.81* 5.82* 6.95*   EGFR 9.3* 9.3* 7.5* "   GLUCOSE 96 99 101*   CALCIUM 8.6 8.5* 9.0   MAGNESIUM 2.1  --   --    PHOSPHORUS 4.3  --   --    TOTAL PROTEIN  --  6.4  --    ALBUMIN  --  3.0*  --    ALT (SGPT)  --  10  --    AST (SGOT)  --  26  --    BILIRUBIN  --  0.9  --    INDIRECT BILIRUBIN  --  0.5  --    BILIRUBIN DIRECT  --  0.4*  --    ALK PHOS  --  134*  --      Lab Results   Component Value Date    IRON 36 (L) 05/11/2023    TIBC 170 (L) 05/11/2023    FERRITIN 1,892.00 (H) 05/18/2023     Lab Results   Component Value Date    FOLATE >20.00 05/12/2023     Lab Results   Component Value Date    UHVYGNFF01 886 05/12/2023         Assessment & Plan     *.  Bleeding from the left upper arm surgical wound started yesterday on 10/20/2023.  This patient has background disease of renal failure on hemodialysis, thrombocytopenia secondary to liver cirrhosis and congestive splenomegaly, and taken low-dose aspirin 81 mg daily except yesterday and today.   His bleeding is likely multifactorial due to the above reason, he already received a 1 unit platelets transfusion earlier today in the ER.  I recommended to obtain further laboratory studies for evaluation including PFA-100, fibrinogen level, factor VIII activity, factor IX activity, vWF antigen, activity and vWF multimers study.   Of note this patient was given 1 dose DDAVP 24 mcg this morning in the ER.  Pending the lab results, we will decide the management plan.       *.  Chronic thrombocytopenia due to liver cirrhosis and congestive splenomegaly, as well as platelet dysfunction from uremia due renal failure.  Platelets 92,000 on 10/27/2023 before he had a surgery for the left arm fistula repair.  This morning 10/29/2023 platelets 73,000.  Patient was given 1 unit platelets transfusion in the ER.  We will repeat a CBC this evening.  This patient has been taking low-dose aspirin 81 mg daily except yesterday and today.  His platelets likely will be ineffective for another 5 days.    No more aspirin during this  hospitalization.      *.  Chronic anemia hemodialysis.   Patient has worsening anemia secondary to bleeding.  Continue to monitor.  I also recommended to repeat iron studies B12 and folate most recent level was in May 2023.  He did have elevated ferritin 1892 at that time with a supratherapeutic folate and a normal vitamin B12 level.    *.  Chronic renal failure hemodialysis.   Patient will continue follow-up by nephrology service.     *.  Chronic leukocytopenia.   This is stable and will be monitored.        PLAN:  Laboratory studies as mentioned above.  We will take action pending on the above lab results.   Hold aspirin during this hospitalization.   Continue local pressure with packed dressing per vascular surgery service.    Continue hemodialysis per protocol.  Other management per primary team.   Monitor CBC in the morning.       Reviewed medical records from emergency room and vascular surgeon Dr. Olivares.  Also reviewed notes from my colleague Dr. Mckenzie, his primary hematologist.     Discussed with the patient and his son at the bedside.  I also spoke with nursing staff.    Thanks for letting us participate in the care of this patient!       We will follow.    Addendum:    CBC:      Lab 10/29/23  1702 10/29/23  0814 10/27/23  0938   WBC 2.80* 2.94* 4.73   HEMOGLOBIN 9.6*  9.8* 10.3* 11.9*   HEMATOCRIT 29.4*  30.1* 31.2* 36.4*   PLATELETS 75*  75* 73* 92*   NEUTROS ABS  --  1.89  --    IMMATURE GRANS (ABS)  --  0.01  --    LYMPHS ABS  --  0.59*  --    MONOS ABS  --  0.37  --    EOS ABS  --  0.06  --    MCV 99.7* 98.7* 99.5*     Component      Latest Ref Rng 10/29/2023   IPF      0.90 - 6.50 % 1.80    Platelets      140 - 450 10*3/mm3 75 (L)    Fibrinogen      219 - 464 mg/dL 187 (L)    Vitamin B-12      211 - 946 pg/mL 563    Folate      4.78 - 24.20 ng/mL >20.00         Patient has hypofibrinogenemia.  Since patient has active bleeding, will give patient 1 unit cryoprecipitate this evening.     Patient also  has no improvement of platelets of the 1 unit platelet transfusion earlier today.  Since patient has dysfunctional platelets due to taking aspirin, and also hemodialysis, I recommended to give another unit platelets transfusion this evening.      Discussed with the patient that he voiced understanding and agreeable.  Spoke with nursing staff again.        HARDEEP OLIVEIRA M.D., Ph.D.

## 2023-10-29 NOTE — ED NOTES
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  The surgical site started bleeding yesterday.  He was seen here and dr terrazas saw him yest here.  Pt's arm is bleeding again - he has spoken to dr terrazas and he is meeting him here

## 2023-10-29 NOTE — PLAN OF CARE
Goal Outcome Evaluation:  Plan of Care Reviewed With: patient, son           Outcome Evaluation: Pt admitted for observation of bleeding in L upper arm.  Dialysis M-W-F.  Nephro and Onc consulted.  A&Ox4, VSS.  Iliostomy present. RA.       Changed dressing once r/t bleeding.  Will cont to monitor and change as needed.

## 2023-10-29 NOTE — ED PROVIDER NOTES
EMERGENCY DEPARTMENT ENCOUNTER    Room Number:  E655/1  PCP: Bharathi De La Torre MD  Historian: Patient, son      HPI:  Chief Complaint: Bleeding from left upper arm  A complete HPI/ROS/PMH/PSH/SH/FH are unobtainable due to: Nothing  Context: Bill Landry is a 78 y.o. male, with a history of end-stage renal disease, who presents to the ED c/o bleeding from his left upper arm.  Patient had a pseudoaneurysm repair in his left upper arm 2 days ago.  He was dialyzed yesterday and had some bleeding coming from his incision.  He was sent to the ED where he was evaluated by Dr. Olivares, vascular surgery.  A new dressing was applied at that time.  Patient reports he began bleeding from his incision again sometime last night.  Bleeding persisted throughout the night and this morning.  He spoke with the on-call vascular surgeon and was advised to come to the ED.  He denies numbness/tingling/weakness in his left arm, chest pain, shortness of breath, dizziness, or syncope.  Patient has a history of thrombocytopenia.  He takes aspirin daily but is not on anticoagulants.            PAST MEDICAL HISTORY  Active Ambulatory Problems     Diagnosis Date Noted    Permanent atrial fibrillation 05/02/2016    Thrombocytopenia 05/02/2016    Chronic leukopenia 05/02/2016    Cirrhosis of liver without ascites 07/24/2017    Congestive splenomegaly 07/24/2017    Anemia due to stage 4 chronic kidney disease treated with erythropoietin 10/04/2017    Esophageal abnormality 01/18/2018    At risk for fluid volume overload 03/21/2019    End stage kidney disease 03/21/2019    Other specified glaucoma 05/21/2019    Dependence on renal dialysis 09/03/2020    Allergy, unspecified, initial encounter 10/09/2020    Crohn's disease, unspecified, without complications 01/18/2018    Deficiency of other specified B group vitamins 01/19/2018    Dermatitis, unspecified 01/18/2018    Encounter for immunization 02/19/2018    Essential (primary) hypertension 01/18/2018     History of urinary stone 01/18/2018    Hyperparathyroidism, unspecified 01/19/2018    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, with stage 1 through stage 4 chronic kidney disease, or unspecified chronic kidney disease 10/18/2019    Other disorders of phosphorus metabolism 02/11/2021    Other iron deficiency anemias 02/10/2018    Pericardial effusion (noninflammatory) 01/18/2018    Pure hypertriglyceridemia 01/18/2018    Renal osteodystrophy 01/18/2018    Secondary hyperparathyroidism of renal origin 01/18/2018    Splenomegaly, not elsewhere classified 01/18/2018    Bilateral pseudophakia 05/05/2021    Dermatochalasis of eyelid 05/05/2021    Primary open-angle glaucoma, bilateral, moderate stage 05/05/2021    Puckering of macula, left eye 05/05/2021    Secondary cataract 05/05/2021    AV fistula occlusion, initial encounter 02/15/2022    TATE (obstructive sleep apnea)     Aneurysm artery, 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    Aortic stenosis, mild 07/20/2022    Bicuspid aortic valve 07/20/2022    Hyperuricemia without signs of inflammatory arthritis and tophaceous disease 08/03/2022    Presbyopia 05/13/2021    Shortness of breath 02/19/2022    Acute hypoxemic respiratory failure due to COVID-19 05/11/2023    Acute on chronic diastolic heart failure 05/11/2023    Impaired glucose tolerance 05/12/2023    Acute cough 05/17/2023     Resolved Ambulatory Problems     Diagnosis Date Noted    Anemia 01/12/2018    Pneumonia of both lungs due to infectious organism 01/19/2018    Anaphylactic shock, unspecified, initial encounter 10/09/2020    Moderate protein-calorie malnutrition 12/16/2020    Sleep apnea, unspecified 01/18/2018    Unspecified atrial fibrillation 01/19/2018     Past Medical History:   Diagnosis Date    Abnormal serum protein electrophoresis     Aneurysm of artery of arm     Crohn disease     Diastolic CHF     Diverticula of colon     ESRD on hemodialysis     Fatty liver disease,  nonalcoholic     GERD (gastroesophageal reflux disease)     GI bleed     Glaucoma     H/O Gallstone pancreatitis     H/O Pericardial effusion     H/O sinus bradycardia     Heart murmur     History of hypertension     History of UTI     Hx of renal calculi     Ileostomy present     Iron deficiency     MGUS (monoclonal gammopathy of unknown significance)     Sleep apnea     Type 2 diabetes mellitus     Urinary retention          PAST SURGICAL HISTORY  Past Surgical History:   Procedure Laterality Date    APPENDECTOMY      ARTERIOVENOUS FISTULA/SHUNT SURGERY Left 08/08/2017    Procedure: LEFT BRACHIAL CEPHALIC AV FISTULA FORMATION WITH CEPHALIC VEIN TRANSPOSITION ;  Surgeon: Bill Deal MD;  Location: [facility] OR;  Service:     ARTERIOVENOUS FISTULA/SHUNT SURGERY Left 5/27/2022    Procedure: OPEN REVISION LEFT ARTERIOVENOUS INTERPOSITION GRAFT PLACEMENT;  Surgeon: Bill Deal MD;  Location: [facility] OR;  Service: Vascular;  Laterality: Left;    ARTERIOVENOUS FISTULA/SHUNT SURGERY W/ HEMODIALYSIS CATHETER INSERTION Left 02/15/2022    Procedure: OPEN LEFT ARM ARTERIOVENOUS FISTULA THROMBECTOMY WITH CEPHALIC VEIN ARTHROPLASTY AND STENT/GRAFT PLACEMENT;  Surgeon: Bill Deal MD;  Location: [facility] OR 18/19;  Service: Vascular;  Laterality: Left;    CATARACT EXTRACTION WITH INTRAOCULAR LENS IMPLANT Bilateral     CHOLECYSTECTOMY      COLECTOMY PARTIAL / TOTAL      History of inflammatory bowel disease with status post colectomy with ileostomy many years ago in the [facility],  18 YEARS OF AGE    COLONOSCOPY      CYSTOSCOPY LITHOLAPAXY BLADDER STONE EXTRACTION      ENDOSCOPY  01/16/2015    gastritis    ENDOSCOPY  01/17/2018    Procedure: ESOPHAGOGASTRODUODENOSCOPY;  Surgeon: Warner Neville MD;  Location: [facility] ENDOSCOPY;  Service:     ILEOSCOPY  01/16/2015    normal    ILEOSCOPY N/A 01/17/2018    Procedure: ILEOSCOPY;  Surgeon: Warner Neville MD;  Location: [facility] ENDOSCOPY;  Service:      TONSILLECTOMY           FAMILY HISTORY  Family History   Problem Relation Age of Onset    Heart failure Mother     Hypertension Mother     Heart disease Mother     Hyperlipidemia Mother     Hypertension Father     Malig Hyperthermia Neg Hx          SOCIAL HISTORY  Social History     Socioeconomic History    Marital status:      Spouse name: Gillian    Number of children: 2    Years of education: College   Tobacco Use    Smoking status: Never    Smokeless tobacco: Never   Vaping Use    Vaping Use: Never used   Substance and Sexual Activity    Alcohol use: No     Comment: caffeine use: none    Drug use: No    Sexual activity: Defer         ALLERGIES  Ace inhibitors, Contrast dye (echo or unknown ct/mr), Iodine, Angiotensin receptor blockers, Eliquis [apixaban], Filgrastim, Keflex [cephalexin], and Other    REVIEW OF SYSTEMS  All systems have been reviewed and are negative except for those listed in the HPI      PHYSICAL EXAM  ED Triage Vitals [10/29/23 0733]   Temp Heart Rate Resp BP SpO2   97 °F (36.1 °C) 82 16 -- 93 %      Temp src Heart Rate Source Patient Position BP Location FiO2 (%)   Tympanic Monitor -- -- --       Physical Exam      GENERAL: Awake, alert, oriented x3.  Chronically ill but nontoxic-appearing elderly male.  Resting comfortably in no acute distress  HENT: NCAT, nares patent  EYES: no scleral icterus  CV: regular rhythm, normal rate, normal left radial pulse, good capillary refill of the left fingers, palpable thrill in the left AV fistula  RESPIRATORY: normal effort, clear to auscultation bilaterally  ABDOMEN: soft, nontender  MUSCULOSKELETAL: Extremities are nontender with full range of motion  NEURO: Speech is normal.  No facial droop.  PSYCH:  calm, cooperative  SKIN: There is a bulky dressing on the left upper arm.  Dressing was removed.  There is a bloodsoaked bandage on the left upper arm.  There is some minimal oozing of blood coming from the inferior aspect of the  dressing.    Vital signs and nursing notes reviewed.          LAB RESULTS  Recent Results (from the past 24 hour(s))   Basic Metabolic Panel    Collection Time: 10/29/23  8:14 AM    Specimen: Blood   Result Value Ref Range    Glucose 99 65 - 99 mg/dL    BUN 45 (H) 8 - 23 mg/dL    Creatinine 5.82 (H) 0.76 - 1.27 mg/dL    Sodium 141 136 - 145 mmol/L    Potassium 3.7 3.5 - 5.2 mmol/L    Chloride 101 98 - 107 mmol/L    CO2 28.0 22.0 - 29.0 mmol/L    Calcium 8.5 (L) 8.6 - 10.5 mg/dL    BUN/Creatinine Ratio 7.7 7.0 - 25.0    Anion Gap 12.0 5.0 - 15.0 mmol/L    eGFR 9.3 (L) >60.0 mL/min/1.73   CBC Auto Differential    Collection Time: 10/29/23  8:14 AM    Specimen: Blood   Result Value Ref Range    WBC 2.94 (L) 3.40 - 10.80 10*3/mm3    RBC 3.16 (L) 4.14 - 5.80 10*6/mm3    Hemoglobin 10.3 (L) 13.0 - 17.7 g/dL    Hematocrit 31.2 (L) 37.5 - 51.0 %    MCV 98.7 (H) 79.0 - 97.0 fL    MCH 32.6 26.6 - 33.0 pg    MCHC 33.0 31.5 - 35.7 g/dL    RDW 14.8 12.3 - 15.4 %    RDW-SD 52.9 37.0 - 54.0 fl    MPV 8.9 6.0 - 12.0 fL    Platelets 73 (L) 140 - 450 10*3/mm3    Neutrophil % 64.3 42.7 - 76.0 %    Lymphocyte % 20.1 19.6 - 45.3 %    Monocyte % 12.6 (H) 5.0 - 12.0 %    Eosinophil % 2.0 0.3 - 6.2 %    Basophil % 0.7 0.0 - 1.5 %    Immature Grans % 0.3 0.0 - 0.5 %    Neutrophils, Absolute 1.89 1.70 - 7.00 10*3/mm3    Lymphocytes, Absolute 0.59 (L) 0.70 - 3.10 10*3/mm3    Monocytes, Absolute 0.37 0.10 - 0.90 10*3/mm3    Eosinophils, Absolute 0.06 0.00 - 0.40 10*3/mm3    Basophils, Absolute 0.02 0.00 - 0.20 10*3/mm3    Immature Grans, Absolute 0.01 0.00 - 0.05 10*3/mm3    nRBC 0.0 0.0 - 0.2 /100 WBC   Hepatic Function Panel    Collection Time: 10/29/23  8:14 AM    Specimen: Blood   Result Value Ref Range    Total Protein 6.4 6.0 - 8.5 g/dL    Albumin 3.0 (L) 3.5 - 5.2 g/dL    ALT (SGPT) 10 1 - 41 U/L    AST (SGOT) 26 1 - 40 U/L    Alkaline Phosphatase 134 (H) 39 - 117 U/L    Total Bilirubin 0.9 0.0 - 1.2 mg/dL    Bilirubin, Direct 0.4 (H)  0.0 - 0.3 mg/dL    Bilirubin, Indirect 0.5 mg/dL   Basic Metabolic Panel    Collection Time: 10/29/23  9:36 AM    Specimen: Blood   Result Value Ref Range    Glucose 96 65 - 99 mg/dL    BUN 45 (H) 8 - 23 mg/dL    Creatinine 5.81 (H) 0.76 - 1.27 mg/dL    Sodium 141 136 - 145 mmol/L    Potassium 4.2 3.5 - 5.2 mmol/L    Chloride 101 98 - 107 mmol/L    CO2 29.0 22.0 - 29.0 mmol/L    Calcium 8.6 8.6 - 10.5 mg/dL    BUN/Creatinine Ratio 7.7 7.0 - 25.0    Anion Gap 11.0 5.0 - 15.0 mmol/L    eGFR 9.3 (L) >60.0 mL/min/1.73   Magnesium    Collection Time: 10/29/23  9:36 AM    Specimen: Blood   Result Value Ref Range    Magnesium 2.1 1.6 - 2.4 mg/dL   Phosphorus    Collection Time: 10/29/23  9:36 AM    Specimen: Blood   Result Value Ref Range    Phosphorus 4.3 2.5 - 4.5 mg/dL   Protime-INR    Collection Time: 10/29/23  9:36 AM    Specimen: Blood   Result Value Ref Range    Protime 16.7 (H) 11.7 - 14.2 Seconds    INR 1.34 (H) 0.90 - 1.10   Prepare Platelet Pheresis, 1 Units    Collection Time: 10/29/23 10:05 AM   Result Value Ref Range    Product Code I6122Z27     Unit Number [id number]     UNIT  ABO A     UNIT  RH POS     Dispense Status IS     Blood Expiration Date [id number]     Blood Type Barcode 6200        Ordered the above labs and reviewed the results.        RADIOLOGY  No Radiology Exams Resulted Within Past 24 Hours    Ordered the above noted radiological studies. Reviewed by me in PACS.            PROCEDURES  Procedures              MEDICATIONS GIVEN IN ER  Medications   sodium chloride 0.9 % flush 10 mL (10 mL Intravenous Given 10/29/23 0944)   sodium chloride 0.9 % flush 10 mL (has no administration in time range)   sodium chloride 0.9 % infusion 40 mL (has no administration in time range)   sennosides-docusate (PERICOLACE) 8.6-50 MG per tablet 2 tablet (2 tablets Oral Not Given 10/29/23 1230)     And   polyethylene glycol (MIRALAX) packet 17 g (has no administration in time range)     And   bisacodyl  (DULCOLAX) EC tablet 5 mg (has no administration in time range)     And   bisacodyl (DULCOLAX) suppository 10 mg (has no administration in time range)   nitroglycerin (NITROSTAT) SL tablet 0.4 mg (has no administration in time range)   famotidine (PEPCID) tablet 10 mg (has no administration in time range)   HYDROcodone-acetaminophen (NORCO) 7.5-325 MG per tablet 1 tablet (has no administration in time range)   latanoprost (XALATAN) 0.005 % ophthalmic solution 1 drop (has no administration in time range)   desmopressin (DDAVP) 24 mcg in sodium chloride 0.9 % 50 mL IVPB (0 mcg Intravenous Stopped 10/29/23 1008)                   MEDICAL DECISION MAKING, PROGRESS, and CONSULTS    All labs have been independently reviewed by me.  All radiology studies have been reviewed by me and I have also reviewed the radiology report.   EKG's independently viewed and interpreted by me.  Discussion below represents my analysis of pertinent findings related to patient's condition, differential diagnosis, treatment plan and final disposition.      Additional sources:  - Discussed/ obtained information from independent historians: Son    - External (non-ED) record review: Patient underwent exploration and resection of left arm pseudoaneurysm with primary closure on 10/27/2023 by Dr. Deal.  Patient was seen here in the ED yesterday for bleeding from the surgical site.  He was seen by Dr. Hua, vascular surgery.    - Chronic or social conditions impacting care: Patient has end-stage renal disease and is on hemodialysis          Orders placed during this visit:  Orders Placed This Encounter   Procedures    Basic Metabolic Panel    CBC Auto Differential    Basic Metabolic Panel    Magnesium    Phosphorus    Protime-INR    Hepatic Function Panel    Diet: Regular/House Diet, Renal Diets; Low Potassium; Texture: Regular Texture (IDDSI 7); Fluid Consistency: Thin (IDDSI 0)    Vital Signs    Intake & Output    Weigh Patient    Oral Care     Telemetry - Maintain IV Access    Telemetry - Place Orders & Notify Provider of Results When Patient Experiences Acute Chest Pain, Dysrhythmia or Respiratory Distress    May Be Off Telemetry for Tests    Activity - Ad [place]    Notify Provider (With Default Parameters)    Verify Informed Consent for Blood Product Administration    Vascular Surgery Consult    Inpatient Hospitalist Consult    Hematology & Oncology Inpatient Consult    Inpatient Nephrology Consult    Pulse Oximetry,  Spot    Oxygen Therapy- Nasal Cannula; Titrate 1-6 LPM Per SpO2; 90 - 95%    Prepare Platelet Pheresis, 1 Units    Insert Peripheral IV    Hemodialysis Inpatient    Initiate Observation Status    CBC & Differential    CBC & Differential         Additional orders considered but not ordered:  None        Differential diagnosis:    Anemia, hyperkalemia, electrolyte abnormality, surgical complication      Independent interpretation of labs, radiology studies, and discussions with consultants:  ED Course as of 10/29/23 1430   Sun Oct 29, 2023   0800 Call has been placed to the on-call vascular surgeon. [WH]   0805 Case discussed with Dr. Olivares, vascular surgery.  He is familiar with the patient.  He will come see him in the ED. [WH]   0839 Dr. Olivares is now at bedside [WH]   0849 BUN(!): 45 [WH]   0849 Creatinine(!): 5.82 [WH]   0849 Potassium: 3.7 [WH]   0853 Dr. Olivares has evaluated the patient.  He is going to admit him to a monitored bed [WH]   0856 WBC(!): 2.94 [WH]   0856 Hemoglobin(!): 10.3 [WH]   0856 Platelets(!): 73  92 two days ago [WH]   0903 MDM: Patient presented to ED with bleeding from his left upper arm.  He had pseudoaneurysm repair of his left AV fistula 2 days ago.  On exam, the dressing was soaked with blood.  He was hemodynamically stable.  Hemoglobin was low but stable.  Patient has thrombocytopenia.  Platelets were 73, which is lower than baseline.  Case was discussed with Dr. Olivares, vascular surgeon, and he came to evaluate the  patient in the ED.  He will admit the patient. [WH]      ED Course User Index  [WH] Damian Tai MD               DIAGNOSIS  Final diagnoses:   Bleeding   Thrombocytopenia   Chronic anemia   End-stage renal disease on hemodialysis         DISPOSITION  ADMISSION    Discussed treatment plan and reason for admission with pt/family and admitting physician.  Pt/family voiced understanding of the plan for admission for further testing/treatment as needed.               Latest Documented Vital Signs:  As of 14:30 EDT  BP- 112/60 HR- 75 Temp- 97.8 °F (36.6 °C) (Oral) O2 sat- 98%              --    Please note that portions of this were completed with a voice recognition program.       Note Disclaimer: At [facility], we believe that sharing information builds trust and better relationships. You are receiving this note because you are receiving care at [facility] or recently visited. It is possible you will see health information before a provider has talked with you about it. This kind of information can be easy to misunderstand. To help you fully understand what it means for your health, we urge you to discuss this note with your provider.             Damian Tai MD  10/29/23 9954

## 2023-10-29 NOTE — CONSULTS
[facility] Consult Note      Patient Name: Bill Landry  : 1945  MRN: [id number]  Primary Care Physician:  Bharathi De La Torre MD  Referring Physician: Boom Olivares II, MD  Date of admission: 10/29/2023    Subjective     Reason for Consult:  ESRD  HPI:   Bill Landry is a 78 y.o. male with a past medical history of hypertension, bicuspid aortic valve, diastolic congestive heart failure, history of Crohn's disease status post ileostomy, history of GERD, history of monoclonal gammopathy of unknown significance, obstructive sleep apnea, persistent atrial fibrillation and end-stage renal disease on hemodialysis through left arm arteriovenous fistula status post pseudoaneurysm repair by Dr. Singh on Friday.  Patient came to the ER yesterday however he was sent home and he had his dialysis done yesterday at first and he is and then decided to come back today because of persistent bleeding.  His AV fistula is wrapped up now with no further bleeding.  We were consulted to help with management of his dialysis  Volume management.    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    Bicuspid aortic valve 2022    Chronic leukopenia and thrombocytopenia     Cirrhosis of liver without ascites 2017    Congestive splenomegaly 2017    Crohn disease     with ileostomy    Diastolic CHF     although it was likely from volume overload due to ESRD    Diverticula of colon     ESRD on hemodialysis     M-W-F [facility]    Fatty liver disease, nonalcoholic     GERD (gastroesophageal reflux disease)     GI bleed     Glaucoma     H/O Gallstone pancreatitis     H/O Pericardial effusion     H/O sinus bradycardia     Heart murmur     History of hypertension      resolved    History of UTI     Hx of renal calculi     Ileostomy present     Iron deficiency     MGUS (monoclonal gammopathy of unknown significance)     TATE (obstructive sleep apnea)     Permanent atrial fibrillation     Sleep apnea     CPAP USED    Thrombocytopenia     Type 2 diabetes mellitus     CURRENTLY TAKES NO MED    Urinary retention     Post-OP       Past Surgical History:   Procedure Laterality Date    APPENDECTOMY      ARTERIOVENOUS FISTULA/SHUNT SURGERY Left 08/08/2017    Procedure: LEFT BRACHIAL CEPHALIC AV FISTULA FORMATION WITH CEPHALIC VEIN TRANSPOSITION ;  Surgeon: Bill Deal MD;  Location: [facility] OR;  Service:     ARTERIOVENOUS FISTULA/SHUNT SURGERY Left 5/27/2022    Procedure: OPEN REVISION LEFT ARTERIOVENOUS INTERPOSITION GRAFT PLACEMENT;  Surgeon: Bill Deal MD;  Location: [facility] OR;  Service: Vascular;  Laterality: Left;    ARTERIOVENOUS FISTULA/SHUNT SURGERY W/ HEMODIALYSIS CATHETER INSERTION Left 02/15/2022    Procedure: OPEN LEFT ARM ARTERIOVENOUS FISTULA THROMBECTOMY WITH CEPHALIC VEIN ARTHROPLASTY AND STENT/GRAFT PLACEMENT;  Surgeon: Bill Deal MD;  Location: [facility] OR 18/19;  Service: Vascular;  Laterality: Left;    CATARACT EXTRACTION WITH INTRAOCULAR LENS IMPLANT Bilateral     CHOLECYSTECTOMY      COLECTOMY PARTIAL / TOTAL      History of inflammatory bowel disease with status post colectomy with ileostomy many years ago in the [facility],  18 YEARS OF AGE    COLONOSCOPY      CYSTOSCOPY LITHOLAPAXY BLADDER STONE EXTRACTION      ENDOSCOPY  01/16/2015    gastritis    ENDOSCOPY  01/17/2018    Procedure: ESOPHAGOGASTRODUODENOSCOPY;  Surgeon: Warner Neville MD;  Location: [facility] ENDOSCOPY;  Service:     ILEOSCOPY  01/16/2015    normal    ILEOSCOPY N/A 01/17/2018    Procedure: ILEOSCOPY;  Surgeon: Warner Neville MD;  Location: [facility] ENDOSCOPY;  Service:     TONSILLECTOMY         Family History: family history includes Heart disease in his mother;  Heart failure in his mother; Hyperlipidemia in his mother; Hypertension in his father and mother.    Social History:  reports that he has never smoked. He has never used smokeless tobacco. He reports that he does not drink alcohol and does not use drugs.    Home Medications:  Prior to Admission medications    Medication Sig Start Date End Date Taking? Authorizing Provider   alfuzosin (UROXATRAL) 10 MG 24 hr tablet Take 1 tablet by mouth Every Other Day. Does not take on tues sun or thurs    Elvia Nicholson MD   aspirin 81 MG tablet Take 1 tablet by mouth Daily. INSTRUCTED TO CONTINUE THIS FOR SURGERY PER DR. BERMEO'S OFFICE    Elvia Nicholson MD   B Complex-C-Folic Acid (Umm-Peter) tablet Take 1 tablet by mouth Daily. 9/27/23   Elvia Nicholson MD   famotidine (PEPCID) 10 MG tablet Take 1 tablet by mouth As Needed for Heartburn.    Elvia Nicholson MD   Ferrous Sulfate (IRON PO) 50 mg 1 (One) Time Per Week. 9/6/23 9/4/24  Elvia Nicholson MD   HYDROcodone-acetaminophen (NORCO) 7.5-325 MG per tablet Take 1 tablet by mouth Every 4 (Four) Hours As Needed for Moderate Pain (Pain). 10/27/23   Bill Bermeo MD   Iron Sucrose (VENOFER IV) Infuse  into a venous catheter As Needed. USUALLY ONCE MONTH    Elvia Nicholson MD   latanoprost (XALATAN) 0.005 % ophthalmic solution Administer 1 drop to both eyes Every Night. 1 drop each eye    Elvia Nicholson MD   lidocaine-prilocaine (EMLA) 2.5-2.5 % cream Apply 1 application  topically to the appropriate area as directed. Rrknfz-Tapiiplru-Tajive:  ONE HOUR PRIOR TO DIALYSIS 2/5/18   Elvia Nicholson MD   Loratadine (CLARITIN PO) Take 1 tablet by mouth As Needed. Pt states he is uncertain of dosage    Elvia Nicholson MD   Methoxy PEG-Epoetin Beta (MIRCERA IJ) Inject  as directed Every 30 (Thirty) Days.    Elvia Nicholson MD   sodium chloride 0.65 % nasal spray 2 sprays into the nostril(s) as directed by provider Every Night.     Provider, MD Elvia   Sucroferric Oxyhydroxide (Velphoro) 500 MG chewable tablet Chew 1 tablet 3 (Three) Times a Day. AFTER EACH  MEAL    Provider, MD Elvia       Allergies:  Allergies   Allergen Reactions    Ace Inhibitors Other (See Comments)     RENAL FAILURE/ raised creatinine    Contrast Dye (Echo Or Unknown Ct/Mr) Other (See Comments) and Anaphylaxis     RENAL FAILURE    Iodine Anaphylaxis    Angiotensin Receptor Blockers Swelling    Eliquis [Apixaban] Arrhythmia     BLEEDING ISSUES; PT CANNOT TAKE ANY ANTICOAGULANTS DUE TO LOW PLATELETS EXCEPT ASPIRIN      Filgrastim GI Intolerance and Confusion     Chest pain    Keflex [Cephalexin] Diarrhea     RENAL FAILURE    Other Angioedema     IVP Dye       Objective     Vitals:   Temp:  [97 °F (36.1 °C)-97.8 °F (36.6 °C)] 97.8 °F (36.6 °C)  Heart Rate:  [58-82] 75  Resp:  [16] 16  BP: (112-130)/(55-67) 112/60    Intake/Output Summary (Last 24 hours) at 10/29/2023 1300  Last data filed at 10/29/2023 1008  Gross per 24 hour   Intake 50 ml   Output --   Net 50 ml       Physical Exam:    General Appearance: alert, oriented x 3, no acute distress   Skin: warm and dry  HEENT: oral mucosa normal, nonicteric sclera  Neck: supple, no JVD  Lungs: CTA  Heart: RRR, normal S1 and S2  Abdomen: soft, nontender, nondistended  : no palpable bladder  Extremities: no edema, cyanosis or clubbing  Neuro: normal speech and mental status     Scheduled Meds:     latanoprost, 1 drop, Both Eyes, Nightly  senna-docusate sodium, 2 tablet, Oral, BID  sodium chloride, 10 mL, Intravenous, Q12H      IV Meds:        Results Reviewed:   I have personally reviewed the results from the time of this admission to 10/29/2023 13:00 EDT     Lab Results   Component Value Date    GLUCOSE 96 10/29/2023    CALCIUM 8.6 10/29/2023     10/29/2023    K 4.2 10/29/2023    CO2 29.0 10/29/2023     10/29/2023    BUN 45 (H) 10/29/2023    CREATININE 5.81 (H) 10/29/2023    EGFRIFAFRI  02/14/2022       Comment:      <15 Indicative of kidney failure.    EGFRIFNONA 7 (L) 02/14/2022    BCR 7.7 10/29/2023    ANIONGAP 11.0 10/29/2023      Lab Results   Component Value Date    MG 2.1 10/29/2023    PHOS 4.3 10/29/2023    ALBUMIN 3.0 (L) 10/29/2023           Assessment / Plan     ASSESSMENT:    End-stage renal disease on hemodialysis through left upper extremity AV graft status post AKA decision of the procedure aneurysm on Friday who came back for bleed from the incision site.  Plan for hemodialysis tomorrow through his AV graft per vascular.  We will continue to monitor for bleeding.  Malfunctioning AV graft  Anemia of chronic illness  Hyperphosphatemia.  Continue binders with meals.  Diabetes mellitus type 2  History of diastolic congestive heart failure  History of Crohn disease    PLAN:    Dialysis tomorrow  Continue blood pressure medication  Continue to evaluate for bleeding from AV graft  Surveillance labs    Thank you for involving us in the care of Bill Landry.  Please feel free to call with any questions.    Kathryn Germain MD  10/29/23  13:00 EDT    [facility]  [phone]

## 2023-10-29 NOTE — CONSULTS
Patient Name:  Bill Landry  YOB: 1945  MRN:  [id number]  Date of Admission:  10/29/2023  Date of Consult:  10/29/2023  Patient Care Team:  Bharathi De La Torre MD as PCP - General (Internal Medicine)  Sharif Mckenzie MD as Consulting Physician (Hematology and Oncology)  Marcellus Osborne MD as Referring Physician (Internal Medicine)  Ken Mosleey MD as Consulting Physician (Pulmonary Disease)  Cooper Virgen MD as Consulting Physician (Urology)  Dale Vázquez MD as Consulting Physician (Cardiology)  Myra Moore MD as Consulting Physician (Nephrology)    Inpatient Hospitalist Consult  Consult performed by: Alyce Garcia APRN  Consult ordered by: Boom Olivares II, MD  Reason for consult: Evaluate and make recommendations for management of diabetes type 2, essential hypertension, thrombocytopenia, anemia, Crohn's,  GERD.        Subjective   History of Present Illness  Mr. Landry is a 78 y.o. male that has been admitted to [facility] ,, to the vascular surgery service line with persistent bleeding from left upper arm incision following recent pseudoaneurysm repair 10/27/2023.  He has a history of chronic anemia, chronic thrombocytopenia and leukopenia, diabetes type 2 diet controlled, end-stage renal disease on hemodialysis, cirrhosis, Crohn's disease s/p ileostomy, hypertension, bicuspid aortic valve, and diastolic heart failure, and GERD.  He is followed by Dr. Vázquez from cardiology standpoint.   Patient as mentioned does have chronic thrombocytopenia and at the time of his surgery platelets were 92,000 prior to operation on 10/27/2023 but on presentation to the emergency room today platelet count 73,000. Hemoglobin on the day of surgery was 11.9 and currently down to 10.3.    He was given platelets in the ED.     At the time of my visit he denies any chest pain, SOA, nausea, vomiting or diarrhea.  He has tolerated a diet.  He does complain of expected  postoperative discomfort to left upper arm incision.  Left upper arm incisional bleeding noted at guaze dressing that was placed this am.   Son at bedside.  Pt lives independently and drives himself to HD.      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    Bicuspid aortic valve 07/20/2022    Chronic leukopenia and thrombocytopenia     Cirrhosis of liver without ascites 07/24/2017    Congestive splenomegaly 07/24/2017    Crohn disease     with ileostomy    Diastolic CHF     although it was likely from volume overload due to ESRD    Diverticula of colon     ESRD on hemodialysis     M-W-F [facility]    Fatty liver disease, nonalcoholic     GERD (gastroesophageal reflux disease)     GI bleed     Glaucoma     H/O Gallstone pancreatitis     H/O Pericardial effusion     H/O sinus bradycardia     Heart murmur     History of hypertension     resolved    History of UTI     Hx of renal calculi     Ileostomy present     Iron deficiency     MGUS (monoclonal gammopathy of unknown significance)     TATE (obstructive sleep apnea)     Permanent atrial fibrillation     Sleep apnea     CPAP USED    Thrombocytopenia     Type 2 diabetes mellitus     CURRENTLY TAKES NO MED    Urinary retention     Post-OP     Past Surgical History:   Procedure Laterality Date    APPENDECTOMY      ARTERIOVENOUS FISTULA/SHUNT SURGERY Left 08/08/2017    Procedure: LEFT BRACHIAL CEPHALIC AV FISTULA FORMATION WITH CEPHALIC VEIN TRANSPOSITION ;  Surgeon: Bill Deal MD;  Location: [facility];  Service:     ARTERIOVENOUS FISTULA/SHUNT SURGERY Left 5/27/2022    Procedure: OPEN REVISION LEFT ARTERIOVENOUS INTERPOSITION GRAFT PLACEMENT;  Surgeon: Bill Deal MD;  Location: [facility] OR;  Service: Vascular;  Laterality: Left;    ARTERIOVENOUS FISTULA/SHUNT SURGERY W/ HEMODIALYSIS CATHETER INSERTION Left 02/15/2022    Procedure: OPEN LEFT ARM  ARTERIOVENOUS FISTULA THROMBECTOMY WITH CEPHALIC VEIN ARTHROPLASTY AND STENT/GRAFT PLACEMENT;  Surgeon: iBll Bermeo MD;  Location: [facility] OR 18/19;  Service: Vascular;  Laterality: Left;    CATARACT EXTRACTION WITH INTRAOCULAR LENS IMPLANT Bilateral     CHOLECYSTECTOMY      COLECTOMY PARTIAL / TOTAL      History of inflammatory bowel disease with status post colectomy with ileostomy many years ago in the [facility],  18 YEARS OF AGE    COLONOSCOPY      CYSTOSCOPY LITHOLAPAXY BLADDER STONE EXTRACTION      ENDOSCOPY  01/16/2015    gastritis    ENDOSCOPY  01/17/2018    Procedure: ESOPHAGOGASTRODUODENOSCOPY;  Surgeon: Warner Neville MD;  Location: [facility] ENDOSCOPY;  Service:     ILEOSCOPY  01/16/2015    normal    ILEOSCOPY N/A 01/17/2018    Procedure: ILEOSCOPY;  Surgeon: Warner Neville MD;  Location: [facility] ENDOSCOPY;  Service:     TONSILLECTOMY       Family History   Problem Relation Age of Onset    Heart failure Mother     Hypertension Mother     Heart disease Mother     Hyperlipidemia Mother     Hypertension Father     Malig Hyperthermia Neg Hx      Social History     Tobacco Use    Smoking status: Never    Smokeless tobacco: Never   Vaping Use    Vaping Use: Never used   Substance Use Topics    Alcohol use: No     Comment: caffeine use: none    Drug use: No     Medications Prior to Admission   Medication Sig Dispense Refill Last Dose    alfuzosin (UROXATRAL) 10 MG 24 hr tablet Take 1 tablet by mouth Every Other Day. Does not take on tues sun or thurs       aspirin 81 MG tablet Take 1 tablet by mouth Daily. INSTRUCTED TO CONTINUE THIS FOR SURGERY PER DR. BERMEO'S OFFICE       B Complex-C-Folic Acid (Umm-Peter) tablet Take 1 tablet by mouth Daily.       famotidine (PEPCID) 10 MG tablet Take 1 tablet by mouth As Needed for Heartburn.       Ferrous Sulfate (IRON PO) 50 mg 1 (One) Time Per Week.       HYDROcodone-acetaminophen (NORCO) 7.5-325 MG per tablet Take 1 tablet by mouth Every 4 (Four) Hours As  Needed for Moderate Pain (Pain). 30 tablet 0     Iron Sucrose (VENOFER IV) Infuse  into a venous catheter As Needed. USUALLY ONCE MONTH       latanoprost (XALATAN) 0.005 % ophthalmic solution Administer 1 drop to both eyes Every Night. 1 drop each eye       lidocaine-prilocaine (EMLA) 2.5-2.5 % cream Apply 1 application  topically to the appropriate area as directed. Dpzqle-Cnftlsady-Thcxwt:  ONE HOUR PRIOR TO DIALYSIS  3     Loratadine (CLARITIN PO) Take 1 tablet by mouth As Needed. Pt states he is uncertain of dosage       Methoxy PEG-Epoetin Beta (MIRCERA IJ) Inject  as directed Every 30 (Thirty) Days.       sodium chloride 0.65 % nasal spray 2 sprays into the nostril(s) as directed by provider Every Night.       Sucroferric Oxyhydroxide (Velphoro) 500 MG chewable tablet Chew 1 tablet 3 (Three) Times a Day. AFTER EACH  MEAL        Allergies:  Ace inhibitors, Contrast dye (echo or unknown ct/mr), Iodine, Angiotensin receptor blockers, Eliquis [apixaban], Filgrastim, Keflex [cephalexin], and Other        Current Facility-Administered Medications:     sennosides-docusate (PERICOLACE) 8.6-50 MG per tablet 2 tablet, 2 tablet, Oral, BID **AND** polyethylene glycol (MIRALAX) packet 17 g, 17 g, Oral, Daily PRN **AND** bisacodyl (DULCOLAX) EC tablet 5 mg, 5 mg, Oral, Daily PRN **AND** bisacodyl (DULCOLAX) suppository 10 mg, 10 mg, Rectal, Daily PRN, Boom Olivares II, MD    famotidine (PEPCID) tablet 10 mg, 10 mg, Oral, PRN, Boom Olivares II, MD    HYDROcodone-acetaminophen (NORCO) 7.5-325 MG per tablet 1 tablet, 1 tablet, Oral, Q4H PRN, oBom Olivares II, MD    latanoprost (XALATAN) 0.005 % ophthalmic solution 1 drop, 1 drop, Both Eyes, Nightly, Boom Olivares II, MD    nitroglycerin (NITROSTAT) SL tablet 0.4 mg, 0.4 mg, Sublingual, Q5 Min PRN, Boom Olivares II, MD    sodium chloride 0.9 % flush 10 mL, 10 mL, Intravenous, Q12H, Boom Olivares II, MD, 10 mL at 10/29/23 0944    sodium chloride 0.9 % flush 10 mL, 10  mL, Intravenous, PRN, Boom Olivares II, MD    sodium chloride 0.9 % infusion 40 mL, 40 mL, Intravenous, PRNMarshall Charles A II, MD    Review of Systems   Constitutional:  Negative for chills, diaphoresis and fever.   Respiratory:  Negative for cough, shortness of breath and wheezing.    Cardiovascular:  Negative for chest pain and leg swelling.   Gastrointestinal:  Negative for abdominal distention, abdominal pain, constipation, diarrhea, nausea and vomiting.   Genitourinary:  Negative for difficulty urinating and dysuria.   Musculoskeletal:  Negative for joint swelling and myalgias.   Neurological:  Negative for weakness and headaches.   Psychiatric/Behavioral:  Negative for agitation and confusion.          Objective      Vital Signs  Temp:  [97 °F (36.1 °C)-97.8 °F (36.6 °C)] 97.8 °F (36.6 °C)  Heart Rate:  [58-82] 75  Resp:  [16] 16  BP: (112-130)/(55-67) 112/60  Body mass index is 24.39 kg/m².    Physical Exam  Vitals and nursing note reviewed.   Constitutional:       Appearance: He is ill-appearing (chronically ill appearing).   Eyes:      General: No scleral icterus.     Conjunctiva/sclera: Conjunctivae normal.   Cardiovascular:      Rate and Rhythm: Normal rate and regular rhythm.      Pulses: Normal pulses.      Heart sounds: Normal heart sounds. Murmur heard.   Pulmonary:      Effort: Pulmonary effort is normal.      Breath sounds: Normal breath sounds.   Abdominal:      General: Bowel sounds are normal.      Palpations: Abdomen is soft.      Comments: Left sided ileostomy noted with brown colored stool.    Musculoskeletal:         General: No swelling or tenderness.      Right lower leg: No edema.      Left lower leg: No edema.   Skin:     Comments: Left upper arm with gauze dressing in place with noted serosanguinous drainage.  Radial and ulnar pulses 2+.     Neurological:      General: No focal deficit present.      Mental Status: He is alert and oriented to person, place, and time. Mental status is  "at baseline.   Psychiatric:         Mood and Affect: Mood normal.         Behavior: Behavior normal.         Thought Content: Thought content normal.         Judgment: Judgment normal.           Results Review:   I reviewed the patient's new clinical results.  I reviewed the patient's new imaging results and agree with the interpretation.  I reviewed the patient's other test results and agree with the interpretation         Assessment/Plan     Active Hospital Problems    Diagnosis  POA    **Bleeding [R58]  Yes    End stage kidney disease [N18.6]  Yes    Crohn's disease, unspecified, without complications [K50.90]  Yes    Essential (primary) hypertension [I10]  Yes    Anemia due to stage 4 chronic kidney disease treated with erythropoietin [N18.4, D63.1]  Yes    Cirrhosis of liver without ascites [K74.60]  Yes    Thrombocytopenia [D69.6]  Yes      Resolved Hospital Problems   No resolved problems to display.       Mr. Landry is a 78 y.o. male who is s/p left upper arm pseudoaneurysm repair 10/27/2023 readmitted for persistent bleeding from left upper arm incision to vascular service team.  We have been been consulted for medical management of his type 2 diabetes,  essential hypertension, and cirrhosis.    His diabetes is reported to be diet managed.  Last hemoglobin A1c September 26, 2023 was 5.2.  Random serum glucose in the emergency room was 96.  Check fingerstick blood sugars ACHS.  Trend blood sugars and initiate low dose if indicated.  He said \"I know longer have Diabetes now that I lost weight and watch my diet.\"     For any BG less than 70 mg/dL, treat per hospital protocol  BMP in a.m.      NCS renal diet. He uses nephro shakes at home.  Will order here.   Hemodynamically he is stable.  Most recent BP is 112/60 with a heart rate of 75.  Appears as an outpatient that he is not on any ongoing antihypertensives.    Continue to monitor hemodynamic stability in the setting of prior bleeding.   Monitor hemoglobin " and hematocrit.  Check serial H/H.  Check 8 pm tonight.   Transfuse for hemoglobin less than 7.5   Nephrology has been consulted by attending to manage his end-stage renal disease.  He is on hemodialysis Monday Wednesday Friday.  Monitor renal function and electrolytes.   INR 1.34 mild elevation likely due to his underlying liver disease/cirrhosis.  This in conjunction with his antiplatelet therapy likely contributed to bleeding.  Home aspirin is appropriately on hold per surgery team    DVT prophylaxis per surgery.      Thank you very much for asking A to be involved in this patient's care. We will follow along with you.      JAME Garza  [facility]  10/29/23  13:19 EDT

## 2023-10-29 NOTE — ED NOTES
Nursing report ED to floor  Bill Landry  78 y.o.  male    HPI :   Chief Complaint   Patient presents with    Vascular Access Problem       Admitting doctor:   Boom Olivares II, MD    Admitting diagnosis:   There were no encounter diagnoses.    Code status:   Current Code Status       Date Active Code Status Order ID Comments User Context       Prior            Allergies:   Ace inhibitors, Contrast dye (echo or unknown ct/mr), Iodine, Angiotensin receptor blockers, Eliquis [apixaban], Filgrastim, Keflex [cephalexin], and Other    Isolation:   No active isolations    Intake and Output    Intake/Output Summary (Last 24 hours) at 10/29/2023 1051  Last data filed at 10/29/2023 1008  Gross per 24 hour   Intake 50 ml   Output --   Net 50 ml       Weight:       10/29/23  0814   Weight: 79.4 kg (175 lb)       Most recent vitals:   Vitals:    10/29/23 0953 10/29/23 0954 10/29/23 0958 10/29/23 1013   BP: 120/63   113/55   Pulse:  61  70   Resp:   16 16   Temp:   97.6 °F (36.4 °C) 97.4 °F (36.3 °C)   TempSrc:   Tympanic    SpO2:  99%  98%   Weight:       Height:           Active LDAs/IV Access:   Lines, Drains & Airways       Active LDAs       Name Placement date Placement time Site Days    Peripheral IV 10/29/23 0942 Right Antecubital 10/29/23  0942  Antecubital  less than 1    Peripheral IV 10/29/23 1000 Anterior;Right Hand 10/29/23  1000  Hand  less than 1    Ileostomy LLQ --  present upon arrival to [facility]  --  LLQ  --                    Labs (abnormal labs have a star):   Labs Reviewed   BASIC METABOLIC PANEL - Abnormal; Notable for the following components:       Result Value    BUN 45 (*)     Creatinine 5.82 (*)     Calcium 8.5 (*)     eGFR 9.3 (*)     All other components within normal limits    Narrative:     GFR Normal >60  Chronic Kidney Disease <60  Kidney Failure <15    The GFR formula is only valid for adults with stable renal function between ages 18 and 70.   CBC WITH AUTO DIFFERENTIAL - Abnormal; Notable  for the following components:    WBC 2.94 (*)     RBC 3.16 (*)     Hemoglobin 10.3 (*)     Hematocrit 31.2 (*)     MCV 98.7 (*)     Platelets 73 (*)     Monocyte % 12.6 (*)     Lymphocytes, Absolute 0.59 (*)     All other components within normal limits   BASIC METABOLIC PANEL - Abnormal; Notable for the following components:    BUN 45 (*)     Creatinine 5.81 (*)     eGFR 9.3 (*)     All other components within normal limits    Narrative:     GFR Normal >60  Chronic Kidney Disease <60  Kidney Failure <15    The GFR formula is only valid for adults with stable renal function between ages 18 and 70.   PROTIME-INR - Abnormal; Notable for the following components:    Protime 16.7 (*)     INR 1.34 (*)     All other components within normal limits   HEPATIC FUNCTION PANEL - Abnormal; Notable for the following components:    Albumin 3.0 (*)     Alkaline Phosphatase 134 (*)     Bilirubin, Direct 0.4 (*)     All other components within normal limits   MAGNESIUM - Normal   PHOSPHORUS - Normal   PREPARE PLATELET PHERESIS   CBC AND DIFFERENTIAL    Narrative:     The following orders were created for panel order CBC & Differential.  Procedure                               Abnormality         Status                     ---------                               -----------         ------                     CBC Auto Differential[[id number]]        Abnormal            Final result                 Please view results for these tests on the individual orders.       EKG:   No orders to display       Meds given in ED:   Medications   sodium chloride 0.9 % flush 10 mL (10 mL Intravenous Given 10/29/23 0944)   sodium chloride 0.9 % flush 10 mL (has no administration in time range)   sodium chloride 0.9 % infusion 40 mL (has no administration in time range)   sennosides-docusate (PERICOLACE) 8.6-50 MG per tablet 2 tablet (has no administration in time range)     And   polyethylene glycol (MIRALAX) packet 17 g (has no administration in time  range)     And   bisacodyl (DULCOLAX) EC tablet 5 mg (has no administration in time range)     And   bisacodyl (DULCOLAX) suppository 10 mg (has no administration in time range)   nitroglycerin (NITROSTAT) SL tablet 0.4 mg (has no administration in time range)   Pharmacy Consult - Pharmacy to dose (has no administration in time range)   famotidine (PEPCID) tablet 10 mg (has no administration in time range)   HYDROcodone-acetaminophen (NORCO) 7.5-325 MG per tablet 1 tablet (has no administration in time range)   latanoprost (XALATAN) 0.005 % ophthalmic solution 1 drop (has no administration in time range)   desmopressin (DDAVP) 24 mcg in sodium chloride 0.9 % 50 mL IVPB (0 mcg Intravenous Stopped 10/29/23 1008)       Imaging results:  No radiology results for the last day    Ambulatory status:   - standby    Social issues:   Social History     Socioeconomic History    Marital status:      Spouse name: Gillian    Number of children: 2    Years of education: College   Tobacco Use    Smoking status: Never    Smokeless tobacco: Never   Vaping Use    Vaping Use: Never used   Substance and Sexual Activity    Alcohol use: No     Comment: caffeine use: none    Drug use: No    Sexual activity: Defer       NIH Stroke Scale:       Micaela Diaz RN  10/29/23 10:51 EDT

## 2023-10-29 NOTE — Clinical Note
Level of Care: Med/Surg [1]   Diagnosis: Bleeding [197620]   Admitting Physician: ANGELO MITCHELL II [[id number]   Attending Physician: ANGELO MITCHELL II [[id number]]

## 2023-10-30 LAB
ALBUMIN SERPL-MCNC: 2.9 G/DL (ref 3.5–5.2)
ALBUMIN/GLOB SERPL: 0.9 G/DL
ALP SERPL-CCNC: 124 U/L (ref 39–117)
ALT SERPL W P-5'-P-CCNC: 6 U/L (ref 1–41)
ANION GAP SERPL CALCULATED.3IONS-SCNC: 13 MMOL/L (ref 5–15)
AST SERPL-CCNC: 25 U/L (ref 1–40)
BACTERIA SPEC AEROBE CULT: NORMAL
BASOPHILS # BLD AUTO: 0.02 10*3/MM3 (ref 0–0.2)
BASOPHILS NFR BLD AUTO: 0.6 % (ref 0–1.5)
BH BB BLOOD EXPIRATION DATE: NORMAL
BH BB BLOOD TYPE BARCODE: 5100
BH BB BLOOD TYPE BARCODE: 6200
BH BB BLOOD TYPE BARCODE: 6200
BH BB DISPENSE STATUS: NORMAL
BH BB PRODUCT CODE: NORMAL
BH BB UNIT NUMBER: NORMAL
BILIRUB SERPL-MCNC: 0.6 MG/DL (ref 0–1.2)
BUN SERPL-MCNC: 61 MG/DL (ref 8–23)
BUN/CREAT SERPL: 8.9 (ref 7–25)
CALCIUM SPEC-SCNC: 8.3 MG/DL (ref 8.6–10.5)
CHLORIDE SERPL-SCNC: 99 MMOL/L (ref 98–107)
CO2 SERPL-SCNC: 24 MMOL/L (ref 22–29)
CREAT SERPL-MCNC: 6.83 MG/DL (ref 0.76–1.27)
DEPRECATED RDW RBC AUTO: 51.1 FL (ref 37–54)
EGFRCR SERPLBLD CKD-EPI 2021: 7.7 ML/MIN/1.73
EOSINOPHIL # BLD AUTO: 0.06 10*3/MM3 (ref 0–0.4)
EOSINOPHIL NFR BLD AUTO: 1.9 % (ref 0.3–6.2)
ERYTHROCYTE [DISTWIDTH] IN BLOOD BY AUTOMATED COUNT: 14.4 % (ref 12.3–15.4)
FIBRINOGEN PPP-MCNC: 211 MG/DL (ref 219–464)
GLOBULIN UR ELPH-MCNC: 3.4 GM/DL
GLUCOSE BLDC GLUCOMTR-MCNC: 120 MG/DL (ref 70–130)
GLUCOSE BLDC GLUCOMTR-MCNC: 202 MG/DL (ref 70–130)
GLUCOSE BLDC GLUCOMTR-MCNC: 66 MG/DL (ref 70–130)
GLUCOSE BLDC GLUCOMTR-MCNC: 84 MG/DL (ref 70–130)
GLUCOSE SERPL-MCNC: 90 MG/DL (ref 65–99)
GRAM STN SPEC: NORMAL
GRAM STN SPEC: NORMAL
HCT VFR BLD AUTO: 26.9 % (ref 37.5–51)
HGB BLD-MCNC: 9 G/DL (ref 13–17.7)
IMM GRANULOCYTES # BLD AUTO: 0.01 10*3/MM3 (ref 0–0.05)
IMM GRANULOCYTES NFR BLD AUTO: 0.3 % (ref 0–0.5)
INR PPP: 1.3 (ref 0.9–1.1)
LYMPHOCYTES # BLD AUTO: 0.72 10*3/MM3 (ref 0.7–3.1)
LYMPHOCYTES NFR BLD AUTO: 22.4 % (ref 19.6–45.3)
MAGNESIUM SERPL-MCNC: 3 MG/DL (ref 1.6–2.4)
MCH RBC QN AUTO: 33 PG (ref 26.6–33)
MCHC RBC AUTO-ENTMCNC: 33.5 G/DL (ref 31.5–35.7)
MCV RBC AUTO: 98.5 FL (ref 79–97)
MONOCYTES # BLD AUTO: 0.37 10*3/MM3 (ref 0.1–0.9)
MONOCYTES NFR BLD AUTO: 11.5 % (ref 5–12)
NEUTROPHILS NFR BLD AUTO: 2.04 10*3/MM3 (ref 1.7–7)
NEUTROPHILS NFR BLD AUTO: 63.3 % (ref 42.7–76)
NRBC BLD AUTO-RTO: 0 /100 WBC (ref 0–0.2)
PHOSPHATE SERPL-MCNC: 4.6 MG/DL (ref 2.5–4.5)
PLATELET # BLD AUTO: 71 10*3/MM3 (ref 140–450)
PMV BLD AUTO: 9.5 FL (ref 6–12)
POTASSIUM SERPL-SCNC: 4 MMOL/L (ref 3.5–5.2)
PROT SERPL-MCNC: 6.3 G/DL (ref 6–8.5)
PROTHROMBIN TIME: 16.4 SECONDS (ref 11.7–14.2)
RBC # BLD AUTO: 2.73 10*6/MM3 (ref 4.14–5.8)
SODIUM SERPL-SCNC: 136 MMOL/L (ref 136–145)
UNIT  ABO: NORMAL
UNIT  RH: NORMAL
WBC NRBC COR # BLD: 3.22 10*3/MM3 (ref 3.4–10.8)

## 2023-10-30 PROCEDURE — 84100 ASSAY OF PHOSPHORUS: CPT | Performed by: STUDENT IN AN ORGANIZED HEALTH CARE EDUCATION/TRAINING PROGRAM

## 2023-10-30 PROCEDURE — 83735 ASSAY OF MAGNESIUM: CPT | Performed by: STUDENT IN AN ORGANIZED HEALTH CARE EDUCATION/TRAINING PROGRAM

## 2023-10-30 PROCEDURE — 85384 FIBRINOGEN ACTIVITY: CPT | Performed by: INTERNAL MEDICINE

## 2023-10-30 PROCEDURE — 36415 COLL VENOUS BLD VENIPUNCTURE: CPT | Performed by: INTERNAL MEDICINE

## 2023-10-30 PROCEDURE — 85610 PROTHROMBIN TIME: CPT | Performed by: STUDENT IN AN ORGANIZED HEALTH CARE EDUCATION/TRAINING PROGRAM

## 2023-10-30 PROCEDURE — 80053 COMPREHEN METABOLIC PANEL: CPT | Performed by: INTERNAL MEDICINE

## 2023-10-30 PROCEDURE — 82948 REAGENT STRIP/BLOOD GLUCOSE: CPT

## 2023-10-30 PROCEDURE — 99232 SBSQ HOSP IP/OBS MODERATE 35: CPT | Performed by: INTERNAL MEDICINE

## 2023-10-30 PROCEDURE — 85025 COMPLETE CBC W/AUTO DIFF WBC: CPT | Performed by: STUDENT IN AN ORGANIZED HEALTH CARE EDUCATION/TRAINING PROGRAM

## 2023-10-30 RX ORDER — FAMOTIDINE 20 MG/1
10 TABLET, FILM COATED ORAL DAILY PRN
Status: DISCONTINUED | OUTPATIENT
Start: 2023-10-30 | End: 2023-11-01 | Stop reason: HOSPADM

## 2023-10-30 RX ORDER — LIDOCAINE HYDROCHLORIDE AND EPINEPHRINE 10; 10 MG/ML; UG/ML
10 INJECTION, SOLUTION INFILTRATION; PERINEURAL ONCE
Status: DISCONTINUED | OUTPATIENT
Start: 2023-10-30 | End: 2023-11-01 | Stop reason: HOSPADM

## 2023-10-30 RX ADMIN — Medication 10 ML: at 21:05

## 2023-10-30 RX ADMIN — LATANOPROST 1 DROP: 50 SOLUTION OPHTHALMIC at 23:00

## 2023-10-30 RX ADMIN — Medication 10 ML: at 12:55

## 2023-10-30 NOTE — PROGRESS NOTES
Name: Bill Landry ADMIT: 10/29/2023   : 1945  PCP: Bharathi De La Torre MD    MRN: [id number] LOS: 0 days   AGE/SEX: 78 y.o. male  ROOM: [facility]     Subjective   Subjective   Patient is lying on the bed and denies any nausea, vomiting, abdominal pain, chest pain.  He does not appear to be in distress.       Objective   Objective   Vital Signs  Temp:  [97 °F (36.1 °C)-98.6 °F (37 °C)] 97.9 °F (36.6 °C)  Heart Rate:  [60-80] 80  Resp:  [16-18] 18  BP: (110-134)/() 128/70  SpO2:  [93 %-99 %] 99 %  on   ;   Device (Oxygen Therapy): room air  Body mass index is 24.39 kg/m².  Physical Exam  HEENT: PERRLA, extract movements intact, Scleras no icterus  Neck: Supple, no JVD  Cardiovascular: Regular rate and rhythm with normal S1 and S2  Respiratory: Fairly clear to auscultation anteriorly with no wheezes  GI: Soft, nontender, bowel sounds are present  Extremities: Left upper extremity has dressing in place and is bloodstained, AV fistula has a palpable bruit.  Results Review     I reviewed the patient's new clinical results.  Results from last 7 days   Lab Units 10/30/23  0539 10/29/23  1702 10/29/23  0814 10/27/23  0938   WBC 10*3/mm3 3.22* 2.80* 2.94* 4.73   HEMOGLOBIN g/dL 9.0* 9.6*  9.8* 10.3* 11.9*   PLATELETS 10*3/mm3 71* 75*  75* 73* 92*     Results from last 7 days   Lab Units 10/30/23  0539 10/29/23  0936 10/29/23  0814 10/27/23  0938   SODIUM mmol/L 136 141 141 127*   POTASSIUM mmol/L 4.0 4.2 3.7 4.0   CHLORIDE mmol/L 99 101 101 93*   CO2 mmol/L 24.0 29.0 28.0 22.1   BUN mg/dL 61* 45* 45* 58*   CREATININE mg/dL 6.83* 5.81* 5.82* 6.95*   GLUCOSE mg/dL 90 96 99 101*   EGFR mL/min/1.73 7.7* 9.3* 9.3* 7.5*     Results from last 7 days   Lab Units 10/30/23  0539 10/29/23  0814   ALBUMIN g/dL 2.9* 3.0*   BILIRUBIN mg/dL 0.6 0.9   ALK PHOS U/L 124* 134*   AST (SGOT) U/L 25 26   ALT (SGPT) U/L 6 10     Results from last 7 days   Lab Units 10/30/23  0539 10/29/23  0936 10/29/23  0814 10/27/23  0938   CALCIUM  mg/dL 8.3* 8.6 8.5* 9.0   ALBUMIN g/dL 2.9*  --  3.0*  --    MAGNESIUM mg/dL 3.0* 2.1  --   --    PHOSPHORUS mg/dL 4.6* 4.3  --   --        Glucose   Date/Time Value Ref Range Status   10/30/2023 1546 202 (H) 70 - 130 mg/dL Final   10/30/2023 1255 66 (L) 70 - 130 mg/dL Final   10/30/2023 0616 84 70 - 130 mg/dL Final   10/29/2023 2101 119 70 - 130 mg/dL Final   10/29/2023 1612 143 (H) 70 - 130 mg/dL Final       No radiology results for the last day    I have personally reviewed all medications:  Scheduled Medications  latanoprost, 1 drop, Both Eyes, Nightly  lidocaine 1% - EPINEPHrine [id number], 10 mL, Infiltration, Once  senna-docusate sodium, 2 tablet, Oral, BID  silver nitrate, 1 application , Topical, Once  sodium chloride, 10 mL, Intravenous, Q12H    Infusions   Diet  Diet: Regular/House Diet, Renal Diets, Diabetic Diets; Consistent Carbohydrate; Low Potassium; Texture: Regular Texture (IDDSI 7); Fluid Consistency: Thin (IDDSI 0)  NPO Diet NPO Type: Strict NPO    I have personally reviewed:  [x]  Laboratory   [x]  Microbiology   [x]  Radiology   [x]  EKG/Telemetry  [x]  Cardiology/Vascular   []  Pathology    []  Records       Assessment/Plan     Active Hospital Problems    Diagnosis  POA    **Bleeding [R58]  Yes    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       78 y.o. male admitted with Bleeding.    1.Recent pseudoaneurysm repair with postop bleeding most likely secondary to dysfunctional platelets.  Received cryoprecipitate as well as  platelets.  Vascular surgery is following along and discharge once cleared.    2.  End-stage renal disease, will continue with routine dialysis and nephrology is following along.    3.  Cirrhosis of the liver with no ascites.  Currently euvolemic.    4.  Thrombocytopenia, chronic and likely secondary to underlying liver disease.  Platelet count is noted to be 71,000 and hematology is following along.    5.  History of diabetes, diet controlled and hemoglobin A1c is 5.20 as of 9/26/2023.  Was 6.2 in May 2023.    Daniel Dalton MD  [facility]  10/30/23  16:21 EDT

## 2023-10-30 NOTE — PROGRESS NOTES
"Name: Bill Landry ADMIT: 10/29/2023   : 1945  PCP: Bharathi De La Torre MD    MRN: [id number] LOS: 0 days   AGE/SEX: 78 y.o. male  ROOM: [address]    [facility]-24, Subsequent Hospital Care, new bleeding    Chief Complaint   Patient presents with    Vascular Access Problem     CC: Bleeding left arm  Subjective     78 y.o. male with bleeding and was left postsurgical site.  Patient's in dialysis during my evaluation and so it is limited.  We will transfer him to [facility] as a plan for possible intervention at bedside if needed and keep him n.p.o. after midnight in case surgery is needed tomorrow.    Review of Systems denies pain    Objective     Scheduled Medications:   latanoprost, 1 drop, Both Eyes, Nightly  senna-docusate sodium, 2 tablet, Oral, BID  sodium chloride, 10 mL, Intravenous, Q12H        Active Infusions:       As Needed Medications:    senna-docusate sodium **AND** polyethylene glycol **AND** bisacodyl **AND** bisacodyl    famotidine    HYDROcodone-acetaminophen    nitroglycerin    sodium chloride    sodium chloride    Vital Signs  Vital Signs Patient Vitals for the past 24 hrs:   BP Temp Temp src Pulse Resp SpO2 Height Weight   10/30/23 0708 127/65 97.9 °F (36.6 °C) Oral 77 16 96 % -- --   10/30/23 0007 110/56 97.7 °F (36.5 °C) Oral 60 16 93 % -- --   10/29/23 2048 117/54 98 °F (36.7 °C) -- 65 16 97 % -- --   10/29/23 2003 (!) 133/106 98.6 °F (37 °C) Oral 76 16 97 % -- --   10/29/23 1857 127/61 97.7 °F (36.5 °C) -- 71 18 95 % -- --   10/29/23 1829 134/64 97.8 °F (36.6 °C) Oral -- 17 95 % -- --   10/29/23 1158 112/60 97.8 °F (36.6 °C) Oral 75 16 98 % 177.8 cm (70\") 77.1 kg (170 lb)   10/29/23 1106 117/60 -- -- 68 -- 97 % -- --   10/29/23 1101 117/67 -- -- 71 -- 99 % -- --   10/29/23 1013 113/55 97.4 °F (36.3 °C) -- 70 16 98 % -- --   10/29/23 0958 -- 97.6 °F (36.4 °C) Tympanic -- 16 -- -- --   10/29/23 0954 -- -- -- 61 -- 99 % -- --   10/29/23 0953 120/63 -- -- -- -- -- -- -- "   10/29/23 0901 128/61 -- -- 58 -- 94 % -- --     Vital Signs (range)  Temp:  [97.4 °F (36.3 °C)-98.6 °F (37 °C)] 97.9 °F (36.6 °C)  Heart Rate:  [58-77] 77  Resp:  [16-18] 16  BP: (110-134)/() 127/65  I/O:  I/O last 3 completed shifts:  In: 905 [P.O.:530; I.V.:50; Blood:325]  Out: -   I/O:   Intake/Output Summary (Last 24 hours) at 10/30/2023 0835  Last data filed at 10/29/2023 2048  Gross per 24 hour   Intake 905 ml   Output --   Net 905 ml     BMI:  Body mass index is 24.39 kg/m².    Physical Exam:  Physical Exam   Lungs clear  Heart regular rate and rhythm  Abdomen benign  Left arm wrapped without breakthrough bleeding    Results Review:     CBC    Results from last 7 days   Lab Units 10/30/23  0539 10/29/23  1702 10/29/23  0814 10/27/23  0938   WBC 10*3/mm3 3.22* 2.80* 2.94* 4.73   HEMOGLOBIN g/dL 9.0* 9.6*  9.8* 10.3* 11.9*   PLATELETS 10*3/mm3 71* 75*  75* 73* 92*     BMP   Results from last 7 days   Lab Units 10/30/23  0539 10/29/23  0936 10/29/23  0814 10/27/23  0938   SODIUM mmol/L 136 141 141 127*   POTASSIUM mmol/L 4.0 4.2 3.7 4.0   CHLORIDE mmol/L 99 101 101 93*   CO2 mmol/L 24.0 29.0 28.0 22.1   BUN mg/dL 61* 45* 45* 58*   CREATININE mg/dL 6.83* 5.81* 5.82* 6.95*   GLUCOSE mg/dL 90 96 99 101*   MAGNESIUM mg/dL 3.0* 2.1  --   --    PHOSPHORUS mg/dL 4.6* 4.3  --   --      Cr Clearance Estimated Creatinine Clearance: 9.7 mL/min (A) (by C-G formula based on SCr of 6.83 mg/dL (H)).  Coag   Results from last 7 days   Lab Units 10/30/23  0539 10/29/23  0936   INR  1.30* 1.34*     HbA1C   Lab Results   Component Value Date    HGBA1C 5.20 09/26/2023    HGBA1C 6.20 (H) 05/12/2023    HGBA1C 4.80 01/20/2018     Blood Glucose   Glucose   Date/Time Value Ref Range Status   10/30/2023 0616 84 70 - 130 mg/dL Final   10/29/2023 2101 119 70 - 130 mg/dL Final   10/29/2023 1612 143 (H) 70 - 130 mg/dL Final   10/27/2023 1338 88 70 - 130 mg/dL Final   10/27/2023 0859 73 70 - 130 mg/dL Final     Infection   Results  from last 7 days   Lab Units 10/27/23  1459   WOUNDCX  No growth at 3 days     CMP   Results from last 7 days   Lab Units 10/30/23  0539 10/29/23  0936 10/29/23  0814 10/27/23  0938   SODIUM mmol/L 136 141 141 127*   POTASSIUM mmol/L 4.0 4.2 3.7 4.0   CHLORIDE mmol/L 99 101 101 93*   CO2 mmol/L 24.0 29.0 28.0 22.1   BUN mg/dL 61* 45* 45* 58*   CREATININE mg/dL 6.83* 5.81* 5.82* 6.95*   GLUCOSE mg/dL 90 96 99 101*   ALBUMIN g/dL 2.9*  --  3.0*  --    BILIRUBIN mg/dL 0.6  --  0.9  --    ALK PHOS U/L 124*  --  134*  --    AST (SGOT) U/L 25  --  26  --    ALT (SGPT) U/L 6  --  10  --      ABG      Radiology(recent) No radiology results for the last day    Assessment & Plan     Assessment & Plan      Bleeding    Thrombocytopenia    Chronic leukopenia    Cirrhosis of liver without ascites    Congestive splenomegaly    Anemia due to stage 4 chronic kidney disease treated with erythropoietin    End stage kidney disease    Crohn's disease, unspecified, without complications    Essential (primary) hypertension    Hypofibrinogenemia      78 y.o. male with history of eroding pseudoaneurysm of the left arm now resected.  Patient was stable for 24 hours post procedure and then began to bleed.  Patient has known platelet dysfunction but had adequate platelets at the time of surgery at 92.  The fact that he began to bleed postoperatively is unusual and makes me wonder if there is some vessel at play.  We are awaiting reversal of his auto anticoagulation and we will transfer him to 5 E. in case we need to place sutures or used silver nitrate.  Otherwise I will keep him n.p.o. after midnight in case surgery is needed to reexplore this area.  We will give him 24 more hours of evaluation prior to making this decision.      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

## 2023-10-30 NOTE — NURSING NOTE
CWON note: pt with existing ileostomy. He is independent in care and management. He has home supplies and will let us know if he needs anything further while in-patient.

## 2023-10-30 NOTE — PROGRESS NOTES
[facility] INPATIENT PROGRESS NOTE    Length of Stay:  0 days    CHIEF COMPLAINT:  Cirrhosis with splenomegaly, chronic thrombocytopenia, Crohn's disease    SUBJECTIVE:   Patient reports that degree of bleeding has diminished today.  Dressing changed an hour ago with some dried blood by his report, currently no evidence of recurrent bleeding.    ROS:  Review of Systems   A comprehensive review of systems was obtained with pertinent positive findings as noted in the interval history above.  All other systems negative.    OBJECTIVE:  Vitals:    10/29/23 2048 10/30/23 0007 10/30/23 0708 10/30/23 0755   BP: 117/54 110/56 127/65 126/63   BP Location:  Right arm Right arm Right arm   Patient Position:  Lying Lying Lying   Pulse: 65 60 77 63   Resp: 16 16 16 16   Temp: 98 °F (36.7 °C) 97.7 °F (36.5 °C) 97.9 °F (36.6 °C) 97.1 °F (36.2 °C)   TempSrc:  Oral Oral Temporal   SpO2: 97% 93% 96%    Weight:       Height:             PHYSICAL EXAMINATION:  General: Alert and orient x3 no distress  Chest/Lungs: Clear to auscultation bilaterally anteriorly  Heart: Regular rate and rhythm murmurs Or rubs  Abdomen/GI: Ileostomy, soft, nontender, nondistended  Extremities: No edema.  Left upper extremity with dressing in place, no apparent active bleeding.    DIAGNOSTIC DATA:  Results Review:     I reviewed the patient's new clinical results.    Results from last 7 days   Lab Units 10/30/23  0539 10/29/23  1702 10/29/23  0814   WBC 10*3/mm3 3.22* 2.80* 2.94*   HEMOGLOBIN g/dL 9.0* 9.6*  9.8* 10.3*   HEMATOCRIT % 26.9* 29.4*  30.1* 31.2*   PLATELETS 10*3/mm3 71* 75*  75* 73*      Results from last 7 days   Lab Units 10/30/23  0539 10/29/23  0936 10/29/23  0814   SODIUM mmol/L 136 141 141   POTASSIUM mmol/L 4.0 4.2 3.7   CHLORIDE mmol/L 99 101 101   CO2 mmol/L 24.0 29.0 28.0   BUN mg/dL 61* 45* 45*   CREATININE mg/dL 6.83* 5.81* 5.82*   CALCIUM mg/dL 8.3* 8.6 8.5*   BILIRUBIN mg/dL 0.6  --  0.9   ALK PHOS U/L 124*  --   134*   ALT (SGPT) U/L 6  --  10   AST (SGOT) U/L 25  --  26   GLUCOSE mg/dL 90 96 99      Lab Results   Component Value Date    NEUTROABS 2.04 10/30/2023     Results from last 7 days   Lab Units 10/30/23  0539 10/29/23  0936   INR  1.30* 1.34*     Results from last 7 days   Lab Units 10/30/23  0539   MAGNESIUM mg/dL 3.0*             Assessment & Plan   ASSESSMENT/PLAN:  This is a 78 y.o. 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  Patient with left upper extremity fistula for dialysis  Patient developed thrombosed aneurysmal segment of fistula that eroded through skin with subsequent bleeding  Patient underwent exploration and resection of left arm pseudoaneurysm with primary closure on 10/27/2023 with vascular surgery  Patient with significant postoperative bleeding  Per vascular surgery, possible need for further surgical intervention  Patient with multiple contributing factors to postoperative bleeding including chronic thrombocytopenia, aspirin use (chronically takes 81 mg daily, held beginning 10/29/2023), platelet dysfunction due to underlying renal failure, coagulopathy related to cirrhosis  Additional labs on 10/29/2023 with B12 563, folate greater than 20, fibrinogen 187, INR 1.34, PFA-100 abnormal (collagen/ADP greater than 300 and collagen/epinephrine greater than 300)  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  Do not expect significant improvement in platelet count with transfusion due to underlying hypersplenism.  If significant bleeding occurs, plan to transfuse additional unit of platelets and consider additional cryoprecipitate.    *NATARAJAN with cirrhosis and splenomegaly  Patient with longstanding known  cirrhosis and splenomegaly    *Chronic thrombocytopenia  Patient with chronic thrombocytopenia secondary to cirrhosis/splenomegaly  Platelet count fluctuates in the 60-low 100,000 range  More recently, platelet count has been in the [id number] range  At time of surgery on 10/27/2023, platelet count was 92,000  Postoperatively, platelet count has remained in the 70-10603 range  Patient is chronically on aspirin 81 mg daily, held beginning 10/29/2023  Patient did receive 1 unit platelet transfusion on 10/29/2023 due to postoperative bleeding  Platelet count today stable at 71,000.  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    *Chronic leukopenia  Secondary to cirrhosis/splenomegaly  WBC in the 2-6000 range chronically with normal differential    *Anemia  Hemoglobin has been in the 10-12 range previously  Secondary to underlying ESRD  Additional labs on 10/29/2023 with iron 69, ferritin 1331, iron saturation 26%, TIBC 262, B12 563, folate greater than 20  Labs consistent with anemia secondary to ESRD, chronic disease  Monitor hemoglobin, transfusion support as needed, WENDY support per nephrology with dialysis  Hemoglobin today 9.0    *ESRD  Patient on hemodialysis Monday Wednesday Friday    *Crohn's disease  Status post colectomy with ileostomy    Plan:  Patient with multiple contributing factors to postoperative bleeding including chronic thrombocytopenia, aspirin use (chronically takes 81 mg daily, held beginning 10/29/2023), platelet dysfunction due to underlying renal failure, coagulopathy related to cirrhosis  Aspirin 81 mg daily remains on hold   Hold on further platelet transfusion  Hold on administration of further cryoprecipitate  Await reevaluation by vascular surgery in a.m. regarding potential need for additional surgical intervention  CBC/differential, INR, fibrinogen in a.m.    Discussed with patient and son at bedside            Ken Shelton MD

## 2023-10-30 NOTE — CASE MANAGEMENT/SOCIAL WORK
Discharge Planning Assessment  [facility]     Patient Name: Bill Landry  MRN: [id number]  Today's Date: 10/30/2023    Admit Date: 10/29/2023    Plan: Return home with wife   Discharge Needs Assessment       Row Name 10/30/23 1301       Living Environment    People in Home spouse    Current Living Arrangements home    Potentially Unsafe Housing Conditions none    Primary Care Provided by self    Provides Primary Care For no one    Family Caregiver if Needed child(gerard), adult;spouse    Quality of Family Relationships helpful;involved;supportive    Able to Return to Prior Arrangements yes       Resource/Environmental Concerns    Resource/Environmental Concerns none    Transportation Concerns none       Transition Planning    Patient/Family Anticipates Transition to home with family    Patient/Family Anticipated Services at Transition none    Transportation Anticipated family or friend will provide       Discharge Needs Assessment    Current Outpatient/Agency/Support Group outpatient hemodialysis    Equipment Currently Used at Home none    Concerns to be Addressed no discharge needs identified;denies needs/concerns at this time    Anticipated Changes Related to Illness none    Equipment Needed After Discharge none    Outpatient/Agency/Support Group Needs outpatient hemodialysis    Provided Post Acute Provider List? N/A    Provided Post Acute Provider Quality & Resource List? N/A                   Discharge Plan       Row Name 10/30/23 1309       Plan    Plan Return home with wife    Patient/Family in Agreement with Plan yes    Plan Comments GARAY noted. CCP met with the patient and his son Josiah at bedside with patient’s verbal permission. The patient confirmed the information on his face sheet was accurate. Patient states that he lives at home with his wife Gillian. Patient confirmed his PCP is Bharathi De La Torre. Patient denies a history of HH/SNF. Patient denies using any DME. Patient goes to dialysis at [facility]  [place] and typically drives himself on Monday, Wednesday, and Friday.  Patient states his local pharmacy is [facility] on [address]. Patient states there are 3 steps to enter the home without any handrails. Patient states his laundry is located in the basement with a full flight of steps and no handrails. Patient denies any needs at this time and states one of his sons can transport him at discharge. CD, CSW.                  Continued Care and Services - Admitted Since 10/29/2023    Coordination has not been started for this encounter.          Demographic Summary       Row Name 10/30/23 1255       General Information    Admission Type observation    Arrived From emergency department    Required Notices Provided Observation Status Notice    Referral Source admission list    Reason for Consult discharge planning    Preferred Language English                   Functional Status       Row Name 10/30/23 1300       Functional Status    Usual Activity Tolerance moderate    Current Activity Tolerance moderate       Functional Status, IADL    Medications independent    Meal Preparation independent    Housekeeping independent    Laundry independent    Shopping independent       Mental Status    General Appearance WDL WDL       Mental Status Summary    Recent Changes in Mental Status/Cognitive Functioning no changes       Employment/    Employment Status retired                   Psychosocial    No documentation.                  Abuse/Neglect    No documentation.                  Legal    No documentation.                  Substance Abuse    No documentation.                  Patient Forms    No documentation.

## 2023-10-30 NOTE — PLAN OF CARE
Goal Outcome Evaluation:            Pt resting in bed quietly. Denies pain. Left arm still bleeding. Pressure dressing applied and reinforced as needed.

## 2023-10-30 NOTE — PROGRESS NOTES
[facility] Progress Note      Patient Name: Bill Landry  : 1945  MRN: [id number]  Primary Care Physician:  Bharathi De La Torre MD  Date of admission: 10/29/2023    Subjective     Interval History:     Seen and examined.  Currently on dialysis with no issues.  Seen by Dr. Singh and plan to transfer to [facility].    Review of Systems:   As noted above    Objective     Vitals:   Temp:  [97.1 °F (36.2 °C)-98.6 °F (37 °C)] 97.1 °F (36.2 °C)  Heart Rate:  [60-77] 63  Resp:  [16-18] 16  BP: (110-134)/() 126/63    Intake/Output Summary (Last 24 hours) at 10/30/2023 0916  Last data filed at 10/29/2023 2048  Gross per 24 hour   Intake 905 ml   Output --   Net 905 ml       Physical Exam:    General Appearance: alert, oriented x 3, no acute distress   Skin: warm and dry  HEENT: oral mucosa normal, nonicteric sclera  Neck: supple, no JVD  Lungs: CTA  Heart: RRR, normal S1 and S2  Abdomen: soft, nontender, nondistended  : no palpable bladder  Extremities: no edema, cyanosis or clubbing  Neuro: normal speech and mental status     Scheduled Meds:     latanoprost, 1 drop, Both Eyes, Nightly  senna-docusate sodium, 2 tablet, Oral, BID  sodium chloride, 10 mL, Intravenous, Q12H      IV Meds:        Results Reviewed:   I have personally reviewed the results from the time of this admission to 10/30/2023 09:16 EDT     Results from last 7 days   Lab Units 10/30/23  0539 10/29/23  0936 10/29/23  0814   SODIUM mmol/L 136 141 141   POTASSIUM mmol/L 4.0 4.2 3.7   CHLORIDE mmol/L 99 101 101   CO2 mmol/L 24.0 29.0 28.0   BUN mg/dL 61* 45* 45*   CREATININE mg/dL 6.83* 5.81* 5.82*   CALCIUM mg/dL 8.3* 8.6 8.5*   BILIRUBIN mg/dL 0.6  --  0.9   ALK PHOS U/L 124*  --  134*   ALT (SGPT) U/L 6  --  10   AST (SGOT) U/L 25  --  26   GLUCOSE mg/dL 90 96 99     Estimated Creatinine Clearance: 9.7 mL/min (A) (by C-G formula based on SCr of 6.83 mg/dL (H)).  Results from last 7 days   Lab Units 10/30/23  0535  10/29/23  0936   MAGNESIUM mg/dL 3.0* 2.1   PHOSPHORUS mg/dL 4.6* 4.3         Results from last 7 days   Lab Units 10/30/23  0539 10/29/23  1702 10/29/23  0814 10/27/23  0938   WBC 10*3/mm3 3.22* 2.80* 2.94* 4.73   HEMOGLOBIN g/dL 9.0* 9.6*  9.8* 10.3* 11.9*   PLATELETS 10*3/mm3 71* 75*  75* 73* 92*     Results from last 7 days   Lab Units 10/30/23  0539 10/29/23  0936   INR  1.30* 1.34*       Assessment / Plan     ASSESSMENT:  End-stage renal disease on hemodialysis through left upper extremity AV graft status post AKA decision of the procedure aneurysm on Friday who came back for bleed from the incision site.  Plan for hemodialysis today through his AV graft per vascular.  We will continue to monitor for bleeding.  Malfunctioning AV graft  Anemia of chronic illness  Hyperphosphatemia.  Continue binders with meals.  Diabetes mellitus type 2  History of diastolic congestive heart failure  History of Crohn disease     PLAN:     Dialysis today  Continue current blood pressure medication  Plan to keep n.p.o. for possible procedure tomorrow if bleeding continues per Dr. Singh  Surveillance lab    Thank you for involving us in the care of Bill Landry.  Please feel free to call with any questions.    Kathryn Germain MD  10/30/23  09:16 EDT    [facility]  [phone]

## 2023-10-30 NOTE — PLAN OF CARE
Goal Outcome Evaluation:  Plan of Care Reviewed With: patient      Pt A&Ox4. Left arm is wrapped and continues to ooze blood. Pt hgb is stable. Pt went to dialysis today. Bs monitoring continued. No c/o pain. Afib on the monitor, controlled. Pt being transferred to [facility] to have a beside procedure performed by Dr. Deal. Reported called to NINOSKA Mejia. VSS. NAD noted. Will continue to monitor.

## 2023-10-31 LAB
ANION GAP SERPL CALCULATED.3IONS-SCNC: 10.4 MMOL/L (ref 5–15)
BASOPHILS # BLD AUTO: 0.01 10*3/MM3 (ref 0–0.2)
BASOPHILS NFR BLD AUTO: 0.3 % (ref 0–1.5)
BUN SERPL-MCNC: 44 MG/DL (ref 8–23)
BUN/CREAT SERPL: 8.3 (ref 7–25)
CALCIUM SPEC-SCNC: 8.3 MG/DL (ref 8.6–10.5)
CHLORIDE SERPL-SCNC: 98 MMOL/L (ref 98–107)
CO2 SERPL-SCNC: 25.6 MMOL/L (ref 22–29)
CREAT SERPL-MCNC: 5.29 MG/DL (ref 0.76–1.27)
DEPRECATED RDW RBC AUTO: 49.7 FL (ref 37–54)
EGFRCR SERPLBLD CKD-EPI 2021: 10.4 ML/MIN/1.73
EOSINOPHIL # BLD AUTO: 0.1 10*3/MM3 (ref 0–0.4)
EOSINOPHIL NFR BLD AUTO: 2.6 % (ref 0.3–6.2)
ERYTHROCYTE [DISTWIDTH] IN BLOOD BY AUTOMATED COUNT: 14.3 % (ref 12.3–15.4)
FACT IX ACT/NOR PPP: 61 % ACTIVITY (ref 60–150)
FACT VIII ACT/NOR PPP: 209 % ACTIVITY (ref 60–150)
FIBRINOGEN PPP-MCNC: 212 MG/DL (ref 219–464)
GLUCOSE BLDC GLUCOMTR-MCNC: 122 MG/DL (ref 70–130)
GLUCOSE BLDC GLUCOMTR-MCNC: 141 MG/DL (ref 70–130)
GLUCOSE BLDC GLUCOMTR-MCNC: 196 MG/DL (ref 70–130)
GLUCOSE SERPL-MCNC: 127 MG/DL (ref 65–99)
HCT VFR BLD AUTO: 27.6 % (ref 37.5–51)
HGB BLD-MCNC: 9.3 G/DL (ref 13–17.7)
IMM GRANULOCYTES # BLD AUTO: 0.02 10*3/MM3 (ref 0–0.05)
IMM GRANULOCYTES NFR BLD AUTO: 0.5 % (ref 0–0.5)
INR PPP: 1.28 (ref 0.9–1.1)
LYMPHOCYTES # BLD AUTO: 0.66 10*3/MM3 (ref 0.7–3.1)
LYMPHOCYTES NFR BLD AUTO: 17.1 % (ref 19.6–45.3)
Lab: ABNORMAL
Lab: NORMAL
MAGNESIUM SERPL-MCNC: 2 MG/DL (ref 1.6–2.4)
MCH RBC QN AUTO: 32.5 PG (ref 26.6–33)
MCHC RBC AUTO-ENTMCNC: 33.7 G/DL (ref 31.5–35.7)
MCV RBC AUTO: 96.5 FL (ref 79–97)
MONOCYTES # BLD AUTO: 0.43 10*3/MM3 (ref 0.1–0.9)
MONOCYTES NFR BLD AUTO: 11.1 % (ref 5–12)
NEUTROPHILS NFR BLD AUTO: 2.64 10*3/MM3 (ref 1.7–7)
NEUTROPHILS NFR BLD AUTO: 68.4 % (ref 42.7–76)
NRBC BLD AUTO-RTO: 0 /100 WBC (ref 0–0.2)
PHOSPHATE SERPL-MCNC: 2.7 MG/DL (ref 2.5–4.5)
PLATELET # BLD AUTO: 83 10*3/MM3 (ref 140–450)
PMV BLD AUTO: 9.4 FL (ref 6–12)
POTASSIUM SERPL-SCNC: 3.8 MMOL/L (ref 3.5–5.2)
PROTHROMBIN TIME: 16.1 SECONDS (ref 11.7–14.2)
RBC # BLD AUTO: 2.86 10*6/MM3 (ref 4.14–5.8)
SODIUM SERPL-SCNC: 134 MMOL/L (ref 136–145)
VWF:RCO ACT/NOR PPP PL AGG: 335 % (ref 50–200)
WBC NRBC COR # BLD: 3.86 10*3/MM3 (ref 3.4–10.8)

## 2023-10-31 PROCEDURE — 85384 FIBRINOGEN ACTIVITY: CPT | Performed by: INTERNAL MEDICINE

## 2023-10-31 PROCEDURE — 84100 ASSAY OF PHOSPHORUS: CPT | Performed by: STUDENT IN AN ORGANIZED HEALTH CARE EDUCATION/TRAINING PROGRAM

## 2023-10-31 PROCEDURE — 85025 COMPLETE CBC W/AUTO DIFF WBC: CPT | Performed by: STUDENT IN AN ORGANIZED HEALTH CARE EDUCATION/TRAINING PROGRAM

## 2023-10-31 PROCEDURE — 99232 SBSQ HOSP IP/OBS MODERATE 35: CPT | Performed by: INTERNAL MEDICINE

## 2023-10-31 PROCEDURE — 36415 COLL VENOUS BLD VENIPUNCTURE: CPT | Performed by: STUDENT IN AN ORGANIZED HEALTH CARE EDUCATION/TRAINING PROGRAM

## 2023-10-31 PROCEDURE — 82948 REAGENT STRIP/BLOOD GLUCOSE: CPT

## 2023-10-31 PROCEDURE — 80048 BASIC METABOLIC PNL TOTAL CA: CPT | Performed by: STUDENT IN AN ORGANIZED HEALTH CARE EDUCATION/TRAINING PROGRAM

## 2023-10-31 PROCEDURE — 5A1D70Z PERFORMANCE OF URINARY FILTRATION, INTERMITTENT, LESS THAN 6 HOURS PER DAY: ICD-10-PCS | Performed by: STUDENT IN AN ORGANIZED HEALTH CARE EDUCATION/TRAINING PROGRAM

## 2023-10-31 PROCEDURE — 85610 PROTHROMBIN TIME: CPT | Performed by: STUDENT IN AN ORGANIZED HEALTH CARE EDUCATION/TRAINING PROGRAM

## 2023-10-31 PROCEDURE — 83735 ASSAY OF MAGNESIUM: CPT | Performed by: STUDENT IN AN ORGANIZED HEALTH CARE EDUCATION/TRAINING PROGRAM

## 2023-10-31 RX ADMIN — LATANOPROST 1 DROP: 50 SOLUTION OPHTHALMIC at 21:04

## 2023-10-31 RX ADMIN — Medication 10 ML: at 21:04

## 2023-10-31 NOTE — PLAN OF CARE
Problem: Adult Inpatient Plan of Care  Goal: Plan of Care Review  Flowsheets (Taken 10/31/2023 0500)  Progress: no change  Plan of Care Reviewed With: patient  Outcome Evaluation: dressing changed x1 to upper am on left blood drainage had soake through dressing taken down to the last gauze and it was left in place not dislodge clot and dressing currently remains dry and intact no complaints of pain radial pulse is palpable   Goal Outcome Evaluation:  Plan of Care Reviewed With: patient        Progress: no change  Outcome Evaluation: dressing changed x1 to upper am on left blood drainage had soake through dressing taken down to the last gauze and it was left in place not dislodge clot and dressing currently remains dry and intact no complaints of pain radial pulse is palpable

## 2023-10-31 NOTE — PROGRESS NOTES
Name: Bill Landry ADMIT: 10/29/2023   : 1945  PCP: Bharathi De La Torre MD    MRN: [id number] LOS: 1 days   AGE/SEX: 78 y.o. male  ROOM: [address]    [address], Subsequent Hospital Care, new bleeding    Chief Complaint   Patient presents with    Vascular Access Problem     CC: Bleeding left arm  Subjective     78 y.o. male with bleeding  left postsurgical site.  Skin edges oozing noted and multiple 3-0 Prolene sutures placed this morning under local anesthetic sterilely.  Patient is not bleeding at this time.  We will plan to change dressings tomorrow and if he tolerates dialysis and has no bleeding overnight then we will proceed with discharge tomorrow after dialysis.  If he needs transfusion would recommend do that during dialysis.  We will check hemoglobin at the start of dialysis.  We will keep type and screen current    Review of Systems denies pain    Objective     Scheduled Medications:   latanoprost, 1 drop, Both Eyes, Nightly  lidocaine 1% - EPINEPHrine [id number], 10 mL, Infiltration, Once  silver nitrate, 1 application , Topical, Once  sodium chloride, 10 mL, Intravenous, Q12H        Active Infusions:       As Needed Medications:    [DISCONTINUED] senna-docusate sodium **AND** polyethylene glycol **AND** bisacodyl **AND** bisacodyl    famotidine    HYDROcodone-acetaminophen    nitroglycerin    sodium chloride    sodium chloride    Vital Signs  Vital Signs Patient Vitals for the past 24 hrs:   BP Temp Temp src Pulse Resp SpO2   10/31/23 0700 121/65 97.7 °F (36.5 °C) Oral 74 18 95 %   10/30/23 2300 110/52 98 °F (36.7 °C) Oral -- 16 --   10/30/23 1900 110/62 98.1 °F (36.7 °C) Oral 69 16 97 %   10/30/23 1856 138/66 97.9 °F (36.6 °C) Oral 70 16 97 %   10/30/23 1700 -- -- -- 66 -- 96 %   10/30/23 1300 -- -- -- 80 -- 99 %   10/30/23 1254 128/70 97.9 °F (36.6 °C) Oral 70 18 98 %   10/30/23 1208 114/52 97 °F (36.1 °C) Temporal 64 18 --     Vital Signs (range)  Temp:  [97 °F (36.1 °C)-98.1  °F (36.7 °C)] 97.7 °F (36.5 °C)  Heart Rate:  [64-80] 74  Resp:  [16-18] 18  BP: (110-138)/(52-70) 121/65  I/O:  I/O last 3 completed shifts:  In: 80 [Blood:80]  Out: 2000   I/O:   Intake/Output Summary (Last 24 hours) at 10/31/2023 1015  Last data filed at 10/31/2023 0900  Gross per 24 hour   Intake 240 ml   Output 2000 ml   Net -1760 ml     BMI:  Body mass index is 24.39 kg/m².    Physical Exam:  Physical Exam   Lungs clear  Heart regular rate and rhythm  Abdomen benign  Left arm wrapped without breakthrough bleeding after sutures placed this morning    Results Review:     CBC    Results from last 7 days   Lab Units 10/31/23  0435 10/30/23  0539 10/29/23  1702 10/29/23  0814 10/27/23  0938   WBC 10*3/mm3 3.86 3.22* 2.80* 2.94* 4.73   HEMOGLOBIN g/dL 9.3* 9.0* 9.6*  9.8* 10.3* 11.9*   PLATELETS 10*3/mm3 83* 71* 75*  75* 73* 92*     BMP   Results from last 7 days   Lab Units 10/31/23  0435 10/30/23  0539 10/29/23  0936 10/29/23  0814 10/27/23  0938   SODIUM mmol/L 134* 136 141 141 127*   POTASSIUM mmol/L 3.8 4.0 4.2 3.7 4.0   CHLORIDE mmol/L 98 99 101 101 93*   CO2 mmol/L 25.6 24.0 29.0 28.0 22.1   BUN mg/dL 44* 61* 45* 45* 58*   CREATININE mg/dL 5.29* 6.83* 5.81* 5.82* 6.95*   GLUCOSE mg/dL 127* 90 96 99 101*   MAGNESIUM mg/dL 2.0 3.0* 2.1  --   --    PHOSPHORUS mg/dL 2.7 4.6* 4.3  --   --      Cr Clearance Estimated Creatinine Clearance: 12.6 mL/min (A) (by C-G formula based on SCr of 5.29 mg/dL (H)).  Coag   Results from last 7 days   Lab Units 10/31/23  0435 10/30/23  0539 10/29/23  0936   INR  1.28* 1.30* 1.34*     HbA1C   Lab Results   Component Value Date    HGBA1C 5.20 09/26/2023    HGBA1C 6.20 (H) 05/12/2023    HGBA1C 4.80 01/20/2018     Blood Glucose   Glucose   Date/Time Value Ref Range Status   10/30/2023 2052 120 70 - 130 mg/dL Final   10/30/2023 1546 202 (H) 70 - 130 mg/dL Final   10/30/2023 1255 66 (L) 70 - 130 mg/dL Final   10/30/2023 0616 84 70 - 130 mg/dL Final   10/29/2023 2101 119 70 - 130  mg/dL Final   10/29/2023 1612 143 (H) 70 - 130 mg/dL Final     Infection   Results from last 7 days   Lab Units 10/27/23  1459   WOUNDCX  No growth at 3 days     CMP   Results from last 7 days   Lab Units 10/31/23  0435 10/30/23  0539 10/29/23  0936 10/29/23  0814 10/27/23  0938   SODIUM mmol/L 134* 136 141 141 127*   POTASSIUM mmol/L 3.8 4.0 4.2 3.7 4.0   CHLORIDE mmol/L 98 99 101 101 93*   CO2 mmol/L 25.6 24.0 29.0 28.0 22.1   BUN mg/dL 44* 61* 45* 45* 58*   CREATININE mg/dL 5.29* 6.83* 5.81* 5.82* 6.95*   GLUCOSE mg/dL 127* 90 96 99 101*   ALBUMIN g/dL  --  2.9*  --  3.0*  --    BILIRUBIN mg/dL  --  0.6  --  0.9  --    ALK PHOS U/L  --  124*  --  134*  --    AST (SGOT) U/L  --  25  --  26  --    ALT (SGPT) U/L  --  6  --  10  --      ABG      Radiology(recent) No radiology results for the last day    Assessment & Plan     Assessment & Plan      Bleeding    Thrombocytopenia    Chronic leukopenia    Cirrhosis of liver without ascites    Congestive splenomegaly    Anemia due to stage 4 chronic kidney disease treated with erythropoietin    End stage kidney disease    Crohn's disease, unspecified, without complications    Essential (primary) hypertension    Hypofibrinogenemia      78 y.o. male with history of eroding pseudoaneurysm of the left arm now resected.  Patient was stable for 24 hours post procedure and then began to bleed.  Patient has known platelet dysfunction but had adequate platelets at the time of surgery at 92.  His bleeding appears to have been primarily skin is based on his response to suture placement under local anesthetic this morning.  We will however watch him another 24 hours and change his dressings regularly.  We will ask renal to monitor his hemoglobin and see if they want to transfuse with dialysis or not.    Bill Deal MD  10/31/23  10:15 EDT    Please call my office with any question: [phone]    Active Hospital Problems    Diagnosis  POA    **Bleeding [R58]  Yes     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

## 2023-10-31 NOTE — PROGRESS NOTES
[facility] INPATIENT PROGRESS NOTE    Length of Stay:  1 days    CHIEF COMPLAINT:  Cirrhosis with splenomegaly, chronic thrombocytopenia, Crohn's disease    SUBJECTIVE:   Patient had sutures placed earlier this morning at surgical site left upper extremity by Dr. Deal.  Overall, bleeding has improved and largely subsided.  Current dressing is dry.    ROS:  Review of Systems   A comprehensive review of systems was obtained with pertinent positive findings as noted in the interval history above.  All other systems negative.    OBJECTIVE:  Vitals:    10/30/23 1700 10/30/23 1856 10/30/23 1900 10/30/23 2300   BP:  138/66 110/62 110/52   BP Location:  Right arm Right arm Right arm   Patient Position:  Lying Lying Lying   Pulse: 66 70 69    Resp:  16 16 16   Temp:  97.9 °F (36.6 °C) 98.1 °F (36.7 °C) 98 °F (36.7 °C)   TempSrc:  Oral Oral Oral   SpO2: 96% 97% 97%    Weight:       Height:             PHYSICAL EXAMINATION:  General: Alert and orient x3 no distress  Chest/Lungs: Clear to auscultation bilaterally anteriorly  Heart: Regular rate and rhythm murmurs Or rubs  Abdomen/GI: Ileostomy, soft, nontender, nondistended  Extremities: No edema.  Left upper extremity with dressing in place, no apparent active bleeding.    Patient was examined today, unchanged from above    DIAGNOSTIC DATA:  Results Review:     I reviewed the patient's new clinical results.    Results from last 7 days   Lab Units 10/31/23  0435 10/30/23  0539 10/29/23  1702   WBC 10*3/mm3 3.86 3.22* 2.80*   HEMOGLOBIN g/dL 9.3* 9.0* 9.6*  9.8*   HEMATOCRIT % 27.6* 26.9* 29.4*  30.1*   PLATELETS 10*3/mm3 83* 71* 75*  75*      Results from last 7 days   Lab Units 10/31/23  0435 10/30/23  0539 10/29/23  0936 10/29/23  0814   SODIUM mmol/L 134* 136 141 141   POTASSIUM mmol/L 3.8 4.0 4.2 3.7   CHLORIDE mmol/L 98 99 101 101   CO2 mmol/L 25.6 24.0 29.0 28.0   BUN mg/dL 44* 61* 45* 45*   CREATININE mg/dL 5.29* 6.83* 5.81* 5.82*   CALCIUM mg/dL 8.3*  8.3* 8.6 8.5*   BILIRUBIN mg/dL  --  0.6  --  0.9   ALK PHOS U/L  --  124*  --  134*   ALT (SGPT) U/L  --  6  --  10   AST (SGOT) U/L  --  25  --  26   GLUCOSE mg/dL 127* 90 96 99      Lab Results   Component Value Date    NEUTROABS 2.64 10/31/2023     Results from last 7 days   Lab Units 10/31/23  0435 10/30/23  0539 10/29/23  0936   INR  1.28* 1.30* 1.34*     Results from last 7 days   Lab Units 10/31/23  0435   MAGNESIUM mg/dL 2.0             Assessment & Plan   ASSESSMENT/PLAN:  This is a 78 y.o. 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  Patient with left upper extremity fistula for dialysis  Patient developed thrombosed aneurysmal segment of fistula that eroded through skin with subsequent bleeding  Patient underwent exploration and resection of left arm pseudoaneurysm with primary closure on 10/27/2023 with vascular surgery  Patient with significant postoperative bleeding  Per vascular surgery, possible need for further surgical intervention  Patient with multiple contributing factors to postoperative bleeding including chronic thrombocytopenia, aspirin use (chronically takes 81 mg daily, held beginning 10/29/2023), platelet dysfunction due to underlying renal failure, coagulopathy related to cirrhosis  Additional labs on 10/29/2023 with B12 563, folate greater than 20, fibrinogen 187, INR 1.34, PFA-100 abnormal (collagen/ADP greater than 300 and collagen/epinephrine greater than 300)  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  Bleeding somewhat improved, no further transfusions performed  Sutures placed around surgical site this  morning by Dr. Deal.  Labs today stable with platelet count up to 83,000, INR 1.28, fibrinogen 212.  Bleeding has subsided and largely resolved.  Current dressing is without any evidence of further bleeding.  Hold on any further transfusions today.    *NATARAJAN with cirrhosis and splenomegaly  Patient with longstanding known cirrhosis and splenomegaly    *Chronic thrombocytopenia  Patient with chronic thrombocytopenia secondary to cirrhosis/splenomegaly  Platelet count fluctuates in the 60-low 100,000 range  More recently, platelet count has been in the [id number] range  At time of surgery on 10/27/2023, platelet count was 92,000  Postoperatively, platelet count has remained in the 70-29116 range  Patient is chronically on aspirin 81 mg daily, held beginning 10/29/2023  Patient did receive 1 unit platelet transfusion on 10/29/2023 due to postoperative bleeding. 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  Platelet count today slightly improved at 83,000.  Hold on further platelet transfusion at this time and less significant further bleeding occurs.    *Chronic leukopenia  Secondary to cirrhosis/splenomegaly  WBC in the 2-6000 range chronically with normal differential    *Anemia  Hemoglobin has been in the 10-12 range previously  Secondary to underlying ESRD  Additional labs on 10/29/2023 with iron 69, ferritin 1331, iron saturation 26%, TIBC 262, B12 563, folate greater than 20  Labs consistent with anemia secondary to ESRD, chronic disease  Monitor hemoglobin, transfusion support as needed, WENDY support per nephrology with dialysis  Hemoglobin today slightly improved at 9.3    *ESRD  Patient on hemodialysis Monday Wednesday Friday    *Crohn's disease  Status post colectomy with ileostomy    Plan:  Patient with multiple contributing factors to postoperative bleeding including chronic thrombocytopenia, aspirin use (chronically takes 81 mg daily,  held beginning 10/29/2023), platelet dysfunction due to underlying renal failure, coagulopathy related to cirrhosis  Aspirin 81 mg daily remains on hold   No further transfusion support planned today unless additional significant bleeding occurs  CBC/differential, INR in a.m.    Discussed with patient and son at bedside.           Ken Shelton MD

## 2023-10-31 NOTE — PROGRESS NOTES
[facility] Progress Note      Patient Name: Bill Landry  : 1945  MRN: [id number]  Primary Care Physician:  Bharathi De La Torre MD  Date of admission: 10/29/2023    Subjective     Interval History:   Follow-up ESRD.  Sutures placed at site of pseudoaneurysm repair this morning.  No further bleeding.  Dr. Deal's  note reviewed.  Eating well.  Not short of breath.  Review of Systems:   As noted above    Objective     Vitals:   Temp:  [97.7 °F (36.5 °C)-98.1 °F (36.7 °C)] 98 °F (36.7 °C)  Heart Rate:  [69-74] 70  Resp:  [16-18] 18  BP: (110-138)/(52-66) 110/59    Intake/Output Summary (Last 24 hours) at 10/31/2023 1736  Last data filed at 10/31/2023 1300  Gross per 24 hour   Intake 480 ml   Output --   Net 480 ml       Physical Exam:    General Appearance: alert, oriented x 3, no acute distress   Skin: warm and dry  HEENT: oral mucosa normal, nonicteric sclera  Neck: supple, no JVD  Lungs: Clear to auscultation  Heart: RRR, normal S1 and S2  Abdomen: soft, nontender, nondistended.  Ileostomy  : no palpable bladder  Extremities: Left arm AV fistula dressing,  thrill bruit.  Neuro: normal speech and mental status     Scheduled Meds:     latanoprost, 1 drop, Both Eyes, Nightly  lidocaine 1% - EPINEPHrine 1:370547, 10 mL, Infiltration, Once  silver nitrate, 1 application , Topical, Once  sodium chloride, 10 mL, Intravenous, Q12H      IV Meds:        Results Reviewed:   I have personally reviewed the results from the time of this admission to 10/31/2023 17:36 EDT     Results from last 7 days   Lab Units 10/31/23  0435 10/30/23  0539 10/29/23  0936 10/29/23  0814   SODIUM mmol/L 134* 136 141 141   POTASSIUM mmol/L 3.8 4.0 4.2 3.7   CHLORIDE mmol/L 98 99 101 101   CO2 mmol/L 25.6 24.0 29.0 28.0   BUN mg/dL 44* 61* 45* 45*   CREATININE mg/dL 5.29* 6.83* 5.81* 5.82*   CALCIUM mg/dL 8.3* 8.3* 8.6 8.5*   BILIRUBIN mg/dL  --  0.6  --  0.9   ALK PHOS U/L  --  124*  --  134*   ALT (SGPT) U/L  --   6  --  10   AST (SGOT) U/L  --  25  --  26   GLUCOSE mg/dL 127* 90 96 99       Estimated Creatinine Clearance: 12.6 mL/min (A) (by C-G formula based on SCr of 5.29 mg/dL (H)).    Results from last 7 days   Lab Units 10/31/23  0435 10/30/23  0539 10/29/23  0936   MAGNESIUM mg/dL 2.0 3.0* 2.1   PHOSPHORUS mg/dL 2.7 4.6* 4.3             Results from last 7 days   Lab Units 10/31/23  0435 10/30/23  0539 10/29/23  1702 10/29/23  0814 10/27/23  0938   WBC 10*3/mm3 3.86 3.22* 2.80* 2.94* 4.73   HEMOGLOBIN g/dL 9.3* 9.0* 9.6*  9.8* 10.3* 11.9*   PLATELETS 10*3/mm3 83* 71* 75*  75* 73* 92*       Results from last 7 days   Lab Units 10/31/23  0435 10/30/23  0539 10/29/23  0936   INR  1.28* 1.30* 1.34*       Assessment / Plan     ASSESSMENT:  ESRD.  Dialysis tomorrow.  Bleeding after pseudoaneurysm repair.  Stitches placed this morning.  No further bleeding.  Check hemoglobin prior to dialysis tomorrow and will transfuse if needed.  Anemia CKD and blood loss  Thrombocytopenia with platelet dysfunction  Cirrhosis    PLAN:  Dialysis tomorrow.    Thank you for involving us in the care of Bill PETER Gris.  Please feel free to call with any questions.    Keisha Freed MD  10/31/23  17:36 EDT    [facility]  [phone]    Please note that portions of this note were completed with a voice recognition program.

## 2023-10-31 NOTE — PLAN OF CARE
Problem: Adult Inpatient Plan of Care  Goal: Plan of Care Review  Outcome: Ongoing, Progressing  Flowsheets (Taken 10/31/2023 1819)  Progress: improving  Plan of Care Reviewed With:   patient   son  Outcome Evaluation: VSS, no c/o pain, AV fistula remains free of bleesing, Pt ambulates well, plan for HD tomorrow and then DC home  Goal: Patient-Specific Goal (Individualized)  Outcome: Ongoing, Progressing  Goal: Absence of Hospital-Acquired Illness or Injury  Outcome: Ongoing, Progressing  Intervention: Identify and Manage Fall Risk  Recent Flowsheet Documentation  Taken 10/31/2023 1640 by Mellissa Mireles RN  Safety Promotion/Fall Prevention: safety round/check completed  Taken 10/31/2023 1415 by Mellissa Mireles RN  Safety Promotion/Fall Prevention: safety round/check completed  Taken 10/31/2023 0840 by Mellissa Mireles RN  Safety Promotion/Fall Prevention:   safety round/check completed   muscle strengthening facilitated   fall prevention program maintained   clutter free environment maintained  Intervention: Prevent Skin Injury  Recent Flowsheet Documentation  Taken 10/31/2023 1640 by Mellissa Mireles RN  Body Position:   position changed independently   tilted   right  Taken 10/31/2023 1415 by Mellissa Mireles RN  Body Position: position changed independently  Taken 10/31/2023 0840 by Mellissa Mireles RN  Body Position: sitting up in bed  Intervention: Prevent and Manage VTE (Venous Thromboembolism) Risk  Recent Flowsheet Documentation  Taken 10/31/2023 1415 by Mellissa Mireles RN  Activity Management: ambulated to bathroom  Taken 10/31/2023 0840 by Mellissa Mireles RN  Activity Management: activity encouraged  VTE Prevention/Management:   bilateral   foot pump device on  Intervention: Prevent Infection  Recent Flowsheet Documentation  Taken 10/31/2023 0840 by Mellissa Mireles RN  Infection Prevention: single patient room provided  Goal: Optimal Comfort and Wellbeing  Outcome: Ongoing,  Progressing  Intervention: Provide Person-Centered Care  Recent Flowsheet Documentation  Taken 10/31/2023 1640 by Mellissa Mireles RN  Trust Relationship/Rapport: care explained  Taken 10/31/2023 0840 by Mellissa Mireles RN  Trust Relationship/Rapport:   care explained   questions answered  Goal: Readiness for Transition of Care  Outcome: Ongoing, Progressing     Problem: Fall Injury Risk  Goal: Absence of Fall and Fall-Related Injury  Outcome: Ongoing, Progressing  Intervention: Identify and Manage Contributors  Recent Flowsheet Documentation  Taken 10/31/2023 0840 by Mellissa Mireles RN  Medication Review/Management: medications reviewed  Intervention: Promote Injury-Free Environment  Recent Flowsheet Documentation  Taken 10/31/2023 1640 by Mellissa Mireles RN  Safety Promotion/Fall Prevention: safety round/check completed  Taken 10/31/2023 1415 by Mellissa Mireles RN  Safety Promotion/Fall Prevention: safety round/check completed  Taken 10/31/2023 0840 by Mellissa Mireles RN  Safety Promotion/Fall Prevention:   safety round/check completed   muscle strengthening facilitated   fall prevention program maintained   clutter free environment maintained     Problem: Skin Injury Risk Increased  Goal: Skin Health and Integrity  Outcome: Ongoing, Progressing  Intervention: Optimize Skin Protection  Recent Flowsheet Documentation  Taken 10/31/2023 0840 by Mellissa Mireles RN  Pressure Reduction Techniques: frequent weight shift encouraged  Head of Bed (HOB) Positioning: HOB at 20 degrees  Pressure Reduction Devices: alternating pressure pump (ADD)     Problem: Adjustment to Illness (Gastrointestinal Bleeding)  Goal: Optimal Coping with Acute Illness  Outcome: Ongoing, Progressing     Problem: Bleeding (Gastrointestinal Bleeding)  Goal: Hemostasis  Outcome: Ongoing, Progressing   Goal Outcome Evaluation:  Plan of Care Reviewed With: patient, son        Progress: improving  Outcome Evaluation: VSS, no c/o pain,  AV fistula remains free of bleesing, Pt ambulates well, plan for HD tomorrow and then DC home

## 2023-10-31 NOTE — PROGRESS NOTES
Name: Bill Landry ADMIT: 10/29/2023   : 1945  PCP: Bhaarthi De La Torre MD    MRN: [id number] LOS: 1 days   AGE/SEX: 78 y.o. male  ROOM: [facility]     Subjective   Subjective   Patient is lying on the bed and denies any nausea, vomiting, abdominal pain, chest pain.  He does not appear to be in distress. His son is at bedside.        Objective   Objective   Vital Signs  Temp:  [97.7 °F (36.5 °C)-98.1 °F (36.7 °C)] 97.8 °F (36.6 °C)  Heart Rate:  [66-80] 71  Resp:  [16-18] 18  BP: (110-138)/(52-70) 117/57  SpO2:  [95 %-99 %] 97 %  on   ;   Device (Oxygen Therapy): room air  Body mass index is 24.39 kg/m².  Physical Exam  HEENT: PERRLA, extract movements intact, Scleras no icterus  Neck: Supple, no JVD  Cardiovascular: Regular rate and rhythm with normal S1 and S2  Respiratory: Fairly clear to auscultation anteriorly with no wheezes  GI: Soft, nontender, bowel sounds are present  Extremities: Left upper extremity has dressing in place and is bloodstained, AV fistula his dressed due to bleeding this morning  Results Review     I reviewed the patient's new clinical results.  Results from last 7 days   Lab Units 10/31/23  0435 10/30/23  0539 10/29/23  1702 10/29/23  0814   WBC 10*3/mm3 3.86 3.22* 2.80* 2.94*   HEMOGLOBIN g/dL 9.3* 9.0* 9.6*  9.8* 10.3*   PLATELETS 10*3/mm3 83* 71* 75*  75* 73*     Results from last 7 days   Lab Units 10/31/23  0435 10/30/23  0539 10/29/23  0936 10/29/23  0814   SODIUM mmol/L 134* 136 141 141   POTASSIUM mmol/L 3.8 4.0 4.2 3.7   CHLORIDE mmol/L 98 99 101 101   CO2 mmol/L 25.6 24.0 29.0 28.0   BUN mg/dL 44* 61* 45* 45*   CREATININE mg/dL 5.29* 6.83* 5.81* 5.82*   GLUCOSE mg/dL 127* 90 96 99   EGFR mL/min/1.73 10.4* 7.7* 9.3* 9.3*     Results from last 7 days   Lab Units 10/30/23  0539 10/29/23  0814   ALBUMIN g/dL 2.9* 3.0*   BILIRUBIN mg/dL 0.6 0.9   ALK PHOS U/L 124* 134*   AST (SGOT) U/L 25 26   ALT (SGPT) U/L 6 10     Results from last 7 days   Lab Units 10/31/23  0433  10/30/23  0539 10/29/23  0936 10/29/23  0814   CALCIUM mg/dL 8.3* 8.3* 8.6 8.5*   ALBUMIN g/dL  --  2.9*  --  3.0*   MAGNESIUM mg/dL 2.0 3.0* 2.1  --    PHOSPHORUS mg/dL 2.7 4.6* 4.3  --        Glucose   Date/Time Value Ref Range Status   10/31/2023 1114 196 (H) 70 - 130 mg/dL Final   10/30/2023 2052 120 70 - 130 mg/dL Final   10/30/2023 1546 202 (H) 70 - 130 mg/dL Final   10/30/2023 1255 66 (L) 70 - 130 mg/dL Final   10/30/2023 0616 84 70 - 130 mg/dL Final   10/29/2023 2101 119 70 - 130 mg/dL Final   10/29/2023 1612 143 (H) 70 - 130 mg/dL Final       No radiology results for the last day    I have personally reviewed all medications:  Scheduled Medications  latanoprost, 1 drop, Both Eyes, Nightly  lidocaine 1% - EPINEPHrine 1:061931, 10 mL, Infiltration, Once  silver nitrate, 1 application , Topical, Once  sodium chloride, 10 mL, Intravenous, Q12H    Infusions   Diet  Diet: Regular/House Diet, Renal Diets, Diabetic Diets; Consistent Carbohydrate; Low Potassium; Texture: Regular Texture (IDDSI 7); Fluid Consistency: Thin (IDDSI 0)    I have personally reviewed:  [x]  Laboratory   [x]  Microbiology   [x]  Radiology   [x]  EKG/Telemetry  [x]  Cardiology/Vascular   []  Pathology    []  Records       Assessment/Plan     Active Hospital Problems    Diagnosis  POA    **Bleeding [R58]  Yes    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       78 y.o. male admitted with Bleeding.    1.Recent pseudoaneurysm repair with postop bleeding most likely secondary to  dysfunctional platelets.  Received cryoprecipitate as well as platelets. Hematology consulted. Vascular surgery is following along and discharge once cleared.    2.  End-stage renal disease, anemia of chronic disease and blood loss anemia will continue with routine dialysis and nephrology is following along.    3.  Cirrhosis of the liver with no ascites.  Currently euvolemic.    4.  Thrombocytopenia, chronic and likely secondary to underlying liver disease.  Platelet count is noted to be 83,000 and hematology is following along.    5.  History of diabetes, diet controlled and hemoglobin A1c is 5.20 as of 9/26/2023.  Was 6.2 in May 2023.    Angle Garrido MD  [facility]  10/31/23  12:11 EDT

## 2023-11-01 ENCOUNTER — TELEPHONE (OUTPATIENT)
Dept: ONCOLOGY | Facility: CLINIC | Age: 78
End: 2023-11-01
Payer: MEDICARE

## 2023-11-01 ENCOUNTER — READMISSION MANAGEMENT (OUTPATIENT)
Dept: CALL CENTER | Facility: HOSPITAL | Age: 78
End: 2023-11-01
Payer: MEDICARE

## 2023-11-01 VITALS
BODY MASS INDEX: 24.34 KG/M2 | RESPIRATION RATE: 16 BRPM | TEMPERATURE: 97.4 F | WEIGHT: 170 LBS | SYSTOLIC BLOOD PRESSURE: 126 MMHG | DIASTOLIC BLOOD PRESSURE: 60 MMHG | HEART RATE: 65 BPM | OXYGEN SATURATION: 100 % | HEIGHT: 70 IN

## 2023-11-01 LAB
ABO GROUP BLD: NORMAL
ALBUMIN SERPL-MCNC: 3 G/DL (ref 3.5–5.2)
ANION GAP SERPL CALCULATED.3IONS-SCNC: 9.5 MMOL/L (ref 5–15)
BACTERIA SPEC ANAEROBE CULT: NORMAL
BASOPHILS # BLD AUTO: 0.04 10*3/MM3 (ref 0–0.2)
BASOPHILS NFR BLD AUTO: 1 % (ref 0–1.5)
BLD GP AB SCN SERPL QL: NEGATIVE
BUN SERPL-MCNC: 59 MG/DL (ref 8–23)
BUN/CREAT SERPL: 8.7 (ref 7–25)
CALCIUM SPEC-SCNC: 8.3 MG/DL (ref 8.6–10.5)
CHLORIDE SERPL-SCNC: 100 MMOL/L (ref 98–107)
CO2 SERPL-SCNC: 22.5 MMOL/L (ref 22–29)
CREAT SERPL-MCNC: 6.81 MG/DL (ref 0.76–1.27)
DEPRECATED RDW RBC AUTO: 51.3 FL (ref 37–54)
EGFRCR SERPLBLD CKD-EPI 2021: 7.7 ML/MIN/1.73
EOSINOPHIL # BLD AUTO: 0.12 10*3/MM3 (ref 0–0.4)
EOSINOPHIL NFR BLD AUTO: 3.1 % (ref 0.3–6.2)
ERYTHROCYTE [DISTWIDTH] IN BLOOD BY AUTOMATED COUNT: 14.2 % (ref 12.3–15.4)
GLUCOSE BLDC GLUCOMTR-MCNC: 110 MG/DL (ref 70–130)
GLUCOSE BLDC GLUCOMTR-MCNC: 117 MG/DL (ref 70–130)
GLUCOSE SERPL-MCNC: 105 MG/DL (ref 65–99)
HCT VFR BLD AUTO: 27.6 % (ref 37.5–51)
HGB BLD-MCNC: 9.2 G/DL (ref 13–17.7)
IMM GRANULOCYTES # BLD AUTO: 0.02 10*3/MM3 (ref 0–0.05)
IMM GRANULOCYTES NFR BLD AUTO: 0.5 % (ref 0–0.5)
INR PPP: 1.25 (ref 0.9–1.1)
LYMPHOCYTES # BLD AUTO: 0.87 10*3/MM3 (ref 0.7–3.1)
LYMPHOCYTES NFR BLD AUTO: 22.3 % (ref 19.6–45.3)
MCH RBC QN AUTO: 33.1 PG (ref 26.6–33)
MCHC RBC AUTO-ENTMCNC: 33.3 G/DL (ref 31.5–35.7)
MCV RBC AUTO: 99.3 FL (ref 79–97)
MONOCYTES # BLD AUTO: 0.43 10*3/MM3 (ref 0.1–0.9)
MONOCYTES NFR BLD AUTO: 11 % (ref 5–12)
NEUTROPHILS NFR BLD AUTO: 2.43 10*3/MM3 (ref 1.7–7)
NEUTROPHILS NFR BLD AUTO: 62.1 % (ref 42.7–76)
NRBC BLD AUTO-RTO: 0 /100 WBC (ref 0–0.2)
PHOSPHATE SERPL-MCNC: 2.9 MG/DL (ref 2.5–4.5)
PLATELET # BLD AUTO: 78 10*3/MM3 (ref 140–450)
PMV BLD AUTO: 9.5 FL (ref 6–12)
POTASSIUM SERPL-SCNC: 4.1 MMOL/L (ref 3.5–5.2)
PROTHROMBIN TIME: 15.8 SECONDS (ref 11.7–14.2)
RBC # BLD AUTO: 2.78 10*6/MM3 (ref 4.14–5.8)
RH BLD: POSITIVE
SODIUM SERPL-SCNC: 132 MMOL/L (ref 136–145)
T&S EXPIRATION DATE: NORMAL
VWF AG ACT/NOR PPP IA: 374 % (ref 50–200)
WBC NRBC COR # BLD: 3.91 10*3/MM3 (ref 3.4–10.8)

## 2023-11-01 PROCEDURE — 86922 COMPATIBILITY TEST ANTIGLOB: CPT

## 2023-11-01 PROCEDURE — 86901 BLOOD TYPING SEROLOGIC RH(D): CPT | Performed by: SURGERY

## 2023-11-01 PROCEDURE — 80069 RENAL FUNCTION PANEL: CPT | Performed by: INTERNAL MEDICINE

## 2023-11-01 PROCEDURE — 86902 BLOOD TYPE ANTIGEN DONOR EA: CPT

## 2023-11-01 PROCEDURE — 99232 SBSQ HOSP IP/OBS MODERATE 35: CPT | Performed by: INTERNAL MEDICINE

## 2023-11-01 PROCEDURE — 86920 COMPATIBILITY TEST SPIN: CPT

## 2023-11-01 PROCEDURE — 25010000002 VANCOMYCIN PER 500 MG: Performed by: SURGERY

## 2023-11-01 PROCEDURE — 86850 RBC ANTIBODY SCREEN: CPT | Performed by: SURGERY

## 2023-11-01 PROCEDURE — 86900 BLOOD TYPING SEROLOGIC ABO: CPT | Performed by: SURGERY

## 2023-11-01 PROCEDURE — 85025 COMPLETE CBC W/AUTO DIFF WBC: CPT | Performed by: SURGERY

## 2023-11-01 PROCEDURE — 82948 REAGENT STRIP/BLOOD GLUCOSE: CPT

## 2023-11-01 PROCEDURE — 85610 PROTHROMBIN TIME: CPT | Performed by: INTERNAL MEDICINE

## 2023-11-01 RX ORDER — VANCOMYCIN HYDROCHLORIDE 1 G/200ML
1000 INJECTION, SOLUTION INTRAVENOUS ONCE
Qty: 200 ML | Refills: 0 | Status: COMPLETED | OUTPATIENT
Start: 2023-11-01 | End: 2023-11-01

## 2023-11-01 RX ADMIN — Medication 10 ML: at 08:43

## 2023-11-01 RX ADMIN — VANCOMYCIN HYDROCHLORIDE 1000 MG: 1 INJECTION, SOLUTION INTRAVENOUS at 14:05

## 2023-11-01 NOTE — TELEPHONE ENCOUNTER
----- Message from Flavia Davis sent at 11/1/2023  5:01 PM EDT -----  Regarding: FW: Hospital follow-up    ----- Message -----  From: Ken Shelton Jr., MD  Sent: 11/1/2023   4:51 PM EDT  To: Sharif Mckenzie MD; Marlene Foss RN; #  Subject: Hospital follow-up                               Please schedule 2 unit visit with Dr. Mckenzie in 2 to 3 weeks with CBC, CMP.

## 2023-11-01 NOTE — PROGRESS NOTES
Name: Bill Landry ADMIT: 10/29/2023   : 1945  PCP: Bharathi De La Torre MD    MRN: [id number] LOS: 2 days   AGE/SEX: 78 y.o. male  ROOM: [facility]     Subjective   Subjective   Patient is lying on the bed and denies any nausea, vomiting, abdominal pain, chest pain.  He does not appear to be in distress. No family yet here today. Plans for dialysis then discharge.       Objective   Objective   Vital Signs  Temp:  [97.5 °F (36.4 °C)-98 °F (36.7 °C)] 97.5 °F (36.4 °C)  Heart Rate:  [68-82] 68  Resp:  [16-20] 16  BP: (110-133)/(55-76) 133/76  SpO2:  [97 %-98 %] 98 %  on   ;   Device (Oxygen Therapy): room air  Body mass index is 24.39 kg/m².  Physical Exam  HEENT: PERRLA, extract movements intact, Scleras no icterus  Neck: Supple, no JVD  Cardiovascular: Regular rate and rhythm with normal S1 and S2  Respiratory: Fairly clear to auscultation anteriorly with no wheezes  GI: Soft, nontender, bowel sounds are present  Extremities: Left upper extremity fistula with staples in place.   Results Review     I reviewed the patient's new clinical results.  Results from last 7 days   Lab Units 23  0504 10/31/23  0435 10/30/23  0539 10/29/23  1702   WBC 10*3/mm3 3.91 3.86 3.22* 2.80*   HEMOGLOBIN g/dL 9.2* 9.3* 9.0* 9.6*  9.8*   PLATELETS 10*3/mm3 78* 83* 71* 75*  75*     Results from last 7 days   Lab Units 23  0504 10/31/23  0435 10/30/23  0539 10/29/23  0936   SODIUM mmol/L 132* 134* 136 141   POTASSIUM mmol/L 4.1 3.8 4.0 4.2   CHLORIDE mmol/L 100 98 99 101   CO2 mmol/L 22.5 25.6 24.0 29.0   BUN mg/dL 59* 44* 61* 45*   CREATININE mg/dL 6.81* 5.29* 6.83* 5.81*   GLUCOSE mg/dL 105* 127* 90 96   EGFR mL/min/1.73 7.7* 10.4* 7.7* 9.3*     Results from last 7 days   Lab Units 23  0504 10/30/23  0539 10/29/23  0814   ALBUMIN g/dL 3.0* 2.9* 3.0*   BILIRUBIN mg/dL  --  0.6 0.9   ALK PHOS U/L  --  124* 134*   AST (SGOT) U/L  --  25 26   ALT (SGPT) U/L  --  6 10     Results from last 7 days   Lab Units  11/01/23  0504 10/31/23  0435 10/30/23  0539 10/29/23  0936 10/29/23  0814   CALCIUM mg/dL 8.3* 8.3* 8.3* 8.6 8.5*   ALBUMIN g/dL 3.0*  --  2.9*  --  3.0*   MAGNESIUM mg/dL  --  2.0 3.0* 2.1  --    PHOSPHORUS mg/dL 2.9 2.7 4.6* 4.3  --        Glucose   Date/Time Value Ref Range Status   11/01/2023 0602 110 70 - 130 mg/dL Final   10/31/2023 2038 141 (H) 70 - 130 mg/dL Final   10/31/2023 1605 122 70 - 130 mg/dL Final   10/31/2023 1114 196 (H) 70 - 130 mg/dL Final   10/30/2023 2052 120 70 - 130 mg/dL Final   10/30/2023 1546 202 (H) 70 - 130 mg/dL Final   10/30/2023 1255 66 (L) 70 - 130 mg/dL Final       No radiology results for the last day    I have personally reviewed all medications:  Scheduled Medications  epoetin tip/tip-epbx, 9,640 Units, Subcutaneous, Once  latanoprost, 1 drop, Both Eyes, Nightly  lidocaine 1% - EPINEPHrine 1:298712, 10 mL, Infiltration, Once  silver nitrate, 1 application , Topical, Once  sodium chloride, 10 mL, Intravenous, Q12H  vancomycin, 1,000 mg, Intravenous, Once    Infusions   Diet  Diet: Regular/House Diet, Renal Diets, Diabetic Diets; Consistent Carbohydrate; Low Potassium; Texture: Regular Texture (IDDSI 7); Fluid Consistency: Thin (IDDSI 0)    I have personally reviewed:  [x]  Laboratory   []  Microbiology   []  Radiology   [x]  EKG/Telemetry  []  Cardiology/Vascular   []  Pathology    []  Records       Assessment/Plan     Active Hospital Problems    Diagnosis  POA    **Bleeding [R58]  Yes    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       78 y.o. male admitted with Bleeding.    1.Recent pseudoaneurysm repair with postop bleeding most likely secondary to dysfunctional platelets.  Received cryoprecipitate as well as platelets. Hematology consulted. Vascular surgery is following along and discharge once cleared.    2.  End-stage renal disease, anemia of chronic disease and blood loss anemia will continue with routine dialysis and nephrology is following along.    3.  Cirrhosis of the liver with no ascites.  Currently euvolemic.    4.  Thrombocytopenia, chronic and likely secondary to underlying liver disease.  Platelet count is noted to be 78,000 and hematology is following along.    5.  History of diabetes, diet controlled and hemoglobin A1c is 5.20 as of 9/26/2023.  Was 6.2 in May 2023.    No objection to discharge today as planned per primary.     Angle Garrido MD  [facility]  11/01/23  10:34 EDT

## 2023-11-01 NOTE — PROGRESS NOTES
Name: Bill Landry ADMIT: 10/29/2023   : 1945  PCP: Bharathi De La Torre MD    MRN: [id number] LOS: 2 days   AGE/SEX: 78 y.o. male  ROOM: [address]    [facility], Subsequent Hospital Care, new bleeding    Chief Complaint   Patient presents with    Vascular Access Problem     CC: Bleeding left arm  Subjective     78 y.o. male with bleeding  left postsurgical site.  Skin edges oozing noted and multiple 3-0 Prolene sutures placed yesterday morning under local anesthetic sterilely.  Patient is not bleeding at this time.  Will give 1 dose of vancomycin as there is some mild redness in the area but otherwise the wound looks good.  She will get dialysis today and then the home     Review of Systems denies pain    Objective     Scheduled Medications:   latanoprost, 1 drop, Both Eyes, Nightly  lidocaine 1% - EPINEPHrine 1:545163, 10 mL, Infiltration, Once  silver nitrate, 1 application , Topical, Once  sodium chloride, 10 mL, Intravenous, Q12H        Active Infusions:       As Needed Medications:    [DISCONTINUED] senna-docusate sodium **AND** polyethylene glycol **AND** bisacodyl **AND** bisacodyl    famotidine    HYDROcodone-acetaminophen    nitroglycerin    sodium chloride    sodium chloride    Vital Signs  Vital Signs Patient Vitals for the past 24 hrs:   BP Temp Temp src Pulse Resp SpO2   23 0700 133/76 97.5 °F (36.4 °C) Oral 68 16 98 %   10/31/23 2325 126/69 97.6 °F (36.4 °C) Oral 82 20 98 %   10/31/23 1920 114/55 97.7 °F (36.5 °C) Oral 69 20 97 %   10/31/23 1500 110/59 98 °F (36.7 °C) Oral 70 18 98 %   10/31/23 1100 117/57 97.8 °F (36.6 °C) Oral 71 18 97 %     Vital Signs (range)  Temp:  [97.5 °F (36.4 °C)-98 °F (36.7 °C)] 97.5 °F (36.4 °C)  Heart Rate:  [68-82] 68  Resp:  [16-20] 16  BP: (110-133)/(55-76) 133/76  I/O:  I/O last 3 completed shifts:  In: 820 [P.O.:820]  Out: -   I/O:   Intake/Output Summary (Last 24 hours) at 2023 0752  Last data filed at 10/31/2023 1915  Gross per 24  hour   Intake 820 ml   Output --   Net 820 ml     BMI:  Body mass index is 24.39 kg/m².    Physical Exam:  Physical Exam   Lungs clear  Heart regular rate and rhythm  Abdomen benign  Left arm stable suture line intact without bleeding  Results Review:     CBC    Results from last 7 days   Lab Units 11/01/23  0504 10/31/23  0435 10/30/23  0539 10/29/23  1702 10/29/23  0814 10/27/23  0938   WBC 10*3/mm3 3.91 3.86 3.22* 2.80* 2.94* 4.73   HEMOGLOBIN g/dL 9.2* 9.3* 9.0* 9.6*  9.8* 10.3* 11.9*   PLATELETS 10*3/mm3 78* 83* 71* 75*  75* 73* 92*     BMP   Results from last 7 days   Lab Units 11/01/23  0504 10/31/23  0435 10/30/23  0539 10/29/23  0936 10/29/23  0814 10/27/23  0938   SODIUM mmol/L 132* 134* 136 141 141 127*   POTASSIUM mmol/L 4.1 3.8 4.0 4.2 3.7 4.0   CHLORIDE mmol/L 100 98 99 101 101 93*   CO2 mmol/L 22.5 25.6 24.0 29.0 28.0 22.1   BUN mg/dL 59* 44* 61* 45* 45* 58*   CREATININE mg/dL 6.81* 5.29* 6.83* 5.81* 5.82* 6.95*   GLUCOSE mg/dL 105* 127* 90 96 99 101*   MAGNESIUM mg/dL  --  2.0 3.0* 2.1  --   --    PHOSPHORUS mg/dL 2.9 2.7 4.6* 4.3  --   --      Cr Clearance Estimated Creatinine Clearance: 9.7 mL/min (A) (by C-G formula based on SCr of 6.81 mg/dL (H)).  Coag   Results from last 7 days   Lab Units 11/01/23  0504 10/31/23  0435 10/30/23  0539 10/29/23  0936   INR  1.25* 1.28* 1.30* 1.34*     HbA1C   Lab Results   Component Value Date    HGBA1C 5.20 09/26/2023    HGBA1C 6.20 (H) 05/12/2023    HGBA1C 4.80 01/20/2018     Blood Glucose   Glucose   Date/Time Value Ref Range Status   11/01/2023 0602 110 70 - 130 mg/dL Final   10/31/2023 2038 141 (H) 70 - 130 mg/dL Final   10/31/2023 1605 122 70 - 130 mg/dL Final   10/31/2023 1114 196 (H) 70 - 130 mg/dL Final   10/30/2023 2052 120 70 - 130 mg/dL Final   10/30/2023 1546 202 (H) 70 - 130 mg/dL Final   10/30/2023 1255 66 (L) 70 - 130 mg/dL Final   10/30/2023 0616 84 70 - 130 mg/dL Final     Infection   Results from last 7 days   Lab Units 10/27/23  145    WOUNDCX  No growth at 3 days     CMP   Results from last 7 days   Lab Units 11/01/23  0504 10/31/23  0435 10/30/23  0539 10/29/23  0936 10/29/23  0814 10/27/23  0938   SODIUM mmol/L 132* 134* 136 141 141 127*   POTASSIUM mmol/L 4.1 3.8 4.0 4.2 3.7 4.0   CHLORIDE mmol/L 100 98 99 101 101 93*   CO2 mmol/L 22.5 25.6 24.0 29.0 28.0 22.1   BUN mg/dL 59* 44* 61* 45* 45* 58*   CREATININE mg/dL 6.81* 5.29* 6.83* 5.81* 5.82* 6.95*   GLUCOSE mg/dL 105* 127* 90 96 99 101*   ALBUMIN g/dL 3.0*  --  2.9*  --  3.0*  --    BILIRUBIN mg/dL  --   --  0.6  --  0.9  --    ALK PHOS U/L  --   --  124*  --  134*  --    AST (SGOT) U/L  --   --  25  --  26  --    ALT (SGPT) U/L  --   --  6  --  10  --      ABG      Radiology(recent) No radiology results for the last day    Assessment & Plan     Assessment & Plan      Bleeding    Thrombocytopenia    Chronic leukopenia    Cirrhosis of liver without ascites    Congestive splenomegaly    Anemia due to stage 4 chronic kidney disease treated with erythropoietin    End stage kidney disease    Crohn's disease, unspecified, without complications    Essential (primary) hypertension    Hypofibrinogenemia      78 y.o. male with history of eroding pseudoaneurysm of the left arm now resected.  Patient was stable for 24 hours post procedure and then began to bleed.  Patient has known platelet dysfunction but had adequate platelets at the time of surgery at 92.  His bleeding appears to have been primarily skin is based on his response to suture placement under local anesthetic yesterday morning.  He is no longer bleeding.  His platelet count has dropped again slightly but I do not think that treating it without evidence of bleeding is worthwhile.  His hemoglobin is remained stable at 9.2.  Will defer to dialysis as to whether they want to transfuse or not.  We will plan for discharge home after dialysis today.  We will give 1 dose of Procrit.  Will give 1 dose of vancomycin.  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

## 2023-11-01 NOTE — OUTREACH NOTE
Prep Survey      Flowsheet Row Responses   [facility] facility patient discharged from? [place]   Is LACE score < 7 ? No   Eligibility [facility]   Date of Admission 10/29/23   Date of Discharge 11/01/23   Discharge Disposition Home or Self Care   Discharge diagnosis Bleeding   Does the patient have one of the following disease processes/diagnoses(primary or secondary)? Other   Does the patient have Home health ordered? No   Is there a DME ordered? No   Comments regarding appointments [facility] for HD on MWF   Prep survey completed? Yes            MICKI SANCHEZ - Registered Nurse

## 2023-11-01 NOTE — NURSING NOTE
Hd completed. Stable for discharge home. Avf performed. Well. Sutures intact. Tissue well vascularized. 2 liters net uf. Post vss. 120/54 hr 69

## 2023-11-01 NOTE — DISCHARGE SUMMARY
Name: Bill Landry ADMIT: 10/29/2023   : 1945  PCP: Bharathi De La Torre MD    MRN: [id number] LOS: 2 days   AGE/SEX: 78 y.o. 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    Bicuspid aortic valve 2022    Chronic leukopenia and thrombocytopenia     Cirrhosis of liver without ascites 2017    Congestive splenomegaly 2017    Crohn disease     with ileostomy    Diastolic CHF     although it was likely from volume overload due to ESRD    Diverticula of colon     ESRD on hemodialysis     M-W-F [facility]    Fatty liver disease, nonalcoholic     GERD (gastroesophageal reflux disease)     GI bleed     Glaucoma     H/O Gallstone pancreatitis     H/O Pericardial effusion     H/O sinus bradycardia     Heart murmur     History of hypertension     resolved    History of UTI     Hx of renal calculi     Ileostomy present     Iron deficiency     MGUS (monoclonal gammopathy of unknown significance)     TATE (obstructive sleep apnea)     Permanent atrial fibrillation     Sleep apnea     CPAP USED    Thrombocytopenia     Type 2 diabetes mellitus     CURRENTLY  "TAKES NO MED    Urinary retention     Post-OP       CONSULTS   Consults       Date and Time Order Name Status Description    10/29/2023  9:19 AM Inpatient Nephrology Consult Completed     10/29/2023  9:07 AM Hematology & Oncology Inpatient Consult Completed     10/29/2023  9:04 AM Inpatient Hospitalist Consult Completed     10/29/2023  7:59 AM Vascular Surgery Consult Completed     10/28/2023 11:53 AM Inpatient Vascular Surgery Consult Completed             PROCEDURES PERFORMED    Date: 10/29/2023    HOSPITAL COURSE  Patient is a 78 y.o. male presented to [facility] admitted for bleeding from repaired pseudoaneurysm of AV fistula.  This turned out to be a skin as user and was treated with sutures at bedside.  His bleeding diathesis been treated.  His platelet count has dropped by about 78 he is no longer bleeding his hemoglobin appears stable and he will get a dose of vancomycin with dialysis prior to discharge.  Otherwise he is stable and desires discharge..  Please see the admitting history and physical for further details.  Discharge home after dialysis today.  Follow-up in 2 weeks for staple and suture removal      VITAL SIGNS  /76 (BP Location: Right arm, Patient Position: Lying)   Pulse 68   Temp 97.5 °F (36.4 °C) (Oral)   Resp 16   Ht 177.8 cm (70\")   Wt 77.1 kg (170 lb)   SpO2 98%   BMI 24.39 kg/m²   Objective:  Vital signs: (most recent): Blood pressure 133/76, pulse 68, temperature 97.5 °F (36.4 °C), temperature source Oral, resp. rate 16, height 177.8 cm (70\"), weight 77.1 kg (170 lb), SpO2 98%.              CONDITION ON DISCHARGE  Stable.      DISCHARGE DISPOSITION   Home or Self Care      DISCHARGE MEDICATIONS     Discharge Medications        Continue These Medications        Instructions Start Date   alfuzosin 10 MG 24 hr tablet  Commonly known as: UROXATRAL   10 mg, Oral, Every Other Day, Does not take on tues sun or thurs      aspirin 81 MG tablet   81 mg, Oral, Daily, INSTRUCTED TO " CONTINUE THIS FOR SURGERY PER DR. BERMEO'S OFFICE      CLARITIN PO   1 tablet, Oral, As Needed, Pt states he is uncertain of dosage       famotidine 10 MG tablet  Commonly known as: PEPCID   10 mg, Oral, As Needed      IRON PO   50 mg, Weekly      latanoprost 0.005 % ophthalmic solution  Commonly known as: XALATAN   1 drop, Both Eyes, Nightly, 1 drop each eye       lidocaine-prilocaine 2.5-2.5 % cream  Commonly known as: EMLA   Apply 1 application  topically to the appropriate area as directed. Rzkusr-Bowytvypj-Hsondz:  ONE HOUR PRIOR TO DIALYSIS      MIRCERA IJ   Injection, Every 30 Days      Umm-Peter tablet   1 tablet, Oral, Daily      sodium chloride 0.65 % nasal spray   2 sprays, Nasal, Nightly      Velphoro 500 MG chewable tablet  Generic drug: Sucroferric Oxyhydroxide   500 mg, Oral, 3 Times Daily, AFTER EACH  MEAL      VENOFER IV   Intravenous, As Needed, USUALLY ONCE MONTH                Future Appointments   Date Time Provider Department Center   11/21/2023  9:30 AM Bharathi De La Torre MD [facility]   11/28/2023 12:30 PM [facility] ECHO/VAS [facility]   11/28/2023  1:00 PM Radha Rojo APRN [facility]   4/11/2024 12:40 PM LAB CHAIR 5 [facility]  [facility]   4/11/2024  1:00 PM Sharif Mckenzie MD [facility] CBC [facility]     Additional Instructions for the Follow-ups that You Need to Schedule       Discharge Follow-up with Specified Provider: Say; 2 Weeks   As directed      To: Say   Follow Up: 2 Weeks   Follow Up Details: Staple and suture removal               Follow-up Information       Bharathi De La Torre MD .    Specialty: Internal Medicine  Contact information:  [address] [id number] [phone]                             VQI Discharge Meds  The patient is being discharged on antiplatelet therapy No, for medical reason thrombocytopenia  The patient is being discharged on Statin therapy Yes    TEST  RESULTS PENDING AT DISCHARGE  Pending Labs       Order Current  Status    Von Willebrand Multimeric In process            [place]    Bill Deal MD  Office Number [phone]    23  07:58 EDT

## 2023-11-01 NOTE — PLAN OF CARE
Goal Outcome Evaluation:  Plan of Care Reviewed With: patient           Outcome Evaluation: Pt admitted 10/29/2023 for AV fistula bleeding .Pt had a few days back pseudoaneurysm repair. Pt admitted ER some bleeding coming from his incision . Pt known to have chronic thrombocytopenia. Platelet 28971 before surgery . 10/31/2023  had sutured at site of pseudoaneurysm repair site. Dressing in place no blood noted.Pt normally going for dialysis MWF . Pt might going today for dialysis and DC home . No new concern per pt. Safety maintained.

## 2023-11-01 NOTE — PROGRESS NOTES
[facility] Progress Note      Patient Name: Bill Landry  : 1945  MRN: [id number]  Primary Care Physician:  Bharathi De La Torre MD  Date of admission: 10/29/2023    Subjective     Interval History:   Follow-up ESRD.  Patient on dialysis.  Actually below his usual dry weight of 77.  Eating.  Says he uses a protein supplement at dialysis.  Concerned about being off his aspirin.  Told him I would discuss it with Dr. Shelton.  Otherwise ready for discharge.  Review of Systems:   As noted above    Objective     Vitals:   Temp:  [97.4 °F (36.3 °C)-98 °F (36.7 °C)] 97.4 °F (36.3 °C)  Heart Rate:  [67-82] 67  Resp:  [16-20] 16  BP: (110-140)/(55-76) 140/75    Intake/Output Summary (Last 24 hours) at 2023 1302  Last data filed at 2023 0900  Gross per 24 hour   Intake 580 ml   Output --   Net 580 ml       Physical Exam:    General Appearance: alert, oriented x 3, no acute distress .  On dialysis.  Goal 1.1 kg.  Skin: warm and dry  HEENT: oral mucosa normal, nonicteric sclera  Neck: supple, no JVD  Lungs: Clear to auscultation  Heart: irreg,irreg  Abdomen: soft, nontender, nondistended.  Ileostomy  : no palpable bladder  Extremities: Left arm AV fistula bruising.  Staples intact.  Mild erythema.  Thrill bruit.  Neuro: normal speech and mental status     Scheduled Meds:     epoetin tip/tip-epbx, 9,640 Units, Subcutaneous, Once  latanoprost, 1 drop, Both Eyes, Nightly  lidocaine 1% - EPINEPHrine 1:[id number], 10 mL, Infiltration, Once  silver nitrate, 1 application , Topical, Once  sodium chloride, 10 mL, Intravenous, Q12H  vancomycin, 1,000 mg, Intravenous, Once      IV Meds:        Results Reviewed:   I have personally reviewed the results from the time of this admission to 2023 13:02 EDT     Results from last 7 days   Lab Units 23  0504 10/31/23  0435 10/30/23  0539 10/29/23  0936 10/29/23  0814   SODIUM mmol/L 132* 134* 136   < > 141   POTASSIUM mmol/L 4.1 3.8 4.0   < >  3.7   CHLORIDE mmol/L 100 98 99   < > 101   CO2 mmol/L 22.5 25.6 24.0   < > 28.0   BUN mg/dL 59* 44* 61*   < > 45*   CREATININE mg/dL 6.81* 5.29* 6.83*   < > 5.82*   CALCIUM mg/dL 8.3* 8.3* 8.3*   < > 8.5*   BILIRUBIN mg/dL  --   --  0.6  --  0.9   ALK PHOS U/L  --   --  124*  --  134*   ALT (SGPT) U/L  --   --  6  --  10   AST (SGOT) U/L  --   --  25  --  26   GLUCOSE mg/dL 105* 127* 90   < > 99    < > = values in this interval not displayed.       Estimated Creatinine Clearance: 9.7 mL/min (A) (by C-G formula based on SCr of 6.81 mg/dL (H)).    Results from last 7 days   Lab Units 11/01/23  0504 10/31/23  0435 10/30/23  0539 10/29/23  0936   MAGNESIUM mg/dL  --  2.0 3.0* 2.1   PHOSPHORUS mg/dL 2.9 2.7 4.6* 4.3             Results from last 7 days   Lab Units 11/01/23  0504 10/31/23  0435 10/30/23  0539 10/29/23  1702 10/29/23  0814   WBC 10*3/mm3 3.91 3.86 3.22* 2.80* 2.94*   HEMOGLOBIN g/dL 9.2* 9.3* 9.0* 9.6*  9.8* 10.3*   PLATELETS 10*3/mm3 78* 83* 71* 75*  75* 73*       Results from last 7 days   Lab Units 11/01/23  0504 10/31/23  0435 10/30/23  0539 10/29/23  0936   INR  1.25* 1.28* 1.30* 1.34*       Assessment / Plan     ASSESSMENT:  ESRD.  Dialysis today.  Lower dry weight a little.  Bleeding after pseudoaneurysm repair.  Stitches placed.   No further bleeding.  Hemoglobin stable today.  No need for transfusion.  Subq Procrit given x1.  Anemia CKD and blood loss  Thrombocytopenia with platelet dysfunction  NATARAJAN Cirrhosis  6.  Permanent atrial fibrillation.  PLAN:  Dialysis today.  We will take to dry weight of 76.5 kg.  We will notify his outpatient unit.  Ask Dr. Shelton about the 81 mg aspirin.  Vancomycin as ordered with dialysis today.    Thank you for involving us in the care of Bill Landry.  Please feel free to call with any questions.    Keisha Freed MD  11/01/23  13:02 EDT    [facility]  [phone]    Please note that portions of this note were completed with a  voice recognition program.

## 2023-11-01 NOTE — PROGRESS NOTES
[facility] INPATIENT PROGRESS NOTE    Length of Stay:  2 days    CHIEF COMPLAINT:  Cirrhosis with splenomegaly, chronic thrombocytopenia, Crohn's disease    SUBJECTIVE:   Patient is doing well today, no further bleeding from the surgical site after sutures were placed yesterday by vascular surgery.  He is receiving dialysis currently.  He is preparing to discharge home today after dialysis.  He has no new complaints today.    ROS:  Review of Systems   A comprehensive review of systems was obtained with pertinent positive findings as noted in the interval history above.  All other systems negative.    OBJECTIVE:  Vitals:    10/31/23 1920 10/31/23 2325 11/01/23 0700 11/01/23 1046   BP: 114/55 126/69 133/76 140/75   BP Location: Right arm Right arm Right arm Right arm   Patient Position: Lying Lying Lying Lying   Pulse: 69 82 68 67   Resp: 20 20 16 16   Temp: 97.7 °F (36.5 °C) 97.6 °F (36.4 °C) 97.5 °F (36.4 °C) 97.4 °F (36.3 °C)   TempSrc: Oral Oral Oral Oral   SpO2: 97% 98% 98% 98%   Weight:       Height:             PHYSICAL EXAMINATION:  General: Alert and orient x3 no distress  Chest/Lungs: Clear to auscultation bilaterally anteriorly  Heart: Regular rate and rhythm no murmurs gallops or rubs  Abdomen/GI: Ileostomy, soft, nontender, nondistended  Extremities: No edema.  Left upper extremity fistula site with no bleeding      DIAGNOSTIC DATA:  Results Review:     I reviewed the patient's new clinical results.    Results from last 7 days   Lab Units 11/01/23  0504 10/31/23  0435 10/30/23  0539   WBC 10*3/mm3 3.91 3.86 3.22*   HEMOGLOBIN g/dL 9.2* 9.3* 9.0*   HEMATOCRIT % 27.6* 27.6* 26.9*   PLATELETS 10*3/mm3 78* 83* 71*      Results from last 7 days   Lab Units 11/01/23  0504 10/31/23  0435 10/30/23  0539 10/29/23  0936 10/29/23  0814   SODIUM mmol/L 132* 134* 136   < > 141   POTASSIUM mmol/L 4.1 3.8 4.0   < > 3.7   CHLORIDE mmol/L 100 98 99   < > 101   CO2 mmol/L 22.5 25.6 24.0   < > 28.0   BUN mg/dL  59* 44* 61*   < > 45*   CREATININE mg/dL 6.81* 5.29* 6.83*   < > 5.82*   CALCIUM mg/dL 8.3* 8.3* 8.3*   < > 8.5*   BILIRUBIN mg/dL  --   --  0.6  --  0.9   ALK PHOS U/L  --   --  124*  --  134*   ALT (SGPT) U/L  --   --  6  --  10   AST (SGOT) U/L  --   --  25  --  26   GLUCOSE mg/dL 105* 127* 90   < > 99    < > = values in this interval not displayed.      Lab Results   Component Value Date    NEUTROABS 2.43 11/01/2023     Results from last 7 days   Lab Units 11/01/23  0504 10/31/23  0435 10/30/23  0539   INR  1.25* 1.28* 1.30*     Results from last 7 days   Lab Units 10/31/23  0435   MAGNESIUM mg/dL 2.0             Assessment & Plan   ASSESSMENT/PLAN:  This is a 78 y.o. 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  Patient with left upper extremity fistula for dialysis  Patient developed thrombosed aneurysmal segment of fistula that eroded through skin with subsequent bleeding  Patient underwent exploration and resection of left arm pseudoaneurysm with primary closure on 10/27/2023 with vascular surgery  Patient with significant postoperative bleeding  Per vascular surgery, possible need for further surgical intervention  Patient with multiple contributing factors to postoperative bleeding including chronic thrombocytopenia, aspirin use (chronically takes 81 mg daily, held beginning 10/29/2023), platelet dysfunction due to underlying renal failure, coagulopathy related to cirrhosis  Additional labs on 10/29/2023 with B12 563, folate greater than 20, fibrinogen 187, INR 1.34, PFA-100 abnormal (collagen/ADP greater than 300 and collagen/epinephrine greater than 300)  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  Bleeding somewhat improved, no further transfusions performed  Sutures placed around surgical site on 10/31/2023 by Dr. Deal.    There has been no further bleeding from the surgical site.    *NATARAJAN with cirrhosis and splenomegaly  Patient with longstanding known cirrhosis and splenomegaly    *Chronic thrombocytopenia  Patient with chronic thrombocytopenia secondary to cirrhosis/splenomegaly  Platelet count fluctuates in the 60-low 100,000 range  More recently, platelet count has been in the 70-411402 range  At time of surgery on 10/27/2023, platelet count was 92,000  Postoperatively, platelet count has remained in the 70-04672 range  Patient is chronically on aspirin 81 mg daily, held beginning 10/29/2023  Patient did receive 1 unit platelet transfusion on 10/29/2023 due to postoperative bleeding. 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  Platelet count today relatively stable at 78,000    *Chronic leukopenia  Secondary to cirrhosis/splenomegaly  WBC in the 2-6000 range chronically with normal differential    *Anemia  Hemoglobin has been in the 10-12 range previously  Secondary to underlying ESRD  Additional labs on 10/29/2023 with iron 69, ferritin 1331, iron saturation 26%, TIBC 262, B12 563, folate greater than 20  Labs consistent with anemia secondary to ESRD, chronic disease  Monitor hemoglobin, transfusion support as needed, WENDY support per nephrology with dialysis  Hemoglobin today stable at 9.2    *ESRD  Patient on hemodialysis Monday Wednesday Friday    *Crohn's disease  Status post colectomy with ileostomy    Plan:  Patient with multiple contributing factors to postoperative bleeding including chronic thrombocytopenia, aspirin use (chronically takes 81 mg daily, held beginning 10/29/2023), platelet dysfunction due to underlying renal failure, coagulopathy  related to cirrhosis  Aspirin 81 mg daily remains on hold   Patient is being discharged home today  We will make outpatient follow-up arrangements with Dr. Mckenzie in 2 to 3 weeks with CBC, CMP.  We will discuss at that time whether patient should resume taking aspirin 81 mg daily.    Discussed with patient at bedside.             Ken Shelton MD

## 2023-11-02 ENCOUNTER — TRANSITIONAL CARE MANAGEMENT TELEPHONE ENCOUNTER (OUTPATIENT)
Dept: CALL CENTER | Facility: HOSPITAL | Age: 78
End: 2023-11-02
Payer: MEDICARE

## 2023-11-02 DIAGNOSIS — D72.819 CHRONIC LEUKOPENIA: Primary | ICD-10-CM

## 2023-11-02 NOTE — OUTREACH NOTE
Call Center TCM Note      Flowsheet Row Responses   [facility] patient discharged from? [place]   Does the patient have one of the following disease processes/diagnoses(primary or secondary)? Other   TCM attempt successful? Yes   Call start time 1141   Call end time 1144   Discharge diagnosis Bleeding   Meds reviewed with patient/caregiver? Yes   Is the patient having any side effects they believe may be caused by any medication additions or changes? No   Does the patient have all medications ordered at discharge? N/A   Is the patient taking all medications as directed (includes completed medication regime)? Yes   Comments Appt. with PCP 11/21/23 @ 9:30am.   Does the patient have an appointment with their PCP within 7-14 days of discharge? No  [11/21/23]   Has home health visited the patient within 72 hours of discharge? N/A   Psychosocial issues? No   Did the patient receive a copy of their discharge instructions? Yes   Nursing interventions Reviewed instructions with patient   What is the patient's perception of their health status since discharge? Improving   Is the patient/caregiver able to teach back signs and symptoms related to disease process for when to call PCP? Yes   Is the patient/caregiver able to teach back signs and symptoms related to disease process for when to call 911? Yes   Is the patient/caregiver able to teach back the hierarchy of who to call/visit for symptoms/problems? PCP, Specialist, Home health nurse, Urgent Care, ED, 911 Yes   If the patient is a current smoker, are they able to teach back resources for cessation? Not a smoker   TCM call completed? Yes   Call end time 1144   Would this patient benefit from a Referral to Amb Social Work? No   Is the patient interested in additional calls from an ambulatory ? No            Hellen Noble RN    11/2/2023, 11:46 EDT

## 2023-11-02 NOTE — PROGRESS NOTES
Discharge Planning Assessment  [facility]     Patient Name: Bill Landry  MRN: [id number]  Today's Date: 11/2/2023    Admit Date: 10/29/2023    Plan: Home   Discharge Needs Assessment    No documentation.                  Discharge Plan       Row Name 11/02/23 1832       Plan    Plan Home    Final Discharge Disposition Code 01 - home or self-care    Final Note Home                  Continued Care and Services - Discharged on 11/1/2023 Admission date: 10/29/2023 - Discharge disposition: Home or Self Care   Coordination has not been started for this encounter.       Expected Discharge Date and Time       Expected Discharge Date Expected Discharge Time    Nov 1, 2023            Demographic Summary    No documentation.                  Functional Status    No documentation.                  Psychosocial    No documentation.                  Abuse/Neglect    No documentation.                  Legal    No documentation.                  Substance Abuse    No documentation.                  Patient Forms    No documentation.                     Suzanne Person RN

## 2023-11-05 LAB
BH BB BLOOD EXPIRATION DATE: NORMAL
BH BB BLOOD EXPIRATION DATE: NORMAL
BH BB BLOOD TYPE BARCODE: 5100
BH BB BLOOD TYPE BARCODE: 5100
BH BB DISPENSE STATUS: NORMAL
BH BB DISPENSE STATUS: NORMAL
BH BB PRODUCT CODE: NORMAL
BH BB PRODUCT CODE: NORMAL
BH BB UNIT NUMBER: NORMAL
BH BB UNIT NUMBER: NORMAL
CROSSMATCH INTERPRETATION: NORMAL
CROSSMATCH INTERPRETATION: NORMAL
UNIT  ABO: NORMAL
UNIT  ABO: NORMAL
UNIT  RH: NORMAL
UNIT  RH: NORMAL

## 2023-11-07 LAB — VWF MULTIMERS PPP IB: NORMAL

## 2023-11-09 ENCOUNTER — OFFICE VISIT (OUTPATIENT)
Dept: ONCOLOGY | Facility: CLINIC | Age: 78
End: 2023-11-09
Payer: MEDICARE

## 2023-11-09 ENCOUNTER — LAB (OUTPATIENT)
Dept: LAB | Facility: HOSPITAL | Age: 78
End: 2023-11-09
Payer: MEDICARE

## 2023-11-09 VITALS
TEMPERATURE: 98.7 F | HEIGHT: 70 IN | HEART RATE: 74 BPM | BODY MASS INDEX: 24.77 KG/M2 | WEIGHT: 173 LBS | SYSTOLIC BLOOD PRESSURE: 126 MMHG | OXYGEN SATURATION: 98 % | RESPIRATION RATE: 16 BRPM | DIASTOLIC BLOOD PRESSURE: 63 MMHG

## 2023-11-09 DIAGNOSIS — D50.8 OTHER IRON DEFICIENCY ANEMIAS: ICD-10-CM

## 2023-11-09 DIAGNOSIS — D72.819 CHRONIC LEUKOPENIA: ICD-10-CM

## 2023-11-09 DIAGNOSIS — R16.1 SPLENOMEGALY, NOT ELSEWHERE CLASSIFIED: Primary | ICD-10-CM

## 2023-11-09 DIAGNOSIS — D73.2 CONGESTIVE SPLENOMEGALY: ICD-10-CM

## 2023-11-09 DIAGNOSIS — D69.6 THROMBOCYTOPENIA: ICD-10-CM

## 2023-11-09 DIAGNOSIS — K74.60 CIRRHOSIS OF LIVER WITHOUT ASCITES, UNSPECIFIED HEPATIC CIRRHOSIS TYPE: ICD-10-CM

## 2023-11-09 LAB
ALBUMIN SERPL-MCNC: 3.1 G/DL (ref 3.5–5.2)
ALBUMIN/GLOB SERPL: 0.9 G/DL
ALP SERPL-CCNC: 166 U/L (ref 39–117)
ALT SERPL W P-5'-P-CCNC: 5 U/L (ref 1–41)
ANION GAP SERPL CALCULATED.3IONS-SCNC: 14.8 MMOL/L (ref 5–15)
AST SERPL-CCNC: 52 U/L (ref 1–40)
BASOPHILS # BLD AUTO: 0.03 10*3/MM3 (ref 0–0.2)
BASOPHILS NFR BLD AUTO: 0.9 % (ref 0–1.5)
BILIRUB SERPL-MCNC: 0.6 MG/DL (ref 0–1.2)
BUN SERPL-MCNC: 39 MG/DL (ref 8–23)
BUN/CREAT SERPL: 7.6 (ref 7–25)
CALCIUM SPEC-SCNC: 8.4 MG/DL (ref 8.6–10.5)
CHLORIDE SERPL-SCNC: 95 MMOL/L (ref 98–107)
CO2 SERPL-SCNC: 24.2 MMOL/L (ref 22–29)
CREAT SERPL-MCNC: 5.14 MG/DL (ref 0.7–1.3)
DEPRECATED RDW RBC AUTO: 59.1 FL (ref 37–54)
EGFRCR SERPLBLD CKD-EPI 2021: 10.8 ML/MIN/1.73
EOSINOPHIL # BLD AUTO: 0.06 10*3/MM3 (ref 0–0.4)
EOSINOPHIL NFR BLD AUTO: 1.9 % (ref 0.3–6.2)
ERYTHROCYTE [DISTWIDTH] IN BLOOD BY AUTOMATED COUNT: 15.9 % (ref 12.3–15.4)
GLOBULIN UR ELPH-MCNC: 3.6 GM/DL
GLUCOSE SERPL-MCNC: 150 MG/DL (ref 65–99)
HCT VFR BLD AUTO: 29 % (ref 37.5–51)
HGB BLD-MCNC: 9.5 G/DL (ref 13–17.7)
IMM GRANULOCYTES # BLD AUTO: 0.02 10*3/MM3 (ref 0–0.05)
IMM GRANULOCYTES NFR BLD AUTO: 0.6 % (ref 0–0.5)
LYMPHOCYTES # BLD AUTO: 0.82 10*3/MM3 (ref 0.7–3.1)
LYMPHOCYTES NFR BLD AUTO: 25.8 % (ref 19.6–45.3)
MCH RBC QN AUTO: 33.5 PG (ref 26.6–33)
MCHC RBC AUTO-ENTMCNC: 32.8 G/DL (ref 31.5–35.7)
MCV RBC AUTO: 102.1 FL (ref 79–97)
MONOCYTES # BLD AUTO: 0.34 10*3/MM3 (ref 0.1–0.9)
MONOCYTES NFR BLD AUTO: 10.7 % (ref 5–12)
NEUTROPHILS NFR BLD AUTO: 1.91 10*3/MM3 (ref 1.7–7)
NEUTROPHILS NFR BLD AUTO: 60.1 % (ref 42.7–76)
NRBC BLD AUTO-RTO: 0 /100 WBC (ref 0–0.2)
PLATELET # BLD AUTO: 89 10*3/MM3 (ref 140–450)
PMV BLD AUTO: 9.2 FL (ref 6–12)
POTASSIUM SERPL-SCNC: 3.9 MMOL/L (ref 3.5–5.2)
PROT SERPL-MCNC: 6.7 G/DL (ref 6–8.5)
RBC # BLD AUTO: 2.84 10*6/MM3 (ref 4.14–5.8)
SODIUM SERPL-SCNC: 134 MMOL/L (ref 136–145)
WBC NRBC COR # BLD: 3.18 10*3/MM3 (ref 3.4–10.8)

## 2023-11-09 PROCEDURE — 36415 COLL VENOUS BLD VENIPUNCTURE: CPT

## 2023-11-09 PROCEDURE — 80053 COMPREHEN METABOLIC PANEL: CPT

## 2023-11-09 PROCEDURE — 85025 COMPLETE CBC W/AUTO DIFF WBC: CPT

## 2023-11-09 NOTE — PROGRESS NOTES
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 He developed brisk bleeding out of the surgical site that was corrected further by Sylvester Deal MD, Vascular Surgeon, and stopped the bleeding. Given his chronic thrombocytopenia and minimal low level of fibrinogen, the patient received blood products that helped in some way to stop bleeding.     Today, the patient actually feels very well. He has not had any fever, infection or bleeding at any other site. His surgical site is healing well and staples will be removed next week. He is not receiving any aspirin. He has taken aspirin in the past as prescribed by me and advised by me because of his chronic atrial fibrillation. One more time, THIS PATIENT NEVER WILL BE ABLE TO TAKE ANY FORM OF ANTICOAGULANTS. He had dramatic GI bleeding when that happened on multiple occasions in the past.     Besides this he has a good appetite, proper bowel activity. No cardiac or respiratory issues today.          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    Bicuspid aortic valve 07/20/2022    Chronic leukopenia and thrombocytopenia     Cirrhosis of liver without ascites 07/24/2017    Congestive splenomegaly 07/24/2017    Crohn disease     with ileostomy    Diastolic CHF     although it was likely from volume overload due to ESRD    Diverticula of colon     ESRD on hemodialysis     M-W-F [facility]    Fatty liver disease, nonalcoholic     GERD (gastroesophageal reflux disease)      GI bleed     Glaucoma     H/O Gallstone pancreatitis     H/O Pericardial effusion     H/O sinus bradycardia     Heart murmur     History of hypertension     resolved    History of UTI     Hx of renal calculi     Ileostomy present     Iron deficiency     MGUS (monoclonal gammopathy of unknown significance)     TATE (obstructive sleep apnea)     Permanent atrial fibrillation     Sleep apnea     CPAP USED    Thrombocytopenia     Type 2 diabetes mellitus     CURRENTLY TAKES NO MED    Urinary retention     Post-OP        Past Surgical History:   Procedure Laterality Date    APPENDECTOMY      ARTERIOVENOUS FISTULA/SHUNT SURGERY Left 08/08/2017    Procedure: LEFT BRACHIAL CEPHALIC AV FISTULA FORMATION WITH CEPHALIC VEIN TRANSPOSITION ;  Surgeon: Bill Deal MD;  Location: [facility] OR;  Service:     ARTERIOVENOUS FISTULA/SHUNT SURGERY Left 5/27/2022    Procedure: OPEN REVISION LEFT ARTERIOVENOUS INTERPOSITION GRAFT PLACEMENT;  Surgeon: Bill Deal MD;  Location: [facility] OR;  Service: Vascular;  Laterality: Left;    ARTERIOVENOUS FISTULA/SHUNT SURGERY W/ HEMODIALYSIS CATHETER INSERTION Left 02/15/2022    Procedure: OPEN LEFT ARM ARTERIOVENOUS FISTULA THROMBECTOMY WITH CEPHALIC VEIN ARTHROPLASTY AND STENT/GRAFT PLACEMENT;  Surgeon: Bill Deal MD;  Location: [facility] OR 18/19;  Service: Vascular;  Laterality: Left;    CATARACT EXTRACTION WITH INTRAOCULAR LENS IMPLANT Bilateral     CHOLECYSTECTOMY      COLECTOMY PARTIAL / TOTAL      History of inflammatory bowel disease with status post colectomy with ileostomy many years ago in the [facility],  18 YEARS OF AGE    COLONOSCOPY      CYSTOSCOPY LITHOLAPAXY BLADDER STONE EXTRACTION      ENDOSCOPY  01/16/2015    gastritis    ENDOSCOPY  01/17/2018    Procedure: ESOPHAGOGASTRODUODENOSCOPY;  Surgeon: Warner Neville MD;  Location: [facility] ENDOSCOPY;  Service:     ILEOSCOPY  01/16/2015    normal    ILEOSCOPY N/A 01/17/2018    Procedure: ILEOSCOPY;   Surgeon: Warner Neville MD;  Location: [facility] ENDOSCOPY;  Service:     INCISION AND DRAINAGE ARM Left 10/27/2023    Procedure: LEFT ARM INCISION AND DRAINAGE;  Surgeon: Bill Bermeo MD;  Location: [facility] MAIN OR;  Service: Vascular;  Laterality: Left;    TONSILLECTOMY          Current Outpatient Medications on File Prior to Visit   Medication Sig Dispense Refill    alfuzosin (UROXATRAL) 10 MG 24 hr tablet Take 1 tablet by mouth Every Other Day. Does not take on tues sun or thurs      B Complex-C-Folic Acid (Umm-Peter) tablet Take 1 tablet by mouth Daily.      famotidine (PEPCID) 10 MG tablet Take 1 tablet by mouth As Needed for Heartburn.      Ferrous Sulfate (IRON PO) 50 mg 1 (One) Time Per Week.      Iron Sucrose (VENOFER IV) Infuse  into a venous catheter As Needed. USUALLY ONCE MONTH      latanoprost (XALATAN) 0.005 % ophthalmic solution Administer 1 drop to both eyes Every Night. 1 drop each eye      lidocaine-prilocaine (EMLA) 2.5-2.5 % cream Apply 1 application  topically to the appropriate area as directed. Ynyxcs-Lqlpcxctw-Gxngny:  ONE HOUR PRIOR TO DIALYSIS  3    Loratadine (CLARITIN PO) Take 1 tablet by mouth As Needed. Pt states he is uncertain of dosage      Methoxy PEG-Epoetin Beta (MIRCERA IJ) Inject  as directed Every 30 (Thirty) Days.      sodium chloride 0.65 % nasal spray 2 sprays into the nostril(s) as directed by provider Every Night.      Sucroferric Oxyhydroxide (Velphoro) 500 MG chewable tablet Chew 1 tablet 3 (Three) Times a Day. AFTER EACH  MEAL      aspirin 81 MG tablet Take 1 tablet by mouth Daily. INSTRUCTED TO CONTINUE THIS FOR SURGERY PER DR. BERMEO'S OFFICE (Patient not taking: Reported on 11/9/2023)       No current facility-administered medications on file prior to visit.        ALLERGIES:    Allergies   Allergen Reactions    Ace Inhibitors Other (See Comments)     RENAL FAILURE/ raised creatinine    Contrast Dye (Echo Or Unknown Ct/Mr) Other (See Comments) and Anaphylaxis      "RENAL FAILURE    Iodine Anaphylaxis    Angiotensin Receptor Blockers Swelling    Eliquis [Apixaban] Arrhythmia     BLEEDING ISSUES; PT CANNOT TAKE ANY ANTICOAGULANTS DUE TO LOW PLATELETS EXCEPT ASPIRIN      Filgrastim GI Intolerance and Confusion     Chest pain    Keflex [Cephalexin] Diarrhea     RENAL FAILURE    Other Angioedema     IVP Dye        Social History     Socioeconomic History    Marital status:      Spouse name: Gillian    Number of children: 2    Years of education: College   Tobacco Use    Smoking status: Never    Smokeless tobacco: Never   Vaping Use    Vaping Use: Never used   Substance and Sexual Activity    Alcohol use: No     Comment: caffeine use: none    Drug use: No    Sexual activity: Defer        Family History   Problem Relation Age of Onset    Heart failure Mother     Hypertension Mother     Heart disease Mother     Hyperlipidemia Mother     Hypertension Father     Malig Hyperthermia Neg Hx           Objective     Vitals:    11/09/23 1240   BP: 126/63   Pulse: 74   Resp: 16   Temp: 98.7 °F (37.1 °C)   TempSrc: Temporal   SpO2: 98%   Weight: 78.5 kg (173 lb)   Height: 177.8 cm (70\")   PainSc: 0-No pain         11/9/2023    12:42 PM   Current Status   ECOG score 0       Physical Exam            GENERAL:  Well-developed, Patient  in no acute distress.   SKIN:  Warm, dry ,NO purpura ,no rash.  HEENT:  Pupils were equal and reactive to light and accomodation, conjunctivae noninjected,  normal visual acuity.   NECK:  Supple with good range of motion; no thyromegaly , no JVD or bruits,.No carotid artery pain, no carotid abnormal pulsation   LYMPHATICS:  No cervical, NO supraclavicular, NO axillary, NO inguinal adenopathies.  CARDIAC   normal rate , regular rhythm, without murmur,NO rubs NO S3 NO S4   LUNGS: normal breath sounds bilateral, no wheezing, NO rhonchi, NO crackles ,NO rubs.  VASCULAR VENOUS: no cyanosis, NO collateral circulation, NO varicosities, NO edema, NO palpable cords, " NO pain,NO erythema, NO pigmentation of the skin.  ABDOMEN:  Soft, NO pain,no hepatomegaly, no splenomegaly,no masses, no ascites, no collateral circulation,no distention.  EXTREMITIES  AND SPINE:  No clubbing, no cyanosis ,no deformities , no pain .No kyphosis,  no pain in spine, no pain in ribs , no pain in pelvic bone.Surgical site in the right arm is well healed with staples in place and small stitch in place as well. Small hematoma surrounding the area that is slowly improving. There is a 2nd aneurysm-like formation below the surgical site. There is no edema in lower extremities.      NEUROLOGICAL:  Patient was awake, alert, oriented to time, person and place.        RECENT LABS:  Lab Results   Component Value Date    WBC 3.18 (L) 11/09/2023    HGB 9.5 (L) 11/09/2023    HCT 29.0 (L) 11/09/2023    .1 (H) 11/09/2023    PLT 89 (L) 11/09/2023           Assessment & Plan     Diagnoses and all orders for this visit:    1. Splenomegaly, not elsewhere classified (Primary)    2. Other iron deficiency anemias    3. Cirrhosis of liver without ascites, unspecified hepatic cirrhosis type    4. Chronic leukopenia    5. Thrombocytopenia    6. Congestive splenomegaly         I reviewed the recent laboratory parameters on this patient and his white count remains around 300, his platelet count was 104,000, the hemoglobin around 11.  These numbers for him are very good. He has received IV iron and his ferritin level and iron profile are appropriate at this time. He has no clinical bleeding.     He raised the question about these numbers. He knows that these numbers are related to his chronic liver disease and splenomegaly. This has been present for years and years since he has been my patient. These numbers fluctuate but never have changed substantially. I made him aware that if he has a white count less than 2000 and platelet count less than 30,000 he will need to call me immediately.    He raised the question about the  Atenolol if this is prudent to take. I pointed out to him that it could be beneficial to control his blood pressure, it will be beneficial to decrease his portal pressure too and it could be beneficial to minimize in case of any atrial fibrillation, rapid ventricular response. He would like to adjust his CPAP machine first to see if he has lesser episodes of sleep apnea and if this does not correct the problem he will start the blood pressure medication under the direction of cardiology.     Finally they raises the question him and his wife in regard to COVID vaccination booster shot. He is ready from this point of view from my situation. He has as we know patient's on dialysis chronic kidney disease are immunosuppressed, he must have his COVID vaccination booster and I asked him to go ahead and proceed with this at this time. Three to 4 weeks later he would like to proceed with his flu shot.     Otherwise we will review him by telemedicine phone visit in 6 months.    He will continue sending reports of his laboratory parameters from his dialysis center.     Duration of the phone visit 12 minutes.   The patient was reviewed by Telemedicine on 03/15/2022. Since the previous visit the patient has undergone the repair of his fistula in the left upper extremity and a stent was placed in the left subclavian vein. This is visible in the most recent chest x-ray. Now the fistula for dialysis is working fine. He had also an episode of urine collection in the bladder, phenomenon that is unusual in chronic kidney disease. He had probably some discomfort and minimal fever. He had a Khan catheter placed, now has been removed by Urology. He is no longer having a fever and he has minimal urinary output. The patient otherwise feels well. The most recent laboratory testing that has been reviewed above shows chronic leukopenia and chronic thrombocytopenia without any changes. His white count is typically around 3500, platelet count  around 110,000, stable hemoglobin. Given the above circumstances, I do not believe that we need to pursue any other intervention. The dialysis team, Dr. Monsivais and associates, will continue doing his laboratory testing and they will let us know if any need arises in regard to any other intervention.     I related to the patient and his wife his numbers are doing well and I find no need for any other intervention. I will review him back here in the office in 4 months with a CBC.    Duration of the phone visit: 15 minutes.  The patient was reviewed on 08/18/2022. Since the previous visit he was treated for a urinary tract infection successfully. He has not had any further issues there. He has not had any bleeding after surgical intervention to correct issues in regard to his fistula for dialysis in the left upper extremity by Vascular Surgery. We provided platelet transfusion for that.     His white count, hemoglobin and platelets today are no different than what they have been for the last many years. His white count is low and low platelet count is evading to splenomegaly and hypersplenism with sequestration. Actually the hemoglobin is very good for somebody undergoing dialysis because he also receives erythropoietin and he receives iron therapy. I encouraged him to remain on this.     He raised the question in regard of what to do with the alfuzosin that he receives every night at dinner time in regard to prostate relaxation and allow him to have proper urination. I pointed out to him that if this medicine triggers low blood pressure he could avoid this medication utilization the night before dialysis and that way he only takes it 4 days of the week instead of every day of the week. Maybe this will be corrected.     Otherwise I would like to review him back in 4 months with a CBC here in the office.     I discussed all these facts with the patient and his wife. They provided me multiple laboratories from the  nephrology team that will be scanned into Epic system, all of them consistent more or less with laboratory testing that we have today including proper ferritin level.   On 02/23/2023 the patient feels very well today. He looks good. Clinical examination disclosed normal sinus, clear lung auscultation, normal colostomy location in the left lower quadrant, no abdominal ascites, no collateral circulation, no palpable splenomegaly. His white count, hemoglobin and platelets are no different than before. He has no clinical bleeding and no infection.    The rest of his chemistry profiles have been provided to me. Most of his labs are doing very well through dialysis including his uric acid.     I recommended for him to come back and see us in 6 months. Otherwise he will remain under the direction of the Nephrology team through his dialysis.   On 09/28/2023 the patient has not had any new modification in his clinical picture. He remains on dialysis, that is his main issue day by day. His diet is appropriate. He has no obvious blood loss. His abdominal exam is no different today than what has been before. No obvious palpable spleen clinically. No obvious ascites.     He has not been taking any new medicine. He remains on erythropoietin through his nephrologist and he remains on Venofer through his nephrologist as well.     Therefore, I do believe that his blood counts today are reflection of his hypersplenism and no more than that. His platelet count always has been low in the 70,000s. His white count always is low in the 3000s and these numbers today are reflection of this. Nothing that I can do for him at this point. Therefore, he will remain on his medicines through the rest of his physicians, I am not providing any medicine for him. I have not stopped or modified any of the medicines that he takes.     I would like to review him back in 6 months with a CBC.  On 11/09/2023, the patient was reviewed. Since the  hospitalization, his hemoglobin has improved to 9.5. His platelet count has improved to almost 90,000 and his white count is stable at 3000. All these numbers are about the same as they have been for the last many years.     The patient raised the question in regard to when he is going to be able to resume his aspirin. We gave him aspirin because his risk of stroke in the background of chronic atrial fibrillation. It is the only medicine that has prevented in my opinion for him to end up with a stroke and has not produced any significant side effect. My statement to the patient will be to proceed with the aspirin introduction into his medication list 2 days after the staples of his surgical site are removed.     Otherwise, I advised him to remain in observation from my point of view. He already has an appointment to follow up with me in 04/2024 and this will be continued. On that occasion, he will have a repeated CBC.     Today his creatinine is expected at 5 and not uncommon on him given his chronic kidney disease and dialysis need.     I discussed all these facts with the patient and his wife in the room. I discussed the patient's case with my partner, Ken Shelton MD.     I reviewed the Discharge Summary of this patient's hospitalization recently.

## 2023-11-21 ENCOUNTER — OFFICE VISIT (OUTPATIENT)
Dept: INTERNAL MEDICINE | Facility: CLINIC | Age: 78
End: 2023-11-21
Payer: MEDICARE

## 2023-11-21 VITALS
OXYGEN SATURATION: 94 % | SYSTOLIC BLOOD PRESSURE: 114 MMHG | HEART RATE: 74 BPM | HEIGHT: 70 IN | BODY MASS INDEX: 24.77 KG/M2 | WEIGHT: 173 LBS | DIASTOLIC BLOOD PRESSURE: 58 MMHG

## 2023-11-21 DIAGNOSIS — N18.6 END STAGE KIDNEY DISEASE: ICD-10-CM

## 2023-11-21 DIAGNOSIS — I48.21 PERMANENT ATRIAL FIBRILLATION: Primary | ICD-10-CM

## 2023-11-21 DIAGNOSIS — I10 ESSENTIAL (PRIMARY) HYPERTENSION: ICD-10-CM

## 2023-11-21 NOTE — PROGRESS NOTES
Bharathi De La Torre M.D.  Internal Medicine  [facility]  [address], [address]  [place] [address]  [phone]      Chief Complaint  Hypertension (3 mth F/U/)    SUBJECTIVE    History of Present Illness    Bill Landry is a 78 y.o. male with past medical history significant for permanent A-fib, hypertension, ESRD on Monday Wednesday Friday dialysis, heart failure, bicuspid aortic valve, hyperparathyroidism, Crohn's disease, chronic anemia and leukopenia, TATE, nephrolithiasis who presents to the office today as an established patient that last saw me on 8/15/2023.     He was recently admitted for bleeding after pseudoaneurysm repair of AV fistula.  He was given a dose of vancomycin with dialysis prior to discharge. He takes ASA. Had too much bleeding with other with other blood thinners.     His wife notes he is fatigued. He sleeps 7 hours/night. CPAP working well. Sleeps 7 hours/day.     Wants to gain weight. Declines nutrition consult. Drinks Nepro. Drinks protein shake.       No dypnea    Review of Systems    Allergies   Allergen Reactions    Ace Inhibitors Other (See Comments)     RENAL FAILURE/ raised creatinine    Contrast Dye (Echo Or Unknown Ct/Mr) Other (See Comments) and Anaphylaxis     RENAL FAILURE    Iodine Anaphylaxis    Angiotensin Receptor Blockers Swelling    Eliquis [Apixaban] Arrhythmia     BLEEDING ISSUES; PT CANNOT TAKE ANY ANTICOAGULANTS DUE TO LOW PLATELETS EXCEPT ASPIRIN      Filgrastim GI Intolerance and Confusion     Chest pain    Keflex [Cephalexin] Diarrhea     RENAL FAILURE    Other Angioedema     IVP Dye        Outpatient Medications Marked as Taking for the 11/21/23 encounter (Office Visit) with Bharathi De La Torre MD   Medication Sig Dispense Refill    alfuzosin (UROXATRAL) 10 MG 24 hr tablet Take 1 tablet by mouth Every Other Day. Does not take on tues sun or thurs      aspirin 81 MG tablet Take 1 tablet by mouth Daily. INSTRUCTED TO CONTINUE THIS FOR SURGERY PER  DR. BERMEO'S OFFICE      B Complex-C-Folic Acid (Umm-Peter) tablet Take 1 tablet by mouth Daily.      famotidine (PEPCID) 10 MG tablet Take 1 tablet by mouth As Needed for Heartburn.      Ferrous Sulfate (IRON PO) 50 mg 1 (One) Time Per Week.      Iron Sucrose (VENOFER IV) Infuse  into a venous catheter As Needed. USUALLY ONCE MONTH      latanoprost (XALATAN) 0.005 % ophthalmic solution Administer 1 drop to both eyes Every Night. 1 drop each eye      lidocaine-prilocaine (EMLA) 2.5-2.5 % cream Apply 1 application  topically to the appropriate area as directed. [id number]:  ONE HOUR PRIOR TO DIALYSIS  3    Loratadine (CLARITIN PO) Take 1 tablet by mouth As Needed. Pt states he is uncertain of dosage      Methoxy PEG-Epoetin Beta (MIRCERA IJ) Inject  as directed Every 30 (Thirty) Days.      sodium chloride 0.65 % nasal spray 2 sprays into the nostril(s) as directed by provider Every Night.      Sucroferric Oxyhydroxide (Velphoro) 500 MG chewable tablet Chew 1 tablet 3 (Three) Times a Day. AFTER EACH  MEAL          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    Bicuspid aortic valve 07/20/2022    Chronic leukopenia and thrombocytopenia     Cirrhosis of liver without ascites 07/24/2017    Congestive splenomegaly 07/24/2017    Crohn disease     with ileostomy    Diastolic CHF     although it was likely from volume overload due to ESRD    Diverticula of colon     ESRD on hemodialysis     M-W-F [facility]    Fatty liver disease, nonalcoholic     GERD (gastroesophageal reflux disease)     GI bleed     Glaucoma     H/O Gallstone pancreatitis     H/O Pericardial effusion     H/O sinus bradycardia     Heart murmur     History of hypertension     resolved    History of UTI     Hx of renal calculi     Ileostomy present     Iron deficiency     MGUS (monoclonal gammopathy of unknown significance)     TATE  (obstructive sleep apnea)     Permanent atrial fibrillation     Sleep apnea     CPAP USED    Thrombocytopenia     Type 2 diabetes mellitus     CURRENTLY TAKES NO MED    Urinary retention     Post-OP     Past Surgical History:   Procedure Laterality Date    APPENDECTOMY      ARTERIOVENOUS FISTULA/SHUNT SURGERY Left 08/08/2017    Procedure: LEFT BRACHIAL CEPHALIC AV FISTULA FORMATION WITH CEPHALIC VEIN TRANSPOSITION ;  Surgeon: Bill Deal MD;  Location: [facility] OR;  Service:     ARTERIOVENOUS FISTULA/SHUNT SURGERY Left 5/27/2022    Procedure: OPEN REVISION LEFT ARTERIOVENOUS INTERPOSITION GRAFT PLACEMENT;  Surgeon: Bill Deal MD;  Location: [facility] MAIN OR;  Service: Vascular;  Laterality: Left;    ARTERIOVENOUS FISTULA/SHUNT SURGERY W/ HEMODIALYSIS CATHETER INSERTION Left 02/15/2022    Procedure: OPEN LEFT ARM ARTERIOVENOUS FISTULA THROMBECTOMY WITH CEPHALIC VEIN ARTHROPLASTY AND STENT/GRAFT PLACEMENT;  Surgeon: Bill Deal MD;  Location: [facility] OR 18/19;  Service: Vascular;  Laterality: Left;    CATARACT EXTRACTION WITH INTRAOCULAR LENS IMPLANT Bilateral     CHOLECYSTECTOMY      COLECTOMY PARTIAL / TOTAL      History of inflammatory bowel disease with status post colectomy with ileostomy many years ago in the [facility],  18 YEARS OF AGE    COLONOSCOPY      CYSTOSCOPY LITHOLAPAXY BLADDER STONE EXTRACTION      ENDOSCOPY  01/16/2015    gastritis    ENDOSCOPY  01/17/2018    Procedure: ESOPHAGOGASTRODUODENOSCOPY;  Surgeon: Warner Neville MD;  Location: [facility] ENDOSCOPY;  Service:     ILEOSCOPY  01/16/2015    normal    ILEOSCOPY N/A 01/17/2018    Procedure: ILEOSCOPY;  Surgeon: Warner Neville MD;  Location: [facility] ENDOSCOPY;  Service:     INCISION AND DRAINAGE ARM Left 10/27/2023    Procedure: LEFT ARM INCISION AND DRAINAGE;  Surgeon: Bill Deal MD;  Location: [facility] MAIN OR;  Service: Vascular;  Laterality: Left;    TONSILLECTOMY       Family History   Problem Relation Age of  "Onset    Heart failure Mother     Hypertension Mother     Heart disease Mother     Hyperlipidemia Mother     Hypertension Father     Malig Hyperthermia Neg Hx     reports that he has never smoked. He has never used smokeless tobacco. He reports that he does not drink alcohol and does not use drugs.    OBJECTIVE    Vital Signs:   /58   Pulse 74   Ht 177.8 cm (70\")   Wt 78.5 kg (173 lb)   SpO2 94%   BMI 24.82 kg/m²     Physical Exam  Constitutional:       Appearance: Normal appearance. He is normal weight.   Cardiovascular:      Rate and Rhythm: Normal rate and regular rhythm.      Heart sounds: Normal heart sounds. No murmur heard.  Pulmonary:      Effort: Pulmonary effort is normal.      Breath sounds: Normal breath sounds.   Abdominal:      Tenderness: There is no abdominal tenderness.   Musculoskeletal:      Right lower leg: No edema.      Left lower leg: No edema.      Comments: Left AV fistula pulsatile   Skin:     General: Skin is warm and dry.   Neurological:      Mental Status: He is alert.   Psychiatric:         Mood and Affect: Mood normal.         Behavior: Behavior normal.         Thought Content: Thought content normal.            The following data was reviewed by: Bharathi De La Torre MD on 11/21/2023:  CMP          10/31/2023    04:35 11/1/2023    05:04 11/9/2023    12:33   CMP   Glucose 127  105  150    BUN 44  59  39    Creatinine 5.29  6.81  5.14    EGFR 10.4  7.7  10.8    Sodium 134  132  134    Potassium 3.8  4.1  3.9    Chloride 98  100  95    Calcium 8.3  8.3  8.4    Total Protein   6.7    Albumin  3.0  3.1    Globulin   3.6    Total Bilirubin   0.6    Alkaline Phosphatase   166    AST (SGOT)   52    ALT (SGPT)   5    Albumin/Globulin Ratio   0.9    BUN/Creatinine Ratio 8.3  8.7  7.6    Anion Gap 10.4  9.5  14.8      CBC w/diff          10/31/2023    04:35 11/1/2023    05:04 11/9/2023    12:33   CBC w/Diff   WBC 3.86  3.91  3.18    RBC 2.86  2.78  2.84    Hemoglobin 9.3  9.2  9.5  "   Hematocrit 27.6  27.6  29.0    MCV 96.5  99.3  102.1    MCH 32.5  33.1  33.5    MCHC 33.7  33.3  32.8    RDW 14.3  14.2  15.9    Platelets 83  78  89    Neutrophil Rel % 68.4  62.1  60.1    Immature Granulocyte Rel % 0.5  0.5  0.6    Lymphocyte Rel % 17.1  22.3  25.8    Monocyte Rel % 11.1  11.0  10.7    Eosinophil Rel % 2.6  3.1  1.9    Basophil Rel % 0.3  1.0  0.9      Lipid Panel          9/26/2023    08:30   Lipid Panel   Total Cholesterol 148    Triglycerides 103    HDL Cholesterol 56    VLDL Cholesterol 19    LDL Cholesterol  73    LDL/HDL Ratio 1.28        A1C Last 3 Results          5/12/2023    06:59 9/26/2023    08:30   HGBA1C Last 3 Results   Hemoglobin A1C 6.20  5.20      Data reviewed : Recent hospitalization notes from bleeding fistula              ASSESSMENT & PLAN     Diagnoses and all orders for this visit:    1. Permanent atrial fibrillation (Primary)    2. Essential (primary) hypertension    3. End stage kidney disease      78 y.o. male with past medical history significant for permanent A-fib, hypertension, ESRD on Monday Wednesday Friday dialysis, heart failure, bicuspid aortic valve, hyperparathyroidism, Crohn's disease, chronic anemia and leukopenia, TATE, nephrolithiasis here for transitional management. He denies bleeding issues since discharge. Renal is managing his iron replacement. Platelets are at baseline. He remains on ASA only for AC for afib due to bleeding risk.    Health Maintenance Due   Topic Date Due    COLONOSCOPY  Never done    ZOSTER VACCINE (1 of 2) Never done    URINE MICROALBUMIN  Never done    ANNUAL WELLNESS VISIT  08/25/2023    DIABETIC FOOT EXAM  08/25/2023    COVID-19 Vaccine (5 - 2023-24 season) 09/01/2023        Follow Up  Return in about 3 months (around 2/21/2024) for Medicare Wellness.    Patient/family had no further questions at this time and verbalized understanding of the plan discussed today.

## 2023-11-28 ENCOUNTER — OFFICE VISIT (OUTPATIENT)
Dept: CARDIOLOGY | Facility: CLINIC | Age: 78
End: 2023-11-28
Payer: MEDICARE

## 2023-11-28 ENCOUNTER — HOSPITAL ENCOUNTER (OUTPATIENT)
Dept: CARDIOLOGY | Facility: HOSPITAL | Age: 78
Discharge: HOME | End: 2023-11-28
Payer: MEDICARE

## 2023-11-28 VITALS — BODY MASS INDEX: 24.77 KG/M2 | HEIGHT: 70 IN | HEART RATE: 78 BPM | WEIGHT: 173 LBS

## 2023-11-28 VITALS
WEIGHT: 172.6 LBS | DIASTOLIC BLOOD PRESSURE: 60 MMHG | HEIGHT: 70 IN | BODY MASS INDEX: 24.71 KG/M2 | SYSTOLIC BLOOD PRESSURE: 128 MMHG | HEART RATE: 68 BPM

## 2023-11-28 DIAGNOSIS — I35.0 AORTIC STENOSIS, MILD: ICD-10-CM

## 2023-11-28 DIAGNOSIS — R09.89 BILATERAL CAROTID BRUITS: ICD-10-CM

## 2023-11-28 DIAGNOSIS — I50.33 ACUTE ON CHRONIC DIASTOLIC HEART FAILURE: ICD-10-CM

## 2023-11-28 DIAGNOSIS — I48.21 PERMANENT ATRIAL FIBRILLATION: ICD-10-CM

## 2023-11-28 DIAGNOSIS — Z99.2 ESRD ON HEMODIALYSIS: ICD-10-CM

## 2023-11-28 DIAGNOSIS — I10 PRIMARY HYPERTENSION: ICD-10-CM

## 2023-11-28 DIAGNOSIS — N18.6 ESRD ON HEMODIALYSIS: ICD-10-CM

## 2023-11-28 DIAGNOSIS — I48.21 PERMANENT ATRIAL FIBRILLATION: Primary | ICD-10-CM

## 2023-11-28 DIAGNOSIS — G47.33 OSA (OBSTRUCTIVE SLEEP APNEA): ICD-10-CM

## 2023-11-28 PROBLEM — J96.01 ACUTE HYPOXEMIC RESPIRATORY FAILURE DUE TO COVID-19: Status: RESOLVED | Noted: 2023-05-11 | Resolved: 2023-11-28

## 2023-11-28 PROBLEM — I31.39 PERICARDIAL EFFUSION (NONINFLAMMATORY): Status: RESOLVED | Noted: 2018-01-18 | Resolved: 2023-11-28

## 2023-11-28 PROBLEM — Z91.89 AT RISK FOR FLUID VOLUME OVERLOAD: Status: RESOLVED | Noted: 2019-03-21 | Resolved: 2023-11-28

## 2023-11-28 PROBLEM — R06.02 SHORTNESS OF BREATH: Status: RESOLVED | Noted: 2022-02-19 | Resolved: 2023-11-28

## 2023-11-28 PROBLEM — R05.1 ACUTE COUGH: Status: RESOLVED | Noted: 2023-05-17 | Resolved: 2023-11-28

## 2023-11-28 PROBLEM — T78.40XA ALLERGY, UNSPECIFIED, INITIAL ENCOUNTER: Status: RESOLVED | Noted: 2020-10-09 | Resolved: 2023-11-28

## 2023-11-28 PROBLEM — I13.10 HYPERTENSIVE HEART AND CHRONIC KIDNEY DISEASE WITHOUT HEART FAILURE, WITH STAGE 1 THROUGH STAGE 4 CHRONIC KIDNEY DISEASE, OR UNSPECIFIED CHRONIC KIDNEY DISEASE: Status: RESOLVED | Noted: 2019-10-18 | Resolved: 2023-11-28

## 2023-11-28 PROBLEM — R58 BLEEDING: Status: RESOLVED | Noted: 2023-10-29 | Resolved: 2023-11-28

## 2023-11-28 PROBLEM — U07.1 ACUTE HYPOXEMIC RESPIRATORY FAILURE DUE TO COVID-19: Status: RESOLVED | Noted: 2023-05-11 | Resolved: 2023-11-28

## 2023-11-28 LAB
AORTIC DIMENSIONLESS INDEX: 0.3 (DI)
ASCENDING AORTA: 3.2 CM
BH CV ECHO MEAS - ACS: 1.06 CM
BH CV ECHO MEAS - AO MAX PG: 29.8 MMHG
BH CV ECHO MEAS - AO MEAN PG: 16 MMHG
BH CV ECHO MEAS - AO ROOT DIAM: 3.2 CM
BH CV ECHO MEAS - AO V2 MAX: 272.9 CM/SEC
BH CV ECHO MEAS - AO V2 VTI: 55.3 CM
BH CV ECHO MEAS - AVA(I,D): 1.35 CM2
BH CV ECHO MEAS - EDV(CUBED): 132.7 ML
BH CV ECHO MEAS - EDV(MOD-SP2): 110 ML
BH CV ECHO MEAS - EDV(MOD-SP4): 106 ML
BH CV ECHO MEAS - EF(MOD-BP): 69.8 %
BH CV ECHO MEAS - EF(MOD-SP2): 66.4 %
BH CV ECHO MEAS - EF(MOD-SP4): 74.5 %
BH CV ECHO MEAS - ESV(CUBED): 22.9 ML
BH CV ECHO MEAS - ESV(MOD-SP2): 37 ML
BH CV ECHO MEAS - ESV(MOD-SP4): 27 ML
BH CV ECHO MEAS - FS: 44.3 %
BH CV ECHO MEAS - IVS/LVPW: 1.2 CM
BH CV ECHO MEAS - IVSD: 1.2 CM
BH CV ECHO MEAS - LAT PEAK E' VEL: 13.7 CM/SEC
BH CV ECHO MEAS - LV DIASTOLIC VOL/BSA (35-75): 54 CM2
BH CV ECHO MEAS - LV MASS(C)D: 213.9 GRAMS
BH CV ECHO MEAS - LV MAX PG: 3.3 MMHG
BH CV ECHO MEAS - LV MEAN PG: 2 MMHG
BH CV ECHO MEAS - LV SYSTOLIC VOL/BSA (12-30): 13.8 CM2
BH CV ECHO MEAS - LV V1 MAX: 90.3 CM/SEC
BH CV ECHO MEAS - LV V1 VTI: 19 CM
BH CV ECHO MEAS - LVIDD: 5.1 CM
BH CV ECHO MEAS - LVIDS: 2.8 CM
BH CV ECHO MEAS - LVOT AREA: 3.9 CM2
BH CV ECHO MEAS - LVOT DIAM: 2.24 CM
BH CV ECHO MEAS - LVPWD: 1 CM
BH CV ECHO MEAS - MED PEAK E' VEL: 15.1 CM/SEC
BH CV ECHO MEAS - MV DEC SLOPE: 639 CM/SEC2
BH CV ECHO MEAS - MV DEC TIME: 0.1 SEC
BH CV ECHO MEAS - MV E MAX VEL: 161.4 CM/SEC
BH CV ECHO MEAS - MV MAX PG: 6.7 MMHG
BH CV ECHO MEAS - MV MEAN PG: 1.53 MMHG
BH CV ECHO MEAS - MV P1/2T: 61.6 MSEC
BH CV ECHO MEAS - MV V2 VTI: 26.9 CM
BH CV ECHO MEAS - MVA(P1/2T): 3.6 CM2
BH CV ECHO MEAS - MVA(VTI): 2.8 CM2
BH CV ECHO MEAS - PA ACC TIME: 0.11 SEC
BH CV ECHO MEAS - PA V2 MAX: 93.7 CM/SEC
BH CV ECHO MEAS - QP/QS: 1.12
BH CV ECHO MEAS - RAP SYSTOLE: 15 MMHG
BH CV ECHO MEAS - RV MAX PG: 2.15 MMHG
BH CV ECHO MEAS - RV V1 MAX: 73.3 CM/SEC
BH CV ECHO MEAS - RV V1 VTI: 16.8 CM
BH CV ECHO MEAS - RVOT DIAM: 2.5 CM
BH CV ECHO MEAS - RVSP: 46.9 MMHG
BH CV ECHO MEAS - SI(MOD-SP2): 37.2 ML/M2
BH CV ECHO MEAS - SI(MOD-SP4): 40.3 ML/M2
BH CV ECHO MEAS - SV(LVOT): 74.6 ML
BH CV ECHO MEAS - SV(MOD-SP2): 73 ML
BH CV ECHO MEAS - SV(MOD-SP4): 79 ML
BH CV ECHO MEAS - SV(RVOT): 83.9 ML
BH CV ECHO MEAS - TAPSE (>1.6): 2.31 CM
BH CV ECHO MEAS - TR MAX PG: 31.9 MMHG
BH CV ECHO MEAS - TR MAX VEL: 282.3 CM/SEC
BH CV ECHO MEASUREMENTS AVERAGE E/E' RATIO: 11.21
BH CV XLRA - RV BASE: 4 CM
BH CV XLRA - RV LENGTH: 8.7 CM
BH CV XLRA - RV MID: 3 CM
BH CV XLRA - TDI S': 9.5 CM/SEC
BH CV XLRA MEAS LEFT DIST CCA EDV: 11.4 CM/SEC
BH CV XLRA MEAS LEFT DIST CCA PSV: 55.2 CM/SEC
BH CV XLRA MEAS LEFT DIST ICA EDV: -23.4 CM/SEC
BH CV XLRA MEAS LEFT DIST ICA PSV: -76 CM/SEC
BH CV XLRA MEAS LEFT ICA/CCA RATIO: -1.18
BH CV XLRA MEAS LEFT MID CCA EDV: 11.4 CM/SEC
BH CV XLRA MEAS LEFT MID CCA PSV: 62.6 CM/SEC
BH CV XLRA MEAS LEFT MID ICA EDV: -23.9 CM/SEC
BH CV XLRA MEAS LEFT MID ICA PSV: -65.4 CM/SEC
BH CV XLRA MEAS LEFT PROX CCA EDV: 11.5 CM/SEC
BH CV XLRA MEAS LEFT PROX CCA PSV: 60.2 CM/SEC
BH CV XLRA MEAS LEFT PROX ECA PSV: -90.3 CM/SEC
BH CV XLRA MEAS LEFT PROX ICA EDV: -11.9 CM/SEC
BH CV XLRA MEAS LEFT PROX ICA PSV: -56.9 CM/SEC
BH CV XLRA MEAS LEFT PROX SCLA PSV: 117.9 CM/SEC
BH CV XLRA MEAS LEFT VERTEBRAL A PSV: -37.9 CM/SEC
BH CV XLRA MEAS RIGHT DIST CCA EDV: 14.9 CM/SEC
BH CV XLRA MEAS RIGHT DIST CCA PSV: 77 CM/SEC
BH CV XLRA MEAS RIGHT DIST ICA EDV: -21.6 CM/SEC
BH CV XLRA MEAS RIGHT DIST ICA PSV: -73.7 CM/SEC
BH CV XLRA MEAS RIGHT ICA/CCA RATIO: -0.83
BH CV XLRA MEAS RIGHT MID CCA EDV: 14.3 CM/SEC
BH CV XLRA MEAS RIGHT MID CCA PSV: 81.4 CM/SEC
BH CV XLRA MEAS RIGHT MID ICA EDV: -14.9 CM/SEC
BH CV XLRA MEAS RIGHT MID ICA PSV: -64.1 CM/SEC
BH CV XLRA MEAS RIGHT PROX CCA EDV: 12.4 CM/SEC
BH CV XLRA MEAS RIGHT PROX CCA PSV: 82 CM/SEC
BH CV XLRA MEAS RIGHT PROX ECA PSV: -77 CM/SEC
BH CV XLRA MEAS RIGHT PROX ICA EDV: -11.9 CM/SEC
BH CV XLRA MEAS RIGHT PROX ICA PSV: -51.1 CM/SEC
BH CV XLRA MEAS RIGHT PROX SCLA PSV: 70.3 CM/SEC
BH CV XLRA MEAS RIGHT VERTEBRAL A PSV: 37.8 CM/SEC
LEFT ATRIUM VOLUME INDEX: 63.3 ML/M2
SINUS: 3.4 CM
STJ: 2.7 CM

## 2023-11-28 PROCEDURE — 99214 OFFICE O/P EST MOD 30 MIN: CPT | Performed by: NURSE PRACTITIONER

## 2023-11-28 PROCEDURE — 93000 ELECTROCARDIOGRAM COMPLETE: CPT | Performed by: NURSE PRACTITIONER

## 2023-11-28 PROCEDURE — 1160F RVW MEDS BY RX/DR IN RCRD: CPT | Performed by: NURSE PRACTITIONER

## 2023-11-28 PROCEDURE — 93306 TTE W/DOPPLER COMPLETE: CPT

## 2023-11-28 PROCEDURE — 93880 EXTRACRANIAL BILAT STUDY: CPT

## 2023-11-28 PROCEDURE — 1159F MED LIST DOCD IN RCRD: CPT | Performed by: NURSE PRACTITIONER

## 2023-11-28 PROCEDURE — 93306 TTE W/DOPPLER COMPLETE: CPT | Performed by: INTERNAL MEDICINE

## 2023-11-28 NOTE — PROGRESS NOTES
Stable echo findings. I'll repeat it in one year (I'll order it when I see him in 6m)    Dorothy kong

## 2023-11-28 NOTE — PROGRESS NOTES
Date of Office Visit: 2023  Encounter Provider: JAME Key  Place of Service: [facility] CARDIOLOGY  Patient Name: Bill Landry  :1945  Primary Cardiologist: Dr. Dale Vázquez     Chief Complaint   Patient presents with    Follow-up    Atrial Fibrillation   :     HPI: Bill Landry is a 78 y.o. male who presents today for 6-month cardiac follow-up visit.  I reviewed his medical records.    He has a complex medical history.  He has Crohn's disease and underwent colectomy/ileostomy in .  He has end-stage renal disease on dialysis from Crohn's disease and hypertension.  He has chronic leukopenia, thrombocytopenia, and anemia.  He has known obstructive sleep apnea.    He has known permanent atrial fibrillation and is not anticoagulated due to a history of GI bleed.  He has had diastolic heart failure and pulmonary hypertension in the setting of volume overload from chronic kidney disease.    In May 2023, echocardiogram revealed normal EF, mild concentric hypertrophy, left atrium severely dilated, moderate aortic valve calcification, mild aortic stenosis, and mild tricuspid regurgitation.    He presents today for a follow-up visit.  He just had an echocardiogram and carotid duplex completed in our office and the results are pending.  He feels that he has been doing very well.  He denies chest pain, shortness of air, PND, orthopnea, edema, palpitations, dizziness, syncope, or bleeding.  Blood pressure normal.  He is compliant with his CPAP machine.      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    Bicuspid aortic valve 2022    Chronic leukopenia and thrombocytopenia     Cirrhosis of liver without ascites 2017    Congestive splenomegaly 2017    Crohn disease     with ileostomy    Diastolic CHF     although it was likely from volume  overload due to ESRD    Diverticula of colon     ESRD on hemodialysis     M-W-F [facility]    Fatty liver disease, nonalcoholic     GERD (gastroesophageal reflux disease)     GI bleed     Glaucoma     H/O Gallstone pancreatitis     H/O Pericardial effusion     H/O sinus bradycardia     Heart murmur     History of hypertension     resolved    History of UTI     Hx of renal calculi     Ileostomy present     Iron deficiency     MGUS (monoclonal gammopathy of unknown significance)     TATE (obstructive sleep apnea)     Pericardial effusion (noninflammatory)     Permanent atrial fibrillation     Sleep apnea     CPAP USED    Thrombocytopenia     Type 2 diabetes mellitus     CURRENTLY TAKES NO MED    Urinary retention     Post-OP       Past Surgical History:   Procedure Laterality Date    APPENDECTOMY      ARTERIOVENOUS FISTULA/SHUNT SURGERY Left 08/08/2017    Procedure: LEFT BRACHIAL CEPHALIC AV FISTULA FORMATION WITH CEPHALIC VEIN TRANSPOSITION ;  Surgeon: Bill Deal MD;  Location: [facility];  Service:     ARTERIOVENOUS FISTULA/SHUNT SURGERY Left 5/27/2022    Procedure: OPEN REVISION LEFT ARTERIOVENOUS INTERPOSITION GRAFT PLACEMENT;  Surgeon: Bill Deal MD;  Location: [facility] OR;  Service: Vascular;  Laterality: Left;    ARTERIOVENOUS FISTULA/SHUNT SURGERY W/ HEMODIALYSIS CATHETER INSERTION Left 02/15/2022    Procedure: OPEN LEFT ARM ARTERIOVENOUS FISTULA THROMBECTOMY WITH CEPHALIC VEIN ARTHROPLASTY AND STENT/GRAFT PLACEMENT;  Surgeon: Bill Deal MD;  Location: [facility] OR 18/19;  Service: Vascular;  Laterality: Left;    CATARACT EXTRACTION WITH INTRAOCULAR LENS IMPLANT Bilateral     CHOLECYSTECTOMY      COLECTOMY PARTIAL / TOTAL      History of inflammatory bowel disease with status post colectomy with ileostomy many years ago in the [facility],  18 YEARS OF AGE    COLONOSCOPY      CYSTOSCOPY LITHOLAPAXY BLADDER STONE EXTRACTION      ENDOSCOPY  01/16/2015    gastritis    ENDOSCOPY   01/17/2018    Procedure: ESOPHAGOGASTRODUODENOSCOPY;  Surgeon: Warner Neville MD;  Location: [facility] ENDOSCOPY;  Service:     ILEOSCOPY  01/16/2015    normal    ILEOSCOPY N/A 01/17/2018    Procedure: ILEOSCOPY;  Surgeon: Warner Neville MD;  Location: [facility] ENDOSCOPY;  Service:     INCISION AND DRAINAGE ARM Left 10/27/2023    Procedure: LEFT ARM INCISION AND DRAINAGE;  Surgeon: Bill Deal MD;  Location: [facility] MAIN OR;  Service: Vascular;  Laterality: Left;    TONSILLECTOMY         Social History     Socioeconomic History    Marital status:      Spouse name: Gillian    Number of children: 2    Years of education: College   Tobacco Use    Smoking status: Never    Smokeless tobacco: Never   Vaping Use    Vaping Use: Never used   Substance and Sexual Activity    Alcohol use: No     Comment: caffeine use: none    Drug use: No    Sexual activity: Defer       Family History   Problem Relation Age of Onset    Heart failure Mother     Hypertension Mother     Heart disease Mother     Hyperlipidemia Mother     Hypertension Father     Malig Hyperthermia Neg Hx        The following portion of the patient's history were reviewed and updated as appropriate: past medical history, past surgical history, past social history, past family history, allergies, current medications, and problem list.    Review of Systems   Constitutional: Negative.   Cardiovascular: Negative.    Respiratory: Negative.     Hematologic/Lymphatic: Bruises/bleeds easily (Bruising).   Neurological: Negative.        Allergies   Allergen Reactions    Ace Inhibitors Other (See Comments)     RENAL FAILURE/ raised creatinine    Contrast Dye (Echo Or Unknown Ct/Mr) Other (See Comments) and Anaphylaxis     RENAL FAILURE    Iodine Anaphylaxis    Angiotensin Receptor Blockers Swelling    Eliquis [Apixaban] Arrhythmia     BLEEDING ISSUES; PT CANNOT TAKE ANY ANTICOAGULANTS DUE TO LOW PLATELETS EXCEPT ASPIRIN      Filgrastim GI Intolerance and Confusion      "Chest pain    Keflex [Cephalexin] Diarrhea     RENAL FAILURE    Other Angioedema     IVP Dye         Current Outpatient Medications:     alfuzosin (UROXATRAL) 10 MG 24 hr tablet, Take 1 tablet by mouth Every Other Day. Does not take on tues sun or thurs, Disp: , Rfl:     aspirin 81 MG tablet, Take 1 tablet by mouth Daily. INSTRUCTED TO CONTINUE THIS FOR SURGERY PER DR. BERMEO'S OFFICE, Disp: , Rfl:     B Complex-C-Folic Acid (Umm-Peter) tablet, Take 1 tablet by mouth Daily., Disp: , Rfl:     famotidine (PEPCID) 10 MG tablet, Take 1 tablet by mouth As Needed for Heartburn., Disp: , Rfl:     Iron Sucrose (VENOFER IV), Infuse  into a venous catheter As Needed. USUALLY ONCE MONTH, Disp: , Rfl:     latanoprost (XALATAN) 0.005 % ophthalmic solution, Administer 1 drop to both eyes Every Night. 1 drop each eye, Disp: , Rfl:     lidocaine-prilocaine (EMLA) 2.5-2.5 % cream, Apply 1 application  topically to the appropriate area as directed. [id number]-Edfula:  ONE HOUR PRIOR TO DIALYSIS, Disp: , Rfl: 3    Loratadine (CLARITIN PO), Take 1 tablet by mouth As Needed. Pt states he is uncertain of dosage, Disp: , Rfl:     Methoxy PEG-Epoetin Beta (MIRCERA IJ), Inject  as directed Every 30 (Thirty) Days., Disp: , Rfl:     sodium chloride 0.65 % nasal spray, 2 sprays into the nostril(s) as directed by provider Every Night., Disp: , Rfl:     Sucroferric Oxyhydroxide (Velphoro) 500 MG chewable tablet, Chew 1 tablet 3 (Three) Times a Day. AFTER EACH  MEAL, Disp: , Rfl:          Objective:     Vitals:    11/28/23 1321   BP: 128/60   BP Location: Left arm   Patient Position: Sitting   Cuff Size: Adult   Pulse: 68   Weight: 78.3 kg (172 lb 9.6 oz)   Height: 177.8 cm (70\")     Body mass index is 24.77 kg/m².    PHYSICAL EXAM:    Vitals Reviewed.   General Appearance: No acute distress, well developed and well nourished.   Eyes: Glasses.   HENT: No hearing loss noted.    Respiratory: No signs of respiratory distress. Respiration rhythm " and depth normal.  Clear to auscultation.   Cardiovascular:  Jugular Venous Pressure: Normal  Heart Rate and Rhythm: Irregular, irregular.  Heart Sounds: Normal S1 and S2. No S3 or S4 noted.  Murmurs: LUSB grade 2/6 systolic murmur present.  Lower Extremities: No edema noted.  Left arm fistula.  Musculoskeletal: Normal movement of extremities.  Skin: General appearance normal.  Bruising noted on left arm.  Psychiatric: Patient alert and oriented to person, place, and time. Speech and behavior appropriate. Normal mood and affect.       ECG 12 Lead    Date/Time: 11/28/2023 1:25 PM  Performed by: Radha Rojo APRN    Authorized by: Radha Rojo APRN  Comparison: compared with previous ECG from 5/11/2023  Similar to previous ECG  Rhythm: atrial fibrillation  Rate: normal  BPM: 68  Conduction: left anterior fascicular block  ST Segments: ST segments normal  T Waves: T waves normal  QRS axis: normal    Clinical impression: abnormal EKG            Assessment:       Diagnosis Plan   1. Permanent atrial fibrillation        2. Acute on chronic diastolic heart failure        3. Aortic stenosis, mild        4. Primary hypertension        5. ESRD on hemodialysis        6. TATE (obstructive sleep apnea)               Plan:       1.  Permanent Atrial Fibrillation: Naturally rate controlled and asymptomatic.  He has a [id number] score of 5 putting him at high risk of stroke.  He is not anticoagulated due to history of GI bleeding and thrombocytopenia.    2.  Acute on chronic diastolic heart failure.  Well compensated today.    3.  Mild aortic stenosis.  Echocardiogram results from today pending.  Heart murmur present.    4.  Carotid duplex completed today and the results are pending.    5.  Hypertension: Blood pressure well-controlled.    6.  ESRD on hemodialysis.  Left arm fistula present.    7.  Obstructive sleep apnea: Compliant with CPAP.    8.  We will call him with the results of his echocardiogram and carotid  duplex.  He will follow-up with Dr. Vázquez in 6 months.    As always, it has been a pleasure to participate in your patient's care. Thank you.         Sincerely,         JAME Solis  [facility]      Dictated utilizing Dragon Dictation

## 2023-11-29 ENCOUNTER — TELEPHONE (OUTPATIENT)
Dept: INTERNAL MEDICINE | Facility: CLINIC | Age: 78
End: 2023-11-29
Payer: MEDICARE

## 2023-11-29 NOTE — TELEPHONE ENCOUNTER
I called Bill Landry at [phone] at 17:11 EST     Carotid U/S shows less than 50% stenosis. He is on ASA.  His cholesterol is actually fairly low without medications.  Discussed patients with asymptomatic internal carotid artery atherosclerotic disease who have <50 percent carotid stenosis do not require carotid revascularization. Discussed such patients should be screened for treatable risk factors for stroke and cardiovascular disease, with institution of appropriate lifestyle changes and medical therapies. Consider annual carotid duplex surveillance

## 2023-11-30 ENCOUNTER — TELEPHONE (OUTPATIENT)
Dept: CARDIOLOGY | Facility: CLINIC | Age: 78
End: 2023-11-30
Payer: MEDICARE

## 2023-11-30 NOTE — TELEPHONE ENCOUNTER
It was actually his wife who called and she had more questions.  I was able to answer those for her.  She asked me to send a copy of the echocardiogram results to Dr. Moseley and I did so.  She wanted know if it was okay if he could exercise and I said yes.

## 2023-11-30 NOTE — PROGRESS NOTES
I reviewed the results of the echocardiogram with  and Mrs. Landry.  Left atrium moderate to severely dilated, moderate aortic stenosis, and RVSP 46 mmHg.  Dr. Vázquez said this is stable and she will follow-up with him in 6 months.  Echocardiogram will be repeated in 1 year.  They asked me to send them a copy in the mail and will do so.

## 2023-11-30 NOTE — TELEPHONE ENCOUNTER
Patient left a voice mail stating he is returning your call regarding his echo results. Patient has some questions and concerns regarding results and exercise

## 2023-12-07 NOTE — PROGRESS NOTES
Subjective  REASONS FOR FOLLOWUP:    1. 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  Since discontinuation of anticoagulants the patient's hemoglobin has remained normal. The patient has associated leukopenia and thrombocytopenia due to chronic splenomegaly and remote history of bone marrow testing that suggested myelodysplasia. Under the present circumstances neither the white count nor platelets are changing and hemoglobin remains stable. There is no abnormality in the differential of his white count. There are no symptoms or signs that would suggest any further progression into myelodysplasia. The patient will remain in observation.            History of Present Illness       The patient is here today in the company of his wife specifically with no complaints. His appetite remains wonderful, he has not had any heartburn or indigestion, no abdominal pain, no jaundice, normal bowel activity through his colostomy. Urination is ongoing with no difficulties. He has had a new creatinine done by Dr. Gordon, his nephrologist, being 3.4. The patient is having minimal fluid accumulation in his ankles. No worse than before. He also has had an echocardiogram that documents normal left ventricular ejection fraction 65% and an element of diastolic dysfunction and minimal evidence of pulmonary hypertension with left atrial dilatation.     The patient denies any clinical bleeding at this time. No hematuria, no melena, no enterorrhagia. No epistaxis. He continues taking his sublingual supplementation of B12.            He has been told by Dr. Gordon, his Nephrologist, that likely he will require the placement of a shunt for eventually initiation of hemodialysis.  If that is the case we will need to treat the patient before this with medications  including Granix to raise his white count and platelet transfusion to minimize any hematoma formation at the surgical site. He also had a CT scan of the abdomen that documented cirrhotic morphology, enlarged spleen, no retroperitoneal adenopathy and normal size kidneys with no hydronephrosis and an enlarged prostate.  This has been placed in the medical record.                   Past Medical History, Past Surgical HistorySignificant for hypertension and also he has history of atrial fibrillation and was chronically anticoagulated with Eliquis in the past. Since January 2015 the patient is no longer receiving this medicine and moved to aspirin during his persistency of GI bleeding. The patient also has a history of chronic kidney disease with creatinine baseline around 2.5. The patient also has a history of inflammatory bowel disease with status post colectomy with ileostomy many years ago in the [facility]. The patient also has a history of pericardial effusion with an GABRIELLE 1:80 homogenous that has never changed or modified. He also has a positive lupus anticoagulant. He has had chronic leukopenia and thrombocytopenia for which he underwent by Dr. Null 10 years ago, bone marrow testing at [facility]. We have requested this information. The patient in the past has had a normal B12 level of 1094, normal folate level and ferritin level of 617 with an iron level of 34. In the past he has received IV iron in the form of Venofer while being at [facility] in January 2015. He has had serum protein electrophoresis at least on 3 different occasions, failing to show any monoclonal proteins. He also has had a CT scan of the abdomen that shows a general spleen anatomy without any retroperitoneal adenopathies and nothing to suggest portal hypertension or cirrhosis of the liver. Kidney anatomy disclosed thinning of the kidney cortexes consistent with chronic medical illness. He has no hydronephrosis. No  retroperitoneal adenopathies.   SOCIAL HISTORY:  The patient lives with his wife in [place], [place]. He is retired. He does not smoke and does not drink any alcohol.   ONCOLOGIC/HEMATOLOGIC HISTORY     On 04/06/2015 the patient’s clinical status has dramatically improved.  He has not had any new episodes of infection, congestive heart failure, GI bleeding, with excellent improvement in hemoglobin.  The patient has been able to walk longer distances without shortness of breath.   He has been able to climb steps without shortness of breath and he feels dramatically better.  Hemoglobin has been stable for the past few weeks at 12.1.  His white count and platelet count remain on the low side with no infection or clinical bleeding.  We found documentation of his bone marrow biopsy performed in 2002 and read at the [facility].  Explicitly it was documented that the patient could have myelodysplastic syndrome.  Procrit, as I pointed out to the patient, is a useful medicine to treat this condition anyway, and given the fact that in 13 years his white count and his platelet count have not changed, makes this diagnosis dubious.  Reading bone marrows for myelodysplasia is one of the most difficult tasks in Hematology.  I suggested to him at some point, if his blood count change, specifically white count modifies or platelet count modifies, to consider repeating the bone marrow testing, flow cytometry and chromosomes.     On 05/26/2015 he was feeling terrific.  He was not having any GI symptomatology, no melena, no enterorrhagia, no abnormal bleeding.  He was functionally perfectly fine.  Urination was ongoing. Uric acid was elevated and I advised him to go back into his Uloric to control this and minimize formation of kidney stones.    On 07/20/2015 the patient had no issues in relationship with anemia. His white count and platelet count remain low associated with his splenomegaly and no issues of consequences  related to this. He was advised to remain in observation. He was advised to remain on his folic acid and B12 supplementation.     On 09/14/2015, hemoglobin was excellent at 12.9, stable weight count at 3400 and stable platelet count at 70,000.  We asked him to remain on observation.     On 11/09/2015 the patient’s hematological parameters remain normal.  He was feeling terrific.  His atrial fibrillation looked like it was very quiet and he had no issues in regard to his aspirin use.  He had no embolic phenomenon.  He had some element of fluid retention associated with his chronic renal disease.  We advised him to monitor his diet properly in regard to sodium ingestion.  Review of Systems   General: no fever, chills, fatigue, weight changes, or lack of appetite.  Eyes: no epiphora, xerophthalmia,conjunctivitis, pain, glaucoma, blurred vision, blindness, secretion, photophobia, proptosis, diplopia.  Ears: no otorrhea, tinnitus, otorrhagia,SOME deafness, pain, vertigo.  Nose: STATED rhinorrhea, epistaxis, alteration in perception of odors, STATED sinuses pressure, SNEEZING AND ITCHING  Mouth: no alteration in gums or teeth,  ulcers, no difficulty with mastication or deglut ion, no odynophagia.  Neck: no masses or pain, no thyroid alterations, no pain in muscles or arteries, no carotid odynia, no crepitation.  Respiratory: no cough, sputum production, dyspnea, trepopnea, pleuritic pain, hemoptysis.  Heart: no syncope, irregularity, palpitations, angina, orthopnea, paroxysmal nocturnal dyspnea.  Vascular Venous: no tenderness,edema, palpable cords, postphlebitic syndrome, skin changes or ulcerations.  Vascular Arterial: no distal ischemia, claudication, gangrene, neuropathic ischemic pain, skin ulcers, paleness or cyanosis.  GI: no dysphagia, odynophagia, no regurgitation, no heartburn,no indigestion,no nausea,no vomiting,no hematemesis ,no melena,no jaundice,no distention, no obstipation,no enterorrhagia,no  proctalgia,no anal  lesions, nochanges in bowel habits.  : no frequency, hesitancy, hematuria, discharge, pain.  Musculoskeletal: no muscle or tendon pain or inflammation, joint pain, edema, functional limitation, fasciculations, mass.  Neurologic: no headache, seizures, alterations on Craneal nerves, no motor or senssory deficit, normal coordination, no alteration in memory, orientation, calculation,writting, verbal or written language.  Skin: no rashes, pruritus or localized lesions.  Psychiatric: no anxiety, depression, agitation, delusions, proper insight.A comprehensive 14 point review of systems was performed and was negative except as mentioned.    Medications:  The current medication list was reviewed in the EMR    ALLERGIES:    Allergies   Allergen Reactions   • Ace Inhibitors    • Iodinated Diagnostic Agents    • Keflex [Cephalexin]    • Other      IVP Dye       Objective      There were no vitals filed for this visit.  Current Status 4/24/2017   ECOG score 0       Physical Exam    GENERAL:  Well-developed, well-nourished  Patient  in no acute distress.   SKIN:  Warm, dry without rashes, purpura or petechiae.  HEENT:  Pupils were equal and reactive to light and accomodation, conjunctivas non injected, no pterigion, normal extraocular movements, normal visual acuity.   Mouth mucosa was moist, no exudates in oropharynx, normal gum line, normal roof of the mouth and pillars, normal papillations of the tongue.Ear canals were FULL OF WAX, NOT VISUALIZED tympanic membranes, normal hearing acuity.No pain in mastoid area or erythema.  NECK:  Supple with good range of motion; no thyromegaly or masses, no JVD or bruits, no cervical adenopathies.No carotid arteries pain, no carotid abnormal pulsation or arterial dance.  LYMPHATICS:  No cervical, supraclavicular, axillary, epitrochlear or inguinal adenopathy.  CHEST:  Normal excursion of both torrey thoraces, normal voice fremitus, no subcutaneous emphysema, normal  axillas, no rashes or acanthosis nigricans. Lungs clear to percussion and auscultation, normal breath sounds bilaterally, no wheezing, crackles or ronchi, no stridor, no rubs.  CARDIAC AND VASCULAR: PMI not displaced,no thrills, normal rate and irrregular rhythm atrial fibrilation controller VR, without murmurs, rubs or S3 or S4 right or left sided gallops. Normal femoral, popliteal, pedis, brachial and carotid pulses.  ABDOMEN:  Soft, nontender with no organomegaly or masses, no ascites, no collateral circulation,no distention,no Contreras sign, no abdominal pain, no inguinal hernias,no umbilical hernias, no abdominal bruits. Normal bowel sounds.Colostomy in place LLQ.  GENITAL: Not  Performed.  EXTREMITIES  AND SPINE:  No clubbing, cyanosis or edema, no deformities or pain .No kyphosis, scoliosis, deformities or pain in spine, ribs or pelvic bone.  NEUROLOGICAL:  Patient was awake, alert, oriented to time, person and place,normal gait and coordination    RECENT LABS:  Hematology WBC   Date Value Ref Range Status   03/20/2017 2.49 (L) 4.00 - 10.00 10*3/mm3 Final     RBC   Date Value Ref Range Status   03/20/2017 3.53 (L) 4.30 - 5.50 10*6/mm3 Final     Hemoglobin   Date Value Ref Range Status   03/20/2017 10.7 (L) 13.5 - 16.5 g/dL Final     Hematocrit   Date Value Ref Range Status   03/20/2017 32.1 (L) 40.0 - 49.0 % Final     Platelets   Date Value Ref Range Status   03/20/2017 73 (L) 150 - 375 10*3/mm3 Final          Assessment/Plan  : This patient has history of multifactorial anemia as well as leukopenia and thrombocytopenia. He is no longer bleeding in the gastrointestinal tract because he is no longer receiving anticoagulants. He remains on a baby aspirin.   In the previous visit, we measured his ferritin, iron, TIBC, B12, folic acid levels, all being normal. We performed a serum protein electrophoresis that is close, a polyclonal expansion of globulins with no monoclonal protein found. We documented a very low  erythropoietin level at 14.     He has received erythropoietin raising the hemoglobin to 11.0 and remaining at that level in absence of any of this medication for a while. The patient actually feels much better and energy is much better. He has no shortness of breath and he is capable of doing anything that he pleases. His white count and platelet count remain stable.     RECOMMENDATIONS: Given the fact that his hemoglobin has improved and the other numbers including white count and platelets are stable, the patient will return on every 3 weeks basis for blood counts, RN check and doctor visit in 3 months. Procrit to be given according to protocol. Again, if the patient is to have a shunt placed in preparation for hemodialysis he will require Granix to raise his white count and he will require platelet transfusion the day of the procedure. I asked him to discuss this with the vascular surgeon if he gets a referral for me to discuss this further.                                  4/24/2017      CC:           will see PCP Dr. Arias for Medical Clearance pending Dr. Verde [phone] Dr. Verde [phone]

## 2024-03-04 DIAGNOSIS — N18.6 END STAGE RENAL DISEASE: Primary | ICD-10-CM

## 2024-03-28 ENCOUNTER — OFFICE VISIT (OUTPATIENT)
Dept: INTERNAL MEDICINE | Facility: CLINIC | Age: 79
End: 2024-03-28
Payer: MEDICARE

## 2024-03-28 VITALS
HEART RATE: 79 BPM | SYSTOLIC BLOOD PRESSURE: 130 MMHG | HEIGHT: 70 IN | WEIGHT: 174 LBS | BODY MASS INDEX: 24.91 KG/M2 | OXYGEN SATURATION: 98 % | DIASTOLIC BLOOD PRESSURE: 60 MMHG

## 2024-03-28 DIAGNOSIS — H61.21 IMPACTED CERUMEN OF RIGHT EAR: Primary | ICD-10-CM

## 2024-03-28 PROCEDURE — 99213 OFFICE O/P EST LOW 20 MIN: CPT | Performed by: STUDENT IN AN ORGANIZED HEALTH CARE EDUCATION/TRAINING PROGRAM

## 2024-03-28 NOTE — PROGRESS NOTES
Efrem Mccabe D.O.  Internal Medicine  [facility]  [address], [address]  [place] [address]  [phone]      Chief Complaint  Right ear clogged x 1 week    SUBJECTIVE    History of Present Illness    Bill Landry is a 78 y.o. male who presents to the office today as an established patient of Dr Bharathi De La Torre here today for an acute care visit.     Pt states symptoms have been going on around 2 weeks. He sits in an air conditioning at dialysis and the changing weather are attributable per his report. He felt like fluid was in his ears and some snapping/popping sounds. No pain in the ears. No fever or chills. No other respiratory or viral symptoms. He uses arm and hamour saline nasal spray which does seem to help.     Allergies   Allergen Reactions    Ace Inhibitors Other (See Comments)     RENAL FAILURE/ raised creatinine    Contrast Dye (Echo Or Unknown Ct/Mr) Other (See Comments) and Anaphylaxis     RENAL FAILURE    Iodine Anaphylaxis    Angiotensin Receptor Blockers Swelling    Eliquis [Apixaban] Arrhythmia     BLEEDING ISSUES; PT CANNOT TAKE ANY ANTICOAGULANTS DUE TO LOW PLATELETS EXCEPT ASPIRIN      Filgrastim GI Intolerance and Confusion     Chest pain    Keflex [Cephalexin] Diarrhea     RENAL FAILURE    Other Angioedema     IVP Dye        Outpatient Medications Marked as Taking for the 3/28/24 encounter (Office Visit) with Efrem Mccabe, DO   Medication Sig Dispense Refill    alfuzosin (UROXATRAL) 10 MG 24 hr tablet Take 1 tablet by mouth Every Other Day. Does not take on tues sun or thurs      aspirin 81 MG tablet Take 1 tablet by mouth Daily. INSTRUCTED TO CONTINUE THIS FOR SURGERY PER DR. BERMEO'S OFFICE      B Complex-C-Folic Acid (Umm-Peter) tablet Take 1 tablet by mouth Daily.      famotidine (PEPCID) 10 MG tablet Take 1 tablet by mouth As Needed for Heartburn.      Iron Sucrose (VENOFER IV) Infuse  into a venous catheter As Needed. USUALLY ONCE MONTH      latanoprost  "(XALATAN) 0.005 % ophthalmic solution Administer 1 drop to both eyes Every Night. 1 drop each eye      lidocaine-prilocaine (EMLA) 2.5-2.5 % cream Apply 1 Application topically to the appropriate area as directed. Hygodb-Iyjvqznal-Qjqmfy:  ONE HOUR PRIOR TO DIALYSIS  3    Loratadine (CLARITIN PO) Take 1 tablet by mouth As Needed. Pt states he is uncertain of dosage      Methoxy PEG-Epoetin Beta (MIRCERA IJ) Inject  as directed Every 30 (Thirty) Days.      sodium chloride 0.65 % nasal spray 2 sprays into the nostril(s) as directed by provider Every Night.      Sucroferric Oxyhydroxide (Velphoro) 500 MG chewable tablet Chew 1 tablet 3 (Three) Times a Day. AFTER EACH  MEAL          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    Aortic stenosis     Bicuspid aortic valve 07/20/2022    Chronic leukopenia and thrombocytopenia     Cirrhosis of liver without ascites 07/24/2017    Congestive splenomegaly 07/24/2017    Crohn disease     with ileostomy    Diastolic CHF     although it was likely from volume overload due to ESRD    Diverticula of colon     ESRD on hemodialysis     M-W-F FRESENIUS    Fatty liver disease, nonalcoholic     GERD (gastroesophageal reflux disease)     GI bleed     Glaucoma     H/O Gallstone pancreatitis     H/O Pericardial effusion     H/O sinus bradycardia     Heart murmur     History of hypertension     resolved    History of UTI     Hx of renal calculi     Ileostomy present     Iron deficiency     MGUS (monoclonal gammopathy of unknown significance)     TATE (obstructive sleep apnea)     Pericardial effusion (noninflammatory)     Permanent atrial fibrillation     Sleep apnea     CPAP USED    Thrombocytopenia     Type 2 diabetes mellitus     CURRENTLY TAKES NO MED    Urinary retention     Post-OP       OBJECTIVE    Vital Signs:   /60   Pulse 79   Ht 177.8 cm (70\")   Wt 78.9 kg (174 lb) "   SpO2 98%   BMI 24.97 kg/m²        Physical Exam  Vitals reviewed.   Constitutional:       General: He is not in acute distress.     Appearance: He is not ill-appearing.   HENT:      Right Ear: External ear normal. There is impacted cerumen.      Left Ear: Tympanic membrane, ear canal and external ear normal. There is no impacted cerumen.   Pulmonary:      Effort: Pulmonary effort is normal. No respiratory distress.   Neurological:      Mental Status: He is alert.   Psychiatric:         Mood and Affect: Mood normal.         Behavior: Behavior normal.         Thought Content: Thought content normal.                             ASSESSMENT & PLAN     Diagnoses and all orders for this visit:    1. Impacted cerumen of right ear (Primary)  -pt presents with signs/symptoms of cerumen impaction vs eustachian tube dysfunction as described above   -on exam he has a completely obstructed right ear canal with cerumen   -offered irrigation in office. Pt accepted. LORNA Garcia MA irrigated the right ear in office. Upon re-examination, the cerumen impaction is cleared revealing a normal TM and normal canal.  -Also offered that he can add some flonase nasal spray OTC for symptomatic relief of eustachian tube dysfunction.           Follow Up  No follow-ups on file.    Patient/family had no further questions at this time and verbalized understanding of the plan discussed today.

## 2024-04-03 ENCOUNTER — TELEPHONE (OUTPATIENT)
Age: 79
End: 2024-04-03
Payer: MEDICARE

## 2024-04-03 NOTE — TELEPHONE ENCOUNTER
Pt spouse called regarding improving access flows. Pt was originally scheduled for fistulagram next week, however pt is no longer having issues at HD. Rescheduled follow up visit and scan to earlier date to ensure access is patent. Spouse aware.

## 2024-04-08 PROBLEM — R23.9 SKIN CHANGE: Status: ACTIVE | Noted: 2024-04-08

## 2024-04-09 ENCOUNTER — OFFICE VISIT (OUTPATIENT)
Age: 79
End: 2024-04-09
Payer: MEDICARE

## 2024-04-09 ENCOUNTER — HOSPITAL ENCOUNTER (OUTPATIENT)
Facility: HOSPITAL | Age: 79
Discharge: HOME | End: 2024-04-09
Admitting: SURGERY
Payer: MEDICARE

## 2024-04-09 VITALS — HEIGHT: 70 IN | WEIGHT: 171.96 LBS | BODY MASS INDEX: 24.62 KG/M2

## 2024-04-09 DIAGNOSIS — Z99.2 ESRD ON HEMODIALYSIS: Primary | ICD-10-CM

## 2024-04-09 DIAGNOSIS — N18.6 ESRD ON HEMODIALYSIS: Primary | ICD-10-CM

## 2024-04-09 DIAGNOSIS — N18.6 END STAGE RENAL DISEASE: ICD-10-CM

## 2024-04-09 LAB
BH CV VAS DIALYSIS ARTERIAL ANASTOMOSIS DIAMETER: 0.57 CM
BH CV VAS DIALYSIS ARTERIAL ANASTOMOSIS EDV: 172 CM/SEC
BH CV VAS DIALYSIS ARTERIAL ANASTOMOSIS PSV: 474 CM/SEC
BH CV VAS DIALYSIS CONDUIT DIST DEPTH: 0.3 CM
BH CV VAS DIALYSIS CONDUIT DIST DIAMETER: 0.92 CM
BH CV VAS DIALYSIS CONDUIT DIST EDV: 47 CM/SEC
BH CV VAS DIALYSIS CONDUIT DIST FLOW VOL: 858 ML/MIN
BH CV VAS DIALYSIS CONDUIT DIST PSV: 70 CM/SEC
BH CV VAS DIALYSIS CONDUIT MID DEPTH: 0.34 CM
BH CV VAS DIALYSIS CONDUIT MID DIAMETER: 2.67 CM
BH CV VAS DIALYSIS CONDUIT MID EDV: 30 CM/SEC
BH CV VAS DIALYSIS CONDUIT MID PSV: 55 CM/SEC
BH CV VAS DIALYSIS CONDUIT MID/DIST DEPTH: 0.56 CM
BH CV VAS DIALYSIS CONDUIT MID/DIST DIAMETER: 0.87 CM
BH CV VAS DIALYSIS CONDUIT MID/DIST EDV: 62 CM/SEC
BH CV VAS DIALYSIS CONDUIT MID/DIST FLOW VOL: 1152 ML/MIN
BH CV VAS DIALYSIS CONDUIT MID/DIST PSV: 141 CM/SEC
BH CV VAS DIALYSIS CONDUIT PROX DEPTH: 0.58 CM
BH CV VAS DIALYSIS CONDUIT PROX DIAMETER: 0.24 CM
BH CV VAS DIALYSIS CONDUIT PROX EDV: 221 CM/SEC
BH CV VAS DIALYSIS CONDUIT PROX PSV: 509 CM/SEC
BH CV VAS DIALYSIS CONDUIT PROX/MID DEPTH: 0.37 CM
BH CV VAS DIALYSIS CONDUIT PROX/MID DIAMETER: 2.03 CM
BH CV VAS DIALYSIS CONDUIT PROX/MID EDV: 56 CM/SEC
BH CV VAS DIALYSIS CONDUIT PROX/MID PSV: 118 CM/SEC
BH CV VAS DIALYSIS PRE-INFLOW BRACHIAL DIAMETER: 0.91 CM
BH CV VAS DIALYSIS PRE-INFLOW BRACHIAL EDV: 134 CM/SEC
BH CV VAS DIALYSIS PRE-INFLOW BRACHIAL FLOW VOL: 2949 ML/MIN
BH CV VAS DIALYSIS PRE-INFLOW BRACHIAL PSV: 304 CM/SEC
BH CV VAS DIALYSIS VENOUS OUTFLOW CEPHALIC ARCH DIAMETE: 0.61 CM
BH CV VAS DIALYSIS VENOUS OUTFLOW CEPHALIC ARCH EDV: 1 CM/SEC
BH CV VAS DIALYSIS VENOUS OUTFLOW CEPHALIC ARCH PSV: 137 CM/SEC
BH CV VAS DIALYSIS VENOUS OUTFLOW CEPHALIC VEIN DIAMETE: 0.4 CM
BH CV VAS DIALYSIS VENOUS OUTFLOW CEPHALIC VEIN EDV: 204 CM/SEC
BH CV VAS DIALYSIS VENOUS OUTFLOW CEPHALIC VEIN PSV: 422 CM/SEC
BH CV VAS DIALYSIS VENOUS OUTFLOW SUBCLAVIAN DIAMETER: 1.54 CM
BH CV VAS DIALYSIS VENOUS OUTFLOW SUBCLAVIAN EDV: 211 CM/SEC
BH CV VAS DIALYSIS VENOUS OUTFLOW SUBCLAVIAN PSV: 352 CM/SEC

## 2024-04-09 PROCEDURE — 93990 DOPPLER FLOW TESTING: CPT | Performed by: SURGERY

## 2024-04-09 PROCEDURE — 93990 DOPPLER FLOW TESTING: CPT

## 2024-04-09 NOTE — PROGRESS NOTES
Chief Complaint  Hemodialysis Access    Subjective        Bill Landry presents to [facility] VASCULAR SURGERY  HPI   Bill Landry is a 78 y.o. male that has been followed in our office for hemodialysis access. He has the following access in place: Left arm brachiocephalic AV fistula with interposition graft. his  last intervention was 1/29/2024 where he had angioplasty. He returns today in follow up along with an AV duplex.  He was supposed to have an intervention yesterday, though on his most recent access flow, this had increased to 900 cc/min.  He denies any issues with their access including difficulty with cannulation, prolonged bleeding, decreased clearances, or flow rates.     Review of Systems   Constitutional:  Negative for fever.   Eyes:  Negative for visual disturbance.   Cardiovascular:  Negative for leg swelling.   Gastrointestinal:  Negative for abdominal pain.   Musculoskeletal:  Negative for back pain.   Skin:  Negative for color change, pallor and wound.   Neurological:  Negative for dizziness, facial asymmetry, speech difficulty and weakness.        Bill Landry  reports that he has never smoked. He has never used smokeless tobacco..        Objective   Vital Signs:  Vitals:      Body mass index is 24.67 kg/m².       Physical Exam  Vitals reviewed.   Constitutional:       Appearance: Normal appearance.   HENT:      Head: Normocephalic.   Cardiovascular:      Rate and Rhythm: Normal rate and regular rhythm.      Pulses: Normal pulses.           Dorsalis pedis pulses are 3+ on the right side and 3+ on the left side.        Posterior tibial pulses are 3+ on the right side and 3+ on the left side.      Arteriovenous access: Left arteriovenous access is present.  Pulmonary:      Effort: Pulmonary effort is normal.   Skin:     General: Skin is warm.   Neurological:      General: No focal deficit present.      Mental Status: He is alert and oriented to person, place, and time.    Psychiatric:         Mood and Affect: Mood normal.          Result Review :        Duplex from today: Arterial inflow is 2949 cc/min.  Volume flow is 1152 cc/min.  He had elevated velocities with a stenosis seen at the proximal at 0.2 cm and the cephalic arch and 0.4 cm.  Volume flows are adequate.                 Assessment and Plan     Diagnoses and all orders for this visit:    1. ESRD on hemodialysis (Primary)  Assessment & Plan:  2017: Left brachiocephalic AV fistula.    2022: open revision with interposition graft placement    2023: exploration and resection of left pseudoaneurysm    Orders:  -     Duplex Hemodialysis Access CAR; Future          Patient is doing well with no issues with his hemodialysis access.  I have recommended a 2 month follow-up along with a repeat AV fistula duplex.  Should he  have any problems prior to this, he  is to contact our office.       Follow Up     Return in about 2 months (around 6/9/2024) for Recheck.  Patient was given instructions and counseling regarding his condition or for health maintenance advice. Please see specific information pulled into the AVS if appropriate.     JAME Treviño

## 2024-04-09 NOTE — ASSESSMENT & PLAN NOTE
2017: Left brachiocephalic AV fistula.    2022: open revision with interposition graft placement    2023: exploration and resection of left pseudoaneurysm

## 2024-04-10 ENCOUNTER — TELEPHONE (OUTPATIENT)
Dept: ONCOLOGY | Facility: CLINIC | Age: 79
End: 2024-04-10

## 2024-04-23 ENCOUNTER — LAB (OUTPATIENT)
Dept: LAB | Facility: HOSPITAL | Age: 79
End: 2024-04-23
Payer: MEDICARE

## 2024-04-23 ENCOUNTER — OFFICE VISIT (OUTPATIENT)
Dept: ONCOLOGY | Facility: CLINIC | Age: 79
End: 2024-04-23
Payer: MEDICARE

## 2024-04-23 VITALS
TEMPERATURE: 95.4 F | HEIGHT: 70 IN | WEIGHT: 174 LBS | DIASTOLIC BLOOD PRESSURE: 66 MMHG | RESPIRATION RATE: 16 BRPM | OXYGEN SATURATION: 96 % | SYSTOLIC BLOOD PRESSURE: 136 MMHG | BODY MASS INDEX: 24.91 KG/M2 | HEART RATE: 78 BPM

## 2024-04-23 DIAGNOSIS — D50.8 OTHER IRON DEFICIENCY ANEMIAS: ICD-10-CM

## 2024-04-23 DIAGNOSIS — D72.819 CHRONIC LEUKOPENIA: ICD-10-CM

## 2024-04-23 DIAGNOSIS — K74.60 CIRRHOSIS OF LIVER WITHOUT ASCITES, UNSPECIFIED HEPATIC CIRRHOSIS TYPE: ICD-10-CM

## 2024-04-23 DIAGNOSIS — D69.6 THROMBOCYTOPENIA: ICD-10-CM

## 2024-04-23 DIAGNOSIS — R16.1 SPLENOMEGALY, NOT ELSEWHERE CLASSIFIED: Primary | ICD-10-CM

## 2024-04-23 DIAGNOSIS — D73.2 CONGESTIVE SPLENOMEGALY: ICD-10-CM

## 2024-04-23 LAB
BASOPHILS # BLD AUTO: 0.02 10*3/MM3 (ref 0–0.2)
BASOPHILS NFR BLD AUTO: 0.5 % (ref 0–1.5)
DEPRECATED RDW RBC AUTO: 51.7 FL (ref 37–54)
EOSINOPHIL # BLD AUTO: 0.07 10*3/MM3 (ref 0–0.4)
EOSINOPHIL NFR BLD AUTO: 1.8 % (ref 0.3–6.2)
ERYTHROCYTE [DISTWIDTH] IN BLOOD BY AUTOMATED COUNT: 14.3 % (ref 12.3–15.4)
HCT VFR BLD AUTO: 33.4 % (ref 37.5–51)
HGB BLD-MCNC: 11.5 G/DL (ref 13–17.7)
IMM GRANULOCYTES # BLD AUTO: 0.04 10*3/MM3 (ref 0–0.05)
IMM GRANULOCYTES NFR BLD AUTO: 1 % (ref 0–0.5)
LYMPHOCYTES # BLD AUTO: 0.76 10*3/MM3 (ref 0.7–3.1)
LYMPHOCYTES NFR BLD AUTO: 19.7 % (ref 19.6–45.3)
MCH RBC QN AUTO: 33.8 PG (ref 26.6–33)
MCHC RBC AUTO-ENTMCNC: 34.4 G/DL (ref 31.5–35.7)
MCV RBC AUTO: 98.2 FL (ref 79–97)
MONOCYTES # BLD AUTO: 0.39 10*3/MM3 (ref 0.1–0.9)
MONOCYTES NFR BLD AUTO: 10.1 % (ref 5–12)
NEUTROPHILS NFR BLD AUTO: 2.57 10*3/MM3 (ref 1.7–7)
NEUTROPHILS NFR BLD AUTO: 66.9 % (ref 42.7–76)
NRBC BLD AUTO-RTO: 0 /100 WBC (ref 0–0.2)
PLATELET # BLD AUTO: 74 10*3/MM3 (ref 140–450)
PMV BLD AUTO: 9.3 FL (ref 6–12)
RBC # BLD AUTO: 3.4 10*6/MM3 (ref 4.14–5.8)
WBC NRBC COR # BLD AUTO: 3.85 10*3/MM3 (ref 3.4–10.8)

## 2024-04-23 PROCEDURE — 85025 COMPLETE CBC W/AUTO DIFF WBC: CPT

## 2024-04-23 PROCEDURE — 1126F AMNT PAIN NOTED NONE PRSNT: CPT | Performed by: INTERNAL MEDICINE

## 2024-04-23 PROCEDURE — 99213 OFFICE O/P EST LOW 20 MIN: CPT | Performed by: INTERNAL MEDICINE

## 2024-04-23 PROCEDURE — 36415 COLL VENOUS BLD VENIPUNCTURE: CPT

## 2024-04-23 RX ORDER — MELATONIN
1000 DAILY
COMMUNITY

## 2024-04-23 NOTE — PROGRESS NOTES
Subjective         REASONS FOR FOLLOWUP: 1  ACQUIRED PANCYTOPENIA FROM SPLENOMEGALY AND LIVER CIRRHOSIS, IRON DEF ANEMIA FROM PREVIOUS GI BLEEDING, PATIENT CAN NOT TAKE ANTICOAGULANTS DUE TO TRIGGERING OF SEVERE GI BLEEDING    HISTORY OF PRESENT ILLNESS:     On 04/23/2024 this 78-year-old male who has history of cirrhosis of the liver, splenomegaly and pancytopenia returns to the office. He is still undergoing dialysis 3 days a week. He has chronic atrial fibrillation and my advice to him is to remain on aspirin. In the past the patient has received anticoagulants and he was admitted to the hospital on at least 4 different occasions with hemorrhagic shock associated with anticoagulants/. Since then the patient has been banned from any anticoagulant use by me. At this time he feels relatively well, some weakness in his lower extremities and arthritis but otherwise no falls. Appetite remains good. Bowel activity is normal. Minimal if any urinary output. Dialysis is still ongoing through fistula left upper extremity 3 days a week. Colostomy remains working well. He has not had any clinical bleeding or new infections. He has not had any cardiac or respiratory issues.         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    Aortic stenosis     Bicuspid aortic valve 07/20/2022    Chronic kidney disease, stage 3     moderate    Chronic leukopenia and thrombocytopenia     Cirrhosis of liver without ascites 07/24/2017    Congestive splenomegaly 07/24/2017    Crohn disease     with ileostomy    Decreased white blood cell count, unspecified     Diastolic CHF     although it was likely from volume overload due to ESRD    Diverticula of colon     ESRD on hemodialysis     M-W-F [facility]    Fatty  liver disease, nonalcoholic     GERD (gastroesophageal reflux disease)     GI bleed     Glaucoma     H/O Gallstone pancreatitis     H/O Pericardial effusion     H/O sinus bradycardia     Heart murmur     History of hypertension     resolved    History of UTI     Hx of renal calculi     Ileostomy present     Iron deficiency     Leakage of heart valve prosthesis, subsequent encounter     MGUS (monoclonal gammopathy of unknown significance)     TATE (obstructive sleep apnea)     Other hemoglobinopathies     Pericardial effusion (noninflammatory)     Permanent atrial fibrillation     Persistent atrial fibrillation     Scarlet fever, uncomplicated     Sleep apnea     CPAP USED    Stenosis of other vascular prosthetic devices, implants and grafts, initial encounter 02/14/2022    Systemic lupus erythematosus, unspecified     Thrombocytopenia     undpecified    Type 2 diabetes mellitus     CURRENTLY TAKES NO MED    Unspecified atrial fibrillation     Unspecified kidney failure     Urinary retention     Post-OP        Past Surgical History:   Procedure Laterality Date    APPENDECTOMY  06/1968    ARTERIOVENOUS FISTULA/SHUNT SURGERY Left 08/08/2017    Procedure: LEFT BRACHIAL CEPHALIC AV FISTULA FORMATION WITH CEPHALIC VEIN TRANSPOSITION ;  Surgeon: Bill Deal MD;  Location: [facility];  Service:     ARTERIOVENOUS FISTULA/SHUNT SURGERY Left 05/27/2022    Procedure: OPEN REVISION LEFT ARTERIOVENOUS INTERPOSITION GRAFT PLACEMENT;  Surgeon: Bill Deal MD;  Location: [facility];  Service: Vascular;  Laterality: Left;    ARTERIOVENOUS FISTULA/SHUNT SURGERY W/ HEMODIALYSIS CATHETER INSERTION Left 02/15/2022    Procedure: OPEN LEFT ARM ARTERIOVENOUS FISTULA THROMBECTOMY WITH CEPHALIC VEIN ARTHROPLASTY AND STENT/GRAFT PLACEMENT;  Surgeon: Bill Deal MD;  Location: [facility] 18/19;  Service: Vascular;  Laterality: Left;    CATARACT EXTRACTION WITH INTRAOCULAR LENS IMPLANT Bilateral 2004    CHOLECYSTECTOMY   2011    COLECTOMY PARTIAL / TOTAL      History of inflammatory bowel disease with status post colectomy with ileostomy many years ago in the [facility],  18 YEARS OF AGE    COLON SURGERY      COLONOSCOPY      CYSTOSCOPY LITHOLAPAXY BLADDER STONE EXTRACTION      ENDOSCOPY  01/16/2015    gastritis    ENDOSCOPY  01/17/2018    Procedure: ESOPHAGOGASTRODUODENOSCOPY;  Surgeon: Warner Neville MD;  Location: [facility] ENDOSCOPY;  Service:     ILEOSCOPY  01/16/2015    normal    ILEOSCOPY N/A 01/17/2018    Procedure: ILEOSCOPY;  Surgeon: Warner Neville MD;  Location: [facility] ENDOSCOPY;  Service:     ILEOSTOMY  12/1968    loop ostomy bypass    INCISION AND DRAINAGE ARM Left 10/27/2023    Procedure: LEFT ARM INCISION AND DRAINAGE;  Surgeon: Bill Bermeo MD;  Location: [facility] MAIN OR;  Service: Vascular;  Laterality: Left;    OTHER SURGICAL HISTORY  2009    kidney stones x3    PSEUDOANEURYSM REPAIR Left 10/27/2023    TONSILLECTOMY  1950        Current Outpatient Medications on File Prior to Visit   Medication Sig Dispense Refill    alfuzosin (UROXATRAL) 10 MG 24 hr tablet Take 1 tablet by mouth Every Other Day. Does not take on tues sun or thurs      aspirin 81 MG tablet Take 1 tablet by mouth Daily. INSTRUCTED TO CONTINUE THIS FOR SURGERY PER DR. BERMEO'S OFFICE      B Complex-C-Folic Acid (Umm-Peter) tablet Take 1 tablet by mouth Daily.      Cholecalciferol 25 MCG (1000 UT) tablet Take 1 tablet by mouth Daily.      famotidine (PEPCID) 10 MG tablet Take 1 tablet by mouth As Needed for Heartburn.      febuxostat (ULORIC) 40 MG tablet Take 1 tablet by mouth Daily.      Iron Sucrose (VENOFER IV) Infuse  into a venous catheter As Needed. USUALLY ONCE MONTH      latanoprost (XALATAN) 0.005 % ophthalmic solution Administer 1 drop to both eyes Every Night. 1 drop each eye      lidocaine-prilocaine (EMLA) 2.5-2.5 % cream Apply 1 Application topically to the appropriate area as directed. [id number]:  ONE HOUR PRIOR  TO DIALYSIS  3    Loratadine (CLARITIN PO) Take 1 tablet by mouth As Needed. Pt states he is uncertain of dosage      Methoxy PEG-Epoetin Beta (MIRCERA IJ) Inject  as directed Every 30 (Thirty) Days.      sodium chloride 0.65 % nasal spray 2 sprays into the nostril(s) as directed by provider Every Night.      Sucroferric Oxyhydroxide (Velphoro) 500 MG chewable tablet Chew 1 tablet 3 (Three) Times a Day. AFTER EACH  MEAL      [DISCONTINUED] Cholecalciferol (Vitamin D3) 25 MCG (1000 UT) capsule Take 1 capsule by mouth Daily. (Patient not taking: Reported on 4/23/2024)       No current facility-administered medications on file prior to visit.        ALLERGIES:    Allergies   Allergen Reactions    Ace Inhibitors Other (See Comments)     RENAL FAILURE/ raised creatinine    Contrast Dye (Echo Or Unknown Ct/Mr) Other (See Comments) and Anaphylaxis     RENAL FAILURE    Iodine Anaphylaxis    Angiotensin Receptor Blockers Swelling    Eliquis [Apixaban] Arrhythmia     BLEEDING ISSUES; PT CANNOT TAKE ANY ANTICOAGULANTS DUE TO LOW PLATELETS EXCEPT ASPIRIN      Filgrastim GI Intolerance and Confusion     Chest pain    Keflex [Cephalexin] Diarrhea     RENAL FAILURE    Other Angioedema     IVP Dye        Social History     Socioeconomic History    Marital status:      Spouse name: Gillian    Number of children: 2    Years of education: College   Tobacco Use    Smoking status: Never    Smokeless tobacco: Never   Vaping Use    Vaping status: Never Used   Substance and Sexual Activity    Alcohol use: No     Comment: caffeine use: none    Drug use: No    Sexual activity: Defer        Family History   Problem Relation Age of Onset    Heart failure Mother 78    Hypertension Mother     Heart disease Mother     Hyperlipidemia Mother     Hypertension Father     Other Father 82        MVI    Malig Hyperthermia Neg Hx           Objective     Vitals:    04/23/24 1421   BP: 136/66   Pulse: 78   Resp: 16   Temp: 95.4 °F (35.2 °C)  "  TempSrc: Temporal   SpO2: 96%   Weight: 78.9 kg (174 lb)   Height: 177.8 cm (70\")   PainSc: 0-No pain         2024     2:22 PM   Current Status   ECOG score 0       Physical Exam                GENERAL:  Well-developed, Patient  in no acute distress.   SKIN:  Warm, dry ,NO purpura ,no rash.  HEENT:  Pupils were equal and reactive to light and accomodation, conjunctivae noninjected,  normal visual acuity.   NECK:  Supple with good range of motion; no thyromegaly , no JVD or bruits,.No carotid artery pain, no carotid abnormal pulsation   LYMPHATICS:  No cervical, NO supraclavicular, NO axillary, NO inguinal adenopathies.  CARDIAC   normal rate , regular rhythm, without murmur,NO rubs NO S3 NO S4   LUNGS: normal breath sounds bilateral, no wheezing, NO rhonchi, NO crackles ,NO rubs.  VASCULAR VENOUS: no cyanosis, NO collateral circulation, NO varicosities, NO edema, NO palpable cords, NO pain,NO erythema, NO pigmentation of the skin.  ABDOMEN:  Soft, NO pain,no hepatomegaly, no splenomegaly,no masses, no ascites, no collateral circulation,no distention.COLOSTOMY LLQOn 2024 I reviewed all of the laboratory testing that the patient has done with his nephrologist and the one from today. He continues having pancytopenia. His white count is no different than before, neither his platelet count. He has no fever or infections. His hemoglobin actually for him is very good in the 11 category and he continues receiving IV iron therapy periodically through the nephrology team. He also receives erythropoietin.     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 HE ALMOST  ON US ON 3-4 DIFFERENT OCCASIONS UPON TAKING ANTICOAGULANTS YEARS BACK. AGAIN ONE MORE TIME THIS PATIENT CANNOT HANDLE ANY ANTICOAGULANTS. "     Otherwise he will be reviewed back in 6 months. He will have a CBC at that time.      EXTREMITIES  AND SPINE:  No clubbing, no cyanosis ,no deformities , no pain .No kyphosis,  no pain in spine, no pain in ribs , no pain in pelvic bone.LARGE VASCULAR SAC IN LEFT ARM WITH THRILL  NEUROLOGICAL:  Patient was awake, alert, oriented to time, person and place.        RECENT LABS:  Lab Results   Component Value Date    WBC 3.85 04/23/2024    HGB 11.5 (L) 04/23/2024    HCT 33.4 (L) 04/23/2024    MCV 98.2 (H) 04/23/2024    PLT 74 (L) 04/23/2024           Assessment & Plan     Diagnoses and all orders for this visit:    1. Splenomegaly, not elsewhere classified (Primary)  -     CBC & Differential; Future    2. Other iron deficiency anemias  -     CBC & Differential; Future    3. Thrombocytopenia  -     CBC & Differential; Future    4. Cirrhosis of liver without ascites, unspecified hepatic cirrhosis type  -     CBC & Differential; Future    5. Chronic leukopenia  -     CBC & Differential; Future    6. Congestive splenomegaly  -     CBC & Differential; Future           I reviewed the recent laboratory parameters on this patient and his white count remains around 300, his platelet count was 104,000, the hemoglobin around 11.  These numbers for him are very good. He has received IV iron and his ferritin level and iron profile are appropriate at this time. He has no clinical bleeding.     He raised the question about these numbers. He knows that these numbers are related to his chronic liver disease and splenomegaly. This has been present for years and years since he has been my patient. These numbers fluctuate but never have changed substantially. I made him aware that if he has a white count less than 2000 and platelet count less than 30,000 he will need to call me immediately.    He raised the question about the Atenolol if this is prudent to take. I pointed out to him that it could be beneficial to control his blood  pressure, it will be beneficial to decrease his portal pressure too and it could be beneficial to minimize in case of any atrial fibrillation, rapid ventricular response. He would like to adjust his CPAP machine first to see if he has lesser episodes of sleep apnea and if this does not correct the problem he will start the blood pressure medication under the direction of cardiology.     Finally they raises the question him and his wife in regard to COVID vaccination booster shot. He is ready from this point of view from my situation. He has as we know patient's on dialysis chronic kidney disease are immunosuppressed, he must have his COVID vaccination booster and I asked him to go ahead and proceed with this at this time. Three to 4 weeks later he would like to proceed with his flu shot.     Otherwise we will review him by telemedicine phone visit in 6 months.    He will continue sending reports of his laboratory parameters from his dialysis center.     Duration of the phone visit 12 minutes.   The patient was reviewed by Telemedicine on 03/15/2022. Since the previous visit the patient has undergone the repair of his fistula in the left upper extremity and a stent was placed in the left subclavian vein. This is visible in the most recent chest x-ray. Now the fistula for dialysis is working fine. He had also an episode of urine collection in the bladder, phenomenon that is unusual in chronic kidney disease. He had probably some discomfort and minimal fever. He had a Khan catheter placed, now has been removed by Urology. He is no longer having a fever and he has minimal urinary output. The patient otherwise feels well. The most recent laboratory testing that has been reviewed above shows chronic leukopenia and chronic thrombocytopenia without any changes. His white count is typically around 3500, platelet count around 110,000, stable hemoglobin. Given the above circumstances, I do not believe that we need to pursue  any other intervention. The dialysis team, Dr. Monsivais and associates, will continue doing his laboratory testing and they will let us know if any need arises in regard to any other intervention.     I related to the patient and his wife his numbers are doing well and I find no need for any other intervention. I will review him back here in the office in 4 months with a CBC.    Duration of the phone visit: 15 minutes.  The patient was reviewed on 08/18/2022. Since the previous visit he was treated for a urinary tract infection successfully. He has not had any further issues there. He has not had any bleeding after surgical intervention to correct issues in regard to his fistula for dialysis in the left upper extremity by Vascular Surgery. We provided platelet transfusion for that.     His white count, hemoglobin and platelets today are no different than what they have been for the last many years. His white count is low and low platelet count is evading to splenomegaly and hypersplenism with sequestration. Actually the hemoglobin is very good for somebody undergoing dialysis because he also receives erythropoietin and he receives iron therapy. I encouraged him to remain on this.     He raised the question in regard of what to do with the alfuzosin that he receives every night at dinner time in regard to prostate relaxation and allow him to have proper urination. I pointed out to him that if this medicine triggers low blood pressure he could avoid this medication utilization the night before dialysis and that way he only takes it 4 days of the week instead of every day of the week. Maybe this will be corrected.     Otherwise I would like to review him back in 4 months with a CBC here in the office.     I discussed all these facts with the patient and his wife. They provided me multiple laboratories from the nephrology team that will be scanned into Epic system, all of them consistent more or less with laboratory  testing that we have today including proper ferritin level.   On 02/23/2023 the patient feels very well today. He looks good. Clinical examination disclosed normal sinus, clear lung auscultation, normal colostomy location in the left lower quadrant, no abdominal ascites, no collateral circulation, no palpable splenomegaly. His white count, hemoglobin and platelets are no different than before. He has no clinical bleeding and no infection.    The rest of his chemistry profiles have been provided to me. Most of his labs are doing very well through dialysis including his uric acid.     I recommended for him to come back and see us in 6 months. Otherwise he will remain under the direction of the Nephrology team through his dialysis.   On 09/28/2023 the patient has not had any new modification in his clinical picture. He remains on dialysis, that is his main issue day by day. His diet is appropriate. He has no obvious blood loss. His abdominal exam is no different today than what has been before. No obvious palpable spleen clinically. No obvious ascites.     He has not been taking any new medicine. He remains on erythropoietin through his nephrologist and he remains on Venofer through his nephrologist as well.     Therefore, I do believe that his blood counts today are reflection of his hypersplenism and no more than that. His platelet count always has been low in the 70,000s. His white count always is low in the 3000s and these numbers today are reflection of this. Nothing that I can do for him at this point. Therefore, he will remain on his medicines through the rest of his physicians, I am not providing any medicine for him. I have not stopped or modified any of the medicines that he takes.     I would like to review him back in 6 months with a CBC.  On 11/09/2023, the patient was reviewed. Since the hospitalization, his hemoglobin has improved to 9.5. His platelet count has improved to almost 90,000 and his white  count is stable at 3000. All these numbers are about the same as they have been for the last many years.     The patient raised the question in regard to when he is going to be able to resume his aspirin. We gave him aspirin because his risk of stroke in the background of chronic atrial fibrillation. It is the only medicine that has prevented in my opinion for him to end up with a stroke and has not produced any significant side effect. My statement to the patient will be to proceed with the aspirin introduction into his medication list 2 days after the staples of his surgical site are removed.     Otherwise, I advised him to remain in observation from my point of view. He already has an appointment to follow up with me in 04/2024 and this will be continued. On that occasion, he will have a repeated CBC.     Today his creatinine is expected at 5 and not uncommon on him given his chronic kidney disease and dialysis need.     I discussed all these facts with the patient and his wife in the room. I discussed the patient's case with my partner, Ken Shelton MD.     I reviewed the Discharge Summary of this patient's hospitalization recently.

## 2024-05-16 ENCOUNTER — OFFICE VISIT (OUTPATIENT)
Dept: INTERNAL MEDICINE | Facility: CLINIC | Age: 79
End: 2024-05-16
Payer: MEDICARE

## 2024-05-16 VITALS
HEART RATE: 64 BPM | SYSTOLIC BLOOD PRESSURE: 122 MMHG | BODY MASS INDEX: 25.4 KG/M2 | WEIGHT: 177.4 LBS | OXYGEN SATURATION: 95 % | HEIGHT: 70 IN | DIASTOLIC BLOOD PRESSURE: 62 MMHG

## 2024-05-16 DIAGNOSIS — L97.511 ULCER OF RIGHT FOOT, LIMITED TO BREAKDOWN OF SKIN: Primary | ICD-10-CM

## 2024-05-16 DIAGNOSIS — R74.8 ELEVATED ALKALINE PHOSPHATASE LEVEL: ICD-10-CM

## 2024-05-16 PROCEDURE — 99214 OFFICE O/P EST MOD 30 MIN: CPT | Performed by: STUDENT IN AN ORGANIZED HEALTH CARE EDUCATION/TRAINING PROGRAM

## 2024-05-16 PROCEDURE — 1126F AMNT PAIN NOTED NONE PRSNT: CPT | Performed by: STUDENT IN AN ORGANIZED HEALTH CARE EDUCATION/TRAINING PROGRAM

## 2024-05-16 NOTE — PATIENT INSTRUCTIONS
Wash with antibiotic soap.     Cover with clean dressing.     If uncontrolled bleeding go to emergency department

## 2024-05-16 NOTE — PROGRESS NOTES
Bharathi De La Torre M.D.  Internal Medicine  [facility]  [address], [address]  [place] [address]  [phone]      Chief Complaint  Foot Injury (Sore (callus) on bottom of foot. /)    SUBJECTIVE    History of Present Illness    Bill Landry is a 78 y.o. male with past medical history significant for permanent A-fib, hypertension, ESRD on Monday Wednesday Friday dialysis, heart failure, bicuspid aortic valve, hyperparathyroidism, Crohn's disease, chronic anemia and leukopenia, TATE, nephrolithiasis who presents to the office today as an established patient that last saw me on 11/21/2023.     Foot ulcer-2 weeks ago Used a pumice stone for callous and developed a sore. Sore is worsening. He has foot numbness from neuropathy. They are using gauze dressing.    June 4th is podiatry appointmnet.     Review of Systems    Allergies   Allergen Reactions    Ace Inhibitors Other (See Comments)     RENAL FAILURE/ raised creatinine    Contrast Dye (Echo Or Unknown Ct/Mr) Other (See Comments) and Anaphylaxis     RENAL FAILURE    Iodine Anaphylaxis    Angiotensin Receptor Blockers Swelling    Eliquis [Apixaban] Arrhythmia     BLEEDING ISSUES; PT CANNOT TAKE ANY ANTICOAGULANTS DUE TO LOW PLATELETS EXCEPT ASPIRIN      Filgrastim GI Intolerance and Confusion     Chest pain    Keflex [Cephalexin] Diarrhea     RENAL FAILURE    Other Angioedema     IVP Dye        Outpatient Medications Marked as Taking for the 5/16/24 encounter (Office Visit) with Bharathi De La Torre MD   Medication Sig Dispense Refill    alfuzosin (UROXATRAL) 10 MG 24 hr tablet Take 1 tablet by mouth Every Other Day. Does not take on tues sun or thurs      aspirin 81 MG tablet Take 1 tablet by mouth Daily. INSTRUCTED TO CONTINUE THIS FOR SURGERY PER DR. BERMEO'S OFFICE      B Complex-C-Folic Acid (Umm-Peter) tablet Take 1 tablet by mouth Daily.      Cholecalciferol 25 MCG (1000 UT) tablet Take 1 tablet by mouth Daily.      famotidine (PEPCID) 10 MG  tablet Take 1 tablet by mouth As Needed for Heartburn.      febuxostat (ULORIC) 40 MG tablet Take 1 tablet by mouth Daily.      Iron Sucrose (VENOFER IV) Infuse  into a venous catheter As Needed. USUALLY ONCE MONTH      latanoprost (XALATAN) 0.005 % ophthalmic solution Administer 1 drop to both eyes Every Night. 1 drop each eye      lidocaine-prilocaine (EMLA) 2.5-2.5 % cream Apply 1 Application topically to the appropriate area as directed. Odcyot-Pjfpmetuc-Fjwdtj:  ONE HOUR PRIOR TO DIALYSIS  3    Loratadine (CLARITIN PO) Take 1 tablet by mouth As Needed. Pt states he is uncertain of dosage      Methoxy PEG-Epoetin Beta (MIRCERA IJ) Inject  as directed Every 30 (Thirty) Days.      sodium chloride 0.65 % nasal spray 2 sprays into the nostril(s) as directed by provider Every Night.      Sucroferric Oxyhydroxide (Velphoro) 500 MG chewable tablet Chew 1 tablet 3 (Three) Times a Day. AFTER EACH  MEAL          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    Aortic stenosis     Bicuspid aortic valve 07/20/2022    Chronic kidney disease, stage 3     moderate    Chronic leukopenia and thrombocytopenia     Cirrhosis of liver without ascites 07/24/2017    Congestive splenomegaly 07/24/2017    Crohn disease     with ileostomy    Decreased white blood cell count, unspecified     Diastolic CHF     although it was likely from volume overload due to ESRD    Diverticula of colon     ESRD on hemodialysis     M-W-F FRESENIUS    Fatty liver disease, nonalcoholic     GERD (gastroesophageal reflux disease)     GI bleed     Glaucoma     H/O Gallstone pancreatitis     H/O Pericardial effusion     H/O sinus bradycardia     Heart murmur     History of hypertension     resolved    History of UTI     Hx of renal  calculi     Ileostomy present     Iron deficiency     Leakage of heart valve prosthesis, subsequent encounter     MGUS (monoclonal gammopathy of unknown significance)     TATE (obstructive sleep apnea)     Other hemoglobinopathies     Pericardial effusion (noninflammatory)     Permanent atrial fibrillation     Persistent atrial fibrillation     Scarlet fever, uncomplicated     Sleep apnea     CPAP USED    Stenosis of other vascular prosthetic devices, implants and grafts, initial encounter 02/14/2022    Systemic lupus erythematosus, unspecified     Thrombocytopenia     undpecified    Type 2 diabetes mellitus     CURRENTLY TAKES NO MED    Unspecified atrial fibrillation     Unspecified kidney failure     Urinary retention     Post-OP     Past Surgical History:   Procedure Laterality Date    APPENDECTOMY  06/1968    ARTERIOVENOUS FISTULA/SHUNT SURGERY Left 08/08/2017    Procedure: LEFT BRACHIAL CEPHALIC AV FISTULA FORMATION WITH CEPHALIC VEIN TRANSPOSITION ;  Surgeon: Bill Deal MD;  Location: [facility] OR;  Service:     ARTERIOVENOUS FISTULA/SHUNT SURGERY Left 05/27/2022    Procedure: OPEN REVISION LEFT ARTERIOVENOUS INTERPOSITION GRAFT PLACEMENT;  Surgeon: Bill Deal MD;  Location: [facility] OR;  Service: Vascular;  Laterality: Left;    ARTERIOVENOUS FISTULA/SHUNT SURGERY W/ HEMODIALYSIS CATHETER INSERTION Left 02/15/2022    Procedure: OPEN LEFT ARM ARTERIOVENOUS FISTULA THROMBECTOMY WITH CEPHALIC VEIN ARTHROPLASTY AND STENT/GRAFT PLACEMENT;  Surgeon: Bill Deal MD;  Location: [facility] OR 18/19;  Service: Vascular;  Laterality: Left;    CATARACT EXTRACTION WITH INTRAOCULAR LENS IMPLANT Bilateral 2004    CHOLECYSTECTOMY  2011    COLECTOMY PARTIAL / TOTAL      History of inflammatory bowel disease with status post colectomy with ileostomy many years ago in the [facility],  18 YEARS OF AGE    COLON SURGERY      COLONOSCOPY      CYSTOSCOPY LITHOLAPAXY BLADDER STONE EXTRACTION       "ENDOSCOPY  01/16/2015    gastritis    ENDOSCOPY  01/17/2018    Procedure: ESOPHAGOGASTRODUODENOSCOPY;  Surgeon: Warner Neville MD;  Location: [facility] ENDOSCOPY;  Service:     ILEOSCOPY  01/16/2015    normal    ILEOSCOPY N/A 01/17/2018    Procedure: ILEOSCOPY;  Surgeon: Warner Neville MD;  Location: [facility] ENDOSCOPY;  Service:     ILEOSTOMY  12/1968    loop ostomy bypass    INCISION AND DRAINAGE ARM Left 10/27/2023    Procedure: LEFT ARM INCISION AND DRAINAGE;  Surgeon: Bill Deal MD;  Location: [facility] MAIN OR;  Service: Vascular;  Laterality: Left;    OTHER SURGICAL HISTORY  2009    kidney stones x3    PSEUDOANEURYSM REPAIR Left 10/27/2023    TONSILLECTOMY  1950     Family History   Problem Relation Age of Onset    Heart failure Mother 78    Hypertension Mother     Heart disease Mother     Hyperlipidemia Mother     Hypertension Father     Other Father 82        MVI    Malig Hyperthermia Neg Hx     reports that he has never smoked. He has never used smokeless tobacco. He reports that he does not drink alcohol and does not use drugs.    OBJECTIVE    Vital Signs:   /62   Pulse 64   Ht 177.8 cm (70\")   Wt 80.5 kg (177 lb 6.4 oz)   SpO2 95%   BMI 25.45 kg/m²     Physical Exam  Constitutional:       Appearance: He is normal weight.   Pulmonary:      Effort: Pulmonary effort is normal.   Musculoskeletal:      Right lower leg: No edema.      Left lower leg: No edema.   Skin:     Comments: Large callous on left foot with stage 2 ulcer. There is serosanguinous drainage. There is no erythema or warmth.    Neurological:      Mental Status: He is alert.            The following data was reviewed by: Bharathi De La Torre MD on 05/16/2024:  CMP          10/31/2023    04:35 11/1/2023    05:04 11/9/2023    12:33   CMP   Glucose 127  105  150    BUN 44  59  39    Creatinine 5.29  6.81  5.14    EGFR 10.4  7.7  10.8    Sodium 134  132  134    Potassium 3.8  4.1  3.9    Chloride 98  100  95    Calcium 8.3  8.3  8.4    Total " Protein   6.7    Albumin  3.0  3.1    Globulin   3.6    Total Bilirubin   0.6    Alkaline Phosphatase   166    AST (SGOT)   52    ALT (SGPT)   5    Albumin/Globulin Ratio   0.9    BUN/Creatinine Ratio 8.3  8.7  7.6    Anion Gap 10.4  9.5  14.8      CBC w/diff          4/24/2024    00:00 5/1/2024    00:00 5/15/2024    00:00   CBC w/Diff   Hemoglobin 11.4        34.2     11.2        33.6     10.9        32.7          Details          This result is from an external source.             Lipid Panel          9/26/2023    08:30   Lipid Panel   Total Cholesterol 148    Triglycerides 103    HDL Cholesterol 56    VLDL Cholesterol 19    LDL Cholesterol  73    LDL/HDL Ratio 1.28        A1C Last 3 Results          9/26/2023    08:30   HGBA1C Last 3 Results   Hemoglobin A1C 5.20      Data reviewed : Note from last visit              ASSESSMENT & PLAN     Diagnoses and all orders for this visit:    1. Ulcer of right foot, limited to breakdown of skin (Primary)  -     mupirocin (BACTROBAN) 2 % ointment; Apply 1 Application topically to the appropriate area as directed 3 (Three) Times a Day.  Dispense: 30 g; Refill: 0  -     Ambulatory Referral to Podiatry    2. Elevated alkaline phosphatase level  -     Gamma GT; Future        Diabetic foot ulcer without signs of infection.  Complicated by: neuropathy.   Patient discouraged from using Pumice stone. He should have calluses taken care of by podiatry. He is getting new shoes.         Foot care instructions provided. Topical ulcer treatment prescribed. Recommend podiatry follow up. Discussed to go to emergency department or call or office if he develops signs or symptoms of infection or uncontrolled bleeding.     Will check GGT with next labs for elevated alk phos    Health Maintenance Due   Topic Date Due    COLONOSCOPY  Never done    ZOSTER VACCINE (1 of 2) Never done    RSV Vaccine - Adults (1 - 1-dose 60+ series) Never done    URINE MICROALBUMIN  Never done    ANNUAL WELLNESS  VISIT  08/25/2023    DIABETIC FOOT EXAM  08/25/2023    BMI FOLLOWUP  08/25/2023    COVID-19 Vaccine (5 - 2023-24 season) 09/01/2023    HEMOGLOBIN A1C  03/26/2024        Follow Up  No follow-ups on file.    Patient/family had no further questions at this time and verbalized understanding of the plan discussed today.

## 2024-06-13 ENCOUNTER — HOSPITAL ENCOUNTER (OUTPATIENT)
Facility: HOSPITAL | Age: 79
Discharge: HOME | End: 2024-06-13
Payer: MEDICARE

## 2024-06-13 ENCOUNTER — OFFICE VISIT (OUTPATIENT)
Age: 79
End: 2024-06-13
Payer: MEDICARE

## 2024-06-13 VITALS
WEIGHT: 177 LBS | BODY MASS INDEX: 25.34 KG/M2 | HEIGHT: 70 IN | SYSTOLIC BLOOD PRESSURE: 131 MMHG | DIASTOLIC BLOOD PRESSURE: 60 MMHG | HEART RATE: 72 BPM

## 2024-06-13 DIAGNOSIS — Z99.2 ESRD ON HEMODIALYSIS: Primary | ICD-10-CM

## 2024-06-13 DIAGNOSIS — N18.6 ESRD ON HEMODIALYSIS: ICD-10-CM

## 2024-06-13 DIAGNOSIS — N18.6 ESRD ON HEMODIALYSIS: Primary | ICD-10-CM

## 2024-06-13 DIAGNOSIS — Z99.2 ESRD ON HEMODIALYSIS: ICD-10-CM

## 2024-06-13 DIAGNOSIS — Z99.2 ARTERIOVENOUS FISTULA FOR HEMODIALYSIS IN PLACE, SECONDARY: ICD-10-CM

## 2024-06-13 LAB
BH CV VAS DIALYSIS ARTERIAL ANASTOMOSIS DIAMETER: 0.67 CM
BH CV VAS DIALYSIS ARTERIAL ANASTOMOSIS EDV: 136 CM/SEC
BH CV VAS DIALYSIS ARTERIAL ANASTOMOSIS PSV: 287 CM/SEC
BH CV VAS DIALYSIS CONDUIT DIST DEPTH: 0.39 CM
BH CV VAS DIALYSIS CONDUIT DIST DIAMETER: 0.85 CM
BH CV VAS DIALYSIS CONDUIT DIST EDV: 44 CM/SEC
BH CV VAS DIALYSIS CONDUIT DIST FLOW VOL: 543 ML/MIN
BH CV VAS DIALYSIS CONDUIT DIST PSV: 65 CM/SEC
BH CV VAS DIALYSIS CONDUIT MID DEPTH: 0.47 CM
BH CV VAS DIALYSIS CONDUIT MID DIAMETER: 0.52 CM
BH CV VAS DIALYSIS CONDUIT MID EDV: 101 CM/SEC
BH CV VAS DIALYSIS CONDUIT MID PSV: 139 CM/SEC
BH CV VAS DIALYSIS CONDUIT MID/DIST DEPTH: 0.6 CM
BH CV VAS DIALYSIS CONDUIT MID/DIST DIAMETER: 0.92 CM
BH CV VAS DIALYSIS CONDUIT MID/DIST EDV: 38 CM/SEC
BH CV VAS DIALYSIS CONDUIT MID/DIST PSV: 99 CM/SEC
BH CV VAS DIALYSIS CONDUIT PROX DEPTH: 0.47 CM
BH CV VAS DIALYSIS CONDUIT PROX DIAMETER: 0.25 CM
BH CV VAS DIALYSIS CONDUIT PROX EDV: 300 CM/SEC
BH CV VAS DIALYSIS CONDUIT PROX PSV: 629 CM/SEC
BH CV VAS DIALYSIS CONDUIT PROX/MID DEPTH: 0.31 CM
BH CV VAS DIALYSIS CONDUIT PROX/MID DIAMETER: 2.33 CM
BH CV VAS DIALYSIS CONDUIT PROX/MID EDV: 16 CM/SEC
BH CV VAS DIALYSIS CONDUIT PROX/MID PSV: 45 CM/SEC
BH CV VAS DIALYSIS PRE-INFLOW SUBCLAV PSV: 151 CM/SEC
BH CV VAS DIALYSIS PRE-INFLOW SUBCLAVIAN DIAMETER: 0.81 CM
BH CV VAS DIALYSIS PRE-INFLOW SUBCLAVIAN EDV: 53 CM/SEC
BH CV VAS DIALYSIS STENT ONE  DIST STENT EDV: 195 CM/SEC
BH CV VAS DIALYSIS STENT ONE  DIST STENT PSV: 322 CM/SEC
BH CV VAS DIALYSIS STENT ONE  MID STENT EDV: 138 CM/SEC
BH CV VAS DIALYSIS STENT ONE  POST STENT EDV: 162 CM/SEC
BH CV VAS DIALYSIS STENT ONE MID STENT PSV: 260 CM/SEC
BH CV VAS DIALYSIS STENT ONE POST STENT PSV: 355 CM/SEC
BH CV VAS DIALYSIS STENT ONE PRE STENT EDV: 158 CM/SEC
BH CV VAS DIALYSIS STENT ONE PRE STENT PSV: 231 CM/SEC
BH CV VAS DIALYSIS STENT ONE PROX STENT EDV: 242 CM/SEC
BH CV VAS DIALYSIS STENT ONE PROX STENT PSV: 400 CM/SEC
BH CV VAS DIALYSIS VENOUS OUTFLOW CEPHALIC ARCH DIAMETE: 0.69 CM
BH CV VAS DIALYSIS VENOUS OUTFLOW CEPHALIC ARCH EDV: 138 CM/SEC
BH CV VAS DIALYSIS VENOUS OUTFLOW CEPHALIC ARCH PSV: 260 CM/SEC
BH CV VAS DIALYSIS VENOUS OUTFLOW CEPHALIC VEIN DIAMETE: 0.44 CM
BH CV VAS DIALYSIS VENOUS OUTFLOW CEPHALIC VEIN EDV: 242 CM/SEC
BH CV VAS DIALYSIS VENOUS OUTFLOW CEPHALIC VEIN PSV: 400 CM/SEC
BH CV VAS DIALYSIS VENOUS OUTFLOW SUBCL-CEPHALIC JX DIAMETER: 0.71 CM
BH CV VAS DIALYSIS VENOUS OUTFLOW SUBCL-CEPHALIC JX EDV: 195 CM/SEC
BH CV VAS DIALYSIS VENOUS OUTFLOW SUBCL-CEPHALIC JX PSV: 322 CM/SEC
BH CV VAS DIALYSIS VENOUS OUTFLOW SUBCLAVIAN DIAMETER: 1.71 CM
BH CV VAS DIALYSIS VENOUS OUTFLOW SUBCLAVIAN EDV: 162 CM/SEC
BH CV VAS DIALYSIS VENOUS OUTFLOW SUBCLAVIAN PSV: 355 CM/SEC
BH CV VAS FREE TEXT STENT ONE: NORMAL
Lab: 1374 ML/MIN

## 2024-06-13 PROCEDURE — 93990 DOPPLER FLOW TESTING: CPT

## 2024-06-13 NOTE — PROGRESS NOTES
Patient Name: Bill Landry    MRN: [id number] Encounter Date: 06/13/2024      Consulting Service: Vascular Surgery    Referring Provider: No ref. provider found       CHIEF COMPLAINT:  Chief Complaint   Patient presents with    Hemodialysis Access       Subjective    HPI: Bill Landry is a 78 y.o. male is being seen for evaluation/management of his left arteriovenous fistula graft that been revised multiple times.  Things look very good on our scan and the volume flows at the dialysis center are thousand 38.    PAST MEDICAL HISTORY:   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    Aortic stenosis     Bicuspid aortic valve 07/20/2022    Calculus of kidney     Cardiac murmur, unspecified     Chronic kidney disease, stage 3     moderate    Chronic leukopenia and thrombocytopenia     Cirrhosis of liver without ascites 07/24/2017    Congestive splenomegaly 07/24/2017    Crohn disease     with ileostomy    Decreased white blood cell count, unspecified     Diastolic CHF     although it was likely from volume overload due to ESRD    Diverticula of colon     ESRD on hemodialysis 04/05/2018    M-W-F FRESENIUS    Fatty liver disease, nonalcoholic     Fistula 04/05/2018    Other Specified Complication    Gastrointestinal hemorrhage, unspecified     GERD (gastroesophageal reflux disease)     GI bleed     Glaucoma     H/O Gallstone pancreatitis     H/O Pericardial effusion     H/O sinus bradycardia     Heart murmur     History of hypertension     resolved    History of UTI     Hx of renal calculi     Ileostomy present     Iron deficiency     Leakage of heart valve prosthesis, subsequent encounter     MGUS (monoclonal gammopathy of unknown significance)     TATE (obstructive sleep apnea)      Other hemoglobinopathies     Pericardial effusion (noninflammatory)     Permanent atrial fibrillation     Persistent atrial fibrillation     Scarlet fever, uncomplicated     Sleep apnea     CPAP USED    Stenosis of other vascular prosthetic devices, implants and grafts, initial encounter 02/14/2022    Systemic lupus erythematosus, unspecified     Thrombocytopenia     undpecified    Type 2 diabetes mellitus     CURRENTLY TAKES NO MED    Unspecified atrial fibrillation     Unspecified kidney failure     Urinary retention     Post-OP      PAST SURGICAL HISTORY:   Past Surgical History:   Procedure Laterality Date    APPENDECTOMY  06/1968    ARTERIOVENOUS FISTULA/SHUNT SURGERY Left 08/08/2017    Procedure: LEFT BRACHIAL CEPHALIC AV FISTULA FORMATION WITH CEPHALIC VEIN TRANSPOSITION ;  Surgeon: Bill Deal MD;  Location: [facility] OR;  Service:     ARTERIOVENOUS FISTULA/SHUNT SURGERY Left 05/27/2022    Procedure: OPEN REVISION LEFT ARTERIOVENOUS INTERPOSITION GRAFT PLACEMENT;  Surgeon: Bill Deal MD;  Location: [facility] OR;  Service: Vascular;  Laterality: Left;    ARTERIOVENOUS FISTULA/SHUNT SURGERY W/ HEMODIALYSIS CATHETER INSERTION Left 02/15/2022    Procedure: OPEN LEFT ARM ARTERIOVENOUS FISTULA THROMBECTOMY WITH CEPHALIC VEIN ARTHROPLASTY AND STENT/GRAFT PLACEMENT;  Surgeon: Bill Deal MD;  Location: [facility] OR 18/19;  Service: Vascular;  Laterality: Left;    CATARACT EXTRACTION WITH INTRAOCULAR LENS IMPLANT Bilateral 2004    CHOLECYSTECTOMY  2011    COLECTOMY PARTIAL / TOTAL      History of inflammatory bowel disease with status post colectomy with ileostomy many years ago in the [facility],  18 YEARS OF AGE    COLON SURGERY      Colon resection with colostomy    COLON SURGERY      COLONOSCOPY  01/2018    CYSTOSCOPY LITHOLAPAXY BLADDER STONE EXTRACTION      ENDOSCOPY  01/16/2015    gastritis    ENDOSCOPY  01/17/2018    Procedure: ESOPHAGOGASTRODUODENOSCOPY;  Surgeon: Warner Neville,  MD;  Location: [facility] ENDOSCOPY;  Service:     ILEOSCOPY  01/16/2015    normal    ILEOSCOPY N/A 01/17/2018    Procedure: ILEOSCOPY;  Surgeon: Warner Neville MD;  Location: [facility] ENDOSCOPY;  Service:     ILEOSTOMY  12/1968    loop ostomy bypass    INCISION AND DRAINAGE ARM Left 10/27/2023    Procedure: LEFT ARM INCISION AND DRAINAGE;  Surgeon: Bill Bermeo MD;  Location: [facility] MAIN OR;  Service: Vascular;  Laterality: Left;    OTHER SURGICAL HISTORY  2009    kidney stones x3    PSEUDOANEURYSM REPAIR Left 10/27/2023    TONSILLECTOMY  1950      FAMILY HISTORY:   Family History   Problem Relation Age of Onset    Heart failure Mother 78    Hypertension Mother     Heart disease Mother     Hyperlipidemia Mother     Hypertension Father     Other Father 82        MVI    Malig Hyperthermia Neg Hx       SOCIAL HISTORY:   Social History     Tobacco Use    Smoking status: Never    Smokeless tobacco: Never   Substance Use Topics    Alcohol use: No     Comment: caffeine use: none    Drug use: No      MEDICATIONS:   Current Outpatient Medications on File Prior to Visit   Medication Sig Dispense Refill    alfuzosin (UROXATRAL) 10 MG 24 hr tablet Take 1 tablet by mouth Every Other Day. Does not take on tues sun or thurs      aspirin 81 MG tablet Take 1 tablet by mouth Daily. INSTRUCTED TO CONTINUE THIS FOR SURGERY PER DR. BERMEO'S OFFICE      B Complex-C-Folic Acid (Umm-Peter) tablet Take 1 tablet by mouth Daily.      Cholecalciferol 25 MCG (1000 UT) tablet Take 1 tablet by mouth Daily.      famotidine (PEPCID) 10 MG tablet Take 1 tablet by mouth As Needed for Heartburn.      febuxostat (ULORIC) 40 MG tablet Take 1 tablet by mouth Daily.      Iron Sucrose (VENOFER IV) Infuse  into a venous catheter As Needed. USUALLY ONCE MONTH      latanoprost (XALATAN) 0.005 % ophthalmic solution Administer 1 drop to both eyes Every Night. 1 drop each eye      lidocaine-prilocaine (EMLA) 2.5-2.5 % cream Apply 1 Application topically to the  "appropriate area as directed. Ewctcc-Vlxyfhtqh-Dppdwn:  ONE HOUR PRIOR TO DIALYSIS  3    Loratadine (CLARITIN PO) Take 1 tablet by mouth As Needed. Pt states he is uncertain of dosage      Methoxy PEG-Epoetin Beta (MIRCERA IJ) Inject  as directed Every 30 (Thirty) Days.      mupirocin (BACTROBAN) 2 % ointment Apply 1 Application topically to the appropriate area as directed 3 (Three) Times a Day. 30 g 0    sodium chloride 0.65 % nasal spray 2 sprays into the nostril(s) as directed by provider Every Night.      Sucroferric Oxyhydroxide (Velphoro) 500 MG chewable tablet Chew 1 tablet 3 (Three) Times a Day. AFTER EACH  MEAL       No current facility-administered medications on file prior to visit.       ALLERGIES: Ace inhibitors, Contrast dye (echo or unknown ct/mr), Iodine, Angiotensin receptor blockers, Eliquis [apixaban], Filgrastim, Keflex [cephalexin], and Other       Objective   Vitals:    06/13/24 1400   BP: 131/60   Pulse: 72   Weight: 80.3 kg (177 lb)   Height: 177.8 cm (70\")     Body mass index is 25.4 kg/m².  BMI is >= 25 and <30. (Overweight) The following options were offered after discussion;: weight loss educational material (shared in after visit summary) and Information on healthy weight added to patient's after visit summary.      PHYSICAL EXAM:   Physical Exam   AV fistula graft area being accessed appears to be overall very healthy although there is 1 area in the apex of the new loop that seems to be getting picked on.  There is multiple areas that are free for use proximal and distal to that.  Result Review   LABS:      Results Review:       I reviewed the patient's new clinical results.    The following radiologic or non-invasive studies have been reviewed by me: Left AV fistula scan reviewed with images reviewed No radiology results for the last 30 days.                ASSESSMENT/PLAN:   Diagnoses and all orders for this visit:    1. ESRD on hemodialysis (Primary)  -     Duplex Hemodialysis " Access CAR; Future    2. Arteriovenous fistula for hemodialysis in place, secondary  -     Duplex Hemodialysis Access CAR; Future       78 y.o. male with functioning left AV graft/fistula that looks really quite good.  Overall very pleased with things so far but we need to be hypervigilant and will stay on a 3-month cycle of checking it.  He knows to call if he thinks something is not going well or if the volume close change.    I discussed the plan with the patient and family who are agreeable to the plan of care at this point. Thank you for this consult.   Follow Up  Return in about 3 months (around 9/13/2024).    Bill Deal MD   06/13/24

## 2024-08-02 NOTE — PROGRESS NOTES
RM:________     PCP: Bharathi De La Torre MD    : 1945  AGE: 78 y.o.  EST PATIENT     REASON FOR VISIT/  CC:        BP Readings from Last 3 Encounters:   24 131/60   24 122/62   24 136/66      Wt Readings from Last 3 Encounters:   24 80.3 kg (177 lb)   24 80.5 kg (177 lb 6.4 oz)   24 78.9 kg (174 lb)        WT: ____________ BP: __________L __________R HR______    CHEST PAIN: _____________    SOA: _____________PALPS: _______________     LIGHTHEADED: ___________FATIGUE: ________________ EDEMA __________    ALLERGIES:Ace inhibitors, Contrast dye (echo or unknown ct/mr), Iodine, Angiotensin receptor blockers, Eliquis [apixaban], Filgrastim, Keflex [cephalexin], and Other SMOKING HISTORY:  Social History     Tobacco Use    Smoking status: Never    Smokeless tobacco: Never   Substance Use Topics    Alcohol use: No     Comment: caffeine use: none    Drug use: No     CAFFEINE USE_________________  ALCOHOL ______________________

## 2024-08-08 ENCOUNTER — OFFICE VISIT (OUTPATIENT)
Dept: CARDIOLOGY | Facility: CLINIC | Age: 79
End: 2024-08-08
Payer: MEDICARE

## 2024-08-08 VITALS
BODY MASS INDEX: 25.17 KG/M2 | HEIGHT: 70 IN | HEART RATE: 70 BPM | DIASTOLIC BLOOD PRESSURE: 60 MMHG | WEIGHT: 175.8 LBS | SYSTOLIC BLOOD PRESSURE: 128 MMHG

## 2024-08-08 DIAGNOSIS — I35.0 NONRHEUMATIC AORTIC VALVE STENOSIS: ICD-10-CM

## 2024-08-08 DIAGNOSIS — I48.21 PERMANENT ATRIAL FIBRILLATION: Primary | ICD-10-CM

## 2024-08-08 DIAGNOSIS — Z86.79 HISTORY OF PULMONARY HYPERTENSION: ICD-10-CM

## 2024-08-08 DIAGNOSIS — Z99.2 ESRD ON HEMODIALYSIS: ICD-10-CM

## 2024-08-08 DIAGNOSIS — Q23.1 BICUSPID AORTIC VALVE: ICD-10-CM

## 2024-08-08 DIAGNOSIS — N18.6 ESRD ON HEMODIALYSIS: ICD-10-CM

## 2024-08-08 PROBLEM — I50.32 CHRONIC HEART FAILURE WITH PRESERVED EJECTION FRACTION (HFPEF): Status: ACTIVE | Noted: 2024-08-08

## 2024-08-08 PROBLEM — I50.33 ACUTE ON CHRONIC DIASTOLIC HEART FAILURE: Status: RESOLVED | Noted: 2023-05-11 | Resolved: 2024-08-08

## 2024-08-08 NOTE — PROGRESS NOTES
Date of Office Visit: 2024  Encounter Provider: Dale Vázquez MD  Place of Service: [facility] CARDIOLOGY  Patient Name: Bill Landry  :1945    Chief Complaint   Patient presents with    Atrial Fibrillation   :     HPI: Bill Landry is a 78 y.o. male who presents today in follow up. He established care with me in 2018. I have reviewed prior notes and there are no changes except for any new updates described below. I have also reviewed any information entered into the medical record by the patient or by ancillary staff.     He has numerous chronic medical conditions.  He has Crohns and had a colectomy/ileostomy in .  He has ESRD which is multifactorial (Crohns, huge kidney stones, hypertension).  He is on hemodialysis.  He has chronic leukopenia, anemia, and thrombocytopenia.  He has permanent atrial fibrillation and has a history of a slow GI bleed when anticoagulated.  He has never had a TIA or stroke.  He previously was diagnosed with diastolic CHF and pulmonary hypertension, but it would seem that this was in the setting of volume overload from CKD prior to being started on dialysis.  Since this occurred, he has had no problems with this, and his RVSP has normalized.      An echo in  showed normal LVSF, indeterminate diastolic function, normal RVSP, and mild AS.     He feels well.  He denies chest pain, dyspnea, orthopnea, edema, palpitations, or syncope.     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    Aortic stenosis     Bicuspid aortic valve 2022    Calculus of kidney     Chronic leukopenia and thrombocytopenia     Cirrhosis of liver without ascites 2017    Congestive splenomegaly 2017    Crohn disease     with ileostomy     Decreased white blood cell count, unspecified     Diverticula of colon     ESRD on hemodialysis 04/05/2018    M-W-F FRESENIUS    Fatty liver disease, nonalcoholic     Fistula 04/05/2018    Other Specified Complication    Gastrointestinal hemorrhage, unspecified     GERD (gastroesophageal reflux disease)     GI bleed     Glaucoma     H/O Gallstone pancreatitis     H/O Pericardial effusion     H/O sinus bradycardia     History of hypertension     resolved    History of UTI     Hx of renal calculi     Ileostomy present     Iron deficiency     Leakage of heart valve prosthesis, subsequent encounter     MGUS (monoclonal gammopathy of unknown significance)     TATE (obstructive sleep apnea)     Other hemoglobinopathies     Pericardial effusion (noninflammatory)     Permanent atrial fibrillation     Scarlet fever, uncomplicated     Stenosis of other vascular prosthetic devices, implants and grafts, initial encounter 02/14/2022    Systemic lupus erythematosus, unspecified     Thrombocytopenia     undpecified    Type 2 diabetes mellitus     CURRENTLY TAKES NO MED    Urinary retention     Post-OP       Past Surgical History:   Procedure Laterality Date    APPENDECTOMY  06/1968    ARTERIOVENOUS FISTULA/SHUNT SURGERY Left 08/08/2017    Procedure: LEFT BRACHIAL CEPHALIC AV FISTULA FORMATION WITH CEPHALIC VEIN TRANSPOSITION ;  Surgeon: Bill Deal MD;  Location: [facility];  Service:     ARTERIOVENOUS FISTULA/SHUNT SURGERY Left 05/27/2022    Procedure: OPEN REVISION LEFT ARTERIOVENOUS INTERPOSITION GRAFT PLACEMENT;  Surgeon: Bill Deal MD;  Location: [facility] OR;  Service: Vascular;  Laterality: Left;    ARTERIOVENOUS FISTULA/SHUNT SURGERY W/ HEMODIALYSIS CATHETER INSERTION Left 02/15/2022    Procedure: OPEN LEFT ARM ARTERIOVENOUS FISTULA THROMBECTOMY WITH CEPHALIC VEIN ARTHROPLASTY AND STENT/GRAFT PLACEMENT;  Surgeon: Bill Deal MD;  Location: [facility] 18/19;  Service: Vascular;  Laterality:  Left;    CATARACT EXTRACTION WITH INTRAOCULAR LENS IMPLANT Bilateral 2004    CHOLECYSTECTOMY  2011    COLECTOMY PARTIAL / TOTAL      History of inflammatory bowel disease with status post colectomy with ileostomy many years ago in the [facility],  18 YEARS OF AGE    COLON SURGERY      Colon resection with colostomy    COLON SURGERY      COLONOSCOPY  01/2018    CYSTOSCOPY LITHOLAPAXY BLADDER STONE EXTRACTION      ENDOSCOPY  01/16/2015    gastritis    ENDOSCOPY  01/17/2018    Procedure: ESOPHAGOGASTRODUODENOSCOPY;  Surgeon: Warner Neville MD;  Location: [facility] ENDOSCOPY;  Service:     ILEOSCOPY  01/16/2015    normal    ILEOSCOPY N/A 01/17/2018    Procedure: ILEOSCOPY;  Surgeon: Warner Neville MD;  Location: [facility] ENDOSCOPY;  Service:     ILEOSTOMY  12/1968    loop ostomy bypass    INCISION AND DRAINAGE ARM Left 10/27/2023    Procedure: LEFT ARM INCISION AND DRAINAGE;  Surgeon: Bill Deal MD;  Location: [facility] MAIN OR;  Service: Vascular;  Laterality: Left;    OTHER SURGICAL HISTORY  2009    kidney stones x3    PSEUDOANEURYSM REPAIR Left 10/27/2023    TONSILLECTOMY  1950       Social History     Socioeconomic History    Marital status:      Spouse name: Gillian    Number of children: 2    Years of education: College   Tobacco Use    Smoking status: Never     Passive exposure: Never    Smokeless tobacco: Never   Substance and Sexual Activity    Alcohol use: No     Comment: caffeine use: none    Drug use: No    Sexual activity: Defer       Family History   Problem Relation Age of Onset    Heart failure Mother 78    Hypertension Mother     Heart disease Mother     Hyperlipidemia Mother     Hypertension Father     Other Father 82        MVI    Malig Hyperthermia Neg Hx        Review of Systems   Cardiovascular:  Negative for chest pain and palpitations.   Respiratory:  Negative for shortness of breath.    Neurological:  Negative for dizziness and light-headedness.   All other systems reviewed and are  negative.      Allergies   Allergen Reactions    Ace Inhibitors Other (See Comments)     RENAL FAILURE/ raised creatinine    Contrast Dye (Echo Or Unknown Ct/Mr) Other (See Comments) and Anaphylaxis     RENAL FAILURE    Iodine Anaphylaxis    Angiotensin Receptor Blockers Swelling    Eliquis [Apixaban] Arrhythmia     BLEEDING ISSUES; PT CANNOT TAKE ANY ANTICOAGULANTS DUE TO LOW PLATELETS EXCEPT ASPIRIN      Filgrastim GI Intolerance and Confusion     Chest pain    Keflex [Cephalexin] Diarrhea     RENAL FAILURE    Other Angioedema     IVP Dye         Current Outpatient Medications:     alfuzosin (UROXATRAL) 10 MG 24 hr tablet, Take 1 tablet by mouth Every Other Day. Does not take on tues sun or thurs, Disp: , Rfl:     aspirin 81 MG tablet, Take 1 tablet by mouth Daily. INSTRUCTED TO CONTINUE THIS FOR SURGERY PER DR. BERMEO'S OFFICE, Disp: , Rfl:     B Complex-C-Folic Acid (Umm-Peter) tablet, Take 1 tablet by mouth Daily., Disp: , Rfl:     Cholecalciferol 25 MCG (1000 UT) tablet, Take 1 tablet by mouth Daily., Disp: , Rfl:     famotidine (PEPCID) 10 MG tablet, Take 1 tablet by mouth As Needed for Heartburn., Disp: , Rfl:     Iron Sucrose (VENOFER IV), Infuse  into a venous catheter As Needed. USUALLY ONCE MONTH, Disp: , Rfl:     latanoprost (XALATAN) 0.005 % ophthalmic solution, Administer 1 drop to both eyes Every Night. 1 drop each eye, Disp: , Rfl:     lidocaine-prilocaine (EMLA) 2.5-2.5 % cream, Apply 1 Application topically to the appropriate area as directed. [id number]-[id number]:  ONE HOUR PRIOR TO DIALYSIS, Disp: , Rfl: 3    Loratadine (CLARITIN PO), Take 1 tablet by mouth As Needed. Pt states he is uncertain of dosage, Disp: , Rfl:     Methoxy PEG-Epoetin Beta (MIRCERA IJ), Inject  as directed Every 30 (Thirty) Days., Disp: , Rfl:     mupirocin (BACTROBAN) 2 % ointment, Apply 1 Application topically to the appropriate area as directed 3 (Three) Times a Day., Disp: 30 g, Rfl: 0    sodium chloride 0.65 % nasal  "spray, 2 sprays into the nostril(s) as directed by provider Every Night., Disp: , Rfl:     Sucroferric Oxyhydroxide (Velphoro) 500 MG chewable tablet, Chew 1 tablet 3 (Three) Times a Day. AFTER EACH  MEAL, Disp: , Rfl:     febuxostat (ULORIC) 40 MG tablet, Take 1 tablet by mouth Daily. (Patient not taking: Reported on 8/8/2024), Disp: , Rfl:       Objective:     Vitals:    08/08/24 1502   BP: 128/60   Pulse: 70   Weight: 79.7 kg (175 lb 12.8 oz)   Height: 177.8 cm (70\")     Body mass index is 25.22 kg/m².    Physical Exam  Vitals reviewed.   Constitutional:       Appearance: He is well-developed.   HENT:      Head: Normocephalic.      Nose: Nose normal.      Mouth/Throat:      Comments: masked  Eyes:      Conjunctiva/sclera: Conjunctivae normal.   Neck:      Vascular: No JVD.   Cardiovascular:      Rate and Rhythm: Normal rate. Rhythm irregularly irregular.      Pulses: Normal pulses and intact distal pulses.      Heart sounds: Murmur heard.      Systolic murmur is present with a grade of 1/6 at the upper left sternal border.   Pulmonary:      Effort: Pulmonary effort is normal.      Breath sounds: Normal breath sounds.   Abdominal:      Palpations: Abdomen is soft.      Tenderness: There is no abdominal tenderness.      Comments: Ostomy in place   Musculoskeletal:         General: No swelling. Normal range of motion.      Cervical back: Normal range of motion.   Lymphadenopathy:      Cervical: No cervical adenopathy.   Skin:     General: Skin is warm and dry.      Findings: Bruising present.   Neurological:      General: No focal deficit present.      Mental Status: He is alert.   Psychiatric:         Mood and Affect: Mood normal.         Behavior: Behavior normal.           ECG 12 Lead    Date/Time: 8/8/2024 3:15 PM  Performed by: Dale Vázquez MD    Authorized by: Dale Vázquez MD  Comparison: compared with previous ECG   Similar to previous ECG  Rhythm: atrial fibrillation  Conduction: left anterior fascicular " block  ST Segments: ST segments normal  T Waves: T waves normal  QRS axis: left  Other: no other findings    Clinical impression: abnormal EKG            Assessment:       Diagnosis Plan   1. Permanent atrial fibrillation  ECG 12 Lead      2. Nonrheumatic aortic valve stenosis  Adult Transthoracic Echo Complete W/ Cont if Necessary Per Protocol    Adult Transthoracic Echo Complete W/ Cont if Necessary Per Protocol      3. Bicuspid aortic valve  Adult Transthoracic Echo Complete W/ Cont if Necessary Per Protocol    Adult Transthoracic Echo Complete W/ Cont if Necessary Per Protocol      4. History of pulmonary hypertension        5. ESRD on hemodialysis               Plan:       1.  Atrial Fibrillation and Atrial Flutter  Assessment   The patient has permanent atrial fibrillation   This is non-valvular in etiology   The patient's CHADS2-VASc score is 4   A [id number] score of 2 or more is considered a high risk for a thromboembolic event    Plan   Continue in atrial fibrillation with rate control    He is rate controlled on his own.  He cannot be anticoagulated due to GI AVM/GI bleeding/thrombocytopenia.  A left atrial occluder has been considered, but it is not felt to be safe to have him on DAPT or anticoagulation for even a short while after the procedure.  He knows to call 911 for ANY neurological symptoms.    2/3. He had mild AS in 2022 and 2023. I'll repeat an echo.    4/5. He had pulmonary hypertension due to volume overload; this resolved with dialysis. His last RVSP was normal.     Sincerely,       Dale Vázquez MD

## 2024-08-15 ENCOUNTER — OFFICE VISIT (OUTPATIENT)
Dept: INTERNAL MEDICINE | Facility: CLINIC | Age: 79
End: 2024-08-15
Payer: MEDICARE

## 2024-08-15 VITALS
WEIGHT: 175.8 LBS | SYSTOLIC BLOOD PRESSURE: 116 MMHG | HEIGHT: 70 IN | BODY MASS INDEX: 25.17 KG/M2 | OXYGEN SATURATION: 95 % | DIASTOLIC BLOOD PRESSURE: 58 MMHG | HEART RATE: 78 BPM

## 2024-08-15 DIAGNOSIS — R74.8 ELEVATED ALKALINE PHOSPHATASE LEVEL: ICD-10-CM

## 2024-08-15 DIAGNOSIS — Z00.00 MEDICARE ANNUAL WELLNESS VISIT, SUBSEQUENT: Primary | ICD-10-CM

## 2024-08-15 DIAGNOSIS — K74.60 CIRRHOSIS OF LIVER WITHOUT ASCITES, UNSPECIFIED HEPATIC CIRRHOSIS TYPE: ICD-10-CM

## 2024-08-15 DIAGNOSIS — R73.03 PREDIABETES: ICD-10-CM

## 2024-08-15 PROCEDURE — 1170F FXNL STATUS ASSESSED: CPT | Performed by: STUDENT IN AN ORGANIZED HEALTH CARE EDUCATION/TRAINING PROGRAM

## 2024-08-15 PROCEDURE — 1126F AMNT PAIN NOTED NONE PRSNT: CPT | Performed by: STUDENT IN AN ORGANIZED HEALTH CARE EDUCATION/TRAINING PROGRAM

## 2024-08-15 PROCEDURE — G0439 PPPS, SUBSEQ VISIT: HCPCS | Performed by: STUDENT IN AN ORGANIZED HEALTH CARE EDUCATION/TRAINING PROGRAM

## 2024-08-15 NOTE — PROGRESS NOTES
The ABCs of the Annual Wellness Visit  Subsequent Medicare Wellness Visit    Chief Complaint   Patient presents with    Medicare Wellness-subsequent      Subjective    History of Present Illness:  Bill Landry is a 78 y.o. male with past medical history significant for permanent A-fib, hypertension, ESRD on Monday Wednesday Friday dialysis, heart failure, bicuspid aortic valve, hyperparathyroidism, Crohn's disease, chronic anemia and leukopenia, TATE, nephrolithiasis who presents for a Subsequent Medicare Wellness Visit.    The following portions of the patient's history were reviewed and   updated as appropriate: allergies, current medications, past family history, past medical history, past social history, past surgical history, and problem list.    Elevated alk phos and previous labs.  Imaging in 2017 showed hepatic cirrhosis. States this was due to autoimmune issue.    Foot ulcer has healed with new orthotic in shoe.     Compared to one year ago, the patient feels his physical   health is the same.    Compared to one year ago, the patient feels his mental   health is better.    Recent Hospitalizations:  This patient has had a [facility] admission record on file within the last 365 days.    Current Medical Providers:  Patient Care Team:  Bharathi De La Torre MD as PCP - General (Internal Medicine)  Ken Moseley MD as Consulting Physician (Pulmonary Disease)  Dale Vázquez MD as Cardiologist (Cardiology)  yMra Moore MD as Consulting Physician (Nephrology)  Theo Pacheco MD as Consulting Physician (Hematology and Oncology)  Preston Ureña MD as Consulting Physician (Urology)  Ernesto Ward MD as Consulting Physician (Ophthalmology)  Bill Deal MD as Surgeon (Vascular Surgery)  Dimple Villanueva DPM as Consulting Physician (Podiatry)    Outpatient Medications Prior to Visit   Medication Sig Dispense Refill    alfuzosin (UROXATRAL) 10 MG 24 hr tablet Take 1 tablet by mouth  Every Other Day. Does not take on tues sun or thurs      aspirin 81 MG tablet Take 1 tablet by mouth Daily. INSTRUCTED TO CONTINUE THIS FOR SURGERY PER DR. BERMEO'S OFFICE      B Complex-C-Folic Acid (Umm-Peter) tablet Take 1 tablet by mouth Daily.      Cholecalciferol 25 MCG (1000 UT) tablet Take 1 tablet by mouth Daily.      famotidine (PEPCID) 10 MG tablet Take 1 tablet by mouth As Needed for Heartburn.      febuxostat (ULORIC) 40 MG tablet Take 1 tablet by mouth Daily.      Iron Sucrose (VENOFER IV) Infuse  into a venous catheter As Needed. USUALLY ONCE MONTH      latanoprost (XALATAN) 0.005 % ophthalmic solution Administer 1 drop to both eyes Every Night. 1 drop each eye      lidocaine-prilocaine (EMLA) 2.5-2.5 % cream Apply 1 Application topically to the appropriate area as directed. [id number]-Nzmtmz:  ONE HOUR PRIOR TO DIALYSIS  3    Loratadine (CLARITIN PO) Take 1 tablet by mouth As Needed. Pt states he is uncertain of dosage      Methoxy PEG-Epoetin Beta (MIRCERA IJ) Inject  as directed Every 30 (Thirty) Days.      mupirocin (BACTROBAN) 2 % ointment Apply 1 Application topically to the appropriate area as directed 3 (Three) Times a Day. 30 g 0    sodium chloride 0.65 % nasal spray 2 sprays into the nostril(s) as directed by provider Every Night.      Sucroferric Oxyhydroxide (Velphoro) 500 MG chewable tablet Chew 1 tablet 3 (Three) Times a Day. AFTER EACH  MEAL       No facility-administered medications prior to visit.       No opioid medication identified on active medication list. I have reviewed chart for other potential  high risk medication/s and harmful drug interactions in the elderly.        Aspirin is on active medication list. Aspirin use is indicated based on review of current medical condition/s. Pros and cons of this therapy have been discussed today. Benefits of this medication outweigh potential harm.  Patient has been encouraged to continue taking this medication.  .      Patient Active  "Problem List   Diagnosis    Permanent atrial fibrillation    Thrombocytopenia    Chronic leukopenia    Cirrhosis of liver without ascites    Congestive splenomegaly    Anemia due to stage 4 chronic kidney disease treated with erythropoietin    Esophageal abnormality    ESRD on hemodialysis    Other specified glaucoma    Arteriovenous fistula for hemodialysis in place, secondary    Crohn's disease, unspecified, without complications    Deficiency of other specified B group vitamins    Dermatitis, unspecified    Encounter for immunization    History of urinary stone    Hyperparathyroidism, unspecified    Other disorders of phosphorus metabolism    Other iron deficiency anemias    Pure hypertriglyceridemia    Renal osteodystrophy    Secondary hyperparathyroidism of renal origin    Splenomegaly, not elsewhere classified    Bilateral pseudophakia    Dermatochalasis of eyelid    Primary open-angle glaucoma, bilateral, moderate stage    Puckering of macula, left eye    Secondary cataract    AV fistula occlusion, initial encounter    TATE (obstructive sleep apnea)    Aneurysm artery, upper extremity    Nonrheumatic aortic valve stenosis    Bicuspid aortic valve    Hyperuricemia without signs of inflammatory arthritis and tophaceous disease    Presbyopia    Impaired glucose tolerance    Hypofibrinogenemia    Pulmonary hypertension    Skin change     Advance Care Planning  Advance Directive is not on file.  ACP discussion was declined by the patient. Patient does not have an advance directive, declines further assistance.          Objective    Vitals:    08/15/24 1447   BP: 116/58   Pulse: 78   SpO2: 95%   Weight: 79.7 kg (175 lb 12.8 oz)   Height: 177.8 cm (70\")     Estimated body mass index is 25.22 kg/m² as calculated from the following:    Height as of this encounter: 177.8 cm (70\").    Weight as of this encounter: 79.7 kg (175 lb 12.8 oz).           Does the patient have evidence of cognitive impairment? No    Physical " Exam  Constitutional:       Appearance: Normal appearance.   Cardiovascular:      Rate and Rhythm: Normal rate and regular rhythm.      Heart sounds: Normal heart sounds. No murmur heard.  Pulmonary:      Effort: Pulmonary effort is normal.      Breath sounds: Normal breath sounds.   Abdominal:      General: Abdomen is flat. There is no distension.      Palpations: Abdomen is soft.      Tenderness: There is no abdominal tenderness.   Musculoskeletal:      Right lower leg: No edema.      Left lower leg: No edema.   Skin:     General: Skin is warm and dry.   Neurological:      Mental Status: He is alert.   Psychiatric:         Mood and Affect: Mood normal.         Behavior: Behavior normal.         Thought Content: Thought content normal.                 HEALTH RISK ASSESSMENT    Smoking Status:  Social History     Tobacco Use   Smoking Status Never    Passive exposure: Never   Smokeless Tobacco Never     Alcohol Consumption:  Social History     Substance and Sexual Activity   Alcohol Use No    Comment: caffeine use: none     Fall Risk Screen:    STEADI Fall Risk Assessment was completed, and patient is at LOW risk for falls.Assessment completed on:8/15/2024    Depression Screenin/15/2024     2:48 PM   PHQ-2/PHQ-9 Depression Screening   Little Interest or Pleasure in Doing Things 0-->not at all   Feeling Down, Depressed or Hopeless 0-->not at all   PHQ-9: Brief Depression Severity Measure Score 0       Health Habits and Functional and Cognitive Screenin/15/2024     2:48 PM   Functional & Cognitive Status   Do you have difficulty preparing food and eating? No   Do you have difficulty bathing yourself, getting dressed or grooming yourself? No   Do you have difficulty using the toilet? No   Do you have difficulty moving around from place to place? No   Do you have trouble with steps or getting out of a bed or a chair? No   Do you need help using the phone?  No   Are you deaf or do you have serious  difficulty hearing?  No   Do you need help to go to places out of walking distance? No   Do you need help shopping? No   Do you need help preparing meals?  No   Do you need help with housework?  No   Do you need help with laundry? No   Do you need help taking your medications? No   Do you need help managing money? No   Do you ever drive or ride in a car without wearing a seat belt? No   Have you felt unusual stress, anger or loneliness in the last month? No   Who do you live with? Spouse   If you need help, do you have trouble finding someone available to you? No   Have you been bothered in the last four weeks by sexual problems? No   Do you have difficulty concentrating, remembering or making decisions? No       Age-appropriate Screening Schedule:  Refer to the list below for future screening recommendations based on patient's age, sex and/or medical conditions. Orders for these recommended tests are listed in the plan section. The patient has been provided with a written plan.    Health Maintenance   Topic Date Due    ZOSTER VACCINE (1 of 2) Never done    RSV Vaccine - Adults (1 - 1-dose 60+ series) Never done    ANNUAL WELLNESS VISIT  08/25/2023    DIABETIC FOOT EXAM  08/25/2023    COVID-19 Vaccine (5 - 2023-24 season) 09/01/2023    HEMOGLOBIN A1C  03/26/2024    INFLUENZA VACCINE  08/01/2024    LIPID PANEL  09/26/2024    BMI FOLLOWUP  06/13/2025    TDAP/TD VACCINES (2 - Td or Tdap) 04/18/2033    HEPATITIS C SCREENING  Completed    Hepatitis B  Completed    Pneumococcal Vaccine 65+  Discontinued    DIABETIC EYE EXAM  Discontinued    URINE MICROALBUMIN  Discontinued    COLONOSCOPY  Discontinued              Assessment & Plan   CMS Preventative Services Quick Reference  Risk Factors Identified During Encounter  Immunizations Discussed/Encouraged: Shingrix, COVID19, and RSV (Respiratory Syncytial Virus)  The above risks/problems have been discussed with the patient.  Follow up actions/plans if indicated are seen  below in the Assessment/Plan Section.  Pertinent information has been shared with the patient in the After Visit Summary.    Diagnoses and all orders for this visit:    1. Medicare annual wellness visit, subsequent (Primary)    2. Elevated alkaline phosphatase level  -     Lipid Panel  -     Gamma GT  -     Comprehensive Metabolic Panel    3. Cirrhosis of liver without ascites, unspecified hepatic cirrhosis type  -     Lipid Panel  -     Gamma GT  -     Comprehensive Metabolic Panel    4. Prediabetes  -     Hemoglobin A1c      78 y.o. male with past medical history significant for permanent A-fib, hypertension, ESRD on Monday Wednesday Friday dialysis, heart failure, bicuspid aortic valve, hyperparathyroidism, Crohn's disease, chronic anemia and leukopenia, TATE, nephrolithiasis here for annual Medicare wellness visit.  He has a history of cirrhosis per chart review seen on prior imaging in 2017.  Alk phos elevated on recent labs.  It appears he has not really had workup for cirrhosis.  Rechecking alk phos and gamma GT today.  Monitoring prediabetes with yearly A1c.         Follow Up:   Return in about 3 months (around 11/15/2024) for Recheck.     An After Visit Summary and PPPS were made available to the patient.

## 2024-09-05 ENCOUNTER — HOSPITAL ENCOUNTER (OUTPATIENT)
Dept: CARDIOLOGY | Facility: HOSPITAL | Age: 79
Discharge: HOME | End: 2024-09-05
Admitting: INTERNAL MEDICINE
Payer: MEDICARE

## 2024-09-05 VITALS
WEIGHT: 175 LBS | BODY MASS INDEX: 25.05 KG/M2 | HEIGHT: 70 IN | SYSTOLIC BLOOD PRESSURE: 120 MMHG | DIASTOLIC BLOOD PRESSURE: 50 MMHG

## 2024-09-05 DIAGNOSIS — I35.0 NONRHEUMATIC AORTIC VALVE STENOSIS: ICD-10-CM

## 2024-09-05 DIAGNOSIS — Q23.1 BICUSPID AORTIC VALVE: ICD-10-CM

## 2024-09-05 LAB
AORTIC DIMENSIONLESS INDEX: 0.3 (DI)
BH CV ECHO MEAS - ACS: 1.29 CM
BH CV ECHO MEAS - AO MAX PG: 31.3 MMHG
BH CV ECHO MEAS - AO MEAN PG: 17.8 MMHG
BH CV ECHO MEAS - AO ROOT DIAM: 2.9 CM
BH CV ECHO MEAS - AO V2 MAX: 279.9 CM/SEC
BH CV ECHO MEAS - AO V2 VTI: 62.2 CM
BH CV ECHO MEAS - AVA(I,D): 1.33 CM2
BH CV ECHO MEAS - EDV(CUBED): 128.4 ML
BH CV ECHO MEAS - EDV(MOD-SP2): 102 ML
BH CV ECHO MEAS - EDV(MOD-SP4): 114 ML
BH CV ECHO MEAS - EF(MOD-BP): 68.8 %
BH CV ECHO MEAS - EF(MOD-SP2): 62.7 %
BH CV ECHO MEAS - EF(MOD-SP4): 72.8 %
BH CV ECHO MEAS - ESV(CUBED): 27.9 ML
BH CV ECHO MEAS - ESV(MOD-SP2): 38 ML
BH CV ECHO MEAS - ESV(MOD-SP4): 31 ML
BH CV ECHO MEAS - FS: 39.9 %
BH CV ECHO MEAS - IVS/LVPW: 1.04 CM
BH CV ECHO MEAS - IVSD: 0.93 CM
BH CV ECHO MEAS - LAT PEAK E' VEL: 9.7 CM/SEC
BH CV ECHO MEAS - LV DIASTOLIC VOL/BSA (35-75): 57.8 CM2
BH CV ECHO MEAS - LV MASS(C)D: 162.4 GRAMS
BH CV ECHO MEAS - LV MAX PG: 3 MMHG
BH CV ECHO MEAS - LV MEAN PG: 1.65 MMHG
BH CV ECHO MEAS - LV SYSTOLIC VOL/BSA (12-30): 15.7 CM2
BH CV ECHO MEAS - LV V1 MAX: 86.3 CM/SEC
BH CV ECHO MEAS - LV V1 VTI: 20 CM
BH CV ECHO MEAS - LVIDD: 5 CM
BH CV ECHO MEAS - LVIDS: 3 CM
BH CV ECHO MEAS - LVOT AREA: 4.1 CM2
BH CV ECHO MEAS - LVOT DIAM: 2.29 CM
BH CV ECHO MEAS - LVPWD: 0.89 CM
BH CV ECHO MEAS - MED PEAK E' VEL: 7.2 CM/SEC
BH CV ECHO MEAS - MV DEC SLOPE: 494.9 CM/SEC2
BH CV ECHO MEAS - MV DEC TIME: 0.24 SEC
BH CV ECHO MEAS - MV E MAX VEL: 110 CM/SEC
BH CV ECHO MEAS - MV MAX PG: 8.7 MMHG
BH CV ECHO MEAS - MV MEAN PG: 3.2 MMHG
BH CV ECHO MEAS - MV P1/2T: 86.9 MSEC
BH CV ECHO MEAS - MV V2 VTI: 28.6 CM
BH CV ECHO MEAS - MVA(P1/2T): 2.5 CM2
BH CV ECHO MEAS - MVA(VTI): 2.9 CM2
BH CV ECHO MEAS - PA ACC TIME: 0.08 SEC
BH CV ECHO MEAS - PA V2 MAX: 106 CM/SEC
BH CV ECHO MEAS - RAP SYSTOLE: 3 MMHG
BH CV ECHO MEAS - RV MAX PG: 2.35 MMHG
BH CV ECHO MEAS - RV V1 MAX: 76.6 CM/SEC
BH CV ECHO MEAS - RV V1 VTI: 15.9 CM
BH CV ECHO MEAS - RVSP: 33 MMHG
BH CV ECHO MEAS - SV(LVOT): 82.5 ML
BH CV ECHO MEAS - SV(MOD-SP2): 64 ML
BH CV ECHO MEAS - SV(MOD-SP4): 83 ML
BH CV ECHO MEAS - SVI(LVOT): 41.9 ML/M2
BH CV ECHO MEAS - SVI(MOD-SP2): 32.5 ML/M2
BH CV ECHO MEAS - SVI(MOD-SP4): 42.1 ML/M2
BH CV ECHO MEAS - TAPSE (>1.6): 1.73 CM
BH CV ECHO MEAS - TR MAX PG: 29.7 MMHG
BH CV ECHO MEAS - TR MAX VEL: 272.7 CM/SEC
BH CV ECHO MEASUREMENTS AVERAGE E/E' RATIO: 13.02
BH CV XLRA - RV BASE: 3.7 CM
BH CV XLRA - RV LENGTH: 7.2 CM
BH CV XLRA - RV MID: 2.19 CM
BH CV XLRA - TDI S': 9.5 CM/SEC
LEFT ATRIUM VOLUME INDEX: 39.5 ML/M2
SINUS: 3 CM

## 2024-09-05 PROCEDURE — 93306 TTE W/DOPPLER COMPLETE: CPT

## 2024-09-05 NOTE — PROGRESS NOTES
Why are the appts next August cancelled? He needs to see me then and have echo same day.    Gabriela -- please print out a copy and mail to Mr Landry.    Jens kong    I called -- mild-mod AS. Recheck one year.

## 2024-09-12 ENCOUNTER — OFFICE VISIT (OUTPATIENT)
Age: 79
End: 2024-09-12
Payer: MEDICARE

## 2024-09-12 ENCOUNTER — HOSPITAL ENCOUNTER (OUTPATIENT)
Facility: HOSPITAL | Age: 79
Discharge: HOME | End: 2024-09-12
Admitting: SURGERY
Payer: MEDICARE

## 2024-09-12 VITALS — BODY MASS INDEX: 25.05 KG/M2 | HEIGHT: 70 IN | WEIGHT: 175 LBS

## 2024-09-12 DIAGNOSIS — Z99.2 ARTERIOVENOUS FISTULA FOR HEMODIALYSIS IN PLACE, SECONDARY: ICD-10-CM

## 2024-09-12 DIAGNOSIS — N18.6 ESRD ON HEMODIALYSIS: ICD-10-CM

## 2024-09-12 DIAGNOSIS — Z99.2 ESRD ON HEMODIALYSIS: ICD-10-CM

## 2024-09-12 DIAGNOSIS — I72.1: Primary | ICD-10-CM

## 2024-09-12 PROCEDURE — 93990 DOPPLER FLOW TESTING: CPT

## 2024-09-12 NOTE — PROGRESS NOTES
Patient Name: Bill Landry    MRN: [id number] Encounter Date: 09/12/2024      Consulting Service: Vascular Surgery    Referring Provider: No ref. provider found       CHIEF COMPLAINT:  Chief Complaint   Patient presents with    Hemodialysis Access       Subjective    HPI: Bill Landry is a 78 y.o. male is being seen for evaluation/management of follow-up for end-stage renal failure and dialysis access.  Patient's current dialysis access is arteriovenous fistula and arteriovenous graft.  They report no current issues with their access including no issues with bleeding or poor volume flows.  Testing today includes arteriovenous fistula or graft duplex study.  Current recommendations include long-term follow-up for stable access.    PAST MEDICAL HISTORY:   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    Aortic stenosis     Bicuspid aortic valve 07/20/2022    Calculus of kidney     Chronic leukopenia and thrombocytopenia     Cirrhosis of liver without ascites 07/24/2017    Congestive splenomegaly 07/24/2017    Crohn disease     with ileostomy    Decreased white blood cell count, unspecified     Diverticula of colon     ESRD on hemodialysis 04/05/2018    M-W-F FRESENIUS    Fatty liver disease, nonalcoholic     Fistula 04/05/2018    Other Specified Complication    Gastrointestinal hemorrhage, unspecified     GERD (gastroesophageal reflux disease)     GI bleed     Glaucoma     H/O Gallstone pancreatitis     H/O Pericardial effusion     H/O sinus bradycardia     History of hypertension     resolved    History of UTI     Hx of renal calculi     Ileostomy present     Iron deficiency     Leakage of heart valve prosthesis, subsequent encounter     MGUS (monoclonal gammopathy of unknown significance)     TATE (obstructive  sleep apnea)     Other hemoglobinopathies     Pericardial effusion (noninflammatory)     Permanent atrial fibrillation     Scarlet fever, uncomplicated     Stenosis of other vascular prosthetic devices, implants and grafts, initial encounter 02/14/2022    Systemic lupus erythematosus, unspecified     Thrombocytopenia     undpecified    Type 2 diabetes mellitus     CURRENTLY TAKES NO MED    Urinary retention     Post-OP      PAST SURGICAL HISTORY:   Past Surgical History:   Procedure Laterality Date    APPENDECTOMY  06/1968    ARTERIOVENOUS FISTULA/SHUNT SURGERY Left 08/08/2017    Procedure: LEFT BRACHIAL CEPHALIC AV FISTULA FORMATION WITH CEPHALIC VEIN TRANSPOSITION ;  Surgeon: Bill Deal MD;  Location: [facility] OR;  Service:     ARTERIOVENOUS FISTULA/SHUNT SURGERY Left 05/27/2022    Procedure: OPEN REVISION LEFT ARTERIOVENOUS INTERPOSITION GRAFT PLACEMENT;  Surgeon: Bill Deal MD;  Location: [facility] OR;  Service: Vascular;  Laterality: Left;    ARTERIOVENOUS FISTULA/SHUNT SURGERY W/ HEMODIALYSIS CATHETER INSERTION Left 02/15/2022    Procedure: OPEN LEFT ARM ARTERIOVENOUS FISTULA THROMBECTOMY WITH CEPHALIC VEIN ARTHROPLASTY AND STENT/GRAFT PLACEMENT;  Surgeon: Bill Deal MD;  Location: [facility] OR 18/19;  Service: Vascular;  Laterality: Left;    CATARACT EXTRACTION WITH INTRAOCULAR LENS IMPLANT Bilateral 2004    CHOLECYSTECTOMY  2011    COLECTOMY PARTIAL / TOTAL      History of inflammatory bowel disease with status post colectomy with ileostomy many years ago in the [facility],  18 YEARS OF AGE    COLON SURGERY      Colon resection with colostomy    COLON SURGERY      COLONOSCOPY  01/2018    CYSTOSCOPY LITHOLAPAXY BLADDER STONE EXTRACTION      ENDOSCOPY  01/16/2015    gastritis    ENDOSCOPY  01/17/2018    Procedure: ESOPHAGOGASTRODUODENOSCOPY;  Surgeon: Warner Neville MD;  Location: [facility] ENDOSCOPY;  Service:     ILEOSCOPY  01/16/2015    normal    ILEOSCOPY N/A 01/17/2018     Procedure: ILEOSCOPY;  Surgeon: Warner Neville MD;  Location: [facility] ENDOSCOPY;  Service:     ILEOSTOMY  12/1968    loop ostomy bypass    INCISION AND DRAINAGE ARM Left 10/27/2023    Procedure: LEFT ARM INCISION AND DRAINAGE;  Surgeon: Bill Bermeo MD;  Location: [facility] MAIN OR;  Service: Vascular;  Laterality: Left;    OTHER SURGICAL HISTORY  2009    kidney stones x3    PSEUDOANEURYSM REPAIR Left 10/27/2023    TONSILLECTOMY  1950      FAMILY HISTORY:   Family History   Problem Relation Age of Onset    Heart failure Mother 78    Hypertension Mother     Heart disease Mother     Hyperlipidemia Mother     Hypertension Father     Other Father 82        MVI    Malig Hyperthermia Neg Hx       SOCIAL HISTORY:   Social History     Tobacco Use    Smoking status: Never     Passive exposure: Never    Smokeless tobacco: Never   Substance Use Topics    Alcohol use: No     Comment: caffeine use: none    Drug use: No      MEDICATIONS:   Current Outpatient Medications on File Prior to Visit   Medication Sig Dispense Refill    alfuzosin (UROXATRAL) 10 MG 24 hr tablet Take 1 tablet by mouth Every Other Day. Does not take on tues sun or thurs      aspirin 81 MG tablet Take 1 tablet by mouth Daily. INSTRUCTED TO CONTINUE THIS FOR SURGERY PER DR. BERMEO'S OFFICE      B Complex-C-Folic Acid (Umm-Peter) tablet Take 1 tablet by mouth Daily.      Cholecalciferol 25 MCG (1000 UT) tablet Take 1 tablet by mouth Daily.      famotidine (PEPCID) 10 MG tablet Take 1 tablet by mouth As Needed for Heartburn.      febuxostat (ULORIC) 40 MG tablet Take 1 tablet by mouth Daily.      Iron Sucrose (VENOFER IV) Infuse  into a venous catheter As Needed. USUALLY ONCE MONTH      latanoprost (XALATAN) 0.005 % ophthalmic solution Administer 1 drop to both eyes Every Night. 1 drop each eye      lidocaine-prilocaine (EMLA) 2.5-2.5 % cream Apply 1 Application topically to the appropriate area as directed. Buzbpw-Bobegnmuu-Wzzsya:  ONE HOUR PRIOR TO DIALYSIS  3  "   Loratadine (CLARITIN PO) Take 1 tablet by mouth As Needed. Pt states he is uncertain of dosage      Methoxy PEG-Epoetin Beta (MIRCERA IJ) Inject  as directed Every 30 (Thirty) Days.      mupirocin (BACTROBAN) 2 % ointment Apply 1 Application topically to the appropriate area as directed 3 (Three) Times a Day. 30 g 0    sodium chloride 0.65 % nasal spray 2 sprays into the nostril(s) as directed by provider Every Night.      Sucroferric Oxyhydroxide (Velphoro) 500 MG chewable tablet Chew 1 tablet 3 (Three) Times a Day. AFTER EACH  MEAL       No current facility-administered medications on file prior to visit.       ALLERGIES: Ace inhibitors, Contrast dye (echo or unknown ct/mr), Iodine, Angiotensin receptor blockers, Eliquis [apixaban], Filgrastim, Keflex [cephalexin], and Other       Objective   Vitals:    09/12/24 1401   Weight: 79.4 kg (175 lb)   Height: 177.8 cm (70\")     Body mass index is 25.11 kg/m².          PHYSICAL EXAM:   Physical Exam   Left AV graft/fistula combined access seems to be doing very well with stable aneurysmal change and no overuse in the graft segment above that.  Good turgor and thrill throughout the entire system  Result Review   LABS:    CBC          8/21/2024    00:00 8/28/2024    00:00 9/4/2024    00:00   CBC   Hemoglobin 11.0        33.0     10.9        32.7     10.6        31.8          Details          This result is from an external source.             BMP          10/31/2023    04:35 11/1/2023    05:04 11/9/2023    12:33   BMP   BUN 44  59  39    Creatinine 5.29  6.81  5.14    Sodium 134  132  134    Potassium 3.8  4.1  3.9    Chloride 98  100  95    CO2 25.6  22.5  24.2    Calcium 8.3  8.3  8.4      Lipid Panel          9/26/2023    08:30   Lipid Panel   Total Cholesterol 148    Triglycerides 103    HDL Cholesterol 56    VLDL Cholesterol 19    LDL Cholesterol  73    LDL/HDL Ratio 1.28       INR          10/29/2023    09:36 10/30/2023    05:39 10/31/2023    04:35 11/1/2023    " 05:04   Common Labs   INR 1.34  1.30  1.28  1.25       A1C Last 3 Results          9/26/2023    08:30   HGBA1C Last 3 Results   Hemoglobin A1C 5.20         Results Review:       I reviewed the patient's new clinical results.    The following radiologic or non-invasive studies have been reviewed by me: Left AV fistula/graft duplex reviewed with images reviewed  Duplex Hemodialysis Access CAR 06/13/2024    Interpretation Summary    Patent combined left arteriovenous fistula and shunt with no evidence of significant stenosis.  Flow volumes difficult to define due to fistula size.  Volumes in the outflow shunt adequate.     No radiology results for the last 30 days.                ASSESSMENT/PLAN:   Diagnoses and all orders for this visit:    1. Aneurysm artery, upper extremity (Primary)    2. ESRD on hemodialysis  -     Duplex Hemodialysis Access CAR; Future    3. Arteriovenous fistula for hemodialysis in place, secondary  -     Duplex Hemodialysis Access CAR; Future       78 y.o. male with with stable left AV fistula/graft access.  The proximal aneurysmal degeneration has not been accessed and is holding on and are not growing.  They are using plenty of the real estate of the upper graft segment I think this is great I think that this will prolong the use of the entire system.  At this point in time flow volumes are good and there is no venous outflow stenosis.  There is some mild stenosis proximally but this can be watched and I think our plan will be another 3-month scan.    I discussed the plan with the patient who is agreeable to the plan of care at this point. Thank you for this consult.   Follow Up  Return in about 3 months (around 12/12/2024).    Bill Deal MD   09/12/24

## 2024-09-13 ENCOUNTER — TELEPHONE (OUTPATIENT)
Dept: CARDIOLOGY | Facility: CLINIC | Age: 79
End: 2024-09-13
Payer: MEDICARE

## 2024-09-13 NOTE — TELEPHONE ENCOUNTER
----- Message from Dale Váqzuez sent at 9/5/2024  4:15 PM EDT -----  Why are the appts next August cancelled? He needs to see me then and have echo same day.    Gabriela -- please print out a copy and mail to Mr Landry.    Ty  luann    I called -- mild-mod AS. Recheck one year.

## 2024-09-16 LAB
BH CV VAS DIALYSIS ARTERIAL ANASTOMOSIS DIAMETER: 0.6 CM
BH CV VAS DIALYSIS ARTERIAL ANASTOMOSIS EDV: 119 CM/SEC
BH CV VAS DIALYSIS ARTERIAL ANASTOMOSIS PSV: 259 CM/SEC
BH CV VAS DIALYSIS CONDUIT DIST DEPTH: 0.3 CM
BH CV VAS DIALYSIS CONDUIT DIST DIAMETER: 0.7 CM
BH CV VAS DIALYSIS CONDUIT DIST EDV: 41 CM/SEC
BH CV VAS DIALYSIS CONDUIT DIST PSV: 60 CM/SEC
BH CV VAS DIALYSIS CONDUIT MID DEPTH: 0.4 CM
BH CV VAS DIALYSIS CONDUIT MID DIAMETER: 1 CM
BH CV VAS DIALYSIS CONDUIT MID EDV: 54 CM/SEC
BH CV VAS DIALYSIS CONDUIT MID FLOW VOL: 2186 ML/MIN
BH CV VAS DIALYSIS CONDUIT MID PSV: 96 CM/SEC
BH CV VAS DIALYSIS CONDUIT MID/DIST DEPTH: 0.3 CM
BH CV VAS DIALYSIS CONDUIT MID/DIST DIAMETER: 1.1 CM
BH CV VAS DIALYSIS CONDUIT MID/DIST EDV: 27 CM/SEC
BH CV VAS DIALYSIS CONDUIT MID/DIST PSV: 41 CM/SEC
BH CV VAS DIALYSIS CONDUIT PROX DEPTH: 0.4 CM
BH CV VAS DIALYSIS CONDUIT PROX DIAMETER: 0.2 CM
BH CV VAS DIALYSIS CONDUIT PROX EDV: 304 CM/SEC
BH CV VAS DIALYSIS CONDUIT PROX PSV: 556 CM/SEC
BH CV VAS DIALYSIS CONDUIT PROX/MID DEPTH: 0.4 CM
BH CV VAS DIALYSIS CONDUIT PROX/MID DIAMETER: 2.3 CM
BH CV VAS DIALYSIS CONDUIT PROX/MID EDV: 19 CM/SEC
BH CV VAS DIALYSIS CONDUIT PROX/MID PSV: 61 CM/SEC
BH CV VAS DIALYSIS PRE-INFLOW BRACHIAL DIAMETER: 0.8 CM
BH CV VAS DIALYSIS PRE-INFLOW BRACHIAL EDV: 104 CM/SEC
BH CV VAS DIALYSIS PRE-INFLOW BRACHIAL FLOW VOL: 842 ML/MIN
BH CV VAS DIALYSIS PRE-INFLOW BRACHIAL PSV: 266 CM/SEC
BH CV VAS DIALYSIS VENOUS OUTFLOW CEPHALIC ARCH DIAMETE: 0.6 CM
BH CV VAS DIALYSIS VENOUS OUTFLOW CEPHALIC ARCH EDV: 76 CM/SEC
BH CV VAS DIALYSIS VENOUS OUTFLOW CEPHALIC ARCH PSV: 148 CM/SEC
BH CV VAS DIALYSIS VENOUS OUTFLOW CEPHALIC VEIN DIAMETE: 0.3 CM
BH CV VAS DIALYSIS VENOUS OUTFLOW CEPHALIC VEIN EDV: 185 CM/SEC
BH CV VAS DIALYSIS VENOUS OUTFLOW CEPHALIC VEIN PSV: 267 CM/SEC
BH CV VAS DIALYSIS VENOUS OUTFLOW SUBCL-CEPHALIC JX DIAMETER: 0.6 CM
BH CV VAS DIALYSIS VENOUS OUTFLOW SUBCL-CEPHALIC JX EDV: 178 CM/SEC
BH CV VAS DIALYSIS VENOUS OUTFLOW SUBCL-CEPHALIC JX PSV: 291 CM/SEC
BH CV VAS DIALYSIS VENOUS OUTFLOW SUBCLAVIAN DIAMETER: 1.2 CM

## 2024-09-17 LAB
ALBUMIN SERPL-MCNC: 3.3 G/DL (ref 3.5–5.2)
ALBUMIN/GLOB SERPL: 0.8 G/DL
ALP SERPL-CCNC: 212 U/L (ref 39–117)
ALT SERPL-CCNC: 17 U/L (ref 1–41)
AST SERPL-CCNC: 25 U/L (ref 1–40)
BILIRUB SERPL-MCNC: 0.8 MG/DL (ref 0–1.2)
BUN SERPL-MCNC: 52 MG/DL (ref 8–23)
BUN/CREAT SERPL: 9.7 (ref 7–25)
CALCIUM SERPL-MCNC: 8.2 MG/DL (ref 8.6–10.5)
CHLORIDE SERPL-SCNC: 99 MMOL/L (ref 98–107)
CHOLEST SERPL-MCNC: 141 MG/DL (ref 0–200)
CO2 SERPL-SCNC: 24.9 MMOL/L (ref 22–29)
CREAT SERPL-MCNC: 5.34 MG/DL (ref 0.76–1.27)
EGFRCR SERPLBLD CKD-EPI 2021: 10.3 ML/MIN/1.73
GGT SERPL-CCNC: 32 U/L (ref 8–61)
GLOBULIN SER CALC-MCNC: 4 GM/DL
GLUCOSE SERPL-MCNC: 126 MG/DL (ref 65–99)
HBA1C MFR BLD: 5.4 % (ref 4.8–5.6)
HDLC SERPL-MCNC: 59 MG/DL (ref 40–60)
LDLC SERPL CALC-MCNC: 66 MG/DL (ref 0–100)
POTASSIUM SERPL-SCNC: 3.9 MMOL/L (ref 3.5–5.2)
PROT SERPL-MCNC: 7.3 G/DL (ref 6–8.5)
SODIUM SERPL-SCNC: 136 MMOL/L (ref 136–145)
TRIGL SERPL-MCNC: 82 MG/DL (ref 0–150)
VLDLC SERPL CALC-MCNC: 16 MG/DL (ref 5–40)

## 2024-09-24 ENCOUNTER — OFFICE VISIT (OUTPATIENT)
Dept: ONCOLOGY | Facility: CLINIC | Age: 79
End: 2024-09-24
Payer: MEDICARE

## 2024-09-24 ENCOUNTER — LAB (OUTPATIENT)
Dept: LAB | Facility: HOSPITAL | Age: 79
End: 2024-09-24
Payer: MEDICARE

## 2024-09-24 VITALS
HEIGHT: 70 IN | DIASTOLIC BLOOD PRESSURE: 58 MMHG | OXYGEN SATURATION: 99 % | BODY MASS INDEX: 25.18 KG/M2 | WEIGHT: 175.9 LBS | SYSTOLIC BLOOD PRESSURE: 137 MMHG | TEMPERATURE: 97.8 F | HEART RATE: 70 BPM

## 2024-09-24 DIAGNOSIS — D69.6 THROMBOCYTOPENIA: ICD-10-CM

## 2024-09-24 DIAGNOSIS — D72.819 CHRONIC LEUKOPENIA: ICD-10-CM

## 2024-09-24 DIAGNOSIS — D50.8 OTHER IRON DEFICIENCY ANEMIAS: ICD-10-CM

## 2024-09-24 DIAGNOSIS — R16.1 SPLENOMEGALY, NOT ELSEWHERE CLASSIFIED: Primary | ICD-10-CM

## 2024-09-24 DIAGNOSIS — D73.2 CONGESTIVE SPLENOMEGALY: ICD-10-CM

## 2024-09-24 DIAGNOSIS — K74.60 CIRRHOSIS OF LIVER WITHOUT ASCITES, UNSPECIFIED HEPATIC CIRRHOSIS TYPE: ICD-10-CM

## 2024-09-24 DIAGNOSIS — R16.1 SPLENOMEGALY, NOT ELSEWHERE CLASSIFIED: ICD-10-CM

## 2024-09-24 LAB
BASOPHILS # BLD AUTO: 0.03 10*3/MM3 (ref 0–0.2)
BASOPHILS NFR BLD AUTO: 0.8 % (ref 0–1.5)
DEPRECATED RDW RBC AUTO: 55.3 FL (ref 37–54)
EOSINOPHIL # BLD AUTO: 0.06 10*3/MM3 (ref 0–0.4)
EOSINOPHIL NFR BLD AUTO: 1.6 % (ref 0.3–6.2)
ERYTHROCYTE [DISTWIDTH] IN BLOOD BY AUTOMATED COUNT: 15 % (ref 12.3–15.4)
HCT VFR BLD AUTO: 32.1 % (ref 37.5–51)
HGB BLD-MCNC: 10.7 G/DL (ref 13–17.7)
IMM GRANULOCYTES # BLD AUTO: 0.05 10*3/MM3 (ref 0–0.05)
IMM GRANULOCYTES NFR BLD AUTO: 1.3 % (ref 0–0.5)
LYMPHOCYTES # BLD AUTO: 0.79 10*3/MM3 (ref 0.7–3.1)
LYMPHOCYTES NFR BLD AUTO: 20.5 % (ref 19.6–45.3)
MCH RBC QN AUTO: 33.4 PG (ref 26.6–33)
MCHC RBC AUTO-ENTMCNC: 33.3 G/DL (ref 31.5–35.7)
MCV RBC AUTO: 100.3 FL (ref 79–97)
MONOCYTES # BLD AUTO: 0.43 10*3/MM3 (ref 0.1–0.9)
MONOCYTES NFR BLD AUTO: 11.1 % (ref 5–12)
NEUTROPHILS NFR BLD AUTO: 2.5 10*3/MM3 (ref 1.7–7)
NEUTROPHILS NFR BLD AUTO: 64.7 % (ref 42.7–76)
NRBC BLD AUTO-RTO: 0 /100 WBC (ref 0–0.2)
PLATELET # BLD AUTO: 74 10*3/MM3 (ref 140–450)
PMV BLD AUTO: 9.7 FL (ref 6–12)
RBC # BLD AUTO: 3.2 10*6/MM3 (ref 4.14–5.8)
WBC NRBC COR # BLD AUTO: 3.86 10*3/MM3 (ref 3.4–10.8)

## 2024-09-24 PROCEDURE — 1126F AMNT PAIN NOTED NONE PRSNT: CPT | Performed by: INTERNAL MEDICINE

## 2024-09-24 PROCEDURE — 85025 COMPLETE CBC W/AUTO DIFF WBC: CPT

## 2024-09-24 PROCEDURE — 99214 OFFICE O/P EST MOD 30 MIN: CPT | Performed by: INTERNAL MEDICINE

## 2024-09-24 PROCEDURE — 1160F RVW MEDS BY RX/DR IN RCRD: CPT | Performed by: INTERNAL MEDICINE

## 2024-09-24 PROCEDURE — 1159F MED LIST DOCD IN RCRD: CPT | Performed by: INTERNAL MEDICINE

## 2024-09-24 PROCEDURE — G2211 COMPLEX E/M VISIT ADD ON: HCPCS | Performed by: INTERNAL MEDICINE

## 2024-09-24 PROCEDURE — 36415 COLL VENOUS BLD VENIPUNCTURE: CPT

## 2024-11-13 ENCOUNTER — APPOINTMENT (OUTPATIENT)
Dept: CARDIOLOGY | Facility: HOSPITAL | Age: 79
End: 2024-11-13
Payer: MEDICARE

## 2024-11-13 ENCOUNTER — ANESTHESIA EVENT (OUTPATIENT)
Dept: PERIOP | Facility: HOSPITAL | Age: 79
End: 2024-11-13
Payer: MEDICARE

## 2024-11-13 ENCOUNTER — HOSPITAL ENCOUNTER (OUTPATIENT)
Facility: HOSPITAL | Age: 79
Discharge: HOME | End: 2024-11-14
Attending: EMERGENCY MEDICINE | Admitting: STUDENT IN AN ORGANIZED HEALTH CARE EDUCATION/TRAINING PROGRAM
Payer: MEDICARE

## 2024-11-13 ENCOUNTER — APPOINTMENT (OUTPATIENT)
Dept: GENERAL RADIOLOGY | Facility: HOSPITAL | Age: 79
End: 2024-11-13
Payer: MEDICARE

## 2024-11-13 ENCOUNTER — ANESTHESIA (OUTPATIENT)
Dept: PERIOP | Facility: HOSPITAL | Age: 79
End: 2024-11-13
Payer: MEDICARE

## 2024-11-13 DIAGNOSIS — Z99.2 END STAGE RENAL DISEASE ON DIALYSIS: ICD-10-CM

## 2024-11-13 DIAGNOSIS — T82.898A AV FISTULA OCCLUSION, INITIAL ENCOUNTER: ICD-10-CM

## 2024-11-13 DIAGNOSIS — N18.6 END STAGE RENAL DISEASE ON DIALYSIS: ICD-10-CM

## 2024-11-13 DIAGNOSIS — Z99.2 ESRD ON DIALYSIS: ICD-10-CM

## 2024-11-13 DIAGNOSIS — N18.6 ESRD ON DIALYSIS: ICD-10-CM

## 2024-11-13 DIAGNOSIS — Z78.9 PROBLEM WITH VASCULAR ACCESS: Primary | ICD-10-CM

## 2024-11-13 DIAGNOSIS — D69.6 THROMBOCYTOPENIA: ICD-10-CM

## 2024-11-13 LAB
ALBUMIN SERPL-MCNC: 3.2 G/DL (ref 3.5–5.2)
ALP SERPL-CCNC: 210 U/L (ref 39–117)
ALT SERPL W P-5'-P-CCNC: 19 U/L (ref 1–41)
ANION GAP SERPL CALCULATED.3IONS-SCNC: 14 MMOL/L (ref 5–15)
AST SERPL-CCNC: 30 U/L (ref 1–40)
BASOPHILS # BLD AUTO: 0.01 10*3/MM3 (ref 0–0.2)
BASOPHILS NFR BLD AUTO: 0.3 % (ref 0–1.5)
BH CV VAS DIALYSIS ARTERIAL ANASTOMOSIS DIAMETER: 0.3 CM
BH CV VAS DIALYSIS ARTERIAL ANASTOMOSIS PSV: 138 CM/SEC
BH CV VAS DIALYSIS CONDUIT DIST PSV: 0 CM/SEC
BH CV VAS DIALYSIS CONDUIT MID PSV: 0 CM/SEC
BH CV VAS DIALYSIS CONDUIT PROX DIAMETER: 3.3 CM
BH CV VAS DIALYSIS CONDUIT PROX PSV: 26.3 CM/SEC
BH CV VAS DIALYSIS PRE-INFLOW BRACHIAL DIAMETER: 0.7 CM
BH CV VAS DIALYSIS PRE-INFLOW BRACHIAL PSV: 90.1 CM/SEC
BH CV VAS DIALYSIS PRE-INFLOW SUBCLAV PSV: 90.1 CM/SEC
BH CV VAS DIALYSIS PRE-INFLOW SUBCLAVIAN DIAMETER: 1 CM
BH CV VAS DIALYSIS VENOUS ANASTOMOSIS DIAMETER: 0.7 CM
BH CV VAS DIALYSIS VENOUS ANASTOMOSIS PSV: 0 CM/SEC
BILIRUB CONJ SERPL-MCNC: 0.4 MG/DL (ref 0–0.3)
BILIRUB INDIRECT SERPL-MCNC: 0.4 MG/DL
BILIRUB SERPL-MCNC: 0.8 MG/DL (ref 0–1.2)
BUN SERPL-MCNC: 77 MG/DL (ref 8–23)
BUN/CREAT SERPL: 10.3 (ref 7–25)
CALCIUM SPEC-SCNC: 8.6 MG/DL (ref 8.6–10.5)
CHLORIDE SERPL-SCNC: 94 MMOL/L (ref 98–107)
CO2 SERPL-SCNC: 24 MMOL/L (ref 22–29)
CREAT SERPL-MCNC: 7.51 MG/DL (ref 0.76–1.27)
DEPRECATED RDW RBC AUTO: 50.1 FL (ref 37–54)
EGFRCR SERPLBLD CKD-EPI 2021: 6.8 ML/MIN/1.73
EOSINOPHIL # BLD AUTO: 0.04 10*3/MM3 (ref 0–0.4)
EOSINOPHIL NFR BLD AUTO: 1.3 % (ref 0.3–6.2)
ERYTHROCYTE [DISTWIDTH] IN BLOOD BY AUTOMATED COUNT: 13.4 % (ref 12.3–15.4)
GLUCOSE BLDC GLUCOMTR-MCNC: 88 MG/DL (ref 70–130)
GLUCOSE BLDC GLUCOMTR-MCNC: 92 MG/DL (ref 70–130)
GLUCOSE SERPL-MCNC: 101 MG/DL (ref 65–99)
HCT VFR BLD AUTO: 29 % (ref 37.5–51)
HGB BLD-MCNC: 9.6 G/DL (ref 13–17.7)
IMM GRANULOCYTES # BLD AUTO: 0.01 10*3/MM3 (ref 0–0.05)
IMM GRANULOCYTES NFR BLD AUTO: 0.3 % (ref 0–0.5)
INR PPP: 1.35 (ref 0.9–1.1)
LYMPHOCYTES # BLD AUTO: 0.31 10*3/MM3 (ref 0.7–3.1)
LYMPHOCYTES NFR BLD AUTO: 10.2 % (ref 19.6–45.3)
MCH RBC QN AUTO: 33.7 PG (ref 26.6–33)
MCHC RBC AUTO-ENTMCNC: 33.1 G/DL (ref 31.5–35.7)
MCV RBC AUTO: 101.8 FL (ref 79–97)
MONOCYTES # BLD AUTO: 0.27 10*3/MM3 (ref 0.1–0.9)
MONOCYTES NFR BLD AUTO: 8.9 % (ref 5–12)
NEUTROPHILS NFR BLD AUTO: 2.41 10*3/MM3 (ref 1.7–7)
NEUTROPHILS NFR BLD AUTO: 79 % (ref 42.7–76)
PLATELET # BLD AUTO: 59 10*3/MM3 (ref 140–450)
PMV BLD AUTO: 9.2 FL (ref 6–12)
POTASSIUM SERPL-SCNC: 3.9 MMOL/L (ref 3.5–5.2)
PROT SERPL-MCNC: 6.9 G/DL (ref 6–8.5)
PROTHROMBIN TIME: 16.9 SECONDS (ref 11.7–14.2)
RBC # BLD AUTO: 2.85 10*6/MM3 (ref 4.14–5.8)
SODIUM SERPL-SCNC: 132 MMOL/L (ref 136–145)
WBC NRBC COR # BLD AUTO: 3.05 10*3/MM3 (ref 3.4–10.8)

## 2024-11-13 PROCEDURE — 82948 REAGENT STRIP/BLOOD GLUCOSE: CPT

## 2024-11-13 PROCEDURE — 80076 HEPATIC FUNCTION PANEL: CPT | Performed by: STUDENT IN AN ORGANIZED HEALTH CARE EDUCATION/TRAINING PROGRAM

## 2024-11-13 PROCEDURE — 85025 COMPLETE CBC W/AUTO DIFF WBC: CPT | Performed by: EMERGENCY MEDICINE

## 2024-11-13 PROCEDURE — 63710000001 B COMPLEX-VITAMIN C-FOLIC ACID 0.8 MG TABLET: Performed by: STUDENT IN AN ORGANIZED HEALTH CARE EDUCATION/TRAINING PROGRAM

## 2024-11-13 PROCEDURE — 25010000002 LIDOCAINE 2% SOLUTION

## 2024-11-13 PROCEDURE — G0378 HOSPITAL OBSERVATION PER HR: HCPCS

## 2024-11-13 PROCEDURE — C1894 INTRO/SHEATH, NON-LASER: HCPCS | Performed by: SURGERY

## 2024-11-13 PROCEDURE — 25810000003 LACTATED RINGERS PER 1000 ML: Performed by: ANESTHESIOLOGY

## 2024-11-13 PROCEDURE — 63710000001 LATANOPROST 0.005 % SOLUTION 2.5 ML BOTTLE: Performed by: SURGERY

## 2024-11-13 PROCEDURE — 25010000002 PROPOFOL 200 MG/20ML EMULSION

## 2024-11-13 PROCEDURE — 99214 OFFICE O/P EST MOD 30 MIN: CPT | Performed by: INTERNAL MEDICINE

## 2024-11-13 PROCEDURE — 99285 EMERGENCY DEPT VISIT HI MDM: CPT

## 2024-11-13 PROCEDURE — C1750 CATH, HEMODIALYSIS,LONG-TERM: HCPCS | Performed by: SURGERY

## 2024-11-13 PROCEDURE — 99215 OFFICE O/P EST HI 40 MIN: CPT | Performed by: SURGERY

## 2024-11-13 PROCEDURE — A9270 NON-COVERED ITEM OR SERVICE: HCPCS | Performed by: STUDENT IN AN ORGANIZED HEALTH CARE EDUCATION/TRAINING PROGRAM

## 2024-11-13 PROCEDURE — 80048 BASIC METABOLIC PNL TOTAL CA: CPT | Performed by: EMERGENCY MEDICINE

## 2024-11-13 PROCEDURE — 25810000003 SODIUM CHLORIDE 0.9 % SOLUTION: Performed by: SURGERY

## 2024-11-13 PROCEDURE — 76937 US GUIDE VASCULAR ACCESS: CPT | Performed by: SURGERY

## 2024-11-13 PROCEDURE — 85610 PROTHROMBIN TIME: CPT | Performed by: EMERGENCY MEDICINE

## 2024-11-13 PROCEDURE — 25010000002 BUPIVACAINE (PF) 0.25 % SOLUTION 30 ML VIAL: Performed by: SURGERY

## 2024-11-13 PROCEDURE — 63710000001 TAMSULOSIN 0.4 MG CAPSULE: Performed by: STUDENT IN AN ORGANIZED HEALTH CARE EDUCATION/TRAINING PROGRAM

## 2024-11-13 PROCEDURE — 93990 DOPPLER FLOW TESTING: CPT

## 2024-11-13 PROCEDURE — A9270 NON-COVERED ITEM OR SERVICE: HCPCS | Performed by: SURGERY

## 2024-11-13 PROCEDURE — 25010000002 HEPARIN (PORCINE) PER 1000 UNITS: Performed by: SURGERY

## 2024-11-13 PROCEDURE — 63710000001 ACETAMINOPHEN EXTRA STRENGTH 500 MG TABLET: Performed by: SURGERY

## 2024-11-13 PROCEDURE — 93990 DOPPLER FLOW TESTING: CPT | Performed by: SURGERY

## 2024-11-13 PROCEDURE — 36558 INSERT TUNNELED CV CATH: CPT | Performed by: SURGERY

## 2024-11-13 PROCEDURE — 77001 FLUOROGUIDE FOR VEIN DEVICE: CPT | Performed by: SURGERY

## 2024-11-13 PROCEDURE — 25010000002 CEFAZOLIN PER 500 MG: Performed by: SURGERY

## 2024-11-13 PROCEDURE — 25010000002 LIDOCAINE 1 % SOLUTION 20 ML VIAL: Performed by: SURGERY

## 2024-11-13 RX ORDER — LIDOCAINE HYDROCHLORIDE 20 MG/ML
INJECTION, SOLUTION INFILTRATION; PERINEURAL AS NEEDED
Status: DISCONTINUED | OUTPATIENT
Start: 2024-11-13 | End: 2024-11-13 | Stop reason: SURG

## 2024-11-13 RX ORDER — LIDOCAINE HYDROCHLORIDE 10 MG/ML
0.5 INJECTION, SOLUTION INFILTRATION; PERINEURAL ONCE AS NEEDED
Status: DISCONTINUED | OUTPATIENT
Start: 2024-11-13 | End: 2024-11-13 | Stop reason: HOSPADM

## 2024-11-13 RX ORDER — TAMSULOSIN HYDROCHLORIDE 0.4 MG/1
0.4 CAPSULE ORAL NIGHTLY
Status: DISCONTINUED | OUTPATIENT
Start: 2024-11-13 | End: 2024-11-14 | Stop reason: HOSPADM

## 2024-11-13 RX ORDER — FAMOTIDINE 10 MG/ML
20 INJECTION, SOLUTION INTRAVENOUS ONCE
Status: COMPLETED | OUTPATIENT
Start: 2024-11-13 | End: 2024-11-13

## 2024-11-13 RX ORDER — SODIUM CHLORIDE 0.9 % (FLUSH) 0.9 %
10 SYRINGE (ML) INJECTION AS NEEDED
Status: DISCONTINUED | OUTPATIENT
Start: 2024-11-13 | End: 2024-11-14 | Stop reason: HOSPADM

## 2024-11-13 RX ORDER — ACETAMINOPHEN 500 MG
1000 TABLET ORAL EVERY 6 HOURS SCHEDULED
Status: DISCONTINUED | OUTPATIENT
Start: 2024-11-13 | End: 2024-11-14 | Stop reason: HOSPADM

## 2024-11-13 RX ORDER — SODIUM CHLORIDE 0.9 % (FLUSH) 0.9 %
10 SYRINGE (ML) INJECTION EVERY 12 HOURS SCHEDULED
Status: DISCONTINUED | OUTPATIENT
Start: 2024-11-13 | End: 2024-11-14 | Stop reason: HOSPADM

## 2024-11-13 RX ORDER — FENTANYL CITRATE 50 UG/ML
50 INJECTION, SOLUTION INTRAMUSCULAR; INTRAVENOUS ONCE AS NEEDED
Status: DISCONTINUED | OUTPATIENT
Start: 2024-11-13 | End: 2024-11-13 | Stop reason: HOSPADM

## 2024-11-13 RX ORDER — SODIUM CHLORIDE 0.9 % (FLUSH) 0.9 %
3 SYRINGE (ML) INJECTION EVERY 12 HOURS SCHEDULED
Status: DISCONTINUED | OUTPATIENT
Start: 2024-11-13 | End: 2024-11-13 | Stop reason: HOSPADM

## 2024-11-13 RX ORDER — MIDAZOLAM HYDROCHLORIDE 1 MG/ML
0.5 INJECTION, SOLUTION INTRAMUSCULAR; INTRAVENOUS
Status: DISCONTINUED | OUTPATIENT
Start: 2024-11-13 | End: 2024-11-13 | Stop reason: HOSPADM

## 2024-11-13 RX ORDER — OXYCODONE HYDROCHLORIDE 5 MG/1
5 TABLET ORAL EVERY 4 HOURS PRN
Status: DISCONTINUED | OUTPATIENT
Start: 2024-11-13 | End: 2024-11-14 | Stop reason: HOSPADM

## 2024-11-13 RX ORDER — SODIUM CHLORIDE 0.9 % (FLUSH) 0.9 %
3-10 SYRINGE (ML) INJECTION AS NEEDED
Status: DISCONTINUED | OUTPATIENT
Start: 2024-11-13 | End: 2024-11-13 | Stop reason: HOSPADM

## 2024-11-13 RX ORDER — SODIUM CHLORIDE 9 MG/ML
40 INJECTION, SOLUTION INTRAVENOUS AS NEEDED
Status: DISCONTINUED | OUTPATIENT
Start: 2024-11-13 | End: 2024-11-14 | Stop reason: HOSPADM

## 2024-11-13 RX ORDER — HEPARIN SODIUM 1000 [USP'U]/ML
INJECTION, SOLUTION INTRAVENOUS; SUBCUTANEOUS AS NEEDED
Status: DISCONTINUED | OUTPATIENT
Start: 2024-11-13 | End: 2024-11-13 | Stop reason: HOSPADM

## 2024-11-13 RX ORDER — LORATADINE 10 MG/1
1 CAPSULE, LIQUID FILLED ORAL DAILY
COMMUNITY

## 2024-11-13 RX ORDER — NITROGLYCERIN 0.4 MG/1
0.4 TABLET SUBLINGUAL
Status: DISCONTINUED | OUTPATIENT
Start: 2024-11-13 | End: 2024-11-14 | Stop reason: HOSPADM

## 2024-11-13 RX ORDER — SODIUM CHLORIDE, SODIUM LACTATE, POTASSIUM CHLORIDE, CALCIUM CHLORIDE 600; 310; 30; 20 MG/100ML; MG/100ML; MG/100ML; MG/100ML
9 INJECTION, SOLUTION INTRAVENOUS CONTINUOUS
Status: DISCONTINUED | OUTPATIENT
Start: 2024-11-13 | End: 2024-11-13

## 2024-11-13 RX ORDER — LATANOPROST 50 UG/ML
1 SOLUTION/ DROPS OPHTHALMIC NIGHTLY
Status: DISCONTINUED | OUTPATIENT
Start: 2024-11-13 | End: 2024-11-14 | Stop reason: HOSPADM

## 2024-11-13 RX ORDER — PROPOFOL 10 MG/ML
INJECTION, EMULSION INTRAVENOUS AS NEEDED
Status: DISCONTINUED | OUTPATIENT
Start: 2024-11-13 | End: 2024-11-13 | Stop reason: SURG

## 2024-11-13 RX ORDER — EPHEDRINE SULFATE 50 MG/ML
5 INJECTION, SOLUTION INTRAVENOUS ONCE AS NEEDED
Status: DISCONTINUED | OUTPATIENT
Start: 2024-11-13 | End: 2024-11-13 | Stop reason: HOSPADM

## 2024-11-13 RX ORDER — LABETALOL HYDROCHLORIDE 5 MG/ML
5 INJECTION, SOLUTION INTRAVENOUS
Status: DISCONTINUED | OUTPATIENT
Start: 2024-11-13 | End: 2024-11-13 | Stop reason: HOSPADM

## 2024-11-13 RX ORDER — HYDRALAZINE HYDROCHLORIDE 20 MG/ML
5 INJECTION INTRAMUSCULAR; INTRAVENOUS
Status: DISCONTINUED | OUTPATIENT
Start: 2024-11-13 | End: 2024-11-13 | Stop reason: HOSPADM

## 2024-11-13 RX ORDER — FAMOTIDINE 10 MG
1 TABLET ORAL DAILY PRN
COMMUNITY

## 2024-11-13 RX ORDER — SODIUM CHLORIDE 9 MG/ML
9 INJECTION, SOLUTION INTRAVENOUS CONTINUOUS
Status: DISCONTINUED | OUTPATIENT
Start: 2024-11-13 | End: 2024-11-13

## 2024-11-13 RX ORDER — IPRATROPIUM BROMIDE AND ALBUTEROL SULFATE 2.5; .5 MG/3ML; MG/3ML
3 SOLUTION RESPIRATORY (INHALATION) ONCE AS NEEDED
Status: DISCONTINUED | OUTPATIENT
Start: 2024-11-13 | End: 2024-11-13 | Stop reason: HOSPADM

## 2024-11-13 RX ORDER — FOLIC ACID/VIT B COMPLEX AND C 0.8 MG
1 TABLET ORAL DAILY
Status: DISCONTINUED | OUTPATIENT
Start: 2024-11-13 | End: 2024-11-14 | Stop reason: HOSPADM

## 2024-11-13 RX ORDER — NALOXONE HCL 0.4 MG/ML
0.2 VIAL (ML) INJECTION AS NEEDED
Status: DISCONTINUED | OUTPATIENT
Start: 2024-11-13 | End: 2024-11-13 | Stop reason: HOSPADM

## 2024-11-13 RX ADMIN — TAMSULOSIN HYDROCHLORIDE 0.4 MG: 0.4 CAPSULE ORAL at 21:51

## 2024-11-13 RX ADMIN — SODIUM CHLORIDE 9 ML/HR: 9 INJECTION, SOLUTION INTRAVENOUS at 15:16

## 2024-11-13 RX ADMIN — PROPOFOL 50 MG: 10 INJECTION, EMULSION INTRAVENOUS at 16:03

## 2024-11-13 RX ADMIN — SODIUM CHLORIDE 2000 MG: 900 INJECTION INTRAVENOUS at 15:55

## 2024-11-13 RX ADMIN — SODIUM CHLORIDE, POTASSIUM CHLORIDE, SODIUM LACTATE AND CALCIUM CHLORIDE: 600; 310; 30; 20 INJECTION, SOLUTION INTRAVENOUS at 16:01

## 2024-11-13 RX ADMIN — ACETAMINOPHEN 1000 MG: 500 TABLET ORAL at 23:34

## 2024-11-13 RX ADMIN — LATANOPROST 1 DROP: 50 SOLUTION OPHTHALMIC at 20:37

## 2024-11-13 RX ADMIN — Medication 1 TABLET: at 23:34

## 2024-11-13 RX ADMIN — PROPOFOL 80 MCG/KG/MIN: 10 INJECTION, EMULSION INTRAVENOUS at 16:07

## 2024-11-13 RX ADMIN — LIDOCAINE HYDROCHLORIDE 80 MG: 20 INJECTION, SOLUTION INFILTRATION; PERINEURAL at 16:01

## 2024-11-13 RX ADMIN — FAMOTIDINE 20 MG: 10 INJECTION INTRAVENOUS at 15:14

## 2024-11-13 RX ADMIN — Medication 10 ML: at 20:37

## 2024-11-13 NOTE — ANESTHESIA PREPROCEDURE EVALUATION
Anesthesia Evaluation                  Airway   Mallampati: II  TM distance: >3 FB  Neck ROM: full  Dental - normal exam     Pulmonary    (+) ,sleep apnea  (-) decreased breath sounds, wheezes  Cardiovascular - normal exam    (+) valvular problems/murmurs, dysrhythmias, pericardial effusion, hyperlipidemia      Neuro/Psych  GI/Hepatic/Renal/Endo    (+) GERD, GI bleeding , liver disease, renal disease- ESRD and dialysis, diabetes mellitus    Musculoskeletal     Abdominal    Substance History      OB/GYN          Other                      Anesthesia Plan    ASA 3     MAC     (K 3.9)  intravenous induction     Anesthetic plan, risks, benefits, and alternatives have been provided, discussed and informed consent has been obtained with: spouse/significant other.      CODE STATUS:    Code Status (Patient has no pulse and is not breathing): CPR (Attempt to Resuscitate)  Medical Interventions (Patient has pulse or is breathing): Full Support

## 2024-11-13 NOTE — ED NOTES
Attempt to call report to 5s, nurse to call back she is in huddle. Spoke with vascular and made them aware the patient will go to room 517 after vascular, not back to ED.

## 2024-11-13 NOTE — H&P
Patient Name:  Bill Landry  YOB: 1945  MRN:  [id number]  Admit Date:  11/13/2024  Patient Care Team:  Bharathi De La Torre MD as PCP - General (Internal Medicine)  Ken Moseley MD as Consulting Physician (Pulmonary Disease)  aDle Vázquez MD as Cardiologist (Cardiology)  Myra Moore MD as Consulting Physician (Nephrology)  Theo Pacheco MD as Consulting Physician (Hematology and Oncology)  Preston Ureña MD as Consulting Physician (Urology)  Ernesto Ward MD as Consulting Physician (Ophthalmology)  Bill Deal MD as Surgeon (Vascular Surgery)  Dimple Villanueva DPM as Consulting Physician (Podiatry)  Boom Olivares II, MD as Referring Physician (Vascular Surgery)      Subjective   History Present Illness     Chief Complaint   Patient presents with    Vascular Access Problem       Mr. Landry is a 79 y.o. Male with a history of HTN, ESRD HD MWF, thrombocytopenia, anemia, AS, A. fib that presents to [facility] complaining of malfunctioning left arm fistula.  Patient went to HD today, last HD Monday, and they were unable to access fistula and felt like it was clotted.  He does admit to history of clotted fistula.  Unable to tolerate AC due to hx AVM and thrombocytopenia.  States the fistula has been swelling up over the past two days.  Sent to [facility] ED for evaluation.     In the ER, vitals 97.5F, HR 75, /66, RR 16, 99% RA. Labs notable for Sodium 132, Cl 94, BUN 77, Cr 7.51, inr 1.35, wbc 3.05, hgb 9.6, plt 59.  Vascular plans to placed TDC for HD access then consider revision of graft.  Medicine consulted for admission       History of Present Illness    Review of Systems   12 point ROS reviewed and negative except as mentioned above      Personal History     Past Medical History:   Diagnosis Date    Abnormal serum protein electrophoresis     Acquired absence of other specified parts of digestive tract     History of colectomy     Anemia in chronic kidney disease     Aneurysm of artery of arm     let arm    Aortic stenosis     Bicuspid aortic valve 07/20/2022    Calculus of kidney     Chronic leukopenia and thrombocytopenia     Cirrhosis of liver without ascites 07/24/2017    Congestive splenomegaly 07/24/2017    Crohn disease     with ileostomy    Decreased white blood cell count, unspecified     Diverticula of colon     ESRD on hemodialysis 04/05/2018    M-W-F FRESENIUS    Fatty liver disease, nonalcoholic     Fistula 04/05/2018    Other Specified Complication    Gastrointestinal hemorrhage, unspecified     GERD (gastroesophageal reflux disease)     GI bleed     Glaucoma     H/O Gallstone pancreatitis     H/O Pericardial effusion     H/O sinus bradycardia     History of hypertension     resolved    History of UTI     Hx of renal calculi     Ileostomy present     Iron deficiency     Leakage of heart valve prosthesis, subsequent encounter     MGUS (monoclonal gammopathy of unknown significance)     TATE (obstructive sleep apnea)     Other hemoglobinopathies     Pericardial effusion (noninflammatory)     Permanent atrial fibrillation     Scarlet fever, uncomplicated     Stenosis of other vascular prosthetic devices, implants and grafts, initial encounter 02/14/2022    Systemic lupus erythematosus, unspecified     Thrombocytopenia     undpecified    Type 2 diabetes mellitus     CURRENTLY TAKES NO MED    Urinary retention     Post-OP     Past Surgical History:   Procedure Laterality Date    APPENDECTOMY  06/1968    ARTERIOVENOUS FISTULA/SHUNT SURGERY Left 08/08/2017    Procedure: LEFT BRACHIAL CEPHALIC AV FISTULA FORMATION WITH CEPHALIC VEIN TRANSPOSITION ;  Surgeon: Bill Deal MD;  Location: [facility] OR;  Service:     ARTERIOVENOUS FISTULA/SHUNT SURGERY Left 05/27/2022    Procedure: OPEN REVISION LEFT ARTERIOVENOUS INTERPOSITION GRAFT PLACEMENT;  Surgeon: Bill Deal MD;  Location: [facility];  Service: Vascular;  Laterality:  Left;    ARTERIOVENOUS FISTULA/SHUNT SURGERY W/ HEMODIALYSIS CATHETER INSERTION Left 02/15/2022    Procedure: OPEN LEFT ARM ARTERIOVENOUS FISTULA THROMBECTOMY WITH CEPHALIC VEIN ARTHROPLASTY AND STENT/GRAFT PLACEMENT;  Surgeon: Bill Deal MD;  Location: [facility] OR 18/19;  Service: Vascular;  Laterality: Left;    CATARACT EXTRACTION WITH INTRAOCULAR LENS IMPLANT Bilateral 2004    CHOLECYSTECTOMY  2011    COLECTOMY PARTIAL / TOTAL      History of inflammatory bowel disease with status post colectomy with ileostomy many years ago in the [facility],  18 YEARS OF AGE    COLON SURGERY      Colon resection with colostomy    COLON SURGERY      COLONOSCOPY  01/2018    CYSTOSCOPY LITHOLAPAXY BLADDER STONE EXTRACTION      ENDOSCOPY  01/16/2015    gastritis    ENDOSCOPY  01/17/2018    Procedure: ESOPHAGOGASTRODUODENOSCOPY;  Surgeon: Warner Neville MD;  Location: [facility] ENDOSCOPY;  Service:     ILEOSCOPY  01/16/2015    normal    ILEOSCOPY N/A 01/17/2018    Procedure: ILEOSCOPY;  Surgeon: Warner Nevlile MD;  Location: [facility] ENDOSCOPY;  Service:     ILEOSTOMY  12/1968    loop ostomy bypass    INCISION AND DRAINAGE ARM Left 10/27/2023    Procedure: LEFT ARM INCISION AND DRAINAGE;  Surgeon: Bill Deal MD;  Location: [facility] MAIN OR;  Service: Vascular;  Laterality: Left;    OTHER SURGICAL HISTORY  2009    kidney stones x3    PSEUDOANEURYSM REPAIR Left 10/27/2023    TONSILLECTOMY  1950     Family History   Problem Relation Age of Onset    Heart failure Mother 78    Hypertension Mother     Heart disease Mother     Hyperlipidemia Mother     Hypertension Father     Other Father 82        MVI    Malig Hyperthermia Neg Hx      Social History     Tobacco Use    Smoking status: Never     Passive exposure: Never    Smokeless tobacco: Never   Substance Use Topics    Alcohol use: No     Comment: caffeine use: none    Drug use: No     No current facility-administered medications on file prior to encounter.     Current  Outpatient Medications on File Prior to Encounter   Medication Sig Dispense Refill    alfuzosin (UROXATRAL) 10 MG 24 hr tablet Take 1 tablet by mouth Every Other Day. Does not take on Sundays Tuesdays or Thursdays      aspirin 81 MG tablet Take 1 tablet by mouth Daily. INSTRUCTED TO CONTINUE THIS FOR SURGERY PER DR. BERMEO'S OFFICE      B Complex-C-Folic Acid (Umm-Peter) tablet Take 1 tablet by mouth Daily.      Cholecalciferol 25 MCG (1000 UT) tablet Take 1 tablet by mouth Daily.      Iron Sucrose (VENOFER IV) Infuse 1 dose into a venous catheter As Needed.      latanoprost (XALATAN) 0.005 % ophthalmic solution Administer 1 drop to both eyes Every Night.      lidocaine-prilocaine (EMLA) 2.5-2.5 % cream Apply 1 Application topically to the appropriate area as directed As Needed. Dfqjla-Xzeyrdzve-Bqocwt:  ONE HOUR PRIOR TO DIALYSIS  3    Methoxy PEG-Epoetin Beta (MIRCERA IJ) Inject 1 dose as directed Every 30 (Thirty) Days.      sodium chloride 0.65 % nasal spray Administer 2 sprays into the nostril(s) as directed by provider Every Night.      Sucroferric Oxyhydroxide (Velphoro) 500 MG chewable tablet Chew 1 tablet Daily.      [DISCONTINUED] famotidine (PEPCID) 10 MG tablet Take 1 tablet by mouth As Needed for Heartburn.      [DISCONTINUED] febuxostat (ULORIC) 40 MG tablet Take 1 tablet by mouth Daily.      [DISCONTINUED] Loratadine (CLARITIN PO) Take 1 tablet by mouth As Needed. Pt states he is uncertain of dosage      [DISCONTINUED] mupirocin (BACTROBAN) 2 % ointment Apply 1 Application topically to the appropriate area as directed 3 (Three) Times a Day. 30 g 0     Allergies   Allergen Reactions    Ace Inhibitors Other (See Comments)     RENAL FAILURE/ raised creatinine    Contrast Dye (Echo Or Unknown Ct/Mr) Other (See Comments) and Anaphylaxis     RENAL FAILURE    Iodine Anaphylaxis    Angiotensin Receptor Blockers Swelling    Eliquis [Apixaban] Arrhythmia     BLEEDING ISSUES; PT CANNOT TAKE ANY ANTICOAGULANTS DUE  TO LOW PLATELETS EXCEPT ASPIRIN      Filgrastim GI Intolerance and Confusion     Chest pain    Keflex [Cephalexin] Diarrhea     RENAL FAILURE    Other Angioedema     IVP Dye       Objective    Objective     Vital Signs  Temp:  [97.5 °F (36.4 °C)] 97.5 °F (36.4 °C)  Heart Rate:  [75-90] 75  Resp:  [16] 16  BP: (139-151)/(66-80) 139/66  SpO2:  [91 %-99 %] 99 %  on   ;      Body mass index is 25.31 kg/m².    Physical Exam  Constitutional:       General: He is not in acute distress.     Appearance: Normal appearance.   HENT:      Head: Normocephalic and atraumatic.      Nose: Nose normal. No congestion.      Mouth/Throat:      Pharynx: Oropharynx is clear. No oropharyngeal exudate.   Eyes:      General: No scleral icterus.  Cardiovascular:      Rate and Rhythm: Normal rate and regular rhythm.      Heart sounds: No murmur heard.     No friction rub. No gallop.   Pulmonary:      Effort: No respiratory distress.      Breath sounds: No wheezing or rales.   Abdominal:      General: There is no distension.      Tenderness: There is no abdominal tenderness. There is no guarding.      Comments: Ileostomy present   Musculoskeletal:         General: No swelling, deformity or signs of injury.      Cervical back: Normal range of motion. No rigidity.   Skin:     Comments: Ecchymosis around fistula with decreased thrill, swelling also noted   Neurological:      General: No focal deficit present.      Mental Status: He is alert and oriented to person, place, and time.      Motor: No weakness.         Results Review:  I reviewed the patient's new clinical results.  I reviewed the patient's new imaging results and agree with the interpretation.  I reviewed the patient's other test results and agree with the interpretation  I personally viewed and interpreted the patient's EKG/Telemetry data  Discussed with ED provider.    Lab Results (last 24 hours)       Procedure Component Value Units Date/Time    CBC & Differential [[id number]]   (Abnormal) Collected: 11/13/24 0903    Specimen: Blood Updated: 11/13/24 0935    Narrative:      The following orders were created for panel order CBC & Differential.  Procedure                               Abnormality         Status                     ---------                               -----------         ------                     CBC Auto Differential[[id number]]        Abnormal            Final result                 Please view results for these tests on the individual orders.    Basic Metabolic Panel [[id number]]  (Abnormal) Collected: 11/13/24 0903    Specimen: Blood Updated: 11/13/24 0939     Glucose 101 mg/dL      BUN 77 mg/dL      Creatinine 7.51 mg/dL      Sodium 132 mmol/L      Potassium 3.9 mmol/L      Chloride 94 mmol/L      CO2 24.0 mmol/L      Calcium 8.6 mg/dL      BUN/Creatinine Ratio 10.3     Anion Gap 14.0 mmol/L      eGFR 6.8 mL/min/1.73      Comment: <15 Indicative of kidney failure       Narrative:      GFR Normal >60  Chronic Kidney Disease <60  Kidney Failure <15    The GFR formula is only valid for adults with stable renal function between ages 18 and 70.    Protime-INR [[id number]]  (Abnormal) Collected: 11/13/24 0903    Specimen: Blood Updated: 11/13/24 0929     Protime 16.9 Seconds      INR 1.35    CBC Auto Differential [[id number]]  (Abnormal) Collected: 11/13/24 0903    Specimen: Blood Updated: 11/13/24 0935     WBC 3.05 10*3/mm3      RBC 2.85 10*6/mm3      Hemoglobin 9.6 g/dL      Hematocrit 29.0 %      .8 fL      MCH 33.7 pg      MCHC 33.1 g/dL      RDW 13.4 %      RDW-SD 50.1 fl      MPV 9.2 fL      Platelets 59 10*3/mm3      Neutrophil % 79.0 %      Lymphocyte % 10.2 %      Monocyte % 8.9 %      Eosinophil % 1.3 %      Basophil % 0.3 %      Immature Grans % 0.3 %      Neutrophils, Absolute 2.41 10*3/mm3      Lymphocytes, Absolute 0.31 10*3/mm3      Monocytes, Absolute 0.27 10*3/mm3      Eosinophils, Absolute 0.04 10*3/mm3      Basophils, Absolute 0.01 10*3/mm3       Immature Grans, Absolute 0.01 10*3/mm3             Imaging Results (Last 24 Hours)       ** No results found for the last 24 hours. **            Results for orders placed during the hospital encounter of 09/05/24    Adult Transthoracic Echo Complete W/ Cont if Necessary Per Protocol    Interpretation Summary    Left ventricular systolic function is normal. Calculated left ventricular EF = 68.8% Normal left ventricular cavity size and wall thickness noted. All left ventricular wall segments contract normally. Left ventricular diastolic function was indeterminate.    The aortic valve is abnormal in structure. There is severe thickening of the aortic valve. The aortic valve appears trileaflet. No significant aortic valve regurgitation is present. Mild to moderate aortic valve stenosis is present. Aortic valve area is 1.33 cm2. Peak velocity of the flow distal to the aortic valve is 279.9 cm/s. Aortic valve maximum pressure gradient is 31.3 mmHg. Aortic valve mean pressure gradient is 17.8 mmHg. Aortic valve dimensionless index is 0.30 .      No orders to display        Assessment/Plan     Active Hospital Problems    Diagnosis  POA    **ESRD on dialysis [N18.6, Z99.2]  Not Applicable    Problem with vascular access [Z78.9]  Yes      Resolved Hospital Problems   No resolved problems to display.       Mr. Landry is a 79 y.o. Male with a history of ESRD on HD who presents due to malfunctioning vascular access     #ESRD  #Malfunctioning left arm fistula    - HD MWF, last HD Monday    - left arm fistula unable to be accessed today concern for thrombosis    - Vascular suspects an extensive revision would be necessary to salvage graft, may have to consider access on other arm    - Vascular to placed Tunneled dialysis catheter today so that access is present and proceed with graft revision at a later date    - Nephrology consulted, HD orders placed so that patient can receive HD once orders in    - NPO    #A. Fib    - follows  with cardiology    - not on ac due to hx of GIB and thrombocytopenia    - rate controlled on own    #cirrhosis    - does not appear decompensated    - INR 1.35    - check hepatic function panel    #AS    - echo on 9/5/24 shows mild-mod AS    #Anemia    - hgb 9.6 on admission, base around 10.5    - no overt bleeding    - monitor    #Thrombocytopenia    - Pkt 59 on admission, base around [id number]    - heme/onc to follow    - will consider plt transfusion if signs of bleeding    - plt level acceptable for TDC per heme/onc    - hold aspirin    #HTN    - stable, monitor    VTE Prophylaxis - SCDs.  Code Status - Full code.       Nick Miller DO  [facility]  11/13/24  10:59 EST

## 2024-11-13 NOTE — ED NOTES
"Nursing report ED to floor  Bill Landry  79 y.o.  male    HPI :  HPI  Stated Reason for Visit: .    Chief Complaint  Chief Complaint   Patient presents with    Vascular Access Problem       Admitting doctor:   Nick Miller DO    Admitting diagnosis:   The primary encounter diagnosis was Problem with vascular access. Diagnoses of Thrombocytopenia, End stage renal disease on dialysis, and ESRD on dialysis were also pertinent to this visit.    Code status:   Current Code Status       Date Active Code Status Order ID Comments User Context       Prior            Allergies:   Ace inhibitors, Contrast dye (echo or unknown ct/mr), Iodine, Angiotensin receptor blockers, Eliquis [apixaban], Filgrastim, Keflex [cephalexin], and Other    Isolation:   No active isolations    Intake and Output  No intake or output data in the 24 hours ending 11/13/24 1045    Weight:       11/13/24  0826   Weight: 80 kg (176 lb 5.9 oz)       Most recent vitals:   Vitals:    11/13/24 0820 11/13/24 0823 11/13/24 0826   BP:  151/80    Pulse: 90 84    Resp: 16     Temp: 97.5 °F (36.4 °C)     SpO2: 92% 91%    Weight:   80 kg (176 lb 5.9 oz)   Height:   177.8 cm (70\")       Active LDAs/IV Access:   Lines, Drains & Airways       Active LDAs       Name Placement date Placement time Site Days    Peripheral IV 11/13/24 0902 Right Antecubital 11/13/24 0902  Antecubital  less than 1    Ileostomy LLQ --  present upon arrival to [facility]  --  LLQ  --                    Labs (abnormal labs have a star):   Labs Reviewed   BASIC METABOLIC PANEL - Abnormal; Notable for the following components:       Result Value    Glucose 101 (*)     BUN 77 (*)     Creatinine 7.51 (*)     Sodium 132 (*)     Chloride 94 (*)     eGFR 6.8 (*)     All other components within normal limits    Narrative:     GFR Normal >60  Chronic Kidney Disease <60  Kidney Failure <15    The GFR formula is only valid for adults with stable renal function between ages 18 and 70.   PROTIME-INR - " Abnormal; Notable for the following components:    Protime 16.9 (*)     INR 1.35 (*)     All other components within normal limits   CBC WITH AUTO DIFFERENTIAL - Abnormal; Notable for the following components:    WBC 3.05 (*)     RBC 2.85 (*)     Hemoglobin 9.6 (*)     Hematocrit 29.0 (*)     .8 (*)     MCH 33.7 (*)     Platelets 59 (*)     Neutrophil % 79.0 (*)     Lymphocyte % 10.2 (*)     Lymphocytes, Absolute 0.31 (*)     All other components within normal limits   CBC AND DIFFERENTIAL    Narrative:     The following orders were created for panel order CBC & Differential.  Procedure                               Abnormality         Status                     ---------                               -----------         ------                     CBC Auto Differential[[id number]]        Abnormal            Final result                 Please view results for these tests on the individual orders.       EKG:   No orders to display       Meds given in ED:   Medications   sodium chloride 0.9 % flush 10 mL (has no administration in time range)       Imaging results:  No radiology results for the last day    Ambulatory status:   - has not ambulated in ED, stand by assist x1    Social issues:   Social History     Socioeconomic History    Marital status:      Spouse name: Gillian    Number of children: 2    Years of education: College   Tobacco Use    Smoking status: Never     Passive exposure: Never    Smokeless tobacco: Never   Substance and Sexual Activity    Alcohol use: No     Comment: caffeine use: none    Drug use: No    Sexual activity: Defer       Peripheral Neurovascular  Peripheral Neurovascular (Adult)  Peripheral Neurovascular WDL: .WDL except  Additional Documentation:  (fistula left arm clotted, unable to access this morning for dialysis)    Neuro Cognitive  Neuro Cognitive (Adult)  Cognitive/Neuro/Behavioral WDL: WDL    Learning  Learning Assessment  Learning Readiness and Ability: no barriers  identified  Education Provided  Person Taught: patient, family member/friend (son at bedside)    Respiratory  Respiratory WDL  Respiratory WDL: WDL    Abdominal Pain       Pain Assessments  Pain (Adult)  (0-10) Pain Rating: Rest: 0    NIH Stroke Scale       Angela Shelby RN  11/13/24 10:45 EST

## 2024-11-13 NOTE — OP NOTE
Operative Note  Location: [facility]  Date of Admission:  11/13/2024  OR Date: 11/13/2024    Pre-op Diagnosis:   ESRD on dialysis [N18.6, Z99.2]    Post-op Diagnosis:     Same    Procedure:  1) Tunneled dialysis catheter insertion   2) Ultrasound guided vascular access  3) Radiologic supervision of catheter tip placement    Assistant: None    Anesthesia: Monitored Anesthesia Care    Estimated Blood Loss: minimal    Staff:   Chataulator: Magali Barba RN  Scrub Person: Jessica Huber  Vascular Radiology Technician: Abbey Jimenes RRT    Complications: None, small skin tear at exit site    Specimen: None    Findings:  Tip in SVC/Atrial     Implants: Nothing was implanted during the procedure    Indications:    The patient is an 79 y.o. male referred for tunneled dialysis catheter placement.  The risks, benefits, and alternatives have been discussed with the patient and/or family and include but are not limited to injury to artery, vein, lung, bleeding, and infection.    Procedure:  The patient was taken to the operating room and placed supine on the operating room table. After the induction of IV sedation, the neck and chest were prepped and draped with ChloraPrep. The patient was placed in Trendelenburg position. Timeout was observed. The right  Internal Jugular  was evaluated on ultrasound and found to be easily compressible. The vessel was entered under direct ultrasound guidance and photographic documentation was recorded in the chart for the record. An angled wire was then advanced down into the superior vena cava under fluoroscopy without any difficulty. Exit site was chosen on the chest. The tract was anesthetized with 1% lidocaine mixed with 0.25% Marcaine. A 23 cm catheter was then flushed and brought up onto the field. It was tunneled in an antegrade fashion up to the IJ insertion site. Dilator and peel-away sheath were then advanced over the wire under fluoroscopy without any significant  difficulty. Dilator and wire were removed leaving the peel-away sheath in place. The distal tip of the catheter was then placed into the peel-away sheath and the peel-away sheath was removed. Under fluoroscopy, the distal tip of the catheter was positioned into the right atrium. There was no kinking at the catheter insertion site. The catheter flushed and aspirated easily. The lung fields were examined. There was no evidence of hemothorax or pneumothorax. The catheter flushed and aspirated quite easily. It was locked with concentrated heparin. The catheter was then anchored to the chest with nylon sutures. A stitch and dermal glue were used to close the neck site as well. Sterile dressings were applied. The patient tolerated the procedure well. There were no immediately apparent complications.           Active Hospital Problems    Diagnosis  POA    **ESRD on dialysis [N18.6, Z99.2]  Not Applicable    Problem with vascular access [Z78.9]  Yes      Resolved Hospital Problems   No resolved problems to display.      Ben Barth MD     Date: 11/13/2024  Time: 16:55 EST

## 2024-11-13 NOTE — ED PROVIDER NOTES
EMERGENCY DEPARTMENT ENCOUNTER  Room Number:  32/32  PCP: Bharathi De La Torre MD  Independent Historians: Patient, son at bedside      HPI:  Chief Complaint: had concerns including Vascular Access Problem.     A complete HPI/ROS/PMH/PSH/SH/FH are unobtainable due to:   Chronic or social conditions impacting patient care (Social Determinants of Health):       Context: Bill Landry is a 79 y.o. male with a medical history of end-stage renal disease on dialysis who presents to the ED c/o acute clotted fistula.  Patient had last dialysis on Monday which was normal dialysis.  When he went to dialysis today they were unable to access his fistula and felt that it was clotted.  He was sent to the ED for further evaluation.  Patient denies recent illness.  Denies chest pain or trouble breathing.      Review of prior external notes (non-ED) -and- Review of prior external test results outside of this encounter:   Reviewed prior medical records including most recent vascular surgery note with Dr. Bill Singh.  Patient does have history of end-stage renal disease and is on dialysis.      Prescription drug monitoring program review:         PAST MEDICAL HISTORY  Active Ambulatory Problems     Diagnosis Date Noted    Permanent atrial fibrillation 05/02/2016    Thrombocytopenia 05/02/2016    Chronic leukopenia 05/02/2016    Cirrhosis of liver without ascites 07/24/2017    Congestive splenomegaly 07/24/2017    Anemia due to stage 4 chronic kidney disease treated with erythropoietin 10/04/2017    Esophageal abnormality 01/18/2018    ESRD on hemodialysis 03/21/2019    Other specified glaucoma 05/21/2019    Arteriovenous fistula for hemodialysis in place, secondary 09/03/2020    Crohn's disease, unspecified, without complications 01/18/2018    Deficiency of other specified B group vitamins 01/19/2018    Dermatitis, unspecified 01/18/2018    Encounter for immunization 02/19/2018    History of urinary stone 01/18/2018     Hyperparathyroidism, unspecified 01/19/2018    Other disorders of phosphorus metabolism 02/11/2021    Other iron deficiency anemias 02/10/2018    Pure hypertriglyceridemia 01/18/2018    Renal osteodystrophy 01/18/2018    Secondary hyperparathyroidism of renal origin 01/18/2018    Splenomegaly, not elsewhere classified 01/18/2018    Bilateral pseudophakia 05/05/2021    Dermatochalasis of eyelid 05/05/2021    Primary open-angle glaucoma, bilateral, moderate stage 05/05/2021    Puckering of macula, left eye 05/05/2021    Secondary cataract 05/05/2021    AV fistula occlusion, initial encounter 02/15/2022    TATE (obstructive sleep apnea)     Aneurysm artery, 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    Nonrheumatic aortic valve stenosis 07/20/2022    Bicuspid aortic valve 07/20/2022    Hyperuricemia without signs of inflammatory arthritis and tophaceous disease 08/03/2022    Presbyopia 05/13/2021    Impaired glucose tolerance 05/12/2023    Hypofibrinogenemia 10/29/2023    Pulmonary hypertension     Skin change 04/08/2024     Resolved Ambulatory Problems     Diagnosis Date Noted    Anemia 01/12/2018    Pneumonia of both lungs due to infectious organism 01/19/2018    At risk for fluid volume overload 03/21/2019    Allergy, unspecified, initial encounter 10/09/2020    Anaphylactic shock, unspecified, initial encounter 10/09/2020    Essential (primary) hypertension 01/18/2018    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, with stage 1 through stage 4 chronic kidney disease, or unspecified chronic kidney disease 10/18/2019    Moderate protein-calorie malnutrition 12/16/2020    Pericardial effusion (noninflammatory) 01/18/2018    Sleep apnea, unspecified 01/18/2018    Unspecified atrial fibrillation 01/19/2018    Shortness of breath 02/19/2022    Acute hypoxemic respiratory failure due to COVID-19 05/11/2023    Acute on chronic diastolic heart failure 05/11/2023    Acute cough 05/17/2023    Bleeding 10/29/2023      Past Medical History:   Diagnosis Date    Abnormal serum protein electrophoresis     Acquired absence of other specified parts of digestive tract     Anemia in chronic kidney disease     Aneurysm of artery of arm     Aortic stenosis     Calculus of kidney     Crohn disease     Decreased white blood cell count, unspecified     Diverticula of colon     Fatty liver disease, nonalcoholic     Fistula 04/05/2018    Gastrointestinal hemorrhage, unspecified     GERD (gastroesophageal reflux disease)     GI bleed     Glaucoma     H/O Gallstone pancreatitis     H/O Pericardial effusion     H/O sinus bradycardia     History of hypertension     History of UTI     Hx of renal calculi     Ileostomy present     Iron deficiency     Leakage of heart valve prosthesis, subsequent encounter     MGUS (monoclonal gammopathy of unknown significance)     Other hemoglobinopathies     Scarlet fever, uncomplicated     Stenosis of other vascular prosthetic devices, implants and grafts, initial encounter 02/14/2022    Systemic lupus erythematosus, unspecified     Type 2 diabetes mellitus     Urinary retention          PAST SURGICAL HISTORY  Past Surgical History:   Procedure Laterality Date    APPENDECTOMY  06/1968    ARTERIOVENOUS FISTULA/SHUNT SURGERY Left 08/08/2017    Procedure: LEFT BRACHIAL CEPHALIC AV FISTULA FORMATION WITH CEPHALIC VEIN TRANSPOSITION ;  Surgeon: Bill Deal MD;  Location: [facility];  Service:     ARTERIOVENOUS FISTULA/SHUNT SURGERY Left 05/27/2022    Procedure: OPEN REVISION LEFT ARTERIOVENOUS INTERPOSITION GRAFT PLACEMENT;  Surgeon: Bill Deal MD;  Location: [facility];  Service: Vascular;  Laterality: Left;    ARTERIOVENOUS FISTULA/SHUNT SURGERY W/ HEMODIALYSIS CATHETER INSERTION Left 02/15/2022    Procedure: OPEN LEFT ARM ARTERIOVENOUS FISTULA THROMBECTOMY WITH CEPHALIC VEIN ARTHROPLASTY AND STENT/GRAFT PLACEMENT;  Surgeon: Bill Deal MD;  Location: [facility] 18/19;  Service:  Vascular;  Laterality: Left;    CATARACT EXTRACTION WITH INTRAOCULAR LENS IMPLANT Bilateral 2004    CHOLECYSTECTOMY  2011    COLECTOMY PARTIAL / TOTAL      History of inflammatory bowel disease with status post colectomy with ileostomy many years ago in the [facility],  18 YEARS OF AGE    COLON SURGERY      Colon resection with colostomy    COLON SURGERY      COLONOSCOPY  01/2018    CYSTOSCOPY LITHOLAPAXY BLADDER STONE EXTRACTION      ENDOSCOPY  01/16/2015    gastritis    ENDOSCOPY  01/17/2018    Procedure: ESOPHAGOGASTRODUODENOSCOPY;  Surgeon: Warner Neville MD;  [facility];  Service:     ILEOSCOPY  01/16/2015    normal    ILEOSCOPY N/A 01/17/2018    Procedure: ILEOSCOPY;  Surgeon: Warner Neville MD;  [facility];  Service:     ILEOSTOMY  12/1968    loop ostomy bypass    INCISION AND DRAINAGE ARM Left 10/27/2023    Procedure: LEFT ARM INCISION AND DRAINAGE;  Surgeon: Bill Deal MD;  Location: [facility] MAIN OR;  Service: Vascular;  Laterality: Left;    OTHER SURGICAL HISTORY  2009    kidney stones x3    PSEUDOANEURYSM REPAIR Left 10/27/2023    TONSILLECTOMY  1950         FAMILY HISTORY  Family History   Problem Relation Age of Onset    Heart failure Mother 78    Hypertension Mother     Heart disease Mother     Hyperlipidemia Mother     Hypertension Father     Other Father 82        MVI    Malig Hyperthermia Neg Hx          SOCIAL HISTORY  Social History     Socioeconomic History    Marital status:      Spouse name: Gillian    Number of children: 2    Years of education: College   Tobacco Use    Smoking status: Never     Passive exposure: Never    Smokeless tobacco: Never   Substance and Sexual Activity    Alcohol use: No     Comment: caffeine use: none    Drug use: No    Sexual activity: Defer         ALLERGIES  Ace inhibitors, Contrast dye (echo or unknown ct/mr), Iodine, Angiotensin receptor blockers, Eliquis [apixaban], Filgrastim, Keflex [cephalexin], and  Other      REVIEW OF SYSTEMS  Review of Systems   Constitutional:  Negative for fever.   Respiratory:  Negative for shortness of breath.    Cardiovascular:  Negative for chest pain.   All other systems reviewed and are negative.    Included in HPI  All systems reviewed and negative except for those discussed in HPI.      PHYSICAL EXAM    I have reviewed the triage vital signs and nursing notes.    ED Triage Vitals [11/13/24 0820]   Temp Heart Rate Resp BP SpO2   97.5 °F (36.4 °C) 90 16 -- 92 %      Temp src Heart Rate Source Patient Position BP Location FiO2 (%)   -- -- -- -- --       Physical Exam  GENERAL: Alert well-appearing male no obvious distress.  Triage vitals reviewed notable for temperature 97.5.  Heart rate 90.  SKIN: Warm, dry  HENT: Normocephalic, atraumatic  EYES: no scleral icterus  CV: regular rhythm, regular rate-no murmur  RESPIRATORY: normal effort, lungs clear  ABDOMEN: soft, nontender, nondistended  MUSCULOSKELETAL: no deformity.  Examination of the left upper extremity reveals swelling consistent with chronic AV fistula.  Distal pulses easily palpable  NEURO: alert, moves all extremities, follows commands      LAB RESULTS  Recent Results (from the past 24 hours)   Basic Metabolic Panel    Collection Time: 11/13/24  9:03 AM    Specimen: Blood   Result Value Ref Range    Glucose 101 (H) 65 - 99 mg/dL    BUN 77 (H) 8 - 23 mg/dL    Creatinine 7.51 (H) 0.76 - 1.27 mg/dL    Sodium 132 (L) 136 - 145 mmol/L    Potassium 3.9 3.5 - 5.2 mmol/L    Chloride 94 (L) 98 - 107 mmol/L    CO2 24.0 22.0 - 29.0 mmol/L    Calcium 8.6 8.6 - 10.5 mg/dL    BUN/Creatinine Ratio 10.3 7.0 - 25.0    Anion Gap 14.0 5.0 - 15.0 mmol/L    eGFR 6.8 (L) >60.0 mL/min/1.73   Protime-INR    Collection Time: 11/13/24  9:03 AM    Specimen: Blood   Result Value Ref Range    Protime 16.9 (H) 11.7 - 14.2 Seconds    INR 1.35 (H) 0.90 - 1.10   CBC Auto Differential    Collection Time: 11/13/24  9:03 AM    Specimen: Blood   Result Value  Ref Range    WBC 3.05 (L) 3.40 - 10.80 10*3/mm3    RBC 2.85 (L) 4.14 - 5.80 10*6/mm3    Hemoglobin 9.6 (L) 13.0 - 17.7 g/dL    Hematocrit 29.0 (L) 37.5 - 51.0 %    .8 (H) 79.0 - 97.0 fL    MCH 33.7 (H) 26.6 - 33.0 pg    MCHC 33.1 31.5 - 35.7 g/dL    RDW 13.4 12.3 - 15.4 %    RDW-SD 50.1 37.0 - 54.0 fl    MPV 9.2 6.0 - 12.0 fL    Platelets 59 (L) 140 - 450 10*3/mm3    Neutrophil % 79.0 (H) 42.7 - 76.0 %    Lymphocyte % 10.2 (L) 19.6 - 45.3 %    Monocyte % 8.9 5.0 - 12.0 %    Eosinophil % 1.3 0.3 - 6.2 %    Basophil % 0.3 0.0 - 1.5 %    Immature Grans % 0.3 0.0 - 0.5 %    Neutrophils, Absolute 2.41 1.70 - 7.00 10*3/mm3    Lymphocytes, Absolute 0.31 (L) 0.70 - 3.10 10*3/mm3    Monocytes, Absolute 0.27 0.10 - 0.90 10*3/mm3    Eosinophils, Absolute 0.04 0.00 - 0.40 10*3/mm3    Basophils, Absolute 0.01 0.00 - 0.20 10*3/mm3    Immature Grans, Absolute 0.01 0.00 - 0.05 10*3/mm3         RADIOLOGY  No Radiology Exams Resulted Within Past 24 Hours      MEDICATIONS GIVEN IN ER  Medications   sodium chloride 0.9 % flush 10 mL (has no administration in time range)         ORDERS PLACED DURING THIS VISIT:  Orders Placed This Encounter   Procedures    Basic Metabolic Panel    Protime-INR    CBC Auto Differential    Vascular Surgery Consult    [facility] (on-call MD unless specified) Details    Insert Peripheral IV    Initiate Observation Status    CBC & Differential         OUTPATIENT MEDICATION MANAGEMENT:  Current Facility-Administered Medications Ordered in Epic   Medication Dose Route Frequency Provider Last Rate Last Admin    sodium chloride 0.9 % flush 10 mL  10 mL Intravenous PRN Aguila Carrillo MD         Current Outpatient Medications Ordered in Epic   Medication Sig Dispense Refill    alfuzosin (UROXATRAL) 10 MG 24 hr tablet Take 1 tablet by mouth Every Other Day. Does not take on tues sun or thurs      B Complex-C-Folic Acid (Mum-Peter) tablet Take 1 tablet by mouth Daily.      latanoprost (XALATAN) 0.005 % ophthalmic  solution Administer 1 drop to both eyes Every Night.      lidocaine-prilocaine (EMLA) 2.5-2.5 % cream Apply 1 Application topically to the appropriate area as directed. Asvmzb-Zjxoofaec-Eoqrvg:  ONE HOUR PRIOR TO DIALYSIS  3    Sucroferric Oxyhydroxide (Velphoro) 500 MG chewable tablet Chew 1 tablet 3 (Three) Times a Day. AFTER EACH  MEAL      aspirin 81 MG tablet Take 1 tablet by mouth Daily. INSTRUCTED TO CONTINUE THIS FOR SURGERY PER DR. BERMEO'S OFFICE      Cholecalciferol 25 MCG (1000 UT) tablet Take 1 tablet by mouth Daily.      famotidine (PEPCID) 10 MG tablet Take 1 tablet by mouth As Needed for Heartburn.      febuxostat (ULORIC) 40 MG tablet Take 1 tablet by mouth Daily.      Iron Sucrose (VENOFER IV) Infuse  into a venous catheter As Needed. USUALLY ONCE MONTH      Loratadine (CLARITIN PO) Take 1 tablet by mouth As Needed. Pt states he is uncertain of dosage      Methoxy PEG-Epoetin Beta (MIRCERA IJ) Inject  as directed Every 30 (Thirty) Days.      mupirocin (BACTROBAN) 2 % ointment Apply 1 Application topically to the appropriate area as directed 3 (Three) Times a Day. 30 g 0    sodium chloride 0.65 % nasal spray Administer 2 sprays into the nostril(s) as directed by provider Every Night.           PROCEDURES  Procedures            PROGRESS, DATA ANALYSIS, CONSULTS, AND MEDICAL DECISION MAKING  All labs have been independently interpreted by me.  All radiology studies have been reviewed by me. All EKG's have been independently viewed and interpreted by me.  Discussion below represents my analysis of pertinent findings related to patient's condition, differential diagnosis, treatment plan and final disposition.    Differential diagnosis includes but is not limited to clotted fistula.      ED Course as of 11/13/24 1034   Wed Nov 13, 2024   0830 Patient with apparent clotted fistula, is well-appearing at this time.  Will reach out to vascular surgery for consultation and advice. [DB]   8232 Discussed  evaluation this patient with Dr. Stevo Barth from vascular surgery.    He would like me to order fistula duplex, get some routine labs and keep patient NPO. [DB]   0980 Labs reviewed notable for white count of 3 and hemoglobin of 9.6.    Chemistries show normal potassium of 3.9 and bicarb of 24.    INR is 1.35. [DB]   1015 I discussed evaluation of this patient with Dr. Ben Barth from vascular surgery.  Given patient's thrombocytopenia and platelet count of 59 he will need to consider various surgical options which were slightly limited secondary to the thrombocytopenia.  He would like to have patient admitted to the medical service. [DB]   1033 I did discuss management of this patient with Dr. Angela Tejada who will admit on behalf of [facility]. [DB]      ED Course User Index  [DB] Aguila Carirllo MD             AS OF 10:34 EST VITALS:    BP - 151/80  HR - 84  TEMP - 97.5 °F (36.4 °C)  O2 SATS - 91%    COMPLEXITY OF CARE  79-year-old male with history of end-stage renal disease presents with AV fistula clotting and unable to undergo dialysis.    Please see ED course above for my independent evaluation and interpretation of ED testing.    I did discuss management with Dr. Ben Barth who saw the patient in the ED.    Plan to undergo surgical repair but unfortunately with thrombocytopenia platelet count of 59 makes this scenario complicated.    Ultimately will admit to the hospital for surgical repair of clotted shunt.      DIAGNOSIS  Final diagnoses:   Problem with vascular access   Thrombocytopenia   End stage renal disease on dialysis         DISPOSITION  ED Disposition       ED Disposition   Decision to Admit    Condition   --    Comment   Level of Care: Med/Surg [1]   Diagnosis: Problem with vascular access [[id number]]   Admitting Physician: EUSEBIO MAXWELL [[id number]]   Attending Physician: EUSEBIO MAXWELL [[id number]]                  Please note that portions of this document were completed with a voice  recognition program.    Note Disclaimer: At [facility], we believe that sharing information builds trust and better relationships. You are receiving this note because you recently visited [facility]. It is possible you will see health information before a provider has talked with you about it. This kind of information can be easy to misunderstand. To help you fully understand what it means for your health, we urge you to discuss this note with your provider.         Aguila Carrillo MD  11/13/24 8223

## 2024-11-13 NOTE — CONSULTS
Name: Bill Landry ADMIT: 2024   : 1945  PCP: Bharathi De La Torre MD    MRN: [id number] LOS: 0 days   AGE/SEX: 79 y.o. male  ROOM: [facility]      Vascular Surgery Consult  Consult performed by: Ben Barth MD  Consult ordered by: Aguila Carrillo MD        CC: Clotted dialysis access  Subjective     History of Present Illness  79 y.o. male who is known to our practice who presented to the emergency department after inability to access his left arm hemodialysis composite fistula/graft today.  He was referred to the emergency department for evaluation.      Review of Systems    History Review Reviewed Comments   Past Medical History:  [x]  ESRD, CLL, thrombocytopenia, cirrhosis   Past Surgical History: [x]  Multiple access revision   Family History: [x]     Social History: [x]       Scheduled Meds:        IV Meds:        Allergies: Ace inhibitors, Contrast dye (echo or unknown ct/mr), Iodine, Angiotensin receptor blockers, Eliquis [apixaban], Filgrastim, Keflex [cephalexin], and Other    Objective   Temp:  [97.5 °F (36.4 °C)] 97.5 °F (36.4 °C)  Heart Rate:  [84-90] 84  Resp:  [16] 16  BP: (151)/(80) 151/80    No intake/output data recorded.    Physical Exam  Patient appears chronically ill.  He has extensive bruising over the left arm.  Moderate left arm swelling.  Aneurysmal proximal AV fistula with multiple incisions consistent with jump graft revision.  The proximal portion as well as the aneurysmal portion have a pulse but no thrill or bruit is felt or heard throughout the rest of the access.  No pacemakers or defibrillators.  He has an ileostomy.    Data Reviewed:  CBC    Results from last 7 days   Lab Units 24  0903   WBC 10*3/mm3 3.05*   HEMOGLOBIN g/dL 9.6*   PLATELETS 10*3/mm3 59*     BMP   Results from last 7 days   Lab Units 24  0903   SODIUM mmol/L 132*   POTASSIUM mmol/L 3.9   CHLORIDE mmol/L 94*   CO2 mmol/L 24.0   BUN mg/dL 77*   CREATININE mg/dL 7.51*    GLUCOSE mg/dL 101*     Coag   Results from last 7 days   Lab Units 11/13/24  0903   INR  1.35*     Radiology(recent) No radiology results for the last day    Labs significant for: Platelet count only 59.  BUN 77, creatinine 7.5, potassium 3.9, INR 1.4    Imaging Studies:  Duplex Hemodialysis Access CAR (09/12/2024 13:24)   images reviewed.  Aneurysmal proximal portion with some inflow stenosis.  Adequate flow volumes    Active Hospital Problems    Diagnosis  POA    **ESRD on dialysis [N18.6, Z99.2]  Not Applicable    Problem with vascular access [Z78.9]  Yes      Resolved Hospital Problems   No resolved problems to display.       Data Points:  During this visit the following were done:  Labs Reviewed [x]  []  []  Labs Ordered []  []  []  Radiology Reports Reviewed [x]    Radiology Ordered []    EKG, echo, and/or stress test reviewed []    Vascular lab results reviewed  []    Vascular lab images reviewed and interpreted per myself   []    Referring Provider Records Reviewed []    ER Records Reviewed []    Hospital Records Reviewed/Summarized [x]    History Obtained From Family []    Radiological images view and Interpreted per myself []    Case Discussed with referring provider []     Decision to obtain and request outside records  []        Active Hospital Problems:   Active Hospital Problems    Diagnosis  POA    **ESRD on dialysis [N18.6, Z99.2]  Not Applicable    Problem with vascular access [Z78.9]  Yes      Resolved Hospital Problems   No resolved problems to display.      Assessment & Plan   Assessment / Plan       79 y.o. male who is well-known to my partner Dr. Bill Deal.  The patient has longstanding vascular access requirements undergoing multiple revisions.  Currently he has a left arm access that is a composite fistula and graft.  The proximal portion of this is native fistula but that has become significantly aneurysmal just on the outflow of that is a composite jump  graft with Artegraft to the  upper arm cephalic.  The proximal portion has a pulse but nothing above that has a thrill or bruit.  Patient has extensive skin changes and ecchymosis.  Laboratory evaluation shows significant thrombocytopenia in the 50s.  This is acute on chronic and he struggles with thrombocytopenia on a chronic basis in part due to his thrombophilia and he CCBC for this.  Additionally also has history of cirrhosis.    I think that the patient will require an urgent tunneled hemodialysis catheter today.  Increased risk of bleeding because of uremia with a BUN of 77 and a platelet count of 59.  I think his current platelet count is a relative contraindication to open thrombectomy and revision.  I think a rather extensive revision would be the only option to salvage this access.  Consideration for new contralateral arm access may also have to be undertaken.  This was explained to the patient and the son at bedside.  Upon reviewing the patient's allergies I also see that he has a documented anaphylaxis to contrast dye.  Will have to review this with primary surgeon and patient before moving forward with revision.    I discussed the patients findings and my recommendations with patient, family, and consulting provider.  Please call my office with any question: [phone]

## 2024-11-13 NOTE — CONSULTS
[facility] Consult Note      Patient Name: Bill Landry  : 1945  MRN: [id number]  Primary Care Physician:  Bharathi De La Torre MD  Referring Physician: Ben Barth MD  Date of admission: 2024    Subjective     Reason for Consult: ESRD on HD    HPI:   Bill Landry is a 79 y.o. male with ESRD on HD (MWF, via L AV fistula, at [facility], followed by Dr. Paul De La Garza of our group) for the past 7 years, anemia, cirrhosis, A-fib, chronic leukopenia and thrombocytopenia, and hypertension, admitted today for clotted composite AV fistula/graft.  Last HD was  without issue.    In the ER, sodium 132, SCr 7.5, potassium 3.7, WBC 3.05, platelets 59.  Evaluated by Vascular Surgery today; TDC planned for now given low platelets, with likely attempt at salvaging his internal access at later point.    Denies chest pain, SOA, or N/V/D  Has no pain in left arm or left shoulder  Makes very little urine; no fever  Appetite is good; stable weight  Wife noticed ecchymosis and erythema around fistula/graft site this morning  +Chronic RLE edema    Review of Systems:   14 point review of systems is otherwise negative except for mentioned above on HPI    Personal History     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    Aortic stenosis     Bicuspid aortic valve 2022    Calculus of kidney     Chronic leukopenia and thrombocytopenia     Cirrhosis of liver without ascites 2017    Congestive splenomegaly 2017    Crohn disease     with ileostomy    Decreased white blood cell count, unspecified     Diverticula of colon     ESRD on hemodialysis 2018    M-W-F FRESENIUS    Fatty liver disease, nonalcoholic     Fistula 2018    Other Specified Complication     Gastrointestinal hemorrhage, unspecified     GERD (gastroesophageal reflux disease)     GI bleed     Glaucoma     H/O Gallstone pancreatitis     H/O Pericardial effusion     H/O sinus bradycardia     History of hypertension     resolved    History of UTI     Hx of renal calculi     Ileostomy present     Iron deficiency     Leakage of heart valve prosthesis, subsequent encounter     MGUS (monoclonal gammopathy of unknown significance)     TATE (obstructive sleep apnea)     Other hemoglobinopathies     Pericardial effusion (noninflammatory)     Permanent atrial fibrillation     Scarlet fever, uncomplicated     Stenosis of other vascular prosthetic devices, implants and grafts, initial encounter 02/14/2022    Systemic lupus erythematosus, unspecified     Thrombocytopenia     undpecified    Type 2 diabetes mellitus     CURRENTLY TAKES NO MED    Urinary retention     Post-OP       Past Surgical History:   Procedure Laterality Date    APPENDECTOMY  06/1968    ARTERIOVENOUS FISTULA/SHUNT SURGERY Left 08/08/2017    Procedure: LEFT BRACHIAL CEPHALIC AV FISTULA FORMATION WITH CEPHALIC VEIN TRANSPOSITION ;  Surgeon: Bill Deal MD;  Location: [facility];  Service:     ARTERIOVENOUS FISTULA/SHUNT SURGERY Left 05/27/2022    Procedure: OPEN REVISION LEFT ARTERIOVENOUS INTERPOSITION GRAFT PLACEMENT;  Surgeon: Bill Deal MD;  Location: [facility];  Service: Vascular;  Laterality: Left;    ARTERIOVENOUS FISTULA/SHUNT SURGERY W/ HEMODIALYSIS CATHETER INSERTION Left 02/15/2022    Procedure: OPEN LEFT ARM ARTERIOVENOUS FISTULA THROMBECTOMY WITH CEPHALIC VEIN ARTHROPLASTY AND STENT/GRAFT PLACEMENT;  Surgeon: Bill Deal MD;  Location: [facility] 18/19;  Service: Vascular;  Laterality: Left;    CATARACT EXTRACTION WITH INTRAOCULAR LENS IMPLANT Bilateral 2004    CHOLECYSTECTOMY  2011    COLECTOMY PARTIAL / TOTAL      History of inflammatory bowel disease with status post colectomy with ileostomy many years ago  in the [facility],  18 YEARS OF AGE    COLON SURGERY      Colon resection with colostomy    COLON SURGERY      COLONOSCOPY  01/2018    CYSTOSCOPY LITHOLAPAXY BLADDER STONE EXTRACTION      ENDOSCOPY  01/16/2015    gastritis    ENDOSCOPY  01/17/2018    Procedure: ESOPHAGOGASTRODUODENOSCOPY;  Surgeon: Warner Neville MD;  Location: [facility] ENDOSCOPY;  Service:     ILEOSCOPY  01/16/2015    normal    ILEOSCOPY N/A 01/17/2018    Procedure: ILEOSCOPY;  Surgeon: Warner Neville MD;  Location: [facility] ENDOSCOPY;  Service:     ILEOSTOMY  12/1968    loop ostomy bypass    INCISION AND DRAINAGE ARM Left 10/27/2023    Procedure: LEFT ARM INCISION AND DRAINAGE;  Surgeon: Bill Bermeo MD;  Location: [facility] MAIN OR;  Service: Vascular;  Laterality: Left;    OTHER SURGICAL HISTORY  2009    kidney stones x3    PSEUDOANEURYSM REPAIR Left 10/27/2023    TONSILLECTOMY  1950       Family History: family history includes Heart disease in his mother; Heart failure (age of onset: 78) in his mother; Hyperlipidemia in his mother; Hypertension in his father and mother; Other (age of onset: 82) in his father.    Social History:  reports that he has never smoked. He has never been exposed to tobacco smoke. He has never used smokeless tobacco. He reports that he does not drink alcohol and does not use drugs.    Home Medications:  Prior to Admission medications    Medication Sig Start Date End Date Taking? Authorizing Provider   alfuzosin (UROXATRAL) 10 MG 24 hr tablet Take 1 tablet by mouth Every Other Day. Does not take on Sundays Tuesdays or Thursdays   Yes Elvia Nicholson MD   aspirin 81 MG tablet Take 1 tablet by mouth Daily. INSTRUCTED TO CONTINUE THIS FOR SURGERY PER DR. BERMEO'S OFFICE   Yes Elvia Nicholson MD   B Complex-C-Folic Acid (Umm-Peter) tablet Take 1 tablet by mouth Daily. 9/27/23  Yes Elvia Nicholson MD   Cholecalciferol 25 MCG (1000 UT) tablet Take 1 tablet by mouth Daily.   Yes Elvia Nicholson MD    Iron Sucrose (VENOFER IV) Infuse 1 dose into a venous catheter As Needed.   Yes Elvia Nicholson MD   latanoprost (XALATAN) 0.005 % ophthalmic solution Administer 1 drop to both eyes Every Night.   Yes Elvia Nicholson MD   lidocaine-prilocaine (EMLA) 2.5-2.5 % cream Apply 1 Application topically to the appropriate area as directed As Needed. [id number]:  ONE HOUR PRIOR TO DIALYSIS 2/5/18  Yes Elvia Nicholson MD   Methoxy PEG-Epoetin Beta (MIRCERA IJ) Inject 1 dose as directed Every 30 (Thirty) Days.   Yes Elvia Nicholson MD   sodium chloride 0.65 % nasal spray Administer 2 sprays into the nostril(s) as directed by provider Every Night.   Yes Elvia Nicholson MD   Sucroferric Oxyhydroxide (Velphoro) 500 MG chewable tablet Chew 1 tablet Daily.   Yes Elvia Nicholson MD   famotidine (PEPCID) 10 MG tablet Take 1 tablet by mouth As Needed for Heartburn.  11/13/24  Elvia Nicholson MD   febuxostat (ULORIC) 40 MG tablet Take 1 tablet by mouth Daily.  11/13/24  Elvia Nicholson MD   Loratadine (CLARITIN PO) Take 1 tablet by mouth As Needed. Pt states he is uncertain of dosage  11/13/24  Elvia Nicholson MD   mupirocin (BACTROBAN) 2 % ointment Apply 1 Application topically to the appropriate area as directed 3 (Three) Times a Day. 5/16/24 11/13/24  Bharathi De La Torre MD       Allergies:  Allergies   Allergen Reactions    Ace Inhibitors Other (See Comments)     RENAL FAILURE/ raised creatinine    Contrast Dye (Echo Or Unknown Ct/Mr) Other (See Comments) and Anaphylaxis     RENAL FAILURE    Iodine Anaphylaxis    Angiotensin Receptor Blockers Swelling    Eliquis [Apixaban] Arrhythmia     BLEEDING ISSUES; PT CANNOT TAKE ANY ANTICOAGULANTS DUE TO LOW PLATELETS EXCEPT ASPIRIN      Filgrastim GI Intolerance and Confusion     Chest pain    Keflex [Cephalexin] Diarrhea     RENAL FAILURE    Other Angioedema     IVP Dye       Objective     Vitals:   Temp:  [97.5 °F (36.4 °C)-98.6  °F (37 °C)] 98.6 °F (37 °C)  Heart Rate:  [75-90] 76  Resp:  [16-18] 18  BP: (120-151)/(66-80) 120/70  No intake or output data in the 24 hours ending 11/13/24 1432    Physical Exam:   Constitutional: Awake, chronically ill, NAD  HEENT: Sclera anicteric, no conjunctival injection  Neck: Supple, no JVD, no carotid bruit  Respiratory: A few crackles in bases, nonlabored on RA  Cardiovascular: Irregularly irregular, not tachycardic, 2/6M, no rub  Gastrointestinal: BS +, + ileostomy, soft, nontender and nondistended  : No palpable bladder  Musculoskeletal: Trace LLE edema, 1+ RLE edema, no clubbing or cyanosis  Psychiatric: Appropriate affect, cooperative  Neurologic: Oriented x3, moving all extremities, normal speech and mental status, no asterixis  Skin: Warm and dry   Vascular: Aneurysmal LUE AV fistula/graft without bruit or thrill     Scheduled Meds:     latanoprost, 1 drop, Both Eyes, Nightly  sodium chloride, 10 mL, Intravenous, Q12H      IV Meds:        Results Reviewed:   I have personally reviewed the results from the time of this admission to 11/13/2024 14:32 EST     Lab Results   Component Value Date    GLUCOSE 101 (H) 11/13/2024    CALCIUM 8.6 11/13/2024     (L) 11/13/2024    K 3.9 11/13/2024    CO2 24.0 11/13/2024    CL 94 (L) 11/13/2024    BUN 77 (H) 11/13/2024    CREATININE 7.51 (H) 11/13/2024    EGFRIFAFRI  02/14/2022      Comment:      <15 Indicative of kidney failure.    EGFRIFNONA 7 (L) 02/14/2022    BCR 10.3 11/13/2024    ANIONGAP 14.0 11/13/2024      Lab Results   Component Value Date    MG 2.0 10/31/2023    PHOS 2.9 11/01/2023    ALBUMIN 3.3 (L) 09/17/2024           Assessment / Plan       ESRD on dialysis    Problem with vascular access      ASSESSMENT:  Thrombosed left arm fistula/graft, confirmed by Doppler; evaluated by Vascular, with plan on TDC placement for now given high risk of bleeding due to elevated BUN and low platelet  ESRD on HD, MWF, via L AVF, last HD was Monday without  issues.  Electrolytes within acceptable range; volume status is stable  Hypertension with CKD, BP acceptable without antihypertensives  Anemia with CKD, receives long-acting WENDY and IV iron outpatient, Hgb stable  A-fib, HR controlled, on aspirin at home  Cirrhosis  Mild-moderate aortic stenosis by echocardiogram August 2024  Chronic leukopenia and thrombocytopenia  Mircera bone disease, on cholecalciferol and Velphoro    PLAN:  TDC this evening  HD tomorrow morning  Discussed with family members at bedside    Thank you for involving us in the care of Bill Landry.  Please feel free to call with any questions.    Boyd Oates MD  11/13/24  14:32 [facility]    [facility]  [phone]      Please note that portions of this note were completed with a voice recognition program.

## 2024-11-13 NOTE — CONSULTS
Subjective     REASON FOR CONSULTATION: Chronic pancytopenia  Provide an opinion on any further workup or treatment                             REQUESTING PHYSICIAN: Angela    RECORDS OBTAINED:  Records of the patients history including those obtained from the referring provider were reviewed and summarized in detail.    HISTORY OF PRESENT ILLNESS:  The patient is a 79 y.o. year old male who is here for an opinion about the above issue.    History of Present Illness   This is a pleasant 79-year-old man followed in our practice for pancytopenia secondary to cirrhosis with portal hypertension and splenic sequestration and anemia further complicated by end-stage renal disease on hemodialysis.  He is admitted after being unable to undergo dialysis today secondary to a malfunctioning fistula.  He feels well with no otherwise specific complaints today.    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    Aortic stenosis     Bicuspid aortic valve 07/20/2022    Calculus of kidney     Chronic leukopenia and thrombocytopenia     Cirrhosis of liver without ascites 07/24/2017    Congestive splenomegaly 07/24/2017    Crohn disease     with ileostomy    Decreased white blood cell count, unspecified     Diverticula of colon     ESRD on hemodialysis 04/05/2018    M-W-F FRESENIUS    Fatty liver disease, nonalcoholic     Fistula 04/05/2018    Other Specified Complication    Gastrointestinal hemorrhage, unspecified     GERD (gastroesophageal reflux disease)     GI bleed     Glaucoma     H/O Gallstone pancreatitis     H/O Pericardial effusion     H/O sinus bradycardia     History of hypertension     resolved    History of UTI     Hx of renal calculi     Ileostomy present     Iron deficiency     Leakage of heart valve prosthesis,  subsequent encounter     MGUS (monoclonal gammopathy of unknown significance)     TATE (obstructive sleep apnea)     Other hemoglobinopathies     Pericardial effusion (noninflammatory)     Permanent atrial fibrillation     Scarlet fever, uncomplicated     Stenosis of other vascular prosthetic devices, implants and grafts, initial encounter 02/14/2022    Systemic lupus erythematosus, unspecified     Thrombocytopenia     undpecified    Type 2 diabetes mellitus     CURRENTLY TAKES NO MED    Urinary retention     Post-OP        Past Surgical History:   Procedure Laterality Date    APPENDECTOMY  06/1968    ARTERIOVENOUS FISTULA/SHUNT SURGERY Left 08/08/2017    Procedure: LEFT BRACHIAL CEPHALIC AV FISTULA FORMATION WITH CEPHALIC VEIN TRANSPOSITION ;  Surgeon: Bill Deal MD;  Location: [facility] OR;  Service:     ARTERIOVENOUS FISTULA/SHUNT SURGERY Left 05/27/2022    Procedure: OPEN REVISION LEFT ARTERIOVENOUS INTERPOSITION GRAFT PLACEMENT;  Surgeon: Bill Deal MD;  Location: [facility] OR;  Service: Vascular;  Laterality: Left;    ARTERIOVENOUS FISTULA/SHUNT SURGERY W/ HEMODIALYSIS CATHETER INSERTION Left 02/15/2022    Procedure: OPEN LEFT ARM ARTERIOVENOUS FISTULA THROMBECTOMY WITH CEPHALIC VEIN ARTHROPLASTY AND STENT/GRAFT PLACEMENT;  Surgeon: Bill Deal MD;  Location: [facility] 18/19;  Service: Vascular;  Laterality: Left;    CATARACT EXTRACTION WITH INTRAOCULAR LENS IMPLANT Bilateral 2004    CHOLECYSTECTOMY  2011    COLECTOMY PARTIAL / TOTAL      History of inflammatory bowel disease with status post colectomy with ileostomy many years ago in the [facility],  18 YEARS OF AGE    COLON SURGERY      Colon resection with colostomy    COLON SURGERY      COLONOSCOPY  01/2018    CYSTOSCOPY LITHOLAPAXY BLADDER STONE EXTRACTION      ENDOSCOPY  01/16/2015    gastritis    ENDOSCOPY  01/17/2018    Procedure: ESOPHAGOGASTRODUODENOSCOPY;  Surgeon: Warner Neville MD;  Location: [facility] ENDOSCOPY;   Service:     ILEOSCOPY  01/16/2015    normal    ILEOSCOPY N/A 01/17/2018    Procedure: ILEOSCOPY;  Surgeon: Warner Neville MD;  Location: [facility] ENDOSCOPY;  Service:     ILEOSTOMY  12/1968    loop ostomy bypass    INCISION AND DRAINAGE ARM Left 10/27/2023    Procedure: LEFT ARM INCISION AND DRAINAGE;  Surgeon: Bill Bermeo MD;  Location: [facility] MAIN OR;  Service: Vascular;  Laterality: Left;    OTHER SURGICAL HISTORY  2009    kidney stones x3    PSEUDOANEURYSM REPAIR Left 10/27/2023    TONSILLECTOMY  1950        No current facility-administered medications on file prior to encounter.     Current Outpatient Medications on File Prior to Encounter   Medication Sig Dispense Refill    alfuzosin (UROXATRAL) 10 MG 24 hr tablet Take 1 tablet by mouth Every Other Day. Does not take on Sundays Tuesdays or Thursdays      aspirin 81 MG tablet Take 1 tablet by mouth Daily. INSTRUCTED TO CONTINUE THIS FOR SURGERY PER DR. BERMEO'S OFFICE      B Complex-C-Folic Acid (Umm-Peter) tablet Take 1 tablet by mouth Daily.      Cholecalciferol 25 MCG (1000 UT) tablet Take 1 tablet by mouth Daily.      Iron Sucrose (VENOFER IV) Infuse 1 dose into a venous catheter As Needed.      latanoprost (XALATAN) 0.005 % ophthalmic solution Administer 1 drop to both eyes Every Night.      lidocaine-prilocaine (EMLA) 2.5-2.5 % cream Apply 1 Application topically to the appropriate area as directed As Needed. Ztznqo-Fvsskmcuk-Hlmeky:  ONE HOUR PRIOR TO DIALYSIS  3    Methoxy PEG-Epoetin Beta (MIRCERA IJ) Inject 1 dose as directed Every 30 (Thirty) Days.      sodium chloride 0.65 % nasal spray Administer 2 sprays into the nostril(s) as directed by provider Every Night.      Sucroferric Oxyhydroxide (Velphoro) 500 MG chewable tablet Chew 1 tablet Daily.      [DISCONTINUED] famotidine (PEPCID) 10 MG tablet Take 1 tablet by mouth As Needed for Heartburn.      [DISCONTINUED] febuxostat (ULORIC) 40 MG tablet Take 1 tablet by mouth Daily.      [DISCONTINUED]  "Loratadine (CLARITIN PO) Take 1 tablet by mouth As Needed. Pt states he is uncertain of dosage      [DISCONTINUED] mupirocin (BACTROBAN) 2 % ointment Apply 1 Application topically to the appropriate area as directed 3 (Three) Times a Day. 30 g 0        ALLERGIES:    Allergies   Allergen Reactions    Ace Inhibitors Other (See Comments)     RENAL FAILURE/ raised creatinine    Contrast Dye (Echo Or Unknown Ct/Mr) Other (See Comments) and Anaphylaxis     RENAL FAILURE    Iodine Anaphylaxis    Angiotensin Receptor Blockers Swelling    Eliquis [Apixaban] Arrhythmia     BLEEDING ISSUES; PT CANNOT TAKE ANY ANTICOAGULANTS DUE TO LOW PLATELETS EXCEPT ASPIRIN      Filgrastim GI Intolerance and Confusion     Chest pain    Keflex [Cephalexin] Diarrhea     RENAL FAILURE    Other Angioedema     IVP Dye        Social History     Socioeconomic History    Marital status:      Spouse name: Gillian    Number of children: 2    Years of education: College   Tobacco Use    Smoking status: Never     Passive exposure: Never    Smokeless tobacco: Never   Substance and Sexual Activity    Alcohol use: No     Comment: caffeine use: none    Drug use: No    Sexual activity: Defer        Family History   Problem Relation Age of Onset    Heart failure Mother 78    Hypertension Mother     Heart disease Mother     Hyperlipidemia Mother     Hypertension Father     Other Father 82        MVI    Malig Hyperthermia Neg Hx         Review of Systems   Constitutional: Negative.    HENT: Negative.     Respiratory: Negative.     Cardiovascular: Negative.    Musculoskeletal: Negative.    Hematological: Negative.    Psychiatric/Behavioral: Negative.            Objective     Vitals:    11/13/24 0820 11/13/24 0823 11/13/24 0826 11/13/24 0831   BP:  151/80  139/66   Pulse: 90 84  75   Resp: 16      Temp: 97.5 °F (36.4 °C)      SpO2: 92% 91%  99%   Weight:   80 kg (176 lb 5.9 oz)    Height:   177.8 cm (70\")          4/23/2024     2:22 PM   Current Status "   ECOG score 0       Physical Exam    CONSTITUTIONAL: pleasant well-developed adult man  HEENT: no icterus, no thrush, moist membranes  LYMPH: no cervical or supraclavicular lad  CV: RRR, S1S2, + murmur  RESP: cta bilat, no wheezing, no rales  GI: soft, nontender, no splenomegaly, +BS, ileostomy  MUSC: no edema, normal gait, dialysis access with dark overlying skin and aneurysmal dilatation  NEURO: alert and oriented x3, normal strength  PSYCH: normal mood mild anxious affect      RECENT LABS:  Hematology WBC   Date Value Ref Range Status   11/13/2024 3.05 (L) 3.40 - 10.80 10*3/mm3 Final     RBC   Date Value Ref Range Status   11/13/2024 2.85 (L) 4.14 - 5.80 10*6/mm3 Final     Hemoglobin   Date Value Ref Range Status   11/13/2024 9.6 (L) 13.0 - 17.7 g/dL Final     Hematocrit   Date Value Ref Range Status   11/13/2024 29.0 (L) 37.5 - 51.0 % Final     Platelets   Date Value Ref Range Status   11/13/2024 59 (L) 140 - 450 10*3/mm3 Final        Lab Results   Component Value Date    GLUCOSE 101 (H) 11/13/2024    BUN 77 (H) 11/13/2024    CREATININE 7.51 (H) 11/13/2024     (L) 11/13/2024    K 3.9 11/13/2024    CL 94 (L) 11/13/2024    CALCIUM 8.6 11/13/2024    PROTEINTOT 6.7 11/09/2023    ALBUMIN 3.3 (L) 09/17/2024    ALT 17 09/17/2024    AST 25 09/17/2024    ALKPHOS 212 (H) 09/17/2024    BILITOT 0.8 09/17/2024    GLOB 3.6 11/09/2023    AGRATIO 0.9 11/09/2023    BCR 10.3 11/13/2024    ANIONGAP 14.0 11/13/2024    EGFR 6.8 (L) 11/13/2024       Assessment & Plan     *Chronic pancytopenia secondary to cirrhosis/portal sequestration  WBC 3.05, hemoglobin 9.6, platelets 59, normal ANC  *End-stage renal disease on hemodialysis  *Nonfunctioning left arm dialysis access composite fistula/graft  *Cirrhosis/portal hypertension  *Chronic atrial fibrillation not anticoagulated because of history of GI AVM/thrombocytopenia    Hematology plan/recommendations:  Discussed with Dr. Stevo Barth vascular surgery-current platelet count of  59,000 considered adequate for tunneled dialysis catheter placement.  The patient will need future surgery/revision for the nonfunctioning dialysis fistula/graft likely will need platelets prior to more invasive procedure.  We will monitor his CBC and follow.  Check PTT and fibrinogen given cirrhosis history.

## 2024-11-13 NOTE — PROGRESS NOTES
Clinical Pharmacy Services: Medication History    Bill Landry is a 79 y.o. male presenting to [facility] for   Chief Complaint   Patient presents with    Vascular Access Problem       He  has a past medical history of Abnormal serum protein electrophoresis, Acquired absence of other specified parts of digestive tract, Anemia in chronic kidney disease, Aneurysm of artery of arm, Aortic stenosis, Bicuspid aortic valve (07/20/2022), Calculus of kidney, Chronic leukopenia and thrombocytopenia, Cirrhosis of liver without ascites (07/24/2017), Congestive splenomegaly (07/24/2017), Crohn disease, Decreased white blood cell count, unspecified, Diverticula of colon, ESRD on hemodialysis (04/05/2018), Fatty liver disease, nonalcoholic, Fistula (04/05/2018), Gastrointestinal hemorrhage, unspecified, GERD (gastroesophageal reflux disease), GI bleed, Glaucoma, H/O Gallstone pancreatitis, H/O Pericardial effusion, H/O sinus bradycardia, History of hypertension, History of UTI, renal calculi, Ileostomy present, Iron deficiency, Leakage of heart valve prosthesis, subsequent encounter, MGUS (monoclonal gammopathy of unknown significance), TATE (obstructive sleep apnea), Other hemoglobinopathies, Pericardial effusion (noninflammatory), Permanent atrial fibrillation, Scarlet fever, uncomplicated, Stenosis of other vascular prosthetic devices, implants and grafts, initial encounter (02/14/2022), Systemic lupus erythematosus, unspecified, Thrombocytopenia, Type 2 diabetes mellitus, and Urinary retention.    Allergies as of 11/13/2024 - Reviewed 11/13/2024   Allergen Reaction Noted    Ace inhibitors Other (See Comments) 03/02/2016    Contrast dye (echo or unknown ct/mr) Other (See Comments) and Anaphylaxis 07/11/2017    Iodine Anaphylaxis 05/13/2021    Angiotensin receptor blockers Swelling 05/13/2021    Eliquis [apixaban] Arrhythmia 07/11/2017    Filgrastim GI Intolerance and Confusion 08/08/2017    Keflex [cephalexin]  Diarrhea 03/02/2016    Other Angioedema 03/02/2016       Medication information was obtained from: Patient   Pharmacy and Phone Number:     Prior to Admission Medications       Prescriptions Last Dose Informant Patient Reported? Taking?    alfuzosin (UROXATRAL) 10 MG 24 hr tablet Past Week Self, Pharmacy Yes Yes    Take 1 tablet by mouth Every Other Day. Does not take on Sundays Tuesdays or Thursdays    aspirin 81 MG tablet 11/12/2024 Self Yes Yes    Take 1 tablet by mouth Daily. INSTRUCTED TO CONTINUE THIS FOR SURGERY PER DR. BERMEO'S OFFICE    B Complex-C-Folic Acid (Umm-Peter) tablet 11/12/2024 Pharmacy, Self Yes Yes    Take 1 tablet by mouth Daily.    Cholecalciferol 25 MCG (1000 UT) tablet Past Week  Yes Yes    Take 1 tablet by mouth Daily.    Iron Sucrose (VENOFER IV)  Self Yes Yes    Infuse 1 dose into a venous catheter As Needed.    latanoprost (XALATAN) 0.005 % ophthalmic solution 11/12/2024 Self, Pharmacy Yes Yes    Administer 1 drop to both eyes Every Night.    lidocaine-prilocaine (EMLA) 2.5-2.5 % cream  Self, Pharmacy Yes Yes    Apply 1 Application topically to the appropriate area as directed As Needed. Nxfakb-[id number]-Lphrkq:  ONE HOUR PRIOR TO DIALYSIS    Methoxy PEG-Epoetin Beta (MIRCERA IJ) Past Week Self Yes Yes    Inject 1 dose as directed Every 30 (Thirty) Days.    sodium chloride 0.65 % nasal spray  Self Yes Yes    Administer 2 sprays into the nostril(s) as directed by provider Every Night.    Sucroferric Oxyhydroxide (Velphoro) 500 MG chewable tablet 11/12/2024 Self, Pharmacy Yes Yes    Chew 1 tablet Daily.              Medication notes: Patient states he is only able to take 1 Velphoro tablet daily do to it upsetting his stomach.     This medication list is complete to the best of my knowledge as of 11/13/2024    Please call if questions.    Alex Medley  Medication History Technician  [phone]    11/13/2024 10:45 EST

## 2024-11-14 ENCOUNTER — DOCUMENTATION (OUTPATIENT)
Dept: ONCOLOGY | Facility: CLINIC | Age: 79
End: 2024-11-14
Payer: MEDICARE

## 2024-11-14 ENCOUNTER — READMISSION MANAGEMENT (OUTPATIENT)
Dept: CALL CENTER | Facility: HOSPITAL | Age: 79
End: 2024-11-14
Payer: MEDICARE

## 2024-11-14 ENCOUNTER — TELEPHONE (OUTPATIENT)
Dept: ONCOLOGY | Facility: CLINIC | Age: 79
End: 2024-11-14
Payer: MEDICARE

## 2024-11-14 VITALS
DIASTOLIC BLOOD PRESSURE: 65 MMHG | OXYGEN SATURATION: 97 % | HEART RATE: 69 BPM | WEIGHT: 174.16 LBS | BODY MASS INDEX: 24.93 KG/M2 | SYSTOLIC BLOOD PRESSURE: 116 MMHG | HEIGHT: 70 IN | TEMPERATURE: 99 F | RESPIRATION RATE: 16 BRPM

## 2024-11-14 DIAGNOSIS — D69.6 THROMBOCYTOPENIA: ICD-10-CM

## 2024-11-14 DIAGNOSIS — R16.1 SPLENOMEGALY, NOT ELSEWHERE CLASSIFIED: Primary | ICD-10-CM

## 2024-11-14 LAB
ALBUMIN SERPL-MCNC: 2.4 G/DL (ref 3.5–5.2)
ANION GAP SERPL CALCULATED.3IONS-SCNC: 15.2 MMOL/L (ref 5–15)
APTT PPP: 37.2 SECONDS (ref 22.7–35.4)
BUN SERPL-MCNC: 89 MG/DL (ref 8–23)
BUN/CREAT SERPL: 10.1 (ref 7–25)
CALCIUM SPEC-SCNC: 8.1 MG/DL (ref 8.6–10.5)
CHLORIDE SERPL-SCNC: 96 MMOL/L (ref 98–107)
CO2 SERPL-SCNC: 21.8 MMOL/L (ref 22–29)
CREAT SERPL-MCNC: 8.78 MG/DL (ref 0.76–1.27)
DEPRECATED RDW RBC AUTO: 48.5 FL (ref 37–54)
EGFRCR SERPLBLD CKD-EPI 2021: 5.6 ML/MIN/1.73
ERYTHROCYTE [DISTWIDTH] IN BLOOD BY AUTOMATED COUNT: 13.2 % (ref 12.3–15.4)
FIBRINOGEN PPP-MCNC: 154 MG/DL (ref 219–464)
GLUCOSE SERPL-MCNC: 151 MG/DL (ref 65–99)
HCT VFR BLD AUTO: 26.4 % (ref 37.5–51)
HGB BLD-MCNC: 8.9 G/DL (ref 13–17.7)
MCH RBC QN AUTO: 33.8 PG (ref 26.6–33)
MCHC RBC AUTO-ENTMCNC: 33.7 G/DL (ref 31.5–35.7)
MCV RBC AUTO: 100.4 FL (ref 79–97)
PHOSPHATE SERPL-MCNC: 4.6 MG/DL (ref 2.5–4.5)
PLATELET # BLD AUTO: 57 10*3/MM3 (ref 140–450)
PLATELET # BLD AUTO: 57 10*3/MM3 (ref 140–450)
PLATELETS.RETICULATED NFR BLD AUTO: 2.4 % (ref 0.9–6.5)
PMV BLD AUTO: 9.6 FL (ref 6–12)
POTASSIUM SERPL-SCNC: 3.9 MMOL/L (ref 3.5–5.2)
RBC # BLD AUTO: 2.63 10*6/MM3 (ref 4.14–5.8)
SODIUM SERPL-SCNC: 133 MMOL/L (ref 136–145)
WBC NRBC COR # BLD AUTO: 2.65 10*3/MM3 (ref 3.4–10.8)

## 2024-11-14 PROCEDURE — 80069 RENAL FUNCTION PANEL: CPT | Performed by: SURGERY

## 2024-11-14 PROCEDURE — 99024 POSTOP FOLLOW-UP VISIT: CPT | Performed by: SURGERY

## 2024-11-14 PROCEDURE — 85027 COMPLETE CBC AUTOMATED: CPT | Performed by: SURGERY

## 2024-11-14 PROCEDURE — 63710000001 ASPIRIN 81 MG TABLET DELAYED-RELEASE: Performed by: SURGERY

## 2024-11-14 PROCEDURE — A9270 NON-COVERED ITEM OR SERVICE: HCPCS | Performed by: SURGERY

## 2024-11-14 PROCEDURE — 85730 THROMBOPLASTIN TIME PARTIAL: CPT | Performed by: SURGERY

## 2024-11-14 PROCEDURE — G0257 UNSCHED DIALYSIS ESRD PT HOS: HCPCS

## 2024-11-14 PROCEDURE — 36415 COLL VENOUS BLD VENIPUNCTURE: CPT | Performed by: SURGERY

## 2024-11-14 PROCEDURE — 85055 RETICULATED PLATELET ASSAY: CPT | Performed by: SURGERY

## 2024-11-14 PROCEDURE — 97161 PT EVAL LOW COMPLEX 20 MIN: CPT

## 2024-11-14 PROCEDURE — G0378 HOSPITAL OBSERVATION PER HR: HCPCS

## 2024-11-14 PROCEDURE — 85384 FIBRINOGEN ACTIVITY: CPT | Performed by: SURGERY

## 2024-11-14 PROCEDURE — 99214 OFFICE O/P EST MOD 30 MIN: CPT | Performed by: INTERNAL MEDICINE

## 2024-11-14 RX ORDER — ASPIRIN 81 MG/1
81 TABLET ORAL DAILY
Status: DISCONTINUED | OUTPATIENT
Start: 2024-11-14 | End: 2024-11-14 | Stop reason: HOSPADM

## 2024-11-14 RX ADMIN — ASPIRIN 81 MG: 81 TABLET, COATED ORAL at 14:08

## 2024-11-14 RX ADMIN — Medication 10 ML: at 08:03

## 2024-11-14 NOTE — CASE MANAGEMENT/SOCIAL WORK
Continued Stay Note  [facility]     Patient Name: Bill Landry  MRN: [id number]  Today's Date: 11/14/2024    Admit Date: 11/13/2024        Discharge Plan       Row Name 11/14/24 1414       Plan    Plan Comments DC orders in [facility]. Spoke with Mariusz/Jada Singleton [phone], fax [phone]. He is aware that patient will be returning to HD tomorrow and that patient has a tunneled cath. OP report faxed.                   Discharge Codes    No documentation.                 Expected Discharge Date and Time       Expected Discharge Date Expected Discharge Time    Nov 14, 2024               Carly Huertas RN

## 2024-11-14 NOTE — OUTREACH NOTE
Prep Survey      Flowsheet Row Responses   [facility] patient discharged from? [place]   Is LACE score < 7 ? Yes   Eligibility [facility]   Date of Admission 11/13/24   Date of Discharge 11/14/24   Discharge Disposition Home or Self Care   Discharge diagnosis Tunnelled HEMODIALYSIS CATHETER INSERTION   Does the patient have one of the following disease processes/diagnoses(primary or secondary)? General Surgery   Does the patient have Home health ordered? No   Is there a DME ordered? No   Prep survey completed? Yes            Reanna RESENDIZ - Registered Nurse

## 2024-11-14 NOTE — DISCHARGE SUMMARY
Patient Name: Bill Landry  : 1945  MRN: [id number]    Date of Admission: 2024  Date of Discharge:  2024  Primary Care Physician: Bharathi De La Torre MD      Chief Complaint:   Vascular Access Problem      Discharge Diagnoses     Active Hospital Problems    Diagnosis  POA    **ESRD on dialysis [N18.6, Z99.2]  Not Applicable    Problem with vascular access [Z78.9]  Yes      Resolved Hospital Problems   No resolved problems to display.        Hospital Course     Mr. Landry is a 79 y.o. male with a history of HTN, ESRD HD MWF, thrombocytopenia, anemia, AS, A. fib  who presented to [facility] initially complaining of malfunctioning left arm fistula.  Please see the admitting history and physical for further details.  He was found to have a non-working left arm fisutla and was admitted to the hospital for further evaluation and treatment.  Vascular consulted and placed a tunneled catheter as fistula revision surgery will be extensive.  Nephrology ordered HD once catheter placed.  Heme/onc arranged for platelets and cryoprecipitate to be given pre-op for revision scheduled by vascular on Tuesday.  Patient cleared by vascular, nephrology, and heme/onc for discharge home and subsequently discharged home after HD.     #Discharge Plan  - Follow up with PCP in 3-5 days  - Vascular plans for Revision on Tuesday, patient aware  - Hematology will arrange for 2 units of platelets and 5 pack cryoprecipitate for hypofibrinogenemia preoperatively  - Patient to maintain current HD schedule    Day of Discharge     Subjective:  Patient cleared by specialists for discharge home and agreeable with plan    Physical Exam:  Temp:  [98 °F (36.7 °C)-98.6 °F (37 °C)] 98.1 °F (36.7 °C)  Heart Rate:  [61-94] 64  Resp:  [16-18] 16  BP: (106-146)/(53-74) 118/74  Body mass index is 24.99 kg/m².  Physical Exam  Constitutional:       General: He is not in acute distress.     Appearance: Normal appearance.   HENT:       Head: Normocephalic and atraumatic.      Nose: Nose normal. No congestion.      Mouth/Throat:      Pharynx: Oropharynx is clear. No oropharyngeal exudate.   Eyes:      General: No scleral icterus.  Cardiovascular:      Rate and Rhythm: Normal rate and regular rhythm.      Heart sounds: No murmur heard.     No friction rub. No gallop.   Pulmonary:      Effort: No respiratory distress.      Breath sounds: No wheezing or rales.   Abdominal:      General: There is no distension.      Tenderness: There is no abdominal tenderness. There is no guarding.      Comments: Ileostomy present   Musculoskeletal:         General: No swelling, deformity or signs of injury.      Cervical back: Normal range of motion. No rigidity.   Skin:    - right chest tunneled HD catheter present     Comments: Ecchymosis around fistula with decreased thrill, swelling also noted   Neurological:      General: No focal deficit present.      Mental Status: He is alert and oriented to person, place, and time.      Motor: No weakness.   Consultants     Consult Orders (all) (From admission, onward)       Start     Ordered    11/13/24 1349  Inpatient Case Management  Consult  Once        Provider:  (Not yet assigned)    11/13/24 1349    11/13/24 1101  Inpatient Nephrology Consult  Once        Specialty:  Nephrology  Provider:  Boyd Oates MD    11/13/24 1100    11/13/24 1101  Hematology & Oncology Inpatient Consult  Once        Specialty:  Hematology and Oncology  Provider:  Robbie Saravia II, MD    11/13/24 1100    11/13/24 1015  [facility] (on-call MD unless specified) Details  Once        Specialty:  Hospitalist  Provider:  Nick Miller DO    11/13/24 1014    11/13/24 0829  Vascular Surgery Consult  Once        Specialty:  Vascular Surgery  Provider:  Aguila Vale MD    11/13/24 0829                  Procedures     Tunnelled HEMODIALYSIS CATHETER INSERTION    Imaging Results (All)       Procedure Component Value  Units Date/Time    Arteriogram (Autofinalize) [[id number]] Resulted: 11/13/24 1651     Updated: 11/13/24 1651    Narrative:      This procedure was auto-finalized with no dictation required.          Results for orders placed during the hospital encounter of 11/13/24    Duplex Hemodialysis Access CAR    Interpretation Summary    The access is thrombosed from the proximal aneurysmal segment throughout the rest of the outflow.  The aneurysmal segment measures 3.3 cm in maximum diameter.  This is completely thrombosed.    Results for orders placed during the hospital encounter of 09/05/24    Adult Transthoracic Echo Complete W/ Cont if Necessary Per Protocol    Interpretation Summary    Left ventricular systolic function is normal. Calculated left ventricular EF = 68.8% Normal left ventricular cavity size and wall thickness noted. All left ventricular wall segments contract normally. Left ventricular diastolic function was indeterminate.    The aortic valve is abnormal in structure. There is severe thickening of the aortic valve. The aortic valve appears trileaflet. No significant aortic valve regurgitation is present. Mild to moderate aortic valve stenosis is present. Aortic valve area is 1.33 cm2. Peak velocity of the flow distal to the aortic valve is 279.9 cm/s. Aortic valve maximum pressure gradient is 31.3 mmHg. Aortic valve mean pressure gradient is 17.8 mmHg. Aortic valve dimensionless index is 0.30 .    Pertinent Labs     Results from last 7 days   Lab Units 11/14/24  0328 11/13/24  0903   WBC 10*3/mm3 2.65* 3.05*   HEMOGLOBIN g/dL 8.9* 9.6*   PLATELETS 10*3/mm3 57*  57* 59*     Results from last 7 days   Lab Units 11/14/24  0329 11/13/24  0903   SODIUM mmol/L 133* 132*   POTASSIUM mmol/L 3.9 3.9   CHLORIDE mmol/L 96* 94*   CO2 mmol/L 21.8* 24.0   BUN mg/dL 89* 77*   CREATININE mg/dL 8.78* 7.51*   GLUCOSE mg/dL 151* 101*   EGFR mL/min/1.73 5.6* 6.8*     Results from last 7 days   Lab Units 11/14/24 0329  "11/13/24  0903   ALBUMIN g/dL 2.4* 3.2*   BILIRUBIN mg/dL  --  0.8   ALK PHOS U/L  --  210*   AST (SGOT) U/L  --  30   ALT (SGPT) U/L  --  19     Results from last 7 days   Lab Units 11/14/24  0329 11/13/24  0903   CALCIUM mg/dL 8.1* 8.6   ALBUMIN g/dL 2.4* 3.2*   PHOSPHORUS mg/dL 4.6*  --                Invalid input(s): \"LDLCALC\"          Test Results Pending at Discharge     Pending Results       None              Discharge Details        Discharge Medications        Continue These Medications        Instructions Start Date   alfuzosin 10 MG 24 hr tablet  Commonly known as: UROXATRAL   10 mg, Oral, Every Other Day, Does not take on Sundays Tuesdays or Thursdays       aspirin 81 MG tablet   81 mg, Daily      cholecalciferol 25 MCG (1000 UT) tablet  Commonly known as: VITAMIN D3   1,000 Units, Daily      latanoprost 0.005 % ophthalmic solution  Commonly known as: XALATAN   1 drop, Nightly      lidocaine-prilocaine 2.5-2.5 % cream  Commonly known as: EMLA   Apply 1 Application topically to the appropriate area as directed As Needed. Qchkbz-Uzsrirzll-Sebpja:  ONE HOUR PRIOR TO DIALYSIS      Loratadine 10 MG capsule   10 mg, Daily      MIRCERA IJ   1 dose, Every 30 Days      Pepcid AC 10 MG tablet  Generic drug: famotidine   10 mg, Daily PRN      Umm-Peter tablet   1 tablet, Oral, Daily      sodium chloride 0.65 % nasal spray   2 sprays, Nightly      Velphoro 500 MG chewable tablet  Generic drug: Sucroferric Oxyhydroxide   1,000 mg, Oral, 3 times daily, Take 2 tablets by  mouth three times a day.      VENOFER IV   1 dose, As Needed               Allergies   Allergen Reactions    Ace Inhibitors Other (See Comments)     RENAL FAILURE/ raised creatinine    Contrast Dye (Echo Or Unknown Ct/Mr) Other (See Comments) and Anaphylaxis     RENAL FAILURE    Iodine Anaphylaxis    Angiotensin Receptor Blockers Swelling    Eliquis [Apixaban] Arrhythmia     BLEEDING ISSUES; PT CANNOT TAKE ANY ANTICOAGULANTS DUE TO LOW PLATELETS " EXCEPT ASPIRIN      Filgrastim GI Intolerance and Confusion     Chest pain    Keflex [Cephalexin] Diarrhea     RENAL FAILURE    Other Angioedema     IVP Dye       Discharge Disposition:  Home or Self Care      Discharge Diet:  Diet Order   Procedures    Diet: Regular/House; Fluid Consistency: Thin (IDDSI 0)       Discharge Activity:       CODE STATUS:    Code Status and Medical Interventions: CPR (Attempt to Resuscitate); Full Support   Ordered at: 11/13/24 1100     Code Status (Patient has no pulse and is not breathing):    CPR (Attempt to Resuscitate)     Medical Interventions (Patient has pulse or is breathing):    Full Support       Future Appointments   Date Time Provider Department Center   12/12/2024  1:30 PM ALEX OP VAS RM 3 BH ALEX OVKR ALEX   12/12/2024  2:15 PM Bill Deal MD [facility] VS ALEX ALEX   4/8/2025  1:20 PM LAB CHAIR 1 CBC KRES[facility]  LAB [facility]   4/8/2025  1:40 PM Theo Pacheco MD [facility] CBC [facility]RES [facility]   9/4/2025  1:15 PM AELX LCG ECHO/VAS RM 1 BH LCG ECHO ALXE   9/4/2025  1:45 PM Dale Vázquez MD [facility] CD LCGKR ALEX      Follow-up Information       Bharathi De La Torre MD .    Specialty: Internal Medicine  Contact information:  [address] [address] [phone]                             Time Spent on Discharge:  36 minutes      DO [facility]  11/14/24  13:20 EST

## 2024-11-14 NOTE — PROGRESS NOTES
Name: Bill Landry ADMIT: 2024   : 1945  PCP: Bharathi De La Torre MD    MRN: [id number] LOS: 0 days   AGE/SEX: 79 y.o. male  ROOM:  [facility] P894/1     CC: Postop day 1 status post tunneled catheter placement  Interval History: Did comfortably overnight  Subjective   Subjective     Review of Systems  Objective   Objective     Vitals:   Temp:  [98 °F (36.7 °C)-98.6 °F (37 °C)] 98.1 °F (36.7 °C)  Heart Rate:  [61-94] 64  Resp:  [16-18] 16  BP: (106-146)/(53-74) 118/74    I/O this shift:  In: 450 [P.O.:450]  Out: -     Scheduled Meds:     acetaminophen, 1,000 mg, Oral, Q6H  b complex-vitamin c-folic acid, 1 tablet, Oral, Daily  latanoprost, 1 drop, Both Eyes, Nightly  sodium chloride, 10 mL, Intravenous, Q12H  tamsulosin, 0.4 mg, Oral, Nightly      IV Meds:        Physical Exam  Vascular: Oozing from the insertion site of the neck is stopped.  Significant ecchymosis noted in the right chest with some blood at the skin exit site.    Data Reviewed:  CBC    Results from last 7 days   Lab Units 24  0328 24  0903   WBC 10*3/mm3 2.65* 3.05*   HEMOGLOBIN g/dL 8.9* 9.6*   PLATELETS 10*3/mm3 57*  57* 59*     BMP   Results from last 7 days   Lab Units 24  0329 24  0903   SODIUM mmol/L 133* 132*   POTASSIUM mmol/L 3.9 3.9   CHLORIDE mmol/L 96* 94*   CO2 mmol/L 21.8* 24.0   BUN mg/dL 89* 77*   CREATININE mg/dL 8.78* 7.51*   GLUCOSE mg/dL 151* 101*   PHOSPHORUS mg/dL 4.6*  --        Most notable findings include: Count down to 57    Active Hospital Problems:   Active Hospital Problems    Diagnosis  POA    **ESRD on dialysis [N18.6, Z99.2]  Not Applicable    Problem with vascular access [Z78.9]  Yes      Resolved Hospital Problems   No resolved problems to display.      Assessment & Plan     Assessment / Plan     End-stage renal disease on dialysis with thrombosed access: Patient underwent tunneled dialysis catheter placement yesterday.  Increased risk secondary to thrombocytopenia and he  does have some ecchymosis and oozing associated with this.  Hopefully will improve with improvement of uremia and dialysis today.    Access: I do not think a simple thrombectomy will be adequate.  Will require more larger revision of his access or potentially even a new graft placement.    I talked to the patient about doing this this admission either tomorrow or next week versus scheduling as an outpatient.  If not going to be done quickly they prefer discharge.  In that case, would arrange short-term follow-up with either myself or Dr. Deal.    Addendum at 10:57 AM:  Will add the patient onto Dr. Deal surgery scheduled for Tuesday.  Okay with discharge after dialysis from our standpoint but will need to coordinate with hematology in order to make sure we will have platelets available for that surgery.    Ben Barth MD  11/14/24  08:37 EST  Available via Secure Chat during the day for non-urgent issues.  After hours and for urgent issues please call the office at [phone]

## 2024-11-14 NOTE — PROGRESS NOTES
Subjective       PATIENT NAME:  Bill Landry  YOB: 1945  PATIENT'S SEX:  male    PATIENT'S ADDRESS:  [address] [place] [address]  PROVIDER:        Allergies   Allergen Reactions    Ace Inhibitors Other (See Comments)     RENAL FAILURE/ raised creatinine    Contrast Dye (Echo Or Unknown Ct/Mr) Other (See Comments) and Anaphylaxis     RENAL FAILURE    Iodine Anaphylaxis    Angiotensin Receptor Blockers Swelling    Eliquis [Apixaban] Arrhythmia     BLEEDING ISSUES; PT CANNOT TAKE ANY ANTICOAGULANTS DUE TO LOW PLATELETS EXCEPT ASPIRIN      Filgrastim GI Intolerance and Confusion     Chest pain    Keflex [Cephalexin] Diarrhea     RENAL FAILURE    Other Angioedema     IVP Dye         PRE-OP ORDERS FOR 11/19/2024    DATE      11/14/2024    Please arrange transfusion for 2 units of platelets and 5 pack (1 pool = 5 units) of cryoprecipitate in pre-op day of his surgery with Dr. Deal.     Electronically Signed By: Robbie Nicolas MD  (NPI: [id number])

## 2024-11-14 NOTE — PROGRESS NOTES
After reviewing images that should both inflow and outflow issues with his current graft, pt may require a new Brach-Ax or Ax-Ax Loop graft.

## 2024-11-14 NOTE — NURSING NOTE
HD complete, 900 ml removed. Patient began cramping last 10 min of treatment. Post /61 HR 64 T 97.5.

## 2024-11-14 NOTE — DISCHARGE INSTRUCTIONS
- Follow up with PCP in 3-5 days  - Vascular plans for Revision on Tuesday, patient aware  - Hematology will arrange for 2 units of platelets and 5 pack cryoprecipitate for hypofibrinogenemia preoperatively  - Patient to maintain current HD schedule

## 2024-11-14 NOTE — PROGRESS NOTES
[facility] Progress Note      Patient Name: Bill Landry  : 1945  MRN: [id number]  Primary Care Physician:  Bharathi De La Torre MD  Date of admission: 2024    Subjective     Interval History:   Seen and examined on HD  TDC right chest placed yesterday; pain is controlled  No shortness of breath on room air; no chest pain    Review of Systems:   As noted above    Objective     Vitals:   Temp:  [98 °F (36.7 °C)-98.6 °F (37 °C)] 98.1 °F (36.7 °C)  Heart Rate:  [61-94] 64  Resp:  [16-18] 16  BP: (106-146)/(53-74) 118/74    Intake/Output Summary (Last 24 hours) at 2024 1013  Last data filed at 2024 0800  Gross per 24 hour   Intake 850 ml   Output --   Net 850 ml       Physical Exam:    Constitutional: Awake, chronically ill, NAD  HEENT: Sclera anicteric, no conjunctival injection  Neck: Supple, no JVD, no carotid bruit  Respiratory: few crackles in bases, nonlabored on RA  Cardiovascular: Irregularly irregular, not tachycardic, 2/6M, no rub  Gastrointestinal: BS +, + ileostomy, soft, nontender and nondistended  : No palpable bladder  Musculoskeletal: Trace LLE edema, 1+ RLE edema, no clubbing or cyanosis  Psychiatric: Appropriate affect, cooperative  Neurologic: Oriented x3, moving all extremities, normal speech, no asterixis  Skin: Warm and dry      Vascular: Aneurysmal LUE AV fistula/graft without bruit or thrill; right chest TDC    Scheduled Meds:     acetaminophen, 1,000 mg, Oral, Q6H  b complex-vitamin c-folic acid, 1 tablet, Oral, Daily  latanoprost, 1 drop, Both Eyes, Nightly  sodium chloride, 10 mL, Intravenous, Q12H  tamsulosin, 0.4 mg, Oral, Nightly      IV Meds:        Results Reviewed:   I have personally reviewed the results from the time of this admission to 2024 10:13 EST     Results from last 7 days   Lab Units 24  0329 24  0903   SODIUM mmol/L 133* 132*   POTASSIUM mmol/L 3.9 3.9   CHLORIDE mmol/L 96* 94*   CO2 mmol/L 21.8* 24.0    BUN mg/dL 89* 77*   CREATININE mg/dL 8.78* 7.51*   CALCIUM mg/dL 8.1* 8.6   BILIRUBIN mg/dL  --  0.8   ALK PHOS U/L  --  210*   ALT (SGPT) U/L  --  19   AST (SGOT) U/L  --  30   GLUCOSE mg/dL 151* 101*       Estimated Creatinine Clearance: 7.6 mL/min (A) (by C-G formula based on SCr of 8.78 mg/dL (H)).    Results from last 7 days   Lab Units 11/14/24  0329   PHOSPHORUS mg/dL 4.6*             Results from last 7 days   Lab Units 11/14/24  0328 11/13/24  0903   WBC 10*3/mm3 2.65* 3.05*   HEMOGLOBIN g/dL 8.9* 9.6*   PLATELETS 10*3/mm3 57*  57* 59*       Results from last 7 days   Lab Units 11/13/24  0903   INR  1.35*       Assessment / Plan     ASSESSMENT:  Thrombosed left arm fistula/graft, confirmed by Doppler; s/p TDC right chest 11/13.    ESRD on HD, MWF, via L AVF, with HD underway now.  Electrolytes within acceptable range; volume status is stable  Hypertension with CKD, BP acceptable without antihypertensives  Anemia with CKD, receives long-acting WENDY and IV iron outpatient, Hgb stable  A-fib, HR controlled, on aspirin at home  Cirrhosis  Mild-moderate aortic stenosis by echocardiogram August 2024  Chronic leukopenia and thrombocytopenia  Mircera bone disease, on cholecalciferol and Velphoro    PLAN:  1.  HD underway now as make up for yesterday  2.  HD again tomorrow, whether inpatient or outpatient, to return him to usual MWF schedule  3.  Unclear whether he will need new internal access or if current one can be salvaged: Vascular evaluating  4.  Home anytime from renal view    Thank you for involving us in the care of Bill Landry.  Please feel free to call with any questions.    Boyd Oates MD  11/14/24  10:13 [facility]    [facility]  [phone]    Please note that portions of this note were completed with a voice recognition program.

## 2024-11-14 NOTE — PROGRESS NOTES
REASON FOR FOLLOW-UP: chronic pancytopenia      INTERVAL HISTORY:  The patient had a tunneled dialysis catheter placed in the right chest wall yesterday and currently undergoing hemodialysis.  He has bruising around the catheter insertion.  He is anxious.  Plan is for surgery next Tuesday to address the malfunctioning arm fistula.    HISTORY OF PRESENT ILLNESS:   This is a pleasant 79-year-old man followed in our practice for pancytopenia secondary to cirrhosis with portal hypertension and splenic sequestration and anemia further complicated by end-stage renal disease on hemodialysis. He is admitted after being unable to undergo dialysis today secondary to a malfunctioning fistula. He feels well with no otherwise specific complaints today.       Past Medical History, Past Surgical History, Social History, Family History have been reviewed and are without significant changes except as mentioned.    Review of Systems   A comprehensive 14 point review of systems was performed and was negative except as mentioned.    Medications:  The current medication list was reviewed in the EMR    ALLERGIES:    Allergies   Allergen Reactions    Ace Inhibitors Other (See Comments)     RENAL FAILURE/ raised creatinine    Contrast Dye (Echo Or Unknown Ct/Mr) Other (See Comments) and Anaphylaxis     RENAL FAILURE    Iodine Anaphylaxis    Angiotensin Receptor Blockers Swelling    Eliquis [Apixaban] Arrhythmia     BLEEDING ISSUES; PT CANNOT TAKE ANY ANTICOAGULANTS DUE TO LOW PLATELETS EXCEPT ASPIRIN      Filgrastim GI Intolerance and Confusion     Chest pain    Keflex [Cephalexin] Diarrhea     RENAL FAILURE    Other Angioedema     IVP Dye       Objective      Vitals:    11/14/24 0500   BP: 118/74   Pulse: 64   Resp: 16   Temp: 98.1 °F (36.7 °C)   SpO2: 95%          Physical Exam    CONSTITUTIONAL: pleasant well-developed adult man  HEENT: no icterus, no thrush, moist membranes  LYMPH: no cervical or supraclavicular lad  CV: RRR, S1S2, +  murmur  RESPchest: cta bilat, no wheezing, no rales; new tunneled dialysis catheter right chest wall with bruising  GI: soft, nontender, no splenomegaly, +BS, ileostomy  MUSC: no edema, normal gait, dialysis access with dark overlying skin and aneurysmal dilatation  NEURO: alert and oriented x3, normal strength  PSYCH: normal mood mild anxious affect     RECENT LABS:  Hematology Results from last 7 days   Lab Units 11/14/24  0328 11/13/24  0903   WBC 10*3/mm3 2.65* 3.05*   HEMOGLOBIN g/dL 8.9* 9.6*   HEMATOCRIT % 26.4* 29.0*   PLATELETS 10*3/mm3 57*  57* 59*     2  Lab Results   Component Value Date    GLUCOSE 151 (H) 11/14/2024    BUN 89 (H) 11/14/2024    CREATININE 8.78 (H) 11/14/2024    EGFRIFNONA 7 (L) 02/14/2022    EGFRIFAFRI  02/14/2022      Comment:      <15 Indicative of kidney failure.    BCR 10.1 11/14/2024    CO2 21.8 (L) 11/14/2024    CALCIUM 8.1 (L) 11/14/2024    PROTENTOTREF 7.3 09/17/2024    ALBUMIN 2.4 (L) 11/14/2024    LABIL2 0.8 09/17/2024    AST 30 11/13/2024    ALT 19 11/13/2024       Lab Results   Component Value Date    IRON 69 10/29/2023    TIBC 262 (L) 10/29/2023    FERRITIN 1,331.00 (H) 10/29/2023       Lab Results   Component Value Date    SBJONOUT35 563 10/29/2023       Lab Results   Component Value Date    FOLATE >20.00 10/29/2023          Assessment & Plan   *Chronic pancytopenia secondary to cirrhosis/portal sequestration, IPF 2.4%  *End-stage renal disease on hemodialysis  *Nonfunctioning left arm dialysis access composite fistula/graft  *Cirrhosis/portal hypertension-PTT 37, PT 16.9, fibrinogen 154  *Chronic atrial fibrillation not anticoagulated because of history of GI AVM/thrombocytopenia     Hematology plan/recommendations:  Discussed with Dr. Stevo Barth vascular surgery--plan is for revision of the fistula/graft next Tuesday by Dr. Deal.  Will arrange 2 units of platelets for thrombocytopenia and 5 pack cryoprecipitate for hypofibrinogenemia preoperatively.  No opposition to  discharge from hematology perspective.                  11/14/2024      CC:

## 2024-11-14 NOTE — PLAN OF CARE
Goal Outcome Evaluation:  Plan of Care Reviewed With: patient        Progress: improving  Outcome Evaluation: Pt cooperative with care. Wife at bedside attentive to pt. Pt has no complaint of pain. Pt is concerned of the dressing in his tunnelcath. Educated pt on care of central lines                             
Goal Outcome Evaluation:  Plan of Care Reviewed With: patient, spouse           Outcome Evaluation: PT evl order received, pt is independent with mobility, was able to walk 150 ft independently with no assistive device, plans home with spouse, no PT needs identified, pt is safe to walk with nursing staff until discharge.    Anticipated Discharge Disposition (PT): home with assist                        
yes

## 2024-11-14 NOTE — TELEPHONE ENCOUNTER
Pre-op says they usually have a hard time seeing our orders when we enter them. I attempted to enter them within the surgery encounter but did not feel it would translate properly. Pre-op says I may create a written order and fax it to them at [phone] and they will take care of the rest.

## 2024-11-14 NOTE — TELEPHONE ENCOUNTER
----- Message from Robbie Nicolas sent at 11/14/2024 12:23 PM EST -----  Pt is to have vascular surgery next Tuesday by Dr. Deal.  He has low platelets and low fibrinogen from cirrhosis.  Please arrange transfusion 2 units platelets and 5 pack cryoprecipitate in pre-op day of his surgery.

## 2024-11-14 NOTE — THERAPY EVALUATION
Patient Name: Bill Landry  : 1945    MRN: [id number]                              Today's Date: 2024       Admit Date: 2024    Visit Dx:     ICD-10-CM ICD-9-CM   1. Problem with vascular access  Z78.9 V49.9   2. Thrombocytopenia  D69.6 287.5   3. End stage renal disease on dialysis  N18.6 585.6    Z99.2 V45.11   4. ESRD on dialysis  N18.6 585.6    Z99.2 V45.11     Patient Active Problem List   Diagnosis    Permanent atrial fibrillation    Thrombocytopenia    Chronic leukopenia    Cirrhosis of liver without ascites    Congestive splenomegaly    Anemia due to stage 4 chronic kidney disease treated with erythropoietin    Esophageal abnormality    ESRD on hemodialysis    Other specified glaucoma    Arteriovenous fistula for hemodialysis in place, secondary    Crohn's disease, unspecified, without complications    Deficiency of other specified B group vitamins    Dermatitis, unspecified    Encounter for immunization    History of urinary stone    Hyperparathyroidism, unspecified    Other disorders of phosphorus metabolism    Other iron deficiency anemias    Pure hypertriglyceridemia    Renal osteodystrophy    Secondary hyperparathyroidism of renal origin    Splenomegaly, not elsewhere classified    Bilateral pseudophakia    Dermatochalasis of eyelid    Primary open-angle glaucoma, bilateral, moderate stage    Puckering of macula, left eye    Secondary cataract    AV fistula occlusion, initial encounter    TATE (obstructive sleep apnea)    Aneurysm artery, upper extremity    Nonrheumatic aortic valve stenosis    Bicuspid aortic valve    Hyperuricemia without signs of inflammatory arthritis and tophaceous disease    Presbyopia    Impaired glucose tolerance    Hypofibrinogenemia    Pulmonary hypertension    Skin change    ESRD on dialysis    Problem with vascular access     Past Medical History:   Diagnosis Date    Abnormal serum protein electrophoresis     Acquired absence of other specified parts  of digestive tract     History of colectomy    Anemia in chronic kidney disease     Aneurysm of artery of arm     let arm    Aortic stenosis     Bicuspid aortic valve 07/20/2022    Calculus of kidney     Chronic leukopenia and thrombocytopenia     Cirrhosis of liver without ascites 07/24/2017    Congestive splenomegaly 07/24/2017    Crohn disease     with ileostomy    Decreased white blood cell count, unspecified     Diverticula of colon     ESRD on hemodialysis 04/05/2018    M-W-F FRESENIUS    Fatty liver disease, nonalcoholic     Fistula 04/05/2018    Other Specified Complication    Gastrointestinal hemorrhage, unspecified     GERD (gastroesophageal reflux disease)     GI bleed     Glaucoma     H/O Gallstone pancreatitis     H/O Pericardial effusion     H/O sinus bradycardia     History of hypertension     resolved    History of UTI     Hx of renal calculi     Ileostomy present     Iron deficiency     Leakage of heart valve prosthesis, subsequent encounter     MGUS (monoclonal gammopathy of unknown significance)     TATE (obstructive sleep apnea)     Other hemoglobinopathies     Pericardial effusion (noninflammatory)     Permanent atrial fibrillation     Scarlet fever, uncomplicated     Stenosis of other vascular prosthetic devices, implants and grafts, initial encounter 02/14/2022    Systemic lupus erythematosus, unspecified     Thrombocytopenia     undpecified    Type 2 diabetes mellitus     CURRENTLY TAKES NO MED    Urinary retention     Post-OP     Past Surgical History:   Procedure Laterality Date    APPENDECTOMY  06/1968    ARTERIOVENOUS FISTULA/SHUNT SURGERY Left 08/08/2017    Procedure: LEFT BRACHIAL CEPHALIC AV FISTULA FORMATION WITH CEPHALIC VEIN TRANSPOSITION ;  Surgeon: Bill Deal MD;  Location: [facility];  Service:     ARTERIOVENOUS FISTULA/SHUNT SURGERY Left 05/27/2022    Procedure: OPEN REVISION LEFT ARTERIOVENOUS INTERPOSITION GRAFT PLACEMENT;  Surgeon: Bill Deal MD;  Location:   [facility];  Service: Vascular;  Laterality: Left;    ARTERIOVENOUS FISTULA/SHUNT SURGERY W/ HEMODIALYSIS CATHETER INSERTION Left 02/15/2022    Procedure: OPEN LEFT ARM ARTERIOVENOUS FISTULA THROMBECTOMY WITH CEPHALIC VEIN ARTHROPLASTY AND STENT/GRAFT PLACEMENT;  Surgeon: Bill Deal MD;  Location: [facility] OR 18/19;  Service: Vascular;  Laterality: Left;    CATARACT EXTRACTION WITH INTRAOCULAR LENS IMPLANT Bilateral 2004    CHOLECYSTECTOMY  2011    COLECTOMY PARTIAL / TOTAL      History of inflammatory bowel disease with status post colectomy with ileostomy many years ago in the [facility],  18 YEARS OF AGE    COLON SURGERY      Colon resection with colostomy    COLON SURGERY      COLONOSCOPY  01/2018    CYSTOSCOPY LITHOLAPAXY BLADDER STONE EXTRACTION      ENDOSCOPY  01/16/2015    gastritis    ENDOSCOPY  01/17/2018    Procedure: ESOPHAGOGASTRODUODENOSCOPY;  Surgeon: Warner Neville MD;  Location: [facility] ENDOSCOPY;  Service:     ILEOSCOPY  01/16/2015    normal    ILEOSCOPY N/A 01/17/2018    Procedure: ILEOSCOPY;  Surgeon: Warner Neville MD;  Location: [facility] ENDOSCOPY;  Service:     ILEOSTOMY  12/1968    loop ostomy bypass    INCISION AND DRAINAGE ARM Left 10/27/2023    Procedure: LEFT ARM INCISION AND DRAINAGE;  Surgeon: Bill Deal MD;  Location: [facility] OR;  Service: Vascular;  Laterality: Left;    OTHER SURGICAL HISTORY  2009    kidney stones x3    PSEUDOANEURYSM REPAIR Left 10/27/2023    TONSILLECTOMY  1950      General Information       Row Name 11/14/24 0851          Physical Therapy Time and Intention    Document Type evaluation  -PC     Mode of Treatment physical therapy  -PC       Row Name 11/14/24 0851          General Information    Patient Profile Reviewed yes  -PC     Prior Level of Function independent:  -PC       Row Name 11/14/24 0851          Living Environment    People in Home spouse  -PC       Row Name 11/14/24 0851          Cognition    Orientation Status (Cognition)  oriented x 4  -PC               User Key  (r) = Recorded By, (t) = Taken By, (c) = Cosigned By      Initials Name Provider Type    PC Mayela Lowe, PT Physical Therapist                   Mobility       Row Name 11/14/24 0851          Bed Mobility    Bed Mobility bed mobility (all) activities  -PC     All Activities, [place] (Bed Mobility) independent  -PC       Row Name 11/14/24 0851          Sit-Stand Transfer    Sit-Stand [place] (Transfers) independent  -PC       Row Name 11/14/24 0851          Gait/Stairs (Locomotion)    [place] Level (Gait) independent  -PC     Patient was able to Ambulate yes  -PC     Distance in Feet (Gait) 150  -PC     Deviations/Abnormal Patterns (Gait) base of support, wide;stride length decreased  -PC     Comment, (Gait/Stairs) no loss of balance  -PC               User Key  (r) = Recorded By, (t) = Taken By, (c) = Cosigned By      Initials Name Provider Type    PC Mayela Lowe, PT Physical Therapist                   Obj/Interventions       Row Name 11/14/24 0852          Range of Motion Comprehensive    General Range of Motion no range of motion deficits identified  -PC       [facility] Name 11/14/24 0852          Strength Comprehensive (MMT)    General Manual Muscle Testing (MMT) Assessment no strength deficits identified  -PC       Row Name 11/14/24 0852          Balance    Comment, Balance WNL  -PC               User Key  (r) = Recorded By, (t) = Taken By, (c) = Cosigned By      Initials Name Provider Type    PC Mayela Lowe, PT Physical Therapist                   Goals/Plan    No documentation.                  Clinical Impression       Row Name 11/14/24 0852          Pain    Pretreatment Pain Rating 0/10 - no pain  -PC     Posttreatment Pain Rating 0/10 - no pain  -PC       [facility] Name 11/14/24 0852          Plan of Care Review    Plan of Care Reviewed With patient;spouse  -PC     Outcome Evaluation PT evl order received, pt is independent with mobility, was able to  walk 150 ft independently with no assistive device, plans home with spouse, no PT needs identified, pt is safe to walk with nursing staff until discharge.  -PC       Row Name 11/14/24 0852          Therapy Assessment/Plan (PT)    Criteria for Skilled Interventions Met (PT) no problems identified which require skilled intervention  -PC     Therapy Frequency (PT) evaluation only  -PC       Row Name 11/14/24 0852          Positioning and Restraints    Pre-Treatment Position in bed  -PC     Post Treatment Position bed  -PC     In Bed supine;call light within reach;encouraged to call for assist;with family/caregiver  -PC               User Key  (r) = Recorded By, (t) = Taken By, (c) = Cosigned By      Initials Name Provider Type    PC Mayela Lowe, PT Physical Therapist                   Outcome Measures       Row Name 11/14/24 0854 11/14/24 0800       How much help from another person do you currently need...    Turning from your back to your side while in flat bed without using bedrails? 4  -PC 4  -KM    Moving from lying on back to sitting on the side of a flat bed without bedrails? 4  -PC 4  -KM    Moving to and from a bed to a chair (including a wheelchair)? 4  -PC 4  -KM    Standing up from a chair using your arms (e.g., wheelchair, bedside chair)? 4  -PC 4  -KM    Climbing 3-5 steps with a railing? 4  -PC 3  -KM    To walk in hospital room? 4  -PC 4  -KM    AM-PAC 6 Clicks Score (PT) 24  -PC 23  -KM              User Key  (r) = Recorded By, (t) = Taken By, (c) = Cosigned By      Initials Name Provider Type    PC Mayela Lowe, PT Physical Therapist    Dorcas Mason, RN Registered Nurse                                 Physical Therapy Education       Title: PT OT SLP Therapies (Done)       Topic: Physical Therapy (Done)       Point: Mobility training (Done)       Learning Progress Summary            Patient Acceptance, E,D, DU by PC at 11/14/2024 0854   Family Acceptance, E,D, DU by  at 11/14/2024 0854                                       User Key       Initials Effective Dates Name Provider Type Discipline    PC 06/16/21 -  Mayela Lowe PT Physical Therapist PT                  PT Recommendation and Plan     Outcome Evaluation: PT evl order received, pt is independent with mobility, was able to walk 150 ft independently with no assistive device, plans home with spouse, no PT needs identified, pt is safe to walk with nursing staff until discharge.     Time Calculation:         PT Charges       Row Name 11/14/24 0855             Time Calculation    Start Time 0833  -PC      Stop Time 0845  -PC      Time Calculation (min) 12 min  -PC      PT Received On 11/14/24  -PC                User Key  (r) = Recorded By, (t) = Taken By, (c) = Cosigned By      Initials Name Provider Type    PC Mayela Lowe, PT Physical Therapist                      PT G-Codes  AM-PAC 6 Clicks Score (PT): 24  PT Discharge Summary  Anticipated Discharge Disposition (PT): home with assist    Mayela Lowe, PT  11/14/2024

## 2024-11-14 NOTE — ANESTHESIA POSTPROCEDURE EVALUATION
Central Scheduling: [phone]         [address] [place] [address]  What is screening for lung cancer? Lung cancer screening is a way to find some lung cancers early, before a person has any symptoms of the cancer. Lung cancer screening may help those who have the highest risk for lung cancer--people age 48 and older who are or were heavy smokers. For most people, who aren't at increased risk, screening for lung cancer probably isn't helpful. Screening won't prevent cancer. And it may not find all lung cancers. Lung cancer screening may lower the risk of dying from lung cancer in a small number of people. How is it done? Lung cancer screening is done with a low-dose CT (computed tomography) scan. A CT scan uses X-rays, or radiation, to make detailed pictures of your body. Experts recommend that screening be done in medical centers that focus on finding and treating lung cancer. Who is screening recommended for? Lung cancer screening is recommended for people age 48 and older who are or were heavy smokers. That means people with a smoking history of at least 20 pack years. A pack year is a way to measure how heavy a smoker you are or were. To figure out your pack years, multiply how many packs a day on average (assuming 20 cigarettes per pack) you have smoked by how many years you have smoked. For example: If you smoked 1 pack a day for 20 years, that's 1 times 20. So you have a smoking history of 20 pack years. If you smoked 2 packs a day for 10 years, that's 2 times 10. So you have a smoking history of 20 pack years. Experts agree that screening is for people who have a high risk of lung cancer. But experts don't agree on what high risk means. Some say people age 48 or older with at least a 20-pack-year smoking history are high risk. Others say it's people age 54 or older with a 30-pack-year history.   To see if you could benefit from screening, first find out if you are at high risk for Patient: Bill Landry    Procedure Summary       Date: 11/13/24 Room / Location: [facility] OR  INV /  [facility]    Anesthesia Start: 1601 Anesthesia Stop: 1657    Procedure: Tunnelled HEMODIALYSIS CATHETER INSERTION (Right) Diagnosis:       ESRD on dialysis      (ESRD on dialysis [N18.6, Z99.2])    Surgeons: Ben Barth MD Provider: Preston Shelton MD    Anesthesia Type: MAC ASA Status: 3            Anesthesia Type: MAC    Vitals  Vitals Value Taken Time   /56 11/13/24 1800   Temp 36.7 °C (98 °F) 11/13/24 1705   Pulse 63 11/13/24 1805   Resp 16 11/13/24 1705   SpO2 96 % 11/13/24 1805   Vitals shown include unfiled device data.        Anesthesia Post Evaluation     lung cancer. Your doctor can help you decide your lung cancer risk.  What are the risks of screening?  CT screening for lung cancer isn't perfect. It can show an abnormal result when it turns out there wasn't any cancer. This is called a false-positive result. This means you may need more tests to make sure you don't have cancer. These tests can be harmful and cause a lot of worry.  These tests may include more CT scans and invasive testing like a lung biopsy. In a biopsy, the doctor takes a sample of tissue from inside your lung so it can be looked at under a microscope. A biopsy is the only way to tell if you have lung cancer. If the biopsy finds cancer, you and your doctor will have to decide how or whether to treat it.  Some lung cancers found on CT scans are harmless and would not have caused a problem if they had not been found through screening. But because doctors can't tell which ones will turn out to be harmless, most will be treated. This means that you may get treatment--including surgery, radiation, or chemotherapy--that you don't need.  There is a risk of damage to cells or tissue from being exposed to radiation, including the small amounts used in CTs, X-rays, and other medical tests. Over time, exposure to radiation may cause cancer and other health problems. But in most cases, the risk of getting cancer from being exposed to small amounts of radiation is low. It's not a reason to avoid these tests for most people.  What are the benefits of screening?  Your scan may be normal (negative).  For some people who are at higher risk, screening lowers the chance of dying of lung cancer. How much and how long you smoked helps to determine your risk level. Screening can find some cancers early, when treatment may be more likely to work.  What happens after screening?  The results of your CT scan will be sent to your doctor. Someone from your care team will explain the results of your scan and answer any  questions you may have. If you need any follow-up, he or she will help you understand what to do next. After a lung cancer screening, you can go back to your usual activities right away. A lung cancer screening test can't tell if you have lung cancer. If your results are positive, your doctor can't tell whether an abnormal finding is a harmless nodule, cancer, or something else without doing more tests. What can you do to help prevent lung cancer? Some lung cancers can't be prevented. But if you smoke, quitting smoking is the best step you can take to prevent lung cancer. If you want to quit, your doctor can recommend medicines or other ways to help. Follow-up care is a key part of your treatment and safety. Be sure to make and go to all appointments, and call your doctor if you are having problems. It's also a good idea to know your test results and keep a list of the medicines you take. Where can you learn more? Go to http://www.valdez.com/ and enter Q940 to learn more about \"Learning About Lung Cancer Screening. \"  Current as of: May 4, 2022               Content Version: 13.5  © 5561-9478 [employer]. Care instructions adapted under license by [facility] ([facility]). If you have questions about a medical condition or this instruction, always ask your healthcare professional. [address] any warranty or liability for your use of this information.

## 2024-11-15 ENCOUNTER — TRANSITIONAL CARE MANAGEMENT TELEPHONE ENCOUNTER (OUTPATIENT)
Dept: CALL CENTER | Facility: HOSPITAL | Age: 79
End: 2024-11-15
Payer: MEDICARE

## 2024-11-15 NOTE — OUTREACH NOTE
Call Center TCM Note      Flowsheet Row Responses   [facility] patient discharged from? [place]   Does the patient have one of the following disease processes/diagnoses(primary or secondary)? General Surgery   TCM attempt successful? Yes   Call start time 1137   Call end time 1139   Discharge diagnosis Tunnelled HEMODIALYSIS CATHETER INSERTION   Person spoke with today (if not patient) and relationship wife   Meds reviewed with patient/caregiver? Yes   Is the patient having any side effects they believe may be caused by any medication additions or changes? No   Does the patient have all medications related to this admission filled (includes all antibiotics, pain medications, etc.) Yes   Is the patient taking all medications as directed (includes completed medication regime)? Yes   Comments Patient will followup with surgeon on 12/12. He had dialysis today and everything went very well.   Does the patient have an appointment with their PCP within 7-14 days of discharge? Other   Nursing Interventions Patient desires to follow up with specialty only   Psychosocial issues? No   Did the patient receive a copy of their discharge instructions? Yes   Nursing interventions Reviewed instructions with patient   What is the patient's perception of their health status since discharge? Improving   Nursing interventions Nurse provided patient education   Is the patient/caregiver able to teach back signs and symptoms of incisional infection? Increased redness, swelling or pain at the incisonal site, Incisional warmth, Fever, Increased drainage or bleeding, Pus or odor from incision   Is the patient/caregiver able to teach back steps to recovery at home? Set small, achievable goals for return to baseline health, Rest and rebuild strength, gradually increase activity   If the patient is a current smoker, are they able to teach back resources for cessation? Not a smoker   Is the patient/caregiver able to teach back the  hierarchy of who to call/visit for symptoms/problems? PCP, Specialist, Home health nurse, Urgent Care, ED, 911 Yes   TCM call completed? Yes   Call end time 1139   Would this patient benefit from a Referral to [facility] Social Work? No   Is the patient interested in additional calls from an ambulatory ? No            Nishant Burroughs RN    11/15/2024, 11:39 EST

## 2024-11-15 NOTE — CASE MANAGEMENT/SOCIAL WORK
Case Management Discharge Note      Final Note: dc home         Selected Continued Care - Discharged on 11/14/2024 Admission date: 11/13/2024 - Discharge disposition: Home or Self Care      Destination    No services have been selected for the patient.                Durable Medical Equipment    No services have been selected for the patient.                Dialysis/Infusion    No services have been selected for the patient.                Home Medical Care    No services have been selected for the patient.                Therapy    No services have been selected for the patient.                Community Resources    No services have been selected for the patient.                Community & DME    No services have been selected for the patient.                    Transportation Services  Private: Car    Final Discharge Disposition Code: 01 - home or self-care

## 2024-11-17 ENCOUNTER — HOSPITAL ENCOUNTER (EMERGENCY)
Facility: HOSPITAL | Age: 79
Discharge: HOME | DRG: 264 | End: 2024-11-17
Attending: EMERGENCY MEDICINE | Admitting: EMERGENCY MEDICINE
Payer: MEDICARE

## 2024-11-17 ENCOUNTER — APPOINTMENT (OUTPATIENT)
Dept: GENERAL RADIOLOGY | Facility: HOSPITAL | Age: 79
DRG: 264 | End: 2024-11-17
Payer: MEDICARE

## 2024-11-17 VITALS
HEIGHT: 70 IN | OXYGEN SATURATION: 96 % | HEART RATE: 70 BPM | TEMPERATURE: 97.9 F | RESPIRATION RATE: 18 BRPM | BODY MASS INDEX: 24.62 KG/M2 | SYSTOLIC BLOOD PRESSURE: 128 MMHG | DIASTOLIC BLOOD PRESSURE: 62 MMHG | WEIGHT: 172 LBS

## 2024-11-17 DIAGNOSIS — T82.838D BLEEDING DUE TO DIALYSIS CATHETER PLACEMENT, SUBSEQUENT ENCOUNTER: ICD-10-CM

## 2024-11-17 DIAGNOSIS — D69.6 THROMBOCYTOPENIA: Primary | ICD-10-CM

## 2024-11-17 LAB
ABO GROUP BLD: NORMAL
ALBUMIN SERPL-MCNC: 3.1 G/DL (ref 3.5–5.2)
ALBUMIN/GLOB SERPL: 0.9 G/DL
ALP SERPL-CCNC: 202 U/L (ref 39–117)
ALT SERPL W P-5'-P-CCNC: 10 U/L (ref 1–41)
ANION GAP SERPL CALCULATED.3IONS-SCNC: 13 MMOL/L (ref 5–15)
APTT PPP: 35.7 SECONDS (ref 22.7–35.4)
AST SERPL-CCNC: 37 U/L (ref 1–40)
BASOPHILS # BLD AUTO: 0.01 10*3/MM3 (ref 0–0.2)
BASOPHILS NFR BLD AUTO: 0.4 % (ref 0–1.5)
BILIRUB SERPL-MCNC: 0.9 MG/DL (ref 0–1.2)
BLD GP AB SCN SERPL QL: NEGATIVE
BUN SERPL-MCNC: 62 MG/DL (ref 8–23)
BUN/CREAT SERPL: 8.8 (ref 7–25)
CALCIUM SPEC-SCNC: 8.2 MG/DL (ref 8.6–10.5)
CHLORIDE SERPL-SCNC: 96 MMOL/L (ref 98–107)
CO2 SERPL-SCNC: 23 MMOL/L (ref 22–29)
CREAT SERPL-MCNC: 7.02 MG/DL (ref 0.76–1.27)
DEPRECATED RDW RBC AUTO: 50.9 FL (ref 37–54)
EGFRCR SERPLBLD CKD-EPI 2021: 7.4 ML/MIN/1.73
EOSINOPHIL # BLD AUTO: 0.05 10*3/MM3 (ref 0–0.4)
EOSINOPHIL NFR BLD AUTO: 2 % (ref 0.3–6.2)
ERYTHROCYTE [DISTWIDTH] IN BLOOD BY AUTOMATED COUNT: 13.4 % (ref 12.3–15.4)
GLOBULIN UR ELPH-MCNC: 3.5 GM/DL
GLUCOSE SERPL-MCNC: 164 MG/DL (ref 65–99)
HCT VFR BLD AUTO: 29 % (ref 37.5–51)
HGB BLD-MCNC: 9.5 G/DL (ref 13–17.7)
IMM GRANULOCYTES # BLD AUTO: 0.01 10*3/MM3 (ref 0–0.05)
IMM GRANULOCYTES NFR BLD AUTO: 0.4 % (ref 0–0.5)
INR PPP: 1.35 (ref 0.9–1.1)
LYMPHOCYTES # BLD AUTO: 0.28 10*3/MM3 (ref 0.7–3.1)
LYMPHOCYTES NFR BLD AUTO: 11.1 % (ref 19.6–45.3)
MAGNESIUM SERPL-MCNC: 1.8 MG/DL (ref 1.6–2.4)
MCH RBC QN AUTO: 33.7 PG (ref 26.6–33)
MCHC RBC AUTO-ENTMCNC: 32.8 G/DL (ref 31.5–35.7)
MCV RBC AUTO: 102.8 FL (ref 79–97)
MONOCYTES # BLD AUTO: 0.21 10*3/MM3 (ref 0.1–0.9)
MONOCYTES NFR BLD AUTO: 8.3 % (ref 5–12)
NEUTROPHILS NFR BLD AUTO: 1.96 10*3/MM3 (ref 1.7–7)
NEUTROPHILS NFR BLD AUTO: 77.8 % (ref 42.7–76)
NRBC BLD AUTO-RTO: 0 /100 WBC (ref 0–0.2)
PHOSPHATE SERPL-MCNC: 3.3 MG/DL (ref 2.5–4.5)
PLATELET # BLD AUTO: 56 10*3/MM3 (ref 140–450)
PMV BLD AUTO: 8.8 FL (ref 6–12)
POTASSIUM SERPL-SCNC: 3.9 MMOL/L (ref 3.5–5.2)
PROT SERPL-MCNC: 6.6 G/DL (ref 6–8.5)
PROTHROMBIN TIME: 17 SECONDS (ref 11.7–14.2)
RBC # BLD AUTO: 2.82 10*6/MM3 (ref 4.14–5.8)
RH BLD: POSITIVE
SODIUM SERPL-SCNC: 132 MMOL/L (ref 136–145)
T&S EXPIRATION DATE: NORMAL
WBC NRBC COR # BLD AUTO: 2.52 10*3/MM3 (ref 3.4–10.8)

## 2024-11-17 PROCEDURE — 85610 PROTHROMBIN TIME: CPT | Performed by: EMERGENCY MEDICINE

## 2024-11-17 PROCEDURE — 84100 ASSAY OF PHOSPHORUS: CPT | Performed by: EMERGENCY MEDICINE

## 2024-11-17 PROCEDURE — P9100 PATHOGEN TEST FOR PLATELETS: HCPCS

## 2024-11-17 PROCEDURE — 99283 EMERGENCY DEPT VISIT LOW MDM: CPT

## 2024-11-17 PROCEDURE — 86900 BLOOD TYPING SEROLOGIC ABO: CPT | Performed by: EMERGENCY MEDICINE

## 2024-11-17 PROCEDURE — 86850 RBC ANTIBODY SCREEN: CPT | Performed by: EMERGENCY MEDICINE

## 2024-11-17 PROCEDURE — 25010000002 DESMOPRESSIN ACETATE PF 4 MCG/ML SOLUTION 1 ML VIAL: Performed by: EMERGENCY MEDICINE

## 2024-11-17 PROCEDURE — 71045 X-RAY EXAM CHEST 1 VIEW: CPT

## 2024-11-17 PROCEDURE — 80053 COMPREHEN METABOLIC PANEL: CPT | Performed by: EMERGENCY MEDICINE

## 2024-11-17 PROCEDURE — 36415 COLL VENOUS BLD VENIPUNCTURE: CPT

## 2024-11-17 PROCEDURE — 25010000002 LIDOCAINE 1% - EPINEPHRINE 1:100000 1 %-1:100000 SOLUTION: Performed by: STUDENT IN AN ORGANIZED HEALTH CARE EDUCATION/TRAINING PROGRAM

## 2024-11-17 PROCEDURE — 85730 THROMBOPLASTIN TIME PARTIAL: CPT | Performed by: EMERGENCY MEDICINE

## 2024-11-17 PROCEDURE — 96365 THER/PROPH/DIAG IV INF INIT: CPT

## 2024-11-17 PROCEDURE — P9035 PLATELET PHERES LEUKOREDUCED: HCPCS

## 2024-11-17 PROCEDURE — 85025 COMPLETE CBC W/AUTO DIFF WBC: CPT | Performed by: EMERGENCY MEDICINE

## 2024-11-17 PROCEDURE — 83735 ASSAY OF MAGNESIUM: CPT | Performed by: EMERGENCY MEDICINE

## 2024-11-17 PROCEDURE — 86901 BLOOD TYPING SEROLOGIC RH(D): CPT | Performed by: EMERGENCY MEDICINE

## 2024-11-17 PROCEDURE — 36430 TRANSFUSION BLD/BLD COMPNT: CPT

## 2024-11-17 PROCEDURE — 99284 EMERGENCY DEPT VISIT MOD MDM: CPT | Performed by: STUDENT IN AN ORGANIZED HEALTH CARE EDUCATION/TRAINING PROGRAM

## 2024-11-17 RX ORDER — LIDOCAINE HYDROCHLORIDE AND EPINEPHRINE 10; 10 MG/ML; UG/ML
20 INJECTION, SOLUTION INFILTRATION; PERINEURAL ONCE
Status: COMPLETED | OUTPATIENT
Start: 2024-11-17 | End: 2024-11-17

## 2024-11-17 RX ORDER — LIDOCAINE HYDROCHLORIDE AND EPINEPHRINE 10; 10 MG/ML; UG/ML
10 INJECTION, SOLUTION INFILTRATION; PERINEURAL ONCE
Status: DISCONTINUED | OUTPATIENT
Start: 2024-11-17 | End: 2024-11-17

## 2024-11-17 RX ORDER — SODIUM CHLORIDE 0.9 % (FLUSH) 0.9 %
10 SYRINGE (ML) INJECTION AS NEEDED
Status: DISCONTINUED | OUTPATIENT
Start: 2024-11-17 | End: 2024-11-17 | Stop reason: HOSPADM

## 2024-11-17 RX ADMIN — LIDOCAINE HYDROCHLORIDE AND EPINEPHRINE 20 ML: 10; 10 INJECTION, SOLUTION INFILTRATION; PERINEURAL at 15:30

## 2024-11-17 RX ADMIN — DESMOPRESSIN ACETATE 23 MCG: 4 INJECTION, SOLUTION INTRAVENOUS; SUBCUTANEOUS at 13:40

## 2024-11-17 NOTE — CONSULTS
Name: Bill Landry ADMIT: 2024   : 1945  PCP: Bharathi De La Torre MD    MRN: [id number] LOS: 0 days   AGE/SEX: 79 y.o. male  ROOM:      Consults  Breonna Arora MD     LOS: 0 days   Patient Care Team:  Bharathi De La Torre MD as PCP - General (Internal Medicine)  Ken Moseley MD as Consulting Physician (Pulmonary Disease)  Dale Vázquez MD as Cardiologist (Cardiology)  Myra Moore MD as Consulting Physician (Nephrology)  Theo Pacheco MD as Consulting Physician (Hematology and Oncology)  Preston Ureña MD as Consulting Physician (Urology)  Ernesto Ward MD as Consulting Physician (Ophthalmology)  Bill Deal MD as Surgeon (Vascular Surgery)  Dimple Villanueva DPM as Consulting Physician (Podiatry)  Boom Olivares II, MD as Referring Physician (Vascular Surgery)    Subjective     Chief complaint: bleeding around tunneled catheter    History of Present Illness  79 y.o. male with a history of thrombocytopenia, cirrhosis, leukopenia, Crohn's disease, and ESRD on HD who had a tunneled catheter placed on 24 who developed bleeding around his catheter yesterday.  This did not improve with direct pressure by his wife overnight so they presented to the ER for evaluation.  He has developed extensive bruising of the right chest wall around the catheter.  His catheter functioned well for hemodialysis on Friday.      Review of Systems   All other systems reviewed and are negative.      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    Aortic stenosis     Bicuspid aortic valve 2022    Calculus of kidney     Chronic leukopenia and thrombocytopenia     Cirrhosis of liver without ascites 2017    Congestive splenomegaly 2017    Crohn disease     with ileostomy     Decreased white blood cell count, unspecified     Diverticula of colon     ESRD on hemodialysis 04/05/2018    M-W-F [facility]ESENI[facility]    Fatty liver disease, nonalcoholic     Fistula 04/05/2018    Other Specified Complication    Gastrointestinal hemorrhage, unspecified     GERD (gastroesophageal reflux disease)     GI bleed     Glaucoma     H/O Gallstone pancreatitis     H/O Pericardial effusion     H/O sinus bradycardia     History of hypertension     resolved    History of UTI     Hx of renal calculi     Ileostomy present     Iron deficiency     Leakage of heart valve prosthesis, subsequent encounter     MGUS (monoclonal gammopathy of unknown significance)     TATE (obstructive sleep apnea)     Other hemoglobinopathies     Pericardial effusion (noninflammatory)     Permanent atrial fibrillation     Scarlet fever, uncomplicated     Stenosis of other vascular prosthetic devices, implants and grafts, initial encounter 02/14/2022    Systemic lupus erythematosus, unspecified     Thrombocytopenia     undpecified    Type 2 diabetes mellitus     CURRENTLY TAKES NO MED    Urinary retention     Post-OP       Past Surgical History:   Procedure Laterality Date    APPENDECTOMY  06/1968    ARTERIOVENOUS FISTULA/SHUNT SURGERY Left 08/08/2017    Procedure: LEFT BRACHIAL CEPHALIC AV FISTULA FORMATION WITH CEPHALIC VEIN TRANSPOSITION ;  Surgeon: Bill Deal MD;  Location: [facility];  Service:     ARTERIOVENOUS FISTULA/SHUNT SURGERY Left 05/27/2022    Procedure: OPEN REVISION LEFT ARTERIOVENOUS INTERPOSITION GRAFT PLACEMENT;  Surgeon: Bill Deal MD;  Location: [facility] OR;  Service: Vascular;  Laterality: Left;    ARTERIOVENOUS FISTULA/SHUNT SURGERY W/ HEMODIALYSIS CATHETER INSERTION Left 02/15/2022    Procedure: OPEN LEFT ARM ARTERIOVENOUS FISTULA THROMBECTOMY WITH CEPHALIC VEIN ARTHROPLASTY AND STENT/GRAFT PLACEMENT;  Surgeon: Bill Deal MD;  Location: [facility] 18/19;  Service: Vascular;  Laterality:  Left;    CATARACT EXTRACTION WITH INTRAOCULAR LENS IMPLANT Bilateral 2004    CHOLECYSTECTOMY  2011    COLECTOMY PARTIAL / TOTAL      History of inflammatory bowel disease with status post colectomy with ileostomy many years ago in the [facility],  18 YEARS OF AGE    COLON SURGERY      Colon resection with colostomy    COLON SURGERY      COLONOSCOPY  01/2018    CYSTOSCOPY LITHOLAPAXY BLADDER STONE EXTRACTION      ENDOSCOPY  01/16/2015    gastritis    ENDOSCOPY  01/17/2018    Procedure: ESOPHAGOGASTRODUODENOSCOPY;  Surgeon: Warner Neville MD;  Location: [facility] ENDOSCOPY;  Service:     ILEOSCOPY  01/16/2015    normal    ILEOSCOPY N/A 01/17/2018    Procedure: ILEOSCOPY;  Surgeon: Warner Neville MD;  Location: [facility] ENDOSCOPY;  Service:     ILEOSTOMY  12/1968    loop ostomy bypass    INCISION AND DRAINAGE ARM Left 10/27/2023    Procedure: LEFT ARM INCISION AND DRAINAGE;  Surgeon: Bill Deal MD;  Location: [facility] MAIN OR;  Service: Vascular;  Laterality: Left;    INSERTION HEMODIALYSIS CATHETER Right 11/13/2024    Procedure: Tunnelled HEMODIALYSIS CATHETER INSERTION;  Surgeon: Ben Barth MD;  Location: [facility] HYBRID OR;  Service: Vascular;  Laterality: Right;    OTHER SURGICAL HISTORY  2009    kidney stones x3    PSEUDOANEURYSM REPAIR Left 10/27/2023    TONSILLECTOMY  1950       Family History   Problem Relation Age of Onset    Heart failure Mother 78    Hypertension Mother     Heart disease Mother     Hyperlipidemia Mother     Hypertension Father     Other Father 82        MVI    Malig Hyperthermia Neg Hx          Social History     Tobacco Use    Smoking status: Never     Passive exposure: Never    Smokeless tobacco: Never   Vaping Use    Vaping status: Never Used   Substance Use Topics    Alcohol use: No     Comment: caffeine use: none    Drug use: No       Allergies: Ace inhibitors, Contrast dye (echo or unknown ct/mr), Iodine, Angiotensin receptor blockers, Eliquis [apixaban], Filgrastim,  Keflex [cephalexin], and Other    (Not in a hospital admission)    lidocaine 1% - EPINEPHrine [id number], 20 mL, Infiltration, Once           [COMPLETED] Insert Peripheral IV **AND** sodium chloride      Objective   Temp:  [96.5 °F (35.8 °C)-98 °F (36.7 °C)] 97.9 °F (36.6 °C)  Heart Rate:  [56-80] 63  Resp:  [16-18] 18  BP: (137-143)/(73-85) 141/85    I/O this shift:  In: 516.2 [Blood:516.2]  Out: -     Physical Exam  Vitals reviewed.   Constitutional:       General: He is not in acute distress.     Appearance: Normal appearance.   HENT:      Head: Normocephalic and atraumatic.      Right Ear: External ear normal.      Left Ear: External ear normal.      Nose: Nose normal.      Mouth/Throat:      Mouth: Mucous membranes are moist.      Pharynx: Oropharynx is clear.   Eyes:      Extraocular Movements: Extraocular movements intact.      Conjunctiva/sclera: Conjunctivae normal.      Pupils: Pupils are equal, round, and reactive to light.   Cardiovascular:      Rate and Rhythm: Normal rate and regular rhythm.      Pulses:           Carotid pulses are 2+ on the right side and 2+ on the left side.       Radial pulses are 2+ on the right side and 2+ on the left side.      Comments: Right chest wall with tunneled catheter in place, diffuse right chest wall bruising and steady oozing of blood around catheter exit site  Pulmonary:      Comments: Non labored respirations on room air, equal chest rise  Abdominal:      General: Abdomen is flat. There is no distension.      Palpations: Abdomen is soft.   Musculoskeletal:      Cervical back: Normal range of motion. No rigidity.      Right lower leg: No edema.      Left lower leg: No edema.   Skin:     General: Skin is warm and dry.      Capillary Refill: Capillary refill takes less than 2 seconds.   Neurological:      General: No focal deficit present.      Mental Status: He is alert and oriented to person, place, and time.   Psychiatric:         Mood and Affect: Mood normal.          Behavior: Behavior normal.         Results from last 7 days   Lab Units 11/17/24  1125 11/14/24  0328 11/13/24  0903   WBC 10*3/mm3 2.52* 2.65* 3.05*   HEMOGLOBIN g/dL 9.5* 8.9* 9.6*   PLATELETS 10*3/mm3 56* 57*  57* 59*     Results from last 7 days   Lab Units 11/17/24  1125 11/14/24  0329 11/13/24  0903   SODIUM mmol/L 132* 133* 132*   POTASSIUM mmol/L 3.9 3.9 3.9   CHLORIDE mmol/L 96* 96* 94*   CO2 mmol/L 23.0 21.8* 24.0   BUN mg/dL 62* 89* 77*   CREATININE mg/dL 7.02* 8.78* 7.51*   GLUCOSE mg/dL 164* 151* 101*   Estimated Creatinine Clearance: 9.4 mL/min (A) (by C-G formula based on SCr of 7.02 mg/dL (H)).  Results from last 7 days   Lab Units 11/17/24  1125 11/13/24  0903   PROTIME Seconds 17.0* 16.9*   INR  1.35* 1.35*       Imaging Studies:          Data Points:  During this visit the following were done:  Labs Reviewed [x]    Labs Ordered []    Radiology Reports Reviewed [x]    Radiology Images Reviewed [x]    Radiology Ordered []    EKG, echo, and/or stress test reviewed []    Vascular lab results reviewed  []    Vascular lab images reviewed and interpreted per myself   []    Referring Provider Records Reviewed []    ER Records Reviewed [x]    Hospital Records Reviewed/Summarized [x]    History Obtained From Family []    Radiological images view and Interpreted per myself []    Case Discussed with referring provider [x]     Decision to obtain and request outside records  []    Total data points reviewed: 5      There are no hospital problems to display for this patient.        Assessment & Plan     Bill Landry is a 79 year old male who presents with bleeding around his tunneled catheter.  He has chronic thrombocytopenia and is on ESRD on HD    -appreciate ED assistance.  Patient received platelets and DDAVP for his thrombocytopenia and uremic platelet dysfunction with improvement but not resolution of his bleeding.  I infiltrated lidocaine with epinephrine around the catheter and placed a horizontal  mattress around the catheter, which controlled the oozing.  I replaced the dressing with surgicel as he had a skin tear at the exit site that was also oozing.  He is ok to go home.  He has a scheduled left arm fistula revision and thrombectomy with Dr. Deal in a few days.    I discussed the patients findings and my recommendations with patient, family, and consulting provider.  Please call my office with any question: [phone]    Breonna Arora MD  11/17/24  15:03 EST

## 2024-11-17 NOTE — ED PROVIDER NOTES
EMERGENCY DEPARTMENT ENCOUNTER    Room Number:  32/32  PCP: Bahrathi De La Torre MD  Independent Historians: Patient and Family    HPI:  Chief Complaint: had concerns including Vascular Access Problem.      A complete HPI/ROS/PMH/PSH/SH/FH are unobtainable due to: None    Chronic or social conditions impacting patient care (Social Determinants of Health): None      Context: Bill Landry is a 79 y.o. male with a medical history of hypertension, end-stage renal disease on dialysis Monday Wednesday Friday, thrombocytopenia, anemia, A-fib on a baby aspirin only who presents to the ED c/o acute bleeding from his tunneled cath site.  Pt with constant oozing of blood at tunneled cath site saturating dressing and patient's shirt overnight and this am. Pt sat in a recliner to sleep last night.  Today bleeding continued so he came in for evaluation.  Pt resumed his baby aspirin on day of discharge and had dialysis session on Friday without any difficulties.        Review of prior external notes (non-ED) -and- Review of prior external test results outside of this encounter: Dr. Barth op note reviewed from 11/13--pt had tunneled cath insertion right IJ for dialysis.  A small skin tear was noted at exit site.    Prescription drug monitoring program review:     N/A    PAST MEDICAL HISTORY  Active Ambulatory Problems     Diagnosis Date Noted    Permanent atrial fibrillation 05/02/2016    Thrombocytopenia 05/02/2016    Chronic leukopenia 05/02/2016    Cirrhosis of liver without ascites 07/24/2017    Congestive splenomegaly 07/24/2017    Anemia due to stage 4 chronic kidney disease treated with erythropoietin 10/04/2017    Esophageal abnormality 01/18/2018    ESRD on hemodialysis 03/21/2019    Other specified glaucoma 05/21/2019    Arteriovenous fistula for hemodialysis in place, secondary 09/03/2020    Crohn's disease, unspecified, without complications 01/18/2018    Deficiency of other specified B group vitamins 01/19/2018     Dermatitis, unspecified 01/18/2018    Encounter for immunization 02/19/2018    History of urinary stone 01/18/2018    Hyperparathyroidism, unspecified 01/19/2018    Other disorders of phosphorus metabolism 02/11/2021    Other iron deficiency anemias 02/10/2018    Pure hypertriglyceridemia 01/18/2018    Renal osteodystrophy 01/18/2018    Secondary hyperparathyroidism of renal origin 01/18/2018    Splenomegaly, not elsewhere classified 01/18/2018    Bilateral pseudophakia 05/05/2021    Dermatochalasis of eyelid 05/05/2021    Primary open-angle glaucoma, bilateral, moderate stage 05/05/2021    Puckering of macula, left eye 05/05/2021    Secondary cataract 05/05/2021    AV fistula occlusion, initial encounter 02/15/2022    TATE (obstructive sleep apnea)     Aneurysm artery, 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    Nonrheumatic aortic valve stenosis 07/20/2022    Bicuspid aortic valve 07/20/2022    Hyperuricemia without signs of inflammatory arthritis and tophaceous disease 08/03/2022    Presbyopia 05/13/2021    Impaired glucose tolerance 05/12/2023    Hypofibrinogenemia 10/29/2023    Pulmonary hypertension     Skin change 04/08/2024    ESRD on dialysis 11/13/2024    Problem with vascular access 11/13/2024     Resolved Ambulatory Problems     Diagnosis Date Noted    Anemia 01/12/2018    Pneumonia of both lungs due to infectious organism 01/19/2018    At risk for fluid volume overload 03/21/2019    Allergy, unspecified, initial encounter 10/09/2020    Anaphylactic shock, unspecified, initial encounter 10/09/2020    Essential (primary) hypertension 01/18/2018    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, with stage 1 through stage 4 chronic kidney disease, or unspecified chronic kidney disease 10/18/2019    Moderate protein-calorie malnutrition 12/16/2020    Pericardial effusion (noninflammatory) 01/18/2018    Sleep apnea, unspecified 01/18/2018    Unspecified atrial fibrillation 01/19/2018    Shortness of  breath 02/19/2022    Acute hypoxemic respiratory failure due to COVID-19 05/11/2023    Acute on chronic diastolic heart failure 05/11/2023    Acute cough 05/17/2023    Bleeding 10/29/2023     Past Medical History:   Diagnosis Date    Abnormal serum protein electrophoresis     Acquired absence of other specified parts of digestive tract     Anemia in chronic kidney disease     Aneurysm of artery of arm     Aortic stenosis     Calculus of kidney     Crohn disease     Decreased white blood cell count, unspecified     Diverticula of colon     Fatty liver disease, nonalcoholic     Fistula 04/05/2018    Gastrointestinal hemorrhage, unspecified     GERD (gastroesophageal reflux disease)     GI bleed     Glaucoma     H/O Gallstone pancreatitis     H/O Pericardial effusion     H/O sinus bradycardia     History of hypertension     History of UTI     Hx of renal calculi     Ileostomy present     Iron deficiency     Leakage of heart valve prosthesis, subsequent encounter     MGUS (monoclonal gammopathy of unknown significance)     Other hemoglobinopathies     Scarlet fever, uncomplicated     Stenosis of other vascular prosthetic devices, implants and grafts, initial encounter 02/14/2022    Systemic lupus erythematosus, unspecified     Type 2 diabetes mellitus     Urinary retention          PAST SURGICAL HISTORY  Past Surgical History:   Procedure Laterality Date    APPENDECTOMY  06/1968    ARTERIOVENOUS FISTULA/SHUNT SURGERY Left 08/08/2017    Procedure: LEFT BRACHIAL CEPHALIC AV FISTULA FORMATION WITH CEPHALIC VEIN TRANSPOSITION ;  Surgeon: Bill Deal MD;  Location: [facility] OR;  Service:     ARTERIOVENOUS FISTULA/SHUNT SURGERY Left 05/27/2022    Procedure: OPEN REVISION LEFT ARTERIOVENOUS INTERPOSITION GRAFT PLACEMENT;  Surgeon: Bill Deal MD;  Location: [facility] OR;  Service: Vascular;  Laterality: Left;    ARTERIOVENOUS FISTULA/SHUNT SURGERY W/ HEMODIALYSIS CATHETER INSERTION Left 02/15/2022    Procedure:  OPEN LEFT ARM ARTERIOVENOUS FISTULA THROMBECTOMY WITH CEPHALIC VEIN ARTHROPLASTY AND STENT/GRAFT PLACEMENT;  Surgeon: Bill Deal MD;  Location: [facility] HYBRID OR 18/19;  Service: Vascular;  Laterality: Left;    CATARACT EXTRACTION WITH INTRAOCULAR LENS IMPLANT Bilateral 2004    CHOLECYSTECTOMY  2011    COLECTOMY PARTIAL / TOTAL      History of inflammatory bowel disease with status post colectomy with ileostomy many years ago in the [facility],  18 YEARS OF AGE    COLON SURGERY      Colon resection with colostomy    COLON SURGERY      COLONOSCOPY  01/2018    CYSTOSCOPY LITHOLAPAXY BLADDER STONE EXTRACTION      ENDOSCOPY  01/16/2015    gastritis    ENDOSCOPY  01/17/2018    Procedure: ESOPHAGOGASTRODUODENOSCOPY;  Surgeon: Warner Neville MD;  Location: [facility] ENDOSCOPY;  Service:     ILEOSCOPY  01/16/2015    normal    ILEOSCOPY N/A 01/17/2018    Procedure: ILEOSCOPY;  Surgeon: Warner Neville MD;  Location: [facility] ENDOSCOPY;  Service:     ILEOSTOMY  12/1968    loop ostomy bypass    INCISION AND DRAINAGE ARM Left 10/27/2023    Procedure: LEFT ARM INCISION AND DRAINAGE;  Surgeon: Bill Deal MD;  Location: [facility] MAIN OR;  Service: Vascular;  Laterality: Left;    INSERTION HEMODIALYSIS CATHETER Right 11/13/2024    Procedure: Tunnelled HEMODIALYSIS CATHETER INSERTION;  Surgeon: Ben Barth MD;  Location: [facility] HYBRID OR;  Service: Vascular;  Laterality: Right;    OTHER SURGICAL HISTORY  2009    kidney stones x3    PSEUDOANEURYSM REPAIR Left 10/27/2023    TONSILLECTOMY  1950         FAMILY HISTORY  Family History   Problem Relation Age of Onset    Heart failure Mother 78    Hypertension Mother     Heart disease Mother     Hyperlipidemia Mother     Hypertension Father     Other Father 82        MVI    Malig Hyperthermia Neg Hx          SOCIAL HISTORY  Social History     Socioeconomic History    Marital status:      Spouse name: Gillian    Number of children: 2    Years of education:  [employer]   Tobacco Use    Smoking status: Never     Passive exposure: Never    Smokeless tobacco: Never   Vaping Use    Vaping status: Never Used   Substance and Sexual Activity    Alcohol use: No     Comment: caffeine use: none    Drug use: No    Sexual activity: Defer         ALLERGIES  Ace inhibitors, Contrast dye (echo or unknown ct/mr), Iodine, Angiotensin receptor blockers, Eliquis [apixaban], Filgrastim, Keflex [cephalexin], and Other        REVIEW OF SYSTEMS  Review of Systems  Included in HPI  All systems reviewed and negative except for those discussed in HPI.      PHYSICAL EXAM    I have reviewed the triage vital signs and nursing notes.    ED Triage Vitals [11/17/24 1102]   Temp Heart Rate Resp BP SpO2   96.5 °F (35.8 °C) 80 16 -- 98 %      Temp src Heart Rate Source Patient Position BP Location FiO2 (%)   Tympanic Monitor -- -- --       Physical Exam  GENERAL: pleasant cooperative M, conversant, alert, no acute distress  SKIN: Warm, dry, large area of ecchymosis over right hemithorax with no induration  HENT: Normocephalic, atraumatic  EYES: no scleral icterus, eomi  NECK:  right neck with no palpable masses nor induration, right neck still dressed from procedure but gauze overlying neck incision site is not bloody--large tegaderm extends over right neck and upper right chest wall--see progress notes for dressing take down and exam  CV: regular rhythm, regular rate  RESPIRATORY: normal effort, lungs clear, no wheezing  ABDOMEN: soft, nontender, nondistended  MUSCULOSKELETAL: no deformity  NEURO: alert, moves all extremities, follows commands                                                                   LAB RESULTS  Recent Results (from the past 24 hours)   Comprehensive Metabolic Panel    Collection Time: 11/17/24 11:25 AM    Specimen: Blood   Result Value Ref Range    Glucose 164 (H) 65 - 99 mg/dL    BUN 62 (H) 8 - 23 mg/dL    Creatinine 7.02 (H) 0.76 - 1.27 mg/dL    Sodium 132 (L) 136 - 145 mmol/L     Potassium 3.9 3.5 - 5.2 mmol/L    Chloride 96 (L) 98 - 107 mmol/L    CO2 23.0 22.0 - 29.0 mmol/L    Calcium 8.2 (L) 8.6 - 10.5 mg/dL    Total Protein 6.6 6.0 - 8.5 g/dL    Albumin 3.1 (L) 3.5 - 5.2 g/dL    ALT (SGPT) 10 1 - 41 U/L    AST (SGOT) 37 1 - 40 U/L    Alkaline Phosphatase 202 (H) 39 - 117 U/L    Total Bilirubin 0.9 0.0 - 1.2 mg/dL    Globulin 3.5 gm/dL    A/G Ratio 0.9 g/dL    BUN/Creatinine Ratio 8.8 7.0 - 25.0    Anion Gap 13.0 5.0 - 15.0 mmol/L    eGFR 7.4 (L) >60.0 mL/min/1.73   Protime-INR    Collection Time: 11/17/24 11:25 AM    Specimen: Blood   Result Value Ref Range    Protime 17.0 (H) 11.7 - 14.2 Seconds    INR 1.35 (H) 0.90 - 1.10   aPTT    Collection Time: 11/17/24 11:25 AM    Specimen: Blood   Result Value Ref Range    PTT 35.7 (H) 22.7 - 35.4 seconds   Type & Screen    Collection Time: 11/17/24 11:25 AM    Specimen: Blood   Result Value Ref Range    ABO Type O     RH type Positive     Antibody Screen Negative     T&S Expiration Date 11/20/2024 11:59:59 PM    Magnesium    Collection Time: 11/17/24 11:25 AM    Specimen: Blood   Result Value Ref Range    Magnesium 1.8 1.6 - 2.4 mg/dL   Phosphorus    Collection Time: 11/17/24 11:25 AM    Specimen: Blood   Result Value Ref Range    Phosphorus 3.3 2.5 - 4.5 mg/dL   CBC Auto Differential    Collection Time: 11/17/24 11:25 AM    Specimen: Blood   Result Value Ref Range    WBC 2.52 (L) 3.40 - 10.80 10*3/mm3    RBC 2.82 (L) 4.14 - 5.80 10*6/mm3    Hemoglobin 9.5 (L) 13.0 - 17.7 g/dL    Hematocrit 29.0 (L) 37.5 - 51.0 %    .8 (H) 79.0 - 97.0 fL    MCH 33.7 (H) 26.6 - 33.0 pg    MCHC 32.8 31.5 - 35.7 g/dL    RDW 13.4 12.3 - 15.4 %    RDW-SD 50.9 37.0 - 54.0 fl    MPV 8.8 6.0 - 12.0 fL    Platelets 56 (L) 140 - 450 10*3/mm3    Neutrophil % 77.8 (H) 42.7 - 76.0 %    Lymphocyte % 11.1 (L) 19.6 - 45.3 %    Monocyte % 8.3 5.0 - 12.0 %    Eosinophil % 2.0 0.3 - 6.2 %    Basophil % 0.4 0.0 - 1.5 %    Immature Grans % 0.4 0.0 - 0.5 %    Neutrophils,  Absolute 1.96 1.70 - 7.00 10*3/mm3    Lymphocytes, Absolute 0.28 (L) 0.70 - 3.10 10*3/mm3    Monocytes, Absolute 0.21 0.10 - 0.90 10*3/mm3    Eosinophils, Absolute 0.05 0.00 - 0.40 10*3/mm3    Basophils, Absolute 0.01 0.00 - 0.20 10*3/mm3    Immature Grans, Absolute 0.01 0.00 - 0.05 10*3/mm3    nRBC 0.0 0.0 - 0.2 /100 WBC   Prepare Platelet Pheresis, 2 Units    Collection Time: 11/17/24 12:35 PM   Result Value Ref Range    Product Code Q0642M24     Unit Number [id number]     UNIT  ABO A     UNIT  RH POS     Dispense Status IS     Blood Expiration Date [id number]     Blood Type Barcode 6200          RADIOLOGY  XR Chest 1 View    Result Date: 11/17/2024  XR CHEST 1 VW-11/17/2024  HISTORY: Tunnel catheter placement.  Right-sided central venous catheter is seen with its tip overlying the SVC. No pneumothorax is seen.  There is elevation of the right hemidiaphragm with some probable right basilar atelectasis. Vascular stent overlies the left upper hemithorax and left axilla.      1. Central venous catheter tip overlying the SVC. 2. No pneumothorax. 3. Probable right basilar atelectasis.  This report was finalized on 11/17/2024 11:48 AM by Dr. Rafi Fragoso M.D on Workstation: [id number]         MEDICATIONS GIVEN IN ER  Medications   sodium chloride 0.9 % flush 10 mL (has no administration in time range)   lidocaine 1% - EPINEPHrine 1:237120 (XYLOCAINE W/EPI) 1 %-1:684381 injection 20 mL (has no administration in time range)   desmopressin (DDAVP) 23 mcg in sodium chloride 0.9 % 50 mL IVPB (23 mcg Intravenous New Bag 11/17/24 1340)         ORDERS PLACED DURING THIS VISIT:  Orders Placed This Encounter   Procedures    XR Chest 1 View    Comprehensive Metabolic Panel    Protime-INR    aPTT    Magnesium    Phosphorus    CBC Auto Differential    Verify Informed Consent    Vascular Surgery Consult    Type & Screen    Prepare Platelet Pheresis, 1 Units    Insert Peripheral IV    CBC & Differential         OUTPATIENT  MEDICATION MANAGEMENT:  Current Facility-Administered Medications Ordered in Epic   Medication Dose Route Frequency Provider Last Rate Last Admin    lidocaine 1% - EPINEPHrine 1:802436 (XYLOCAINE W/EPI) 1 %-1:720271 injection 20 mL  20 mL Infiltration Once Breonna Arora MD        sodium chloride 0.9 % flush 10 mL  10 mL Intravenous Patricia Maxwell MD         Current Outpatient Medications Ordered in Epic   Medication Sig Dispense Refill    alfuzosin (UROXATRAL) 10 MG 24 hr tablet Take 1 tablet by mouth Every Other Day. Does not take on Sundays Tuesdays or Thursdays      aspirin 81 MG tablet Take 1 tablet by mouth Daily. INSTRUCTED TO CONTINUE THIS FOR SURGERY PER DR. BERMEO'S OFFICE      B Complex-C-Folic Acid (Umm-Peter) tablet Take 1 tablet by mouth Daily.      Cholecalciferol 25 MCG (1000 UT) tablet Take 1 tablet by mouth Daily.      famotidine (Pepcid AC) 10 MG tablet Take 1 tablet by mouth Daily As Needed for Heartburn (take as needed after HD on dialysis days).      Iron Sucrose (VENOFER IV) Infuse 1 dose into a venous catheter As Needed.      latanoprost (XALATAN) 0.005 % ophthalmic solution Administer 1 drop to both eyes Every Night.      lidocaine-prilocaine (EMLA) 2.5-2.5 % cream Apply 1 Application topically to the appropriate area as directed As Needed. Gsvryc-Qszeqgswo-Zjwxfw:  ONE HOUR PRIOR TO DIALYSIS  3    Loratadine 10 MG capsule Take 1 capsule by mouth Daily.      Methoxy PEG-Epoetin Beta (MIRCERA IJ) Inject 1 dose as directed Every 30 (Thirty) Days.      sodium chloride 0.65 % nasal spray Administer 2 sprays into the nostril(s) as directed by provider Every Night.      Sucroferric Oxyhydroxide (Velphoro) 500 MG chewable tablet Chew 2 tablets 3 times a day. Take 2 tablets by  mouth three times a day.           PROCEDURES  Procedures            PROGRESS, DATA ANALYSIS, CONSULTS, AND MEDICAL DECISION MAKING  All labs have been independently interpreted by me.  All radiology studies  have been reviewed by me. All EKG's have been independently viewed and interpreted by me.  Discussion below represents my analysis of pertinent findings related to patient's condition, differential diagnosis, treatment plan and final disposition.    Differential diagnosis includes but is not limited to   Skin injury, bleeding propensity, tunneled cath change in position, hematoma, infection--plan for labs to include cbc and cxr.  Will contact vascular surgery for further recs      ED Course as of 11/17/24 1520   Sun Nov 17, 2024   1223 I discussed patient's case with Dr. Casey on for vascular surgery who has been in contact with patient's wife.  Plan for transfusion of 1 unit of platelets and a dose of DDAVP.  Plan to change dressing out for a dry dressing to reassess amount of bleeding or oozing.  Dr. Casey will come and assess the site herself soon. [AR]   1240 I took dressing down and noted clotted blood around distal end of tunneled cath tubing with friable skin at insertion site.  No bleeding at neck incision site--that is clean/dry/intact.  With sterile gloves and mask worn, I cleaned clotted blood from tubing with chlorhexidine and dried blood and old saturated gauze.  New dry 4X4 placed under tubing and over site with new tegaderm overlying all. Will reassess for continued bleeding. [AR]   1513 Pt evaluated and seen by Dr. Arora--line site sutured and bleeding resolved.  Plan for home with f/u on Tuesday for surgery as planned. Pt to attend HD tomorrow where line will be assessed as well. [AR]      ED Course User Index  [AR] Patricia Weber MD             AS OF 15:20 EST VITALS:    BP - 128/62  HR - 70  TEMP - 97.9 °F (36.6 °C) (Oral)  O2 SATS - 96%      COMPLEXITY OF CARE  Admission was considered but after careful review of the patient's presentation, physical examination, diagnostic results, and response to treatment the patient may be safely discharged with outpatient  follow-up.    DIAGNOSIS  Final diagnoses:   Thrombocytopenia   Bleeding due to dialysis catheter placement, subsequent encounter         DISPOSITION  ED Disposition       ED Disposition   Discharge    Condition   Stable    Comment   --                Please note that portions of this document were completed with a voice recognition program.    Note Disclaimer: At [facility], we believe that sharing information builds trust and better relationships. You are receiving this note because you recently visited [facility]. It is possible you will see health information before a provider has talked with you about it. This kind of information can be easy to misunderstand. To help you fully understand what it means for your health, we urge you to discuss this note with your provider.         Patricia Weber MD  11/17/24 4262

## 2024-11-17 NOTE — DISCHARGE INSTRUCTIONS
Rest at home avoiding any particularly strenuous activity.    Eat small, frequent meals and drink plenty of fluids. Take any medication prescribed as instructed.     Keep your appt for dialysis tomorrow and follow all preop instructions for your vascular surgery planned for Tuesday.    Monitor for any signs of recurrent symptoms or worsening and see your primary doctor to discuss your ER visit while returning to the ER if any concerns as we discussed.

## 2024-11-19 ENCOUNTER — ANESTHESIA (OUTPATIENT)
Dept: PERIOP | Facility: HOSPITAL | Age: 79
End: 2024-11-19
Payer: MEDICARE

## 2024-11-19 ENCOUNTER — ANESTHESIA EVENT (OUTPATIENT)
Dept: PERIOP | Facility: HOSPITAL | Age: 79
End: 2024-11-19
Payer: MEDICARE

## 2024-11-19 ENCOUNTER — APPOINTMENT (OUTPATIENT)
Dept: GENERAL RADIOLOGY | Facility: HOSPITAL | Age: 79
End: 2024-11-19
Payer: MEDICARE

## 2024-11-19 ENCOUNTER — HOSPITAL ENCOUNTER (INPATIENT)
Facility: HOSPITAL | Age: 79
LOS: 2 days | Discharge: HOME HEALTH | End: 2024-11-21
Attending: SURGERY | Admitting: SURGERY
Payer: MEDICARE

## 2024-11-19 DIAGNOSIS — Z78.9 PROBLEM WITH VASCULAR ACCESS: ICD-10-CM

## 2024-11-19 DIAGNOSIS — T82.868A AV GRAFT THROMBOSIS, INITIAL ENCOUNTER: Primary | ICD-10-CM

## 2024-11-19 LAB
ABO GROUP BLD: NORMAL
ANION GAP SERPL CALCULATED.3IONS-SCNC: 12.8 MMOL/L (ref 5–15)
BLD GP AB SCN SERPL QL: NEGATIVE
BUN SERPL-MCNC: 43 MG/DL (ref 8–23)
BUN/CREAT SERPL: 7.4 (ref 7–25)
CALCIUM SPEC-SCNC: 8.4 MG/DL (ref 8.6–10.5)
CHLORIDE SERPL-SCNC: 98 MMOL/L (ref 98–107)
CO2 SERPL-SCNC: 24.2 MMOL/L (ref 22–29)
CREAT SERPL-MCNC: 5.8 MG/DL (ref 0.76–1.27)
EGFRCR SERPLBLD CKD-EPI 2021: 9.3 ML/MIN/1.73
GLUCOSE SERPL-MCNC: 108 MG/DL (ref 65–99)
POTASSIUM SERPL-SCNC: 3.8 MMOL/L (ref 3.5–5.2)
RH BLD: POSITIVE
SODIUM SERPL-SCNC: 135 MMOL/L (ref 136–145)
T&S EXPIRATION DATE: NORMAL

## 2024-11-19 PROCEDURE — 25010000002 ONDANSETRON PER 1 MG: Performed by: ANESTHESIOLOGY

## 2024-11-19 PROCEDURE — 25510000001 IOPAMIDOL PER 1 ML: Performed by: SURGERY

## 2024-11-19 PROCEDURE — C1769 GUIDE WIRE: HCPCS | Performed by: SURGERY

## 2024-11-19 PROCEDURE — P9035 PLATELET PHERES LEUKOREDUCED: HCPCS

## 2024-11-19 PROCEDURE — B51W1ZZ FLUOROSCOPY OF DIALYSIS SHUNT/FISTULA USING LOW OSMOLAR CONTRAST: ICD-10-PCS | Performed by: SURGERY

## 2024-11-19 PROCEDURE — 25010000002 PROPOFOL 10 MG/ML EMULSION: Performed by: ANESTHESIOLOGY

## 2024-11-19 PROCEDURE — 25010000002 CEFAZOLIN PER 500 MG: Performed by: SURGERY

## 2024-11-19 PROCEDURE — 86900 BLOOD TYPING SEROLOGIC ABO: CPT | Performed by: INTERNAL MEDICINE

## 2024-11-19 PROCEDURE — 86901 BLOOD TYPING SEROLOGIC RH(D): CPT | Performed by: INTERNAL MEDICINE

## 2024-11-19 PROCEDURE — 25810000003 SODIUM CHLORIDE 0.9 % SOLUTION: Performed by: ANESTHESIOLOGY

## 2024-11-19 PROCEDURE — P9012 CRYOPRECIPITATE EACH UNIT: HCPCS

## 2024-11-19 PROCEDURE — 03180JD BYPASS LEFT BRACHIAL ARTERY TO UPPER ARM VEIN WITH SYNTHETIC SUBSTITUTE, OPEN APPROACH: ICD-10-PCS | Performed by: SURGERY

## 2024-11-19 PROCEDURE — 5A1D70Z PERFORMANCE OF URINARY FILTRATION, INTERMITTENT, LESS THAN 6 HOURS PER DAY: ICD-10-PCS | Performed by: SURGERY

## 2024-11-19 PROCEDURE — P9100 PATHOGEN TEST FOR PLATELETS: HCPCS

## 2024-11-19 PROCEDURE — 25010000002 HEPARIN (PORCINE) PER 1000 UNITS: Performed by: SURGERY

## 2024-11-19 PROCEDURE — 80048 BASIC METABOLIC PNL TOTAL CA: CPT | Performed by: SURGERY

## 2024-11-19 PROCEDURE — 25010000002 PROTAMINE SULFATE PER 10 MG: Performed by: NURSE ANESTHETIST, CERTIFIED REGISTERED

## 2024-11-19 PROCEDURE — 25810000003 SODIUM CHLORIDE 0.9 % SOLUTION 250 ML FLEX CONT: Performed by: SURGERY

## 2024-11-19 PROCEDURE — 36833 AV FISTULA REVISION: CPT | Performed by: SURGERY

## 2024-11-19 PROCEDURE — 86920 COMPATIBILITY TEST SPIN: CPT

## 2024-11-19 PROCEDURE — C1757 CATH, THROMBECTOMY/EMBOLECT: HCPCS | Performed by: SURGERY

## 2024-11-19 PROCEDURE — 36430 TRANSFUSION BLD/BLD COMPNT: CPT

## 2024-11-19 PROCEDURE — S0260 H&P FOR SURGERY: HCPCS | Performed by: SURGERY

## 2024-11-19 PROCEDURE — 25010000002 HEPARIN (PORCINE) PER 1000 UNITS: Performed by: NURSE ANESTHETIST, CERTIFIED REGISTERED

## 2024-11-19 PROCEDURE — 25010000002 LIDOCAINE 2% SOLUTION: Performed by: ANESTHESIOLOGY

## 2024-11-19 PROCEDURE — C1894 INTRO/SHEATH, NON-LASER: HCPCS | Performed by: SURGERY

## 2024-11-19 PROCEDURE — 25010000002 VANCOMYCIN 1 G RECONSTITUTED SOLUTION 1 EACH VIAL: Performed by: SURGERY

## 2024-11-19 PROCEDURE — 86922 COMPATIBILITY TEST ANTIGLOB: CPT

## 2024-11-19 PROCEDURE — 86850 RBC ANTIBODY SCREEN: CPT | Performed by: INTERNAL MEDICINE

## 2024-11-19 PROCEDURE — C1768 GRAFT, VASCULAR: HCPCS | Performed by: SURGERY

## 2024-11-19 PROCEDURE — 86927 PLASMA FRESH FROZEN: CPT

## 2024-11-19 PROCEDURE — 25010000002 SUGAMMADEX 200 MG/2ML SOLUTION: Performed by: ANESTHESIOLOGY

## 2024-11-19 DEVICE — FLOSEAL WITH RECOTHROM - 5ML
Type: IMPLANTABLE DEVICE | Site: ARM | Status: FUNCTIONAL
Brand: FLOSEAL HEMOSTATIC MATRIX

## 2024-11-19 DEVICE — PROPATEN VASCULAR GRAFT SW 6MMX40CM HEPARIN
Type: IMPLANTABLE DEVICE | Site: ARM | Status: FUNCTIONAL
Brand: GORE PROPATEN VASCULAR GRAFT

## 2024-11-19 DEVICE — FLOSEAL WITH RECOTHROM - 10ML.
Type: IMPLANTABLE DEVICE | Site: ARM | Status: FUNCTIONAL
Brand: FLOSEAL HEMOSTATIC MATRIX

## 2024-11-19 RX ORDER — DROPERIDOL 2.5 MG/ML
0.62 INJECTION, SOLUTION INTRAMUSCULAR; INTRAVENOUS
Status: DISCONTINUED | OUTPATIENT
Start: 2024-11-19 | End: 2024-11-19 | Stop reason: HOSPADM

## 2024-11-19 RX ORDER — ONDANSETRON 2 MG/ML
INJECTION INTRAMUSCULAR; INTRAVENOUS AS NEEDED
Status: DISCONTINUED | OUTPATIENT
Start: 2024-11-19 | End: 2024-11-19 | Stop reason: SURG

## 2024-11-19 RX ORDER — ONDANSETRON 4 MG/1
4 TABLET, ORALLY DISINTEGRATING ORAL EVERY 6 HOURS PRN
Status: DISCONTINUED | OUTPATIENT
Start: 2024-11-19 | End: 2024-11-21 | Stop reason: HOSPADM

## 2024-11-19 RX ORDER — FLUMAZENIL 0.1 MG/ML
0.2 INJECTION INTRAVENOUS AS NEEDED
Status: DISCONTINUED | OUTPATIENT
Start: 2024-11-19 | End: 2024-11-19 | Stop reason: HOSPADM

## 2024-11-19 RX ORDER — ACETAMINOPHEN 325 MG/1
650 TABLET ORAL EVERY 4 HOURS PRN
Status: DISCONTINUED | OUTPATIENT
Start: 2024-11-19 | End: 2024-11-21 | Stop reason: HOSPADM

## 2024-11-19 RX ORDER — FAMOTIDINE 20 MG/1
10 TABLET, FILM COATED ORAL DAILY PRN
Status: DISCONTINUED | OUTPATIENT
Start: 2024-11-19 | End: 2024-11-21 | Stop reason: HOSPADM

## 2024-11-19 RX ORDER — DIPHENHYDRAMINE HYDROCHLORIDE 50 MG/ML
12.5 INJECTION INTRAMUSCULAR; INTRAVENOUS
Status: DISCONTINUED | OUTPATIENT
Start: 2024-11-19 | End: 2024-11-19 | Stop reason: HOSPADM

## 2024-11-19 RX ORDER — PROMETHAZINE HYDROCHLORIDE 25 MG/1
25 TABLET ORAL ONCE AS NEEDED
Status: DISCONTINUED | OUTPATIENT
Start: 2024-11-19 | End: 2024-11-19 | Stop reason: HOSPADM

## 2024-11-19 RX ORDER — EPHEDRINE SULFATE 50 MG/ML
5 INJECTION, SOLUTION INTRAVENOUS ONCE AS NEEDED
Status: DISCONTINUED | OUTPATIENT
Start: 2024-11-19 | End: 2024-11-19 | Stop reason: HOSPADM

## 2024-11-19 RX ORDER — FENTANYL CITRATE 50 UG/ML
25 INJECTION, SOLUTION INTRAMUSCULAR; INTRAVENOUS
Status: DISCONTINUED | OUTPATIENT
Start: 2024-11-19 | End: 2024-11-19 | Stop reason: HOSPADM

## 2024-11-19 RX ORDER — LATANOPROST 50 UG/ML
1 SOLUTION/ DROPS OPHTHALMIC NIGHTLY
Status: DISCONTINUED | OUTPATIENT
Start: 2024-11-19 | End: 2024-11-21 | Stop reason: HOSPADM

## 2024-11-19 RX ORDER — ACETAMINOPHEN 160 MG/5ML
650 SOLUTION ORAL EVERY 4 HOURS PRN
Status: DISCONTINUED | OUTPATIENT
Start: 2024-11-19 | End: 2024-11-21 | Stop reason: HOSPADM

## 2024-11-19 RX ORDER — NITROGLYCERIN 0.4 MG/1
0.4 TABLET SUBLINGUAL
Status: DISCONTINUED | OUTPATIENT
Start: 2024-11-19 | End: 2024-11-19 | Stop reason: SDUPTHER

## 2024-11-19 RX ORDER — SODIUM CHLORIDE 9 MG/ML
INJECTION, SOLUTION INTRAVENOUS CONTINUOUS PRN
Status: DISCONTINUED | OUTPATIENT
Start: 2024-11-19 | End: 2024-11-19 | Stop reason: SURG

## 2024-11-19 RX ORDER — NITROGLYCERIN 0.4 MG/1
0.4 TABLET SUBLINGUAL
Status: DISCONTINUED | OUTPATIENT
Start: 2024-11-19 | End: 2024-11-21 | Stop reason: HOSPADM

## 2024-11-19 RX ORDER — MAGNESIUM HYDROXIDE 1200 MG/15ML
LIQUID ORAL AS NEEDED
Status: DISCONTINUED | OUTPATIENT
Start: 2024-11-19 | End: 2024-11-19 | Stop reason: HOSPADM

## 2024-11-19 RX ORDER — PROMETHAZINE HYDROCHLORIDE 25 MG/1
25 SUPPOSITORY RECTAL ONCE AS NEEDED
Status: DISCONTINUED | OUTPATIENT
Start: 2024-11-19 | End: 2024-11-19 | Stop reason: HOSPADM

## 2024-11-19 RX ORDER — PROTAMINE SULFATE 10 MG/ML
INJECTION, SOLUTION INTRAVENOUS AS NEEDED
Status: DISCONTINUED | OUTPATIENT
Start: 2024-11-19 | End: 2024-11-19 | Stop reason: SURG

## 2024-11-19 RX ORDER — IPRATROPIUM BROMIDE AND ALBUTEROL SULFATE 2.5; .5 MG/3ML; MG/3ML
3 SOLUTION RESPIRATORY (INHALATION)
Status: DISCONTINUED | OUTPATIENT
Start: 2024-11-19 | End: 2024-11-21 | Stop reason: HOSPADM

## 2024-11-19 RX ORDER — HYDROCODONE BITARTRATE AND ACETAMINOPHEN 5; 325 MG/1; MG/1
1 TABLET ORAL EVERY 6 HOURS PRN
Status: DISCONTINUED | OUTPATIENT
Start: 2024-11-19 | End: 2024-11-21 | Stop reason: HOSPADM

## 2024-11-19 RX ORDER — SODIUM CHLORIDE, SODIUM LACTATE, POTASSIUM CHLORIDE, CALCIUM CHLORIDE 600; 310; 30; 20 MG/100ML; MG/100ML; MG/100ML; MG/100ML
9 INJECTION, SOLUTION INTRAVENOUS CONTINUOUS
Status: DISCONTINUED | OUTPATIENT
Start: 2024-11-19 | End: 2024-11-19

## 2024-11-19 RX ORDER — IOPAMIDOL 510 MG/ML
INJECTION, SOLUTION INTRAVASCULAR AS NEEDED
Status: DISCONTINUED | OUTPATIENT
Start: 2024-11-19 | End: 2024-11-19 | Stop reason: HOSPADM

## 2024-11-19 RX ORDER — HYDROMORPHONE HYDROCHLORIDE 1 MG/ML
0.25 INJECTION, SOLUTION INTRAMUSCULAR; INTRAVENOUS; SUBCUTANEOUS
Status: DISCONTINUED | OUTPATIENT
Start: 2024-11-19 | End: 2024-11-19 | Stop reason: HOSPADM

## 2024-11-19 RX ORDER — PROPOFOL 10 MG/ML
VIAL (ML) INTRAVENOUS AS NEEDED
Status: DISCONTINUED | OUTPATIENT
Start: 2024-11-19 | End: 2024-11-19 | Stop reason: SURG

## 2024-11-19 RX ORDER — ROCURONIUM BROMIDE 10 MG/ML
INJECTION, SOLUTION INTRAVENOUS AS NEEDED
Status: DISCONTINUED | OUTPATIENT
Start: 2024-11-19 | End: 2024-11-19 | Stop reason: SURG

## 2024-11-19 RX ORDER — HEPARIN SODIUM 1000 [USP'U]/ML
INJECTION, SOLUTION INTRAVENOUS; SUBCUTANEOUS AS NEEDED
Status: DISCONTINUED | OUTPATIENT
Start: 2024-11-19 | End: 2024-11-19 | Stop reason: SURG

## 2024-11-19 RX ORDER — HYDROCODONE BITARTRATE AND ACETAMINOPHEN 5; 325 MG/1; MG/1
1 TABLET ORAL ONCE AS NEEDED
Status: DISCONTINUED | OUTPATIENT
Start: 2024-11-19 | End: 2024-11-19 | Stop reason: HOSPADM

## 2024-11-19 RX ORDER — LIDOCAINE HYDROCHLORIDE 20 MG/ML
INJECTION, SOLUTION INFILTRATION; PERINEURAL AS NEEDED
Status: DISCONTINUED | OUTPATIENT
Start: 2024-11-19 | End: 2024-11-19 | Stop reason: SURG

## 2024-11-19 RX ORDER — SODIUM CHLORIDE 9 MG/ML
9 INJECTION, SOLUTION INTRAVENOUS CONTINUOUS
Status: DISCONTINUED | OUTPATIENT
Start: 2024-11-19 | End: 2024-11-20

## 2024-11-19 RX ORDER — CHOLECALCIFEROL (VITAMIN D3) 25 MCG
1000 TABLET ORAL DAILY
Status: DISCONTINUED | OUTPATIENT
Start: 2024-11-19 | End: 2024-11-21 | Stop reason: HOSPADM

## 2024-11-19 RX ORDER — VANCOMYCIN HYDROCHLORIDE 1 G/20ML
1000 INJECTION, POWDER, LYOPHILIZED, FOR SOLUTION INTRAVENOUS ONCE
Status: DISCONTINUED | OUTPATIENT
Start: 2024-11-19 | End: 2024-11-19

## 2024-11-19 RX ORDER — LIDOCAINE HYDROCHLORIDE 10 MG/ML
0.5 INJECTION, SOLUTION INFILTRATION; PERINEURAL ONCE AS NEEDED
Status: DISCONTINUED | OUTPATIENT
Start: 2024-11-19 | End: 2024-11-19 | Stop reason: HOSPADM

## 2024-11-19 RX ORDER — FAMOTIDINE 10 MG/ML
20 INJECTION, SOLUTION INTRAVENOUS ONCE
Status: COMPLETED | OUTPATIENT
Start: 2024-11-19 | End: 2024-11-19

## 2024-11-19 RX ORDER — NALOXONE HCL 0.4 MG/ML
0.2 VIAL (ML) INJECTION AS NEEDED
Status: DISCONTINUED | OUTPATIENT
Start: 2024-11-19 | End: 2024-11-19 | Stop reason: HOSPADM

## 2024-11-19 RX ORDER — HYDRALAZINE HYDROCHLORIDE 20 MG/ML
5 INJECTION INTRAMUSCULAR; INTRAVENOUS
Status: DISCONTINUED | OUTPATIENT
Start: 2024-11-19 | End: 2024-11-19 | Stop reason: HOSPADM

## 2024-11-19 RX ORDER — ONDANSETRON 2 MG/ML
4 INJECTION INTRAMUSCULAR; INTRAVENOUS ONCE AS NEEDED
Status: DISCONTINUED | OUTPATIENT
Start: 2024-11-19 | End: 2024-11-19 | Stop reason: HOSPADM

## 2024-11-19 RX ORDER — IPRATROPIUM BROMIDE AND ALBUTEROL SULFATE 2.5; .5 MG/3ML; MG/3ML
3 SOLUTION RESPIRATORY (INHALATION) ONCE AS NEEDED
Status: DISCONTINUED | OUTPATIENT
Start: 2024-11-19 | End: 2024-11-19 | Stop reason: HOSPADM

## 2024-11-19 RX ORDER — SODIUM CHLORIDE 0.9 % (FLUSH) 0.9 %
3 SYRINGE (ML) INJECTION EVERY 12 HOURS SCHEDULED
Status: DISCONTINUED | OUTPATIENT
Start: 2024-11-19 | End: 2024-11-19 | Stop reason: HOSPADM

## 2024-11-19 RX ORDER — LABETALOL HYDROCHLORIDE 5 MG/ML
5 INJECTION, SOLUTION INTRAVENOUS
Status: DISCONTINUED | OUTPATIENT
Start: 2024-11-19 | End: 2024-11-19 | Stop reason: HOSPADM

## 2024-11-19 RX ORDER — ONDANSETRON 2 MG/ML
4 INJECTION INTRAMUSCULAR; INTRAVENOUS EVERY 6 HOURS PRN
Status: DISCONTINUED | OUTPATIENT
Start: 2024-11-19 | End: 2024-11-21 | Stop reason: HOSPADM

## 2024-11-19 RX ORDER — HYDROCODONE BITARTRATE AND ACETAMINOPHEN 7.5; 325 MG/1; MG/1
1 TABLET ORAL EVERY 4 HOURS PRN
Status: DISCONTINUED | OUTPATIENT
Start: 2024-11-19 | End: 2024-11-19 | Stop reason: HOSPADM

## 2024-11-19 RX ORDER — SODIUM CHLORIDE 0.9 % (FLUSH) 0.9 %
3-10 SYRINGE (ML) INJECTION AS NEEDED
Status: DISCONTINUED | OUTPATIENT
Start: 2024-11-19 | End: 2024-11-19 | Stop reason: HOSPADM

## 2024-11-19 RX ADMIN — ONDANSETRON 4 MG: 2 INJECTION INTRAMUSCULAR; INTRAVENOUS at 19:39

## 2024-11-19 RX ADMIN — FAMOTIDINE 20 MG: 10 INJECTION INTRAVENOUS at 14:50

## 2024-11-19 RX ADMIN — PROPOFOL 100 MG: 10 INJECTION, EMULSION INTRAVENOUS at 17:57

## 2024-11-19 RX ADMIN — LATANOPROST 1 DROP: 50 SOLUTION OPHTHALMIC at 23:06

## 2024-11-19 RX ADMIN — SODIUM CHLORIDE 1000 MG: 9 INJECTION, SOLUTION INTRAVENOUS at 17:25

## 2024-11-19 RX ADMIN — SODIUM CHLORIDE: 9 INJECTION, SOLUTION INTRAVENOUS at 17:41

## 2024-11-19 RX ADMIN — HEPARIN SODIUM 5000 UNITS: 1000 INJECTION, SOLUTION INTRAVENOUS; SUBCUTANEOUS at 18:42

## 2024-11-19 RX ADMIN — Medication 1000 UNITS: at 23:06

## 2024-11-19 RX ADMIN — SUGAMMADEX 200 MG: 100 INJECTION, SOLUTION INTRAVENOUS at 19:39

## 2024-11-19 RX ADMIN — SODIUM CHLORIDE: 9 INJECTION, SOLUTION INTRAVENOUS at 18:41

## 2024-11-19 RX ADMIN — ROCURONIUM BROMIDE 30 MG: 10 INJECTION, SOLUTION INTRAVENOUS at 18:01

## 2024-11-19 RX ADMIN — PROPOFOL 30 MG: 10 INJECTION, EMULSION INTRAVENOUS at 18:06

## 2024-11-19 RX ADMIN — ROCURONIUM BROMIDE 10 MG: 10 INJECTION, SOLUTION INTRAVENOUS at 18:39

## 2024-11-19 RX ADMIN — LIDOCAINE HYDROCHLORIDE 100 MG: 20 INJECTION, SOLUTION INFILTRATION; PERINEURAL at 17:57

## 2024-11-19 RX ADMIN — SODIUM CHLORIDE 9 ML/HR: 9 INJECTION, SOLUTION INTRAVENOUS at 14:50

## 2024-11-19 RX ADMIN — PROTAMINE SULFATE 40 MG: 10 INJECTION, SOLUTION INTRAVENOUS at 19:23

## 2024-11-19 RX ADMIN — ROCURONIUM BROMIDE 10 MG: 10 INJECTION, SOLUTION INTRAVENOUS at 19:07

## 2024-11-19 NOTE — ANESTHESIA PROCEDURE NOTES
Airway  Urgency: elective    Date/Time: 11/19/2024 6:03 PM  Airway not difficult    General Information and Staff    Patient location during procedure: OR  Anesthesiologist: Radha Ureña MD    Indications and Patient Condition  Indications for airway management: airway protection    Preoxygenated: yes  Mask difficulty assessment: 1 - vent by mask    Final Airway Details  Final airway type: endotracheal airway      Successful airway: ETT  Cuffed: yes   Successful intubation technique: direct laryngoscopy  Facilitating devices/methods: intubating stylet  Blade: Iman  Blade size: 3  ETT size (mm): 7.0  Cormack-Lehane Classification: grade I - full view of glottis  Placement verified by: capnometry   Measured from: teeth  Number of attempts at approach: 1  Assessment: lips, teeth, and gum same as pre-op and atraumatic intubation             Yes

## 2024-11-19 NOTE — ANESTHESIA PREPROCEDURE EVALUATION
Anesthesia Evaluation     Patient summary reviewed and Nursing notes reviewed   no history of anesthetic complications:   NPO Solid Status: > 8 hours  NPO Liquid Status: > 8 hours           Airway   Mallampati: II  TM distance: >3 FB  Neck ROM: full  No difficulty expected  Dental - normal exam     Pulmonary    (+) ,sleep apnea on CPAP  (-) asthma, rhonchi, decreased breath sounds, wheezes, not a smoker  Cardiovascular   Exercise tolerance: good (4-7 METS)    Rhythm: regular  Rate: normal    (+) valvular problems/murmurs (moderate) AS, dysrhythmias Atrial Fib, hyperlipidemia  (-) hypertension, CAD, angina, JOHANSEN, murmur      Neuro/Psych  (-) seizures, CVA  GI/Hepatic/Renal/Endo    (+) GERD well controlled, GI bleeding resolved, liver disease cirrhosis, renal disease- ESRD and dialysis  (-) diabetes, no thyroid disorder    Musculoskeletal     Abdominal     Abdomen: soft.   Substance History      OB/GYN          Other   blood dyscrasia (plt 56) thrombocytopenia,                 Anesthesia Plan    ASA 4     general     (Pt to get 2 u plts and 5-pack of cryo(fibrinogen 154) prior to procedure )  intravenous induction     Anesthetic plan, risks, benefits, and alternatives have been provided, discussed and informed consent has been obtained with: patient.    CODE STATUS:

## 2024-11-19 NOTE — H&P
[facility]   PREOPERATIVE HISTORY AND PHYSICAL    Patient Name:Bill Landry  : 1945  MRN: [id number]  Primary Care Physician: Bharathi De La Torre MD  Date of admission: 2024    Subjective   Subjective     Chief Complaint: preoperative evaluation    History of Present Illness  Bill Landry is a 79 y.o. male who presents for preoperative evaluation. He is scheduled for LEFT ARTERIOVENOUS SHUNT GRAFT REVISION (Left), patient actually has a pulse in the proximal graft.  Does not appear to be occluded clinically at this time.  Initial fistulogram will be performed and then depending on our findings appropriate intervention will be accessed.    Review of Systems multiple bruising sites noted    Personal History     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    Aortic stenosis     Bicuspid aortic valve 2022    Calculus of kidney     Chronic leukopenia and thrombocytopenia     Cirrhosis of liver without ascites 2017    Congestive splenomegaly 2017    Crohn disease     with ileostomy    Decreased white blood cell count, unspecified     Diverticula of colon     ESRD on hemodialysis 2018    M-W-F FRESENIUS    Fatty liver disease, nonalcoholic     Fistula 2018    Other Specified Complication    Gastrointestinal hemorrhage, unspecified     GERD (gastroesophageal reflux disease)     GI bleed     Glaucoma     H/O Gallstone pancreatitis     H/O Pericardial effusion     H/O sinus bradycardia     History of hypertension     resolved    History of UTI     Hx of renal calculi     Ileostomy present     Iron deficiency     Leakage of heart valve prosthesis, subsequent encounter     MGUS (monoclonal gammopathy of unknown significance)     TATE (obstructive sleep apnea)     Other  hemoglobinopathies     Pericardial effusion (noninflammatory)     Permanent atrial fibrillation     Scarlet fever, uncomplicated     Stenosis of other vascular prosthetic devices, implants and grafts, initial encounter 02/14/2022    Systemic lupus erythematosus, unspecified     Thrombocytopenia     undpecified    Type 2 diabetes mellitus     CURRENTLY TAKES NO MED    Urinary retention     Post-OP       Past Surgical History:   Procedure Laterality Date    APPENDECTOMY  06/1968    ARTERIOVENOUS FISTULA/SHUNT SURGERY Left 08/08/2017    Procedure: LEFT BRACHIAL CEPHALIC AV FISTULA FORMATION WITH CEPHALIC VEIN TRANSPOSITION ;  Surgeon: Bill Deal MD;  Location: [facility] OR;  Service:     ARTERIOVENOUS FISTULA/SHUNT SURGERY Left 05/27/2022    Procedure: OPEN REVISION LEFT ARTERIOVENOUS INTERPOSITION GRAFT PLACEMENT;  Surgeon: Bill Deal MD;  Location: [facility] MAIN OR;  Service: Vascular;  Laterality: Left;    ARTERIOVENOUS FISTULA/SHUNT SURGERY W/ HEMODIALYSIS CATHETER INSERTION Left 02/15/2022    Procedure: OPEN LEFT ARM ARTERIOVENOUS FISTULA THROMBECTOMY WITH CEPHALIC VEIN ARTHROPLASTY AND STENT/GRAFT PLACEMENT;  Surgeon: Bill Deal MD;  Location: [facility] OR 18/19;  Service: Vascular;  Laterality: Left;    CATARACT EXTRACTION WITH INTRAOCULAR LENS IMPLANT Bilateral 2004    CHOLECYSTECTOMY  2011    COLECTOMY PARTIAL / TOTAL      History of inflammatory bowel disease with status post colectomy with ileostomy many years ago in the [facility],  18 YEARS OF AGE    COLON SURGERY      Colon resection with colostomy    COLON SURGERY      COLONOSCOPY  01/2018    CYSTOSCOPY LITHOLAPAXY BLADDER STONE EXTRACTION      ENDOSCOPY  01/16/2015    gastritis    ENDOSCOPY  01/17/2018    Procedure: ESOPHAGOGASTRODUODENOSCOPY;  Surgeon: Warner Neville MD;  Location: [facility] ENDOSCOPY;  Service:     ILEOSCOPY  01/16/2015    normal    ILEOSCOPY N/A 01/17/2018    Procedure: ILEOSCOPY;  Surgeon: Warner Neville,  MD;  Location: [facility] ENDOSCOPY;  Service:     ILEOSTOMY  12/1968    loop ostomy bypass    INCISION AND DRAINAGE ARM Left 10/27/2023    Procedure: LEFT ARM INCISION AND DRAINAGE;  Surgeon: Bill Deal MD;  Location: [facility] MAIN OR;  Service: Vascular;  Laterality: Left;    INSERTION HEMODIALYSIS CATHETER Right 11/13/2024    Procedure: Tunnelled HEMODIALYSIS CATHETER INSERTION;  Surgeon: Ben Barth MD;  Location: [facility] HYBRID OR;  Service: Vascular;  Laterality: Right;    OTHER SURGICAL HISTORY  2009    kidney stones x3    PSEUDOANEURYSM REPAIR Left 10/27/2023    TONSILLECTOMY  1950       Family History: His family history includes Heart disease in his mother; Heart failure (age of onset: 78) in his mother; Hyperlipidemia in his mother; Hypertension in his father and mother; Other (age of onset: 82) in his father.     Social History: He  reports that he has never smoked. He has never been exposed to tobacco smoke. He has never used smokeless tobacco. He reports that he does not drink alcohol and does not use drugs.    Home Medications:  Iron Sucrose, Loratadine, Methoxy PEG-Epoetin Beta, Umm-Pteer, Sucroferric Oxyhydroxide, alfuzosin, aspirin, cholecalciferol, famotidine, latanoprost, lidocaine-prilocaine, and sodium chloride    Allergies:  He is allergic to ace inhibitors, contrast dye (echo or unknown ct/mr), iodine, angiotensin receptor blockers, eliquis [apixaban], filgrastim, keflex [cephalexin], and other.    Objective    Objective     Vitals:    Temp:  [97.7 °F (36.5 °C)-98.3 °F (36.8 °C)] 97.9 °F (36.6 °C)  Heart Rate:  [66-86] 66  Resp:  [16] 16  BP: (120-141)/(60-72) 136/63    Physical Exam  Lungs clear  Significant ecchymosis everywhere  AV fistula with palpable pulse proximally in the proximal aneurysmal segment.    Assessment & Plan   Assessment / Plan     Brief Patient Summary:  Bill Landry is a 79 y.o. male who presents for preoperative evaluation.    Pre-Op Diagnosis Codes:       * AV fistula occlusion, initial encounter [T87.624W]    Active Hospital Problems:  Active Hospital Problems    Diagnosis     **AV fistula occlusion, initial encounter      Plan: Bleeding infection failure use and open the graft  Procedure(s):  LEFT ARTERIOVENOUS SHUNT GRAFT REVISION    The risks, benefits, and alternatives of the procedure including but not limited to bleeding infection failure to open the graft and death and risks of the anesthesia were discussed in detail with the patient and questions were answered. No guarantees were made or implied. Informed consent was obtained.    Bill Deal MD

## 2024-11-20 LAB
ANION GAP SERPL CALCULATED.3IONS-SCNC: 14.2 MMOL/L (ref 5–15)
BASOPHILS # BLD AUTO: 0.02 10*3/MM3 (ref 0–0.2)
BASOPHILS NFR BLD AUTO: 0.6 % (ref 0–1.5)
BH BB BLOOD EXPIRATION DATE: NORMAL
BH BB BLOOD TYPE BARCODE: 5100
BH BB BLOOD TYPE BARCODE: 5100
BH BB BLOOD TYPE BARCODE: 6200
BH BB BLOOD TYPE BARCODE: 6200
BH BB DISPENSE STATUS: NORMAL
BH BB PRODUCT CODE: NORMAL
BH BB UNIT NUMBER: NORMAL
BUN SERPL-MCNC: 56 MG/DL (ref 8–23)
BUN/CREAT SERPL: 9.2 (ref 7–25)
CALCIUM SPEC-SCNC: 7.8 MG/DL (ref 8.6–10.5)
CHLORIDE SERPL-SCNC: 102 MMOL/L (ref 98–107)
CO2 SERPL-SCNC: 21.8 MMOL/L (ref 22–29)
CREAT SERPL-MCNC: 6.12 MG/DL (ref 0.76–1.27)
DEPRECATED RDW RBC AUTO: 48.5 FL (ref 37–54)
EGFRCR SERPLBLD CKD-EPI 2021: 8.7 ML/MIN/1.73
EOSINOPHIL # BLD AUTO: 0.07 10*3/MM3 (ref 0–0.4)
EOSINOPHIL NFR BLD AUTO: 2 % (ref 0.3–6.2)
ERYTHROCYTE [DISTWIDTH] IN BLOOD BY AUTOMATED COUNT: 13.1 % (ref 12.3–15.4)
GLUCOSE SERPL-MCNC: 106 MG/DL (ref 65–99)
HCT VFR BLD AUTO: 23.5 % (ref 37.5–51)
HCT VFR BLD AUTO: 24.3 % (ref 37.5–51)
HGB BLD-MCNC: 7.8 G/DL (ref 13–17.7)
HGB BLD-MCNC: 8.1 G/DL (ref 13–17.7)
IMM GRANULOCYTES # BLD AUTO: 0.02 10*3/MM3 (ref 0–0.05)
IMM GRANULOCYTES NFR BLD AUTO: 0.6 % (ref 0–0.5)
LYMPHOCYTES # BLD AUTO: 0.41 10*3/MM3 (ref 0.7–3.1)
LYMPHOCYTES NFR BLD AUTO: 11.8 % (ref 19.6–45.3)
MCH RBC QN AUTO: 33.9 PG (ref 26.6–33)
MCHC RBC AUTO-ENTMCNC: 33.2 G/DL (ref 31.5–35.7)
MCV RBC AUTO: 102.2 FL (ref 79–97)
MONOCYTES # BLD AUTO: 0.38 10*3/MM3 (ref 0.1–0.9)
MONOCYTES NFR BLD AUTO: 10.9 % (ref 5–12)
NEUTROPHILS NFR BLD AUTO: 2.58 10*3/MM3 (ref 1.7–7)
NEUTROPHILS NFR BLD AUTO: 74.1 % (ref 42.7–76)
NRBC BLD AUTO-RTO: 0 /100 WBC (ref 0–0.2)
PLATELET # BLD AUTO: 73 10*3/MM3 (ref 140–450)
PMV BLD AUTO: 9.5 FL (ref 6–12)
POTASSIUM SERPL-SCNC: 4 MMOL/L (ref 3.5–5.2)
RBC # BLD AUTO: 2.3 10*6/MM3 (ref 4.14–5.8)
SODIUM SERPL-SCNC: 138 MMOL/L (ref 136–145)
UNIT  ABO: NORMAL
UNIT  RH: NORMAL
VANCOMYCIN SERPL-MCNC: 7.2 MCG/ML (ref 5–40)
WBC NRBC COR # BLD AUTO: 3.48 10*3/MM3 (ref 3.4–10.8)

## 2024-11-20 PROCEDURE — 85025 COMPLETE CBC W/AUTO DIFF WBC: CPT | Performed by: SURGERY

## 2024-11-20 PROCEDURE — 94760 N-INVAS EAR/PLS OXIMETRY 1: CPT

## 2024-11-20 PROCEDURE — 94799 UNLISTED PULMONARY SVC/PX: CPT

## 2024-11-20 PROCEDURE — 80202 ASSAY OF VANCOMYCIN: CPT | Performed by: SURGERY

## 2024-11-20 PROCEDURE — 99024 POSTOP FOLLOW-UP VISIT: CPT | Performed by: SURGERY

## 2024-11-20 PROCEDURE — 80048 BASIC METABOLIC PNL TOTAL CA: CPT | Performed by: SURGERY

## 2024-11-20 PROCEDURE — 85014 HEMATOCRIT: CPT

## 2024-11-20 PROCEDURE — 94761 N-INVAS EAR/PLS OXIMETRY MLT: CPT

## 2024-11-20 PROCEDURE — 94640 AIRWAY INHALATION TREATMENT: CPT

## 2024-11-20 PROCEDURE — 94664 DEMO&/EVAL PT USE INHALER: CPT

## 2024-11-20 PROCEDURE — 25010000002 VANCOMYCIN PER 500 MG: Performed by: SURGERY

## 2024-11-20 PROCEDURE — 25810000003 SODIUM CHLORIDE 0.9 % SOLUTION

## 2024-11-20 PROCEDURE — 85018 HEMOGLOBIN: CPT

## 2024-11-20 RX ADMIN — SODIUM CHLORIDE 500 ML: 9 INJECTION, SOLUTION INTRAVENOUS at 09:55

## 2024-11-20 RX ADMIN — LATANOPROST 1 DROP: 50 SOLUTION OPHTHALMIC at 21:18

## 2024-11-20 RX ADMIN — IPRATROPIUM BROMIDE AND ALBUTEROL SULFATE 3 ML: 2.5; .5 SOLUTION RESPIRATORY (INHALATION) at 23:22

## 2024-11-20 RX ADMIN — VANCOMYCIN HYDROCHLORIDE 500 MG: 500 INJECTION, POWDER, LYOPHILIZED, FOR SOLUTION INTRAVENOUS at 17:05

## 2024-11-20 RX ADMIN — Medication 1000 UNITS: at 08:19

## 2024-11-20 RX ADMIN — IPRATROPIUM BROMIDE AND ALBUTEROL SULFATE 3 ML: 2.5; .5 SOLUTION RESPIRATORY (INHALATION) at 07:27

## 2024-11-20 NOTE — NURSING NOTE
Dialysis complete, removed no fluid as ordered and no complications noted.  Pre and post vitals are in epic, and dressing change completed to dialysis catheter.

## 2024-11-20 NOTE — PLAN OF CARE
Problem: Adult Inpatient Plan of Care  Goal: Plan of Care Review  Outcome: Progressing   Goal Outcome Evaluation:         VSS ex/ Afib. RA. AxO x4. NICK fistula CDI, ace & gauze, bruit & thrill present. R HD tunneled cath CDI. All pulses palpable. No prn pain meds per pt. LLQ ileostomy CDI. Oliguric. BRAULIO foot pumps on. Renal consulted for inpatient dialysis plans for today. Wife @ bedside.

## 2024-11-20 NOTE — SIGNIFICANT NOTE
11/20/24 1617   OTHER   Discipline physical therapist   Therapy Assessment/Plan (PT)   Criteria for Skilled Interventions Met (PT) no;no problems identified which require skilled intervention  (Pt off floor HD. See PT note from 11/14, Pt mobilizing independently. no apparent skilled PT needs. RN notified. will defer formal PT eval.)

## 2024-11-20 NOTE — CONSULTS
[facility] Consult Note      Patient Name: Bill Landry  : 1945  MRN: [id number]  Primary Care Physician:  Bharathi De La Torre MD  Referring Physician: Bill Deal MD  Date of admission: 2024    Subjective     Reason for Consult: ESRD    HPI:   Bill Landry is a 79 y.o. male with ESRD on HD (MWF, via new R [facility] d/t thrombosed AVF, under my care at [facility]), anemia, cirrhosis, A-fib, chronic leukopenia and thrombocytopenia, and hypertension, admitted on  under underwent L AVG placement with thrombectomy by Dr. Deal.  Reportedly patient lost large amount of blood during surgery secondary to thrombocytopenia, received platelets and cryo.      Last HD was , without issues. Patient has been having hypotension overnight with BP 90s/50s.  Denies chest pain, SOA, N/V/D, dizziness/lightheadedness.    Review of Systems:   14 point review of systems is otherwise negative except for mentioned above on HPI    Personal History     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    Aortic stenosis     Bicuspid aortic valve 2022    Calculus of kidney     Chronic leukopenia and thrombocytopenia     Cirrhosis of liver without ascites 2017    Congestive splenomegaly 2017    Crohn disease     with ileostomy    Decreased white blood cell count, unspecified     Diverticula of colon     ESRD on hemodialysis 2018    M-W-F FRESENIUS    Fatty liver disease, nonalcoholic     Fistula 2018    Other Specified Complication    Gastrointestinal hemorrhage, unspecified     GERD (gastroesophageal reflux disease)     GI bleed     Glaucoma     H/O Gallstone pancreatitis     H/O Pericardial effusion     H/O sinus bradycardia     History of hypertension     resolved     History of UTI     Hx of renal calculi     Ileostomy present     Iron deficiency     Leakage of heart valve prosthesis, subsequent encounter     MGUS (monoclonal gammopathy of unknown significance)     TATE (obstructive sleep apnea)     Other hemoglobinopathies     Pericardial effusion (noninflammatory)     Permanent atrial fibrillation     Scarlet fever, uncomplicated     Stenosis of other vascular prosthetic devices, implants and grafts, initial encounter 02/14/2022    Systemic lupus erythematosus, unspecified     Thrombocytopenia     undpecified    Type 2 diabetes mellitus     CURRENTLY TAKES NO MED    Urinary retention     Post-OP       Past Surgical History:   Procedure Laterality Date    APPENDECTOMY  06/1968    ARTERIOVENOUS FISTULA/SHUNT SURGERY Left 08/08/2017    Procedure: LEFT BRACHIAL CEPHALIC AV FISTULA FORMATION WITH CEPHALIC VEIN TRANSPOSITION ;  Surgeon: Bill Deal MD;  Location: [facility] OR;  Service:     ARTERIOVENOUS FISTULA/SHUNT SURGERY Left 05/27/2022    Procedure: OPEN REVISION LEFT ARTERIOVENOUS INTERPOSITION GRAFT PLACEMENT;  Surgeon: Bill Deal MD;  Location: [facility] OR;  Service: Vascular;  Laterality: Left;    ARTERIOVENOUS FISTULA/SHUNT SURGERY W/ HEMODIALYSIS CATHETER INSERTION Left 02/15/2022    Procedure: OPEN LEFT ARM ARTERIOVENOUS FISTULA THROMBECTOMY WITH CEPHALIC VEIN ARTHROPLASTY AND STENT/GRAFT PLACEMENT;  Surgeon: Bill Deal MD;  Location: [facility] OR 18/19;  Service: Vascular;  Laterality: Left;    CATARACT EXTRACTION WITH INTRAOCULAR LENS IMPLANT Bilateral 2004    CHOLECYSTECTOMY  2011    COLECTOMY PARTIAL / TOTAL      History of inflammatory bowel disease with status post colectomy with ileostomy many years ago in the [facility],  18 YEARS OF AGE    COLON SURGERY      Colon resection with colostomy    COLON SURGERY      COLONOSCOPY  01/2018    CYSTOSCOPY LITHOLAPAXY BLADDER STONE EXTRACTION      ENDOSCOPY  01/16/2015    gastritis     ENDOSCOPY  01/17/2018    Procedure: ESOPHAGOGASTRODUODENOSCOPY;  Surgeon: Warner Neville MD;  Location: [facility] ENDOSCOPY;  Service:     ILEOSCOPY  01/16/2015    normal    ILEOSCOPY N/A 01/17/2018    Procedure: ILEOSCOPY;  Surgeon: Warner Neville MD;  Location: [facility] ENDOSCOPY;  Service:     ILEOSTOMY  12/1968    loop ostomy bypass    INCISION AND DRAINAGE ARM Left 10/27/2023    Procedure: LEFT ARM INCISION AND DRAINAGE;  Surgeon: Bill Bermeo MD;  Location: [facility] MAIN OR;  Service: Vascular;  Laterality: Left;    INSERTION HEMODIALYSIS CATHETER Right 11/13/2024    Procedure: Tunnelled HEMODIALYSIS CATHETER INSERTION;  Surgeon: Ben Barth MD;  Location: [facility] HYBRID OR;  Service: Vascular;  Laterality: Right;    OTHER SURGICAL HISTORY  2009    kidney stones x3    PSEUDOANEURYSM REPAIR Left 10/27/2023    TONSILLECTOMY  1950       Family History: family history includes Heart disease in his mother; Heart failure (age of onset: 78) in his mother; Hyperlipidemia in his mother; Hypertension in his father and mother; Other (age of onset: 82) in his father.    Social History:  reports that he has never smoked. He has never been exposed to tobacco smoke. He has never used smokeless tobacco. He reports that he does not drink alcohol and does not use drugs.    Home Medications:  Prior to Admission medications    Medication Sig Start Date End Date Taking? Authorizing Provider   alfuzosin (UROXATRAL) 10 MG 24 hr tablet Take 1 tablet by mouth Every Other Day. Does not take on Sundays Tuesdays or Thursdays   Yes Elvia Nicholson MD   aspirin 81 MG tablet Take 1 tablet by mouth Every Night. INSTRUCTED TO CONTINUE THIS FOR SURGERY PER DR. BERMEO'S OFFICE   Yes Elvia Nicholson MD   B Complex-C-Folic Acid (Umm-Peter) tablet Take 1 tablet by mouth Daily. 9/27/23  Yes Elvia Nicholson MD   Cholecalciferol 25 MCG (1000 UT) tablet Take 1 tablet by mouth Daily.   Yes Elvia Nicholson MD   Iron  Sucrose (VENOFER IV) Infuse 1 dose into a venous catheter As Needed.   Yes Elvia Nicholson MD   latanoprost (XALATAN) 0.005 % ophthalmic solution Administer 1 drop to both eyes Every Night.   Yes Elvia Nicholson MD   Loratadine 10 MG capsule Take 1 capsule by mouth Daily.   Yes Elvia Nicholson MD   Methoxy PEG-Epoetin Beta (MIRCERA IJ) Inject 1 dose as directed Every 30 (Thirty) Days.   Yes Elvia Nicholson MD   sodium chloride 0.65 % nasal spray Administer 2 sprays into the nostril(s) as directed by provider Every Night.   Yes Elvia Nicholson MD   Sucroferric Oxyhydroxide (Velphoro) 500 MG chewable tablet Chew 2 tablets 3 times a day. Take 2 tablets by  mouth three times a day.   Yes Elvia Nicholson MD   famotidine (Pepcid AC) 10 MG tablet Take 1 tablet by mouth Daily As Needed for Heartburn (take as needed after HD on dialysis days).    Elvia Nicholson MD   lidocaine-prilocaine (EMLA) 2.5-2.5 % cream Apply 1 Application topically to the appropriate area as directed As Needed. Ocrpbk-Ahgyanmsc-Jozwrd:  ONE HOUR PRIOR TO DIALYSIS 2/5/18   Elvia Nicholson MD       Allergies:  Allergies   Allergen Reactions    Ace Inhibitors Other (See Comments)     RENAL FAILURE/ raised creatinine    Contrast Dye (Echo Or Unknown Ct/Mr) Other (See Comments) and Anaphylaxis     RENAL FAILURE    Iodine Anaphylaxis    Angiotensin Receptor Blockers Swelling    Eliquis [Apixaban] Arrhythmia     BLEEDING ISSUES; PT CANNOT TAKE ANY ANTICOAGULANTS DUE TO LOW PLATELETS EXCEPT ASPIRIN      Filgrastim GI Intolerance and Confusion     Chest pain    Keflex [Cephalexin] Diarrhea     RENAL FAILURE    Other Angioedema     IVP Dye       Objective     Vitals:   Temp:  [97.6 °F (36.4 °C)-98.9 °F (37.2 °C)] 97.6 °F (36.4 °C)  Heart Rate:  [46-86] 63  Resp:  [14-18] 18  BP: ()/(50-86) 92/54  Flow (L/min) (Oxygen Therapy):  [4] 4    Intake/Output Summary (Last 24 hours) at 11/20/2024 0846  Last data  filed at 11/20/2024 0200  Gross per 24 hour   Intake 1421 ml   Output 200 ml   Net 1221 ml       Physical Exam:   Constitutional: Awake, chronically ill, NAD  HEENT: Sclera anicteric, no conjunctival injection  Neck: Supple, no JVD, no carotid bruit  Respiratory: Clear, nonlabored on RA  Cardiovascular: Irregularly irregular, not tachycardic, systolic murmur, no rub  Gastrointestinal: BS +, + ileostomy, soft, nontender and nondistended  : No palpable bladder  Musculoskeletal: No lower extremities edema, no clubbing  Psychiatric: Appropriate affect, cooperative  Neurologic: Oriented x3, moving all extremities, normal speech and mental status, no asterixis  Skin: Warm and dry.  Ecchymotic area extending from his right chest to his right arm     Vascular: LUE wrapped with Ace wrap; R TDC in place       Scheduled Meds:     cholecalciferol, 1,000 Units, Oral, Daily  ipratropium-albuterol, 3 mL, Nebulization, Q8H - RT  latanoprost, 1 drop, Both Eyes, Nightly      IV Meds:   Pharmacy to dose vancomycin,   sodium chloride, 9 mL/hr, Last Rate: 9 mL/hr (11/19/24 1450)        Results Reviewed:   I have personally reviewed the results from the time of this admission to 11/20/2024 08:46 EST     Lab Results   Component Value Date    GLUCOSE 106 (H) 11/20/2024    CALCIUM 7.8 (L) 11/20/2024     11/20/2024    K 4.0 11/20/2024    CO2 21.8 (L) 11/20/2024     11/20/2024    BUN 56 (H) 11/20/2024    CREATININE 6.12 (H) 11/20/2024    EGFRIFAFRI  02/14/2022      Comment:      <15 Indicative of kidney failure.    EGFRIFNONA 7 (L) 02/14/2022    BCR 9.2 11/20/2024    ANIONGAP 14.2 11/20/2024      Lab Results   Component Value Date    MG 1.8 11/17/2024    PHOS 3.3 11/17/2024    ALBUMIN 3.1 (L) 11/17/2024           Assessment / Plan       AV fistula occlusion, initial encounter    AV graft thrombosis, initial encounter      ASSESSMENT:  ESRD on HD, MWF, via right tunneled dialysis catheter given thrombosed left upper extremity AV  fistula, last HD was 11/18 without issues.  Electrolytes within acceptable range; volume status is stable  Thrombosed left arm fistula/graft, s/p L AVG placement and thrombectomy on 11/19 by Dr. Deal  Hypertension with CKD, BP now low, in association with acute blood loss during surgery  Anemia with CKD/acute blood loss, receives long-acting WENDY and IV iron outpatient, Hgb down almost 3 g compared to 3 days ago  A-fib, HR controlled, on aspirin at home  Cirrhosis  Mild-moderate aortic stenosis by echocardiogram August 2024  Chronic leukopenia and thrombocytopenia, s/p cryo and platelets on 11/19  Mircera bone disease, on cholecalciferol and Velphoro    PLAN:  Repeat H&H  Will give 500 ml bolus now  HD later today without  fluid removal  Surveillance labs    Thank you for involving us in the care of Bill Landry.  Please feel free to call with any questions.    Myra De La Garza MD  11/20/24  08:46 [facility]    [facility]  [phone]      Please note that portions of this note were completed with a voice recognition program.

## 2024-11-20 NOTE — BRIEF OP NOTE
ARTERIOVENOUS FISTULA TRANSPOSITION / REVISION  Progress Note    Bill Landry  11/19/2024    Pre-op Diagnosis:   AV fistula occlusion, initial encounter [T82.898A]       Post-Op Diagnosis Codes:     * AV fistula occlusion, initial encounter [T82.898A]    Procedure/CPT® Codes:        Procedure(s):  LEFT ARTERIOVENOUS SHUNT GRAFT REVISION              Surgeon(s):  Bill Deal MD    Anesthesia: General    Staff:   Circulator: Erica Brown RN; Shi Verduzco RN  Radiology Technologist: Alida Desir  Scrub Person: Neeru Peace; Marie Andre  Assistant: Cynthia Moreland CSA  Assistant: Cynthia Moreland CSA      Estimated Blood Loss: 200ml    Urine Voided: * No values recorded between 11/19/2024  5:41 PM and 11/19/2024  7:45 PM *    Specimens:                None          Drains:   Ileostomy LLQ (Active)       Findings: see dict        Complications: none    Assistant: Cynthia Moreland CSA  was responsible for performing the following activities: Retraction, Suction, Irrigation, Suturing, Closing, and Placing Dressing and their skilled assistance was necessary for the success of this case.    Bill Deal MD     Date: 11/19/2024  Time: 19:50 EST

## 2024-11-20 NOTE — OP NOTE
Operative Note  Date of Admission:  11/19/2024  OR Date: 11/19/2024    Pre-op Diagnosis:   Thrombosed left AV fistula/shunt    Post-Op Diagnosis Codes:     * AV fistula occlusion, initial encounter [T82.898A]    Procedure:   1) left brachial cephalic fistula to axillary AV graft placement   2) open left brachiocephalic proximal fistula thrombectomy  3) left AV fistulogram      Surgeon: Feng Deal MD    Assistant:  Cynthia Alvarado CSA and they provided critical assistance during the case including suctioning, exposure, retraction, and reduction of blood loss.    Anesthesia: General    Staff:   Circulator: Erica Brown RN; Shi Verduzco RN  Radiology Technologist: Alida Desir  Scrub Person: Neeru Peace; Marie Andre  Assistant: Cynthia Moreland CSA    Estimated Blood Loss: 100ml    Specimens:   Order Name Source Comment Collection Info Order Time   BASIC METABOLIC PANEL   Collected By: Abbey Day RN 11/19/2024 11:32 AM     Release to patient   Routine Release        PREPARE PLATELET PHERESIS    11/15/2024  1:23 PM     Indication for Transfusion   Other - Comment Required          Other indication   pre op surgery          When to Transfuse?   Hold for Surgery/Procedure (Insert Date)          Surgery Date   11/19/2024          Release to patient   Routine Release        PREPARE CRYOPRECIPITATE  For surgery on 11/19  11/15/2024  1:24 PM     Prepare   1 Pool = 5 Units          Indication for Transfusion   Other          Clinical Indication   pre op surgery          Release to patient   Routine Release        PREPARE RBC Other   11/18/2024  3:57 PM     When to Transfuse?   Hold          Indication for Transfusion   Other          Other Indication?   positive antibody screen          Release to patient   Routine Release        PREPARE PLATELET PHERESIS    11/19/2024 11:25 AM     Indication for Transfusion   Other - Comment Required          Other indication   surgery 11/19          When  to Transfuse?   Hold for Surgery/Procedure (Insert Date)          Surgery Date   11/19/2024          Release to patient   Routine Release        TYPE AND SCREEN   Collected By: Abbey Day RN 11/19/2024 11:25 AM     Release to patient   Routine Release             Complications: none    Findings: see dict    Indications:    The patient is an 79 y.o. male seen for evaluation of thrombosed AV fistula.  Because of the pulsatility of the fistula proximally fistulogram will be pursued as well as revision.  Patient and family understand risk benefits complications and agreed to the procedure.       Procedure:    Patient was prepped and draped sterilely.  Because of significant pulsatility of the proximal fistula we used duplex directionally and accessed it with a micropuncture needle.  Despite what looked like a patent graft proximally on duplex we shot her picture and there was no real flow other than a 6 short segment that filled.  There is proximal and distal clot.  At this point time we removed the catheter and placed a stitch and then cutdown at the proximal fistula and at the axillary vein.  Proximal fistula was exposed and controlled down to the anastomosis and the 3 Eva embolectomy catheter was used to thrombectomize it after was divided.  We oversewed the outflow and then performed a 6 mm straight [place] graft that was tunneled with the [place] tunneling device before to giving heparin 5000 units.  The proximal anastomosis was performed with 5-0 Prolene suture in an end-to-end manner after thrombectomy was performed on the fistula segment.  Good pulsatile flow was seen and then the patient had an end-to-side anastomosis to the axillary vein with running 5-0 Prolene suture and clamp control also.  Floseal and protamine were given for hemostasis.  The patient was quite oozy consistent with his thrombocytopenia.  After assuring hemostasis both sites we closed proximally with 1 layer 3-0 Vicryl in skin  staples.  The axillary incision was closed with 2 layers of 2-0 Vicryl and skin staples.  Patient tolerated the procedure well.    Radiographic Findings:  Fistulogram confirms thrombosis      Active Hospital Problems    Diagnosis  POA    **AV fistula occlusion, initial encounter [T82.726I]  Unknown    AV graft thrombosis, initial encounter [Y12.754O]  Yes      Resolved Hospital Problems   No resolved problems to display.      Bill Deal MD     Date: 11/19/2024  Time: 20:05 EST

## 2024-11-20 NOTE — PLAN OF CARE
Goal Outcome Evaluation:  Plan of Care Reviewed With: patient        Progress: improving  Outcome Evaluation: VSS, Pt rec'd dialysis today, pt up with stand-by assist, dressing changed on LUE per Dr. Deal, wcm

## 2024-11-20 NOTE — PROGRESS NOTES
Name: Bill Landry ADMIT: 2024   : 1945  PCP: Bharathi De La Torre MD    MRN: [id number] LOS: 1 days   AGE/SEX: 79 y.o. male  ROOM: [address]      CC: Postop day 1 status post left AV shunt placement  Subjective     79 y.o. male overall doing reasonably well after left AV shunt placement despite his multiple comorbidities and severe bleeding diathesis.  Currently his shunt feels great.  His wounds are of concern and we will keep him an extra day to watch.  His bleeding has been controlled and we will avoid tape to his skin.  We have ordered physical therapy and Occupational Therapy as he is and I have a hard time going home without some improvement.  Plans for dialysis today via tunneled catheter    Review of Systems denies much pain    Objective     Scheduled Medications:   cholecalciferol, 1,000 Units, Oral, Daily  ipratropium-albuterol, 3 mL, Nebulization, Q8H - RT  latanoprost, 1 drop, Both Eyes, Nightly        Active Infusions:  Pharmacy to dose vancomycin,   sodium chloride, 9 mL/hr, Last Rate: 9 mL/hr (24 1450)        As Needed Medications:    acetaminophen **OR** acetaminophen **OR** acetaminophen    famotidine    HYDROcodone-acetaminophen    nitroglycerin    ondansetron ODT **OR** ondansetron    Pharmacy to dose vancomycin    Vital Signs  Vital Signs Patient Vitals for the past 24 hrs:   BP Temp Temp src Pulse Resp SpO2 Height Weight   24 0727 -- -- -- -- 18 -- -- --   24 0600 98/52 -- -- (!) 46 -- 100 % -- --   24 0500 90/53 -- -- 58 -- 100 % -- --   24 0400 96/50 -- -- 56 -- 98 % -- --   24 0326 -- 98.9 °F (37.2 °C) Axillary 60 -- 100 % -- --   24 0300 94/59 -- -- 54 -- 100 % -- --   24 0200 134/86 -- -- 66 -- 96 % -- --   24 0100 104/57 -- -- 56 -- 99 % -- --   24 0000 100/56 97.8 °F (36.6 °C) Oral 65 16 96 % -- --   24 2300 105/66 -- -- 71 -- 94 % -- --   24 2202 120/50 97.8 °F (36.6 °C) Oral 73 16 95 % 177.8  "cm (70\") 81.1 kg (178 lb 12.7 oz)   11/19/24 2115 132/63 -- -- 67 16 100 % -- --   11/19/24 2100 123/71 -- -- 65 16 100 % -- --   11/19/24 2045 122/57 -- -- 67 16 100 % -- --   11/19/24 2030 116/68 -- -- 71 16 100 % -- --   11/19/24 2015 123/68 -- -- 62 16 100 % -- --   11/19/24 2005 110/64 -- -- 69 14 96 % -- --   11/19/24 2000 106/50 -- -- 71 14 96 % -- --   11/19/24 1956 104/58 97.9 °F (36.6 °C) Oral 72 14 100 % -- --   11/19/24 1400 136/63 97.9 °F (36.6 °C) -- 66 16 97 % -- --   11/19/24 1325 129/67 98.3 °F (36.8 °C) -- 73 16 96 % -- --   11/19/24 1320 -- -- -- 67 16 96 % -- --   11/19/24 1315 132/66 -- -- 68 16 96 % -- --   11/19/24 1300 120/64 -- -- 70 16 97 % -- --   11/19/24 1248 126/60 97.7 °F (36.5 °C) -- 66 16 95 % -- --   11/19/24 1215 130/67 97.9 °F (36.6 °C) Oral 72 16 -- -- --   11/19/24 1136 141/72 97.9 °F (36.6 °C) Oral 86 16 100 % -- --     Vital Signs (range)  Temp:  [97.7 °F (36.5 °C)-98.9 °F (37.2 °C)] 98.9 °F (37.2 °C)  Heart Rate:  [46-86] 46  Resp:  [14-18] 18  BP: ()/(50-86) 98/52  I/O:  I/O last 3 completed shifts:  In: 1421 [P.O.:480; I.V.:600; Blood:341]  Out: 200 [Stool:200]  I/O:   Intake/Output Summary (Last 24 hours) at 11/20/2024 0746  Last data filed at 11/20/2024 0200  Gross per 24 hour   Intake 1421 ml   Output 200 ml   Net 1221 ml     BMI:  Body mass index is 25.65 kg/m².    Physical Exam:  Physical Exam   Left arm ecchymosis impressive but stable.  No active oozing now.  Positive thrill.  Lungs diminished.    Results Review:     CBC    Results from last 7 days   Lab Units 11/20/24  0506 11/17/24  1125 11/14/24  0328 11/13/24  0903   WBC 10*3/mm3 3.48 2.52* 2.65* 3.05*   HEMOGLOBIN g/dL 7.8* 9.5* 8.9* 9.6*   PLATELETS 10*3/mm3 73* 56* 57*  57* 59*     BMP   Results from last 7 days   Lab Units 11/20/24  0506 11/19/24  1200 11/17/24  1125 11/14/24  0329 11/13/24  0903   SODIUM mmol/L 138 135* 132* 133* 132*   POTASSIUM mmol/L 4.0 3.8 3.9 3.9 3.9   CHLORIDE mmol/L 102 98 96* " "96* 94*   CO2 mmol/L 21.8* 24.2 23.0 21.8* 24.0   BUN mg/dL 56* 43* 62* 89* 77*   CREATININE mg/dL 6.12* 5.80* 7.02* 8.78* 7.51*   GLUCOSE mg/dL 106* 108* 164* 151* 101*   MAGNESIUM mg/dL  --   --  1.8  --   --    PHOSPHORUS mg/dL  --   --  3.3 4.6*  --      Cr Clearance Estimated Creatinine Clearance: 11.2 mL/min (A) (by C-G formula based on SCr of 6.12 mg/dL (H)).  Coag   Results from last 7 days   Lab Units 11/17/24  1125 11/14/24  0329 11/13/24  0903   INR  1.35*  --  1.35*   APTT seconds 35.7* 37.2*  --      HbA1C   Lab Results   Component Value Date    HGBA1C 5.40 09/17/2024    HGBA1C 5.20 09/26/2023    HGBA1C 6.20 (H) 05/12/2023     Blood Glucose No results found for: \"POCGLU\"  Infection     CMP   Results from last 7 days   Lab Units 11/20/24  0506 11/19/24  1200 11/17/24  1125 11/14/24  0329 11/13/24  0903   SODIUM mmol/L 138 135* 132* 133* 132*   POTASSIUM mmol/L 4.0 3.8 3.9 3.9 3.9   CHLORIDE mmol/L 102 98 96* 96* 94*   CO2 mmol/L 21.8* 24.2 23.0 21.8* 24.0   BUN mg/dL 56* 43* 62* 89* 77*   CREATININE mg/dL 6.12* 5.80* 7.02* 8.78* 7.51*   GLUCOSE mg/dL 106* 108* 164* 151* 101*   ALBUMIN g/dL  --   --  3.1* 2.4* 3.2*   BILIRUBIN mg/dL  --   --  0.9  --  0.8   ALK PHOS U/L  --   --  202*  --  210*   AST (SGOT) U/L  --   --  37  --  30   ALT (SGPT) U/L  --   --  10  --  19     Radiology(recent) No radiology results for the last day    Assessment & Plan     Assessment & Plan      AV fistula occlusion, initial encounter    AV graft thrombosis, initial encounter      79 y.o. male status post thrombectomy and revision of graft with formal brachial axillary AV graft now in place.  Arm looks surprisingly good considering how much bleeding was going on last night.  He unfortunately is quite debilitated and with his new arm surgery will need occupational and physical therapy today.  Additionally he needs dialysis today.  I think it is best if we keep him today and let him go home tomorrow and he is agreeable.  "       Bill Deal MD  11/20/24  07:45 EST    Please call my office with any question: [phone]    Active Hospital Problems    Diagnosis  POA    **AV fistula occlusion, initial encounter [N26.547P]  Unknown    AV graft thrombosis, initial encounter [P72.470H]  Yes      Resolved Hospital Problems   No resolved problems to display.

## 2024-11-20 NOTE — CASE MANAGEMENT/SOCIAL WORK
Discharge Planning Assessment  [facility]     Patient Name: Bill Landry  MRN: [id number]  Today's Date: 11/20/2024    Admit Date: 11/19/2024    Plan: Home with family   Discharge Needs Assessment       Row Name 11/20/24 1728       Living Environment    People in Home spouse    Current Living Arrangements home    Potentially Unsafe Housing Conditions none    Primary Care Provided by self    Provides Primary Care For no one    Family Caregiver if Needed spouse    Family Caregiver Names Gillian [phone]    Quality of Family Relationships supportive    Able to Return to Prior Arrangements yes       Resource/Environmental Concerns    Resource/Environmental Concerns none    Transportation Concerns none       Transition Planning    Patient/Family Anticipates Transition to home    Patient/Family Anticipated Services at Transition none    Transportation Anticipated family or friend will provide       Discharge Needs Assessment    Equipment Currently Used at Home cpap;bp cuff;pulse ox;colostomy/ostomy supplies    Concerns to be Addressed no discharge needs identified    Anticipated Changes Related to Illness none    Equipment Needed After Discharge none                   Discharge Plan       Row Name 11/20/24 2189       Plan    Plan Home with family    Patient/Family in Agreement with Plan yes    Plan Comments Met with pt's wife in room, pt off floor. Introduced self, explained  role, verified face sheet. She stated that the plan is for the pt to return home with her at discharge. Their son will provide transportation. The pt uses a CPAP at home and has ostomy supplies delivered. [facility], Wednesday, Friday and drives himself. She denied any needs at this time. CCP to follow. ANUP Parnell RN                  Continued Care and Services - Admitted Since 11/19/2024    No active coordination exists for this encounter.       Expected Discharge Date and Time       Expected  Discharge Date Expected Discharge Time    Nov 21, 2024            Demographic Summary       Row Name 11/20/24 1728       General Information    Admission Type inpatient    Referral Source admission list       Contact Information    Permission Granted to Share Info With                    Functional Status       Row Name 11/20/24 1728       Functional Status    Usual Activity Tolerance moderate    Current Activity Tolerance moderate                   Psychosocial    No documentation.                  Abuse/Neglect    No documentation.                  Legal    No documentation.                  Substance Abuse    No documentation.                  Patient Forms    No documentation.                     Fidencio Silver RN

## 2024-11-20 NOTE — PROGRESS NOTES
[facility] Clinical Pharmacy Services: Vancomycin Pharmacokinetic Initial Consult Note    Bill Landry is a 79 y.o. male who is on day 1 of pharmacy to dose vancomycin.    Indication: Surgical Prophylaxis  Consulting Provider: Say  Planned Duration of Therapy: 24hrs  Loading Dose Ordered or Given: 1000 mg on 11/19 at 1725  MRSA PCR performed: no  Culture/Source: n/a  Target: Dose by Levels  Pertinent Vanc Dosing History: n/a  Other Antimicrobials: cefazolin 2g x once    Vitals/Labs  Ht:  ; Wt:    Temp Readings from Last 1 Encounters:   11/19/24 97.9 °F (36.6 °C) (Oral)    Estimated Creatinine Clearance: 11.4 mL/min (A) (by C-G formula based on SCr of 5.8 mg/dL (H)).  Dialysis MWF     Results from last 7 days   Lab Units 11/19/24  1200 11/17/24  1125 11/14/24  0329 11/14/24  0328 11/13/24  0903   CREATININE mg/dL 5.80* 7.02* 8.78*  --  7.51*   WBC 10*3/mm3  --  2.52*  --  2.65* 3.05*     Assessment/Plan:  Patient on dialysis PTA, MWF  Unclear when last session was or when next one will be  Patient should be covered until next HD session  Will get an AM level and redose if needed tomorrow  Vancomycin Dose: no further dose needed  Vanc Random has been ordered for 11/20 at 0600 with AM labs     Pharmacy will follow patient's kidney function and will adjust doses and obtain levels as necessary. Thank you for involving pharmacy in this patient's care. Please contact pharmacy with any questions or concerns.                           Rosa Barfield, [facility]  Clinical Pharmacist

## 2024-11-20 NOTE — PROGRESS NOTES
"[facility] Clinical Pharmacy Services: Vancomycin Monitoring Note    Bill Landry is a 79 y.o. male who is on day 2/2 of pharmacy to dose vancomycin for Surgical Prophylaxis.    Previous Vancomycin Dose:   1000mg on 11/19 at 1725  Updated Cultures and Sensitivities:   Results from last 7 days   Lab Units 11/20/24  0506   VANCOMYCIN RM mcg/mL 7.20     Vitals/Labs  Ht: 177.8 cm (70\"); Wt: 81.1 kg (178 lb 12.7 oz)   Temp Readings from Last 1 Encounters:   11/20/24 97.6 °F (36.4 °C) (Oral)     Estimated Creatinine Clearance: 11.2 mL/min (A) (by C-G formula based on SCr of 6.12 mg/dL (H)).   Dialysis MWF    Results from last 7 days   Lab Units 11/20/24  0506 11/19/24  1200 11/17/24  1125 11/14/24  0329 11/14/24  0328   CREATININE mg/dL 6.12* 5.80* 7.02*   < >  --    WBC 10*3/mm3 3.48  --  2.52*  --  2.65*    < > = values in this interval not displayed.     Assessment/Plan    Current Vancomycin Dose: Give 500 mg IV x1 after HD today then RX will sign off.  Next Level Date and Time: not needed as this is the last dose of surgical prophylaxis  We will continue to monitor patient changes and renal function     Thank you for involving pharmacy in this patient's care. Please contact pharmacy with any questions or concerns.       Robbie Byrnes [facility]  Clinical Pharmacist         " No 14.4

## 2024-11-20 NOTE — SIGNIFICANT NOTE
11/20/24 1047   OTHER   Discipline occupational therapist   Rehab Time/Intention   Session Not Performed other (see comments)  (spoke with pt, reports no acute OT needs or any needs at d/c, plans home with spouse, will sign off. Pt aware/agreeable.)   Therapy Assessment/Plan (PT)   Criteria for Skilled Interventions Met (PT) no problems identified which require skilled intervention

## 2024-11-21 ENCOUNTER — HOME HEALTH ADMISSION (OUTPATIENT)
Dept: HOME HEALTH SERVICES | Facility: HOME HEALTHCARE | Age: 79
End: 2024-11-21
Payer: MEDICARE

## 2024-11-21 ENCOUNTER — READMISSION MANAGEMENT (OUTPATIENT)
Dept: CALL CENTER | Facility: HOSPITAL | Age: 79
End: 2024-11-21
Payer: MEDICARE

## 2024-11-21 ENCOUNTER — DOCUMENTATION (OUTPATIENT)
Dept: HOME HEALTH SERVICES | Facility: HOME HEALTHCARE | Age: 79
End: 2024-11-21
Payer: MEDICARE

## 2024-11-21 VITALS
DIASTOLIC BLOOD PRESSURE: 64 MMHG | BODY MASS INDEX: 25.6 KG/M2 | OXYGEN SATURATION: 98 % | SYSTOLIC BLOOD PRESSURE: 113 MMHG | WEIGHT: 178.79 LBS | TEMPERATURE: 98.3 F | RESPIRATION RATE: 18 BRPM | HEART RATE: 53 BPM | HEIGHT: 70 IN

## 2024-11-21 LAB
ALBUMIN SERPL-MCNC: 2.9 G/DL (ref 3.5–5.2)
ANION GAP SERPL CALCULATED.3IONS-SCNC: 9.6 MMOL/L (ref 5–15)
BASOPHILS # BLD AUTO: 0.02 10*3/MM3 (ref 0–0.2)
BASOPHILS NFR BLD AUTO: 0.7 % (ref 0–1.5)
BUN SERPL-MCNC: 33 MG/DL (ref 8–23)
BUN/CREAT SERPL: 7.2 (ref 7–25)
CALCIUM SPEC-SCNC: 7.6 MG/DL (ref 8.6–10.5)
CHLORIDE SERPL-SCNC: 103 MMOL/L (ref 98–107)
CO2 SERPL-SCNC: 23.4 MMOL/L (ref 22–29)
CREAT SERPL-MCNC: 4.6 MG/DL (ref 0.76–1.27)
DEPRECATED RDW RBC AUTO: 47.4 FL (ref 37–54)
EGFRCR SERPLBLD CKD-EPI 2021: 12.3 ML/MIN/1.73
EOSINOPHIL # BLD AUTO: 0.07 10*3/MM3 (ref 0–0.4)
EOSINOPHIL NFR BLD AUTO: 2.4 % (ref 0.3–6.2)
ERYTHROCYTE [DISTWIDTH] IN BLOOD BY AUTOMATED COUNT: 13.1 % (ref 12.3–15.4)
GLUCOSE SERPL-MCNC: 93 MG/DL (ref 65–99)
HCT VFR BLD AUTO: 23.8 % (ref 37.5–51)
HGB BLD-MCNC: 8 G/DL (ref 13–17.7)
IMM GRANULOCYTES # BLD AUTO: 0.02 10*3/MM3 (ref 0–0.05)
IMM GRANULOCYTES NFR BLD AUTO: 0.7 % (ref 0–0.5)
LYMPHOCYTES # BLD AUTO: 0.35 10*3/MM3 (ref 0.7–3.1)
LYMPHOCYTES NFR BLD AUTO: 11.8 % (ref 19.6–45.3)
MCH RBC QN AUTO: 33.9 PG (ref 26.6–33)
MCHC RBC AUTO-ENTMCNC: 33.6 G/DL (ref 31.5–35.7)
MCV RBC AUTO: 100.8 FL (ref 79–97)
MONOCYTES # BLD AUTO: 0.3 10*3/MM3 (ref 0.1–0.9)
MONOCYTES NFR BLD AUTO: 10.1 % (ref 5–12)
NEUTROPHILS NFR BLD AUTO: 2.2 10*3/MM3 (ref 1.7–7)
NEUTROPHILS NFR BLD AUTO: 74.3 % (ref 42.7–76)
NRBC BLD AUTO-RTO: 0 /100 WBC (ref 0–0.2)
PHOSPHATE SERPL-MCNC: 3.5 MG/DL (ref 2.5–4.5)
PLATELET # BLD AUTO: 66 10*3/MM3 (ref 140–450)
PMV BLD AUTO: 9 FL (ref 6–12)
POTASSIUM SERPL-SCNC: 4.2 MMOL/L (ref 3.5–5.2)
RBC # BLD AUTO: 2.36 10*6/MM3 (ref 4.14–5.8)
SODIUM SERPL-SCNC: 136 MMOL/L (ref 136–145)
WBC NRBC COR # BLD AUTO: 2.96 10*3/MM3 (ref 3.4–10.8)

## 2024-11-21 PROCEDURE — 94664 DEMO&/EVAL PT USE INHALER: CPT

## 2024-11-21 PROCEDURE — 94799 UNLISTED PULMONARY SVC/PX: CPT

## 2024-11-21 PROCEDURE — 94761 N-INVAS EAR/PLS OXIMETRY MLT: CPT

## 2024-11-21 PROCEDURE — 99024 POSTOP FOLLOW-UP VISIT: CPT | Performed by: SURGERY

## 2024-11-21 PROCEDURE — 25010000002 EPOETIN ALFA-EPBX 40000 UNIT/ML SOLUTION: Performed by: SURGERY

## 2024-11-21 PROCEDURE — 85025 COMPLETE CBC W/AUTO DIFF WBC: CPT

## 2024-11-21 PROCEDURE — 80069 RENAL FUNCTION PANEL: CPT

## 2024-11-21 RX ORDER — MUPIROCIN 20 MG/G
1 OINTMENT TOPICAL EVERY 12 HOURS SCHEDULED
Status: DISCONTINUED | OUTPATIENT
Start: 2024-11-21 | End: 2024-11-21 | Stop reason: HOSPADM

## 2024-11-21 RX ORDER — ASPIRIN 81 MG/1
81 TABLET ORAL DAILY
Status: DISCONTINUED | OUTPATIENT
Start: 2024-11-21 | End: 2024-11-21 | Stop reason: HOSPADM

## 2024-11-21 RX ADMIN — IPRATROPIUM BROMIDE AND ALBUTEROL SULFATE 3 ML: 2.5; .5 SOLUTION RESPIRATORY (INHALATION) at 08:43

## 2024-11-21 RX ADMIN — EPOETIN ALFA-EPBX 40000 UNITS: 40000 INJECTION, SOLUTION INTRAVENOUS; SUBCUTANEOUS at 10:54

## 2024-11-21 RX ADMIN — ASPIRIN 81 MG: 81 TABLET, COATED ORAL at 08:32

## 2024-11-21 RX ADMIN — Medication 1000 UNITS: at 08:32

## 2024-11-21 RX ADMIN — MUPIROCIN 1 APPLICATION: 20 OINTMENT TOPICAL at 08:32

## 2024-11-21 NOTE — DISCHARGE PLACEMENT REQUEST
"Bill Landry (79 y.o. Male)       Date of Birth   1945    Social Security Number       Address   [address] [place] [address]    Home Phone   [phone]    MRN   [id number]       Sabianist   Cheondoism    Marital Status                               Admission Date   11/19/24    Admission Type   Elective    Admitting Provider   Bill Deal MD    Attending Provider   Bill Deal MD    Department, Room/Bed   [address], [address]/1       Discharge Date       Discharge Disposition   Home-Health Care [facility]    Discharge Destination                                 Attending Provider: Bill Deal MD    Allergies: Ace Inhibitors, Contrast Dye (Echo Or Unknown Ct/mr), Iodine, Angiotensin Receptor Blockers, Eliquis [Apixaban], Filgrastim, Keflex [Cephalexin], Other    Isolation: None   Infection: None   Code Status: CPR    Ht: 177.8 cm (70\")   Wt: 81.1 kg (178 lb 12.7 oz)    Admission Cmt: None   Principal Problem: AV fistula occlusion, initial encounter [T82.898A]                   Active Insurance as of 11/19/2024       Primary Coverage       Payor Plan Insurance Group Employer/Plan Group    MEDICARE MEDICARE A & B        Payor Plan Address Payor Plan Phone Number Payor Plan Fax Number Effective Dates    [address] [phone]  10/1/2010 - None Entered    [facility] [id number]         Subscriber Name Subscriber Birth Date Member ID       BILL LANRDY 1945 [id number]               Secondary Coverage       Payor Plan Insurance Group Employer/Plan Group    [employer] [employer] [employer]       Payor Plan Address Payor Plan Phone Number Payor Plan Fax Number Effective Dates    [address] [phone]  10/1/2010 - None Entered    [place] [address]         Subscriber Name Subscriber Birth Date Member ID       BILL LANDRY 1945 [id number]                     Emergency Contacts        (Rel.) Home Phone Work Phone " Mobile Phone    Gillian Landry (Spouse) [phone] -- [phone]    bereket landry (Son) [phone] -- --    KEVIN LANDRY (Son) -- -- [phone]

## 2024-11-21 NOTE — CASE MANAGEMENT/SOCIAL WORK
Continued Stay Note  [facility]     Patient Name: Bill Landry  MRN: [id number]  Today's Date: 11/21/2024    Admit Date: 11/19/2024    Plan: Home with    Discharge Plan       Row Name 11/21/24 1127       Plan    Plan Home with     Patient/Family in Agreement with Plan yes    Plan Comments Met with pt and wife in room. Pt confirmed his plan is to return home with his wife at discharge. Their son will provide transportation. Discussed HH with the pt as the surgeon has requested it for wound care and he is agreeable. Provided Patient Choice list and he would like to use [facility] . Referral entered. Spoke with Ronna/Juan MARTÍNEZ and she stated that they can accept and will call the pt to set up his first visit. Pt denied any further needs at this time. CCP following. ANUP Parnell RN                   Discharge Codes    No documentation.                 Expected Discharge Date and Time       Expected Discharge Date Expected Discharge Time    Nov 21, 2024               Fidencio Silver RN

## 2024-11-21 NOTE — PLAN OF CARE
Problem: Adult Inpatient Plan of Care  Goal: Plan of Care Review  Outcome: Progressing  Flowsheets (Taken 11/21/2024 0130)  Progress: improving  Outcome Evaluation: pt w left arm wrapped w ace wrap, edema noted on left arm and hand, placed on pillow, denies pain, no distress ntoed   Goal Outcome Evaluation:           Progress: improving  Outcome Evaluation: pt w left arm wrapped w ace wrap, edema noted on left arm and hand, placed on pillow, denies pain, no distress ntoed

## 2024-11-21 NOTE — PROGRESS NOTES
Name: Bill Landry ADMIT: 2024   : 1945  PCP: Bharathi De La Torre MD    MRN: [id number] LOS: 2 days   AGE/SEX: 79 y.o. male  ROOM: [address]      CC: Postop day 2 status post left AV shunt placement  Subjective     79 y.o. male overall doing reasonably well after left AV shunt placement despite his multiple comorbidities and severe bleeding diathesis.  Currently his shunt feels great.  His arm swelling is as expected especially with Ace is not placed on the hand.  It should improve with elevation.  He has some serosanguineous drainage but not actually from the wounds as much is from some small skin tears.  Will avoid tape to his skin.    Review of Systems denies much pain.  He desires discharge although his wife is nervous.    Objective     Scheduled Medications:   aspirin, 81 mg, Oral, Daily  cholecalciferol, 1,000 Units, Oral, Daily  ipratropium-albuterol, 3 mL, Nebulization, Q8H - RT  latanoprost, 1 drop, Both Eyes, Nightly        Active Infusions:         As Needed Medications:    acetaminophen **OR** acetaminophen **OR** acetaminophen    famotidine    HYDROcodone-acetaminophen    nitroglycerin    ondansetron ODT **OR** ondansetron    Vital Signs  Vital Signs Patient Vitals for the past 24 hrs:   BP Temp Temp src Pulse Resp SpO2   24 0700 100/53 98 °F (36.7 °C) Oral 61 18 --   24 2330 103/54 98.2 °F (36.8 °C) Oral 71 18 97 %   24 -- -- -- 54 -- 100 %   24 -- -- -- 60 18 97 %   24 1935 92/47 98.1 °F (36.7 °C) Oral 54 18 99 %   24 1650 108/50 -- -- 62 -- 98 %   24 1615 108/55 97.2 °F (36.2 °C) -- 52 18 --   24 1230 118/53 97.3 °F (36.3 °C) -- 63 18 --   24 0806 92/54 97.6 °F (36.4 °C) Oral 63 18 97 %     Vital Signs (range)  Temp:  [97.2 °F (36.2 °C)-98.2 °F (36.8 °C)] 98 °F (36.7 °C)  Heart Rate:  [52-71] 61  Resp:  [18] 18  BP: ()/(47-55) 100/53  I/O:  I/O last 3 completed shifts:  In: 1080 [P.O.:480; I.V.:100; IV  "Piggyback:500]  Out: 600 [Stool:600]  I/O:   Intake/Output Summary (Last 24 hours) at 11/21/2024 0801  Last data filed at 11/21/2024 0500  Gross per 24 hour   Intake 500 ml   Output 400 ml   Net 100 ml     BMI:  Body mass index is 25.65 kg/m².    Physical Exam:  Physical Exam   Left arm ecchymosis impressive but stable.  No active bleeding now.  Positive thrill.  Lungs improved.    Results Review:     CBC    Results from last 7 days   Lab Units 11/21/24  0641 11/20/24  0950 11/20/24  0506 11/17/24  1125   WBC 10*3/mm3 2.96*  --  3.48 2.52*   HEMOGLOBIN g/dL 8.0* 8.1* 7.8* 9.5*   PLATELETS 10*3/mm3 66*  --  73* 56*     BMP   Results from last 7 days   Lab Units 11/21/24  0641 11/20/24  0506 11/19/24  1200 11/17/24  1125   SODIUM mmol/L 136 138 135* 132*   POTASSIUM mmol/L 4.2 4.0 3.8 3.9   CHLORIDE mmol/L 103 102 98 96*   CO2 mmol/L 23.4 21.8* 24.2 23.0   BUN mg/dL 33* 56* 43* 62*   CREATININE mg/dL 4.60* 6.12* 5.80* 7.02*   GLUCOSE mg/dL 93 106* 108* 164*   MAGNESIUM mg/dL  --   --   --  1.8   PHOSPHORUS mg/dL 3.5  --   --  3.3     Cr Clearance Estimated Creatinine Clearance: 14.9 mL/min (A) (by C-G formula based on SCr of 4.6 mg/dL (H)).  Coag   Results from last 7 days   Lab Units 11/17/24  1125   INR  1.35*   APTT seconds 35.7*     HbA1C   Lab Results   Component Value Date    HGBA1C 5.40 09/17/2024    HGBA1C 5.20 09/26/2023    HGBA1C 6.20 (H) 05/12/2023     Blood Glucose No results found for: \"POCGLU\"  Infection     CMP   Results from last 7 days   Lab Units 11/21/24  0641 11/20/24  0506 11/19/24  1200 11/17/24  1125   SODIUM mmol/L 136 138 135* 132*   POTASSIUM mmol/L 4.2 4.0 3.8 3.9   CHLORIDE mmol/L 103 102 98 96*   CO2 mmol/L 23.4 21.8* 24.2 23.0   BUN mg/dL 33* 56* 43* 62*   CREATININE mg/dL 4.60* 6.12* 5.80* 7.02*   GLUCOSE mg/dL 93 106* 108* 164*   ALBUMIN g/dL 2.9*  --   --  3.1*   BILIRUBIN mg/dL  --   --   --  0.9   ALK PHOS U/L  --   --   --  202*   AST (SGOT) U/L  --   --   --  37   ALT (SGPT) U/L  " --   --   --  10     Radiology(recent) No radiology results for the last day    Assessment & Plan     Assessment & Plan      AV fistula occlusion, initial encounter    AV graft thrombosis, initial encounter      79 y.o. male status post thrombectomy and revision of graft with formal brachial axillary AV graft now in place.  Arm is stable.  VAC has been redosed.  Hemoglobin is stable and it was decided not to transfuse with dialysis yesterday.  We would not pursue transfusion without hemodialysis.  Hemoglobin of 8 remained stable down from his baseline of 9.5.  Will add Procrit.  Platelet count 66 which is his baseline.    Addendum alerted by the the wife there was some slight oozing at his antecubital space site.  Continuing to bleed in fact it is a small amount of blood that is already clotted.  I believe we will change his dressings now to Bactroban ointment to the staple lines with a light gauze dressing.  After 1 week will likely be able to go back to Betadine.  I will order home health as I believe it will make him more comfortable.    Bill Deal MD  11/21/24  08:01 EST    Please call my office with any question: [phone]    Active Hospital Problems    Diagnosis  POA    **AV fistula occlusion, initial encounter [T82.169O]  Unknown    AV graft thrombosis, initial encounter [N31.285H]  Yes      Resolved Hospital Problems   No resolved problems to display.

## 2024-11-21 NOTE — PROGRESS NOTES
[facility] will follow post hospital as requested. Patient/spouse agreeable to service. Contact information confirmed.

## 2024-11-21 NOTE — OUTREACH NOTE
Prep Survey      Flowsheet Row Responses   [facility] patient discharged from? [facility]   Is LACE score < 7 ? No   Eligibility [facility]   Date of Admission 11/19/24   Date of Discharge 11/21/24   Discharge Disposition Home-Health Care Svc   Discharge diagnosis AV fistula occlusion, initial encounter,   LEFT ARTERIOVENOUS SHUNT GRAFT REVISION   Does the patient have one of the following disease processes/diagnoses(primary or secondary)? General Surgery   Does the patient have Home health ordered? Yes   What is the Home health agency?  [facility]   Is there a DME ordered? No   Prep survey completed? Yes            Destinee RESENDIZ - Registered Nurse

## 2024-11-21 NOTE — DISCHARGE SUMMARY
Name: Bill Landry ADMIT: 2024   : 1945  PCP: Bharathi De La Torre MD    MRN: [id number] LOS: 2 days   AGE/SEX: 79 y.o. male  Location: [facility]     Date of Admission: 2024  Date of Discharge:  2024    PCP: Bharathi De La Torre MD      DISCHARGE DIAGNOSIS  Active Hospital Problems    Diagnosis  POA    **AV fistula occlusion, initial encounter [T82.871M]  Unknown    AV graft thrombosis, initial encounter [O52.034E]  Yes      Resolved Hospital Problems   No resolved problems to display.       SECONDARY DIAGNOSES  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    Aortic stenosis     Bicuspid aortic valve 2022    Calculus of kidney     Chronic leukopenia and thrombocytopenia     Cirrhosis of liver without ascites 2017    Congestive splenomegaly 2017    Crohn disease     with ileostomy    Decreased white blood cell count, unspecified     Diverticula of colon     ESRD on hemodialysis 2018    M-W-F FRESENIUS    Fatty liver disease, nonalcoholic     Fistula 2018    Other Specified Complication    Gastrointestinal hemorrhage, unspecified     GERD (gastroesophageal reflux disease)     GI bleed     Glaucoma     H/O Gallstone pancreatitis     H/O Pericardial effusion     H/O sinus bradycardia     History of hypertension     resolved    History of UTI     Hx of renal calculi     Ileostomy present     Iron deficiency     Leakage of heart valve prosthesis, subsequent encounter     MGUS (monoclonal gammopathy of unknown significance)     TATE (obstructive sleep apnea)     Other hemoglobinopathies     Pericardial effusion (noninflammatory)     Permanent atrial fibrillation     Scarlet fever, uncomplicated     Stenosis of other vascular prosthetic devices, implants and grafts, initial  "encounter 02/14/2022    Systemic lupus erythematosus, unspecified     Thrombocytopenia     undpecified    Type 2 diabetes mellitus     CURRENTLY TAKES NO MED    Urinary retention     Post-OP       CONSULTS   Consults       Date and Time Order Name Status Description    11/19/2024  8:04 PM Inpatient Nephrology Consult Completed     11/13/2024 11:00 AM Hematology & Oncology Inpatient Consult Completed     11/13/2024 11:00 AM Inpatient Nephrology Consult Completed     11/13/2024  8:29 AM Vascular Surgery Consult Completed             PROCEDURES PERFORMED    Date: 11/19/2024    HOSPITAL COURSE  Patient is a 79 y.o. male presented to [facility] admitted for failed left AV fistula/jump graft.  Patient underwent thrombectomy of the proximal graft and placement of a new brachial axillary graft with [place].  Patient's tolerated the procedure well.  His skin integrity is poor we kept him an extra day for monitor his arm and get him dialysis.  At this point in time incisions look actually good.  He will continue to dialyze through his chest for the foreseeable future and we will roll gauze wrap his arm lightly to avoid tape to the skin and place Bactroban ointment to the staple line to prevent continued oozing.  Will ask home health to evaluate and follow and they can transition him away from dressings to Betadine paint to the staple line when appropriate..  Please see the admitting history and physical for further details.  At the time of discharge she is stable afebrile tolerating diet he will ambulate prior to discharge she does desire discharge.      VITAL SIGNS  /53 (BP Location: Right arm, Patient Position: Lying)   Pulse 61   Temp 98 °F (36.7 °C) (Oral)   Resp 18   Ht 177.8 cm (70\")   Wt 81.1 kg (178 lb 12.7 oz)   SpO2 97%   BMI 25.65 kg/m²   Objective:  Vital signs: (most recent): Blood pressure 100/53, pulse 61, temperature 98 °F (36.7 °C), temperature source Oral, resp. rate 18, height 177.8 cm (70\"), " weight 81.1 kg (178 lb 12.7 oz), SpO2 97%.              CONDITION ON DISCHARGE  Stable.    Active Hospital Problems:   Active Hospital Problems    Diagnosis  POA    **AV fistula occlusion, initial encounter [T87.869W]  Unknown    AV graft thrombosis, initial encounter [T81.566B]  Yes      Resolved Hospital Problems   No resolved problems to display.          DISCHARGE DISPOSITION   [place] [facility]      DISCHARGE MEDICATIONS     Discharge Medications        Continue These Medications        Instructions Start Date   alfuzosin 10 MG 24 hr tablet  Commonly known as: UROXATRAL   10 mg, Every Other Day      aspirin 81 MG tablet   81 mg, Oral, Nightly, INSTRUCTED TO CONTINUE THIS FOR SURGERY PER DR. BERMEO'S OFFICE      cholecalciferol 25 MCG (1000 UT) tablet  Commonly known as: VITAMIN D3   1,000 Units, Daily      latanoprost 0.005 % ophthalmic solution  Commonly known as: XALATAN   1 drop, Nightly      lidocaine-prilocaine 2.5-2.5 % cream  Commonly known as: EMLA   Apply 1 Application topically to the appropriate area as directed As Needed. Txreoz-Yefizgxvr-Hfwdbm:  ONE HOUR PRIOR TO DIALYSIS      Loratadine 10 MG capsule   10 mg, Daily      MIRCERA IJ   1 dose, Every 30 Days      Pepcid AC 10 MG tablet  Generic drug: famotidine   10 mg, Daily PRN      Umm-Peter tablet   1 tablet, Daily      sodium chloride 0.65 % nasal spray   2 sprays, Nightly      Velphoro 500 MG chewable tablet  Generic drug: Sucroferric Oxyhydroxide   1,000 mg, 3 times daily      VENOFER IV   1 dose, As Needed                Activity Instructions       Activity as Tolerated      Other Activity Instructions      Activity Instructions: Elevate left arm above heart for 1 hour at a time 3 times a day and as needed          Future Appointments   Date Time Provider Department Center   12/12/2024  1:30 PM ALEX OP VAS RM 3  [facility]EX OVKR ALEX   12/12/2024  2:15 PM Bill Bermeo MD [facility] VS ALEX ALEX   4/8/2025  1:20 PM LAB CHAIR 1 CARLOS LAST  LAB K[facility]  [facility]   4/8/2025  1:40 PM Theo Pacheco MD [facility] CBC [facility]   9/4/2025  1:15 PM ALEX LCG ECHO/VAS RM 1 BH LCG ECHO ALEX   9/4/2025  1:45 PM Dale Vázquez MD [facility] CD LCGKR ALEX     Additional Instructions for the Follow-ups that You Need to Schedule       Discharge Follow-up with Specified Provider: Say; 3 Weeks   As directed      To: Say   Follow Up: 3 Weeks   Follow Up Details: Staple removal               Follow-up Information       Bharathi De La Torre MD .    Specialty: Internal Medicine  Contact information:  [address] [address]  [phone]                             VQI Discharge Meds  The patient is being discharged on antiplatelet therapy Yes  The patient is being discharged on Statin therapy Yes    TEST  RESULTS PENDING AT DISCHARGE  Pending Results       Procedure [Order ID] Specimen - Date/Time    Arteriogram (Autofinalize) [[id number]] Resulted: 11/19/24 1835     Updated: 11/19/24 1836            Bill Deal MD  Office Number [phone]    11/21/24  08:15 EST

## 2024-11-22 ENCOUNTER — TELEPHONE (OUTPATIENT)
Age: 79
End: 2024-11-22
Payer: MEDICARE

## 2024-11-22 ENCOUNTER — TRANSITIONAL CARE MANAGEMENT TELEPHONE ENCOUNTER (OUTPATIENT)
Dept: CALL CENTER | Facility: HOSPITAL | Age: 79
End: 2024-11-22
Payer: MEDICARE

## 2024-11-22 ENCOUNTER — HOME CARE VISIT (OUTPATIENT)
Dept: HOME HEALTH SERVICES | Facility: HOME HEALTHCARE | Age: 79
End: 2024-11-22
Payer: MEDICARE

## 2024-11-22 VITALS
DIASTOLIC BLOOD PRESSURE: 58 MMHG | TEMPERATURE: 95.7 F | OXYGEN SATURATION: 99 % | HEART RATE: 71 BPM | SYSTOLIC BLOOD PRESSURE: 102 MMHG | RESPIRATION RATE: 16 BRPM

## 2024-11-22 DIAGNOSIS — N18.6 ESRD (END STAGE RENAL DISEASE): Primary | ICD-10-CM

## 2024-11-22 PROCEDURE — G0299 HHS/HOSPICE OF RN EA 15 MIN: HCPCS

## 2024-11-22 RX ORDER — MUPIROCIN 20 MG/G
1 OINTMENT TOPICAL 3 TIMES DAILY
Qty: 22 G | Refills: 1 | Status: SHIPPED | OUTPATIENT
Start: 2024-11-22

## 2024-11-22 RX ORDER — MUPIROCIN 20 MG/G
1 OINTMENT TOPICAL EVERY 12 HOURS SCHEDULED
Status: DISCONTINUED | OUTPATIENT
Start: 2024-11-22 | End: 2024-11-22

## 2024-11-22 NOTE — TELEPHONE ENCOUNTER
Cmp results reviewed with Dr. Mckenzie, order to fax labs and call Dr. Gordon to let them about creat and co2.  Spoke with Dr. Gordon's assistant and critical results given and labs faxed as well.    
Patient

## 2024-11-22 NOTE — TELEPHONE ENCOUNTER
Patients spouse called and stated that she spoke with a nurse this morning and a prescription for Bactroban was supposed to be sent to [facility] on [facility] [address] [facility]. Spouse stated that she just spoke with [facility] and they do not have anything. Spouse would like a call back when this prescription is taken care of

## 2024-11-22 NOTE — OUTREACH NOTE
Call Center TCM Note      Flowsheet Row Responses   [facility] patient discharged from? [place]   Does the patient have one of the following disease processes/diagnoses(primary or secondary)? General Surgery   TCM attempt successful? Yes   Call start time 0906   Call end time 0916   Discharge diagnosis AV fistula occlusion, initial encounter,   LEFT ARTERIOVENOUS SHUNT GRAFT REVISION   Person spoke with today (if not patient) and relationship spouse   Meds reviewed with patient/caregiver? Yes   Is the patient having any side effects they believe may be caused by any medication additions or changes? No   Does the patient have all medications related to this admission filled (includes all antibiotics, pain medications, etc.) Yes   Is the patient taking all medications as directed (includes completed medication regime)? Yes   Comments Dialysis today, 11/22/24   Does the patient have an appointment with their PCP within 7-14 days of discharge? No appointments available   Nursing Interventions PCP office requested to make appointment - message sent, Routed TCM call to PCP office   What is the Home health agency?  [facility]   Has home health visited the patient within 72 hours of discharge? Call prior to 72 hours   Psychosocial issues? No   Did the patient receive a copy of their discharge instructions? Yes   Nursing interventions Reviewed instructions with patient   What is the patient's perception of their health status since discharge? Improving   Nursing interventions Nurse provided patient education   Is the patient /caregiver able to teach back basic post-op care? Take showers only when approved by MD-sponge bathe until then, No tub bath, swimming, or hot tub until instructed by MD, Lifting as instructed by MD in discharge instructions, Keep incision areas clean,dry and protected   Is the patient/caregiver able to teach back signs and symptoms of incisional infection? Increased redness, swelling or pain at the  incisonal site, Incisional warmth, Increased drainage or bleeding, Pus or odor from incision, Fever   Is the patient/caregiver able to teach back steps to recovery at home? Rest and rebuild strength, gradually increase activity, Eat a well-balance diet   If the patient is a current smoker, are they able to teach back resources for cessation? Not a smoker   Is the patient/caregiver able to teach back the hierarchy of who to call/visit for symptoms/problems? PCP, Specialist, Home health nurse, Urgent Care, ED, 911 Yes   TCM call completed? Yes   Wrap up additional comments Spouse shares left AV fistula was swollen and left forearm was weeping. Pt is at dialysis today and they shared with spouse that once fluid is removed at dialysis that weeping will stop. No medication changes.  visits   Call end time 0916   Would this patient benefit from a Referral to [facility]? No   Is the patient interested in additional calls from an ambulatory ? No            Carito Sheridan RN    11/22/2024, 09:16 EST

## 2024-11-25 ENCOUNTER — HOME CARE VISIT (OUTPATIENT)
Dept: HOME HEALTH SERVICES | Facility: HOME HEALTHCARE | Age: 79
End: 2024-11-25
Payer: MEDICARE

## 2024-11-26 ENCOUNTER — HOME CARE VISIT (OUTPATIENT)
Dept: HOME HEALTH SERVICES | Facility: HOME HEALTHCARE | Age: 79
End: 2024-11-26
Payer: MEDICARE

## 2024-11-26 VITALS
SYSTOLIC BLOOD PRESSURE: 114 MMHG | DIASTOLIC BLOOD PRESSURE: 60 MMHG | OXYGEN SATURATION: 99 % | HEART RATE: 59 BPM | RESPIRATION RATE: 18 BRPM | TEMPERATURE: 96 F

## 2024-11-26 PROCEDURE — G0300 HHS/HOSPICE OF LPN EA 15 MIN: HCPCS

## 2024-11-27 NOTE — CASE COMMUNICATION
THE FOLLOWING SUPPLIES WERE ORDERED 11/25/24:  4X4 GAUZE  ABD PADS  KERLIX  ACE WRAPS    ORDER #[id number]

## 2024-12-02 NOTE — ANESTHESIA POSTPROCEDURE EVALUATION
Patient: Bill Landry    Procedure Summary       Date: 11/19/24 Room / Location: [facility] OR [address] [place] [address] MAIN OR    Anesthesia Start: 1741 Anesthesia Stop: 1959    Procedure: LEFT ARTERIOVENOUS SHUNT GRAFT REVISION (Left) Diagnosis:       AV fistula occlusion, initial encounter      (AV fistula occlusion, initial encounter [T82.186J])    Surgeons: Bill Deal MD Provider: Radha Ureña MD    Anesthesia Type: general ASA Status: 4            Anesthesia Type: general    Vitals  Vitals Value Taken Time   /63 11/19/24 2115   Temp 36.6 °C (97.9 °F) 11/19/24 1956   Pulse 67 11/19/24 2115   Resp 16 11/19/24 2115   SpO2 100 % 11/19/24 2115           Post Anesthesia Care and Evaluation    Anesthetic complications: No anesthetic complications

## 2024-12-03 ENCOUNTER — HOME CARE VISIT (OUTPATIENT)
Dept: HOME HEALTH SERVICES | Facility: HOME HEALTHCARE | Age: 79
End: 2024-12-03
Payer: MEDICARE

## 2024-12-03 VITALS
OXYGEN SATURATION: 99 % | SYSTOLIC BLOOD PRESSURE: 118 MMHG | DIASTOLIC BLOOD PRESSURE: 60 MMHG | HEART RATE: 65 BPM | RESPIRATION RATE: 16 BRPM

## 2024-12-03 PROCEDURE — G0300 HHS/HOSPICE OF LPN EA 15 MIN: HCPCS

## 2024-12-05 ENCOUNTER — TELEPHONE (OUTPATIENT)
Age: 79
End: 2024-12-05
Payer: MEDICARE

## 2024-12-05 ENCOUNTER — HOME CARE VISIT (OUTPATIENT)
Dept: HOME HEALTH SERVICES | Facility: HOME HEALTHCARE | Age: 79
End: 2024-12-05
Payer: MEDICARE

## 2024-12-05 VITALS
DIASTOLIC BLOOD PRESSURE: 52 MMHG | HEART RATE: 88 BPM | SYSTOLIC BLOOD PRESSURE: 114 MMHG | TEMPERATURE: 96.2 F | OXYGEN SATURATION: 99 % | RESPIRATION RATE: 16 BRPM

## 2024-12-05 PROCEDURE — G0300 HHS/HOSPICE OF LPN EA 15 MIN: HCPCS

## 2024-12-05 RX ORDER — VANCOMYCIN HYDROCHLORIDE 1 G/20ML
1 INJECTION, POWDER, LYOPHILIZED, FOR SOLUTION INTRAVENOUS ONCE
Qty: 1 G | Refills: 0 | Status: SHIPPED | OUTPATIENT
Start: 2024-12-05 | End: 2024-12-05

## 2024-12-05 NOTE — TELEPHONE ENCOUNTER
Spoke with pt wife- they are currently Painting betadine vs using Bactroban , surgical sites look clear, sutures well approximated. swelling decreasing, no drainage noted , bruising present, Pt has hx of low platelets, possibly explaining the increased bruising post surgery. , denies tenderness.  Updated pictures of the LUE in Epic. Pt wife advised to call if symptoms worsen, otherwise pt is scheduled for a Duplex scan and follow up with Dr. Deal on 12/12/24.     Dr. Deal agrees to keep f/u as scheduled, but put in an order for Vancomycin to be administered at [facility] during dialysis treatment tomorrow.  I spoke with Malia and Jeimy to confirm rx and that it has been received.

## 2024-12-05 NOTE — TELEPHONE ENCOUNTER
Pt wife calling to clarify that we had the correct dialysis center charge nurse to clarify vancomycin rx.   Wife advised I have been in contact with Adrea and rx is submitted.

## 2024-12-05 NOTE — TELEPHONE ENCOUNTER
Caller: Gillian Landry    Relationship: Emergency Contact    Best call back number: [phone]    What is the best time to reach you: ANY    Who are you requesting to speak with (clinical staff, provider,  specific staff member): JORDON PORRAS    Do you know the name of the person who called: PTS WIFE    What was the call regarding: PTS WIFE CALLED AND WOULD LIKE JORDON TO CALL HER BACK REGARDING A MEDICATION SHE SPOKE TO HER ABOUT YESTERDAY. THANK YOU    Is it okay if the provider responds through MyChart: NO

## 2024-12-05 NOTE — TELEPHONE ENCOUNTER
Caller: Gillian Landry     Relationship: SPOUSE     Best call back number:     [phone]       What is your medical concern? SWELLING IN ARM    How long has this issue been going on? 10 DAYS     Is your provider already aware of this issue? YES     [employer] IS UPLOADING PICTURES TO EPIC .

## 2024-12-10 ENCOUNTER — HOME CARE VISIT (OUTPATIENT)
Dept: HOME HEALTH SERVICES | Facility: HOME HEALTHCARE | Age: 79
End: 2024-12-10
Payer: MEDICARE

## 2024-12-10 VITALS
DIASTOLIC BLOOD PRESSURE: 54 MMHG | HEART RATE: 59 BPM | RESPIRATION RATE: 18 BRPM | SYSTOLIC BLOOD PRESSURE: 126 MMHG | OXYGEN SATURATION: 99 % | TEMPERATURE: 97.1 F

## 2024-12-10 PROCEDURE — G0300 HHS/HOSPICE OF LPN EA 15 MIN: HCPCS

## 2024-12-11 NOTE — CASE COMMUNICATION
Hello,    Giving you update on patient L arm after IV ATB TX. Patient's wife reported no drainage since ATB, noted less swelling and redness. Patient wife has concerns when arm is wrapped and swelling travels NICK. Nurse instructed to continueto elevate arm. I took pic for you to view in media.   Can Homecare continue to see patient until the end of this month?    Thanks

## 2024-12-12 ENCOUNTER — OFFICE VISIT (OUTPATIENT)
Age: 79
End: 2024-12-12
Payer: MEDICARE

## 2024-12-12 ENCOUNTER — HOSPITAL ENCOUNTER (OUTPATIENT)
Facility: HOSPITAL | Age: 79
Discharge: HOME | End: 2024-12-12
Admitting: SURGERY
Payer: MEDICARE

## 2024-12-12 VITALS — WEIGHT: 188.3 LBS | HEIGHT: 70 IN | BODY MASS INDEX: 26.96 KG/M2

## 2024-12-12 DIAGNOSIS — R23.9 SKIN CHANGE: ICD-10-CM

## 2024-12-12 DIAGNOSIS — M79.89 LEFT ARM SWELLING: ICD-10-CM

## 2024-12-12 DIAGNOSIS — Z99.2 ESRD ON DIALYSIS: ICD-10-CM

## 2024-12-12 DIAGNOSIS — Z99.2 ARTERIOVENOUS FISTULA FOR HEMODIALYSIS IN PLACE, SECONDARY: ICD-10-CM

## 2024-12-12 DIAGNOSIS — N18.6 ESRD ON DIALYSIS: ICD-10-CM

## 2024-12-12 DIAGNOSIS — Z99.2 ESRD ON HEMODIALYSIS: ICD-10-CM

## 2024-12-12 DIAGNOSIS — T82.591A AV SHUNT MALFUNCTION, INITIAL ENCOUNTER: Primary | ICD-10-CM

## 2024-12-12 DIAGNOSIS — N18.6 ESRD ON HEMODIALYSIS: ICD-10-CM

## 2024-12-12 PROCEDURE — 93990 DOPPLER FLOW TESTING: CPT

## 2024-12-12 NOTE — PROGRESS NOTES
Patient Name: Bill Landry    MRN: [id number] Encounter Date: 12/12/2024      Consulting Service: Vascular Surgery    Referring Provider: No ref. provider found       CHIEF COMPLAINT:  Chief Complaint   Patient presents with    Hemodialysis Access    Post-op Follow-up       Subjective    HPI: Bill Landry is a 79 y.o. male is being seen for evaluation/management of  new onset swelling since placement of left AV shunt with a large amount of collateral veins across his chest signifying central venous stenosis I think at the axillary level above where our new graft sits.  There must be something that has not been perfused before by his cephalic vein outflow that is now being pressurized.  This is a difficult situation because we have never been able to image the area of concern is that the outflow from the prior study was always above that.  Without an ability to estimate what this is I think we have to go straight to shuntogram with plans for angioplasty of what ever we may find.  Will try to do this in the [facility] to prevent the need for extra access at the operating room    PAST MEDICAL HISTORY:   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    Aortic stenosis     Bicuspid aortic valve 07/20/2022    Calculus of kidney     Chronic leukopenia and thrombocytopenia     Cirrhosis of liver without ascites 07/24/2017    Congestive splenomegaly 07/24/2017    Crohn disease     with ileostomy    Decreased white blood cell count, unspecified     Diverticula of colon     ESRD on hemodialysis 04/05/2018    M-W-F FRESENIUS    Fatty liver disease, nonalcoholic     Fistula 04/05/2018    Other Specified Complication    Gastrointestinal hemorrhage, unspecified     GERD (gastroesophageal reflux disease)     GI bleed     Glaucoma      H/O Gallstone pancreatitis     H/O Pericardial effusion     H/O sinus bradycardia     History of hypertension     resolved    History of UTI     Hx of renal calculi     Ileostomy present     Iron deficiency     Leakage of heart valve prosthesis, subsequent encounter     MGUS (monoclonal gammopathy of unknown significance)     TATE (obstructive sleep apnea)     Other hemoglobinopathies     Pericardial effusion (noninflammatory)     Permanent atrial fibrillation     Scarlet fever, uncomplicated     Stenosis of other vascular prosthetic devices, implants and grafts, initial encounter 02/14/2022    Systemic lupus erythematosus, unspecified     Thrombocytopenia     undpecified    Type 2 diabetes mellitus     CURRENTLY TAKES NO MED    Urinary retention     Post-OP      PAST SURGICAL HISTORY:   Past Surgical History:   Procedure Laterality Date    APPENDECTOMY  06/1968    ARTERIOVENOUS FISTULA/SHUNT SURGERY Left 08/08/2017    Procedure: LEFT BRACHIAL CEPHALIC AV FISTULA FORMATION WITH CEPHALIC VEIN TRANSPOSITION ;  Surgeon: Bill Deal MD;  Location: [facility] OR;  Service:     ARTERIOVENOUS FISTULA/SHUNT SURGERY Left 05/27/2022    Procedure: OPEN REVISION LEFT ARTERIOVENOUS INTERPOSITION GRAFT PLACEMENT;  Surgeon: Bill Deal MD;  Location: [facility] OR;  Service: Vascular;  Laterality: Left;    ARTERIOVENOUS FISTULA/SHUNT SURGERY Left 11/19/2024    Procedure: LEFT ARTERIOVENOUS SHUNT GRAFT REVISION;  Surgeon: Bill Deal MD;  Location: [facility] OR;  Service: Vascular;  Laterality: Left;    ARTERIOVENOUS FISTULA/SHUNT SURGERY W/ HEMODIALYSIS CATHETER INSERTION Left 02/15/2022    Procedure: OPEN LEFT ARM ARTERIOVENOUS FISTULA THROMBECTOMY WITH CEPHALIC VEIN ARTHROPLASTY AND STENT/GRAFT PLACEMENT;  Surgeon: Bill Deal MD;  Location: [facility] OR 18/19;  Service: Vascular;  Laterality: Left;    CATARACT EXTRACTION WITH INTRAOCULAR LENS IMPLANT Bilateral 2004    CHOLECYSTECTOMY  2011    COLECTOMY  PARTIAL / TOTAL      History of inflammatory bowel disease with status post colectomy with ileostomy many years ago in the [facility],  18 YEARS OF AGE    COLON SURGERY      Colon resection with colostomy    COLON SURGERY      COLONOSCOPY  01/2018    CYSTOSCOPY LITHOLAPAXY BLADDER STONE EXTRACTION      ENDOSCOPY  01/16/2015    gastritis    ENDOSCOPY  01/17/2018    Procedure: ESOPHAGOGASTRODUODENOSCOPY;  Surgeon: Warner Neville MD;  Location: [facility] ENDOSCOPY;  Service:     ILEOSCOPY  01/16/2015    normal    ILEOSCOPY N/A 01/17/2018    Procedure: ILEOSCOPY;  Surgeon: Warner Neville MD;  Location: [facility] ENDOSCOPY;  Service:     ILEOSTOMY  12/1968    loop ostomy bypass    INCISION AND DRAINAGE ARM Left 10/27/2023    Procedure: LEFT ARM INCISION AND DRAINAGE;  Surgeon: Bill Deal MD;  Location: [facility] MAIN OR;  Service: Vascular;  Laterality: Left;    INSERTION HEMODIALYSIS CATHETER Right 11/13/2024    Procedure: Tunnelled HEMODIALYSIS CATHETER INSERTION;  Surgeon: Ben Barth MD;  Location: [facility] HYBRID OR;  Service: Vascular;  Laterality: Right;    OTHER SURGICAL HISTORY  2009    kidney stones x3    PSEUDOANEURYSM REPAIR Left 10/27/2023    TONSILLECTOMY  1950      FAMILY HISTORY:   Family History   Problem Relation Age of Onset    Heart failure Mother 78    Hypertension Mother     Heart disease Mother     Hyperlipidemia Mother     Hypertension Father     Other Father 82        MVI    Malig Hyperthermia Neg Hx       SOCIAL HISTORY:   Social History     Tobacco Use    Smoking status: Never     Passive exposure: Never    Smokeless tobacco: Never   Vaping Use    Vaping status: Never Used   Substance Use Topics    Alcohol use: No     Comment: caffeine use: none    Drug use: No      MEDICATIONS:   Current Outpatient Medications on File Prior to Visit   Medication Sig Dispense Refill    alfuzosin (UROXATRAL) 10 MG 24 hr tablet Take 1 tablet by mouth Every Other Day. Does not take on Sundays  Tuesdays or Thursdays   Indications: Benign Enlargement of Prostate      aspirin 81 MG tablet Take 1 tablet by mouth Every Night. INSTRUCTED TO CONTINUE THIS FOR SURGERY PER DR. BERMEO'S OFFICE  Indications: Disease involving Lipid Deposits in the Arteries      B Complex-C-Folic Acid (Umm-Peter) tablet Take 1 tablet by mouth Daily.      Cholecalciferol 25 MCG (1000 UT) tablet Take 1 tablet by mouth Daily. Indications: Vitamin D Deficiency      famotidine (Pepcid AC) 10 MG tablet Take 1 tablet by mouth Daily As Needed for Heartburn (take as needed after HD on dialysis days). Indications: Heartburn      Iron Sucrose (VENOFER IV) Infuse 1 dose into a venous catheter As Needed.      latanoprost (XALATAN) 0.005 % ophthalmic solution Administer 1 drop to both eyes Every Night. Indications: Wide-Angle Glaucoma      lidocaine-prilocaine (EMLA) 2.5-2.5 % cream Apply 1 Application topically to the appropriate area as directed As Needed. [id number]:  ONE HOUR PRIOR TO DIALYSIS  Indications: Anesthesia to a Specific Part of the Body  3    Loratadine 10 MG capsule Take 1 capsule by mouth Daily. Indications: Hayfever      Methoxy PEG-Epoetin Beta (MIRCERA IJ) Inject 1 dose as directed Every 30 (Thirty) Days.      mupirocin (BACTROBAN) 2 % ointment Apply 1 Application topically to the appropriate area as directed 3 (Three) Times a Day. Indications: Wound Infection 22 g 1    sodium chloride 0.65 % nasal spray Administer 2 sprays into the nostril(s) as directed by provider Every Night.      Sucroferric Oxyhydroxide (Velphoro) 500 MG chewable tablet Chew 2 tablets Daily As Needed (phopurus def). Take 2 tablets by  mouth three times a day.  Indications: Hyperphosphatemia D/T Renal Insufficiency       No current facility-administered medications on file prior to visit.       ALLERGIES: Ace inhibitors, Contrast dye (echo or unknown ct/mr), Iodine, Angiotensin receptor blockers, Eliquis [apixaban], Filgrastim, Keflex  "[cephalexin], and Other       Objective   Vitals:    12/12/24 1433   Weight: 85.4 kg (188 lb 4.8 oz)   Height: 177.8 cm (70\")     Body mass index is 27.02 kg/m².          PHYSICAL EXAM:   Physical Exam  Constitutional:       Appearance: Normal appearance. He is normal weight.   HENT:      Head: Normocephalic and atraumatic.      Nose: Nose normal.   Eyes:      Extraocular Movements: Extraocular movements intact.      Pupils: Pupils are equal, round, and reactive to light.   Cardiovascular:      Rate and Rhythm: Normal rate.      Pulses:           Carotid pulses are 2+ on the right side and 2+ on the left side.       Radial pulses are 2+ on the right side and 2+ on the left side.        Femoral pulses are 2+ on the right side and 2+ on the left side.       Popliteal pulses are 2+ on the right side and 2+ on the left side.        Dorsalis pedis pulses are 2+ on the right side and 2+ on the left side.        Posterior tibial pulses are 2+ on the right side and 2+ on the left side.      Heart sounds: Normal heart sounds.   Pulmonary:      Effort: Pulmonary effort is normal.      Breath sounds: Normal breath sounds.   Abdominal:      General: Abdomen is flat. Bowel sounds are normal.      Palpations: Abdomen is soft.   Musculoskeletal:         General: Normal range of motion.      Cervical back: Normal range of motion and neck supple.      Right lower leg: No edema.      Left lower leg: No edema.   Skin:     General: Skin is warm and dry.   Neurological:      General: No focal deficit present.      Mental Status: He is alert and oriented to person, place, and time. Mental status is at baseline.   Psychiatric:         Mood and Affect: Mood normal.         Thought Content: Thought content normal.     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  He has " had a prior stent and stent graft in the cephalic flowing into the axillary     Result Review   LABS:    CBC          11/22/2024    00:00 11/25/2024    00:00 12/4/2024    00:00   CBC   Hemoglobin 8.8        26.4     9.3        27.9     9.6        28.8          Details          This result is from an external source.             BMP          11/20/2024    05:06 11/21/2024    06:41 11/30/2024    00:00   BMP   BUN 56  33  60       Creatinine 6.12  4.60     Sodium 138  136     Potassium 4.0  4.2     Chloride 102  103     CO2 21.8  23.4     Calcium 7.8  7.6        Details          This result is from an external source.             Lipid Panel          9/17/2024    08:12   Lipid Panel   Total Cholesterol 141    Triglycerides 82    HDL Cholesterol 59    VLDL Cholesterol 16    LDL Cholesterol  66       INR          11/13/2024    09:03 11/17/2024    11:25   Common Labs   INR 1.35  1.35       A1C Last 3 Results          9/17/2024    08:12   HGBA1C Last 3 Results   Hemoglobin A1C 5.40         Results Review:       I reviewed the patient's new clinical results.    The following radiologic or non-invasive studies have been reviewed by me: Left upper duplex shows no DVT.  There is no significant outflow stenosis visualized despite   the amount of swelling in his arm.  D  uplex Hemodialysis Access CAR 11/13/2024    Interpretation Summary    The access is thrombosed from the proximal aneurysmal segment throughout the rest of the outflow.  The aneurysmal segment measures 3.3 cm in maximum diameter.  This is completely thrombosed.     XR Chest 1 View    Result Date: 11/17/2024  1. Central venous catheter tip overlying the SVC. 2. No pneumothorax. 3. Probable right basilar atelectasis.  This report was finalized on 11/17/2024 11:48 AM by Dr. Rafi Fragoso M.D on Workstation: [id number]                   ASSESSMENT/PLAN:   Diagnoses and all orders for this visit:    1. AV shunt malfunction, initial encounter (Primary)  -      Dialysis Circuit Angiography (Fistulogram); Future    2. Skin change    3. ESRD on dialysis  -     Dialysis Circuit Angiography (Fistulogram); Future    4. Left arm swelling       79 y.o. male with unusual finding of what appears to be venous collaterals coming out of the deltoid area and a swollen arm from the armpit down that to me looks like he is must have a stenosis of the vein at the axillary level which is just below where we plugged in his new shunt.  This is brought out swelling these never had before and as such I think we have to pursue a diagnostic and therapeutic study in the [facility] year.  I am concerned that if I take his staples out his wounds may open and we will leave those until we can get this done as quickly as we can.    I discussed the plan with the patient and family who are agreeable to the plan of care at this point. Thank you for this consult.   Follow Up  Return in about 2 weeks (around 12/26/2024).    Bill Deal MD   12/12/24

## 2024-12-13 ENCOUNTER — HOME CARE VISIT (OUTPATIENT)
Dept: HOME HEALTH SERVICES | Facility: HOME HEALTHCARE | Age: 79
End: 2024-12-13
Payer: MEDICARE

## 2024-12-15 LAB
BH CV VAS DIALYSIS ARTERIAL ANASTOMOSIS DIAMETER: 0.4 CM
BH CV VAS DIALYSIS ARTERIAL ANASTOMOSIS EDV: 152 CM/SEC
BH CV VAS DIALYSIS ARTERIAL ANASTOMOSIS PSV: 354 CM/SEC
BH CV VAS DIALYSIS CONDUIT DIST DEPTH: 1.8 CM
BH CV VAS DIALYSIS CONDUIT DIST DIAMETER: 0.6 CM
BH CV VAS DIALYSIS CONDUIT DIST EDV: 112 CM/SEC
BH CV VAS DIALYSIS CONDUIT DIST PSV: 228 CM/SEC
BH CV VAS DIALYSIS CONDUIT MID DEPTH: 0.2 CM
BH CV VAS DIALYSIS CONDUIT MID DIAMETER: 0.4 CM
BH CV VAS DIALYSIS CONDUIT MID EDV: 106 CM/SEC
BH CV VAS DIALYSIS CONDUIT MID PSV: 196 CM/SEC
BH CV VAS DIALYSIS CONDUIT MID/DIST DEPTH: 0.4 CM
BH CV VAS DIALYSIS CONDUIT MID/DIST DIAMETER: 0.4 CM
BH CV VAS DIALYSIS CONDUIT MID/DIST EDV: 97 CM/SEC
BH CV VAS DIALYSIS CONDUIT MID/DIST PSV: 211 CM/SEC
BH CV VAS DIALYSIS CONDUIT PROX DEPTH: 1.2 CM
BH CV VAS DIALYSIS CONDUIT PROX DIAMETER: 0.2 CM
BH CV VAS DIALYSIS CONDUIT PROX EDV: 308 CM/SEC
BH CV VAS DIALYSIS CONDUIT PROX PSV: 563 CM/SEC
BH CV VAS DIALYSIS CONDUIT PROX/MID DEPTH: 1 CM
BH CV VAS DIALYSIS CONDUIT PROX/MID DIAMETER: 0.4 CM
BH CV VAS DIALYSIS CONDUIT PROX/MID EDV: 148 CM/SEC
BH CV VAS DIALYSIS CONDUIT PROX/MID FLOW VOL: 1039 ML/MIN
BH CV VAS DIALYSIS CONDUIT PROX/MID PSV: 251 CM/SEC
BH CV VAS DIALYSIS PRE-INFLOW BRACHIAL DIAMETER: 0.8 CM
BH CV VAS DIALYSIS PRE-INFLOW BRACHIAL EDV: 45 CM/SEC
BH CV VAS DIALYSIS PRE-INFLOW BRACHIAL FLOW VOL: 902 ML/MIN
BH CV VAS DIALYSIS PRE-INFLOW BRACHIAL PSV: 108 CM/SEC
BH CV VAS DIALYSIS VENOUS ANASTOMOSIS DIAMETER: 0.4 CM
BH CV VAS DIALYSIS VENOUS ANASTOMOSIS EDV: 205 CM/SEC
BH CV VAS DIALYSIS VENOUS ANASTOMOSIS PSV: 375 CM/SEC
BH CV VAS DIALYSIS VENOUS OUTFLOW AXILLARY DIAMETER: 0.7 CM
BH CV VAS DIALYSIS VENOUS OUTFLOW AXILLARY EDV: 72 CM/SEC
BH CV VAS DIALYSIS VENOUS OUTFLOW AXILLARY PSV: 162 CM/SEC
BH CV VAS DIALYSIS VENOUS OUTFLOW SUBCLAVIAN DIAMETER: 0.7 CM
BH CV VAS DIALYSIS VENOUS OUTFLOW SUBCLAVIAN EDV: 35 CM/SEC
BH CV VAS DIALYSIS VENOUS OUTFLOW SUBCLAVIAN PSV: 90 CM/SEC

## 2024-12-17 ENCOUNTER — HOME CARE VISIT (OUTPATIENT)
Dept: HOME HEALTH SERVICES | Facility: HOME HEALTHCARE | Age: 79
End: 2024-12-17
Payer: MEDICARE

## 2024-12-19 ENCOUNTER — HOME CARE VISIT (OUTPATIENT)
Dept: HOME HEALTH SERVICES | Facility: HOME HEALTHCARE | Age: 79
End: 2024-12-19
Payer: MEDICARE

## 2024-12-19 ENCOUNTER — OFFICE VISIT (OUTPATIENT)
Age: 79
End: 2024-12-19
Payer: MEDICARE

## 2024-12-19 VITALS
WEIGHT: 188 LBS | BODY MASS INDEX: 26.92 KG/M2 | HEART RATE: 72 BPM | DIASTOLIC BLOOD PRESSURE: 66 MMHG | HEIGHT: 70 IN | SYSTOLIC BLOOD PRESSURE: 126 MMHG

## 2024-12-19 DIAGNOSIS — T82.591A AV SHUNT MALFUNCTION, INITIAL ENCOUNTER: Primary | ICD-10-CM

## 2024-12-19 NOTE — PROGRESS NOTES
Patient Name: Bill Landry    MRN: [id number] Encounter Date: 12/19/2024      Consulting Service: Vascular Surgery    Referring Provider: No ref. provider found       CHIEF COMPLAINT:  Chief Complaint   Patient presents with    Post-op Follow-up       Subjective    HPI: Bill Landry is a 79 y.o. 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  We stented around and things look much better and his arm is less swollen.  We took the staples and his stitch out.    PAST MEDICAL HISTORY:   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    Aortic stenosis     Bicuspid aortic valve 07/20/2022    Calculus of kidney     Chronic leukopenia and thrombocytopenia     Cirrhosis of liver without ascites 07/24/2017    Congestive splenomegaly 07/24/2017    Crohn disease     with ileostomy    Decreased white blood cell count, unspecified     Diverticula of colon     ESRD on hemodialysis 04/05/2018    M-W-F FRESENIUS    Fatty liver disease, nonalcoholic     Fistula 04/05/2018    Other Specified Complication    Gastrointestinal hemorrhage, unspecified     GERD (gastroesophageal reflux disease)     GI bleed     Glaucoma     H/O Gallstone pancreatitis     H/O Pericardial effusion     H/O sinus bradycardia     History of hypertension     resolved    History of UTI     Hx of renal calculi     Ileostomy present     Iron deficiency     Leakage of heart valve prosthesis, subsequent encounter     MGUS (monoclonal gammopathy of unknown significance)     TATE (obstructive sleep apnea)     Other hemoglobinopathies     Pericardial effusion (noninflammatory)     Permanent atrial fibrillation     Scarlet fever,  uncomplicated     Stenosis of other vascular prosthetic devices, implants and grafts, initial encounter 02/14/2022    Systemic lupus erythematosus, unspecified     Thrombocytopenia     undpecified    Type 2 diabetes mellitus     CURRENTLY TAKES NO MED    Urinary retention     Post-OP      PAST SURGICAL HISTORY:   Past Surgical History:   Procedure Laterality Date    APPENDECTOMY  06/1968    ARTERIOVENOUS FISTULA/SHUNT SURGERY Left 08/08/2017    Procedure: LEFT BRACHIAL CEPHALIC AV FISTULA FORMATION WITH CEPHALIC VEIN TRANSPOSITION ;  Surgeon: Bill Deal MD;  Location: [facility] OR;  Service:     ARTERIOVENOUS FISTULA/SHUNT SURGERY Left 05/27/2022    Procedure: OPEN REVISION LEFT ARTERIOVENOUS INTERPOSITION GRAFT PLACEMENT;  Surgeon: Bill Deal MD;  Location: [facility] OR;  Service: Vascular;  Laterality: Left;    ARTERIOVENOUS FISTULA/SHUNT SURGERY Left 11/19/2024    Procedure: LEFT ARTERIOVENOUS SHUNT GRAFT REVISION;  Surgeon: Bill Deal MD;  Location: [facility] OR;  Service: Vascular;  Laterality: Left;    ARTERIOVENOUS FISTULA/SHUNT SURGERY W/ HEMODIALYSIS CATHETER INSERTION Left 02/15/2022    Procedure: OPEN LEFT ARM ARTERIOVENOUS FISTULA THROMBECTOMY WITH CEPHALIC VEIN ARTHROPLASTY AND STENT/GRAFT PLACEMENT;  Surgeon: Bill Deal MD;  Location: [facility] OR 18/19;  Service: Vascular;  Laterality: Left;    CATARACT EXTRACTION WITH INTRAOCULAR LENS IMPLANT Bilateral 2004    CHOLECYSTECTOMY  2011    COLECTOMY PARTIAL / TOTAL      History of inflammatory bowel disease with status post colectomy with ileostomy many years ago in the [facility],  18 YEARS OF AGE    COLON SURGERY      Colon resection with colostomy    COLON SURGERY      COLONOSCOPY  01/2018    CYSTOSCOPY LITHOLAPAXY BLADDER STONE EXTRACTION      ENDOSCOPY  01/16/2015    gastritis    ENDOSCOPY  01/17/2018    Procedure: ESOPHAGOGASTRODUODENOSCOPY;  Surgeon: Warner Neville MD;  Location: [facility] ENDOSCOPY;  Service:      ILEOSCOPY  01/16/2015    normal    ILEOSCOPY N/A 01/17/2018    Procedure: ILEOSCOPY;  Surgeon: Warner Neville MD;  Location: [facility] ENDOSCOPY;  Service:     ILEOSTOMY  12/1968    loop ostomy bypass    INCISION AND DRAINAGE ARM Left 10/27/2023    Procedure: LEFT ARM INCISION AND DRAINAGE;  Surgeon: Bill Bermeo MD;  Location: [facility] MAIN OR;  Service: Vascular;  Laterality: Left;    INSERTION HEMODIALYSIS CATHETER Right 11/13/2024    Procedure: Tunnelled HEMODIALYSIS CATHETER INSERTION;  Surgeon: Ben Barth MD;  Location: [facility] HYBRID OR;  Service: Vascular;  Laterality: Right;    OTHER SURGICAL HISTORY  2009    kidney stones x3    PSEUDOANEURYSM REPAIR Left 10/27/2023    TONSILLECTOMY  1950      FAMILY HISTORY:   Family History   Problem Relation Age of Onset    Heart failure Mother 78    Hypertension Mother     Heart disease Mother     Hyperlipidemia Mother     Hypertension Father     Other Father 82        MVI    Malig Hyperthermia Neg Hx       SOCIAL HISTORY:   Social History     Tobacco Use    Smoking status: Never     Passive exposure: Never    Smokeless tobacco: Never   Vaping Use    Vaping status: Never Used   Substance Use Topics    Alcohol use: No     Comment: caffeine use: none    Drug use: No      MEDICATIONS:   Current Outpatient Medications on File Prior to Visit   Medication Sig Dispense Refill    alfuzosin (UROXATRAL) 10 MG 24 hr tablet Take 1 tablet by mouth Every Other Day. Does not take on Sundays Tuesdays or Thursdays   Indications: Benign Enlargement of Prostate      aspirin 81 MG tablet Take 1 tablet by mouth Every Night. INSTRUCTED TO CONTINUE THIS FOR SURGERY PER DR. BERMEO'S OFFICE  Indications: Disease involving Lipid Deposits in the Arteries      B Complex-C-Folic Acid (Umm-Peter) tablet Take 1 tablet by mouth Daily.      Cholecalciferol 25 MCG (1000 UT) tablet Take 1 tablet by mouth Daily. Indications: Vitamin D Deficiency      famotidine (Pepcid AC) 10 MG tablet Take 1  "tablet by mouth Daily As Needed for Heartburn (take as needed after HD on dialysis days). Indications: Heartburn      Iron Sucrose (VENOFER IV) Infuse 1 dose into a venous catheter As Needed.      latanoprost (XALATAN) 0.005 % ophthalmic solution Administer 1 drop to both eyes Every Night. Indications: Wide-Angle Glaucoma      lidocaine-prilocaine (EMLA) 2.5-2.5 % cream Apply 1 Application topically to the appropriate area as directed As Needed. [id number]:  ONE HOUR PRIOR TO DIALYSIS  Indications: Anesthesia to a Specific Part of the Body  3    Loratadine 10 MG capsule Take 1 capsule by mouth Daily. Indications: Hayfever      Methoxy PEG-Epoetin Beta (MIRCERA IJ) Inject 1 dose as directed Every 30 (Thirty) Days.      mupirocin (BACTROBAN) 2 % ointment Apply 1 Application topically to the appropriate area as directed 3 (Three) Times a Day. Indications: Wound Infection 22 g 1    sodium chloride 0.65 % nasal spray Administer 2 sprays into the nostril(s) as directed by provider Every Night.      Sucroferric Oxyhydroxide (Velphoro) 500 MG chewable tablet Chew 2 tablets Daily As Needed (phopurus def). Take 2 tablets by  mouth three times a day.  Indications: Hyperphosphatemia D/T Renal Insufficiency       No current facility-administered medications on file prior to visit.       ALLERGIES: Ace inhibitors, Contrast dye (echo or unknown ct/mr), Iodine, Angiotensin receptor blockers, Eliquis [apixaban], Filgrastim, Keflex [cephalexin], and Other       Objective   Vitals:    12/19/24 1056   BP: 126/66   Pulse: 72   Weight: 85.3 kg (188 lb)   Height: 177.8 cm (70\")     Body mass index is 26.98 kg/m².          PHYSICAL EXAM:   Physical Exam   Improvements for swelling but still some edema at the forearm and distal upper arm.  Sutures and staples removed.  Result Review   LABS:    CBC          11/22/2024    00:00 11/25/2024    00:00 12/4/2024    00:00   CBC   Hemoglobin 8.8        26.4     9.3        27.9     9.6   "      28.8          Details          This result is from an external source.             BMP          11/20/2024    05:06 11/21/2024    06:41 11/30/2024    00:00   BMP   BUN 56  33  60       Creatinine 6.12  4.60     Sodium 138  136     Potassium 4.0  4.2     Chloride 102  103     CO2 21.8  23.4     Calcium 7.8  7.6        Details          This result is from an external source.             Lipid Panel          9/17/2024    08:12   Lipid Panel   Total Cholesterol 141    Triglycerides 82    HDL Cholesterol 59    VLDL Cholesterol 16    LDL Cholesterol  66       INR          11/13/2024    09:03 11/17/2024    11:25   Common Labs   INR 1.35  1.35       A1C Last 3 Results          9/17/2024    08:12   HGBA1C Last 3 Results   Hemoglobin A1C 5.40         Results Review:       I reviewed the patient's new clinical results.    The following radiologic or non-invasive studies have been reviewed by me:   Duplex Hemodialysis Access CAR 12/12/2024    Interpretation Summary    Patent left upper extremity arteriovenous graft with proximal stenosis.  Moderate venous outflow stenosis also noted.  No central venous stenosis identified despite edema and chest wall venous collaterals noted.     No radiology results for the last 30 days.                ASSESSMENT/PLAN:   Diagnoses and all orders for this visit:    1. AV shunt malfunction, initial encounter (Primary)       79 y.o. male with successful intervention of the subclavian level with marked improvement in the arm.  He should be able to use his shunt for access by the end of next week but he may wait till he comes back to see me due to concerns by he and his wife.  He is cleared for use if needed.    I discussed the plan with the patient and family who are agreeable to the plan of care at this point. Thank you for this consult.   Follow Up  Return in about 2 weeks (around 1/2/2025).    Bill Deal MD   12/19/24

## 2024-12-19 NOTE — CASE COMMUNICATION
Patient missed a SNV visit from [facility] on 12/17/24.     Reason: Patient was seen at Dr. Deal office. Left arm wrapped until next office visit on Thursday of this week.      For your records only.   Per CMS Guidance, MD must be notified of missed/cancelled visits; therefore the prescribed frequency was not met.

## 2024-12-20 NOTE — CASE COMMUNICATION
Patient missed a SN visit from [facility] on 12/19/24.     Reason: Spoke with patient's spouse this week and she request no visit this week, but next week      For your records only.   Per CMS Guidance, MD must be notified of missed/cancelled visits; therefore the prescribed frequency was not met.

## 2024-12-23 ENCOUNTER — HOME CARE VISIT (OUTPATIENT)
Dept: HOME HEALTH SERVICES | Facility: HOME HEALTHCARE | Age: 79
End: 2024-12-23
Payer: MEDICARE

## 2024-12-23 PROCEDURE — G0300 HHS/HOSPICE OF LPN EA 15 MIN: HCPCS

## 2024-12-25 VITALS
OXYGEN SATURATION: 93 % | HEART RATE: 88 BPM | TEMPERATURE: 97.6 F | SYSTOLIC BLOOD PRESSURE: 120 MMHG | DIASTOLIC BLOOD PRESSURE: 62 MMHG | RESPIRATION RATE: 18 BRPM

## 2024-12-26 ENCOUNTER — HOME CARE VISIT (OUTPATIENT)
Dept: HOME HEALTH SERVICES | Facility: HOME HEALTHCARE | Age: 79
End: 2024-12-26
Payer: MEDICARE

## 2024-12-26 ENCOUNTER — TELEPHONE (OUTPATIENT)
Age: 79
End: 2024-12-26
Payer: MEDICARE

## 2024-12-26 PROCEDURE — G0300 HHS/HOSPICE OF LPN EA 15 MIN: HCPCS

## 2024-12-26 NOTE — TELEPHONE ENCOUNTER
Caller: Gillian Landry    Relationship: Emergency Contact    Best call back number: [phone]  [phone]  What is the best time to reach you: ANY    Who are you requesting to speak with (clinical staff, provider,  specific staff member): CLINICAL    Do you know the name of the person who called: NA    What was the call regarding: ASKING FOR CALL BACK DAVIDANUP SANZ (NURSE) REGARDING PHOTOS THAT WERE SENT BY HOME [facility] YESTERDAY- NEEDS TO KNOW IF HIS ARM IS OKAY TO USE FOR DIALYSIS TOMORROW-    SENDING AS HP DUE TO TIMEFRAME- PLEASE RETURN CALL- THANKS~!

## 2024-12-26 NOTE — TELEPHONE ENCOUNTER
Pictures reviewed from today , arm appears to be improving from prior pictures, spoke to pt wife and instructed per 's notes below that dialysis may use fistula or if the pt an pt wife want to wait until they see  on 1/2/, they may do so, pt wife verbalizes understanding.      79 y.o. male with successful intervention of the subclavian level with marked improvement in the arm.  He should be able to use his shunt for access by the end of next week but he may wait till he comes back to see me due to concerns by he and his wife.  He is cleared for use if needed.     I discussed the plan with the patient and family who are agreeable to the plan of care at this point. Thank you for this consult.   Follow Up  Return in about 2 weeks (around 1/2/2025).     Bill Deal MD   12/19/24

## 2024-12-26 NOTE — CASE COMMUNICATION
Dr. Deal,    Mr. and Mrs. Gris wanted me to take a pic of his arm. Noted swelling and bruising gone to L lower arm and No tightness in L upper arm. I will take another pic on Thursday for you can see progression of patient arm. Hopefully, this week at diaysis thwy can use shunt. He will have dialysis this week on Tues-Fri-Sun.

## 2024-12-27 VITALS
DIASTOLIC BLOOD PRESSURE: 50 MMHG | TEMPERATURE: 97.1 F | SYSTOLIC BLOOD PRESSURE: 102 MMHG | OXYGEN SATURATION: 96 % | RESPIRATION RATE: 18 BRPM | HEART RATE: 67 BPM

## 2024-12-30 ENCOUNTER — HOME CARE VISIT (OUTPATIENT)
Dept: HOME HEALTH SERVICES | Facility: HOME HEALTHCARE | Age: 79
End: 2024-12-30
Payer: MEDICARE

## 2024-12-30 VITALS
RESPIRATION RATE: 178 BRPM | TEMPERATURE: 96.6 F | DIASTOLIC BLOOD PRESSURE: 58 MMHG | OXYGEN SATURATION: 98 % | SYSTOLIC BLOOD PRESSURE: 130 MMHG | HEART RATE: 74 BPM

## 2024-12-30 PROCEDURE — G0300 HHS/HOSPICE OF LPN EA 15 MIN: HCPCS

## 2025-01-02 ENCOUNTER — OFFICE VISIT (OUTPATIENT)
Age: 80
End: 2025-01-02
Payer: MEDICARE

## 2025-01-02 VITALS — HEIGHT: 70 IN | WEIGHT: 182.98 LBS | BODY MASS INDEX: 26.2 KG/M2

## 2025-01-02 DIAGNOSIS — Z99.2 ARTERIOVENOUS FISTULA FOR HEMODIALYSIS IN PLACE, SECONDARY: ICD-10-CM

## 2025-01-02 DIAGNOSIS — N18.6 ESRD ON DIALYSIS: ICD-10-CM

## 2025-01-02 DIAGNOSIS — Z99.2 ESRD ON DIALYSIS: ICD-10-CM

## 2025-01-02 DIAGNOSIS — N18.6 ESRD ON HEMODIALYSIS: ICD-10-CM

## 2025-01-02 DIAGNOSIS — I72.1: Primary | ICD-10-CM

## 2025-01-02 DIAGNOSIS — Z99.2 ESRD ON HEMODIALYSIS: ICD-10-CM

## 2025-01-02 NOTE — PROGRESS NOTES
Patient Name: Bill Landry    MRN: [id number] Encounter Date: 01/02/2025      Consulting Service: Vascular Surgery    Referring Provider: No ref. provider found       CHIEF COMPLAINT:  Chief Complaint   Patient presents with    Hemodialysis Access    Post-op Follow-up       Subjective    HPI: Bill Landry is a 79 y.o. male is being seen for evaluation/management of follow-up for end-stage renal failure and dialysis access.  Patient's current dialysis access is tunneled dialysis catheter and arteriovenous graft.  They report issues with access complications including swelling.  Testing today includes none.  Current recommendations include long-term follow-up for stable access.    PAST MEDICAL HISTORY:   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    Aortic stenosis     Bicuspid aortic valve 07/20/2022    Calculus of kidney     Chronic leukopenia and thrombocytopenia     Cirrhosis of liver without ascites 07/24/2017    Congestive splenomegaly 07/24/2017    Crohn disease     with ileostomy    Decreased white blood cell count, unspecified     Diverticula of colon     ESRD on hemodialysis 04/05/2018    M-W-F FRESENIUS    Fatty liver disease, nonalcoholic     Fistula 04/05/2018    Other Specified Complication    Gastrointestinal hemorrhage, unspecified     GERD (gastroesophageal reflux disease)     GI bleed     Glaucoma     H/O Gallstone pancreatitis     H/O Pericardial effusion     H/O sinus bradycardia     History of hypertension     resolved    History of UTI     Hx of renal calculi     Ileostomy present     Iron deficiency     Leakage of heart valve prosthesis, subsequent encounter     MGUS (monoclonal gammopathy of unknown significance)     TATE (obstructive sleep apnea)     Other hemoglobinopathies      Pericardial effusion (noninflammatory)     Permanent atrial fibrillation     Scarlet fever, uncomplicated     Stenosis of other vascular prosthetic devices, implants and grafts, initial encounter 02/14/2022    Systemic lupus erythematosus, unspecified     Thrombocytopenia     undpecified    Type 2 diabetes mellitus     CURRENTLY TAKES NO MED    Urinary retention     Post-OP      PAST SURGICAL HISTORY:   Past Surgical History:   Procedure Laterality Date    APPENDECTOMY  06/1968    ARTERIOVENOUS FISTULA/SHUNT SURGERY Left 08/08/2017    Procedure: LEFT BRACHIAL CEPHALIC AV FISTULA FORMATION WITH CEPHALIC VEIN TRANSPOSITION ;  Surgeon: Bill Deal MD;  Location: [facility] OR;  Service:     ARTERIOVENOUS FISTULA/SHUNT SURGERY Left 05/27/2022    Procedure: OPEN REVISION LEFT ARTERIOVENOUS INTERPOSITION GRAFT PLACEMENT;  Surgeon: Bill Deal MD;  Location: [facility] OR;  Service: Vascular;  Laterality: Left;    ARTERIOVENOUS FISTULA/SHUNT SURGERY Left 11/19/2024    Procedure: LEFT ARTERIOVENOUS SHUNT GRAFT REVISION;  Surgeon: Bill Deal MD;  Location: [facility] OR;  Service: Vascular;  Laterality: Left;    ARTERIOVENOUS FISTULA/SHUNT SURGERY W/ HEMODIALYSIS CATHETER INSERTION Left 02/15/2022    Procedure: OPEN LEFT ARM ARTERIOVENOUS FISTULA THROMBECTOMY WITH CEPHALIC VEIN ARTHROPLASTY AND STENT/GRAFT PLACEMENT;  Surgeon: Bill Deal MD;  Location: [facility] OR 18/19;  Service: Vascular;  Laterality: Left;    CATARACT EXTRACTION WITH INTRAOCULAR LENS IMPLANT Bilateral 2004    CHOLECYSTECTOMY  2011    COLECTOMY PARTIAL / TOTAL      History of inflammatory bowel disease with status post colectomy with ileostomy many years ago in the [facility],  18 YEARS OF AGE    COLON SURGERY      Colon resection with colostomy    COLON SURGERY      COLONOSCOPY  01/2018    CYSTOSCOPY LITHOLAPAXY BLADDER STONE EXTRACTION      ENDOSCOPY  01/16/2015    gastritis    ENDOSCOPY  01/17/2018    Procedure:  ESOPHAGOGASTRODUODENOSCOPY;  Surgeon: Warner Neville MD;  Location: [facility] ENDOSCOPY;  Service:     ILEOSCOPY  01/16/2015    normal    ILEOSCOPY N/A 01/17/2018    Procedure: ILEOSCOPY;  Surgeon: Warner Neville MD;  Location: [facility] ENDOSCOPY;  Service:     ILEOSTOMY  12/1968    loop ostomy bypass    INCISION AND DRAINAGE ARM Left 10/27/2023    Procedure: LEFT ARM INCISION AND DRAINAGE;  Surgeon: Bill Bermeo MD;  Location: [facility] MAIN OR;  Service: Vascular;  Laterality: Left;    INSERTION HEMODIALYSIS CATHETER Right 11/13/2024    Procedure: Tunnelled HEMODIALYSIS CATHETER INSERTION;  Surgeon: Ben Barth MD;  Location: [facility] HYBRID OR;  Service: Vascular;  Laterality: Right;    OTHER SURGICAL HISTORY  2009    kidney stones x3    PSEUDOANEURYSM REPAIR Left 10/27/2023    TONSILLECTOMY  1950      FAMILY HISTORY:   Family History   Problem Relation Age of Onset    Heart failure Mother 78    Hypertension Mother     Heart disease Mother     Hyperlipidemia Mother     Hypertension Father     Other Father 82        MVI    Malig Hyperthermia Neg Hx       SOCIAL HISTORY:   Social History     Tobacco Use    Smoking status: Never     Passive exposure: Never    Smokeless tobacco: Never   Vaping Use    Vaping status: Never Used   Substance Use Topics    Alcohol use: No     Comment: caffeine use: none    Drug use: No      MEDICATIONS:   Current Outpatient Medications on File Prior to Visit   Medication Sig Dispense Refill    alfuzosin (UROXATRAL) 10 MG 24 hr tablet Take 1 tablet by mouth Every Other Day. Does not take on Sundays Tuesdays or Thursdays   Indications: Benign Enlargement of Prostate      aspirin 81 MG tablet Take 1 tablet by mouth Every Night. INSTRUCTED TO CONTINUE THIS FOR SURGERY PER DR. BERMEO'S OFFICE  Indications: Disease involving Lipid Deposits in the Arteries      B Complex-C-Folic Acid (Umm-Peter) tablet Take 1 tablet by mouth Daily.      Cholecalciferol 25 MCG (1000 UT) tablet Take 1 tablet  "by mouth Daily. Indications: Vitamin D Deficiency      famotidine (Pepcid AC) 10 MG tablet Take 1 tablet by mouth Daily As Needed for Heartburn (take as needed after HD on dialysis days). Indications: Heartburn      Iron Sucrose (VENOFER IV) Infuse 1 dose into a venous catheter As Needed.      latanoprost (XALATAN) 0.005 % ophthalmic solution Administer 1 drop to both eyes Every Night. Indications: Wide-Angle Glaucoma      lidocaine-prilocaine (EMLA) 2.5-2.5 % cream Apply 1 Application topically to the appropriate area as directed As Needed. [id number]:  ONE HOUR PRIOR TO DIALYSIS  Indications: Anesthesia to a Specific Part of the Body  3    Loratadine 10 MG capsule Take 1 capsule by mouth Daily. Indications: Hayfever      Methoxy PEG-Epoetin Beta (MIRCERA IJ) Inject 1 dose as directed Every 30 (Thirty) Days.      mupirocin (BACTROBAN) 2 % ointment Apply 1 Application topically to the appropriate area as directed 3 (Three) Times a Day. Indications: Wound Infection 22 g 1    sodium chloride 0.65 % nasal spray Administer 2 sprays into the nostril(s) as directed by provider Every Night.      Sucroferric Oxyhydroxide (Velphoro) 500 MG chewable tablet Chew 2 tablets Daily As Needed (phopurus def). Take 2 tablets by  mouth three times a day.  Indications: Hyperphosphatemia D/T Renal Insufficiency       No current facility-administered medications on file prior to visit.       ALLERGIES: Ace inhibitors, Contrast dye (echo or unknown ct/mr), Iodine, Angiotensin receptor blockers, Eliquis [apixaban], Filgrastim, Keflex [cephalexin], and Other       Objective   Vitals:    01/02/25 1047   Weight: 83 kg (182 lb 15.7 oz)   Height: 177.8 cm (70\")     Body mass index is 26.26 kg/m².          PHYSICAL EXAM:   Physical Exam   Swelling in the left arm has improved significantly but is still present at +1 to +2.  Shunt is palpable under the the swelling.  He has a small area of skin injury from tape burn on his old " aneurysm site.  Everything else looks overall fairly good.  Result Review   LABS:    CBC          12/18/2024    00:00 12/22/2024    00:00 12/29/2024    00:00   CBC   Hemoglobin 10.5        31.5     10.5        31.5     11        33          Details          This result is from an external source.             BMP          11/20/2024    05:06 11/21/2024    06:41 11/30/2024    00:00   BMP   BUN 56  33  60       Creatinine 6.12  4.60     Sodium 138  136     Potassium 4.0  4.2     Chloride 102  103     CO2 21.8  23.4     Calcium 7.8  7.6        Details          This result is from an external source.             Lipid Panel          9/17/2024    08:12   Lipid Panel   Total Cholesterol 141    Triglycerides 82    HDL Cholesterol 59    VLDL Cholesterol 16    LDL Cholesterol  66       INR          11/13/2024    09:03 11/17/2024    11:25   Common Labs   INR 1.35  1.35       A1C Last 3 Results          9/17/2024    08:12   HGBA1C Last 3 Results   Hemoglobin A1C 5.40         Results Review:       I reviewed the patient's new clinical results.    The following radiologic or non-invasive studies have been reviewed by me: We reviewed his fistulogram scans  Duplex Hemodialysis Access CAR 12/12/2024    Interpretation Summary    Patent left upper extremity arteriovenous graft with proximal stenosis.  Moderate venous outflow stenosis also noted.  No central venous stenosis identified despite edema and chest wall venous collaterals noted.     No radiology results for the last 30 days.                ASSESSMENT/PLAN:   Diagnoses and all orders for this visit:    1. Aneurysm artery, upper extremity (Primary)    2. Arteriovenous fistula for hemodialysis in place, secondary  -     Duplex Hemodialysis Access CAR; Future    3. ESRD on hemodialysis  -     Duplex Hemodialysis Access CAR; Future    4. ESRD on dialysis  -     Duplex Hemodialysis Access CAR; Future       79 y.o. male with stable left AV graft with known subclavian stenosis that  was stented from his cephalic stent graft that was jutting into this area.  We are going to follow this with duplex scan in 6 to 8 weeks to make sure things remain stable and he will likely be getting his catheter out in about a week to 2 weeks as long as his shunt is usable for the next 3 dialysis is.  They will call to get back in for that we will set up the scan for 6 to 8 weeks.    I discussed the plan with the patient and family who are agreeable to the plan of care at this point. Thank you for this consult.   Follow Up  Return in about 6 weeks (around 2/13/2025).    Bill Deal MD   01/02/25

## 2025-01-03 ENCOUNTER — HOME CARE VISIT (OUTPATIENT)
Dept: HOME HEALTH SERVICES | Facility: HOME HEALTHCARE | Age: 80
End: 2025-01-03
Payer: MEDICARE

## 2025-01-03 VITALS
HEART RATE: 71 BPM | RESPIRATION RATE: 18 BRPM | TEMPERATURE: 97.1 F | OXYGEN SATURATION: 98 % | SYSTOLIC BLOOD PRESSURE: 124 MMHG | DIASTOLIC BLOOD PRESSURE: 60 MMHG

## 2025-01-03 PROCEDURE — G0299 HHS/HOSPICE OF RN EA 15 MIN: HCPCS

## 2025-01-06 NOTE — HOME HEALTH
Patient back home from dialysis, reports that arm swelling is significantly reduced, they are using AV graft now for dialysis.  No further questions or concerns from patient or spouse.  No medication changes.  CP assessment WNL.  Agreeable to discharge today.

## 2025-01-08 ENCOUNTER — TELEPHONE (OUTPATIENT)
Age: 80
End: 2025-01-08
Payer: MEDICARE

## 2025-01-08 DIAGNOSIS — N18.6 ESRD (END STAGE RENAL DISEASE): Primary | ICD-10-CM

## 2025-01-08 NOTE — TELEPHONE ENCOUNTER
Mariusz from [facility] called and stated that the patient has had 3 successful treatments and they would like to know if they can go ahead and get the patients catheter removed.

## 2025-01-08 NOTE — TELEPHONE ENCOUNTER
Spoke to Mariusz and informed need to have used fistula for 2 weeks without any problems before scheduling TCD removal, Mariusz will call us back in another week and will let pt know.

## 2025-01-25 ENCOUNTER — APPOINTMENT (OUTPATIENT)
Dept: CT IMAGING | Facility: HOSPITAL | Age: 80
End: 2025-01-25
Payer: MEDICARE

## 2025-01-25 ENCOUNTER — HOSPITAL ENCOUNTER (INPATIENT)
Facility: HOSPITAL | Age: 80
LOS: 6 days | Discharge: NURSING, REHAB OR LONG-TERM CARE | End: 2025-01-31
Attending: EMERGENCY MEDICINE | Admitting: STUDENT IN AN ORGANIZED HEALTH CARE EDUCATION/TRAINING PROGRAM
Payer: MEDICARE

## 2025-01-25 DIAGNOSIS — S32.402A CLOSED NONDISPLACED FRACTURE OF LEFT ACETABULUM, UNSPECIFIED PORTION OF ACETABULUM, INITIAL ENCOUNTER: Primary | ICD-10-CM

## 2025-01-25 DIAGNOSIS — N18.6 ESRD (END STAGE RENAL DISEASE): ICD-10-CM

## 2025-01-25 PROBLEM — D64.9 ANEMIA: Status: ACTIVE | Noted: 2025-01-25

## 2025-01-25 PROBLEM — S32.409A ACETABULAR FRACTURE: Status: ACTIVE | Noted: 2025-01-25

## 2025-01-25 PROBLEM — R53.1 WEAKNESS: Status: ACTIVE | Noted: 2025-01-25

## 2025-01-25 PROBLEM — K74.60 CIRRHOSIS OF LIVER: Status: ACTIVE | Noted: 2025-01-25

## 2025-01-25 PROBLEM — G89.11 ACUTE PAIN DUE TO TRAUMA: Status: ACTIVE | Noted: 2025-01-25

## 2025-01-25 PROBLEM — M25.552 PAIN IN LEFT HIP: Status: ACTIVE | Noted: 2025-01-25

## 2025-01-25 PROBLEM — R09.02 HYPOXEMIA: Status: ACTIVE | Noted: 2025-01-25
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
## 2025-01-25 PROBLEM — I10 HYPERTENSION: Status: ACTIVE | Noted: 2025-01-25

## 2025-01-25 PROBLEM — R73.9 HYPERGLYCEMIA: Status: ACTIVE | Noted: 2025-01-25

## 2025-01-25 PROBLEM — R74.01 TRANSAMINITIS: Status: ACTIVE | Noted: 2025-01-25

## 2025-01-25 PROBLEM — Z93.2 ILEOSTOMY PRESENT: Status: ACTIVE | Noted: 2025-01-25

## 2025-01-25 PROBLEM — I48.91 ATRIAL FIBRILLATION: Status: ACTIVE | Noted: 2025-01-25

## 2025-01-25 LAB
ALBUMIN SERPL-MCNC: 2.6 G/DL (ref 3.5–5.2)
ALBUMIN/GLOB SERPL: 0.6 G/DL
ALP SERPL-CCNC: 205 U/L (ref 39–117)
ALT SERPL W P-5'-P-CCNC: 32 U/L (ref 1–41)
ANION GAP SERPL CALCULATED.3IONS-SCNC: 11.9 MMOL/L (ref 5–15)
APTT PPP: 41.2 SECONDS (ref 22.7–35.4)
AST SERPL-CCNC: 67 U/L (ref 1–40)
BASOPHILS # BLD AUTO: 0.01 10*3/MM3 (ref 0–0.2)
BASOPHILS NFR BLD AUTO: 0.3 % (ref 0–1.5)
BILIRUB SERPL-MCNC: 0.8 MG/DL (ref 0–1.2)
BUN SERPL-MCNC: 35 MG/DL (ref 8–23)
BUN/CREAT SERPL: 7.1 (ref 7–25)
CALCIUM SPEC-SCNC: 8.5 MG/DL (ref 8.6–10.5)
CHLORIDE SERPL-SCNC: 97 MMOL/L (ref 98–107)
CO2 SERPL-SCNC: 28.1 MMOL/L (ref 22–29)
CREAT SERPL-MCNC: 4.9 MG/DL (ref 0.76–1.27)
DEPRECATED RDW RBC AUTO: 51.5 FL (ref 37–54)
EGFRCR SERPLBLD CKD-EPI 2021: 11.4 ML/MIN/1.73
EOSINOPHIL # BLD AUTO: 0.02 10*3/MM3 (ref 0–0.4)
EOSINOPHIL NFR BLD AUTO: 0.6 % (ref 0.3–6.2)
ERYTHROCYTE [DISTWIDTH] IN BLOOD BY AUTOMATED COUNT: 13.6 % (ref 12.3–15.4)
GLOBULIN UR ELPH-MCNC: 4.5 GM/DL
GLUCOSE SERPL-MCNC: 150 MG/DL (ref 65–99)
HCT VFR BLD AUTO: 33.1 % (ref 37.5–51)
HGB BLD-MCNC: 10.1 G/DL (ref 13–17.7)
HOLD SPECIMEN: NORMAL
HOLD SPECIMEN: NORMAL
IMM GRANULOCYTES # BLD AUTO: 0.03 10*3/MM3 (ref 0–0.05)
IMM GRANULOCYTES NFR BLD AUTO: 0.8 % (ref 0–0.5)
INR PPP: 1.35 (ref 0.9–1.1)
LYMPHOCYTES # BLD AUTO: 0.22 10*3/MM3 (ref 0.7–3.1)
LYMPHOCYTES NFR BLD AUTO: 6.1 % (ref 19.6–45.3)
MAGNESIUM SERPL-MCNC: 1.9 MG/DL (ref 1.6–2.4)
MCH RBC QN AUTO: 31.5 PG (ref 26.6–33)
MCHC RBC AUTO-ENTMCNC: 30.5 G/DL (ref 31.5–35.7)
MCV RBC AUTO: 103.1 FL (ref 79–97)
MONOCYTES # BLD AUTO: 0.24 10*3/MM3 (ref 0.1–0.9)
MONOCYTES NFR BLD AUTO: 6.6 % (ref 5–12)
NEUTROPHILS NFR BLD AUTO: 3.09 10*3/MM3 (ref 1.7–7)
NEUTROPHILS NFR BLD AUTO: 85.6 % (ref 42.7–76)
NRBC BLD AUTO-RTO: 0 /100 WBC (ref 0–0.2)
PLAT MORPH BLD: NORMAL
PLATELET # BLD AUTO: 54 10*3/MM3 (ref 140–450)
PMV BLD AUTO: 9.7 FL (ref 6–12)
POTASSIUM SERPL-SCNC: 3.7 MMOL/L (ref 3.5–5.2)
PROT SERPL-MCNC: 7.1 G/DL (ref 6–8.5)
PROTHROMBIN TIME: 16.6 SECONDS (ref 11.7–14.2)
RBC # BLD AUTO: 3.21 10*6/MM3 (ref 4.14–5.8)
RBC MORPH BLD: NORMAL
SODIUM SERPL-SCNC: 137 MMOL/L (ref 136–145)
WBC MORPH BLD: NORMAL
WBC NRBC COR # BLD AUTO: 3.61 10*3/MM3 (ref 3.4–10.8)
WHOLE BLOOD HOLD COAG: NORMAL
WHOLE BLOOD HOLD SPECIMEN: NORMAL

## 2025-01-25 PROCEDURE — 70450 CT HEAD/BRAIN W/O DYE: CPT

## 2025-01-25 PROCEDURE — 72125 CT NECK SPINE W/O DYE: CPT

## 2025-01-25 PROCEDURE — 25010000002 FENTANYL CITRATE (PF) 50 MCG/ML SOLUTION: Performed by: EMERGENCY MEDICINE

## 2025-01-25 PROCEDURE — 85610 PROTHROMBIN TIME: CPT | Performed by: EMERGENCY MEDICINE

## 2025-01-25 PROCEDURE — 85007 BL SMEAR W/DIFF WBC COUNT: CPT | Performed by: EMERGENCY MEDICINE

## 2025-01-25 PROCEDURE — 85730 THROMBOPLASTIN TIME PARTIAL: CPT | Performed by: EMERGENCY MEDICINE

## 2025-01-25 PROCEDURE — 25010000002 MORPHINE PER 10 MG: Performed by: EMERGENCY MEDICINE

## 2025-01-25 PROCEDURE — 80053 COMPREHEN METABOLIC PANEL: CPT | Performed by: EMERGENCY MEDICINE

## 2025-01-25 PROCEDURE — 83735 ASSAY OF MAGNESIUM: CPT | Performed by: EMERGENCY MEDICINE

## 2025-01-25 PROCEDURE — 99285 EMERGENCY DEPT VISIT HI MDM: CPT

## 2025-01-25 PROCEDURE — 85025 COMPLETE CBC W/AUTO DIFF WBC: CPT | Performed by: EMERGENCY MEDICINE

## 2025-01-25 PROCEDURE — 72192 CT PELVIS W/O DYE: CPT

## 2025-01-25 PROCEDURE — 25010000002 ONDANSETRON PER 1 MG: Performed by: EMERGENCY MEDICINE

## 2025-01-25 RX ORDER — ACETAMINOPHEN 160 MG/5ML
650 SOLUTION ORAL EVERY 4 HOURS PRN
Status: DISCONTINUED | OUTPATIENT
Start: 2025-01-25 | End: 2025-01-31 | Stop reason: HOSPADM

## 2025-01-25 RX ORDER — ONDANSETRON 2 MG/ML
4 INJECTION INTRAMUSCULAR; INTRAVENOUS ONCE
Status: COMPLETED | OUTPATIENT
Start: 2025-01-25 | End: 2025-01-25

## 2025-01-25 RX ORDER — SODIUM CHLORIDE 0.9 % (FLUSH) 0.9 %
10 SYRINGE (ML) INJECTION AS NEEDED
Status: DISCONTINUED | OUTPATIENT
Start: 2025-01-25 | End: 2025-01-31 | Stop reason: HOSPADM

## 2025-01-25 RX ORDER — SODIUM CHLORIDE 9 MG/ML
40 INJECTION, SOLUTION INTRAVENOUS AS NEEDED
Status: DISCONTINUED | OUTPATIENT
Start: 2025-01-25 | End: 2025-01-31 | Stop reason: HOSPADM

## 2025-01-25 RX ORDER — BISACODYL 10 MG
10 SUPPOSITORY, RECTAL RECTAL DAILY PRN
Status: DISCONTINUED | OUTPATIENT
Start: 2025-01-25 | End: 2025-01-31 | Stop reason: HOSPADM

## 2025-01-25 RX ORDER — BISACODYL 5 MG/1
5 TABLET, DELAYED RELEASE ORAL DAILY PRN
Status: DISCONTINUED | OUTPATIENT
Start: 2025-01-25 | End: 2025-01-31 | Stop reason: HOSPADM

## 2025-01-25 RX ORDER — ONDANSETRON 4 MG/1
4 TABLET, ORALLY DISINTEGRATING ORAL EVERY 6 HOURS PRN
Status: DISCONTINUED | OUTPATIENT
Start: 2025-01-25 | End: 2025-01-31 | Stop reason: HOSPADM

## 2025-01-25 RX ORDER — ONDANSETRON 2 MG/ML
4 INJECTION INTRAMUSCULAR; INTRAVENOUS EVERY 6 HOURS PRN
Status: DISCONTINUED | OUTPATIENT
Start: 2025-01-25 | End: 2025-01-31 | Stop reason: HOSPADM

## 2025-01-25 RX ORDER — ASPIRIN 81 MG/1
81 TABLET ORAL NIGHTLY
Status: DISCONTINUED | OUTPATIENT
Start: 2025-01-25 | End: 2025-01-31 | Stop reason: HOSPADM

## 2025-01-25 RX ORDER — POLYETHYLENE GLYCOL 3350 17 G/17G
17 POWDER, FOR SOLUTION ORAL DAILY PRN
Status: DISCONTINUED | OUTPATIENT
Start: 2025-01-25 | End: 2025-01-31 | Stop reason: HOSPADM

## 2025-01-25 RX ORDER — ACETAMINOPHEN 650 MG/1
650 SUPPOSITORY RECTAL EVERY 4 HOURS PRN
Status: DISCONTINUED | OUTPATIENT
Start: 2025-01-25 | End: 2025-01-31 | Stop reason: HOSPADM

## 2025-01-25 RX ORDER — HYDROMORPHONE HYDROCHLORIDE 1 MG/ML
0.5 INJECTION, SOLUTION INTRAMUSCULAR; INTRAVENOUS; SUBCUTANEOUS
Status: ACTIVE | OUTPATIENT
Start: 2025-01-25 | End: 2025-01-30

## 2025-01-25 RX ORDER — FAMOTIDINE 20 MG/1
10 TABLET, FILM COATED ORAL DAILY PRN
Status: DISCONTINUED | OUTPATIENT
Start: 2025-01-25 | End: 2025-01-31 | Stop reason: HOSPADM

## 2025-01-25 RX ORDER — AMOXICILLIN 250 MG
2 CAPSULE ORAL 2 TIMES DAILY PRN
Status: DISCONTINUED | OUTPATIENT
Start: 2025-01-25 | End: 2025-01-31 | Stop reason: HOSPADM

## 2025-01-25 RX ORDER — HYDROCODONE BITARTRATE AND ACETAMINOPHEN 5; 325 MG/1; MG/1
1 TABLET ORAL EVERY 6 HOURS PRN
Status: DISCONTINUED | OUTPATIENT
Start: 2025-01-25 | End: 2025-01-31 | Stop reason: HOSPADM

## 2025-01-25 RX ORDER — SODIUM CHLORIDE 0.9 % (FLUSH) 0.9 %
10 SYRINGE (ML) INJECTION EVERY 12 HOURS SCHEDULED
Status: DISCONTINUED | OUTPATIENT
Start: 2025-01-25 | End: 2025-01-31 | Stop reason: HOSPADM

## 2025-01-25 RX ORDER — MORPHINE SULFATE 2 MG/ML
2 INJECTION, SOLUTION INTRAMUSCULAR; INTRAVENOUS ONCE
Status: COMPLETED | OUTPATIENT
Start: 2025-01-25 | End: 2025-01-25

## 2025-01-25 RX ORDER — CALCIUM CARBONATE 500 MG/1
2 TABLET, CHEWABLE ORAL 2 TIMES DAILY PRN
Status: DISCONTINUED | OUTPATIENT
Start: 2025-01-25 | End: 2025-01-31 | Stop reason: HOSPADM

## 2025-01-25 RX ORDER — FENTANYL CITRATE 50 UG/ML
25 INJECTION, SOLUTION INTRAMUSCULAR; INTRAVENOUS ONCE
Status: COMPLETED | OUTPATIENT
Start: 2025-01-25 | End: 2025-01-25

## 2025-01-25 RX ORDER — ACETAMINOPHEN 325 MG/1
650 TABLET ORAL EVERY 4 HOURS PRN
Status: DISCONTINUED | OUTPATIENT
Start: 2025-01-25 | End: 2025-01-31 | Stop reason: HOSPADM

## 2025-01-25 RX ADMIN — ASPIRIN 81 MG: 81 TABLET, DELAYED RELEASE ORAL at 20:35

## 2025-01-25 RX ADMIN — Medication 10 ML: at 08:23

## 2025-01-25 RX ADMIN — MORPHINE SULFATE 2 MG: 2 INJECTION, SOLUTION INTRAMUSCULAR; INTRAVENOUS at 04:43

## 2025-01-25 RX ADMIN — FENTANYL CITRATE 25 MCG: 50 INJECTION, SOLUTION INTRAMUSCULAR; INTRAVENOUS at 06:57

## 2025-01-25 RX ADMIN — Medication 10 ML: at 20:35

## 2025-01-25 RX ADMIN — ONDANSETRON 4 MG: 2 INJECTION INTRAMUSCULAR; INTRAVENOUS at 04:43

## 2025-01-25 NOTE — CONSULTS
[facility] Consult Note      Patient Name: Bill Landry  : 1945  MRN: [id number]  Primary Care Physician:  Bharathi De La Torre MD  Referring Physician: Buddy Lagos DO  Date of admission: 2025    Subjective     Reason for Consult:  ESRD    HPI:   Bill Landry is a 79 y.o. male with ESRD on HD (MWF, via L AV fistula, at [facility], followed by Dr. Moore of our group) for the past 7 years, Crohn's disease s/p bowel resection with ileostomy, anemia, cirrhosis, A-fib, chronic leukopenia and thrombocytopenia, and hypertension, admitted yesterday after he fell at home.  CT revealed left acetabular fracture and left sacral ala fracture.  Last HD was yesterday.    Primary complaint is left hip pain  Breathing is comfortable; no chest pain  Appetite fair; no N/V  Chronic leg swelling, right worse than left  Makes very little urine; no fever    Review of Systems:   14 point review of systems is otherwise negative except for mentioned above on HPI    Personal History     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    Aortic stenosis     Bicuspid aortic valve 2022    Calculus of kidney     Chronic leukopenia and thrombocytopenia     Cirrhosis of liver without ascites 2017    Congestive splenomegaly 2017    Crohn disease     with ileostomy    Decreased white blood cell count, unspecified     Diverticula of colon     ESRD on hemodialysis 2018    M-W-F FRESENIUS    Fatty liver disease, nonalcoholic     Fistula 2018    Other Specified Complication    Gastrointestinal hemorrhage, unspecified     GERD (gastroesophageal reflux disease)     GI bleed     Glaucoma     H/O Gallstone pancreatitis     H/O Pericardial effusion     H/O sinus bradycardia     History  of hypertension     resolved    History of UTI     Hx of renal calculi     Ileostomy present     Iron deficiency     Leakage of heart valve prosthesis, subsequent encounter     MGUS (monoclonal gammopathy of unknown significance)     TATE (obstructive sleep apnea)     Other hemoglobinopathies     Pericardial effusion (noninflammatory)     Permanent atrial fibrillation     Scarlet fever, uncomplicated     Stenosis of other vascular prosthetic devices, implants and grafts, initial encounter 02/14/2022    Systemic lupus erythematosus, unspecified     Thrombocytopenia     undpecified    Type 2 diabetes mellitus     CURRENTLY TAKES NO MED    Urinary retention     Post-OP       Past Surgical History:   Procedure Laterality Date    APPENDECTOMY  06/1968    ARTERIOVENOUS FISTULA/SHUNT SURGERY Left 08/08/2017    Procedure: LEFT BRACHIAL CEPHALIC AV FISTULA FORMATION WITH CEPHALIC VEIN TRANSPOSITION ;  Surgeon: Bill Deal MD;  Location: [facility] OR;  Service:     ARTERIOVENOUS FISTULA/SHUNT SURGERY Left 05/27/2022    Procedure: OPEN REVISION LEFT ARTERIOVENOUS INTERPOSITION GRAFT PLACEMENT;  Surgeon: Bill Deal MD;  Location: [facility] OR;  Service: Vascular;  Laterality: Left;    ARTERIOVENOUS FISTULA/SHUNT SURGERY Left 11/19/2024    Procedure: LEFT ARTERIOVENOUS SHUNT GRAFT REVISION;  Surgeon: Bill Deal MD;  Location: [facility] OR;  Service: Vascular;  Laterality: Left;    ARTERIOVENOUS FISTULA/SHUNT SURGERY W/ HEMODIALYSIS CATHETER INSERTION Left 02/15/2022    Procedure: OPEN LEFT ARM ARTERIOVENOUS FISTULA THROMBECTOMY WITH CEPHALIC VEIN ARTHROPLASTY AND STENT/GRAFT PLACEMENT;  Surgeon: Bill Deal MD;  Location: [facility] OR 18/19;  Service: Vascular;  Laterality: Left;    CATARACT EXTRACTION WITH INTRAOCULAR LENS IMPLANT Bilateral 2004    CHOLECYSTECTOMY  2011    COLECTOMY PARTIAL / TOTAL      History of inflammatory bowel disease with status post colectomy with ileostomy many years ago  in the [facility],  18 YEARS OF AGE    COLON SURGERY      Colon resection with colostomy    COLON SURGERY      COLONOSCOPY  01/2018    CYSTOSCOPY LITHOLAPAXY BLADDER STONE EXTRACTION      ENDOSCOPY  01/16/2015    gastritis    ENDOSCOPY  01/17/2018    Procedure: ESOPHAGOGASTRODUODENOSCOPY;  Surgeon: Warner Neville MD;  Location: [facility] ENDOSCOPY;  Service:     ILEOSCOPY  01/16/2015    normal    ILEOSCOPY N/A 01/17/2018    Procedure: ILEOSCOPY;  Surgeon: Warner Neville MD;  Location: [facility] ENDOSCOPY;  Service:     ILEOSTOMY  12/1968    loop ostomy bypass    INCISION AND DRAINAGE ARM Left 10/27/2023    Procedure: LEFT ARM INCISION AND DRAINAGE;  Surgeon: Bill Deal MD;  Location: [facility] MAIN OR;  Service: Vascular;  Laterality: Left;    INSERTION HEMODIALYSIS CATHETER Right 11/13/2024    Procedure: Tunnelled HEMODIALYSIS CATHETER INSERTION;  Surgeon: Ben Barth MD;  Location: [facility] HYBRID OR;  Service: Vascular;  Laterality: Right;    OTHER SURGICAL HISTORY  2009    kidney stones x3    PSEUDOANEURYSM REPAIR Left 10/27/2023    TONSILLECTOMY  1950       Family History: family history includes Heart disease in his mother; Heart failure (age of onset: 78) in his mother; Hyperlipidemia in his mother; Hypertension in his father and mother; Other (age of onset: 82) in his father.    Social History:  reports that he has never smoked. He has never been exposed to tobacco smoke. He has never used smokeless tobacco. He reports that he does not drink alcohol and does not use drugs.    Home Medications:  Prior to Admission medications    Medication Sig Start Date End Date Taking? Authorizing Provider   alfuzosin (UROXATRAL) 10 MG 24 hr tablet Take 1 tablet by mouth Every Other Day. Does not take on Sundays Tuesdays or Thursdays   Indications: Benign Enlargement of Prostate  Patient taking differently: Take 1 tablet by mouth Every Other Day. Does not take on Sundays Tuesdays or Thursdays  Indications:  Benign Enlargement of Prostate   Yes Elvia Nicholson MD   aspirin 81 MG tablet Take 1 tablet by mouth Every Night. INSTRUCTED TO CONTINUE THIS FOR SURGERY PER DR. BERMEO'S OFFICE  Indications: Disease involving Lipid Deposits in the Arteries   Yes Elvia Nicholson MD   B Complex-C-Folic Acid (Umm-Peter) tablet Take 1 tablet by mouth Daily. 9/27/23  Yes Elvia Nicholson MD   Cholecalciferol 25 MCG (1000 UT) tablet Take 1 tablet by mouth Daily. Indications: Vitamin D Deficiency  Patient taking differently: Take 1 tablet by mouth Daily. 3 days a week at dialysis   Indications: Vitamin D Deficiency   Yes Elvia Nicholson MD   famotidine (Pepcid AC) 10 MG tablet Take 1 tablet by mouth Daily As Needed for Heartburn (take as needed after HD on dialysis days). Indications: Heartburn   Yes Elvia Nicholson MD   Iron Sucrose (VENOFER IV) Infuse 1 dose into a venous catheter As Needed.   Yes Elvia Nicholson MD   latanoprost (XALATAN) 0.005 % ophthalmic solution Administer 1 drop to both eyes Every Night. Indications: Wide-Angle Glaucoma   Yes Elvia Nicholson MD   lidocaine-prilocaine (EMLA) 2.5-2.5 % cream Apply 1 Application topically to the appropriate area as directed As Needed. [id number]:  ONE HOUR PRIOR TO DIALYSIS  Indications: Anesthesia to a Specific Part of the Body 2/5/18  Yes Elvia Nicholson MD   Loratadine 10 MG capsule Take 1 capsule by mouth Daily. Indications: Hayfever   Yes Elvia Nicholson MD   Methoxy PEG-Epoetin Beta (MIRCERA IJ) Inject 1 dose as directed Every 30 (Thirty) Days.   Yes Elvia Nicholson MD   mupirocin (BACTROBAN) 2 % ointment Apply 1 Application topically to the appropriate area as directed 3 (Three) Times a Day. Indications: Wound Infection 11/22/24  Yes Bill Bermeo MD   sodium chloride 0.65 % nasal spray Administer 2 sprays into the nostril(s) as directed by provider Every Night.   Yes Elvia Nicholson MD    Sucroferric Oxyhydroxide (Velphoro) 500 MG chewable tablet Chew 2 tablets Daily As Needed (phopurus def). Take 2 tablets by  mouth three times a day.  Indications: Hyperphosphatemia D/T Renal Insufficiency 11/26/24  Yes Provider, Historical, MD       Allergies:  Allergies   Allergen Reactions    Ace Inhibitors Other (See Comments)     RENAL FAILURE/ raised creatinine    Contrast Dye (Echo Or Unknown Ct/Mr) Other (See Comments) and Anaphylaxis     RENAL FAILURE    Iodine Anaphylaxis    Angiotensin Receptor Blockers Swelling    Eliquis [Apixaban] Arrhythmia     BLEEDING ISSUES; PT CANNOT TAKE ANY ANTICOAGULANTS DUE TO LOW PLATELETS EXCEPT ASPIRIN      Filgrastim GI Intolerance and Confusion     Chest pain    Keflex [Cephalexin] Diarrhea     RENAL FAILURE    Other Angioedema     IVP Dye       Objective     Vitals:   Temp:  [98.1 °F (36.7 °C)] 98.1 °F (36.7 °C)  Heart Rate:  [62-84] 62  Resp:  [16-18] 18  BP: (101-110)/(48-70) 101/48  Flow (L/min) (Oxygen Therapy):  [1-1.5] 1  No intake or output data in the 24 hours ending 01/25/25 0926    Physical Exam:   Constitutional: Awake, alert, NAD, chronically ill, fully oriented  HEENT: Sclera anicteric, no conjunctival injection  Neck: Supple, no carotid bruit, trachea at midline, no JVD  Respiratory: Mostly clear bilaterally, nonlabored on 1 L/min  Cardiovascular: Irregularly irregular, 2/6M, no rub  Vascular: Left arm AV fistula/graft patent  Gastrointestinal: BS +, soft, nontender, nondistended, + ileostomy  : No palpable bladder  Musculoskeletal: +1-2 LE edema, right worse than left; no C/C   Psychiatric: Appropriate affect, cooperative, fully oriented, depressed  Neurologic: No asterixis, moving all extremities, normal speech   Skin: Warm and dry; venous stasis changes both legs; bruises all limbs       Scheduled Meds:     sodium chloride, 10 mL, Intravenous, Q12H      IV Meds:        Results Reviewed:   I have personally reviewed the results from the time of this  admission to 1/25/2025 09:26 EST     Lab Results   Component Value Date    GLUCOSE 150 (H) 01/25/2025    CALCIUM 8.5 (L) 01/25/2025     01/25/2025    K 3.7 01/25/2025    CO2 28.1 01/25/2025    CL 97 (L) 01/25/2025    BUN 35 (H) 01/25/2025    CREATININE 4.90 (H) 01/25/2025    EGFRIFAFRI  02/14/2022      Comment:      <15 Indicative of kidney failure.    EGFRIFNONA 7 (L) 02/14/2022    BCR 7.1 01/25/2025    ANIONGAP 11.9 01/25/2025      Lab Results   Component Value Date    MG 1.9 01/25/2025    PHOS 3.5 11/21/2024    ALBUMIN 2.6 (L) 01/25/2025           Assessment / Plan       Acetabular fracture    ESRD on hemodialysis    Crohn's disease, unspecified, without complications    Nonrheumatic aortic valve stenosis    Anemia    Cirrhosis of liver    Atrial fibrillation    Fall      ASSESSMENT:  1.  ESRD: Volume status stable with chronic lower extremity edema but mostly clear lungs.  Electrolytes stable.  Last HD was yesterday  2.  Fall 1/24 (lost balance), with left acetabular fracture  3.  Atrial fibrillation with rate-control  4.  Crohn's disease with prior bowel resection and ileostomy creation  5.  Cirrhosis   6.  Mild-moderate aortic stenosis  7.  Chronic leukopenia and thrombocytopenia  8.  Chronic anemia of ESRD; hgb at goal    PLAN:  1.  Await orthopedic evaluation  2.  HD MWF  3.  Surveillance labs      Thank you for involving us in the care of Bill Landry.  Please feel free to call with any questions.    Boyd Oates MD  01/25/25  09:26 EST    [facility]  [phone]      Please note that portions of this note were completed with a voice recognition program.

## 2025-01-25 NOTE — ED TRIAGE NOTES
To ER via EMS from home.  C/o fall onto buttocks with possible strike to head on floor.  Pt was attempting to get a shoe out from under a piece of furniture when he lost his balance and fell.  Pt is able to ambulate with assistance.

## 2025-01-25 NOTE — CONSULTS
Orthopaedic Surgery  Consult Note  Dr. KRIS Laguna” Nathan   [phone]    HPI:  Patient is a 79 y.o. Not  or  male who presents with left hip pain after a fall.  A CT scan in the emergency room demonstrated a nondisplaced pubic ramus fracture extending near the area of the acetabulum.  Patient was admitted for medical problems and hip pain.  Orthopedics was consulted.  He has not been able to mobilize since the fall    MEDICAL HISTORY  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    Aortic stenosis     Bicuspid aortic valve 07/20/2022    Calculus of kidney     Chronic leukopenia and thrombocytopenia     Cirrhosis of liver without ascites 07/24/2017    Congestive splenomegaly 07/24/2017    Crohn disease     with ileostomy    Decreased white blood cell count, unspecified     Diverticula of colon     ESRD on hemodialysis 04/05/2018    M-W-F FRESENIUS    Fatty liver disease, nonalcoholic     Fistula 04/05/2018    Other Specified Complication    Gastrointestinal hemorrhage, unspecified     GERD (gastroesophageal reflux disease)     GI bleed     Glaucoma     H/O Gallstone pancreatitis     H/O Pericardial effusion     H/O sinus bradycardia     History of hypertension     resolved    History of UTI     Hx of renal calculi     Ileostomy present     Iron deficiency     Leakage of heart valve prosthesis, subsequent encounter     MGUS (monoclonal gammopathy of unknown significance)     TATE (obstructive sleep apnea)     Other hemoglobinopathies     Pericardial effusion (noninflammatory)     Permanent atrial fibrillation     Scarlet fever, uncomplicated     Stenosis of other vascular prosthetic devices, implants and grafts, initial encounter 02/14/2022    Systemic lupus erythematosus, unspecified      Thrombocytopenia     undpecified    Type 2 diabetes mellitus     CURRENTLY TAKES NO MED    Urinary retention     Post-OP     Past Surgical History:   Procedure Laterality Date    APPENDECTOMY  06/1968    ARTERIOVENOUS FISTULA/SHUNT SURGERY Left 08/08/2017    Procedure: LEFT BRACHIAL CEPHALIC AV FISTULA FORMATION WITH CEPHALIC VEIN TRANSPOSITION ;  Surgeon: Bill Deal MD;  Location: [facility] OR;  Service:     ARTERIOVENOUS FISTULA/SHUNT SURGERY Left 05/27/2022    Procedure: OPEN REVISION LEFT ARTERIOVENOUS INTERPOSITION GRAFT PLACEMENT;  Surgeon: Bill Deal MD;  Location: [facility] OR;  Service: Vascular;  Laterality: Left;    ARTERIOVENOUS FISTULA/SHUNT SURGERY Left 11/19/2024    Procedure: LEFT ARTERIOVENOUS SHUNT GRAFT REVISION;  Surgeon: Bill Deal MD;  Location: [facility] MAIN OR;  Service: Vascular;  Laterality: Left;    ARTERIOVENOUS FISTULA/SHUNT SURGERY W/ HEMODIALYSIS CATHETER INSERTION Left 02/15/2022    Procedure: OPEN LEFT ARM ARTERIOVENOUS FISTULA THROMBECTOMY WITH CEPHALIC VEIN ARTHROPLASTY AND STENT/GRAFT PLACEMENT;  Surgeon: Bill Deal MD;  Location: [facility] OR 18/19;  Service: Vascular;  Laterality: Left;    CATARACT EXTRACTION WITH INTRAOCULAR LENS IMPLANT Bilateral 2004    CHOLECYSTECTOMY  2011    COLECTOMY PARTIAL / TOTAL      History of inflammatory bowel disease with status post colectomy with ileostomy many years ago in the [facility],  18 YEARS OF AGE    COLON SURGERY      Colon resection with colostomy    COLON SURGERY      COLONOSCOPY  01/2018    CYSTOSCOPY LITHOLAPAXY BLADDER STONE EXTRACTION      ENDOSCOPY  01/16/2015    gastritis    ENDOSCOPY  01/17/2018    Procedure: ESOPHAGOGASTRODUODENOSCOPY;  Surgeon: Warner Neville MD;  Location: [facility] ENDOSCOPY;  Service:     ILEOSCOPY  01/16/2015    normal    ILEOSCOPY N/A 01/17/2018    Procedure: ILEOSCOPY;  Surgeon: Warner Neville MD;  Location: [facility] ENDOSCOPY;  Service:     ILEOSTOMY  12/1968    loop  ostomy bypass    INCISION AND DRAINAGE ARM Left 10/27/2023    Procedure: LEFT ARM INCISION AND DRAINAGE;  Surgeon: Bill Deal MD;  Location: [facility] MAIN OR;  Service: Vascular;  Laterality: Left;    INSERTION HEMODIALYSIS CATHETER Right 11/13/2024    Procedure: Tunnelled HEMODIALYSIS CATHETER INSERTION;  Surgeon: Ben Barth MD;  Location: [facility] HYBRID OR;  Service: Vascular;  Laterality: Right;    OTHER SURGICAL HISTORY  2009    kidney stones x3    PSEUDOANEURYSM REPAIR Left 10/27/2023    TONSILLECTOMY  1950     Prior to Admission medications    Medication Sig Start Date End Date Taking? Authorizing Provider   alfuzosin (UROXATRAL) 10 MG 24 hr tablet Take 1 tablet by mouth Every Other Day. Does not take on Sundays Tuesdays or Thursdays   Indications: Benign Enlargement of Prostate  Patient taking differently: Take 1 tablet by mouth Every Other Day. Does not take on Sundays Tuesdays or Thursdays  Indications: Benign Enlargement of Prostate   Yes Elvia Nicholson MD   aspirin 81 MG tablet Take 1 tablet by mouth Every Night. INSTRUCTED TO CONTINUE THIS FOR SURGERY PER DR. DEAL'S OFFICE  Indications: Disease involving Lipid Deposits in the Arteries   Yes ProviderElvia MD   B Complex-C-Folic Acid (Umm-Peter) tablet Take 1 tablet by mouth Daily. 9/27/23  Yes Elvia Nicholson MD   Cholecalciferol 25 MCG (1000 UT) tablet Take 1 tablet by mouth Daily. Indications: Vitamin D Deficiency  Patient taking differently: Take 1 tablet by mouth Daily. 3 days a week at dialysis   Indications: Vitamin D Deficiency   Yes Elvia Nicholson MD   famotidine (Pepcid AC) 10 MG tablet Take 1 tablet by mouth Daily As Needed for Heartburn (take as needed after HD on dialysis days). Indications: Heartburn   Yes Elvia Nicholson MD   Iron Sucrose (VENOFER IV) Infuse 1 dose into a venous catheter As Needed.   Yes Elvia Nicholson MD   latanoprost (XALATAN) 0.005 % ophthalmic solution Administer 1  drop to both eyes Every Night. Indications: Wide-Angle Glaucoma   Yes Elvia Nicholson MD   lidocaine-prilocaine (EMLA) 2.5-2.5 % cream Apply 1 Application topically to the appropriate area as directed As Needed. Wsohet-Orjpodrjs-Exixpw:  ONE HOUR PRIOR TO DIALYSIS  Indications: Anesthesia to a Specific Part of the Body 2/5/18  Yes Elvia Nicholson MD   Loratadine 10 MG capsule Take 1 capsule by mouth Daily. Indications: Hayfever   Yes Elvia Nicholson MD   Methoxy PEG-Epoetin Beta (MIRCERA IJ) Inject 1 dose as directed Every 30 (Thirty) Days.   Yes Elvia Nicholson MD   mupirocin (BACTROBAN) 2 % ointment Apply 1 Application topically to the appropriate area as directed 3 (Three) Times a Day. Indications: Wound Infection 11/22/24  Yes Bill Deal MD   sodium chloride 0.65 % nasal spray Administer 2 sprays into the nostril(s) as directed by provider Every Night.   Yes Elvia Nicholson MD   Sucroferric Oxyhydroxide (Velphoro) 500 MG chewable tablet Chew 2 tablets Daily As Needed (phopurus def). Take 2 tablets by  mouth three times a day.  Indications: Hyperphosphatemia D/T Renal Insufficiency 11/26/24  Yes Elvia Nicholson MD     Allergies   Allergen Reactions    Ace Inhibitors Other (See Comments)     RENAL FAILURE/ raised creatinine    Contrast Dye (Echo Or Unknown Ct/Mr) Other (See Comments) and Anaphylaxis     RENAL FAILURE    Iodine Anaphylaxis    Angiotensin Receptor Blockers Swelling    Eliquis [Apixaban] Arrhythmia     BLEEDING ISSUES; PT CANNOT TAKE ANY ANTICOAGULANTS DUE TO LOW PLATELETS EXCEPT ASPIRIN      Filgrastim GI Intolerance and Confusion     Chest pain    Keflex [Cephalexin] Diarrhea     RENAL FAILURE    Other Angioedema     IVP Dye     Most Recent Immunizations   Administered Date(s) Administered    COVID-19 (PFIZER) Purple Cap Monovalent 11/02/2021    Covid-19 (Pfizer) Gray Cap Monovalent 07/26/2022    Fluzone High-Dose 65+YRS 09/16/2020    Fluzone High-Dose  "65+yrs 10/03/2022    Hep B, Dialysis 06/13/2018    Hepatitis B Adult/Adolescent IM 05/12/2021    Influenza, Unspecified 10/03/2022    Pneumococcal Polysaccharide (PPSV23) 02/21/2018    Tdap 04/18/2023     Social History     Tobacco Use    Smoking status: Never     Passive exposure: Never    Smokeless tobacco: Never   Substance Use Topics    Alcohol use: No     Comment: caffeine use: none      Social History     Substance and Sexual Activity   Drug Use No       VITALS: /48 (BP Location: Right arm, Patient Position: Lying)   Pulse 62   Temp 96 °F (35.6 °C) (Axillary)   Resp 18   Ht 177.8 cm (70\")   Wt 81.2 kg (179 lb 0.2 oz)   SpO2 100%   BMI 25.69 kg/m²  Body mass index is 25.69 kg/m².    PHYSICAL EXAM:   CONSTITUTIONAL: No acute distress  LUNGS: Equal chest rise, no shortness of air  CARDIOVASCULAR: palpable peripheral pulses  SKIN: no skin lesions in the area examined  LYMPH: no lymphadenopathy in the area examined  Musculoskeletal: Left lower extremity he is able to do a heel slide up the bed but cannot do an active straight leg raise due to pain.  Normal neurovascular exam to the left lower extremity    RADIOLOGY REVIEW:   CT Pelvis Without Contrast    Result Date: 1/25/2025  Electronically signed by Ken Amos MD on 01-25-25 at 0543    CT Cervical Spine Without Contrast    Result Date: 1/25/2025  Electronically signed by Reinier Garcia MD on 01-25-25 at 0517    CT Head Without Contrast    Result Date: 1/25/2025  Electronically signed by Reinier Garcia MD on 01-25-25 at 0513     LABS:   Results for the past 24 hours:   Recent Results (from the past 24 hours)   Green Top (Gel)    Collection Time: 01/25/25  4:32 AM   Result Value Ref Range    Extra Tube Hold for add-ons.    Lavender Top    Collection Time: 01/25/25  4:32 AM   Result Value Ref Range    Extra Tube hold for add-on    Gold Top - SST    Collection Time: 01/25/25  4:32 AM   Result Value Ref Range    Extra Tube Hold for add-ons.  "   Light Blue Top    Collection Time: 01/25/25  4:32 AM   Result Value Ref Range    Extra Tube Hold for add-ons.    Comprehensive Metabolic Panel    Collection Time: 01/25/25  4:32 AM    Specimen: Arm, Right; Blood   Result Value Ref Range    Glucose 150 (H) 65 - 99 mg/dL    BUN 35 (H) 8 - 23 mg/dL    Creatinine 4.90 (H) 0.76 - 1.27 mg/dL    Sodium 137 136 - 145 mmol/L    Potassium 3.7 3.5 - 5.2 mmol/L    Chloride 97 (L) 98 - 107 mmol/L    CO2 28.1 22.0 - 29.0 mmol/L    Calcium 8.5 (L) 8.6 - 10.5 mg/dL    Total Protein 7.1 6.0 - 8.5 g/dL    Albumin 2.6 (L) 3.5 - 5.2 g/dL    ALT (SGPT) 32 1 - 41 U/L    AST (SGOT) 67 (H) 1 - 40 U/L    Alkaline Phosphatase 205 (H) 39 - 117 U/L    Total Bilirubin 0.8 0.0 - 1.2 mg/dL    Globulin 4.5 gm/dL    A/G Ratio 0.6 g/dL    BUN/Creatinine Ratio 7.1 7.0 - 25.0    Anion Gap 11.9 5.0 - 15.0 mmol/L    eGFR 11.4 (L) >60.0 mL/min/1.73   Protime-INR    Collection Time: 01/25/25  4:32 AM    Specimen: Arm, Right; Blood   Result Value Ref Range    Protime 16.6 (H) 11.7 - 14.2 Seconds    INR 1.35 (H) 0.90 - 1.10   aPTT    Collection Time: 01/25/25  4:32 AM    Specimen: Arm, Right; Blood   Result Value Ref Range    PTT 41.2 (H) 22.7 - 35.4 seconds   Magnesium    Collection Time: 01/25/25  4:32 AM    Specimen: Arm, Right; Blood   Result Value Ref Range    Magnesium 1.9 1.6 - 2.4 mg/dL   CBC Auto Differential    Collection Time: 01/25/25  4:32 AM    Specimen: Arm, Right; Blood   Result Value Ref Range    WBC 3.61 3.40 - 10.80 10*3/mm3    RBC 3.21 (L) 4.14 - 5.80 10*6/mm3    Hemoglobin 10.1 (L) 13.0 - 17.7 g/dL    Hematocrit 33.1 (L) 37.5 - 51.0 %    .1 (H) 79.0 - 97.0 fL    MCH 31.5 26.6 - 33.0 pg    MCHC 30.5 (L) 31.5 - 35.7 g/dL    RDW 13.6 12.3 - 15.4 %    RDW-SD 51.5 37.0 - 54.0 fl    MPV 9.7 6.0 - 12.0 fL    Platelets 54 (L) 140 - 450 10*3/mm3    Neutrophil % 85.6 (H) 42.7 - 76.0 %    Lymphocyte % 6.1 (L) 19.6 - 45.3 %    Monocyte % 6.6 5.0 - 12.0 %    Eosinophil % 0.6 0.3 - 6.2 %     "Basophil % 0.3 0.0 - 1.5 %    Immature Grans % 0.8 (H) 0.0 - 0.5 %    Neutrophils, Absolute 3.09 1.70 - 7.00 10*3/mm3    Lymphocytes, Absolute 0.22 (L) 0.70 - 3.10 10*3/mm3    Monocytes, Absolute 0.24 0.10 - 0.90 10*3/mm3    Eosinophils, Absolute 0.02 0.00 - 0.40 10*3/mm3    Basophils, Absolute 0.01 0.00 - 0.20 10*3/mm3    Immature Grans, Absolute 0.03 0.00 - 0.05 10*3/mm3    nRBC 0.0 0.0 - 0.2 /100 WBC   Scan Slide    Collection Time: 01/25/25  4:32 AM    Specimen: Arm, Right; Blood   Result Value Ref Range    RBC Morphology Normal Normal    WBC Morphology Normal Normal    Platelet Morphology Normal Normal       IMPRESSION:  Patient is a 79 y.o. Not  or  male with left acetabular fracture    PLAN:   Admited to: Buddy Lagos DO  In terms of acetabular fractures, this fracture barely extends into the acetabular fracture and is more of a pubic root fracture.  I think this is stable enough for him to bear weight on using a walker.  I would recommend use of a walker for 4 to 6 weeks and then may discontinue it with physical therapy.  Okay for weightbearing as tolerated and to begin mobilization immediately.  As his fracture cannot be seen on x-ray, there is really no need for orthopedic follow-up unless he fails to progress.  It would likely take 2 to 3 months for the fracture to fully heal and all pain to go away.  Will take 3 to 6 months after that to regain all strength and mobility.  If he would like to come to the office for a repeat checkup we would be happy to see him but as long as he is progressing he may follow-up as needed.    R \"Roger\" Nathan YEE MD  Orthopaedic Surgery  [facility]  [phone] - [place] [facility]  [phone] - [place] Office            "

## 2025-01-25 NOTE — ED PROVIDER NOTES
EMERGENCY DEPARTMENT ENCOUNTER    Room Number:  09/09  PCP: Bharathi De La Torre MD  Patient Care Team:  Bharathi De La Torre MD as PCP - General (Internal Medicine)  Ken Moseley MD as Consulting Physician (Pulmonary Disease)  Dale Vázquez MD as Cardiologist (Cardiology)  Myra Moore MD as Consulting Physician (Nephrology)  Theo Pacheco MD as Consulting Physician (Hematology and Oncology)  Preston Ureña MD as Consulting Physician (Urology)  Ernesto Ward MD as Consulting Physician (Ophthalmology)  Bill Deal MD as Surgeon (Vascular Surgery)  Dimple Villanueva DPM as Consulting Physician (Podiatry)  Boom Olivares II, MD as Referring Physician (Vascular Surgery)   Independent Historians: Patient, pelon    HPI:  Chief Complaint: Fall    A complete HPI/ROS/PMH/PSH/SH/FH are unobtainable due to: None    Chronic or social conditions impacting patient care (Social Determinants of Health): None  (Financial Resource Strain / Food Insecurity / Transportation Needs / Physical Activity / Stress / Social Connections / Intimate Partner Violence / Housing Stability)    Context: Bill Landry is a 79 y.o. male who presents to the ED c/o acute fall at home.  Patient was attempting to reach his wife she was a trapezio trended up falling patient fell on his left hip and also struck his head on the ground.  No loss of consciousness.  Patient is fairly medically complex has history of cirrhosis, ESRD has not recently missed any dialysis.  Patient complaining only of pain to his left hip states he has pain when he attempts to ambulate.  No nausea vomiting no altered mental status.    Review of prior external notes (non-ED) -and- Review of prior external test results outside of this encounter: I reviewed patient's vascular surgery note from 1/2/2025    Prescription drug monitoring program review:         PAST MEDICAL HISTORY  Active Ambulatory Problems     Diagnosis Date Noted    Permanent atrial  fibrillation 05/02/2016    Thrombocytopenia 05/02/2016    Chronic leukopenia 05/02/2016    Cirrhosis of liver without ascites 07/24/2017    Congestive splenomegaly 07/24/2017    Anemia due to stage 4 chronic kidney disease treated with erythropoietin 10/04/2017    Esophageal abnormality 01/18/2018    ESRD on hemodialysis 03/21/2019    Other specified glaucoma 05/21/2019    Arteriovenous fistula for hemodialysis in place, secondary 09/03/2020    Crohn's disease, unspecified, without complications 01/18/2018    Deficiency of other specified B group vitamins 01/19/2018    Dermatitis, unspecified 01/18/2018    Encounter for immunization 02/19/2018    History of urinary stone 01/18/2018    Hyperparathyroidism, unspecified 01/19/2018    Other disorders of phosphorus metabolism 02/11/2021    Other iron deficiency anemias 02/10/2018    Pure hypertriglyceridemia 01/18/2018    Renal osteodystrophy 01/18/2018    Secondary hyperparathyroidism of renal origin 01/18/2018    Splenomegaly, not elsewhere classified 01/18/2018    Bilateral pseudophakia 05/05/2021    Dermatochalasis of eyelid 05/05/2021    Primary open-angle glaucoma, bilateral, moderate stage 05/05/2021    Puckering of macula, left eye 05/05/2021    Secondary cataract 05/05/2021    AV fistula occlusion, initial encounter 02/15/2022    TATE (obstructive sleep apnea)     Aneurysm artery, 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    Nonrheumatic aortic valve stenosis 07/20/2022    Bicuspid aortic valve 07/20/2022    Hyperuricemia without signs of inflammatory arthritis and tophaceous disease 08/03/2022    Presbyopia 05/13/2021    Impaired glucose tolerance 05/12/2023    Hypofibrinogenemia 10/29/2023    Pulmonary hypertension     Skin change 04/08/2024    ESRD on dialysis 11/13/2024    Problem with vascular access 11/13/2024    AV graft thrombosis, initial encounter 11/19/2024    AV shunt malfunction, initial encounter 12/12/2024    Left arm swelling 12/12/2024     Resolved Ambulatory  Problems     Diagnosis Date Noted    Anemia 01/12/2018    Pneumonia of both lungs due to infectious organism 01/19/2018    At risk for fluid volume overload 03/21/2019    Allergy, unspecified, initial encounter 10/09/2020    Anaphylactic shock, unspecified, initial encounter 10/09/2020    Essential (primary) hypertension 01/18/2018    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, with stage 1 through stage 4 chronic kidney disease, or unspecified chronic kidney disease 10/18/2019    Moderate protein-calorie malnutrition 12/16/2020    Pericardial effusion (noninflammatory) 01/18/2018    Sleep apnea, unspecified 01/18/2018    Unspecified atrial fibrillation 01/19/2018    Shortness of breath 02/19/2022    Acute hypoxemic respiratory failure due to COVID-19 05/11/2023    Acute on chronic diastolic heart failure 05/11/2023    Acute cough 05/17/2023    Bleeding 10/29/2023     Past Medical History:   Diagnosis Date    Abnormal serum protein electrophoresis     Acquired absence of other specified parts of digestive tract     Anemia in chronic kidney disease     Aneurysm of artery of arm     Aortic stenosis     Calculus of kidney     Crohn disease     Decreased white blood cell count, unspecified     Diverticula of colon     Fatty liver disease, nonalcoholic     Fistula 04/05/2018    Gastrointestinal hemorrhage, unspecified     GERD (gastroesophageal reflux disease)     GI bleed     Glaucoma     H/O Gallstone pancreatitis     H/O Pericardial effusion     H/O sinus bradycardia     History of hypertension     History of UTI     Hx of renal calculi     Ileostomy present     Iron deficiency     Leakage of heart valve prosthesis, subsequent encounter     MGUS (monoclonal gammopathy of unknown significance)     Other hemoglobinopathies     Scarlet fever, uncomplicated     Stenosis of other vascular prosthetic devices, implants and grafts, initial encounter 02/14/2022    Systemic lupus erythematosus,  unspecified     Type 2 diabetes mellitus     Urinary retention          PAST SURGICAL HISTORY  Past Surgical History:   Procedure Laterality Date    APPENDECTOMY  06/1968    ARTERIOVENOUS FISTULA/SHUNT SURGERY Left 08/08/2017    Procedure: LEFT BRACHIAL CEPHALIC AV FISTULA FORMATION WITH CEPHALIC VEIN TRANSPOSITION ;  Surgeon: Bill Deal MD;  Location: [facility] OR;  Service:     ARTERIOVENOUS FISTULA/SHUNT SURGERY Left 05/27/2022    Procedure: OPEN REVISION LEFT ARTERIOVENOUS INTERPOSITION GRAFT PLACEMENT;  Surgeon: Bill Deal MD;  Location: [facility] OR;  Service: Vascular;  Laterality: Left;    ARTERIOVENOUS FISTULA/SHUNT SURGERY Left 11/19/2024    Procedure: LEFT ARTERIOVENOUS SHUNT GRAFT REVISION;  Surgeon: Bill Deal MD;  Location: [facility] MAIN OR;  Service: Vascular;  Laterality: Left;    ARTERIOVENOUS FISTULA/SHUNT SURGERY W/ HEMODIALYSIS CATHETER INSERTION Left 02/15/2022    Procedure: OPEN LEFT ARM ARTERIOVENOUS FISTULA THROMBECTOMY WITH CEPHALIC VEIN ARTHROPLASTY AND STENT/GRAFT PLACEMENT;  Surgeon: Bill Deal MD;  Location: [facility] OR 18/19;  Service: Vascular;  Laterality: Left;    CATARACT EXTRACTION WITH INTRAOCULAR LENS IMPLANT Bilateral 2004    CHOLECYSTECTOMY  2011    COLECTOMY PARTIAL / TOTAL      History of inflammatory bowel disease with status post colectomy with ileostomy many years ago in the [facility],  18 YEARS OF AGE    COLON SURGERY      Colon resection with colostomy    COLON SURGERY      COLONOSCOPY  01/2018    CYSTOSCOPY LITHOLAPAXY BLADDER STONE EXTRACTION      ENDOSCOPY  01/16/2015    gastritis    ENDOSCOPY  01/17/2018    Procedure: ESOPHAGOGASTRODUODENOSCOPY;  Surgeon: Warner Neville MD;  Location: [facility] ENDOSCOPY;  Service:     ILEOSCOPY  01/16/2015    normal    ILEOSCOPY N/A 01/17/2018    Procedure: ILEOSCOPY;  Surgeon: Warner Neville MD;  Location: [facility] ENDOSCOPY;  Service:     ILEOSTOMY  12/1968    loop ostomy bypass    INCISION AND  DRAINAGE ARM Left 10/27/2023    Procedure: LEFT ARM INCISION AND DRAINAGE;  Surgeon: Bill Deal MD;  Location: [facility] MAIN OR;  Service: Vascular;  Laterality: Left;    INSERTION HEMODIALYSIS CATHETER Right 11/13/2024    Procedure: Tunnelled HEMODIALYSIS CATHETER INSERTION;  Surgeon: Ben Barth MD;  Location: [facility] HYBRID OR;  Service: Vascular;  Laterality: Right;    OTHER SURGICAL HISTORY  2009    kidney stones x3    PSEUDOANEURYSM REPAIR Left 10/27/2023    TONSILLECTOMY  1950         FAMILY HISTORY  Family History   Problem Relation Age of Onset    Heart failure Mother 78    Hypertension Mother     Heart disease Mother     Hyperlipidemia Mother     Hypertension Father     Other Father 82        MVI    Malig Hyperthermia Neg Hx          SOCIAL HISTORY  Social History     Socioeconomic History    Marital status:      Spouse name: Gillian    Number of children: 2    Years of education: College   Tobacco Use    Smoking status: Never     Passive exposure: Never    Smokeless tobacco: Never   Vaping Use    Vaping status: Never Used   Substance and Sexual Activity    Alcohol use: No     Comment: caffeine use: none    Drug use: No    Sexual activity: Defer         ALLERGIES  Ace inhibitors, Contrast dye (echo or unknown ct/mr), Iodine, Angiotensin receptor blockers, Eliquis [apixaban], Filgrastim, Keflex [cephalexin], and Other        REVIEW OF SYSTEMS  Review of Systems  Included in HPI  All systems reviewed and negative except for those discussed in HPI.      PHYSICAL EXAM    I have reviewed the triage vital signs and nursing notes.    ED Triage Vitals [01/25/25 0352]   Temp Heart Rate Resp BP SpO2   98.1 °F (36.7 °C) 84 16 110/70 100 %      Temp src Heart Rate Source Patient Position BP Location FiO2 (%)   -- -- -- -- --       Physical Exam  GENERAL: alert, no acute distress  SKIN: Warm, dry dialysis fistula left upper extremity with palpable thrill  HENT: Normocephalic, atraumatic  EYES: no  scleral icterus  CV: regular rhythm, regular rate  RESPIRATORY: normal effort, lungs clear  ABDOMEN: soft, nontender, nondistended  MUSCULOSKELETAL: no deformity, tender palpation over left hip  NEURO: alert, moves all extremities, follows commands                                                               LAB RESULTS  Recent Results (from the past 24 hours)   Green Top (Gel)    Collection Time: 01/25/25  4:32 AM   Result Value Ref Range    Extra Tube Hold for add-ons.    Lavender Top    Collection Time: 01/25/25  4:32 AM   Result Value Ref Range    Extra Tube hold for add-on    Gold Top - SST    Collection Time: 01/25/25  4:32 AM   Result Value Ref Range    Extra Tube Hold for add-ons.    Light Blue Top    Collection Time: 01/25/25  4:32 AM   Result Value Ref Range    Extra Tube Hold for add-ons.    Comprehensive Metabolic Panel    Collection Time: 01/25/25  4:32 AM    Specimen: Arm, Right; Blood   Result Value Ref Range    Glucose 150 (H) 65 - 99 mg/dL    BUN 35 (H) 8 - 23 mg/dL    Creatinine 4.90 (H) 0.76 - 1.27 mg/dL    Sodium 137 136 - 145 mmol/L    Potassium 3.7 3.5 - 5.2 mmol/L    Chloride 97 (L) 98 - 107 mmol/L    CO2 28.1 22.0 - 29.0 mmol/L    Calcium 8.5 (L) 8.6 - 10.5 mg/dL    Total Protein 7.1 6.0 - 8.5 g/dL    Albumin 2.6 (L) 3.5 - 5.2 g/dL    ALT (SGPT) 32 1 - 41 U/L    AST (SGOT) 67 (H) 1 - 40 U/L    Alkaline Phosphatase 205 (H) 39 - 117 U/L    Total Bilirubin 0.8 0.0 - 1.2 mg/dL    Globulin 4.5 gm/dL    A/G Ratio 0.6 g/dL    BUN/Creatinine Ratio 7.1 7.0 - 25.0    Anion Gap 11.9 5.0 - 15.0 mmol/L    eGFR 11.4 (L) >60.0 mL/min/1.73   Protime-INR    Collection Time: 01/25/25  4:32 AM    Specimen: Arm, Right; Blood   Result Value Ref Range    Protime 16.6 (H) 11.7 - 14.2 Seconds    INR 1.35 (H) 0.90 - 1.10   aPTT    Collection Time: 01/25/25  4:32 AM    Specimen: Arm, Right; Blood   Result Value Ref Range    PTT 41.2 (H) 22.7 - 35.4 seconds   Magnesium    Collection Time: 01/25/25  4:32 AM     Specimen: Arm, Right; Blood   Result Value Ref Range    Magnesium 1.9 1.6 - 2.4 mg/dL       I ordered the above labs and independently reviewed the results.        RADIOLOGY  CT Pelvis Without Contrast    Result Date: 1/25/2025  Patient: GAEL ZARATE  Time Out: 05:43 Exam(s): CT PELVIS Without Contrast EXAM:   CT Pelvis Without Intravenous Contrast CLINICAL HISTORY:    Reason for exam: fall, left hip pain. TECHNIQUE:   Axial computed tomography images of the pelvis without intravenous contrast.  CTDI is 7.54 mGy and DLP is 248.2 mGy-cm.  This CT exam was performed according to the principle of ALARA (As Low As Reasonably Achievable) by using one or more of the following dose reduction techniques: automated exposure control, adjustment of the mA and or kV according to patient size, and or use of iterative reconstruction technique. COMPARISON:   7 17 2017 FINDINGS:   Limitations:  Limited due to lack of IV contrast.   Bones joints:  Left acetabular fracture involving superior pubic ramus root and inferior pubic ramus fracture.  Left sacral ala fracture.  Bones otherwise grossly intact.  Degenerative spine.   Soft tissues:  Soft tissue hematomas in the pelvis.  Left body wall stoma with parastomal hernia containing nonobstructed bowel loops.  Ventral hernia repair with mesh.  Unchanged scarring right anterior abdominal wall from prior operative intervention.   Stomach and bowel:  Colonic diverticula.   Bladder:  Layering tiny stones in the urinary bladder.  Urinary bladder wall prominence.   Other findings:  Left renal nonobstructing partly seen stones. IMPRESSION:     1.  Limited due to lack of IV contrast. 2.  Left acetabular fracture involving superior pubic ramus root and inferior pubic ramus fracture. Left sacral ala fracture. 3.  Correlate for cystitis. 4.  Left body wall stoma with parastomal hernia containing nonobstructed bowel loops. 5.  See additional findings above.     Electronically signed by Ken  MD Dandre on 01-25-25 at 0543    CT Cervical Spine Without Contrast    Result Date: 1/25/2025  Patient: GAEL ZARATE  Time Out: 05:17 Exam(s): CT C SPINE EXAM:   CT Cervical Spine Without Intravenous Contrast CLINICAL HISTORY:    Reason for exam: fall. TECHNIQUE:   Axial computed tomography images of the cervical spine without intravenous contrast.  CTDI is 19.63 mGy and DLP is 370.2 mGy-cm.  This CT exam was performed according to the principle of ALARA (As Low As Reasonably Achievable) by using one or more of the following dose reduction techniques: automated exposure control, adjustment of the mA and or kV according to patient size, and or use of iterative reconstruction technique. COMPARISON:   No relevant prior studies available. FINDINGS:   Vertebrae:  No acute fracture.   Discs spinal canal neural foramina:  degenerative changes.   Soft tissues:  No prevertebral swelling. IMPRESSION:       No acute fracture or subluxation.     Electronically signed by Reinier Garcia MD on 01-25-25 at 0517    CT Head Without Contrast    Result Date: 1/25/2025  Patient: GAEL ZARATE  Time Out: 05:13 Exam(s): CT HEAD Without Contrast EXAM:   CT Head Without Intravenous Contrast CLINICAL HISTORY:    Reason for exam: fall. TECHNIQUE:   Axial computed tomography images of the head brain without intravenous contrast.  CTDI is 55.44 mGy and DLP is 1095.2 mGy-cm.  This CT exam was performed according to the principle of ALARA (As Low As Reasonably Achievable) by using one or more of the following dose reduction techniques: automated exposure control, adjustment of the mA and or kV according to patient size, and or use of iterative reconstruction technique. COMPARISON:   No relevant prior studies available. FINDINGS:   Brain:  No hemorrhage or mass effect.  Senescent changes   Ventricles:  No hydrocephalus.   Bones joints:  Unremarkable.   Soft tissues:  Unremarkable.   Sinuses:  No air fluid level.   Mastoid air cells:   Clear. IMPRESSION:       No acute hemorrhage, hydrocephalus, or mass effect.     Electronically signed by Reinier Garcia MD on 01-25-25 at 0513     I ordered the above noted radiological studies. Reviewed by me and discussed with radiologist.  See dictation for official radiology interpretation.      PROCEDURES    Procedures      MEDICATIONS GIVEN IN ER    Medications   sodium chloride 0.9 % flush 10 mL (has no administration in time range)   sodium chloride 0.9 % flush 10 mL (has no administration in time range)   sodium chloride 0.9 % flush 10 mL (has no administration in time range)   sodium chloride 0.9 % infusion 40 mL (has no administration in time range)   acetaminophen (TYLENOL) tablet 650 mg (has no administration in time range)     Or   acetaminophen (TYLENOL) 160 MG/5ML oral solution 650 mg (has no administration in time range)     Or   acetaminophen (TYLENOL) suppository 650 mg (has no administration in time range)   sennosides-docusate (PERICOLACE) 8.6-50 MG per tablet 2 tablet (has no administration in time range)     And   polyethylene glycol (MIRALAX) packet 17 g (has no administration in time range)     And   bisacodyl (DULCOLAX) EC tablet 5 mg (has no administration in time range)     And   bisacodyl (DULCOLAX) suppository 10 mg (has no administration in time range)   ondansetron ODT (ZOFRAN-ODT) disintegrating tablet 4 mg (has no administration in time range)     Or   ondansetron (ZOFRAN) injection 4 mg (has no administration in time range)   calcium carbonate (TUMS) chewable tablet 500 mg (200 mg elemental) (has no administration in time range)   HYDROmorphone (DILAUDID) injection 0.5 mg (has no administration in time range)   HYDROcodone-acetaminophen (NORCO) 5-325 MG per tablet 1 tablet (has no administration in time range)   morphine injection 2 mg (2 mg Intravenous Given 1/25/25 0443)   ondansetron (ZOFRAN) injection 4 mg (4 mg Intravenous Given 1/25/25 0443)   fentaNYL citrate (PF)  (SUBLIMAZE) injection 25 mcg (25 mcg Intravenous Given 1/25/25 0657)         ORDERS PLACED DURING THIS VISIT:  Orders Placed This Encounter   Procedures    CT Pelvis Without Contrast    CT Head Without Contrast    CT Cervical Spine Without Contrast    [place] Draw    Comprehensive Metabolic Panel    Protime-INR    aPTT    Magnesium    Basic Metabolic Panel    CBC (No Diff)    CBC Auto Differential    Manual Differential    NPO Diet NPO Type: Strict NPO    Vital Signs    Intake & Output    Weigh Patient    Oral Care    Saline Lock & Maintain IV Access    Place Sequential Compression Device    Maintain Sequential Compression Device    Neurovascular Checks    Elevate HOB    Code Status and Medical Interventions: CPR (Attempt to Resuscitate); Full Support    [facility] (on-call MD unless specified) Details    Inpatient Orthopedic Surgery Consult    Ortho (on-call MD unless specified)    Inpatient Nephrology Consult    OT Consult: Eval & Treat ADL Performance Below Baseline, Discharge Placement Assessment    PT Consult: Eval & Treat Discharge Placement Assessment, Functional Mobility Below Baseline    Insert Peripheral IV    Insert Peripheral IV    Inpatient Admission    Aspiration Precautions    Fall Precautions    Green Top (Gel)    Lavender Top    Gold Top - SST    Light Blue Top    CBC & Differential         PROGRESS, DATA ANALYSIS, CONSULTS, AND MEDICAL DECISION MAKING    All labs have been independently interpreted by me.  All radiology studies have been reviewed by me and discussed with radiologist dictating the report.   EKG's independently viewed and interpreted by me.  Discussion below represents my analysis of pertinent findings related to patient's condition, differential diagnosis, treatment plan and final disposition.    Differential diagnosis includes but is not limited to, sprain, strain, ICH    Clinical Scores:              ED Course as of 01/25/25 0710   Sat Jan 25, 2025   6091 I have independently reviewed  patient's head CT; my interpretation is no bleed no shift [TJ]   0623 Creatinine(!): 4.90 [TJ]   0623 Potassium: 3.7 [TJ]      ED Course User Index  [TJ] Tab Tavares MD               PPE: The patient wore a mask and I wore an N95 mask throughout the entire patient encounter.       AS OF 07:10 EST VITALS:    BP - 109/51  HR - 66  TEMP - 98.1 °F (36.7 °C)  O2 SATS - 98%        DIAGNOSIS  Final diagnoses:   Closed nondisplaced fracture of left acetabulum, unspecified portion of acetabulum, initial encounter   ESRD (end stage renal disease)         DISPOSITION  ED Disposition       ED Disposition   Decision to Admit    Condition   --    Comment   Level of Care: Telemetry [5]   Diagnosis: Acetabular fracture [080480]   Admitting Physician: TOMMY DELGADILLO [[id number]]   Certification: I Certify That Inpatient Hospital Services Are Medically Necessary For Greater Than 2 Midnights                    Note Disclaimer: At [facility], we believe that sharing information builds trust and better relationships. You are receiving this note because you recently visited [facility]. It is possible you will see health information before a provider has talked with you about it. This kind of information can be easy to misunderstand. To help you fully understand what it means for your health, we urge you to discuss this note with your provider.         Tab Tavares MD  01/25/25 6990

## 2025-01-25 NOTE — ED NOTES
"Nursing report ED to floor  Bill Landry  79 y.o.  male    HPI :  HPI  Stated Reason for Visit: To ER via EMS from home.  C/o fall onto buttocks with possible strike to head on floor.  Pt was attempting to get a shoe out from under a piece of furniture when he lost his balance and fell.  Pt is able to ambulate with assistance.  History Obtained From: patient, EMS    Chief Complaint  Chief Complaint   Patient presents with    Fall    Pain       Admitting doctor:   Daniel Dalton MD    Admitting diagnosis:   The primary encounter diagnosis was Closed nondisplaced fracture of left acetabulum, unspecified portion of acetabulum, initial encounter. A diagnosis of ESRD (end stage renal disease) was also pertinent to this visit.    Code status:   Current Code Status       Date Active Code Status Order ID Comments User Context       1/25/2025 0621 CPR (Attempt to Resuscitate) [id number]  Lisa Yates APRN ED        Question Answer    Code Status (Patient has no pulse and is not breathing) CPR (Attempt to Resuscitate)    Medical Interventions (Patient has pulse or is breathing) Full Support                    Allergies:   Ace inhibitors, Contrast dye (echo or unknown ct/mr), Iodine, Angiotensin receptor blockers, Eliquis [apixaban], Filgrastim, Keflex [cephalexin], and Other    Isolation:   No active isolations    Intake and Output  No intake or output data in the 24 hours ending 01/25/25 0706    Weight:       01/25/25  0352   Weight: 81.2 kg (179 lb)       Most recent vitals:   Vitals:    01/25/25 0352 01/25/25 0429 01/25/25 0437 01/25/25 0446   BP: 110/70  109/51    Pulse: 84 69 67 66   Resp: 16      Temp: 98.1 °F (36.7 °C)      SpO2: 100% (!) 88% 96% 98%   Weight: 81.2 kg (179 lb)      Height: 177.8 cm (70\")          Active LDAs/IV Access:   Lines, Drains & Airways       Active LDAs       Name Placement date Placement time Site Days    Peripheral IV 01/25/25 0433 Anterior;Right Forearm 01/25/25 0433  " Forearm  less than 1    Ileostomy LLQ --  present upon arrival to [facility]  --  LLQ  --                    Labs (abnormal labs have a star):   Labs Reviewed   COMPREHENSIVE METABOLIC PANEL - Abnormal; Notable for the following components:       Result Value    Glucose 150 (*)     BUN 35 (*)     Creatinine 4.90 (*)     Chloride 97 (*)     Calcium 8.5 (*)     Albumin 2.6 (*)     AST (SGOT) 67 (*)     Alkaline Phosphatase 205 (*)     eGFR 11.4 (*)     All other components within normal limits    Narrative:     GFR Categories in Chronic Kidney Disease (CKD)      GFR Category          GFR (mL/min/1.73)    Interpretation  G1                     90 or greater         Normal or high (1)  G2                      60-89                Mild decrease (1)  G3a                   45-59                Mild to moderate decrease  G3b                   30-44                Moderate to severe decrease  G4                    15-29                Severe decrease  G5                    14 or less           Kidney failure          (1)In the absence of evidence of kidney disease, neither GFR category G1 or G2 fulfill the criteria for CKD.    eGFR calculation 2021 CKD-EPI creatinine equation, which does not include race as a factor   PROTIME-INR - Abnormal; Notable for the following components:    Protime 16.6 (*)     INR 1.35 (*)     All other components within normal limits   APTT - Abnormal; Notable for the following components:    PTT 41.2 (*)     All other components within normal limits   MAGNESIUM - Normal   RAINBOW DRAW    Narrative:     The following orders were created for panel order [place] Draw.  Procedure                               Abnormality         Status                     ---------                               -----------         ------                     Green Top (Gel)[[id number]]                                  Final result               Lavender Top[[id number]]                                     Final result                Gold Top - SST[[id number]]                                   Final result               Light Blue Top[[id number]]                                   Final result                 Please view results for these tests on the individual orders.   CBC WITH AUTO DIFFERENTIAL   GREEN TOP   LAVENDER TOP   GOLD TOP - SST   LIGHT BLUE TOP   CBC AND DIFFERENTIAL    Narrative:     The following orders were created for panel order CBC & Differential.  Procedure                               Abnormality         Status                     ---------                               -----------         ------                     CBC Auto Differential[[id number]]                            In process                   Please view results for these tests on the individual orders.       EKG:   No orders to display       Meds given in ED:   Medications   sodium chloride 0.9 % flush 10 mL (has no administration in time range)   sodium chloride 0.9 % flush 10 mL (has no administration in time range)   sodium chloride 0.9 % flush 10 mL (has no administration in time range)   sodium chloride 0.9 % infusion 40 mL (has no administration in time range)   acetaminophen (TYLENOL) tablet 650 mg (has no administration in time range)     Or   acetaminophen (TYLENOL) 160 MG/5ML oral solution 650 mg (has no administration in time range)     Or   acetaminophen (TYLENOL) suppository 650 mg (has no administration in time range)   sennosides-docusate (PERICOLACE) 8.6-50 MG per tablet 2 tablet (has no administration in time range)     And   polyethylene glycol (MIRALAX) packet 17 g (has no administration in time range)     And   bisacodyl (DULCOLAX) EC tablet 5 mg (has no administration in time range)     And   bisacodyl (DULCOLAX) suppository 10 mg (has no administration in time range)   ondansetron ODT (ZOFRAN-ODT) disintegrating tablet 4 mg (has no administration in time range)     Or   ondansetron (ZOFRAN) injection 4 mg (has no administration in time  range)   calcium carbonate (TUMS) chewable tablet 500 mg (200 mg elemental) (has no administration in time range)   HYDROmorphone (DILAUDID) injection 0.5 mg (has no administration in time range)   HYDROcodone-acetaminophen (NORCO) 5-325 MG per tablet 1 tablet (has no administration in time range)   morphine injection 2 mg (2 mg Intravenous Given 1/25/25 0443)   ondansetron (ZOFRAN) injection 4 mg (4 mg Intravenous Given 1/25/25 0443)   fentaNYL citrate (PF) (SUBLIMAZE) injection 25 mcg (25 mcg Intravenous Given 1/25/25 0657)       Imaging results:  CT Pelvis Without Contrast    Result Date: 1/25/2025  Electronically signed by Ken Amos MD on 01-25-25 at 0543    CT Cervical Spine Without Contrast    Result Date: 1/25/2025  Electronically signed by Reinier Garcia MD on 01-25-25 at 0517    CT Head Without Contrast    Result Date: 1/25/2025  Electronically signed by Reinier Garcia MD on 01-25-25 at 0513     Ambulatory status:   - Bed rest.    Social issues:   Social History     Socioeconomic History    Marital status:      Spouse name: Gillian    Number of children: 2    Years of education: College   Tobacco Use    Smoking status: Never     Passive exposure: Never    Smokeless tobacco: Never   Vaping Use    Vaping status: Never Used   Substance and Sexual Activity    Alcohol use: No     Comment: caffeine use: none    Drug use: No    Sexual activity: Defer       Peripheral Neurovascular  Peripheral Neurovascular (Adult)  Peripheral Neurovascular WDL: WDL    Neuro Cognitive  Neuro Cognitive (Adult)  Cognitive/Neuro/Behavioral WDL: WDL    Learning  Learning Assessment  Learning Readiness and Ability: no barriers identified  Education Provided  Person Taught: patient, family member/friend  Teaching Method: verbal instruction  Teaching Focus: symptom/problem overview, diagnostic test  Education Outcome Evaluation: eager to learn    Respiratory  Respiratory WDL  Respiratory WDL: WDL    Abdominal Pain        Pain Assessments  Pain (Adult)  (0-10) Pain Rating: Rest: 8    NIH Stroke Scale       Ken Younger RN Extern  01/25/25 07:06 EST

## 2025-01-25 NOTE — H&P
Patient Name:  Bill Landry  YOB: 1945  MRN:  [id number]  Admit Date:  1/25/2025  Patient Care Team:  Bharathi De La Torre MD as PCP - General (Internal Medicine)  Ken Moseley MD as Consulting Physician (Pulmonary Disease)  Dale Vázquez MD as Cardiologist (Cardiology)  Myra Moore MD as Consulting Physician (Nephrology)  Theo Pacheco MD as Consulting Physician (Hematology and Oncology)  Preston Ureña MD as Consulting Physician (Urology)  Ernesto Ward MD as Consulting Physician (Ophthalmology)  Bill Deal MD as Surgeon (Vascular Surgery)  Dimple Villanueva DPM as Consulting Physician (Podiatry)  Boom Olivares II, MD as Referring Physician (Vascular Surgery)      Subjective   History Present Illness     Chief Complaint   Patient presents with    Fall    Pain       Mr. Landry is a 79 y.o. male with a history of ESRD on HD, atrial fibrillation, aortic stenosis, crohn's disease s/p ileostomy, cirrhosis, chronic leukopenia, thrombocytopenia, anemia of chronic disease and other medical conditions as noted in history that presents to [facility] after suffering mechanical fall at home prior to arrival. Patient states that he was bending down to help his wife with her shoes and he lost his balance when reaching for them, which caused him to fall, subsequently leading to pain in back, hip and buttocks region. He denies prodromal symptoms of dizziness, lightheadedness, chest pain, palpitations, dyspnea or vision changes. He endorses associated feelings of generalized weakness, but denies additional acute complaints. After falling, he was unable to get up on his own and his sons were called to come help him EMS was called and he was brought to hospital. CT head/cervical spine negative for acute hemorrhage, hydrocephalus, mass effect or fracture. CT Pelvis showed left acetabular fracture involving superior pubic ramus root and inferior pubic ramus  fracture. He is being admitted for further evaluation and management.    Personal History     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    Aortic stenosis     Bicuspid aortic valve 07/20/2022    Calculus of kidney     Chronic leukopenia and thrombocytopenia     Cirrhosis of liver without ascites 07/24/2017    Congestive splenomegaly 07/24/2017    Crohn disease     with ileostomy    Decreased white blood cell count, unspecified     Diverticula of colon     ESRD on hemodialysis 04/05/2018    M-W-F FRESENIUS    Fatty liver disease, nonalcoholic     Fistula 04/05/2018    Other Specified Complication    Gastrointestinal hemorrhage, unspecified     GERD (gastroesophageal reflux disease)     GI bleed     Glaucoma     H/O Gallstone pancreatitis     H/O Pericardial effusion     H/O sinus bradycardia     History of hypertension     resolved    History of UTI     Hx of renal calculi     Ileostomy present     Iron deficiency     Leakage of heart valve prosthesis, subsequent encounter     MGUS (monoclonal gammopathy of unknown significance)     TATE (obstructive sleep apnea)     Other hemoglobinopathies     Pericardial effusion (noninflammatory)     Permanent atrial fibrillation     Scarlet fever, uncomplicated     Stenosis of other vascular prosthetic devices, implants and grafts, initial encounter 02/14/2022    Systemic lupus erythematosus, unspecified     Thrombocytopenia     undpecified    Type 2 diabetes mellitus     CURRENTLY TAKES NO MED    Urinary retention     Post-OP     Past Surgical History:   Procedure Laterality Date    APPENDECTOMY  06/1968    ARTERIOVENOUS FISTULA/SHUNT SURGERY Left 08/08/2017    Procedure: LEFT BRACHIAL CEPHALIC AV FISTULA FORMATION WITH CEPHALIC VEIN TRANSPOSITION ;  Surgeon: Bill Deal,  MD;  Location: [facility] MAIN OR;  Service:     ARTERIOVENOUS FISTULA/SHUNT SURGERY Left 05/27/2022    Procedure: OPEN REVISION LEFT ARTERIOVENOUS INTERPOSITION GRAFT PLACEMENT;  Surgeon: Bill Deal MD;  Location: [facility] MAIN OR;  Service: Vascular;  Laterality: Left;    ARTERIOVENOUS FISTULA/SHUNT SURGERY Left 11/19/2024    Procedure: LEFT ARTERIOVENOUS SHUNT GRAFT REVISION;  Surgeon: Bill Deal MD;  Location: [facility] MAIN OR;  Service: Vascular;  Laterality: Left;    ARTERIOVENOUS FISTULA/SHUNT SURGERY W/ HEMODIALYSIS CATHETER INSERTION Left 02/15/2022    Procedure: OPEN LEFT ARM ARTERIOVENOUS FISTULA THROMBECTOMY WITH CEPHALIC VEIN ARTHROPLASTY AND STENT/GRAFT PLACEMENT;  Surgeon: Bill Deal MD;  Location: [facility] OR 18/19;  Service: Vascular;  Laterality: Left;    CATARACT EXTRACTION WITH INTRAOCULAR LENS IMPLANT Bilateral 2004    CHOLECYSTECTOMY  2011    COLECTOMY PARTIAL / TOTAL      History of inflammatory bowel disease with status post colectomy with ileostomy many years ago in the [facility],  18 YEARS OF AGE    COLON SURGERY      Colon resection with colostomy    COLON SURGERY      COLONOSCOPY  01/2018    CYSTOSCOPY LITHOLAPAXY BLADDER STONE EXTRACTION      ENDOSCOPY  01/16/2015    gastritis    ENDOSCOPY  01/17/2018    Procedure: ESOPHAGOGASTRODUODENOSCOPY;  Surgeon: Warner Neville MD;  Location: [facility] ENDOSCOPY;  Service:     ILEOSCOPY  01/16/2015    normal    ILEOSCOPY N/A 01/17/2018    Procedure: ILEOSCOPY;  Surgeon: Warner Neville MD;  Location: [facility] ENDOSCOPY;  Service:     ILEOSTOMY  12/1968    loop ostomy bypass    INCISION AND DRAINAGE ARM Left 10/27/2023    Procedure: LEFT ARM INCISION AND DRAINAGE;  Surgeon: Bill Deal MD;  Location: [facility] MAIN OR;  Service: Vascular;  Laterality: Left;    INSERTION HEMODIALYSIS CATHETER Right 11/13/2024    Procedure: Tunnelled HEMODIALYSIS CATHETER INSERTION;  Surgeon: Ben Barth MD;  Location: [facility] OR;   Service: Vascular;  Laterality: Right;    OTHER SURGICAL HISTORY  2009    kidney stones x3    PSEUDOANEURYSM REPAIR Left 10/27/2023    TONSILLECTOMY  1950     Family History   Problem Relation Age of Onset    Heart failure Mother 78    Hypertension Mother     Heart disease Mother     Hyperlipidemia Mother     Hypertension Father     Other Father 82        MVI    Malig Hyperthermia Neg Hx      Social History     Tobacco Use    Smoking status: Never     Passive exposure: Never    Smokeless tobacco: Never   Vaping Use    Vaping status: Never Used   Substance Use Topics    Alcohol use: No     Comment: caffeine use: none    Drug use: No     No current facility-administered medications on file prior to encounter.     Current Outpatient Medications on File Prior to Encounter   Medication Sig Dispense Refill    alfuzosin (UROXATRAL) 10 MG 24 hr tablet Take 1 tablet by mouth Every Other Day. Does not take on Sundays Tuesdays or Thursdays   Indications: Benign Enlargement of Prostate      aspirin 81 MG tablet Take 1 tablet by mouth Every Night. INSTRUCTED TO CONTINUE THIS FOR SURGERY PER DR. BERMEO'S OFFICE  Indications: Disease involving Lipid Deposits in the Arteries      B Complex-C-Folic Acid (Umm-Peter) tablet Take 1 tablet by mouth Daily.      Cholecalciferol 25 MCG (1000 UT) tablet Take 1 tablet by mouth Daily. Indications: Vitamin D Deficiency      famotidine (Pepcid AC) 10 MG tablet Take 1 tablet by mouth Daily As Needed for Heartburn (take as needed after HD on dialysis days). Indications: Heartburn      Iron Sucrose (VENOFER IV) Infuse 1 dose into a venous catheter As Needed.      latanoprost (XALATAN) 0.005 % ophthalmic solution Administer 1 drop to both eyes Every Night. Indications: Wide-Angle Glaucoma      lidocaine-prilocaine (EMLA) 2.5-2.5 % cream Apply 1 Application topically to the appropriate area as directed As Needed. Kyricr-Xgfjpsnfc-Zilzlu:  ONE HOUR PRIOR TO DIALYSIS  Indications: Anesthesia to a  Specific Part of the Body  3    Loratadine 10 MG capsule Take 1 capsule by mouth Daily. Indications: Hayfever      Methoxy PEG-Epoetin Beta (MIRCERA IJ) Inject 1 dose as directed Every 30 (Thirty) Days.      mupirocin (BACTROBAN) 2 % ointment Apply 1 Application topically to the appropriate area as directed 3 (Three) Times a Day. Indications: Wound Infection 22 g 1    sodium chloride 0.65 % nasal spray Administer 2 sprays into the nostril(s) as directed by provider Every Night.      Sucroferric Oxyhydroxide (Velphoro) 500 MG chewable tablet Chew 2 tablets Daily As Needed (phopurus def). Take 2 tablets by  mouth three times a day.  Indications: Hyperphosphatemia D/T Renal Insufficiency       Allergies   Allergen Reactions    Ace Inhibitors Other (See Comments)     RENAL FAILURE/ raised creatinine    Contrast Dye (Echo Or Unknown Ct/Mr) Other (See Comments) and Anaphylaxis     RENAL FAILURE    Iodine Anaphylaxis    Angiotensin Receptor Blockers Swelling    Eliquis [Apixaban] Arrhythmia     BLEEDING ISSUES; PT CANNOT TAKE ANY ANTICOAGULANTS DUE TO LOW PLATELETS EXCEPT ASPIRIN      Filgrastim GI Intolerance and Confusion     Chest pain    Keflex [Cephalexin] Diarrhea     RENAL FAILURE    Other Angioedema     IVP Dye       Objective    Objective     Vital Signs  Temp:  [98.1 °F (36.7 °C)] 98.1 °F (36.7 °C)  Heart Rate:  [66-84] 66  Resp:  [16] 16  BP: (109-110)/(51-70) 109/51  SpO2:  [88 %-100 %] 98 %  on  Flow (L/min) (Oxygen Therapy):  [1.5] 1.5;   Device (Oxygen Therapy): nasal cannula  Body mass index is 25.68 kg/m².    Physical Exam  Vitals and nursing note reviewed.   Constitutional:       General: He is awake. He is not in acute distress.  HENT:      Head: Atraumatic.   Cardiovascular:      Rate and Rhythm: Normal rate.      Pulses: Normal pulses.      Heart sounds: Normal heart sounds.   Pulmonary:      Effort: Pulmonary effort is normal. No respiratory distress.      Breath sounds: Normal breath sounds.    Abdominal:      Palpations: Abdomen is soft.      Tenderness: There is no abdominal tenderness. There is no guarding.      Comments: Ileostomy present   Musculoskeletal:         General: Tenderness present.   Skin:     General: Skin is warm and dry.   Neurological:      Mental Status: He is alert.         Results Review:  I reviewed the patient's new clinical results.  I reviewed the patient's new imaging results and agree with the interpretation.  I reviewed the patient's other test results and agree with the interpretation  I personally viewed and interpreted the patient's EKG/Telemetry data  Discussed with ED provider.    Lab Results (last 24 hours)       Procedure Component Value Units Date/Time    Comprehensive Metabolic Panel [[id number]]  (Abnormal) Collected: 01/25/25 0432    Specimen: Blood from Arm, Right Updated: 01/25/25 0610     Glucose 150 mg/dL      BUN 35 mg/dL      Creatinine 4.90 mg/dL      Sodium 137 mmol/L      Potassium 3.7 mmol/L      Chloride 97 mmol/L      CO2 28.1 mmol/L      Calcium 8.5 mg/dL      Total Protein 7.1 g/dL      Albumin 2.6 g/dL      ALT (SGPT) 32 U/L      AST (SGOT) 67 U/L      Alkaline Phosphatase 205 U/L      Total Bilirubin 0.8 mg/dL      Globulin 4.5 gm/dL      A/G Ratio 0.6 g/dL      BUN/Creatinine Ratio 7.1     Anion Gap 11.9 mmol/L      eGFR 11.4 mL/min/1.73     Narrative:      GFR Categories in Chronic Kidney Disease (CKD)      GFR Category          GFR (mL/min/1.73)    Interpretation  G1                     90 or greater         Normal or high (1)  G2                      60-89                Mild decrease (1)  G3a                   45-59                Mild to moderate decrease  G3b                   30-44                Moderate to severe decrease  G4                    15-29                Severe decrease  G5                    14 or less           Kidney failure          (1)In the absence of evidence of kidney disease, neither GFR category G1 or G2 fulfill the  criteria for CKD.    eGFR calculation 2021 CKD-EPI creatinine equation, which does not include race as a factor    Protime-INR [[id number]]  (Abnormal) Collected: 01/25/25 0432    Specimen: Blood from Arm, Right Updated: 01/25/25 0559     Protime 16.6 Seconds      INR 1.35    aPTT [[id number]]  (Abnormal) Collected: 01/25/25 0432    Specimen: Blood from Arm, Right Updated: 01/25/25 0559     PTT 41.2 seconds     Magnesium [[id number]]  (Normal) Collected: 01/25/25 0432    Specimen: Blood from Arm, Right Updated: 01/25/25 0610     Magnesium 1.9 mg/dL             Imaging Results (Last 24 Hours)       Procedure Component Value Units Date/Time    CT Pelvis Without Contrast [[id number]] Collected: 01/25/25 0543     Updated: 01/25/25 0543    Narrative:        Patient: GAEL ZARATE  Time Out: 05:43  Exam(s): CT PELVIS Without Contrast     EXAM:    CT Pelvis Without Intravenous Contrast    CLINICAL HISTORY:     Reason for exam: fall, left hip pain.    TECHNIQUE:    Axial computed tomography images of the pelvis without intravenous   contrast.  CTDI is 7.54 mGy and DLP is 248.2 mGy-cm.  This CT exam was   performed according to the principle of ALARA (As Low As Reasonably   Achievable) by using one or more of the following dose reduction   techniques: automated exposure control, adjustment of the mA and or kV   according to patient size, and or use of iterative reconstruction   technique.    COMPARISON:    7 17 2017    FINDINGS:    Limitations:  Limited due to lack of IV contrast.    Bones joints:  Left acetabular fracture involving superior pubic ramus   root and inferior pubic ramus fracture.  Left sacral ala fracture.  Bones   otherwise grossly intact.  Degenerative spine.    Soft tissues:  Soft tissue hematomas in the pelvis.  Left body wall   stoma with parastomal hernia containing nonobstructed bowel loops.    Ventral hernia repair with mesh.  Unchanged scarring right anterior   abdominal wall from prior operative  intervention.    Stomach and bowel:  Colonic diverticula.    Bladder:  Layering tiny stones in the urinary bladder.  Urinary bladder   wall prominence.    Other findings:  Left renal nonobstructing partly seen stones.    IMPRESSION:       1.  Limited due to lack of IV contrast.  2.  Left acetabular fracture involving superior pubic ramus root and   inferior pubic ramus fracture. Left sacral ala fracture.    3.  Correlate for cystitis.  4.  Left body wall stoma with parastomal hernia containing nonobstructed   bowel loops.  5.  See additional findings above.      Impression:          Electronically signed by Ken Amos MD on 01-25-25 at 0543    CT Cervical Spine Without Contrast [[id number]] Collected: 01/25/25 0518     Updated: 01/25/25 0518    Narrative:        Patient: GAEL ZARATE  Time Out: 05:17  Exam(s): CT C SPINE     EXAM:    CT Cervical Spine Without Intravenous Contrast    CLINICAL HISTORY:     Reason for exam: fall.    TECHNIQUE:    Axial computed tomography images of the cervical spine without   intravenous contrast.  CTDI is 19.63 mGy and DLP is 370.2 mGy-cm.  This   CT exam was performed according to the principle of ALARA (As Low As   Reasonably Achievable) by using one or more of the following dose   reduction techniques: automated exposure control, adjustment of the mA   and or kV according to patient size, and or use of iterative   reconstruction technique.    COMPARISON:    No relevant prior studies available.    FINDINGS:    Vertebrae:  No acute fracture.    Discs spinal canal neural foramina:  degenerative changes.    Soft tissues:  No prevertebral swelling.    IMPRESSION:         No acute fracture or subluxation.      Impression:          Electronically signed by Reinier Garcia MD on 01-25-25 at 0517    CT Head Without Contrast [[id number]] Collected: 01/25/25 0514     Updated: 01/25/25 0514    Narrative:        Patient: GAEL ZARATE  Time Out: 05:13  Exam(s): CT HEAD Without Contrast      EXAM:    CT Head Without Intravenous Contrast    CLINICAL HISTORY:     Reason for exam: fall.    TECHNIQUE:    Axial computed tomography images of the head brain without intravenous   contrast.  CTDI is 55.44 mGy and DLP is 1095.2 mGy-cm.  This CT exam was   performed according to the principle of ALARA (As Low As Reasonably   Achievable) by using one or more of the following dose reduction   techniques: automated exposure control, adjustment of the mA and or kV   according to patient size, and or use of iterative reconstruction   technique.    COMPARISON:    No relevant prior studies available.    FINDINGS:    Brain:  No hemorrhage or mass effect.  Senescent changes    Ventricles:  No hydrocephalus.    Bones joints:  Unremarkable.    Soft tissues:  Unremarkable.    Sinuses:  No air fluid level.    Mastoid air cells:  Clear.    IMPRESSION:         No acute hemorrhage, hydrocephalus, or mass effect.      Impression:          Electronically signed by Reinier Garcia MD on 01-25-25 at 0513            Results for orders placed during the hospital encounter of 09/05/24    Adult Transthoracic Echo Complete W/ Cont if Necessary Per Protocol    Interpretation Summary    Left ventricular systolic function is normal. Calculated left ventricular EF = 68.8% Normal left ventricular cavity size and wall thickness noted. All left ventricular wall segments contract normally. Left ventricular diastolic function was indeterminate.    The aortic valve is abnormal in structure. There is severe thickening of the aortic valve. The aortic valve appears trileaflet. No significant aortic valve regurgitation is present. Mild to moderate aortic valve stenosis is present. Aortic valve area is 1.33 cm2. Peak velocity of the flow distal to the aortic valve is 279.9 cm/s. Aortic valve maximum pressure gradient is 31.3 mmHg. Aortic valve mean pressure gradient is 17.8 mmHg. Aortic valve dimensionless index is 0.30 .      No orders to display         Assessment/Plan     Active Hospital Problems    Diagnosis  POA    **Acetabular fracture [S32.409A]  Yes    Anemia [D64.9]  Yes    Cirrhosis of liver [K74.60]  Yes    Atrial fibrillation [I48.91]  Yes    Fall [W19.XXXA]  Yes    Ileostomy present [Z93.2]  Not Applicable    Weakness [R53.1]  Yes    Hypertension [I10]  Yes    Pain in left hip [M25.552]  Yes    Acute pain due to trauma [G89.11]  Yes    Transaminitis [R74.01]  Yes    Hypoxemia [R09.02]  Yes    Hyperglycemia [R73.9]  Yes    Nonrheumatic aortic valve stenosis [I35.0]  Yes    ESRD on hemodialysis [N18.6, Z99.2]  Not Applicable    Crohn's disease, unspecified, without complications [K50.90]  Yes    Thrombocytopenia [D69.6]  Yes    Chronic leukopenia [D72.819]  Yes      Resolved Hospital Problems   No resolved problems to display.     Mr. Landry is a 79 y.o. male with a history of ESRD on HD, atrial fibrillation, aortic stenosis, crohn's disease s/p ileostomy, cirrhosis, chronic leukopenia, thrombocytopenia, anemia of chronic disease and other medical conditions as noted in history that presents to [facility] after suffering mechanical fall at home prior to arrival.     Mechanical fall  Acute pain due to trauma  Acetabular fracture  Generalized weakness  - CT head/cervical spine negative for acute hemorrhage, hydrocephalus, mass effect or fracture. CT Pelvis showed left acetabular fracture involving superior pubic ramus root and inferior pubic ramus fracture.  - Orthopedic surgery consult.  - PRN medications for pain, PT/OT, fall precautions.    ESRD on HD  - Nephrology consulted and following. Defer management decisions to them, greatly appreciate their help.    Chronic thrombocytopenia, leukopenia, anemia  - Follows with [facility], patient and family would like to be seen by them while here, will place consult.  - Order repeat CBC in AM for reassessment. Transfuse for hemoglobin <7.    Hypoxemia  - Noted transiently on arrival, O2 sat  88%, patient without dyspnea or acute respiratory complaints, monitor respiratory status to guide management.    Atrial fibrillation  - Not on rate control medication, not on AC due to history of bleeding. Monitor on telemetry.    Hypertension  - BP acceptable, trend BP to guide management.    Cirrhosis  Transaminitis  - No evidence of decompensation, LFT mildly elevated, but abdominal exam benign, monitor for now, trend with CBC.    Hyperglycemia  - A1c 5.40% (09/17/24), repeat A1c in AM.      I discussed the patient's findings and my recommendations with patient, family, and nursing staff.  Home medication list reviewed and verified, medications reordered as appropriate.    VTE Prophylaxis - SCDs.  Code Status - Full code.       Buddy Lagos DO  [facility]  01/25/25  06:47 EST

## 2025-01-26 LAB
ANION GAP SERPL CALCULATED.3IONS-SCNC: 10 MMOL/L (ref 5–15)
BUN SERPL-MCNC: 51 MG/DL (ref 8–23)
BUN/CREAT SERPL: 7.8 (ref 7–25)
CALCIUM SPEC-SCNC: 7.7 MG/DL (ref 8.6–10.5)
CHLORIDE SERPL-SCNC: 97 MMOL/L (ref 98–107)
CO2 SERPL-SCNC: 28 MMOL/L (ref 22–29)
CREAT SERPL-MCNC: 6.58 MG/DL (ref 0.76–1.27)
DEPRECATED RDW RBC AUTO: 50.9 FL (ref 37–54)
EGFRCR SERPLBLD CKD-EPI 2021: 8 ML/MIN/1.73
ERYTHROCYTE [DISTWIDTH] IN BLOOD BY AUTOMATED COUNT: 13.8 % (ref 12.3–15.4)
GLUCOSE SERPL-MCNC: 98 MG/DL (ref 65–99)
HBA1C MFR BLD: 4.8 % (ref 4.8–5.6)
HCT VFR BLD AUTO: 27.2 % (ref 37.5–51)
HGB BLD-MCNC: 8.5 G/DL (ref 13–17.7)
MCH RBC QN AUTO: 31.8 PG (ref 26.6–33)
MCHC RBC AUTO-ENTMCNC: 31.3 G/DL (ref 31.5–35.7)
MCV RBC AUTO: 101.9 FL (ref 79–97)
PLATELET # BLD AUTO: 49 10*3/MM3 (ref 140–450)
PMV BLD AUTO: 10 FL (ref 6–12)
POTASSIUM SERPL-SCNC: 4.8 MMOL/L (ref 3.5–5.2)
RBC # BLD AUTO: 2.67 10*6/MM3 (ref 4.14–5.8)
SODIUM SERPL-SCNC: 135 MMOL/L (ref 136–145)
WBC NRBC COR # BLD AUTO: 4.34 10*3/MM3 (ref 3.4–10.8)

## 2025-01-26 PROCEDURE — 97166 OT EVAL MOD COMPLEX 45 MIN: CPT

## 2025-01-26 PROCEDURE — 99222 1ST HOSP IP/OBS MODERATE 55: CPT | Performed by: INTERNAL MEDICINE

## 2025-01-26 PROCEDURE — 85027 COMPLETE CBC AUTOMATED: CPT | Performed by: STUDENT IN AN ORGANIZED HEALTH CARE EDUCATION/TRAINING PROGRAM

## 2025-01-26 PROCEDURE — 97530 THERAPEUTIC ACTIVITIES: CPT

## 2025-01-26 PROCEDURE — 83036 HEMOGLOBIN GLYCOSYLATED A1C: CPT | Performed by: STUDENT IN AN ORGANIZED HEALTH CARE EDUCATION/TRAINING PROGRAM

## 2025-01-26 PROCEDURE — 80048 BASIC METABOLIC PNL TOTAL CA: CPT | Performed by: STUDENT IN AN ORGANIZED HEALTH CARE EDUCATION/TRAINING PROGRAM

## 2025-01-26 PROCEDURE — 97162 PT EVAL MOD COMPLEX 30 MIN: CPT

## 2025-01-26 PROCEDURE — 36415 COLL VENOUS BLD VENIPUNCTURE: CPT | Performed by: STUDENT IN AN ORGANIZED HEALTH CARE EDUCATION/TRAINING PROGRAM

## 2025-01-26 RX ORDER — LATANOPROST 50 UG/ML
1 SOLUTION/ DROPS OPHTHALMIC NIGHTLY
Status: DISCONTINUED | OUTPATIENT
Start: 2025-01-26 | End: 2025-01-31 | Stop reason: HOSPADM

## 2025-01-26 RX ADMIN — Medication 10 ML: at 20:36

## 2025-01-26 RX ADMIN — ASPIRIN 81 MG: 81 TABLET, DELAYED RELEASE ORAL at 20:36

## 2025-01-26 RX ADMIN — Medication 10 ML: at 08:44

## 2025-01-26 RX ADMIN — LATANOPROST 1 DROP: 50 SOLUTION/ DROPS OPHTHALMIC at 22:10

## 2025-01-26 RX ADMIN — ACETAMINOPHEN 650 MG: 325 TABLET, FILM COATED ORAL at 09:29

## 2025-01-26 NOTE — PROGRESS NOTES
Name: Bill Landry ADMIT: 2025   : 1945  PCP: Bharathi De La Torre MD    MRN: [id number] LOS: 1 days   AGE/SEX: 79 y.o. male  ROOM: [facility]     Subjective   Subjective   Patient awake and resting in bed, family present at bedside, pain mostly controlled, no additional acute complaints.       Objective   Objective   Vital Signs  Temp:  [98.1 °F (36.7 °C)-98.5 °F (36.9 °C)] 98.4 °F (36.9 °C)  Heart Rate:  [65-74] 65  Resp:  [18] 18  BP: ()/(42-62) 96/62  SpO2:  [93 %-98 %] 98 %  on  Flow (L/min) (Oxygen Therapy):  [1] 1;   Device (Oxygen Therapy): nasal cannula  Body mass index is 25.69 kg/m².  Physical Exam  Vitals and nursing note reviewed.   Constitutional:       General: He is awake. He is not in acute distress.     Comments: Chronically ill appearing   Cardiovascular:      Rate and Rhythm: Normal rate.      Pulses: Normal pulses.      Heart sounds: Normal heart sounds.   Pulmonary:      Effort: Pulmonary effort is normal. No respiratory distress.      Breath sounds: Normal breath sounds.   Abdominal:      Palpations: Abdomen is soft.      Tenderness: There is no abdominal tenderness.      Comments: Ileostomy present   Musculoskeletal:         General: Tenderness present.   Skin:     General: Skin is warm and dry.   Neurological:      Mental Status: He is alert.   Psychiatric:         Behavior: Behavior is cooperative.       Results Review     I reviewed the patient's new clinical results.  Results from last 7 days   Lab Units 25  0432 25  0000   WBC 10*3/mm3 4.34 3.61  --    HEMOGLOBIN g/dL 8.5* 10.1* 10.5*  31.5*   PLATELETS 10*3/mm3 49* 54*  --      Results from last 7 days   Lab Units 25  0432   SODIUM mmol/L 135* 137   POTASSIUM mmol/L 4.8 3.7   CHLORIDE mmol/L 97* 97*   CO2 mmol/L 28.0 28.1   BUN mg/dL 51* 35*   CREATININE mg/dL 6.58* 4.90*   GLUCOSE mg/dL 98 150*   EGFR mL/min/1.73 8.0* 11.4*     Results from last 7 days   Lab Units  01/25/25  0432   ALBUMIN g/dL 2.6*   BILIRUBIN mg/dL 0.8   ALK PHOS U/L 205*   AST (SGOT) U/L 67*   ALT (SGPT) U/L 32     Results from last 7 days   Lab Units 01/26/25  0411 01/25/25  0432   CALCIUM mg/dL 7.7* 8.5*   ALBUMIN g/dL  --  2.6*   MAGNESIUM mg/dL  --  1.9       Hemoglobin A1C   Date/Time Value Ref Range Status   01/26/2025 0411 4.80 4.80 - 5.60 % Final       CT Pelvis Without Contrast    Result Date: 1/25/2025  Electronically signed by Ken Amos MD on 01-25-25 at 0543    CT Cervical Spine Without Contrast    Result Date: 1/25/2025  Electronically signed by Reinier Garcia MD on 01-25-25 at 0517    CT Head Without Contrast    Result Date: 1/25/2025  Electronically signed by Reinier Garcia MD on 01-25-25 at 0513     I have personally reviewed all medications:  Scheduled Medications  aspirin, 81 mg, Oral, Nightly  sodium chloride, 10 mL, Intravenous, Q12H    Infusions   Diet  Diet: Cardiac, Diabetic, Renal; Healthy Heart (2-3 Na+); Consistent Carbohydrate; Low Potassium; Fluid Consistency: Thin (IDDSI 0)    I have personally reviewed:  [x]  Laboratory   []  Microbiology   []  Radiology   [x]  EKG/Telemetry  []  Cardiology/Vascular   []  Pathology    []  Records       Assessment/Plan     Active Hospital Problems    Diagnosis  POA    **Acetabular fracture [S32.409A]  Yes    Anemia [D64.9]  Yes    Cirrhosis of liver [K74.60]  Yes    Atrial fibrillation [I48.91]  Yes    Fall [W19.XXXA]  Yes    Ileostomy present [Z93.2]  Not Applicable    Weakness [R53.1]  Yes    Hypertension [I10]  Yes    Pain in left hip [M25.552]  Yes    Acute pain due to trauma [G89.11]  Yes    Transaminitis [R74.01]  Yes    Hypoxemia [R09.02]  Yes    Hyperglycemia [R73.9]  Yes    Nonrheumatic aortic valve stenosis [I35.0]  Yes    ESRD on hemodialysis [N18.6, Z99.2]  Not Applicable    Crohn's disease, unspecified, without complications [K50.90]  Yes    Thrombocytopenia [D69.6]  Yes    Chronic leukopenia [D72.819]  Yes      Resolved  Hospital Problems   No resolved problems to display.     Mr. Landry is a 79 y.o. male with a history of ESRD on HD, atrial fibrillation, aortic stenosis, crohn's disease s/p ileostomy, cirrhosis, chronic leukopenia, thrombocytopenia, anemia of chronic disease and other medical conditions as noted in history that presents to [facility] after suffering mechanical fall at home prior to arrival.      Mechanical fall  Acute pain due to trauma  Acetabular fracture  Generalized weakness  - CT head/cervical spine negative for acute hemorrhage, hydrocephalus, mass effect or fracture. CT Pelvis showed left acetabular fracture involving superior pubic ramus root and inferior pubic ramus fracture.  - Orthopedic surgery consulted, no acute surgical intervention warranted, recommending conservative measures.  - PRN medications for pain, PT/OT, fall precautions.     ESRD on HD  - Nephrology consulted and following. Defer management decisions to them, greatly appreciate their help.     Chronic thrombocytopenia, leukopenia, anemia  - Follows with [facility], they have been consulted. Hemoglobin and platelets both worse on most recent labs. Follow up with Hematology.   - Order repeat CBC in AM for reassessment. Transfuse for hemoglobin <7.     Hypoxemia  - Noted transiently on arrival, O2 sat 88%, patient without dyspnea or acute respiratory complaints, monitor respiratory status to guide management.     Atrial fibrillation  - Not on rate control medication, not on AC due to history of bleeding. Monitor on telemetry.     Hypertension  - BP acceptable, trend BP to guide management.     Cirrhosis  Transaminitis  - No evidence of decompensation, LFT mildly elevated, but abdominal exam benign, monitor for now, trend with CMP.     Hyperglycemia  - A1c 5.40% (09/17/24), repeat A1c here 4.80%. Monitor.      SCDs for DVT prophylaxis.  Full code.  Discussed with patient and nursing staff.  Anticipate discharge to SNU facility  once arrangements have been made. And cleared by consultants.  Expected discharge date/ time has not been documented.      Buddy Lagos DO  [facility]  01/26/25

## 2025-01-26 NOTE — CONSULTS
.     REASON FOR CONSULTATION:   Thrombocytopenia  Provide an opinion on any further workup or treatment                             REQUESTING PHYSICIAN: Buddy Lagos DO  RECORDS OBTAINED:  Records of the patient's history including those from the electronic medical record were reviewed and summarized in detail.    HISTORY OF PRESENT ILLNESS:  The patient is a 79 y.o. year old male  who is here for follow-up with the above-mentioned history.    Patient follows with Dr. Pacheco in our office, last seen by him on 9/24/2024.  Also seen by Dr. Nicolas of our practice as an inpatient, 11/14/2024.  ESRD on hemodialysis.  Pancytopenia thought to be due to splenomegaly and cirrhosis.    Mechanical fall at home left acetabular fracture.  Dr. Roger Evans is evaluated and feels the patient is stable enough to bear weight using a walker.  He recommended a walker for 4 to 6 weeks and then may discontinue it with physical therapy.  Okay for weightbearing as tolerated and to begin mobilization immediately.  He plans no orthopedic follow-up because the fracture cannot be seen on x-ray.  He states it will take 2 to 3 months for the fracture to fully heal and the pain to go away and 3 to 6 months after that to regain all strength and mobility.  He stated he would be happy to see the patient in the office for follow-up if the patient would like but it would not be necessary if patient is progressing.    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    Aortic stenosis     Bicuspid aortic valve 07/20/2022    Calculus of kidney     Chronic leukopenia and thrombocytopenia     Cirrhosis of liver without ascites 07/24/2017    Congestive splenomegaly 07/24/2017    Crohn disease     with ileostomy    Decreased white blood cell count,  unspecified     Diverticula of colon     ESRD on hemodialysis 04/05/2018    M-W-F [facility]    Fatty liver disease, nonalcoholic     Fistula 04/05/2018    Other Specified Complication    Gastrointestinal hemorrhage, unspecified     GERD (gastroesophageal reflux disease)     GI bleed     Glaucoma     H/O Gallstone pancreatitis     H/O Pericardial effusion     H/O sinus bradycardia     History of hypertension     resolved    History of UTI     Hx of renal calculi     Ileostomy present     Iron deficiency     Leakage of heart valve prosthesis, subsequent encounter     MGUS (monoclonal gammopathy of unknown significance)     TATE (obstructive sleep apnea)     Other hemoglobinopathies     Pericardial effusion (noninflammatory)     Permanent atrial fibrillation     Scarlet fever, uncomplicated     Stenosis of other vascular prosthetic devices, implants and grafts, initial encounter 02/14/2022    Systemic lupus erythematosus, unspecified     Thrombocytopenia     undpecified    Type 2 diabetes mellitus     CURRENTLY TAKES NO MED    Urinary retention     Post-OP     Past Surgical History:   Procedure Laterality Date    APPENDECTOMY  06/1968    ARTERIOVENOUS FISTULA/SHUNT SURGERY Left 08/08/2017    Procedure: LEFT BRACHIAL CEPHALIC AV FISTULA FORMATION WITH CEPHALIC VEIN TRANSPOSITION ;  Surgeon: Bill Deal MD;  Location: [facility] OR;  Service:     ARTERIOVENOUS FISTULA/SHUNT SURGERY Left 05/27/2022    Procedure: OPEN REVISION LEFT ARTERIOVENOUS INTERPOSITION GRAFT PLACEMENT;  Surgeon: Bill Deal MD;  Location: [facility] OR;  Service: Vascular;  Laterality: Left;    ARTERIOVENOUS FISTULA/SHUNT SURGERY Left 11/19/2024    Procedure: LEFT ARTERIOVENOUS SHUNT GRAFT REVISION;  Surgeon: Bill Deal MD;  Location: [facility] OR;  Service: Vascular;  Laterality: Left;    ARTERIOVENOUS FISTULA/SHUNT SURGERY W/ HEMODIALYSIS CATHETER INSERTION Left 02/15/2022    Procedure: OPEN LEFT ARM ARTERIOVENOUS FISTULA  THROMBECTOMY WITH CEPHALIC VEIN ARTHROPLASTY AND STENT/GRAFT PLACEMENT;  Surgeon: Bill Deal MD;  Location: [facility] HYBRID OR 18/19;  Service: Vascular;  Laterality: Left;    CATARACT EXTRACTION WITH INTRAOCULAR LENS IMPLANT Bilateral 2004    CHOLECYSTECTOMY  2011    COLECTOMY PARTIAL / TOTAL      History of inflammatory bowel disease with status post colectomy with ileostomy many years ago in the [facility],  18 YEARS OF AGE    COLON SURGERY      Colon resection with colostomy    COLON SURGERY      COLONOSCOPY  01/2018    CYSTOSCOPY LITHOLAPAXY BLADDER STONE EXTRACTION      ENDOSCOPY  01/16/2015    gastritis    ENDOSCOPY  01/17/2018    Procedure: ESOPHAGOGASTRODUODENOSCOPY;  Surgeon: Warner Neville MD;  Location: [facility] ENDOSCOPY;  Service:     ILEOSCOPY  01/16/2015    normal    ILEOSCOPY N/A 01/17/2018    Procedure: ILEOSCOPY;  Surgeon: Warner Neville MD;  Location: [facility] ENDOSCOPY;  Service:     ILEOSTOMY  12/1968    loop ostomy bypass    INCISION AND DRAINAGE ARM Left 10/27/2023    Procedure: LEFT ARM INCISION AND DRAINAGE;  Surgeon: Bill Deal MD;  Location: [facility] MAIN OR;  Service: Vascular;  Laterality: Left;    INSERTION HEMODIALYSIS CATHETER Right 11/13/2024    Procedure: Tunnelled HEMODIALYSIS CATHETER INSERTION;  Surgeon: Ben Barth MD;  Location: [facility] HYBRID OR;  Service: Vascular;  Laterality: Right;    OTHER SURGICAL HISTORY  2009    kidney stones x3    PSEUDOANEURYSM REPAIR Left 10/27/2023    TONSILLECTOMY  1950       MEDICATIONS    Current Facility-Administered Medications:     acetaminophen (TYLENOL) tablet 650 mg, 650 mg, Oral, Q4H PRN, 650 mg at 01/26/25 0929 **OR** acetaminophen (TYLENOL) 160 MG/5ML oral solution 650 mg, 650 mg, Oral, Q4H PRN **OR** acetaminophen (TYLENOL) suppository 650 mg, 650 mg, Rectal, Q4H PRN, Buddy Lagos DO    aspirin EC tablet 81 mg, 81 mg, Oral, Nightly, Buddy Lagos DO, 81 mg at 01/25/25 2035    sennosides-docusate (PERICOLACE)  8.6-50 MG per tablet 2 tablet, 2 tablet, Oral, BID PRN **AND** polyethylene glycol (MIRALAX) packet 17 g, 17 g, Oral, Daily PRN **AND** bisacodyl (DULCOLAX) EC tablet 5 mg, 5 mg, Oral, Daily PRN **AND** bisacodyl (DULCOLAX) suppository 10 mg, 10 mg, Rectal, Daily PRN, Demond, Buddy, DO    calcium carbonate (TUMS) chewable tablet 500 mg (200 mg elemental), 2 tablet, Oral, BID PRN, Demond, Buddy, DO    famotidine (PEPCID) tablet 10 mg, 10 mg, Oral, Daily PRN, Demond, Buddy, DO    HYDROcodone-acetaminophen (NORCO) 5-325 MG per tablet 1 tablet, 1 tablet, Oral, Q6H PRN, Demond, Buddy, DO    HYDROmorphone (DILAUDID) injection 0.5 mg, 0.5 mg, Intravenous, Q2H PRN, Demond, Buddy, DO    ondansetron ODT (ZOFRAN-ODT) disintegrating tablet 4 mg, 4 mg, Oral, Q6H PRN **OR** ondansetron (ZOFRAN) injection 4 mg, 4 mg, Intravenous, Q6H PRN, Demond, Buddy, DO    [COMPLETED] Insert Peripheral IV, , , Once **AND** sodium chloride 0.9 % flush 10 mL, 10 mL, Intravenous, PRN, Aroda, Buddy, DO    sodium chloride 0.9 % flush 10 mL, 10 mL, Intravenous, Q12H, Demond, Buddy, DO, 10 mL at 01/26/25 0844    sodium chloride 0.9 % flush 10 mL, 10 mL, Intravenous, PRN, Aroda, Buddy, DO    sodium chloride 0.9 % infusion 40 mL, 40 mL, Intravenous, PRN, Demond, Buddy, DO    ALLERGIES:     Allergies   Allergen Reactions    Ace Inhibitors Other (See Comments)     RENAL FAILURE/ raised creatinine    Contrast Dye (Echo Or Unknown Ct/Mr) Other (See Comments) and Anaphylaxis     RENAL FAILURE    Iodine Anaphylaxis    Angiotensin Receptor Blockers Swelling    Eliquis [Apixaban] Arrhythmia     BLEEDING ISSUES; PT CANNOT TAKE ANY ANTICOAGULANTS DUE TO LOW PLATELETS EXCEPT ASPIRIN      Filgrastim GI Intolerance and Confusion     Chest pain    Keflex [Cephalexin] Diarrhea     RENAL FAILURE    Other Angioedema     IVP Dye       SOCIAL HISTORY:       Social History     Socioeconomic History    Marital status:      Spouse name: Gillian    Number of children: 2     Years of education: College   Tobacco Use    Smoking status: Never     Passive exposure: Never    Smokeless tobacco: Never   Vaping Use    Vaping status: Never Used   Substance and Sexual Activity    Alcohol use: No     Comment: caffeine use: none    Drug use: No    Sexual activity: Defer         FAMILY HISTORY:  Family History   Problem Relation Age of Onset    Heart failure Mother 78    Hypertension Mother     Heart disease Mother     Hyperlipidemia Mother     Hypertension Father     Other Father 82        MVI    Malig Hyperthermia Neg Hx        REVIEW OF SYSTEMS:  Review of Systems   Constitutional:  Negative for activity change.   HENT:  Negative for nosebleeds and trouble swallowing.    Respiratory:  Negative for shortness of breath and wheezing.    Cardiovascular:  Negative for chest pain and palpitations.   Gastrointestinal:  Negative for constipation, diarrhea and nausea.   Genitourinary:  Negative for dysuria and hematuria.   Musculoskeletal:  Negative for arthralgias and myalgias.   Neurological:  Negative for seizures and syncope.   Hematological:  Negative for adenopathy. Does not bruise/bleed easily.   Psychiatric/Behavioral:  Negative for confusion.               Vitals:    01/25/25 1920 01/25/25 2002 01/25/25 2308 01/26/25 0725   BP: 92/42 97/55 97/52 101/56   BP Location: Right arm  Right arm Right arm   Patient Position: Lying  Lying Lying   Pulse: 71  74 69   Resp: 18  18 18   Temp: 98.1 °F (36.7 °C)  98.2 °F (36.8 °C) 98.2 °F (36.8 °C)   TempSrc: Oral  Oral Oral   SpO2: 93%  97% 98%   Weight:       Height:             4/23/2024     2:22 PM   Current Status   ECOG score 0      PHYSICAL EXAM:    CONSTITUTIONAL:  Vital signs reviewed.  No distress, looks comfortable.  EYES:  Conjunctivae and lids unremarkable.  PERRLA  EARS, NOSE, MOUTH, THROAT:  Ears and nose appear unremarkable.  Lips, teeth, gums appear unremarkable.  RESPIRATORY:  Normal respiratory effort.  Lungs clear to auscultation  bilaterally.  CARDIOVASCULAR:  Normal S1, S2.  No murmurs, rubs or gallops.  No significant lower extremity edema.  GASTROINTESTINAL: Abdomen appears unremarkable.  Nontender.  No hepatomegaly.  No splenomegaly.  LYMPHATIC:  No cervical, supraclavicular, axillary lymphadenopathy.  NEURO: Cranial nerves 2-12 grossly intact.  No focal deficits.  Appears to have equal strength all 4 extremities.  MUSCULOSKELETAL:  Unremarkable digits/nails.  No cyanosis or clubbing.  No apparent joint deformities.  SKIN:  Warm.  No rashes.  PSYCHIATRIC:  Normal judgment and insight.  Normal mood and affect.     RECENT LABS:        WBC   Date Value Ref Range Status   01/26/2025 4.34 3.40 - 10.80 10*3/mm3 Final   01/25/2025 3.61 3.40 - 10.80 10*3/mm3 Final     Hemoglobin   Date Value Ref Range Status   01/26/2025 8.5 (L) 13.0 - 17.7 g/dL Final   01/25/2025 10.1 (L) 13.0 - 17.7 g/dL Final     Platelets   Date Value Ref Range Status   01/26/2025 49 (C) 140 - 450 10*3/mm3 Final   01/25/2025 54 (L) 140 - 450 10*3/mm3 Final       Assessment & Plan   Bill Landry [unfilled]   *Chronic pancytopenia due to cirrhosis and splenomegaly  Family requested we see while inpatient.  WBC is normal.  PLT is around the fluctuating baseline.  Hb is also around the fluctuating baseline.    *Left acetabular fracture  Mechanical fall at home left acetabular fracture.  Dr. Roger Evans is evaluated and feels the patient is stable enough to bear weight using a walker.  He recommended a walker for 4 to 6 weeks and then may discontinue it with physical therapy.  Okay for weightbearing as tolerated and to begin mobilization immediately.  He plans no orthopedic follow-up because the fracture cannot be seen on x-ray.  He states it will take 2 to 3 months for the fracture to fully heal and the pain to go away and 3 to 6 months after that to regain all strength and mobility.  He stated he would be happy to see the patient in the office for follow-up if the patient would  like but it would not be necessary if patient is progressing.    *ESRD on hemodialysis    Plan  Nothing more to add from a hematology point.  Keep follow-up with Dr. Pacheco on 4/8/25     I don't think I'm adding much at this point.  Will sign off.  Please call if I can be of any further assistance.  Thanks for the opportunity to help.     Chart reviewed including hospitalist note and orthopedic surgery note and BMP and CBC which were not ordered by me

## 2025-01-26 NOTE — PLAN OF CARE
Goal Outcome Evaluation:  Plan of Care Reviewed With: patient, spouse           Outcome Evaluation: Pt admit with fall. Work up for nondisplaced pubic ramus fracture. Ortho plans conservative treatment, WBAT with walker for 4-6 weeks. Pt lives at home with wife and does ADL without AD or assistance.  Today pt presents in bed, wife present.  Pt is max A to move to/from EOB, stands with max A twice with poor posture and increase pain.  Pt  is dependent to reta socks EOB.   Pt with recent issues L UE dialysis shunt and RN is messaging with MD to see if any concern using L UE with walker.  OT discusses recommendation for rehab due to decline from baseline.  Will cont to follow for skilled OT and time spent answering question from pt/wife.    Anticipated Discharge Disposition (OT): skilled nursing facility

## 2025-01-26 NOTE — THERAPY EVALUATION
Patient Name: Bill Landry  : 1945    MRN: [id number]                              Today's Date: 2025       Admit Date: 2025    Visit Dx:     ICD-10-CM ICD-9-CM   1. Closed nondisplaced fracture of left acetabulum, unspecified portion of acetabulum, initial encounter  S32.402A 808.0   2. ESRD (end stage renal disease)  N18.6 585.6     Patient Active Problem List   Diagnosis    Permanent atrial fibrillation    Thrombocytopenia    Chronic leukopenia    Cirrhosis of liver without ascites    Congestive splenomegaly    Anemia due to stage 4 chronic kidney disease treated with erythropoietin    Esophageal abnormality    ESRD on hemodialysis    Other specified glaucoma    Arteriovenous fistula for hemodialysis in place, secondary    Crohn's disease, unspecified, without complications    Deficiency of other specified B group vitamins    Dermatitis, unspecified    Encounter for immunization    History of urinary stone    Hyperparathyroidism, unspecified    Other disorders of phosphorus metabolism    Other iron deficiency anemias    Pure hypertriglyceridemia    Renal osteodystrophy    Secondary hyperparathyroidism of renal origin    Splenomegaly, not elsewhere classified    Bilateral pseudophakia    Dermatochalasis of eyelid    Primary open-angle glaucoma, bilateral, moderate stage    Puckering of macula, left eye    Secondary cataract    AV fistula occlusion, initial encounter    TATE (obstructive sleep apnea)    Aneurysm artery, upper extremity    Nonrheumatic aortic valve stenosis    Bicuspid aortic valve    Hyperuricemia without signs of inflammatory arthritis and tophaceous disease    Presbyopia    Impaired glucose tolerance    Hypofibrinogenemia    Pulmonary hypertension    Skin change    ESRD on dialysis    Problem with vascular access    AV graft thrombosis, initial encounter    AV shunt malfunction, initial encounter    Left arm swelling    Acetabular fracture    Anemia    Cirrhosis of liver     Atrial fibrillation    Fall    Ileostomy present    Weakness    Hypertension    Pain in left hip    Acute pain due to trauma    Transaminitis    Hypoxemia    Hyperglycemia     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    Aortic stenosis     Bicuspid aortic valve 07/20/2022    Calculus of kidney     Chronic leukopenia and thrombocytopenia     Cirrhosis of liver without ascites 07/24/2017    Congestive splenomegaly 07/24/2017    Crohn disease     with ileostomy    Decreased white blood cell count, unspecified     Diverticula of colon     ESRD on hemodialysis 04/05/2018    M-W-F FRESENIUS    Fatty liver disease, nonalcoholic     Fistula 04/05/2018    Other Specified Complication    Gastrointestinal hemorrhage, unspecified     GERD (gastroesophageal reflux disease)     GI bleed     Glaucoma     H/O Gallstone pancreatitis     H/O Pericardial effusion     H/O sinus bradycardia     History of hypertension     resolved    History of UTI     Hx of renal calculi     Ileostomy present     Iron deficiency     Leakage of heart valve prosthesis, subsequent encounter     MGUS (monoclonal gammopathy of unknown significance)     TATE (obstructive sleep apnea)     Other hemoglobinopathies     Pericardial effusion (noninflammatory)     Permanent atrial fibrillation     Scarlet fever, uncomplicated     Stenosis of other vascular prosthetic devices, implants and grafts, initial encounter 02/14/2022    Systemic lupus erythematosus, unspecified     Thrombocytopenia     undpecified    Type 2 diabetes mellitus     CURRENTLY TAKES NO MED    Urinary retention     Post-OP     Past Surgical History:   Procedure Laterality Date    APPENDECTOMY  06/1968    ARTERIOVENOUS FISTULA/SHUNT SURGERY Left 08/08/2017    Procedure: LEFT BRACHIAL CEPHALIC  AV FISTULA FORMATION WITH CEPHALIC VEIN TRANSPOSITION ;  Surgeon: Bill Deal MD;  Location: [facility] OR;  Service:     ARTERIOVENOUS FISTULA/SHUNT SURGERY Left 05/27/2022    Procedure: OPEN REVISION LEFT ARTERIOVENOUS INTERPOSITION GRAFT PLACEMENT;  Surgeon: Bill Deal MD;  Location: [facility] MAIN OR;  Service: Vascular;  Laterality: Left;    ARTERIOVENOUS FISTULA/SHUNT SURGERY Left 11/19/2024    Procedure: LEFT ARTERIOVENOUS SHUNT GRAFT REVISION;  Surgeon: Bill Deal MD;  Location: [facility] MAIN OR;  Service: Vascular;  Laterality: Left;    ARTERIOVENOUS FISTULA/SHUNT SURGERY W/ HEMODIALYSIS CATHETER INSERTION Left 02/15/2022    Procedure: OPEN LEFT ARM ARTERIOVENOUS FISTULA THROMBECTOMY WITH CEPHALIC VEIN ARTHROPLASTY AND STENT/GRAFT PLACEMENT;  Surgeon: Bill Deal MD;  Location: [facility] OR 18/19;  Service: Vascular;  Laterality: Left;    CATARACT EXTRACTION WITH INTRAOCULAR LENS IMPLANT Bilateral 2004    CHOLECYSTECTOMY  2011    COLECTOMY PARTIAL / TOTAL      History of inflammatory bowel disease with status post colectomy with ileostomy many years ago in the [facility],  18 YEARS OF AGE    COLON SURGERY      Colon resection with colostomy    COLON SURGERY      COLONOSCOPY  01/2018    CYSTOSCOPY LITHOLAPAXY BLADDER STONE EXTRACTION      ENDOSCOPY  01/16/2015    gastritis    ENDOSCOPY  01/17/2018    Procedure: ESOPHAGOGASTRODUODENOSCOPY;  Surgeon: Warner Neville MD;  Location: [facility] ENDOSCOPY;  Service:     ILEOSCOPY  01/16/2015    normal    ILEOSCOPY N/A 01/17/2018    Procedure: ILEOSCOPY;  Surgeon: Warner Neville MD;  Location: [facility] ENDOSCOPY;  Service:     ILEOSTOMY  12/1968    loop ostomy bypass    INCISION AND DRAINAGE ARM Left 10/27/2023    Procedure: LEFT ARM INCISION AND DRAINAGE;  Surgeon: Bill Deal MD;  Location: [facility] MAIN OR;  Service: Vascular;  Laterality: Left;    INSERTION HEMODIALYSIS CATHETER Right 11/13/2024    Procedure: Tunnelled HEMODIALYSIS  CATHETER INSERTION;  Surgeon: Ben Barth MD;  Location: [facility] OR;  Service: Vascular;  Laterality: Right;    OTHER SURGICAL HISTORY  2009    kidney stones x3    PSEUDOANEURYSM REPAIR Left 10/27/2023    TONSILLECTOMY  1950      General Information       Row Name 01/26/25 1451          Physical Therapy Time and Intention    Document Type evaluation  -DB     Mode of Treatment individual therapy;physical therapy  -DB       Row Name 01/26/25 1451          General Information    Patient Profile Reviewed yes  -DB     Prior Level of Function independent:;ADL's  -DB     Existing Precautions/Restrictions fall  awaiting clearance on WB'ing status for LUE  -DB     Barriers to Rehab medically complex  -DB       Row Name 01/26/25 1451          Living Environment    People in Home spouse  -DB       Row Name 01/26/25 1451          Home Main Entrance    Number of Stairs, Main Entrance none  -DB       Row Name 01/26/25 1451          Stairs Within Home, Primary    Number of Stairs, Within Home, Primary none  -DB     Stairs Comment, Within Home, Primary laundry in basement, but pt has not been going downstairs recently  -DB       Row Name 01/26/25 1451          Cognition    Orientation Status (Cognition) oriented x 4  -DB       Row Name 01/26/25 1451          Safety Issues/Impairments Affecting Functional Mobility    Impairments Affecting Function (Mobility) balance;endurance/activity tolerance;strength;pain  -DB               User Key  (r) = Recorded By, (t) = Taken By, (c) = Cosigned By      Initials Name Provider Type    DB Sandra Peterson PT Physical Therapist                   Mobility       Row Name 01/26/25 1453          Bed Mobility    Bed Mobility supine-sit;sit-supine;scooting/bridging  -DB     Scooting/Bridging [place] (Bed Mobility) dependent (less than 25% patient effort);2 person assist  -DB     Supine-Sit [place] (Bed Mobility) set up;verbal cues;maximum assist (25% patient  effort);nonverbal cues (demo/gesture)  -DB     Sit-Supine [place] (Bed Mobility) set up;verbal cues;nonverbal cues (demo/gesture);maximum assist (25% patient effort)  -DB     Assistive Device (Bed Mobility) bed rails;head of bed elevated  -DB       Row Name 01/26/25 1453          Sit-Stand Transfer    Sit-Stand [place] (Transfers) set up;verbal cues;maximum assist (25% patient effort)  -DB     Assistive Device (Sit-Stand Transfers) other (see comments);walker, front-wheeled  -DB     Comment, (Sit-Stand Transfer) attempted RW first, then HHA. inc'd concerns for pt using LUE so unable to achieve full stand, but attempted multiple times  -DB               User Key  (r) = Recorded By, (t) = Taken By, (c) = Cosigned By      Initials Name Provider Type    DB Sandra Peterson, ERROL Physical Therapist                   Obj/Interventions       Row Name 01/26/25 1454          Range of Motion Comprehensive    General Range of Motion no range of motion deficits identified  -DB       Row Name 01/26/25 1454          Strength Comprehensive (MMT)    Comment, General Manual Muscle Testing (MMT) Assessment generalized weakness  -DB       Row Name 01/26/25 1454          Balance    Balance Assessment sitting static balance;sitting dynamic balance;standing static balance  -DB     Static Sitting Balance standby assist  -DB     Dynamic Sitting Balance standby assist  -DB     Position, Sitting Balance unsupported;sitting edge of bed  -DB               User Key  (r) = Recorded By, (t) = Taken By, (c) = Cosigned By      Initials Name Provider Type    DB Sandra Peterson, ERROL Physical Therapist                   Goals/Plan       Row Name 01/26/25 1452          Bed Mobility Goal 1 (PT)    Activity/Assistive Device (Bed Mobility Goal 1, PT) bed mobility activities, all  -DB     [place] Level/Cues Needed (Bed Mobility Goal 1, PT) minimum assist (75% or more patient effort)  -DB     Time Frame (Bed Mobility Goal 1, PT) 1 week  -DB        Row Name 01/26/25 1455          Transfer Goal 1 (PT)    Activity/Assistive Device (Transfer Goal 1, PT) transfers, all  -DB     [place] Level/Cues Needed (Transfer Goal 1, PT) minimum assist (75% or more patient effort)  -DB     Time Frame (Transfer Goal 1, PT) 1 week  -DB       Row Name 01/26/25 1455          Gait Training Goal 1 (PT)    Activity/Assistive Device (Gait Training Goal 1, PT) gait (walking locomotion)  -DB     [place] Level (Gait Training Goal 1, PT) minimum assist (75% or more patient effort)  -DB     Time Frame (Gait Training Goal 1, PT) 1 week  -DB       Row Name 01/26/25 1455          Therapy Assessment/Plan (PT)    Planned Therapy Interventions (PT) balance training;bed mobility training;gait training;home exercise program;neuromuscular re-education;patient/family education;strengthening;ROM (range of motion);stair training;postural re-education;transfer training  -DB               User Key  (r) = Recorded By, (t) = Taken By, (c) = Cosigned By      Initials Name Provider Type    Sandra Mcgrath, PT Physical Therapist                   Clinical Impression       Row Name 01/26/25 1454          Plan of Care Review    Plan of Care Reviewed With patient;spouse  -DB     Outcome Evaluation Patient is a 79 y.o. male admitted to [facility] on 1/25/2025 for closed nondisplaced fx of L acetabulum/pubic rami. Ortho plans conservative treatment, WBAT with walker for 4-6 weeks. Concerns with WB'ing status on LUEd/t recent issues with dialysis shunt. PMHx includes ESRD on HD, atrial fibrillation, aortic stenosis, cirrhosis, chronic leukopenia, and thrombocytopenia. Patient is (I) at baseline and lives with his wife. He has steps to the laundry room in the basement, but he has not been performing recenty. Today, patient performed bed mobility with maxA. Attempted STS at EOB with and without RW, maxA and unable to achieve fully upright position. Strength, balance, and activity tolerance deficits  noted. Patient may benefit from skilled PT services acutely to address functional deficits as well as improve level of independence prior to discharge. Anticipate SNF upon DC.  -DB       Row Name 01/26/25 1454          Therapy Assessment/Plan (PT)    Rehab Potential (PT) good  -DB     Criteria for Skilled Interventions Met (PT) yes  -DB     Therapy Frequency (PT) 6 times/wk  -DB       Row Name 01/26/25 1454          Vital Signs    O2 Delivery Pre Treatment room air  -DB     O2 Delivery Intra Treatment room air  -DB     O2 Delivery Post Treatment room air  -DB     Pre Patient Position Supine  -DB     Intra Patient Position Sitting  -DB     Post Patient Position Supine  -DB       Row Name 01/26/25 1454          Positioning and Restraints    Pre-Treatment Position in bed  -DB     Post Treatment Position bed  -DB     In Bed supine;notified nsg;call light within reach;encouraged to call for assist;exit alarm on;with family/caregiver  -DB               User Key  (r) = Recorded By, (t) = Taken By, (c) = Cosigned By      Initials Name Provider Type    DB Sandra Peterson, PT Physical Therapist                   Outcome Measures       Row Name 01/26/25 1455          How much help from another person do you currently need...    Turning from your back to your side while in flat bed without using bedrails? 2  -DB     Moving from lying on back to sitting on the side of a flat bed without bedrails? 2  -DB     Moving to and from a bed to a chair (including a wheelchair)? 2  -DB     Standing up from a chair using your arms (e.g., wheelchair, bedside chair)? 2  -DB     Climbing 3-5 steps with a railing? 1  -DB     To walk in hospital room? 1  -DB     AM-PAC 6 Clicks Score (PT) 10  -DB     Highest Level of Mobility Goal 4 --> Transfer to chair/commode  -DB       Row Name 01/26/25 1455 01/26/25 0951       Functional Assessment    Outcome Measure Options AM-PAC 6 Clicks Basic Mobility (PT)  -DB AM-PAC 6 Clicks Daily Activity (OT)  -LE               User Key  (r) = Recorded By, (t) = Taken By, (c) = Cosigned By      Initials Name Provider Type    Cassie Reeves, OTR Occupational Therapist    Sandra Mcgrath, PT Physical Therapist                                 Physical Therapy Education       Title: PT OT SLP Therapies (In Progress)       Topic: Physical Therapy (Done)       Point: Mobility training (Done)       Learning Progress Summary            Patient Acceptance, E, VU by  at 1/26/2025 1456                      Point: Home exercise program (Done)       Learning Progress Summary            Patient Acceptance, E, VU by DB at 1/26/2025 1456                      Point: Body mechanics (Done)       Learning Progress Summary            Patient Acceptance, E, VU by DB at 1/26/2025 1456                      Point: Precautions (Done)       Learning Progress Summary            Patient Acceptance, E, VU by DB at 1/26/2025 1456                                      User Key       Initials Effective Dates Name Provider Type Discipline    EDUARDO 06/16/21 -  Sandra Peterson, PT Physical Therapist PT                  PT Recommendation and Plan  Planned Therapy Interventions (PT): balance training, bed mobility training, gait training, home exercise program, neuromuscular re-education, patient/family education, strengthening, ROM (range of motion), stair training, postural re-education, transfer training  Outcome Evaluation: Patient is a 79 y.o. male admitted to [facility] on 1/25/2025 for closed nondisplaced fx of L acetabulum/pubic rami. Ortho plans conservative treatment, WBAT with walker for 4-6 weeks. Concerns with WB'ing status on LUEd/t recent issues with dialysis shunt. PMHx includes ESRD on HD, atrial fibrillation, aortic stenosis, cirrhosis, chronic leukopenia, and thrombocytopenia. Patient is (I) at baseline and lives with his wife. He has steps to the laundry room in the basement, but he has not been performing recenty. Today, patient performed bed  mobility with maxA. Attempted STS at EOB with and without RW, maxA and unable to achieve fully upright position. Strength, balance, and activity tolerance deficits noted. Patient may benefit from skilled PT services acutely to address functional deficits as well as improve level of independence prior to discharge. Anticipate SNF upon DC.     Time Calculation:         PT Charges       Row Name 01/26/25 1503             Time Calculation    Start Time 1423  -DB      Stop Time 1446  -DB      Time Calculation (min) 23 min  -DB      PT Received On 01/26/25  -DB      PT - Next Appointment 01/27/25  -DB      PT Goal Re-Cert Due Date 02/02/25  -DB         Time Calculation- PT    Total Timed Code Minutes- PT 8 minute(s)  -DB                User Key  (r) = Recorded By, (t) = Taken By, (c) = Cosigned By      Initials Name Provider Type    DB Sandra Peterson, PT Physical Therapist                  Therapy Charges for Today       Code Description Service Date Service Provider Modifiers Qty    66992553575 HC PT EVAL MOD COMPLEXITY 2 1/26/2025 Sandra Peterson, PT GP 1    [id number]  PT THERAPEUTIC ACT EA 15 MIN 1/26/2025 Sandra Peterson, PT GP 1            PT G-Codes  Outcome Measure Options: AM-PAC 6 Clicks Basic Mobility (PT)  AM-PAC 6 Clicks Score (PT): 10  AM-PAC 6 Clicks Score (OT): 12  PT Discharge Summary  Anticipated Discharge Disposition (PT): skilled nursing facility    Sandra Peterson PT  1/26/2025

## 2025-01-26 NOTE — PLAN OF CARE
Goal Outcome Evaluation:  Plan of Care Reviewed With: patient, spouse        Progress: no change  Outcome Evaluation: Pt A&Ox4, VSS on RA/ 1L nc while sleeping. Meds admin as ordered. Safety precautions maintained, plan of care ongoing.       Problem: Adult Inpatient Plan of Care  Goal: Plan of Care Review  Outcome: Progressing  Flowsheets (Taken 1/26/2025 0536)  Progress: no change  Outcome Evaluation: Pt A&Ox4, VSS on RA/ 1L nc while sleeping. Meds admin as ordered. Safety precautions maintained, plan of care ongoing.  Plan of Care Reviewed With:   patient   spouse  Goal: Patient-Specific Goal (Individualized)  Outcome: Progressing  Goal: Absence of Hospital-Acquired Illness or Injury  Outcome: Progressing  Intervention: Identify and Manage Fall Risk  Recent Flowsheet Documentation  Taken 1/26/2025 0400 by Franny Dempsey RN  Safety Promotion/Fall Prevention: safety round/check completed  Taken 1/26/2025 0212 by Franny Dempsey RN  Safety Promotion/Fall Prevention: safety round/check completed  Taken 1/26/2025 0009 by Franny Dempsey RN  Safety Promotion/Fall Prevention:   activity supervised   assistive device/personal items within reach   clutter free environment maintained   fall prevention program maintained   lighting adjusted   nonskid shoes/slippers when out of bed   room organization consistent   safety round/check completed  Taken 1/25/2025 2216 by Franny Dempsey RN  Safety Promotion/Fall Prevention: safety round/check completed  Taken 1/25/2025 2035 by Franny Dempsey RN  Safety Promotion/Fall Prevention:   activity supervised   assistive device/personal items within reach   clutter free environment maintained   fall prevention program maintained   lighting adjusted   nonskid shoes/slippers when out of bed   room organization consistent   safety round/check completed  Intervention: Prevent Skin Injury  Recent Flowsheet Documentation  Taken 1/26/2025 0400 by Franny Dempsey RN  Body Position: position changed  independently  Taken 1/26/2025 0212 by Franny Dempsey RN  Body Position: position changed independently  Taken 1/26/2025 0009 by Franny Dempsey RN  Body Position: position changed independently  Skin Protection: incontinence pads utilized  Taken 1/25/2025 2216 by Franny Dempsey RN  Body Position: position changed independently  Taken 1/25/2025 2035 by Franny Dempsey RN  Body Position: position changed independently  Skin Protection: incontinence pads utilized  Intervention: Prevent and Manage VTE (Venous Thromboembolism) Risk  Recent Flowsheet Documentation  Taken 1/26/2025 0009 by Franny Dempsey RN  VTE Prevention/Management: patient refused intervention  Taken 1/25/2025 2035 by Franny Dempsey RN  VTE Prevention/Management: patient refused intervention  Intervention: Prevent Infection  Recent Flowsheet Documentation  Taken 1/26/2025 0009 by Franny Dempsey RN  Infection Prevention:   environmental surveillance performed   rest/sleep promoted   single patient room provided  Taken 1/25/2025 2035 by Franny Dempsey RN  Infection Prevention:   cohorting utilized   environmental surveillance performed   rest/sleep promoted   single patient room provided  Goal: Optimal Comfort and Wellbeing  Outcome: Progressing  Intervention: Provide Person-Centered Care  Recent Flowsheet Documentation  Taken 1/26/2025 0009 by Franny Dempsey RN  Trust Relationship/Rapport:   care explained   choices provided  Taken 1/25/2025 2035 by Franny Dempsey RN  Trust Relationship/Rapport:   care explained   choices provided   questions answered   reassurance provided   thoughts/feelings acknowledged  Goal: Readiness for Transition of Care  Outcome: Progressing     Problem: Skin Injury Risk Increased  Goal: Skin Health and Integrity  Outcome: Progressing  Intervention: Optimize Skin Protection  Recent Flowsheet Documentation  Taken 1/26/2025 0400 by Franny Dempsey RN  Head of Bed (HOB) Positioning: HOB at 20-30 degrees  Taken 1/26/2025 0212 by Franny Dempsey  RN  Head of Bed ([facility]) Positioning: HOB at 20-30 degrees  Taken 1/26/2025 0009 by Franny Dempsey RN  Pressure Reduction Techniques:   heels elevated off bed   pressure points protected   weight shift assistance provided  Head of Bed (HOB) Positioning: HOB at 30-45 degrees  Pressure Reduction Devices: pressure-redistributing mattress utilized  Skin Protection: incontinence pads utilized  Taken 1/25/2025 2216 by Franny Dempsey RN  Head of Bed ([facility]) Positioning: HOB at 30-45 degrees  Taken 1/25/2025 2035 by Franny Dempsey RN  Pressure Reduction Techniques:   frequent weight shift encouraged   pressure points protected   weight shift assistance provided  Head of Bed ([facility]) Positioning: HOB at 30-45 degrees  Pressure Reduction Devices: pressure-redistributing mattress utilized  Skin Protection: incontinence pads utilized     Problem: Fall Injury Risk  Goal: Absence of Fall and Fall-Related Injury  Outcome: Progressing  Intervention: Identify and Manage Contributors  Recent Flowsheet Documentation  Taken 1/26/2025 0400 by Franny Dempsey RN  Medication Review/Management: medications reviewed  Taken 1/26/2025 0212 by Franny Dempsey RN  Medication Review/Management: medications reviewed  Taken 1/26/2025 0009 by Franny Dempsey RN  Medication Review/Management: medications reviewed  Taken 1/25/2025 2216 by Franny Dempsey RN  Medication Review/Management: medications reviewed  Taken 1/25/2025 2035 by Franny Dempsey RN  Medication Review/Management: medications reviewed  Intervention: Promote Injury-Free Environment  Recent Flowsheet Documentation  Taken 1/26/2025 0400 by Franny Dempsey RN  Safety Promotion/Fall Prevention: safety round/check completed  Taken 1/26/2025 0212 by Franny Dempsey RN  Safety Promotion/Fall Prevention: safety round/check completed  Taken 1/26/2025 0009 by Franny Dempsey RN  Safety Promotion/Fall Prevention:   activity supervised   assistive device/personal items within reach   clutter free environment maintained    fall prevention program maintained   lighting adjusted   nonskid shoes/slippers when out of bed   room organization consistent   safety round/check completed  Taken 1/25/2025 2216 by Franny Dempsey, RN  Safety Promotion/Fall Prevention: safety round/check completed  Taken 1/25/2025 2035 by Franny Dempsey, RN  Safety Promotion/Fall Prevention:   activity supervised   assistive device/personal items within reach   clutter free environment maintained   fall prevention program maintained   lighting adjusted   nonskid shoes/slippers when out of bed   room organization consistent   safety round/check completed

## 2025-01-26 NOTE — PLAN OF CARE
Goal Outcome Evaluation:  Plan of Care Reviewed With: patient, spouse         Patient is a 79 y.o. male admitted to [facility] on 1/25/2025 for closed nondisplaced fx of L acetabulum/pubic rami. Ortho plans conservative treatment, WBAT with walker for 4-6 weeks. Concerns with WB'ing status on LUEd/t recent issues with dialysis shunt. PMHx includes ESRD on HD, atrial fibrillation, aortic stenosis, cirrhosis, chronic leukopenia, and thrombocytopenia. Patient is (I) at baseline and lives with his wife. He has steps to the laundry room in the basement, but he has not been performing recenty. Today, patient performed bed mobility with maxA. Attempted STS at EOB with and without RW, maxA and unable to achieve fully upright position. Strength, balance, and activity tolerance deficits noted. Patient may benefit from skilled PT services acutely to address functional deficits as well as improve level of independence prior to discharge. Anticipate SNF upon DC.      Anticipated Discharge Disposition (PT): skilled nursing facility

## 2025-01-26 NOTE — THERAPY EVALUATION
Patient Name: Bill Landry  : 1945    MRN: [id number]                              Today's Date: 2025       Admit Date: 2025    Visit Dx:     ICD-10-CM ICD-9-CM   1. Closed nondisplaced fracture of left acetabulum, unspecified portion of acetabulum, initial encounter  S32.402A 808.0   2. ESRD (end stage renal disease)  N18.6 585.6     Patient Active Problem List   Diagnosis    Permanent atrial fibrillation    Thrombocytopenia    Chronic leukopenia    Cirrhosis of liver without ascites    Congestive splenomegaly    Anemia due to stage 4 chronic kidney disease treated with erythropoietin    Esophageal abnormality    ESRD on hemodialysis    Other specified glaucoma    Arteriovenous fistula for hemodialysis in place, secondary    Crohn's disease, unspecified, without complications    Deficiency of other specified B group vitamins    Dermatitis, unspecified    Encounter for immunization    History of urinary stone    Hyperparathyroidism, unspecified    Other disorders of phosphorus metabolism    Other iron deficiency anemias    Pure hypertriglyceridemia    Renal osteodystrophy    Secondary hyperparathyroidism of renal origin    Splenomegaly, not elsewhere classified    Bilateral pseudophakia    Dermatochalasis of eyelid    Primary open-angle glaucoma, bilateral, moderate stage    Puckering of macula, left eye    Secondary cataract    AV fistula occlusion, initial encounter    TATE (obstructive sleep apnea)    Aneurysm artery, upper extremity    Nonrheumatic aortic valve stenosis    Bicuspid aortic valve    Hyperuricemia without signs of inflammatory arthritis and tophaceous disease    Presbyopia    Impaired glucose tolerance    Hypofibrinogenemia    Pulmonary hypertension    Skin change    ESRD on dialysis    Problem with vascular access    AV graft thrombosis, initial encounter    AV shunt malfunction, initial encounter    Left arm swelling    Acetabular fracture    Anemia    Cirrhosis of liver     Atrial fibrillation    Fall    Ileostomy present    Weakness    Hypertension    Pain in left hip    Acute pain due to trauma    Transaminitis    Hypoxemia    Hyperglycemia     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    Aortic stenosis     Bicuspid aortic valve 07/20/2022    Calculus of kidney     Chronic leukopenia and thrombocytopenia     Cirrhosis of liver without ascites 07/24/2017    Congestive splenomegaly 07/24/2017    Crohn disease     with ileostomy    Decreased white blood cell count, unspecified     Diverticula of colon     ESRD on hemodialysis 04/05/2018    M-W-F FRESENI[facility]    Fatty liver disease, nonalcoholic     Fistula 04/05/2018    Other Specified Complication    Gastrointestinal hemorrhage, unspecified     GERD (gastroesophageal reflux disease)     GI bleed     Glaucoma     H/O Gallstone pancreatitis     H/O Pericardial effusion     H/O sinus bradycardia     History of hypertension     resolved    History of UTI     Hx of renal calculi     Ileostomy present     Iron deficiency     Leakage of heart valve prosthesis, subsequent encounter     MGUS (monoclonal gammopathy of unknown significance)     TATE (obstructive sleep apnea)     Other hemoglobinopathies     Pericardial effusion (noninflammatory)     Permanent atrial fibrillation     Scarlet fever, uncomplicated     Stenosis of other vascular prosthetic devices, implants and grafts, initial encounter 02/14/2022    Systemic lupus erythematosus, unspecified     Thrombocytopenia     undpecified    Type 2 diabetes mellitus     CURRENTLY TAKES NO MED    Urinary retention     Post-OP     Past Surgical History:   Procedure Laterality Date    APPENDECTOMY  06/1968    ARTERIOVENOUS FISTULA/SHUNT SURGERY Left 08/08/2017    Procedure: LEFT BRACHIAL CEPHALIC  AV FISTULA FORMATION WITH CEPHALIC VEIN TRANSPOSITION ;  Surgeon: Bill Deal MD;  Location: [facility] OR;  Service:     ARTERIOVENOUS FISTULA/SHUNT SURGERY Left 05/27/2022    Procedure: OPEN REVISION LEFT ARTERIOVENOUS INTERPOSITION GRAFT PLACEMENT;  Surgeon: Bill Deal MD;  Location: [facility] MAIN OR;  Service: Vascular;  Laterality: Left;    ARTERIOVENOUS FISTULA/SHUNT SURGERY Left 11/19/2024    Procedure: LEFT ARTERIOVENOUS SHUNT GRAFT REVISION;  Surgeon: Bill Deal MD;  Location: [facility] MAIN OR;  Service: Vascular;  Laterality: Left;    ARTERIOVENOUS FISTULA/SHUNT SURGERY W/ HEMODIALYSIS CATHETER INSERTION Left 02/15/2022    Procedure: OPEN LEFT ARM ARTERIOVENOUS FISTULA THROMBECTOMY WITH CEPHALIC VEIN ARTHROPLASTY AND STENT/GRAFT PLACEMENT;  Surgeon: Bill Deal MD;  Location: [facility] OR 18/19;  Service: Vascular;  Laterality: Left;    CATARACT EXTRACTION WITH INTRAOCULAR LENS IMPLANT Bilateral 2004    CHOLECYSTECTOMY  2011    COLECTOMY PARTIAL / TOTAL      History of inflammatory bowel disease with status post colectomy with ileostomy many years ago in the [facility],  18 YEARS OF AGE    COLON SURGERY      Colon resection with colostomy    COLON SURGERY      COLONOSCOPY  01/2018    CYSTOSCOPY LITHOLAPAXY BLADDER STONE EXTRACTION      ENDOSCOPY  01/16/2015    gastritis    ENDOSCOPY  01/17/2018    Procedure: ESOPHAGOGASTRODUODENOSCOPY;  Surgeon: Warner Neville MD;  Location: [facility] ENDOSCOPY;  Service:     ILEOSCOPY  01/16/2015    normal    ILEOSCOPY N/A 01/17/2018    Procedure: ILEOSCOPY;  Surgeon: Warner Neville MD;  Location: [facility] ENDOSCOPY;  Service:     ILEOSTOMY  12/1968    loop ostomy bypass    INCISION AND DRAINAGE ARM Left 10/27/2023    Procedure: LEFT ARM INCISION AND DRAINAGE;  Surgeon: Bill Deal MD;  Location: [facility] MAIN OR;  Service: Vascular;  Laterality: Left;    INSERTION HEMODIALYSIS CATHETER Right 11/13/2024    Procedure: Tunnelled HEMODIALYSIS  CATHETER INSERTION;  Surgeon: Ben Barth MD;  Location: [facility] OR;  Service: Vascular;  Laterality: Right;    OTHER SURGICAL HISTORY  2009    kidney stones x3    PSEUDOANEURYSM REPAIR Left 10/27/2023    TONSILLECTOMY  1950      General Information       Row Name 01/26/25 0932          OT Time and Intention    Subjective Information complains of;pain  -LE     Document Type evaluation;therapy note (daily note)  -LE     Mode of Treatment individual therapy;occupational therapy  -LE     Patient Effort good  -LE     Symptoms Noted During/After Treatment increased pain  -LE       Row Name 01/26/25 0932          General Information    Patient Profile Reviewed yes  -LE     Prior Level of Function independent:;transfer;ADL's  son has been driving pt to dialysis  -LE     Existing Precautions/Restrictions fall  past months  issues with L UE dialysis shunt (collapsed shunt, infection, catheter placed and then d/c and now using shunt). wife reports were told to be cautious with L UE.  RN messaging hospitalist to see if any concern using L UE with walker.  -LE     Barriers to Rehab none identified  -LE       Row Name 01/26/25 0932          Living Environment    People in Home spouse  spouse uses cane and pt assist's wife at times  -LE       Row Name 01/26/25 0932          Cognition    Orientation Status (Cognition) oriented x 4  pt needs reassurances, asks lots of questions, some decrease insight into current deficits (pt asking if he can walk to dialysis here).  feel pt will do well but will need repetition of tasks and cueing to build confidence.  -LE       Row Name 01/26/25 0932          Safety Issues/Impairments Affecting Functional Mobility    Impairments Affecting Function (Mobility) balance;cognition;endurance/activity tolerance;strength;pain  -LE     Comment, Safety Issues/Impairments (Mobility) non skid socks and gait belt worn.  while standing efforts made to avoid push with L UE from bed to stand.   -GONZALO               User Key  (r) = Recorded By, (t) = Taken By, (c) = Cosigned By      Initials Name Provider Type    Cassie Reeves OTR Occupational Therapist                     Mobility/ADL's       Row Name 01/26/25 0941          Bed Mobility    Bed Mobility supine-sit;sit-supine;scooting/bridging  -LE     Supine-Sit [place] (Bed Mobility) set up;verbal cues;maximum assist (25% patient effort);nonverbal cues (demo/gesture)  -LE     Sit-Supine [place] (Bed Mobility) set up;verbal cues;nonverbal cues (demo/gesture);maximum assist (25% patient effort)  -GONZALO     Bed Mobility, Safety Issues decreased use of legs for bridging/pushing;impaired trunk control for bed mobility  -LE     Assistive Device (Bed Mobility) bed rails;head of bed elevated  -GONZALO     Comment, (Bed Mobility) at home has HOB built up with wedges.   OT cues/assist keeping legs together to mitigate pain.  pt has difficulty following cueing and also pain is increasing with task.  -GONZALO       Row Name 01/26/25 0941          Transfers    Transfers sit-stand transfer;stand-sit transfer;bed-chair transfer;toilet transfer  -GONZALO     Comment, (Transfers) cues to push up with R UE only while clarifying any concern with using L UE.   OT physically places feet on floor before standing as pt keeps lifting R foot off floor and supinating.  -GONZALO       Row Name 01/26/25 0941          Bed-Chair Transfer    Bed-Chair [place] (Transfers) unable to assess  -GONZALO     Comment, (Bed-Chair Transfer) defer to chair. first time up.  pt low tolerance to standing.  -GONZALO       Row Name 01/26/25 0941          Sit-Stand Transfer    Sit-Stand [place] (Transfers) set up;verbal cues;maximum assist (25% patient effort)  -GONZALO     Assistive Device (Sit-Stand Transfers) walker, front-wheeled  -GONZALO     Comment, (Sit-Stand Transfer) first stand pt lean onto L side and nearly  lifting R foot off floor.   this seemed unsually since pt reports pain oriented to L side.  -GONZALO Figueredo  Name 01/26/25 0941          Stand-Sit Transfer    Stand-Sit [place] (Transfers) set up;verbal cues;maximum assist (25% patient effort);moderate assist (50% patient effort)  -GONZALO     Assistive Device (Stand-Sit Transfers) walker, front-wheeled  -GONZALO     Comment, (Stand-Sit Transfer) cued pt to reach back with R hand and pt did well controlling descent to sit down second time.  -GONZALO       Row Name 01/26/25 0941          Toilet Transfer    [place] Level (Toilet Transfer) unable to assess  -LE       Row Name 01/26/25 0941          Functional Mobility    Functional Mobility- Comment 2 stand.  first stand unable to weight shift. after working on feet placement and cueing pt stands again with improved posture and with mod A able to take a few sidesteps to L side with walker.  -GONZALO       Row Name 01/26/25 0941          Activities of Daily Living    BADL Assessment/Intervention toileting;feeding;grooming;lower body dressing;upper body dressing;bathing  -GONZALO       Row Name 01/26/25 0941          Self-Feeding Assessment/Training    Comment, (Feeding) was NPO for ? surgery but has UE function to feed himself.  -LE       Row Name 01/26/25 0941          Lower Body Dressing Assessment/Training    [place] Level (Lower Body Dressing) don;socks;dependent (less than 25% patient effort)  -GONZALO     Position (Lower Body Dressing) edge of bed sitting  -LE     Comment, (Lower Body Dressing) pt unable to reach feet to attempt, nor has standing balance to attempt hiking if to reta pants.  -GONZALO       Row Name 01/26/25 0941          Toileting Assessment/Training    [place] Level (Toileting) toileting skills;dependent (less than 25% patient effort)  -GONZALO     Comment, (Toileting) ostomy for bowel and does not urinate (dialysis).  at home wife assist with changing ostomy  -GONZALO               User Key  (r) = Recorded By, (t) = Taken By, (c) = Cosigned By      Initials Name Provider Type    Cassie Reeves, AVAR Occupational Therapist                    Obj/Interventions       Row Name 01/26/25 0905          Range of Motion Comprehensive    General Range of Motion bilateral upper extremity ROM WFL  -LE     Comment, General Range of Motion L UE edema and dark skin changes,  past months has had issues due to dialysis shunt. functional for light grasp and reach during OT.  -       Row Name 01/26/25 0901          Balance    Balance Assessment sitting static balance;standing static balance;standing dynamic balance  -LE     Static Sitting Balance standby assist  -LE     Position, Sitting Balance sitting edge of bed  -LE     Static Standing Balance maximum assist;set-up;verbal cues;non-verbal cues (demo/gesture)  -LE               User Key  (r) = Recorded By, (t) = Taken By, (c) = Cosigned By      Initials Name Provider Type    Cassie Reeves OTR Occupational Therapist                   Goals/Plan       Row Name 01/26/25 0115          Transfer Goal 1 (OT)    Activity/Assistive Device (Transfer Goal 1, OT) sit-to-stand/stand-to-sit;bed-to-chair/chair-to-bed;toilet;commode, 3-in-1;walker, rolling  -LE     [place] Level/Cues Needed (Transfer Goal 1, OT) set-up required;moderate assist (50-74% patient effort)  -LE     Time Frame (Transfer Goal 1, OT) 2 weeks  -LE     Progress/Outcome (Transfer Goal 1, OT) goal ongoing  -       Row Name 01/26/25 0999          Bathing Goal 1 (OT)    Activity/Device (Bathing Goal 1, OT) lower body bathing;long-handled sponge  -LE     [place] Level/Cues Needed (Bathing Goal 1, OT) set-up required;moderate assist (50-74% patient effort)  -LE     Time Frame (Bathing Goal 1, OT) 2 weeks  -LE     Progress/Outcomes (Bathing Goal 1, OT) goal ongoing  -       Row Name 01/26/25 4192          Dressing Goal 1 (OT)    Activity/Device (Dressing Goal 1, OT) lower body dressing;long-handled shoe horn;reacher;sock-aid  -LE     [place]/Cues Needed (Dressing Goal 1, OT) set-up required;minimum assist (75% or more patient  effort);verbal cues required  -LE     Time Frame (Dressing Goal 1, OT) 2 weeks  -LE     Progress/Outcome (Dressing Goal 1, OT) goal ongoing  -LE       Row Name 01/26/25 0942          Problem Specific Goal 1 (OT)    Problem Specific Goal 1 (OT) Mod A for ambulation to/from bathroom with walker.  -LE     Time Frame (Problem Specific Goal 1, OT) 2 weeks  -LE     Progress/Outcome (Problem Specific Goal 1, OT) goal ongoing  -LE       Row Name 01/26/25 0986          Therapy Assessment/Plan (OT)    Planned Therapy Interventions (OT) activity tolerance training;adaptive equipment training;BADL retraining;functional balance retraining;patient/caregiver education/training;transfer/mobility retraining  -LE               User Key  (r) = Recorded By, (t) = Taken By, (c) = Cosigned By      Initials Name Provider Type    Cassie Reeves OTR Occupational Therapist                   Clinical Impression       Row Name 01/26/25 0969          Pain Assessment    Pre/Posttreatment Pain Comment no pain at rest. when move to EOB pt reports pain and in obvious pain with grimace and it affects moving body.  does not rate.  once back at rest reports no pain.  end of session pt asking for Tylenol.  -LE       Row Name 01/26/25 0999          Plan of Care Review    Plan of Care Reviewed With patient;spouse  -LE     Outcome Evaluation Pt admit with fall. Work up for nondisplaced pubic ramus fracture. Ortho plans conservative treatment, WBAT with walker for 4-6 weeks. Pt lives at home with wife and does ADL without AD or assistance.  Today pt presents in bed, wife present.  Pt is max A to move to/from EOB, stands with max A twice with poor posture and increase pain.  Pt  is dependent to reta socks EOB.   Pt with recent issues L UE dialysis shunt and RN is messaging with MD to see if any concern using L UE with walker.  OT discusses recommendation for rehab due to decline from baseline.  Will cont to follow for skilled OT and time spent answering  question from pt/wife.  -       Row Name 01/26/25 0948          Therapy Assessment/Plan (OT)    Patient/Family Therapy Goal Statement (OT) Return to prior level of function.  -LE     Rehab Potential (OT) good  -LE     Criteria for Skilled Therapeutic Interventions Met (OT) meets criteria;yes  -LE     Therapy Frequency (OT) 5 times/wk  -       Row Name 01/26/25 0948          Therapy Plan Review/Discharge Plan (OT)    Equipment Needs Upon Discharge (OT) --  walker, BSC, shower chair, AE for LBD.  -LE     Anticipated Discharge Disposition (OT) skilled nursing facility  -       Row Name 01/26/25 0948          Vital Signs    O2 Delivery Pre Treatment room air  -LE     Pre Patient Position Supine  -LE     Intra Patient Position Standing  -LE     Post Patient Position Supine  -LE       Row Name 01/26/25 0948          Positioning and Restraints    Pre-Treatment Position in bed  -LE     Post Treatment Position bed  -LE     In Bed notified nsg;fowlers;call light within reach;encouraged to call for assist;exit alarm on;with family/caregiver;heels elevated  -LE               User Key  (r) = Recorded By, (t) = Taken By, (c) = Cosigned By      Initials Name Provider Type    Cassie Reeves, AVAR Occupational Therapist                   Outcome Measures       Row Name 01/26/25 0951          How much help from another is currently needed...    Putting on and taking off regular lower body clothing? 1  -LE     Bathing (including washing, rinsing, and drying) 2  -LE     Toileting (which includes using toilet bed pan or urinal) 1  -LE     Putting on and taking off regular upper body clothing 2  -LE     Taking care of personal grooming (such as brushing teeth) 3  -LE     Eating meals 3  -LE     AM-PAC 6 Clicks Score (OT) 12  -LE       Row Name 01/26/25 0951          Functional Assessment    Outcome Measure Options AM-PAC 6 Clicks Daily Activity (OT)  -LE               User Key  (r) = Recorded By, (t) = Taken By, (c) = Cosigned By       Initials Name Provider Type    GONZALO Paz Cassie, OTR Occupational Therapist                    Occupational Therapy Education       Title: PT OT SLP Therapies (In Progress)       Topic: Occupational Therapy (In Progress)       Point: ADL training (Done)       Description:   Instruct learner(s) on proper safety adaptation and remediation techniques during self care or transfers.   Instruct in proper use of assistive devices.                  Learning Progress Summary            Patient Acceptance, E, Bed IU by GONZALO at 1/26/2025 0952    Comment: OT answers ?'s about rehab, plan for walker use and anticipated healing time per ortho notes, body mechanics to help with pain.   Family Acceptance, E, Bed IU by GONZALO at 1/26/2025 0952    Comment: OT answers ?'s about rehab, plan for walker use and anticipated healing time per ortho notes, body mechanics to help with pain.                      Point: Home exercise program (Not Started)       Description:   Instruct learner(s) on appropriate technique for monitoring, assisting and/or progressing therapeutic exercises/activities.                  Learner Progress:  Not documented in this visit.              Point: Precautions (Done)       Description:   Instruct learner(s) on prescribed precautions during self-care and functional transfers.                  Learning Progress Summary            Patient Acceptance, E, Bed IU by GONZALO at 1/26/2025 0952    Comment: OT answers ?'s about rehab, plan for walker use and anticipated healing time per ortho notes, body mechanics to help with pain.   Family Acceptance, E, Bed IU by GONZALO at 1/26/2025 0952    Comment: OT answers ?'s about rehab, plan for walker use and anticipated healing time per ortho notes, body mechanics to help with pain.                      Point: Body mechanics (Done)       Description:   Instruct learner(s) on proper positioning and spine alignment during self-care, functional mobility activities and/or exercises.                   Learning Progress Summary            Patient Acceptance, E, Bed IU by GONZALO at 1/26/2025 0952    Comment: OT answers ?'s about rehab, plan for walker use and anticipated healing time per ortho notes, body mechanics to help with pain.   Family Acceptance, E, Bed IU by GONZALO at 1/26/2025 0952    Comment: OT answers ?'s about rehab, plan for walker use and anticipated healing time per ortho notes, body mechanics to help with pain.                                      User Key       Initials Effective Dates Name Provider Type Discipline    GONZALO 06/16/21 -  Cassie Paz, OTR Occupational Therapist OT                  OT Recommendation and Plan  Planned Therapy Interventions (OT): activity tolerance training, adaptive equipment training, BADL retraining, functional balance retraining, patient/caregiver education/training, transfer/mobility retraining  Therapy Frequency (OT): 5 times/wk  Plan of Care Review  Plan of Care Reviewed With: patient, spouse  Outcome Evaluation: Pt admit with fall. Work up for nondisplaced pubic ramus fracture. Ortho plans conservative treatment, WBAT with walker for 4-6 weeks. Pt lives at home with wife and does ADL without AD or assistance.  Today pt presents in bed, wife present.  Pt is max A to move to/from EOB, stands with max A twice with poor posture and increase pain.  Pt  is dependent to reta socks EOB.   Pt with recent issues L UE dialysis shunt and RN is messaging with MD to see if any concern using L UE with walker.  OT discusses recommendation for rehab due to decline from baseline.  Will cont to follow for skilled OT and time spent answering question from pt/wife.     Time Calculation:   Evaluation Complexity (OT)  Review Occupational Profile/Medical/Therapy History Complexity: expanded/moderate complexity  Assessment, Occupational Performance/Identification of Deficit Complexity: 3-5 performance deficits  Clinical Decision Making Complexity (OT): detailed assessment/moderate  complexity  Overall Complexity of Evaluation (OT): moderate complexity     Time Calculation- OT       Row Name 01/26/25 0953             Time Calculation- OT    OT Start Time 0848  -LE      OT Stop Time 0920  additional time spent reviewing notes from ortho and vascular, talk with RN and Dr. Lagos after session re:clarfying use of L UE with walker.  -LE      OT Time Calculation (min) 32 min  -LE      Total Timed Code Minutes- OT 24 minute(s)  -LE      OT Received On 01/26/25  -LE      OT - Next Appointment 01/27/25  -LE      OT Goal Re-Cert Due Date 02/09/25  -LE         Timed Charges    23454 - OT Therapeutic Activity Minutes 24  -LE         Total Minutes    Timed Charges Total Minutes 24  -LE       Total Minutes 24  -LE                User Key  (r) = Recorded By, (t) = Taken By, (c) = Cosigned By      Initials Name Provider Type    Cassie Reeves OTR Occupational Therapist                  Therapy Charges for Today       Code Description Service Date Service Provider Modifiers Qty    36870581270 HC OT THERAPEUTIC ACT EA 15 MIN 1/26/2025 Cassie Paz OTR GO 2    69158862816 HC OT EVAL MOD COMPLEXITY 3 1/26/2025 Cassie Paz OTR GO 1                 MAAME Rose  1/26/2025

## 2025-01-26 NOTE — PROGRESS NOTES
[facility] Progress Note      Patient Name: Bill Landry  : 1945  MRN: [id number]  Primary Care Physician:  Bharathi De La Torre MD  Date of admission: 2025    Subjective     Interval History:   Ortho says no surgery needed, but recommends to use a walker for a few months  Worried about relying on arm strength to use walker given recent arm surgery  Had had revision to left arm AVF in November, with another procedure just last month  Appetite is good; no N/V  Breathing is comfortable on room air    Review of Systems:   As noted above    Objective     Vitals:   Temp:  [98.1 °F (36.7 °C)-98.4 °F (36.9 °C)] 98.4 °F (36.9 °C)  Heart Rate:  [65-74] 65  Resp:  [18] 18  BP: ()/(42-62) 96/62  Flow (L/min) (Oxygen Therapy):  [1] 1    Intake/Output Summary (Last 24 hours) at 2025 1746  Last data filed at 2025 1639  Gross per 24 hour   Intake --   Output 875 ml   Net -875 ml       Physical Exam:    Constitutional: Awake, alert, NAD, chronically ill, fully oriented  HEENT: Sclera anicteric, no conjunctival injection  Neck: Supple, no carotid bruit, trachea at midline, no JVD  Respiratory: Mostly clear bilaterally, nonlabored on 1 L/min  Cardiovascular: Irregularly irregular, 2/6M, no rub  Vascular: Left arm AV fistula/graft patent  Gastrointestinal: BS +, soft, nontender, nondistended, + ileostomy  : No palpable bladder  Musculoskeletal: +1 LE edema, right worse than left; no C/C   Psychiatric: Appropriate affect, cooperative, fully oriented, depressed  Neurologic: No asterixis, moving all extremities, normal speech   Skin: Warm and dry; venous stasis changes both legs; bruises all limbs    Scheduled Meds:     aspirin, 81 mg, Oral, Nightly  sodium chloride, 10 mL, Intravenous, Q12H      IV Meds:        Results Reviewed:   I have personally reviewed the results from the time of this admission to 2025 17:46 EST     Results from last 7 days   Lab Units 25  0412  01/25/25  0432   SODIUM mmol/L 135* 137   POTASSIUM mmol/L 4.8 3.7   CHLORIDE mmol/L 97* 97*   CO2 mmol/L 28.0 28.1   BUN mg/dL 51* 35*   CREATININE mg/dL 6.58* 4.90*   CALCIUM mg/dL 7.7* 8.5*   BILIRUBIN mg/dL  --  0.8   ALK PHOS U/L  --  205*   ALT (SGPT) U/L  --  32   AST (SGOT) U/L  --  67*   GLUCOSE mg/dL 98 150*       Estimated Creatinine Clearance: 10.5 mL/min (A) (by C-G formula based on SCr of 6.58 mg/dL (H)).    Results from last 7 days   Lab Units 01/25/25  0432   MAGNESIUM mg/dL 1.9             Results from last 7 days   Lab Units 01/26/25  0411 01/25/25  0432 01/22/25  0000   WBC 10*3/mm3 4.34 3.61  --    HEMOGLOBIN g/dL 8.5* 10.1* 10.5*  31.5*   PLATELETS 10*3/mm3 49* 54*  --        Results from last 7 days   Lab Units 01/25/25  0432   INR  1.35*       Assessment / Plan     ASSESSMENT:  1.  ESRD: Volume status stable with chronic lower extremity edema but mostly clear lungs.  Electrolytes stable.  Last HD was 1/24  2.  Fall 1/24 (lost balance), with left acetabular fracture: no surgery needed  3.  Atrial fibrillation with rate-control  4.  Crohn's disease with prior bowel resection and ileostomy creation  5.  Cirrhosis   6.  Mild-moderate aortic stenosis  7.  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  Chronic anemia of ESRD; hemoglobin has fallen  9.  Chronic hypotension    PLAN:  1.  Wife requests that I verify with Vascular if any contraindications to gripping walker tightly with access arm in view of recent surgical procedures  2.  HD in the morning (MWF)  3.  Hopefully home soon    Thank you for involving us in the care of Bill Landry.  Please feel free to call with any questions.    Boyd Oates MD  01/26/25  17:46 EST    [facility]  [phone]    Please note that portions of this note were completed with a voice recognition program.

## 2025-01-27 LAB
ALBUMIN SERPL-MCNC: 2.5 G/DL (ref 3.5–5.2)
ALBUMIN/GLOB SERPL: 0.6 G/DL
ALP SERPL-CCNC: 191 U/L (ref 39–117)
ALT SERPL W P-5'-P-CCNC: 29 U/L (ref 1–41)
ANION GAP SERPL CALCULATED.3IONS-SCNC: 14 MMOL/L (ref 5–15)
AST SERPL-CCNC: 56 U/L (ref 1–40)
BILIRUB SERPL-MCNC: 0.8 MG/DL (ref 0–1.2)
BUN SERPL-MCNC: 72 MG/DL (ref 8–23)
BUN/CREAT SERPL: 8.6 (ref 7–25)
CALCIUM SPEC-SCNC: 7.8 MG/DL (ref 8.6–10.5)
CHLORIDE SERPL-SCNC: 93 MMOL/L (ref 98–107)
CO2 SERPL-SCNC: 22 MMOL/L (ref 22–29)
CREAT SERPL-MCNC: 8.41 MG/DL (ref 0.76–1.27)
DEPRECATED RDW RBC AUTO: 50.8 FL (ref 37–54)
EGFRCR SERPLBLD CKD-EPI 2021: 5.9 ML/MIN/1.73
ERYTHROCYTE [DISTWIDTH] IN BLOOD BY AUTOMATED COUNT: 13.6 % (ref 12.3–15.4)
GLOBULIN UR ELPH-MCNC: 3.9 GM/DL
GLUCOSE SERPL-MCNC: 98 MG/DL (ref 65–99)
HCT VFR BLD AUTO: 30.1 % (ref 37.5–51)
HGB BLD-MCNC: 9.3 G/DL (ref 13–17.7)
MCH RBC QN AUTO: 31.4 PG (ref 26.6–33)
MCHC RBC AUTO-ENTMCNC: 30.9 G/DL (ref 31.5–35.7)
MCV RBC AUTO: 101.7 FL (ref 79–97)
PHOSPHATE SERPL-MCNC: 5.1 MG/DL (ref 2.5–4.5)
PLATELET # BLD AUTO: 60 10*3/MM3 (ref 140–450)
PMV BLD AUTO: 9.8 FL (ref 6–12)
POTASSIUM SERPL-SCNC: 5.1 MMOL/L (ref 3.5–5.2)
PROT SERPL-MCNC: 6.4 G/DL (ref 6–8.5)
RBC # BLD AUTO: 2.96 10*6/MM3 (ref 4.14–5.8)
SODIUM SERPL-SCNC: 129 MMOL/L (ref 136–145)
WBC NRBC COR # BLD AUTO: 4.89 10*3/MM3 (ref 3.4–10.8)

## 2025-01-27 PROCEDURE — 84100 ASSAY OF PHOSPHORUS: CPT | Performed by: INTERNAL MEDICINE

## 2025-01-27 PROCEDURE — 36415 COLL VENOUS BLD VENIPUNCTURE: CPT | Performed by: STUDENT IN AN ORGANIZED HEALTH CARE EDUCATION/TRAINING PROGRAM

## 2025-01-27 PROCEDURE — 85027 COMPLETE CBC AUTOMATED: CPT | Performed by: STUDENT IN AN ORGANIZED HEALTH CARE EDUCATION/TRAINING PROGRAM

## 2025-01-27 PROCEDURE — 5A1D70Z PERFORMANCE OF URINARY FILTRATION, INTERMITTENT, LESS THAN 6 HOURS PER DAY: ICD-10-PCS | Performed by: INTERNAL MEDICINE

## 2025-01-27 PROCEDURE — 80053 COMPREHEN METABOLIC PANEL: CPT | Performed by: STUDENT IN AN ORGANIZED HEALTH CARE EDUCATION/TRAINING PROGRAM

## 2025-01-27 PROCEDURE — 97530 THERAPEUTIC ACTIVITIES: CPT

## 2025-01-27 RX ADMIN — LATANOPROST 1 DROP: 50 SOLUTION/ DROPS OPHTHALMIC at 21:23

## 2025-01-27 RX ADMIN — ASPIRIN 81 MG: 81 TABLET, DELAYED RELEASE ORAL at 21:00

## 2025-01-27 RX ADMIN — ACETAMINOPHEN 650 MG: 325 TABLET, FILM COATED ORAL at 23:43

## 2025-01-27 RX ADMIN — Medication 10 ML: at 21:02

## 2025-01-27 NOTE — PLAN OF CARE
Problem: Adult Inpatient Plan of Care  Goal: Plan of Care Review  Outcome: Progressing   Goal Outcome Evaluation:      Pt a+ox4, ostomy pouch changed by spouse overnight after 3rd empty from staff, pt has incr pain w/turning and is awaiting ambulatory information today. Pt and spouse state they need to know how much he can weight bear on fistula arm and then are hoping for d/c to rehab soon

## 2025-01-27 NOTE — THERAPY TREATMENT NOTE
Patient Name: Bill Landry  : 1945    MRN: [id number]                              Today's Date: 2025       Admit Date: 2025    Visit Dx:     ICD-10-CM ICD-9-CM   1. Closed nondisplaced fracture of left acetabulum, unspecified portion of acetabulum, initial encounter  S32.402A 808.0   2. ESRD (end stage renal disease)  N18.6 585.6     Patient Active Problem List   Diagnosis    Permanent atrial fibrillation    Thrombocytopenia    Chronic leukopenia    Cirrhosis of liver without ascites    Congestive splenomegaly    Anemia due to stage 4 chronic kidney disease treated with erythropoietin    Esophageal abnormality    ESRD on hemodialysis    Other specified glaucoma    Arteriovenous fistula for hemodialysis in place, secondary    Crohn's disease, unspecified, without complications    Deficiency of other specified B group vitamins    Dermatitis, unspecified    Encounter for immunization    History of urinary stone    Hyperparathyroidism, unspecified    Other disorders of phosphorus metabolism    Other iron deficiency anemias    Pure hypertriglyceridemia    Renal osteodystrophy    Secondary hyperparathyroidism of renal origin    Splenomegaly, not elsewhere classified    Bilateral pseudophakia    Dermatochalasis of eyelid    Primary open-angle glaucoma, bilateral, moderate stage    Puckering of macula, left eye    Secondary cataract    AV fistula occlusion, initial encounter    TATE (obstructive sleep apnea)    Aneurysm artery, upper extremity    Nonrheumatic aortic valve stenosis    Bicuspid aortic valve    Hyperuricemia without signs of inflammatory arthritis and tophaceous disease    Presbyopia    Impaired glucose tolerance    Hypofibrinogenemia    Pulmonary hypertension    Skin change    ESRD on dialysis    Problem with vascular access    AV graft thrombosis, initial encounter    AV shunt malfunction, initial encounter    Left arm swelling    Acetabular fracture    Anemia    Cirrhosis of liver     Atrial fibrillation    Fall    Ileostomy present    Weakness    Hypertension    Pain in left hip    Acute pain due to trauma    Transaminitis    Hypoxemia    Hyperglycemia     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    Aortic stenosis     Bicuspid aortic valve 07/20/2022    Calculus of kidney     Chronic leukopenia and thrombocytopenia     Cirrhosis of liver without ascites 07/24/2017    Congestive splenomegaly 07/24/2017    Crohn disease     with ileostomy    Decreased white blood cell count, unspecified     Diverticula of colon     ESRD on hemodialysis 04/05/2018    M-W-F FRESENIUS    Fatty liver disease, nonalcoholic     Fistula 04/05/2018    Other Specified Complication    Gastrointestinal hemorrhage, unspecified     GERD (gastroesophageal reflux disease)     GI bleed     Glaucoma     H/O Gallstone pancreatitis     H/O Pericardial effusion     H/O sinus bradycardia     History of hypertension     resolved    History of UTI     Hx of renal calculi     Ileostomy present     Iron deficiency     Leakage of heart valve prosthesis, subsequent encounter     MGUS (monoclonal gammopathy of unknown significance)     TATE (obstructive sleep apnea)     Other hemoglobinopathies     Pericardial effusion (noninflammatory)     Permanent atrial fibrillation     Scarlet fever, uncomplicated     Stenosis of other vascular prosthetic devices, implants and grafts, initial encounter 02/14/2022    Systemic lupus erythematosus, unspecified     Thrombocytopenia     undpecified    Type 2 diabetes mellitus     CURRENTLY TAKES NO MED    Urinary retention     Post-OP     Past Surgical History:   Procedure Laterality Date    APPENDECTOMY  06/1968    ARTERIOVENOUS FISTULA/SHUNT SURGERY Left 08/08/2017    Procedure: LEFT BRACHIAL CEPHALIC  AV FISTULA FORMATION WITH CEPHALIC VEIN TRANSPOSITION ;  Surgeon: Bill Deal MD;  Location: [facility] OR;  Service:     ARTERIOVENOUS FISTULA/SHUNT SURGERY Left 05/27/2022    Procedure: OPEN REVISION LEFT ARTERIOVENOUS INTERPOSITION GRAFT PLACEMENT;  Surgeon: Bill Deal MD;  Location: [facility] MAIN OR;  Service: Vascular;  Laterality: Left;    ARTERIOVENOUS FISTULA/SHUNT SURGERY Left 11/19/2024    Procedure: LEFT ARTERIOVENOUS SHUNT GRAFT REVISION;  Surgeon: Bill eDal MD;  Location: [facility] MAIN OR;  Service: Vascular;  Laterality: Left;    ARTERIOVENOUS FISTULA/SHUNT SURGERY W/ HEMODIALYSIS CATHETER INSERTION Left 02/15/2022    Procedure: OPEN LEFT ARM ARTERIOVENOUS FISTULA THROMBECTOMY WITH CEPHALIC VEIN ARTHROPLASTY AND STENT/GRAFT PLACEMENT;  Surgeon: Bill Deal MD;  Location: [facility] OR 18/19;  Service: Vascular;  Laterality: Left;    CATARACT EXTRACTION WITH INTRAOCULAR LENS IMPLANT Bilateral 2004    CHOLECYSTECTOMY  2011    COLECTOMY PARTIAL / TOTAL      History of inflammatory bowel disease with status post colectomy with ileostomy many years ago in the [facility],  18 YEARS OF AGE    COLON SURGERY      Colon resection with colostomy    COLON SURGERY      COLONOSCOPY  01/2018    CYSTOSCOPY LITHOLAPAXY BLADDER STONE EXTRACTION      ENDOSCOPY  01/16/2015    gastritis    ENDOSCOPY  01/17/2018    Procedure: ESOPHAGOGASTRODUODENOSCOPY;  Surgeon: Warner Neville MD;  Location: [facility] ENDOSCOPY;  Service:     ILEOSCOPY  01/16/2015    normal    ILEOSCOPY N/A 01/17/2018    Procedure: ILEOSCOPY;  Surgeon: Warner Neville MD;  Location: [facility] ENDOSCOPY;  Service:     ILEOSTOMY  12/1968    loop ostomy bypass    INCISION AND DRAINAGE ARM Left 10/27/2023    Procedure: LEFT ARM INCISION AND DRAINAGE;  Surgeon: Bill Deal MD;  Location: [facility] MAIN OR;  Service: Vascular;  Laterality: Left;    INSERTION HEMODIALYSIS CATHETER Right 11/13/2024    Procedure: Tunnelled HEMODIALYSIS  CATHETER INSERTION;  Surgeon: Ben Barth MD;  Location: [facility] OR;  Service: Vascular;  Laterality: Right;    OTHER SURGICAL HISTORY  2009    kidney stones x3    PSEUDOANEURYSM REPAIR Left 10/27/2023    TONSILLECTOMY  1950      General Information       Row Name 01/27/25 1531          Physical Therapy Time and Intention    Document Type therapy note (daily note)  -EF (r) CH (t) EF (c)     Mode of Treatment individual therapy;physical therapy  -EF (r) CH (t) EF (c)       Row Name 01/27/25 1531          General Information    Patient Profile Reviewed yes  -EF (r) CH (t) EF (c)     Prior Level of Function independent:;ADL's  -EF (r) CH (t) EF (c)     Existing Precautions/Restrictions fall  -EF (r) CH (t) EF (c)     Barriers to Rehab medically complex  -EF (r) CH (t) EF (c)       Row Name 01/27/25 1531          Cognition    Orientation Status (Cognition) oriented x 4  -EF (r) CH (t) EF (c)       Row Name 01/27/25 1531          Safety Issues/Impairments Affecting Functional Mobility    Impairments Affecting Function (Mobility) balance;endurance/activity tolerance;strength;pain  -EF (r) CH (t) EF (c)               User Key  (r) = Recorded By, (t) = Taken By, (c) = Cosigned By      Initials Name Provider Type    EF Rosanna Ortiz, PT Physical Therapist    Juan Daniel Jones, PT Student PT Student                   Mobility       Row Name 01/27/25 1532          Bed Mobility    Bed Mobility supine-sit  -EF (r) CH (t) EF (c)     Supine-Sit [place] (Bed Mobility) maximum assist (25% patient effort);2 person assist  -EF (r) CH (t) EF (c)     Assistive Device (Bed Mobility) bed rails;head of bed elevated  -EF (r) CH (t) EF (c)     Comment, (Bed Mobility) HOB elevated Pt uses B UE during transfer and needs assistance w/ B LE and hip for transfer.  -EF (r) CH (t) EF (c)       Row Name 01/27/25 1532          Transfers    Comment, (Transfers) Pt performs SPS transfer from sitting EOB to chair w/ RW. Pt  required verbal cues for hand placement for safety. Performed transfer from elevated surface  -EF (r) CH (t) EF (c)       Row Name 01/27/25 1532          Bed-Chair Transfer    Bed-Chair [place] (Transfers) moderate assist (50% patient effort);1 person assist  -EF (r) CH (t) EF (c)     Assistive Device (Bed-Chair Transfers) walker, rolling platform  -EF (r) CH (t) EF (c)     Comment, (Bed-Chair Transfer) Cues for hand placement for sitting down and standing up. Pt needed constant verbal cues for sequencing during transition.  -EF (r) CH (t) EF (c)       Row Name 01/27/25 1532          Sit-Stand Transfer    Sit-Stand [place] (Transfers) minimum assist (75% patient effort);1 person assist  -EF (r) CH (t) EF (c)     Assistive Device (Sit-Stand Transfers) walker, front-wheeled  -EF (r) CH (t) EF (c)     Comment, (Sit-Stand Transfer) x2 STS from sitting EOB w/ RW. Performed from an elevated surface.  -EF (r) CH (t) EF (c)       Row Name 01/27/25 1532          Gait/Stairs (Locomotion)    [place] Level (Gait) moderate assist (50% patient effort);1 person assist (P)   -     Assistive Device (Gait) walker, front-wheeled (P)   -CH     Patient was able to Ambulate -- (P)   Simultaneous filing. User may not have seen previous data.  -     Distance in Feet (Gait) -- (P)   side steps toward HOB  -CH     Comment, (Gait/Stairs) Pt took 3 side steps towards HOB w/ RW . gait limited due to self limiting w/ his mobility due to being fearful of falling  -EF (r) CH (t) EF (c)               User Key  (r) = Recorded By, (t) = Taken By, (c) = Cosigned By      Initials Name Provider Type    EF Rosanna Ortiz, PT Physical Therapist    Juan Daniel Jones, PT Student PT Student                   Obj/Interventions       Row Name 01/27/25 1537          Motor Skills    Therapeutic Exercise hip;knee;ankle  Pt performed x10 reps of B LE exercises sitting EOB w/ SBA/CGA. This included hip flexion marches, ankle pumps, LAQ. Pt  required verbal cues to slow down during exercises. Pt needs constant verbal cues for sequencing  -EF (r) CH (t) EF (c)       Row Name 01/27/25 1531          Balance    Balance Assessment sitting static balance  -EF (r) CH (t) EF (c)     Static Sitting Balance standby assist  -EF (r) CH (t) EF (c)     Static Standing Balance minimal assist  -EF (r) CH (t) EF (c)     Position/Device Used, Standing Balance walker, rolling  -EF (r) CH (t) EF (c)               User Key  (r) = Recorded By, (t) = Taken By, (c) = Cosigned By      Initials Name Provider Type    EF Rosanna Ortiz, PT Physical Therapist    CH Juan Daniel Cason, PT Student PT Student                   Goals/Plan    No documentation.                  Clinical Impression       Row Name 01/27/25 1545          Pain    Pretreatment Pain Rating 0/10 - no pain  -EF (r) CH (t) EF (c)     Posttreatment Pain Rating 0/10 - no pain  -EF (r) CH (t) EF (c)       Row Name 01/27/25 1543          Plan of Care Review    Plan of Care Reviewed With patient  -EF (r) CH (t) EF (c)     Progress improving  -EF (r) CH (t) EF (c)     Outcome Evaluation Pt is making steady progress w/ PT this PM session. Pt performed bed mobility to sitting EOB w/ max  A x2 . Pt then performed seated LE exercises sitting EOB w/ SBA/CGA during sitting balance. Pt then performed SPS from sitting EOB to chair w/ RW requiring mod A for transfer. Transfer was performed from an elevated surface. Pt continues to demo decrease exercise tolerance, decrease LE strength, and is fearful to perform gait and transfers due to previous fall. Pt would continue to benefit from skilled PT in order to address above impairment for d/c environment. Recommend SNF for d/c environment  -EF (r) CH (t) EF (c)       Row Name 01/27/25 1542          Therapy Assessment/Plan (PT)    Rehab Potential (PT) good  -EF (r) CH (t) EF (c)     Therapy Frequency (PT) 6 times/wk  -EF (r) CH (t) EF (c)       Row Name 01/27/25 9020           Positioning and Restraints    Pre-Treatment Position in bed  -EF (r) CH (t) EF (c)     Post Treatment Position chair  -EF (r) CH (t) EF (c)     In Chair notified nsg;sitting;call light within reach;encouraged to call for assist;exit alarm on;with family/caregiver  -EF (r) CH (t) EF (c)               User Key  (r) = Recorded By, (t) = Taken By, (c) = Cosigned By      Initials Name Provider Type    Rosanna Guardado, PT Physical Therapist    Juan Daniel Jones, PT Student PT Student                   Outcome Measures       Row Name 01/27/25 1544 01/27/25 1400       How much help from another person do you currently need...    Turning from your back to your side while in flat bed without using bedrails? 2  -EF (r) CH (t) EF (c) 2  -LH    Moving from lying on back to sitting on the side of a flat bed without bedrails? 2  -EF (r) CH (t) EF (c) 2  -LH    Moving to and from a bed to a chair (including a wheelchair)? 2  -EF (r) CH (t) EF (c) 2  -LH    Standing up from a chair using your arms (e.g., wheelchair, bedside chair)? 2  -EF (r) CH (t) EF (c) 2  -LH    Climbing 3-5 steps with a railing? 1  -EF (r) CH (t) EF (c) 1  -LH    To walk in hospital room? 1  -EF (r) CH (t) EF (c) 1  -LH    AM-PAC 6 Clicks Score (PT) 10  -EF (r) CH (t) 10  -LH    Highest Level of Mobility Goal 4 --> Transfer to chair/commode  -EF (r) CH (t) 4 --> Transfer to chair/commode  -LH              User Key  (r) = Recorded By, (t) = Taken By, (c) = Cosigned By      Initials Name Provider Type    Rosanna Guardado, PT Physical Therapist    Magali Garcia, RN Registered Nurse    Juan Daniel Jones, PT Student PT Student                                 Physical Therapy Education       Title: PT OT SLP Therapies (In Progress)       Topic: Physical Therapy (In Progress)       Point: Mobility training (In Progress)       Learning Progress Summary            Patient Acceptance, E, NR by  at 1/27/2025 1540    Acceptance, E, VU by DB at 1/26/2025 0548                       Point: Home exercise program (In Progress)       Learning Progress Summary            Patient Acceptance, E, NR by  at 1/27/2025 1545    Acceptance, E, VU by  at 1/26/2025 1456                      Point: Body mechanics (In Progress)       Learning Progress Summary            Patient Acceptance, E, NR by  at 1/27/2025 1545    Acceptance, E, VU by  at 1/26/2025 1456                      Point: Precautions (Done)       Learning Progress Summary            Patient Acceptance, E, VU by  at 1/26/2025 1456                                      User Key       Initials Effective Dates Name Provider Type Discipline     06/16/21 -  Sandra Peterson, PT Physical Therapist PT     01/10/25 -  Juan Daniel Cason, PT Student PT Student PT                  PT Recommendation and Plan     Progress: improving  Outcome Evaluation: Pt is making steady progress w/ PT this PM session. Pt performed bed mobility to sitting EOB w/ max  A x2 . Pt then performed seated LE exercises sitting EOB w/ SBA/CGA during sitting balance. Pt then performed SPS from sitting EOB to chair w/ RW requiring mod A for transfer. Transfer was performed from an elevated surface. Pt continues to demo decrease exercise tolerance, decrease LE strength, and is fearful to perform gait and transfers due to previous fall. Pt would continue to benefit from skilled PT in order to address above impairment for d/c environment. Recommend SNF for d/c environment     Time Calculation:         PT Charges       Row Name 01/27/25 1541             Time Calculation    Start Time 1503  -EF (r) CH (t) EF (c)      Stop Time 1527  -EF (r) CH (t) EF (c)      Time Calculation (min) 24 min  -EF (r) CH (t)      PT Received On 01/27/25  -EF (r) CH (t) EF (c)      PT - Next Appointment 01/28/25  -EF (r) CH (t) EF (c)         Time Calculation- PT    Total Timed Code Minutes- PT 24 minute(s)  -EF (r) CH (t) EF (c)         Timed Charges    04258 - PT Therapeutic  Activity Minutes 24  -EF (r) CH (t) EF (c)         Total Minutes    Timed Charges Total Minutes 24  -EF (r) CH (t)       Total Minutes 24  -EF (r) CH (t)                User Key  (r) = Recorded By, (t) = Taken By, (c) = Cosigned By      Initials Name Provider Type    EF Rosanna Ortiz, PT Physical Therapist    Juan Daniel Jones, PT Student PT Student                      PT G-Codes  Outcome Measure Options: AM-PAC 6 Clicks Basic Mobility (PT)  AM-PAC 6 Clicks Score (PT): 10  AM-PAC 6 Clicks Score (OT): 12  PT Discharge Summary  Anticipated Discharge Disposition (PT): skilled nursing facility    Juan Daniel Cason, PT Student  1/27/2025

## 2025-01-27 NOTE — PROGRESS NOTES
Name: Bill Landry ADMIT: 2025   : 1945  PCP: Bharathi De La Torre MD    MRN: [id number] LOS: 2 days   AGE/SEX: 79 y.o. male  ROOM: [facility]     Subjective   Subjective   Patient seen and examined this morning.  Hospital day 2.  He is in dialysis, currently tolerating this well, is doing okay at present, pain controlled.       Objective   Objective   Vital Signs  Temp:  [97.7 °F (36.5 °C)-98.9 °F (37.2 °C)] 97.7 °F (36.5 °C)  Heart Rate:  [63-76] 65  Resp:  [18] 18  BP: ()/(52-62) 108/55  SpO2:  [91 %-98 %] 97 %  on   ;   Device (Oxygen Therapy): room air  Body mass index is 25.69 kg/m².  Physical Exam  Vitals and nursing note reviewed.   Constitutional:       General: He is awake. He is not in acute distress.     Comments: Chronically ill appearing   Cardiovascular:      Rate and Rhythm: Normal rate.      Pulses: Normal pulses.      Heart sounds: Normal heart sounds.   Pulmonary:      Effort: Pulmonary effort is normal. No respiratory distress.      Breath sounds: Normal breath sounds. No wheezing.   Abdominal:      Palpations: Abdomen is soft.      Tenderness: There is no abdominal tenderness.      Comments: Ileostomy present   Musculoskeletal:         General: Tenderness present.      Comments: LUE AVF   Skin:     General: Skin is warm and dry.   Neurological:      Mental Status: He is alert.   Psychiatric:         Behavior: Behavior is cooperative.       Results Review     I reviewed the patient's new clinical results.  Results from last 7 days   Lab Units 252 25  0000   WBC 10*3/mm3 4.89 4.34 3.61  --    HEMOGLOBIN g/dL 9.3* 8.5* 10.1* 10.5*  31.5*   PLATELETS 10*3/mm3 60* 49* 54*  --      Results from last 7 days   Lab Units 25  0411 25  0432   SODIUM mmol/L 129* 135* 137   POTASSIUM mmol/L 5.1 4.8 3.7   CHLORIDE mmol/L 93* 97* 97*   CO2 mmol/L 22.0 28.0 28.1   BUN mg/dL 72* 51* 35*   CREATININE mg/dL 8.41* 6.58* 4.90*    GLUCOSE mg/dL 98 98 150*   EGFR mL/min/1.73 5.9* 8.0* 11.4*     Results from last 7 days   Lab Units 01/27/25 0457 01/25/25  0432   ALBUMIN g/dL 2.5* 2.6*   BILIRUBIN mg/dL 0.8 0.8   ALK PHOS U/L 191* 205*   AST (SGOT) U/L 56* 67*   ALT (SGPT) U/L 29 32     Results from last 7 days   Lab Units 01/27/25 0457 01/26/25 0411 01/25/25  0432   CALCIUM mg/dL 7.8* 7.7* 8.5*   ALBUMIN g/dL 2.5*  --  2.6*   MAGNESIUM mg/dL  --   --  1.9   PHOSPHORUS mg/dL 5.1*  --   --        Hemoglobin A1C   Date/Time Value Ref Range Status   01/26/2025 0411 4.80 4.80 - 5.60 % Final       No radiology results for the last day    I have personally reviewed all medications:  Scheduled Medications  aspirin, 81 mg, Oral, Nightly  latanoprost, 1 drop, Both Eyes, Nightly  sodium chloride, 10 mL, Intravenous, Q12H    Infusions   Diet  Diet: Cardiac, Diabetic, Renal; Healthy Heart (2-3 Na+); Consistent Carbohydrate; Low Potassium; Fluid Consistency: Thin (IDDSI 0)    I have personally reviewed:  [x]  Laboratory   []  Microbiology   []  Radiology   [x]  EKG/Telemetry  []  Cardiology/Vascular   []  Pathology    []  Records       Assessment/Plan     Active Hospital Problems    Diagnosis  POA    **Acetabular fracture [S32.409A]  Yes    Anemia [D64.9]  Yes    Cirrhosis of liver [K74.60]  Yes    Atrial fibrillation [I48.91]  Yes    Fall [W19.XXXA]  Yes    Ileostomy present [Z93.2]  Not Applicable    Weakness [R53.1]  Yes    Hypertension [I10]  Yes    Pain in left hip [M25.552]  Yes    Acute pain due to trauma [G89.11]  Yes    Transaminitis [R74.01]  Yes    Hypoxemia [R09.02]  Yes    Hyperglycemia [R73.9]  Yes    Nonrheumatic aortic valve stenosis [I35.0]  Yes    ESRD on hemodialysis [N18.6, Z99.2]  Not Applicable    Crohn's disease, unspecified, without complications [K50.90]  Yes    Thrombocytopenia [D69.6]  Yes    Chronic leukopenia [D72.819]  Yes      Resolved Hospital Problems   No resolved problems to display.     Mr. Landry is a 79 y.o. male  with a history of ESRD on HD, atrial fibrillation, aortic stenosis, crohn's disease s/p ileostomy, cirrhosis, chronic leukopenia, thrombocytopenia, anemia of chronic disease and other medical conditions as noted in history that presents to [facility] after suffering mechanical fall at home prior to arrival.      Mechanical fall  Acute pain due to trauma  Acetabular fracture  Generalized weakness  - CT head/cervical spine negative for acute hemorrhage, hydrocephalus, mass effect or fracture. CT Pelvis showed left acetabular fracture involving superior pubic ramus root and inferior pubic ramus fracture.  - Orthopedic surgery consulted, no acute surgical intervention warranted, recommending conservative measures.  - PRN medications for pain, PT/OT, fall precautions. Patient in need of SNF after discharge.     ESRD on HD  - Nephrology consulted and following. Defer management decisions to them, greatly appreciate their help.     Chronic thrombocytopenia, leukopenia, anemia  - Follows with [facility] group, they have been consulted. Hemoglobin and platelets remain low, however, no evidence of significant worsening. Will continue to follow Hematology plans/recommendations. Greatly appreciate their help.  - Order repeat CBC in AM for reassessment. Transfuse for hemoglobin <7.     Hypoxemia  - Noted transiently on arrival, O2 sat 88%, patient without dyspnea or acute respiratory complaints, monitor respiratory status to guide management.    Cirrhosis  Transaminitis  - No evidence of decompensation, LFT mildly elevated, but abdominal exam benign, values are starting to improve so need to closely monitor this. Repeat CMP in AM.      Atrial fibrillation  - Not on rate control medication, not on AC due to history of bleeding. Monitor on telemetry.     Hypertension  - BP soft, so need to closely monitor this. Trend BP to guide further management.      Hyperglycemia  - A1c 5.40% (09/17/24), repeat A1c here 4.80%.  Monitor.      SCDs for DVT prophylaxis.  Full code.  Discussed with patient, family, and nursing staff.  Anticipate discharge to SNU facility once arrangements have been made. And cleared by consultants.  Expected discharge date/ time has not been documented.      Buddy Lagos DO  [facility]  01/27/25

## 2025-01-27 NOTE — PLAN OF CARE
Goal Outcome Evaluation:  Plan of Care Reviewed With: patient, spouse        Progress: improving  Outcome Evaluation: VSS on RA. AOx4. HD today, 0.5L off. Q2 turns. Up to chair with PT. x2 assist. States pain is tolerable and refuses pain medication. Wife at bedside. Bed alarm on.

## 2025-01-27 NOTE — PLAN OF CARE
Goal Outcome Evaluation:  Plan of Care Reviewed With: patient        Progress: improving  Outcome Evaluation: Pt is making steady progress w/ PT this PM session. Pt performed bed mobility to sitting EOB w/ max  A x2 . Pt then performed seated LE exercises sitting EOB w/ SBA/CGA during sitting balance. Pt then performed SPS from sitting EOB to chair w/ RW requiring mod A for transfer. Transfer was performed from an elevated surface. Pt continues to demo decrease exercise tolerance, decrease LE strength, and is fearful to perform gait and transfers due to previous fall. Pt would continue to benefit from skilled PT in order to address above impairment for d/c environment. Recommend SNF for d/c environment    Anticipated Discharge Disposition (PT): skilled nursing facility

## 2025-01-27 NOTE — NURSING NOTE
Went into room pt and spouse requesting his B Complex-C-Folic Acid (Umm-Peter tablet) and his latanoprost eye drops, not currently ordered. Linda SCHUMACHER.

## 2025-01-28 LAB
ALBUMIN SERPL-MCNC: 2.2 G/DL (ref 3.5–5.2)
ALBUMIN/GLOB SERPL: 0.6 G/DL
ALP SERPL-CCNC: 182 U/L (ref 39–117)
ALT SERPL W P-5'-P-CCNC: 23 U/L (ref 1–41)
ANION GAP SERPL CALCULATED.3IONS-SCNC: 10.6 MMOL/L (ref 5–15)
AST SERPL-CCNC: 47 U/L (ref 1–40)
BILIRUB SERPL-MCNC: 0.8 MG/DL (ref 0–1.2)
BUN SERPL-MCNC: 49 MG/DL (ref 8–23)
BUN/CREAT SERPL: 7.6 (ref 7–25)
CALCIUM SPEC-SCNC: 7.6 MG/DL (ref 8.6–10.5)
CHLORIDE SERPL-SCNC: 96 MMOL/L (ref 98–107)
CO2 SERPL-SCNC: 26.4 MMOL/L (ref 22–29)
CREAT SERPL-MCNC: 6.47 MG/DL (ref 0.76–1.27)
DEPRECATED RDW RBC AUTO: 50.6 FL (ref 37–54)
EGFRCR SERPLBLD CKD-EPI 2021: 8.1 ML/MIN/1.73
ERYTHROCYTE [DISTWIDTH] IN BLOOD BY AUTOMATED COUNT: 13.7 % (ref 12.3–15.4)
GLOBULIN UR ELPH-MCNC: 4 GM/DL
GLUCOSE SERPL-MCNC: 121 MG/DL (ref 65–99)
HCT VFR BLD AUTO: 27.2 % (ref 37.5–51)
HGB BLD-MCNC: 8.7 G/DL (ref 13–17.7)
MCH RBC QN AUTO: 32.6 PG (ref 26.6–33)
MCHC RBC AUTO-ENTMCNC: 32 G/DL (ref 31.5–35.7)
MCV RBC AUTO: 101.9 FL (ref 79–97)
PLATELET # BLD AUTO: 53 10*3/MM3 (ref 140–450)
PMV BLD AUTO: 9.2 FL (ref 6–12)
POTASSIUM SERPL-SCNC: 4.4 MMOL/L (ref 3.5–5.2)
PROT SERPL-MCNC: 6.2 G/DL (ref 6–8.5)
RBC # BLD AUTO: 2.67 10*6/MM3 (ref 4.14–5.8)
SODIUM SERPL-SCNC: 133 MMOL/L (ref 136–145)
WBC NRBC COR # BLD AUTO: 4.32 10*3/MM3 (ref 3.4–10.8)

## 2025-01-28 PROCEDURE — 97530 THERAPEUTIC ACTIVITIES: CPT

## 2025-01-28 PROCEDURE — 80053 COMPREHEN METABOLIC PANEL: CPT | Performed by: STUDENT IN AN ORGANIZED HEALTH CARE EDUCATION/TRAINING PROGRAM

## 2025-01-28 PROCEDURE — 85027 COMPLETE CBC AUTOMATED: CPT | Performed by: STUDENT IN AN ORGANIZED HEALTH CARE EDUCATION/TRAINING PROGRAM

## 2025-01-28 RX ADMIN — Medication 10 ML: at 08:55

## 2025-01-28 RX ADMIN — ASPIRIN 81 MG: 81 TABLET, DELAYED RELEASE ORAL at 20:41

## 2025-01-28 RX ADMIN — Medication 10 ML: at 20:41

## 2025-01-28 RX ADMIN — LATANOPROST 1 DROP: 50 SOLUTION/ DROPS OPHTHALMIC at 20:46

## 2025-01-28 RX ADMIN — ACETAMINOPHEN 650 MG: 325 TABLET, FILM COATED ORAL at 17:34

## 2025-01-28 NOTE — PROGRESS NOTES
Name: Bill Landry ADMIT: 2025   : 1945  PCP: Bharathi De La Torre MD    MRN: [id number] LOS: 3 days   AGE/SEX: 79 y.o. male  ROOM: [facility]     Subjective   Subjective   Patient seen and examined this morning.  Hospital day 3.  Family present at bedside.  He is awake and resting comfortably in bed, doing okay at present.  Pain controlled       Objective   Objective   Vital Signs  Temp:  [97.3 °F (36.3 °C)-98.4 °F (36.9 °C)] 98.2 °F (36.8 °C)  Heart Rate:  [57-69] 59  Resp:  [16-18] 18  BP: ()/(56-68) 103/59  SpO2:  [94 %-99 %] 99 %  on   ;   Device (Oxygen Therapy): room air  Body mass index is 26.41 kg/m².  Physical Exam  Vitals and nursing note reviewed.   Constitutional:       General: He is awake. He is not in acute distress.     Comments: Chronically ill appearing   Cardiovascular:      Rate and Rhythm: Normal rate.      Pulses: Normal pulses.      Heart sounds: Normal heart sounds.   Pulmonary:      Effort: Pulmonary effort is normal. No respiratory distress.      Breath sounds: Normal breath sounds. No wheezing or rhonchi.   Abdominal:      General: There is no distension.      Palpations: Abdomen is soft.      Tenderness: There is no abdominal tenderness.      Comments: Ileostomy present   Musculoskeletal:         General: Tenderness present.      Comments: LUE AVF   Skin:     General: Skin is warm and dry.   Neurological:      Mental Status: He is alert.   Psychiatric:         Behavior: Behavior is cooperative.       Results Review     I reviewed the patient's new clinical results.  Results from last 7 days   Lab Units 25  03425  0432   WBC 10*3/mm3 4.32 4.89 4.34 3.61   HEMOGLOBIN g/dL 8.7* 9.3* 8.5* 10.1*   PLATELETS 10*3/mm3 53* 60* 49* 54*     Results from last 7 days   Lab Units 25  0342 25  0457 25  0411 25  0432   SODIUM mmol/L 133* 129* 135* 137   POTASSIUM mmol/L 4.4 5.1 4.8 3.7   CHLORIDE mmol/L 96* 93* 97* 97*    CO2 mmol/L 26.4 22.0 28.0 28.1   BUN mg/dL 49* 72* 51* 35*   CREATININE mg/dL 6.47* 8.41* 6.58* 4.90*   GLUCOSE mg/dL 121* 98 98 150*   EGFR mL/min/1.73 8.1* 5.9* 8.0* 11.4*     Results from last 7 days   Lab Units 01/28/25 0342 01/27/25 0457 01/25/25  0432   ALBUMIN g/dL 2.2* 2.5* 2.6*   BILIRUBIN mg/dL 0.8 0.8 0.8   ALK PHOS U/L 182* 191* 205*   AST (SGOT) U/L 47* 56* 67*   ALT (SGPT) U/L 23 29 32     Results from last 7 days   Lab Units 01/28/25 0342 01/27/25 0457 01/26/25 0411 01/25/25  0432   CALCIUM mg/dL 7.6* 7.8* 7.7* 8.5*   ALBUMIN g/dL 2.2* 2.5*  --  2.6*   MAGNESIUM mg/dL  --   --   --  1.9   PHOSPHORUS mg/dL  --  5.1*  --   --        Hemoglobin A1C   Date/Time Value Ref Range Status   01/26/2025 0411 4.80 4.80 - 5.60 % Final       No radiology results for the last day    I have personally reviewed all medications:  Scheduled Medications  aspirin, 81 mg, Oral, Nightly  latanoprost, 1 drop, Both Eyes, Nightly  sodium chloride, 10 mL, Intravenous, Q12H    Infusions   Diet  Diet: Cardiac, Diabetic, Renal; Healthy Heart (2-3 Na+); Consistent Carbohydrate; Low Potassium; Fluid Consistency: Thin (IDDSI 0)    I have personally reviewed:  [x]  Laboratory   []  Microbiology   []  Radiology   [x]  EKG/Telemetry  []  Cardiology/Vascular   []  Pathology    []  Records       Assessment/Plan     Active Hospital Problems    Diagnosis  POA    **Acetabular fracture [S32.409A]  Yes    Anemia [D64.9]  Yes    Cirrhosis of liver [K74.60]  Yes    Atrial fibrillation [I48.91]  Yes    Fall [W19.XXXA]  Yes    Ileostomy present [Z93.2]  Not Applicable    Weakness [R53.1]  Yes    Hypertension [I10]  Yes    Pain in left hip [M25.552]  Yes    Acute pain due to trauma [G89.11]  Yes    Transaminitis [R74.01]  Yes    Hypoxemia [R09.02]  Yes    Hyperglycemia [R73.9]  Yes    Nonrheumatic aortic valve stenosis [I35.0]  Yes    ESRD on hemodialysis [N18.6, Z99.2]  Not Applicable    Crohn's disease, unspecified, without complications  [K50.90]  Yes    Thrombocytopenia [D69.6]  Yes    Chronic leukopenia [D72.819]  Yes      Resolved Hospital Problems   No resolved problems to display.     Mr. Landry is a 79 y.o. male with a history of ESRD on HD, atrial fibrillation, aortic stenosis, crohn's disease s/p ileostomy, cirrhosis, chronic leukopenia, thrombocytopenia, anemia of chronic disease and other medical conditions as noted in history that presents to [facility] after suffering mechanical fall at home prior to arrival.      Mechanical fall  Acute pain due to trauma  Acetabular fracture  Generalized weakness  - CT head/cervical spine negative for acute hemorrhage, hydrocephalus, mass effect or fracture. CT Pelvis showed left acetabular fracture involving superior pubic ramus root and inferior pubic ramus fracture.  - Orthopedic surgery consulted, no acute surgical intervention warranted, recommending conservative measures.  - PRN medications for pain, PT/OT, fall precautions. Patient in need of SNF after discharge.  Discussed with CCP, going to work on this now.     ESRD on HD  - Nephrology consulted and following. Defer management decisions to them, greatly appreciate their help.     Chronic thrombocytopenia, leukopenia, anemia  - Follows with CBC group, they were consulted. Hemoglobin and platelets remain low, however, values are fluctuating.    - Order repeat CBC in AM for reassessment. Transfuse for hemoglobin <7.     Hypoxemia  - Noted transiently on arrival, O2 sat 88%, patient without dyspnea or acute respiratory complaints, monitor respiratory status to guide management.    Cirrhosis  Transaminitis  - No evidence of decompensation, LFT mildly elevated, but abdominal exam benign, values are improving, so no acute intervention warranted at this time, continue to monitor for now. Repeat CMP in AM.      Atrial fibrillation  - Not on rate control medication, not on AC due to history of bleeding. Monitor on telemetry.      Hypertension  - BP soft, on lower end of normal range, per review of vitals.  No acute intervention warranted, however, do need to closely monitor this, consider further intervention if blood pressure worsens. Trend BP to guide further management.      Hyperglycemia  - A1c 5.40% (09/17/24), repeat A1c here 4.80%. Monitor.      SCDs for DVT prophylaxis.  Full code.  Discussed with patient, family, and nursing staff.  Anticipate discharge to SNU facility once arrangements have been made. And cleared by consultants.  Expected Discharge Date: 1/29/2025; Expected Discharge Time:       Buddy Lagos DO  [facility]  01/28/25

## 2025-01-28 NOTE — DISCHARGE PLACEMENT REQUEST
"Bill Landry (79 y.o. Male)       Date of Birth   1945    Social Security Number       Address   [address] [place] [address]    Home Phone   [phone]    MRN   [id number]       Sabianist   Religion    Marital Status                               Admission Date   1/25/25    Admission Type   Emergency    Admitting Provider   Buddy Lagos DO    Attending Provider   Buddy Lagos DO    Department, Room/Bed   [address], S420/1       Discharge Date       Discharge Disposition       Discharge Destination                                 Attending Provider: Buddy Lagos DO    Allergies: Ace Inhibitors, Contrast Dye (Echo Or Unknown Ct/mr), Iodine, Angiotensin Receptor Blockers, Eliquis [Apixaban], Filgrastim, Heparin, Keflex [Cephalexin], Other    Isolation: None   Infection: None   Code Status: CPR    Ht: 177.8 cm (70\")   Wt: 83.5 kg (184 lb 1.4 oz)    Admission Cmt: None   Principal Problem: Acetabular fracture [S32.409A]                   Active Insurance as of 1/25/2025       Primary Coverage       Payor Plan Insurance Group Employer/Plan Group    MEDICARE MEDICARE A & B        Payor Plan Address Payor Plan Phone Number Payor Plan Fax Number Effective Dates    [address] [phone]  10/1/2010 - None Entered    [place] [address]         Subscriber Name Subscriber Birth Date Member ID       BILL LANDRY 1945 [id number]               Secondary Coverage       Payor Plan Insurance Group Employer/Plan Group    [employer] [place] [employer] [place] [employer]       Payor Plan Address Payor Plan Phone Number Payor Plan Fax Number Effective Dates    [address] [place] [address] [phone]  10/1/2010 - None Entered    [place] [address]         Subscriber Name Subscriber Birth Date Member ID       BILL LANDRY 1945 [id number]                     Emergency Contacts        (Rel.) Home Phone Work Phone Mobile Phone    Gillian Landry (Spouse) " [phone] -- [phone]    bereket zarate (Son) [phone] -- --    KEVIN ZARATE (Son) -- -- [phone]

## 2025-01-28 NOTE — PROGRESS NOTES
[facility] Progress Note      Patient Name: Bill Landry  : 1945  MRN: [id number]  Primary Care Physician:  Bharathi De La Torre MD  Date of admission: 2025    Subjective     Interval History:     Dialyzed yesterday with no reported problems    Review of Systems:   As noted above    Objective     Vitals:   Temp:  [98.1 °F (36.7 °C)-98.4 °F (36.9 °C)] 98.2 °F (36.8 °C)  Heart Rate:  [59-69] 59  Resp:  [16-18] 18  BP: (103-121)/(57-68) 103/59    Intake/Output Summary (Last 24 hours) at 2025 1746  Last data filed at 2025 1705  Gross per 24 hour   Intake 240 ml   Output 610 ml   Net -370 ml       Physical Exam:    Constitutional: Awake, alert, NAD, chronically ill, fully oriented  HEENT: Sclera anicteric, no conjunctival injection  Neck: Supple, no carotid bruit, trachea at midline, no JVD  Respiratory: Mostly clear bilaterally, nonlabored on 1 L/min  Cardiovascular: Irregularly irregular, 2/6M, no rub  Vascular: Left arm AV fistula/graft patent  Gastrointestinal: BS +, soft, nontender, nondistended, + ileostomy  : No palpable bladder  Musculoskeletal: +1 LE edema, right worse than left; no C/C   Psychiatric: Appropriate affect, cooperative, fully oriented, depressed  Neurologic: No asterixis, moving all extremities, normal speech   Skin: Warm and dry; venous stasis changes both legs; bruises all limbs    Scheduled Meds:     aspirin, 81 mg, Oral, Nightly  latanoprost, 1 drop, Both Eyes, Nightly  sodium chloride, 10 mL, Intravenous, Q12H      IV Meds:        Results Reviewed:   I have personally reviewed the results from the time of this admission to 2025 17:46 EST     Results from last 7 days   Lab Units 25  0342 25  0457 25  0411 25  0432   SODIUM mmol/L 133* 129* 135* 137   POTASSIUM mmol/L 4.4 5.1 4.8 3.7   CHLORIDE mmol/L 96* 93* 97* 97*   CO2 mmol/L 26.4 22.0 28.0 28.1   BUN mg/dL 49* 72* 51* 35*   CREATININE mg/dL 6.47* 8.41* 6.58*  4.90*   CALCIUM mg/dL 7.6* 7.8* 7.7* 8.5*   BILIRUBIN mg/dL 0.8 0.8  --  0.8   ALK PHOS U/L 182* 191*  --  205*   ALT (SGPT) U/L 23 29  --  32   AST (SGOT) U/L 47* 56*  --  67*   GLUCOSE mg/dL 121* 98 98 150*       Estimated Creatinine Clearance: 10.9 mL/min (A) (by C-G formula based on SCr of 6.47 mg/dL (H)).    Results from last 7 days   Lab Units 01/27/25  0457 01/25/25  0432   MAGNESIUM mg/dL  --  1.9   PHOSPHORUS mg/dL 5.1*  --              Results from last 7 days   Lab Units 01/28/25  0342 01/27/25  0457 01/26/25  0411 01/25/25  0432 01/22/25  0000   WBC 10*3/mm3 4.32 4.89 4.34 3.61  --    HEMOGLOBIN g/dL 8.7* 9.3* 8.5* 10.1* 10.5*  31.5*   PLATELETS 10*3/mm3 53* 60* 49* 54*  --        Results from last 7 days   Lab Units 01/25/25  0432   INR  1.35*       Assessment / Plan     ASSESSMENT:  1.  ESRD: Volume status stable with chronic lower extremity edema but mostly clear lungs.  Electrolytes stable.  Last HD was 1/24  2.  Fall 1/24 (lost balance), with left acetabular fracture: no surgery needed  3.  Atrial fibrillation with rate-control  4.  Crohn's disease with prior bowel resection and ileostomy creation  5.  Cirrhosis   6.  Mild-moderate aortic stenosis  7.  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  Chronic anemia of ESRD; hemoglobin has fallen  9.  Chronic hypotension    PLAN:  1.  We will continue HD on MWF, will plan for dialysis tomorrow  2.  Surveillance labs    Thank you for involving us in the care of Bill Landry.  Please feel free to call with any questions.    Myra Moore MD  01/28/25  17:46 [facility]    [facility]  [phone]    Please note that portions of this note were completed with a voice recognition program.

## 2025-01-28 NOTE — PROGRESS NOTES
[facility] Progress Note      Patient Name: Bill Landry  : 1945  MRN: [id number]  Primary Care Physician:  Bharathi De La Torre MD  Date of admission: 2025    Subjective     Interval History:   Seen today on hd  Doing well and pain seems to be controlled   Denies any SOB and no chest pain     Review of Systems:   As noted above    Objective     Vitals:   Temp:  [97.3 °F (36.3 °C)-98.9 °F (37.2 °C)] 97.3 °F (36.3 °C)  Heart Rate:  [57-76] 65  Resp:  [16-18] 18  BP: ()/(52-59) 115/56    Intake/Output Summary (Last 24 hours) at 2025  Last data filed at 2025 1740  Gross per 24 hour   Intake 60 ml   Output 1575 ml   Net -1515 ml       Physical Exam:    Constitutional: Awake, alert, NAD, chronically ill, fully oriented  HEENT: Sclera anicteric, no conjunctival injection  Neck: Supple, no carotid bruit, trachea at midline, no JVD  Respiratory: Mostly clear bilaterally, nonlabored on 1 L/min  Cardiovascular: Irregularly irregular, 2/6M, no rub  Vascular: Left arm AV fistula/graft patent  Gastrointestinal: BS +, soft, nontender, nondistended, + ileostomy  : No palpable bladder  Musculoskeletal: +1 LE edema, right worse than left; no C/C   Psychiatric: Appropriate affect, cooperative, fully oriented, depressed  Neurologic: No asterixis, moving all extremities, normal speech   Skin: Warm and dry; venous stasis changes both legs; bruises all limbs    Scheduled Meds:     aspirin, 81 mg, Oral, Nightly  latanoprost, 1 drop, Both Eyes, Nightly  sodium chloride, 10 mL, Intravenous, Q12H      IV Meds:        Results Reviewed:   I have personally reviewed the results from the time of this admission to 2025 19:22 EST     Results from last 7 days   Lab Units 25  0457 25  0411 25  0432   SODIUM mmol/L 129* 135* 137   POTASSIUM mmol/L 5.1 4.8 3.7   CHLORIDE mmol/L 93* 97* 97*   CO2 mmol/L 22.0 28.0 28.1   BUN mg/dL 72* 51* 35*   CREATININE mg/dL 8.41*  6.58* 4.90*   CALCIUM mg/dL 7.8* 7.7* 8.5*   BILIRUBIN mg/dL 0.8  --  0.8   ALK PHOS U/L 191*  --  205*   ALT (SGPT) U/L 29  --  32   AST (SGOT) U/L 56*  --  67*   GLUCOSE mg/dL 98 98 150*       Estimated Creatinine Clearance: 8.4 mL/min (A) (by C-G formula based on SCr of 8.41 mg/dL (H)).    Results from last 7 days   Lab Units 01/27/25 0457 01/25/25 0432   MAGNESIUM mg/dL  --  1.9   PHOSPHORUS mg/dL 5.1*  --              Results from last 7 days   Lab Units 01/27/25 0457 01/26/25  0411 01/25/25  0432 01/22/25  0000   WBC 10*3/mm3 4.89 4.34 3.61  --    HEMOGLOBIN g/dL 9.3* 8.5* 10.1* 10.5*  31.5*   PLATELETS 10*3/mm3 60* 49* 54*  --        Results from last 7 days   Lab Units 01/25/25 0432   INR  1.35*       Assessment / Plan     ASSESSMENT:  1.  ESRD: Volume status stable with chronic lower extremity edema but mostly clear lungs.  Electrolytes stable.  Last HD was 1/24  2.  Fall 1/24 (lost balance), with left acetabular fracture: no surgery needed  3.  Atrial fibrillation with rate-control  4.  Crohn's disease with prior bowel resection and ileostomy creation  5.  Cirrhosis   6.  Mild-moderate aortic stenosis  7.  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  Chronic anemia of ESRD; hemoglobin has fallen  9.  Chronic hypotension    PLAN:  1.  We will continue HD on MWF, dialyzing today   2.  Vascular surgery ok with using walker in rehab with recommendation to avoid Putting pressure on the arm with fistula { using walker) for 2 hours after dialysis to prevent bleeding  3.  Hopefully home soon  4. Surveillance labs     Thank you for involving us in the care of Bill Landry.  Please feel free to call with any questions.    Myra Moore MD  01/27/25  19:22 [facility]    [facility]  [phone]    Please note that portions of this note were completed with a voice recognition program.

## 2025-01-28 NOTE — CASE MANAGEMENT/SOCIAL WORK
Discharge Planning Assessment  [facility]     Patient Name: Bill Landry  MRN: [id number]  Today's Date: 1/28/2025    Admit Date: 1/25/2025    Plan: SNF with onsite HD referrals pending.   Discharge Needs Assessment       Row Name 01/28/25 0958       Living Environment    People in Home spouse    Current Living Arrangements home    Family Caregiver if Needed spouse    Quality of Family Relationships helpful;involved;supportive    Able to Return to Prior Arrangements no       Transition Planning    Patient/Family Anticipates Transition to inpatient rehabilitation facility    Patient/Family Anticipated Services at Transition     Transportation Anticipated family or friend will provide;health plan transportation       Discharge Needs Assessment    Readmission Within the Last 30 Days no previous admission in last 30 days    Equipment Currently Used at Home cpap;walker, rolling;shower chair    Concerns to be Addressed discharge planning    Equipment Needed After Discharge none    Outpatient/Agency/Support Group Needs skilled nursing facility    Discharge Facility/Level of Care Needs nursing facility, skilled                   Discharge Plan       Row Name 01/28/25 0959       Plan    Plan SNF with onsite HD referrals pending.    Patient/Family in Agreement with Plan yes    Plan Comments CCP met with pt and spouse Gillian at the bedside, introduced self and role of CCP. Pt gave CCP permission to speak in front of Gillian. Face sheet information and pharmacy verified. Pt lives with Gillian in a 2-story home with 3STE but they reside mostly on the first floor. Pt still drives, IADL's and CPAP, cane and shower chair. Pt denies having a living will. Pt is enrolled in meds to beds and denies trouble affording or managing his medications. Pt has used [facility] in the past and denies SNF history. CCP discussed PT recs for SNF. CCP discussed SNF with onsite HD vs SNF without onsite HD; pt and spouse are  agreeable to [facility] referrals to SNF with onsite HD. CCP made referrals to [facility], [facility], [facility], [facility], [facility], [facility], [facility], [facility] and [place]. Ronna/[facility]hel[facility], Huan/[facility], Lelia/[facility], aPpi/Micheal and Roger/Giacomo notified of referral. They will review pt and notify CCP of who can accept and bed availability. Nik RN/CCP                  Continued Care and Services - Admitted Since 1/25/2025       Destination       Service Provider Request Status Services Address Phone Fax Patient Preferred    [facility] Considering -- [address] [facility] [address] [phone] --    [facility] -- [address] [id number] [phone] --    [facility] [address] [address] [phone] --    [facility] Considering -- [address], [facility] [address] [phone] --    [facility] Pending - Request Sent -- [address] [address] [phone] --    [facility] Pending - Request Sent -- [address] [address] [phone] --    [facility] Pending - Request Sent -- [address], [facility] [phone] --    [facility] Pending - Request Sent -- [address], [facility] [address] [phone] --    [facility] [facility] - Request Sent -- [address] [phone] --                  Selected Continued Care - Prior Encounters Includes continued care and service providers with selected services from prior encounters from 10/27/2024 to  1/28/2025      Discharged on 11/21/2024 Admission date: 11/19/2024 - Discharge disposition: Home-Health Care Svc      Home Medical Care       Service Provider Services Address Phone Fax Patient Preferred    [facility] [place] [address] [address] [phone] --                          Expected Discharge Date and Time       Expected Discharge Date Expected Discharge Time    Jan 29, 2025            Demographic Summary       Row Name 01/28/25 0958       General Information    Admission Type inpatient    Arrived From home    Required Notices Provided Important Message from Medicare    Referral Source admission list;case finding    Reason for Consult discharge planning    Preferred Language English       Contact Information    Permission Granted to Share Info With ;family/designee                   Functional Status       Row Name 01/28/25 0958       Functional Status    Usual Activity Tolerance good    Current Activity Tolerance moderate       Assessment of Health Literacy    How often do you have someone help you read hospital materials? Never    How often do you have problems learning about your medical condition because of difficulty understanding written information? Never    How often do you have a problem understanding what is told to you about your medical condition? Never    How confident are you filling out medical forms by yourself? Extremely    Health Literacy Excellent       Functional Status, IADL    Medications independent    Meal Preparation independent    Housekeeping independent    Laundry independent    Shopping independent                               Abbey Naik RN

## 2025-01-28 NOTE — THERAPY TREATMENT NOTE
Patient Name: Bill Landry  : 1945    MRN: [id number]                              Today's Date: 2025       Admit Date: 2025    Visit Dx:     ICD-10-CM ICD-9-CM   1. Closed nondisplaced fracture of left acetabulum, unspecified portion of acetabulum, initial encounter  S32.402A 808.0   2. ESRD (end stage renal disease)  N18.6 585.6     Patient Active Problem List   Diagnosis    Permanent atrial fibrillation    Thrombocytopenia    Chronic leukopenia    Cirrhosis of liver without ascites    Congestive splenomegaly    Anemia due to stage 4 chronic kidney disease treated with erythropoietin    Esophageal abnormality    ESRD on hemodialysis    Other specified glaucoma    Arteriovenous fistula for hemodialysis in place, secondary    Crohn's disease, unspecified, without complications    Deficiency of other specified B group vitamins    Dermatitis, unspecified    Encounter for immunization    History of urinary stone    Hyperparathyroidism, unspecified    Other disorders of phosphorus metabolism    Other iron deficiency anemias    Pure hypertriglyceridemia    Renal osteodystrophy    Secondary hyperparathyroidism of renal origin    Splenomegaly, not elsewhere classified    Bilateral pseudophakia    Dermatochalasis of eyelid    Primary open-angle glaucoma, bilateral, moderate stage    Puckering of macula, left eye    Secondary cataract    AV fistula occlusion, initial encounter    TATE (obstructive sleep apnea)    Aneurysm artery, upper extremity    Nonrheumatic aortic valve stenosis    Bicuspid aortic valve    Hyperuricemia without signs of inflammatory arthritis and tophaceous disease    Presbyopia    Impaired glucose tolerance    Hypofibrinogenemia    Pulmonary hypertension    Skin change    ESRD on dialysis    Problem with vascular access    AV graft thrombosis, initial encounter    AV shunt malfunction, initial encounter    Left arm swelling    Acetabular fracture    Anemia    Cirrhosis of liver     Atrial fibrillation    Fall    Ileostomy present    Weakness    Hypertension    Pain in left hip    Acute pain due to trauma    Transaminitis    Hypoxemia    Hyperglycemia     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    Aortic stenosis     Bicuspid aortic valve 07/20/2022    Calculus of kidney     Chronic leukopenia and thrombocytopenia     Cirrhosis of liver without ascites 07/24/2017    Congestive splenomegaly 07/24/2017    Crohn disease     with ileostomy    Decreased white blood cell count, unspecified     Diverticula of colon     ESRD on hemodialysis 04/05/2018    M-W-F FRESENIUS    Fatty liver disease, nonalcoholic     Fistula 04/05/2018    Other Specified Complication    Gastrointestinal hemorrhage, unspecified     GERD (gastroesophageal reflux disease)     GI bleed     Glaucoma     H/O Gallstone pancreatitis     H/O Pericardial effusion     H/O sinus bradycardia     History of hypertension     resolved    History of UTI     Hx of renal calculi     Ileostomy present     Iron deficiency     Leakage of heart valve prosthesis, subsequent encounter     MGUS (monoclonal gammopathy of unknown significance)     TATE (obstructive sleep apnea)     Other hemoglobinopathies     Pericardial effusion (noninflammatory)     Permanent atrial fibrillation     Scarlet fever, uncomplicated     Stenosis of other vascular prosthetic devices, implants and grafts, initial encounter 02/14/2022    Systemic lupus erythematosus, unspecified     Thrombocytopenia     undpecified    Type 2 diabetes mellitus     CURRENTLY TAKES NO MED    Urinary retention     Post-OP     Past Surgical History:   Procedure Laterality Date    APPENDECTOMY  06/1968    ARTERIOVENOUS FISTULA/SHUNT SURGERY Left 08/08/2017    Procedure: LEFT BRACHIAL CEPHALIC  AV FISTULA FORMATION WITH CEPHALIC VEIN TRANSPOSITION ;  Surgeon: Bill Deal MD;  Location: [facility] OR;  Service:     ARTERIOVENOUS FISTULA/SHUNT SURGERY Left 05/27/2022    Procedure: OPEN REVISION LEFT ARTERIOVENOUS INTERPOSITION GRAFT PLACEMENT;  Surgeon: Bill Deal MD;  Location: [facility] MAIN OR;  Service: Vascular;  Laterality: Left;    ARTERIOVENOUS FISTULA/SHUNT SURGERY Left 11/19/2024    Procedure: LEFT ARTERIOVENOUS SHUNT GRAFT REVISION;  Surgeon: Bill Deal MD;  Location: [facility] MAIN OR;  Service: Vascular;  Laterality: Left;    ARTERIOVENOUS FISTULA/SHUNT SURGERY W/ HEMODIALYSIS CATHETER INSERTION Left 02/15/2022    Procedure: OPEN LEFT ARM ARTERIOVENOUS FISTULA THROMBECTOMY WITH CEPHALIC VEIN ARTHROPLASTY AND STENT/GRAFT PLACEMENT;  Surgeon: Bill Deal MD;  Location: [facility] OR 18/19;  Service: Vascular;  Laterality: Left;    CATARACT EXTRACTION WITH INTRAOCULAR LENS IMPLANT Bilateral 2004    CHOLECYSTECTOMY  2011    COLECTOMY PARTIAL / TOTAL      History of inflammatory bowel disease with status post colectomy with ileostomy many years ago in the [facility],  18 YEARS OF AGE    COLON SURGERY      Colon resection with colostomy    COLON SURGERY      COLONOSCOPY  01/2018    CYSTOSCOPY LITHOLAPAXY BLADDER STONE EXTRACTION      ENDOSCOPY  01/16/2015    gastritis    ENDOSCOPY  01/17/2018    Procedure: ESOPHAGOGASTRODUODENOSCOPY;  Surgeon: Warner Neville MD;  Location: [facility] ENDOSCOPY;  Service:     ILEOSCOPY  01/16/2015    normal    ILEOSCOPY N/A 01/17/2018    Procedure: ILEOSCOPY;  Surgeon: Warner Neville MD;  Location: [facility] ENDOSCOPY;  Service:     ILEOSTOMY  12/1968    loop ostomy bypass    INCISION AND DRAINAGE ARM Left 10/27/2023    Procedure: LEFT ARM INCISION AND DRAINAGE;  Surgeon: Bill Deal MD;  Location: [facility] MAIN OR;  Service: Vascular;  Laterality: Left;    INSERTION HEMODIALYSIS CATHETER Right 11/13/2024    Procedure: Tunnelled HEMODIALYSIS  CATHETER INSERTION;  Surgeon: Ben Barth MD;  Location: [facility] OR;  Service: Vascular;  Laterality: Right;    OTHER SURGICAL HISTORY  2009    kidney stones x3    PSEUDOANEURYSM REPAIR Left 10/27/2023    TONSILLECTOMY  1950      General Information       Row Name 01/28/25 1258          OT Time and Intention    Document Type therapy note (daily note)  -     Mode of Treatment individual therapy;occupational therapy  -       Row Name 01/28/25 1258          General Information    Patient Profile Reviewed yes  -     Existing Precautions/Restrictions fall  -       Row Name 01/28/25 1258          Cognition    Orientation Status (Cognition) oriented x 4  -       Row Name 01/28/25 1258          Safety Issues/Impairments Affecting Functional Mobility    Impairments Affecting Function (Mobility) balance;endurance/activity tolerance;strength;pain  -     Comment, Safety Issues/Impairments (Mobility) gait belt and non skid socks.  -               User Key  (r) = Recorded By, (t) = Taken By, (c) = Cosigned By      Initials Name Provider Type    Ken Lancaster, OT Occupational Therapist                     Mobility/ADL's       Row Name 01/28/25 1259          Bed Mobility    Bed Mobility supine-sit  -     Supine-Sit [place] (Bed Mobility) moderate assist (50% patient effort)  -     Bed Mobility, Safety Issues decreased use of legs for bridging/pushing;impaired trunk control for bed mobility  -     Assistive Device (Bed Mobility) bed rails;head of bed elevated  -       Row Name 01/28/25 1259          Transfers    Transfers sit-stand transfer  -       Row Name 01/28/25 1259          Sit-Stand Transfer    Sit-Stand [place] (Transfers) minimum assist (75% patient effort);1 person assist  -     Assistive Device (Sit-Stand Transfers) walker, front-wheeled  -               User Key  (r) = Recorded By, (t) = Taken By, (c) = Cosigned By      Initials Name Provider Type    Ken Lancaster  OT Occupational Therapist                   Obj/Interventions       Row Name 01/28/25 1300          Sensory Assessment (Somatosensory)    Sensory Assessment (Somatosensory) sensation intact  -       Row Name 01/28/25 1300          Vision Assessment/Intervention    Visual Impairment/Limitations WFL  -JR       Row Name 01/28/25 1300          Balance    Balance Assessment sitting static balance;sitting dynamic balance;standing static balance;standing dynamic balance  -JR     Static Sitting Balance standby assist  -JR     Dynamic Sitting Balance standby assist  -JR     Position, Sitting Balance sitting edge of bed  -     Static Standing Balance minimal assist  -JR     Dynamic Standing Balance minimal assist  -JR     Position/Device Used, Standing Balance supported;walker, front-wheeled  -               User Key  (r) = Recorded By, (t) = Taken By, (c) = Cosigned By      Initials Name Provider Type    Ken Lancaster OT Occupational Therapist                   Goals/Plan    No documentation.                  Clinical Impression       Row Name 01/28/25 1301          Pain Assessment    Pre/Posttreatment Pain Comment Patient did not report pain, but c/o pain in pelvic area.  -       Row Name 01/28/25 1301          Plan of Care Review    Plan of Care Reviewed With patient;spouse  -     Progress improving  -     Outcome Evaluation Patient seen for OT this am.  Patient resting and visiting with spouse upon ariva.  Patient transitioned to EOB with Mod A.  Patientc/o pain in Left pelvic area with any movement.  STS from EOB with Min A and Rw.  Patient performed short ambulation to recliner chair with Min A and Rw.  Patient very fatigued and relieved when able to sit.  Patient reclined in chair with all needs within reach.  Anticipate patient d/c to SNF with dialysis unit.  Cont OT as tolerated.  -       Row Name 01/28/25 1301          Therapy Assessment/Plan (OT)    Rehab Potential (OT) good  -     Criteria for  Skilled Therapeutic Interventions Met (OT) meets criteria;yes  -JR     Therapy Frequency (OT) 5 times/wk  -JR       Row Name 01/28/25 1301          Therapy Plan Review/Discharge Plan (OT)    Anticipated Discharge Disposition (OT) skilled nursing facility  -       Row Name 01/28/25 1301          Vital Signs    O2 Delivery Pre Treatment room air  -JR     O2 Delivery Intra Treatment room air  -JR     O2 Delivery Post Treatment room air  -JR     Pre Patient Position Supine  -JR     Intra Patient Position Standing  -JR     Post Patient Position Sitting  -JR       Row Name 01/28/25 1301          Positioning and Restraints    Pre-Treatment Position in bed  -JR     Post Treatment Position chair  -JR     In Chair notified nsg;reclined;call light within reach;encouraged to call for assist;exit alarm on;with family/caregiver  -JR               User Key  (r) = Recorded By, (t) = Taken By, (c) = Cosigned By      Initials Name Provider Type    Ken Lancaster, OT Occupational Therapist                   Outcome Measures       Row Name 01/28/25 1304          How much help from another is currently needed...    Putting on and taking off regular lower body clothing? 1  -JR     Bathing (including washing, rinsing, and drying) 2  -JR     Toileting (which includes using toilet bed pan or urinal) 2  -JR     Putting on and taking off regular upper body clothing 2  -JR     Taking care of personal grooming (such as brushing teeth) 3  -JR     Eating meals 3  -JR     AM-PAC 6 Clicks Score (OT) 13  -JR       Row Name 01/28/25 0955          How much help from another person do you currently need...    Turning from your back to your side while in flat bed without using bedrails? 2  -LH     Moving from lying on back to sitting on the side of a flat bed without bedrails? 2  -LH     Moving to and from a bed to a chair (including a wheelchair)? 2  -LH     Standing up from a chair using your arms (e.g., wheelchair, bedside chair)? 2  -LH      Climbing 3-5 steps with a railing? 1  -     To walk in hospital room? 2  -     AM-PAC 6 Clicks Score (PT) 11  -     Highest Level of Mobility Goal 4 --> Transfer to chair/commode  -       Row Name 01/28/25 1301          Modified [place] Scale    Modified [place] Scale 4 - Moderately severe disability.  Unable to walk without assistance, and unable to attend to own bodily needs without assistance.  -       Row Name 01/28/25 1304          Functional Assessment    Outcome Measure Options AM-PAC 6 Clicks Daily Activity (OT);Modified [place]  -               User Key  (r) = Recorded By, (t) = Taken By, (c) = Cosigned By      Initials Name Provider Type     Magali Sam, RN Registered Nurse    Ken Lancaster OT Occupational Therapist                    Occupational Therapy Education       Title: PT OT SLP Therapies (In Progress)       Topic: Occupational Therapy (In Progress)       Point: ADL training (Done)       Description:   Instruct learner(s) on proper safety adaptation and remediation techniques during self care or transfers.   Instruct in proper use of assistive devices.                  Learning Progress Summary            Patient Acceptance, E, Bed IU by GONZALO at 1/26/2025 0952    Comment: OT answers ?'s about rehab, plan for walker use and anticipated healing time per ortho notes, body mechanics to help with pain.   Family Acceptance, E, Bed IU by GONZALO at 1/26/2025 0952    Comment: OT answers ?'s about rehab, plan for walker use and anticipated healing time per ortho notes, body mechanics to help with pain.                      Point: Home exercise program (Not Started)       Description:   Instruct learner(s) on appropriate technique for monitoring, assisting and/or progressing therapeutic exercises/activities.                  Learner Progress:  Not documented in this visit.              Point: Precautions (Done)       Description:   Instruct learner(s) on prescribed precautions during self-care and  functional transfers.                  Learning Progress Summary            Patient Acceptance, E, Bed IU by GONZALO at 1/26/2025 0952    Comment: OT answers ?'s about rehab, plan for walker use and anticipated healing time per ortho notes, body mechanics to help with pain.   Family Acceptance, E, Bed IU by GONZALO at 1/26/2025 0952    Comment: OT answers ?'s about rehab, plan for walker use and anticipated healing time per ortho notes, body mechanics to help with pain.                      Point: Body mechanics (Done)       Description:   Instruct learner(s) on proper positioning and spine alignment during self-care, functional mobility activities and/or exercises.                  Learning Progress Summary            Patient Acceptance, E, Bed IU by GONZALO at 1/26/2025 0952    Comment: OT answers ?'s about rehab, plan for walker use and anticipated healing time per ortho notes, body mechanics to help with pain.   Family Acceptance, E, Bed IU by GONZALO at 1/26/2025 0952    Comment: OT answers ?'s about rehab, plan for walker use and anticipated healing time per ortho notes, body mechanics to help with pain.                                      User Key       Initials Effective Dates Name Provider Type Discipline     06/16/21 -  Cassie Paz OTR Occupational Therapist OT                  OT Recommendation and Plan  Therapy Frequency (OT): 5 times/wk  Plan of Care Review  Plan of Care Reviewed With: patient, spouse  Progress: improving  Outcome Evaluation: Patient seen for OT this am.  Patient resting and visiting with spouse upon ariva.  Patient transitioned to EOB with Mod A.  Patientc/o pain in Left pelvic area with any movement.  STS from EOB with Min A and Rw.  Patient performed short ambulation to recliner chair with Min A and Rw.  Patient very fatigued and relieved when able to sit.  Patient reclined in chair with all needs within reach.  Anticipate patient d/c to SNF with dialysis unit.  Cont OT as tolerated.     Time  Calculation:         Time Calculation- OT       Row Name 01/28/25 1305             Time Calculation- OT    OT Start Time 1032  -JR      OT Stop Time 1056  -JR      OT Time Calculation (min) 24 min  -JR      Total Timed Code Minutes- OT 24 minute(s)  -JR      OT Received On 01/28/25  -JR      OT - Next Appointment 01/29/25  -         Timed Charges    92987 - OT Therapeutic Activity Minutes 24  -JR         Total Minutes    Timed Charges Total Minutes 24  -JR       Total Minutes 24  -JR                User Key  (r) = Recorded By, (t) = Taken By, (c) = Cosigned By      Initials Name Provider Type    Ken Lancaster OT Occupational Therapist                  Therapy Charges for Today       Code Description Service Date Service Provider Modifiers Qty    [id number] HC OT THERAPEUTIC ACT EA 15 MIN 1/28/2025 Ken Gregory OT GO 2                 Ken Gregory OT  1/28/2025

## 2025-01-28 NOTE — PLAN OF CARE
Goal Outcome Evaluation:  Plan of Care Reviewed With: patient, spouse        Progress: improving  Outcome Evaluation: Patient seen for OT this am.  Patient resting and visiting with spouse upon ariva.  Patient transitioned to EOB with Mod A.  Patientc/o pain in Left pelvic area with any movement.  STS from EOB with Min A and Rw.  Patient performed short ambulation to recliner chair with Min A and Rw.  Patient very fatigued and relieved when able to sit.  Patient reclined in chair with all needs within reach.  Anticipate patient d/c to SNF with dialysis unit.  Cont OT as tolerated.    Anticipated Discharge Disposition (OT): skilled nursing facility

## 2025-01-29 LAB
ALBUMIN SERPL-MCNC: 2.3 G/DL (ref 3.5–5.2)
ALBUMIN/GLOB SERPL: 0.6 G/DL
ALP SERPL-CCNC: 181 U/L (ref 39–117)
ALT SERPL W P-5'-P-CCNC: 21 U/L (ref 1–41)
ANION GAP SERPL CALCULATED.3IONS-SCNC: 14 MMOL/L (ref 5–15)
AST SERPL-CCNC: 45 U/L (ref 1–40)
BILIRUB SERPL-MCNC: 0.9 MG/DL (ref 0–1.2)
BUN SERPL-MCNC: 73 MG/DL (ref 8–23)
BUN/CREAT SERPL: 9.7 (ref 7–25)
CALCIUM SPEC-SCNC: 7.6 MG/DL (ref 8.6–10.5)
CHLORIDE SERPL-SCNC: 95 MMOL/L (ref 98–107)
CO2 SERPL-SCNC: 24 MMOL/L (ref 22–29)
CREAT SERPL-MCNC: 7.52 MG/DL (ref 0.76–1.27)
DEPRECATED RDW RBC AUTO: 50.2 FL (ref 37–54)
EGFRCR SERPLBLD CKD-EPI 2021: 6.8 ML/MIN/1.73
ERYTHROCYTE [DISTWIDTH] IN BLOOD BY AUTOMATED COUNT: 13.7 % (ref 12.3–15.4)
GLOBULIN UR ELPH-MCNC: 4 GM/DL
GLUCOSE SERPL-MCNC: 101 MG/DL (ref 65–99)
HBV SURFACE AB SER RIA-ACNC: REACTIVE
HBV SURFACE AG SERPL QL IA: NORMAL
HCT VFR BLD AUTO: 26.1 % (ref 37.5–51)
HGB BLD-MCNC: 8.7 G/DL (ref 13–17.7)
MCH RBC QN AUTO: 33.6 PG (ref 26.6–33)
MCHC RBC AUTO-ENTMCNC: 33.3 G/DL (ref 31.5–35.7)
MCV RBC AUTO: 100.8 FL (ref 79–97)
PHOSPHATE SERPL-MCNC: 5.1 MG/DL (ref 2.5–4.5)
PLATELET # BLD AUTO: 53 10*3/MM3 (ref 140–450)
PMV BLD AUTO: 9.3 FL (ref 6–12)
POTASSIUM SERPL-SCNC: 4.6 MMOL/L (ref 3.5–5.2)
PROT SERPL-MCNC: 6.3 G/DL (ref 6–8.5)
RBC # BLD AUTO: 2.59 10*6/MM3 (ref 4.14–5.8)
SODIUM SERPL-SCNC: 133 MMOL/L (ref 136–145)
WBC NRBC COR # BLD AUTO: 4.28 10*3/MM3 (ref 3.4–10.8)

## 2025-01-29 PROCEDURE — 84100 ASSAY OF PHOSPHORUS: CPT | Performed by: HOSPITALIST

## 2025-01-29 PROCEDURE — 86706 HEP B SURFACE ANTIBODY: CPT | Performed by: HOSPITALIST

## 2025-01-29 PROCEDURE — 36415 COLL VENOUS BLD VENIPUNCTURE: CPT | Performed by: STUDENT IN AN ORGANIZED HEALTH CARE EDUCATION/TRAINING PROGRAM

## 2025-01-29 PROCEDURE — 80053 COMPREHEN METABOLIC PANEL: CPT | Performed by: STUDENT IN AN ORGANIZED HEALTH CARE EDUCATION/TRAINING PROGRAM

## 2025-01-29 PROCEDURE — 86704 HEP B CORE ANTIBODY TOTAL: CPT | Performed by: HOSPITALIST

## 2025-01-29 PROCEDURE — 85027 COMPLETE CBC AUTOMATED: CPT | Performed by: STUDENT IN AN ORGANIZED HEALTH CARE EDUCATION/TRAINING PROGRAM

## 2025-01-29 PROCEDURE — 87340 HEPATITIS B SURFACE AG IA: CPT | Performed by: HOSPITALIST

## 2025-01-29 RX ADMIN — Medication 10 ML: at 09:00

## 2025-01-29 RX ADMIN — ASPIRIN 81 MG: 81 TABLET, DELAYED RELEASE ORAL at 21:06

## 2025-01-29 RX ADMIN — ACETAMINOPHEN 650 MG: 325 TABLET, FILM COATED ORAL at 21:06

## 2025-01-29 RX ADMIN — LATANOPROST 1 DROP: 50 SOLUTION/ DROPS OPHTHALMIC at 21:07

## 2025-01-29 RX ADMIN — Medication 10 ML: at 21:06

## 2025-01-29 NOTE — PROGRESS NOTES
[facility] Progress Note      Patient Name: Bill Landry  : 1945  MRN: [id number]  Primary Care Physician:  Bharathi De La Torre MD  Date of admission: 2025    Subjective     Interval History:     Has no new complaints today.  Afebrile.  Dialyzed today per schedule.    Review of Systems:   As noted above    Objective     Vitals:   Temp:  [98.1 °F (36.7 °C)-98.5 °F (36.9 °C)] 98.5 °F (36.9 °C)  Heart Rate:  [60-71] 60  Resp:  [18] 18  BP: (103-114)/(60-66) 114/66    Intake/Output Summary (Last 24 hours) at 2025 1032  Last data filed at 2025 0222  Gross per 24 hour   Intake 240 ml   Output 385 ml   Net -145 ml       Physical Exam:    Constitutional: Awake, alert, NAD, chronically ill, fully oriented  HEENT: Sclera anicteric, no conjunctival injection  Neck: Supple, no carotid bruit, trachea at midline, no JVD  Respiratory: Mostly clear bilaterally, nonlabored on 1 L/min  Cardiovascular: Irregularly irregular, 2/6M, no rub  Vascular: Left arm AV fistula/graft patent  Gastrointestinal: BS +, soft, nontender, nondistended, + ileostomy  : No palpable bladder  Musculoskeletal: +1 LE edema, right worse than left; no C/C   Psychiatric: Appropriate affect, cooperative, fully oriented, depressed  Neurologic: No asterixis, moving all extremities, normal speech   Skin: Warm and dry; venous stasis changes both legs; bruises all limbs    Scheduled Meds:     aspirin, 81 mg, Oral, Nightly  latanoprost, 1 drop, Both Eyes, Nightly  sodium chloride, 10 mL, Intravenous, Q12H      IV Meds:        Results Reviewed:   I have personally reviewed the results from the time of this admission to 2025 10:32 EST     Results from last 7 days   Lab Units 25  0347 25  0342 25  0457   SODIUM mmol/L 133* 133* 129*   POTASSIUM mmol/L 4.6 4.4 5.1   CHLORIDE mmol/L 95* 96* 93*   CO2 mmol/L 24.0 26.4 22.0   BUN mg/dL 73* 49* 72*   CREATININE mg/dL 7.52* 6.47* 8.41*   CALCIUM mg/dL  7.6* 7.6* 7.8*   BILIRUBIN mg/dL 0.9 0.8 0.8   ALK PHOS U/L 181* 182* 191*   ALT (SGPT) U/L 21 23 29   AST (SGOT) U/L 45* 47* 56*   GLUCOSE mg/dL 101* 121* 98       Estimated Creatinine Clearance: 9.4 mL/min (A) (by C-G formula based on SCr of 7.52 mg/dL (H)).    Results from last 7 days   Lab Units 01/29/25 0347 01/27/25 0457 01/25/25  0432   MAGNESIUM mg/dL  --   --  1.9   PHOSPHORUS mg/dL 5.1* 5.1*  --              Results from last 7 days   Lab Units 01/29/25 0347 01/28/25 0342 01/27/25 0457 01/26/25  0411 01/25/25  0432   WBC 10*3/mm3 4.28 4.32 4.89 4.34 3.61   HEMOGLOBIN g/dL 8.7* 8.7* 9.3* 8.5* 10.1*   PLATELETS 10*3/mm3 53* 53* 60* 49* 54*       Results from last 7 days   Lab Units 01/25/25  0432   INR  1.35*       Assessment / Plan     ASSESSMENT:  1.  ESRD: Volume status stable with chronic lower extremity edema but mostly clear lungs.  Electrolytes stable.  Last HD was 1/24  2.  Fall 1/24 (lost balance), with left acetabular fracture: no surgery needed  3.  Atrial fibrillation with rate-control  4.  Crohn's disease with prior bowel resection and ileostomy creation  5.  Cirrhosis   6.  Mild-moderate aortic stenosis  7.  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  Chronic anemia of ESRD; hemoglobin has fallen  9.  Chronic hypotension  10.  Mild hyponatremia likely secondary to hypervolemia.  Will improve with dialysis  11.  Anemia of CKD.  Will check iron panel    PLAN:  1.  We will continue HD on MWF, will plan for dialysis today    2.  Surveillance labs    3.  Check iron panel    4. Fluid restriction     Thank you for involving us in the care of Bill PETER Pastranabest.  Please feel free to call with any questions.    Myra Moore MD  01/29/25  10:32 [facility]    [facility]  [phone]    Please note that portions of this note were completed with a voice recognition program.

## 2025-01-29 NOTE — CASE MANAGEMENT/SOCIAL WORK
Continued Stay Note  [facility]     Patient Name: Bill Landry  MRN: [id number]  Today's Date: 1/29/2025    Admit Date: 1/25/2025    Plan: [facility], bed available Friday after HD setup complete   Discharge Plan       Row Name 01/29/25 1334       Plan    Plan [facility], bed available Friday after HD setup complete    Patient/Family in Agreement with Plan yes    Plan Comments [facility] and [facility] notified [facility]CP pt has been accepted and [facility] available [facility]. Ronna[facility] notified CCP bed available Friday and needs Hep B panel and CXR to complete HD setup. CCP met with pts spouse Gillian at the bedside to update. CCP explained [facility] accepted and beds available. Gillian verbalized understanding and would like [facility]. CCP discussed transportation with Gillian; she is agreeable to [facility] transport at [place]. CCP updated pt at bedside in the Dialysis unit and pt accepts bed at [facility] and is agreeable to [facility] at [place]. Milagros notified of pt/family acceptance of bed. Per Milagros pt will need to receive HD on Friday prior to admission. Partial packet in CCP office, [facility] Nik XIAO/CCP                   Discharge Codes    No documentation.                 Expected Discharge Date and Time       Expected Discharge Date Expected Discharge Time    Jan 30, 2025               Abbey Naik RN

## 2025-01-29 NOTE — PLAN OF CARE
Goal Outcome Evaluation:  Plan of Care Reviewed With: patient        Progress: improving  Outcome Evaluation: VSS on RA. AOx4. Q2 turns. Up to chair with PT. x2 assist. PRN tylenol x1. Wife at bedside. Bed alarm on. Awaiting rehab placement.

## 2025-01-29 NOTE — PROGRESS NOTES
Name: Bill Landry ADMIT: 2025   : 1945  PCP: Bharathi De La Torre MD    MRN: [id number] LOS: 4 days   AGE/SEX: 79 y.o. male  ROOM: [facility]     Subjective   Subjective   Patient seen and examined this morning.  Hospital day 4, resting comfortably in bed, doing okay at present, pain improved, asking about physical therapy reassessment today.       Objective   Objective   Vital Signs  Temp:  [98.1 °F (36.7 °C)-98.5 °F (36.9 °C)] 98.5 °F (36.9 °C)  Heart Rate:  [60-71] 60  Resp:  [18] 18  BP: (103-114)/(60-66) 114/66  SpO2:  [93 %-97 %] 97 %  on   ;   Device (Oxygen Therapy): room air  Body mass index is 26.41 kg/m².  Physical Exam  Vitals and nursing note reviewed.   Constitutional:       General: He is awake. He is not in acute distress.     Comments: Chronically ill appearing   Cardiovascular:      Rate and Rhythm: Normal rate.      Pulses: Normal pulses.      Heart sounds: Normal heart sounds.   Pulmonary:      Effort: Pulmonary effort is normal. No respiratory distress.      Breath sounds: Normal breath sounds. No wheezing or rhonchi.   Abdominal:      General: There is no distension.      Palpations: Abdomen is soft.      Tenderness: There is no abdominal tenderness.      Comments: Ileostomy present   Musculoskeletal:         General: No tenderness or deformity.      Comments: LUE AVF   Skin:     General: Skin is warm and dry.   Neurological:      Mental Status: He is alert.   Psychiatric:         Behavior: Behavior is cooperative.       Results Review     I reviewed the patient's new clinical results.  Results from last 7 days   Lab Units 25  0411   WBC 10*3/mm3 4.28 4.32 4.89 4.34   HEMOGLOBIN g/dL 8.7* 8.7* 9.3* 8.5*   PLATELETS 10*3/mm3 53* 53* 60* 49*     Results from last 7 days   Lab Units 25  0342 25  0411   SODIUM mmol/L 133* 133* 129* 135*   POTASSIUM mmol/L 4.6 4.4 5.1 4.8   CHLORIDE mmol/L 95* 96*  "93* 97*   CO2 mmol/L 24.0 26.4 22.0 28.0   BUN mg/dL 73* 49* 72* 51*   CREATININE mg/dL 7.52* 6.47* 8.41* 6.58*   GLUCOSE mg/dL 101* 121* 98 98   EGFR mL/min/1.73 6.8* 8.1* 5.9* 8.0*     Results from last 7 days   Lab Units 01/29/25 0347 01/28/25 0342 01/27/25 0457 01/25/25  0432   ALBUMIN g/dL 2.3* 2.2* 2.5* 2.6*   BILIRUBIN mg/dL 0.9 0.8 0.8 0.8   ALK PHOS U/L 181* 182* 191* 205*   AST (SGOT) U/L 45* 47* 56* 67*   ALT (SGPT) U/L 21 23 29 32     Results from last 7 days   Lab Units 01/29/25 0347 01/28/25 0342 01/27/25 0457 01/26/25 0411 01/25/25  0432   CALCIUM mg/dL 7.6* 7.6* 7.8* 7.7* 8.5*   ALBUMIN g/dL 2.3* 2.2* 2.5*  --  2.6*   MAGNESIUM mg/dL  --   --   --   --  1.9   PHOSPHORUS mg/dL 5.1*  --  5.1*  --   --        No results found for: \"HGBA1C\", \"POCGLU\"      No radiology results for the last day    I have personally reviewed all medications:  Scheduled Medications  aspirin, 81 mg, Oral, Nightly  latanoprost, 1 drop, Both Eyes, Nightly  sodium chloride, 10 mL, Intravenous, Q12H    Infusions   Diet  Diet: Cardiac, Diabetic, Renal; Healthy Heart (2-3 Na+); Consistent Carbohydrate; Low Potassium; Fluid Consistency: Thin (IDDSI 0)    I have personally reviewed:  [x]  Laboratory   []  Microbiology   []  Radiology   [x]  EKG/Telemetry  []  Cardiology/Vascular   []  Pathology    []  Records       Assessment/Plan     Active Hospital Problems    Diagnosis  POA    **Acetabular fracture [S32.409A]  Yes    Anemia [D64.9]  Yes    Cirrhosis of liver [K74.60]  Yes    Atrial fibrillation [I48.91]  Yes    Fall [W19.XXXA]  Yes    Ileostomy present [Z93.2]  Not Applicable    Weakness [R53.1]  Yes    Hypertension [I10]  Yes    Pain in left hip [M25.552]  Yes    Acute pain due to trauma [G89.11]  Yes    Transaminitis [R74.01]  Yes    Hypoxemia [R09.02]  Yes    Hyperglycemia [R73.9]  Yes    Nonrheumatic aortic valve stenosis [I35.0]  Yes    ESRD on hemodialysis [N18.6, Z99.2]  Not Applicable    Crohn's disease, " unspecified, without complications [K50.90]  Yes    Thrombocytopenia [D69.6]  Yes    Chronic leukopenia [D72.819]  Yes      Resolved Hospital Problems   No resolved problems to display.     Mr. Landry is a 79 y.o. male with a history of ESRD on HD, atrial fibrillation, aortic stenosis, crohn's disease s/p ileostomy, cirrhosis, chronic leukopenia, thrombocytopenia, anemia of chronic disease and other medical conditions as noted in history that presents to [facility] after suffering mechanical fall at home prior to arrival.      Mechanical fall  Acute pain due to trauma  Acetabular fracture  Generalized weakness  - CT head/cervical spine negative for acute hemorrhage, hydrocephalus, mass effect or fracture. CT Pelvis showed left acetabular fracture involving superior pubic ramus root and inferior pubic ramus fracture.  - Orthopedic surgery consulted, no acute surgical intervention warranted, recommending conservative measures.  - PRN medications for pain, PT/OT, fall precautions. Patient in need of SNF after discharge.  Discussed with CCP, currently working on this, will continue to follow up to guide final disposition plans.     ESRD on HD  - Nephrology consulted and following. Defer management decisions to them, greatly appreciate their help.     Chronic thrombocytopenia, leukopenia, anemia  - Follows with [facility], they were consulted and evaluated. Hemoglobin and platelets remain low, however, values are fluctuating.    - Order repeat CBC in AM for reassessment. Transfuse for hemoglobin <7.     Hypoxemia  - Noted transiently on arrival, O2 sat 88%, patient without dyspnea or acute respiratory complaints, monitor respiratory status to guide management.    Cirrhosis  Transaminitis  - No evidence of decompensation, LFT mildly elevated, but abdominal exam benign, values are improving, so no acute intervention warranted at this time, continue to monitor for now. Repeat CMP in AM.      Atrial  fibrillation  - Not on rate control medication, not on AC due to history of bleeding. Monitor on telemetry.     Hypertension  - BP soft, on lower end of normal range, per review of vitals.  No acute intervention warranted, however, do need to closely monitor this, consider further intervention if blood pressure worsens. Trend BP to guide further management.      Hyperglycemia  - A1c 5.40% (09/17/24), repeat A1c here 4.80%. Monitor.    Portions of this text have been copied and I have reviewed these. Documentation accurate as of 01/29/25.     SCDs for DVT prophylaxis.  Full code.  Discussed with patient, family, nursing staff, and CCP.  Anticipate discharge to SNU facility once arrangements have been made. And cleared by consultants.  Expected Discharge Date: 1/30/2025; Expected Discharge Time:       Buddy Lagos DO  [facility]  01/29/25

## 2025-01-30 ENCOUNTER — APPOINTMENT (OUTPATIENT)
Dept: GENERAL RADIOLOGY | Facility: HOSPITAL | Age: 80
End: 2025-01-30
Payer: MEDICARE

## 2025-01-30 LAB
ALBUMIN SERPL-MCNC: 2.5 G/DL (ref 3.5–5.2)
ALBUMIN/GLOB SERPL: 0.7 G/DL
ALP SERPL-CCNC: 195 U/L (ref 39–117)
ALT SERPL W P-5'-P-CCNC: 23 U/L (ref 1–41)
ANION GAP SERPL CALCULATED.3IONS-SCNC: 9.5 MMOL/L (ref 5–15)
AST SERPL-CCNC: 50 U/L (ref 1–40)
BASOPHILS # BLD AUTO: 0.02 10*3/MM3 (ref 0–0.2)
BASOPHILS NFR BLD AUTO: 0.4 % (ref 0–1.5)
BILIRUB SERPL-MCNC: 1 MG/DL (ref 0–1.2)
BUN SERPL-MCNC: 48 MG/DL (ref 8–23)
BUN/CREAT SERPL: 9.2 (ref 7–25)
CALCIUM SPEC-SCNC: 7.7 MG/DL (ref 8.6–10.5)
CHLORIDE SERPL-SCNC: 99 MMOL/L (ref 98–107)
CO2 SERPL-SCNC: 23.5 MMOL/L (ref 22–29)
CREAT SERPL-MCNC: 5.21 MG/DL (ref 0.76–1.27)
DEPRECATED RDW RBC AUTO: 52 FL (ref 37–54)
EGFRCR SERPLBLD CKD-EPI 2021: 10.6 ML/MIN/1.73
EOSINOPHIL # BLD AUTO: 0.13 10*3/MM3 (ref 0–0.4)
EOSINOPHIL NFR BLD AUTO: 2.9 % (ref 0.3–6.2)
ERYTHROCYTE [DISTWIDTH] IN BLOOD BY AUTOMATED COUNT: 14.1 % (ref 12.3–15.4)
FERRITIN SERPL-MCNC: 943 NG/ML (ref 30–400)
GLOBULIN UR ELPH-MCNC: 3.6 GM/DL
GLUCOSE SERPL-MCNC: 100 MG/DL (ref 65–99)
HBV CORE AB SERPL QL IA: NEGATIVE
HCT VFR BLD AUTO: 27.6 % (ref 37.5–51)
HGB BLD-MCNC: 8.7 G/DL (ref 13–17.7)
IMM GRANULOCYTES # BLD AUTO: 0.05 10*3/MM3 (ref 0–0.05)
IMM GRANULOCYTES NFR BLD AUTO: 1.1 % (ref 0–0.5)
IRON 24H UR-MRATE: 66 MCG/DL (ref 59–158)
IRON SATN MFR SERPL: 29 % (ref 20–50)
LYMPHOCYTES # BLD AUTO: 0.63 10*3/MM3 (ref 0.7–3.1)
LYMPHOCYTES NFR BLD AUTO: 14 % (ref 19.6–45.3)
MCH RBC QN AUTO: 32.1 PG (ref 26.6–33)
MCHC RBC AUTO-ENTMCNC: 31.5 G/DL (ref 31.5–35.7)
MCV RBC AUTO: 101.8 FL (ref 79–97)
MONOCYTES # BLD AUTO: 0.57 10*3/MM3 (ref 0.1–0.9)
MONOCYTES NFR BLD AUTO: 12.6 % (ref 5–12)
NEUTROPHILS NFR BLD AUTO: 3.11 10*3/MM3 (ref 1.7–7)
NEUTROPHILS NFR BLD AUTO: 69 % (ref 42.7–76)
PHOSPHATE SERPL-MCNC: 4 MG/DL (ref 2.5–4.5)
PLATELET # BLD AUTO: 71 10*3/MM3 (ref 140–450)
PMV BLD AUTO: 9.3 FL (ref 6–12)
POTASSIUM SERPL-SCNC: 4.3 MMOL/L (ref 3.5–5.2)
PROT SERPL-MCNC: 6.1 G/DL (ref 6–8.5)
RBC # BLD AUTO: 2.71 10*6/MM3 (ref 4.14–5.8)
SODIUM SERPL-SCNC: 132 MMOL/L (ref 136–145)
TIBC SERPL-MCNC: 226 MCG/DL (ref 298–536)
TRANSFERRIN SERPL-MCNC: 152 MG/DL (ref 200–360)
WBC NRBC COR # BLD AUTO: 4.51 10*3/MM3 (ref 3.4–10.8)

## 2025-01-30 PROCEDURE — 71046 X-RAY EXAM CHEST 2 VIEWS: CPT

## 2025-01-30 PROCEDURE — 85025 COMPLETE CBC W/AUTO DIFF WBC: CPT | Performed by: HOSPITALIST

## 2025-01-30 PROCEDURE — 82728 ASSAY OF FERRITIN: CPT | Performed by: HOSPITALIST

## 2025-01-30 PROCEDURE — 97110 THERAPEUTIC EXERCISES: CPT

## 2025-01-30 PROCEDURE — 97530 THERAPEUTIC ACTIVITIES: CPT

## 2025-01-30 PROCEDURE — 83540 ASSAY OF IRON: CPT | Performed by: HOSPITALIST

## 2025-01-30 PROCEDURE — 84466 ASSAY OF TRANSFERRIN: CPT | Performed by: HOSPITALIST

## 2025-01-30 PROCEDURE — 84100 ASSAY OF PHOSPHORUS: CPT | Performed by: HOSPITALIST

## 2025-01-30 PROCEDURE — 80053 COMPREHEN METABOLIC PANEL: CPT | Performed by: STUDENT IN AN ORGANIZED HEALTH CARE EDUCATION/TRAINING PROGRAM

## 2025-01-30 RX ORDER — MIDODRINE HYDROCHLORIDE 2.5 MG/1
2.5 TABLET ORAL
Status: DISCONTINUED | OUTPATIENT
Start: 2025-01-30 | End: 2025-01-31

## 2025-01-30 RX ADMIN — ACETAMINOPHEN 650 MG: 325 TABLET, FILM COATED ORAL at 20:06

## 2025-01-30 RX ADMIN — Medication 10 ML: at 20:07

## 2025-01-30 RX ADMIN — LATANOPROST 1 DROP: 50 SOLUTION/ DROPS OPHTHALMIC at 20:07

## 2025-01-30 RX ADMIN — Medication 10 ML: at 08:28

## 2025-01-30 RX ADMIN — ASPIRIN 81 MG: 81 TABLET, DELAYED RELEASE ORAL at 20:06

## 2025-01-30 NOTE — THERAPY TREATMENT NOTE
Patient Name: Bill Landry  : 1945    MRN: [id number]                              Today's Date: 2025       Admit Date: 2025    Visit Dx:     ICD-10-CM ICD-9-CM   1. Closed nondisplaced fracture of left acetabulum, unspecified portion of acetabulum, initial encounter  S32.402A 808.0   2. ESRD (end stage renal disease)  N18.6 585.6     Patient Active Problem List   Diagnosis    Permanent atrial fibrillation    Thrombocytopenia    Chronic leukopenia    Cirrhosis of liver without ascites    Congestive splenomegaly    Anemia due to stage 4 chronic kidney disease treated with erythropoietin    Esophageal abnormality    ESRD on hemodialysis    Other specified glaucoma    Arteriovenous fistula for hemodialysis in place, secondary    Crohn's disease, unspecified, without complications    Deficiency of other specified B group vitamins    Dermatitis, unspecified    Encounter for immunization    History of urinary stone    Hyperparathyroidism, unspecified    Other disorders of phosphorus metabolism    Other iron deficiency anemias    Pure hypertriglyceridemia    Renal osteodystrophy    Secondary hyperparathyroidism of renal origin    Splenomegaly, not elsewhere classified    Bilateral pseudophakia    Dermatochalasis of eyelid    Primary open-angle glaucoma, bilateral, moderate stage    Puckering of macula, left eye    Secondary cataract    AV fistula occlusion, initial encounter    TATE (obstructive sleep apnea)    Aneurysm artery, upper extremity    Nonrheumatic aortic valve stenosis    Bicuspid aortic valve    Hyperuricemia without signs of inflammatory arthritis and tophaceous disease    Presbyopia    Impaired glucose tolerance    Hypofibrinogenemia    Pulmonary hypertension    Skin change    ESRD on dialysis    Problem with vascular access    AV graft thrombosis, initial encounter    AV shunt malfunction, initial encounter    Left arm swelling    Acetabular fracture    Anemia    Cirrhosis of liver     Atrial fibrillation    Fall    Ileostomy present    Weakness    Hypertension    Pain in left hip    Acute pain due to trauma    Transaminitis    Hypoxemia    Hyperglycemia     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    Aortic stenosis     Bicuspid aortic valve 07/20/2022    Calculus of kidney     Chronic leukopenia and thrombocytopenia     Cirrhosis of liver without ascites 07/24/2017    Congestive splenomegaly 07/24/2017    Crohn disease     with ileostomy    Decreased white blood cell count, unspecified     Diverticula of colon     ESRD on hemodialysis 04/05/2018    M-W-F [facility]    Fatty liver disease, nonalcoholic     Fistula 04/05/2018    Other Specified Complication    Gastrointestinal hemorrhage, unspecified     GERD (gastroesophageal reflux disease)     GI bleed     Glaucoma     H/O Gallstone pancreatitis     H/O Pericardial effusion     H/O sinus bradycardia     History of hypertension     resolved    History of UTI     Hx of renal calculi     Ileostomy present     Iron deficiency     Leakage of heart valve prosthesis, subsequent encounter     MGUS (monoclonal gammopathy of unknown significance)     TATE (obstructive sleep apnea)     Other hemoglobinopathies     Pericardial effusion (noninflammatory)     Permanent atrial fibrillation     Scarlet fever, uncomplicated     Stenosis of other vascular prosthetic devices, implants and grafts, initial encounter 02/14/2022    Systemic lupus erythematosus, unspecified     Thrombocytopenia     undpecified    Type 2 diabetes mellitus     CURRENTLY TAKES NO MED    Urinary retention     Post-OP     Past Surgical History:   Procedure Laterality Date    APPENDECTOMY  06/1968    ARTERIOVENOUS FISTULA/SHUNT SURGERY Left 08/08/2017    Procedure: LEFT BRACHIAL CEPHALIC  AV FISTULA FORMATION WITH CEPHALIC VEIN TRANSPOSITION ;  Surgeon: Bill Deal MD;  Location: [facility] OR;  Service:     ARTERIOVENOUS FISTULA/SHUNT SURGERY Left 05/27/2022    Procedure: OPEN REVISION LEFT ARTERIOVENOUS INTERPOSITION GRAFT PLACEMENT;  Surgeon: Bill Deal MD;  Location: [facility] MAIN OR;  Service: Vascular;  Laterality: Left;    ARTERIOVENOUS FISTULA/SHUNT SURGERY Left 11/19/2024    Procedure: LEFT ARTERIOVENOUS SHUNT GRAFT REVISION;  Surgeon: Bill Deal MD;  Location: [facility] MAIN OR;  Service: Vascular;  Laterality: Left;    ARTERIOVENOUS FISTULA/SHUNT SURGERY W/ HEMODIALYSIS CATHETER INSERTION Left 02/15/2022    Procedure: OPEN LEFT ARM ARTERIOVENOUS FISTULA THROMBECTOMY WITH CEPHALIC VEIN ARTHROPLASTY AND STENT/GRAFT PLACEMENT;  Surgeon: Bill Deal MD;  Location: [facility] OR 18/19;  Service: Vascular;  Laterality: Left;    CATARACT EXTRACTION WITH INTRAOCULAR LENS IMPLANT Bilateral 2004    CHOLECYSTECTOMY  2011    COLECTOMY PARTIAL / TOTAL      History of inflammatory bowel disease with status post colectomy with ileostomy many years ago in the [facility],  18 YEARS OF AGE    COLON SURGERY      Colon resection with colostomy    COLON SURGERY      COLONOSCOPY  01/2018    CYSTOSCOPY LITHOLAPAXY BLADDER STONE EXTRACTION      ENDOSCOPY  01/16/2015    gastritis    ENDOSCOPY  01/17/2018    Procedure: ESOPHAGOGASTRODUODENOSCOPY;  Surgeon: Warner Neville MD;  Location: [facility] ENDOSCOPY;  Service:     ILEOSCOPY  01/16/2015    normal    ILEOSCOPY N/A 01/17/2018    Procedure: ILEOSCOPY;  Surgeon: Warner Neville MD;  Location: [facility] ENDOSCOPY;  Service:     ILEOSTOMY  12/1968    loop ostomy bypass    INCISION AND DRAINAGE ARM Left 10/27/2023    Procedure: LEFT ARM INCISION AND DRAINAGE;  Surgeon: Bill Deal MD;  Location: [facility] MAIN OR;  Service: Vascular;  Laterality: Left;    INSERTION HEMODIALYSIS CATHETER Right 11/13/2024    Procedure: Tunnelled HEMODIALYSIS  CATHETER INSERTION;  Surgeon: Ben Barth MD;  Location: [facility] OR;  Service: Vascular;  Laterality: Right;    OTHER SURGICAL HISTORY  2009    kidney stones x3    PSEUDOANEURYSM REPAIR Left 10/27/2023    TONSILLECTOMY  1950      General Information       Row Name 01/30/25 1417          Physical Therapy Time and Intention    Document Type therapy note (daily note)  -CS     Mode of Treatment individual therapy;physical therapy  -CS       Row Name 01/30/25 1417          General Information    Patient Profile Reviewed yes  -CS     Existing Precautions/Restrictions fall  -CS       Row Name 01/30/25 1417          Cognition    Orientation Status (Cognition) oriented x 4  -CS       Row Name 01/30/25 1417          Safety Issues/Impairments Affecting Functional Mobility    Safety Issues Affecting Function (Mobility) ability to follow commands;awareness of need for assistance;impulsivity;insight into deficits/self-awareness;judgment;positioning of assistive device  -CS     Impairments Affecting Function (Mobility) balance;endurance/activity tolerance;strength;pain  -CS     Comment, Safety Issues/Impairments (Mobility) Gait belt and [id number] socks donned. Pt needs frequent cueing for AD placement.  -CS               User Key  (r) = Recorded By, (t) = Taken By, (c) = Cosigned By      Initials Name Provider Type    CS Marvin Ureña, PT Physical Therapist                   Mobility       Row Name 01/30/25 1419          Bed Mobility    Bed Mobility supine-sit  -CS     Supine-Sit [place] (Bed Mobility) minimum assist (75% patient effort);1 person assist  -CS     Assistive Device (Bed Mobility) bed rails;head of bed elevated  -CS     Comment, (Bed Mobility) Increased encouragement required to perform  -CS       Row Name 01/30/25 1419          Sit-Stand Transfer    Sit-Stand [place] (Transfers) minimum assist (75% patient effort);1 person assist;contact guard  -CS     Assistive Device (Sit-Stand Transfers)  walker, front-wheeled  -     Comment, (Sit-Stand Transfer) STS x2, initial Aparna on second rep performs w/ CGA  -       Row Name 01/30/25 1419          Gait/Stairs (Locomotion)    [place] Level (Gait) minimum assist (75% patient effort)  -     Assistive Device (Gait) walker, front-wheeled  -CS     Patient was able to Ambulate yes  -CS     Distance in Feet (Gait) 10  -CS     Deviations/Abnormal Patterns (Gait) gait speed decreased;base of support, narrow;baldomero decreased;left sided deviations;right sided deviations;bilateral deviations  -CS     Bilateral Gait Deviations heel strike decreased  -CS     Left Sided Gait Deviations decreased knee extension  -CS     Right Sided Gait Deviations decreased knee extension  -CS     Comment, (Gait/Stairs) Able to walk in room and take backwards steps this visit  -               User Key  (r) = Recorded By, (t) = Taken By, (c) = Cosigned By      Initials Name Provider Type    Marvin Goodrich PT Physical Therapist                   Obj/Interventions       Row Name 01/30/25 1421          Motor Skills    Therapeutic Exercise hip;knee;ankle  -       Row Name 01/30/25 1421          Hip (Therapeutic Exercise)    Hip (Therapeutic Exercise) AROM (active range of motion)  -     Hip AROM (Therapeutic Exercise) bilateral;flexion;20 repititions  -       Row Name 01/30/25 1421          Knee (Therapeutic Exercise)    Knee (Therapeutic Exercise) AROM (active range of motion)  -     Knee AROM (Therapeutic Exercise) LAQ (long arc quad);bilateral;20 repititions  -       Row Name 01/30/25 1421          Ankle (Therapeutic Exercise)    Ankle (Therapeutic Exercise) AROM (active range of motion)  -     Ankle AROM (Therapeutic Exercise) bilateral;dorsiflexion;plantarflexion;20 repititions  -       Row Name 01/30/25 1421          Advanced Stepping/Walking Interventions    Stepping/Walking Interventions backward walking  -     Backward Walking (Stepping/Walking Interventions)  Backwards steps towards chair  -       Row Name 01/30/25 1421          Balance    Balance Assessment sitting static balance;sitting dynamic balance;standing static balance;standing dynamic balance  -CS     Static Sitting Balance standby assist  -CS     Dynamic Sitting Balance standby assist  -CS     Position, Sitting Balance unsupported;sitting edge of bed  -CS     Static Standing Balance minimal assist  -CS     Dynamic Standing Balance minimal assist;contact guard  -CS     Position/Device Used, Standing Balance supported;walker, front-wheeled  -CS     Balance Interventions sitting;standing;sit to stand;supported;static;dynamic;dynamic reaching  -               User Key  (r) = Recorded By, (t) = Taken By, (c) = Cosigned By      Initials Name Provider Type    CS Marvin Ureña, PT Physical Therapist                   Goals/Plan    No documentation.                  Clinical Impression       Row Name 01/30/25 1422          Pain    Pretreatment Pain Rating 0/10 - no pain  -CS     Posttreatment Pain Rating 0/10 - no pain  -       Row Name 01/30/25 1422          Plan of Care Review    Plan of Care Reviewed With patient;spouse  -     Progress improving  -     Outcome Evaluation Pt and spouse agreeable to tx session this date. Pt requires Aparna for LE assist during bed mobility. Able to perform STS w/ Aparna on initial attempt, ambulates 10ft this date w/ Aparna. Performs backwards steps to reach reclining chair. Provided pt w/ LE therapeutic exercises and then an additional STS requiring only CGA. Pt able to stand for 90s and performing dynamic reaching tasks. Pt fatigues quickly w/ standing activities, will continue to treat pt while admitted.  -       Row Name 01/30/25 1422          Therapy Assessment/Plan (PT)    Rehab Potential (PT) good  -     Criteria for Skilled Interventions Met (PT) yes  -CS     Therapy Frequency (PT) 6 times/wk  -       Row Name 01/30/25 1422          Positioning and Restraints     Pre-Treatment Position in bed  -CS     Post Treatment Position chair  -CS     In Chair notified nsg;reclined;call light within reach;with OT;with family/caregiver;exit alarm on;encouraged to call for assist  -CS               User Key  (r) = Recorded By, (t) = Taken By, (c) = Cosigned By      Initials Name Provider Type    Marvin Goodrich PT Physical Therapist                   Outcome Measures       Row Name 01/30/25 1423          How much help from another person do you currently need...    Turning from your back to your side while in flat bed without using bedrails? 3  -CS     Moving from lying on back to sitting on the side of a flat bed without bedrails? 3  -CS     Moving to and from a bed to a chair (including a wheelchair)? 3  -CS     Standing up from a chair using your arms (e.g., wheelchair, bedside chair)? 3  -CS     Climbing 3-5 steps with a railing? 2  -CS     To walk in hospital room? 3  -CS     AM-PAC 6 Clicks Score (PT) 17  -CS     Highest Level of Mobility Goal 5 --> Static standing  -CS               User Key  (r) = Recorded By, (t) = Taken By, (c) = Cosigned By      Initials Name Provider Type    Marvin Goodrich PT Physical Therapist                                 Physical Therapy Education       Title: PT OT SLP Therapies (In Progress)       Topic: Physical Therapy (Done)       Point: Mobility training (Done)       Learning Progress Summary            Patient Acceptance, E, DU by CS at 1/30/2025 1423    Acceptance, E, NR by  at 1/27/2025 1545    Acceptance, E, VU by DB at 1/26/2025 1456   Family Acceptance, E, DU by CS at 1/30/2025 1423                      Point: Home exercise program (Done)       Learning Progress Summary            Patient Acceptance, E, DU by CS at 1/30/2025 1423    Acceptance, E, NR by  at 1/27/2025 1545    Acceptance, E, VU by DB at 1/26/2025 1456   Family Acceptance, E, DU by  at 1/30/2025 1423                      Point: Body mechanics (Done)       Learning  Progress Summary            Patient Acceptance, E, DU by  at 1/30/2025 1423    Acceptance, E, NR by  at 1/27/2025 1545    Acceptance, E, VU by  at 1/26/2025 1456   Family Acceptance, E, DU by  at 1/30/2025 1423                      Point: Precautions (Done)       Learning Progress Summary            Patient Acceptance, E, DU by CS at 1/30/2025 1423    Acceptance, E, VU by DB at 1/26/2025 1456   Family Acceptance, E, DU by  at 1/30/2025 1423                                      User Key       Initials Effective Dates Name Provider Type Discipline    DB 06/16/21 -  Sandra Peterson, PT Physical Therapist PT     09/06/24 -  Marvin Ureña, PT Physical Therapist PT     01/10/25 -  Juan Daniel Cason, ERROL Student PT Student PT                  PT Recommendation and Plan     Progress: improving  Outcome Evaluation: Pt and spouse agreeable to tx session this date. Pt requires Aparna for LE assist during bed mobility. Able to perform STS w/ Aparna on initial attempt, ambulates 10ft this date w/ Aparna. Performs backwards steps to reach reclining chair. Provided pt w/ LE therapeutic exercises and then an additional STS requiring only CGA. Pt able to stand for 90s and performing dynamic reaching tasks. Pt fatigues quickly w/ standing activities, will continue to treat pt while admitted.     Time Calculation:         PT Charges       Row Name 01/30/25 1423             Time Calculation    Start Time 1348  -CS      Stop Time 1412  -CS      Time Calculation (min) 24 min  -CS      PT Received On 01/30/25  -CS      PT - Next Appointment 01/31/25  -         Timed Charges    76828 - PT Therapeutic Activity Minutes 24  -CS         Total Minutes    Timed Charges Total Minutes 24  -CS       Total Minutes 24  -CS                User Key  (r) = Recorded By, (t) = Taken By, (c) = Cosigned By      Initials Name Provider Type    CS Marvin Ureña, PT Physical Therapist                  Therapy Charges for Today       Code Description Service  Date Service Provider Modifiers Qty    [id number]  PT THERAPEUTIC ACT EA 15 MIN 1/30/2025 Marvin Ureña, PT GP 2            PT G-Codes  Outcome Measure Options: AM-PAC 6 Clicks Daily Activity (OT), Modified [place]  AM-PAC 6 Clicks Score (PT): 17  AM-PAC 6 Clicks Score (OT): 13  Modified Silvino Scale: 4 - Moderately severe disability.  Unable to walk without assistance, and unable to attend to own bodily needs without assistance.  PT Discharge Summary  Anticipated Discharge Disposition (PT): skilled nursing facility    Marvin Ureña, PT  1/30/2025

## 2025-01-30 NOTE — PLAN OF CARE
Goal Outcome Evaluation:  Plan of Care Reviewed With: patient, spouse        Progress: improving  Outcome Evaluation: Patient seen for OT tx this pm.  Spouse present for tx.  Patient very fatigued from recent PT session, however aggreable to BUE exercises.  Patient performed BUE ther Ex with red thera-band for all functional planes and patterns from chair level..  Patient very fatigued between sets.  Patient and spouse excited for SNF d/c for continued progress before returning home.  Cont OT as tolerated.    Anticipated Discharge Disposition (OT): skilled nursing facility

## 2025-01-30 NOTE — PLAN OF CARE
Goal Outcome Evaluation:  Plan of Care Reviewed With: patient, spouse        Progress: improving  Outcome Evaluation: Pt and spouse agreeable to tx session this date. Pt requires Aparna for LE assist during bed mobility. Able to perform STS w/ Aparna on initial attempt, ambulates 10ft this date w/ Aparna. Performs backwards steps to reach reclining chair. Provided pt w/ LE therapeutic exercises and then an additional STS requiring only CGA. Pt able to stand for 90s and performing dynamic reaching tasks. Pt fatigues quickly w/ standing activities, will continue to treat pt while admitted.    Anticipated Discharge Disposition (PT): skilled nursing facility

## 2025-01-30 NOTE — PROGRESS NOTES
[facility] Progress Note      Patient Name: Bill Landry  : 1945  MRN: [id number]  Primary Care Physician:  Bharathi De La Torre MD  Date of admission: 2025    Subjective     Interval History:   Patient seen today for follow-up on end-stage renal disease.  Dialyzed yesterday with no reported problems.  2 kg of fluid removed.  Denies any chest pain and no shortness of breath.  Denies any fever or chills.  Blood pressure is soft today    Review of Systems:   As noted above    Objective     Vitals:   Temp:  [97.9 °F (36.6 °C)-98.4 °F (36.9 °C)] 97.9 °F (36.6 °C)  Heart Rate:  [65-77] 74  Resp:  [16] 16  BP: ()/(54-60) 98/60    Intake/Output Summary (Last 24 hours) at 2025 0921  Last data filed at 2025 0300  Gross per 24 hour   Intake --   Output 2950 ml   Net -2950 ml       Physical Exam:    Constitutional: Awake, alert, NAD, chronically ill, fully oriented  HEENT: Sclera anicteric, no conjunctival injection  Neck: Supple, no carotid bruit, trachea at midline, no JVD  Respiratory: Mostly clear bilaterally, nonlabored on 1 L/min  Cardiovascular: Irregularly irregular, 2/6M, no rub  Vascular: Left arm AV fistula/graft patent  Gastrointestinal: BS +, soft, nontender, nondistended, + ileostomy  : No palpable bladder  Musculoskeletal: +1 LE edema, right worse than left; no C/C   Psychiatric: Appropriate affect, cooperative, fully oriented, depressed  Neurologic: No asterixis, moving all extremities, normal speech   Skin: Warm and dry; venous stasis changes both legs; bruises all limbs    Scheduled Meds:     aspirin, 81 mg, Oral, Nightly  latanoprost, 1 drop, Both Eyes, Nightly  sodium chloride, 10 mL, Intravenous, Q12H      IV Meds:        Results Reviewed:   I have personally reviewed the results from the time of this admission to 2025 09:21 EST     Results from last 7 days   Lab Units 25  0325 25  0347 25  0342   SODIUM mmol/L 132* 133* 133*    POTASSIUM mmol/L 4.3 4.6 4.4   CHLORIDE mmol/L 99 95* 96*   CO2 mmol/L 23.5 24.0 26.4   BUN mg/dL 48* 73* 49*   CREATININE mg/dL 5.21* 7.52* 6.47*   CALCIUM mg/dL 7.7* 7.6* 7.6*   BILIRUBIN mg/dL 1.0 0.9 0.8   ALK PHOS U/L 195* 181* 182*   ALT (SGPT) U/L 23 21 23   AST (SGOT) U/L 50* 45* 47*   GLUCOSE mg/dL 100* 101* 121*       Estimated Creatinine Clearance: 13.6 mL/min (A) (by C-G formula based on SCr of 5.21 mg/dL (H)).    Results from last 7 days   Lab Units 01/30/25 0325 01/29/25 0347 01/27/25 0457 01/25/25  0432   MAGNESIUM mg/dL  --   --   --  1.9   PHOSPHORUS mg/dL 4.0 5.1* 5.1*  --              Results from last 7 days   Lab Units 01/30/25 0325 01/29/25 0347 01/28/25 0342 01/27/25 0457 01/26/25  0411   WBC 10*3/mm3 4.51 4.28 4.32 4.89 4.34   HEMOGLOBIN g/dL 8.7* 8.7* 8.7* 9.3* 8.5*   PLATELETS 10*3/mm3 71* 53* 53* 60* 49*       Results from last 7 days   Lab Units 01/25/25  0432   INR  1.35*       Assessment / Plan     ASSESSMENT:  1.  ESRD: Volume status stable with chronic lower extremity edema but mostly clear lungs.  Electrolytes stable.  Last HD was 1/24  2.  Fall 1/24 (lost balance), with left acetabular fracture: no surgery needed  3.  Atrial fibrillation with rate-control  4.  Crohn's disease with prior bowel resection and ileostomy creation  5.  Cirrhosis   6.  Mild-moderate aortic stenosis  7.  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  Chronic anemia of ESRD; hemoglobin has fallen  9.  Chronic hypotension  10.  Mild hyponatremia likely secondary to hypervolemia.  Will improve with dialysis  11.  Anemia of CKD.  Will check iron panel    PLAN:  1.  Slightly hypervolemic.  We will continue HD on MWF, will plan for dialysis  tomorrow.  2.  Surveillance labs  3.  Plan noted for possible discharge to [facility] today  4.  Will start midodrine 2.5 mg 3 times daily given soft blood pressure        Thank you for involving us in the care of Bill Landry.   Please feel free to call with any questions.    Myra Moore MD  01/30/25  09:21 [facility]    [facility]  [phone]    Please note that portions of this note were completed with a voice recognition program.

## 2025-01-30 NOTE — THERAPY TREATMENT NOTE
Patient Name: Bill Landry  : 1945    MRN: [id number]                              Today's Date: 2025       Admit Date: 2025    Visit Dx:     ICD-10-CM ICD-9-CM   1. Closed nondisplaced fracture of left acetabulum, unspecified portion of acetabulum, initial encounter  S32.402A 808.0   2. ESRD (end stage renal disease)  N18.6 585.6     Patient Active Problem List   Diagnosis    Permanent atrial fibrillation    Thrombocytopenia    Chronic leukopenia    Cirrhosis of liver without ascites    Congestive splenomegaly    Anemia due to stage 4 chronic kidney disease treated with erythropoietin    Esophageal abnormality    ESRD on hemodialysis    Other specified glaucoma    Arteriovenous fistula for hemodialysis in place, secondary    Crohn's disease, unspecified, without complications    Deficiency of other specified B group vitamins    Dermatitis, unspecified    Encounter for immunization    History of urinary stone    Hyperparathyroidism, unspecified    Other disorders of phosphorus metabolism    Other iron deficiency anemias    Pure hypertriglyceridemia    Renal osteodystrophy    Secondary hyperparathyroidism of renal origin    Splenomegaly, not elsewhere classified    Bilateral pseudophakia    Dermatochalasis of eyelid    Primary open-angle glaucoma, bilateral, moderate stage    Puckering of macula, left eye    Secondary cataract    AV fistula occlusion, initial encounter    TATE (obstructive sleep apnea)    Aneurysm artery, upper extremity    Nonrheumatic aortic valve stenosis    Bicuspid aortic valve    Hyperuricemia without signs of inflammatory arthritis and tophaceous disease    Presbyopia    Impaired glucose tolerance    Hypofibrinogenemia    Pulmonary hypertension    Skin change    ESRD on dialysis    Problem with vascular access    AV graft thrombosis, initial encounter    AV shunt malfunction, initial encounter    Left arm swelling    Acetabular fracture    Anemia    Cirrhosis of liver     Atrial fibrillation    Fall    Ileostomy present    Weakness    Hypertension    Pain in left hip    Acute pain due to trauma    Transaminitis    Hypoxemia    Hyperglycemia     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    Aortic stenosis     Bicuspid aortic valve 07/20/2022    Calculus of kidney     Chronic leukopenia and thrombocytopenia     Cirrhosis of liver without ascites 07/24/2017    Congestive splenomegaly 07/24/2017    Crohn disease     with ileostomy    Decreased white blood cell count, unspecified     Diverticula of colon     ESRD on hemodialysis 04/05/2018    M-W-F FR[facility]    Fatty liver disease, nonalcoholic     Fistula 04/05/2018    Other Specified Complication    Gastrointestinal hemorrhage, unspecified     GERD (gastroesophageal reflux disease)     GI bleed     Glaucoma     H/O Gallstone pancreatitis     H/O Pericardial effusion     H/O sinus bradycardia     History of hypertension     resolved    History of UTI     Hx of renal calculi     Ileostomy present     Iron deficiency     Leakage of heart valve prosthesis, subsequent encounter     MGUS (monoclonal gammopathy of unknown significance)     TATE (obstructive sleep apnea)     Other hemoglobinopathies     Pericardial effusion (noninflammatory)     Permanent atrial fibrillation     Scarlet fever, uncomplicated     Stenosis of other vascular prosthetic devices, implants and grafts, initial encounter 02/14/2022    Systemic lupus erythematosus, unspecified     Thrombocytopenia     undpecified    Type 2 diabetes mellitus     CURRENTLY TAKES NO MED    Urinary retention     Post-OP     Past Surgical History:   Procedure Laterality Date    APPENDECTOMY  06/1968    ARTERIOVENOUS FISTULA/SHUNT SURGERY Left 08/08/2017    Procedure: LEFT BRACHIAL CEPHALIC  AV FISTULA FORMATION WITH CEPHALIC VEIN TRANSPOSITION ;  Surgeon: Bill Deal MD;  Location: [facility] OR;  Service:     ARTERIOVENOUS FISTULA/SHUNT SURGERY Left 05/27/2022    Procedure: OPEN REVISION LEFT ARTERIOVENOUS INTERPOSITION GRAFT PLACEMENT;  Surgeon: Bill Deal MD;  Location: [facility] MAIN OR;  Service: Vascular;  Laterality: Left;    ARTERIOVENOUS FISTULA/SHUNT SURGERY Left 11/19/2024    Procedure: LEFT ARTERIOVENOUS SHUNT GRAFT REVISION;  Surgeon: Bill Deal MD;  Location: [facility] MAIN OR;  Service: Vascular;  Laterality: Left;    ARTERIOVENOUS FISTULA/SHUNT SURGERY W/ HEMODIALYSIS CATHETER INSERTION Left 02/15/2022    Procedure: OPEN LEFT ARM ARTERIOVENOUS FISTULA THROMBECTOMY WITH CEPHALIC VEIN ARTHROPLASTY AND STENT/GRAFT PLACEMENT;  Surgeon: Bill Deal MD;  Location: [facility] OR 18/19;  Service: Vascular;  Laterality: Left;    CATARACT EXTRACTION WITH INTRAOCULAR LENS IMPLANT Bilateral 2004    CHOLECYSTECTOMY  2011    COLECTOMY PARTIAL / TOTAL      History of inflammatory bowel disease with status post colectomy with ileostomy many years ago in the [facility],  18 YEARS OF AGE    COLON SURGERY      Colon resection with colostomy    COLON SURGERY      COLONOSCOPY  01/2018    CYSTOSCOPY LITHOLAPAXY BLADDER STONE EXTRACTION      ENDOSCOPY  01/16/2015    gastritis    ENDOSCOPY  01/17/2018    Procedure: ESOPHAGOGASTRODUODENOSCOPY;  Surgeon: Warner Neville MD;  Location: [facility] ENDOSCOPY;  Service:     ILEOSCOPY  01/16/2015    normal    ILEOSCOPY N/A 01/17/2018    Procedure: ILEOSCOPY;  Surgeon: Warner Neville MD;  Location: [facility] ENDOSCOPY;  Service:     ILEOSTOMY  12/1968    loop ostomy bypass    INCISION AND DRAINAGE ARM Left 10/27/2023    Procedure: LEFT ARM INCISION AND DRAINAGE;  Surgeon: Bill Deal MD;  Location: [facility] MAIN OR;  Service: Vascular;  Laterality: Left;    INSERTION HEMODIALYSIS CATHETER Right 11/13/2024    Procedure: Tunnelled HEMODIALYSIS  CATHETER INSERTION;  Surgeon: Ben Barth MD;  Location: [facility] OR;  Service: Vascular;  Laterality: Right;    OTHER SURGICAL HISTORY  2009    kidney stones x3    PSEUDOANEURYSM REPAIR Left 10/27/2023    TONSILLECTOMY  1950      General Information       Row Name 01/30/25 1532          OT Time and Intention    Document Type therapy note (daily note)  -JR     Mode of Treatment individual therapy;occupational therapy  -JR     Patient Effort good  -JR       Row Name 01/30/25 1532          General Information    Patient Profile Reviewed yes  -JR     Existing Precautions/Restrictions fall  -JR     Barriers to Rehab medically complex  -JR       Row Name 01/30/25 1532          Cognition    Orientation Status (Cognition) oriented x 4  -JR       Row Name 01/30/25 1532          Safety Issues/Impairments Affecting Functional Mobility    Impairments Affecting Function (Mobility) balance;endurance/activity tolerance;strength;pain  -JR               User Key  (r) = Recorded By, (t) = Taken By, (c) = Cosigned By      Initials Name Provider Type    Ken Lancaster OT Occupational Therapist                     Mobility/ADL's       Row Name 01/30/25 1532          Bed Mobility    Comment, (Bed Mobility) Patient UI upon arrival.  -JR               User Key  (r) = Recorded By, (t) = Taken By, (c) = Cosigned By      Initials Name Provider Type    Ken Lancaster, AVA Occupational Therapist                   Obj/Interventions    No documentation.                  Goals/Plan    No documentation.                  Clinical Impression       Row Name 01/30/25 1532          Plan of Care Review    Plan of Care Reviewed With patient;spouse  -JR     Progress improving  -JR     Outcome Evaluation Patient seen for OT tx this pm.  Spouse present for tx.  Patient very fatigued from recent PT session, however aggreable to BUE exercises.  Patient performed BUE ther Ex with red thera-band for all functional planes and patterns from chair  level..  Patient very fatigued between sets.  Patient and spouse excited for SNF d/c for continued progress before returning home.  Cont OT as tolerated.  -JR       Row Name 01/30/25 1532          Therapy Assessment/Plan (OT)    Rehab Potential (OT) good  -JR     Criteria for Skilled Therapeutic Interventions Met (OT) meets criteria;yes  -JR     Therapy Frequency (OT) 5 times/wk  -JR       Row Name 01/30/25 1532          Therapy Plan Review/Discharge Plan (OT)    Anticipated Discharge Disposition (OT) skilled nursing facility  -JR       Row Name 01/30/25 1532          Vital Signs    O2 Delivery Pre Treatment room air  -JR     O2 Delivery Intra Treatment room air  -JR     O2 Delivery Post Treatment room air  -JR     Pre Patient Position Sitting  -JR     Intra Patient Position Sitting  -JR     Post Patient Position Sitting  -JR       Row Name 01/30/25 1532          Positioning and Restraints    Pre-Treatment Position sitting in chair/recliner  -JR     Post Treatment Position chair  -JR     In Chair notified nsg;sitting;call light within reach;encouraged to call for assist;exit alarm on;with family/caregiver  -JR               User Key  (r) = Recorded By, (t) = Taken By, (c) = Cosigned By      Initials Name Provider Type    Ken Lancaster, OT Occupational Therapist                   Outcome Measures       Row Name 01/30/25 1536          How much help from another is currently needed...    Putting on and taking off regular lower body clothing? 2  -JR     Bathing (including washing, rinsing, and drying) 2  -JR     Toileting (which includes using toilet bed pan or urinal) 2  -JR     Putting on and taking off regular upper body clothing 3  -JR     Taking care of personal grooming (such as brushing teeth) 3  -JR     Eating meals 3  -JR     AM-PAC 6 Clicks Score (OT) 15  -JR       Row Name 01/30/25 8631          How much help from another person do you currently need...    Turning from your back to your side while in flat bed  without using bedrails? 3  -CS     Moving from lying on back to sitting on the side of a flat bed without bedrails? 3  -CS     Moving to and from a bed to a chair (including a wheelchair)? 3  -CS     Standing up from a chair using your arms (e.g., wheelchair, bedside chair)? 3  -CS     Climbing 3-5 steps with a railing? 2  -CS     To walk in hospital room? 3  -CS     AM-PAC 6 Clicks Score (PT) 17  -CS     Highest Level of Mobility Goal 5 --> Static standing  -CS       Row Name 01/30/25 1536          Modified Silvino Scale    Modified [place] Scale 4 - Moderately severe disability.  Unable to walk without assistance, and unable to attend to own bodily needs without assistance.  -JR       Row Name 01/30/25 1536          Functional Assessment    Outcome Measure Options AM-PAC 6 Clicks Daily Activity (OT);Modified [place]  -JR               User Key  (r) = Recorded By, (t) = Taken By, (c) = Cosigned By      Initials Name Provider Type    Marvin Goodrich, PT Physical Therapist    Ken Lancaster, OT Occupational Therapist                    Occupational Therapy Education       Title: PT OT SLP Therapies (In Progress)       Topic: Occupational Therapy (In Progress)       Point: ADL training (Done)       Description:   Instruct learner(s) on proper safety adaptation and remediation techniques during self care or transfers.   Instruct in proper use of assistive devices.                  Learning Progress Summary            Patient Acceptance, E, Bed IU by GONZALO at 1/26/2025 0952    Comment: OT answers ?'s about rehab, plan for walker use and anticipated healing time per ortho notes, body mechanics to help with pain.   Family Acceptance, E, Bed IU by GONZALO at 1/26/2025 0952    Comment: OT answers ?'s about rehab, plan for walker use and anticipated healing time per ortho notes, body mechanics to help with pain.                      Point: Home exercise program (Not Started)       Description:   Instruct learner(s) on appropriate  technique for monitoring, assisting and/or progressing therapeutic exercises/activities.                  Learner Progress:  Not documented in this visit.              Point: Precautions (Done)       Description:   Instruct learner(s) on prescribed precautions during self-care and functional transfers.                  Learning Progress Summary            Patient Acceptance, E, Bed IU by GONZALO at 1/26/2025 0952    Comment: OT answers ?'s about rehab, plan for walker use and anticipated healing time per ortho notes, body mechanics to help with pain.   Family Acceptance, E, Bed IU by GONZALO at 1/26/2025 0952    Comment: OT answers ?'s about rehab, plan for walker use and anticipated healing time per ortho notes, body mechanics to help with pain.                      Point: Body mechanics (Done)       Description:   Instruct learner(s) on proper positioning and spine alignment during self-care, functional mobility activities and/or exercises.                  Learning Progress Summary            Patient Acceptance, E, Bed IU by GONZALO at 1/26/2025 0952    Comment: OT answers ?'s about rehab, plan for walker use and anticipated healing time per ortho notes, body mechanics to help with pain.   Family Acceptance, E, Bed IU by GONZALO at 1/26/2025 0952    Comment: OT answers ?'s about rehab, plan for walker use and anticipated healing time per ortho notes, body mechanics to help with pain.                                      User Key       Initials Effective Dates Name Provider Type Discipline     06/16/21 -  Cassie Paz OTR Occupational Therapist OT                  OT Recommendation and Plan  Therapy Frequency (OT): 5 times/wk  Plan of Care Review  Plan of Care Reviewed With: patient, spouse  Progress: improving  Outcome Evaluation: Patient seen for OT tx this pm.  Spouse present for tx.  Patient very fatigued from recent PT session, however aggreable to BUE exercises.  Patient performed BUE ther Ex with red thera-band for all  functional planes and patterns from chair level..  Patient very fatigued between sets.  Patient and spouse excited for SNF d/c for continued progress before returning home.  Cont OT as tolerated.     Time Calculation:         Time Calculation- OT       Row Name 01/30/25 1536             Time Calculation- OT    OT Start Time 1412  -JR      OT Stop Time 1430  -JR      OT Time Calculation (min) 18 min  -JR      Total Timed Code Minutes- OT 18 minute(s)  -JR      OT Received On 01/30/25  -JR      OT - Next Appointment 01/31/25  -         Timed Charges    54599 - OT Therapeutic Exercise Minutes 18  -JR         Total Minutes    Timed Charges Total Minutes 18  -JR       Total Minutes 18  -JR                User Key  (r) = Recorded By, (t) = Taken By, (c) = Cosigned By      Initials Name Provider Type    Ken Lancaster OT Occupational Therapist                  Therapy Charges for Today       Code Description Service Date Service Provider Modifiers Qty    [id number] HC OT THER PROC EA 15 MIN 1/30/2025 Ken Gregory OT GO 1                 Ken Gregory OT  1/30/2025

## 2025-01-30 NOTE — PROGRESS NOTES
Name: Bill Landry ADMIT: 2025   : 1945  PCP: Bharathi De La Torre MD    MRN: [id number] LOS: 5 days   AGE/SEX: 79 y.o. male  ROOM: [facility]     Subjective   Subjective   Patient resting comfortably in bed.  No pain when not ambulatory.  Wife at bedside.  Discussed plan for discharge tomorrow.         Objective   Objective   Vital Signs  Temp:  [97.9 °F (36.6 °C)-98.4 °F (36.9 °C)] 97.9 °F (36.6 °C)  Heart Rate:  [65-77] 69  Resp:  [16] 16  BP: ()/(54-60) 109/57  SpO2:  [95 %-96 %] 96 %  on   ;   Device (Oxygen Therapy): room air  Body mass index is 26.41 kg/m².  Physical Exam  Vitals and nursing note reviewed.   Constitutional:       General: He is not in acute distress.     Comments: Elderly, frail   Cardiovascular:      Rate and Rhythm: Normal rate and regular rhythm.   Pulmonary:      Effort: Pulmonary effort is normal. No respiratory distress.   Abdominal:      General: Abdomen is flat. There is no distension.      Tenderness: There is no abdominal tenderness.   Musculoskeletal:         General: No swelling or deformity.   Skin:     General: Skin is warm and dry.   Neurological:      General: No focal deficit present.      Mental Status: He is alert. Mental status is at baseline.         Results Review     I reviewed the patient's new clinical results.  Results from last 7 days   Lab Units 257   WBC 10*3/mm3 4.51 4.28 4.32 4.89   HEMOGLOBIN g/dL 8.7* 8.7* 8.7* 9.3*   PLATELETS 10*3/mm3 71* 53* 53* 60*     Results from last 7 days   Lab Units 257   SODIUM mmol/L 132* 133* 133* 129*   POTASSIUM mmol/L 4.3 4.6 4.4 5.1   CHLORIDE mmol/L 99 95* 96* 93*   CO2 mmol/L 23.5 24.0 26.4 22.0   BUN mg/dL 48* 73* 49* 72*   CREATININE mg/dL 5.21* 7.52* 6.47* 8.41*   GLUCOSE mg/dL 100* 101* 121* 98   Estimated Creatinine Clearance: 13.6 mL/min (A) (by C-G formula based on SCr of 5.21 mg/dL  "(H)).  Results from last 7 days   Lab Units 01/30/25  0325 01/29/25  0347 01/28/25 0342 01/27/25  0457   ALBUMIN g/dL 2.5* 2.3* 2.2* 2.5*   BILIRUBIN mg/dL 1.0 0.9 0.8 0.8   ALK PHOS U/L 195* 181* 182* 191*   AST (SGOT) U/L 50* 45* 47* 56*   ALT (SGPT) U/L 23 21 23 29     Results from last 7 days   Lab Units 01/30/25  0325 01/29/25  0347 01/28/25  0342 01/27/25  0457 01/26/25  0411 01/25/25  0432   CALCIUM mg/dL 7.7* 7.6* 7.6* 7.8*   < > 8.5*   ALBUMIN g/dL 2.5* 2.3* 2.2* 2.5*  --  2.6*   MAGNESIUM mg/dL  --   --   --   --   --  1.9   PHOSPHORUS mg/dL 4.0 5.1*  --  5.1*  --   --     < > = values in this interval not displayed.       COVID19   Date Value Ref Range Status   05/17/2023 Presumptive Negative Presumptive Negative - Ref. Range Final   05/15/2023 Presumptive Negative Presumptive Negative - Ref. Range Final     No results found for: \"HGBA1C\", \"POCGLU\"    XR Chest PA & Lateral  Narrative: XR CHEST PA AND LATERAL-     HISTORY: Male who is 79 years-old, hemodialysis     TECHNIQUE: Frontal and lateral views of the chest     COMPARISON: 11/17/2024     FINDINGS: The heart is enlarged. Pulmonary vasculature is congested.  Mild right basilar atelectasis/infiltrate, follow-up recommended.  Pleural effusions are suggested on the lateral view. No pneumothorax.  Right hemidiaphragm remains elevated. No acute osseous process.     Impression: Mass described.     This report was finalized on 1/30/2025 12:35 PM by Dr. Byron Chavira M.D on Workstation: [employer]       I reviewed the patient's daily medications.  Scheduled Medications  aspirin, 81 mg, Oral, Nightly  latanoprost, 1 drop, Both Eyes, Nightly  midodrine, 2.5 mg, Oral, TID AC  sodium chloride, 10 mL, Intravenous, Q12H    Infusions   Diet  Diet: Cardiac, Diabetic, Renal, Fluid Restriction (240 mL/tray); Healthy Heart (2-3 Na+); Consistent Carbohydrate; Low Potassium; 1500 mL/day; Fluid Consistency: Thin (IDDSI 0)         I have personally reviewed:  []  " Laboratory   []  Microbiology   []  Radiology   []  EKG/Telemetry   []  Cardiology/Vascular   []  Pathology   []  Records     Assessment/Plan     Active Hospital Problems    Diagnosis  POA    **Acetabular fracture [S32.409A]  Yes    Anemia [D64.9]  Yes    Cirrhosis of liver [K74.60]  Yes    Atrial fibrillation [I48.91]  Yes    Fall [W19.XXXA]  Yes    Ileostomy present [Z93.2]  Not Applicable    Weakness [R53.1]  Yes    Hypertension [I10]  Yes    Pain in left hip [M25.552]  Yes    Acute pain due to trauma [G89.11]  Yes    Transaminitis [R74.01]  Yes    Hypoxemia [R09.02]  Yes    Hyperglycemia [R73.9]  Yes    Nonrheumatic aortic valve stenosis [I35.0]  Yes    ESRD on hemodialysis [N18.6, Z99.2]  Not Applicable    Crohn's disease, unspecified, without complications [K50.90]  Yes    Thrombocytopenia [D69.6]  Yes    Chronic leukopenia [D72.819]  Yes      Resolved Hospital Problems   No resolved problems to display.       79 y.o. male admitted with Acetabular fracture.    Mr. Landry is a 79 y.o. male with a history of ESRD on HD, atrial fibrillation, aortic stenosis, crohn's disease s/p ileostomy, cirrhosis, chronic leukopenia, thrombocytopenia, anemia of chronic disease and other medical conditions as noted in history that presents to [facility] after suffering mechanical fall at home prior to arrival.      Mechanical fall  Acute pain due to trauma  Acetabular fracture  Generalized weakness  - Orthopedic surgery consulted, no acute surgical intervention warranted, recommending conservative measures.  - PRN medications for pain     ESRD on HD  - Nephrology consulted and following.      Chronic thrombocytopenia, leukopenia, anemia  - Follows with [facility], they were consulted and evaluated.  -Platelets improved     Hypoxemia  -Has monitor for multiple days.     Cirrhosis  Transaminitis  -LFTs been stable for multiple days, stop trending them.  Follow-up as outpatient.     Atrial fibrillation  - Not on rate  control medication, not on AC due to history of bleeding. Monitor on telemetry.     Hyperglycemia  - A1c 5.40% (09/17/24), repeat A1c here 4.80%. Monitor.    SCDs for DVT prophylaxis.  Full code.  Discussed with patient, spouse, nursing staff, and CCP.  Anticipate discharge to SNU facility tomorrow.    Expected Discharge Date: 1/31/2025; Expected Discharge Time:       Juan Daniel Randall MD  [facility]  01/30/25  14:56 EST

## 2025-01-30 NOTE — PLAN OF CARE
Goal Outcome Evaluation:           Progress: improving  Outcome Evaluation: Patient is alert and oriented. On room air. Denies pain and nausea. New orders for midodrine, explained to family and patient about the medication but they decided they wanted to speak to nephrology before taking. CXR completed. Plan of care is ongoing.

## 2025-01-31 VITALS
OXYGEN SATURATION: 90 % | BODY MASS INDEX: 26.35 KG/M2 | HEART RATE: 70 BPM | HEIGHT: 70 IN | SYSTOLIC BLOOD PRESSURE: 111 MMHG | DIASTOLIC BLOOD PRESSURE: 57 MMHG | WEIGHT: 184.08 LBS | TEMPERATURE: 97.3 F | RESPIRATION RATE: 16 BRPM

## 2025-01-31 LAB
ANION GAP SERPL CALCULATED.3IONS-SCNC: 12.2 MMOL/L (ref 5–15)
BUN SERPL-MCNC: 73 MG/DL (ref 8–23)
BUN/CREAT SERPL: 9.8 (ref 7–25)
CALCIUM SPEC-SCNC: 7.8 MG/DL (ref 8.6–10.5)
CHLORIDE SERPL-SCNC: 100 MMOL/L (ref 98–107)
CO2 SERPL-SCNC: 20.8 MMOL/L (ref 22–29)
CREAT SERPL-MCNC: 7.42 MG/DL (ref 0.76–1.27)
DEPRECATED RDW RBC AUTO: 49.2 FL (ref 37–54)
EGFRCR SERPLBLD CKD-EPI 2021: 6.9 ML/MIN/1.73
ERYTHROCYTE [DISTWIDTH] IN BLOOD BY AUTOMATED COUNT: 13.6 % (ref 12.3–15.4)
GLUCOSE SERPL-MCNC: 119 MG/DL (ref 65–99)
HCT VFR BLD AUTO: 25.4 % (ref 37.5–51)
HGB BLD-MCNC: 8.7 G/DL (ref 13–17.7)
MCH RBC QN AUTO: 33.6 PG (ref 26.6–33)
MCHC RBC AUTO-ENTMCNC: 34.3 G/DL (ref 31.5–35.7)
MCV RBC AUTO: 98.1 FL (ref 79–97)
PLATELET # BLD AUTO: 58 10*3/MM3 (ref 140–450)
PMV BLD AUTO: 9.2 FL (ref 6–12)
POTASSIUM SERPL-SCNC: 4.5 MMOL/L (ref 3.5–5.2)
RBC # BLD AUTO: 2.59 10*6/MM3 (ref 4.14–5.8)
SODIUM SERPL-SCNC: 133 MMOL/L (ref 136–145)
WBC NRBC COR # BLD AUTO: 3.76 10*3/MM3 (ref 3.4–10.8)

## 2025-01-31 PROCEDURE — 85027 COMPLETE CBC AUTOMATED: CPT | Performed by: STUDENT IN AN ORGANIZED HEALTH CARE EDUCATION/TRAINING PROGRAM

## 2025-01-31 PROCEDURE — 80048 BASIC METABOLIC PNL TOTAL CA: CPT | Performed by: STUDENT IN AN ORGANIZED HEALTH CARE EDUCATION/TRAINING PROGRAM

## 2025-01-31 PROCEDURE — 36415 COLL VENOUS BLD VENIPUNCTURE: CPT | Performed by: STUDENT IN AN ORGANIZED HEALTH CARE EDUCATION/TRAINING PROGRAM

## 2025-01-31 RX ORDER — MIDODRINE HYDROCHLORIDE 2.5 MG/1
2.5 TABLET ORAL 3 TIMES WEEKLY
Start: 2025-01-31

## 2025-01-31 RX ORDER — POLYETHYLENE GLYCOL 3350 17 G/17G
17 POWDER, FOR SOLUTION ORAL DAILY PRN
Start: 2025-01-31

## 2025-01-31 RX ORDER — MIDODRINE HYDROCHLORIDE 2.5 MG/1
2.5 TABLET ORAL
Status: DISCONTINUED | OUTPATIENT
Start: 2025-01-31 | End: 2025-01-31 | Stop reason: HOSPADM

## 2025-01-31 RX ORDER — AMOXICILLIN 250 MG
2 CAPSULE ORAL 2 TIMES DAILY PRN
Start: 2025-01-31

## 2025-01-31 RX ORDER — HYDROCODONE BITARTRATE AND ACETAMINOPHEN 5; 325 MG/1; MG/1
1 TABLET ORAL EVERY 8 HOURS PRN
Qty: 9 TABLET | Refills: 0 | Status: SHIPPED | OUTPATIENT
Start: 2025-01-31 | End: 2025-02-03

## 2025-01-31 RX ORDER — ACETAMINOPHEN 325 MG/1
650 TABLET ORAL EVERY 4 HOURS PRN
Start: 2025-01-31

## 2025-01-31 RX ADMIN — MIDODRINE HYDROCHLORIDE 2.5 MG: 2.5 TABLET ORAL at 09:52

## 2025-01-31 NOTE — PROGRESS NOTES
[facility] Progress Note      Patient Name: Bill Landry  : 1945  MRN: [id number]  Primary Care Physician:  Bharathi De La Torre MD  Date of admission: 2025    Subjective     Interval History:   Patient seen today for follow-up on end-stage renal disease.    Seen on dialysis today.  Denies any nausea or vomiting.  No fever no chills.  No chest pain and no shortness of breath  Hypotensive limiting UF, refusing midodrine    Review of Systems:   As noted above    Objective     Vitals:   Temp:  [97.5 °F (36.4 °C)-97.9 °F (36.6 °C)] 97.5 °F (36.4 °C)  Heart Rate:  [60-76] 63  Resp:  [16-18] 16  BP: (107-126)/(53-69) 107/53    Intake/Output Summary (Last 24 hours) at 2025 1000  Last data filed at 2025 0500  Gross per 24 hour   Intake 240 ml   Output 700 ml   Net -460 ml       Physical Exam:    Constitutional: Awake, alert, NAD, chronically ill, fully oriented  HEENT: Sclera anicteric, no conjunctival injection  Neck: Supple, no carotid bruit, trachea at midline, no JVD  Respiratory: Mostly clear bilaterally, nonlabored on 1 L/min  Cardiovascular: Irregularly irregular, 2/6M, no rub  Vascular: Left arm AV fistula/graft patent  Gastrointestinal: BS +, soft, nontender, nondistended, + ileostomy  : No palpable bladder  Musculoskeletal: +1 LE edema, right worse than left;   Psychiatric: Appropriate affect, cooperative, fully oriented, depressed  Neurologic: No asterixis, moving all extremities, normal speech   Skin: Warm and dry; venous stasis changes both legs; bruises all limbs    Scheduled Meds:     aspirin, 81 mg, Oral, Nightly  latanoprost, 1 drop, Both Eyes, Nightly  midodrine, 2.5 mg, Oral, TID AC  sodium chloride, 10 mL, Intravenous, Q12H      IV Meds:        Results Reviewed:   I have personally reviewed the results from the time of this admission to 2025 10:00 EST     Results from last 7 days   Lab Units 25  0350 25  0325 25  0347  01/28/25 0342   SODIUM mmol/L 133* 132* 133* 133*   POTASSIUM mmol/L 4.5 4.3 4.6 4.4   CHLORIDE mmol/L 100 99 95* 96*   CO2 mmol/L 20.8* 23.5 24.0 26.4   BUN mg/dL 73* 48* 73* 49*   CREATININE mg/dL 7.42* 5.21* 7.52* 6.47*   CALCIUM mg/dL 7.8* 7.7* 7.6* 7.6*   BILIRUBIN mg/dL  --  1.0 0.9 0.8   ALK PHOS U/L  --  195* 181* 182*   ALT (SGPT) U/L  --  23 21 23   AST (SGOT) U/L  --  50* 45* 47*   GLUCOSE mg/dL 119* 100* 101* 121*       Estimated Creatinine Clearance: 9.5 mL/min (A) (by C-G formula based on SCr of 7.42 mg/dL (H)).    Results from last 7 days   Lab Units 01/30/25 0325 01/29/25 0347 01/27/25 0457 01/25/25 0432   MAGNESIUM mg/dL  --   --   --  1.9   PHOSPHORUS mg/dL 4.0 5.1* 5.1*  --              Results from last 7 days   Lab Units 01/31/25  0350 01/30/25 0325 01/29/25 0347 01/28/25 0342 01/27/25 0457   WBC 10*3/mm3 3.76 4.51 4.28 4.32 4.89   HEMOGLOBIN g/dL 8.7* 8.7* 8.7* 8.7* 9.3*   PLATELETS 10*3/mm3 58* 71* 53* 53* 60*       Results from last 7 days   Lab Units 01/25/25  0432   INR  1.35*       Assessment / Plan     ASSESSMENT:  1.  ESRD: Volume status stable with chronic lower extremity edema but mostly clear lungs.  Electrolytes stable.  Last HD was 1/24  2.  Fall 1/24 (lost balance), with left acetabular fracture: no surgery needed  3.  Atrial fibrillation with rate-control  4.  Crohn's disease with prior bowel resection and ileostomy creation  5.  Cirrhosis   6.  Mild-moderate aortic stenosis  7.  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  Chronic anemia of ESRD; hemoglobin has fallen  9.  Chronic hypotension  10.  Mild hyponatremia likely secondary to hypervolemia.  Will improve with dialysis  11.  Anemia of CKD.  Will check iron panel    PLAN:  1.  Slightly hypervolemic.  We will continue HD on MWF, will plan for dialysis  tomorrow.  3.   Awaiting Discharge to [place]  4.  Will continue midodrine 2.5 mg 2 times daily to give before dialysis on  dialysis days          Thank you for involving us in the care of Bill Landry.  Please feel free to call with any questions.    Myra Moore MD  01/31/25  10:00 [facility]    [facility]  [phone]    Please note that portions of this note were completed with a voice recognition program.

## 2025-01-31 NOTE — CASE MANAGEMENT/SOCIAL WORK
Case Management Discharge Note      Final Note: [facility] via [facility] at 3pm. No additional CCP needs.         Selected Continued Care - Admitted Since 1/25/2025       Destination Coordination complete.      Service Provider Services Address Phone Fax Patient Preferred    [facility] [address] [place] [address], [facility] [address] [phone] --              Durable Medical Equipment    No services have been selected for the patient.                Dialysis/Infusion    No services have been selected for the patient.                Home Medical Care    No services have been selected for the patient.                Therapy    No services have been selected for the patient.                Community Resources    No services have been selected for the patient.                Community & DME    No services have been selected for the patient.                    Selected Continued Care - Prior Encounters Includes continued care and service providers with selected services from prior encounters from 10/27/2024 to 1/31/2025      Discharged on 11/21/2024 Admission date: 11/19/2024 - Discharge disposition: Home-Health Care Svc      Home Medical Care       Service Provider Services Address Phone Fax Patient Preferred     [facility], Home Nursing [address] [place] [id number] [phone] --                               Final Discharge Disposition Code: 03 - skilled nursing facility (SNF)

## 2025-01-31 NOTE — PLAN OF CARE
Goal Outcome Evaluation:  Plan of Care Reviewed With: patient        Progress: improving  Outcome Evaluation: No significant changes overnight. Pt is alert and oriented x 4. Vital signs stable, on room air. Home CPAP overnight. PRN Tylenol given for left hip pain. Patient refused to take Midodrine and would like to speak to Nephrology first. Continue to monitor.

## 2025-01-31 NOTE — NURSING NOTE
Dialysis complete, removed 500 cc with this treatment, related to lower blood pressure.  Pt initially refused to take mididrone but did take after talking with Dr. Paul snyder.  No other complications noted,  pre and post vitals are in epic.

## 2025-01-31 NOTE — DISCHARGE SUMMARY
Patient Name: Bill Landry  : 1945  MRN: [id number]    Date of Admission: 2025  Date of Discharge:  2025  Primary Care Physician: Bharathi De La Torre MD      Chief Complaint:   Fall and Pain      Discharge Diagnoses     Active Hospital Problems    Diagnosis  POA    **Acetabular fracture [S32.409A]  Yes    Anemia [D64.9]  Yes    Cirrhosis of liver [K74.60]  Yes    Atrial fibrillation [I48.91]  Yes    Fall [W19.XXXA]  Yes    Ileostomy present [Z93.2]  Not Applicable    Weakness [R53.1]  Yes    Hypertension [I10]  Yes    Pain in left hip [M25.552]  Yes    Acute pain due to trauma [G89.11]  Yes    Transaminitis [R74.01]  Yes    Hypoxemia [R09.02]  Yes    Hyperglycemia [R73.9]  Yes    Nonrheumatic aortic valve stenosis [I35.0]  Yes    ESRD on hemodialysis [N18.6, Z99.2]  Not Applicable    Crohn's disease, unspecified, without complications [K50.90]  Yes    Thrombocytopenia [D69.6]  Yes    Chronic leukopenia [D72.819]  Yes      Resolved Hospital Problems   No resolved problems to display.        Hospital Course     Mr. Landry is a 79 y.o. male with a history of ESRD on HD, chronic thrombocytopenia, cirrhosis, Crohn's disease status post ileostomy, atrial fibrillation not on AC due to history of bleeding presenting after mechanical fall found to have left acetabular fracture.  Orthopedic surgery was consulted no acute surgical interventions were warranted.  Plan for SNF to improve mobility.  Orthopedic surgery recommends the use of a walker for 4 to 6 weeks, okay for weightbearing as tolerated.  No need for orthopedic follow-up unless he fails to progress and then will take her likely 2 to 3 months for fracture to fully heal and for pain to go away.  Intake 3 to 6 months for regaining of all strength and mobility.    Hematology was consulted for chronic, cytopenia from cirrhosis and splenomegaly, follow-up with Dr. Pacheco on .    Nephrology is out of midodrine 2.5 mg twice daily on days of  dialysis    At the time of discharge patient was told to take all medications as prescribed, keep all follow-up appointments, and call their doctor or return to the hospital with any worsening or concerning symptoms.    Day of Discharge     Subjective:  Patient seen on HD.  Doing well.  Discussed plan for discharge.  No complaints.      Physical Exam:  Temp:  [97.3 °F (36.3 °C)-97.9 °F (36.6 °C)] 97.3 °F (36.3 °C)  Heart Rate:  [60-76] 65  Resp:  [16-18] 16  BP: (107-126)/(53-69) 114/57  Body mass index is 26.41 kg/m².  Physical Exam  Constitutional:       General: He is not in acute distress.     Comments: Elderly, frail   Cardiovascular:      Rate and Rhythm: Normal rate and regular rhythm.   Pulmonary:      Effort: Pulmonary effort is normal. No respiratory distress.   Abdominal:      General: Abdomen is flat. There is no distension.      Tenderness: There is no abdominal tenderness.   Musculoskeletal:         General: No swelling or deformity.   Skin:     General: Skin is warm and dry.   Neurological:      General: No focal deficit present.      Mental Status: He is alert. Mental status is at baseline.   Consultants     Consult Orders (all) (From admission, onward)       Start     Ordered    01/26/25 0938  Hematology & Oncology Inpatient Consult  Once        Specialty:  Hematology and Oncology  Provider:  Theo Pacheco MD    01/26/25 0938    01/25/25 0911  Inpatient Orthopedic Surgery Consult  Once        Specialty:  Orthopedic Surgery  Provider:  Luiz Evans II, MD    01/25/25 0911    01/25/25 0820  Inpatient Case Management  Consult  Once        Provider:  (Not yet assigned)    01/25/25 0819    01/25/25 0650  Inpatient Nephrology Consult  Once        Specialty:  Nephrology  Provider:  Myra Moore MD    01/25/25 0649    01/25/25 0643  Ortho (on-call MD unless specified)  Once        Specialty:  Orthopedic Surgery  Provider:  Aguila Escoto MD    01/25/25 0642     01/25/25 0621  Inpatient Orthopedic Surgery Consult  Once,   Status:  Canceled        Specialty:  Orthopedic Surgery  Provider:  Aguila Escoto MD    01/25/25 0621 01/25/25 0546  [facility] (on-call MD unless specified) Details  Once        Specialty:  Hospitalist  Provider:  (Not yet assigned)    01/25/25 0546                  Procedures     Imaging Results (All)       Procedure Component Value Units Date/Time    XR Chest PA & Lateral [[id number]] Collected: 01/30/25 1234     Updated: 01/30/25 1238    Narrative:      XR CHEST PA AND LATERAL-     HISTORY: Male who is 79 years-old, hemodialysis     TECHNIQUE: Frontal and lateral views of the chest     COMPARISON: 11/17/2024     FINDINGS: The heart is enlarged. Pulmonary vasculature is congested.  Mild right basilar atelectasis/infiltrate, follow-up recommended.  Pleural effusions are suggested on the lateral view. No pneumothorax.  Right hemidiaphragm remains elevated. No acute osseous process.       Impression:      Mass described.     This report was finalized on 1/30/2025 12:35 PM by Dr. Byron Chavira M.D on Workstation: [id number]       CT Pelvis Without Contrast [[id number]] Collected: 01/25/25 0543     Updated: 01/25/25 0543    Narrative:        Patient: GAEL ZARATE  Time Out: 05:43  Exam(s): CT PELVIS Without Contrast     EXAM:    CT Pelvis Without Intravenous Contrast    CLINICAL HISTORY:     Reason for exam: fall, left hip pain.    TECHNIQUE:    Axial computed tomography images of the pelvis without intravenous   contrast.  CTDI is 7.54 mGy and DLP is 248.2 mGy-cm.  This CT exam was   performed according to the principle of ALARA (As Low As Reasonably   Achievable) by using one or more of the following dose reduction   techniques: automated exposure control, adjustment of the mA and or kV   according to patient size, and or use of iterative reconstruction   technique.    COMPARISON:    7 17 2017    FINDINGS:    Limitations:  Limited due to lack of IV  contrast.    Bones joints:  Left acetabular fracture involving superior pubic ramus   root and inferior pubic ramus fracture.  Left sacral ala fracture.  Bones   otherwise grossly intact.  Degenerative spine.    Soft tissues:  Soft tissue hematomas in the pelvis.  Left body wall   stoma with parastomal hernia containing nonobstructed bowel loops.    Ventral hernia repair with mesh.  Unchanged scarring right anterior   abdominal wall from prior operative intervention.    Stomach and bowel:  Colonic diverticula.    Bladder:  Layering tiny stones in the urinary bladder.  Urinary bladder   wall prominence.    Other findings:  Left renal nonobstructing partly seen stones.    IMPRESSION:       1.  Limited due to lack of IV contrast.  2.  Left acetabular fracture involving superior pubic ramus root and   inferior pubic ramus fracture. Left sacral ala fracture.    3.  Correlate for cystitis.  4.  Left body wall stoma with parastomal hernia containing nonobstructed   bowel loops.  5.  See additional findings above.      Impression:          Electronically signed by Ken Amos MD on 01-25-25 at 0543    CT Cervical Spine Without Contrast [[id number]] Collected: 01/25/25 0518     Updated: 01/25/25 0518    Narrative:        Patient: GAEL ZARATE  Time Out: 05:17  Exam(s): CT C SPINE     EXAM:    CT Cervical Spine Without Intravenous Contrast    CLINICAL HISTORY:     Reason for exam: fall.    TECHNIQUE:    Axial computed tomography images of the cervical spine without   intravenous contrast.  CTDI is 19.63 mGy and DLP is 370.2 mGy-cm.  This   CT exam was performed according to the principle of ALARA (As Low As   Reasonably Achievable) by using one or more of the following dose   reduction techniques: automated exposure control, adjustment of the mA   and or kV according to patient size, and or use of iterative   reconstruction technique.    COMPARISON:    No relevant prior studies available.    FINDINGS:    Vertebrae:  No  acute fracture.    Discs spinal canal neural foramina:  degenerative changes.    Soft tissues:  No prevertebral swelling.    IMPRESSION:         No acute fracture or subluxation.      Impression:          Electronically signed by Reinier Garcia MD on 01-25-25 at 0517    CT Head Without Contrast [[id number]] Collected: 01/25/25 0514     Updated: 01/25/25 0514    Narrative:        Patient: GAEL ZARATE  Time Out: 05:13  Exam(s): CT HEAD Without Contrast     EXAM:    CT Head Without Intravenous Contrast    CLINICAL HISTORY:     Reason for exam: fall.    TECHNIQUE:    Axial computed tomography images of the head brain without intravenous   contrast.  CTDI is 55.44 mGy and DLP is 1095.2 mGy-cm.  This CT exam was   performed according to the principle of ALARA (As Low As Reasonably   Achievable) by using one or more of the following dose reduction   techniques: automated exposure control, adjustment of the mA and or kV   according to patient size, and or use of iterative reconstruction   technique.    COMPARISON:    No relevant prior studies available.    FINDINGS:    Brain:  No hemorrhage or mass effect.  Senescent changes    Ventricles:  No hydrocephalus.    Bones joints:  Unremarkable.    Soft tissues:  Unremarkable.    Sinuses:  No air fluid level.    Mastoid air cells:  Clear.    IMPRESSION:         No acute hemorrhage, hydrocephalus, or mass effect.      Impression:          Electronically signed by Reinier Garcia MD on 01-25-25 at 0513            Pertinent Labs     Results from last 7 days   Lab Units 01/31/25  0350 01/30/25  0325 01/29/25 0347 01/28/25  0342   WBC 10*3/mm3 3.76 4.51 4.28 4.32   HEMOGLOBIN g/dL 8.7* 8.7* 8.7* 8.7*   PLATELETS 10*3/mm3 58* 71* 53* 53*     Results from last 7 days   Lab Units 01/31/25  0350 01/30/25  0325 01/29/25  0347 01/28/25  0342   SODIUM mmol/L 133* 132* 133* 133*   POTASSIUM mmol/L 4.5 4.3 4.6 4.4   CHLORIDE mmol/L 100 99 95* 96*   CO2 mmol/L 20.8* 23.5 24.0 26.4   BUN  "mg/dL 73* 48* 73* 49*   CREATININE mg/dL 7.42* 5.21* 7.52* 6.47*   GLUCOSE mg/dL 119* 100* 101* 121*   Estimated Creatinine Clearance: 9.5 mL/min (A) (by C-G formula based on SCr of 7.42 mg/dL (H)).  Results from last 7 days   Lab Units 01/30/25 0325 01/29/25 0347 01/28/25 0342 01/27/25  0457   ALBUMIN g/dL 2.5* 2.3* 2.2* 2.5*   BILIRUBIN mg/dL 1.0 0.9 0.8 0.8   ALK PHOS U/L 195* 181* 182* 191*   AST (SGOT) U/L 50* 45* 47* 56*   ALT (SGPT) U/L 23 21 23 29     Results from last 7 days   Lab Units 01/31/25  0350 01/30/25 0325 01/29/25 0347 01/28/25 0342 01/27/25 0457 01/26/25  0411 01/25/25  0432   CALCIUM mg/dL 7.8* 7.7* 7.6* 7.6* 7.8*   < > 8.5*   ALBUMIN g/dL  --  2.5* 2.3* 2.2* 2.5*  --  2.6*   MAGNESIUM mg/dL  --   --   --   --   --   --  1.9   PHOSPHORUS mg/dL  --  4.0 5.1*  --  5.1*  --   --     < > = values in this interval not displayed.               Invalid input(s): \"LDLCALC\"        Test Results Pending at Discharge       Discharge Details        Discharge Medications        New Medications        Instructions Start Date   acetaminophen 325 MG tablet  Commonly known as: TYLENOL   650 mg, Oral, Every 4 Hours PRN      HYDROcodone-acetaminophen 5-325 MG per tablet  Commonly known as: NORCO   1 tablet, Oral, Every 8 Hours PRN      midodrine 2.5 MG tablet  Commonly known as: PROAMATINE   2.5 mg, Oral, 3 Times Weekly, Monday, Wednesday, Friday on days of hemodialysis      polyethylene glycol 17 g packet  Commonly known as: MIRALAX   17 g, Oral, Daily PRN      sennosides-docusate 8.6-50 MG per tablet  Commonly known as: PERICOLACE   2 tablets, Oral, 2 Times Daily PRN             Changes to Medications        Instructions Start Date   alfuzosin 10 MG 24 hr tablet  Commonly known as: UROXATRAL   1 tablet, Oral, Every Other Day, Does not take on Sundays Tuesdays or Thursdays       cholecalciferol 25 MCG (1000 UT) tablet  Commonly known as: VITAMIN D3  What changed: additional instructions   1 tablet, Oral, " "Daily             Continue These Medications        Instructions Start Date   aspirin 81 MG tablet   1 tablet, Nightly      latanoprost 0.005 % ophthalmic solution  Commonly known as: XALATAN   1 drop, Nightly      lidocaine-prilocaine 2.5-2.5 % cream  Commonly known as: EMLA   1 Application, As Needed      Loratadine 10 MG capsule   1 capsule, Daily      MIRCERA IJ   1 dose, Every 30 Days      mupirocin 2 % ointment  Commonly known as: BACTROBAN   1 Application, Topical, 3 Times Daily      Pepcid AC 10 MG tablet  Generic drug: famotidine   1 tablet, Daily PRN      Umm-Peter tablet   1 tablet, Daily      sodium chloride 0.65 % nasal spray   2 sprays, Nightly      Velphoro 500 MG chewable tablet  Generic drug: Sucroferric Oxyhydroxide   2 tablets, Daily PRN      VENOFER IV   1 dose, As Needed               Allergies   Allergen Reactions    Ace Inhibitors Other (See Comments)     RENAL FAILURE/ raised creatinine    Contrast Dye (Echo Or Unknown Ct/Mr) Other (See Comments) and Anaphylaxis     RENAL FAILURE    Iodine Anaphylaxis    Angiotensin Receptor Blockers Swelling    Eliquis [Apixaban] Arrhythmia     BLEEDING ISSUES; PT CANNOT TAKE ANY ANTICOAGULANTS DUE TO LOW PLATELETS EXCEPT ASPIRIN      Filgrastim GI Intolerance and Confusion     Chest pain    Heparin Other (See Comments)     \"It causes me to bleed out\"    Keflex [Cephalexin] Diarrhea     RENAL FAILURE    Other Angioedema     IVP Dye         Discharge Disposition:  Skilled Nursing Facility (DC - External)    Discharge Diet:  Diet Order   Procedures    Diet: Cardiac, Diabetic, Renal, Fluid Restriction (240 mL/tray); Healthy Heart (2-3 Na+); Consistent Carbohydrate; Low Potassium; 1500 mL/day; Fluid Consistency: Thin (IDDSI 0)       Discharge Activity:   As tolerated    CODE STATUS:    Code Status and Medical Interventions: CPR (Attempt to Resuscitate); Full Support   Ordered at: 01/25/25 0621     Code Status (Patient has no pulse and is not breathing):    CPR " (Attempt to Resuscitate)     Medical Interventions (Patient has pulse or is breathing):    Full Support       Future Appointments   Date Time Provider Department Center   2/13/2025  8:45 AM ALEX OP VAS RM 3 BH ALEX OVKR ALEX   2/13/2025  9:30 AM Bill Deal MD [facility] VS ALEX ALEX   4/8/2025  1:20 PM LAB CHAIR 1 CBC [facility]  LAB [facility]   4/8/2025  1:40 PM Theo Pacheco MD [facility] CBC [facility]   9/4/2025  1:15 PM ALEX LCG ECHO/VAS RM 1  LCG ECHO ALEX   9/4/2025  1:45 PM Dale Vázquez MD [facility] CD LCGKR AELX      Contact information for follow-up providers       Bharathi De La Torre MD .    Specialty: Internal Medicine  Contact information:  [address] [facility] [address]  [facility] [id number] [phone]                       Contact information for after-discharge care       Destination       [facility] .    Service: Skilled Nursing  Contact information:  [address]  [facility] [address]  [phone]                                   Time Spent on Discharge:  Greater than 30 minutes      Juan Daniel Randall MD  [facility]  01/31/25  13:28 EST

## 2025-01-31 NOTE — CASE MANAGEMENT/SOCIAL WORK
Continued Stay Note  [facility]     Patient Name: Bill Landry  MRN: [id number]  Today's Date: 1/31/2025    Admit Date: 1/25/2025    Plan: [facility] via [facility] at 3pm   Discharge Plan       Row Name 01/31/25 1339       Plan    Plan [facility] at 3pm    Patient/Family in Agreement with Plan yes    Plan Comments DC orders noted. [facility] transport arranged for 3pm, confirmation #[id number] and cost $75.50. MD, RN, pt, spouse and Ronna/Chelsey notified of transportation time. Packet given to RN. Nik XIAO/CCP    Final Discharge Disposition Code 03 - skilled nursing facility (SNF)    Final Note [facility] at 3pm. No additional CCP needs.                   Discharge Codes    No documentation.                 Expected Discharge Date and Time       Expected Discharge Date Expected Discharge Time    Jan 31, 2025               Abbey Naik, RN

## 2025-01-31 NOTE — PLAN OF CARE
Goal Outcome Evaluation:  Plan of Care Reviewed With: patient, spouse        Progress: improving               Discharging today

## 2025-02-13 ENCOUNTER — HOSPITAL ENCOUNTER (OUTPATIENT)
Facility: HOSPITAL | Age: 80
Discharge: HOME | End: 2025-02-13
Admitting: SURGERY
Payer: MEDICARE

## 2025-02-13 ENCOUNTER — OFFICE VISIT (OUTPATIENT)
Age: 80
End: 2025-02-13
Payer: MEDICARE

## 2025-02-13 VITALS — WEIGHT: 184 LBS | HEIGHT: 70 IN | BODY MASS INDEX: 26.34 KG/M2

## 2025-02-13 DIAGNOSIS — Z99.2 ESRD ON HEMODIALYSIS: ICD-10-CM

## 2025-02-13 DIAGNOSIS — Z99.2 ARTERIOVENOUS FISTULA FOR HEMODIALYSIS IN PLACE, SECONDARY: Primary | ICD-10-CM

## 2025-02-13 DIAGNOSIS — Z99.2 ESRD ON DIALYSIS: ICD-10-CM

## 2025-02-13 DIAGNOSIS — N18.6 ESRD ON HEMODIALYSIS: ICD-10-CM

## 2025-02-13 DIAGNOSIS — Z99.2 ARTERIOVENOUS FISTULA FOR HEMODIALYSIS IN PLACE, SECONDARY: ICD-10-CM

## 2025-02-13 DIAGNOSIS — N18.6 ESRD ON DIALYSIS: ICD-10-CM

## 2025-02-13 LAB
BH CV VAS DIALYSIS ARTERIAL ANASTOMOSIS DIAMETER: 0.7 CM
BH CV VAS DIALYSIS ARTERIAL ANASTOMOSIS EDV: 286 CM/SEC
BH CV VAS DIALYSIS ARTERIAL ANASTOMOSIS PSV: 546 CM/SEC
BH CV VAS DIALYSIS CONDUIT DIST DEPTH: 0.6 CM
BH CV VAS DIALYSIS CONDUIT DIST DIAMETER: 0.5 CM
BH CV VAS DIALYSIS CONDUIT DIST EDV: 108 CM/SEC
BH CV VAS DIALYSIS CONDUIT DIST FLOW VOL: 698 ML/MIN
BH CV VAS DIALYSIS CONDUIT DIST PSV: 167 CM/SEC
BH CV VAS DIALYSIS CONDUIT MID DEPTH: 0.4 CM
BH CV VAS DIALYSIS CONDUIT MID DIAMETER: 0.5 CM
BH CV VAS DIALYSIS CONDUIT MID EDV: 129 CM/SEC
BH CV VAS DIALYSIS CONDUIT MID FLOW VOL: 588 ML/MIN
BH CV VAS DIALYSIS CONDUIT MID PSV: 215 CM/SEC
BH CV VAS DIALYSIS CONDUIT MID/DIST DEPTH: 0.3 CM
BH CV VAS DIALYSIS CONDUIT MID/DIST DIAMETER: 0.5 CM
BH CV VAS DIALYSIS CONDUIT MID/DIST EDV: 116 CM/SEC
BH CV VAS DIALYSIS CONDUIT MID/DIST PSV: 198 CM/SEC
BH CV VAS DIALYSIS CONDUIT PROX DEPTH: 0.4 CM
BH CV VAS DIALYSIS CONDUIT PROX DIAMETER: 0.5 CM
BH CV VAS DIALYSIS CONDUIT PROX EDV: 175 CM/SEC
BH CV VAS DIALYSIS CONDUIT PROX FLOW VOL: 724 ML/MIN
BH CV VAS DIALYSIS CONDUIT PROX PSV: 322 CM/SEC
BH CV VAS DIALYSIS CONDUIT PROX/MID DEPTH: 0.3 CM
BH CV VAS DIALYSIS CONDUIT PROX/MID DIAMETER: 0.4 CM
BH CV VAS DIALYSIS CONDUIT PROX/MID EDV: 123 CM/SEC
BH CV VAS DIALYSIS CONDUIT PROX/MID FLOW VOL: 668 ML/MIN
BH CV VAS DIALYSIS CONDUIT PROX/MID PSV: 244 CM/SEC
BH CV VAS DIALYSIS PRE-INFLOW BRACHIAL DIAMETER: 1.1 CM
BH CV VAS DIALYSIS PRE-INFLOW BRACHIAL EDV: 195 CM/SEC
BH CV VAS DIALYSIS PRE-INFLOW BRACHIAL FLOW VOL: 1229 ML/MIN
BH CV VAS DIALYSIS PRE-INFLOW BRACHIAL PSV: 323 CM/SEC
BH CV VAS DIALYSIS STENT ONE  DIST STENT EDV: 52 CM/SEC
BH CV VAS DIALYSIS STENT ONE  DIST STENT PSV: 125 CM/SEC
BH CV VAS DIALYSIS STENT ONE  MID STENT EDV: 51 CM/SEC
BH CV VAS DIALYSIS STENT ONE  POST STENT EDV: 24 CM/SEC
BH CV VAS DIALYSIS STENT ONE MID STENT PSV: 90 CM/SEC
BH CV VAS DIALYSIS STENT ONE POST STENT PSV: 45 CM/SEC
BH CV VAS DIALYSIS STENT ONE PRE STENT EDV: 66 CM/SEC
BH CV VAS DIALYSIS STENT ONE PRE STENT PSV: 109 CM/SEC
BH CV VAS DIALYSIS STENT ONE PROX STENT EDV: 40 CM/SEC
BH CV VAS DIALYSIS STENT ONE PROX STENT PSV: 75 CM/SEC
BH CV VAS DIALYSIS VENOUS ANASTOMOSIS DIAMETER: 0.8 CM
BH CV VAS DIALYSIS VENOUS ANASTOMOSIS EDV: 149 CM/SEC
BH CV VAS DIALYSIS VENOUS ANASTOMOSIS PSV: 275 CM/SEC
BH CV VAS DIALYSIS VENOUS OUTFLOW AXILLARY DIAMETER: 0.7 CM
BH CV VAS DIALYSIS VENOUS OUTFLOW AXILLARY EDV: 321 CM/SEC
BH CV VAS DIALYSIS VENOUS OUTFLOW AXILLARY PSV: 524 CM/SEC
BH CV VAS DIALYSIS VENOUS OUTFLOW SUBCLAVIAN DIAMETER: 1.2 CM
BH CV VAS DIALYSIS VENOUS OUTFLOW SUBCLAVIAN EDV: 24 CM/SEC
BH CV VAS DIALYSIS VENOUS OUTFLOW SUBCLAVIAN PSV: 45 CM/SEC
BH CV VAS FREE TEXT STENT ONE: NORMAL

## 2025-02-13 PROCEDURE — 93990 DOPPLER FLOW TESTING: CPT

## 2025-02-13 NOTE — PROGRESS NOTES
Patient Name: Bill Landry    MRN: [id number] Encounter Date: 02/13/2025      Consulting Service: Vascular Surgery    Referring Provider: No ref. provider found       CHIEF COMPLAINT:  Chief Complaint   Patient presents with    Aneurysm artery, upper extremity       Subjective    HPI: Bill Landry is a 79 y.o. male is being seen for evaluation/management of follow-up for end-stage renal failure and dialysis access.  Patient's current dialysis access is arteriovenous graft.  They report no current issues with their access including no issues with bleeding or poor volume flows.  Testing today includes arteriovenous fistula or graft duplex study.  Current recommendations include fistula or shuntogram.    PAST MEDICAL HISTORY:   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    Aortic stenosis     Bicuspid aortic valve 07/20/2022    Calculus of kidney     Chronic leukopenia and thrombocytopenia     Cirrhosis of liver without ascites 07/24/2017    Congestive splenomegaly 07/24/2017    Crohn disease     with ileostomy    Decreased white blood cell count, unspecified     Diverticula of colon     ESRD on hemodialysis 04/05/2018    M-W-F FRESENIUS    Fatty liver disease, nonalcoholic     Fistula 04/05/2018    Other Specified Complication    Gastrointestinal hemorrhage, unspecified     GERD (gastroesophageal reflux disease)     GI bleed     Glaucoma     H/O Gallstone pancreatitis     H/O Pericardial effusion     H/O sinus bradycardia     History of hypertension     resolved    History of UTI     Hx of renal calculi     Ileostomy present     Iron deficiency     Leakage of heart valve prosthesis, subsequent encounter     MGUS (monoclonal gammopathy of unknown significance)     TATE (obstructive sleep apnea)     Other  hemoglobinopathies     Pericardial effusion (noninflammatory)     Permanent atrial fibrillation     Scarlet fever, uncomplicated     Stenosis of other vascular prosthetic devices, implants and grafts, initial encounter 02/14/2022    Systemic lupus erythematosus, unspecified     Thrombocytopenia     undpecified    Type 2 diabetes mellitus     CURRENTLY TAKES NO MED    Urinary retention     Post-OP      PAST SURGICAL HISTORY:   Past Surgical History:   Procedure Laterality Date    APPENDECTOMY  06/1968    ARTERIOVENOUS FISTULA/SHUNT SURGERY Left 08/08/2017    Procedure: LEFT BRACHIAL CEPHALIC AV FISTULA FORMATION WITH CEPHALIC VEIN TRANSPOSITION ;  Surgeon: Bill Deal MD;  Location: [facility] OR;  Service:     ARTERIOVENOUS FISTULA/SHUNT SURGERY Left 05/27/2022    Procedure: OPEN REVISION LEFT ARTERIOVENOUS INTERPOSITION GRAFT PLACEMENT;  Surgeon: Bill Deal MD;  Location: [facility] OR;  Service: Vascular;  Laterality: Left;    ARTERIOVENOUS FISTULA/SHUNT SURGERY Left 11/19/2024    Procedure: LEFT ARTERIOVENOUS SHUNT GRAFT REVISION;  Surgeon: Bill Deal MD;  Location: [facility] OR;  Service: Vascular;  Laterality: Left;    ARTERIOVENOUS FISTULA/SHUNT SURGERY W/ HEMODIALYSIS CATHETER INSERTION Left 02/15/2022    Procedure: OPEN LEFT ARM ARTERIOVENOUS FISTULA THROMBECTOMY WITH CEPHALIC VEIN ARTHROPLASTY AND STENT/GRAFT PLACEMENT;  Surgeon: Bill Deal MD;  Location: [facility] OR 18/19;  Service: Vascular;  Laterality: Left;    CATARACT EXTRACTION WITH INTRAOCULAR LENS IMPLANT Bilateral 2004    CHOLECYSTECTOMY  2011    COLECTOMY PARTIAL / TOTAL      History of inflammatory bowel disease with status post colectomy with ileostomy many years ago in the [facility],  18 YEARS OF AGE    COLON SURGERY      Colon resection with colostomy    COLON SURGERY      COLONOSCOPY  01/2018    CYSTOSCOPY LITHOLAPAXY BLADDER STONE EXTRACTION      ENDOSCOPY  01/16/2015    gastritis    ENDOSCOPY   01/17/2018    Procedure: ESOPHAGOGASTRODUODENOSCOPY;  Surgeon: Warner Neville MD;  Location: [facility] ENDOSCOPY;  Service:     ILEOSCOPY  01/16/2015    normal    ILEOSCOPY N/A 01/17/2018    Procedure: ILEOSCOPY;  Surgeon: Warner Neville MD;  Location: [facility] ENDOSCOPY;  Service:     ILEOSTOMY  12/1968    loop ostomy bypass    INCISION AND DRAINAGE ARM Left 10/27/2023    Procedure: LEFT ARM INCISION AND DRAINAGE;  Surgeon: Bill Bermeo MD;  Location: [facility] MAIN OR;  Service: Vascular;  Laterality: Left;    INSERTION HEMODIALYSIS CATHETER Right 11/13/2024    Procedure: Tunnelled HEMODIALYSIS CATHETER INSERTION;  Surgeon: Ben Barth MD;  Location: [facility] HYBRID OR;  Service: Vascular;  Laterality: Right;    OTHER SURGICAL HISTORY  2009    kidney stones x3    PSEUDOANEURYSM REPAIR Left 10/27/2023    TONSILLECTOMY  1950      FAMILY HISTORY:   Family History   Problem Relation Age of Onset    Heart failure Mother 78    Hypertension Mother     Heart disease Mother     Hyperlipidemia Mother     Hypertension Father     Other Father 82        MVI    Malig Hyperthermia Neg Hx       SOCIAL HISTORY:   Social History     Tobacco Use    Smoking status: Never     Passive exposure: Never    Smokeless tobacco: Never   Vaping Use    Vaping status: Never Used   Substance Use Topics    Alcohol use: No     Comment: caffeine use: none    Drug use: No      MEDICATIONS:   Current Outpatient Medications on File Prior to Visit   Medication Sig Dispense Refill    acetaminophen (TYLENOL) 325 MG tablet Take 2 tablets by mouth Every 4 (Four) Hours As Needed for Mild Pain.      alfuzosin (UROXATRAL) 10 MG 24 hr tablet Take 1 tablet by mouth Every Other Day. Does not take on Sundays Tuesdays or Thursdays   Indications: Benign Enlargement of Prostate      aspirin 81 MG tablet Take 1 tablet by mouth Every Night. INSTRUCTED TO CONTINUE THIS FOR SURGERY PER DR. BERMEO'S OFFICE  Indications: Disease involving Lipid Deposits in the  Arteries      B Complex-C-Folic Acid (Umm-Peter) tablet Take 1 tablet by mouth Daily.      Cholecalciferol 25 MCG (1000 UT) tablet Take 1 tablet by mouth Daily. Indications: Vitamin D Deficiency      famotidine (Pepcid AC) 10 MG tablet Take 1 tablet by mouth Daily As Needed for Heartburn (take as needed after HD on dialysis days). Indications: Heartburn      Iron Sucrose (VENOFER IV) Infuse 1 dose into a venous catheter As Needed.      latanoprost (XALATAN) 0.005 % ophthalmic solution Administer 1 drop to both eyes Every Night. Indications: Wide-Angle Glaucoma      lidocaine-prilocaine (EMLA) 2.5-2.5 % cream Apply 1 Application topically to the appropriate area as directed As Needed. Mvdepq-Hsumfpygp-Hflkwo:  ONE HOUR PRIOR TO DIALYSIS  Indications: Anesthesia to a Specific Part of the Body  3    Loratadine 10 MG capsule Take 1 capsule by mouth Daily. Indications: Hayfever      Methoxy PEG-Epoetin Beta (MIRCERA IJ) Inject 1 dose as directed Every 30 (Thirty) Days.      midodrine (PROAMATINE) 2.5 MG tablet Take 1 tablet by mouth 3 (Three) Times a Week. Monday, Wednesday, Friday on days of hemodialysis      mupirocin (BACTROBAN) 2 % ointment Apply 1 Application topically to the appropriate area as directed 3 (Three) Times a Day. Indications: Wound Infection 22 g 1    polyethylene glycol (MIRALAX) 17 g packet Take 17 g by mouth Daily As Needed (Use if senna-docusate is ineffective).      sennosides-docusate (PERICOLACE) 8.6-50 MG per tablet Take 2 tablets by mouth 2 (Two) Times a Day As Needed for Constipation.      sodium chloride 0.65 % nasal spray Administer 2 sprays into the nostril(s) as directed by provider Every Night.      Sucroferric Oxyhydroxide (Velphoro) 500 MG chewable tablet Chew 2 tablets Daily As Needed (phopurus def). Take 2 tablets by  mouth three times a day.  Indications: Hyperphosphatemia D/T Renal Insufficiency       No current facility-administered medications on file prior to visit.  "      ALLERGIES: Ace inhibitors, Contrast dye (echo or unknown ct/mr), Iodine, Angiotensin receptor blockers, Eliquis [apixaban], Filgrastim, Heparin, Keflex [cephalexin], and Other       Objective   Vitals:    25 0918   Weight: 83.5 kg (184 lb)   Height: 177.8 cm (70\")     Body mass index is 26.4 kg/m².          PHYSICAL EXAM:   Physical Exam  Constitutional:       Appearance: Normal appearance. He is normal weight.   HENT:      Head: Normocephalic and atraumatic.      Nose: Nose normal.   Eyes:      Extraocular Movements: Extraocular movements intact.      Pupils: Pupils are equal, round, and reactive to light.   Cardiovascular:      Rate and Rhythm: Normal rate.      Pulses:           Carotid pulses are 2+ on the right side and 2+ on the left side.       Radial pulses are 2+ on the right side and 2+ on the left side.        Femoral pulses are 2+ on the right side and 2+ on the left side.       Popliteal pulses are 2+ on the right side and 2+ on the left side.        Dorsalis pedis pulses are 2+ on the right side and 2+ on the left side.        Posterior tibial pulses are 2+ on the right side and 2+ on the left side.      Heart sounds: Normal heart sounds.      Comments: Improved with no evidence of cellulitis or drainage.  Thrill in shunt little diminished.  Pulmonary:      Effort: Pulmonary effort is normal.      Breath sounds: Normal breath sounds.   Abdominal:      General: Abdomen is flat. Bowel sounds are normal.      Palpations: Abdomen is soft.   Musculoskeletal:         General: Normal range of motion.      Cervical back: Normal range of motion and neck supple.      Right lower le+ Edema present.      Left lower le+ Edema present.   Skin:     General: Skin is warm and dry.   Neurological:      General: No focal deficit present.      Mental Status: He is alert and oriented to person, place, and time. Mental status is at baseline.   Psychiatric:         Mood and Affect: Mood normal.         " Thought Content: Thought content normal.          Result Review   LABS:    CBC          1/30/2025    03:25 1/31/2025    03:50 2/5/2025    00:00   CBC   WBC 4.51  3.76     RBC 2.71  2.59     Hemoglobin 8.7  8.7  9.6        28.8       Hematocrit 27.6  25.4     .8  98.1     MCH 32.1  33.6     MCHC 31.5  34.3     RDW 14.1  13.6     Platelets 71  58        Details          This result is from an external source.             BMP          1/29/2025    03:47 1/30/2025    03:25 1/31/2025    03:50   BMP   BUN 73  48  73    Creatinine 7.52  5.21  7.42    Sodium 133  132  133    Potassium 4.6  4.3  4.5    Chloride 95  99  100    CO2 24.0  23.5  20.8    Calcium 7.6  7.7  7.8      Lipid Panel          9/17/2024    08:12   Lipid Panel   Total Cholesterol 141    Triglycerides 82    HDL Cholesterol 59    VLDL Cholesterol 16    LDL Cholesterol  66       INR          11/13/2024    09:03 11/17/2024    11:25 1/25/2025    04:32   Common Labs   INR 1.35  1.35  1.35       A1C Last 3 Results          9/17/2024    08:12 1/26/2025    04:11 2/3/2025    00:00   HGBA1C Last 3 Results   Hemoglobin A1C 5.40  4.80  5.3          Details          This result is from an external source.                Results Review:       I reviewed the patient's new clinical results.    The following radiologic or non-invasive studies have been reviewed by me: Left AV fistula shunt site looks good.  Overall though the turgor of the shunt looks a little down.  Duplex Hemodialysis Access CAR 12/12/2024    Interpretation Summary    Patent left upper extremity arteriovenous graft with proximal stenosis.  Moderate venous outflow stenosis also noted.  No central venous stenosis identified despite edema and chest wall venous collaterals noted.     XR Chest PA & Lateral    Result Date: 1/30/2025  Mass described.  This report was finalized on 1/30/2025 12:35 PM by Dr. Byron Chavira M.D on Workstation: [id number]      CT Pelvis Without Contrast    Result Date:  1/25/2025  Electronically signed by Ken Amos MD on 01-25-25 at 0543    CT Cervical Spine Without Contrast    Result Date: 1/25/2025  Electronically signed by Reinier Garcia MD on 01-25-25 at 0517    CT Head Without Contrast    Result Date: 1/25/2025  Electronically signed by Reinier Garcia MD on 01-25-25 at 0513                 ASSESSMENT/PLAN:   Diagnoses and all orders for this visit:    1. Arteriovenous fistula for hemodialysis in place, secondary (Primary)  -     Dialysis Circuit Angiography (Fistulogram); Future    2. ESRD on dialysis  -     Dialysis Circuit Angiography (Fistulogram); Future    3. ESRD on hemodialysis  -     Dialysis Circuit Angiography (Fistulogram); Future       79 y.o. male with functioning left AV shunt has been revised multiple times.  The flow volume send fortunately are little low with the lowest being 588 which is suboptimal.  There is increased velocities at the venous outflow segment.  Organ to perform a shuntogram here in the office and try to get a answer and treatment for what is going on to make sure the flow volumes get back up.  He will stay on his aspirin.  He has been at the [facility] after a fall with a left hip fracture.  Will get this set up at his earliest convenience.    I discussed the plan with the patient and family who are agreeable to the plan of care at this point. Thank you for this consult.   Follow Up  Return in about 2 weeks (around 2/27/2025).    Bill Deal MD   02/13/25

## 2025-02-25 DIAGNOSIS — N18.6 ESRD ON DIALYSIS: Primary | ICD-10-CM

## 2025-02-25 DIAGNOSIS — Z99.2 ESRD ON DIALYSIS: Primary | ICD-10-CM

## 2025-03-11 ENCOUNTER — TELEPHONE (OUTPATIENT)
Dept: INTERNAL MEDICINE | Facility: CLINIC | Age: 80
End: 2025-03-11

## 2025-03-11 NOTE — TELEPHONE ENCOUNTER
Caller: [facility]    Relationship:     Best call back number: [phone]    What orders are you requesting (i.e. lab or imaging): TRANSPORT WHEEL CHAIR      Additional notes: PLEASE SEND ORDER TO DME PROVIDER.     [facility] WANTED TO LET YOU KNOW THEY STARTED CARE AND WILL BE SEEING PATIENT FOR THE NEXT 2 MONTHS.

## 2025-03-11 NOTE — TELEPHONE ENCOUNTER
Faxed order for transport wheel chair to [facility]. Please note patient has not been seen by Dr. De La Torre since 08/2024 This might be an issue with [facility].     OKAY FOR HUB TO SHARE INFORMATION ABOVE AND   ANY OTHER PREVIOUS MESSAGES IN THIS NOTE/CALL.

## 2025-03-16 NOTE — PROGRESS NOTES
· The patient has a mobility limitation that significantly impairs his/her ability to participate in one or more mobility related activity of daily living such as: toileting, feeding, dressing, grooming, and bathing in customary within the home due to hip pain.  · A cane or walker have been ruled out.    · The use of a manual wheelchair will significantly improve the patient's ability to participate in ADL's and the patient will use it on a regular basis with in the home.    · Patient does not have the upper body strength and mental capability to safely propel the w   heelchair in their home and they do have a caregiver that is available, willing and able to provide assistance with the wheelchair.

## 2025-03-17 NOTE — TELEPHONE ENCOUNTER
Caller: Gillian Landry    Relationship to patient: Emergency Contact    Best call back number: [phone]     Patient is needing: PATIENT'S WIFE IS REQUESTING THE INFO FOR [facility], AND STATED AS WELL THAT THIS IS NEEDED FOR DIALYSIS CARE BECAUSE ITS SO DIFFICULT TO GET PATIENT INTO THE DOCTOR'S OFFICE.    PLEASE CALL WITH [facility] INFO AND TO DISCUSS.

## 2025-03-17 NOTE — TELEPHONE ENCOUNTER
Name: [facility] Callback Number: [phone]       [facility]UB PROVIDED THE RELAY MESSAGE FROM THE OFFICE      PATIENT: VOICED UNDERSTANDING AND HAS NO FURTHER QUESTIONS AT THIS TIME    ADDITIONAL INFORMATION:

## 2025-03-18 NOTE — TELEPHONE ENCOUNTER
Spoke with patient wife and gave her [facility] information and when I faxed information on Wheel Chair request 03.11.25 @ 4:23pm.

## 2025-03-23 ENCOUNTER — HOSPITAL ENCOUNTER (INPATIENT)
Facility: HOSPITAL | Age: 80
LOS: 11 days | Discharge: HOME | End: 2025-04-03
Attending: EMERGENCY MEDICINE | Admitting: INTERNAL MEDICINE
Payer: MEDICARE

## 2025-03-23 ENCOUNTER — APPOINTMENT (OUTPATIENT)
Dept: GENERAL RADIOLOGY | Facility: HOSPITAL | Age: 80
End: 2025-03-23
Payer: MEDICARE

## 2025-03-23 ENCOUNTER — APPOINTMENT (OUTPATIENT)
Dept: CT IMAGING | Facility: HOSPITAL | Age: 80
End: 2025-03-23
Payer: MEDICARE

## 2025-03-23 ENCOUNTER — DOCUMENTATION (OUTPATIENT)
Age: 80
End: 2025-03-23
Payer: MEDICARE

## 2025-03-23 DIAGNOSIS — N18.6 ESRD ON DIALYSIS: ICD-10-CM

## 2025-03-23 DIAGNOSIS — Z99.2 ESRD ON DIALYSIS: ICD-10-CM

## 2025-03-23 DIAGNOSIS — Z99.2 ESRD (END STAGE RENAL DISEASE) ON DIALYSIS: ICD-10-CM

## 2025-03-23 DIAGNOSIS — R53.1 WEAKNESS: ICD-10-CM

## 2025-03-23 DIAGNOSIS — L03.90 SEPSIS DUE TO CELLULITIS: ICD-10-CM

## 2025-03-23 DIAGNOSIS — L02.414 ABSCESS OF LEFT ARM: ICD-10-CM

## 2025-03-23 DIAGNOSIS — T82.9XXA COMPLICATION OF ARTERIOVENOUS DIALYSIS FISTULA, INITIAL ENCOUNTER: ICD-10-CM

## 2025-03-23 DIAGNOSIS — L03.114 CELLULITIS OF LEFT ARM: Primary | ICD-10-CM

## 2025-03-23 DIAGNOSIS — Z78.9 PROBLEM WITH VASCULAR ACCESS: ICD-10-CM

## 2025-03-23 DIAGNOSIS — N18.6 ESRD (END STAGE RENAL DISEASE) ON DIALYSIS: ICD-10-CM

## 2025-03-23 DIAGNOSIS — A41.9 SEPSIS DUE TO CELLULITIS: ICD-10-CM

## 2025-03-23 DIAGNOSIS — E87.1 HYPONATREMIA: ICD-10-CM

## 2025-03-23 DIAGNOSIS — A41.9 SEPSIS, DUE TO UNSPECIFIED ORGANISM, UNSPECIFIED WHETHER ACUTE ORGAN DYSFUNCTION PRESENT: ICD-10-CM

## 2025-03-23 LAB
ALBUMIN SERPL-MCNC: 2.6 G/DL (ref 3.5–5.2)
ALBUMIN/GLOB SERPL: 0.6 G/DL
ALP SERPL-CCNC: 283 U/L (ref 39–117)
ALT SERPL W P-5'-P-CCNC: 18 U/L (ref 1–41)
ANION GAP SERPL CALCULATED.3IONS-SCNC: 13.8 MMOL/L (ref 5–15)
APTT PPP: 26.7 SECONDS (ref 22.7–35.4)
AST SERPL-CCNC: 43 U/L (ref 1–40)
BASOPHILS # BLD AUTO: 0.01 10*3/MM3 (ref 0–0.2)
BASOPHILS NFR BLD AUTO: 0.2 % (ref 0–1.5)
BILIRUB SERPL-MCNC: 1.2 MG/DL (ref 0–1.2)
BUN SERPL-MCNC: 46 MG/DL (ref 8–23)
BUN/CREAT SERPL: 7.6 (ref 7–25)
CALCIUM SPEC-SCNC: 7.8 MG/DL (ref 8.6–10.5)
CHLORIDE SERPL-SCNC: 93 MMOL/L (ref 98–107)
CO2 SERPL-SCNC: 22.2 MMOL/L (ref 22–29)
CREAT SERPL-MCNC: 6.03 MG/DL (ref 0.76–1.27)
D-LACTATE SERPL-SCNC: 2.4 MMOL/L (ref 0.5–2)
D-LACTATE SERPL-SCNC: 2.7 MMOL/L (ref 0.5–2)
D-LACTATE SERPL-SCNC: 3.6 MMOL/L (ref 0.5–2)
DEPRECATED RDW RBC AUTO: 56.9 FL (ref 37–54)
EGFRCR SERPLBLD CKD-EPI 2021: 8.9 ML/MIN/1.73
EOSINOPHIL # BLD AUTO: 0.04 10*3/MM3 (ref 0–0.4)
EOSINOPHIL NFR BLD AUTO: 1 % (ref 0.3–6.2)
ERYTHROCYTE [DISTWIDTH] IN BLOOD BY AUTOMATED COUNT: 15.1 % (ref 12.3–15.4)
GLOBULIN UR ELPH-MCNC: 4.5 GM/DL
GLUCOSE SERPL-MCNC: 171 MG/DL (ref 65–99)
HCT VFR BLD AUTO: 30.4 % (ref 37.5–51)
HGB BLD-MCNC: 9.6 G/DL (ref 13–17.7)
IMM GRANULOCYTES # BLD AUTO: 0.05 10*3/MM3 (ref 0–0.05)
IMM GRANULOCYTES NFR BLD AUTO: 1.2 % (ref 0–0.5)
INR PPP: 1.36 (ref 0.9–1.1)
LYMPHOCYTES # BLD AUTO: 0.31 10*3/MM3 (ref 0.7–3.1)
LYMPHOCYTES NFR BLD AUTO: 7.4 % (ref 19.6–45.3)
MCH RBC QN AUTO: 32.3 PG (ref 26.6–33)
MCHC RBC AUTO-ENTMCNC: 31.6 G/DL (ref 31.5–35.7)
MCV RBC AUTO: 102.4 FL (ref 79–97)
MONOCYTES # BLD AUTO: 0.28 10*3/MM3 (ref 0.1–0.9)
MONOCYTES NFR BLD AUTO: 6.7 % (ref 5–12)
NEUTROPHILS NFR BLD AUTO: 3.52 10*3/MM3 (ref 1.7–7)
NEUTROPHILS NFR BLD AUTO: 83.5 % (ref 42.7–76)
NRBC BLD AUTO-RTO: 0 /100 WBC (ref 0–0.2)
PLATELET # BLD AUTO: 59 10*3/MM3 (ref 140–450)
PMV BLD AUTO: 9.5 FL (ref 6–12)
POTASSIUM SERPL-SCNC: 4.3 MMOL/L (ref 3.5–5.2)
PROCALCITONIN SERPL-MCNC: 1.2 NG/ML (ref 0–0.25)
PROT SERPL-MCNC: 7.1 G/DL (ref 6–8.5)
PROTHROMBIN TIME: 16.7 SECONDS (ref 11.7–14.2)
RBC # BLD AUTO: 2.97 10*6/MM3 (ref 4.14–5.8)
SODIUM SERPL-SCNC: 129 MMOL/L (ref 136–145)
WBC NRBC COR # BLD AUTO: 4.21 10*3/MM3 (ref 3.4–10.8)

## 2025-03-23 PROCEDURE — 80053 COMPREHEN METABOLIC PANEL: CPT | Performed by: EMERGENCY MEDICINE

## 2025-03-23 PROCEDURE — 84145 PROCALCITONIN (PCT): CPT | Performed by: EMERGENCY MEDICINE

## 2025-03-23 PROCEDURE — 83605 ASSAY OF LACTIC ACID: CPT | Performed by: EMERGENCY MEDICINE

## 2025-03-23 PROCEDURE — 87070 CULTURE OTHR SPECIMN AEROBIC: CPT | Performed by: EMERGENCY MEDICINE

## 2025-03-23 PROCEDURE — 25510000001 IOPAMIDOL 61 % SOLUTION: Performed by: EMERGENCY MEDICINE

## 2025-03-23 PROCEDURE — 36415 COLL VENOUS BLD VENIPUNCTURE: CPT | Performed by: EMERGENCY MEDICINE

## 2025-03-23 PROCEDURE — 99285 EMERGENCY DEPT VISIT HI MDM: CPT

## 2025-03-23 PROCEDURE — 25010000002 VANCOMYCIN 10 G RECONSTITUTED SOLUTION: Performed by: EMERGENCY MEDICINE

## 2025-03-23 PROCEDURE — 85610 PROTHROMBIN TIME: CPT | Performed by: EMERGENCY MEDICINE

## 2025-03-23 PROCEDURE — 25810000003 SODIUM CHLORIDE 0.9 % SOLUTION: Performed by: EMERGENCY MEDICINE

## 2025-03-23 PROCEDURE — 71045 X-RAY EXAM CHEST 1 VIEW: CPT

## 2025-03-23 PROCEDURE — 85730 THROMBOPLASTIN TIME PARTIAL: CPT | Performed by: EMERGENCY MEDICINE

## 2025-03-23 PROCEDURE — 87040 BLOOD CULTURE FOR BACTERIA: CPT | Performed by: EMERGENCY MEDICINE

## 2025-03-23 PROCEDURE — 73201 CT UPPER EXTREMITY W/DYE: CPT

## 2025-03-23 PROCEDURE — 25010000002 CEFTRIAXONE PER 250 MG: Performed by: EMERGENCY MEDICINE

## 2025-03-23 PROCEDURE — 85025 COMPLETE CBC W/AUTO DIFF WBC: CPT | Performed by: EMERGENCY MEDICINE

## 2025-03-23 PROCEDURE — 87147 CULTURE TYPE IMMUNOLOGIC: CPT | Performed by: EMERGENCY MEDICINE

## 2025-03-23 PROCEDURE — 87186 SC STD MICRODIL/AGAR DIL: CPT | Performed by: EMERGENCY MEDICINE

## 2025-03-23 PROCEDURE — 87205 SMEAR GRAM STAIN: CPT | Performed by: EMERGENCY MEDICINE

## 2025-03-23 RX ORDER — LIDOCAINE 40 MG/G
CREAM TOPICAL AS NEEDED
Status: DISCONTINUED | OUTPATIENT
Start: 2025-03-23 | End: 2025-04-03 | Stop reason: HOSPADM

## 2025-03-23 RX ORDER — ACETAMINOPHEN 160 MG/5ML
650 SOLUTION ORAL EVERY 4 HOURS PRN
Status: DISCONTINUED | OUTPATIENT
Start: 2025-03-23 | End: 2025-04-03 | Stop reason: HOSPADM

## 2025-03-23 RX ORDER — LATANOPROST 50 UG/ML
1 SOLUTION/ DROPS OPHTHALMIC NIGHTLY
Status: DISCONTINUED | OUTPATIENT
Start: 2025-03-23 | End: 2025-04-03 | Stop reason: HOSPADM

## 2025-03-23 RX ORDER — ONDANSETRON 4 MG/1
4 TABLET, ORALLY DISINTEGRATING ORAL EVERY 6 HOURS PRN
Status: DISCONTINUED | OUTPATIENT
Start: 2025-03-23 | End: 2025-04-03 | Stop reason: HOSPADM

## 2025-03-23 RX ORDER — ACETAMINOPHEN 325 MG/1
650 TABLET ORAL EVERY 4 HOURS PRN
Status: DISCONTINUED | OUTPATIENT
Start: 2025-03-23 | End: 2025-04-03 | Stop reason: HOSPADM

## 2025-03-23 RX ORDER — AMOXICILLIN 250 MG
2 CAPSULE ORAL 2 TIMES DAILY PRN
Status: DISCONTINUED | OUTPATIENT
Start: 2025-03-23 | End: 2025-04-03 | Stop reason: HOSPADM

## 2025-03-23 RX ORDER — SODIUM CHLORIDE 0.9 % (FLUSH) 0.9 %
10 SYRINGE (ML) INJECTION AS NEEDED
Status: DISCONTINUED | OUTPATIENT
Start: 2025-03-23 | End: 2025-04-03 | Stop reason: HOSPADM

## 2025-03-23 RX ORDER — SEVELAMER HYDROCHLORIDE 800 MG/1
800 TABLET, FILM COATED ORAL
COMMUNITY

## 2025-03-23 RX ORDER — TAMSULOSIN HYDROCHLORIDE 0.4 MG/1
0.4 CAPSULE ORAL NIGHTLY
Status: DISCONTINUED | OUTPATIENT
Start: 2025-03-23 | End: 2025-04-03 | Stop reason: HOSPADM

## 2025-03-23 RX ORDER — ONDANSETRON 2 MG/ML
4 INJECTION INTRAMUSCULAR; INTRAVENOUS EVERY 6 HOURS PRN
Status: DISCONTINUED | OUTPATIENT
Start: 2025-03-23 | End: 2025-04-03 | Stop reason: HOSPADM

## 2025-03-23 RX ORDER — BISACODYL 10 MG
10 SUPPOSITORY, RECTAL RECTAL DAILY PRN
Status: DISCONTINUED | OUTPATIENT
Start: 2025-03-23 | End: 2025-04-03 | Stop reason: HOSPADM

## 2025-03-23 RX ORDER — FAMOTIDINE 20 MG/1
10 TABLET, FILM COATED ORAL DAILY PRN
Status: DISCONTINUED | OUTPATIENT
Start: 2025-03-23 | End: 2025-03-24

## 2025-03-23 RX ORDER — ASPIRIN 81 MG/1
81 TABLET ORAL DAILY
Status: DISCONTINUED | OUTPATIENT
Start: 2025-03-24 | End: 2025-03-26

## 2025-03-23 RX ORDER — MIDODRINE HYDROCHLORIDE 2.5 MG/1
2.5 TABLET ORAL
Status: DISCONTINUED | OUTPATIENT
Start: 2025-03-24 | End: 2025-04-03

## 2025-03-23 RX ORDER — BISACODYL 5 MG/1
5 TABLET, DELAYED RELEASE ORAL DAILY PRN
Status: DISCONTINUED | OUTPATIENT
Start: 2025-03-23 | End: 2025-04-03 | Stop reason: HOSPADM

## 2025-03-23 RX ORDER — CHOLECALCIFEROL (VITAMIN D3) 25 MCG
1000 TABLET ORAL DAILY
Status: DISCONTINUED | OUTPATIENT
Start: 2025-03-24 | End: 2025-04-03 | Stop reason: HOSPADM

## 2025-03-23 RX ORDER — FOLIC ACID/VIT B COMPLEX AND C 0.8 MG
1 TABLET ORAL DAILY
Status: DISCONTINUED | OUTPATIENT
Start: 2025-03-24 | End: 2025-04-03 | Stop reason: HOSPADM

## 2025-03-23 RX ORDER — CETIRIZINE HYDROCHLORIDE 10 MG/1
10 TABLET ORAL DAILY
Status: DISCONTINUED | OUTPATIENT
Start: 2025-03-24 | End: 2025-04-03 | Stop reason: HOSPADM

## 2025-03-23 RX ORDER — IOPAMIDOL 612 MG/ML
100 INJECTION, SOLUTION INTRAVASCULAR
Status: COMPLETED | OUTPATIENT
Start: 2025-03-23 | End: 2025-03-23

## 2025-03-23 RX ORDER — POLYETHYLENE GLYCOL 3350 17 G/17G
17 POWDER, FOR SOLUTION ORAL DAILY PRN
Status: DISCONTINUED | OUTPATIENT
Start: 2025-03-23 | End: 2025-04-03 | Stop reason: HOSPADM

## 2025-03-23 RX ADMIN — CEFTRIAXONE 2000 MG: 2 INJECTION, POWDER, FOR SOLUTION INTRAMUSCULAR; INTRAVENOUS at 15:54

## 2025-03-23 RX ADMIN — SODIUM CHLORIDE 1750 MG: 9 INJECTION, SOLUTION INTRAVENOUS at 16:37

## 2025-03-23 RX ADMIN — LATANOPROST 1 DROP: 50 SOLUTION/ DROPS OPHTHALMIC at 22:36

## 2025-03-23 RX ADMIN — IOPAMIDOL 75 ML: 612 INJECTION, SOLUTION INTRAVENOUS at 17:08

## 2025-03-23 NOTE — H&P
HISTORY AND PHYSICAL   [facility]        Date of Admission: 3/23/2025  Patient Identification:  Name: Bill Landry  Age: 79 y.o.  Sex: male  :  1945  MRN: [id number]                     Primary Care Physician: Bharathi De La Torre MD    Chief Complaint:  79 year old gentleman presented to the emergency room with bleeding from his av fistula; he has a history of esrd and last had dialysis two days ago; he has had revisions to the site in the past; he has noted some redness and pain in the area as well; no fever or chills; he has noted some purulent drainage along with bleeding initially reported; no family is present with him    History of Present Illness:   As above    Past Medical History:  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    Aortic stenosis     Bicuspid aortic valve 2022    Calculus of kidney     Chronic leukopenia and thrombocytopenia     Cirrhosis of liver without ascites 2017    Congestive splenomegaly 2017    Crohn disease     with ileostomy    Decreased white blood cell count, unspecified     Diverticula of colon     ESRD on hemodialysis 2018    M-W-F FRESENIUS    Fatty liver disease, nonalcoholic     Fistula 2018    Other Specified Complication    Gastrointestinal hemorrhage, unspecified     GERD (gastroesophageal reflux disease)     GI bleed     Glaucoma     H/O Gallstone pancreatitis     H/O Pericardial effusion     H/O sinus bradycardia     History of hypertension     resolved    History of UTI     Hx of renal calculi     Ileostomy present     Iron deficiency     Leakage of heart valve prosthesis, subsequent encounter     MGUS (monoclonal gammopathy of unknown significance)     TATE (obstructive sleep apnea)     Other hemoglobinopathies      Pericardial effusion (noninflammatory) 1/18/2018    Permanent atrial fibrillation     Scarlet fever, uncomplicated     Stenosis of other vascular prosthetic devices, implants and grafts, initial encounter 02/14/2022    Systemic lupus erythematosus, unspecified     Thrombocytopenia     undpecified    Type 2 diabetes mellitus     CURRENTLY TAKES NO MED    Urinary retention     Post-OP     Past Surgical History:  Past Surgical History:   Procedure Laterality Date    APPENDECTOMY  06/1968    ARTERIOVENOUS FISTULA/SHUNT SURGERY Left 08/08/2017    Procedure: LEFT BRACHIAL CEPHALIC AV FISTULA FORMATION WITH CEPHALIC VEIN TRANSPOSITION ;  Surgeon: Bill Deal MD;  Location: [facility] OR;  Service:     ARTERIOVENOUS FISTULA/SHUNT SURGERY Left 05/27/2022    Procedure: OPEN REVISION LEFT ARTERIOVENOUS INTERPOSITION GRAFT PLACEMENT;  Surgeon: Bill Deal MD;  Location: [facility] OR;  Service: Vascular;  Laterality: Left;    ARTERIOVENOUS FISTULA/SHUNT SURGERY Left 11/19/2024    Procedure: LEFT ARTERIOVENOUS SHUNT GRAFT REVISION;  Surgeon: Bill Deal MD;  Location: [facility] OR;  Service: Vascular;  Laterality: Left;    ARTERIOVENOUS FISTULA/SHUNT SURGERY W/ HEMODIALYSIS CATHETER INSERTION Left 02/15/2022    Procedure: OPEN LEFT ARM ARTERIOVENOUS FISTULA THROMBECTOMY WITH CEPHALIC VEIN ARTHROPLASTY AND STENT/GRAFT PLACEMENT;  Surgeon: Bill Deal MD;  Location: [facility] OR 18/19;  Service: Vascular;  Laterality: Left;    CATARACT EXTRACTION WITH INTRAOCULAR LENS IMPLANT Bilateral 2004    CHOLECYSTECTOMY  2011    COLECTOMY PARTIAL / TOTAL      History of inflammatory bowel disease with status post colectomy with ileostomy many years ago in the [facility],  18 YEARS OF AGE    COLON SURGERY      Colon resection with colostomy    COLON SURGERY      COLONOSCOPY  01/2018    CYSTOSCOPY LITHOLAPAXY BLADDER STONE EXTRACTION      ENDOSCOPY  01/16/2015    gastritis    ENDOSCOPY  01/17/2018    Procedure:  "ESOPHAGOGASTRODUODENOSCOPY;  Surgeon: Warner Neville MD;  Location: [facility] ENDOSCOPY;  Service:     ILEOSCOPY  01/16/2015    normal    ILEOSCOPY N/A 01/17/2018    Procedure: ILEOSCOPY;  Surgeon: Warner Neville MD;  Location: [facility] ENDOSCOPY;  Service:     ILEOSTOMY  12/1968    loop ostomy bypass    INCISION AND DRAINAGE ARM Left 10/27/2023    Procedure: LEFT ARM INCISION AND DRAINAGE;  Surgeon: Bill Deal MD;  Location: [facility] MAIN OR;  Service: Vascular;  Laterality: Left;    INSERTION HEMODIALYSIS CATHETER Right 11/13/2024    Procedure: Tunnelled HEMODIALYSIS CATHETER INSERTION;  Surgeon: Ben Barth MD;  Location: [facility] HYBRID OR;  Service: Vascular;  Laterality: Right;    OTHER SURGICAL HISTORY  2009    kidney stones x3    PSEUDOANEURYSM REPAIR Left 10/27/2023    TONSILLECTOMY  1950      Home Meds:  (Not in a hospital admission)      Allergies:  Allergies   Allergen Reactions    Ace Inhibitors Other (See Comments)     RENAL FAILURE/ raised creatinine    Contrast Dye (Echo Or Unknown Ct/Mr) Other (See Comments) and Anaphylaxis     RENAL FAILURE    Iodine Anaphylaxis    Angiotensin Receptor Blockers Swelling    Eliquis [Apixaban] Arrhythmia     BLEEDING ISSUES; PT CANNOT TAKE ANY ANTICOAGULANTS DUE TO LOW PLATELETS EXCEPT ASPIRIN      Filgrastim GI Intolerance and Confusion     Chest pain    Heparin Other (See Comments)     \"It causes me to bleed out\"    Keflex [Cephalexin] Diarrhea     RENAL FAILURE    Other Angioedema     IVP Dye     Immunizations:  Immunization History   Administered Date(s) Administered    COVID-19 (PFIZER) Purple Cap Monovalent 02/09/2021, 03/02/2021, 11/02/2021    Covid-19 (Pfizer) Gray Cap Monovalent 07/26/2022    Fluzone High-Dose 65+YRS 09/21/2016, 09/29/2017, 09/03/2018, 09/16/2020    Fluzone High-Dose 65+yrs 09/16/2020, 12/08/2021, 10/03/2022    Hep B, Dialysis 04/11/2018, 06/13/2018    Hepatitis B Adult/Adolescent IM 04/11/2018, 05/09/2018, 06/13/2018, 10/10/2018, " 2021, 2021    Influenza, Unspecified 10/03/2022    Pneumococcal Polysaccharide (PPSV23) 2018    Tdap 2023     Social History:   Social History     Social History Narrative    Not on file     Social History     Socioeconomic History    Marital status:      Spouse name: Gillian    Number of children: 2    Years of education: College   Tobacco Use    Smoking status: Never     Passive exposure: Never    Smokeless tobacco: Never   Vaping Use    Vaping status: Never Used   Substance and Sexual Activity    Alcohol use: No     Comment: caffeine use: none    Drug use: No    Sexual activity: Defer       Family History:  Family History   Problem Relation Age of Onset    Heart failure Mother 78    Hypertension Mother     Heart disease Mother     Hyperlipidemia Mother     Hypertension Father     Other Father 82        MVI    Malig Hyperthermia Neg Hx         Review of Systems  See history of present illness and past medical history.  Patient denies headache, dizziness, syncope, falls, trauma, change in vision, change in hearing, change in taste, changes in weight, changes in appetite, focal weakness, numbness, or paresthesia.  Patient denies chest pain, palpitations, dyspnea, orthopnea, PND, cough, sinus pressure, rhinorrhea, epistaxis, hemoptysis, nausea, vomiting,hematemesis, diarrhea, constipation or hematochezia.  Denies cold or heat intolerance, polydipsia, polyuria, polyphagia. Denies hematuria, pyuria, dysuria, hesitancy, frequency or urgency. Denies consumption of raw and under cooked meats foods or change in water source.  Denies fever, chills, sweats, night sweats.  Denies missing any routine medications. Remainder of ROS is negative.    Objective:  T Max 24 hrs: Temp (24hrs), Av.1 °F (36.2 °C), Min:97.1 °F (36.2 °C), Max:97.1 °F (36.2 °C)    Vitals Ranges:   Temp:  [97.1 °F (36.2 °C)] 97.1 °F (36.2 °C)  Heart Rate:  [77-87] 85  Resp:  [18] 18  BP: (128-141)/(65-86)  128/69      Exam:  /69   Pulse 85   Temp 97.1 °F (36.2 °C)   Resp 18   Wt 85 kg (187 lb 6.4 oz)   SpO2 94%   BMI 26.89 kg/m²     General Appearance:    Alert, cooperative, no distress, appears stated age   Head:    Normocephalic, without obvious abnormality, atraumatic   Eyes:    PERRL, conjunctivae/corneas clear, EOM's intact, both eyes   Ears:    Normal external ear canals, both ears   Nose:   Nares normal, septum midline, mucosa normal, no drainage    or sinus tenderness   Throat:   Lips, mucosa, and tongue normal   Neck:   Supple, symmetrical, trachea midline, no adenopathy;     thyroid:  no enlargement/tenderness/nodules; no carotid    bruit or JVD   Back:     Symmetric, no curvature, ROM normal, no CVA tenderness   Lungs:    Decreased breath sounds bilaterally, respirations unlabored   Chest Wall:    No tenderness or deformity    Heart:    Regular rate and rhythm, S1 and S2 normal, no murmur, rub   or gallop   Abdomen:     Soft, nontender, bowel sounds active all four quadrants,     no masses, no hepatomegaly, no splenomegaly   Extremities:   Extremities normal, atraumatic, no cyanosis or edema      .    Data Review:  Labs in chart were reviewed.  WBC   Date Value Ref Range Status   03/23/2025 4.21 3.40 - 10.80 10*3/mm3 Final     Hemoglobin   Date Value Ref Range Status   03/23/2025 9.6 (L) 13.0 - 17.7 g/dL Final     Hematocrit   Date Value Ref Range Status   03/23/2025 30.4 (L) 37.5 - 51.0 % Final     Platelets   Date Value Ref Range Status   03/23/2025 59 (L) 140 - 450 10*3/mm3 Final     Sodium   Date Value Ref Range Status   03/23/2025 129 (L) 136 - 145 mmol/L Final     Potassium   Date Value Ref Range Status   03/23/2025 4.3 3.5 - 5.2 mmol/L Final     Comment:     Slight hemolysis detected by analyzer. Result may be falsely elevated.     Chloride   Date Value Ref Range Status   03/23/2025 93 (L) 98 - 107 mmol/L Final     CO2   Date Value Ref Range Status   03/23/2025 22.2 22.0 - 29.0 mmol/L  Final     BUN   Date Value Ref Range Status   03/23/2025 46 (H) 8 - 23 mg/dL Final     Creatinine   Date Value Ref Range Status   03/23/2025 6.03 (H) 0.76 - 1.27 mg/dL Final     Glucose   Date Value Ref Range Status   03/23/2025 171 (H) 65 - 99 mg/dL Final     Calcium   Date Value Ref Range Status   03/23/2025 7.8 (L) 8.6 - 10.5 mg/dL Final                Imaging Results (All)       Procedure Component Value Units Date/Time    CT Upper Extremity Left With Contrast [[id number]] Collected: 03/23/25 1754     Updated: 03/23/25 1819    Narrative:      CT UPPER EXTREMITY LEFT W CONTRAST-     HISTORY: Clinical concern for infected left upper extremity fistula.     TECHNIQUE: Radiation dose reduction techniques were utilized, including  automated exposure control and exposure modulation based on body size. 2  mm images were obtained through the left upper arm after the  administration of IV contrast.     COMPARISON: CT chest 2/21/2015        FINDINGS: Patent visualized left axillary and brachial arteries.  Proximalmost left axillary artery is outside the exam field-of-view. 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 Proximal  most portions of the fistulas are outside the exam field-of-view. One is  thrombosed with a partially visualized stent proximally (series 3/image  47). This fistula appears ligated distally and aneurysmal distally  measuring up to 4 cm (series 6/image 17). The second fistula extends  from the left axilla to the left elbow and is patent where seen. The  proximal most portion of the fistula is outside the exam field-of-view.  A 3.6 x 2.3 cm focus of subcutaneous fluid density in the inner mid left  upper arm extends from the thickened skin surface to abut the fistula  (series 3/image 93). No enhancing wall or internal locules of air. No  soft tissue air/gas. Normal mostly visualized left humerus. Advanced  left acromioclavicular osteoarthritis. Nonaggressive appearing sharply  demarcated left  scapular lytic focus interposed between the left glenoid  and coracoid process (series 2/image 28). This was present in 2015 and  similarly sized, although less well-defined. 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   Proximal most portions of the fistulas are outside the exam  field-of-view. A 3.6 x 2.3 cm focus of subcutaneous fluid density in the  inner mid left upper arm extends from the thickened skin surface to abut  the patent fistula. No enhancing wall or internal locules of air.  Recommend clinical correlation for findings to suggest infection at this  site. In the absence of recent surgery, fluid collection raises concern  for infection. The other thrombosed fistula is stented proximally and  appears ligated distally. The distal aspect of this fistula is  aneurysmal measuring up to 4 cm.     This report was finalized on 3/23/2025 6:16 PM by Dr. Bill Minaya M.D on Workstation: [id number]       XR Chest 1 View [[id number]] Collected: 03/23/25 1744     Updated: 03/23/25 1749    Narrative:      CXR ONE VIEW      HISTORY: Orthopnea; L03.114-Cellulitis of left upper limb;  L02.414-Cutaneous abscess of left upper limb; T82.9XXA-Unspecified  complication of cardiac and vascular prosthetic device, implant and  graft, initial encounter; N18.6-End stage renal disease;  Z99.2-Dependence on renal dialysis; A41.9-Sepsis, unspecified organism;  E87.0-Njyt-shwgtygviq and hyponatremia     COMPARISON: Chest x-ray 1/30/2025     TECHNIQUE: single portable AP       Impression:         Redemonstrated mild cardiomegaly and marked elevation of the right  hemidiaphragm.     Mild bilateral central vascular congestion and interstitial prominence,  no pneumothorax.     Question small right effusion.     This report was finalized on 3/23/2025 5:45 PM by Dr. Tab Tolbert M.D on Workstation: [id number]                 Assessment:  Active  Hospital Problems    Diagnosis  POA    **Sepsis due to cellulitis [L03.90, A41.9]  Yes      Resolved Hospital Problems   No resolved problems to display.   Esrd on hd  Crohns disease  Cirrhosis  Anemia  Hyperglycemia  Hypertension  Pulmonary hypertension    Plan:  Antibiotics  Monitor on telemetry  Nephrology to see  Trend labs  Id to see  Will need a tunneled catheter for dialysis  Dw patient and ed provider  Patient is full code and spouse is poncho Yan Rogelio Soria MD  3/23/2025  18:38 EDT

## 2025-03-23 NOTE — ED NOTES
Nursing report ED to floor  Bill Landry  79 y.o.  male    HPI :  HPI  Stated Reason for Visit: vascular access problem    Chief Complaint  Chief Complaint   Patient presents with    Vascular Access Problem       Admitting doctor:   Farrah Soria MD    Admitting diagnosis:   The primary encounter diagnosis was Cellulitis of left arm. Diagnoses of Abscess of left arm, Complication of arteriovenous dialysis fistula, initial encounter, ESRD (end stage renal disease) on dialysis, Sepsis, due to unspecified organism, unspecified whether acute organ dysfunction present, and Hyponatremia were also pertinent to this visit.    Code status:   Current Code Status       Date Active Code Status Order ID Comments User Context       3/23/2025 1725 CPR (Attempt to Resuscitate) [id number]  Farrah Soria MD ED        Question Answer    Code Status (Patient has no pulse and is not breathing) CPR (Attempt to Resuscitate)    Medical Interventions (Patient has pulse or is breathing) Full                    Allergies:   Ace inhibitors, Contrast dye (echo or unknown ct/mr), Iodine, Angiotensin receptor blockers, Eliquis [apixaban], Filgrastim, Heparin, Keflex [cephalexin], and Other    Isolation:   No active isolations    Intake and Output    Intake/Output Summary (Last 24 hours) at 3/23/2025 1733  Last data filed at 3/23/2025 1726  Gross per 24 hour   Intake 100 ml   Output 200 ml   Net -100 ml       Weight:       03/23/25  1514   Weight: 85 kg (187 lb 6.4 oz)       Most recent vitals:   Vitals:    03/23/25 1533 03/23/25 1600 03/23/25 1632 03/23/25 1726   BP:  128/69 139/68    BP Location:       Patient Position:       Pulse: 87 82 84 84   Resp:       Temp:       SpO2: 94% 95% 94% 95%   Weight:           Active LDAs/IV Access:   Lines, Drains & Airways       Active LDAs       Name Placement date Placement time Site Days    Peripheral IV 03/23/25 1527 Right Antecubital 03/23/25  1527  Antecubital  less than 1     Ileostomy LLQ --  present upon arrival to [facility]  --  LLQ  --                    Labs (abnormal labs have a star):   Labs Reviewed   COMPREHENSIVE METABOLIC PANEL - Abnormal; Notable for the following components:       Result Value    Glucose 171 (*)     BUN 46 (*)     Creatinine 6.03 (*)     Sodium 129 (*)     Chloride 93 (*)     Calcium 7.8 (*)     Albumin 2.6 (*)     AST (SGOT) 43 (*)     Alkaline Phosphatase 283 (*)     eGFR 8.9 (*)     All other components within normal limits    Narrative:     GFR Categories in Chronic Kidney Disease (CKD)      GFR Category          GFR (mL/min/1.73)    Interpretation  G1                     90 or greater         Normal or high (1)  G2                      60-89                Mild decrease (1)  G3a                   45-59                Mild to moderate decrease  G3b                   30-44                Moderate to severe decrease  G4                    15-29                Severe decrease  G5                    14 or less           Kidney failure          (1)In the absence of evidence of kidney disease, neither GFR category G1 or G2 fulfill the criteria for CKD.    eGFR calculation 2021 CKD-EPI creatinine equation, which does not include race as a factor   PROTIME-INR - Abnormal; Notable for the following components:    Protime 16.7 (*)     INR 1.36 (*)     All other components within normal limits   LACTIC ACID, PLASMA - Abnormal; Notable for the following components:    Lactate 3.6 (*)     All other components within normal limits   PROCALCITONIN - Abnormal; Notable for the following components:    Procalcitonin 1.20 (*)     All other components within normal limits    Narrative:     As a Marker for Sepsis (Non-Neonates):    1. <0.5 ng/mL represents a low risk of severe sepsis and/or septic shock.  2. >2 ng/mL represents a high risk of severe sepsis and/or septic shock.    As a Marker for Lower Respiratory Tract Infections that require antibiotic therapy:    PCT on  "Admission    Antibiotic Therapy       6-12 Hrs later    >0.5                Strongly Recommended  >0.25 - <0.5        Recommended   0.1 - 0.25          Discouraged              Remeasure/reassess PCT  <0.1                Strongly Discouraged     Remeasure/reassess PCT    As 28 day mortality risk marker: \"Change in Procalcitonin Result\" (>80% or <=80%) if Day 0 (or Day 1) and Day 4 values are available. Refer to http://[email or web address]-pct-[email or web address]    Change in PCT <=80%  A decrease of PCT levels below or equal to 80% defines a positive change in PCT test result representing a higher risk for 28-day all-cause mortality of patients diagnosed with severe sepsis for septic shock.    Change in PCT >80%  A decrease of PCT levels of more than 80% defines a negative change in PCT result representing a lower risk for 28-day all-cause mortality of patients diagnosed with severe sepsis or septic shock.      CBC WITH AUTO DIFFERENTIAL - Abnormal; Notable for the following components:    RBC 2.97 (*)     Hemoglobin 9.6 (*)     Hematocrit 30.4 (*)     .4 (*)     RDW-SD 56.9 (*)     Platelets 59 (*)     Neutrophil % 83.5 (*)     Lymphocyte % 7.4 (*)     Immature Grans % 1.2 (*)     Lymphocytes, Absolute 0.31 (*)     All other components within normal limits   APTT - Normal   BLOOD CULTURE   BLOOD CULTURE   WOUND CULTURE   LACTIC ACID, REFLEX   CBC AND DIFFERENTIAL    Narrative:     The following orders were created for panel order CBC & Differential.  Procedure                               Abnormality         Status                     ---------                               -----------         ------                     CBC Auto Differential[[id number]]        Abnormal            Final result                 Please view results for these tests on the individual orders.       EKG:   No orders to display       Meds given in ED:   Medications   sodium chloride 0.9 % flush 10 mL (has no administration in time range) "   vancomycin 1750 mg/500 mL 0.9% NS IVPB ([facility]) (1,750 mg Intravenous New Bag 3/23/25 1637)   acetaminophen (TYLENOL) tablet 650 mg (has no administration in time range)     Or   acetaminophen (TYLENOL) 160 MG/5ML oral solution 650 mg (has no administration in time range)     Or   acetaminophen (TYLENOL) suppository 325 mg (has no administration in time range)   ondansetron ODT (ZOFRAN-ODT) disintegrating tablet 4 mg (has no administration in time range)     Or   ondansetron (ZOFRAN) injection 4 mg (has no administration in time range)   melatonin tablet 2.5 mg (has no administration in time range)   sennosides-docusate (PERICOLACE) 8.6-50 MG per tablet 2 tablet (has no administration in time range)     And   polyethylene glycol (MIRALAX) packet 17 g (has no administration in time range)     And   bisacodyl (DULCOLAX) EC tablet 5 mg (has no administration in time range)     And   bisacodyl (DULCOLAX) suppository 10 mg (has no administration in time range)   cefTRIAXone (ROCEPHIN) 2,000 mg in sodium chloride 0.9 % 100 mL MBP (0 mg Intravenous Stopped 3/23/25 1632)   iopamidol (ISOVUE-300) 61 % injection 100 mL (75 mL Intravenous Given 3/23/25 1708)       Imaging results:  No radiology results for the last day    Ambulatory status:   - assist x1 with walker    Social issues:   Social History     Socioeconomic History    Marital status:      Spouse name: Gillian    Number of children: 2    Years of education: College   Tobacco Use    Smoking status: Never     Passive exposure: Never    Smokeless tobacco: Never   Vaping Use    Vaping status: Never Used   Substance and Sexual Activity    Alcohol use: No     Comment: caffeine use: none    Drug use: No    Sexual activity: Defer       Peripheral Neurovascular  Peripheral Neurovascular (Adult)  Peripheral Neurovascular WDL: WDL    Neuro Cognitive  Neuro Cognitive (Adult)  Cognitive/Neuro/Behavioral WDL: WDL, orientation, speech  Orientation: oriented x 4  Speech:  spontaneous, logical, clear    Learning  Learning Assessment  Learning Readiness and Ability: no barriers identified  Education Provided  Person Taught: patient  Teaching Method: verbal instruction  Teaching Focus: diagnostic test, symptom/problem overview  Education Outcome Evaluation: verbalizes understanding, acceptance expressed    Respiratory  Respiratory WDL  Respiratory WDL: WDL    Abdominal Pain       Pain Assessments  Pain (Adult)  (0-10) Pain Rating: Rest: 0    NIH Stroke Scale       Derek Pickett RN  03/23/25 17:33 EDT

## 2025-03-23 NOTE — CONSULTS
"[facility] Vascular Surgery  Consult Note    Patient Name: Bill Landry  : 1945  MRN: [id number]  Primary Care Physician:  Bharathi De La Torre MD  Referring Physician: No ref. provider found  Date of admission: 3/23/2025    Inpatient Vascular Surgery Consult  Consult performed by: Boom Olivares II, MD  Consult ordered by: Cooper Ureña MD        Subjective   Subjective     Reason for Consult/ Chief Complaint: Left arm bleeding, concern for graft infection    History of Present Illness  Bill Landry is a 79 y.o. male with end-stage renal disease on dialysis, cirrhosis, atrial fibrillation not on anticoagulation due to history of GI bleed and thrombocytopenia, with multiple left arm dialysis access interventions over the last several months including jump graft placement from cephalic vein to axillary vein on 2024, and fistulogram in 2024 and February of this year.  Patient's wife called the office this morning as they noticed some bleeding from the proximal upper arm this morning.  I instructed her to hold pressure for 15 minutes which she did, however it persisted with a \"slow dribble\" of bleeding.  When she called back she was instructed to come to the ER.  The patient is here in the ER with his son.  On evaluation there is some erythema around the proximal most incision from his jump graft with some serosanguineous drainage from this incision.  The graft has been working well for dialysis and he last had dialysis on Friday.  The patient denies any fevers or chills chest pain difficulty breathing headaches nausea vomiting or any other symptoms.  They report he was treated for cellulitis of his arm earlier this year.      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 arm    Aortic stenosis     Bicuspid aortic valve 07/20/2022    Calculus of kidney     Chronic leukopenia and thrombocytopenia     Cirrhosis of liver without ascites 07/24/2017    Congestive splenomegaly 07/24/2017    Crohn disease     with ileostomy    Decreased white blood cell count, unspecified     Diverticula of colon     ESRD on hemodialysis 04/05/2018    M-W-F FRESENIUS    Fatty liver disease, nonalcoholic     Fistula 04/05/2018    Other Specified Complication    Gastrointestinal hemorrhage, unspecified     GERD (gastroesophageal reflux disease)     GI bleed     Glaucoma     H/O Gallstone pancreatitis     H/O Pericardial effusion     H/O sinus bradycardia     History of hypertension     resolved    History of UTI     Hx of renal calculi     Ileostomy present     Iron deficiency     Leakage of heart valve prosthesis, subsequent encounter     MGUS (monoclonal gammopathy of unknown significance)     TATE (obstructive sleep apnea)     Other hemoglobinopathies     Pericardial effusion (noninflammatory) 1/18/2018    Permanent atrial fibrillation     Scarlet fever, uncomplicated     Stenosis of other vascular prosthetic devices, implants and grafts, initial encounter 02/14/2022    Systemic lupus erythematosus, unspecified     Thrombocytopenia     undpecified    Type 2 diabetes mellitus     CURRENTLY TAKES NO MED    Urinary retention     Post-OP       Past Surgical History:   Procedure Laterality Date    APPENDECTOMY  06/1968    ARTERIOVENOUS FISTULA/SHUNT SURGERY Left 08/08/2017    Procedure: LEFT BRACHIAL CEPHALIC AV FISTULA FORMATION WITH CEPHALIC VEIN TRANSPOSITION ;  Surgeon: Bill Deal MD;  Location: [facility] OR;  Service:     ARTERIOVENOUS FISTULA/SHUNT SURGERY Left 05/27/2022    Procedure: OPEN REVISION LEFT ARTERIOVENOUS INTERPOSITION GRAFT PLACEMENT;  Surgeon: Bill Deal MD;  Location: [facility] OR;  Service: Vascular;  Laterality: Left;    ARTERIOVENOUS FISTULA/SHUNT  SURGERY Left 11/19/2024    Procedure: LEFT ARTERIOVENOUS SHUNT GRAFT REVISION;  Surgeon: Bill Deal MD;  Location: [facility] MAIN OR;  Service: Vascular;  Laterality: Left;    ARTERIOVENOUS FISTULA/SHUNT SURGERY W/ HEMODIALYSIS CATHETER INSERTION Left 02/15/2022    Procedure: OPEN LEFT ARM ARTERIOVENOUS FISTULA THROMBECTOMY WITH CEPHALIC VEIN ARTHROPLASTY AND STENT/GRAFT PLACEMENT;  Surgeon: Bill Deal MD;  Location: [facility] OR 18/19;  Service: Vascular;  Laterality: Left;    CATARACT EXTRACTION WITH INTRAOCULAR LENS IMPLANT Bilateral 2004    CHOLECYSTECTOMY  2011    COLECTOMY PARTIAL / TOTAL      History of inflammatory bowel disease with status post colectomy with ileostomy many years ago in the [facility],  18 YEARS OF AGE    COLON SURGERY      Colon resection with colostomy    COLON SURGERY      COLONOSCOPY  01/2018    CYSTOSCOPY LITHOLAPAXY BLADDER STONE EXTRACTION      ENDOSCOPY  01/16/2015    gastritis    ENDOSCOPY  01/17/2018    Procedure: ESOPHAGOGASTRODUODENOSCOPY;  Surgeon: Warner Neville MD;  Location: [facility] ENDOSCOPY;  Service:     ILEOSCOPY  01/16/2015    normal    ILEOSCOPY N/A 01/17/2018    Procedure: ILEOSCOPY;  Surgeon: Warner Neville MD;  Location: [facility] ENDOSCOPY;  Service:     ILEOSTOMY  12/1968    loop ostomy bypass    INCISION AND DRAINAGE ARM Left 10/27/2023    Procedure: LEFT ARM INCISION AND DRAINAGE;  Surgeon: Bill Deal MD;  Location: [facility] OR;  Service: Vascular;  Laterality: Left;    INSERTION HEMODIALYSIS CATHETER Right 11/13/2024    Procedure: Tunnelled HEMODIALYSIS CATHETER INSERTION;  Surgeon: Ben Barth MD;  Location: [facility] OR;  Service: Vascular;  Laterality: Right;    OTHER SURGICAL HISTORY  2009    kidney stones x3    PSEUDOANEURYSM REPAIR Left 10/27/2023    TONSILLECTOMY  1950       Family History: His family history includes Heart disease in his mother; Heart failure (age of onset: 78) in his mother; Hyperlipidemia in  his mother; Hypertension in his father and mother; Other (age of onset: 82) in his father.     Social History: He  reports that he has never smoked. He has never been exposed to tobacco smoke. He has never used smokeless tobacco. He reports that he does not drink alcohol and does not use drugs.    Home Medications:  Iron Sucrose, Loratadine, Methoxy PEG-Epoetin Beta, Umm-Peter, Sucroferric Oxyhydroxide, acetaminophen, alfuzosin, aspirin, cholecalciferol, famotidine, latanoprost, lidocaine-prilocaine, midodrine, mupirocin, polyethylene glycol, sennosides-docusate, and sodium chloride    Allergies:  He is allergic to ace inhibitors, contrast dye (echo or unknown ct/mr), iodine, angiotensin receptor blockers, eliquis [apixaban], filgrastim, heparin, keflex [cephalexin], and other.    Objective    Objective     Vitals:    Temp:  [97.1 °F (36.2 °C)] 97.1 °F (36.2 °C)  Heart Rate:  [83-87] 87  Resp:  [18] 18  BP: (141)/(86) 141/86  Output by Drain (mL) 03/22/25 0701 - 03/22/25 1900 03/22/25 1901 - 03/23/25 0700 03/23/25 0701 - 03/23/25 1537 Range Total   Requested LDAs do not have output data documented.       Physical Exam  Nad  Respiratoins unlabored  L arm AV fistula/graft with thrill.   Incision in the medial aspect of his proximal upper arm with some scant serosanguineous oozing.  There is some erythema of the skin around this with minimal induration or hyperemia.  It is nontender.    Result Review    Result Review:  I have personally reviewed the results from the time of this admission to 3/23/2025 15:37 EDT and agree with these findings:  [x]  Laboratory list / accordion  [x]  Microbiology  []  Radiology  []  EKG/Telemetry   []  Cardiology/Vascular   []  Pathology  [x]  Old records  []  Other:  Most notable findings include: no leukocytosis, hgb 9.6, Platelets 59k. Chemistry pending.       CBC    Results from last 7 days   Lab Units 03/23/25  1527 03/19/25  0000   WBC 10*3/mm3 4.21  --    HEMOGLOBIN g/dL 9.6* 9.8*  " 29.4*   PLATELETS 10*3/mm3 59*  --      BMP   Results from last 7 days   Lab Units 03/17/25  0000   BUN mg/dL 72*     Cr Clearance CrCl cannot be calculated (Patient's most recent lab result is older than the maximum 30 days allowed.).  Coag     HbA1C   Lab Results   Component Value Date    HGBA1C 5.3 02/03/2025    HGBA1C 4.80 01/26/2025    HGBA1C 5.40 09/17/2024     Blood Glucose No results found for: \"POCGLU\"  Infection     CMP   Results from last 7 days   Lab Units 03/17/25  0000   BUN mg/dL 72*     ABG      UA      JODY  No results found for: \"POCMETH\", \"POCAMPHET\", \"POCBARBITUR\", \"POCBENZO\", \"POCCOCAINE\", \"POCOPIATES\", \"POCOXYCODO\", \"POCPHENCYC\", \"POCPROPOXY\", \"POCTHC\", \"POCTRICYC\"  Lysis Labs   Results from last 7 days   Lab Units 03/23/25  1527 03/19/25  0000   HEMOGLOBIN g/dL 9.6* 9.8*  29.4*   PLATELETS 10*3/mm3 59*  --      Radiology(recent) No radiology results for the last day    Assessment & Plan   Assessment / Plan     Brief Patient Summary:  Bill Landry is a 79 y.o. male with some redness and drainage from incision from AV graft revision concerning for infection.  Also noted to have anemia and thrombocytopenia.  Admission to medicine pending.  Broad-spectrum antibiotics ordered by the ER.  Will check CT of the left arm with contrast.  Patient has documented contrast allergy but that is from his previous chronic kidney disease. He does not really have an anaphylacic reaction to contrast according to patient.    Discussed with the patient that he may need this graft excised and new dialysis access.  We discussed potentially doing this as soon as tomorrow with one of my partners.  The patient states he would strongly prefer to wait until Dr. Deal is available to do the procedure which would likely be Tuesday at the earliest.    Active Hospital Problems:  There are no active hospital problems to display for this patient.      Plan:   Continue antibiotics  Will follow up cultures  CT L arm with " contrast  Suspect he will need graft excision with TDC placement this admission.       VTE Prophylaxis:  No VTE prophylaxis order currently exists.         MD Boom Eller II, II, MD  03/23/25  15:37 EDT  Office Number [phone]    [facility] Vascular Surgery

## 2025-03-23 NOTE — ED PROVIDER NOTES
EMERGENCY DEPARTMENT ENCOUNTER  Room Number:  36/36  PCP: Bharathi De La Torre MD  Independent Historians: Patient and son      HPI:  Chief Complaint: Wound drainage left arm    A complete HPI/ROS/PMH/PSH/SH/FH are unobtainable due to: None    Chronic or social conditions impacting patient care (Social Determinants of Health): None      Context: Bill Landry is a 79 y.o. male with a medical history of permanent atrial fibrillation, hepatic cirrhosis, Crohn's disease, ESRD requiring dialysis, and anemia who presents to the ED c/o acute bloody drainage from prior surgical site left medial upper arm near AV fistula.  Patient reports he had surgical intervention of his AV fistula that required stenting and that the incision just distal to his axilla was one of the locations where they incised.  Over the last month he is noted a small growing red area that became tender recently.  Today it burst open began to have purulent bloody drainage.  No reported fevers.  Minimal discomfort.      Review of prior external notes (non-ED) -and- Review of prior external test results outside of this encounter:  Discharge summary 11/21/2024 by vascular surgery reviewed: Patient admitted and treated for AV fistula occlusion secondary to AV graft thrombosis.      PAST MEDICAL HISTORY  Active Ambulatory Problems     Diagnosis Date Noted    Permanent atrial fibrillation 05/02/2016    Thrombocytopenia 05/02/2016    Chronic leukopenia 05/02/2016    Cirrhosis of liver without ascites 07/24/2017    Congestive splenomegaly 07/24/2017    Anemia due to stage 4 chronic kidney disease treated with erythropoietin 10/04/2017    Esophageal abnormality 01/18/2018    ESRD on hemodialysis 03/21/2019    Other specified glaucoma 05/21/2019    Arteriovenous fistula for hemodialysis in place, secondary 09/03/2020    Crohn's disease, unspecified, without complications 01/18/2018    Deficiency of other specified B group vitamins 01/19/2018    Dermatitis, unspecified  01/18/2018    Encounter for immunization 02/19/2018    History of urinary stone 01/18/2018    Hyperparathyroidism, unspecified 01/19/2018    Other disorders of phosphorus metabolism 02/11/2021    Other iron deficiency anemias 02/10/2018    Pure hypertriglyceridemia 01/18/2018    Renal osteodystrophy 01/18/2018    Secondary hyperparathyroidism of renal origin 01/18/2018    Splenomegaly, not elsewhere classified 01/18/2018    Bilateral pseudophakia 05/05/2021    Dermatochalasis of eyelid 05/05/2021    Primary open-angle glaucoma, bilateral, moderate stage 05/05/2021    Puckering of macula, left eye 05/05/2021    Secondary cataract 05/05/2021    AV fistula occlusion, initial encounter 02/15/2022    TATE (obstructive sleep apnea)     Aneurysm artery, 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    Nonrheumatic aortic valve stenosis 07/20/2022    Bicuspid aortic valve 07/20/2022    Hyperuricemia without signs of inflammatory arthritis and tophaceous disease 08/03/2022    Presbyopia 05/13/2021    Impaired glucose tolerance 05/12/2023    Hypofibrinogenemia 10/29/2023    Pulmonary hypertension     Skin change 04/08/2024    ESRD on dialysis 11/13/2024    Problem with vascular access 11/13/2024    AV graft thrombosis, initial encounter 11/19/2024    AV shunt malfunction, initial encounter 12/12/2024    Left arm swelling 12/12/2024    Acetabular fracture 01/25/2025    Anemia 01/25/2025    Cirrhosis of liver 01/25/2025    Atrial fibrillation 01/25/2025    Fall 01/25/2025    Ileostomy present 01/25/2025    Weakness 01/25/2025    Hypertension 01/25/2025    Pain in left hip 01/25/2025    Acute pain due to trauma 01/25/2025    Transaminitis 01/25/2025    Hypoxemia 01/25/2025    Hyperglycemia 01/25/2025     Resolved Ambulatory Problems     Diagnosis Date Noted    Anemia 01/12/2018    Pneumonia of both lungs due to infectious organism 01/19/2018    At risk for fluid volume overload 03/21/2019    Allergy, unspecified, initial encounter 10/09/2020     Anaphylactic shock, unspecified, initial encounter 10/09/2020    Essential (primary) hypertension 01/18/2018    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, with stage 1 through stage 4 chronic kidney disease, or unspecified chronic kidney disease 10/18/2019    Moderate protein-calorie malnutrition 12/16/2020    Pericardial effusion (noninflammatory) 01/18/2018    Sleep apnea, unspecified 01/18/2018    Unspecified atrial fibrillation 01/19/2018    Shortness of breath 02/19/2022    Acute hypoxemic respiratory failure due to COVID-19 05/11/2023    Acute on chronic diastolic heart failure 05/11/2023    Acute cough 05/17/2023    Bleeding 10/29/2023     Past Medical History:   Diagnosis Date    Abnormal serum protein electrophoresis     Acquired absence of other specified parts of digestive tract     Anemia in chronic kidney disease     Aneurysm of artery of arm     Aortic stenosis     Calculus of kidney     Crohn disease     Decreased white blood cell count, unspecified     Diverticula of colon     Fatty liver disease, nonalcoholic     Fistula 04/05/2018    Gastrointestinal hemorrhage, unspecified     GERD (gastroesophageal reflux disease)     GI bleed     Glaucoma     H/O Gallstone pancreatitis     H/O Pericardial effusion     H/O sinus bradycardia     History of hypertension     History of UTI     Hx of renal calculi     Iron deficiency     Leakage of heart valve prosthesis, subsequent encounter     MGUS (monoclonal gammopathy of unknown significance)     Other hemoglobinopathies     Scarlet fever, uncomplicated     Stenosis of other vascular prosthetic devices, implants and grafts, initial encounter 02/14/2022    Systemic lupus erythematosus, unspecified     Type 2 diabetes mellitus     Urinary retention          PAST SURGICAL HISTORY  Past Surgical History:   Procedure Laterality Date    APPENDECTOMY  06/1968    ARTERIOVENOUS FISTULA/SHUNT SURGERY Left 08/08/2017    Procedure: LEFT BRACHIAL  CEPHALIC AV FISTULA FORMATION WITH CEPHALIC VEIN TRANSPOSITION ;  Surgeon: Bill Deal MD;  Location: [facility] OR;  Service:     ARTERIOVENOUS FISTULA/SHUNT SURGERY Left 05/27/2022    Procedure: OPEN REVISION LEFT ARTERIOVENOUS INTERPOSITION GRAFT PLACEMENT;  Surgeon: Bill Deal MD;  Location: [facility] MAIN OR;  Service: Vascular;  Laterality: Left;    ARTERIOVENOUS FISTULA/SHUNT SURGERY Left 11/19/2024    Procedure: LEFT ARTERIOVENOUS SHUNT GRAFT REVISION;  Surgeon: Bill Deal MD;  Location: [facility] MAIN OR;  Service: Vascular;  Laterality: Left;    ARTERIOVENOUS FISTULA/SHUNT SURGERY W/ HEMODIALYSIS CATHETER INSERTION Left 02/15/2022    Procedure: OPEN LEFT ARM ARTERIOVENOUS FISTULA THROMBECTOMY WITH CEPHALIC VEIN ARTHROPLASTY AND STENT/GRAFT PLACEMENT;  Surgeon: Bill Deal MD;  Location: [facility] OR 18/19;  Service: Vascular;  Laterality: Left;    CATARACT EXTRACTION WITH INTRAOCULAR LENS IMPLANT Bilateral 2004    CHOLECYSTECTOMY  2011    COLECTOMY PARTIAL / TOTAL      History of inflammatory bowel disease with status post colectomy with ileostomy many years ago in the [facility],  18 YEARS OF AGE    COLON SURGERY      Colon resection with colostomy    COLON SURGERY      COLONOSCOPY  01/2018    CYSTOSCOPY LITHOLAPAXY BLADDER STONE EXTRACTION      ENDOSCOPY  01/16/2015    gastritis    ENDOSCOPY  01/17/2018    Procedure: ESOPHAGOGASTRODUODENOSCOPY;  Surgeon: Warner Neville MD;  Location: [facility] ENDOSCOPY;  Service:     ILEOSCOPY  01/16/2015    normal    ILEOSCOPY N/A 01/17/2018    Procedure: ILEOSCOPY;  Surgeon: Warner Neville MD;  Location: [facility] ENDOSCOPY;  Service:     ILEOSTOMY  12/1968    loop ostomy bypass    INCISION AND DRAINAGE ARM Left 10/27/2023    Procedure: LEFT ARM INCISION AND DRAINAGE;  Surgeon: Bill Deal MD;  Location: [facility] MAIN OR;  Service: Vascular;  Laterality: Left;    INSERTION HEMODIALYSIS CATHETER Right 11/13/2024    Procedure: Tunnelled  HEMODIALYSIS CATHETER INSERTION;  Surgeon: Ben Barth MD;  Location: [facility] OR;  Service: Vascular;  Laterality: Right;    OTHER SURGICAL HISTORY  2009    kidney stones x3    PSEUDOANEURYSM REPAIR Left 10/27/2023    TONSILLECTOMY  1950         FAMILY HISTORY  Family History   Problem Relation Age of Onset    Heart failure Mother 78    Hypertension Mother     Heart disease Mother     Hyperlipidemia Mother     Hypertension Father     Other Father 82        MVI    Malig Hyperthermia Neg Hx          SOCIAL HISTORY  Social History     Socioeconomic History    Marital status:      Spouse name: Gillian    Number of children: 2    Years of education: College   Tobacco Use    Smoking status: Never     Passive exposure: Never    Smokeless tobacco: Never   Vaping Use    Vaping status: Never Used   Substance and Sexual Activity    Alcohol use: No     Comment: caffeine use: none    Drug use: No    Sexual activity: Defer         ALLERGIES  Ace inhibitors, Contrast dye (echo or unknown ct/mr), Iodine, Angiotensin receptor blockers, Eliquis [apixaban], Filgrastim, Heparin, Keflex [cephalexin], and Other      REVIEW OF SYSTEMS  Review of Systems  Included in HPI  All systems reviewed and negative except for those discussed in HPI.      PHYSICAL EXAM    I have reviewed the triage vital signs and nursing notes.    ED Triage Vitals   Temp Pulse Resp BP SpO2   -- -- -- -- --      Temp src Heart Rate Source Patient Position BP Location FiO2 (%)   -- -- -- -- --       Physical Exam    Physical Exam   Constitutional: No distress.  Nontoxic though moderately chronically ill-appearing  HENT:  Head: Normocephalic and atraumatic.   Oropharynx: Mucous membranes are moist.   Eyes: . No scleral icterus. No conjunctival pallor.  Neck: Normal range of motion. Neck supple.   Cardiovascular: Pink warm and well perfused throughout.    Pulmonary/Chest: No respiratory distress.  No tachypnea or increased work of breathing  appreciated.    Abdominal: Soft. There is no tenderness. There is no rebound and no guarding.   Musculoskeletal: Moves all extremities equally.  There is a cellulitic indurated area overlying incision left proximal medial upper arm with central fluctuance and purulent drainage.  AV fistula with good thrill overlying bicep  Neurological: Alert and oriented.  No acute focal deficit appreciated.  Skin: Skin is pink, warm, and dry.   Psychiatric: Mood and affect normal.   Nursing note and vitals reviewed.             LAB RESULTS  Recent Results (from the past 24 hours)   Comprehensive Metabolic Panel    Collection Time: 03/23/25  3:27 PM    Specimen: Blood   Result Value Ref Range    Glucose 171 (H) 65 - 99 mg/dL    BUN 46 (H) 8 - 23 mg/dL    Creatinine 6.03 (H) 0.76 - 1.27 mg/dL    Sodium 129 (L) 136 - 145 mmol/L    Potassium 4.3 3.5 - 5.2 mmol/L    Chloride 93 (L) 98 - 107 mmol/L    CO2 22.2 22.0 - 29.0 mmol/L    Calcium 7.8 (L) 8.6 - 10.5 mg/dL    Total Protein 7.1 6.0 - 8.5 g/dL    Albumin 2.6 (L) 3.5 - 5.2 g/dL    ALT (SGPT) 18 1 - 41 U/L    AST (SGOT) 43 (H) 1 - 40 U/L    Alkaline Phosphatase 283 (H) 39 - 117 U/L    Total Bilirubin 1.2 0.0 - 1.2 mg/dL    Globulin 4.5 gm/dL    A/G Ratio 0.6 g/dL    BUN/Creatinine Ratio 7.6 7.0 - 25.0    Anion Gap 13.8 5.0 - 15.0 mmol/L    eGFR 8.9 (L) >60.0 mL/min/1.73   Protime-INR    Collection Time: 03/23/25  3:27 PM    Specimen: Blood   Result Value Ref Range    Protime 16.7 (H) 11.7 - 14.2 Seconds    INR 1.36 (H) 0.90 - 1.10   aPTT    Collection Time: 03/23/25  3:27 PM    Specimen: Blood   Result Value Ref Range    PTT 26.7 22.7 - 35.4 seconds   Lactic Acid, Plasma    Collection Time: 03/23/25  3:27 PM    Specimen: Blood   Result Value Ref Range    Lactate 3.6 (C) 0.5 - 2.0 mmol/L   Procalcitonin    Collection Time: 03/23/25  3:27 PM    Specimen: Blood   Result Value Ref Range    Procalcitonin 1.20 (H) 0.00 - 0.25 ng/mL   CBC Auto Differential    Collection Time: 03/23/25   3:27 PM    Specimen: Blood   Result Value Ref Range    WBC 4.21 3.40 - 10.80 10*3/mm3    RBC 2.97 (L) 4.14 - 5.80 10*6/mm3    Hemoglobin 9.6 (L) 13.0 - 17.7 g/dL    Hematocrit 30.4 (L) 37.5 - 51.0 %    .4 (H) 79.0 - 97.0 fL    MCH 32.3 26.6 - 33.0 pg    MCHC 31.6 31.5 - 35.7 g/dL    RDW 15.1 12.3 - 15.4 %    RDW-SD 56.9 (H) 37.0 - 54.0 fl    MPV 9.5 6.0 - 12.0 fL    Platelets 59 (L) 140 - 450 10*3/mm3    Neutrophil % 83.5 (H) 42.7 - 76.0 %    Lymphocyte % 7.4 (L) 19.6 - 45.3 %    Monocyte % 6.7 5.0 - 12.0 %    Eosinophil % 1.0 0.3 - 6.2 %    Basophil % 0.2 0.0 - 1.5 %    Immature Grans % 1.2 (H) 0.0 - 0.5 %    Neutrophils, Absolute 3.52 1.70 - 7.00 10*3/mm3    Lymphocytes, Absolute 0.31 (L) 0.70 - 3.10 10*3/mm3    Monocytes, Absolute 0.28 0.10 - 0.90 10*3/mm3    Eosinophils, Absolute 0.04 0.00 - 0.40 10*3/mm3    Basophils, Absolute 0.01 0.00 - 0.20 10*3/mm3    Immature Grans, Absolute 0.05 0.00 - 0.05 10*3/mm3    nRBC 0.0 0.0 - 0.2 /100 WBC         RADIOLOGY  No Radiology Exams Resulted Within Past 24 Hours      MEDICATIONS GIVEN IN ER  Medications   sodium chloride 0.9 % flush 10 mL (has no administration in time range)   vancomycin 1750 mg/500 mL 0.9% NS IVPB ([facility]) (1,750 mg Intravenous New Bag 3/23/25 1637)   cefTRIAXone (ROCEPHIN) 2,000 mg in sodium chloride 0.9 % 100 mL MBP (0 mg Intravenous Stopped 3/23/25 1632)   iopamidol (ISOVUE-300) 61 % injection 100 mL (75 mL Intravenous Given 3/23/25 9258)         ORDERS PLACED DURING THIS VISIT:  Orders Placed This Encounter   Procedures    Blood Culture - Blood,    Blood Culture - Blood,    Wound Culture - Swab, Arm, Left    CT Upper Extremity Left With Contrast    XR Chest 1 View    Comprehensive Metabolic Panel    Protime-INR    aPTT    Lactic Acid, Plasma    Procalcitonin    CBC Auto Differential    STAT Lactic Acid, Reflex    Monitor Blood Pressure    Vascular Surgery Consult    [facility] (on-call MD unless specified) Details    Nephrology (on -call MD unless  specified)    Insert Peripheral IV    Inpatient Admission    CBC & Differential         OUTPATIENT MEDICATION MANAGEMENT:  Current Facility-Administered Medications Ordered in Epic   Medication Dose Route Frequency Provider Last Rate Last Admin    sodium chloride 0.9 % flush 10 mL  10 mL Intravenous PRN Cooper Ureña MD        vancomycin 1750 mg/500 mL 0.9% NS IVPB ([facility])  20 mg/kg Intravenous Once Cooper Ureña MD   1,750 mg at 03/23/25 1637     Current Outpatient Medications Ordered in Epic   Medication Sig Dispense Refill    acetaminophen (TYLENOL) 325 MG tablet Take 2 tablets by mouth Every 4 (Four) Hours As Needed for Mild Pain.      alfuzosin (UROXATRAL) 10 MG 24 hr tablet Take 1 tablet by mouth Every Other Day. Does not take on Sundays Tuesdays or Thursdays   Indications: Benign Enlargement of Prostate      aspirin 81 MG tablet Take 1 tablet by mouth Every Night. INSTRUCTED TO CONTINUE THIS FOR SURGERY PER DR. BERMEO'S OFFICE  Indications: Disease involving Lipid Deposits in the Arteries      B Complex-C-Folic Acid (Umm-Peter) tablet Take 1 tablet by mouth Daily.      Cholecalciferol 25 MCG (1000 UT) tablet Take 1 tablet by mouth Daily. Indications: Vitamin D Deficiency      famotidine (Pepcid AC) 10 MG tablet Take 1 tablet by mouth Daily As Needed for Heartburn (take as needed after HD on dialysis days). Indications: Heartburn      Iron Sucrose (VENOFER IV) Infuse 1 dose into a venous catheter As Needed.      latanoprost (XALATAN) 0.005 % ophthalmic solution Administer 1 drop to both eyes Every Night. Indications: Wide-Angle Glaucoma      lidocaine-prilocaine (EMLA) 2.5-2.5 % cream Apply 1 Application topically to the appropriate area as directed As Needed. Qmjxxm-[id number]-Zspoog:  ONE HOUR PRIOR TO DIALYSIS  Indications: Anesthesia to a Specific Part of the Body  3    Loratadine 10 MG capsule Take 1 capsule by mouth Daily. Indications: Hayfever      Methoxy PEG-Epoetin Beta (MIRCERA IJ) Inject 1 dose as  directed Every 30 (Thirty) Days.      midodrine (PROAMATINE) 2.5 MG tablet Take 1 tablet by mouth 3 (Three) Times a Week. Monday, Wednesday, Friday on days of hemodialysis      mupirocin (BACTROBAN) 2 % ointment Apply 1 Application topically to the appropriate area as directed 3 (Three) Times a Day. Indications: Wound Infection 22 g 1    polyethylene glycol (MIRALAX) 17 g packet Take 17 g by mouth Daily As Needed (Use if senna-docusate is ineffective).      sennosides-docusate (PERICOLACE) 8.6-50 MG per tablet Take 2 tablets by mouth 2 (Two) Times a Day As Needed for Constipation.      sodium chloride 0.65 % nasal spray Administer 2 sprays into the nostril(s) as directed by provider Every Night.      Sucroferric Oxyhydroxide (Velphoro) 500 MG chewable tablet Chew 2 tablets Daily As Needed (phopurus def). Take 2 tablets by  mouth three times a day.  Indications: Hyperphosphatemia D/T Renal Insufficiency           PROCEDURES  Procedures    Total critical care time: Approximately 35 minutes    Due to a high probability of clinically significant, life threatening deterioration, the patient required my highest level of preparedness to intervene emergently and I personally spent this critical care time directly and personally managing the patient. This critical care time included obtaining a history; examining the patient; vital sign monitoring; ordering and review of studies; arranging urgent treatment with development of a management plan; evaluation of patient's response to treatment; frequent reassessment; and, discussions with other providers.    This critical care time was performed to assess and manage the high probability of imminent, life-threatening deterioration that could result in multi-organ failure. It was exclusive of separately billable procedures and treating other patients and teaching time.    Please see MDM section and the rest of the note for further information on patient assessment and  treatment.          PROGRESS, DATA ANALYSIS, CONSULTS, AND MEDICAL DECISION MAKING  All labs have been independently interpreted by me.  All radiology studies have been reviewed by me. All EKGs have been independently viewed and interpreted by me.  Discussion below represents my analysis of pertinent findings related to patient's condition, differential diagnosis, treatment plan and final disposition.    Differential diagnosis:   My differential diagnosis of the upper extremity pain or injury includes but is not limited to contusions of the shoulder, forearm, arm, wrist, elbow or hand, dislocations of shoulder, elbow, wrist, digits, nursemaid's elbow (age-appropriate), shoulder sprain, elbow sprain, wrist sprain, digit sprain, shoulder strain, arm strain, forearm strain, elbow strain, wrist strain, hand sprain, digit strain, lacerations of the upper extremity, fractures both closed and open of clavicle, scapula, humerus, radius, ulna, bones of the wrist, hands and digits, cellulitis or abscess, cervical radiculopathy, radial nerve palsy, neurogenic upper extremity pain, angina equivalent, SVT, DVT, arterial occlusion, compartment syndrome.      Clinical Scores:                  ED Course as of 03/23/25 1717   Sun Mar 23, 2025   1509 Antibiotic selection discussed with clinical pharmacistEndy.  Vancomycin and ceftriaxone ordered. [RS]   1517 CONSULT        Provider: Dr. Olivares - Vascular    Discussion: Reviewed history, ED presentation evaluation.  Agreeable with current plan, he will see the patient in consultation.  He may desire imaging but will make that decision when he sees him.    Agreeable c treatment and planned disposition.         [RS]   1547 IV contrasted CT left upper extremity ordered at the request of vascular surgery. [RS]   1548 WBC: 4.21 [RS]   1548 Hemoglobin(!): 9.6 [RS]   1548 Platelets(!): 59  Chronic thrombocytopenia stable [RS]   1703 BUN(!): 46 [RS]   1703 Creatinine(!): 6.03 [RS]   1703  Sodium(!): 129 [RS]   1703 Glucose(!): 171 [RS]   1703 Procalcitonin(!): 1.20 [RS]   1703 PTT: 26.7 [RS]   1703 Lactate(!!): 3.6 [RS]   1703 INR(!): 1.36 [RS]   1705 Patient has received 600 mL IV fluid in the ED through infusion of antibiotics.  He is not a candidate for further aggressive IV fluid boluses given his renal disease and concern for volume overload. [RS]   1717 CONSULT        Provider: Dr. [facility]    Discussion: Reviewed patient history, ED presentation evaluation as well as therapies initiated in the ED and vascular consult with pending imaging.  Agreeable to accept patient for full admission with telemetry    Agreeable c treatment and planned disposition.         [RS]      ED Course User Index  [RS] Cooper Ureña MD         Prescription drug monitoring program review:     AS OF 17:17 EDT VITALS:    BP - 139/68  HR - 84  TEMP - 97.1 °F (36.2 °C)  O2 SATS - 94%    COMPLEXITY OF CARE  The patient requires admission.      DIAGNOSIS  Final diagnoses:   Cellulitis of left arm   Abscess of left arm   Complication of arteriovenous dialysis fistula, initial encounter   ESRD (end stage renal disease) on dialysis   Sepsis, due to unspecified organism, unspecified whether acute organ dysfunction present   Hyponatremia         DISPOSITION  ED Disposition       ED Disposition   Decision to Admit    Condition   --    Comment   Level of Care: Telemetry [5]   Diagnosis: Sepsis due to cellulitis [[id number]]   Admitting Physician: JACQUELINE SORIA [[id number]]   Certification: I Certify That Inpatient Hospital Services Are Medically Necessary For Greater Than 2 Midnights                    ADMISSION    Discussed treatment plan and reason for admission with pt/family and admitting physician.  Pt/family voiced understanding of the plan for admission for further testing/treatment as needed.       Please note that portions of this document were completed with a voice recognition program.    Note Disclaimer: At [facility], we believe that sharing information builds trust and better relationships. You are receiving this note because you recently visited [facility]. It is possible you will see health information before a provider has talked with you about it. This kind of information can be easy to misunderstand. To help you fully understand what it means for your health, we urge you to discuss this note with your provider.         Cooper Ureña MD  03/23/25 [id number]

## 2025-03-23 NOTE — PROGRESS NOTES
Name: Bill Landry ADMIT: (Not on file)   : 1945  PCP: Bharathi De La Torre MD    MRN: [id number] LOS: 0 days   AGE/SEX: 79 y.o. male  ROOM:   Room/bed info not found     Vascular Surgery Note    Patients wife called stating patient began having spontaneous bleeding from his arm this morning. It is on the arm where his fistula is but not at his dialysis access site.   Patient's wife advised to hold pressure for 15 minutes continuously and if it is still bleeding after that then proceed to ER for evaluation. She reports fistula worked well for dialysis on Friday.         Boom Olivares II, MD  25  11:52 EDT  Office Number [phone]    [facility]

## 2025-03-24 LAB
ALBUMIN SERPL-MCNC: 2.4 G/DL (ref 3.5–5.2)
ANION GAP SERPL CALCULATED.3IONS-SCNC: 14.5 MMOL/L (ref 5–15)
BUN SERPL-MCNC: 49 MG/DL (ref 8–23)
BUN/CREAT SERPL: 7.8 (ref 7–25)
CALCIUM SPEC-SCNC: 7.8 MG/DL (ref 8.6–10.5)
CHLORIDE SERPL-SCNC: 95 MMOL/L (ref 98–107)
CO2 SERPL-SCNC: 21.5 MMOL/L (ref 22–29)
CREAT SERPL-MCNC: 6.28 MG/DL (ref 0.76–1.27)
D-LACTATE SERPL-SCNC: 1.4 MMOL/L (ref 0.5–2)
D-LACTATE SERPL-SCNC: 2.2 MMOL/L (ref 0.5–2)
DEPRECATED RDW RBC AUTO: 55.8 FL (ref 37–54)
EGFRCR SERPLBLD CKD-EPI 2021: 8.4 ML/MIN/1.73
ERYTHROCYTE [DISTWIDTH] IN BLOOD BY AUTOMATED COUNT: 15.2 % (ref 12.3–15.4)
GLUCOSE SERPL-MCNC: 107 MG/DL (ref 65–99)
HCT VFR BLD AUTO: 29.3 % (ref 37.5–51)
HGB BLD-MCNC: 9.2 G/DL (ref 13–17.7)
MAGNESIUM SERPL-MCNC: 2.1 MG/DL (ref 1.6–2.4)
MCH RBC QN AUTO: 31.6 PG (ref 26.6–33)
MCHC RBC AUTO-ENTMCNC: 31.4 G/DL (ref 31.5–35.7)
MCV RBC AUTO: 100.7 FL (ref 79–97)
PHOSPHATE SERPL-MCNC: 5.2 MG/DL (ref 2.5–4.5)
PLATELET # BLD AUTO: 76 10*3/MM3 (ref 140–450)
PMV BLD AUTO: 8.9 FL (ref 6–12)
POTASSIUM SERPL-SCNC: 4.1 MMOL/L (ref 3.5–5.2)
RBC # BLD AUTO: 2.91 10*6/MM3 (ref 4.14–5.8)
SODIUM SERPL-SCNC: 131 MMOL/L (ref 136–145)
VANCOMYCIN SERPL-MCNC: 17.5 MCG/ML (ref 5–40)
WBC NRBC COR # BLD AUTO: 4.42 10*3/MM3 (ref 3.4–10.8)

## 2025-03-24 PROCEDURE — 87186 SC STD MICRODIL/AGAR DIL: CPT | Performed by: SURGERY

## 2025-03-24 PROCEDURE — 87070 CULTURE OTHR SPECIMN AEROBIC: CPT | Performed by: SURGERY

## 2025-03-24 PROCEDURE — 87147 CULTURE TYPE IMMUNOLOGIC: CPT | Performed by: SURGERY

## 2025-03-24 PROCEDURE — 85027 COMPLETE CBC AUTOMATED: CPT | Performed by: INTERNAL MEDICINE

## 2025-03-24 PROCEDURE — 25810000003 SODIUM CHLORIDE 0.9 % SOLUTION 250 ML FLEX CONT: Performed by: INTERNAL MEDICINE

## 2025-03-24 PROCEDURE — 25010000002 CEFTRIAXONE PER 250 MG: Performed by: SURGERY

## 2025-03-24 PROCEDURE — 25010000002 VANCOMYCIN 750 MG RECONSTITUTED SOLUTION 1 EACH VIAL: Performed by: INTERNAL MEDICINE

## 2025-03-24 PROCEDURE — 83605 ASSAY OF LACTIC ACID: CPT | Performed by: EMERGENCY MEDICINE

## 2025-03-24 PROCEDURE — 87481 CANDIDA DNA AMP PROBE: CPT | Performed by: INTERNAL MEDICINE

## 2025-03-24 PROCEDURE — G0545 PR INHERENT VISIT TO INPT: HCPCS | Performed by: STUDENT IN AN ORGANIZED HEALTH CARE EDUCATION/TRAINING PROGRAM

## 2025-03-24 PROCEDURE — 83735 ASSAY OF MAGNESIUM: CPT | Performed by: INTERNAL MEDICINE

## 2025-03-24 PROCEDURE — 99223 1ST HOSP IP/OBS HIGH 75: CPT | Performed by: STUDENT IN AN ORGANIZED HEALTH CARE EDUCATION/TRAINING PROGRAM

## 2025-03-24 PROCEDURE — 25010000002 ONDANSETRON PER 1 MG: Performed by: INTERNAL MEDICINE

## 2025-03-24 PROCEDURE — 80202 ASSAY OF VANCOMYCIN: CPT | Performed by: SURGERY

## 2025-03-24 PROCEDURE — 87205 SMEAR GRAM STAIN: CPT | Performed by: SURGERY

## 2025-03-24 PROCEDURE — 80069 RENAL FUNCTION PANEL: CPT | Performed by: INTERNAL MEDICINE

## 2025-03-24 RX ORDER — FAMOTIDINE 20 MG/1
20 TABLET, FILM COATED ORAL DAILY
Status: DISCONTINUED | OUTPATIENT
Start: 2025-03-24 | End: 2025-04-03 | Stop reason: HOSPADM

## 2025-03-24 RX ORDER — ALUMINA, MAGNESIA, AND SIMETHICONE 2400; 2400; 240 MG/30ML; MG/30ML; MG/30ML
15 SUSPENSION ORAL EVERY 4 HOURS PRN
Status: DISCONTINUED | OUTPATIENT
Start: 2025-03-24 | End: 2025-04-03 | Stop reason: HOSPADM

## 2025-03-24 RX ORDER — SEVELAMER CARBONATE 800 MG/1
800 TABLET, FILM COATED ORAL
Status: DISCONTINUED | OUTPATIENT
Start: 2025-03-24 | End: 2025-04-03 | Stop reason: HOSPADM

## 2025-03-24 RX ADMIN — SEVELAMER CARBONATE 800 MG: 800 TABLET, FILM COATED ORAL at 20:33

## 2025-03-24 RX ADMIN — LATANOPROST 1 DROP: 50 SOLUTION/ DROPS OPHTHALMIC at 20:33

## 2025-03-24 RX ADMIN — TAMSULOSIN HYDROCHLORIDE 0.4 MG: 0.4 CAPSULE ORAL at 20:33

## 2025-03-24 RX ADMIN — VANCOMYCIN HYDROCHLORIDE 750 MG: 750 INJECTION, POWDER, LYOPHILIZED, FOR SOLUTION INTRAVENOUS at 21:38

## 2025-03-24 RX ADMIN — ACETAMINOPHEN 650 MG: 325 TABLET, FILM COATED ORAL at 11:21

## 2025-03-24 RX ADMIN — Medication 1 TABLET: at 09:24

## 2025-03-24 RX ADMIN — ASPIRIN 81 MG: 81 TABLET, COATED ORAL at 09:24

## 2025-03-24 RX ADMIN — ONDANSETRON 4 MG: 2 INJECTION, SOLUTION INTRAMUSCULAR; INTRAVENOUS at 23:00

## 2025-03-24 RX ADMIN — Medication 1000 UNITS: at 09:25

## 2025-03-24 RX ADMIN — CEFTRIAXONE 2000 MG: 2 INJECTION, POWDER, FOR SOLUTION INTRAMUSCULAR; INTRAVENOUS at 20:33

## 2025-03-24 RX ADMIN — FAMOTIDINE 20 MG: 20 TABLET, FILM COATED ORAL at 11:21

## 2025-03-24 NOTE — PLAN OF CARE
Goal Outcome Evaluation:  Plan of Care Reviewed With: patient        Progress: no change  Outcome Evaluation: Continued treatment for cellulitis. On IV antibiotics. Currently receiving dialysis to stay on normal M/W/F schedule. NPO at midnight for possible surgical intervention related to left arm. Patient anxious this afternoon prior to HD. Pepcid started for indigestion symptoms. No visitors at bedside today. No further issues at this time.

## 2025-03-24 NOTE — NURSING NOTE
HD completed without complications. 2 L removed.     Pre HD  /72  HR 85  Weight 85.5 kg    Post HD  /56  HR 77  Weight 83.5 kg  BVP 88.3

## 2025-03-24 NOTE — PROGRESS NOTES
"[facility] Clinical Pharmacy Services: Vancomycin Pharmacokinetic Initial Consult Note    Bill Landry is a 79 y.o. male who is on day 2 of pharmacy to dose vancomycin.    Indication: Skin and Soft Tissue  Consulting Provider: Dr Deal  Planned Duration of Therapy: 45 days  Loading Dose Ordered or Given: 1750 mg on 3/23 at 1637  MRSA PCR performed: no  Culture/Source:   3/23 Wcx: pending  3/23 Bcx: 2/2 pending  3/24 Wcx: pending  Target: Dose by Levels  Other Antimicrobials: ceftriaxone     Vitals/Labs  Ht: 177.8 cm (70\"); Wt: 84.8 kg (187 lb)  Temp Readings from Last 1 Encounters:   03/24/25 97.3 °F (36.3 °C) (Oral)    Estimated Creatinine Clearance: 11.4 mL/min (A) (by C-G formula based on SCr of 6.28 mg/dL (H)).  Dialysis MWF     Results from last 7 days   Lab Units 03/24/25  0355 03/23/25  1527   CREATININE mg/dL 6.28* 6.03*   WBC 10*3/mm3 4.42 4.21     Results from last 7 days   Lab Units 03/24/25  0355   VANCOMYCIN RM mcg/mL 17.50     Assessment/Plan:    HD scheduled for today. Will give dose after HD.    Vancomycin Dose:   750 mg IV once  Vanc Random has been ordered for 3/26 at 0400     Pharmacy will follow patient's kidney function and will adjust doses and obtain levels as necessary. Thank you for involving pharmacy in this patient's care. Please contact pharmacy with any questions or concerns.                           Cynthia Garcia [facility]  Clinical Pharmacist    "

## 2025-03-24 NOTE — CONSULTS
"Referring Provider: Cooper Ureña MD  Reason for Consultation:     Left arm cellulitis versus graft infection     Chief Complaint   Patient presents with    Vascular Access Problem         Subjective   History of present illness: Patient is a 79-year-old male with past medical history of ESRD on HD Monday Wednesday Friday via left upper extremity AV graft, Crohn's disease status post bowel resection end ileostomy, cirrhosis, A-fib, type 2 diabetes and lupus who presents with drainage from left upper extremity graft site.  ID consulted for \"left arm cellulitis versus graft infection\".    On presentation patient has been afebrile with normal WBC.  Platelets noted to be low at 72 and procalcitonin elevated at 1.2 in the setting of chronic renal failure.  Started on vancomycin and ceftriaxone with wound cultures obtained from the left upper extremity.  Blood cultures are also pending.  CT of the upper extremity performed with 3.6 x 2.3 cm subcutaneous fluid density with no enhancing wall or internal locules of air.  Vascular surgery consulted and recommending 6 weeks of antibiotics for graft salvage.    Patient reports he is doing well this morning.  Denies any acute complaints.  Tolerating antibiotics.  Denies any history of antibiotic allergy.    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    Aortic stenosis     Bicuspid aortic valve 07/20/2022    Calculus of kidney     Chronic leukopenia and thrombocytopenia     Cirrhosis of liver without ascites 07/24/2017    Congestive splenomegaly 07/24/2017    Crohn disease     with ileostomy    Decreased white blood cell count, unspecified     Diverticula of colon     ESRD on hemodialysis 04/05/2018    M-W-F FRESENIUS    Fatty liver disease, nonalcoholic     Fistula 04/05/2018 "    Other Specified Complication    Gastrointestinal hemorrhage, unspecified     GERD (gastroesophageal reflux disease)     GI bleed     Glaucoma     H/O Gallstone pancreatitis     H/O Pericardial effusion     H/O sinus bradycardia     History of hypertension     resolved    History of UTI     Hx of renal calculi     Ileostomy present     Iron deficiency     Leakage of heart valve prosthesis, subsequent encounter     MGUS (monoclonal gammopathy of unknown significance)     TATE (obstructive sleep apnea)     Other hemoglobinopathies     Pericardial effusion (noninflammatory) 1/18/2018    Permanent atrial fibrillation     Scarlet fever, uncomplicated     Stenosis of other vascular prosthetic devices, implants and grafts, initial encounter 02/14/2022    Systemic lupus erythematosus, unspecified     Thrombocytopenia     undpecified    Type 2 diabetes mellitus     CURRENTLY TAKES NO MED    Urinary retention     Post-OP       Past Surgical History:   Procedure Laterality Date    APPENDECTOMY  06/1968    ARTERIOVENOUS FISTULA/SHUNT SURGERY Left 08/08/2017    Procedure: LEFT BRACHIAL CEPHALIC AV FISTULA FORMATION WITH CEPHALIC VEIN TRANSPOSITION ;  Surgeon: Bill Deal MD;  Location: [facility];  Service:     ARTERIOVENOUS FISTULA/SHUNT SURGERY Left 05/27/2022    Procedure: OPEN REVISION LEFT ARTERIOVENOUS INTERPOSITION GRAFT PLACEMENT;  Surgeon: Bill Deal MD;  Location: [facility] OR;  Service: Vascular;  Laterality: Left;    ARTERIOVENOUS FISTULA/SHUNT SURGERY Left 11/19/2024    Procedure: LEFT ARTERIOVENOUS SHUNT GRAFT REVISION;  Surgeon: Bill Deal MD;  Location: [facility] OR;  Service: Vascular;  Laterality: Left;    ARTERIOVENOUS FISTULA/SHUNT SURGERY W/ HEMODIALYSIS CATHETER INSERTION Left 02/15/2022    Procedure: OPEN LEFT ARM ARTERIOVENOUS FISTULA THROMBECTOMY WITH CEPHALIC VEIN ARTHROPLASTY AND STENT/GRAFT PLACEMENT;  Surgeon: Bill Deal MD;  Location: [facility] 18/19;  Service:  Vascular;  Laterality: Left;    CATARACT EXTRACTION WITH INTRAOCULAR LENS IMPLANT Bilateral 2004    CHOLECYSTECTOMY  2011    COLECTOMY PARTIAL / TOTAL      History of inflammatory bowel disease with status post colectomy with ileostomy many years ago in the [facility],  18 YEARS OF AGE    COLON SURGERY      Colon resection with colostomy    COLON SURGERY      COLONOSCOPY  01/2018    CYSTOSCOPY LITHOLAPAXY BLADDER STONE EXTRACTION      ENDOSCOPY  01/16/2015    gastritis    ENDOSCOPY  01/17/2018    Procedure: ESOPHAGOGASTRODUODENOSCOPY;  Surgeon: Warner Neville MD;  Location: [facility] ENDOSCOPY;  Service:     ILEOSCOPY  01/16/2015    normal    ILEOSCOPY N/A 01/17/2018    Procedure: ILEOSCOPY;  Surgeon: Warner Neville MD;  Location: [facility] ENDOSCOPY;  Service:     ILEOSTOMY  12/1968    loop ostomy bypass    INCISION AND DRAINAGE ARM Left 10/27/2023    Procedure: LEFT ARM INCISION AND DRAINAGE;  Surgeon: Bill Deal MD;  Location: [facility] MAIN OR;  Service: Vascular;  Laterality: Left;    INSERTION HEMODIALYSIS CATHETER Right 11/13/2024    Procedure: Tunnelled HEMODIALYSIS CATHETER INSERTION;  Surgeon: Ben Barth MD;  Location: [facility] HYBRID OR;  Service: Vascular;  Laterality: Right;    OTHER SURGICAL HISTORY  2009    kidney stones x3    PSEUDOANEURYSM REPAIR Left 10/27/2023    TONSILLECTOMY  1950       family history includes Heart disease in his mother; Heart failure (age of onset: 78) in his mother; Hyperlipidemia in his mother; Hypertension in his father and mother; Other (age of onset: 82) in his father.     reports that he has never smoked. He has never been exposed to tobacco smoke. He has never used smokeless tobacco. He reports that he does not drink alcohol and does not use drugs.     Allergies   Allergen Reactions    Ace Inhibitors Other (See Comments)     RENAL FAILURE/ raised creatinine    Contrast Dye (Echo Or Unknown Ct/Mr) Other (See Comments) and Anaphylaxis     RENAL FAILURE     "Iodine Anaphylaxis    Angiotensin Receptor Blockers Swelling    Eliquis [Apixaban] Arrhythmia     BLEEDING ISSUES; PT CANNOT TAKE ANY ANTICOAGULANTS DUE TO LOW PLATELETS EXCEPT ASPIRIN      Filgrastim GI Intolerance and Confusion     Chest pain    Heparin Other (See Comments)     \"It causes me to bleed out\"    Keflex [Cephalexin] Diarrhea     RENAL FAILURE    Other Angioedema     IVP Dye       Medication:  Antibiotics:  Anti-Infectives (From admission, onward)      Ordered     Dose/Rate Route Frequency Start Stop    03/24/25 0813  cefTRIAXone (ROCEPHIN) 2,000 mg in sodium chloride 0.9 % 100 mL MBP        Ordering Provider: Bill Deal MD    2,000 mg  200 mL/hr over 30 Minutes Intravenous Every 24 Hours 03/24/25 1600 03/31/25 1559    03/24/25 0813  Pharmacy to dose vancomycin        Ordering Provider: Bill Deal MD     Not Applicable Continuous PRN 03/24/25 0812 05/08/25 0811    03/23/25 1509  cefTRIAXone (ROCEPHIN) 2,000 mg in sodium chloride 0.9 % 100 mL MBP        Ordering Provider: Cooper Ureña MD    2,000 mg  200 mL/hr over 30 Minutes Intravenous Once 03/23/25 1525 03/23/25 1632    03/23/25 1506  vancomycin 1750 mg/500 mL 0.9% NS IVPB ([facility])        Ordering Provider: Cooper Ureña MD    20 mg/kg × 85 kg  over 105 Minutes Intravenous Once 03/23/25 1522 03/23/25 1922              Objective     Physical Exam:   Vital Signs   Temp:  [97.1 °F (36.2 °C)-97.7 °F (36.5 °C)] 97.3 °F (36.3 °C)  Heart Rate:  [73-89] 89  Resp:  [18-20] 18  BP: (128-141)/(58-86) 135/68    GENERAL: Awake and alert, in no acute distress.   HEENT: Oropharynx is clear. Hearing is grossly normal.   EYES: PERRL. No conjunctival injection. No lid lag.   LUNGS: Normal work of breathing.  SKIN: Left upper extremity with packing in place.  Painted with Betadine.  PSYCHIATRIC: Appropriate mood, affect, insight, and judgment.     Results Review:   I reviewed the patient's new clinical results.  I reviewed the patient's new imaging results " and agree with the interpretation.  I reviewed the patient's other test results and agree with the interpretation    Lab Results   Component Value Date    WBC 4.42 03/24/2025    HGB 9.2 (L) 03/24/2025    HCT 29.3 (L) 03/24/2025    .7 (H) 03/24/2025    PLT 76 (L) 03/24/2025       Lab Results   Component Value Date    VANCORANDOM 7.20 11/20/2024       Lab Results   Component Value Date    GLUCOSE 107 (H) 03/24/2025    BUN 49 (H) 03/24/2025    CREATININE 6.28 (H) 03/24/2025    EGFRIFNONA 7 (L) 02/14/2022    EGFRIFAFRI  02/14/2022      Comment:      <15 Indicative of kidney failure.    BCR 7.8 03/24/2025    CO2 21.5 (L) 03/24/2025    CALCIUM 7.8 (L) 03/24/2025    ALBUMIN 2.4 (L) 03/24/2025    AST 43 (H) 03/23/2025    ALT 18 03/23/2025         Estimated Creatinine Clearance: 11.4 mL/min (A) (by C-G formula based on SCr of 6.28 mg/dL (H)).    Isolation:   No active isolations      Microbiology:  Microbiology Results (last 10 days)       ** No results found for the last 240 hours. **             Radiology:  CT left upper extremity with 3.6 x 2.3 focus of subcutaneous fluid without enhancing wall or locules of air.    Assessment     #Left upper extremity graft infection  #End-stage renal disease on hemodialysis via AV graft  #Type 2 diabetes  #Crohn's disease status post bowel resection end ileostomy  #Cirrhosis with chronic leukopenia and thrombocytopenia    Vascular surgery concern for graft infection and recommending 6 weeks of antibiotics to attempt to sterilize the graft site.  ID will follow-up cultures.  Continue vancomycin goal -600 and ceftriaxone 2 g daily for now.      Thank you for this consult.  We will continue to follow along and tailor antibiotics as the patient's clinical course evolves.  ------------------------------------------------------------------------------------------------  The above decisions regarding antimicrobials incorporates elements of engaging in complex medical  decision-making associated with antimicrobial prescribing including considerations such as antimicrobial resistance patterns, emergence of new variants/strains, recent antibiotic exposure, interactions/complications from comorbidities including concurrent infections, public health considerations to minimize development of antimicrobial resistance, and emerging and re-emerging infections.

## 2025-03-24 NOTE — PROGRESS NOTES
[facility]   Surgical Progress Note    Patient Name: Bill Landry  : 1945  MRN: [id number]  Date of admission: 3/23/2025  Surgical Procedures Since Admission:    Subjective   Subjective     Chief Complaint: Left arm cellulitis with draining    History of Present Illness   79-year-old gentleman with a complicated history of multiple grafts and graft revisions after initial fistula dilation on the left side.  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  Graft has had 2 interventions with angioplasty at the since that time.  He has been working well but appears to have open an area in the prior incision at the level of the axilla where some drainage began yesterday.  There is some redness tracking up towards the upper part of the arm but there is no other fluctuance or evidence of infection in the graft.    Review of Systems denies pain in the left arm    Objective   Objective     Vitals:   Temp:  [97.1 °F (36.2 °C)-97.7 °F (36.5 °C)] 97.3 °F (36.3 °C)  Heart Rate:  [73-89] 89  Resp:  [18-20] 18  BP: (128-141)/(58-86) 135/68  Output by Drain (mL) 25 0701 - 25 1900 25 1901 - 25 0700 25 0701 - 25 0817 Range Total   Ileostomy  425  625       Physical Exam    To exam patient has minimal erythema tracking up the arm but drainage is positive from the small area of opening in the incision line at the axilla..  This area was opened with a snap and vasectomy and packed after culturing.  There is no visible graft in the area.  Neurovascularly he is intact.  Lungs are clear  Baseline anxiety present    Result Review    Result Review:  I have personally reviewed the results from the time of this admission to 3/24/2025 08:17 EDT and agree with these findings:  [x]  Laboratory list / accordion  [x]  Microbiology  [x]  Radiology  []  EKG/Telemetry   []  Cardiology/Vascular   []  Pathology  []  " Old records  []  Other:  Most notable findings include: I have reviewed his  CT scan which really does not show any areas of fluid or air around the site of the drainage.        Results from last 7 days   Lab Units 03/24/25  0355 03/23/25  1527 03/19/25  0000   WBC 10*3/mm3 4.42 4.21  --    HEMOGLOBIN g/dL 9.2* 9.6* 9.8*  29.4*   PLATELETS 10*3/mm3 76* 59*  --      Results from last 7 days   Lab Units 03/24/25  0355 03/23/25  1527   SODIUM mmol/L 131* 129*   POTASSIUM mmol/L 4.1 4.3   CHLORIDE mmol/L 95* 93*   CO2 mmol/L 21.5* 22.2   BUN mg/dL 49* 46*   CREATININE mg/dL 6.28* 6.03*   GLUCOSE mg/dL 107* 171*   MAGNESIUM mg/dL 2.1  --    PHOSPHORUS mg/dL 5.2*  --    Estimated Creatinine Clearance: 11.4 mL/min (A) (by C-G formula based on SCr of 6.28 mg/dL (H)).  Results from last 7 days   Lab Units 03/23/25  1527   PROTIME Seconds 16.7*   INR  1.36*     Lab Results   Component Value Date    HGBA1C 5.3 02/03/2025    HGBA1C 4.80 01/26/2025    HGBA1C 5.40 09/17/2024   No results found for: \"POCGLU\"    Assessment & Plan   Assessment / Plan     Brief Patient Summary:  Bill Landry is a 79 y.o. male who has an unusual presentation of draining from a wound is over 3 months old and has had no problems up-to-date.  The question whether there could be an indolent graft infection has been raised however it is seems unlikely that it would have started in this area and rather would be more likely to be related to the multiple punctures which these sites appear intact.  The fact that the graft is working well and this is his last option for this arm makes me believe that attempted salvage is warranted.  At bedside exam and opening of the wound I do not find any direct extension to the graft and on CT scan I really do not see any fluid or air in the area of concern.  Given this picture I believe then infectious disease evaluation and plan 6 weeks of antibiotics therapy is probably our best choice to try to sterilize his graft " and salvage this arm access.    Active Hospital Problems:   Active Hospital Problems    Diagnosis     **Sepsis due to cellulitis      Plan:   Initiate broad-spectrum antibiotics and await culture results.  Will ask infectious disease opinion but I am inclined to try to sterilize the area with long-term current antibiotic therapy including 6 weeks of therapy through his dialysis.  The question of whether a tunneled catheter could be beneficial in this situation or even further I&D has been raised but at this point it is either.  Attempt to salvage the graft with antibiotics clinics or remove his entire graft and give up on his left arm.  At this point in time the patient and I both believe that an attempted antibiotic coverage for 6 weeks to sterilize the graft is our best answer.  Will await infectious disease opinion.  In the interim I will keep him n.p.o. after midnight and scheduled for possible tunneled dialysis catheter in and I&D of the wound depending on plans made with infectious disease.    VTE Prophylaxis:  Mechanical VTE prophylaxis orders are signed & held. Mechanical VTE prophylaxis orders are present.        Bill Deal MD

## 2025-03-24 NOTE — PROGRESS NOTES
[facility] Progress Note      Patient Name: Bill Landry  : 1945  MRN: [id number]  Primary Care Physician:  Bharathi De La Torre MD  Date of admission: 3/23/2025    Subjective     Interval History:   The patient was seen and examined today for follow-up on end-stage renal disease  No new complaints today.  Denies any chest pain or shortness of breath.  Afebrile    Review of Systems:   As noted above    Objective     Vitals:   Temp:  [97.1 °F (36.2 °C)-97.7 °F (36.5 °C)] 97.3 °F (36.3 °C)  Heart Rate:  [73-89] 89  Resp:  [18-20] 18  BP: (128-141)/(58-86) 135/68    Intake/Output Summary (Last 24 hours) at 3/24/2025 0845  Last data filed at 3/24/2025 0500  Gross per 24 hour   Intake 1080 ml   Output 625 ml   Net 455 ml       Physical Exam:    General Appearance: alert, oriented x 3, no acute distress   Skin: warm and dry  HEENT: oral mucosa normal, nonicteric sclera  Neck: supple, no JVD  Lungs: CTA  Heart: RRR, normal S1 and S2  Abdomen: soft, nontender, nondistended  : no palpable bladder  Extremities: Bilateral lower extremity edema  Neuro: normal speech and mental status   Left upper extremity AV graft.  Left upper extremity wound covered  Scheduled Meds:     aspirin, 81 mg, Oral, Daily  b complex-vitamin c-folic acid, 1 tablet, Oral, Daily  cefTRIAXone, 2,000 mg, Intravenous, Q24H  cetirizine, 10 mg, Oral, Daily  cholecalciferol, 1,000 Units, Oral, Daily  latanoprost, 1 drop, Both Eyes, Nightly  midodrine, 2.5 mg, Oral, TID AC  Sucroferric Oxyhydroxide, 1,000 mg, Oral, TID AC  tamsulosin, 0.4 mg, Oral, Nightly      IV Meds:   Pharmacy to dose vancomycin,         Results Reviewed:   I have personally reviewed the results from the time of this admission to 3/24/2025 08:45 EDT     Results from last 7 days   Lab Units 25  0355 25  1527   SODIUM mmol/L 131* 129*   POTASSIUM mmol/L 4.1 4.3   CHLORIDE mmol/L 95* 93*   CO2 mmol/L 21.5* 22.2   BUN mg/dL 49* 46*   CREATININE  mg/dL 6.28* 6.03*   CALCIUM mg/dL 7.8* 7.8*   BILIRUBIN mg/dL  --  1.2   ALK PHOS U/L  --  283*   ALT (SGPT) U/L  --  18   AST (SGOT) U/L  --  43*   GLUCOSE mg/dL 107* 171*       Estimated Creatinine Clearance: 11.4 mL/min (A) (by C-G formula based on SCr of 6.28 mg/dL (H)).    Results from last 7 days   Lab Units 03/24/25  0355   MAGNESIUM mg/dL 2.1   PHOSPHORUS mg/dL 5.2*             Results from last 7 days   Lab Units 03/24/25  0355 03/23/25  1527 03/19/25  0000   WBC 10*3/mm3 4.42 4.21  --    HEMOGLOBIN g/dL 9.2* 9.6* 9.8*  29.4*   PLATELETS 10*3/mm3 76* 59*  --        Results from last 7 days   Lab Units 03/23/25  1527   INR  1.36*       Assessment / Plan     ASSESSMENT:  .  ESRD: Volume excess by exam and imaging; hypervolemic hyponatremia.  Normal potassium and anion gap.  Due for HD tomorrow; last HD was 3/20   Left arm AV graft with recent bleeding and later drainage from incision site (had had recent shuntogram).  Elevated lactate and procalcitonin; normal white blood cell count (though chronic leukopenia).  CT scan of the left arm does reveal a fluid density that was cultured by vascular surgery today  Crohn's disease s/p bowel resection with ileostomy  Cirrhosis with chronic leukopenia and thrombocytopenia  Atrial fibrillation  Chronic hypotension of ESRD:  on midodrine        PLAN:  I was informed by nursing staff that it is okay to use AV graft today  We will dialyze today per schedule  Labs in a.m.      Thank you for involving us in the care of Bill Landry.  Please feel free to call with any questions.    Myra Moore MD  03/24/25  08:45 EDT    [facility]  [phone]    Parts of this note may be an electronic transcription/translation of spoken language to printed text using the Dragon dictation system.

## 2025-03-24 NOTE — PROGRESS NOTES
Discharge Planning Assessment  [facility]     Patient Name: Bill Landry  MRN: [id number]  Today's Date: 3/24/2025    Admit Date: 3/23/2025    Plan: Home with spouse and [facility]   Discharge Needs Assessment       Row Name 03/24/25 0952       Living Environment    People in Home spouse    Current Living Arrangements home    Potentially Unsafe Housing Conditions none    Primary Care Provided by self    Provides Primary Care For no one    Family Caregiver if Needed child(gerard), adult;spouse    Family Caregiver Names Gillian  [phone] and 2 sons    Quality of Family Relationships supportive;involved;helpful    Able to Return to Prior Arrangements yes       Resource/Environmental Concerns    Resource/Environmental Concerns none    Transportation Concerns none       Transition Planning    Patient/Family Anticipates Transition to home with family;home with help/services    Transportation Anticipated family or friend will provide       Discharge Needs Assessment    Equipment Currently Used at Home walker, rolling;shower chair;cpap;bp cuff    Concerns to be Addressed denies needs/concerns at this time    Provided Post Acute Provider List? N/A    N/A Provider List Comment current with [facility]    Provided Post Acute Provider Quality & Resource List? N/A    N/A Quality & Resource List Comment current with [facility]    Offered/Gave Vendor List no                   Discharge Plan       Row Name 03/24/25 0956       Plan    Plan Home with spouse and [facility]    Plan Comments Spoke with pt at bedside to screen for DCP/needs.  Pt did confirm facesheet information as correct including PCP as Dr. Bharathi De La Torre.  Pt stated that he lives in a 2 stroy home with a basement with his wife.  Pt stated that there are 4 NEVIN with a handrail.  Pt did state that he does not go upstairs or downstairs in his home.   Pt stated that he recently was DC home from [place] for rehab.  Pt stated that upon DC from [facility]  Home HH was arranged with  [facility].  Call placed and  to Justin GREEN with [facility] who did confirm pt is current withthem.  Referral placed in [facility].  Pt also goes to [facility] M/W/F for OP HD.  Pt stated that he used to be able to drive himself but has not be able to drive himself since his DC from [place].  Pt stated that family transports him to HD and any appts as needed.  Pt did state that family can transport him home at DC.  Pt does have a rolling walker at home to assist him with ambulation.  CCP will follow to assist if any DC needs do arise.                  Continued Care and Services - Admitted Since 3/23/2025       Home Medical Care       Service Provider Request Status Services Address Phone Fax Patient Preferred    [facility] Accepted -- [address] [place] [id number] [phone] --                  Selected Continued Care - Prior Encounters Includes continued care and service providers with selected services from prior encounters from 12/23/2024 to 3/24/2025      Discharged on 1/31/2025 Admission date: 1/25/2025 - Discharge disposition: Skilled Nursing Facility (DC - External)      Destination       Service Provider Services Address Phone Fax Patient Preferred    [facility] [address] [place] [address] [phone] --                             Demographic Summary       Row Name 03/24/25 0952       General Information    Admission Type inpatient    Arrived From home    Referral Source admission list    Reason for Consult discharge planning    Preferred Language English                   Functional Status       Row Name 03/24/25 0952       Functional Status    Usual Activity Tolerance good    Current Activity Tolerance moderate       Functional Status, IADL    Medications independent    Meal Preparation independent    Housekeeping independent    Laundry independent    Shopping  assistive person       Mental Status Summary    Recent Changes in Mental Status/Cognitive Functioning no changes                   Psychosocial    No documentation.                  Abuse/Neglect    No documentation.                  Legal    No documentation.                  Substance Abuse    No documentation.                  Patient Forms    No documentation.                     MAXIMINO Malave

## 2025-03-24 NOTE — CONSULTS
"  [facility] Consult Note      Patient Name: Bill Landry  : 1945  MRN: [id number]  Primary Care Physician:  Bharathi De La Torre MD  Referring Physician: Cooper Ureña MD  Date of admission: 3/23/2025    Subjective     Reason for Consult:  ESRD    HPI:   Bill Landry is a 79 y.o. male  with ESRD on HD (MWF, via left AV graft, at [facility], followed by Dr. Moore of our group) for the past 7 years, Crohn's disease s/p bowel resection with ileostomy, anemia, cirrhosis, A-fib, chronic leukopenia and thrombocytopenia, and hypertension, admitted today for further evaluation of left arm bleeding (\"big at first and then a slow drip\"), erythema, and purulent drainage from surgical site near left arm graft.  PMH outlined below.  Last HD was 3/20.     Not aware of any fever or chills; left arm not especially tender  Reports uneventful HD 3/20  Breathing is comfortable on room air; no chest pain  Appetite fair; no N/V  Makes very little urine    Review of Systems:   14 point review of systems is otherwise negative except for mentioned above on HPI    Personal History     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    Aortic stenosis     Bicuspid aortic valve 2022    Calculus of kidney     Chronic leukopenia and thrombocytopenia     Cirrhosis of liver without ascites 2017    Congestive splenomegaly 2017    Crohn disease     with ileostomy    Decreased white blood cell count, unspecified     Diverticula of colon     ESRD on hemodialysis 2018    M-W-F [facility]    Fatty liver disease, nonalcoholic     Fistula 2018    Other Specified Complication    Gastrointestinal hemorrhage, unspecified     GERD (gastroesophageal reflux disease)     GI bleed     Glaucoma     " H/O Gallstone pancreatitis     H/O Pericardial effusion     H/O sinus bradycardia     History of hypertension     resolved    History of UTI     Hx of renal calculi     Ileostomy present     Iron deficiency     Leakage of heart valve prosthesis, subsequent encounter     MGUS (monoclonal gammopathy of unknown significance)     TATE (obstructive sleep apnea)     Other hemoglobinopathies     Pericardial effusion (noninflammatory) 1/18/2018    Permanent atrial fibrillation     Scarlet fever, uncomplicated     Stenosis of other vascular prosthetic devices, implants and grafts, initial encounter 02/14/2022    Systemic lupus erythematosus, unspecified     Thrombocytopenia     undpecified    Type 2 diabetes mellitus     CURRENTLY TAKES NO MED    Urinary retention     Post-OP       Past Surgical History:   Procedure Laterality Date    APPENDECTOMY  06/1968    ARTERIOVENOUS FISTULA/SHUNT SURGERY Left 08/08/2017    Procedure: LEFT BRACHIAL CEPHALIC AV FISTULA FORMATION WITH CEPHALIC VEIN TRANSPOSITION ;  Surgeon: Bill Deal MD;  Location: [facility] OR;  Service:     ARTERIOVENOUS FISTULA/SHUNT SURGERY Left 05/27/2022    Procedure: OPEN REVISION LEFT ARTERIOVENOUS INTERPOSITION GRAFT PLACEMENT;  Surgeon: Bill Deal MD;  Location: [facility] OR;  Service: Vascular;  Laterality: Left;    ARTERIOVENOUS FISTULA/SHUNT SURGERY Left 11/19/2024    Procedure: LEFT ARTERIOVENOUS SHUNT GRAFT REVISION;  Surgeon: Bill Deal MD;  Location: [facility] OR;  Service: Vascular;  Laterality: Left;    ARTERIOVENOUS FISTULA/SHUNT SURGERY W/ HEMODIALYSIS CATHETER INSERTION Left 02/15/2022    Procedure: OPEN LEFT ARM ARTERIOVENOUS FISTULA THROMBECTOMY WITH CEPHALIC VEIN ARTHROPLASTY AND STENT/GRAFT PLACEMENT;  Surgeon: Bill Deal MD;  Location: [facility] 18/19;  Service: Vascular;  Laterality: Left;    CATARACT EXTRACTION WITH INTRAOCULAR LENS IMPLANT Bilateral 2004    CHOLECYSTECTOMY  2011    COLECTOMY PARTIAL /  TOTAL      History of inflammatory bowel disease with status post colectomy with ileostomy many years ago in the [facility],  18 YEARS OF AGE    COLON SURGERY      Colon resection with colostomy    COLON SURGERY      COLONOSCOPY  01/2018    CYSTOSCOPY LITHOLAPAXY BLADDER STONE EXTRACTION      ENDOSCOPY  01/16/2015    gastritis    ENDOSCOPY  01/17/2018    Procedure: ESOPHAGOGASTRODUODENOSCOPY;  Surgeon: Warner Neville MD;  Location: [facility] ENDOSCOPY;  Service:     ILEOSCOPY  01/16/2015    normal    ILEOSCOPY N/A 01/17/2018    Procedure: ILEOSCOPY;  Surgeon: Warner Neville MD;  Location: [facility] ENDOSCOPY;  Service:     ILEOSTOMY  12/1968    loop ostomy bypass    INCISION AND DRAINAGE ARM Left 10/27/2023    Procedure: LEFT ARM INCISION AND DRAINAGE;  Surgeon: Bill Deal MD;  Location: [facility] MAIN OR;  Service: Vascular;  Laterality: Left;    INSERTION HEMODIALYSIS CATHETER Right 11/13/2024    Procedure: Tunnelled HEMODIALYSIS CATHETER INSERTION;  Surgeon: Ben Barth MD;  Location: [facility] HYBRID OR;  Service: Vascular;  Laterality: Right;    OTHER SURGICAL HISTORY  2009    kidney stones x3    PSEUDOANEURYSM REPAIR Left 10/27/2023    TONSILLECTOMY  1950       Family History: family history includes Heart disease in his mother; Heart failure (age of onset: 78) in his mother; Hyperlipidemia in his mother; Hypertension in his father and mother; Other (age of onset: 82) in his father.    Social History:  reports that he has never smoked. He has never been exposed to tobacco smoke. He has never used smokeless tobacco. He reports that he does not drink alcohol and does not use drugs.    Home Medications:  Prior to Admission medications    Medication Sig Start Date End Date Taking? Authorizing Provider   alfuzosin (UROXATRAL) 10 MG 24 hr tablet Take 1 tablet by mouth Every Other Day. Does not take on Sundays Tuesdays or Thursdays   Indications: Benign Enlargement of Prostate   Yes Provider, Historical,  MD   aspirin 81 MG tablet Take 1 tablet by mouth Every Night. INSTRUCTED TO CONTINUE THIS FOR SURGERY PER DR. BERMEO'S OFFICE  Indications: Disease involving Lipid Deposits in the Arteries   Yes Elvia Nicholson MD   B Complex-C-Folic Acid (Umm-Peter) tablet Take 1 tablet by mouth Daily. 9/27/23  Yes Elvia Nicholson MD   Cholecalciferol 25 MCG (1000 UT) tablet Take 1 tablet by mouth Daily. Indications: Vitamin D Deficiency   Yes Elvia Nicholson MD   Iron Sucrose (VENOFER IV) Infuse 1 dose into a venous catheter As Needed.   Yes Elvia Nicholson MD   latanoprost (XALATAN) 0.005 % ophthalmic solution Administer 1 drop to both eyes Every Night. Indications: Wide-Angle Glaucoma   Yes Elvia Nicholson MD   Loratadine 10 MG capsule Take 1 capsule by mouth Daily. Indications: Hayfever   Yes Elvia Nicholson MD   midodrine (PROAMATINE) 2.5 MG tablet Take 1 tablet by mouth 3 (Three) Times a Week. Monday, Wednesday, Friday on days of hemodialysis 1/31/25  Yes Juan Daniel Randall MD   sevelamer (RENAGEL) 800 MG tablet Take 1 tablet by mouth 3 (Three) Times a Day With Meals.   Yes Elvia Nicholson MD   sodium chloride 0.65 % nasal spray Administer 2 sprays into the nostril(s) as directed by provider Every Night.   Yes Elvia Nicholson MD   acetaminophen (TYLENOL) 325 MG tablet Take 2 tablets by mouth Every 4 (Four) Hours As Needed for Mild Pain. 1/31/25   Juan Daniel Randall MD   famotidine (Pepcid AC) 10 MG tablet Take 1 tablet by mouth Daily As Needed for Heartburn (take as needed after HD on dialysis days). Indications: Heartburn    Elvia Nicholson MD   lidocaine-prilocaine (EMLA) 2.5-2.5 % cream Apply 1 Application topically to the appropriate area as directed As Needed. Rtejwo-Lzhymqxfc-Delkcw:  ONE HOUR PRIOR TO DIALYSIS  Indications: Anesthesia to a Specific Part of the Body 2/5/18   Elvia Nicholson MD   Methoxy PEG-Epoetin Beta (MIRCERA IJ) Inject 1 dose as directed Every 30  "(Thirty) Days.    Provider, MD Elvia   mupirocin (BACTROBAN) 2 % ointment Apply 1 Application topically to the appropriate area as directed 3 (Three) Times a Day. Indications: Wound Infection  Patient not taking: Reported on 3/23/2025 11/22/24   Bill Deal MD   polyethylene glycol (MIRALAX) 17 g packet Take 17 g by mouth Daily As Needed (Use if senna-docusate is ineffective). 1/31/25   Juan Daniel Randall MD   sennosides-docusate (PERICOLACE) 8.6-50 MG per tablet Take 2 tablets by mouth 2 (Two) Times a Day As Needed for Constipation. 1/31/25   Juan Daniel Randall MD   Sucroferric Oxyhydroxide (Velphoro) 500 MG chewable tablet Chew 2 tablets Daily As Needed (phopurus def). Take 2 tablets by  mouth three times a day.  Indications: Hyperphosphatemia D/T Renal Insufficiency  Patient not taking: Reported on 3/23/2025 11/26/24   Provider, MD Elvia       Allergies:  Allergies   Allergen Reactions    Ace Inhibitors Other (See Comments)     RENAL FAILURE/ raised creatinine    Contrast Dye (Echo Or Unknown Ct/Mr) Other (See Comments) and Anaphylaxis     RENAL FAILURE    Iodine Anaphylaxis    Angiotensin Receptor Blockers Swelling    Eliquis [Apixaban] Arrhythmia     BLEEDING ISSUES; PT CANNOT TAKE ANY ANTICOAGULANTS DUE TO LOW PLATELETS EXCEPT ASPIRIN      Filgrastim GI Intolerance and Confusion     Chest pain    Heparin Other (See Comments)     \"It causes me to bleed out\"    Keflex [Cephalexin] Diarrhea     RENAL FAILURE    Other Angioedema     IVP Dye       Objective     Vitals:   Temp:  [97.1 °F (36.2 °C)-97.7 °F (36.5 °C)] 97.7 °F (36.5 °C)  Heart Rate:  [74-87] 84  Resp:  [18-20] 20  BP: (128-141)/(65-86) 138/70    Intake/Output Summary (Last 24 hours) at 3/23/2025 2127  Last data filed at 3/23/2025 1922  Gross per 24 hour   Intake 600 ml   Output 200 ml   Net 400 ml       Physical Exam:   Constitutional: Awake, alert, NAD, chronically ill, anxious  HEENT: Sclera anicteric, no conjunctival injection  Neck: " Supple, no carotid bruit, trachea at midline, no JVD  Respiratory: Diminished right base more than left; crackles; nonlabored on RA  Cardiovascular: Irregularly irregular, 2/6M, no rub  Vascular: Left arm AV graft patent; some erythema medial to graft; minimally tender  Gastrointestinal: BS+, soft, nontender, distended, + ileostomy  : No palpable bladder  Musculoskeletal: Trace-+1 edema, no clubbing or cyanosis  Psychiatric: Appropriate affect, cooperative, oriented  Neurologic:  moving all extremities, normal speech and mental status  Skin: Warm and dry       Scheduled Meds:     [START ON 3/24/2025] aspirin, 81 mg, Oral, Daily  [START ON 3/24/2025] b complex-vitamin c-folic acid, 1 tablet, Oral, Daily  [START ON 3/24/2025] cetirizine, 10 mg, Oral, Daily  [START ON 3/24/2025] cholecalciferol, 1,000 Units, Oral, Daily  latanoprost, 1 drop, Both Eyes, Nightly  [START ON 3/24/2025] midodrine, 2.5 mg, Oral, TID AC  [START ON 3/24/2025] Sucroferric Oxyhydroxide, 1,000 mg, Oral, TID AC  tamsulosin, 0.4 mg, Oral, Nightly      IV Meds:        Results Reviewed:   I have personally reviewed the results from the time of this admission to 3/23/2025 21:27 EDT     Lab Results   Component Value Date    GLUCOSE 171 (H) 03/23/2025    CALCIUM 7.8 (L) 03/23/2025     (L) 03/23/2025    K 4.3 03/23/2025    CO2 22.2 03/23/2025    CL 93 (L) 03/23/2025    BUN 46 (H) 03/23/2025    CREATININE 6.03 (H) 03/23/2025    EGFRIFAFRI  02/14/2022      Comment:      <15 Indicative of kidney failure.    EGFRIFNONA 7 (L) 02/14/2022    BCR 7.6 03/23/2025    ANIONGAP 13.8 03/23/2025      Lab Results   Component Value Date    MG 1.9 01/25/2025    PHOS 4.0 01/30/2025    ALBUMIN 2.6 (L) 03/23/2025           Assessment / Plan       Sepsis due to cellulitis      ASSESSMENT:  1.  ESRD: Volume excess by exam and imaging; hypervolemic hyponatremia.  Normal potassium and anion gap.  Due for HD tomorrow; last HD was 3/20  2.  Left arm AV graft with recent  bleeding and later drainage from incision site (had had recent shuntogram).  Elevated lactate and procalcitonin; normal white blood cell count (though chronic leukopenia).  CT scan of the left arm does reveal a fluid density  3.  Crohn's disease s/p bowel resection with ileostomy  4.  Cirrhosis with chronic leukopenia and thrombocytopenia  5.  Atrial fibrillation  6.  Chronic hypotension of ESRD:  on midodrine    PLAN:  1.  Vascular evaluation underway  2.  Empiric vancomycin and ceftriaxone given in ER  3.  Will hold off ordering HD tomorrow until greenlight for cannulation given by Vascular  4.  Surveillance labs    Thank you for involving us in the care of Bill Landry.  Please feel free to call with any questions.    Boyd Oates MD  03/23/25  21:27 EDT    [facility]  [phone]      Please note that portions of this note were completed with a voice recognition program.

## 2025-03-24 NOTE — PLAN OF CARE
Problem: Adult Inpatient Plan of Care  Goal: Plan of Care Review  Outcome: Progressing  Flowsheets (Taken 3/24/2025 6657)  Progress: no change  Outcome Evaluation: Pt admitted to  with sepsis d/t cellulitis. Vital signs stable. Pt on room air. No complaints of pain. No bleeding from left arm with AV fistula this shift- scabbed areas above fistula site present. Ileostomy in place with liquid/loose stool output. Son visited. Plan of care ongoing.  Plan of Care Reviewed With: patient

## 2025-03-24 NOTE — DISCHARGE PLACEMENT REQUEST
"Bill Landry (79 y.o. Male)       Date of Birth   1945    Social Security Number       Address   [address] [place] [address]    Home Phone   [phone]    MRN   [id number]       [employer]   [employer]    Marital Status                               Admission Date   3/23/2025    Admission Type   Emergency    Admitting Provider   Farrah Soria MD    Attending Provider   Yoan Perez MD    Department, Room/Bed   [address], E452/1       Discharge Date       Discharge Disposition       Discharge Destination                                 Attending Provider: Yoan Perez MD    Allergies: Ace Inhibitors, Contrast Dye (Echo Or Unknown Ct/mr), Iodine, Angiotensin Receptor Blockers, Eliquis [Apixaban], Filgrastim, Heparin, Keflex [Cephalexin], Other    Isolation: None   Infection: None   Code Status: CPR    Ht: 177.8 cm (70\")   Wt: 84.8 kg (187 lb)    Admission Cmt: None   Principal Problem: Sepsis due to cellulitis [L03.90,A41.9]                   Active Insurance as of 3/23/2025       Primary Coverage       Payor Plan Insurance Group Employer/Plan Group    MEDICARE MEDICARE A & B        Payor Plan Address Payor Plan Phone Number Payor Plan Fax Number Effective Dates    [address] [phone]  10/1/2010 - None Entered    [facility] [address]         Subscriber Name Subscriber Birth Date Member ID       BILL LANDRY 1945 [id number]               Secondary Coverage       Payor Plan Insurance Group Employer/Plan Group    [employer] [place] [employer] [place] [employer]       Payor Plan Address Payor Plan Phone Number Payor Plan Fax Number Effective Dates    [address] [address] [phone]  10/1/2010 - None Entered    [place] [id number]         Subscriber Name Subscriber Birth Date Member ID       BILL LANDRY 1945 [id number]                     Emergency Contacts        (Rel.) Home Phone Work Phone Mobile Phone    " Gillian Landry (Spouse) [phone] -- [phone]    bereket landry (Son) [phone] -- --    KEVIN LANDRY (Son) -- -- [phone]

## 2025-03-24 NOTE — PROGRESS NOTES
Name: Bill Landry ADMIT: 3/23/2025   : 1945  PCP: Bharathi De La Torre MD    MRN: [id number] LOS: 1 days   AGE/SEX: 79 y.o. male  ROOM: [facility]   Subjective   Chief Complaint   Patient presents with    Vascular Access Problem     80 yo with history of ESRD on HD MWF, chron's disease s/p previous bowel resection and end ileostomy, DM2, Afib, cirrhosis, lups and other medical issues. He presented with erythema and drainage from LUE graft site. Came to [facility]. Given IV ABX    ROS  No f/c  No n/v  No cp/palp  No soa/cough    Objective   Vital Signs  Temp:  [97.1 °F (36.2 °C)-97.7 °F (36.5 °C)] 97.3 °F (36.3 °C)  Heart Rate:  [73-89] 89  Resp:  [18-20] 18  BP: (128-141)/(58-86) 135/68  SpO2:  [91 %-97 %] 91 %  on   ;   Device (Oxygen Therapy): room air  Body mass index is 26.83 kg/m².    Physical Exam  HENT:      Head: Normocephalic and atraumatic.   Eyes:      General: No scleral icterus.  Cardiovascular:      Rate and Rhythm: Normal rate. Rhythm irregular.   Pulmonary:      Effort: Pulmonary effort is normal. No respiratory distress.   Abdominal:      General: There is no distension.      Palpations: Abdomen is soft.      Tenderness: There is no abdominal tenderness.   Musculoskeletal:      Cervical back: Neck supple.   Neurological:      Mental Status: He is alert.   Psychiatric:         Behavior: Behavior normal.     LUE graft site    Results Review:       I reviewed the patient's new clinical results.  Results from last 7 days   Lab Units 25  0355 25  1527 25  0000   WBC 10*3/mm3 4.42 4.21  --    HEMOGLOBIN g/dL 9.2* 9.6* 9.8*  29.4*   PLATELETS 10*3/mm3 76* 59*  --      Results from last 7 days   Lab Units 25  0355 25  1527   SODIUM mmol/L 131* 129*   POTASSIUM mmol/L 4.1 4.3   CHLORIDE mmol/L 95* 93*   CO2 mmol/L 21.5* 22.2   BUN mg/dL 49* 46*   CREATININE mg/dL 6.28* 6.03*   GLUCOSE mg/dL 107* 171*   Estimated Creatinine Clearance: 11.4 mL/min (A) (by C-G formula based on  SCr of 6.28 mg/dL (H)).  Results from last 7 days   Lab Units 03/24/25  0355 03/23/25  1527   ALBUMIN g/dL 2.4* 2.6*   BILIRUBIN mg/dL  --  1.2   ALK PHOS U/L  --  283*   AST (SGOT) U/L  --  43*   ALT (SGPT) U/L  --  18     Results from last 7 days   Lab Units 03/24/25  0355 03/23/25  1527   CALCIUM mg/dL 7.8* 7.8*   ALBUMIN g/dL 2.4* 2.6*   MAGNESIUM mg/dL 2.1  --    PHOSPHORUS mg/dL 5.2*  --      Results from last 7 days   Lab Units 03/24/25  0355 03/23/25  2329 03/23/25  2041 03/23/25  1825 03/23/25  1527   PROCALCITONIN ng/mL  --   --   --   --  1.20*   LACTATE mmol/L 1.4 2.2* 2.7* 2.4* 3.6*       Coag   Results from last 7 days   Lab Units 03/23/25  1527   INR  1.36*   APTT seconds 26.7     HbA1C   Lab Results   Component Value Date    HGBA1C 5.3 02/03/2025    HGBA1C 4.80 01/26/2025    HGBA1C 5.40 09/17/2024     Infection   Results from last 7 days   Lab Units 03/23/25  1527 03/23/25  1508   WOUNDCX   --  Light growth (2+) Staphylococcus aureus*   PROCALCITONIN ng/mL 1.20*  --      Radiology(recent) CT Upper Extremity Left With Contrast  Result Date: 3/23/2025  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 Proximal most portions of the fistulas are outside the exam field-of-view. A 3.6 x 2.3 cm focus of subcutaneous fluid density in the inner mid left upper arm extends from the thickened skin surface to abut the patent fistula. No enhancing wall or internal locules of air. Recommend clinical correlation for findings to suggest infection at this site. In the absence of recent surgery, fluid collection raises concern for infection. The other thrombosed fistula is stented proximally and appears ligated distally. 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  This report was finalized on 3/23/2025 6:16 PM by Dr. Bill Minaya M.D on Workstation: [id number]      XR Chest 1 View  Result Date: 3/23/2025   Redemonstrated mild cardiomegaly and marked elevation of the right hemidiaphragm.   "Mild bilateral central vascular congestion and interstitial prominence, no pneumothorax.  Question small right effusion.  This report was finalized on 3/23/2025 5:45 PM by Dr. Tab Tolbert M.D on Workstation: [id number]      No results found for: \"TROPONINT\", \"TROPONINI\", \"BNP\"  No components found for: \"TSH;2\"    aspirin, 81 mg, Oral, Daily  b complex-vitamin c-folic acid, 1 tablet, Oral, Daily  cefTRIAXone, 2,000 mg, Intravenous, Q24H  cetirizine, 10 mg, Oral, Daily  cholecalciferol, 1,000 Units, Oral, Daily  latanoprost, 1 drop, Both Eyes, Nightly  midodrine, 2.5 mg, Oral, TID AC  Sucroferric Oxyhydroxide, 1,000 mg, Oral, TID AC  tamsulosin, 0.4 mg, Oral, Nightly      Pharmacy to dose vancomycin,     Diet: Cardiac; Healthy Heart (2-3 Na+); Fluid Consistency: Thin (IDDSI 0)  NPO Diet NPO Type: Strict NPO      Assessment & Plan      Active Hospital Problems    Diagnosis  POA    **Sepsis due to cellulitis [L03.90, A41.9]  Yes    ESRD on dialysis [N18.6, Z99.2]  Not Applicable    Problem with vascular access [Z78.9]  Yes    Anemia due to stage 4 chronic kidney disease treated with erythropoietin [N18.4, D63.1]  Yes    Cirrhosis of liver without ascites [K74.60]  Yes    Permanent atrial fibrillation [I48.21]  Yes      Resolved Hospital Problems   No resolved problems to display.     80 yo with history of ESRD on HD MWF, chron's disease s/p previous bowel resection and end ileostomy, DM2, Afib, cirrhosis, lups and other medical issues. He presented with erythema and drainage from LUE graft site. Came to [facility] ER. Given IV ABX    Vascular and ID have evaluated. Likely plans for 6 weeks abx. Follow up cultures  Continue home asa, midodrine. Not on a/c due to previous bleeding  Nephrology following and plans for HD today  Monitor labs      DW NINOSKA Perez MD  [facility]  03/24/25  10:52 EDT  "

## 2025-03-25 ENCOUNTER — APPOINTMENT (OUTPATIENT)
Dept: GENERAL RADIOLOGY | Facility: HOSPITAL | Age: 80
End: 2025-03-25
Payer: MEDICARE

## 2025-03-25 ENCOUNTER — ANESTHESIA EVENT (OUTPATIENT)
Dept: PERIOP | Facility: HOSPITAL | Age: 80
End: 2025-03-25
Payer: MEDICARE

## 2025-03-25 ENCOUNTER — ANESTHESIA (OUTPATIENT)
Dept: PERIOP | Facility: HOSPITAL | Age: 80
End: 2025-03-25
Payer: MEDICARE

## 2025-03-25 LAB
ALBUMIN SERPL-MCNC: 2.3 G/DL (ref 3.5–5.2)
ANION GAP SERPL CALCULATED.3IONS-SCNC: 8.3 MMOL/L (ref 5–15)
BACTERIA SPEC AEROBE CULT: ABNORMAL
BUN SERPL-MCNC: 29 MG/DL (ref 8–23)
BUN/CREAT SERPL: 6.1 (ref 7–25)
CALCIUM SPEC-SCNC: 8.1 MG/DL (ref 8.6–10.5)
CHLORIDE SERPL-SCNC: 99 MMOL/L (ref 98–107)
CO2 SERPL-SCNC: 22.7 MMOL/L (ref 22–29)
CREAT SERPL-MCNC: 4.75 MG/DL (ref 0.76–1.27)
DEPRECATED RDW RBC AUTO: 52.3 FL (ref 37–54)
EGFRCR SERPLBLD CKD-EPI 2021: 11.8 ML/MIN/1.73
ERYTHROCYTE [DISTWIDTH] IN BLOOD BY AUTOMATED COUNT: 14.5 % (ref 12.3–15.4)
GLUCOSE BLDC GLUCOMTR-MCNC: 116 MG/DL (ref 70–130)
GLUCOSE BLDC GLUCOMTR-MCNC: 118 MG/DL (ref 70–130)
GLUCOSE SERPL-MCNC: 129 MG/DL (ref 65–99)
GRAM STN SPEC: ABNORMAL
GRAM STN SPEC: ABNORMAL
HCT VFR BLD AUTO: 28.3 % (ref 37.5–51)
HGB BLD-MCNC: 9.5 G/DL (ref 13–17.7)
MCH RBC QN AUTO: 33.1 PG (ref 26.6–33)
MCHC RBC AUTO-ENTMCNC: 33.6 G/DL (ref 31.5–35.7)
MCV RBC AUTO: 98.6 FL (ref 79–97)
PHOSPHATE SERPL-MCNC: 4 MG/DL (ref 2.5–4.5)
PLATELET # BLD AUTO: 86 10*3/MM3 (ref 140–450)
PMV BLD AUTO: 9.1 FL (ref 6–12)
POTASSIUM SERPL-SCNC: 4.4 MMOL/L (ref 3.5–5.2)
RBC # BLD AUTO: 2.87 10*6/MM3 (ref 4.14–5.8)
SODIUM SERPL-SCNC: 130 MMOL/L (ref 136–145)
WBC NRBC COR # BLD AUTO: 6.38 10*3/MM3 (ref 3.4–10.8)

## 2025-03-25 PROCEDURE — 25010000002 PROPOFOL 10 MG/ML EMULSION: Performed by: NURSE ANESTHETIST, CERTIFIED REGISTERED

## 2025-03-25 PROCEDURE — C1750 CATH, HEMODIALYSIS,LONG-TERM: HCPCS | Performed by: SURGERY

## 2025-03-25 PROCEDURE — 25810000003 SODIUM CHLORIDE 0.9 % SOLUTION: Performed by: ANESTHESIOLOGY

## 2025-03-25 PROCEDURE — 25010000002 PHENYLEPHRINE 10 MG/ML SOLUTION 5 ML VIAL: Performed by: NURSE ANESTHETIST, CERTIFIED REGISTERED

## 2025-03-25 PROCEDURE — 0J9F0ZZ DRAINAGE OF LEFT UPPER ARM SUBCUTANEOUS TISSUE AND FASCIA, OPEN APPROACH: ICD-10-PCS | Performed by: SURGERY

## 2025-03-25 PROCEDURE — 02HV33Z INSERTION OF INFUSION DEVICE INTO SUPERIOR VENA CAVA, PERCUTANEOUS APPROACH: ICD-10-PCS | Performed by: SURGERY

## 2025-03-25 PROCEDURE — 85027 COMPLETE CBC AUTOMATED: CPT | Performed by: INTERNAL MEDICINE

## 2025-03-25 PROCEDURE — 25810000003 SODIUM CHLORIDE 0.9 % SOLUTION 250 ML FLEX CONT: Performed by: NURSE ANESTHETIST, CERTIFIED REGISTERED

## 2025-03-25 PROCEDURE — 25010000002 CEFAZOLIN PER 500 MG: Performed by: SURGERY

## 2025-03-25 PROCEDURE — 82948 REAGENT STRIP/BLOOD GLUCOSE: CPT

## 2025-03-25 PROCEDURE — 99232 SBSQ HOSP IP/OBS MODERATE 35: CPT | Performed by: STUDENT IN AN ORGANIZED HEALTH CARE EDUCATION/TRAINING PROGRAM

## 2025-03-25 PROCEDURE — C1894 INTRO/SHEATH, NON-LASER: HCPCS | Performed by: SURGERY

## 2025-03-25 PROCEDURE — 0JH63XZ INSERTION OF TUNNELED VASCULAR ACCESS DEVICE INTO CHEST SUBCUTANEOUS TISSUE AND FASCIA, PERCUTANEOUS APPROACH: ICD-10-PCS | Performed by: SURGERY

## 2025-03-25 PROCEDURE — 5A1D70Z PERFORMANCE OF URINARY FILTRATION, INTERMITTENT, LESS THAN 6 HOURS PER DAY: ICD-10-PCS | Performed by: SURGERY

## 2025-03-25 PROCEDURE — B51W1ZZ FLUOROSCOPY OF DIALYSIS SHUNT/FISTULA USING LOW OSMOLAR CONTRAST: ICD-10-PCS | Performed by: SURGERY

## 2025-03-25 PROCEDURE — G0545 PR INHERENT VISIT TO INPT: HCPCS | Performed by: STUDENT IN AN ORGANIZED HEALTH CARE EDUCATION/TRAINING PROGRAM

## 2025-03-25 PROCEDURE — 80069 RENAL FUNCTION PANEL: CPT | Performed by: HOSPITALIST

## 2025-03-25 PROCEDURE — 25010000002 ALTEPLASE 2 MG RECONSTITUTED SOLUTION: Performed by: SURGERY

## 2025-03-25 PROCEDURE — 71045 X-RAY EXAM CHEST 1 VIEW: CPT

## 2025-03-25 PROCEDURE — 0JPT3XZ REMOVAL OF TUNNELED VASCULAR ACCESS DEVICE FROM TRUNK SUBCUTANEOUS TISSUE AND FASCIA, PERCUTANEOUS APPROACH: ICD-10-PCS | Performed by: SURGERY

## 2025-03-25 PROCEDURE — 25010000002 LIDOCAINE 1 % SOLUTION 20 ML VIAL: Performed by: SURGERY

## 2025-03-25 PROCEDURE — 25010000002 BUPIVACAINE (PF) 0.25 % SOLUTION 30 ML VIAL: Performed by: SURGERY

## 2025-03-25 PROCEDURE — 25010000002 PHENYLEPHRINE 10 MG/ML SOLUTION: Performed by: NURSE ANESTHETIST, CERTIFIED REGISTERED

## 2025-03-25 RX ORDER — ONDANSETRON 2 MG/ML
4 INJECTION INTRAMUSCULAR; INTRAVENOUS ONCE AS NEEDED
Status: DISCONTINUED | OUTPATIENT
Start: 2025-03-25 | End: 2025-03-25 | Stop reason: HOSPADM

## 2025-03-25 RX ORDER — LIDOCAINE HYDROCHLORIDE 10 MG/ML
0.5 INJECTION, SOLUTION INFILTRATION; PERINEURAL ONCE AS NEEDED
Status: DISCONTINUED | OUTPATIENT
Start: 2025-03-25 | End: 2025-03-25 | Stop reason: HOSPADM

## 2025-03-25 RX ORDER — DIPHENHYDRAMINE HYDROCHLORIDE 50 MG/ML
12.5 INJECTION, SOLUTION INTRAMUSCULAR; INTRAVENOUS
Status: DISCONTINUED | OUTPATIENT
Start: 2025-03-25 | End: 2025-03-25 | Stop reason: HOSPADM

## 2025-03-25 RX ORDER — DROPERIDOL 2.5 MG/ML
0.62 INJECTION, SOLUTION INTRAMUSCULAR; INTRAVENOUS
Status: DISCONTINUED | OUTPATIENT
Start: 2025-03-25 | End: 2025-03-25 | Stop reason: HOSPADM

## 2025-03-25 RX ORDER — HYDRALAZINE HYDROCHLORIDE 20 MG/ML
5 INJECTION INTRAMUSCULAR; INTRAVENOUS
Status: DISCONTINUED | OUTPATIENT
Start: 2025-03-25 | End: 2025-03-25 | Stop reason: HOSPADM

## 2025-03-25 RX ORDER — IPRATROPIUM BROMIDE AND ALBUTEROL SULFATE 2.5; .5 MG/3ML; MG/3ML
3 SOLUTION RESPIRATORY (INHALATION) ONCE AS NEEDED
Status: DISCONTINUED | OUTPATIENT
Start: 2025-03-25 | End: 2025-03-25 | Stop reason: HOSPADM

## 2025-03-25 RX ORDER — HYDROCODONE BITARTRATE AND ACETAMINOPHEN 5; 325 MG/1; MG/1
1 TABLET ORAL EVERY 4 HOURS PRN
Status: ACTIVE | OUTPATIENT
Start: 2025-03-25 | End: 2025-03-30

## 2025-03-25 RX ORDER — NALOXONE HCL 0.4 MG/ML
0.2 VIAL (ML) INJECTION AS NEEDED
Status: DISCONTINUED | OUTPATIENT
Start: 2025-03-25 | End: 2025-03-25 | Stop reason: HOSPADM

## 2025-03-25 RX ORDER — PROPOFOL 10 MG/ML
VIAL (ML) INTRAVENOUS AS NEEDED
Status: DISCONTINUED | OUTPATIENT
Start: 2025-03-25 | End: 2025-03-25 | Stop reason: SURG

## 2025-03-25 RX ORDER — EPHEDRINE SULFATE 50 MG/ML
5 INJECTION, SOLUTION INTRAVENOUS ONCE AS NEEDED
Status: DISCONTINUED | OUTPATIENT
Start: 2025-03-25 | End: 2025-03-25 | Stop reason: HOSPADM

## 2025-03-25 RX ORDER — NITROGLYCERIN 0.4 MG/1
0.4 TABLET SUBLINGUAL
Status: DISCONTINUED | OUTPATIENT
Start: 2025-03-25 | End: 2025-04-03 | Stop reason: HOSPADM

## 2025-03-25 RX ORDER — SODIUM CHLORIDE 0.9 % (FLUSH) 0.9 %
3 SYRINGE (ML) INJECTION EVERY 12 HOURS SCHEDULED
Status: DISCONTINUED | OUTPATIENT
Start: 2025-03-25 | End: 2025-03-25 | Stop reason: HOSPADM

## 2025-03-25 RX ORDER — FLUMAZENIL 0.1 MG/ML
0.2 INJECTION INTRAVENOUS AS NEEDED
Status: DISCONTINUED | OUTPATIENT
Start: 2025-03-25 | End: 2025-03-25 | Stop reason: HOSPADM

## 2025-03-25 RX ORDER — FAMOTIDINE 10 MG/ML
20 INJECTION, SOLUTION INTRAVENOUS ONCE
Status: COMPLETED | OUTPATIENT
Start: 2025-03-25 | End: 2025-03-25

## 2025-03-25 RX ORDER — HYDROMORPHONE HYDROCHLORIDE 1 MG/ML
0.25 INJECTION, SOLUTION INTRAMUSCULAR; INTRAVENOUS; SUBCUTANEOUS
Status: DISCONTINUED | OUTPATIENT
Start: 2025-03-25 | End: 2025-03-25 | Stop reason: HOSPADM

## 2025-03-25 RX ORDER — HYDROCODONE BITARTRATE AND ACETAMINOPHEN 5; 325 MG/1; MG/1
1 TABLET ORAL ONCE AS NEEDED
Status: DISCONTINUED | OUTPATIENT
Start: 2025-03-25 | End: 2025-03-25 | Stop reason: HOSPADM

## 2025-03-25 RX ORDER — ATROPINE SULFATE 0.4 MG/ML
0.4 INJECTION, SOLUTION INTRAMUSCULAR; INTRAVENOUS; SUBCUTANEOUS ONCE AS NEEDED
Status: DISCONTINUED | OUTPATIENT
Start: 2025-03-25 | End: 2025-03-25 | Stop reason: HOSPADM

## 2025-03-25 RX ORDER — PROMETHAZINE HYDROCHLORIDE 25 MG/1
25 SUPPOSITORY RECTAL ONCE AS NEEDED
Status: DISCONTINUED | OUTPATIENT
Start: 2025-03-25 | End: 2025-03-25 | Stop reason: HOSPADM

## 2025-03-25 RX ORDER — FENTANYL CITRATE 50 UG/ML
25 INJECTION, SOLUTION INTRAMUSCULAR; INTRAVENOUS
Status: DISCONTINUED | OUTPATIENT
Start: 2025-03-25 | End: 2025-03-25 | Stop reason: HOSPADM

## 2025-03-25 RX ORDER — PROMETHAZINE HYDROCHLORIDE 25 MG/1
25 TABLET ORAL ONCE AS NEEDED
Status: DISCONTINUED | OUTPATIENT
Start: 2025-03-25 | End: 2025-03-25 | Stop reason: HOSPADM

## 2025-03-25 RX ORDER — HYDROCODONE BITARTRATE AND ACETAMINOPHEN 7.5; 325 MG/1; MG/1
1 TABLET ORAL EVERY 4 HOURS PRN
Status: DISCONTINUED | OUTPATIENT
Start: 2025-03-25 | End: 2025-03-25 | Stop reason: HOSPADM

## 2025-03-25 RX ORDER — PHENYLEPHRINE HYDROCHLORIDE 10 MG/ML
INJECTION INTRAVENOUS AS NEEDED
Status: DISCONTINUED | OUTPATIENT
Start: 2025-03-25 | End: 2025-03-25 | Stop reason: SURG

## 2025-03-25 RX ORDER — SODIUM CHLORIDE 0.9 % (FLUSH) 0.9 %
3-10 SYRINGE (ML) INJECTION AS NEEDED
Status: DISCONTINUED | OUTPATIENT
Start: 2025-03-25 | End: 2025-03-25 | Stop reason: HOSPADM

## 2025-03-25 RX ORDER — LABETALOL HYDROCHLORIDE 5 MG/ML
5 INJECTION, SOLUTION INTRAVENOUS
Status: DISCONTINUED | OUTPATIENT
Start: 2025-03-25 | End: 2025-03-25 | Stop reason: HOSPADM

## 2025-03-25 RX ORDER — SODIUM CHLORIDE 9 MG/ML
9 INJECTION, SOLUTION INTRAVENOUS CONTINUOUS PRN
Status: DISCONTINUED | OUTPATIENT
Start: 2025-03-25 | End: 2025-03-25 | Stop reason: HOSPADM

## 2025-03-25 RX ADMIN — MIDODRINE HYDROCHLORIDE 2.5 MG: 2.5 TABLET ORAL at 13:50

## 2025-03-25 RX ADMIN — PROPOFOL 80 MG: 10 INJECTION, EMULSION INTRAVENOUS at 10:43

## 2025-03-25 RX ADMIN — PROPOFOL 30 MCG/KG/MIN: 10 INJECTION, EMULSION INTRAVENOUS at 10:50

## 2025-03-25 RX ADMIN — TAMSULOSIN HYDROCHLORIDE 0.4 MG: 0.4 CAPSULE ORAL at 20:09

## 2025-03-25 RX ADMIN — SEVELAMER CARBONATE 800 MG: 800 TABLET, FILM COATED ORAL at 13:50

## 2025-03-25 RX ADMIN — CEFAZOLIN 2000 MG: 2 INJECTION, POWDER, FOR SOLUTION INTRAMUSCULAR; INTRAVENOUS at 10:27

## 2025-03-25 RX ADMIN — SODIUM CHLORIDE 9 ML/HR: 9 INJECTION, SOLUTION INTRAVENOUS at 09:09

## 2025-03-25 RX ADMIN — MIDODRINE HYDROCHLORIDE 2.5 MG: 2.5 TABLET ORAL at 17:05

## 2025-03-25 RX ADMIN — LATANOPROST 1 DROP: 50 SOLUTION/ DROPS OPHTHALMIC at 20:09

## 2025-03-25 RX ADMIN — PHENYLEPHRINE HYDROCHLORIDE 100 MCG: 10 INJECTION INTRAVENOUS at 11:12

## 2025-03-25 RX ADMIN — FAMOTIDINE 20 MG: 20 TABLET, FILM COATED ORAL at 13:51

## 2025-03-25 RX ADMIN — Medication 3 ML: at 13:52

## 2025-03-25 RX ADMIN — PHENYLEPHRINE HYDROCHLORIDE 100 MCG: 10 INJECTION INTRAVENOUS at 11:02

## 2025-03-25 RX ADMIN — FAMOTIDINE 20 MG: 10 INJECTION INTRAVENOUS at 09:09

## 2025-03-25 RX ADMIN — PHENYLEPHRINE HYDROCHLORIDE 100 MCG: 10 INJECTION INTRAVENOUS at 10:43

## 2025-03-25 RX ADMIN — PHENYLEPHRINE HYDROCHLORIDE 100 MCG: 10 INJECTION INTRAVENOUS at 11:09

## 2025-03-25 RX ADMIN — SEVELAMER CARBONATE 800 MG: 800 TABLET, FILM COATED ORAL at 17:05

## 2025-03-25 RX ADMIN — PHENYLEPHRINE HYDROCHLORIDE 0.5 MCG/KG/MIN: 10 INJECTION, SOLUTION INTRAVENOUS at 11:05

## 2025-03-25 NOTE — ANESTHESIA POSTPROCEDURE EVALUATION
Patient: Bill Landry    Procedure Summary       Date: 03/25/25 Room / Location: [facility] OR [facility] HYBRID OR; [facility] XRAY OR    Anesthesia Start: 1034 Anesthesia Stop: 1135    Procedures:       HEMODIALYSIS CATHETER INSERTION, left arm I&D      INCISION AND DRAINAGE UPPER EXTREMITY (Left: Arm Upper)      ARTERIOGRAM (AUTOFINALIZE) Diagnosis:       ESRD on dialysis      Problem with vascular access      Sepsis due to cellulitis      (ESRD on dialysis [N18.6, Z99.2])      (Problem with vascular access [Z78.9])      (Sepsis due to cellulitis [L03.90, A41.9])      (sepsis)    Scheduled Providers: Bill Deal MD Provider: Cassie Smith MD    Anesthesia Type: general ASA Status: 3            Anesthesia Type: general    Vitals  Vitals Value Taken Time   /50 03/25/25 13:15   Temp 36.5 °C (97.7 °F) 03/25/25 13:00   Pulse 73 03/25/25 13:15   Resp 15 03/25/25 13:00   SpO2 98 % 03/25/25 13:15   Vitals shown include unfiled device data.        Post Anesthesia Care and Evaluation    Patient location during evaluation: bedside  Patient participation: complete - patient participated  Level of consciousness: awake  Pain management: adequate    Airway patency: patent  Anesthetic complications: No anesthetic complications    Cardiovascular status: acceptable  Respiratory status: acceptable  Hydration status: acceptable

## 2025-03-25 NOTE — SIGNIFICANT NOTE
03/25/25 1544   OTHER   Discipline physical therapist   Rehab Time/Intention   Session Not Performed other (see comments)  (Attempted PT eval again this afternoon. RN requesting hold, as BP is a concern following procedure. Will f/u)   Therapy Assessment/Plan (PT)   Criteria for Skilled Interventions Met (PT) yes   Recommendation   PT - Next Appointment 03/26/25

## 2025-03-25 NOTE — PROGRESS NOTES
LOS: 2 days     Chief Complaint: Vascular graft infection    Interval History: Patient seen in preop this morning and reports he is doing well.  Denies any acute complaints.  Tolerating antibiotics.    Vital Signs  Temp:  [97.3 °F (36.3 °C)-98.5 °F (36.9 °C)] 98.5 °F (36.9 °C)  Heart Rate:  [77-98] 98  Resp:  [16-20] 18  BP: (108-140)/(56-85) 140/58    Physical Exam:  General: In no acute distress  HEENT: Oropharynx clear, moist mucous membranes  Respiratory: Normal work of breathing  Skin: Left upper extremity dressing in place.  Access: Peripheral IV    Antibiotics:  Anti-Infectives (From admission, onward)      Ordered     Dose/Rate Route Frequency Start Stop    03/25/25 0803  ceFAZolin 2000 mg IVPB in 100 mL NS (MBP)        Ordering Provider: Bill Deal MD    2,000 mg  over 30 Minutes Intravenous Once 03/25/25 0805      03/24/25 1407  vancomycin 750 mg in sodium chloride 0.9 % 250 mL IVPB-VTB        Ordering Provider: Yoan Perez MD    750 mg  333.3 mL/hr over 45 Minutes Intravenous Once 03/24/25 2000 03/24/25 2223    03/24/25 0813  cefTRIAXone (ROCEPHIN) 2,000 mg in sodium chloride 0.9 % 100 mL MBP        Ordering Provider: Bill Deal MD    2,000 mg  200 mL/hr over 30 Minutes Intravenous Every 24 Hours 03/24/25 1600 03/31/25 1559    03/24/25 0813  Pharmacy to dose vancomycin        Ordering Provider: Bill Deal MD     Not Applicable Continuous PRN 03/24/25 0812 05/08/25 0811    03/23/25 1509  cefTRIAXone (ROCEPHIN) 2,000 mg in sodium chloride 0.9 % 100 mL MBP        Ordering Provider: Cooper Ureña MD    2,000 mg  200 mL/hr over 30 Minutes Intravenous Once 03/23/25 1525 03/23/25 1632    03/23/25 1506  vancomycin 1750 mg/500 mL 0.9% NS IVPB ([facility])        Ordering Provider: Cooper Ureña MD    20 mg/kg × 85 kg  over 105 Minutes Intravenous Once 03/23/25 1522 03/23/25 1922             Results Review:     I reviewed the patient's new clinical results.    Lab Results   Component Value  Date    WBC 6.38 03/25/2025    HGB 9.5 (L) 03/25/2025    HCT 28.3 (L) 03/25/2025    MCV 98.6 (H) 03/25/2025    PLT 86 (L) 03/25/2025     Lab Results   Component Value Date    GLUCOSE 129 (H) 03/25/2025    BUN 29 (H) 03/25/2025    CREATININE 4.75 (H) 03/25/2025    EGFRIFNONA 7 (L) 02/14/2022    EGFRIFAFRI  02/14/2022      Comment:      <15 Indicative of kidney failure.    BCR 6.1 (L) 03/25/2025    CO2 22.7 03/25/2025    CALCIUM 8.1 (L) 03/25/2025    ALBUMIN 2.3 (L) 03/25/2025    AST 43 (H) 03/23/2025    ALT 18 03/23/2025       Lab Results   Component Value Date    VANCORANDOM 17.50 03/24/2025        Microbiology:  Microbiology Results (last 10 days)       Procedure Component Value - Date/Time    Wound Culture - Wound, Arm, Left [[id number]]  (Abnormal) Collected: 03/24/25 0805    Lab Status: Preliminary result Specimen: Wound from Arm, Left Updated: 03/25/25 0910     Wound Culture Light growth (2+) Staphylococcus aureus     Gram Stain Few (2+) WBCs seen      Rare (1+) Gram positive cocci    Blood Culture - Blood, Arm, Right [[id number]]  (Normal) Collected: 03/23/25 1549    Lab Status: Preliminary result Specimen: Blood from Arm, Right Updated: 03/24/25 1600     Blood Culture No growth at 24 hours    Blood Culture - Blood, Hand, Left [[id number]]  (Normal) Collected: 03/23/25 1549    Lab Status: Preliminary result Specimen: Blood from Hand, Left Updated: 03/24/25 1600     Blood Culture No growth at 24 hours    Narrative:      Less than seven (7) mL's of blood was collected.  Insufficient quantity may yield false negative results.    Wound Culture - Swab, Arm, Left [[id number]]  (Abnormal)  (Susceptibility) Collected: 03/23/25 1508    Lab Status: Final result Specimen: Swab from Arm, Left Updated: 03/25/25 0723     Wound Culture Light growth (2+) Staphylococcus aureus     Gram Stain Occasional Gram positive cocci      Rare (1+) WBCs seen    Susceptibility        Staphylococcus aureus      KANG      Clindamycin Susceptible       Erythromycin Susceptible      Oxacillin Susceptible      Rifampin Susceptible      Tetracycline Susceptible      Trimethoprim + Sulfamethoxazole Susceptible      Vancomycin Susceptible                                      Isolation:   Contact    Assessment    #Left upper extremity graft infection  #End-stage renal disease on hemodialysis via AV graft  #Type 2 diabetes  #Crohn's disease status post bowel resection end ileostomy  #Cirrhosis with chronic leukopenia and thrombocytopenia    Wound cultures growing MSSA.  Plan to stop vancomycin and ceftriaxone.  Start cefazolin 1 g every 24 hours (daily dose for HD).  Plans for OR today and will follow-up operative findings.    ID will follow.   -----------------------------------------------------------------------------------------------  The above decisions regarding antimicrobials incorporates elements of engaging in complex medical decision-making associated with antimicrobial prescribing including considerations such as antimicrobial resistance patterns, emergence of new variants/strains, recent antibiotic exposure, interactions/complications from comorbidities including concurrent infections, public health considerations to minimize development of antimicrobial resistance, and emerging and re-emerging infections.

## 2025-03-25 NOTE — DISCHARGE PLACEMENT REQUEST
"Bill Landry (79 y.o. Male)       Date of Birth   1945    Social Security Number       Address   [address] [place] [address]    Home Phone   [phone]    MRN   [id number]       Orth[employer]   [facility]    Marital Status                               Admission Date   3/23/2025    Admission Type   Emergency    Admitting Provider   Farrah Soria MD    Attending Provider   Yoan Perez MD    Department, Room/Bed   [address], E452/1       Discharge Date       Discharge Disposition       Discharge Destination                                 Attending Provider: Yoan Perez MD    Allergies: Ace Inhibitors, Contrast Dye (Echo Or Unknown Ct/mr), Iodine, Angiotensin Receptor Blockers, Eliquis [Apixaban], Filgrastim, Heparin, Keflex [Cephalexin], Other    Isolation: Contact   Infection: Candida Auris (rule out) (03/24/25)   Code Status: CPR    Ht: 177.8 cm (70\")   Wt: 83.9 kg (184 lb 14.4 oz)    Admission Cmt: None   Principal Problem: Sepsis due to cellulitis [L03.90,A41.9]                   Active Insurance as of 3/23/2025       Primary Coverage       Payor Plan Insurance Group Employer/Plan Group    MEDICARE MEDICARE A & B        Payor Plan Address Payor Plan Phone Number Payor Plan Fax Number Effective Dates    [address] [phone]  10/1/2010 - None Entered    [facility] [id number]         Subscriber Name Subscriber Birth Date Member ID       BILL LANDRY 1945 [id number]               Secondary Coverage       Payor Plan Insurance Group Employer/Plan Group    [employer] [place] [employer] [place] [employer]       Payor Plan Address Payor Plan Phone Number Payor Plan Fax Number Effective Dates    [address] [place] [address] [phone]  10/1/2010 - None Entered    [place] [address]         Subscriber Name Subscriber Birth Date Member ID       BILL LANDRY 1945 [id number]                     Emergency Contacts        " (Rel.) Home Phone Work Phone Mobile Phone    Gillian Rothman (Spouse) [phone] -- [phone]    bereket rothman (Son) [phone] -- [phone]    KEVIN ROTHMAN (Son) -- -- [phone]                 History & Physical        Stingl, Farrah Mercado MD at 25 1837          HISTORY AND PHYSICAL   [facility]        Date of Admission: 3/23/2025  Patient Identification:  Name: Bill Rothman  Age: 79 y.o.  Sex: male  :  1945  MRN: [id number]                     Primary Care Physician: Bharathi De La Torre MD    Chief Complaint:  79 year old gentleman presented to the emergency room with bleeding from his av fistula; he has a history of esrd and last had dialysis two days ago; he has had revisions to the site in the past; he has noted some redness and pain in the area as well; no fever or chills; he has noted some purulent drainage along with bleeding initially reported; no family is present with him    History of Present Illness:   As above    Past Medical History:  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    Aortic stenosis     Bicuspid aortic valve 2022    Calculus of kidney     Chronic leukopenia and thrombocytopenia     Cirrhosis of liver without ascites 2017    Congestive splenomegaly 2017    Crohn disease     with ileostomy    Decreased white blood cell count, unspecified     Diverticula of colon     ESRD on hemodialysis 2018    M-W-F FRESENIUS    Fatty liver disease, nonalcoholic     Fistula 2018    Other Specified Complication    Gastrointestinal hemorrhage, unspecified     GERD (gastroesophageal reflux disease)     GI bleed     Glaucoma     H/O Gallstone pancreatitis     H/O Pericardial effusion     H/O sinus bradycardia     History of hypertension      resolved    History of UTI     Hx of renal calculi     Ileostomy present     Iron deficiency     Leakage of heart valve prosthesis, subsequent encounter     MGUS (monoclonal gammopathy of unknown significance)     TATE (obstructive sleep apnea)     Other hemoglobinopathies     Pericardial effusion (noninflammatory) 1/18/2018    Permanent atrial fibrillation     Scarlet fever, uncomplicated     Stenosis of other vascular prosthetic devices, implants and grafts, initial encounter 02/14/2022    Systemic lupus erythematosus, unspecified     Thrombocytopenia     undpecified    Type 2 diabetes mellitus     CURRENTLY TAKES NO MED    Urinary retention     Post-OP     Past Surgical History:  Past Surgical History:   Procedure Laterality Date    APPENDECTOMY  06/1968    ARTERIOVENOUS FISTULA/SHUNT SURGERY Left 08/08/2017    Procedure: LEFT BRACHIAL CEPHALIC AV FISTULA FORMATION WITH CEPHALIC VEIN TRANSPOSITION ;  Surgeon: Bill Deal MD;  Location: [facility] OR;  Service:     ARTERIOVENOUS FISTULA/SHUNT SURGERY Left 05/27/2022    Procedure: OPEN REVISION LEFT ARTERIOVENOUS INTERPOSITION GRAFT PLACEMENT;  Surgeon: Bill Deal MD;  Location: [facility] OR;  Service: Vascular;  Laterality: Left;    ARTERIOVENOUS FISTULA/SHUNT SURGERY Left 11/19/2024    Procedure: LEFT ARTERIOVENOUS SHUNT GRAFT REVISION;  Surgeon: Bill Deal MD;  Location: [facility] OR;  Service: Vascular;  Laterality: Left;    ARTERIOVENOUS FISTULA/SHUNT SURGERY W/ HEMODIALYSIS CATHETER INSERTION Left 02/15/2022    Procedure: OPEN LEFT ARM ARTERIOVENOUS FISTULA THROMBECTOMY WITH CEPHALIC VEIN ARTHROPLASTY AND STENT/GRAFT PLACEMENT;  Surgeon: Bill Deal MD;  Location: [facility] 18/19;  Service: Vascular;  Laterality: Left;    CATARACT EXTRACTION WITH INTRAOCULAR LENS IMPLANT Bilateral 2004    CHOLECYSTECTOMY  2011    COLECTOMY PARTIAL / TOTAL      History of inflammatory bowel disease with status post colectomy with ileostomy  "many years ago in the [facility],  18 YEARS OF AGE    COLON SURGERY      Colon resection with colostomy    COLON SURGERY      COLONOSCOPY  01/2018    CYSTOSCOPY LITHOLAPAXY BLADDER STONE EXTRACTION      ENDOSCOPY  01/16/2015    gastritis    ENDOSCOPY  01/17/2018    Procedure: ESOPHAGOGASTRODUODENOSCOPY;  Surgeon: Warner Neville MD;  Location: [facility] ENDOSCOPY;  Service:     ILEOSCOPY  01/16/2015    normal    ILEOSCOPY N/A 01/17/2018    Procedure: ILEOSCOPY;  Surgeon: Warner Neville MD;  Location: [facility] ENDOSCOPY;  Service:     ILEOSTOMY  12/1968    loop ostomy bypass    INCISION AND DRAINAGE ARM Left 10/27/2023    Procedure: LEFT ARM INCISION AND DRAINAGE;  Surgeon: Bill Deal MD;  Location: [facility] MAIN OR;  Service: Vascular;  Laterality: Left;    INSERTION HEMODIALYSIS CATHETER Right 11/13/2024    Procedure: Tunnelled HEMODIALYSIS CATHETER INSERTION;  Surgeon: Ben Barth MD;  Location: [facility] HYBRID OR;  Service: Vascular;  Laterality: Right;    OTHER SURGICAL HISTORY  2009    kidney stones x3    PSEUDOANEURYSM REPAIR Left 10/27/2023    TONSILLECTOMY  1950      Home Meds:  (Not in a hospital admission)      Allergies:  Allergies   Allergen Reactions    Ace Inhibitors Other (See Comments)     RENAL FAILURE/ raised creatinine    Contrast Dye (Echo Or Unknown Ct/Mr) Other (See Comments) and Anaphylaxis     RENAL FAILURE    Iodine Anaphylaxis    Angiotensin Receptor Blockers Swelling    Eliquis [Apixaban] Arrhythmia     BLEEDING ISSUES; PT CANNOT TAKE ANY ANTICOAGULANTS DUE TO LOW PLATELETS EXCEPT ASPIRIN      Filgrastim GI Intolerance and Confusion     Chest pain    Heparin Other (See Comments)     \"It causes me to bleed out\"    Keflex [Cephalexin] Diarrhea     RENAL FAILURE    Other Angioedema     IVP Dye     Immunizations:  Immunization History   Administered Date(s) Administered    COVID-19 (PFIZER) Purple Cap Monovalent 02/09/2021, 03/02/2021, 11/02/2021    Covid-19 (Pfizer) Gray Cap " Monovalent 07/26/2022    Fluzone High-Dose 65+YRS 09/21/2016, 09/29/2017, 09/03/2018, 09/16/2020    Fluzone High-Dose 65+yrs 09/16/2020, 12/08/2021, 10/03/2022    Hep B, Dialysis 04/11/2018, 06/13/2018    Hepatitis B Adult/Adolescent IM 04/11/2018, 05/09/2018, 06/13/2018, 10/10/2018, 04/16/2021, 05/12/2021    Influenza, Unspecified 10/03/2022    Pneumococcal Polysaccharide (PPSV23) 02/21/2018    Tdap 04/18/2023     Social History:   Social History     Social History Narrative    Not on file     Social History     Socioeconomic History    Marital status:      Spouse name: Gillian    Number of children: 2    Years of education: College   Tobacco Use    Smoking status: Never     Passive exposure: Never    Smokeless tobacco: Never   Vaping Use    Vaping status: Never Used   Substance and Sexual Activity    Alcohol use: No     Comment: caffeine use: none    Drug use: No    Sexual activity: Defer       Family History:  Family History   Problem Relation Age of Onset    Heart failure Mother 78    Hypertension Mother     Heart disease Mother     Hyperlipidemia Mother     Hypertension Father     Other Father 82        MVI    Malig Hyperthermia Neg Hx         Review of Systems  See history of present illness and past medical history.  Patient denies headache, dizziness, syncope, falls, trauma, change in vision, change in hearing, change in taste, changes in weight, changes in appetite, focal weakness, numbness, or paresthesia.  Patient denies chest pain, palpitations, dyspnea, orthopnea, PND, cough, sinus pressure, rhinorrhea, epistaxis, hemoptysis, nausea, vomiting,hematemesis, diarrhea, constipation or hematochezia.  Denies cold or heat intolerance, polydipsia, polyuria, polyphagia. Denies hematuria, pyuria, dysuria, hesitancy, frequency or urgency. Denies consumption of raw and under cooked meats foods or change in water source.  Denies fever, chills, sweats, night sweats.  Denies missing any routine medications.  Remainder of ROS is negative.    Objective:  T Max 24 hrs: Temp (24hrs), Av.1 °F (36.2 °C), Min:97.1 °F (36.2 °C), Max:97.1 °F (36.2 °C)    Vitals Ranges:   Temp:  [97.1 °F (36.2 °C)] 97.1 °F (36.2 °C)  Heart Rate:  [77-87] 85  Resp:  [18] 18  BP: (128-141)/(65-86) 128/69      Exam:  /69   Pulse 85   Temp 97.1 °F (36.2 °C)   Resp 18   Wt 85 kg (187 lb 6.4 oz)   SpO2 94%   BMI 26.89 kg/m²     General Appearance:    Alert, cooperative, no distress, appears stated age   Head:    Normocephalic, without obvious abnormality, atraumatic   Eyes:    PERRL, conjunctivae/corneas clear, EOM's intact, both eyes   Ears:    Normal external ear canals, both ears   Nose:   Nares normal, septum midline, mucosa normal, no drainage    or sinus tenderness   Throat:   Lips, mucosa, and tongue normal   Neck:   Supple, symmetrical, trachea midline, no adenopathy;     thyroid:  no enlargement/tenderness/nodules; no carotid    bruit or JVD   Back:     Symmetric, no curvature, ROM normal, no CVA tenderness   Lungs:    Decreased breath sounds bilaterally, respirations unlabored   Chest Wall:    No tenderness or deformity    Heart:    Regular rate and rhythm, S1 and S2 normal, no murmur, rub   or gallop   Abdomen:     Soft, nontender, bowel sounds active all four quadrants,     no masses, no hepatomegaly, no splenomegaly   Extremities:   Extremities normal, atraumatic, no cyanosis or edema      .    Data Review:  Labs in chart were reviewed.  WBC   Date Value Ref Range Status   2025 4.21 3.40 - 10.80 10*3/mm3 Final     Hemoglobin   Date Value Ref Range Status   2025 9.6 (L) 13.0 - 17.7 g/dL Final     Hematocrit   Date Value Ref Range Status   2025 30.4 (L) 37.5 - 51.0 % Final     Platelets   Date Value Ref Range Status   2025 59 (L) 140 - 450 10*3/mm3 Final     Sodium   Date Value Ref Range Status   2025 129 (L) 136 - 145 mmol/L Final     Potassium   Date Value Ref Range Status   2025 4.3 3.5  - 5.2 mmol/L Final     Comment:     Slight hemolysis detected by analyzer. Result may be falsely elevated.     Chloride   Date Value Ref Range Status   03/23/2025 93 (L) 98 - 107 mmol/L Final     CO2   Date Value Ref Range Status   03/23/2025 22.2 22.0 - 29.0 mmol/L Final     BUN   Date Value Ref Range Status   03/23/2025 46 (H) 8 - 23 mg/dL Final     Creatinine   Date Value Ref Range Status   03/23/2025 6.03 (H) 0.76 - 1.27 mg/dL Final     Glucose   Date Value Ref Range Status   03/23/2025 171 (H) 65 - 99 mg/dL Final     Calcium   Date Value Ref Range Status   03/23/2025 7.8 (L) 8.6 - 10.5 mg/dL Final                Imaging Results (All)       Procedure Component Value Units Date/Time    CT Upper Extremity Left With Contrast [[id number]] Collected: 03/23/25 1754     Updated: 03/23/25 1819    Narrative:      CT UPPER EXTREMITY LEFT W CONTRAST-     HISTORY: Clinical concern for infected left upper extremity fistula.     TECHNIQUE: Radiation dose reduction techniques were utilized, including  automated exposure control and exposure modulation based on body size. 2  mm images were obtained through the left upper arm after the  administration of IV contrast.     COMPARISON: CT chest 2/21/2015        FINDINGS: Patent visualized left axillary and brachial arteries.  Proximalmost left axillary artery is outside the exam field-of-view. 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 Proximal  most portions of the fistulas are outside the exam field-of-view. One is  thrombosed with a partially visualized stent proximally (series 3/image  47). This fistula appears ligated distally and aneurysmal distally  measuring up to 4 cm (series 6/image 17). The second fistula extends  from the left axilla to the left elbow and is patent where seen. The  proximal most portion of the fistula is outside the exam field-of-view.  A 3.6 x 2.3 cm focus of subcutaneous fluid density in the inner mid left  upper arm extends from the  thickened skin surface to abut the fistula  (series 3/image 93). No enhancing wall or internal locules of air. No  soft tissue air/gas. Normal mostly visualized left humerus. Advanced  left acromioclavicular osteoarthritis. Nonaggressive appearing sharply  demarcated left scapular lytic focus interposed between the left glenoid  and coracoid process (series 2/image 28). This was present in 2015 and  similarly sized, although less well-defined. 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   Proximal most portions of the fistulas are outside the exam  field-of-view. A 3.6 x 2.3 cm focus of subcutaneous fluid density in the  inner mid left upper arm extends from the thickened skin surface to abut  the patent fistula. No enhancing wall or internal locules of air.  Recommend clinical correlation for findings to suggest infection at this  site. In the absence of recent surgery, fluid collection raises concern  for infection. The other thrombosed fistula is stented proximally and  appears ligated distally. The distal aspect of this fistula is  aneurysmal measuring up to 4 cm.     This report was finalized on 3/23/2025 6:16 PM by Dr. Bill Minaya M.D on Workstation: [id number]       XR Chest 1 View [[id number]] Collected: 03/23/25 1744     Updated: 03/23/25 1749    Narrative:      CXR ONE VIEW      HISTORY: Orthopnea; L03.114-Cellulitis of left upper limb;  L02.414-Cutaneous abscess of left upper limb; T82.9XXA-Unspecified  complication of cardiac and vascular prosthetic device, implant and  graft, initial encounter; N18.6-End stage renal disease;  Z99.2-Dependence on renal dialysis; A41.9-Sepsis, unspecified organism;  E87.0-Dsnr-wlzbsnlbbm and hyponatremia     COMPARISON: Chest x-ray 1/30/2025     TECHNIQUE: single portable AP       Impression:         Redemonstrated mild cardiomegaly and marked elevation of the right  hemidiaphragm.      Mild bilateral central vascular congestion and interstitial prominence,  no pneumothorax.     Question small right effusion.     This report was finalized on 3/23/2025 5:45 PM by Dr. Tab Tolbert M.D on Workstation: [id number]                 Assessment:  Active Hospital Problems    Diagnosis  POA    **Sepsis due to cellulitis [L03.90, A41.9]  Yes      Resolved Hospital Problems   No resolved problems to display.   Esrd on hd  Crohns disease  Cirrhosis  Anemia  Hyperglycemia  Hypertension  Pulmonary hypertension    Plan:  Antibiotics  Monitor on telemetry  Nephrology to see  Trend labs  Id to see  Will need a tunneled catheter for dialysis  Dw patient and ed provider  Patient is full code and spouse is nok    Farrah Soria MD  3/23/2025  18:38 EDT      Electronically signed by Farrah Soria MD at 03/23/25 1844          Operative/Procedure Notes (last 48 hours)        Bill Deal MD at 03/25/25 1108          Operative Note  Location: [facility]  Date of Admission:  3/23/2025  OR Date: 3/25/2025    Pre-op Diagnosis:   ESRD on dialysis [N18.6, Z99.2]  Problem with vascular access [Z78.9]  Sepsis due to cellulitis [L03.90, A41.9]    Post-op Diagnosis:     Same    Procedure:  1) Tunneled dialysis catheter insertion   2) Ultrasound guided vascular access  3) Radiologic supervision of catheter tip placement  4) sharp excisional left axillary wound exploration irrigation and debridement 2 x 2 x 2 cm    Surgeon: Bill Deal MD    Assistant: Keisha ZUNIGA, Provided critical assistance in exposure, retraction, and suction that overall decrease blood loss and operative time.    Anesthesia: General    Estimated Blood Loss: minimal    Staff:   Circulator: Shi Anthony RN  Radiology Technologist: Kayley Palumbo  Scrub Person: Joey Rodriguez  Assistant: Keisha Roger CSA    Complications: None    Specimen: None    Findings:  Tip in SVC/Atrial     Implants: Nothing was  implanted during the procedure    Indications:    The patient is an 79 y.o. male referred for tunneled dialysis catheter placement.  The risks, benefits, and alternatives have been discussed with the patient and/or family and include but are not limited to injury to artery, vein, lung, bleeding, and infection.    Procedure:  The patient was taken to the operating room and placed supine on the operating room table. After the induction of IV sedation, the neck and chest were prepped and draped with ChloraPrep. The patient was placed in Trendelenburg position. Timeout was observed. The right  Internal Jugular  was evaluated on ultrasound and found to be easily compressible. The vessel was entered under direct ultrasound guidance and photographic documentation was recorded in the chart for the record. An angled wire was then advanced down into the superior vena cava under fluoroscopy without any difficulty. Exit site was chosen on the chest. The tract was anesthetized with 1% lidocaine mixed with 0.25% Marcaine. A 23 cm catheter was then flushed and brought up onto the field. It was tunneled in an antegrade fashion up to the IJ insertion site. Dilator and peel-away sheath were then advanced over the wire under fluoroscopy without any significant difficulty. Dilator and wire were removed leaving the peel-away sheath in place. The distal tip of the catheter was then placed into the peel-away sheath and the peel-away sheath was removed. Under fluoroscopy, the distal tip of the catheter was positioned into the right atrium. There was no kinking at the catheter insertion site. The catheter flushed and aspirated easily. The lung fields were examined. There was no evidence of hemothorax or pneumothorax. The catheter flushed and aspirated quite easily. It was locked with concentrated heparin. The catheter was then anchored to the chest with nylon sutures. A stitch and dermal glue were used to close the neck site as well.  Sterile dressings were applied.     Attention was then turned to the left arm where local anesthetic was in's used under sterile conditions into the skin and using a Kale retractor and suction we exposed explore the area debriding excising sharply subcutaneous fibrinous tissue.  The skin was opened widely and 2 x 2 x 2 cm area and incision was cleared of nonviable tissue and subcutaneous site via sharp excisional debridement.  Area was then packed with Betadine wet-to-dry gauze.  During our exploration I did not identify any elements of graft material visible within the wound.    The patient tolerated the procedure well. There were no immediately apparent complications.           Active Hospital Problems    Diagnosis  POA    **Sepsis due to cellulitis [L03.90, A41.9]  Yes    ESRD on dialysis [N18.6, Z99.2]  Not Applicable    Problem with vascular access [Z78.9]  Yes    Anemia due to stage 4 chronic kidney disease treated with erythropoietin [N18.4, D63.1]  Yes    Cirrhosis of liver without ascites [K74.60]  Yes    Permanent atrial fibrillation [I48.21]  Yes      Resolved Hospital Problems   No resolved problems to display.      Bill Deal MD     Date: 3/25/2025  Time: 11:41 EDT                 Electronically signed by Bill Deal MD at 25 1153          Physician Progress Notes (last 48 hours)        Yoan Perez MD at 25 1131              Name: Bill Landry ADMIT: 3/23/2025   : 1945  PCP: Bharathi De La Torre MD    MRN: [id number] LOS: 2 days   AGE/SEX: 79 y.o. male  ROOM: [facility] OR/MAIN OR   Subjective   Chief Complaint   Patient presents with    Vascular Access Problem     80 yo with history of ESRD on HD MWF, chron's disease s/p previous bowel resection and end ileostomy, DM2, Afib, cirrhosis, lups and other medical issues. He presented with erythema and drainage from LUE graft site. Came to [facility]. Given IV ABX    NPO  Seen in pre-op  Plans for HD cath today    ROS  No  f/c  No n/v  No cp/palp  No soa/cough    Objective   Vital Signs  Temp:  [97.3 °F (36.3 °C)-98.5 °F (36.9 °C)] 98.5 °F (36.9 °C)  Heart Rate:  [77-98] 98  Resp:  [16-20] 18  BP: (108-140)/(56-85) 140/58  SpO2:  [92 %-99 %] 99 %  on  Flow (L/min) (Oxygen Therapy):  [2] 2;   Device (Oxygen Therapy): nasal cannula  Body mass index is 26.53 kg/m².    Physical Exam  HENT:      Head: Normocephalic and atraumatic.   Eyes:      General: No scleral icterus.  Cardiovascular:      Rate and Rhythm: Normal rate.   Pulmonary:      Effort: Pulmonary effort is normal. No respiratory distress.   Abdominal:      General: There is no distension.      Palpations: Abdomen is soft.      Tenderness: There is no abdominal tenderness.   Musculoskeletal:      Cervical back: Neck supple.   Neurological:      Mental Status: He is alert.   Psychiatric:         Mood and Affect: Mood normal.         Behavior: Behavior normal.         Thought Content: Thought content normal.         Judgment: Judgment normal.     LUE graft site    Results Review:       I reviewed the patient's new clinical results.  Results from last 7 days   Lab Units 03/25/25  0401 03/24/25  0355 03/23/25  1527 03/19/25  0000   WBC 10*3/mm3 6.38 4.42 4.21  --    HEMOGLOBIN g/dL 9.5* 9.2* 9.6* 9.8*  29.4*   PLATELETS 10*3/mm3 86* 76* 59*  --      Results from last 7 days   Lab Units 03/25/25  0401 03/24/25  0355 03/23/25  1527   SODIUM mmol/L 130* 131* 129*   POTASSIUM mmol/L 4.4 4.1 4.3   CHLORIDE mmol/L 99 95* 93*   CO2 mmol/L 22.7 21.5* 22.2   BUN mg/dL 29* 49* 46*   CREATININE mg/dL 4.75* 6.28* 6.03*   GLUCOSE mg/dL 129* 107* 171*   Estimated Creatinine Clearance: 15 mL/min (A) (by C-G formula based on SCr of 4.75 mg/dL (H)).  Results from last 7 days   Lab Units 03/25/25  0401 03/24/25  0355 03/23/25  1527   ALBUMIN g/dL 2.3* 2.4* 2.6*   BILIRUBIN mg/dL  --   --  1.2   ALK PHOS U/L  --   --  283*   AST (SGOT) U/L  --   --  43*   ALT (SGPT) U/L  --   --  18     Results from  last 7 days   Lab Units 03/25/25  0401 03/24/25  0355 03/23/25  1527   CALCIUM mg/dL 8.1* 7.8* 7.8*   ALBUMIN g/dL 2.3* 2.4* 2.6*   MAGNESIUM mg/dL  --  2.1  --    PHOSPHORUS mg/dL 4.0 5.2*  --      Results from last 7 days   Lab Units 03/24/25  0355 03/23/25  2329 03/23/25  2041 03/23/25  1825 03/23/25  1527   PROCALCITONIN ng/mL  --   --   --   --  1.20*   LACTATE mmol/L 1.4 2.2* 2.7* 2.4* 3.6*       Coag   Results from last 7 days   Lab Units 03/23/25  1527   INR  1.36*   APTT seconds 26.7     HbA1C   Lab Results   Component Value Date    HGBA1C 5.3 02/03/2025    HGBA1C 4.80 01/26/2025    HGBA1C 5.40 09/17/2024     Infection   Results from last 7 days   Lab Units 03/24/25  0805 03/23/25  1549 03/23/25  1527 03/23/25  1508   BLOODCX   --  No growth at 24 hours  No growth at 24 hours  --   --    WOUNDCX  Light growth (2+) Staphylococcus aureus*  --   --  Light growth (2+) Staphylococcus aureus*   PROCALCITONIN ng/mL  --   --  1.20*  --      Radiology(recent) CT Upper Extremity Left With Contrast  Result Date: 3/23/2025  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 Proximal most portions of the fistulas are outside the exam field-of-view. A 3.6 x 2.3 cm focus of subcutaneous fluid density in the inner mid left upper arm extends from the thickened skin surface to abut the patent fistula. No enhancing wall or internal locules of air. Recommend clinical correlation for findings to suggest infection at this site. In the absence of recent surgery, fluid collection raises concern for infection. The other thrombosed fistula is stented proximally and appears ligated distally. 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  This report was finalized on 3/23/2025 6:16 PM by Dr. Bill Minaya M.D on Workstation: [id number]      XR Chest 1 View  Result Date: 3/23/2025   Redemonstrated mild cardiomegaly and marked elevation of the right hemidiaphragm.  Mild bilateral central vascular congestion  "and interstitial prominence, no pneumothorax.  Question small right effusion.  This report was finalized on 3/23/2025 5:45 PM by Dr. Tab Tolbert M.D on Workstation: [id number]      No results found for: \"TROPONINT\", \"TROPONINI\", \"BNP\"  No components found for: \"TSH;2\"    [Transfer Hold] aspirin, 81 mg, Oral, Daily  [Transfer Hold] b complex-vitamin c-folic acid, 1 tablet, Oral, Daily  ceFAZolin, 1,000 mg, Intravenous, Q24H  [Transfer Hold] cetirizine, 10 mg, Oral, Daily  [Transfer Hold] cholecalciferol, 1,000 Units, Oral, Daily  famotidine, 20 mg, Oral, Daily  [Transfer Hold] latanoprost, 1 drop, Both Eyes, Nightly  [Transfer Hold] midodrine, 2.5 mg, Oral, TID AC  [Transfer Hold] sevelamer, 800 mg, Oral, TID With Meals  sodium chloride, 3 mL, Intravenous, Q12H  [Transfer Hold] tamsulosin, 0.4 mg, Oral, Nightly      sodium chloride, 9 mL/hr, Last Rate: 9 mL/hr (03/25/25 1034)    NPO Diet NPO Type: Strict NPO      Assessment & Plan     Active Hospital Problems    Diagnosis  POA    **Sepsis due to cellulitis [L03.90, A41.9]  Yes    ESRD on dialysis [N18.6, Z99.2]  Not Applicable    Problem with vascular access [Z78.9]  Yes    Anemia due to stage 4 chronic kidney disease treated with erythropoietin [N18.4, D63.1]  Yes    Cirrhosis of liver without ascites [K74.60]  Yes    Permanent atrial fibrillation [I48.21]  Yes      Resolved Hospital Problems   No resolved problems to display.     78 yo with history of ESRD on HD MWF, chron's disease s/p previous bowel resection and end ileostomy, DM2, Afib, cirrhosis, lups and other medical issues. He presented with erythema and drainage from LUE graft site. Came to [facility] ER. Given IV ABX    Vascular and ID have evaluated. Likely plans for 6 weeks abx ( probably with HD). Follow up cultures and operative report from I/D today.   Continue home asa, midodrine. Not on a/c due to previous bleeding  Nephrology following for HD  Monitor labs      LALO RN   Dispo- to home in 1-2 days " pending abx plan and Vascular thoughts and depending on the OR today      Yoan Perez MD  [facility]  03/25/25  10:52 EDT    Electronically signed by Yoan Perez MD at 03/25/25 1133       John Sotelo,  at 03/25/25 0930           LOS: 2 days     Chief Complaint: Vascular graft infection    Interval History: Patient seen in preop this morning and reports he is doing well.  Denies any acute complaints.  Tolerating antibiotics.    Vital Signs  Temp:  [97.3 °F (36.3 °C)-98.5 °F (36.9 °C)] 98.5 °F (36.9 °C)  Heart Rate:  [77-98] 98  Resp:  [16-20] 18  BP: (108-140)/(56-85) 140/58    Physical Exam:  General: In no acute distress  HEENT: Oropharynx clear, moist mucous membranes  Respiratory: Normal work of breathing  Skin: Left upper extremity dressing in place.  Access: Peripheral IV    Antibiotics:  Anti-Infectives (From admission, onward)      Ordered     Dose/Rate Route Frequency Start Stop    03/25/25 0803  ceFAZolin 2000 mg IVPB in 100 mL NS (MBP)        Ordering Provider: Bill Deal MD    2,000 mg  over 30 Minutes Intravenous Once 03/25/25 0805      03/24/25 1407  vancomycin 750 mg in sodium chloride 0.9 % 250 mL IVPB-VTB        Ordering Provider: Yoan Perez MD    750 mg  333.3 mL/hr over 45 Minutes Intravenous Once 03/24/25 2000 03/24/25 2223    03/24/25 0813  cefTRIAXone (ROCEPHIN) 2,000 mg in sodium chloride 0.9 % 100 mL MBP        Ordering Provider: Bill Deal MD    2,000 mg  200 mL/hr over 30 Minutes Intravenous Every 24 Hours 03/24/25 1600 03/31/25 1559    03/24/25 0813  Pharmacy to dose vancomycin        Ordering Provider: Bill Deal MD     Not Applicable Continuous PRN 03/24/25 0812 05/08/25 0811    03/23/25 1509  cefTRIAXone (ROCEPHIN) 2,000 mg in sodium chloride 0.9 % 100 mL MBP        Ordering Provider: Cooper Ureña MD    2,000 mg  200 mL/hr over 30 Minutes Intravenous Once 03/23/25 1525 03/23/25 1632    03/23/25 1506   vancomycin 1750 mg/500 mL 0.9% NS IVPB ([facility])        Ordering Provider: Cooper Ureña MD    20 mg/kg × 85 kg  over 105 Minutes Intravenous Once 03/23/25 1522 03/23/25 1922             Results Review:     I reviewed the patient's new clinical results.    Lab Results   Component Value Date    WBC 6.38 03/25/2025    HGB 9.5 (L) 03/25/2025    HCT 28.3 (L) 03/25/2025    MCV 98.6 (H) 03/25/2025    PLT 86 (L) 03/25/2025     Lab Results   Component Value Date    GLUCOSE 129 (H) 03/25/2025    BUN 29 (H) 03/25/2025    CREATININE 4.75 (H) 03/25/2025    EGFRIFNONA 7 (L) 02/14/2022    EGFRIFAFRI  02/14/2022      Comment:      <15 Indicative of kidney failure.    BCR 6.1 (L) 03/25/2025    CO2 22.7 03/25/2025    CALCIUM 8.1 (L) 03/25/2025    ALBUMIN 2.3 (L) 03/25/2025    AST 43 (H) 03/23/2025    ALT 18 03/23/2025       Lab Results   Component Value Date    VANCORANDOM 17.50 03/24/2025        Microbiology:  Microbiology Results (last 10 days)       Procedure Component Value - Date/Time    Wound Culture - Wound, Arm, Left [[id number]]  (Abnormal) Collected: 03/24/25 0805    Lab Status: Preliminary result Specimen: Wound from Arm, Left Updated: 03/25/25 0910     Wound Culture Light growth (2+) Staphylococcus aureus     Gram Stain Few (2+) WBCs seen      Rare (1+) Gram positive cocci    Blood Culture - Blood, Arm, Right [[id number]]  (Normal) Collected: 03/23/25 1549    Lab Status: Preliminary result Specimen: Blood from Arm, Right Updated: 03/24/25 1600     Blood Culture No growth at 24 hours    Blood Culture - Blood, Hand, Left [[id number]]  (Normal) Collected: 03/23/25 1549    Lab Status: Preliminary result Specimen: Blood from Hand, Left Updated: 03/24/25 1600     Blood Culture No growth at 24 hours    Narrative:      Less than seven (7) mL's of blood was collected.  Insufficient quantity may yield false negative results.    Wound Culture - Swab, Arm, Left [[id number]]  (Abnormal)  (Susceptibility) Collected: 03/23/25 7548    Lab  Status: Final result Specimen: Swab from Arm, Left Updated: 25 0723     Wound Culture Light growth (2+) Staphylococcus aureus     Gram Stain Occasional Gram positive cocci      Rare (1+) WBCs seen    Susceptibility        Staphylococcus aureus      KANG      Clindamycin Susceptible      Erythromycin Susceptible      Oxacillin Susceptible      Rifampin Susceptible      Tetracycline Susceptible      Trimethoprim + Sulfamethoxazole Susceptible      Vancomycin Susceptible                                      Isolation:   Contact    Assessment    #Left upper extremity graft infection  #End-stage renal disease on hemodialysis via AV graft  #Type 2 diabetes  #Crohn's disease status post bowel resection end ileostomy  #Cirrhosis with chronic leukopenia and thrombocytopenia    Wound cultures growing MSSA.  Plan to stop vancomycin and ceftriaxone.  Start cefazolin 1 g every 24 hours (daily dose for HD).  Plans for OR today and will follow-up operative findings.    ID will follow.   -----------------------------------------------------------------------------------------------  The above decisions regarding antimicrobials incorporates elements of engaging in complex medical decision-making associated with antimicrobial prescribing including considerations such as antimicrobial resistance patterns, emergence of new variants/strains, recent antibiotic exposure, interactions/complications from comorbidities including concurrent infections, public health considerations to minimize development of antimicrobial resistance, and emerging and re-emerging infections.       Electronically signed by John Sotelo DO at 25 0934       Yoan Perez MD at 25 1052              Name: Bill Landry ADMIT: 3/23/2025   : 1945  PCP: Bharathi De La Torre MD    MRN: [id number] LOS: 1 days   AGE/SEX: 79 y.o. male  ROOM: E452/1   Subjective   Chief Complaint   Patient presents with    Vascular Access Problem     79  yo with history of ESRD on HD MWF, chron's disease s/p previous bowel resection and end ileostomy, DM2, Afib, cirrhosis, lups and other medical issues. He presented with erythema and drainage from LUE graft site. Came to [facility] ER. Given IV ABX    ROS  No f/c  No n/v  No cp/palp  No soa/cough    Objective   Vital Signs  Temp:  [97.1 °F (36.2 °C)-97.7 °F (36.5 °C)] 97.3 °F (36.3 °C)  Heart Rate:  [73-89] 89  Resp:  [18-20] 18  BP: (128-141)/(58-86) 135/68  SpO2:  [91 %-97 %] 91 %  on   ;   Device (Oxygen Therapy): room air  Body mass index is 26.83 kg/m².    Physical Exam  HENT:      Head: Normocephalic and atraumatic.   Eyes:      General: No scleral icterus.  Cardiovascular:      Rate and Rhythm: Normal rate. Rhythm irregular.   Pulmonary:      Effort: Pulmonary effort is normal. No respiratory distress.   Abdominal:      General: There is no distension.      Palpations: Abdomen is soft.      Tenderness: There is no abdominal tenderness.   Musculoskeletal:      Cervical back: Neck supple.   Neurological:      Mental Status: He is alert.   Psychiatric:         Behavior: Behavior normal.     LUE graft site    Results Review:       I reviewed the patient's new clinical results.  Results from last 7 days   Lab Units 03/24/25  0355 03/23/25  1527 03/19/25  0000   WBC 10*3/mm3 4.42 4.21  --    HEMOGLOBIN g/dL 9.2* 9.6* 9.8*  29.4*   PLATELETS 10*3/mm3 76* 59*  --      Results from last 7 days   Lab Units 03/24/25  0355 03/23/25  1527   SODIUM mmol/L 131* 129*   POTASSIUM mmol/L 4.1 4.3   CHLORIDE mmol/L 95* 93*   CO2 mmol/L 21.5* 22.2   BUN mg/dL 49* 46*   CREATININE mg/dL 6.28* 6.03*   GLUCOSE mg/dL 107* 171*   Estimated Creatinine Clearance: 11.4 mL/min (A) (by C-G formula based on SCr of 6.28 mg/dL (H)).  Results from last 7 days   Lab Units 03/24/25  0355 03/23/25  1527   ALBUMIN g/dL 2.4* 2.6*   BILIRUBIN mg/dL  --  1.2   ALK PHOS U/L  --  283*   AST (SGOT) U/L  --  43*   ALT (SGPT) U/L  --  18     Results from last 7  days   Lab Units 03/24/25  0355 03/23/25  1527   CALCIUM mg/dL 7.8* 7.8*   ALBUMIN g/dL 2.4* 2.6*   MAGNESIUM mg/dL 2.1  --    PHOSPHORUS mg/dL 5.2*  --      Results from last 7 days   Lab Units 03/24/25  0355 03/23/25  2329 03/23/25  2041 03/23/25  1825 03/23/25  1527   PROCALCITONIN ng/mL  --   --   --   --  1.20*   LACTATE mmol/L 1.4 2.2* 2.7* 2.4* 3.6*       Coag   Results from last 7 days   Lab Units 03/23/25  1527   INR  1.36*   APTT seconds 26.7     HbA1C   Lab Results   Component Value Date    HGBA1C 5.3 02/03/2025    HGBA1C 4.80 01/26/2025    HGBA1C 5.40 09/17/2024     Infection   Results from last 7 days   Lab Units 03/23/25  1527 03/23/25  1508   WOUNDCX   --  Light growth (2+) Staphylococcus aureus*   PROCALCITONIN ng/mL 1.20*  --      Radiology(recent) CT Upper Extremity Left With Contrast  Result Date: 3/23/2025  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 Proximal most portions of the fistulas are outside the exam field-of-view. A 3.6 x 2.3 cm focus of subcutaneous fluid density in the inner mid left upper arm extends from the thickened skin surface to abut the patent fistula. No enhancing wall or internal locules of air. Recommend clinical correlation for findings to suggest infection at this site. In the absence of recent surgery, fluid collection raises concern for infection. The other thrombosed fistula is stented proximally and appears ligated distally. 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  This report was finalized on 3/23/2025 6:16 PM by Dr. Bill Minaya M.D on Workstation: [id number]      XR Chest 1 View  Result Date: 3/23/2025   Redemonstrated mild cardiomegaly and marked elevation of the right hemidiaphragm.  Mild bilateral central vascular congestion and interstitial prominence, no pneumothorax.  Question small right effusion.  This report was finalized on 3/23/2025 5:45 PM by Dr. Tab Tolbert M.D on Workstation: [id number]      No results  "found for: \"TROPONINT\", \"TROPONINI\", \"BNP\"  No components found for: \"TSH;2\"    aspirin, 81 mg, Oral, Daily  b complex-vitamin c-folic acid, 1 tablet, Oral, Daily  cefTRIAXone, 2,000 mg, Intravenous, Q24H  cetirizine, 10 mg, Oral, Daily  cholecalciferol, 1,000 Units, Oral, Daily  latanoprost, 1 drop, Both Eyes, Nightly  midodrine, 2.5 mg, Oral, TID AC  Sucroferric Oxyhydroxide, 1,000 mg, Oral, TID AC  tamsulosin, 0.4 mg, Oral, Nightly      Pharmacy to dose vancomycin,     Diet: Cardiac; Healthy Heart (2-3 Na+); Fluid Consistency: Thin (IDDSI 0)  NPO Diet NPO Type: Strict NPO      Assessment & Plan     Active Hospital Problems    Diagnosis  POA    **Sepsis due to cellulitis [L03.90, A41.9]  Yes    ESRD on dialysis [N18.6, Z99.2]  Not Applicable    Problem with vascular access [Z78.9]  Yes    Anemia due to stage 4 chronic kidney disease treated with erythropoietin [N18.4, D63.1]  Yes    Cirrhosis of liver without ascites [K74.60]  Yes    Permanent atrial fibrillation [I48.21]  Yes      Resolved Hospital Problems   No resolved problems to display.     78 yo with history of ESRD on HD MWF, chron's disease s/p previous bowel resection and end ileostomy, DM2, Afib, cirrhosis, lups and other medical issues. He presented with erythema and drainage from LUE graft site. Came to [facility] ER. Given IV ABX    Vascular and ID have evaluated. Likely plans for 6 weeks abx. Follow up cultures  Continue home asa, midodrine. Not on a/c due to previous bleeding  Nephrology following and plans for HD today  Monitor labs      DW RN      Yoan Perez MD  [facility]  25  10:52 EDT    Electronically signed by Yoan Perez MD at 25 2949       Myra Moore MD at 25 9430              [facility] Progress Note      Patient Name: Bill Landry  : 1945  MRN: [id number]  Primary Care Physician:  Bharathi De La Torre MD  Date of admission: 3/23/2025    Subjective "     Interval History:   The patient was seen and examined today for follow-up on end-stage renal disease  No new complaints today.  Denies any chest pain or shortness of breath.  Afebrile    Review of Systems:   As noted above    Objective     Vitals:   Temp:  [97.1 °F (36.2 °C)-97.7 °F (36.5 °C)] 97.3 °F (36.3 °C)  Heart Rate:  [73-89] 89  Resp:  [18-20] 18  BP: (128-141)/(58-86) 135/68    Intake/Output Summary (Last 24 hours) at 3/24/2025 0845  Last data filed at 3/24/2025 0500  Gross per 24 hour   Intake 1080 ml   Output 625 ml   Net 455 ml       Physical Exam:    General Appearance: alert, oriented x 3, no acute distress   Skin: warm and dry  HEENT: oral mucosa normal, nonicteric sclera  Neck: supple, no JVD  Lungs: CTA  Heart: RRR, normal S1 and S2  Abdomen: soft, nontender, nondistended  : no palpable bladder  Extremities: Bilateral lower extremity edema  Neuro: normal speech and mental status   Left upper extremity AV graft.  Left upper extremity wound covered  Scheduled Meds:     aspirin, 81 mg, Oral, Daily  b complex-vitamin c-folic acid, 1 tablet, Oral, Daily  cefTRIAXone, 2,000 mg, Intravenous, Q24H  cetirizine, 10 mg, Oral, Daily  cholecalciferol, 1,000 Units, Oral, Daily  latanoprost, 1 drop, Both Eyes, Nightly  midodrine, 2.5 mg, Oral, TID AC  Sucroferric Oxyhydroxide, 1,000 mg, Oral, TID AC  tamsulosin, 0.4 mg, Oral, Nightly      IV Meds:   Pharmacy to dose vancomycin,         Results Reviewed:   I have personally reviewed the results from the time of this admission to 3/24/2025 08:45 EDT     Results from last 7 days   Lab Units 03/24/25  0355 03/23/25  1527   SODIUM mmol/L 131* 129*   POTASSIUM mmol/L 4.1 4.3   CHLORIDE mmol/L 95* 93*   CO2 mmol/L 21.5* 22.2   BUN mg/dL 49* 46*   CREATININE mg/dL 6.28* 6.03*   CALCIUM mg/dL 7.8* 7.8*   BILIRUBIN mg/dL  --  1.2   ALK PHOS U/L  --  283*   ALT (SGPT) U/L  --  18   AST (SGOT) U/L  --  43*   GLUCOSE mg/dL 107* 171*       Estimated Creatinine Clearance:  11.4 mL/min (A) (by C-G formula based on SCr of 6.28 mg/dL (H)).    Results from last 7 days   Lab Units 25  0355   MAGNESIUM mg/dL 2.1   PHOSPHORUS mg/dL 5.2*             Results from last 7 days   Lab Units 25  0355 25  1527 25  0000   WBC 10*3/mm3 4.42 4.21  --    HEMOGLOBIN g/dL 9.2* 9.6* 9.8*  29.4*   PLATELETS 10*3/mm3 76* 59*  --        Results from last 7 days   Lab Units 25  1527   INR  1.36*       Assessment / Plan     ASSESSMENT:  .  ESRD: Volume excess by exam and imaging; hypervolemic hyponatremia.  Normal potassium and anion gap.  Due for HD tomorrow; last HD was 3/20   Left arm AV graft with recent bleeding and later drainage from incision site (had had recent shuntogram).  Elevated lactate and procalcitonin; normal white blood cell count (though chronic leukopenia).  CT scan of the left arm does reveal a fluid density that was cultured by vascular surgery today  Crohn's disease s/p bowel resection with ileostomy  Cirrhosis with chronic leukopenia and thrombocytopenia  Atrial fibrillation  Chronic hypotension of ESRD:  on midodrine        PLAN:  I was informed by nursing staff that it is okay to use AV graft today  We will dialyze today per schedule  Labs in a.m.      Thank you for involving us in the care of Bill Landry.  Please feel free to call with any questions.    Myra Moore MD  25  08:45 EDT    [facility]  [phone]    Parts of this note may be an electronic transcription/translation of spoken language to printed text using the Dragon dictation system.    Electronically signed by Myra Moore MD at 25 0846       Bill Deal MD at 25 0817           [facility]   Surgical Progress Note    Patient Name: Bill Landry  : 1945  MRN: [id number]  Date of admission: 3/23/2025  Surgical Procedures Since Admission:    Subjective   Subjective     Chief Complaint: Left arm cellulitis with  draining    History of Present Illness   79-year-old gentleman with a complicated history of multiple grafts and graft revisions after initial fistula dilation on the left side.  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  Graft has had 2 interventions with angioplasty at the since that time.  He has been working well but appears to have open an area in the prior incision at the level of the axilla where some drainage began yesterday.  There is some redness tracking up towards the upper part of the arm but there is no other fluctuance or evidence of infection in the graft.    Review of Systems denies pain in the left arm    Objective   Objective     Vitals:   Temp:  [97.1 °F (36.2 °C)-97.7 °F (36.5 °C)] 97.3 °F (36.3 °C)  Heart Rate:  [73-89] 89  Resp:  [18-20] 18  BP: (128-141)/(58-86) 135/68  Output by Drain (mL) 03/23/25 0701 - 03/23/25 1900 03/23/25 1901 - 03/24/25 0700 03/24/25 0701 - 03/24/25 0817 Range Total   Ileostomy  425  625       Physical Exam    To exam patient has minimal erythema tracking up the arm but drainage is positive from the small area of opening in the incision line at the axilla..  This area was opened with a snap and vasectomy and packed after culturing.  There is no visible graft in the area.  Neurovascularly he is intact.  Lungs are clear  Baseline anxiety present    Result Review    Result Review:  I have personally reviewed the results from the time of this admission to 3/24/2025 08:17 EDT and agree with these findings:  [x]  Laboratory list / accordion  [x]  Microbiology  [x]  Radiology  []  EKG/Telemetry   []  Cardiology/Vascular   []  Pathology  []  Old records  []  Other:  Most notable findings include: I have reviewed his  CT scan which really does not show any areas of fluid or air around the site of the drainage.        Results from last 7 days   Lab Units 03/24/25  0355 03/23/25  1527  "03/19/25  0000   WBC 10*3/mm3 4.42 4.21  --    HEMOGLOBIN g/dL 9.2* 9.6* 9.8*  29.4*   PLATELETS 10*3/mm3 76* 59*  --      Results from last 7 days   Lab Units 03/24/25  0355 03/23/25  1527   SODIUM mmol/L 131* 129*   POTASSIUM mmol/L 4.1 4.3   CHLORIDE mmol/L 95* 93*   CO2 mmol/L 21.5* 22.2   BUN mg/dL 49* 46*   CREATININE mg/dL 6.28* 6.03*   GLUCOSE mg/dL 107* 171*   MAGNESIUM mg/dL 2.1  --    PHOSPHORUS mg/dL 5.2*  --    Estimated Creatinine Clearance: 11.4 mL/min (A) (by C-G formula based on SCr of 6.28 mg/dL (H)).  Results from last 7 days   Lab Units 03/23/25  1527   PROTIME Seconds 16.7*   INR  1.36*     Lab Results   Component Value Date    HGBA1C 5.3 02/03/2025    HGBA1C 4.80 01/26/2025    HGBA1C 5.40 09/17/2024   No results found for: \"POCGLU\"    Assessment & Plan   Assessment / Plan     Brief Patient Summary:  Bill Landry is a 79 y.o. male who has an unusual presentation of draining from a wound is over 3 months old and has had no problems up-to-date.  The question whether there could be an indolent graft infection has been raised however it is seems unlikely that it would have started in this area and rather would be more likely to be related to the multiple punctures which these sites appear intact.  The fact that the graft is working well and this is his last option for this arm makes me believe that attempted salvage is warranted.  At bedside exam and opening of the wound I do not find any direct extension to the graft and on CT scan I really do not see any fluid or air in the area of concern.  Given this picture I believe then infectious disease evaluation and plan 6 weeks of antibiotics therapy is probably our best choice to try to sterilize his graft and salvage this arm access.    Active Hospital Problems:   Active Hospital Problems    Diagnosis     **Sepsis due to cellulitis      Plan:   Initiate broad-spectrum antibiotics and await culture results.  Will ask infectious disease opinion but I " "am inclined to try to sterilize the area with long-term current antibiotic therapy including 6 weeks of therapy through his dialysis.  The question of whether a tunneled catheter could be beneficial in this situation or even further I&D has been raised but at this point it is either.  Attempt to salvage the graft with antibiotics clinics or remove his entire graft and give up on his left arm.  At this point in time the patient and I both believe that an attempted antibiotic coverage for 6 weeks to sterilize the graft is our best answer.  Will await infectious disease opinion.  In the interim I will keep him n.p.o. after midnight and scheduled for possible tunneled dialysis catheter in and I&D of the wound depending on plans made with infectious disease.    VTE Prophylaxis:  Mechanical VTE prophylaxis orders are signed & held. Mechanical VTE prophylaxis orders are present.        Bill Deal MD      Electronically signed by Bill Deal MD at 03/24/25 0824          Consult Notes (last 48 hours)        John Sotelo,  at 03/24/25 0850        Consult Orders    1. Inpatient Infectious Diseases Consult [[id number]] ordered by Bill Deal MD at 03/24/25 0811                 Referring Provider: Cooper Ureña MD  Reason for Consultation:     Left arm cellulitis versus graft infection     Chief Complaint   Patient presents with    Vascular Access Problem         Subjective   History of present illness: Patient is a 79-year-old male with past medical history of ESRD on HD Monday Wednesday Friday via left upper extremity AV graft, Crohn's disease status post bowel resection end ileostomy, cirrhosis, A-fib, type 2 diabetes and lupus who presents with drainage from left upper extremity graft site.  ID consulted for \"left arm cellulitis versus graft infection\".    On presentation patient has been afebrile with normal WBC.  Platelets noted to be low at 72 and procalcitonin elevated at 1.2 in the setting of " chronic renal failure.  Started on vancomycin and ceftriaxone with wound cultures obtained from the left upper extremity.  Blood cultures are also pending.  CT of the upper extremity performed with 3.6 x 2.3 cm subcutaneous fluid density with no enhancing wall or internal locules of air.  Vascular surgery consulted and recommending 6 weeks of antibiotics for graft salvage.    Patient reports he is doing well this morning.  Denies any acute complaints.  Tolerating antibiotics.  Denies any history of antibiotic allergy.    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    Aortic stenosis     Bicuspid aortic valve 07/20/2022    Calculus of kidney     Chronic leukopenia and thrombocytopenia     Cirrhosis of liver without ascites 07/24/2017    Congestive splenomegaly 07/24/2017    Crohn disease     with ileostomy    Decreased white blood cell count, unspecified     Diverticula of colon     ESRD on hemodialysis 04/05/2018    M-W-F FRESENIUS    Fatty liver disease, nonalcoholic     Fistula 04/05/2018    Other Specified Complication    Gastrointestinal hemorrhage, unspecified     GERD (gastroesophageal reflux disease)     GI bleed     Glaucoma     H/O Gallstone pancreatitis     H/O Pericardial effusion     H/O sinus bradycardia     History of hypertension     resolved    History of UTI     Hx of renal calculi     Ileostomy present     Iron deficiency     Leakage of heart valve prosthesis, subsequent encounter     MGUS (monoclonal gammopathy of unknown significance)     TATE (obstructive sleep apnea)     Other hemoglobinopathies     Pericardial effusion (noninflammatory) 1/18/2018    Permanent atrial fibrillation     Scarlet fever, uncomplicated     Stenosis of other vascular prosthetic devices, implants and grafts, initial  encounter 02/14/2022    Systemic lupus erythematosus, unspecified     Thrombocytopenia     undpecified    Type 2 diabetes mellitus     CURRENTLY TAKES NO MED    Urinary retention     Post-OP       Past Surgical History:   Procedure Laterality Date    APPENDECTOMY  06/1968    ARTERIOVENOUS FISTULA/SHUNT SURGERY Left 08/08/2017    Procedure: LEFT BRACHIAL CEPHALIC AV FISTULA FORMATION WITH CEPHALIC VEIN TRANSPOSITION ;  Surgeon: Bill Deal MD;  Location: [facility] OR;  Service:     ARTERIOVENOUS FISTULA/SHUNT SURGERY Left 05/27/2022    Procedure: OPEN REVISION LEFT ARTERIOVENOUS INTERPOSITION GRAFT PLACEMENT;  Surgeon: Bill Deal MD;  Location: [facility] OR;  Service: Vascular;  Laterality: Left;    ARTERIOVENOUS FISTULA/SHUNT SURGERY Left 11/19/2024    Procedure: LEFT ARTERIOVENOUS SHUNT GRAFT REVISION;  Surgeon: Bill Deal MD;  Location: [facility] OR;  Service: Vascular;  Laterality: Left;    ARTERIOVENOUS FISTULA/SHUNT SURGERY W/ HEMODIALYSIS CATHETER INSERTION Left 02/15/2022    Procedure: OPEN LEFT ARM ARTERIOVENOUS FISTULA THROMBECTOMY WITH CEPHALIC VEIN ARTHROPLASTY AND STENT/GRAFT PLACEMENT;  Surgeon: Bill Deal MD;  Location: [facility] OR 18/19;  Service: Vascular;  Laterality: Left;    CATARACT EXTRACTION WITH INTRAOCULAR LENS IMPLANT Bilateral 2004    CHOLECYSTECTOMY  2011    COLECTOMY PARTIAL / TOTAL      History of inflammatory bowel disease with status post colectomy with ileostomy many years ago in the [facility],  18 YEARS OF AGE    COLON SURGERY      Colon resection with colostomy    COLON SURGERY      COLONOSCOPY  01/2018    CYSTOSCOPY LITHOLAPAXY BLADDER STONE EXTRACTION      ENDOSCOPY  01/16/2015    gastritis    ENDOSCOPY  01/17/2018    Procedure: ESOPHAGOGASTRODUODENOSCOPY;  Surgeon: Warner Neville MD;  Location: [facility] ENDOSCOPY;  Service:     ILEOSCOPY  01/16/2015    normal    ILEOSCOPY N/A 01/17/2018    Procedure: ILEOSCOPY;  Surgeon: Warner Neville MD;   "Location: [facility] ENDOSCOPY;  Service:     ILEOSTOMY  12/1968    loop ostomy bypass    INCISION AND DRAINAGE ARM Left 10/27/2023    Procedure: LEFT ARM INCISION AND DRAINAGE;  Surgeon: Bill Deal MD;  Location: [facility] MAIN OR;  Service: Vascular;  Laterality: Left;    INSERTION HEMODIALYSIS CATHETER Right 11/13/2024    Procedure: Tunnelled HEMODIALYSIS CATHETER INSERTION;  Surgeon: Ben Barth MD;  Location: [facility] HYBRID OR;  Service: Vascular;  Laterality: Right;    OTHER SURGICAL HISTORY  2009    kidney stones x3    PSEUDOANEURYSM REPAIR Left 10/27/2023    TONSILLECTOMY  1950       family history includes Heart disease in his mother; Heart failure (age of onset: 78) in his mother; Hyperlipidemia in his mother; Hypertension in his father and mother; Other (age of onset: 82) in his father.     reports that he has never smoked. He has never been exposed to tobacco smoke. He has never used smokeless tobacco. He reports that he does not drink alcohol and does not use drugs.     Allergies   Allergen Reactions    Ace Inhibitors Other (See Comments)     RENAL FAILURE/ raised creatinine    Contrast Dye (Echo Or Unknown Ct/Mr) Other (See Comments) and Anaphylaxis     RENAL FAILURE    Iodine Anaphylaxis    Angiotensin Receptor Blockers Swelling    Eliquis [Apixaban] Arrhythmia     BLEEDING ISSUES; PT CANNOT TAKE ANY ANTICOAGULANTS DUE TO LOW PLATELETS EXCEPT ASPIRIN      Filgrastim GI Intolerance and Confusion     Chest pain    Heparin Other (See Comments)     \"It causes me to bleed out\"    Keflex [Cephalexin] Diarrhea     RENAL FAILURE    Other Angioedema     IVP Dye       Medication:  Antibiotics:  Anti-Infectives (From admission, onward)      Ordered     Dose/Rate Route Frequency Start Stop    03/24/25 0813  cefTRIAXone (ROCEPHIN) 2,000 mg in sodium chloride 0.9 % 100 mL MBP        Ordering Provider: Bill Deal MD    2,000 mg  200 mL/hr over 30 Minutes Intravenous Every 24 Hours 03/24/25 1600 " 03/31/25 1559    03/24/25 0813  Pharmacy to dose vancomycin        Ordering Provider: Bill Deal MD     Not Applicable Continuous PRN 03/24/25 0812 05/08/25 0811    03/23/25 1509  cefTRIAXone (ROCEPHIN) 2,000 mg in sodium chloride 0.9 % 100 mL MBP        Ordering Provider: Cooper Ureña MD    2,000 mg  200 mL/hr over 30 Minutes Intravenous Once 03/23/25 1525 03/23/25 1632    03/23/25 1506  vancomycin 1750 mg/500 mL 0.9% NS IVPB ([facility])        Ordering Provider: Cooper Ureña MD    20 mg/kg × 85 kg  over 105 Minutes Intravenous Once 03/23/25 1522 03/23/25 1922              Objective     Physical Exam:   Vital Signs   Temp:  [97.1 °F (36.2 °C)-97.7 °F (36.5 °C)] 97.3 °F (36.3 °C)  Heart Rate:  [73-89] 89  Resp:  [18-20] 18  BP: (128-141)/(58-86) 135/68    GENERAL: Awake and alert, in no acute distress.   HEENT: Oropharynx is clear. Hearing is grossly normal.   EYES: PERRL. No conjunctival injection. No lid lag.   LUNGS: Normal work of breathing.  SKIN: Left upper extremity with packing in place.  Painted with Betadine.  PSYCHIATRIC: Appropriate mood, affect, insight, and judgment.     Results Review:   I reviewed the patient's new clinical results.  I reviewed the patient's new imaging results and agree with the interpretation.  I reviewed the patient's other test results and agree with the interpretation    Lab Results   Component Value Date    WBC 4.42 03/24/2025    HGB 9.2 (L) 03/24/2025    HCT 29.3 (L) 03/24/2025    .7 (H) 03/24/2025    PLT 76 (L) 03/24/2025       Lab Results   Component Value Date    VANCORANDOM 7.20 11/20/2024       Lab Results   Component Value Date    GLUCOSE 107 (H) 03/24/2025    BUN 49 (H) 03/24/2025    CREATININE 6.28 (H) 03/24/2025    EGFRIFNONA 7 (L) 02/14/2022    EGFRIFAFRI  02/14/2022      Comment:      <15 Indicative of kidney failure.    BCR 7.8 03/24/2025    CO2 21.5 (L) 03/24/2025    CALCIUM 7.8 (L) 03/24/2025    ALBUMIN 2.4 (L) 03/24/2025    AST 43 (H) 03/23/2025     ALT 18 2025         Estimated Creatinine Clearance: 11.4 mL/min (A) (by C-G formula based on SCr of 6.28 mg/dL (H)).    Isolation:   No active isolations      Microbiology:  Microbiology Results (last 10 days)       ** No results found for the last 240 hours. **             Radiology:  CT left upper extremity with 3.6 x 2.3 focus of subcutaneous fluid without enhancing wall or locules of air.    Assessment     #Left upper extremity graft infection  #End-stage renal disease on hemodialysis via AV graft  #Type 2 diabetes  #Crohn's disease status post bowel resection end ileostomy  #Cirrhosis with chronic leukopenia and thrombocytopenia    Vascular surgery concern for graft infection and recommending 6 weeks of antibiotics to attempt to sterilize the graft site.  ID will follow-up cultures.  Continue vancomycin goal -600 and ceftriaxone 2 g daily for now.      Thank you for this consult.  We will continue to follow along and tailor antibiotics as the patient's clinical course evolves.  ------------------------------------------------------------------------------------------------  The above decisions regarding antimicrobials incorporates elements of engaging in complex medical decision-making associated with antimicrobial prescribing including considerations such as antimicrobial resistance patterns, emergence of new variants/strains, recent antibiotic exposure, interactions/complications from comorbidities including concurrent infections, public health considerations to minimize development of antimicrobial resistance, and emerging and re-emerging infections.       Electronically signed by John Sotelo DO at 25 [phone]       Boyd Oates MD at [phone]            [facility] Consult Note      Patient Name: Bill Landry  : 1945  MRN: [id number]  Primary Care Physician:  Bharathi De La Torre MD  Referring Physician: Cooper Ureña MD  Date of  "admission: 3/23/2025    Subjective     Reason for Consult:  ESRD    HPI:   Bill Landry is a 79 y.o. male  with ESRD on HD (MWF, via left AV graft, at [facility], followed by Dr. Moore of our group) for the past 7 years, Crohn's disease s/p bowel resection with ileostomy, anemia, cirrhosis, A-fib, chronic leukopenia and thrombocytopenia, and hypertension, admitted today for further evaluation of left arm bleeding (\"big at first and then a slow drip\"), erythema, and purulent drainage from surgical site near left arm graft.  PMH outlined below.  Last HD was 3/20.     Not aware of any fever or chills; left arm not especially tender  Reports uneventful HD 3/20  Breathing is comfortable on room air; no chest pain  Appetite fair; no N/V  Makes very little urine    Review of Systems:   14 point review of systems is otherwise negative except for mentioned above on HPI    Personal History     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    Aortic stenosis     Bicuspid aortic valve 07/20/2022    Calculus of kidney     Chronic leukopenia and thrombocytopenia     Cirrhosis of liver without ascites 07/24/2017    Congestive splenomegaly 07/24/2017    Crohn disease     with ileostomy    Decreased white blood cell count, unspecified     Diverticula of colon     ESRD on hemodialysis 04/05/2018    M-W-F FRESENIUS    Fatty liver disease, nonalcoholic     Fistula 04/05/2018    Other Specified Complication    Gastrointestinal hemorrhage, unspecified     GERD (gastroesophageal reflux disease)     GI bleed     Glaucoma     H/O Gallstone pancreatitis     H/O Pericardial effusion     H/O sinus bradycardia     History of hypertension     resolved    History of UTI     Hx of renal calculi     Ileostomy present     Iron deficiency     " Leakage of heart valve prosthesis, subsequent encounter     MGUS (monoclonal gammopathy of unknown significance)     TATE (obstructive sleep apnea)     Other hemoglobinopathies     Pericardial effusion (noninflammatory) 1/18/2018    Permanent atrial fibrillation     Scarlet fever, uncomplicated     Stenosis of other vascular prosthetic devices, implants and grafts, initial encounter 02/14/2022    Systemic lupus erythematosus, unspecified     Thrombocytopenia     undpecified    Type 2 diabetes mellitus     CURRENTLY TAKES NO MED    Urinary retention     Post-OP       Past Surgical History:   Procedure Laterality Date    APPENDECTOMY  06/1968    ARTERIOVENOUS FISTULA/SHUNT SURGERY Left 08/08/2017    Procedure: LEFT BRACHIAL CEPHALIC AV FISTULA FORMATION WITH CEPHALIC VEIN TRANSPOSITION ;  Surgeon: Bill Deal MD;  Location: [facility] OR;  Service:     ARTERIOVENOUS FISTULA/SHUNT SURGERY Left 05/27/2022    Procedure: OPEN REVISION LEFT ARTERIOVENOUS INTERPOSITION GRAFT PLACEMENT;  Surgeon: Bill Deal MD;  Location: [facility] OR;  Service: Vascular;  Laterality: Left;    ARTERIOVENOUS FISTULA/SHUNT SURGERY Left 11/19/2024    Procedure: LEFT ARTERIOVENOUS SHUNT GRAFT REVISION;  Surgeon: Bill Deal MD;  Location: [facility] OR;  Service: Vascular;  Laterality: Left;    ARTERIOVENOUS FISTULA/SHUNT SURGERY W/ HEMODIALYSIS CATHETER INSERTION Left 02/15/2022    Procedure: OPEN LEFT ARM ARTERIOVENOUS FISTULA THROMBECTOMY WITH CEPHALIC VEIN ARTHROPLASTY AND STENT/GRAFT PLACEMENT;  Surgeon: Bill Deal MD;  Location: [facility] OR 18/19;  Service: Vascular;  Laterality: Left;    CATARACT EXTRACTION WITH INTRAOCULAR LENS IMPLANT Bilateral 2004    CHOLECYSTECTOMY  2011    COLECTOMY PARTIAL / TOTAL      History of inflammatory bowel disease with status post colectomy with ileostomy many years ago in the [facility],  18 YEARS OF AGE    COLON SURGERY      Colon resection with colostomy    COLON  SURGERY      COLONOSCOPY  01/2018    CYSTOSCOPY LITHOLAPAXY BLADDER STONE EXTRACTION      ENDOSCOPY  01/16/2015    gastritis    ENDOSCOPY  01/17/2018    Procedure: ESOPHAGOGASTRODUODENOSCOPY;  Surgeon: Warner Neville MD;  Location: [facility] ENDOSCOPY;  Service:     ILEOSCOPY  01/16/2015    normal    ILEOSCOPY N/A 01/17/2018    Procedure: ILEOSCOPY;  Surgeon: Warner Neville MD;  Location: [facility] ENDOSCOPY;  Service:     ILEOSTOMY  12/1968    loop ostomy bypass    INCISION AND DRAINAGE ARM Left 10/27/2023    Procedure: LEFT ARM INCISION AND DRAINAGE;  Surgeon: Bill Bermeo MD;  Location: [facility] MAIN OR;  Service: Vascular;  Laterality: Left;    INSERTION HEMODIALYSIS CATHETER Right 11/13/2024    Procedure: Tunnelled HEMODIALYSIS CATHETER INSERTION;  Surgeon: Ben Barth MD;  Location: [facility] HYBRID OR;  Service: Vascular;  Laterality: Right;    OTHER SURGICAL HISTORY  2009    kidney stones x3    PSEUDOANEURYSM REPAIR Left 10/27/2023    TONSILLECTOMY  1950       Family History: family history includes Heart disease in his mother; Heart failure (age of onset: 78) in his mother; Hyperlipidemia in his mother; Hypertension in his father and mother; Other (age of onset: 82) in his father.    Social History:  reports that he has never smoked. He has never been exposed to tobacco smoke. He has never used smokeless tobacco. He reports that he does not drink alcohol and does not use drugs.    Home Medications:  Prior to Admission medications    Medication Sig Start Date End Date Taking? Authorizing Provider   alfuzosin (UROXATRAL) 10 MG 24 hr tablet Take 1 tablet by mouth Every Other Day. Does not take on Sundays Tuesdays or Thursdays   Indications: Benign Enlargement of Prostate   Yes Provider, MD Elvia   aspirin 81 MG tablet Take 1 tablet by mouth Every Night. INSTRUCTED TO CONTINUE THIS FOR SURGERY PER DR. BERMEO'S OFFICE  Indications: Disease involving Lipid Deposits in the Arteries   Yes Provider,  MD Elvia   B Complex-C-Folic Acid (Umm-Peter) tablet Take 1 tablet by mouth Daily. 9/27/23  Yes Elvia Nicholson MD   Cholecalciferol 25 MCG (1000 UT) tablet Take 1 tablet by mouth Daily. Indications: Vitamin D Deficiency   Yes Elvia Nicholson MD   Iron Sucrose (VENOFER IV) Infuse 1 dose into a venous catheter As Needed.   Yes Elvia Nicholson MD   latanoprost (XALATAN) 0.005 % ophthalmic solution Administer 1 drop to both eyes Every Night. Indications: Wide-Angle Glaucoma   Yes Elvia Nicholson MD   Loratadine 10 MG capsule Take 1 capsule by mouth Daily. Indications: Hayfever   Yes Elvia Nicholson MD   midodrine (PROAMATINE) 2.5 MG tablet Take 1 tablet by mouth 3 (Three) Times a Week. Monday, Wednesday, Friday on days of hemodialysis 1/31/25  Yes Juan Daniel Randall MD   sevelamer (RENAGEL) 800 MG tablet Take 1 tablet by mouth 3 (Three) Times a Day With Meals.   Yes Elvia Nicholson MD   sodium chloride 0.65 % nasal spray Administer 2 sprays into the nostril(s) as directed by provider Every Night.   Yes Elvia Nicholson MD   acetaminophen (TYLENOL) 325 MG tablet Take 2 tablets by mouth Every 4 (Four) Hours As Needed for Mild Pain. 1/31/25   Juan Daniel Randall MD   famotidine (Pepcid AC) 10 MG tablet Take 1 tablet by mouth Daily As Needed for Heartburn (take as needed after HD on dialysis days). Indications: Heartburn    Elvia Nicholson MD   lidocaine-prilocaine (EMLA) 2.5-2.5 % cream Apply 1 Application topically to the appropriate area as directed As Needed. Bkabdw-Edzgbtobz-Eteyvy:  ONE HOUR PRIOR TO DIALYSIS  Indications: Anesthesia to a Specific Part of the Body 2/5/18   Elvia Nicholson MD   Methoxy PEG-Epoetin Beta (MIRCERA IJ) Inject 1 dose as directed Every 30 (Thirty) Days.    Elvia Nicholson MD   mupirocin (BACTROBAN) 2 % ointment Apply 1 Application topically to the appropriate area as directed 3 (Three) Times a Day. Indications: Wound Infection  Patient  "not taking: Reported on 3/23/2025 11/22/24   Bill Deal MD   polyethylene glycol (MIRALAX) 17 g packet Take 17 g by mouth Daily As Needed (Use if senna-docusate is ineffective). 1/31/25   Juan Daniel Randall MD   sennosides-docusate (PERICOLACE) 8.6-50 MG per tablet Take 2 tablets by mouth 2 (Two) Times a Day As Needed for Constipation. 1/31/25   Juan Daniel Randall MD   Sucroferric Oxyhydroxide (Velphoro) 500 MG chewable tablet Chew 2 tablets Daily As Needed (phopurus def). Take 2 tablets by  mouth three times a day.  Indications: Hyperphosphatemia D/T Renal Insufficiency  Patient not taking: Reported on 3/23/2025 11/26/24   ProviderElvia MD       Allergies:  Allergies   Allergen Reactions    Ace Inhibitors Other (See Comments)     RENAL FAILURE/ raised creatinine    Contrast Dye (Echo Or Unknown Ct/Mr) Other (See Comments) and Anaphylaxis     RENAL FAILURE    Iodine Anaphylaxis    Angiotensin Receptor Blockers Swelling    Eliquis [Apixaban] Arrhythmia     BLEEDING ISSUES; PT CANNOT TAKE ANY ANTICOAGULANTS DUE TO LOW PLATELETS EXCEPT ASPIRIN      Filgrastim GI Intolerance and Confusion     Chest pain    Heparin Other (See Comments)     \"It causes me to bleed out\"    Keflex [Cephalexin] Diarrhea     RENAL FAILURE    Other Angioedema     IVP Dye       Objective     Vitals:   Temp:  [97.1 °F (36.2 °C)-97.7 °F (36.5 °C)] 97.7 °F (36.5 °C)  Heart Rate:  [74-87] 84  Resp:  [18-20] 20  BP: (128-141)/(65-86) 138/70    Intake/Output Summary (Last 24 hours) at 3/23/2025 2127  Last data filed at 3/23/2025 1922  Gross per 24 hour   Intake 600 ml   Output 200 ml   Net 400 ml       Physical Exam:   Constitutional: Awake, alert, NAD, chronically ill, anxious  HEENT: Sclera anicteric, no conjunctival injection  Neck: Supple, no carotid bruit, trachea at midline, no JVD  Respiratory: Diminished right base more than left; crackles; nonlabored on RA  Cardiovascular: Irregularly irregular, 2/6M, no rub  Vascular: Left arm AV graft " patent; some erythema medial to graft; minimally tender  Gastrointestinal: BS+, soft, nontender, distended, + ileostomy  : No palpable bladder  Musculoskeletal: Trace-+1 edema, no clubbing or cyanosis  Psychiatric: Appropriate affect, cooperative, oriented  Neurologic:  moving all extremities, normal speech and mental status  Skin: Warm and dry       Scheduled Meds:     [START ON 3/24/2025] aspirin, 81 mg, Oral, Daily  [START ON 3/24/2025] b complex-vitamin c-folic acid, 1 tablet, Oral, Daily  [START ON 3/24/2025] cetirizine, 10 mg, Oral, Daily  [START ON 3/24/2025] cholecalciferol, 1,000 Units, Oral, Daily  latanoprost, 1 drop, Both Eyes, Nightly  [START ON 3/24/2025] midodrine, 2.5 mg, Oral, TID AC  [START ON 3/24/2025] Sucroferric Oxyhydroxide, 1,000 mg, Oral, TID AC  tamsulosin, 0.4 mg, Oral, Nightly      IV Meds:        Results Reviewed:   I have personally reviewed the results from the time of this admission to 3/23/2025 21:27 EDT     Lab Results   Component Value Date    GLUCOSE 171 (H) 03/23/2025    CALCIUM 7.8 (L) 03/23/2025     (L) 03/23/2025    K 4.3 03/23/2025    CO2 22.2 03/23/2025    CL 93 (L) 03/23/2025    BUN 46 (H) 03/23/2025    CREATININE 6.03 (H) 03/23/2025    EGFRIFAFRI  02/14/2022      Comment:      <15 Indicative of kidney failure.    EGFRIFNONA 7 (L) 02/14/2022    BCR 7.6 03/23/2025    ANIONGAP 13.8 03/23/2025      Lab Results   Component Value Date    MG 1.9 01/25/2025    PHOS 4.0 01/30/2025    ALBUMIN 2.6 (L) 03/23/2025           Assessment / Plan       Sepsis due to cellulitis      ASSESSMENT:  1.  ESRD: Volume excess by exam and imaging; hypervolemic hyponatremia.  Normal potassium and anion gap.  Due for HD tomorrow; last HD was 3/20  2.  Left arm AV graft with recent bleeding and later drainage from incision site (had had recent shuntogram).  Elevated lactate and procalcitonin; normal white blood cell count (though chronic leukopenia).  CT scan of the left arm does reveal a fluid  density  3.  Crohn's disease s/p bowel resection with ileostomy  4.  Cirrhosis with chronic leukopenia and thrombocytopenia  5.  Atrial fibrillation  6.  Chronic hypotension of ESRD:  on midodrine    PLAN:  1.  Vascular evaluation underway  2.  Empiric vancomycin and ceftriaxone given in ER  3.  Will hold off ordering HD tomorrow until greenlight for cannulation given by Vascular  4.  Surveillance labs    Thank you for involving us in the care of Bill Landry.  Please feel free to call with any questions.    Boyd Oates MD  25  21:27 EDT    [facility]  [phone]      Please note that portions of this note were completed with a voice recognition program.    Electronically signed by Boyd Oates MD at 25 [phone]       Boom Olivares II, MD at 25 [phone]        Consult Orders    1. Inpatient Vascular Surgery Consult [[id number]] ordered by Cooper Ureña MD                 [facility] Vascular Surgery  Consult Note    Patient Name: Bill Landry  : 1945  MRN: [id number]  Primary Care Physician:  Bharathi De La Torre MD  Referring Physician: No ref. provider found  Date of admission: 3/23/2025    Inpatient Vascular Surgery Consult  Consult performed by: Boom Olivares II, MD  Consult ordered by: Cooper Ureña MD        Subjective   Subjective     Reason for Consult/ Chief Complaint: Left arm bleeding, concern for graft infection    History of Present Illness  Bill Landry is a 79 y.o. male with end-stage renal disease on dialysis, cirrhosis, atrial fibrillation not on anticoagulation due to history of GI bleed and thrombocytopenia, with multiple left arm dialysis access interventions over the last several months including jump graft placement from cephalic vein to axillary vein on 2024, and fistulogram in 2024 and February of this year.  Patient's wife called the office this morning as they noticed some bleeding from the proximal  "upper arm this morning.  I instructed her to hold pressure for 15 minutes which she did, however it persisted with a \"slow dribble\" of bleeding.  When she called back she was instructed to come to the ER.  The patient is here in the ER with his son.  On evaluation there is some erythema around the proximal most incision from his jump graft with some serosanguineous drainage from this incision.  The graft has been working well for dialysis and he last had dialysis on Friday.  The patient denies any fevers or chills chest pain difficulty breathing headaches nausea vomiting or any other symptoms.  They report he was treated for cellulitis of his arm earlier this year.      Review of Systems     Personal History     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    Aortic stenosis     Bicuspid aortic valve 07/20/2022    Calculus of kidney     Chronic leukopenia and thrombocytopenia     Cirrhosis of liver without ascites 07/24/2017    Congestive splenomegaly 07/24/2017    Crohn disease     with ileostomy    Decreased white blood cell count, unspecified     Diverticula of colon     ESRD on hemodialysis 04/05/2018    M-W-F FRESENIUS    Fatty liver disease, nonalcoholic     Fistula 04/05/2018    Other Specified Complication    Gastrointestinal hemorrhage, unspecified     GERD (gastroesophageal reflux disease)     GI bleed     Glaucoma     H/O Gallstone pancreatitis     H/O Pericardial effusion     H/O sinus bradycardia     History of hypertension     resolved    History of UTI     Hx of renal calculi     Ileostomy present     Iron deficiency     Leakage of heart valve prosthesis, subsequent encounter     MGUS (monoclonal gammopathy of unknown significance)     TATE (obstructive sleep apnea)     Other hemoglobinopathies     " Pericardial effusion (noninflammatory) 1/18/2018    Permanent atrial fibrillation     Scarlet fever, uncomplicated     Stenosis of other vascular prosthetic devices, implants and grafts, initial encounter 02/14/2022    Systemic lupus erythematosus, unspecified     Thrombocytopenia     undpecified    Type 2 diabetes mellitus     CURRENTLY TAKES NO MED    Urinary retention     Post-OP       Past Surgical History:   Procedure Laterality Date    APPENDECTOMY  06/1968    ARTERIOVENOUS FISTULA/SHUNT SURGERY Left 08/08/2017    Procedure: LEFT BRACHIAL CEPHALIC AV FISTULA FORMATION WITH CEPHALIC VEIN TRANSPOSITION ;  Surgeon: Bill Deal MD;  Location: [facility] OR;  Service:     ARTERIOVENOUS FISTULA/SHUNT SURGERY Left 05/27/2022    Procedure: OPEN REVISION LEFT ARTERIOVENOUS INTERPOSITION GRAFT PLACEMENT;  Surgeon: Bill Deal MD;  Location: [facility] OR;  Service: Vascular;  Laterality: Left;    ARTERIOVENOUS FISTULA/SHUNT SURGERY Left 11/19/2024    Procedure: LEFT ARTERIOVENOUS SHUNT GRAFT REVISION;  Surgeon: Bill Deal MD;  Location: [facility] OR;  Service: Vascular;  Laterality: Left;    ARTERIOVENOUS FISTULA/SHUNT SURGERY W/ HEMODIALYSIS CATHETER INSERTION Left 02/15/2022    Procedure: OPEN LEFT ARM ARTERIOVENOUS FISTULA THROMBECTOMY WITH CEPHALIC VEIN ARTHROPLASTY AND STENT/GRAFT PLACEMENT;  Surgeon: Bill Deal MD;  Location: [facility] OR 18/19;  Service: Vascular;  Laterality: Left;    CATARACT EXTRACTION WITH INTRAOCULAR LENS IMPLANT Bilateral 2004    CHOLECYSTECTOMY  2011    COLECTOMY PARTIAL / TOTAL      History of inflammatory bowel disease with status post colectomy with ileostomy many years ago in the [facility],  18 YEARS OF AGE    COLON SURGERY      Colon resection with colostomy    COLON SURGERY      COLONOSCOPY  01/2018    CYSTOSCOPY LITHOLAPAXY BLADDER STONE EXTRACTION      ENDOSCOPY  01/16/2015    gastritis    ENDOSCOPY  01/17/2018    Procedure:  ESOPHAGOGASTRODUODENOSCOPY;  Surgeon: Warner Neville MD;  Location: [facility] ENDOSCOPY;  Service:     ILEOSCOPY  01/16/2015    normal    ILEOSCOPY N/A 01/17/2018    Procedure: ILEOSCOPY;  Surgeon: Warner Neville MD;  Location: [facility] ENDOSCOPY;  Service:     ILEOSTOMY  12/1968    loop ostomy bypass    INCISION AND DRAINAGE ARM Left 10/27/2023    Procedure: LEFT ARM INCISION AND DRAINAGE;  Surgeon: Bill Deal MD;  Location: [facility] MAIN OR;  Service: Vascular;  Laterality: Left;    INSERTION HEMODIALYSIS CATHETER Right 11/13/2024    Procedure: Tunnelled HEMODIALYSIS CATHETER INSERTION;  Surgeon: Ben Barth MD;  Location: [facility] HYBRID OR;  Service: Vascular;  Laterality: Right;    OTHER SURGICAL HISTORY  2009    kidney stones x3    PSEUDOANEURYSM REPAIR Left 10/27/2023    TONSILLECTOMY  1950       Family History: His family history includes Heart disease in his mother; Heart failure (age of onset: 78) in his mother; Hyperlipidemia in his mother; Hypertension in his father and mother; Other (age of onset: 82) in his father.     Social History: He  reports that he has never smoked. He has never been exposed to tobacco smoke. He has never used smokeless tobacco. He reports that he does not drink alcohol and does not use drugs.    Home Medications:  Iron Sucrose, Loratadine, Methoxy PEG-Epoetin Beta, Umm-Peter, Sucroferric Oxyhydroxide, acetaminophen, alfuzosin, aspirin, cholecalciferol, famotidine, latanoprost, lidocaine-prilocaine, midodrine, mupirocin, polyethylene glycol, sennosides-docusate, and sodium chloride    Allergies:  He is allergic to ace inhibitors, contrast dye (echo or unknown ct/mr), iodine, angiotensin receptor blockers, eliquis [apixaban], filgrastim, heparin, keflex [cephalexin], and other.    Objective    Objective     Vitals:    Temp:  [97.1 °F (36.2 °C)] 97.1 °F (36.2 °C)  Heart Rate:  [83-87] 87  Resp:  [18] 18  BP: (141)/(86) 141/86  Output by Drain (mL) 03/22/25 0701 - 03/22/25  "1900 03/22/25 1901 - 03/23/25 0700 03/23/25 0701 - 03/23/25 1537 Range Total   Requested LDAs do not have output data documented.       Physical Exam  Nad  Respiratoins unlabored  L arm AV fistula/graft with thrill.   Incision in the medial aspect of his proximal upper arm with some scant serosanguineous oozing.  There is some erythema of the skin around this with minimal induration or hyperemia.  It is nontender.    Result Review    Result Review:  I have personally reviewed the results from the time of this admission to 3/23/2025 15:37 EDT and agree with these findings:  [x]  Laboratory list / accordion  [x]  Microbiology  []  Radiology  []  EKG/Telemetry   []  Cardiology/Vascular   []  Pathology  [x]  Old records  []  Other:  Most notable findings include: no leukocytosis, hgb 9.6, Platelets 59k. Chemistry pending.       CBC    Results from last 7 days   Lab Units 03/23/25  1527 03/19/25  0000   WBC 10*3/mm3 4.21  --    HEMOGLOBIN g/dL 9.6* 9.8*  29.4*   PLATELETS 10*3/mm3 59*  --      BMP   Results from last 7 days   Lab Units 03/17/25  0000   BUN mg/dL 72*     Cr Clearance CrCl cannot be calculated (Patient's most recent lab result is older than the maximum 30 days allowed.).  Coag     HbA1C   Lab Results   Component Value Date    HGBA1C 5.3 02/03/2025    HGBA1C 4.80 01/26/2025    HGBA1C 5.40 09/17/2024     Blood Glucose No results found for: \"POCGLU\"  Infection     CMP   Results from last 7 days   Lab Units 03/17/25  0000   BUN mg/dL 72*     ABG      UA      JODY  No results found for: \"POCMETH\", \"POCAMPHET\", \"POCBARBITUR\", \"POCBENZO\", \"POCCOCAINE\", \"POCOPIATES\", \"POCOXYCODO\", \"POCPHENCYC\", \"POCPROPOXY\", \"POCTHC\", \"POCTRICYC\"  Lysis Labs   Results from last 7 days   Lab Units 03/23/25  1527 03/19/25  0000   HEMOGLOBIN g/dL 9.6* 9.8*  29.4*   PLATELETS 10*3/mm3 59*  --      Radiology(recent) No radiology results for the last day    Assessment & Plan   Assessment / Plan     Brief Patient Summary:  Bill GREEN" Gris is a 79 y.o. male with some redness and drainage from incision from AV graft revision concerning for infection.  Also noted to have anemia and thrombocytopenia.  Admission to medicine pending.  Broad-spectrum antibiotics ordered by the ER.  Will check CT of the left arm with contrast.  Patient has documented contrast allergy but that is from his previous chronic kidney disease. He does not really have an anaphylacic reaction to contrast according to patient.    Discussed with the patient that he may need this graft excised and new dialysis access.  We discussed potentially doing this as soon as tomorrow with one of my partners.  The patient states he would strongly prefer to wait until Dr. eDal is available to do the procedure which would likely be Tuesday at the earliest.    Active Hospital Problems:  There are no active hospital problems to display for this patient.      Plan:   Continue antibiotics  Will follow up cultures  CT L arm with contrast  Suspect he will need graft excision with TDC placement this admission.       VTE Prophylaxis:  No VTE prophylaxis order currently exists.         MD Boom Eller II, II, MD  03/23/25  15:37 EDT  Office Number [phone]    [facility] [place] [facility]        Electronically signed by Boom Olivares II, MD at 03/23/25 5148

## 2025-03-25 NOTE — SIGNIFICANT NOTE
03/25/25 0756   OTHER   Discipline physical therapist   Rehab Time/Intention   Session Not Performed other (see comments)  (Pt. set to have dialysis cath placed this AM. Will follow up afterwards as time permits or next service date as appropriate.)   Therapy Assessment/Plan (PT)   Criteria for Skilled Interventions Met (PT) yes   Recommendation   PT - Next Appointment 03/26/25

## 2025-03-25 NOTE — PLAN OF CARE
Problem: Adult Inpatient Plan of Care  Goal: Plan of Care Review  Outcome: Progressing  Flowsheets (Taken 3/25/2025 1229)  Progress: no change  Outcome Evaluation: Vital signs stable. Pt placed on 2L O2 NC w/ activity and while sleeping to maintain O2 sats. No complaints of pain. IV abx given. x1 zosyn given for nausea overnight. Betadine wet to dry dressing to left arm wound changed this am per wound care orders. Pt NPO since midnight for possible HD catheter insertion and left arm I&D. Family visited. Pt resting on and off through night. Plan of care ongoing.  Plan of Care Reviewed With: patient

## 2025-03-25 NOTE — ANESTHESIA PREPROCEDURE EVALUATION
Anesthesia Evaluation                  Airway   Mallampati: III  TM distance: >3 FB  Neck ROM: full  No difficulty expected  Dental - normal exam     Pulmonary    (+) ,sleep apnea  Cardiovascular     (+) hypertension, valvular problems/murmurs AS, dysrhythmias Atrial Fib, pericardial effusion, hyperlipidemia    ROS comment: Bicuspid AV    Neuro/Psych  (+) weakness  GI/Hepatic/Renal/Endo    (+) GERD, GI bleeding , liver disease fatty liver disease cirrhosis, renal disease- dialysis and ESRD, diabetes mellitus    Musculoskeletal     Abdominal    Substance History      OB/GYN          Other   blood dyscrasia thrombocytopenia,       Other Comment: SLE                Anesthesia Plan    ASA 3     general and MAC     intravenous induction     Anesthetic plan, risks, benefits, and alternatives have been provided, discussed and informed consent has been obtained with: patient.      CODE STATUS:    Code Status (Patient has no pulse and is not breathing): CPR (Attempt to Resuscitate)  Medical Interventions (Patient has pulse or is breathing): Full

## 2025-03-25 NOTE — PLAN OF CARE
Goal Outcome Evaluation:  Plan of Care Reviewed With: patient        Progress: improving  Outcome Evaluation: Patient is resting comforatbly; VSS no complaints of pain; neuro checks done on the L arm; family at the bedside update is provided on the procedure that was done earlier today. Temporary dialysis catheter is in the right chest area, dressing is clean and intact, Plan is for dialysis tomorrow. Patient will ask the same questions over and over. Will continue to monitor for bleeding.

## 2025-03-25 NOTE — OP NOTE
Operative Note  Location: [facility]  Date of Admission:  3/23/2025  OR Date: 3/25/2025    Pre-op Diagnosis:   ESRD on dialysis [N18.6, Z99.2]  Problem with vascular access [Z78.9]  Sepsis due to cellulitis [L03.90, A41.9]    Post-op Diagnosis:     Same    Procedure:  1) Tunneled dialysis catheter insertion   2) Ultrasound guided vascular access  3) Radiologic supervision of catheter tip placement  4) sharp excisional left axillary wound exploration irrigation and debridement 2 x 2 x 2 cm    Surgeon: Bill Deal MD    Assistant: Keisha ZUNIGA, Provided critical assistance in exposure, retraction, and suction that overall decrease blood loss and operative time.    Anesthesia: General    Estimated Blood Loss: minimal    Staff:   Circulator: Shi Anthony RN  Radiology Technologist: Kayley Palumbo  Scrub Person: Joey Rodriguez  Assistant: Keisha Roger CSA    Complications: None    Specimen: None    Findings:  Tip in SVC/Atrial     Implants: Nothing was implanted during the procedure    Indications:    The patient is an 79 y.o. male referred for tunneled dialysis catheter placement.  The risks, benefits, and alternatives have been discussed with the patient and/or family and include but are not limited to injury to artery, vein, lung, bleeding, and infection.    Procedure:  The patient was taken to the operating room and placed supine on the operating room table. After the induction of IV sedation, the neck and chest were prepped and draped with ChloraPrep. The patient was placed in Trendelenburg position. Timeout was observed. The right  Internal Jugular  was evaluated on ultrasound and found to be easily compressible. The vessel was entered under direct ultrasound guidance and photographic documentation was recorded in the chart for the record. An angled wire was then advanced down into the superior vena cava under fluoroscopy without any difficulty. Exit site was chosen on the chest. The  tract was anesthetized with 1% lidocaine mixed with 0.25% Marcaine. A 23 cm catheter was then flushed and brought up onto the field. It was tunneled in an antegrade fashion up to the IJ insertion site. Dilator and peel-away sheath were then advanced over the wire under fluoroscopy without any significant difficulty. Dilator and wire were removed leaving the peel-away sheath in place. The distal tip of the catheter was then placed into the peel-away sheath and the peel-away sheath was removed. Under fluoroscopy, the distal tip of the catheter was positioned into the right atrium. There was no kinking at the catheter insertion site. The catheter flushed and aspirated easily. The lung fields were examined. There was no evidence of hemothorax or pneumothorax. The catheter flushed and aspirated quite easily. It was locked with concentrated heparin. The catheter was then anchored to the chest with nylon sutures. A stitch and dermal glue were used to close the neck site as well. Sterile dressings were applied.     Attention was then turned to the left arm where local anesthetic was in's used under sterile conditions into the skin and using a Kale retractor and suction we exposed explore the area debriding excising sharply subcutaneous fibrinous tissue.  The skin was opened widely and 2 x 2 x 2 cm area and incision was cleared of nonviable tissue and subcutaneous site via sharp excisional debridement.  Area was then packed with Betadine wet-to-dry gauze.  During our exploration I did not identify any elements of graft material visible within the wound.    The patient tolerated the procedure well. There were no immediately apparent complications.           Active Hospital Problems    Diagnosis  POA    **Sepsis due to cellulitis [L03.90, A41.9]  Yes    ESRD on dialysis [N18.6, Z99.2]  Not Applicable    Problem with vascular access [Z78.9]  Yes    Anemia due to stage 4 chronic kidney disease treated with erythropoietin [N18.4,  D63.1]  Yes    Cirrhosis of liver without ascites [K74.60]  Yes    Permanent atrial fibrillation [I48.21]  Yes      Resolved Hospital Problems   No resolved problems to display.      Bill Deal MD     Date: 3/25/2025  Time: 11:41 EDT

## 2025-03-25 NOTE — PROGRESS NOTES
Name: Bill Landry ADMIT: 3/23/2025   : 1945  PCP: Bharathi De La Torre MD    MRN: [id number] LOS: 2 days   AGE/SEX: 79 y.o. male  ROOM: [facility] OR/MAIN OR   Subjective   Chief Complaint   Patient presents with    Vascular Access Problem     80 yo with history of ESRD on HD MWF, chron's disease s/p previous bowel resection and end ileostomy, DM2, Afib, cirrhosis, lups and other medical issues. He presented with erythema and drainage from LUE graft site. Came to [facility] ER. Given IV ABX    NPO  Seen in pre-op  Plans for HD cath today    ROS  No f/c  No n/v  No cp/palp  No soa/cough    Objective   Vital Signs  Temp:  [97.3 °F (36.3 °C)-98.5 °F (36.9 °C)] 98.5 °F (36.9 °C)  Heart Rate:  [77-98] 98  Resp:  [16-20] 18  BP: (108-140)/(56-85) 140/58  SpO2:  [92 %-99 %] 99 %  on  Flow (L/min) (Oxygen Therapy):  [2] 2;   Device (Oxygen Therapy): nasal cannula  Body mass index is 26.53 kg/m².    Physical Exam  HENT:      Head: Normocephalic and atraumatic.   Eyes:      General: No scleral icterus.  Cardiovascular:      Rate and Rhythm: Normal rate.   Pulmonary:      Effort: Pulmonary effort is normal. No respiratory distress.   Abdominal:      General: There is no distension.      Palpations: Abdomen is soft.      Tenderness: There is no abdominal tenderness.   Musculoskeletal:      Cervical back: Neck supple.   Neurological:      Mental Status: He is alert.   Psychiatric:         Mood and Affect: Mood normal.         Behavior: Behavior normal.         Thought Content: Thought content normal.         Judgment: Judgment normal.     LUE graft site    Results Review:       I reviewed the patient's new clinical results.  Results from last 7 days   Lab Units 25  0401 25  0355 25  1527 25  0000   WBC 10*3/mm3 6.38 4.42 4.21  --    HEMOGLOBIN g/dL 9.5* 9.2* 9.6* 9.8*  29.4*   PLATELETS 10*3/mm3 86* 76* 59*  --      Results from last 7 days   Lab Units 25  0401 25  0355 25  1527    SODIUM mmol/L 130* 131* 129*   POTASSIUM mmol/L 4.4 4.1 4.3   CHLORIDE mmol/L 99 95* 93*   CO2 mmol/L 22.7 21.5* 22.2   BUN mg/dL 29* 49* 46*   CREATININE mg/dL 4.75* 6.28* 6.03*   GLUCOSE mg/dL 129* 107* 171*   Estimated Creatinine Clearance: 15 mL/min (A) (by C-G formula based on SCr of 4.75 mg/dL (H)).  Results from last 7 days   Lab Units 03/25/25  0401 03/24/25  0355 03/23/25  1527   ALBUMIN g/dL 2.3* 2.4* 2.6*   BILIRUBIN mg/dL  --   --  1.2   ALK PHOS U/L  --   --  283*   AST (SGOT) U/L  --   --  43*   ALT (SGPT) U/L  --   --  18     Results from last 7 days   Lab Units 03/25/25  0401 03/24/25  0355 03/23/25  1527   CALCIUM mg/dL 8.1* 7.8* 7.8*   ALBUMIN g/dL 2.3* 2.4* 2.6*   MAGNESIUM mg/dL  --  2.1  --    PHOSPHORUS mg/dL 4.0 5.2*  --      Results from last 7 days   Lab Units 03/24/25  0355 03/23/25  2329 03/23/25  2041 03/23/25  1825 03/23/25  1527   PROCALCITONIN ng/mL  --   --   --   --  1.20*   LACTATE mmol/L 1.4 2.2* 2.7* 2.4* 3.6*       Coag   Results from last 7 days   Lab Units 03/23/25  1527   INR  1.36*   APTT seconds 26.7     HbA1C   Lab Results   Component Value Date    HGBA1C 5.3 02/03/2025    HGBA1C 4.80 01/26/2025    HGBA1C 5.40 09/17/2024     Infection   Results from last 7 days   Lab Units 03/24/25  0805 03/23/25  1549 03/23/25  1527 03/23/25  1508   BLOODCX   --  No growth at 24 hours  No growth at 24 hours  --   --    WOUNDCX  Light growth (2+) Staphylococcus aureus*  --   --  Light growth (2+) Staphylococcus aureus*   PROCALCITONIN ng/mL  --   --  1.20*  --      Radiology(recent) CT Upper Extremity Left With Contrast  Result Date: 3/23/2025  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 Proximal most portions of the fistulas are outside the exam field-of-view. A 3.6 x 2.3 cm focus of subcutaneous fluid density in the inner mid left upper arm extends from the thickened skin surface to abut the patent fistula. No enhancing wall or internal locules of air. Recommend clinical  "correlation for findings to suggest infection at this site. In the absence of recent surgery, fluid collection raises concern for infection. The other thrombosed fistula is stented proximally and appears ligated distally. 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  This report was finalized on 3/23/2025 6:16 PM by Dr. Bill Minaya M.D on Workstation: [id number]      XR Chest 1 View  Result Date: 3/23/2025   Redemonstrated mild cardiomegaly and marked elevation of the right hemidiaphragm.  Mild bilateral central vascular congestion and interstitial prominence, no pneumothorax.  Question small right effusion.  This report was finalized on 3/23/2025 5:45 PM by Dr. Tab Tolbert M.D on Workstation: [id number]      No results found for: \"TROPONINT\", \"TROPONINI\", \"BNP\"  No components found for: \"TSH;2\"    [Transfer Hold] aspirin, 81 mg, Oral, Daily  [Transfer Hold] b complex-vitamin c-folic acid, 1 tablet, Oral, Daily  ceFAZolin, 1,000 mg, Intravenous, Q24H  [Transfer Hold] cetirizine, 10 mg, Oral, Daily  [Transfer Hold] cholecalciferol, 1,000 Units, Oral, Daily  famotidine, 20 mg, Oral, Daily  [Transfer Hold] latanoprost, 1 drop, Both Eyes, Nightly  [Transfer Hold] midodrine, 2.5 mg, Oral, TID AC  [Transfer Hold] sevelamer, 800 mg, Oral, TID With Meals  sodium chloride, 3 mL, Intravenous, Q12H  [Transfer Hold] tamsulosin, 0.4 mg, Oral, Nightly      sodium chloride, 9 mL/hr, Last Rate: 9 mL/hr (03/25/25 1034)    NPO Diet NPO Type: Strict NPO      Assessment & Plan      Active Hospital Problems    Diagnosis  POA    **Sepsis due to cellulitis [L03.90, A41.9]  Yes    ESRD on dialysis [N18.6, Z99.2]  Not Applicable    Problem with vascular access [Z78.9]  Yes    Anemia due to stage 4 chronic kidney disease treated with erythropoietin [N18.4, D63.1]  Yes    Cirrhosis of liver without ascites [K74.60]  Yes    Permanent atrial fibrillation [I48.21]  Yes      Resolved Hospital Problems   No " resolved problems to display.     78 yo with history of ESRD on HD MWF, chron's disease s/p previous bowel resection and end ileostomy, DM2, Afib, cirrhosis, lups and other medical issues. He presented with erythema and drainage from LUE graft site. Came to [facility] ER. Given IV ABX    Vascular and ID have evaluated. Likely plans for 6 weeks abx ( probably with HD). Follow up cultures and operative report from I/D today.   Continue home asa, midodrine. Not on a/c due to previous bleeding  Nephrology following for HD  Monitor labs      DW RN   Dispo- to home in 1-2 days pending abx plan and Vascular thoughts and depending on the OR today      Yoan Perez MD  [facility]  03/25/25  10:52 EDT

## 2025-03-25 NOTE — PROGRESS NOTES
[facility] Progress Note      Patient Name: Bill Landry  : 1945  MRN: [id number]  Primary Care Physician:  Bharathi De La Torre MD  Date of admission: 3/23/2025    Subjective     Interval History:   The patient was seen and examined today for follow-up on end-stage renal disease  Status post tunneled dialysis catheter placement and left axillary wound exploration and irrigation debridement    Review of Systems:   As noted above    Objective     Vitals:   Temp:  [97.3 °F (36.3 °C)-99.2 °F (37.3 °C)] 97.5 °F (36.4 °C)  Heart Rate:  [70-98] 75  Resp:  [12-20] 16  BP: ()/(44-81) 129/66  Flow (L/min) (Oxygen Therapy):  [2-3] 2    Intake/Output Summary (Last 24 hours) at 3/25/2025 1635  Last data filed at 3/25/2025 1336  Gross per 24 hour   Intake 240 ml   Output 2450 ml   Net -2210 ml       Physical Exam:    General Appearance: alert, oriented x 3, no acute distress   Skin: warm and dry  HEENT: oral mucosa normal, nonicteric sclera  Neck: supple, no JVD  Lungs: CTA  Heart: RRR, normal S1 and S2  Abdomen: soft, nontender, nondistended  : no palpable bladder  Extremities: Bilateral lower extremity edema  Neuro: normal speech and mental status   Left upper extremity AV graft.  Left upper extremity wound covered  Scheduled Meds:     aspirin, 81 mg, Oral, Daily  b complex-vitamin c-folic acid, 1 tablet, Oral, Daily  ceFAZolin, 1,000 mg, Intravenous, Q24H  cetirizine, 10 mg, Oral, Daily  cholecalciferol, 1,000 Units, Oral, Daily  famotidine, 20 mg, Oral, Daily  latanoprost, 1 drop, Both Eyes, Nightly  midodrine, 2.5 mg, Oral, TID AC  sevelamer, 800 mg, Oral, TID With Meals  tamsulosin, 0.4 mg, Oral, Nightly      IV Meds:          Results Reviewed:   I have personally reviewed the results from the time of this admission to 3/25/2025 16:35 EDT     Results from last 7 days   Lab Units 25  0401 25  0355 25  1527   SODIUM mmol/L 130* 131* 129*   POTASSIUM mmol/L 4.4 4.1  4.3   CHLORIDE mmol/L 99 95* 93*   CO2 mmol/L 22.7 21.5* 22.2   BUN mg/dL 29* 49* 46*   CREATININE mg/dL 4.75* 6.28* 6.03*   CALCIUM mg/dL 8.1* 7.8* 7.8*   BILIRUBIN mg/dL  --   --  1.2   ALK PHOS U/L  --   --  283*   ALT (SGPT) U/L  --   --  18   AST (SGOT) U/L  --   --  43*   GLUCOSE mg/dL 129* 107* 171*       Estimated Creatinine Clearance: 15 mL/min (A) (by C-G formula based on SCr of 4.75 mg/dL (H)).    Results from last 7 days   Lab Units 03/25/25  0401 03/24/25  0355   MAGNESIUM mg/dL  --  2.1   PHOSPHORUS mg/dL 4.0 5.2*             Results from last 7 days   Lab Units 03/25/25  0401 03/24/25  0355 03/23/25  1527 03/19/25  0000   WBC 10*3/mm3 6.38 4.42 4.21  --    HEMOGLOBIN g/dL 9.5* 9.2* 9.6* 9.8*  29.4*   PLATELETS 10*3/mm3 86* 76* 59*  --        Results from last 7 days   Lab Units 03/23/25  1527   INR  1.36*       Assessment / Plan     ASSESSMENT:  .  ESRD: Volume excess by exam and imaging; hypervolemic hyponatremia.  Normal potassium and anion gap.  Due for HD tomorrow; last HD was 3/20   Left arm AV graft with recent bleeding and later drainage from incision site (had had recent shuntogram).  Elevated lactate and procalcitonin; normal white blood cell count (though chronic leukopenia).  CT scan of the left arm does reveal a fluid density that was cultured by vascular surgery today status post incision and drainage  Crohn's disease s/p bowel resection with ileostomy  Cirrhosis with chronic leukopenia and thrombocytopenia  Atrial fibrillation  Chronic hypotension of ESRD:  on midodrine        PLAN:  Discussed with  today, he wants to let the graft rest and wound to heal after an incision and drainage of fluid collection  Tunneled dialysis catheter inserted today  Plan for dialysis tomorrow      Thank you for involving us in the care of Bill Landry.  Please feel free to call with any questions.    Myra Moore MD  03/25/25  16:35 EDT    [facility]  [phone]    Parts of this note may be an electronic transcription/translation of spoken language to printed text using the Dragon dictation system.

## 2025-03-25 NOTE — ANESTHESIA PROCEDURE NOTES
Airway  Date/Time: 3/25/2025 10:44 AM    Indications and Patient Condition    Preoxygenated: yes      Final Airway Details    Final airway type: supraglottic airway      Successful airway: LMA  Size: 5   Number of attempts at approach: 1  Assessment: lips, teeth, and gum same as pre-op

## 2025-03-26 LAB
BACTERIA SPEC AEROBE CULT: ABNORMAL
BASOPHILS # BLD AUTO: 0.03 10*3/MM3 (ref 0–0.2)
BASOPHILS NFR BLD AUTO: 0.6 % (ref 0–1.5)
C AURIS DNA SPEC QL NAA+NON-PROBE: NOT DETECTED
DEPRECATED RDW RBC AUTO: 54 FL (ref 37–54)
EOSINOPHIL # BLD AUTO: 0.13 10*3/MM3 (ref 0–0.4)
EOSINOPHIL NFR BLD AUTO: 2.6 % (ref 0.3–6.2)
ERYTHROCYTE [DISTWIDTH] IN BLOOD BY AUTOMATED COUNT: 14.8 % (ref 12.3–15.4)
GRAM STN SPEC: ABNORMAL
GRAM STN SPEC: ABNORMAL
HCT VFR BLD AUTO: 25.5 % (ref 37.5–51)
HGB BLD-MCNC: 8.6 G/DL (ref 13–17.7)
IMM GRANULOCYTES # BLD AUTO: 0.04 10*3/MM3 (ref 0–0.05)
IMM GRANULOCYTES NFR BLD AUTO: 0.8 % (ref 0–0.5)
LYMPHOCYTES # BLD AUTO: 0.49 10*3/MM3 (ref 0.7–3.1)
LYMPHOCYTES NFR BLD AUTO: 9.6 % (ref 19.6–45.3)
MCH RBC QN AUTO: 33.3 PG (ref 26.6–33)
MCHC RBC AUTO-ENTMCNC: 33.7 G/DL (ref 31.5–35.7)
MCV RBC AUTO: 98.8 FL (ref 79–97)
MONOCYTES # BLD AUTO: 0.63 10*3/MM3 (ref 0.1–0.9)
MONOCYTES NFR BLD AUTO: 12.4 % (ref 5–12)
NEUTROPHILS NFR BLD AUTO: 3.77 10*3/MM3 (ref 1.7–7)
NEUTROPHILS NFR BLD AUTO: 74 % (ref 42.7–76)
PLATELET # BLD AUTO: 83 10*3/MM3 (ref 140–450)
PMV BLD AUTO: 9 FL (ref 6–12)
RBC # BLD AUTO: 2.58 10*6/MM3 (ref 4.14–5.8)
SODIUM SERPL-SCNC: 123 MMOL/L (ref 136–145)
WBC NRBC COR # BLD AUTO: 5.09 10*3/MM3 (ref 3.4–10.8)

## 2025-03-26 PROCEDURE — 84295 ASSAY OF SERUM SODIUM: CPT | Performed by: HOSPITALIST

## 2025-03-26 PROCEDURE — 85025 COMPLETE CBC W/AUTO DIFF WBC: CPT | Performed by: NURSE PRACTITIONER

## 2025-03-26 PROCEDURE — 97530 THERAPEUTIC ACTIVITIES: CPT

## 2025-03-26 PROCEDURE — 99232 SBSQ HOSP IP/OBS MODERATE 35: CPT | Performed by: STUDENT IN AN ORGANIZED HEALTH CARE EDUCATION/TRAINING PROGRAM

## 2025-03-26 PROCEDURE — 25010000002 LIDOCAINE 1 % SOLUTION: Performed by: SURGERY

## 2025-03-26 PROCEDURE — 25010000002 DESMOPRESSIN ACETATE PF 4 MCG/ML SOLUTION 1 ML VIAL: Performed by: SURGERY

## 2025-03-26 PROCEDURE — G0545 PR INHERENT VISIT TO INPT: HCPCS | Performed by: STUDENT IN AN ORGANIZED HEALTH CARE EDUCATION/TRAINING PROGRAM

## 2025-03-26 PROCEDURE — 25010000002 CEFAZOLIN PER 500 MG: Performed by: STUDENT IN AN ORGANIZED HEALTH CARE EDUCATION/TRAINING PROGRAM

## 2025-03-26 PROCEDURE — 97162 PT EVAL MOD COMPLEX 30 MIN: CPT

## 2025-03-26 RX ORDER — SODIUM CHLORIDE 0.9 % (FLUSH) 0.9 %
10 SYRINGE (ML) INJECTION AS NEEDED
Status: DISCONTINUED | OUTPATIENT
Start: 2025-03-26 | End: 2025-04-03 | Stop reason: HOSPADM

## 2025-03-26 RX ORDER — SODIUM CHLORIDE 0.9 % (FLUSH) 0.9 %
20 SYRINGE (ML) INJECTION AS NEEDED
Status: DISCONTINUED | OUTPATIENT
Start: 2025-03-26 | End: 2025-04-03 | Stop reason: HOSPADM

## 2025-03-26 RX ORDER — LIDOCAINE HYDROCHLORIDE 10 MG/ML
20 INJECTION, SOLUTION INFILTRATION; PERINEURAL ONCE
Status: COMPLETED | OUTPATIENT
Start: 2025-03-26 | End: 2025-03-26

## 2025-03-26 RX ORDER — SODIUM CHLORIDE 0.9 % (FLUSH) 0.9 %
10 SYRINGE (ML) INJECTION EVERY 12 HOURS SCHEDULED
Status: DISCONTINUED | OUTPATIENT
Start: 2025-03-26 | End: 2025-04-03 | Stop reason: HOSPADM

## 2025-03-26 RX ORDER — SODIUM CHLORIDE 9 MG/ML
40 INJECTION, SOLUTION INTRAVENOUS AS NEEDED
Status: DISCONTINUED | OUTPATIENT
Start: 2025-03-26 | End: 2025-04-03 | Stop reason: HOSPADM

## 2025-03-26 RX ADMIN — Medication 1000 UNITS: at 08:25

## 2025-03-26 RX ADMIN — MIDODRINE HYDROCHLORIDE 2.5 MG: 2.5 TABLET ORAL at 08:25

## 2025-03-26 RX ADMIN — SEVELAMER CARBONATE 800 MG: 800 TABLET, FILM COATED ORAL at 11:42

## 2025-03-26 RX ADMIN — LATANOPROST 1 DROP: 50 SOLUTION/ DROPS OPHTHALMIC at 20:00

## 2025-03-26 RX ADMIN — LIDOCAINE HYDROCHLORIDE 20 ML: 10 INJECTION, SOLUTION INFILTRATION; PERINEURAL at 17:16

## 2025-03-26 RX ADMIN — MIDODRINE HYDROCHLORIDE 2.5 MG: 2.5 TABLET ORAL at 11:42

## 2025-03-26 RX ADMIN — Medication 10 ML: at 20:00

## 2025-03-26 RX ADMIN — CETIRIZINE HYDROCHLORIDE 10 MG: 10 TABLET ORAL at 08:25

## 2025-03-26 RX ADMIN — TAMSULOSIN HYDROCHLORIDE 0.4 MG: 0.4 CAPSULE ORAL at 22:00

## 2025-03-26 RX ADMIN — CEFAZOLIN 1000 MG: 1 INJECTION, POWDER, FOR SOLUTION INTRAMUSCULAR; INTRAVENOUS at 10:26

## 2025-03-26 RX ADMIN — DESMOPRESSIN ACETATE 25 MCG: 4 INJECTION, SOLUTION INTRAVENOUS; SUBCUTANEOUS at 06:22

## 2025-03-26 RX ADMIN — Medication 10 ML: at 22:00

## 2025-03-26 RX ADMIN — FAMOTIDINE 20 MG: 20 TABLET, FILM COATED ORAL at 08:25

## 2025-03-26 RX ADMIN — Medication 1 TABLET: at 08:25

## 2025-03-26 RX ADMIN — SEVELAMER CARBONATE 800 MG: 800 TABLET, FILM COATED ORAL at 08:25

## 2025-03-26 NOTE — PROGRESS NOTES
[facility]   Surgical Progress Note    Patient Name: Bill Landry  : 1945  MRN: [id number]  Date of admission: 3/23/2025  Surgical Procedures Since Admission:  Procedure(s):  HEMODIALYSIS CATHETER INSERTION, left arm I&D  INCISION AND DRAINAGE UPPER EXTREMITY  Surgeon:  Bill Deal MD  Status:  1 Day Post-Op  -------------------    Subjective   Subjective     Chief Complaint: Possible left AV graft infection    History of Present Illness   79-year-old gentleman with possible left AV graft infection now status post I&D with no visible graft at the base of the wound.  Cultures are pending.  Tunneled catheters been placed.    Review of Systems moderate oozing from right chest wall    Objective   Objective     Vitals:   Temp:  [97.2 °F (36.2 °C)-99.2 °F (37.3 °C)] 97.5 °F (36.4 °C)  Heart Rate:  [64-83] 78  Resp:  [12-20] 18  BP: ()/(44-81) 97/55  Flow (L/min) (Oxygen Therapy):  [2-3] 2  Output by Drain (mL) 25 0701 - 25 1900 25 1901 - 25 0700 25 0701 - 25 0938 Range Total   Ileostomy  525  775       Physical Exam, catheter in place with moderate oozing from a small skin tear at the chest wall not from the actual tract.  Left arm looks good with no cellulitis remaining     Result Review    Result Review:  I have personally reviewed the results from the time of this admission to 3/26/2025 09:38 EDT and agree with these findings:  [x]  Laboratory list / accordion  [x]  Microbiology  []  Radiology  [x]  EKG/Telemetry   []  Cardiology/Vascular   []  Pathology  []  Old records  []  Other:  Most notable findings include: Culture with Staph aureus early growth      Results from last 7 days   Lab Units 25  0429 25  0401 25  0355 25  1527   WBC 10*3/mm3 5.09 6.38 4.42 4.21   HEMOGLOBIN g/dL 8.6* 9.5* 9.2* 9.6*   PLATELETS 10*3/mm3 83* 86* 76* 59*     Results from last 7 days   Lab Units 25  0756 25  0401 25  0355  03/23/25  1527   SODIUM mmol/L 123* 130* 131* 129*   POTASSIUM mmol/L  --  4.4 4.1 4.3   CHLORIDE mmol/L  --  99 95* 93*   CO2 mmol/L  --  22.7 21.5* 22.2   BUN mg/dL  --  29* 49* 46*   CREATININE mg/dL  --  4.75* 6.28* 6.03*   GLUCOSE mg/dL  --  129* 107* 171*   MAGNESIUM mg/dL  --   --  2.1  --    PHOSPHORUS mg/dL  --  4.0 5.2*  --    Estimated Creatinine Clearance: 15 mL/min (A) (by C-G formula based on SCr of 4.75 mg/dL (H)).  Results from last 7 days   Lab Units 03/23/25  1527   PROTIME Seconds 16.7*   INR  1.36*     Lab Results   Component Value Date    HGBA1C 5.3 02/03/2025    HGBA1C 4.80 01/26/2025    HGBA1C 5.40 09/17/2024     Glucose   Date/Time Value Ref Range Status   03/25/2025 1146 118 70 - 130 mg/dL Final   03/25/2025 0815 116 70 - 130 mg/dL Final       Assessment & Plan   Assessment / Plan     Brief Patient Summary:  Bill Landry is a 79 y.o. male who appears to have staph growing from wound that does not appear to directly communicate to his AV graft.  We are going to try to sterilize it with the 6 weeks of antibiotic therapy.  Everything looks good so far.  Is using likely could be pushed by some of the tPA views to lock his catheters and we will shift to normal saline for this.    Active Hospital Problems:   Active Hospital Problems    Diagnosis     **Sepsis due to cellulitis     ESRD on dialysis     Problem with vascular access     Anemia due to stage 4 chronic kidney disease treated with erythropoietin     Cirrhosis of liver without ascites     Permanent atrial fibrillation      Plan:   TDC for the next 4 to 6 weeks to let the left arm wound heal.  Will transition to saline to lock his catheter for now.    VTE Prophylaxis:  Mechanical VTE prophylaxis orders are present.        Bill Deal MD

## 2025-03-26 NOTE — NURSING NOTE
Pt with blood seeping out around hemodialysis catheter site dressing. Dressing changed. Pt also becoming more forgetful. On call A paged.    0354: Call back received from JAME Sloan. Reported the above. Orders received for STAT CBC.    0510: Spoke with JAME Sloan. Reported STAT CBC back with hgb 8.6 and platelets 83. Blood continuing to seep out around catheter dressing. Per Lisa Yates call vascular surgery. Page placed out to vascular surgery.    0512: Call back received from Dr Vale with vascular surgery. Reported the above and dressing to left arm starting to ooze blood. Received orders for one time dose desmopressin (DDAVP) IVPB 0.3mcg/kg. Repeated and verified.

## 2025-03-26 NOTE — PROGRESS NOTES
[facility] Progress Note      Patient Name: Bill Landry  : 1945  MRN: [id number]  Primary Care Physician:  Bharathi De La Torre MD  Date of admission: 3/23/2025    Subjective     Interval History:   The patient was seen and examined today for follow-up on end-stage renal disease  Status post tunneled dialysis catheter placement and left axillary wound exploration and irrigation debridement  Noted oozing from dialysis catheter today.  Discussed with vascular  Review of Systems:   As noted above    Objective     Vitals:   Temp:  [97.2 °F (36.2 °C)-99.2 °F (37.3 °C)] 97.5 °F (36.4 °C)  Heart Rate:  [64-83] 78  Resp:  [12-20] 18  BP: ()/(44-81) 97/55  Flow (L/min) (Oxygen Therapy):  [2-3] 2    Intake/Output Summary (Last 24 hours) at 3/26/2025 1135  Last data filed at 3/26/2025 1051  Gross per 24 hour   Intake 720 ml   Output 925 ml   Net -205 ml       Physical Exam:    General Appearance: alert, oriented x 3, no acute distress   Skin: warm and dry  HEENT: oral mucosa normal, nonicteric sclera  Neck: supple, no JVD  Lungs: CTA  Heart: RRR, normal S1 and S2  Abdomen: soft, nontender, nondistended  : no palpable bladder  Extremities: Bilateral lower extremity edema  Neuro: normal speech and mental status   Left upper extremity AV graft.  Left upper extremity wound covered  Scheduled Meds:     b complex-vitamin c-folic acid, 1 tablet, Oral, Daily  ceFAZolin, 1,000 mg, Intravenous, Q24H  cetirizine, 10 mg, Oral, Daily  cholecalciferol, 1,000 Units, Oral, Daily  famotidine, 20 mg, Oral, Daily  latanoprost, 1 drop, Both Eyes, Nightly  midodrine, 2.5 mg, Oral, TID AC  sevelamer, 800 mg, Oral, TID With Meals  sodium chloride, 10 mL, Intravenous, Q12H  sodium chloride, 10 mL, Intravenous, Q12H  tamsulosin, 0.4 mg, Oral, Nightly      IV Meds:          Results Reviewed:   I have personally reviewed the results from the time of this admission to 3/26/2025 11:35 EDT     Results from last 7  days   Lab Units 03/26/25  0756 03/25/25  0401 03/24/25  0355 03/23/25  1527   SODIUM mmol/L 123* 130* 131* 129*   POTASSIUM mmol/L  --  4.4 4.1 4.3   CHLORIDE mmol/L  --  99 95* 93*   CO2 mmol/L  --  22.7 21.5* 22.2   BUN mg/dL  --  29* 49* 46*   CREATININE mg/dL  --  4.75* 6.28* 6.03*   CALCIUM mg/dL  --  8.1* 7.8* 7.8*   BILIRUBIN mg/dL  --   --   --  1.2   ALK PHOS U/L  --   --   --  283*   ALT (SGPT) U/L  --   --   --  18   AST (SGOT) U/L  --   --   --  43*   GLUCOSE mg/dL  --  129* 107* 171*       Estimated Creatinine Clearance: 15 mL/min (A) (by C-G formula based on SCr of 4.75 mg/dL (H)).    Results from last 7 days   Lab Units 03/25/25  0401 03/24/25  0355   MAGNESIUM mg/dL  --  2.1   PHOSPHORUS mg/dL 4.0 5.2*             Results from last 7 days   Lab Units 03/26/25  0429 03/25/25  0401 03/24/25  0355 03/23/25  1527   WBC 10*3/mm3 5.09 6.38 4.42 4.21   HEMOGLOBIN g/dL 8.6* 9.5* 9.2* 9.6*   PLATELETS 10*3/mm3 83* 86* 76* 59*       Results from last 7 days   Lab Units 03/23/25  1527   INR  1.36*       Assessment / Plan     ASSESSMENT:  .  ESRD: Hypervolemic on exam today   Left arm AV graft with recent bleeding and later drainage from incision site (had had recent shuntogram).  Elevated lactate and procalcitonin; normal white blood cell count (though chronic leukopenia).  CT scan of the left arm does reveal a fluid density that was cultured by vascular surgery  status post incision and drainage on 3/25/2024  Crohn's disease s/p bowel resection with ileostomy  Cirrhosis with chronic leukopenia and thrombocytopenia  Atrial fibrillation  Chronic hypotension of ESRD:  on midodrine  Hyponatremia secondary to volume overload will correct with hemodialysis  Thrombocytopenia chronic         PLAN:  Will continue dialysis on Monday Wednesday Friday with plan for dialysis today.  Plan noted to go back to rehab on daily dialysis with 6 weeks of IV antibiotics  We will dialyze today per schedule  With him from [facility]  dialysis catheter was given DDAVP today.  Will not give heparin on dialysis      Thank you for involving us in the care of Bill PETER Pastranabest.  Please feel free to call with any questions.    Myra Moore MD  03/26/25  11:35 EDT    [facility]  [phone]    Parts of this note may be an electronic transcription/translation of spoken language to printed text using the Dragon dictation system.

## 2025-03-26 NOTE — PROGRESS NOTES
[facility]    [phone]   LOS: 3 days     Name: Bill Landry  Age/Sex: 79 y.o. male  :  1945        PCP: Bharathi De La Torre MD  Chief Complaint   Patient presents with    Vascular Access Problem      Subjective   He feels pretty good today.  Did have some bruising and bleeding around his tunneled dialysis catheter site.  Was given some medication overnight with no further bleeding or oozing.  He does have some soreness in the shoulder but otherwise denies new issues or complaints.  Had a very good appetite this morning and ate well.  Slept well overnight.  General: No Fever or Chills, Cardiac: No Chest Pain or Palpitations, Resp: No Cough or SOA, GI: No Nausea, Vomiting, or Diarrhea, and Other: No bleeding    b complex-vitamin c-folic acid, 1 tablet, Oral, Daily  ceFAZolin, 1,000 mg, Intravenous, Q24H  cetirizine, 10 mg, Oral, Daily  cholecalciferol, 1,000 Units, Oral, Daily  famotidine, 20 mg, Oral, Daily  latanoprost, 1 drop, Both Eyes, Nightly  midodrine, 2.5 mg, Oral, TID AC  sevelamer, 800 mg, Oral, TID With Meals  sodium chloride, 10 mL, Intravenous, Q12H  sodium chloride, 10 mL, Intravenous, Q12H  tamsulosin, 0.4 mg, Oral, Nightly           Objective   Vital Signs  Temp:  [97.2 °F (36.2 °C)-99.2 °F (37.3 °C)] 97.5 °F (36.4 °C)  Heart Rate:  [64-83] 78  Resp:  [12-20] 18  BP: ()/(44-81) 97/55  Body mass index is 26.63 kg/m².    Intake/Output Summary (Last 24 hours) at 3/26/2025 0855  Last data filed at 3/26/2025 0725  Gross per 24 hour   Intake 480 ml   Output 775 ml   Net -295 ml       Physical Exam  Awake alert resting sitting up on the edge of the bed with no distress.   Clear to auscultation anteriorly  Has a lot of bruising around the tunneled dialysis catheter site.  His vascular site looks okay.      Results Review:       I reviewed the patient's new clinical results.  Results from last 7 days   Lab Units 25  0429 25  0401 25  0355 25  1527   WBC  10*3/mm3 5.09 6.38 4.42 4.21   HEMOGLOBIN g/dL 8.6* 9.5* 9.2* 9.6*   PLATELETS 10*3/mm3 83* 86* 76* 59*     Results from last 7 days   Lab Units 03/25/25  0401 03/24/25  0355 03/23/25  1527   SODIUM mmol/L 130* 131* 129*   POTASSIUM mmol/L 4.4 4.1 4.3   CHLORIDE mmol/L 99 95* 93*   CO2 mmol/L 22.7 21.5* 22.2   BUN mg/dL 29* 49* 46*   CREATININE mg/dL 4.75* 6.28* 6.03*   CALCIUM mg/dL 8.1* 7.8* 7.8*   MAGNESIUM mg/dL  --  2.1  --    PHOSPHORUS mg/dL 4.0 5.2*  --    Estimated Creatinine Clearance: 15 mL/min (A) (by C-G formula based on SCr of 4.75 mg/dL (H)).      Assessment & Plan   Active Hospital Problems    Diagnosis  POA    **Sepsis due to cellulitis [L03.90, A41.9]  Yes    ESRD on dialysis [N18.6, Z99.2]  Not Applicable    Problem with vascular access [Z78.9]  Yes    Anemia due to stage 4 chronic kidney disease treated with erythropoietin [N18.4, D63.1]  Yes    Cirrhosis of liver without ascites [K74.60]  Yes    Permanent atrial fibrillation [I48.21]  Yes      Resolved Hospital Problems   No resolved problems to display.       PLAN    Tolerating antibiotics without issues no nausea vomiting diarrhea  Will be on antibiotics through end date with 6 weeks antibiotic therapy given graft infection.  Still having some oozing and bleeding issues with the catheter.  Hemoglobin did drop about 1 g.  Platelets remained stable.  Given 1 dose of DDAVP to see if this helps with platelet function.  Cleared for discharge by nephrology awaiting word from vascular surgery on discharge planning.  Infectious disease signed off today.  Potential discharge home with IV antibiotics with dialysis tomorrow.      Disposition  Expected Discharge Date: 3/26/2025; Expected Discharge Time:        Juan Ramon Aburto MD  [facility]  03/26/25  08:55 EDT

## 2025-03-26 NOTE — CASE MANAGEMENT/SOCIAL WORK
"Continued Stay Note  Psychiatric     Patient Name: Bill Landry  MRN: [id number]  Today's Date: 3/26/2025    Admit Date: 3/23/2025    Plan: Home with wife and Boogie  (Current). Per ID, antibiotics with HD. 2 g cefazolin Monday after dialysis and 2 g cefazolin Wednesday after dialysis. Dose on Friday would be 3 g cefazolin after dialysis. End date will be May 6. Called and Faxed IV antibiotic order to [facility] and they are able to provide Cefazolin with HD. Family transport home.   Discharge Plan       Row Name 03/26/25 0927       Plan    Plan Home with wife and Boogie  (Current). Per ID, antibiotics with HD. 2 g cefazolin Monday after dialysis and 2 g cefazolin Wednesday after dialysis. Dose on Friday would be 3 g cefazolin after dialysis. End date will be May 6. Called and Faxed IV antibiotic order to [facility] and they are able to provide Cefazolin with HD. Family transport home.    Patient/Family in Agreement with Plan yes    Plan Comments Per ID's final plan for antibiotics, \"On discharge cefazolin can be transition to dosing on dialysis days after dialysis thrice weekly.  Patient's normal dialysis schedule is Monday Wednesday Friday and therefore doses would be 2 g cefazolin Monday after dialysis and 2 g cefazolin Wednesday after dialysis.  Dose on Friday would be 3 g cefazolin after dialysis.  End date will be May 6.\"  Called [facility] and they will be able to provide Cefazolin with HD. Faxed IV antibiotic order and ID note from today to [facility]. Chase Araiza RN-BC                   Discharge Codes    No documentation.                 Expected Discharge Date and Time       Expected Discharge Date Expected Discharge Time    Mar 26, 2025               Chase Araiza RN    "

## 2025-03-26 NOTE — PROGRESS NOTES
LOS: 3 days     Chief Complaint: Vascular graft infection    Interval History: Status post debridement yesterday with hemodialysis catheter placement.  Patient oriented to self this morning.  Tolerating antibiotics.    Vital Signs  Temp:  [97.2 °F (36.2 °C)-99.2 °F (37.3 °C)] 97.5 °F (36.4 °C)  Heart Rate:  [64-83] 78  Resp:  [12-20] 18  BP: ()/(44-81) 97/55    Physical Exam:  General: In no acute distress  HEENT: Oropharynx clear, moist mucous membranes  Respiratory: Normal work of breathing  Skin: Left upper extremity dressing in place.  Access: Peripheral IV.  Subclavian HD catheter.    Antibiotics:  Anti-Infectives (From admission, onward)      Ordered     Dose/Rate Route Frequency Start Stop    03/25/25 0933  ceFAZolin 1000 mg IVPB in 100 mL NS (MBP)        Ordering Provider: Bill Deal MD    1,000 mg  over 30 Minutes Intravenous Every 24 Hours 03/25/25 0935 05/06/25 0934    03/25/25 0803  ceFAZolin 2000 mg IVPB in 100 mL NS (MBP)        Ordering Provider: Bill Deal MD    2,000 mg  over 30 Minutes Intravenous Once 03/25/25 0805 03/25/25 1057    03/24/25 1407  vancomycin 750 mg in sodium chloride 0.9 % 250 mL IVPB-VTB        Ordering Provider: Yoan Perez MD    750 mg  333.3 mL/hr over 45 Minutes Intravenous Once 03/24/25 2000 03/24/25 2223    03/24/25 0813  cefTRIAXone (ROCEPHIN) 2,000 mg in sodium chloride 0.9 % 100 mL MBP  Status:  Discontinued        Ordering Provider: Bill Deal MD    2,000 mg  200 mL/hr over 30 Minutes Intravenous Every 24 Hours 03/24/25 1600 03/25/25 0933    03/24/25 0813  Pharmacy to dose vancomycin  Status:  Discontinued        Ordering Provider: Bill Deal MD     Not Applicable Continuous PRN 03/24/25 0812 03/25/25 0933    03/23/25 1509  cefTRIAXone (ROCEPHIN) 2,000 mg in sodium chloride 0.9 % 100 mL MBP        Ordering Provider: Cooper Ureña MD    2,000 mg  200 mL/hr over 30 Minutes Intravenous Once 03/23/25 1525 03/23/25 1632    03/23/25  1506  vancomycin 1750 mg/500 mL 0.9% NS IVPB ([facility])        Ordering Provider: Cooper Ureña MD    20 mg/kg × 85 kg  over 105 Minutes Intravenous Once 03/23/25 1522 03/23/25 1922             Results Review:     I reviewed the patient's new clinical results.    Lab Results   Component Value Date    WBC 5.09 03/26/2025    HGB 8.6 (L) 03/26/2025    HCT 25.5 (L) 03/26/2025    MCV 98.8 (H) 03/26/2025    PLT 83 (L) 03/26/2025     Lab Results   Component Value Date    GLUCOSE 129 (H) 03/25/2025    BUN 29 (H) 03/25/2025    CREATININE 4.75 (H) 03/25/2025    EGFRIFNONA 7 (L) 02/14/2022    EGFRIFAFRI  02/14/2022      Comment:      <15 Indicative of kidney failure.    BCR 6.1 (L) 03/25/2025    CO2 22.7 03/25/2025    CALCIUM 8.1 (L) 03/25/2025    ALBUMIN 2.3 (L) 03/25/2025    AST 43 (H) 03/23/2025    ALT 18 03/23/2025       Lab Results   Component Value Date    VANCORANDOM 17.50 03/24/2025        Microbiology:  Microbiology Results (last 10 days)       Procedure Component Value - Date/Time    Wound Culture - Wound, Arm, Left [[id number]]  (Abnormal)  (Susceptibility) Collected: 03/24/25 0805    Lab Status: Final result Specimen: Wound from Arm, Left Updated: 03/26/25 0721     Wound Culture Light growth (2+) Staphylococcus aureus     Gram Stain Few (2+) WBCs seen      Rare (1+) Gram positive cocci    Susceptibility        Staphylococcus aureus      KANG      Clindamycin Susceptible      Erythromycin Susceptible      Oxacillin Susceptible      Rifampin Susceptible      Tetracycline Susceptible      Trimethoprim + Sulfamethoxazole Susceptible      Vancomycin Susceptible                           Blood Culture - Blood, Arm, Right [[id number]]  (Normal) Collected: 03/23/25 1549    Lab Status: Preliminary result Specimen: Blood from Arm, Right Updated: 03/25/25 1600     Blood Culture No growth at 2 days    Blood Culture - Blood, Hand, Left [[id number]]  (Normal) Collected: 03/23/25 1549    Lab Status: Preliminary result Specimen: Blood  from Hand, Left Updated: 03/25/25 1600     Blood Culture No growth at 2 days    Narrative:      Less than seven (7) mL's of blood was collected.  Insufficient quantity may yield false negative results.    Wound Culture - Swab, Arm, Left [[id number]]  (Abnormal)  (Susceptibility) Collected: 03/23/25 1508    Lab Status: Final result Specimen: Swab from Arm, Left Updated: 03/25/25 0723     Wound Culture Light growth (2+) Staphylococcus aureus     Gram Stain Occasional Gram positive cocci      Rare (1+) WBCs seen    Susceptibility        Staphylococcus aureus      KANG      Clindamycin Susceptible      Erythromycin Susceptible      Oxacillin Susceptible      Rifampin Susceptible      Tetracycline Susceptible      Trimethoprim + Sulfamethoxazole Susceptible      Vancomycin Susceptible                                      Isolation:   Contact    Assessment    #Left upper extremity graft infection  #End-stage renal disease on hemodialysis via AV graft  #Type 2 diabetes  #Crohn's disease status post bowel resection end ileostomy  #Cirrhosis with chronic leukopenia and thrombocytopenia    Both wound cultures isolating MSSA.  Status post I&D of the involved area yesterday without any evidence of exposed graft.  Continue cefazolin 1 g every 24 hours (daily dose for HD).      In order to attempt  salvage and be as aggressive as possible we will plan for 6 weeks of IV cefazolin therapy.      On discharge cefazolin can be transition to dosing on dialysis days after dialysis thrice weekly.  Patient's normal dialysis schedule is Monday Wednesday Friday and therefore doses would be 2 g cefazolin Monday after dialysis and 2 g cefazolin Wednesday after dialysis.  Dose on Friday would be 3 g cefazolin after dialysis.  End date will be May 6.    While on therapy patient will need weekly CBC with differential and creatinine faxed to [phone].  No follow-up needed from an ID perspective at this  time.      -----------------------------------------------------------------------------------------------  The above decisions regarding antimicrobials incorporates elements of engaging in complex medical decision-making associated with antimicrobial prescribing including considerations such as antimicrobial resistance patterns, emergence of new variants/strains, recent antibiotic exposure, interactions/complications from comorbidities including concurrent infections, public health considerations to minimize development of antimicrobial resistance, and emerging and re-emerging infections.

## 2025-03-26 NOTE — THERAPY EVALUATION
Patient Name: Bill Landry  : 1945    MRN: [id number]                              Today's Date: 3/26/2025       Admit Date: 3/23/2025    Visit Dx:     ICD-10-CM ICD-9-CM   1. Cellulitis of left arm  L03.114 682.3   2. Abscess of left arm  L02.414 682.3   3. Complication of arteriovenous dialysis fistula, initial encounter  T82.9XXA 996.73   4. ESRD (end stage renal disease) on dialysis  N18.6 585.6    Z99.2 V45.11   5. Sepsis, due to unspecified organism, unspecified whether acute organ dysfunction present  A41.9 038.9     995.91   6. Hyponatremia  E87.1 276.1   7. ESRD on dialysis  N18.6 585.6    Z99.2 V45.11   8. Problem with vascular access  Z78.9 V49.9   9. Sepsis due to cellulitis  L03.90 682.9    A41.9 995.91     Patient Active Problem List   Diagnosis    Permanent atrial fibrillation    Thrombocytopenia    Chronic leukopenia    Cirrhosis of liver without ascites    Congestive splenomegaly    Anemia due to stage 4 chronic kidney disease treated with erythropoietin    Esophageal abnormality    ESRD on hemodialysis    Other specified glaucoma    Arteriovenous fistula for hemodialysis in place, secondary    Crohn's disease, unspecified, without complications    Deficiency of other specified B group vitamins    Dermatitis, unspecified    Encounter for immunization    History of urinary stone    Hyperparathyroidism, unspecified    Other disorders of phosphorus metabolism    Other iron deficiency anemias    Pure hypertriglyceridemia    Renal osteodystrophy    Secondary hyperparathyroidism of renal origin    Splenomegaly, not elsewhere classified    Bilateral pseudophakia    Dermatochalasis of eyelid    Primary open-angle glaucoma, bilateral, moderate stage    Puckering of macula, left eye    Secondary cataract    AV fistula occlusion, initial encounter    TATE (obstructive sleep apnea)    Aneurysm artery, upper extremity    Nonrheumatic aortic valve stenosis    Bicuspid aortic valve    Hyperuricemia  without signs of inflammatory arthritis and tophaceous disease    Presbyopia    Impaired glucose tolerance    Hypofibrinogenemia    Pulmonary hypertension    Skin change    ESRD on dialysis    Problem with vascular access    AV graft thrombosis, initial encounter    AV shunt malfunction, initial encounter    Left arm swelling    Acetabular fracture    Anemia    Cirrhosis of liver    Atrial fibrillation    Fall    Ileostomy present    Weakness    Hypertension    Pain in left hip    Acute pain due to trauma    Transaminitis    Hypoxemia    Hyperglycemia    Sepsis due to cellulitis     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    Aortic stenosis     Bicuspid aortic valve 07/20/2022    Calculus of kidney     Chronic leukopenia and thrombocytopenia     Cirrhosis of liver without ascites 07/24/2017    Congestive splenomegaly 07/24/2017    Crohn disease     with ileostomy    Decreased white blood cell count, unspecified     Diverticula of colon     ESRD on hemodialysis 04/05/2018    M-W-F FRESENIUS    Fatty liver disease, nonalcoholic     Fistula 04/05/2018    Other Specified Complication    Gastrointestinal hemorrhage, unspecified     GERD (gastroesophageal reflux disease)     GI bleed     Glaucoma     H/O Gallstone pancreatitis     H/O Pericardial effusion     H/O sinus bradycardia     History of hypertension     resolved    History of UTI     Hx of renal calculi     Ileostomy present     Iron deficiency     Leakage of heart valve prosthesis, subsequent encounter     MGUS (monoclonal gammopathy of unknown significance)     TATE (obstructive sleep apnea)     Other hemoglobinopathies     Pericardial effusion (noninflammatory) 1/18/2018    Permanent atrial fibrillation     Scarlet fever, uncomplicated     Stenosis of  other vascular prosthetic devices, implants and grafts, initial encounter 02/14/2022    Systemic lupus erythematosus, unspecified     Thrombocytopenia     undpecified    Type 2 diabetes mellitus     CURRENTLY TAKES NO MED    Urinary retention     Post-OP     Past Surgical History:   Procedure Laterality Date    APPENDECTOMY  06/1968    ARTERIOVENOUS FISTULA/SHUNT SURGERY Left 08/08/2017    Procedure: LEFT BRACHIAL CEPHALIC AV FISTULA FORMATION WITH CEPHALIC VEIN TRANSPOSITION ;  Surgeon: Bill Deal MD;  Location: [facility] OR;  Service:     ARTERIOVENOUS FISTULA/SHUNT SURGERY Left 05/27/2022    Procedure: OPEN REVISION LEFT ARTERIOVENOUS INTERPOSITION GRAFT PLACEMENT;  Surgeon: Bill Deal MD;  Location: [facility] OR;  Service: Vascular;  Laterality: Left;    ARTERIOVENOUS FISTULA/SHUNT SURGERY Left 11/19/2024    Procedure: LEFT ARTERIOVENOUS SHUNT GRAFT REVISION;  Surgeon: Bill Deal MD;  Location: [facility] OR;  Service: Vascular;  Laterality: Left;    ARTERIOVENOUS FISTULA/SHUNT SURGERY W/ HEMODIALYSIS CATHETER INSERTION Left 02/15/2022    Procedure: OPEN LEFT ARM ARTERIOVENOUS FISTULA THROMBECTOMY WITH CEPHALIC VEIN ARTHROPLASTY AND STENT/GRAFT PLACEMENT;  Surgeon: Bill Deal MD;  Location: [facility] OR 18/19;  Service: Vascular;  Laterality: Left;    CATARACT EXTRACTION WITH INTRAOCULAR LENS IMPLANT Bilateral 2004    CHOLECYSTECTOMY  2011    COLECTOMY PARTIAL / TOTAL      History of inflammatory bowel disease with status post colectomy with ileostomy many years ago in the [facility],  18 YEARS OF AGE    COLON SURGERY      Colon resection with colostomy    COLON SURGERY      COLONOSCOPY  01/2018    CYSTOSCOPY LITHOLAPAXY BLADDER STONE EXTRACTION      ENDOSCOPY  01/16/2015    gastritis    ENDOSCOPY  01/17/2018    Procedure: ESOPHAGOGASTRODUODENOSCOPY;  Surgeon: Warner Neville MD;  Location: [facility] ENDOSCOPY;  Service:     ILEOSCOPY  01/16/2015    normal    ILEOSCOPY N/A  01/17/2018    Procedure: ILEOSCOPY;  Surgeon: Warner Neville MD;  Location: [facility] ENDOSCOPY;  Service:     ILEOSTOMY  12/1968    loop ostomy bypass    INCISION AND DRAINAGE ARM Left 10/27/2023    Procedure: LEFT ARM INCISION AND DRAINAGE;  Surgeon: Bill Deal MD;  Location: [facility] MAIN OR;  Service: Vascular;  Laterality: Left;    INCISION AND DRAINAGE ARM Left 3/25/2025    Procedure: INCISION AND DRAINAGE UPPER EXTREMITY;  Surgeon: Bill Deal MD;  Location: [facility] HYBRID OR;  Service: Vascular;  Laterality: Left;    INSERTION HEMODIALYSIS CATHETER Right 11/13/2024    Procedure: Tunnelled HEMODIALYSIS CATHETER INSERTION;  Surgeon: Ben Barth MD;  Location: [facility] HYBRID OR;  Service: Vascular;  Laterality: Right;    INSERTION HEMODIALYSIS CATHETER N/A 3/25/2025    Procedure: HEMODIALYSIS CATHETER INSERTION, left arm I&D;  Surgeon: Bill Deal MD;  Location: [facility] HYBRID OR;  Service: Vascular;  Laterality: N/A;    OTHER SURGICAL HISTORY  2009    kidney stones x3    PSEUDOANEURYSM REPAIR Left 10/27/2023    TONSILLECTOMY  1950      General Information       Row Name 03/26/25 1539          Physical Therapy Time and Intention    Document Type evaluation  -ER     Mode of Treatment individual therapy;physical therapy  -ER       Row Name 03/26/25 1539          General Information    Patient Profile Reviewed yes  -ER     Prior Level of Function independent:  -ER     Existing Precautions/Restrictions no known precautions/restrictions  -ER     Barriers to Rehab medically complex  dialysis cath, bleeding  -ER       Row Name 03/26/25 1539          Living Environment    Current Living Arrangements home  -ER     People in Home spouse  -ER       Row Name 03/26/25 1539          Home Main Entrance    Number of Stairs, Main Entrance two  -ER     Stair Railings, Main Entrance railings safe and in good condition  -ER       Row Name 03/26/25 1539          Stairs Within Home, Primary    Number of Stairs,  Within Home, Primary none  -ER       Row Name 03/26/25 1539          Cognition    Orientation Status (Cognition) oriented x 3  -ER       Row Name 03/26/25 1539          Safety Issues/Impairments Affecting Functional Mobility    Comment, Safety Issues/Impairments (Mobility) Mily lerma, RN aware, watch BP  -ER               User Key  (r) = Recorded By, (t) = Taken By, (c) = Cosigned By      Initials Name Provider Type    ER Saadia Ayon, PT Physical Therapist                   Mobility       Row Name 03/26/25 1540          Bed Mobility    Bed Mobility bed mobility (all) activities  -ER     All Activities, [place] (Bed Mobility) modified independence  -ER       Row Name 03/26/25 1540          Sit-Stand Transfer    Sit-Stand [place] (Transfers) modified independence  -ER     Assistive Device (Sit-Stand Transfers) walker, front-wheeled  -ER       Row Name 03/26/25 1540          Gait/Stairs (Locomotion)    [place] Level (Gait) standby assist  -ER     Assistive Device (Gait) walker, front-wheeled  -ER     Patient was able to Ambulate yes  -ER     Distance in Feet (Gait) 25  -ER     Deviations/Abnormal Patterns (Gait) bilateral deviations  -ER     Bilateral Gait Deviations forward flexed posture  -ER     Comment, (Gait/Stairs) forward flexed with ambulation. Reports he does not want this PT's assistance for mobility  -ER               User Key  (r) = Recorded By, (t) = Taken By, (c) = Cosigned By      Initials Name Provider Type    ER Saadia Ayon, ERROL Physical Therapist                   Obj/Interventions       Row Name 03/26/25 1548          Range of Motion Comprehensive    General Range of Motion no range of motion deficits identified  -ER       Row Name 03/26/25 1548          Strength Comprehensive (MMT)    General Manual Muscle Testing (MMT) Assessment no strength deficits identified  -ER       Row Name 03/26/25 1548          Balance    Balance Assessment sitting static balance;sitting dynamic  balance;standing static balance;standing dynamic balance  -ER     Static Sitting Balance modified independence  -ER     Dynamic Sitting Balance modified independence  -ER     Position, Sitting Balance sitting edge of bed;unsupported  -ER     Static Standing Balance standby assist  -ER     Dynamic Standing Balance standby assist  -ER     Position/Device Used, Standing Balance supported;walker, front-wheeled  -ER     Balance Interventions sitting;standing;supported;static;dynamic;sit to stand  -ER       Row Name 03/26/25 1548          Sensory Assessment (Somatosensory)    Sensory Assessment (Somatosensory) sensation intact  -ER               User Key  (r) = Recorded By, (t) = Taken By, (c) = Cosigned By      Initials Name Provider Type    ER Gabo, Saadia, PT Physical Therapist                   Goals/Plan    No documentation.                  Clinical Impression       Row Name 03/26/25 1548          Pain    Pre/Posttreatment Pain Comment Does not rate pain, notes that cath insertion site is tender, and still bleeding  -ER       Row Name 03/26/25 1548          Plan of Care Review    Plan of Care Reviewed With patient  -ER     Outcome Evaluation Bill Landry is a 79 year old male seen for physical therapy evaluation after being admitted for HD catheter insertion. HD cath is still bleeding, so caution with using RW and pushing through UE this date. He lives with his spouse, is indep at [facility], uses a RW, denies falls and has 2STE. He performs bed mobility with mod indep, STS mod indep and RW. He ambulates 25 ft with standby A and RW. Pt. denies any need for PT assistance. Denies any safety concerns with returning home. PT will sign off. Recommending HH at d/c.  -ER       Row Name 03/26/25 1548          Therapy Assessment/Plan (PT)    Criteria for Skilled Interventions Met (PT) no problems identified which require skilled intervention;patient/family refuse skilled intervention at this time  -ER     Therapy Frequency (PT)  evaluation only  -ER       Row Name 03/26/25 1548          Positioning and Restraints    Pre-Treatment Position in bed  -ER     Post Treatment Position bed  -ER     In Bed notified nsg;call light within reach;encouraged to call for assist;exit alarm on  -ER               User Key  (r) = Recorded By, (t) = Taken By, (c) = Cosigned By      Initials Name Provider Type    ER Saadia Ayon, ERROL Physical Therapist                   Outcome Measures       Row Name 03/26/25 1558          How much help from another person do you currently need...    Turning from your back to your side while in flat bed without using bedrails? 4  -ER     Moving from lying on back to sitting on the side of a flat bed without bedrails? 4  -ER     Moving to and from a bed to a chair (including a wheelchair)? 4  -ER     Standing up from a chair using your arms (e.g., wheelchair, bedside chair)? 3  -ER     Climbing 3-5 steps with a railing? 3  -ER     To walk in hospital room? 3  -ER     AM-PAC 6 Clicks Score (PT) 21  -ER     Highest Level of Mobility Goal 6 --> Walk 10 steps or more  -ER       Row Name 03/26/25 1558          Functional Assessment    Outcome Measure Options AM-PAC 6 Clicks Basic Mobility (PT)  -ER               User Key  (r) = Recorded By, (t) = Taken By, (c) = Cosigned By      Initials Name Provider Type    ER Saadia Ayon PT Physical Therapist                                 Physical Therapy Education       Title: PT OT SLP Therapies (Done)       Topic: Physical Therapy (Done)       Point: Mobility training (Done)       Learning Progress Summary            Patient Acceptance, E, VU by ER at 3/26/2025 1559                      Point: Home exercise program (Done)       Learning Progress Summary            Patient Acceptance, E, VU by ER at 3/26/2025 1559                      Point: Body mechanics (Done)       Learning Progress Summary            Patient Acceptance, E, VU by ER at 3/26/2025 1559                      Point: Precautions  (Done)       Learning Progress Summary            Patient Acceptance, E, VU by ER at 3/26/2025 1559                                      User Key       Initials Effective Dates Name Provider Type Discipline    ER 10/15/23 -  Saadia Ayon PT Physical Therapist PT                  PT Recommendation and Plan     Outcome Evaluation: Bill Landry is a 79 year old male seen for physical therapy evaluation after being admitted for HD catheter insertion. HD cath is still bleeding, so caution with using RW and pushing through UE this date. He lives with his spouse, is indep at [facility], uses a RW, denies falls and has 2STE. He performs bed mobility with mod indep, STS mod indep and RW. He ambulates 25 ft with standby A and RW. Pt. denies any need for PT assistance. Denies any safety concerns with returning home. PT will sign off. Recommending HH at d/c.     Time Calculation:         PT Charges       Row Name 03/26/25 1600             Time Calculation    Start Time 1416  -ER      Stop Time 1431  -ER      Time Calculation (min) 15 min  -ER      PT Received On 03/26/25  -ER         Time Calculation- PT    Total Timed Code Minutes- PT 10 minute(s)  -ER         Timed Charges    53640 - PT Therapeutic Activity Minutes 10  -ER         Total Minutes    Timed Charges Total Minutes 10  -ER       Total Minutes 10  -ER                User Key  (r) = Recorded By, (t) = Taken By, (c) = Cosigned By      Initials Name Provider Type    ER Saadia Ayon, PT Physical Therapist                  Therapy Charges for Today       Code Description Service Date Service Provider Modifiers Qty    09410072458 HC PT THERAPEUTIC ACT EA 15 MIN 3/26/2025 Saadia Ayon, PT GP 1    77669793733 HC PT EVAL MOD COMPLEXITY 3 3/26/2025 Saadia Ayon, PT GP 1            PT G-Codes  Outcome Measure Options: AM-PAC 6 Clicks Basic Mobility (PT)  AM-PAC 6 Clicks Score (PT): 21  PT Discharge Summary  Anticipated Discharge Disposition (PT): home, home with 24/7 care, home with home  health, home with assist    Saadia Ayon, PT  3/26/2025

## 2025-03-26 NOTE — PLAN OF CARE
Goal Outcome Evaluation:  Plan of Care Reviewed With: patient           Outcome Evaluation: Bill Landry is a 79 year old male seen for physical therapy evaluation after being admitted for HD catheter insertion. HD cath is still bleeding, so caution with using RW and pushing through UE this date. He lives with his spouse, is indep at [facility], uses a RW, denies falls and has 2STE. He performs bed mobility with mod indep, STS mod indep and RW. He ambulates 25 ft with standby A and RW. Pt. denies any need for PT assistance. Denies any safety concerns with returning home. PT will sign off. Recommending HH at d/c.    Anticipated Discharge Disposition (PT): home, home with 24/7 care, home with home health, home with assist

## 2025-03-26 NOTE — PLAN OF CARE
Goal Outcome Evaluation:  Plan of Care Reviewed With: patient        Progress: no change  Outcome Evaluation: Continued treatment for sepsis due to cellulitis. Wound cultures pending, but currently growing staph. Oozing noted from dialysis catheter throughout the day, Dr. Deal came to bedside to put in another stitch. New dressing currently clean, dry, and intact. Bruising noted to right chest area where the dialysis catheter is. Kerlex applied to left arm behind AV fistula site, no oozing noted today. Vitals stable. Remains on room air, BP soft this morning but improved this afternoon. Just left for HD. Possible discharge tomorrow.

## 2025-03-26 NOTE — PLAN OF CARE
Problem: Adult Inpatient Plan of Care  Goal: Plan of Care Review  Outcome: Progressing  Flowsheets (Taken 3/26/2025 3359)  Progress: no change  Outcome Evaluation: Vital signs stable. Pt more forgetful this shift. Pt denies pain. Pt w/ bleeding at HD catheter site- dressing changed and STAT CBC obtained, one time dose IV desmopressin given per vascular surgery (see previous note). Pt resting on and off overnight. Plans for dialysis today. Plan of care ongoing.  Plan of Care Reviewed With: patient

## 2025-03-27 LAB
ALBUMIN SERPL-MCNC: 2.6 G/DL (ref 3.5–5.2)
ANION GAP SERPL CALCULATED.3IONS-SCNC: 10 MMOL/L (ref 5–15)
BASOPHILS # BLD AUTO: 0.05 10*3/MM3 (ref 0–0.2)
BASOPHILS NFR BLD AUTO: 1 % (ref 0–1.5)
BH BB BLOOD EXPIRATION DATE: NORMAL
BH BB BLOOD TYPE BARCODE: 6200
BH BB DISPENSE STATUS: NORMAL
BH BB PRODUCT CODE: NORMAL
BH BB UNIT NUMBER: NORMAL
BUN SERPL-MCNC: 24 MG/DL (ref 8–23)
BUN/CREAT SERPL: 5.7 (ref 7–25)
CALCIUM SPEC-SCNC: 7.9 MG/DL (ref 8.6–10.5)
CHLORIDE SERPL-SCNC: 100 MMOL/L (ref 98–107)
CO2 SERPL-SCNC: 23 MMOL/L (ref 22–29)
CREAT SERPL-MCNC: 4.19 MG/DL (ref 0.76–1.27)
DEPRECATED RDW RBC AUTO: 51 FL (ref 37–54)
EGFRCR SERPLBLD CKD-EPI 2021: 13.7 ML/MIN/1.73
EOSINOPHIL # BLD AUTO: 0.1 10*3/MM3 (ref 0–0.4)
EOSINOPHIL NFR BLD AUTO: 2.1 % (ref 0.3–6.2)
ERYTHROCYTE [DISTWIDTH] IN BLOOD BY AUTOMATED COUNT: 14.6 % (ref 12.3–15.4)
GLUCOSE SERPL-MCNC: 122 MG/DL (ref 65–99)
HCT VFR BLD AUTO: 25.3 % (ref 37.5–51)
HGB BLD-MCNC: 8.7 G/DL (ref 13–17.7)
IMM GRANULOCYTES # BLD AUTO: 0.04 10*3/MM3 (ref 0–0.05)
IMM GRANULOCYTES NFR BLD AUTO: 0.8 % (ref 0–0.5)
LYMPHOCYTES # BLD AUTO: 0.46 10*3/MM3 (ref 0.7–3.1)
LYMPHOCYTES NFR BLD AUTO: 9.6 % (ref 19.6–45.3)
MCH RBC QN AUTO: 33.5 PG (ref 26.6–33)
MCHC RBC AUTO-ENTMCNC: 34.4 G/DL (ref 31.5–35.7)
MCV RBC AUTO: 97.3 FL (ref 79–97)
MONOCYTES # BLD AUTO: 0.49 10*3/MM3 (ref 0.1–0.9)
MONOCYTES NFR BLD AUTO: 10.2 % (ref 5–12)
NEUTROPHILS NFR BLD AUTO: 3.66 10*3/MM3 (ref 1.7–7)
NEUTROPHILS NFR BLD AUTO: 76.3 % (ref 42.7–76)
NRBC BLD AUTO-RTO: 0 /100 WBC (ref 0–0.2)
PHOSPHATE SERPL-MCNC: 3.8 MG/DL (ref 2.5–4.5)
PLATELET # BLD AUTO: 73 10*3/MM3 (ref 140–450)
PMV BLD AUTO: 9 FL (ref 6–12)
POTASSIUM SERPL-SCNC: 4.3 MMOL/L (ref 3.5–5.2)
RBC # BLD AUTO: 2.6 10*6/MM3 (ref 4.14–5.8)
SODIUM SERPL-SCNC: 133 MMOL/L (ref 136–145)
UNIT  ABO: NORMAL
UNIT  RH: NORMAL
WBC NRBC COR # BLD AUTO: 4.8 10*3/MM3 (ref 3.4–10.8)

## 2025-03-27 PROCEDURE — P9100 PATHOGEN TEST FOR PLATELETS: HCPCS

## 2025-03-27 PROCEDURE — 80069 RENAL FUNCTION PANEL: CPT | Performed by: HOSPITALIST

## 2025-03-27 PROCEDURE — P9035 PLATELET PHERES LEUKOREDUCED: HCPCS

## 2025-03-27 PROCEDURE — 85025 COMPLETE CBC W/AUTO DIFF WBC: CPT | Performed by: HOSPITALIST

## 2025-03-27 PROCEDURE — 36430 TRANSFUSION BLD/BLD COMPNT: CPT

## 2025-03-27 PROCEDURE — 25010000002 CEFAZOLIN PER 500 MG: Performed by: STUDENT IN AN ORGANIZED HEALTH CARE EDUCATION/TRAINING PROGRAM

## 2025-03-27 PROCEDURE — P9037 PLATE PHERES LEUKOREDU IRRAD: HCPCS

## 2025-03-27 RX ADMIN — LATANOPROST 1 DROP: 50 SOLUTION/ DROPS OPHTHALMIC at 20:07

## 2025-03-27 RX ADMIN — CEFAZOLIN 1000 MG: 1 INJECTION, POWDER, FOR SOLUTION INTRAMUSCULAR; INTRAVENOUS at 10:02

## 2025-03-27 RX ADMIN — Medication 10 ML: at 08:19

## 2025-03-27 RX ADMIN — Medication 10 ML: at 20:07

## 2025-03-27 RX ADMIN — SEVELAMER CARBONATE 800 MG: 800 TABLET, FILM COATED ORAL at 12:41

## 2025-03-27 RX ADMIN — CETIRIZINE HYDROCHLORIDE 10 MG: 10 TABLET ORAL at 10:01

## 2025-03-27 RX ADMIN — MIDODRINE HYDROCHLORIDE 2.5 MG: 2.5 TABLET ORAL at 18:17

## 2025-03-27 RX ADMIN — Medication 1000 UNITS: at 08:18

## 2025-03-27 RX ADMIN — SEVELAMER CARBONATE 800 MG: 800 TABLET, FILM COATED ORAL at 08:18

## 2025-03-27 RX ADMIN — Medication 1 TABLET: at 08:18

## 2025-03-27 RX ADMIN — TAMSULOSIN HYDROCHLORIDE 0.4 MG: 0.4 CAPSULE ORAL at 20:07

## 2025-03-27 RX ADMIN — SEVELAMER CARBONATE 800 MG: 800 TABLET, FILM COATED ORAL at 18:17

## 2025-03-27 NOTE — PLAN OF CARE
Goal Outcome Evaluation:           Progress: no change  Outcome Evaluation: Pt VSS stable. Received scheduled dialysis last night. Vascular surgery notified about continuous bleeding of dialysis cath - dstat dry and pressure dressing ordered. 1 unit of platelets ordered. When sleeping, O2 maintained with 2L NC. Ileostomy intact. Pt safety maintained.

## 2025-03-27 NOTE — PROGRESS NOTES
[facility]   Surgical Progress Note    Patient Name: Bill Landry  : 1945  MRN: [id number]  Date of admission: 3/23/2025  Surgical Procedures Since Admission:  Procedure(s):  HEMODIALYSIS CATHETER INSERTION, left arm I&D  INCISION AND DRAINAGE UPPER EXTREMITY  Surgeon:  Bill Deal MD  Status:  2 Days Post-Op  -------------------    Subjective   Subjective     Chief Complaint: Possible left AV graft infection    History of Present Illness   79-year-old gentleman with possible left AV graft infection now status post I&D with no visible graft at the base of the wound.  Cultures are pending.  Tunneled catheters been placed.  Unfortunately has been oozing consistently and we placed a stitch yesterday and had to replace this another stitch this morning.  Hopefully this will control it.    Review of Systems moderate oozing from right chest wall    Objective   Objective     Vitals:   Temp:  [97.3 °F (36.3 °C)-98.4 °F (36.9 °C)] 97.9 °F (36.6 °C)  Heart Rate:  [68-72] 70  Resp:  [18] 18  BP: (122-134)/(53-63) 122/62  Flow (L/min) (Oxygen Therapy):  [2] 2  Output by Drain (mL) 25 0701 - 25 1900 25 1901 - 25 0700 25 0701 - 25 0905 Range Total   Ileostomy  150  500       Physical Exam, catheter in place with oozing.  Suture placed yesterday is in place but we will place another 1 this morning.  Left arm wound healing rapidly.  I explained to nursing need for packing.     Result Review    Result Review:  I have personally reviewed the results from the time of this admission to 3/27/2025 09:05 EDT and agree with these findings:  [x]  Laboratory list / accordion  [x]  Microbiology  []  Radiology  [x]  EKG/Telemetry   []  Cardiology/Vascular   []  Pathology  []  Old records  []  Other:  Most notable findings include: Culture with Staph aureus early growth      Results from last 7 days   Lab Units 25  0413 25  0429 25  0401 25  0356  03/23/25  1527   WBC 10*3/mm3 4.80 5.09 6.38 4.42 4.21   HEMOGLOBIN g/dL 8.7* 8.6* 9.5* 9.2* 9.6*   PLATELETS 10*3/mm3 73* 83* 86* 76* 59*     Results from last 7 days   Lab Units 03/27/25  0412 03/26/25  0756 03/25/25  0401 03/24/25  0355 03/23/25  1527   SODIUM mmol/L 133* 123* 130* 131* 129*   POTASSIUM mmol/L 4.3  --  4.4 4.1 4.3   CHLORIDE mmol/L 100  --  99 95* 93*   CO2 mmol/L 23.0  --  22.7 21.5* 22.2   BUN mg/dL 24*  --  29* 49* 46*   CREATININE mg/dL 4.19*  --  4.75* 6.28* 6.03*   GLUCOSE mg/dL 122*  --  129* 107* 171*   MAGNESIUM mg/dL  --   --   --  2.1  --    PHOSPHORUS mg/dL 3.8  --  4.0 5.2*  --    Estimated Creatinine Clearance: 16.5 mL/min (A) (by C-G formula based on SCr of 4.19 mg/dL (H)).  Results from last 7 days   Lab Units 03/23/25  1527   PROTIME Seconds 16.7*   INR  1.36*     Lab Results   Component Value Date    HGBA1C 5.3 02/03/2025    HGBA1C 4.80 01/26/2025    HGBA1C 5.40 09/17/2024     Glucose   Date/Time Value Ref Range Status   03/25/2025 1146 118 70 - 130 mg/dL Final   03/25/2025 0815 116 70 - 130 mg/dL Final       Assessment & Plan   Assessment / Plan     Brief Patient Summary:  Bill Landry is a 79 y.o. male who appears to have staph growing from wound that does not appear to directly communicate to his AV graft.  We are going to try to sterilize it with the 6 weeks of antibiotic therapy.  Everything looks good so far.  I will suture the catheter today.  At this point in time the bigger concern is whether he could use more platelets which we will give it.    Active Hospital Problems:   Active Hospital Problems    Diagnosis     **Sepsis due to cellulitis     ESRD on dialysis     Problem with vascular access     Anemia due to stage 4 chronic kidney disease treated with erythropoietin     Cirrhosis of liver without ascites     Permanent atrial fibrillation      Plan:   TDC for the next 4 to 6 weeks to let the left arm wound heal.  Will transition to saline to lock his catheter for  now.  Patient will need to stay until present for least another day.  Family wishes to keep her till Saturday as she is wiped out [place] but she will need tomorrow prior to discharge.  Will defer to A.  Home health at discharge.    VTE Prophylaxis:  Mechanical VTE prophylaxis orders are present.        Bill Deal MD

## 2025-03-27 NOTE — OP NOTE
Operative Note  Date of Admission:  3/23/2025  OR Date: 3/27/2025    Pre-op Diagnosis:   Continued bleeding from hemodialysis catheter site    Post-Op Diagnosis Codes:     * ESRD on dialysis [N18.6, Z99.2]     * Problem with vascular access [Z78.9]     * Sepsis due to cellulitis [L03.90, A41.9]    Procedure:   1) suture repair of bleeding site from hemodialysis catheter    Surgeon: Feng Deal MD    Assistant: None    Anesthesia: Local    Staff:   Circulator: Shi Anthony RN  Radiology Technologist: Kayley Palumbo  Scrub Person: Joey Rodriguez  Assistant: Keisha Roger CSA    Estimated Blood Loss: minimal    Specimens: * No orders in the log *     Complications: None a great suture placed around the origin of the catheter    Findings: See dictation    Indications:    The patient is an 79 y.o. male seen for evaluation persistent bleeding from right chest wound at the site of catheter egressed.  Patient and family understand risk benefits complications of repeat suture placement the great       Procedure:    Patient prepped draped sterilely.  Instilling 1% lidocaine into the area we placed a 2-0 Vicryl suture.  States around the actual origin of the catheter closing down in the drainage from the site.  Patient tolerated procedure well.      Active Hospital Problems    Diagnosis  POA    **Sepsis due to cellulitis [L03.90, A41.9]  Yes    ESRD on dialysis [N18.6, Z99.2]  Not Applicable    Problem with vascular access [Z78.9]  Yes    Anemia due to stage 4 chronic kidney disease treated with erythropoietin [N18.4, D63.1]  Yes    Cirrhosis of liver without ascites [K74.60]  Yes    Permanent atrial fibrillation [I48.21]  Yes      Resolved Hospital Problems   No resolved problems to display.      Bill Deal MD     Date: 3/27/2025  Time: 09:08 EDT

## 2025-03-27 NOTE — PLAN OF CARE
Goal Outcome Evaluation:  Plan of Care Reviewed With: patient        Progress: no change  Outcome Evaluation: Continued treatment for sepsis due to cellulitis. Antibiotics continued. Dialysis dressing has stopped bleeding. 1u platelets administered today. Vitals stable.

## 2025-03-27 NOTE — NURSING NOTE
Paged vascular surgery on call MD - spoke with Dr. Aguila about active bleeding at the hemodialysis cath double site. He stated to call OR for DSTAT dry dressing to be placed over site as a pressure dressing instead of central line dressing.

## 2025-03-27 NOTE — PROGRESS NOTES
[facility] Progress Note      Patient Name: Bill Landry  : 1945  MRN: [id number]  Primary Care Physician:  Bharathi De La Torre MD  Date of admission: 3/23/2025    Subjective     Interval History:   The patient was seen and examined today for follow-up on end-stage renal disease  Status post tunneled dialysis catheter placement and left axillary wound exploration and irrigation debridement    Review of Systems:   As noted above    Objective     Vitals:   Temp:  [97.3 °F (36.3 °C)-98.4 °F (36.9 °C)] 97.9 °F (36.6 °C)  Heart Rate:  [68-72] 70  Resp:  [18] 18  BP: (122-134)/(53-63) 122/62  Flow (L/min) (Oxygen Therapy):  [2] 2    Intake/Output Summary (Last 24 hours) at 3/27/2025 0934  Last data filed at 3/27/2025 0055  Gross per 24 hour   Intake 513.75 ml   Output 500 ml   Net 13.75 ml       Physical Exam:    General Appearance: alert, oriented x 3, no acute distress   Skin: warm and dry  HEENT: oral mucosa normal, nonicteric sclera  Neck: supple, no JVD  Lungs: CTA  Heart: RRR, normal S1 and S2  Abdomen: soft, nontender, nondistended  : no palpable bladder  Extremities: Bilateral lower extremity edema  Neuro: normal speech and mental status   Left upper extremity AV graft.  Left upper extremity wound covered  Scheduled Meds:     b complex-vitamin c-folic acid, 1 tablet, Oral, Daily  ceFAZolin, 1,000 mg, Intravenous, Q24H  cetirizine, 10 mg, Oral, Daily  cholecalciferol, 1,000 Units, Oral, Daily  famotidine, 20 mg, Oral, Daily  latanoprost, 1 drop, Both Eyes, Nightly  midodrine, 2.5 mg, Oral, TID AC  sevelamer, 800 mg, Oral, TID With Meals  silver nitrate, 1 Application, Topical, Once  sodium chloride, 10 mL, Intravenous, Q12H  sodium chloride, 10 mL, Intravenous, Q12H  tamsulosin, 0.4 mg, Oral, Nightly      IV Meds:          Results Reviewed:   I have personally reviewed the results from the time of this admission to 3/27/2025 09:34 EDT     Results from last 7 days   Lab Units  03/27/25  0412 03/26/25  0756 03/25/25  0401 03/24/25  0355 03/23/25  1527   SODIUM mmol/L 133* 123* 130* 131* 129*   POTASSIUM mmol/L 4.3  --  4.4 4.1 4.3   CHLORIDE mmol/L 100  --  99 95* 93*   CO2 mmol/L 23.0  --  22.7 21.5* 22.2   BUN mg/dL 24*  --  29* 49* 46*   CREATININE mg/dL 4.19*  --  4.75* 6.28* 6.03*   CALCIUM mg/dL 7.9*  --  8.1* 7.8* 7.8*   BILIRUBIN mg/dL  --   --   --   --  1.2   ALK PHOS U/L  --   --   --   --  283*   ALT (SGPT) U/L  --   --   --   --  18   AST (SGOT) U/L  --   --   --   --  43*   GLUCOSE mg/dL 122*  --  129* 107* 171*       Estimated Creatinine Clearance: 16.5 mL/min (A) (by C-G formula based on SCr of 4.19 mg/dL (H)).    Results from last 7 days   Lab Units 03/27/25  0412 03/25/25  0401 03/24/25  0355   MAGNESIUM mg/dL  --   --  2.1   PHOSPHORUS mg/dL 3.8 4.0 5.2*             Results from last 7 days   Lab Units 03/27/25  0413 03/26/25  0429 03/25/25  0401 03/24/25  0355 03/23/25  1527   WBC 10*3/mm3 4.80 5.09 6.38 4.42 4.21   HEMOGLOBIN g/dL 8.7* 8.6* 9.5* 9.2* 9.6*   PLATELETS 10*3/mm3 73* 83* 86* 76* 59*       Results from last 7 days   Lab Units 03/23/25  1527   INR  1.36*       Assessment / Plan     ASSESSMENT:  .  ESRD: Hypervolemic on exam today   Left arm AV graft with recent bleeding and later drainage from incision site (had had recent shuntogram).  Elevated lactate and procalcitonin; normal white blood cell count (though chronic leukopenia).  CT scan of the left arm does reveal a fluid density that was cultured by vascular surgery  status post incision and drainage on 3/25/2024  Crohn's disease s/p bowel resection with ileostomy  Cirrhosis with chronic leukopenia and thrombocytopenia  Atrial fibrillation  Chronic hypotension of ESRD:  on midodrine  Hyponatremia secondary to volume overload will correct with hemodialysis  Thrombocytopenia chronic         PLAN:  Will continue dialysis on Monday Wednesday Friday with plan for dialysis today.  Plan noted to go back to rehab  on daily dialysis with 6 weeks of IV antibiotics  We will dialyze today per schedule  With him from tunneled dialysis catheter was given DDAVP today.  Will not give heparin on dialysis      Thank you for involving us in the care of Bill PETER Landry.  Please feel free to call with any questions.    Myra Moore MD  03/27/25  09:34 EDT    [facility]  [phone]    Parts of this note may be an electronic transcription/translation of spoken language to printed text using the Dragon dictation system.

## 2025-03-27 NOTE — PROGRESS NOTES
Dedicated to Hospital Care    [phone]   LOS: 4 days     Name: Bill Landry  Age/Sex: 79 y.o. male  :  1945        PCP: Bharathi De La Torre MD  Chief Complaint   Patient presents with    Vascular Access Problem      Subjective   He is feeling okay today denies new issues or complaints.  Still has bruising around the tunneled cath site and did have continued oozing throughout the day yesterday.  Was given platelets last night and again today.  He also had 2 stitches placed and at this point the oozing appears to have stopped.  General: No Fever or Chills, Cardiac: No Chest Pain or Palpitations, Resp: No Cough or SOA, GI: No Nausea, Vomiting, or Diarrhea, and Other: No bleeding    b complex-vitamin c-folic acid, 1 tablet, Oral, Daily  ceFAZolin, 1,000 mg, Intravenous, Q24H  cetirizine, 10 mg, Oral, Daily  cholecalciferol, 1,000 Units, Oral, Daily  famotidine, 20 mg, Oral, Daily  latanoprost, 1 drop, Both Eyes, Nightly  midodrine, 2.5 mg, Oral, TID AC  sevelamer, 800 mg, Oral, TID With Meals  silver nitrate, 1 Application, Topical, Once  sodium chloride, 10 mL, Intravenous, Q12H  sodium chloride, 10 mL, Intravenous, Q12H  tamsulosin, 0.4 mg, Oral, Nightly           Objective   Vital Signs  Temp:  [97.3 °F (36.3 °C)-98.4 °F (36.9 °C)] 98.1 °F (36.7 °C)  Heart Rate:  [66-96] 78  Resp:  [16-20] 16  BP: (118-134)/(52-63) 118/52  Body mass index is 25.81 kg/m².    Intake/Output Summary (Last 24 hours) at 3/27/2025 1256  Last data filed at 3/27/2025 1055  Gross per 24 hour   Intake 393.75 ml   Output 600 ml   Net -206.25 ml       Physical Exam  Awake alert resting sitting up on the edge of the bed with no distress.   Clear to auscultation anteriorly  Has a lot of bruising around the tunneled dialysis catheter site.  His vascular site looks okay.      Results Review:       I reviewed the patient's new clinical results.  Results from last 7 days   Lab Units 25  0413 25  0429 25  0401 25  0355  03/23/25  1527   WBC 10*3/mm3 4.80 5.09 6.38 4.42 4.21   HEMOGLOBIN g/dL 8.7* 8.6* 9.5* 9.2* 9.6*   PLATELETS 10*3/mm3 73* 83* 86* 76* 59*     Results from last 7 days   Lab Units 03/27/25  0412 03/26/25  0756 03/25/25  0401 03/24/25  0355 03/23/25  1527   SODIUM mmol/L 133* 123* 130* 131* 129*   POTASSIUM mmol/L 4.3  --  4.4 4.1 4.3   CHLORIDE mmol/L 100  --  99 95* 93*   CO2 mmol/L 23.0  --  22.7 21.5* 22.2   BUN mg/dL 24*  --  29* 49* 46*   CREATININE mg/dL 4.19*  --  4.75* 6.28* 6.03*   CALCIUM mg/dL 7.9*  --  8.1* 7.8* 7.8*   MAGNESIUM mg/dL  --   --   --  2.1  --    PHOSPHORUS mg/dL 3.8  --  4.0 5.2*  --    Estimated Creatinine Clearance: 16.5 mL/min (A) (by C-G formula based on SCr of 4.19 mg/dL (H)).      Assessment & Plan   Active Hospital Problems    Diagnosis  POA    **Sepsis due to cellulitis [L03.90, A41.9]  Yes    ESRD on dialysis [N18.6, Z99.2]  Not Applicable    Problem with vascular access [Z78.9]  Yes    Anemia due to stage 4 chronic kidney disease treated with erythropoietin [N18.4, D63.1]  Yes    Cirrhosis of liver without ascites [K74.60]  Yes    Permanent atrial fibrillation [I48.21]  Yes      Resolved Hospital Problems   No resolved problems to display.       PLAN    Tolerating antibiotics without issues no nausea vomiting diarrhea  Will be on antibiotics through end date with 6 weeks antibiotic therapy given graft infection.  Hopefully after intervention the bleeding stops his hemoglobin is remained stable.  Cleared for discharge by nephrology awaiting word from vascular surgery on discharge planning.  Infectious disease signed off today.  Potential discharge home with IV antibiotics with dialysis tomorrow.      Disposition  Expected Discharge Date: 3/28/2025; Expected Discharge Time:    Potential discharge tomorrow after dialysis.  I did reach out to nephrology to try to get his dialysis early in the morning.    Juan Ramon Aburto MD  [facility]  03/27/25  12:56  EDT

## 2025-03-27 NOTE — NURSING NOTE
Paged Vascular surgery for active bleeding at the hemodialysis cath double site. Spoke with Dr. Aguila - he said to apply pressure dressing over site. He ordered 1unit of platelets and DDAVP to be reordered.

## 2025-03-27 NOTE — OP NOTE
Operative Note  Date of Admission:  3/23/2025  OR Date: 3/26/2025    Pre-op Diagnosis:   Bleeding hemodialysis access site    Post-Op Diagnosis Codes:     * ESRD on dialysis [N18.6, Z99.2]     * Problem with vascular access [Z78.9]     * Sepsis due to cellulitis [L03.90, A41.9]    Procedure:   1) suture ligation of bleeding site from hemodialysis access catheter    Surgeon: Feng Deal MD    Assistant: None    Anesthesia: Local    Staff:   Nursing staff    Estimated Blood Loss:  Blood loss at site prior to sutures    Specimens: * No orders in the log *     Complications: None    Findings: Cessation of bleeding after initial stitch placement    Indications:    The patient is an 79 y.o. male seen for evaluation of bleeding site from tunneled dialysis catheter placement.  Patient understands risk benefits complications of suture ligation agrees to procedure       Procedure:    Patient was prepped and draped sterilely.  Suture ligation with 2-0 Vicryl on a CT1 needle allowed immediate cessation of bleeding from the tract.  A figure-of-eight stitch was placed under local anesthetic with 1% lidocaine instilled.  No bleeding was seen and AV step drive is places and over dressing.  Thank          Active Hospital Problems    Diagnosis  POA    **Sepsis due to cellulitis [L03.90, A41.9]  Yes    ESRD on dialysis [N18.6, Z99.2]  Not Applicable    Problem with vascular access [Z78.9]  Yes    Anemia due to stage 4 chronic kidney disease treated with erythropoietin [N18.4, D63.1]  Yes    Cirrhosis of liver without ascites [K74.60]  Yes    Permanent atrial fibrillation [I48.21]  Yes      Resolved Hospital Problems   No resolved problems to display.      Bill Deal MD     Date: 3/27/2025  Time: 07:29 EDT

## 2025-03-27 NOTE — NURSING NOTE
Paged Vascular surgery for medication/order. Spoke with Dr. Aguila - he said to not give DDAVP because of contraindication that popped up with medication order.

## 2025-03-28 LAB
BACTERIA SPEC AEROBE CULT: NORMAL
BACTERIA SPEC AEROBE CULT: NORMAL
BH BB BLOOD EXPIRATION DATE: NORMAL
BH BB BLOOD TYPE BARCODE: 5100
BH BB DISPENSE STATUS: NORMAL
BH BB PRODUCT CODE: NORMAL
BH BB UNIT NUMBER: NORMAL
UNIT  ABO: NORMAL
UNIT  RH: NORMAL

## 2025-03-28 PROCEDURE — 25010000002 CEFAZOLIN PER 500 MG: Performed by: STUDENT IN AN ORGANIZED HEALTH CARE EDUCATION/TRAINING PROGRAM

## 2025-03-28 RX ADMIN — FAMOTIDINE 20 MG: 20 TABLET, FILM COATED ORAL at 13:35

## 2025-03-28 RX ADMIN — MIDODRINE HYDROCHLORIDE 2.5 MG: 2.5 TABLET ORAL at 13:36

## 2025-03-28 RX ADMIN — Medication 10 ML: at 13:38

## 2025-03-28 RX ADMIN — MIDODRINE HYDROCHLORIDE 2.5 MG: 2.5 TABLET ORAL at 07:49

## 2025-03-28 RX ADMIN — SEVELAMER CARBONATE 800 MG: 800 TABLET, FILM COATED ORAL at 13:36

## 2025-03-28 RX ADMIN — CETIRIZINE HYDROCHLORIDE 10 MG: 10 TABLET ORAL at 13:35

## 2025-03-28 RX ADMIN — LATANOPROST 1 DROP: 50 SOLUTION/ DROPS OPHTHALMIC at 21:14

## 2025-03-28 RX ADMIN — CEFAZOLIN 1000 MG: 1 INJECTION, POWDER, FOR SOLUTION INTRAMUSCULAR; INTRAVENOUS at 13:39

## 2025-03-28 RX ADMIN — SEVELAMER CARBONATE 800 MG: 800 TABLET, FILM COATED ORAL at 17:37

## 2025-03-28 RX ADMIN — Medication 1000 UNITS: at 13:36

## 2025-03-28 RX ADMIN — Medication 10 ML: at 13:37

## 2025-03-28 RX ADMIN — Medication 1 TABLET: at 13:36

## 2025-03-28 RX ADMIN — TAMSULOSIN HYDROCHLORIDE 0.4 MG: 0.4 CAPSULE ORAL at 21:14

## 2025-03-28 NOTE — DISCHARGE SUMMARY
Patient Name: Bill Landry  : 1945  MRN: [id number]    Date of Admission: 3/23/2025  Date of Discharge:  3/28/2025  Primary Care Physician: Bharathi De La Torre MD      Chief Complaint:   Vascular Access Problem      Discharge Diagnoses     Active Hospital Problems    Diagnosis  POA    **Sepsis due to cellulitis [L03.90, A41.9]  Yes    ESRD on dialysis [N18.6, Z99.2]  Not Applicable    Problem with vascular access [Z78.9]  Yes    Anemia due to stage 4 chronic kidney disease treated with erythropoietin [N18.4, D63.1]  Yes    Cirrhosis of liver without ascites [K74.60]  Yes    Permanent atrial fibrillation [I48.21]  Yes      Resolved Hospital Problems   No resolved problems to display.        Hospital Course     Mr. Landry is a 79 y.o. male with a history ofESRD on HD MWF, chron's disease s/p previous bowel resection and end ileostomy, DM2, Afib, cirrhosis who presented to [facility] initially complaining of vascular access issues.  Please see the admitting history and physical for further details.  He was found to have arm cellulitis and was admitted to the hospital for further evaluation and treatment.  He was admitted to the hospital and started on antibiotics.  Infectious disease evaluated and assisted with management and made recommendations for duration of antibiotic therapy.  He was felt to have a vascular graft infection and was followed and evaluated by vascular surgery.  On  he was taken to the operating room with a right-sided tunneled dialysis catheter placed.  He also had a sharp excisional left axillary wound exploration irrigation and debridement.  He tolerated those procedures well but was complicated by a lot of oozing post procedurally.  He required a few stitches and infusion and DDAVP and received platelets and ultimately the bleeding has stopped.  His hemoglobin remained stable.  The plan at this time is for him to continue IV antibiotics as well as local wound care  of the left upper extremity.  He will continue dialysis through his right sided tunneled dialysis catheter.  He received dialysis today and tolerated that well.  He has been up and ambulating with standby assist following his dialysis.  Wound care and home health have been arranged in the outpatient setting as well as follow-up with subspecialty providers.  The plan was discussed with the patient and his wife at the bedside all questions addressed and answered and stable to discharge home today      Day of Discharge     Subjective:  Denies new issues or complaints sitting up and eating breakfast this morning.  Per nursing staff following dialysis was able to get up and ambulate with standby assist out to the nurses station and back.    Physical Exam:  Temp:  [97.7 °F (36.5 °C)-98.4 °F (36.9 °C)] 97.7 °F (36.5 °C)  Heart Rate:  [61-79] 74  Resp:  [18] 18  BP: (107-131)/(49-64) 118/49  Body mass index is 26.26 kg/m².  Physical Exam  Vitals and nursing note reviewed.   Constitutional:       General: He is not in acute distress.     Appearance: He is ill-appearing.   Cardiovascular:      Rate and Rhythm: Normal rate and regular rhythm.      Comments: Right chest noted to have tunneled dialysis catheter with lots of bruising  Pulmonary:      Effort: No respiratory distress.      Breath sounds: Normal breath sounds.   Neurological:      Mental Status: He is alert and oriented to person, place, and time. Mental status is at baseline.         Consultants     Consult Orders (all) (From admission, onward)       Start     Ordered    03/24/25 0812  Inpatient Infectious Diseases Consult  Once        Specialty:  Infectious Diseases  Provider:  Ney Vivas MD    03/24/25 0811    03/23/25 1705  [facility] (on-call MD unless specified) Details  Once        Specialty:  Hospitalist  Provider:  (Not yet assigned)    03/23/25 1705    03/23/25 1705  Nephrology (on -call MD unless specified)  Once        Specialty:  Nephrology   Provider:  Myra Moore MD    03/23/25 1705    03/23/25 1507  Inpatient Vascular Surgery Consult  Once        Specialty:  Vascular Surgery  Provider:  Blil Deal MD    03/23/25 1507                  Procedures     HEMODIALYSIS CATHETER INSERTION, left arm I&D, INCISION AND DRAINAGE UPPER EXTREMITY    Imaging Results (All)       Procedure Component Value Units Date/Time    XR Chest 1 View [[id number]] Collected: 03/25/25 1229     Updated: 03/25/25 1234    Narrative:      XR CHEST 1 VW-        INDICATION: Dialysis catheter placement     COMPARISON: Chest radiograph March 23, 2025     TECHNIQUE: 1 view     FINDINGS:      Vascular congestion. Ill-defined perihilar and basilar opacities. Stable  mediastinum. Elevated right hemidiaphragm. Cholecystectomy clips. Left  axillary stents. Right IJ catheter with catheter tip in the distal SVC.       Impression:         1. Interval placement of right IJ catheter with the catheter tip in the  distal SVC. No pneumothorax.  2. Vascular congestion and possible edema.  3. Elevated right hemidiaphragm with suspected atelectasis at the right  lung base     This report was finalized on 3/25/2025 12:31 PM by Dr. Bao Drake M.D on Workstation: [id number]       Arteriogram (Autofinalize) [[id number]] Resulted: 03/25/25 1132     Updated: 03/25/25 1132    Narrative:      This procedure was auto-finalized with no dictation required.    CT Upper Extremity Left With Contrast [[id number]] Collected: 03/23/25 1754     Updated: 03/23/25 1819    Narrative:      CT UPPER EXTREMITY LEFT W CONTRAST-     HISTORY: Clinical concern for infected left upper extremity fistula.     TECHNIQUE: Radiation dose reduction techniques were utilized, including  automated exposure control and exposure modulation based on body size. 2  mm images were obtained through the left upper arm after the  administration of IV contrast.     COMPARISON: CT chest 2/21/2015        FINDINGS: Patent visualized  left axillary and brachial arteries.  Proximalmost left axillary artery is outside the exam field-of-view. 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 Proximal  most portions of the fistulas are outside the exam field-of-view. One is  thrombosed with a partially visualized stent proximally (series 3/image  47). This fistula appears ligated distally and aneurysmal distally  measuring up to 4 cm (series 6/image 17). The second fistula extends  from the left axilla to the left elbow and is patent where seen. The  proximal most portion of the fistula is outside the exam field-of-view.  A 3.6 x 2.3 cm focus of subcutaneous fluid density in the inner mid left  upper arm extends from the thickened skin surface to abut the fistula  (series 3/image 93). No enhancing wall or internal locules of air. No  soft tissue air/gas. Normal mostly visualized left humerus. Advanced  left acromioclavicular osteoarthritis. Nonaggressive appearing sharply  demarcated left scapular lytic focus interposed between the left glenoid  and coracoid process (series 2/image 28). This was present in 2015 and  similarly sized, although less well-defined. 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   Proximal most portions of the fistulas are outside the exam  field-of-view. A 3.6 x 2.3 cm focus of subcutaneous fluid density in the  inner mid left upper arm extends from the thickened skin surface to abut  the patent fistula. No enhancing wall or internal locules of air.  Recommend clinical correlation for findings to suggest infection at this  site. In the absence of recent surgery, fluid collection raises concern  for infection. The other thrombosed fistula is stented proximally and  appears ligated distally. The distal aspect of this fistula is  aneurysmal measuring up to 4 cm.     This report was finalized on 3/23/2025 6:16 PM by Dr. Bill Minaya M.D  on Workstation: [id number]       XR Chest 1 View [[id number]] Collected: 03/23/25 1744     Updated: 03/23/25 1749    Narrative:      CXR ONE VIEW      HISTORY: Orthopnea; L03.114-Cellulitis of left upper limb;  L02.414-Cutaneous abscess of left upper limb; T82.9XXA-Unspecified  complication of cardiac and vascular prosthetic device, implant and  graft, initial encounter; N18.6-End stage renal disease;  Z99.2-Dependence on renal dialysis; A41.9-Sepsis, unspecified organism;  E87.8-Lext-siyuqhzocv and hyponatremia     COMPARISON: Chest x-ray 1/30/2025     TECHNIQUE: single portable AP       Impression:         Redemonstrated mild cardiomegaly and marked elevation of the right  hemidiaphragm.     Mild bilateral central vascular congestion and interstitial prominence,  no pneumothorax.     Question small right effusion.     This report was finalized on 3/23/2025 5:45 PM by Dr. Tab Tolbert M.D on Workstation: [id number]             Results for orders placed in visit on 02/25/25    Dialysis Circuit Angiography (Fistulogram)    Interpretation Summary    Duplex directed access with left AV shuntogram with left AV shunt angioplasty      Operative Note    OR Date: 2/25/2025    Pre-op Diagnosis:  Left arteriovenous shunt stenosis    Post-Op Diagnosis Codes:  Same    Procedure:  1) duplex directed access with left arteriovenous shuntogram with arteriovenous shunt angioplasty    Surgeon: Feng Deal MD    Assistant:  [facility] staff and they provided critical assistance during the case including suctioning, exposure, retraction, and reduction of blood loss.    Anesthesia: Local    Staff:  [facility] staff    Estimated Blood Loss: Minimal    Specimens: None    Complications: None    Findings: Venous outflow stenosis at the venous anastomosis responds well to an single balloon angioplasty.    Indications:    The patient is an 79 y.o. male seen for evaluation diminishing flow volume loss facilities and below volumes and his AV shunt with  venous outflow stenosis on duplex.  Patient understands risk benefits complications and agrees to procedure.    Procedure:    Patient was prepped and draped sterilely.  Using duplex directionally accessed the AV graft proximally and passed a wire centrally.  The shuntogram performed through the micropuncture set we confirmed venous outflow stenosis placed a 6 Greek sheath and a wire across the lesion and then performed balloon angioplasty to high-pressure with an 8 mm x 40 mm of accessible lumen.  With angioplasty performed follow-up angiogram confirmed wide patency of the venous anastomosis.  Patient amazingly did not have any pain and we performed a balloon angioplasty.  At this point in time he has done very well and bolster stitch was placed after completing the shuntogram with proximal views and chest views.  Patient's got wide patency everywhere of the else and has responded well to initial balloon angioplasty.  No Complicating features are seen    Radiographic Findings:  Angiographic findings include patent arteriovenous shunt with widely patent brachial artery anastomosis and shunt loop extending and anastomosis to the axillary artery where there is 80% stenosis noted.  Venous outflow throughout the subclavian vein and innominate vein are widely patent.  Crossing this lesion and angioplasty with an 8 mm x 40 mm balloon totally resolves the lesion with 1 inflation.  There is no residual stenosis and no complicating features.      Bill Deal MD    Date: 2/25/2025  Time: 15:20 EST    Results for orders placed during the hospital encounter of 09/05/24    Adult Transthoracic Echo Complete W/ Cont if Necessary Per Protocol    Interpretation Summary    Left ventricular systolic function is normal. Calculated left ventricular EF = 68.8% Normal left ventricular cavity size and wall thickness noted. All left ventricular wall segments contract normally. Left ventricular diastolic function was indeterminate.     "The aortic valve is abnormal in structure. There is severe thickening of the aortic valve. The aortic valve appears trileaflet. No significant aortic valve regurgitation is present. Mild to moderate aortic valve stenosis is present. Aortic valve area is 1.33 cm2. Peak velocity of the flow distal to the aortic valve is 279.9 cm/s. Aortic valve maximum pressure gradient is 31.3 mmHg. Aortic valve mean pressure gradient is 17.8 mmHg. Aortic valve dimensionless index is 0.30 .    Pertinent Labs     Results from last 7 days   Lab Units 03/27/25 0413 03/26/25  0429 03/25/25 0401 03/24/25  0355   WBC 10*3/mm3 4.80 5.09 6.38 4.42   HEMOGLOBIN g/dL 8.7* 8.6* 9.5* 9.2*   PLATELETS 10*3/mm3 73* 83* 86* 76*     Results from last 7 days   Lab Units 03/27/25 0412 03/26/25  0756 03/25/25 0401 03/24/25 0355 03/23/25  1527   SODIUM mmol/L 133* 123* 130* 131* 129*   POTASSIUM mmol/L 4.3  --  4.4 4.1 4.3   CHLORIDE mmol/L 100  --  99 95* 93*   CO2 mmol/L 23.0  --  22.7 21.5* 22.2   BUN mg/dL 24*  --  29* 49* 46*   CREATININE mg/dL 4.19*  --  4.75* 6.28* 6.03*   GLUCOSE mg/dL 122*  --  129* 107* 171*   EGFR mL/min/1.73 13.7*  --  11.8* 8.4* 8.9*     Results from last 7 days   Lab Units 03/27/25 0412 03/25/25  0401 03/24/25 0355 03/23/25  1527   ALBUMIN g/dL 2.6* 2.3* 2.4* 2.6*   BILIRUBIN mg/dL  --   --   --  1.2   ALK PHOS U/L  --   --   --  283*   AST (SGOT) U/L  --   --   --  43*   ALT (SGPT) U/L  --   --   --  18     Results from last 7 days   Lab Units 03/27/25 0412 03/25/25 0401 03/24/25  0355 03/23/25  1527   CALCIUM mg/dL 7.9* 8.1* 7.8* 7.8*   ALBUMIN g/dL 2.6* 2.3* 2.4* 2.6*   MAGNESIUM mg/dL  --   --  2.1  --    PHOSPHORUS mg/dL 3.8 4.0 5.2*  --                Invalid input(s): \"LDLCALC\"  Results from last 7 days   Lab Units 03/24/25  0805 03/23/25  1549 03/23/25  1508   BLOODCX   --  No growth at 4 days  No growth at 4 days  --    WOUNDCX  Light growth (2+) Staphylococcus aureus*  --  Light growth (2+) " Staphylococcus aureus*         Test Results Pending at Discharge     Pending Results       None              Discharge Details        Discharge Medications        PAUSE taking these medications        Instructions Start Date   aspirin 81 MG tablet  Wait to take this until: April 1, 2025   1 tablet, Nightly             New Medications        Instructions Start Date   ceFAZolin 1000 mg IVPB in 100 mL NS (MBP)   1,000 mg, Intravenous, Every 24 Hours   Start Date: March 29, 2025            Continue These Medications        Instructions Start Date   acetaminophen 325 MG tablet  Commonly known as: TYLENOL   650 mg, Oral, Every 4 Hours PRN      alfuzosin 10 MG 24 hr tablet  Commonly known as: UROXATRAL   1 tablet, Every Other Day      cholecalciferol 25 MCG (1000 UT) tablet  Commonly known as: VITAMIN D3   1 tablet, Daily      latanoprost 0.005 % ophthalmic solution  Commonly known as: XALATAN   1 drop, Nightly      lidocaine-prilocaine 2.5-2.5 % cream  Commonly known as: EMLA   1 Application, As Needed      Loratadine 10 MG capsule   1 capsule, Daily      midodrine 2.5 MG tablet  Commonly known as: PROAMATINE   2.5 mg, Oral, 3 Times Weekly, Monday, Wednesday, Friday on days of hemodialysis      MIRCERA IJ   1 dose, Every 30 Days      Pepcid AC 10 MG tablet  Generic drug: famotidine   1 tablet, Daily PRN      polyethylene glycol 17 g packet  Commonly known as: MIRALAX   17 g, Oral, Daily PRN      Umm-Peter tablet   1 tablet, Daily      sennosides-docusate 8.6-50 MG per tablet  Commonly known as: PERICOLACE   2 tablets, Oral, 2 Times Daily PRN      sevelamer 800 MG tablet  Commonly known as: RENAGEL   800 mg, 3 Times Daily With Meals      sodium chloride 0.65 % nasal spray   2 sprays, Nightly      VENOFER IV   1 dose, As Needed             Stop These Medications      mupirocin 2 % ointment  Commonly known as: BACTROBAN     Velphoro 500 MG chewable tablet  Generic drug: Sucroferric Oxyhydroxide              Allergies  "  Allergen Reactions    Ace Inhibitors Other (See Comments)     RENAL FAILURE/ raised creatinine    Contrast Dye (Echo Or Unknown Ct/Mr) Other (See Comments) and Anaphylaxis     RENAL FAILURE    Iodine Anaphylaxis    Angiotensin Receptor Blockers Swelling    Eliquis [Apixaban] Arrhythmia     BLEEDING ISSUES; PT CANNOT TAKE ANY ANTICOAGULANTS DUE TO LOW PLATELETS EXCEPT ASPIRIN      Filgrastim GI Intolerance and Confusion     Chest pain    Heparin Other (See Comments)     \"It causes me to bleed out\"    Keflex [Cephalexin] Diarrhea     RENAL FAILURE    Other Angioedema     IVP Dye       Discharge Disposition:  Home-Health Care Svc      Discharge Diet:  Diet Order   Procedures    Diet: Cardiac, Diabetic, Renal; Healthy Heart (2-3 Na+); Consistent Carbohydrate; Low Sodium (2-3g), Low Potassium, Low Phosphorus; Fluid Consistency: Thin (IDDSI 0)       Discharge Activity:   Activity Instructions       Activity as Tolerated              CODE STATUS:    Code Status and Medical Interventions: CPR (Attempt to Resuscitate); Full Support   Ordered at: 03/25/25 5565     Code Status (Patient has no pulse and is not breathing):    CPR (Attempt to Resuscitate)     Medical Interventions (Patient has pulse or is breathing):    Full Support     Level Of Support Discussed With:    Patient       Future Appointments   Date Time Provider Department Center   4/8/2025  1:10 PM LAB CHAIR 5 [facility]  [facility]   4/8/2025  1:40 PM Theo Pacheco MD [facility]   4/17/2025 11:15 AM Bill Deal MD [facility]   5/22/2025  2:15 PM ALEX OP VAS RM 3 BH ALEX OVKR ALEX   5/22/2025  3:00 PM Rosanna Saul APRN [facility] VS [facility]   9/4/2025  1:15 PM [facility] L[facility] ECHO/VAS RM 1 BH L[facility] ECHO [facility]   9/4/2025  1:45 PM Dale Vázquez MD [facility] CD [id number] ALEX     Additional Instructions for the Follow-ups that You Need to Schedule       Discharge Follow-up with PCP   As directed       Currently Documented PCP:    Bharathi De La Torre MD    PCP Phone " Number:    [phone]     Follow Up Details: 1-2 weeks        Discharge Follow-up with Specified Provider: Dr Deal as directed   As directed      To: Dr Deal as directed        Discharge Follow-up with Specified Provider: shaunnaology as directed   As directed      To: carmellarhology as directed               Contact information for follow-up providers       Bharathi De La Torre MD .    Specialty: Internal Medicine  Why: 1-2 weeks  Contact information:  [address]  [address]  [facility] [address]  [phone]                       Contact information for after-discharge care       Dialysis/Infusion       [facility] .    Service: In-Center Hemodialysis  Contact information:  [address]  [address]  [facility] [address]  [phone]                     Home Medical Care       [facility] .    Services: Home Health Services, Home Rehabilitation, Home Nursing  Contact information:  [address] [id number] [phone]                                   Additional Instructions for the Follow-ups that You Need to Schedule       Discharge Follow-up with PCP   As directed       Currently Documented PCP:    Bharathi De La Torre MD    PCP Phone Number:    [phone]     Follow Up Details: 1-2 weeks        Discharge Follow-up with Specified Provider: Dr Deal as directed   As directed      To: Dr Deal as directed        Discharge Follow-up with Specified Provider: shaunnaology as directed   As directed      To: shaunnaology as directed            Time Spent on Discharge:  Greater than 30 minutes      Juan Ramon Aburto MD  [facility]  03/28/25  14:36 EDT

## 2025-03-28 NOTE — PROGRESS NOTES
[facility] Progress Note      Patient Name: Bill Landry  : 1945  MRN: [id number]  Primary Care Physician:  Bharathi De La Torre MD  Date of admission: 3/23/2025    Subjective     Interval History:   ESRD on HD MWF  Feeling well this morning, up in bed eating breakfast  Plans for dialysis today via right TDC    Review of Systems:   As noted above    Objective     Vitals:   Temp:  [97.3 °F (36.3 °C)-98.4 °F (36.9 °C)] 97.7 °F (36.5 °C)  Heart Rate:  [66-79] 72  Resp:  [16-18] 18  BP: (107-131)/(52-64) 131/63    Intake/Output Summary (Last 24 hours) at 3/28/2025 0921  Last data filed at 3/28/2025 0500  Gross per 24 hour   Intake 510 ml   Output 500 ml   Net 10 ml       Physical Exam:    General Appearance: alert, oriented x 3, no acute distress   Skin: warm and dry  HEENT: oral mucosa normal, nonicteric sclera  Neck: supple, no JVD  Lungs: CTA  Heart: RRR, normal S1 and S2  Abdomen: soft, nontender, nondistended  : no palpable bladder  Extremities: Bilateral lower extremity edema  Neuro: normal speech and mental status   Left upper extremity AV graft.  Left upper extremity wound covered  Bruising to right chest around TDC  Scheduled Meds:     b complex-vitamin c-folic acid, 1 tablet, Oral, Daily  ceFAZolin, 1,000 mg, Intravenous, Q24H  cetirizine, 10 mg, Oral, Daily  cholecalciferol, 1,000 Units, Oral, Daily  famotidine, 20 mg, Oral, Daily  latanoprost, 1 drop, Both Eyes, Nightly  midodrine, 2.5 mg, Oral, TID AC  sevelamer, 800 mg, Oral, TID With Meals  silver nitrate, 1 Application, Topical, Once  sodium chloride, 10 mL, Intravenous, Q12H  sodium chloride, 10 mL, Intravenous, Q12H  tamsulosin, 0.4 mg, Oral, Nightly      IV Meds:          Results Reviewed:   I have personally reviewed the results from the time of this admission to 3/28/2025 09:21 EDT     Results from last 7 days   Lab Units 25  0412 25  0756 25  0401 25  0355 25  1527   SODIUM mmol/L  133* 123* 130* 131* 129*   POTASSIUM mmol/L 4.3  --  4.4 4.1 4.3   CHLORIDE mmol/L 100  --  99 95* 93*   CO2 mmol/L 23.0  --  22.7 21.5* 22.2   BUN mg/dL 24*  --  29* 49* 46*   CREATININE mg/dL 4.19*  --  4.75* 6.28* 6.03*   CALCIUM mg/dL 7.9*  --  8.1* 7.8* 7.8*   BILIRUBIN mg/dL  --   --   --   --  1.2   ALK PHOS U/L  --   --   --   --  283*   ALT (SGPT) U/L  --   --   --   --  18   AST (SGOT) U/L  --   --   --   --  43*   GLUCOSE mg/dL 122*  --  129* 107* 171*       Estimated Creatinine Clearance: 16.8 mL/min (A) (by C-G formula based on SCr of 4.19 mg/dL (H)).    Results from last 7 days   Lab Units 03/27/25  0412 03/25/25  0401 03/24/25  0355   MAGNESIUM mg/dL  --   --  2.1   PHOSPHORUS mg/dL 3.8 4.0 5.2*             Results from last 7 days   Lab Units 03/27/25  0413 03/26/25  0429 03/25/25  0401 03/24/25  0355 03/23/25  1527   WBC 10*3/mm3 4.80 5.09 6.38 4.42 4.21   HEMOGLOBIN g/dL 8.7* 8.6* 9.5* 9.2* 9.6*   PLATELETS 10*3/mm3 73* 83* 86* 76* 59*       Results from last 7 days   Lab Units 03/23/25  1527   INR  1.36*       Assessment / Plan     ASSESSMENT:  .  ESRD: Hypervolemic on exam today   Left arm AV graft with recent bleeding and later drainage from incision site (had had recent shuntogram).  Elevated lactate and procalcitonin; normal white blood cell count (though chronic leukopenia).  CT scan of the left arm does reveal a fluid density that was cultured by vascular surgery  status post incision and drainage on 3/25/2024  Crohn's disease s/p bowel resection with ileostomy  Cirrhosis with chronic leukopenia and thrombocytopenia  Atrial fibrillation  Chronic hypotension of ESRD:  on midodrine  Hyponatremia secondary to volume overload will correct with hemodialysis  Thrombocytopenia chronic         PLAN:  Will continue dialysis on Monday Wednesday Friday with plan for dialysis today.    We will arrange for cefazolin 2 g after  on dialysis for 6 weeks  Likely discharge today after dialysis      Thank you  for involving us in the care of Bill Landry.  Please feel free to call with any questions.    Myra Moore MD  03/28/25  09:21 EDT    [facility]  [phone]    Parts of this note may be an electronic transcription/translation of spoken language to printed text using the Dragon dictation system.

## 2025-03-28 NOTE — PLAN OF CARE
Goal Outcome Evaluation:           Progress: no change  Outcome Evaluation: AOx4, but forgetful.  Dialysis done today and dressing changed at that time.  Later when he was preparing to leave for d/c the tunnel cath site started bleeding again at the insertion site around the sutures.   I changed the dressing again and notified Dr. Aburto & Dr. Deal.  Discharge was cancelled for today.  Patient ambulated in room this afternoon with walker and stand by assist.  Dressing to left arm changed today.

## 2025-03-28 NOTE — PROGRESS NOTES
[facility]   Surgical Progress Note    Patient Name: Bill Landry  : 1945  MRN: [id number]  Date of admission: 3/23/2025  Surgical Procedures Since Admission:  Procedure(s):  HEMODIALYSIS CATHETER INSERTION, left arm I&D  INCISION AND DRAINAGE UPPER EXTREMITY  Surgeon:  Bill Deal MD  Status:  3 Days Post-Op  -------------------    Subjective   Subjective     Chief Complaint: Possible left AV graft infection    History of Present Illness   79-year-old gentleman with possible left AV graft infection now status post I&D with no visible graft at the base of the wound.  Cultures showed MSSA.   Tunneled catheters been placed.  Unfortunately has been oozing consistently posterior appears to be stable after multiple cystic sutures placed.    Review of Systems catheter no longer losing    Objective   Objective     Vitals:   Temp:  [97.3 °F (36.3 °C)-98.4 °F (36.9 °C)] 97.7 °F (36.5 °C)  Heart Rate:  [66-79] 72  Resp:  [16-18] 18  BP: (107-131)/(52-64) 131/63  Output by Drain (mL) 25 0701 - 25 1900 25 1901 - 25 0700 25 0701 - 25 1108 Range Total   Ileostomy  100  500       Physical Exam, catheter no longer oozing and stable.  Left arm site clear without cellulitis.  Wound being packed.     Result Review    Result Review:  I have personally reviewed the results from the time of this admission to 3/28/2025 11:08 EDT and agree with these findings:  [x]  Laboratory list / accordion  [x]  Microbiology  []  Radiology  [x]  EKG/Telemetry   []  Cardiology/Vascular   []  Pathology  []  Old records  []  Other:  Most notable findings include: Culture with Staph aureus early growth      Results from last 7 days   Lab Units 25  0413 25  0429 25  0401 25  0355 25  1527   WBC 10*3/mm3 4.80 5.09 6.38 4.42 4.21   HEMOGLOBIN g/dL 8.7* 8.6* 9.5* 9.2* 9.6*   PLATELETS 10*3/mm3 73* 83* 86* 76* 59*     Results from last 7 days   Lab Units  03/27/25  0412 03/26/25  0756 03/25/25  0401 03/24/25  0355 03/23/25  1527   SODIUM mmol/L 133* 123* 130* 131* 129*   POTASSIUM mmol/L 4.3  --  4.4 4.1 4.3   CHLORIDE mmol/L 100  --  99 95* 93*   CO2 mmol/L 23.0  --  22.7 21.5* 22.2   BUN mg/dL 24*  --  29* 49* 46*   CREATININE mg/dL 4.19*  --  4.75* 6.28* 6.03*   GLUCOSE mg/dL 122*  --  129* 107* 171*   MAGNESIUM mg/dL  --   --   --  2.1  --    PHOSPHORUS mg/dL 3.8  --  4.0 5.2*  --    Estimated Creatinine Clearance: 16.8 mL/min (A) (by C-G formula based on SCr of 4.19 mg/dL (H)).  Results from last 7 days   Lab Units 03/23/25  1527   PROTIME Seconds 16.7*   INR  1.36*     Lab Results   Component Value Date    HGBA1C 5.3 02/03/2025    HGBA1C 4.80 01/26/2025    HGBA1C 5.40 09/17/2024     Glucose   Date/Time Value Ref Range Status   03/25/2025 1146 118 70 - 130 mg/dL Final       Assessment & Plan   Assessment / Plan     Brief Patient Summary:  Bill Landry is a 79 y.o. male who appears to have staph growing from wound that does not appear to directly communicate to his AV graft.  We are going to try to sterilize it with the 6 weeks of antibiotic therapy.  At this point in time he is finally stopped oozing and things appear stable.  As long as he is cleared from all of these issues with oozing and he has home health set up to pack his wound and antibiotics in place for 6 weeks with his hemodialysis that he is stable for discharge from our standpoint.  We will follow him up in 3 to 4 weeks for wound check.  Active Hospital Problems:   Active Hospital Problems    Diagnosis     **Sepsis due to cellulitis     ESRD on dialysis     Problem with vascular access     Anemia due to stage 4 chronic kidney disease treated with erythropoietin     Cirrhosis of liver without ascites     Permanent atrial fibrillation      Plan:   TDC for the next 4 to 6 weeks to let the left arm wound heal.  Needs home health for wound packing daily at discharge.  Will need 6 weeks of antibiotics  per ID with dialysis.  We will follow him up in 3 to 4 weeks to check his wound.  We will sign off at this time.  Likely home today if stable and all arrangements can be made.    VTE Prophylaxis:  Mechanical VTE prophylaxis orders are present.        Bill Deal MD

## 2025-03-28 NOTE — CASE MANAGEMENT/SOCIAL WORK
Continued Stay Note  [facility]     Patient Name: Bill Landry  MRN: [id number]  Today's Date: 3/28/2025    Admit Date: 3/23/2025    [facility] Receives HD MWF at [facility].  Per ID, antibiotics with HD. 2 g cefazolin Monday after dialysis and 2 g cefazolin Wednesday after dialysis. Dose on Friday would be 3 g cefazolin after dialysis. End date will be May 6.    Family transport home   Discharge Plan       Row Name 03/28/25 1639       Plan    [facility]. Receives HD MWF at [facility].  Per ID, antibiotics with HD. 2 g cefazolin Monday after dialysis and 2 g cefazolin Wednesday after dialysis. Dose on Friday would be 3 g cefazolin after dialysis. End date will be May 6.    Family transport home    Patient/Family in Agreement with Plan yes    Plan Comments Plan was to D/C after HD today. D/C cancelled due to tunneled cath oozing again. Vascular surgery to reassess. Chase Araiza RN-BC                   Discharge Codes    No documentation.                 Expected Discharge Date and Time       Expected Discharge Date Expected Discharge Time    Mar 28, 2025               Chase Araiza RN

## 2025-03-28 NOTE — NURSING NOTE
Patient stable and tolerated hd tx.  Patient's BP decreased during dialysis could not remove 3L.  Dressing changed with no issues.  Patient removed 2 L.    BP  122/64  HR  64      Damian Ureña RN  [facility]

## 2025-03-28 NOTE — PLAN OF CARE
Problem: Adult Inpatient Plan of Care  Goal: Plan of Care Review  Outcome: Progressing  Flowsheets (Taken 3/28/2025 0515)  Progress: improving  Outcome Evaluation: Vitals stable, continued Tx for Sepsis d/t cellulitis, ABx continued, dialysis dressing stopped bleeding, pressure kept over site, dressing changed Q shift on NICK, pt was able to ambulate to bathroom with staff and walker, planned to have dialysis today before getting discharged, spouse visited, plan of care on going.  Plan of Care Reviewed With: patient

## 2025-03-28 NOTE — PROGRESS NOTES
[facility] Progress Note      Patient Name: Bill Landry  : 1945  MRN: [id number]  Primary Care Physician:  Bharathi De La Torre MD  Date of admission: 3/23/2025    Subjective     Interval History:   The patient was seen and examined today for follow-up on end-stage renal disease  Status post tunneled dialysis catheter placement and left axillary wound exploration and irrigation debridement  Oozing from dialysis catheter and sutures was  placed.  Review of Systems:   As noted above    Objective     Vitals:   Temp:  [97.3 °F (36.3 °C)-98.4 °F (36.9 °C)] 97.7 °F (36.5 °C)  Heart Rate:  [66-96] 79  Resp:  [16-20] 18  BP: (107-122)/(52-64) 122/64    Intake/Output Summary (Last 24 hours) at 3/28/2025 0730  Last data filed at 3/28/2025 0500  Gross per 24 hour   Intake 630 ml   Output 500 ml   Net 130 ml       Physical Exam:    General Appearance: alert, oriented x 3, no acute distress   Skin: warm and dry  HEENT: oral mucosa normal, nonicteric sclera  Neck: supple, no JVD  Lungs: CTA  Heart: RRR, normal S1 and S2  Abdomen: soft, nontender, nondistended  : no palpable bladder  Extremities: Bilateral lower extremity edema  Neuro: normal speech and mental status   Left upper extremity AV graft.  Left upper extremity wound covered  Scheduled Meds:     b complex-vitamin c-folic acid, 1 tablet, Oral, Daily  ceFAZolin, 1,000 mg, Intravenous, Q24H  cetirizine, 10 mg, Oral, Daily  cholecalciferol, 1,000 Units, Oral, Daily  famotidine, 20 mg, Oral, Daily  latanoprost, 1 drop, Both Eyes, Nightly  midodrine, 2.5 mg, Oral, TID AC  sevelamer, 800 mg, Oral, TID With Meals  silver nitrate, 1 Application, Topical, Once  sodium chloride, 10 mL, Intravenous, Q12H  sodium chloride, 10 mL, Intravenous, Q12H  tamsulosin, 0.4 mg, Oral, Nightly      IV Meds:          Results Reviewed:   I have personally reviewed the results from the time of this admission to 3/28/2025 07:30 EDT     Results from last 7 days    Lab Units 03/27/25  0412 03/26/25  0756 03/25/25  0401 03/24/25  0355 03/23/25  1527   SODIUM mmol/L 133* 123* 130* 131* 129*   POTASSIUM mmol/L 4.3  --  4.4 4.1 4.3   CHLORIDE mmol/L 100  --  99 95* 93*   CO2 mmol/L 23.0  --  22.7 21.5* 22.2   BUN mg/dL 24*  --  29* 49* 46*   CREATININE mg/dL 4.19*  --  4.75* 6.28* 6.03*   CALCIUM mg/dL 7.9*  --  8.1* 7.8* 7.8*   BILIRUBIN mg/dL  --   --   --   --  1.2   ALK PHOS U/L  --   --   --   --  283*   ALT (SGPT) U/L  --   --   --   --  18   AST (SGOT) U/L  --   --   --   --  43*   GLUCOSE mg/dL 122*  --  129* 107* 171*       Estimated Creatinine Clearance: 16.8 mL/min (A) (by C-G formula based on SCr of 4.19 mg/dL (H)).    Results from last 7 days   Lab Units 03/27/25  0412 03/25/25  0401 03/24/25  0355   MAGNESIUM mg/dL  --   --  2.1   PHOSPHORUS mg/dL 3.8 4.0 5.2*             Results from last 7 days   Lab Units 03/27/25  0413 03/26/25  0429 03/25/25  0401 03/24/25  0355 03/23/25  1527   WBC 10*3/mm3 4.80 5.09 6.38 4.42 4.21   HEMOGLOBIN g/dL 8.7* 8.6* 9.5* 9.2* 9.6*   PLATELETS 10*3/mm3 73* 83* 86* 76* 59*       Results from last 7 days   Lab Units 03/23/25  1527   INR  1.36*       Assessment / Plan     ASSESSMENT:  .  ESRD: Hypervolemic on exam today   Left arm AV graft with recent bleeding and later drainage from incision site (had had recent shuntogram).  Elevated lactate and procalcitonin; normal white blood cell count (though chronic leukopenia).  CT scan of the left arm does reveal a fluid density that was cultured by vascular surgery  status post incision and drainage on 3/25/2024  Crohn's disease s/p bowel resection with ileostomy  Cirrhosis with chronic leukopenia and thrombocytopenia  Atrial fibrillation  Chronic hypotension of ESRD:  on midodrine  Hyponatremia secondary to volume overload will correct with hemodialysis  Thrombocytopenia chronic         PLAN:  Will continue dialysis on Monday Wednesday Friday with plan for dialysis tomorrow   Continue to  maintain for 6 weeks  We will arrange for cefazolin 2 g after  on dialysis for 6 weeks  Labs in a.m.      Thank you for involving us in the care of Bill Landry.  Please feel free to call with any questions.    Myra Moore MD  03/28/25  07:30 EDT    [facility]  [phone]    Parts of this note may be an electronic transcription/translation of spoken language to printed text using the Dragon dictation system.

## 2025-03-29 LAB
DEPRECATED RDW RBC AUTO: 52.3 FL (ref 37–54)
ERYTHROCYTE [DISTWIDTH] IN BLOOD BY AUTOMATED COUNT: 14.3 % (ref 12.3–15.4)
HCT VFR BLD AUTO: 26.3 % (ref 37.5–51)
HCT VFR BLD AUTO: 26.3 % (ref 37.5–51)
HGB BLD-MCNC: 8.4 G/DL (ref 13–17.7)
HGB BLD-MCNC: 8.4 G/DL (ref 13–17.7)
INR PPP: 1.5 (ref 0.9–1.1)
MCH RBC QN AUTO: 33.3 PG (ref 26.6–33)
MCHC RBC AUTO-ENTMCNC: 33.2 G/DL (ref 31.5–35.7)
MCV RBC AUTO: 100.4 FL (ref 79–97)
PLATELET # BLD AUTO: 95 10*3/MM3 (ref 140–450)
PMV BLD AUTO: 8.7 FL (ref 6–12)
PROTHROMBIN TIME: 18.1 SECONDS (ref 11.7–14.2)
RBC # BLD AUTO: 2.55 10*6/MM3 (ref 4.14–5.8)
WBC NRBC COR # BLD AUTO: 4.35 10*3/MM3 (ref 3.4–10.8)

## 2025-03-29 PROCEDURE — 85014 HEMATOCRIT: CPT | Performed by: STUDENT IN AN ORGANIZED HEALTH CARE EDUCATION/TRAINING PROGRAM

## 2025-03-29 PROCEDURE — 85610 PROTHROMBIN TIME: CPT | Performed by: SURGERY

## 2025-03-29 PROCEDURE — 25010000002 CEFAZOLIN PER 500 MG: Performed by: STUDENT IN AN ORGANIZED HEALTH CARE EDUCATION/TRAINING PROGRAM

## 2025-03-29 PROCEDURE — 85027 COMPLETE CBC AUTOMATED: CPT | Performed by: SURGERY

## 2025-03-29 PROCEDURE — 85018 HEMOGLOBIN: CPT | Performed by: STUDENT IN AN ORGANIZED HEALTH CARE EDUCATION/TRAINING PROGRAM

## 2025-03-29 PROCEDURE — 25010000002 DESMOPRESSIN ACETATE PF 4 MCG/ML SOLUTION 1 ML AMPULE: Performed by: SURGERY

## 2025-03-29 RX ADMIN — MIDODRINE HYDROCHLORIDE 2.5 MG: 2.5 TABLET ORAL at 08:28

## 2025-03-29 RX ADMIN — SEVELAMER CARBONATE 800 MG: 800 TABLET, FILM COATED ORAL at 12:49

## 2025-03-29 RX ADMIN — Medication 10 ML: at 21:14

## 2025-03-29 RX ADMIN — CETIRIZINE HYDROCHLORIDE 10 MG: 10 TABLET ORAL at 08:28

## 2025-03-29 RX ADMIN — Medication 1 TABLET: at 08:28

## 2025-03-29 RX ADMIN — CEFAZOLIN 1000 MG: 1 INJECTION, POWDER, FOR SOLUTION INTRAMUSCULAR; INTRAVENOUS at 10:50

## 2025-03-29 RX ADMIN — Medication 10 ML: at 08:28

## 2025-03-29 RX ADMIN — SEVELAMER CARBONATE 800 MG: 800 TABLET, FILM COATED ORAL at 08:28

## 2025-03-29 RX ADMIN — TAMSULOSIN HYDROCHLORIDE 0.4 MG: 0.4 CAPSULE ORAL at 20:07

## 2025-03-29 RX ADMIN — DESMOPRESSIN ACETATE 25 MCG: 4 SOLUTION INTRAVENOUS at 21:13

## 2025-03-29 RX ADMIN — Medication 1000 UNITS: at 08:28

## 2025-03-29 RX ADMIN — LATANOPROST 1 DROP: 50 SOLUTION/ DROPS OPHTHALMIC at 20:07

## 2025-03-29 RX ADMIN — MIDODRINE HYDROCHLORIDE 2.5 MG: 2.5 TABLET ORAL at 12:49

## 2025-03-29 NOTE — DISCHARGE SUMMARY
Patient Name: Bill Landry  : 1945  MRN: [id number]    Date of Admission: 3/23/2025  Date of Discharge:  3/29/2025  Primary Care Physician: Bharathi De La Torre MD      Chief Complaint:   Vascular Access Problem      Discharge Diagnoses     Active Hospital Problems    Diagnosis  POA    **Sepsis due to cellulitis [L03.90, A41.9]  Yes    ESRD on dialysis [N18.6, Z99.2]  Not Applicable    Problem with vascular access [Z78.9]  Yes    Anemia due to stage 4 chronic kidney disease treated with erythropoietin [N18.4, D63.1]  Yes    Cirrhosis of liver without ascites [K74.60]  Yes    Permanent atrial fibrillation [I48.21]  Yes      Resolved Hospital Problems   No resolved problems to display.        Hospital Course     Mr. Landry is a 79 y.o. male with a history ofESRD on HD MWF, chron's disease s/p previous bowel resection and end ileostomy, DM2, Afib, cirrhosis who presented to [facility] initially complaining of vascular access issues.  Please see the admitting history and physical for further details.  He was found to have arm cellulitis and was admitted to the hospital for further evaluation and treatment.  He was admitted to the hospital and started on antibiotics.  Infectious disease evaluated and assisted with management and made recommendations for duration of antibiotic therapy.  He was felt to have a vascular graft infection and was followed and evaluated by vascular surgery.  On  he was taken to the operating room with a right-sided tunneled dialysis catheter placed.  He also had a sharp excisional left axillary wound exploration irrigation and debridement.  He tolerated those procedures well but was complicated by a lot of oozing post procedurally.  He required a few stitches and infusion and DDAVP and received platelets and ultimately the bleeding has stopped.  His hemoglobin remained stable.  The plan at this time is for him to continue IV antibiotics as well as local wound care  of the left upper extremity.  He will continue dialysis through his right sided tunneled dialysis catheter.  He received dialysis today and tolerated that well.  He has been up and ambulating with standby assist following his dialysis.  Wound care and home health have been arranged in the outpatient setting as well as follow-up with subspecialty providers       Physical Exam:  Temp:  [97 °F (36.1 °C)-98.5 °F (36.9 °C)] 97 °F (36.1 °C)  Heart Rate:  [61-79] 68  Resp:  [16-18] 18  BP: (115-126)/(56-63) 115/57  Body mass index is 26.26 kg/m².  Physical Exam  Constitutional:       General: He is not in acute distress.  HENT:      Head: Normocephalic and atraumatic.   Cardiovascular:      Rate and Rhythm: Normal rate and regular rhythm.   Pulmonary:      Effort: Pulmonary effort is normal. No respiratory distress.   Abdominal:      General: There is no distension.      Palpations: Abdomen is soft.      Tenderness: There is no abdominal tenderness.   Neurological:      Mental Status: He is alert and oriented to person, place, and time.         Consultants     Consult Orders (all) (From admission, onward)       Start     Ordered    03/24/25 0812  Inpatient Infectious Diseases Consult  Once        Specialty:  Infectious Diseases  Provider:  Ney Vivas MD    03/24/25 0811    03/23/25 1705  [facility] (on-call MD unless specified) Details  Once        Specialty:  Hospitalist  Provider:  (Not yet assigned)    03/23/25 1705    03/23/25 1705  Nephrology (on -call MD unless specified)  Once        Specialty:  Nephrology  Provider:  Myra Moore MD    03/23/25 1705    03/23/25 1507  Inpatient Vascular Surgery Consult  Once        Specialty:  Vascular Surgery  Provider:  Bill Deal MD    03/23/25 1507                  Procedures     Imaging Results (All)       Procedure Component Value Units Date/Time    XR Chest 1 View [[id number]] Collected: 03/25/25 1229     Updated: 03/25/25 1234    Narrative:      XR CHEST  1 VW-        INDICATION: Dialysis catheter placement     COMPARISON: Chest radiograph March 23, 2025     TECHNIQUE: 1 view     FINDINGS:      Vascular congestion. Ill-defined perihilar and basilar opacities. Stable  mediastinum. Elevated right hemidiaphragm. Cholecystectomy clips. Left  axillary stents. Right IJ catheter with catheter tip in the distal SVC.       Impression:         1. Interval placement of right IJ catheter with the catheter tip in the  distal SVC. No pneumothorax.  2. Vascular congestion and possible edema.  3. Elevated right hemidiaphragm with suspected atelectasis at the right  lung base     This report was finalized on 3/25/2025 12:31 PM by Dr. Bao Drake M.D on Workstation: [id number]       Arteriogram (Autofinalize) [[id number]] Resulted: 03/25/25 1132     Updated: 03/25/25 1132    Narrative:      This procedure was auto-finalized with no dictation required.    CT Upper Extremity Left With Contrast [[id number]] Collected: 03/23/25 1754     Updated: 03/23/25 1819    Narrative:      CT UPPER EXTREMITY LEFT W CONTRAST-     HISTORY: Clinical concern for infected left upper extremity fistula.     TECHNIQUE: Radiation dose reduction techniques were utilized, including  automated exposure control and exposure modulation based on body size. 2  mm images were obtained through the left upper arm after the  administration of IV contrast.     COMPARISON: CT chest 2/21/2015        FINDINGS: Patent visualized left axillary and brachial arteries.  Proximalmost left axillary artery is outside the exam field-of-view. 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 Proximal  most portions of the fistulas are outside the exam field-of-view. One is  thrombosed with a partially visualized stent proximally (series 3/image  47). This fistula appears ligated distally and aneurysmal distally  measuring up to 4 cm (series 6/image 17). The second fistula extends  from the left axilla to the left elbow and  is patent where seen. The  proximal most portion of the fistula is outside the exam field-of-view.  A 3.6 x 2.3 cm focus of subcutaneous fluid density in the inner mid left  upper arm extends from the thickened skin surface to abut the fistula  (series 3/image 93). No enhancing wall or internal locules of air. No  soft tissue air/gas. Normal mostly visualized left humerus. Advanced  left acromioclavicular osteoarthritis. Nonaggressive appearing sharply  demarcated left scapular lytic focus interposed between the left glenoid  and coracoid process (series 2/image 28). This was present in 2015 and  similarly sized, although less well-defined. 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   Proximal most portions of the fistulas are outside the exam  field-of-view. A 3.6 x 2.3 cm focus of subcutaneous fluid density in the  inner mid left upper arm extends from the thickened skin surface to abut  the patent fistula. No enhancing wall or internal locules of air.  Recommend clinical correlation for findings to suggest infection at this  site. In the absence of recent surgery, fluid collection raises concern  for infection. The other thrombosed fistula is stented proximally and  appears ligated distally. The distal aspect of this fistula is  aneurysmal measuring up to 4 cm.     This report was finalized on 3/23/2025 6:16 PM by Dr. Bill Minaya M.D on Workstation: [id number]       XR Chest 1 View [[id number]] Collected: 03/23/25 1744     Updated: 03/23/25 1749    Narrative:      CXR ONE VIEW      HISTORY: Orthopnea; L03.114-Cellulitis of left upper limb;  L02.414-Cutaneous abscess of left upper limb; T82.9XXA-Unspecified  complication of cardiac and vascular prosthetic device, implant and  graft, initial encounter; N18.6-End stage renal disease;  Z99.2-Dependence on renal dialysis; A41.9-Sepsis, unspecified organism;  E87.3-Abks-tfypcaagyp  "and hyponatremia     COMPARISON: Chest x-ray 1/30/2025     TECHNIQUE: single portable AP       Impression:         Redemonstrated mild cardiomegaly and marked elevation of the right  hemidiaphragm.     Mild bilateral central vascular congestion and interstitial prominence,  no pneumothorax.     Question small right effusion.     This report was finalized on 3/23/2025 5:45 PM by Dr. Tab Tolbert M.D on Workstation: [id number]               Pertinent Labs     Results from last 7 days   Lab Units 03/29/25  1010 03/27/25  0413 03/26/25  0429 03/25/25  0401 03/24/25 0355   WBC 10*3/mm3  --  4.80 5.09 6.38 4.42   HEMOGLOBIN g/dL 8.4* 8.7* 8.6* 9.5* 9.2*   PLATELETS 10*3/mm3  --  73* 83* 86* 76*     Results from last 7 days   Lab Units 03/27/25  0412 03/26/25  0756 03/25/25  0401 03/24/25 0355 03/23/25  1527   SODIUM mmol/L 133* 123* 130* 131* 129*   POTASSIUM mmol/L 4.3  --  4.4 4.1 4.3   CHLORIDE mmol/L 100  --  99 95* 93*   CO2 mmol/L 23.0  --  22.7 21.5* 22.2   BUN mg/dL 24*  --  29* 49* 46*   CREATININE mg/dL 4.19*  --  4.75* 6.28* 6.03*   GLUCOSE mg/dL 122*  --  129* 107* 171*   Estimated Creatinine Clearance: 16.8 mL/min (A) (by C-G formula based on SCr of 4.19 mg/dL (H)).  Results from last 7 days   Lab Units 03/27/25  0412 03/25/25  0401 03/24/25 0355 03/23/25  1527   ALBUMIN g/dL 2.6* 2.3* 2.4* 2.6*   BILIRUBIN mg/dL  --   --   --  1.2   ALK PHOS U/L  --   --   --  283*   AST (SGOT) U/L  --   --   --  43*   ALT (SGPT) U/L  --   --   --  18     Results from last 7 days   Lab Units 03/27/25  0412 03/25/25  0401 03/24/25  0355 03/23/25  1527   CALCIUM mg/dL 7.9* 8.1* 7.8* 7.8*   ALBUMIN g/dL 2.6* 2.3* 2.4* 2.6*   MAGNESIUM mg/dL  --   --  2.1  --    PHOSPHORUS mg/dL 3.8 4.0 5.2*  --                Invalid input(s): \"LDLCALC\"  Results from last 7 days   Lab Units 03/24/25  0805 03/23/25  1549 03/23/25  1508   BLOODCX   --  No growth at 5 days  No growth at 5 days  --    WOUNDCX  Light growth (2+) " Staphylococcus aureus*  --  Light growth (2+) Staphylococcus aureus*       Test Results Pending at Discharge       Discharge Details        Discharge Medications        PAUSE taking these medications        Instructions Start Date   aspirin 81 MG tablet  Wait to take this until: April 1, 2025   1 tablet, Nightly             New Medications        Instructions Start Date   ceFAZolin 1000 mg IVPB in 100 mL NS (MBP)   1,000 mg, Intravenous, Every 24 Hours             Continue These Medications        Instructions Start Date   acetaminophen 325 MG tablet  Commonly known as: TYLENOL   650 mg, Oral, Every 4 Hours PRN      alfuzosin 10 MG 24 hr tablet  Commonly known as: UROXATRAL   1 tablet, Every Other Day      cholecalciferol 25 MCG (1000 UT) tablet  Commonly known as: VITAMIN D3   1 tablet, Daily      latanoprost 0.005 % ophthalmic solution  Commonly known as: XALATAN   1 drop, Nightly      lidocaine-prilocaine 2.5-2.5 % cream  Commonly known as: EMLA   1 Application, As Needed      Loratadine 10 MG capsule   1 capsule, Daily      midodrine 2.5 MG tablet  Commonly known as: PROAMATINE   2.5 mg, Oral, 3 Times Weekly, Monday, Wednesday, Friday on days of hemodialysis      MIRCERA IJ   1 dose, Every 30 Days      Pepcid AC 10 MG tablet  Generic drug: famotidine   1 tablet, Daily PRN      polyethylene glycol 17 g packet  Commonly known as: MIRALAX   17 g, Oral, Daily PRN      Umm-Peter tablet   1 tablet, Daily      sennosides-docusate 8.6-50 MG per tablet  Commonly known as: PERICOLACE   2 tablets, Oral, 2 Times Daily PRN      sevelamer 800 MG tablet  Commonly known as: RENAGEL   800 mg, 3 Times Daily With Meals      sodium chloride 0.65 % nasal spray   2 sprays, Nightly      VENOFER IV   1 dose, As Needed             Stop These Medications      mupirocin 2 % ointment  Commonly known as: BACTROBAN     Velphoro 500 MG chewable tablet  Generic drug: Sucroferric Oxyhydroxide              Allergies   Allergen Reactions    Ace  "Inhibitors Other (See Comments)     RENAL FAILURE/ raised creatinine    Contrast Dye (Echo Or Unknown Ct/Mr) Other (See Comments) and Anaphylaxis     RENAL FAILURE    Iodine Anaphylaxis    Angiotensin Receptor Blockers Swelling    Eliquis [Apixaban] Arrhythmia     BLEEDING ISSUES; PT CANNOT TAKE ANY ANTICOAGULANTS DUE TO LOW PLATELETS EXCEPT ASPIRIN      Filgrastim GI Intolerance and Confusion     Chest pain    Heparin Other (See Comments)     \"It causes me to bleed out\"    Keflex [Cephalexin] Diarrhea     RENAL FAILURE    Other Angioedema     IVP Dye         Discharge Disposition:  Home or Self Care    Discharge Diet:  Diet Order   Procedures    Diet: Cardiac, Diabetic, Renal; Healthy Heart (2-3 Na+); Consistent Carbohydrate; Low Sodium (2-3g), Low Potassium, Low Phosphorus; Fluid Consistency: Thin (IDDSI 0)       Discharge Activity:   Activity Instructions       Activity as Tolerated              CODE STATUS:    Code Status and Medical Interventions: CPR (Attempt to Resuscitate); Full Support   Ordered at: 03/25/25 5496     Code Status (Patient has no pulse and is not breathing):    CPR (Attempt to Resuscitate)     Medical Interventions (Patient has pulse or is breathing):    Full Support     Level Of Support Discussed With:    Patient       Future Appointments   Date Time Provider Department Center   4/8/2025  1:10 PM LAB CHAIR 5 [facility]  [facility]   4/8/2025  1:40 PM Theo Pacheco MD [facility] CBC [facility]   4/17/2025 11:15 AM Bill Deal MD [facility] VS ALEX ALEX   5/22/2025  2:15 PM ALEX OP VAS RM 3 BH ALEX OVKR ALEX   5/22/2025  3:00 PM Rosanna Saul APRN [facility] VS ALEX ALEX   9/4/2025  1:15 PM ALEX LCG ECHO/VAS RM 1 BH LCG ECHO ALEX   9/4/2025  1:45 PM Dale Vázquez MD [facility] CD [id number] ALEX     Additional Instructions for the Follow-ups that You Need to Schedule       Discharge Follow-up with PCP   As directed       Currently Documented PCP:    Bharathi De La Torre MD    PCP Phone Number:    [phone]     " Follow Up Details: 1-2 weeks        Discharge Follow-up with Specified Provider: Dr Deal as directed   As directed      To: Dr Deal as directed        Discharge Follow-up with Specified Provider: shaunnaology as directed   As directed      To: carmellarhology as directed               Contact information for follow-up providers       Bharathi De La Torre MD .    Specialty: Internal Medicine  Why: 1-2 weeks  Contact information:  [address]  [address]  [facility] [address]  [phone]               Myra Moore MD. Call.    Specialty: Nephrology  Why: Kidney doctor:  please call their office to ask when they want to see you for an appointment  Contact information:  [address]  [address]  [facility] [address]  [phone]               Bill Deal MD Follow up in 1 month(s).    Specialty: Vascular Surgery  Contact information:  [address]  [address]  [facility] [address]  [phone]                       Contact information for after-discharge care       Dialysis/Infusion       [facility] .    Service: In-Center Hemodialysis  Contact information:  [address] [facility] [facility]  [address]  [facility] [address]  [phone]                     [facility]       [facility] .    Services: Home Health Services, Home Rehabilitation, Home Nursing  Contact information:  [address] [place] [phone]                                   Additional Instructions for the Follow-ups that You Need to Schedule       Discharge Follow-up with PCP   As directed       Currently Documented PCP:    Bharathi De La Torre MD    PCP Phone Number:    [phone]     Follow Up Details: 1-2 weeks        Discharge Follow-up with Specified Provider: Dr Deal as directed   As directed      To: Dr Deal as directed        Discharge Follow-up with Specified Provider: monse as directed   As directed      To: shaunnaology as directed            Time Spent on Discharge:  Greater  than 30 minutes      Shaquille Cosby MD  [facility]  03/29/25  14:06 EDT

## 2025-03-29 NOTE — PROGRESS NOTES
Name: Bill Landry ADMIT: 3/23/2025   : 1945  PCP: Bharathi De La Torre MD    MRN: [id number] LOS: 6 days   AGE/SEX: 79 y.o. male  ROOM:  [place] [address]/1     CC: Site bleeding  Interval History: Patient followed by Dr. Bill Deal who placed a suture at this site yesterday  Subjective   Subjective     Review of Systems  Objective   Objective     Vitals:   Temp:  [97 °F (36.1 °C)-98.5 °F (36.9 °C)] 97 °F (36.1 °C)  Heart Rate:  [61-75] 68  Resp:  [16-18] 18  BP: (115-126)/(56-63) 115/57    I/O this shift:  In: -   Out: 250 [Stool:250]    Scheduled Meds:     b complex-vitamin c-folic acid, 1 tablet, Oral, Daily  ceFAZolin, 1,000 mg, Intravenous, Q24H  cetirizine, 10 mg, Oral, Daily  cholecalciferol, 1,000 Units, Oral, Daily  famotidine, 20 mg, Oral, Daily  latanoprost, 1 drop, Both Eyes, Nightly  midodrine, 2.5 mg, Oral, TID AC  sevelamer, 800 mg, Oral, TID With Meals  silver nitrate, 1 Application, Topical, Once  sodium chloride, 10 mL, Intravenous, Q12H  sodium chloride, 10 mL, Intravenous, Q12H  tamsulosin, 0.4 mg, Oral, Nightly      IV Meds:        Physical Exam  Vascular: Oozing from the catheter noted.  Improved per nursing and patient.    Data Reviewed:  CBC    Results from last 7 days   Lab Units 25  1010 25  0413 25  0429 25  0401 25  0355 25  1527   WBC 10*3/mm3  --  4.80 5.09 6.38 4.42 4.21   HEMOGLOBIN g/dL 8.4* 8.7* 8.6* 9.5* 9.2* 9.6*   PLATELETS 10*3/mm3  --  73* 83* 86* 76* 59*     BMP   Results from last 7 days   Lab Units 25  0412 25  0756 25  0401 25  0355 25  1527   SODIUM mmol/L 133* 123* 130* 131* 129*   POTASSIUM mmol/L 4.3  --  4.4 4.1 4.3   CHLORIDE mmol/L 100  --  99 95* 93*   CO2 mmol/L 23.0  --  22.7 21.5* 22.2   BUN mg/dL 24*  --  29* 49* 46*   CREATININE mg/dL 4.19*  --  4.75* 6.28* 6.03*   GLUCOSE mg/dL 122*  --  129* 107* 171*   MAGNESIUM mg/dL  --   --   --  2.1  --    PHOSPHORUS mg/dL 3.8  --  4.0 5.2*  --       Coag   Results from last 7 days   Lab Units 03/23/25  1527   INR  1.36*   APTT seconds 26.7       Most notable findings include: Hemoglobin 8.4.  Platelet count of 73 on the 27th.  Not repeated today.  No coags since 3/23/2025.    Active Hospital Problems:   Active Hospital Problems    Diagnosis  POA    **Sepsis due to cellulitis [L03.90, A41.9]  Yes    ESRD on dialysis [N18.6, Z99.2]  Not Applicable    Problem with vascular access [Z78.9]  Yes    Anemia due to stage 4 chronic kidney disease treated with erythropoietin [N18.4, D63.1]  Yes    Cirrhosis of liver without ascites [K74.60]  Yes    Permanent atrial fibrillation [I48.21]  Yes      Resolved Hospital Problems   No resolved problems to display.      Assessment & Plan     Assessment / Plan     Thrombocytopenia: Will repeat CBC to look at platelet counts, coags, give a dose of DDAVP, and apply a thrombin pad.    Addendum @5:33 PM: Platelet count actually little better.  INR a little worse at 1.5.   CBC    Results from last 7 days   Lab Units 03/29/25  1010 03/27/25  0413 03/26/25  0429 03/25/25  0401 03/24/25  0355 03/23/25  1527   WBC 10*3/mm3 4.35 4.80 5.09 6.38 4.42 4.21   HEMOGLOBIN g/dL 8.4*  8.4* 8.7* 8.6* 9.5* 9.2* 9.6*   PLATELETS 10*3/mm3 95* 73* 83* 86* 76* 59*     Coag   Results from last 7 days   Lab Units 03/29/25  1648 03/23/25  1527   INR  1.50* 1.36*   APTT seconds  --  26.7        Ben Barth MD  03/29/25  16:07 EDT  Available via Secure Chat during the day for non-urgent issues.  After hours and for urgent issues please call the office at [phone]

## 2025-03-29 NOTE — PROGRESS NOTES
[facility] Progress Note      Patient Name: Bill Landry  : 1945  MRN: [id number]  Primary Care Physician:  Bharathi De La Torre MD  Date of admission: 3/23/2025    Subjective     Interval History:   ESRD on HD MWF  Patient dialyzed yesterday and his dialysis uneventful.  Bleeding noted from tunneled dialysis catheter exit site that resolved today    Review of Systems:   As noted above    Objective     Vitals:   Temp:  [97 °F (36.1 °C)-98.5 °F (36.9 °C)] 97 °F (36.1 °C)  Heart Rate:  [61-79] 68  Resp:  [16-18] 18  BP: (115-126)/(49-63) 115/57  Flow (L/min) (Oxygen Therapy):  [2] 2    Intake/Output Summary (Last 24 hours) at 3/29/2025 1207  Last data filed at 3/29/2025 1047  Gross per 24 hour   Intake 240 ml   Output 750 ml   Net -510 ml       Physical Exam:    General Appearance: alert, oriented x 3, no acute distress   Skin: warm and dry  HEENT: oral mucosa normal, nonicteric sclera  Neck: supple, no JVD  Lungs: CTA  Heart: RRR, normal S1 and S2  Abdomen: soft, nontender, nondistended  : no palpable bladder  Extremities: Bilateral lower extremity edema  Neuro: normal speech and mental status   Left upper extremity AV graft.  Left upper extremity wound covered  Bruising to right chest around TDC, small amount of blood clotted around uterus MSK  Scheduled Meds:     b complex-vitamin c-folic acid, 1 tablet, Oral, Daily  ceFAZolin, 1,000 mg, Intravenous, Q24H  cetirizine, 10 mg, Oral, Daily  cholecalciferol, 1,000 Units, Oral, Daily  famotidine, 20 mg, Oral, Daily  latanoprost, 1 drop, Both Eyes, Nightly  midodrine, 2.5 mg, Oral, TID AC  sevelamer, 800 mg, Oral, TID With Meals  silver nitrate, 1 Application, Topical, Once  sodium chloride, 10 mL, Intravenous, Q12H  sodium chloride, 10 mL, Intravenous, Q12H  tamsulosin, 0.4 mg, Oral, Nightly      IV Meds:          Results Reviewed:   I have personally reviewed the results from the time of this admission to 3/29/2025 12:07 EDT      Results from last 7 days   Lab Units 03/27/25  0412 03/26/25  0756 03/25/25  0401 03/24/25  0355 03/23/25  1527   SODIUM mmol/L 133* 123* 130* 131* 129*   POTASSIUM mmol/L 4.3  --  4.4 4.1 4.3   CHLORIDE mmol/L 100  --  99 95* 93*   CO2 mmol/L 23.0  --  22.7 21.5* 22.2   BUN mg/dL 24*  --  29* 49* 46*   CREATININE mg/dL 4.19*  --  4.75* 6.28* 6.03*   CALCIUM mg/dL 7.9*  --  8.1* 7.8* 7.8*   BILIRUBIN mg/dL  --   --   --   --  1.2   ALK PHOS U/L  --   --   --   --  283*   ALT (SGPT) U/L  --   --   --   --  18   AST (SGOT) U/L  --   --   --   --  43*   GLUCOSE mg/dL 122*  --  129* 107* 171*       Estimated Creatinine Clearance: 16.8 mL/min (A) (by C-G formula based on SCr of 4.19 mg/dL (H)).    Results from last 7 days   Lab Units 03/27/25  0412 03/25/25  0401 03/24/25  0355   MAGNESIUM mg/dL  --   --  2.1   PHOSPHORUS mg/dL 3.8 4.0 5.2*             Results from last 7 days   Lab Units 03/29/25  1010 03/27/25  0413 03/26/25  0429 03/25/25  0401 03/24/25  0355 03/23/25  1527   WBC 10*3/mm3  --  4.80 5.09 6.38 4.42 4.21   HEMOGLOBIN g/dL 8.4* 8.7* 8.6* 9.5* 9.2* 9.6*   PLATELETS 10*3/mm3  --  73* 83* 86* 76* 59*       Results from last 7 days   Lab Units 03/23/25  1527   INR  1.36*       Assessment / Plan     ASSESSMENT:  .  ESRD: Hypervolemic on exam today   Left arm AV graft with recent bleeding and later drainage from incision site (had had recent shuntogram).  Elevated lactate and procalcitonin; normal white blood cell count (though chronic leukopenia).  CT scan of the left arm does reveal a fluid density that was cultured by vascular surgery  status post incision and drainage on 3/25/2024  Crohn's disease s/p bowel resection with ileostomy  Cirrhosis with chronic leukopenia and thrombocytopenia  Atrial fibrillation  Chronic hypotension of ESRD:  on midodrine  Hyponatremia secondary to volume overload will correct with hemodialysis  Thrombocytopenia chronic         PLAN:  Will continue dialysis on Monday  Wednesday and Friday with plan for dialysis on Monday if staying in the hospital  Antibiotic has been arranged with outpatient dialysis unit for a total of 6-week  Okay to discharge from nephrology standpoint if cleared by vascular surgery    Thank you for involving us in the care of Bill Landry.  Please feel free to call with any questions.    Myra Moore MD  03/29/25  12:07 EDT    [facility]  [phone]    Parts of this note may be an electronic transcription/translation of spoken language to printed text using the Dragon dictation system.

## 2025-03-29 NOTE — PLAN OF CARE
Problem: Adult Inpatient Plan of Care  Goal: Plan of Care Review  Recent Flowsheet Documentation  Taken 3/29/2025 0240 by Alirio Durham, RN  Progress: no change  Outcome Evaluation: Vitals stable, pt got dialysis yesterday and tolerated fine, bleeding over tunnel cath stopped, dressing changed this am, no active bleeding, also HOB > 30 degree, dressing over fistula changed Q shift per order, no drainage, ileostomy with good output, pt is alert & oriented but forgetful, wife stay at bedside, pt was on RA when awake and attempted to try CPAP when sleeping but did not tolerate, placed on 2L NC, possible discharge today, plan of care on going.  Plan of Care Reviewed With: patient

## 2025-03-29 NOTE — NURSING NOTE
Vascular MD paged for bleeding dressing around 1400. Md advised D-stat dressing change. Dressing changed around 1520 with Umm POOL RN.     1600 Vascular MD paged for bleeding at HD site again with D-stat dressing. MD advised to monitor and he will place order for labs and other possible medication to help bleeding stop. Advised pt and family to notify RN if bleeding comes close to dressing edge. CTM

## 2025-03-30 LAB
ALBUMIN SERPL-MCNC: 2.6 G/DL (ref 3.5–5.2)
ALBUMIN/GLOB SERPL: 0.6 G/DL
ALP SERPL-CCNC: 251 U/L (ref 39–117)
ALT SERPL W P-5'-P-CCNC: <5 U/L (ref 1–41)
ANION GAP SERPL CALCULATED.3IONS-SCNC: 12 MMOL/L (ref 5–15)
AST SERPL-CCNC: 36 U/L (ref 1–40)
BILIRUB SERPL-MCNC: 0.6 MG/DL (ref 0–1.2)
BUN SERPL-MCNC: 47 MG/DL (ref 8–23)
BUN/CREAT SERPL: 7.5 (ref 7–25)
CALCIUM SPEC-SCNC: 8.1 MG/DL (ref 8.6–10.5)
CHLORIDE SERPL-SCNC: 98 MMOL/L (ref 98–107)
CO2 SERPL-SCNC: 19 MMOL/L (ref 22–29)
CREAT SERPL-MCNC: 6.25 MG/DL (ref 0.76–1.27)
DEPRECATED RDW RBC AUTO: 50.5 FL (ref 37–54)
EGFRCR SERPLBLD CKD-EPI 2021: 8.5 ML/MIN/1.73
ERYTHROCYTE [DISTWIDTH] IN BLOOD BY AUTOMATED COUNT: 14.4 % (ref 12.3–15.4)
GLOBULIN UR ELPH-MCNC: 4.4 GM/DL
GLUCOSE SERPL-MCNC: 115 MG/DL (ref 65–99)
HCT VFR BLD AUTO: 23.9 % (ref 37.5–51)
HGB BLD-MCNC: 8.1 G/DL (ref 13–17.7)
MCH RBC QN AUTO: 32.9 PG (ref 26.6–33)
MCHC RBC AUTO-ENTMCNC: 33.9 G/DL (ref 31.5–35.7)
MCV RBC AUTO: 97.2 FL (ref 79–97)
PLATELET # BLD AUTO: 76 10*3/MM3 (ref 140–450)
PMV BLD AUTO: 8.7 FL (ref 6–12)
POTASSIUM SERPL-SCNC: 5.1 MMOL/L (ref 3.5–5.2)
PROT SERPL-MCNC: 7 G/DL (ref 6–8.5)
RBC # BLD AUTO: 2.46 10*6/MM3 (ref 4.14–5.8)
SODIUM SERPL-SCNC: 129 MMOL/L (ref 136–145)
WBC NRBC COR # BLD AUTO: 4.75 10*3/MM3 (ref 3.4–10.8)

## 2025-03-30 PROCEDURE — 86927 PLASMA FRESH FROZEN: CPT

## 2025-03-30 PROCEDURE — 85027 COMPLETE CBC AUTOMATED: CPT | Performed by: NURSE PRACTITIONER

## 2025-03-30 PROCEDURE — 36430 TRANSFUSION BLD/BLD COMPNT: CPT

## 2025-03-30 PROCEDURE — 63710000001 ONDANSETRON ODT 4 MG TABLET DISPERSIBLE: Performed by: SURGERY

## 2025-03-30 PROCEDURE — 86985 SPLIT BLOOD OR PRODUCTS: CPT

## 2025-03-30 PROCEDURE — 25010000002 CEFAZOLIN PER 500 MG: Performed by: STUDENT IN AN ORGANIZED HEALTH CARE EDUCATION/TRAINING PROGRAM

## 2025-03-30 PROCEDURE — P9059 PLASMA, FRZ BETWEEN 8-24HOUR: HCPCS

## 2025-03-30 PROCEDURE — 80053 COMPREHEN METABOLIC PANEL: CPT | Performed by: NURSE PRACTITIONER

## 2025-03-30 RX ADMIN — Medication 10 ML: at 08:21

## 2025-03-30 RX ADMIN — SEVELAMER CARBONATE 800 MG: 800 TABLET, FILM COATED ORAL at 13:04

## 2025-03-30 RX ADMIN — LATANOPROST 1 DROP: 50 SOLUTION/ DROPS OPHTHALMIC at 20:26

## 2025-03-30 RX ADMIN — CETIRIZINE HYDROCHLORIDE 10 MG: 10 TABLET ORAL at 08:21

## 2025-03-30 RX ADMIN — SEVELAMER CARBONATE 800 MG: 800 TABLET, FILM COATED ORAL at 08:21

## 2025-03-30 RX ADMIN — Medication 1000 UNITS: at 08:21

## 2025-03-30 RX ADMIN — TAMSULOSIN HYDROCHLORIDE 0.4 MG: 0.4 CAPSULE ORAL at 20:18

## 2025-03-30 RX ADMIN — SEVELAMER CARBONATE 800 MG: 800 TABLET, FILM COATED ORAL at 17:33

## 2025-03-30 RX ADMIN — Medication 10 ML: at 20:20

## 2025-03-30 RX ADMIN — Medication 10 ML: at 20:19

## 2025-03-30 RX ADMIN — ONDANSETRON 4 MG: 4 TABLET, ORALLY DISINTEGRATING ORAL at 10:55

## 2025-03-30 RX ADMIN — Medication 1 TABLET: at 08:20

## 2025-03-30 RX ADMIN — CEFAZOLIN 1000 MG: 1 INJECTION, POWDER, FOR SOLUTION INTRAMUSCULAR; INTRAVENOUS at 08:22

## 2025-03-30 RX ADMIN — FAMOTIDINE 20 MG: 20 TABLET, FILM COATED ORAL at 08:21

## 2025-03-30 NOTE — SIGNIFICANT NOTE
Ultrasound Inserted IV Site: RFA    Catheter Length: 1.75 inch    Diameter: 0.3 cm    Depth: 0.5 cm      Vascular Access Score= 5  1) Palpable / Visible / Dis  2) Palpable / Viasible / Not Distended  3) Easily Palpable / Not Visibile  4) Poorly Palpable / Visible  5) Poorly / Nonpalpable / NV

## 2025-03-30 NOTE — PLAN OF CARE
Problem: Adult Inpatient Plan of Care  Goal: Plan of Care Review  Recent Flowsheet Documentation  Taken 3/30/2025 0414 by Alirio Durham, RN  Progress: no change  Outcome Evaluation: Vitals stale, tunnel cath dressing changed due to blood oozing over site, HOB> 30, DDAVP given once per order d/t blood oozing at tunnel cath site, dressing over fistula changed Q shift per order, no drainage, c/d/i, ileostomy in place, pt is alert & oriented but forgetful, wife stayed at bedside, pt placed on 2L o2 when sleeping, plan of care on going.  Plan of Care Reviewed With: patient

## 2025-03-30 NOTE — PROGRESS NOTES
Name: Bill Landry ADMIT: 3/23/2025   : 1945  PCP: Bharathi De La Torre MD    MRN: [id number] LOS: 7 days   AGE/SEX: 79 y.o. male  ROOM:  [facility] E4/1     CC: Catheter site oozing  Interval History: Dressing change twice last night after I changed it in the evening.  Subjective   Subjective     Review of Systems  Objective   Objective     Vitals:   Temp:  [97.7 °F (36.5 °C)-97.9 °F (36.6 °C)] 97.7 °F (36.5 °C)  Heart Rate:  [69-84] 84  Resp:  [18-22] 18  BP: (121-144)/(62-71) 121/62    No intake/output data recorded.    Scheduled Meds:     b complex-vitamin c-folic acid, 1 tablet, Oral, Daily  ceFAZolin, 1,000 mg, Intravenous, Q24H  cetirizine, 10 mg, Oral, Daily  cholecalciferol, 1,000 Units, Oral, Daily  famotidine, 20 mg, Oral, Daily  latanoprost, 1 drop, Both Eyes, Nightly  midodrine, 2.5 mg, Oral, TID AC  sevelamer, 800 mg, Oral, TID With Meals  silver nitrate, 1 Application, Topical, Once  sodium chloride, 10 mL, Intravenous, Q12H  sodium chloride, 10 mL, Intravenous, Q12H  tamsulosin, 0.4 mg, Oral, Nightly      IV Meds:        Physical Exam  Vascular: Saturated    Data Reviewed:  CBC    Results from last 7 days   Lab Units 25  0423 25  1010 25  0413 25  0429 25  0401 25  0355 25  1527   WBC 10*3/mm3 4.75 4.35 4.80 5.09 6.38 4.42 4.21   HEMOGLOBIN g/dL 8.1* 8.4*  8.4* 8.7* 8.6* 9.5* 9.2* 9.6*   PLATELETS 10*3/mm3 76* 95* 73* 83* 86* 76* 59*     BMP   Results from last 7 days   Lab Units 25  0423 25  0412 25  0756 25  0401 25  0355 25  1527   SODIUM mmol/L 129* 133* 123* 130* 131* 129*   POTASSIUM mmol/L 5.1 4.3  --  4.4 4.1 4.3   CHLORIDE mmol/L 98 100  --  99 95* 93*   CO2 mmol/L 19.0* 23.0  --  22.7 21.5* 22.2   BUN mg/dL 47* 24*  --  29* 49* 46*   CREATININE mg/dL 6.25* 4.19*  --  4.75* 6.28* 6.03*   GLUCOSE mg/dL 115* 122*  --  129* 107* 171*   MAGNESIUM mg/dL  --   --   --   --  2.1  --    PHOSPHORUS mg/dL  --  3.8   --  4.0 5.2*  --      Coag   Results from last 7 days   Lab Units 03/29/25  1648 03/23/25  1527   INR  1.50* 1.36*   APTT seconds  --  26.7       Most notable findings include: Hemoglobin 8.1 from 8.4.  Platelet count 76 from 95.    Active Hospital Problems:   Active Hospital Problems    Diagnosis  POA    **Sepsis due to cellulitis [L03.90, A41.9]  Yes    ESRD on dialysis [N18.6, Z99.2]  Not Applicable    Problem with vascular access [Z78.9]  Yes    Anemia due to stage 4 chronic kidney disease treated with erythropoietin [N18.4, D63.1]  Yes    Cirrhosis of liver without ascites [K74.60]  Yes    Permanent atrial fibrillation [I48.21]  Yes      Resolved Hospital Problems   No resolved problems to display.      Assessment & Plan     Assessment / Plan     Catheter site oozing: I took down the dressing again.  He has a skin tear that is new lateral from the adhesives.  I cleaned all the blood from the catheter which was fairly extensive.  I observe this for a while and was not able to see any bleeding from the catheter exit site or the suture points to the skin.  I reapplied a hemostatic pad and a pressure dressing.  Unfortunately, I was called later by the nurse to tell me that it is continuing to bleed.  If this continues, the only thing I can think of would be to take him to the operating room for possible exchange and evaluation in that setting since I cannot identify anything at the bedside.    Thrombocytopenia: Patient given DDAVP last night.  Platelet count is 76.  INR 1.5.  I suppose we could give FFP would be best done on dialysis though because of the volume.    Ben Barth MD  03/30/25  08:38 EDT  Available via Secure Chat during the day for non-urgent issues.  After hours and for urgent issues please call the office at [phone]

## 2025-03-30 NOTE — PROGRESS NOTES
Name: Bill Landry ADMIT: 3/23/2025   : 1945  PCP: Bharathi De La Torre MD    MRN: [id number] LOS: 7 days   AGE/SEX: 79 y.o. male  ROOM: [facility]     Subjective     Examined at bedside.  His hemoglobin is downtrending somewhat.  Objective   Objective   Vital Signs  Temp:  [97.7 °F (36.5 °C)-97.9 °F (36.6 °C)] 97.7 °F (36.5 °C)  Heart Rate:  [69-84] 84  Resp:  [18-22] 18  BP: (121-144)/(62-71) 121/62  SpO2:  [95 %-96 %] 96 %  on   ;   Device (Oxygen Therapy): room air  Body mass index is 27.14 kg/m².  Physical Exam  Constitutional:       Appearance: He is ill-appearing.   HENT:      Head: Normocephalic and atraumatic.   Cardiovascular:      Rate and Rhythm: Normal rate and regular rhythm.   Pulmonary:      Effort: Pulmonary effort is normal. No respiratory distress.   Abdominal:      General: There is no distension.      Palpations: Abdomen is soft.      Tenderness: There is no abdominal tenderness.   Musculoskeletal:      Comments: Right chest wall with port, bandage soaked with blood   Neurological:      Mental Status: He is alert. He is disoriented.         Results Review     I reviewed the patient's new clinical results.  Results from last 7 days   Lab Units 25  0423 25  1010 25  0413 25  0429   WBC 10*3/mm3 4.75 4.35 4.80 5.09   HEMOGLOBIN g/dL 8.1* 8.4*  8.4* 8.7* 8.6*   PLATELETS 10*3/mm3 76* 95* 73* 83*     Results from last 7 days   Lab Units 25  0423 25  0412 25  0756 25  0401 25  0355   SODIUM mmol/L 129* 133* 123* 130* 131*   POTASSIUM mmol/L 5.1 4.3  --  4.4 4.1   CHLORIDE mmol/L 98 100  --  99 95*   CO2 mmol/L 19.0* 23.0  --  22.7 21.5*   BUN mg/dL 47* 24*  --  29* 49*   CREATININE mg/dL 6.25* 4.19*  --  4.75* 6.28*   GLUCOSE mg/dL 115* 122*  --  129* 107*   Estimated Creatinine Clearance: 11.6 mL/min (A) (by C-G formula based on SCr of 6.25 mg/dL (H)).  Results from last 7 days   Lab Units 25  0423 25  0412 25  0405  "03/24/25  0355 03/23/25  1527   ALBUMIN g/dL 2.6* 2.6* 2.3* 2.4* 2.6*   BILIRUBIN mg/dL 0.6  --   --   --  1.2   ALK PHOS U/L 251*  --   --   --  283*   AST (SGOT) U/L 36  --   --   --  43*   ALT (SGPT) U/L <5  --   --   --  18     Results from last 7 days   Lab Units 03/30/25  0423 03/27/25  0412 03/25/25  0401 03/24/25  0355   CALCIUM mg/dL 8.1* 7.9* 8.1* 7.8*   ALBUMIN g/dL 2.6* 2.6* 2.3* 2.4*   MAGNESIUM mg/dL  --   --   --  2.1   PHOSPHORUS mg/dL  --  3.8 4.0 5.2*     Results from last 7 days   Lab Units 03/24/25  0355 03/23/25  2329 03/23/25  2041 03/23/25  1825 03/23/25  1527   PROCALCITONIN ng/mL  --   --   --   --  1.20*   LACTATE mmol/L 1.4 2.2* 2.7* 2.4* 3.6*     COVID19   Date Value Ref Range Status   05/17/2023 Presumptive Negative Presumptive Negative - Ref. Range Final   05/15/2023 Presumptive Negative Presumptive Negative - Ref. Range Final     No results found for: \"HGBA1C\", \"POCGLU\"  Results for orders placed or performed during the hospital encounter of 03/23/25   Blood Culture - Blood, Hand, Left    Specimen: Hand, Left; Blood   Result Value Ref Range    Blood Culture No growth at 5 days          XR Chest 1 View  Narrative: XR CHEST 1 VW-        INDICATION: Dialysis catheter placement     COMPARISON: Chest radiograph March 23, 2025     TECHNIQUE: 1 view     FINDINGS:      Vascular congestion. Ill-defined perihilar and basilar opacities. Stable  mediastinum. Elevated right hemidiaphragm. Cholecystectomy clips. Left  axillary stents. Right IJ catheter with catheter tip in the distal SVC.     Impression:    1. Interval placement of right IJ catheter with the catheter tip in the  distal SVC. No pneumothorax.  2. Vascular congestion and possible edema.  3. Elevated right hemidiaphragm with suspected atelectasis at the right  lung base     This report was finalized on 3/25/2025 12:31 PM by Dr. Bao Drake M.D on Workstation: [id number]     Arteriogram (Autofinalize)  This procedure was " auto-finalized with no dictation required.    Scheduled Medications  b complex-vitamin c-folic acid, 1 tablet, Oral, Daily  ceFAZolin, 1,000 mg, Intravenous, Q24H  cetirizine, 10 mg, Oral, Daily  cholecalciferol, 1,000 Units, Oral, Daily  famotidine, 20 mg, Oral, Daily  latanoprost, 1 drop, Both Eyes, Nightly  midodrine, 2.5 mg, Oral, TID AC  sevelamer, 800 mg, Oral, TID With Meals  silver nitrate, 1 Application, Topical, Once  sodium chloride, 10 mL, Intravenous, Q12H  sodium chloride, 10 mL, Intravenous, Q12H  tamsulosin, 0.4 mg, Oral, Nightly    Infusions   Diet  Diet: Cardiac, Diabetic, Renal; Healthy Heart (2-3 Na+); Consistent Carbohydrate; Low Sodium (2-3g), Low Potassium, Low Phosphorus; Fluid Consistency: Thin (IDDSI 0)  NPO Diet NPO Type: Sips with Meds       Assessment/Plan     Active Hospital Problems    Diagnosis  POA    **Sepsis due to cellulitis [L03.90, A41.9]  Yes    ESRD on dialysis [N18.6, Z99.2]  Not Applicable    Problem with vascular access [Z78.9]  Yes    Anemia due to stage 4 chronic kidney disease treated with erythropoietin [N18.4, D63.1]  Yes    Cirrhosis of liver without ascites [K74.60]  Yes    Permanent atrial fibrillation [I48.21]  Yes      Resolved Hospital Problems   No resolved problems to display.       79 y.o. male admitted with Sepsis due to cellulitis.    Left arm AV graft bleeding  Vascular surgery was consulted.  He received some DDAVP yesterday.  Noted plans to possibly administer FFP versus take to the OR for catheter placement  Trend hemoglobin and replace for hemoglobin less than 7    Chron's  disease  Status post bowel resection with ileostomy    Cellulitis  On cefazolin, noted plans for 42 days for vascular graft infection    Thrombocytopenia  May need FFP    SCD  Full Code           Shaquille Cosby MD  [facility]  03/30/25  11:21 EDT

## 2025-03-30 NOTE — PROGRESS NOTES
[facility] Progress Note      Patient Name: Bill Landry  : 1945  MRN: [id number]  Primary Care Physician:  Bharathi De La Torre MD  Date of admission: 3/23/2025    Subjective     Interval History:   ESRD on HD MWF  Continues to ooze from tunneled dialysis catheter.  Dr. Barth perform dressing changes that showed recurrence of minor bleeding  No new complaints are noted.    Review of Systems:   As noted above    Objective     Vitals:   Temp:  [97.7 °F (36.5 °C)-97.9 °F (36.6 °C)] 97.7 °F (36.5 °C)  Heart Rate:  [69-84] 84  Resp:  [18-22] 18  BP: (121-144)/(62-71) 121/62    Intake/Output Summary (Last 24 hours) at 3/30/2025 1205  Last data filed at 3/29/2025 2345  Gross per 24 hour   Intake --   Output 225 ml   Net -225 ml       Physical Exam:    General Appearance: alert, oriented x 3, no acute distress   Skin: warm and dry  HEENT: oral mucosa normal, nonicteric sclera  Neck: supple, no JVD  Lungs: CTA  Heart: RRR, normal S1 and S2  Abdomen: soft, nontender, nondistended  : no palpable bladder  Extremities: Bilateral lower extremity edema  Neuro: normal speech and mental status   Left upper extremity AV graft.  Left upper extremity wound covered  Bruising to right chest around TDC, small amount of blood clotted around uterus MSK  Scheduled Meds:     b complex-vitamin c-folic acid, 1 tablet, Oral, Daily  ceFAZolin, 1,000 mg, Intravenous, Q24H  cetirizine, 10 mg, Oral, Daily  cholecalciferol, 1,000 Units, Oral, Daily  famotidine, 20 mg, Oral, Daily  latanoprost, 1 drop, Both Eyes, Nightly  midodrine, 2.5 mg, Oral, TID AC  sevelamer, 800 mg, Oral, TID With Meals  silver nitrate, 1 Application, Topical, Once  sodium chloride, 10 mL, Intravenous, Q12H  sodium chloride, 10 mL, Intravenous, Q12H  tamsulosin, 0.4 mg, Oral, Nightly      IV Meds:          Results Reviewed:   I have personally reviewed the results from the time of this admission to 3/30/2025 12:05 EDT     Results from last  7 days   Lab Units 03/30/25  0423 03/27/25  0412 03/26/25  0756 03/25/25  0401 03/24/25  0355 03/23/25  1527   SODIUM mmol/L 129* 133* 123* 130*   < > 129*   POTASSIUM mmol/L 5.1 4.3  --  4.4   < > 4.3   CHLORIDE mmol/L 98 100  --  99   < > 93*   CO2 mmol/L 19.0* 23.0  --  22.7   < > 22.2   BUN mg/dL 47* 24*  --  29*   < > 46*   CREATININE mg/dL 6.25* 4.19*  --  4.75*   < > 6.03*   CALCIUM mg/dL 8.1* 7.9*  --  8.1*   < > 7.8*   BILIRUBIN mg/dL 0.6  --   --   --   --  1.2   ALK PHOS U/L 251*  --   --   --   --  283*   ALT (SGPT) U/L <5  --   --   --   --  18   AST (SGOT) U/L 36  --   --   --   --  43*   GLUCOSE mg/dL 115* 122*  --  129*   < > 171*    < > = values in this interval not displayed.       Estimated Creatinine Clearance: 11.6 mL/min (A) (by C-G formula based on SCr of 6.25 mg/dL (H)).    Results from last 7 days   Lab Units 03/27/25  0412 03/25/25  0401 03/24/25  0355   MAGNESIUM mg/dL  --   --  2.1   PHOSPHORUS mg/dL 3.8 4.0 5.2*             Results from last 7 days   Lab Units 03/30/25  0423 03/29/25  1010 03/27/25  0413 03/26/25  0429 03/25/25  0401   WBC 10*3/mm3 4.75 4.35 4.80 5.09 6.38   HEMOGLOBIN g/dL 8.1* 8.4*  8.4* 8.7* 8.6* 9.5*   PLATELETS 10*3/mm3 76* 95* 73* 83* 86*       Results from last 7 days   Lab Units 03/29/25  1648 03/23/25  1527   INR  1.50* 1.36*       Assessment / Plan     ASSESSMENT:  .  ESRD: Hypervolemic on exam today   Left arm AV graft with recent bleeding and later drainage from incision site (had had recent shuntogram).  Elevated lactate and procalcitonin; normal white blood cell count (though chronic leukopenia).  CT scan of the left arm does reveal a fluid density that was cultured by vascular surgery  status post incision and drainage on 3/25/2024  Crohn's disease s/p bowel resection with ileostomy  Cirrhosis with chronic leukopenia and thrombocytopenia  Atrial fibrillation  Chronic hypotension of ESRD:  on midodrine  Hyponatremia secondary to volume overload will  correct with hemodialysis  Thrombocytopenia chronic   Bleeding from tunneled dialysis catheter.  Received DDAVP        PLAN:  Will continue dialysis on Monday Wednesday and Friday with plan for dialysis on Monday plan for dialysis tomorrow  Antibiotic has been arranged with outpatient dialysis unit for a total of 6-week  Discussed with Dr. Barth will give 1 dose of FFP today as he continues to bleed  Labs in a.m.    Thank you for involving us in the care of Bill Landry.  Please feel free to call with any questions.    Myra Moore MD  03/30/25  12:05 EDT    [facility]  [phone]    Parts of this note may be an electronic transcription/translation of spoken language to printed text using the Dragon dictation system.

## 2025-03-30 NOTE — PLAN OF CARE
Goal Outcome Evaluation:         Patient alert and oriented x 4. He continues to have bleeding from tunnel cath. Plan is for patient to have urgery tomorrow for exchange and evaluation.

## 2025-03-31 LAB
ALBUMIN SERPL-MCNC: 2.7 G/DL (ref 3.5–5.2)
ANION GAP SERPL CALCULATED.3IONS-SCNC: 14.1 MMOL/L (ref 5–15)
ARTERIAL PATENCY WRIST A: POSITIVE
ATMOSPHERIC PRESS: 747.4 MMHG
BASE EXCESS BLDA CALC-SCNC: -0.3 MMOL/L (ref 0–2)
BDY SITE: ABNORMAL
BH BB BLOOD EXPIRATION DATE: NORMAL
BH BB BLOOD TYPE BARCODE: 5100
BH BB DISPENSE STATUS: NORMAL
BH BB PRODUCT CODE: NORMAL
BH BB UNIT NUMBER: NORMAL
BUN SERPL-MCNC: 59 MG/DL (ref 8–23)
BUN/CREAT SERPL: 8.3 (ref 7–25)
CALCIUM SPEC-SCNC: 8.1 MG/DL (ref 8.6–10.5)
CHLORIDE SERPL-SCNC: 95 MMOL/L (ref 98–107)
CO2 SERPL-SCNC: 17.9 MMOL/L (ref 22–29)
CREAT SERPL-MCNC: 7.1 MG/DL (ref 0.76–1.27)
DEPRECATED RDW RBC AUTO: 50.2 FL (ref 37–54)
DEVICE COMMENT: ABNORMAL
EGFRCR SERPLBLD CKD-EPI 2021: 7.3 ML/MIN/1.73
ERYTHROCYTE [DISTWIDTH] IN BLOOD BY AUTOMATED COUNT: 14.3 % (ref 12.3–15.4)
GLUCOSE BLDC GLUCOMTR-MCNC: 181 MG/DL (ref 70–130)
GLUCOSE SERPL-MCNC: 135 MG/DL (ref 65–99)
HCO3 BLDA-SCNC: 24.7 MMOL/L (ref 22–28)
HCT VFR BLD AUTO: 23.3 % (ref 37.5–51)
HEMODILUTION: NO
HGB BLD-MCNC: 7.9 G/DL (ref 13–17.7)
MCH RBC QN AUTO: 33.3 PG (ref 26.6–33)
MCHC RBC AUTO-ENTMCNC: 33.9 G/DL (ref 31.5–35.7)
MCV RBC AUTO: 98.3 FL (ref 79–97)
MODALITY: ABNORMAL
PCO2 BLDA: 41.3 MM HG (ref 35–45)
PH BLDA: 7.39 PH UNITS (ref 7.35–7.45)
PHOSPHATE SERPL-MCNC: 6 MG/DL (ref 2.5–4.5)
PLATELET # BLD AUTO: 73 10*3/MM3 (ref 140–450)
PMV BLD AUTO: 9.3 FL (ref 6–12)
PO2 BLDA: 66 MM HG (ref 80–100)
POTASSIUM SERPL-SCNC: 5.1 MMOL/L (ref 3.5–5.2)
RBC # BLD AUTO: 2.37 10*6/MM3 (ref 4.14–5.8)
SAO2 % BLDCOA: 92.4 % (ref 92–98.5)
SET MECH RESP RATE: 20
SODIUM SERPL-SCNC: 127 MMOL/L (ref 136–145)
UNIT  ABO: NORMAL
UNIT  RH: NORMAL
WBC NRBC COR # BLD AUTO: 4.67 10*3/MM3 (ref 3.4–10.8)

## 2025-03-31 PROCEDURE — 82948 REAGENT STRIP/BLOOD GLUCOSE: CPT

## 2025-03-31 PROCEDURE — 80069 RENAL FUNCTION PANEL: CPT | Performed by: HOSPITALIST

## 2025-03-31 PROCEDURE — 25010000002 LIDOCAINE 1% - EPINEPHRINE 1:100000 1 %-1:100000 SOLUTION: Performed by: SURGERY

## 2025-03-31 PROCEDURE — 36600 WITHDRAWAL OF ARTERIAL BLOOD: CPT | Performed by: STUDENT IN AN ORGANIZED HEALTH CARE EDUCATION/TRAINING PROGRAM

## 2025-03-31 PROCEDURE — 82803 BLOOD GASES ANY COMBINATION: CPT | Performed by: STUDENT IN AN ORGANIZED HEALTH CARE EDUCATION/TRAINING PROGRAM

## 2025-03-31 PROCEDURE — 25010000002 CEFAZOLIN PER 500 MG: Performed by: STUDENT IN AN ORGANIZED HEALTH CARE EDUCATION/TRAINING PROGRAM

## 2025-03-31 PROCEDURE — 85027 COMPLETE CBC AUTOMATED: CPT | Performed by: STUDENT IN AN ORGANIZED HEALTH CARE EDUCATION/TRAINING PROGRAM

## 2025-03-31 RX ORDER — LIDOCAINE HYDROCHLORIDE AND EPINEPHRINE 10; 10 MG/ML; UG/ML
20 INJECTION, SOLUTION INFILTRATION; PERINEURAL ONCE
Status: COMPLETED | OUTPATIENT
Start: 2025-03-31 | End: 2025-03-31

## 2025-03-31 RX ADMIN — SEVELAMER CARBONATE 800 MG: 800 TABLET, FILM COATED ORAL at 17:28

## 2025-03-31 RX ADMIN — TAMSULOSIN HYDROCHLORIDE 0.4 MG: 0.4 CAPSULE ORAL at 20:59

## 2025-03-31 RX ADMIN — SEVELAMER CARBONATE 800 MG: 800 TABLET, FILM COATED ORAL at 08:03

## 2025-03-31 RX ADMIN — CEFAZOLIN 1000 MG: 1 INJECTION, POWDER, FOR SOLUTION INTRAMUSCULAR; INTRAVENOUS at 13:01

## 2025-03-31 RX ADMIN — Medication 10 ML: at 20:59

## 2025-03-31 RX ADMIN — LATANOPROST 1 DROP: 50 SOLUTION/ DROPS OPHTHALMIC at 20:58

## 2025-03-31 RX ADMIN — Medication 10 ML: at 08:19

## 2025-03-31 RX ADMIN — LIDOCAINE HYDROCHLORIDE AND EPINEPHRINE 20 ML: 10; 10 INJECTION, SOLUTION INFILTRATION; PERINEURAL at 12:30

## 2025-03-31 RX ADMIN — MIDODRINE HYDROCHLORIDE 2.5 MG: 2.5 TABLET ORAL at 08:03

## 2025-03-31 RX ADMIN — MIDODRINE HYDROCHLORIDE 2.5 MG: 2.5 TABLET ORAL at 17:28

## 2025-03-31 RX ADMIN — MIDODRINE HYDROCHLORIDE 2.5 MG: 2.5 TABLET ORAL at 13:01

## 2025-03-31 RX ADMIN — SEVELAMER CARBONATE 800 MG: 800 TABLET, FILM COATED ORAL at 13:01

## 2025-03-31 RX ADMIN — CETIRIZINE HYDROCHLORIDE 10 MG: 10 TABLET ORAL at 08:03

## 2025-03-31 RX ADMIN — FAMOTIDINE 20 MG: 20 TABLET, FILM COATED ORAL at 08:03

## 2025-03-31 RX ADMIN — Medication 1 TABLET: at 08:03

## 2025-03-31 RX ADMIN — Medication 1000 UNITS: at 08:03

## 2025-03-31 NOTE — PROCEDURES
Procedure note       preoperative diagnosis: End-stage renal disease; other complication of vascular dialysis catheter (T82.49 XA), continued oozing from insertion site    Postop diagnosis: Same     Procedure: Removal of tunneled dialysis catheter right internal jugular vein     Anesthesia: Local     Estimated blood loss: Minimal     Complications: None     Indications: The patient had placement of a right internal jugular tunneled dialysis catheter to initiate hemodialysis.  He has had ongoing oozing from the catheter insertion site since placement.  If platelet count remains chronically low, 73,000 today.  The patient has a functioning upper extremity AV access.  The catheter is no longer needed.      Description of procedure.  In the dialysis unit, the patient was placed supine in his bed.  The right neck and chest were prepped using ChloraPrep and draped sterilely.  The skin and subcutaneous tissue was anesthetized with 1% lidocaine at the exit site as well as to the level of the cuff.  The previous sutures at the catheter exit site as well as the hub of the catheter were removed.  With a combination of both blunt and sharp dissection the cuff was dissected away from the subcutaneous tissue circumferentially.  With gentle traction the catheter was removed intact.  Pressure above and below the clavicle was used for hemostasis.  After more than 5 minutes of manual pressure hemostasis was achieved.  The exit site was closed in 2 layers with the subcutaneous tissue closed with a 3-0 Vicryl suture and the skin closed with a 3-0 Prolene in horizontal mattress fashion.  Hemostasis was achieved.  Sterile dressings were applied.  Patient tolerated procedure well.

## 2025-03-31 NOTE — NURSING NOTE
hd without incident or c/o. tolerated well. removed 2 l. no meds administered. drsg to be removed/tunnelled dialysis catheter to be removed post return to room in lieu of bleeding complications. stable, no c/o post completion of hd.

## 2025-03-31 NOTE — PROGRESS NOTES
[facility]   Surgical Progress Note    Patient Name: Bill Landry  : 1945  MRN: [id number]  Date of admission: 3/23/2025  Surgical Procedures Since Admission:  Procedure(s):  HEMODIALYSIS CATHETER INSERTION, left arm I&D  INCISION AND DRAINAGE UPPER EXTREMITY  Surgeon:  Bill Deal MD  Status:  6 Days Post-Op  -------------------    Subjective   Subjective     Chief Complaint: Possible left AV graft infection, persistent oozing from right tunneled dialysis catheter site    History of Present Illness   79-year-old gentleman with possible left AV graft infection now status post I&D with no visible graft at the base of the wound.  Cultures showed MSSA.   Tunneled catheters been placed.  Unfortunately has been oozing consistently despite multiple sutures placed and interventions.  At this point in time I believe that the catheter is nonsalvageable and the arm is possibly usable we will remove the catheter today after dialysis and plan on a line holiday in an attempt to use the arm either tomorrow or Wednesday depending on her next dialysis is indicated.      Review of Systems serosanguineous drainage from catheter    Objective   Objective     Vitals:   Temp:  [97.1 °F (36.2 °C)-97.7 °F (36.5 °C)] 97.1 °F (36.2 °C)  Heart Rate:  [68-81] 69  Resp:  [16-20] 16  BP: (112-144)/(59-72) 112/61  Flow (L/min) (Oxygen Therapy):  [2] 2  Output by Drain (mL) 25 0701 - 25 1900 25 1901 - 25 0700 25 0701 - 25 0918 Range Total   Ileostomy LLQ  400  400       Physical Exam, catheter site draining serosanguineous.  Left arm wound site almost healed.     Result Review    Result Review:  I have personally reviewed the results from the time of this admission to 3/31/2025 09:18 EDT and agree with these findings:  [x]  Laboratory list / accordion  [x]  Microbiology  []  Radiology  [x]  EKG/Telemetry   []  Cardiology/Vascular   []  Pathology  []  Old records  []  Other:  Most notable  "findings include: Culture with Staph aureus       Results from last 7 days   Lab Units 03/31/25  0407 03/30/25  0423 03/29/25  1010 03/27/25  0413 03/26/25  0429 03/25/25  0401   WBC 10*3/mm3 4.67 4.75 4.35 4.80 5.09 6.38   HEMOGLOBIN g/dL 7.9* 8.1* 8.4*  8.4* 8.7* 8.6* 9.5*   PLATELETS 10*3/mm3 73* 76* 95* 73* 83* 86*     Results from last 7 days   Lab Units 03/31/25  0407 03/30/25  0423 03/27/25  0412 03/26/25  0756 03/25/25  0401   SODIUM mmol/L 127* 129* 133* 123* 130*   POTASSIUM mmol/L 5.1 5.1 4.3  --  4.4   CHLORIDE mmol/L 95* 98 100  --  99   CO2 mmol/L 17.9* 19.0* 23.0  --  22.7   BUN mg/dL 59* 47* 24*  --  29*   CREATININE mg/dL 7.10* 6.25* 4.19*  --  4.75*   GLUCOSE mg/dL 135* 115* 122*  --  129*   PHOSPHORUS mg/dL 6.0*  --  3.8  --  4.0   Estimated Creatinine Clearance: 10.2 mL/min (A) (by C-G formula based on SCr of 7.1 mg/dL (H)).  Results from last 7 days   Lab Units 03/29/25  1648   PROTIME Seconds 18.1*   INR  1.50*     Lab Results   Component Value Date    HGBA1C 5.3 02/03/2025    HGBA1C 4.80 01/26/2025    HGBA1C 5.40 09/17/2024     No results found for: \"POCGLU\"      Assessment & Plan   Assessment / Plan     Brief Patient Summary:  Bill Landry is a 79 y.o. male who appears to have staph growing from wound that does not appear to directly communicate to his AV graft.  We are going to try to sterilize it with the 6 weeks of antibiotic therapy.  At this point in time his tunneled dialysis catheter continues to ooze although it appears more serosanguineous.  We are to remove it as there is no way to really salvage at this point in time.  He can use it for dialysis today and we will take it out at bedside and control the site manually at that time.  We will try not to have to place a new catheter as we will attempt to use his shunt on Wednesday.  Active Hospital Problems:   Active Hospital Problems    Diagnosis     **Sepsis due to cellulitis     ESRD on dialysis     Problem with vascular access  "    Anemia due to stage 4 chronic kidney disease treated with erythropoietin     Cirrhosis of liver without ascites     Permanent atrial fibrillation      Plan:   And will now be to remove tunneled dialysis catheter as I believe it will not stop oozing the serosanguineous fluid from this area.  Will have to go back to using his arm.  He will get a line holiday and attempt to use the shunt on Wednesday.    VTE Prophylaxis:  Mechanical VTE prophylaxis orders are present.        Bill Deal MD

## 2025-03-31 NOTE — NURSING NOTE
Per Dr. Deal, Dialysis this morning using current tunnel cath. Cath will be removed at bedside by MD after dialysis for a line holiday.

## 2025-03-31 NOTE — PROGRESS NOTES
[facility] Progress Note      Patient Name: Bill Landry  : 1945  MRN: [id number]  Primary Care Physician:  Bharathi De La Torre MD  Date of admission: 3/23/2025    Subjective     Interval History:   ESRD on HD MWF  Seen on dialysis today.  Afebrile.  Denies nausea or vomiting.  No fever no chills  Continue to have bleeding from around the tunneled dialysis catheter plan noted for tunneled dialysis catheter removal today for dialysis    Review of Systems:   As noted above    Objective     Vitals:   Temp:  [97.1 °F (36.2 °C)-97.7 °F (36.5 °C)] 97.3 °F (36.3 °C)  Heart Rate:  [68-81] 74  Resp:  [16-20] 16  BP: (106-144)/(56-72) 111/65  Flow (L/min) (Oxygen Therapy):  [2] 2    Intake/Output Summary (Last 24 hours) at 3/31/2025 1311  Last data filed at 3/31/2025 0759  Gross per 24 hour   Intake 300 ml   Output 450 ml   Net -150 ml       Physical Exam:    General Appearance: alert, oriented x 3, no acute distress   Skin: warm and dry  HEENT: oral mucosa normal, nonicteric sclera  Neck: supple, no JVD  Lungs: CTA  Heart: RRR, normal S1 and S2  Abdomen: soft, nontender, nondistended  : no palpable bladder  Extremities: Bilateral lower extremity edema  Neuro: normal speech and mental status   Left upper extremity AV graft.  Left upper extremity wound covered  Bruising to right chest around TDC, small amount of blood clotted around uterus MSK  Scheduled Meds:     b complex-vitamin c-folic acid, 1 tablet, Oral, Daily  ceFAZolin, 1,000 mg, Intravenous, Q24H  cetirizine, 10 mg, Oral, Daily  cholecalciferol, 1,000 Units, Oral, Daily  famotidine, 20 mg, Oral, Daily  latanoprost, 1 drop, Both Eyes, Nightly  midodrine, 2.5 mg, Oral, TID AC  sevelamer, 800 mg, Oral, TID With Meals  silver nitrate, 1 Application, Topical, Once  sodium chloride, 10 mL, Intravenous, Q12H  sodium chloride, 10 mL, Intravenous, Q12H  tamsulosin, 0.4 mg, Oral, Nightly      IV Meds:          Results Reviewed:   I have  personally reviewed the results from the time of this admission to 3/31/2025 13:11 EDT     Results from last 7 days   Lab Units 03/31/25  0407 03/30/25  0423 03/27/25  0412   SODIUM mmol/L 127* 129* 133*   POTASSIUM mmol/L 5.1 5.1 4.3   CHLORIDE mmol/L 95* 98 100   CO2 mmol/L 17.9* 19.0* 23.0   BUN mg/dL 59* 47* 24*   CREATININE mg/dL 7.10* 6.25* 4.19*   CALCIUM mg/dL 8.1* 8.1* 7.9*   BILIRUBIN mg/dL  --  0.6  --    ALK PHOS U/L  --  251*  --    ALT (SGPT) U/L  --  <5  --    AST (SGOT) U/L  --  36  --    GLUCOSE mg/dL 135* 115* 122*       Estimated Creatinine Clearance: 10.2 mL/min (A) (by C-G formula based on SCr of 7.1 mg/dL (H)).    Results from last 7 days   Lab Units 03/31/25  0407 03/27/25  0412 03/25/25  0401   PHOSPHORUS mg/dL 6.0* 3.8 4.0             Results from last 7 days   Lab Units 03/31/25  0407 03/30/25  0423 03/29/25  1010 03/27/25  0413 03/26/25  0429   WBC 10*3/mm3 4.67 4.75 4.35 4.80 5.09   HEMOGLOBIN g/dL 7.9* 8.1* 8.4*  8.4* 8.7* 8.6*   PLATELETS 10*3/mm3 73* 76* 95* 73* 83*       Results from last 7 days   Lab Units 03/29/25  1648   INR  1.50*       Assessment / Plan     ASSESSMENT:  .  ESRD: Hypervolemic on exam today   Left arm AV graft with recent bleeding and later drainage from incision site (had had recent shuntogram).  Elevated lactate and procalcitonin; normal white blood cell count (though chronic leukopenia).  CT scan of the left arm does reveal a fluid density that was cultured by vascular surgery  status post incision and drainage on 3/25/2024  Crohn's disease s/p bowel resection with ileostomy  Cirrhosis with chronic leukopenia and thrombocytopenia  Atrial fibrillation  Chronic hypotension of ESRD:  on midodrine  Hyponatremia secondary to volume overload will correct with hemodialysis  Thrombocytopenia chronic   Bleeding from tunneled dialysis catheter.  Received DDAVP and FFP without improvement.  Plan to remove tunneled catheter today        PLAN:  Will continue dialysis on  Monday Wednesday and Friday with plan for dialysis on Monday plan for dialysis on Wednesday using AV graft  Labs in a.m.  Thank you for involving us in the care of Bill Landry.  Please feel free to call with any questions.    Myra Moore MD  03/31/25  13:11 EDT    [facility]  [phone]    Parts of this note may be an electronic transcription/translation of spoken language to printed text using the Dragon dictation system.

## 2025-03-31 NOTE — PROGRESS NOTES
Name: Bill Landry ADMIT: 3/23/2025   : 1945  PCP: Bharathi De La Torre MD    MRN: [id number] LOS: 8 days   AGE/SEX: 79 y.o. male  ROOM: [facility]     Subjective   Examined during HD     Objective   Objective   Vital Signs  Temp:  [97.1 °F (36.2 °C)-97.7 °F (36.5 °C)] 97.1 °F (36.2 °C)  Heart Rate:  [68-81] 69  Resp:  [16-20] 16  BP: (112-144)/(59-72) 112/61  SpO2:  [92 %-100 %] 92 %  on  Flow (L/min) (Oxygen Therapy):  [2] 2;   Device (Oxygen Therapy): nasal cannula  Body mass index is 26.92 kg/m².  Physical Exam  Constitutional:       Appearance: He is ill-appearing.   HENT:      Head: Normocephalic and atraumatic.   Cardiovascular:      Rate and Rhythm: Normal rate and regular rhythm.   Pulmonary:      Effort: Pulmonary effort is normal. No respiratory distress.   Abdominal:      General: There is no distension.      Palpations: Abdomen is soft.      Tenderness: There is no abdominal tenderness.   Musculoskeletal:      Comments: Right chest wall with port, bandage soaked with blood   Neurological:      Mental Status: He is alert.         Results Review     I reviewed the patient's new clinical results.  Results from last 7 days   Lab Units 25  0407 25  0423 25  1010 25  0413   WBC 10*3/mm3 4.67 4.75 4.35 4.80   HEMOGLOBIN g/dL 7.9* 8.1* 8.4*  8.4* 8.7*   PLATELETS 10*3/mm3 73* 76* 95* 73*     Results from last 7 days   Lab Units 25  0407 25  0423 25  0412 25  0756 25  0401   SODIUM mmol/L 127* 129* 133* 123* 130*   POTASSIUM mmol/L 5.1 5.1 4.3  --  4.4   CHLORIDE mmol/L 95* 98 100  --  99   CO2 mmol/L 17.9* 19.0* 23.0  --  22.7   BUN mg/dL 59* 47* 24*  --  29*   CREATININE mg/dL 7.10* 6.25* 4.19*  --  4.75*   GLUCOSE mg/dL 135* 115* 122*  --  129*   Estimated Creatinine Clearance: 10.2 mL/min (A) (by C-G formula based on SCr of 7.1 mg/dL (H)).  Results from last 7 days   Lab Units 25  0407 25  0423 25  0412 25  0401   ALBUMIN g/dL  "2.7* 2.6* 2.6* 2.3*   BILIRUBIN mg/dL  --  0.6  --   --    ALK PHOS U/L  --  251*  --   --    AST (SGOT) U/L  --  36  --   --    ALT (SGPT) U/L  --  <5  --   --      Results from last 7 days   Lab Units 03/31/25  0407 03/30/25  0423 03/27/25  0412 03/25/25  0401   CALCIUM mg/dL 8.1* 8.1* 7.9* 8.1*   ALBUMIN g/dL 2.7* 2.6* 2.6* 2.3*   PHOSPHORUS mg/dL 6.0*  --  3.8 4.0           COVID19   Date Value Ref Range Status   05/17/2023 Presumptive Negative Presumptive Negative - Ref. Range Final   05/15/2023 Presumptive Negative Presumptive Negative - Ref. Range Final     No results found for: \"HGBA1C\", \"POCGLU\"  Results for orders placed or performed during the hospital encounter of 03/23/25   Blood Culture - Blood, Hand, Left    Specimen: Hand, Left; Blood   Result Value Ref Range    Blood Culture No growth at 5 days          XR Chest 1 View  Narrative: XR CHEST 1 VW-        INDICATION: Dialysis catheter placement     COMPARISON: Chest radiograph March 23, 2025     TECHNIQUE: 1 view     FINDINGS:      Vascular congestion. Ill-defined perihilar and basilar opacities. Stable  mediastinum. Elevated right hemidiaphragm. Cholecystectomy clips. Left  axillary stents. Right IJ catheter with catheter tip in the distal SVC.     Impression:    1. Interval placement of right IJ catheter with the catheter tip in the  distal SVC. No pneumothorax.  2. Vascular congestion and possible edema.  3. Elevated right hemidiaphragm with suspected atelectasis at the right  lung base     This report was finalized on 3/25/2025 12:31 PM by Dr. Bao Drake M.D on Workstation: [id number]     Arteriogram (Autofinalize)  This procedure was auto-finalized with no dictation required.    Scheduled Medications  b complex-vitamin c-folic acid, 1 tablet, Oral, Daily  ceFAZolin, 1,000 mg, Intravenous, Q24H  cetirizine, 10 mg, Oral, Daily  cholecalciferol, 1,000 Units, Oral, Daily  famotidine, 20 mg, Oral, Daily  latanoprost, 1 drop, Both Eyes, " Nightly  midodrine, 2.5 mg, Oral, TID AC  sevelamer, 800 mg, Oral, TID With Meals  silver nitrate, 1 Application, Topical, Once  sodium chloride, 10 mL, Intravenous, Q12H  sodium chloride, 10 mL, Intravenous, Q12H  tamsulosin, 0.4 mg, Oral, Nightly    Infusions   Diet  Diet: Renal; Low Potassium, Low Sodium (2-3g); Fluid Consistency: Thin (IDDSI 0)       Assessment/Plan     Active Hospital Problems    Diagnosis  POA    **Sepsis due to cellulitis [L03.90, A41.9]  Yes    ESRD on dialysis [N18.6, Z99.2]  Not Applicable    Problem with vascular access [Z78.9]  Yes    Anemia due to stage 4 chronic kidney disease treated with erythropoietin [N18.4, D63.1]  Yes    Cirrhosis of liver without ascites [K74.60]  Yes    Permanent atrial fibrillation [I48.21]  Yes      Resolved Hospital Problems   No resolved problems to display.       79 y.o. male admitted with Sepsis due to cellulitis.    Left arm AV graft bleeding  Vascular surgery was consulted.  He received some DDAVP yesterday.    Noted plan to remove TDC     Chron's  disease  Status post bowel resection with ileostomy    Cellulitis  On cefazolin  42 days for vascular graft infection    Thrombocytopenia  May need FFP    SCD  Full Code           Shaquille Cosby MD  [facility]  03/31/25  11:07 EDT

## 2025-03-31 NOTE — CASE MANAGEMENT/SOCIAL WORK
Continued Stay Note  [facility]     Patient Name: Bill Landry  MRN: [id number]  Today's Date: 3/31/2025    Admit Date: 3/23/2025    Plan: Home w/ spouse & [facility]   Discharge Plan       Row Name 03/31/25 1107       Plan    Plan Home w/ spouse & [facility]    Plan Comments Per [facility] MD, patient not ready for dc and will be here for a few more days. CCP left  for [facility] to notify - awaiting callback. Plan remains home w/ spouse and [facility] for dressing changes and PT. Patient will get IV abx through HD (through 5/6/25). Family to transport home. CARLOS LUCIANO                   Discharge Codes    No documentation.                 Expected Discharge Date and Time       Expected Discharge Date Expected Discharge Time    Apr 3, 2025               CARLOS Shell

## 2025-03-31 NOTE — NURSING NOTE
Patients family concerned about patient having increased confusion. Patient alert and oriented x 3. He states that he is just tired from dialysis. Dr. Cosby notified. ABG's, vitals and BS obtained. No additional orders at this time.

## 2025-03-31 NOTE — PLAN OF CARE
Goal Outcome Evaluation:           Progress: no change  Outcome Evaluation: Pt VSS. O2 maintained with 2L NC when sleeping. Room air when awake. Tunnel cath dressing changed multiple times due to bleeding. Dressing change orders followed. Ileostomy intact. NPO since midnight. Plan for dialysis today. Pt safety maintained.

## 2025-04-01 LAB
ALBUMIN SERPL-MCNC: 2.5 G/DL (ref 3.5–5.2)
ANION GAP SERPL CALCULATED.3IONS-SCNC: 11.1 MMOL/L (ref 5–15)
BUN SERPL-MCNC: 35 MG/DL (ref 8–23)
BUN/CREAT SERPL: 6.3 (ref 7–25)
CALCIUM SPEC-SCNC: 7.8 MG/DL (ref 8.6–10.5)
CHLORIDE SERPL-SCNC: 100 MMOL/L (ref 98–107)
CO2 SERPL-SCNC: 21.9 MMOL/L (ref 22–29)
CREAT SERPL-MCNC: 5.53 MG/DL (ref 0.76–1.27)
DEPRECATED RDW RBC AUTO: 50.9 FL (ref 37–54)
EGFRCR SERPLBLD CKD-EPI 2021: 9.8 ML/MIN/1.73
ERYTHROCYTE [DISTWIDTH] IN BLOOD BY AUTOMATED COUNT: 14.4 % (ref 12.3–15.4)
GLUCOSE SERPL-MCNC: 125 MG/DL (ref 65–99)
HCT VFR BLD AUTO: 21.9 % (ref 37.5–51)
HGB BLD-MCNC: 7.3 G/DL (ref 13–17.7)
MCH RBC QN AUTO: 32.6 PG (ref 26.6–33)
MCHC RBC AUTO-ENTMCNC: 33.3 G/DL (ref 31.5–35.7)
MCV RBC AUTO: 97.8 FL (ref 79–97)
PHOSPHATE SERPL-MCNC: 4.7 MG/DL (ref 2.5–4.5)
PLATELET # BLD AUTO: 80 10*3/MM3 (ref 140–450)
PMV BLD AUTO: 9.2 FL (ref 6–12)
POTASSIUM SERPL-SCNC: 4.2 MMOL/L (ref 3.5–5.2)
RBC # BLD AUTO: 2.24 10*6/MM3 (ref 4.14–5.8)
SODIUM SERPL-SCNC: 133 MMOL/L (ref 136–145)
WBC NRBC COR # BLD AUTO: 3.38 10*3/MM3 (ref 3.4–10.8)

## 2025-04-01 PROCEDURE — 80069 RENAL FUNCTION PANEL: CPT | Performed by: HOSPITALIST

## 2025-04-01 PROCEDURE — 85027 COMPLETE CBC AUTOMATED: CPT | Performed by: STUDENT IN AN ORGANIZED HEALTH CARE EDUCATION/TRAINING PROGRAM

## 2025-04-01 PROCEDURE — 25010000002 CEFAZOLIN PER 500 MG: Performed by: STUDENT IN AN ORGANIZED HEALTH CARE EDUCATION/TRAINING PROGRAM

## 2025-04-01 RX ORDER — MUPIROCIN 20 MG/G
1 OINTMENT TOPICAL EVERY 12 HOURS SCHEDULED
Status: DISCONTINUED | OUTPATIENT
Start: 2025-04-01 | End: 2025-04-03 | Stop reason: HOSPADM

## 2025-04-01 RX ADMIN — FAMOTIDINE 20 MG: 20 TABLET, FILM COATED ORAL at 09:22

## 2025-04-01 RX ADMIN — SEVELAMER CARBONATE 800 MG: 800 TABLET, FILM COATED ORAL at 09:22

## 2025-04-01 RX ADMIN — CEFAZOLIN 1000 MG: 1 INJECTION, POWDER, FOR SOLUTION INTRAMUSCULAR; INTRAVENOUS at 09:22

## 2025-04-01 RX ADMIN — SEVELAMER CARBONATE 800 MG: 800 TABLET, FILM COATED ORAL at 18:47

## 2025-04-01 RX ADMIN — Medication 1 TABLET: at 09:22

## 2025-04-01 RX ADMIN — CETIRIZINE HYDROCHLORIDE 10 MG: 10 TABLET ORAL at 09:22

## 2025-04-01 RX ADMIN — SEVELAMER CARBONATE 800 MG: 800 TABLET, FILM COATED ORAL at 13:07

## 2025-04-01 RX ADMIN — Medication 1000 UNITS: at 09:22

## 2025-04-01 RX ADMIN — Medication 10 ML: at 20:03

## 2025-04-01 RX ADMIN — TAMSULOSIN HYDROCHLORIDE 0.4 MG: 0.4 CAPSULE ORAL at 20:02

## 2025-04-01 RX ADMIN — LATANOPROST 1 DROP: 50 SOLUTION/ DROPS OPHTHALMIC at 20:02

## 2025-04-01 RX ADMIN — MUPIROCIN 1 APPLICATION: 20 OINTMENT TOPICAL at 20:03

## 2025-04-01 RX ADMIN — Medication 10 ML: at 09:22

## 2025-04-01 RX ADMIN — Medication 10 ML: at 09:23

## 2025-04-01 RX ADMIN — MUPIROCIN 1 APPLICATION: 20 OINTMENT TOPICAL at 13:07

## 2025-04-01 NOTE — PLAN OF CARE
Goal Outcome Evaluation:              Outcome Evaluation: VSS. No active bleeding this shift. Wound care adjusted per vascular surgery. Dressing chancged on left arm. Plan for dilaysis tomorrow. Trend hgb tomorrow for possible blood transfusion per renal. No other complaints, spouse at bedside. Needs met at this time.

## 2025-04-01 NOTE — PROGRESS NOTES
[facility]   Surgical Progress Note    Patient Name: Bill Landry  : 1945  MRN: [id number]  Date of admission: 3/23/2025  Surgical Procedures Since Admission:  Procedure(s):  HEMODIALYSIS CATHETER INSERTION, left arm I&D  INCISION AND DRAINAGE UPPER EXTREMITY  Surgeon:  Bill Deal MD  Status:  7 Days Post-Op  -------------------    Subjective   Subjective     Chief Complaint: Possible left AV graft infection    History of Present Illness   79-year-old gentleman with possible left AV graft infection now status post I&D with no visible graft at the base of the wound.  Cultures showed MSSA.  Catheter was removed yesterday and bleeding has immediately stopped.  Feel strongly the patient needs successful dialysis through left arm prior to discharge.  Will transition left arm dressing now to Bactroban as there is very little anything to pack and everything looks good.  Needs full 6-week course of antibiotics per ID management.    Review of Systems right chest no longer draining    Objective   Objective     Vitals:   Temp:  [97.3 °F (36.3 °C)-97.8 °F (36.6 °C)] 97.5 °F (36.4 °C)  Heart Rate:  [65-81] 67  Resp:  [16-18] 18  BP: (106-124)/(51-80) 118/60  Output by Drain (mL) 25 0701 - 25 1900 25 1901 - 25 0700 25 0701 - 25 0945 Range Total   Ileostomy LLQ  650  650       Physical Exam, right chest clear.  Left arm with no real packable site.     Result Review    Result Review:  I have personally reviewed the results from the time of this admission to 2025 09:45 EDT and agree with these findings:  [x]  Laboratory list / accordion  [x]  Microbiology  []  Radiology  [x]  EKG/Telemetry   []  Cardiology/Vascular   []  Pathology  []  Old records  []  Other:  Most notable findings include: Culture with Staph aureus white count low.  Hemoglobin 7.3.  Platelets 80      Results from last 7 days   Lab Units 25  0347 25  0407 25  0423 25  1010  03/27/25  0413 03/26/25  0429   WBC 10*3/mm3 3.38* 4.67 4.75 4.35 4.80 5.09   HEMOGLOBIN g/dL 7.3* 7.9* 8.1* 8.4*  8.4* 8.7* 8.6*   PLATELETS 10*3/mm3 80* 73* 76* 95* 73* 83*     Results from last 7 days   Lab Units 04/01/25  0347 03/31/25  0407 03/30/25  0423 03/27/25  0412 03/26/25  0756   SODIUM mmol/L 133* 127* 129* 133* 123*   POTASSIUM mmol/L 4.2 5.1 5.1 4.3  --    CHLORIDE mmol/L 100 95* 98 100  --    CO2 mmol/L 21.9* 17.9* 19.0* 23.0  --    BUN mg/dL 35* 59* 47* 24*  --    CREATININE mg/dL 5.53* 7.10* 6.25* 4.19*  --    GLUCOSE mg/dL 125* 135* 115* 122*  --    PHOSPHORUS mg/dL 4.7* 6.0*  --  3.8  --    Estimated Creatinine Clearance: 12.8 mL/min (A) (by C-G formula based on SCr of 5.53 mg/dL (H)).  Results from last 7 days   Lab Units 03/29/25  1648   PROTIME Seconds 18.1*   INR  1.50*     Lab Results   Component Value Date    HGBA1C 5.3 02/03/2025    HGBA1C 4.80 01/26/2025    HGBA1C 5.40 09/17/2024     Glucose   Date/Time Value Ref Range Status   03/31/2025 1706 181 (H) 70 - 130 mg/dL Final         Assessment & Plan   Assessment / Plan     Brief Patient Summary:  Bill Landry is a 79 y.o. male who appears to have staph growing from wound that does not appear to directly communicate to his AV graft.  We are going to try to sterilize it with the 6 weeks of antibiotic therapy.  Chest drainage is stopped with catheter out.  Will attempt not to replace any type of catheter and will use the left arm as it looks very stable now.  Will transition to Bactroban for the wound itself      Will transition to Bactroban for the wound itself.  Active Hospital Problems:   Active Hospital Problems    Diagnosis     **Sepsis due to cellulitis     ESRD on dialysis     Problem with vascular access     Anemia due to stage 4 chronic kidney disease treated with erythropoietin     Cirrhosis of liver without ascites     Permanent atrial fibrillation      Plan:   Plan to change wound dressing on the left arm to Bactroban with  paper tape.  Cleared to use left arm AV graft for dialysis.  Needs full 6-week course of  antibiotic therapy ID to manage.  Recommend keeping him until he is had successful dialysis via the left arm.  Will defer to nephrology and medicine as to whether transfusion is recommended.    VTE Prophylaxis:  Mechanical VTE prophylaxis orders are present.        Bill Deal MD

## 2025-04-01 NOTE — PROGRESS NOTES
[facility] Progress Note      Patient Name: Bill Landry  : 1945  MRN: [id number]  Primary Care Physician:  Bharathi De La Torre MD  Date of admission: 3/23/2025    Subjective     Interval History:   ESRD on HD MWF  Dialyzed yesterday with no reported problems.  Tunneled dialysis catheter was removed due to bleeding around the tunneled catheter.      Review of Systems:   As noted above    Objective     Vitals:   Temp:  [97.3 °F (36.3 °C)-97.8 °F (36.6 °C)] 97.5 °F (36.4 °C)  Heart Rate:  [65-81] 67  Resp:  [16-18] 18  BP: (106-124)/(51-80) 118/60    Intake/Output Summary (Last 24 hours) at 2025 1120  Last data filed at 2025 0300  Gross per 24 hour   Intake 300 ml   Output 650 ml   Net -350 ml       Physical Exam:    General Appearance: alert, oriented x 3, no acute distress   Skin: warm and dry  HEENT: oral mucosa normal, nonicteric sclera  Neck: supple, no JVD  Lungs: CTA  Heart: RRR, normal S1 and S2  Abdomen: soft, nontender, nondistended  : no palpable bladder  Extremities: Bilateral lower extremity edema  Neuro: normal speech and mental status   Left upper extremity AV graft.  Left upper extremity wound covered  Bruising to right chest around TDC, small amount of blood clotted around uterus MSK  Scheduled Meds:     b complex-vitamin c-folic acid, 1 tablet, Oral, Daily  ceFAZolin, 1,000 mg, Intravenous, Q24H  cetirizine, 10 mg, Oral, Daily  cholecalciferol, 1,000 Units, Oral, Daily  famotidine, 20 mg, Oral, Daily  latanoprost, 1 drop, Both Eyes, Nightly  midodrine, 2.5 mg, Oral, TID AC  mupirocin, 1 Application, Topical, Q12H  sevelamer, 800 mg, Oral, TID With Meals  silver nitrate, 1 Application, Topical, Once  sodium chloride, 10 mL, Intravenous, Q12H  sodium chloride, 10 mL, Intravenous, Q12H  tamsulosin, 0.4 mg, Oral, Nightly      IV Meds:          Results Reviewed:   I have personally reviewed the results from the time of this admission to 2025 11:20 EDT      Results from last 7 days   Lab Units 04/01/25 0347 03/31/25  0407 03/30/25  0423   SODIUM mmol/L 133* 127* 129*   POTASSIUM mmol/L 4.2 5.1 5.1   CHLORIDE mmol/L 100 95* 98   CO2 mmol/L 21.9* 17.9* 19.0*   BUN mg/dL 35* 59* 47*   CREATININE mg/dL 5.53* 7.10* 6.25*   CALCIUM mg/dL 7.8* 8.1* 8.1*   BILIRUBIN mg/dL  --   --  0.6   ALK PHOS U/L  --   --  251*   ALT (SGPT) U/L  --   --  <5   AST (SGOT) U/L  --   --  36   GLUCOSE mg/dL 125* 135* 115*       Estimated Creatinine Clearance: 12.8 mL/min (A) (by C-G formula based on SCr of 5.53 mg/dL (H)).    Results from last 7 days   Lab Units 04/01/25  0347 03/31/25  0407 03/27/25  0412   PHOSPHORUS mg/dL 4.7* 6.0* 3.8             Results from last 7 days   Lab Units 04/01/25  0347 03/31/25  0407 03/30/25  0423 03/29/25  1010 03/27/25  0413   WBC 10*3/mm3 3.38* 4.67 4.75 4.35 4.80   HEMOGLOBIN g/dL 7.3* 7.9* 8.1* 8.4*  8.4* 8.7*   PLATELETS 10*3/mm3 80* 73* 76* 95* 73*       Results from last 7 days   Lab Units 03/29/25  1648   INR  1.50*       Assessment / Plan     ASSESSMENT:  .  ESRD: Hypervolemic on exam today   Left arm AV graft with recent bleeding and later drainage from incision site (had had recent shuntogram).  Elevated lactate and procalcitonin; normal white blood cell count (though chronic leukopenia).  CT scan of the left arm does reveal a fluid density that was cultured by vascular surgery  status post incision and drainage on 3/25/2024  Crohn's disease s/p bowel resection with ileostomy  Cirrhosis with chronic leukopenia and thrombocytopenia  Atrial fibrillation  Chronic hypotension of ESRD:  on midodrine  Hyponatremia secondary to volume overload will correct with hemodialysis  Thrombocytopenia chronic   Bleeding from tunneled dialysis catheter.  Resolved after removal of tunneled dialysis catheter        PLAN:  Will continue dialysis Monday Wednesday Friday and plan to use AV graft tomorrow  Surveillance labs  Thank you for involving us in the care of  Bill Landry.  Please feel free to call with any questions.    Myra Moore MD  04/01/25  11:20 EDT    [facility]  [phone]    Parts of this note may be an electronic transcription/translation of spoken language to printed text using the Dragon dictation system.

## 2025-04-01 NOTE — PLAN OF CARE
Goal Outcome Evaluation:           Progress: no change  Outcome Evaluation: Pt VSS. Dressings intact. Wound care orders followed. CPAP worn at night. Ostomy intact. Family at bedside - supportive of pt. Pt safety maintained.

## 2025-04-01 NOTE — PROGRESS NOTES
Name: Bill Landry ADMIT: 3/23/2025   : 1945  PCP: Bharathi De La Torre MD    MRN: [id number] LOS: 9 days   AGE/SEX: 79 y.o. male  ROOM: [facility]     Subjective   TDC removed yesterday- bleeding has stopped.     Objective   Objective   Vital Signs  Temp:  [97.3 °F (36.3 °C)-97.8 °F (36.6 °C)] 97.5 °F (36.4 °C)  Heart Rate:  [65-81] 67  Resp:  [16-18] 18  BP: (106-124)/(51-80) 118/60  SpO2:  [91 %-98 %] 95 %  on   ;   Device (Oxygen Therapy): room air  Body mass index is 26.41 kg/m².  Physical Exam  Constitutional:       Appearance: He is ill-appearing.   HENT:      Head: Normocephalic and atraumatic.   Cardiovascular:      Rate and Rhythm: Normal rate and regular rhythm.   Pulmonary:      Effort: Pulmonary effort is normal. No respiratory distress.   Abdominal:      General: There is no distension.      Palpations: Abdomen is soft.      Tenderness: There is no abdominal tenderness.   Neurological:      Mental Status: He is alert.         Results Review     I reviewed the patient's new clinical results.  Results from last 7 days   Lab Units 25  04025  0423 25  1010   WBC 10*3/mm3 3.38* 4.67 4.75 4.35   HEMOGLOBIN g/dL 7.3* 7.9* 8.1* 8.4*  8.4*   PLATELETS 10*3/mm3 80* 73* 76* 95*     Results from last 7 days   Lab Units 25  0407 25  0423 25  0412   SODIUM mmol/L 133* 127* 129* 133*   POTASSIUM mmol/L 4.2 5.1 5.1 4.3   CHLORIDE mmol/L 100 95* 98 100   CO2 mmol/L 21.9* 17.9* 19.0* 23.0   BUN mg/dL 35* 59* 47* 24*   CREATININE mg/dL 5.53* 7.10* 6.25* 4.19*   GLUCOSE mg/dL 125* 135* 115* 122*   Estimated Creatinine Clearance: 12.8 mL/min (A) (by C-G formula based on SCr of 5.53 mg/dL (H)).  Results from last 7 days   Lab Units 25  0347 25  0407 25  0423 25  0412   ALBUMIN g/dL 2.5* 2.7* 2.6* 2.6*   BILIRUBIN mg/dL  --   --  0.6  --    ALK PHOS U/L  --   --  251*  --    AST (SGOT) U/L  --   --  36  --    ALT (SGPT) U/L  --    --  <5  --      Results from last 7 days   Lab Units 04/01/25  0347 03/31/25  0407 03/30/25  0423 03/27/25  0412   CALCIUM mg/dL 7.8* 8.1* 8.1* 7.9*   ALBUMIN g/dL 2.5* 2.7* 2.6* 2.6*   PHOSPHORUS mg/dL 4.7* 6.0*  --  3.8           COVID19   Date Value Ref Range Status   05/17/2023 Presumptive Negative Presumptive Negative - Ref. Range Final   05/15/2023 Presumptive Negative Presumptive Negative - Ref. Range Final     Glucose   Date/Time Value Ref Range Status   03/31/2025 1706 181 (H) 70 - 130 mg/dL Final     Results for orders placed or performed during the hospital encounter of 03/23/25   Blood Culture - Blood, Hand, Left    Specimen: Hand, Left; Blood   Result Value Ref Range    Blood Culture No growth at 5 days          XR Chest 1 View  Narrative: XR CHEST 1 VW-        INDICATION: Dialysis catheter placement     COMPARISON: Chest radiograph March 23, 2025     TECHNIQUE: 1 view     FINDINGS:      Vascular congestion. Ill-defined perihilar and basilar opacities. Stable  mediastinum. Elevated right hemidiaphragm. Cholecystectomy clips. Left  axillary stents. Right IJ catheter with catheter tip in the distal SVC.     Impression:    1. Interval placement of right IJ catheter with the catheter tip in the  distal SVC. No pneumothorax.  2. Vascular congestion and possible edema.  3. Elevated right hemidiaphragm with suspected atelectasis at the right  lung base     This report was finalized on 3/25/2025 12:31 PM by Dr. Bao Drake M.D on Workstation: [id number]     Arteriogram (Autofinalize)  This procedure was auto-finalized with no dictation required.    Scheduled Medications  b complex-vitamin c-folic acid, 1 tablet, Oral, Daily  ceFAZolin, 1,000 mg, Intravenous, Q24H  cetirizine, 10 mg, Oral, Daily  cholecalciferol, 1,000 Units, Oral, Daily  famotidine, 20 mg, Oral, Daily  latanoprost, 1 drop, Both Eyes, Nightly  midodrine, 2.5 mg, Oral, TID AC  mupirocin, 1 Application, Topical, Q12H  sevelamer, 800 mg,  Oral, TID With Meals  silver nitrate, 1 Application, Topical, Once  sodium chloride, 10 mL, Intravenous, Q12H  sodium chloride, 10 mL, Intravenous, Q12H  tamsulosin, 0.4 mg, Oral, Nightly    Infusions   Diet  Diet: Renal; Low Potassium, Low Sodium (2-3g); Fluid Consistency: Thin (IDDSI 0)       Assessment/Plan     Active Hospital Problems    Diagnosis  POA    **Sepsis due to cellulitis [L03.90, A41.9]  Yes    ESRD on dialysis [N18.6, Z99.2]  Not Applicable    Problem with vascular access [Z78.9]  Yes    Anemia due to stage 4 chronic kidney disease treated with erythropoietin [N18.4, D63.1]  Yes    Cirrhosis of liver without ascites [K74.60]  Yes    Permanent atrial fibrillation [I48.21]  Yes      Resolved Hospital Problems   No resolved problems to display.       79 y.o. male admitted with Sepsis due to cellulitis.      Acute blood loss anemia  Hb 7.3. Monitor, may need a unit of PRBC.  Will defer nephrology whether or not this can be administered with dialysis which will happen tomorrow.    Left arm AV graft bleeding  Vascular surgery was consulted.  He received some DDAVP yesterday.    Noted plan to remove TDC     Chron's  disease  Status post bowel resection with ileostomy    Cellulitis  On cefazolin  42 days for vascular graft infection    Thrombocytopenia  May need FFP    SCD  Full Code           Shaquille Cosby MD  [facility]  04/01/25  10:56 EDT

## 2025-04-02 LAB
ALBUMIN SERPL-MCNC: 2.6 G/DL (ref 3.5–5.2)
ANION GAP SERPL CALCULATED.3IONS-SCNC: 11.3 MMOL/L (ref 5–15)
BASOPHILS # BLD AUTO: 0.03 10*3/MM3 (ref 0–0.2)
BASOPHILS NFR BLD AUTO: 0.8 % (ref 0–1.5)
BUN SERPL-MCNC: 51 MG/DL (ref 8–23)
BUN/CREAT SERPL: 7.4 (ref 7–25)
CALCIUM SPEC-SCNC: 8 MG/DL (ref 8.6–10.5)
CHLORIDE SERPL-SCNC: 100 MMOL/L (ref 98–107)
CO2 SERPL-SCNC: 19.7 MMOL/L (ref 22–29)
CREAT SERPL-MCNC: 6.93 MG/DL (ref 0.76–1.27)
DEPRECATED RDW RBC AUTO: 52.7 FL (ref 37–54)
EGFRCR SERPLBLD CKD-EPI 2021: 7.5 ML/MIN/1.73
EOSINOPHIL # BLD AUTO: 0.06 10*3/MM3 (ref 0–0.4)
EOSINOPHIL NFR BLD AUTO: 1.5 % (ref 0.3–6.2)
ERYTHROCYTE [DISTWIDTH] IN BLOOD BY AUTOMATED COUNT: 14.5 % (ref 12.3–15.4)
GLUCOSE SERPL-MCNC: 130 MG/DL (ref 65–99)
HCT VFR BLD AUTO: 24 % (ref 37.5–51)
HGB BLD-MCNC: 8 G/DL (ref 13–17.7)
IMM GRANULOCYTES # BLD AUTO: 0.06 10*3/MM3 (ref 0–0.05)
IMM GRANULOCYTES NFR BLD AUTO: 1.5 % (ref 0–0.5)
LYMPHOCYTES # BLD AUTO: 0.39 10*3/MM3 (ref 0.7–3.1)
LYMPHOCYTES NFR BLD AUTO: 9.8 % (ref 19.6–45.3)
MCH RBC QN AUTO: 32.9 PG (ref 26.6–33)
MCHC RBC AUTO-ENTMCNC: 33.3 G/DL (ref 31.5–35.7)
MCV RBC AUTO: 98.8 FL (ref 79–97)
MONOCYTES # BLD AUTO: 0.45 10*3/MM3 (ref 0.1–0.9)
MONOCYTES NFR BLD AUTO: 11.3 % (ref 5–12)
NEUTROPHILS NFR BLD AUTO: 3.01 10*3/MM3 (ref 1.7–7)
NEUTROPHILS NFR BLD AUTO: 75.1 % (ref 42.7–76)
NRBC BLD AUTO-RTO: 0 /100 WBC (ref 0–0.2)
PHOSPHATE SERPL-MCNC: 5.2 MG/DL (ref 2.5–4.5)
PLATELET # BLD AUTO: 73 10*3/MM3 (ref 140–450)
PMV BLD AUTO: 9.1 FL (ref 6–12)
POTASSIUM SERPL-SCNC: 4.7 MMOL/L (ref 3.5–5.2)
RBC # BLD AUTO: 2.43 10*6/MM3 (ref 4.14–5.8)
SODIUM SERPL-SCNC: 131 MMOL/L (ref 136–145)
WBC NRBC COR # BLD AUTO: 4 10*3/MM3 (ref 3.4–10.8)

## 2025-04-02 PROCEDURE — 80069 RENAL FUNCTION PANEL: CPT | Performed by: HOSPITALIST

## 2025-04-02 PROCEDURE — 25010000002 CEFAZOLIN PER 500 MG: Performed by: STUDENT IN AN ORGANIZED HEALTH CARE EDUCATION/TRAINING PROGRAM

## 2025-04-02 PROCEDURE — 85025 COMPLETE CBC W/AUTO DIFF WBC: CPT | Performed by: HOSPITALIST

## 2025-04-02 RX ADMIN — CEFAZOLIN 1000 MG: 1 INJECTION, POWDER, FOR SOLUTION INTRAMUSCULAR; INTRAVENOUS at 14:40

## 2025-04-02 RX ADMIN — Medication 10 ML: at 14:33

## 2025-04-02 RX ADMIN — Medication 1 TABLET: at 14:40

## 2025-04-02 RX ADMIN — LATANOPROST 1 DROP: 50 SOLUTION/ DROPS OPHTHALMIC at 20:04

## 2025-04-02 RX ADMIN — SEVELAMER CARBONATE 800 MG: 800 TABLET, FILM COATED ORAL at 14:40

## 2025-04-02 RX ADMIN — MUPIROCIN 1 APPLICATION: 20 OINTMENT TOPICAL at 14:40

## 2025-04-02 RX ADMIN — Medication 10 ML: at 20:03

## 2025-04-02 RX ADMIN — TAMSULOSIN HYDROCHLORIDE 0.4 MG: 0.4 CAPSULE ORAL at 20:03

## 2025-04-02 RX ADMIN — MIDODRINE HYDROCHLORIDE 2.5 MG: 2.5 TABLET ORAL at 08:27

## 2025-04-02 RX ADMIN — Medication 1000 UNITS: at 14:40

## 2025-04-02 RX ADMIN — FAMOTIDINE 20 MG: 20 TABLET, FILM COATED ORAL at 14:40

## 2025-04-02 RX ADMIN — SEVELAMER CARBONATE 800 MG: 800 TABLET, FILM COATED ORAL at 18:03

## 2025-04-02 RX ADMIN — CETIRIZINE HYDROCHLORIDE 10 MG: 10 TABLET ORAL at 14:40

## 2025-04-02 RX ADMIN — MUPIROCIN 1 APPLICATION: 20 OINTMENT TOPICAL at 20:04

## 2025-04-02 NOTE — PROGRESS NOTES
[facility] Progress Note      Patient Name: Bill Landry  : 1945  MRN: [id number]  Primary Care Physician:  Bharathi De La Torre MD  Date of admission: 3/23/2025    Subjective     Interval History:   ESRD on HD MWF  Tunneled dialysis catheter removed  No further bleeding noted from tunneled dialysis catheter exit site  Dialyzing today using AV graft and his dialysis has been uneventful      Review of Systems:   As noted above    Objective     Vitals:   Temp:  [97.3 °F (36.3 °C)-98 °F (36.7 °C)] 97.6 °F (36.4 °C)  Heart Rate:  [66-80] 76  Resp:  [16-18] 16  BP: (117-127)/(57-64) 117/60    Intake/Output Summary (Last 24 hours) at 2025 1154  Last data filed at 2025 0830  Gross per 24 hour   Intake 0 ml   Output 375 ml   Net -375 ml       Physical Exam:    General Appearance: alert, oriented x 3, no acute distress   Skin: warm and dry  HEENT: oral mucosa normal, nonicteric sclera  Neck: supple, no JVD  Lungs: CTA  Heart: RRR, normal S1 and S2  Abdomen: soft, nontender, nondistended  : no palpable bladder  Extremities: Bilateral lower extremity edema  Neuro: normal speech and mental status   Left upper extremity AV graft.  Left upper extremity wound covered    Scheduled Meds:     b complex-vitamin c-folic acid, 1 tablet, Oral, Daily  ceFAZolin, 1,000 mg, Intravenous, Q24H  cetirizine, 10 mg, Oral, Daily  cholecalciferol, 1,000 Units, Oral, Daily  famotidine, 20 mg, Oral, Daily  latanoprost, 1 drop, Both Eyes, Nightly  midodrine, 2.5 mg, Oral, TID AC  mupirocin, 1 Application, Topical, Q12H  sevelamer, 800 mg, Oral, TID With Meals  silver nitrate, 1 Application, Topical, Once  sodium chloride, 10 mL, Intravenous, Q12H  sodium chloride, 10 mL, Intravenous, Q12H  tamsulosin, 0.4 mg, Oral, Nightly      IV Meds:          Results Reviewed:   I have personally reviewed the results from the time of this admission to 2025 11:54 EDT     Results from last 7 days   Lab Units  04/02/25 0339 04/01/25 0347 03/31/25 0407 03/30/25  0423   SODIUM mmol/L 131* 133* 127* 129*   POTASSIUM mmol/L 4.7 4.2 5.1 5.1   CHLORIDE mmol/L 100 100 95* 98   CO2 mmol/L 19.7* 21.9* 17.9* 19.0*   BUN mg/dL 51* 35* 59* 47*   CREATININE mg/dL 6.93* 5.53* 7.10* 6.25*   CALCIUM mg/dL 8.0* 7.8* 8.1* 8.1*   BILIRUBIN mg/dL  --   --   --  0.6   ALK PHOS U/L  --   --   --  251*   ALT (SGPT) U/L  --   --   --  <5   AST (SGOT) U/L  --   --   --  36   GLUCOSE mg/dL 130* 125* 135* 115*       Estimated Creatinine Clearance: 10.3 mL/min (A) (by C-G formula based on SCr of 6.93 mg/dL (H)).    Results from last 7 days   Lab Units 04/02/25 0339 04/01/25 0347 03/31/25  0407   PHOSPHORUS mg/dL 5.2* 4.7* 6.0*             Results from last 7 days   Lab Units 04/02/25 0339 04/01/25 0347 03/31/25  0407 03/30/25  0423 03/29/25  1010   WBC 10*3/mm3 4.00 3.38* 4.67 4.75 4.35   HEMOGLOBIN g/dL 8.0* 7.3* 7.9* 8.1* 8.4*  8.4*   PLATELETS 10*3/mm3 73* 80* 73* 76* 95*       Results from last 7 days   Lab Units 03/29/25  1648   INR  1.50*       Assessment / Plan     ASSESSMENT:  ESRD: On maintenance hemodialysis on Monday Wednesday and Friday   Left arm AV graft with recent bleeding and later drainage from incision site (had had recent shuntogram).  Elevated lactate and procalcitonin; normal white blood cell count (though chronic leukopenia).  CT scan of the left arm does reveal a fluid density that was cultured by vascular surgery  status post incision and drainage on 3/25/2024  Crohn's disease s/p bowel resection with ileostomy  Cirrhosis with chronic leukopenia and thrombocytopenia  Atrial fibrillation  Chronic hypotension of ESRD:  on midodrine  Hyponatremia secondary to volume overload.  Improving on fluid restriction  Thrombocytopenia chronic   Bleeding from tunneled dialysis catheter.  Resolved after removal of tunneled dialysis catheter        PLAN:  Will continue dialysis Monday Wednesday Friday     Surveillance labs  Thank  you for involving us in the care of Bill Landry.  Please feel free to call with any questions.    Myra Moore MD  04/02/25  11:54 EDT    [facility]  [phone]    Parts of this note may be an electronic transcription/translation of spoken language to printed text using the Dragon dictation system.

## 2025-04-02 NOTE — PROGRESS NOTES
[facility]   Surgical Progress Note    Patient Name: Bill Landry  : 1945  MRN: [id number]  Date of admission: 3/23/2025  Surgical Procedures Since Admission:  Procedure(s):  HEMODIALYSIS CATHETER INSERTION, left arm I&D  INCISION AND DRAINAGE UPPER EXTREMITY  Surgeon:  Bill Deal MD  Status:  8 Days Post-Op  -------------------    Subjective   Subjective     Chief Complaint: Possible left AV graft infection    History of Present Illness   79-year-old gentleman with possible left AV graft infection now status post I&D with no visible graft at the base of the wound.  Cultures showed MSSA.  Catheter was removed yesterday and bleeding has immediately stopped.  Feel strongly the patient needs successful dialysis through left arm prior to discharge.  Plan for hemodialysis today.    Review of Systems family concerned that he is too weak to go home and have expressed concerns.  I told him that they need to discuss with admitting service.    Objective   Objective     Vitals:   Temp:  [97.3 °F (36.3 °C)-98 °F (36.7 °C)] 97.3 °F (36.3 °C)  Heart Rate:  [66-80] 66  Resp:  [16-18] 16  BP: (117-127)/(57-64) 117/64  Output by Drain (mL) 25 0701 - 25 1900 25 1901 - 25 0700 25 0701 - 25 0751 Range Total   Ileostomy  100  325       Physical Exam, right chest clear.  Left arm with no real packable site. Clear without cellulitis or drainage    Result Review    Result Review:  I have personally reviewed the results from the time of this admission to 2025 07:51 EDT and agree with these findings:  [x]  Laboratory list / accordion  [x]  Microbiology  []  Radiology  [x]  EKG/Telemetry   []  Cardiology/Vascular   []  Pathology  []  Old records  []  Other:  Most notable findings include: Hemoglobin 8  Results from last 7 days   Lab Units 25  0339 25  0347 25  0407 25  0423 25  1010 25  0413   WBC 10*3/mm3 4.00 3.38* 4.67 4.75 4.35 4.80    HEMOGLOBIN g/dL 8.0* 7.3* 7.9* 8.1* 8.4*  8.4* 8.7*   PLATELETS 10*3/mm3 73* 80* 73* 76* 95* 73*     Results from last 7 days   Lab Units 04/02/25  0339 04/01/25  0347 03/31/25  0407 03/30/25  0423 03/27/25  0412 03/26/25  0756   SODIUM mmol/L 131* 133* 127* 129* 133* 123*   POTASSIUM mmol/L 4.7 4.2 5.1 5.1 4.3  --    CHLORIDE mmol/L 100 100 95* 98 100  --    CO2 mmol/L 19.7* 21.9* 17.9* 19.0* 23.0  --    BUN mg/dL 51* 35* 59* 47* 24*  --    CREATININE mg/dL 6.93* 5.53* 7.10* 6.25* 4.19*  --    GLUCOSE mg/dL 130* 125* 135* 115* 122*  --    PHOSPHORUS mg/dL 5.2* 4.7* 6.0*  --  3.8  --    Estimated Creatinine Clearance: 10.3 mL/min (A) (by C-G formula based on SCr of 6.93 mg/dL (H)).  Results from last 7 days   Lab Units 03/29/25  1648   PROTIME Seconds 18.1*   INR  1.50*     Lab Results   Component Value Date    HGBA1C 5.3 02/03/2025    HGBA1C 4.80 01/26/2025    HGBA1C 5.40 09/17/2024     Glucose   Date/Time Value Ref Range Status   03/31/2025 1706 181 (H) 70 - 130 mg/dL Final         Assessment & Plan   Assessment / Plan     Brief Patient Summary:  Bill Landry is a 79 y.o. male who appears to have staph growing from wound that does not appear to directly communicate to his AV graft.  We are going to try to sterilize it with the 6 weeks of antibiotic therapy.  Chest drainage is stopped with catheter out.  Will attempt not to replace any type of catheter and will use the left arm as it looks very stable now.  Tolerating Bactroban.  Will reconsult physical therapy at family and patient request to confirm that he is stable for discharge home      Will transition to Bactroban for the wound itself.  Active Hospital Problems:   Active Hospital Problems    Diagnosis     **Sepsis due to cellulitis     ESRD on dialysis     Problem with vascular access     Anemia due to stage 4 chronic kidney disease treated with erythropoietin     Cirrhosis of liver without ascites     Permanent atrial fibrillation      Plan:   Plan  hemodialysis today.  Discharge planning per [facility].  Family hoping to stay another day.  Will ask physical therapy's opinion.  VTE Prophylaxis:  Mechanical VTE prophylaxis orders are present.        Bill Deal MD

## 2025-04-02 NOTE — PROGRESS NOTES
Name: Bill Landry ADMIT: 3/23/2025   : 1945  PCP: Bharathi De La Torre MD    MRN: [id number] LOS: 10 days   AGE/SEX: 79 y.o. male  ROOM: [facility]     Subjective   Examined during HD. Tolerating it well today     Objective   Objective   Vital Signs  Temp:  [97.3 °F (36.3 °C)-98 °F (36.7 °C)] 97.6 °F (36.4 °C)  Heart Rate:  [66-80] 76  Resp:  [16-18] 16  BP: (117-127)/(57-64) 117/60  SpO2:  [84 %-96 %] 96 %  on   ;   Device (Oxygen Therapy): room air  Body mass index is 26.73 kg/m².  Physical Exam  Constitutional:       Appearance: He is ill-appearing.   HENT:      Head: Normocephalic and atraumatic.   Eyes:      Extraocular Movements: Extraocular movements intact.      Pupils: Pupils are equal, round, and reactive to light.   Cardiovascular:      Rate and Rhythm: Normal rate and regular rhythm.   Pulmonary:      Effort: Pulmonary effort is normal. No respiratory distress.   Abdominal:      General: There is no distension.      Palpations: Abdomen is soft.      Tenderness: There is no abdominal tenderness.   Neurological:      Mental Status: He is alert.         Results Review     I reviewed the patient's new clinical results.  Results from last 7 days   Lab Units 25  0423   WBC 10*3/mm3 4.00 3.38* 4.67 4.75   HEMOGLOBIN g/dL 8.0* 7.3* 7.9* 8.1*   PLATELETS 10*3/mm3 73* 80* 73* 76*     Results from last 7 days   Lab Units 25  0423   SODIUM mmol/L 131* 133* 127* 129*   POTASSIUM mmol/L 4.7 4.2 5.1 5.1   CHLORIDE mmol/L 100 100 95* 98   CO2 mmol/L 19.7* 21.9* 17.9* 19.0*   BUN mg/dL 51* 35* 59* 47*   CREATININE mg/dL 6.93* 5.53* 7.10* 6.25*   GLUCOSE mg/dL 130* 125* 135* 115*   Estimated Creatinine Clearance: 10.3 mL/min (A) (by C-G formula based on SCr of 6.93 mg/dL (H)).  Results from last 7 days   Lab Units 25  0339 25  0347 25  0407 25  0423   ALBUMIN g/dL 2.6* 2.5* 2.7* 2.6*    BILIRUBIN mg/dL  --   --   --  0.6   ALK PHOS U/L  --   --   --  251*   AST (SGOT) U/L  --   --   --  36   ALT (SGPT) U/L  --   --   --  <5     Results from last 7 days   Lab Units 04/02/25  0339 04/01/25  0347 03/31/25  0407 03/30/25  0423 03/27/25  0412   CALCIUM mg/dL 8.0* 7.8* 8.1* 8.1* 7.9*   ALBUMIN g/dL 2.6* 2.5* 2.7* 2.6* 2.6*   PHOSPHORUS mg/dL 5.2* 4.7* 6.0*  --  3.8           COVID19   Date Value Ref Range Status   05/17/2023 Presumptive Negative Presumptive Negative - Ref. Range Final   05/15/2023 Presumptive Negative Presumptive Negative - Ref. Range Final     Glucose   Date/Time Value Ref Range Status   03/31/2025 1706 181 (H) 70 - 130 mg/dL Final     Results for orders placed or performed during the hospital encounter of 03/23/25   Blood Culture - Blood, Hand, Left    Specimen: Hand, Left; Blood   Result Value Ref Range    Blood Culture No growth at 5 days          XR Chest 1 View  Narrative: XR CHEST 1 VW-        INDICATION: Dialysis catheter placement     COMPARISON: Chest radiograph March 23, 2025     TECHNIQUE: 1 view     FINDINGS:      Vascular congestion. Ill-defined perihilar and basilar opacities. Stable  mediastinum. Elevated right hemidiaphragm. Cholecystectomy clips. Left  axillary stents. Right IJ catheter with catheter tip in the distal SVC.     Impression:    1. Interval placement of right IJ catheter with the catheter tip in the  distal SVC. No pneumothorax.  2. Vascular congestion and possible edema.  3. Elevated right hemidiaphragm with suspected atelectasis at the right  lung base     This report was finalized on 3/25/2025 12:31 PM by Dr. Bao Drake M.D on Workstation: [id number]     Arteriogram (Autofinalize)  This procedure was auto-finalized with no dictation required.    Scheduled Medications  b complex-vitamin c-folic acid, 1 tablet, Oral, Daily  ceFAZolin, 1,000 mg, Intravenous, Q24H  cetirizine, 10 mg, Oral, Daily  cholecalciferol, 1,000 Units, Oral,  Daily  famotidine, 20 mg, Oral, Daily  latanoprost, 1 drop, Both Eyes, Nightly  midodrine, 2.5 mg, Oral, TID AC  mupirocin, 1 Application, Topical, Q12H  sevelamer, 800 mg, Oral, TID With Meals  silver nitrate, 1 Application, Topical, Once  sodium chloride, 10 mL, Intravenous, Q12H  sodium chloride, 10 mL, Intravenous, Q12H  tamsulosin, 0.4 mg, Oral, Nightly    Infusions   Diet  Diet: Renal; Low Potassium, Low Sodium (2-3g); Fluid Consistency: Thin (IDDSI 0)       Assessment/Plan     Active Hospital Problems    Diagnosis  POA    **Sepsis due to cellulitis [L03.90, A41.9]  Yes    ESRD on dialysis [N18.6, Z99.2]  Not Applicable    Problem with vascular access [Z78.9]  Yes    Anemia due to stage 4 chronic kidney disease treated with erythropoietin [N18.4, D63.1]  Yes    Cirrhosis of liver without ascites [K74.60]  Yes    Permanent atrial fibrillation [I48.21]  Yes      Resolved Hospital Problems   No resolved problems to display.       79 y.o. male admitted with Sepsis due to cellulitis.      Acute blood loss anemia  Resolved. AV graft used for HD    ESRD  Via LUE graft. TDC removed     Chron's  disease  Status post bowel resection with ileostomy    Cellulitis  On cefazolin  42 days for vascular graft infection    Thrombocytopenia  May need FFP    SCD  Full Code           Shaquille Cosby MD  [facility]  04/02/25  11:54 EDT

## 2025-04-02 NOTE — PLAN OF CARE
Goal Outcome Evaluation:              Outcome Evaluation: VSS. Pt tolerated dilaysis well this shift. No active bleeding. Continue iv abx. Dressing changed on left upper arm. Plan for d/c tomorrow. Needs met at this time.

## 2025-04-02 NOTE — PROGRESS NOTES
"Nutrition Services    Patient Name:  Bill Landry  YOB: 1945  MRN: [id number]  Admit Date:  3/23/2025Nutrition Services    Patient Name: Bill Landry  YOB: 1945  MRN: [id number]  Admission date: 3/23/2025    Assessment Date:  04/02/25    NUTRITION EVALUATION      Reason for Encounter Length of Stay   Diagnosis/Problem Admission Diagnosis:  Hyponatremia [E87.1]  ESRD (end stage renal disease) on dialysis [N18.6, Z99.2]  Abscess of left arm [L02.414]  Cellulitis of left arm [L03.114]  Complication of arteriovenous dialysis fistula, initial encounter [T82.9XXA]  Sepsis due to cellulitis [L03.90, A41.9]  Sepsis, due to unspecified organism, unspecified whether acute organ dysfunction present [A41.9]    Problem List:    Sepsis due to cellulitis    Permanent atrial fibrillation    Cirrhosis of liver without ascites    Anemia due to stage 4 chronic kidney disease treated with erythropoietin    ESRD on dialysis    Problem with vascular access     Narrative 79 yoM with sepsis d/t cellulitis. ESRD on HD getting dialysis M/W/F. Noted chron's disease s/t bowel resection with ileostomy.       PO Diet Diet: Renal; Low Potassium, Low Sodium (2-3g); Fluid Consistency: Thin (IDDSI 0)   Allergies NKFA   Supplements n/a   PO Intake % %       Chewing/Swallowing Difficulty no issues identified at this time       Medications reviewed   Labs  reviewed, Na 131, PO4 5.2       Physical Findings alert, disoriented     Edema 1+ (trace), 2+ (mild), 3+ (moderate)    GI Function Ostomy, BM 3/30    Skin Status surgical incision: L upper arm, other: R lateral clavicle      Lines/Drains dialysis catheter, ileostomy   I/O reviewed        Height  Weight  BMI  Weight Trend     Height: 177.8 cm (70\")  Weight: 84.5 kg (186 lb 4.6 oz) (04/02/25 0555)  Body mass index is 26.73 kg/m².  Stable    Weight change: No significant changes       NFPE Not indicated at this time       Nutrition Problem (PES) Problem: " Increased Nutrient Needs  Etiology: Medical Diagnosis - sepsis, ESRD on HD    Signs/Symptoms: Calories, Protein, and Report/Observation       Intervention/Plan Encourage intakes >75%  HD per MD M/W/F schedule    RD to follow up per protocol.     Results from last 7 days   Lab Units 04/02/25  0339 04/01/25  0347 03/31/25  0407 03/30/25  0423   SODIUM mmol/L 131* 133* 127* 129*   POTASSIUM mmol/L 4.7 4.2 5.1 5.1   CHLORIDE mmol/L 100 100 95* 98   CO2 mmol/L 19.7* 21.9* 17.9* 19.0*   BUN mg/dL 51* 35* 59* 47*   CREATININE mg/dL 6.93* 5.53* 7.10* 6.25*   CALCIUM mg/dL 8.0* 7.8* 8.1* 8.1*   BILIRUBIN mg/dL  --   --   --  0.6   ALK PHOS U/L  --   --   --  251*   ALT (SGPT) U/L  --   --   --  <5   AST (SGOT) U/L  --   --   --  36   GLUCOSE mg/dL 130* 125* 135* 115*     Results from last 7 days   Lab Units 04/02/25  0339   PHOSPHORUS mg/dL 5.2*   HEMOGLOBIN g/dL 8.0*   HEMATOCRIT % 24.0*     Lab Results   Component Value Date    HGBA1C 5.3 02/03/2025     Wt Readings from Last 10 Encounters:   04/02/25 84.5 kg (186 lb 4.6 oz)   02/13/25 83.5 kg (184 lb)   01/27/25 83.5 kg (184 lb 1.4 oz)   01/02/25 83 kg (182 lb 15.7 oz)   12/19/24 85.3 kg (188 lb)   12/12/24 85.4 kg (188 lb 4.8 oz)   11/19/24 81.1 kg (178 lb 12.7 oz)   11/17/24 78 kg (172 lb)   11/14/24 79 kg (174 lb 2.6 oz)   09/24/24 79.8 kg (175 lb 14.4 oz)       Electronically signed by:  Jackie Patricia RD  04/02/25 13:19 EDT

## 2025-04-02 NOTE — PLAN OF CARE
Goal Outcome Evaluation:           Progress: no change  Outcome Evaluation: Pt VSS. Dressings intact - no active bleeding during shift. Home CPAP worn when sleeping. Trending hemoglin - AM hemoglobin 8.0. Plan for dialysis today. Ostomy pouch changed by family. Family remained at bedside - supportive of pt. Pt safety maintained.

## 2025-04-03 ENCOUNTER — READMISSION MANAGEMENT (OUTPATIENT)
Dept: CALL CENTER | Facility: HOSPITAL | Age: 80
End: 2025-04-03
Payer: MEDICARE

## 2025-04-03 VITALS
HEART RATE: 60 BPM | DIASTOLIC BLOOD PRESSURE: 67 MMHG | TEMPERATURE: 97.7 F | SYSTOLIC BLOOD PRESSURE: 104 MMHG | WEIGHT: 178.79 LBS | OXYGEN SATURATION: 99 % | BODY MASS INDEX: 25.6 KG/M2 | RESPIRATION RATE: 16 BRPM | HEIGHT: 70 IN

## 2025-04-03 LAB
ALBUMIN SERPL-MCNC: 2.5 G/DL (ref 3.5–5.2)
ANION GAP SERPL CALCULATED.3IONS-SCNC: 12 MMOL/L (ref 5–15)
BUN SERPL-MCNC: 32 MG/DL (ref 8–23)
BUN/CREAT SERPL: 6.6 (ref 7–25)
CALCIUM SPEC-SCNC: 8 MG/DL (ref 8.6–10.5)
CHLORIDE SERPL-SCNC: 102 MMOL/L (ref 98–107)
CO2 SERPL-SCNC: 20 MMOL/L (ref 22–29)
CREAT SERPL-MCNC: 4.87 MG/DL (ref 0.76–1.27)
DEPRECATED RDW RBC AUTO: 53.7 FL (ref 37–54)
EGFRCR SERPLBLD CKD-EPI 2021: 11.5 ML/MIN/1.73
ERYTHROCYTE [DISTWIDTH] IN BLOOD BY AUTOMATED COUNT: 14.7 % (ref 12.3–15.4)
GLUCOSE SERPL-MCNC: 121 MG/DL (ref 65–99)
HCT VFR BLD AUTO: 23.4 % (ref 37.5–51)
HGB BLD-MCNC: 7.9 G/DL (ref 13–17.7)
MCH RBC QN AUTO: 33.3 PG (ref 26.6–33)
MCHC RBC AUTO-ENTMCNC: 33.8 G/DL (ref 31.5–35.7)
MCV RBC AUTO: 98.7 FL (ref 79–97)
PHOSPHATE SERPL-MCNC: 4.4 MG/DL (ref 2.5–4.5)
PLATELET # BLD AUTO: 88 10*3/MM3 (ref 140–450)
PMV BLD AUTO: 8.8 FL (ref 6–12)
POTASSIUM SERPL-SCNC: 4.1 MMOL/L (ref 3.5–5.2)
RBC # BLD AUTO: 2.37 10*6/MM3 (ref 4.14–5.8)
SODIUM SERPL-SCNC: 134 MMOL/L (ref 136–145)
WBC NRBC COR # BLD AUTO: 3.19 10*3/MM3 (ref 3.4–10.8)

## 2025-04-03 PROCEDURE — 97530 THERAPEUTIC ACTIVITIES: CPT

## 2025-04-03 PROCEDURE — 25010000002 CEFAZOLIN PER 500 MG: Performed by: STUDENT IN AN ORGANIZED HEALTH CARE EDUCATION/TRAINING PROGRAM

## 2025-04-03 PROCEDURE — 85027 COMPLETE CBC AUTOMATED: CPT | Performed by: STUDENT IN AN ORGANIZED HEALTH CARE EDUCATION/TRAINING PROGRAM

## 2025-04-03 PROCEDURE — 97164 PT RE-EVAL EST PLAN CARE: CPT

## 2025-04-03 PROCEDURE — 80069 RENAL FUNCTION PANEL: CPT | Performed by: HOSPITALIST

## 2025-04-03 RX ORDER — MUPIROCIN 20 MG/G
1 OINTMENT TOPICAL 3 TIMES DAILY
Qty: 30 G | Refills: 2 | Status: SHIPPED | OUTPATIENT
Start: 2025-04-03

## 2025-04-03 RX ORDER — MIDODRINE HYDROCHLORIDE 2.5 MG/1
2.5 TABLET ORAL 3 TIMES DAILY PRN
Status: DISCONTINUED | OUTPATIENT
Start: 2025-04-03 | End: 2025-04-03 | Stop reason: HOSPADM

## 2025-04-03 RX ORDER — MIDODRINE HYDROCHLORIDE 2.5 MG/1
2.5 TABLET ORAL 3 TIMES DAILY PRN
Qty: 90 TABLET | Refills: 0 | Status: SHIPPED | OUTPATIENT
Start: 2025-04-03

## 2025-04-03 RX ADMIN — Medication 10 ML: at 09:52

## 2025-04-03 RX ADMIN — FAMOTIDINE 20 MG: 20 TABLET, FILM COATED ORAL at 08:37

## 2025-04-03 RX ADMIN — CETIRIZINE HYDROCHLORIDE 10 MG: 10 TABLET ORAL at 08:37

## 2025-04-03 RX ADMIN — SEVELAMER CARBONATE 800 MG: 800 TABLET, FILM COATED ORAL at 08:37

## 2025-04-03 RX ADMIN — MUPIROCIN 1 APPLICATION: 20 OINTMENT TOPICAL at 08:37

## 2025-04-03 RX ADMIN — SEVELAMER CARBONATE 800 MG: 800 TABLET, FILM COATED ORAL at 12:40

## 2025-04-03 RX ADMIN — MIDODRINE HYDROCHLORIDE 2.5 MG: 2.5 TABLET ORAL at 08:37

## 2025-04-03 RX ADMIN — Medication 1 TABLET: at 08:37

## 2025-04-03 RX ADMIN — CEFAZOLIN 1000 MG: 1 INJECTION, POWDER, FOR SOLUTION INTRAMUSCULAR; INTRAVENOUS at 09:52

## 2025-04-03 RX ADMIN — Medication 1000 UNITS: at 08:37

## 2025-04-03 NOTE — THERAPY RE-EVALUATION
Patient Name: Bill Landry  : 1945    MRN: [id number]                              Today's Date: 4/3/2025       Admit Date: 3/23/2025    Visit Dx:     ICD-10-CM ICD-9-CM   1. Cellulitis of left arm  L03.114 682.3   2. Abscess of left arm  L02.414 682.3   3. Complication of arteriovenous dialysis fistula, initial encounter  T82.9XXA 996.73   4. ESRD (end stage renal disease) on dialysis  N18.6 585.6    Z99.2 V45.11   5. Sepsis, due to unspecified organism, unspecified whether acute organ dysfunction present  A41.9 038.9     995.91   6. Hyponatremia  E87.1 276.1   7. ESRD on dialysis  N18.6 585.6    Z99.2 V45.11   8. Problem with vascular access  Z78.9 V49.9   9. Sepsis due to cellulitis  L03.90 682.9    A41.9 995.91     Patient Active Problem List   Diagnosis    Permanent atrial fibrillation    Thrombocytopenia    Chronic leukopenia    Cirrhosis of liver without ascites    Congestive splenomegaly    Anemia due to stage 4 chronic kidney disease treated with erythropoietin    Esophageal abnormality    ESRD on hemodialysis    Other specified glaucoma    Arteriovenous fistula for hemodialysis in place, secondary    Crohn's disease, unspecified, without complications    Deficiency of other specified B group vitamins    Dermatitis, unspecified    Encounter for immunization    History of urinary stone    Hyperparathyroidism, unspecified    Other disorders of phosphorus metabolism    Other iron deficiency anemias    Pure hypertriglyceridemia    Renal osteodystrophy    Secondary hyperparathyroidism of renal origin    Splenomegaly, not elsewhere classified    Bilateral pseudophakia    Dermatochalasis of eyelid    Primary open-angle glaucoma, bilateral, moderate stage    Puckering of macula, left eye    Secondary cataract    AV fistula occlusion, initial encounter    TATE (obstructive sleep apnea)    Aneurysm artery, upper extremity    Nonrheumatic aortic valve stenosis    Bicuspid aortic valve    Hyperuricemia  without signs of inflammatory arthritis and tophaceous disease    Presbyopia    Impaired glucose tolerance    Hypofibrinogenemia    Pulmonary hypertension    Skin change    ESRD on dialysis    Problem with vascular access    AV graft thrombosis, initial encounter    AV shunt malfunction, initial encounter    Left arm swelling    Acetabular fracture    Anemia    Cirrhosis of liver    Atrial fibrillation    Fall    Ileostomy present    Weakness    Hypertension    Pain in left hip    Acute pain due to trauma    Transaminitis    Hypoxemia    Hyperglycemia    Sepsis due to cellulitis     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    Aortic stenosis     Bicuspid aortic valve 07/20/2022    Calculus of kidney     Chronic leukopenia and thrombocytopenia     Cirrhosis of liver without ascites 07/24/2017    Congestive splenomegaly 07/24/2017    Crohn disease     with ileostomy    Decreased white blood cell count, unspecified     Diverticula of colon     ESRD on hemodialysis 04/05/2018    M-W-F FRESENIUS    Fatty liver disease, nonalcoholic     Fistula 04/05/2018    Other Specified Complication    Gastrointestinal hemorrhage, unspecified     GERD (gastroesophageal reflux disease)     GI bleed     Glaucoma     H/O Gallstone pancreatitis     H/O Pericardial effusion     H/O sinus bradycardia     History of hypertension     resolved    History of UTI     Hx of renal calculi     Ileostomy present     Iron deficiency     Leakage of heart valve prosthesis, subsequent encounter     MGUS (monoclonal gammopathy of unknown significance)     TATE (obstructive sleep apnea)     Other hemoglobinopathies     Pericardial effusion (noninflammatory) 1/18/2018    Permanent atrial fibrillation     Scarlet fever, uncomplicated     Stenosis of  other vascular prosthetic devices, implants and grafts, initial encounter 02/14/2022    Systemic lupus erythematosus, unspecified     Thrombocytopenia     undpecified    Type 2 diabetes mellitus     CURRENTLY TAKES NO MED    Urinary retention     Post-OP     Past Surgical History:   Procedure Laterality Date    APPENDECTOMY  06/1968    ARTERIOVENOUS FISTULA/SHUNT SURGERY Left 08/08/2017    Procedure: LEFT BRACHIAL CEPHALIC AV FISTULA FORMATION WITH CEPHALIC VEIN TRANSPOSITION ;  Surgeon: Bill Deal MD;  Location: [facility] OR;  Service:     ARTERIOVENOUS FISTULA/SHUNT SURGERY Left 05/27/2022    Procedure: OPEN REVISION LEFT ARTERIOVENOUS INTERPOSITION GRAFT PLACEMENT;  Surgeon: Bill Deal MD;  Location: [facility] OR;  Service: Vascular;  Laterality: Left;    ARTERIOVENOUS FISTULA/SHUNT SURGERY Left 11/19/2024    Procedure: LEFT ARTERIOVENOUS SHUNT GRAFT REVISION;  Surgeon: Bill Deal MD;  Location: [facility] OR;  Service: Vascular;  Laterality: Left;    ARTERIOVENOUS FISTULA/SHUNT SURGERY W/ HEMODIALYSIS CATHETER INSERTION Left 02/15/2022    Procedure: OPEN LEFT ARM ARTERIOVENOUS FISTULA THROMBECTOMY WITH CEPHALIC VEIN ARTHROPLASTY AND STENT/GRAFT PLACEMENT;  Surgeon: Bill Deal MD;  Location: [facility] OR 18/19;  Service: Vascular;  Laterality: Left;    CATARACT EXTRACTION WITH INTRAOCULAR LENS IMPLANT Bilateral 2004    CHOLECYSTECTOMY  2011    COLECTOMY PARTIAL / TOTAL      History of inflammatory bowel disease with status post colectomy with ileostomy many years ago in the [facility],  18 YEARS OF AGE    COLON SURGERY      Colon resection with colostomy    COLON SURGERY      COLONOSCOPY  01/2018    CYSTOSCOPY LITHOLAPAXY BLADDER STONE EXTRACTION      ENDOSCOPY  01/16/2015    gastritis    ENDOSCOPY  01/17/2018    Procedure: ESOPHAGOGASTRODUODENOSCOPY;  Surgeon: Warner Neville MD;  Location: [facility] ENDOSCOPY;  Service:     ILEOSCOPY  01/16/2015    normal    ILEOSCOPY N/A  01/17/2018    Procedure: ILEOSCOPY;  Surgeon: Warner Neville MD;  Location: [facility] ENDOSCOPY;  Service:     ILEOSTOMY  12/1968    loop ostomy bypass    INCISION AND DRAINAGE ARM Left 10/27/2023    Procedure: LEFT ARM INCISION AND DRAINAGE;  Surgeon: Bill Deal MD;  Location: [facility] MAIN OR;  Service: Vascular;  Laterality: Left;    INCISION AND DRAINAGE ARM Left 3/25/2025    Procedure: INCISION AND DRAINAGE UPPER EXTREMITY;  Surgeon: Bill Deal MD;  Location: [facility] HYBRID OR;  Service: Vascular;  Laterality: Left;    INSERTION HEMODIALYSIS CATHETER Right 11/13/2024    Procedure: Tunnelled HEMODIALYSIS CATHETER INSERTION;  Surgeon: Ben Barth MD;  Location: [facility] OR;  Service: Vascular;  Laterality: Right;    INSERTION HEMODIALYSIS CATHETER N/A 3/25/2025    Procedure: HEMODIALYSIS CATHETER INSERTION, left arm I&D;  Surgeon: Bill Deal MD;  Location: [facility] HYBRID OR;  Service: Vascular;  Laterality: N/A;    OTHER SURGICAL HISTORY  2009    kidney stones x3    PSEUDOANEURYSM REPAIR Left 10/27/2023    TONSILLECTOMY  1950      General Information       Row Name 04/03/25 0912          Physical Therapy Time and Intention    Document Type re-evaluation;discharge treatment  -     Mode of Treatment individual therapy;physical therapy  -       Row Name 04/03/25 0912          General Information    Patient Profile Reviewed yes  -SM       Row Name 04/03/25 0912          Cognition    Orientation Status (Cognition) oriented x 3  -SM               User Key  (r) = Recorded By, (t) = Taken By, (c) = Cosigned By      Initials Name Provider Type     Alida Delgado PT Physical Therapist                   Mobility       Row Name 04/03/25 0912          Bed Mobility    Bed Mobility supine-sit  -SM     All Activities, [place] (Bed Mobility) modified independence  -     Comment, (Bed Mobility) UIC at end of session  -       Row Name 04/03/25 0912          Sit-Stand Transfer     Sit-Stand [place] (Transfers) standby assist  -     Assistive Device (Sit-Stand Transfers) walker, front-wheeled  -       Row Name 04/03/25 0912          Gait/Stairs (Locomotion)    [place] Level (Gait) standby assist  -     Assistive Device (Gait) walker, front-wheeled  -     Distance in Feet (Gait) 150  -     Comment, (Gait/Stairs) Gait steady with no overt LOB noted.  -               User Key  (r) = Recorded By, (t) = Taken By, (c) = Cosigned By      Initials Name Provider Type     Alida Delgado PT Physical Therapist                   Obj/Interventions       Row Name 04/03/25 0913          Range of Motion Comprehensive    General Range of Motion no range of motion deficits identified  -       Row Name 04/03/25 0913          Strength Comprehensive (MMT)    Comment, General Manual Muscle Testing (MMT) Assessment Generalized weakness  -       Row Name 04/03/25 0913          Balance    Balance Assessment sitting static balance;sitting dynamic balance;standing static balance;standing dynamic balance  -     Static Sitting Balance independent  -     Dynamic Sitting Balance modified independence  -     Position, Sitting Balance sitting edge of bed  -     Static Standing Balance standby assist  -     Dynamic Standing Balance standby assist  -     Position/Device Used, Standing Balance supported;walker, front-wheeled  -     Balance Interventions sitting;standing;sit to stand;supported;dynamic;static  -               User Key  (r) = Recorded By, (t) = Taken By, (c) = Cosigned By      Initials Name Provider Type    Alida Ramirez PT Physical Therapist                   Goals/Plan    No documentation.                  Clinical Impression       Row Name 04/03/25 0913          Pain    Pretreatment Pain Rating 0/10 - no pain  -     Posttreatment Pain Rating 0/10 - no pain  -       Row Name 04/03/25 0913          Plan of Care Review    Plan of Care Reviewed With patient   -     Outcome Evaluation Patient seen for PT reeval this AM prior to d/c due to family concerns with mobility. Patient completed all bed mobility mod(I). Patient stood and ambulated 150ft around unit with rwx and SBA. Gait steady with no overt LOB noted. Patient sitting UIC at end of session. Patient is current with HHPT per spouse at bedside. Recommend continue at d/c. Acute PT will sign off.  -       Row Name 04/03/25 0913          Therapy Assessment/Plan (PT)    Criteria for Skilled Interventions Met (PT) no;no problems identified which require skilled intervention  -     Therapy Frequency (PT) evaluation only  -       Row Name 04/03/25 0913          Vital Signs    Pre Patient Position Supine  -     Intra Patient Position Standing  -SM     Post Patient Position Sitting  -       Row Name 04/03/25 0913          Positioning and Restraints    Pre-Treatment Position in bed  -SM     Post Treatment Position chair  -SM     In Chair notified nsg;reclined;call light within reach;encouraged to call for assist;exit alarm on  -               User Key  (r) = Recorded By, (t) = Taken By, (c) = Cosigned By      Initials Name Provider Type     Alida Delgado, PT Physical Therapist                   Outcome Measures       Row Name 04/03/25 0915 04/02/25 2200       How much help from another person do you currently need...    Turning from your back to your side while in flat bed without using bedrails? 4  -SM 4  -AJ    Moving from lying on back to sitting on the side of a flat bed without bedrails? 4  -SM 3  -AJ    Moving to and from a bed to a chair (including a wheelchair)? 4  -SM 3  -AJ    Standing up from a chair using your arms (e.g., wheelchair, bedside chair)? 4  -SM 3  -AJ    Climbing 3-5 steps with a railing? 3  -SM 2  -AJ    To walk in hospital room? 4  -SM 3  -AJ    AM-PAC 6 Clicks Score (PT) 23  -SM 18  -AJ    Highest Level of Mobility Goal 7 --> Walk 25 feet or more  -SM 6 --> Walk 10 steps or more   -ROSE MARY      Row Name 04/03/25 0915          Functional Assessment    Outcome Measure Options AM-PAC 6 Clicks Basic Mobility (PT)  -               User Key  (r) = Recorded By, (t) = Taken By, (c) = Cosigned By      Initials Name Provider Type     Alida Delgado, PT Physical Therapist    Grace Lema, RN Registered Nurse                                 Physical Therapy Education       Title: PT OT SLP Therapies (Done)       Topic: Physical Therapy (Done)       Point: Mobility training (Done)       Learning Progress Summary            Patient Acceptance, E, VU by  at 4/3/2025 0915    Acceptance, E, VU by ER at 3/26/2025 1559                      Point: Home exercise program (Done)       Learning Progress Summary            Patient Acceptance, E, VU by  at 4/3/2025 0915    Acceptance, E, VU by ER at 3/26/2025 1559                      Point: Body mechanics (Done)       Learning Progress Summary            Patient Acceptance, E, VU by  at 4/3/2025 0915    Acceptance, E, VU by ER at 3/26/2025 1559                      Point: Precautions (Done)       Learning Progress Summary            Patient Acceptance, E, VU by  at 4/3/2025 0915    Acceptance, E, VU by ER at 3/26/2025 1559                                      User Key       Initials Effective Dates Name Provider Type Discipline     05/02/22 -  Alida Delgado, ERROL Physical Therapist PT    ER 10/15/23 -  Saadia Ayon, PT Physical Therapist PT                  PT Recommendation and Plan     Outcome Evaluation: Patient seen for PT reeval this AM prior to d/c due to family concerns with mobility. Patient completed all bed mobility mod(I). Patient stood and ambulated 150ft around unit with rwx and SBA. Gait steady with no overt LOB noted. Patient sitting UIC at end of session. Patient is current with HHPT per spouse at bedside. Recommend continue at d/c. Acute PT will sign off.     Time Calculation:         PT Charges       Row Name 04/03/25 0916              Time Calculation    Start Time 0838  -      Stop Time 0851  -      Time Calculation (min) 13 min  -SM      PT Received On 04/03/25  -         Time Calculation- PT    Total Timed Code Minutes- PT 8 minute(s)  -SM         Timed Charges    13907 - PT Therapeutic Activity Minutes 8  -SM         Total Minutes    Timed Charges Total Minutes 8  -SM       Total Minutes 8  -SM                User Key  (r) = Recorded By, (t) = Taken By, (c) = Cosigned By      Initials Name Provider Type     Alida Delgado, PT Physical Therapist                  Therapy Charges for Today       Code Description Service Date Service Provider Modifiers Qty    [id number]  PT THERAPEUTIC ACT EA 15 MIN 4/3/2025 Alida Delgado, PT GP 1    [id number]  PT RE-EVAL ESTABLISHED PLAN 2 4/3/2025 Alida Delgado, PT GP 1            PT G-Codes  Outcome Measure Options: AM-PAC 6 Clicks Basic Mobility (PT)  AM-PAC 6 Clicks Score (PT): 23  PT Discharge Summary  Anticipated Discharge Disposition (PT): home with assist, home with home health    Alida Delgado PT  4/3/2025

## 2025-04-03 NOTE — DISCHARGE SUMMARY
Patient Name: Bill Landry  : 1945  MRN: [id number]    Date of Admission: 3/23/2025  Date of Discharge:  4/3/2025  Primary Care Physician: Bharathi De La Torre MD      Chief Complaint:   Vascular Access Problem      Discharge Diagnoses     Active Hospital Problems    Diagnosis  POA    **Sepsis due to cellulitis [L03.90, A41.9]  Yes    ESRD on dialysis [N18.6, Z99.2]  Not Applicable    Problem with vascular access [Z78.9]  Yes    Anemia due to stage 4 chronic kidney disease treated with erythropoietin [N18.4, D63.1]  Yes    Cirrhosis of liver without ascites [K74.60]  Yes    Permanent atrial fibrillation [I48.21]  Yes      Resolved Hospital Problems   No resolved problems to display.        Hospital Course     This is a 79-year-old male with a past medical history of ESRD on hemodialysis, type 2 diabetes, atrial fibrillation, cirrhosis who presents the hospital after experiencing vascular access issues.  He was he was noted to have cellulitis of the arm and was started on or IV al antibiotics.  Blood cultures ultimately grew MSSA.  Infectious disease evaluated him and made recommendations for duration of antibiotic therapy.  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  He was taken to the operating room on  for placement of a right-sided tunneled dialysis catheter for hemodialysis.  At that time he also had a sharp excisional left axillary wound exploration and irrigation/debridement.  He was doing well however started experiencing oozing from the site of the tunneled dialysis catheter.  This was ultimately removed and the graft was used for dialysis access.  He will be discharged on IV cefazolin, 1 g every 24 hours for 6-week close of antibiotic therapy to be completed on May 6.      78 yo with history of ESRD on HD MWF, chron's disease s/p previous bowel resection and end ileostomy, DM2, Afib, cirrhosis, lups and other medical issues. He presented with  erythema and drainage from LUE graft site.    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 Likely 6 weeks abx with HD. I/D with [facility] today.   Edited by: Yoan Perez MD at 3/25/2025 5959      Physical Exam:  Temp:  [97.3 °F (36.3 °C)-98.2 °F (36.8 °C)] 97.7 °F (36.5 °C)  Heart Rate:  [60-72] 60  Resp:  [16] 16  BP: (104-122)/(55-67) 104/67  Body mass index is 25.65 kg/m².  Physical Exam  Constitutional:       General: He is not in acute distress.  HENT:      Head: Normocephalic and atraumatic.   Cardiovascular:      Rate and Rhythm: Normal rate and regular rhythm.   Pulmonary:      Effort: Pulmonary effort is normal. No respiratory distress.   Abdominal:      General: There is no distension.      Palpations: Abdomen is soft.      Tenderness: There is no abdominal tenderness.   Musculoskeletal:      Comments: RUE AV graft    Skin:     Comments: RUE AV graft    Neurological:      Mental Status: He is alert.         Consultants     Consult Orders (all) (From admission, onward)       Start     Ordered    03/24/25 0812  Inpatient Infectious Diseases Consult  Once        Specialty:  Infectious Diseases  Provider:  Ney Vivas MD    03/24/25 0811    03/23/25 1705  [facility] (on-call MD unless specified) Details  Once        Specialty:  Hospitalist  Provider:  (Not yet assigned)    03/23/25 1705    03/23/25 1705  Nephrology (on -call MD unless specified)  Once        Specialty:  Nephrology  Provider:  Myra Moore MD    03/23/25 1705    03/23/25 1507  Inpatient Vascular Surgery Consult  Once        Specialty:  Vascular Surgery  Provider:  Bill Deal MD    03/23/25 1507                  Procedures     Imaging Results (All)       Procedure Component Value Units Date/Time    XR Chest 1 View [[id number]] Collected: 03/25/25 1229     Updated: 03/25/25 1234    Narrative:      XR CHEST 1 VW-        INDICATION: Dialysis catheter placement     COMPARISON: Chest radiograph March 23, 2025     TECHNIQUE: 1 view     FINDINGS:       Vascular congestion. Ill-defined perihilar and basilar opacities. Stable  mediastinum. Elevated right hemidiaphragm. Cholecystectomy clips. Left  axillary stents. Right IJ catheter with catheter tip in the distal SVC.       Impression:         1. Interval placement of right IJ catheter with the catheter tip in the  distal SVC. No pneumothorax.  2. Vascular congestion and possible edema.  3. Elevated right hemidiaphragm with suspected atelectasis at the right  lung base     This report was finalized on 3/25/2025 12:31 PM by Dr. Bao Drake M.D on Workstation: [id number]       Arteriogram (Autofinalize) [[id number]] Resulted: 03/25/25 1132     Updated: 03/25/25 1132    Narrative:      This procedure was auto-finalized with no dictation required.    CT Upper Extremity Left With Contrast [[id number]] Collected: 03/23/25 1754     Updated: 03/23/25 1819    Narrative:      CT UPPER EXTREMITY LEFT W CONTRAST-     HISTORY: Clinical concern for infected left upper extremity fistula.     TECHNIQUE: Radiation dose reduction techniques were utilized, including  automated exposure control and exposure modulation based on body size. 2  mm images were obtained through the left upper arm after the  administration of IV contrast.     COMPARISON: CT chest 2/21/2015        FINDINGS: Patent visualized left axillary and brachial arteries.  Proximalmost left axillary artery is outside the exam field-of-view. 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 Proximal  most portions of the fistulas are outside the exam field-of-view. One is  thrombosed with a partially visualized stent proximally (series 3/image  47). This fistula appears ligated distally and aneurysmal distally  measuring up to 4 cm (series 6/image 17). The second fistula extends  from the left axilla to the left elbow and is patent where seen. The  proximal most portion of the fistula is outside the exam field-of-view.  A 3.6 x 2.3 cm focus of subcutaneous  fluid density in the inner mid left  upper arm extends from the thickened skin surface to abut the fistula  (series 3/image 93). No enhancing wall or internal locules of air. No  soft tissue air/gas. Normal mostly visualized left humerus. Advanced  left acromioclavicular osteoarthritis. Nonaggressive appearing sharply  demarcated left scapular lytic focus interposed between the left glenoid  and coracoid process (series 2/image 28). This was present in 2015 and  similarly sized, although less well-defined. 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   Proximal most portions of the fistulas are outside the exam  field-of-view. A 3.6 x 2.3 cm focus of subcutaneous fluid density in the  inner mid left upper arm extends from the thickened skin surface to abut  the patent fistula. No enhancing wall or internal locules of air.  Recommend clinical correlation for findings to suggest infection at this  site. In the absence of recent surgery, fluid collection raises concern  for infection. The other thrombosed fistula is stented proximally and  appears ligated distally. The distal aspect of this fistula is  aneurysmal measuring up to 4 cm.     This report was finalized on 3/23/2025 6:16 PM by Dr. Bill Minaya M.D on Workstation: [id number]       XR Chest 1 View [[id number]] Collected: 03/23/25 1744     Updated: 03/23/25 1749    Narrative:      CXR ONE VIEW      HISTORY: Orthopnea; L03.114-Cellulitis of left upper limb;  L02.414-Cutaneous abscess of left upper limb; T82.9XXA-Unspecified  complication of cardiac and vascular prosthetic device, implant and  graft, initial encounter; N18.6-End stage renal disease;  Z99.2-Dependence on renal dialysis; A41.9-Sepsis, unspecified organism;  E87.7-Kclb-dlmrmcwpnr and hyponatremia     COMPARISON: Chest x-ray 1/30/2025     TECHNIQUE: single portable AP       Impression:         Redemonstrated mild  "cardiomegaly and marked elevation of the right  hemidiaphragm.     Mild bilateral central vascular congestion and interstitial prominence,  no pneumothorax.     Question small right effusion.     This report was finalized on 3/23/2025 5:45 PM by Dr. Tab Tolbert M.D on Workstation: [id number]               Pertinent Labs     Results from last 7 days   Lab Units 04/03/25 0435 04/02/25 0339 04/01/25 0347 03/31/25  0407   WBC 10*3/mm3 3.19* 4.00 3.38* 4.67   HEMOGLOBIN g/dL 7.9* 8.0* 7.3* 7.9*   PLATELETS 10*3/mm3 88* 73* 80* 73*     Results from last 7 days   Lab Units 04/03/25 0435 04/02/25 0339 04/01/25 0347 03/31/25  0407   SODIUM mmol/L 134* 131* 133* 127*   POTASSIUM mmol/L 4.1 4.7 4.2 5.1   CHLORIDE mmol/L 102 100 100 95*   CO2 mmol/L 20.0* 19.7* 21.9* 17.9*   BUN mg/dL 32* 51* 35* 59*   CREATININE mg/dL 4.87* 6.93* 5.53* 7.10*   GLUCOSE mg/dL 121* 130* 125* 135*   Estimated Creatinine Clearance: 14.1 mL/min (A) (by C-G formula based on SCr of 4.87 mg/dL (H)).  Results from last 7 days   Lab Units 04/03/25 0435 04/02/25 0339 04/01/25 0347 03/31/25 0407 03/30/25  0423   ALBUMIN g/dL 2.5* 2.6* 2.5* 2.7* 2.6*   BILIRUBIN mg/dL  --   --   --   --  0.6   ALK PHOS U/L  --   --   --   --  251*   AST (SGOT) U/L  --   --   --   --  36   ALT (SGPT) U/L  --   --   --   --  <5     Results from last 7 days   Lab Units 04/03/25 0435 04/02/25 0339 04/01/25 0347 03/31/25  0407   CALCIUM mg/dL 8.0* 8.0* 7.8* 8.1*   ALBUMIN g/dL 2.5* 2.6* 2.5* 2.7*   PHOSPHORUS mg/dL 4.4 5.2* 4.7* 6.0*               Invalid input(s): \"LDLCALC\"        Test Results Pending at Discharge       Discharge Details        Discharge Medications        New Medications        Instructions Start Date   ceFAZolin 1000 mg IVPB in 100 mL NS (MBP)   1,000 mg, Intravenous, Every 24 Hours             Changes to Medications        Instructions Start Date   midodrine 2.5 MG tablet  Commonly known as: PROAMATINE  What changed: Another medication " with the same name was added. Make sure you understand how and when to take each.   2.5 mg, Oral, 3 Times Weekly, Monday, Wednesday, Friday on days of hemodialysis      midodrine 2.5 MG tablet  Commonly known as: PROAMATINE  What changed: You were already taking a medication with the same name, and this prescription was added. Make sure you understand how and when to take each.   2.5 mg, Oral, 3 Times Daily PRN             Continue These Medications        Instructions Start Date   acetaminophen 325 MG tablet  Commonly known as: TYLENOL   650 mg, Oral, Every 4 Hours PRN      alfuzosin 10 MG 24 hr tablet  Commonly known as: UROXATRAL   1 tablet, Every Other Day      aspirin 81 MG tablet   1 tablet, Nightly      cholecalciferol 25 MCG (1000 UT) tablet  Commonly known as: VITAMIN D3   1 tablet, Daily      latanoprost 0.005 % ophthalmic solution  Commonly known as: XALATAN   1 drop, Nightly      lidocaine-prilocaine 2.5-2.5 % cream  Commonly known as: EMLA   1 Application, As Needed      Loratadine 10 MG capsule   1 capsule, Daily      MIRCERA IJ   1 dose, Every 30 Days      Pepcid AC 10 MG tablet  Generic drug: famotidine   1 tablet, Daily PRN      polyethylene glycol 17 g packet  Commonly known as: MIRALAX   17 g, Oral, Daily PRN      Umm-Peter tablet   1 tablet, Daily      sennosides-docusate 8.6-50 MG per tablet  Commonly known as: PERICOLACE   2 tablets, Oral, 2 Times Daily PRN      sevelamer 800 MG tablet  Commonly known as: RENAGEL   800 mg, 3 Times Daily With Meals      sodium chloride 0.65 % nasal spray   2 sprays, Nightly      VENOFER IV   1 dose, As Needed             Stop These Medications      mupirocin 2 % ointment  Commonly known as: BACTROBAN     Velphoro 500 MG chewable tablet  Generic drug: Sucroferric Oxyhydroxide              Allergies   Allergen Reactions    Ace Inhibitors Other (See Comments)     RENAL FAILURE/ raised creatinine    Contrast Dye (Echo Or Unknown Ct/Mr) Other (See Comments) and  "Anaphylaxis     RENAL FAILURE    Iodine Anaphylaxis    Angiotensin Receptor Blockers Swelling    Eliquis [Apixaban] Arrhythmia     BLEEDING ISSUES; PT CANNOT TAKE ANY ANTICOAGULANTS DUE TO LOW PLATELETS EXCEPT ASPIRIN      Filgrastim GI Intolerance and Confusion     Chest pain    Heparin Other (See Comments)     \"It causes me to bleed out\"    Keflex [Cephalexin] Diarrhea     RENAL FAILURE    Other Angioedema     IVP Dye         Discharge Disposition:  Home or Self Care    Discharge Diet:  Diet Order   Procedures    Diet: Renal; Low Potassium, Low Sodium (2-3g); Fluid Consistency: Thin (IDDSI 0)       Discharge Activity:   Activity Instructions       Activity as Tolerated              CODE STATUS:    Code Status and Medical Interventions: CPR (Attempt to Resuscitate); Full Support   Ordered at: 03/25/25 9503     Code Status (Patient has no pulse and is not breathing):    CPR (Attempt to Resuscitate)     Medical Interventions (Patient has pulse or is breathing):    Full Support     Level Of Support Discussed With:    Patient       Future Appointments   Date Time Provider Department Center   4/8/2025  3:00 PM LAB CHAIR 5 [facility]  [facility]   4/8/2025  3:20 PM Theo Pacheco MD [facility]   4/11/2025 11:30 AM Marcelina Romero MD [facility] PC [facility]   4/17/2025 11:15 AM Bill Deal MD [facility] VS [facility]   5/22/2025  2:15 PM [facility] OP VAS RM 3  ALEX [facility]   5/22/2025  3:00 PM Rosanna Saul APRN [facility] VS [facility]   9/4/2025  1:15 PM [facility] LCG ECHO/VAS RM 1  [facility] ECHO [facility]   9/4/2025  1:45 PM Dale Vázquez MD [facility] CD [id number] [facility]     Additional Instructions for the Follow-ups that You Need to Schedule       Ambulatory Referral to Home Health   As directed      Face to Face Visit Date: 4/3/2025   Follow-up provider for Plan of Care?: I treated the patient in an acute care facility and will not continue treatment after discharge.   Follow-up provider: MARCELINA ROMERO [[id number]]   Reason/Clinical Findings: Sepsis, " mobility below baseline, LACE score 15, wound from I&D of Left arm abscess   Describe mobility limitations that make leaving home difficult: Pt unable to drive   Nursing/Therapeutic Services Requested: Skilled Nursing Physical Therapy   Skilled nursing orders: Medication education Wound care dressing/changes Cardiopulmonary assessments   PT orders: Therapeutic exercise Strengthening Home safety assessment   Frequency: 1 Week 1        Discharge Follow-up with PCP   As directed       Currently Documented PCP:    Bharathi De La Torre MD    PCP Phone Number:    [phone]     Follow Up Details: 1-2 weeks        Discharge Follow-up with Specified Provider: Dr Deal as directed   As directed      To: Dr Deal as directed        Discharge Follow-up with Specified Provider: monse as directed   As directed      To: shaunnaology as directed               Contact information for follow-up providers       Bharathi De La Torre MD .    Specialty: Internal Medicine  Why: 1-2 weeks  Contact information:  [address]  [address]  [facility] [address]  [phone]               Myra Moore MD. Call.    Specialty: Nephrology  Why: Kidney doctor:  please call their office to ask when they want to see you for an appointment  Contact information:  [address] [facility]  [address]  [place] [id number]  [phone]               Bill Deal MD Follow up in 1 month(s).    Specialty: Vascular Surgery  Contact information:  [address]  [place] [id number]  [phone]                       Contact information for after-discharge care       Dialysis/Infusion       [facility] .    Service: In-Center Hemodialysis  Contact information:  [address]  [address]  [facility] [address]  [phone]                     Home Medical Care       [facility] .    Services: Home Health Services, Home Rehabilitation, Home Nursing  Contact information:  [address]  [address]  [phone]                                   Additional Instructions for the Follow-ups that You Need to Schedule       Ambulatory Referral to Home Health   As directed      Face to Face Visit Date: 4/3/2025   Follow-up provider for Plan of Care?: I treated the patient in an acute care facility and will not continue treatment after discharge.   Follow-up provider: MARCELINA ROMERO [[id number]]   Reason/Clinical Findings: Sepsis, mobility below baseline, LACE score 15, wound from I&D of Left arm abscess   Describe mobility limitations that make leaving home difficult: Pt unable to drive   Nursing/Therapeutic Services Requested: Skilled Nursing Physical Therapy   Skilled nursing orders: Medication education Wound care dressing/changes Cardiopulmonary assessments   PT orders: Therapeutic exercise Strengthening Home safety assessment   Frequency: 1 Week 1        Discharge Follow-up with PCP   As directed       Currently Documented PCP:    Marcelina Romero MD    PCP Phone Number:    [phone]     Follow Up Details: 1-2 weeks        Discharge Follow-up with Specified Provider: Dr Deal as directed   As directed      To: Dr Deal as directed        Discharge Follow-up with Specified Provider: monse as directed   As directed      To: monse as directed            Time Spent on Discharge:  Greater than 30 minutes      Shaquille Cosby MD  [facility]  04/03/25  10:59 EDT

## 2025-04-03 NOTE — PLAN OF CARE
Problem: Fall Injury Risk  Goal: Absence of Fall and Fall-Related Injury  Outcome: Progressing  Intervention: Promote Injury-Free Environment  Recent Flowsheet Documentation  Taken 4/3/2025 0000 by Grace Harvey RN  Safety Promotion/Fall Prevention: safety round/check completed  Taken 4/2/2025 2200 by Grace Harvey RN  Safety Promotion/Fall Prevention: safety round/check completed  Taken 4/2/2025 2000 by Grace Harvey RN  Safety Promotion/Fall Prevention: safety round/check completed  Goal: Absence of Fall and Fall-Related Injury  Outcome: Progressing  Intervention: Promote Injury-Free Environment  Recent Flowsheet Documentation  Taken 4/3/2025 0000 by Grace Harvey RN  Safety Promotion/Fall Prevention: safety round/check completed  Taken 4/2/2025 2200 by Grace Harvey RN  Safety Promotion/Fall Prevention: safety round/check completed  Taken 4/2/2025 2000 by Grace Harvey RN  Safety Promotion/Fall Prevention: safety round/check completed     Problem: Adult Inpatient Plan of Care  Goal: Plan of Care Review  Outcome: Progressing  Goal: Patient-Specific Goal (Individualized)  Outcome: Progressing  Goal: Absence of Hospital-Acquired Illness or Injury  Outcome: Progressing  Intervention: Identify and Manage Fall Risk  Recent Flowsheet Documentation  Taken 4/3/2025 0000 by Grace Harvey RN  Safety Promotion/Fall Prevention: safety round/check completed  Taken 4/2/2025 2200 by Grace Harvey RN  Safety Promotion/Fall Prevention: safety round/check completed  Taken 4/2/2025 2000 by Grace Harvey RN  Safety Promotion/Fall Prevention: safety round/check completed  Intervention: Prevent Skin Injury  Recent Flowsheet Documentation  Taken 4/2/2025 2200 by Grace Harvey RN  Body Position: position changed independently  Taken 4/2/2025 2000 by Grace Harvey RN  Body Position: position changed independently  Goal: Optimal Comfort and Wellbeing  Outcome: Progressing  Intervention: Provide Person-Centered Care  Recent  Flowsheet Documentation  Taken 4/2/2025 2000 by Grace Harvey RN  Trust Relationship/Rapport: care explained  Goal: Readiness for Transition of Care  Outcome: Progressing     Problem: Sepsis/Septic Shock  Goal: Optimal Coping  Outcome: Progressing  Goal: Absence of Bleeding  Outcome: Progressing  Goal: Blood Glucose Level Within Target Range  Outcome: Progressing  Goal: Absence of Infection Signs and Symptoms  Outcome: Progressing  Intervention: Promote Recovery  Recent Flowsheet Documentation  Taken 4/2/2025 2000 by Grace Harvey RN  Activity Management: activity encouraged  Goal: Optimal Nutrition Delivery  Outcome: Progressing     Problem: Skin Injury Risk Increased  Goal: Skin Health and Integrity  Outcome: Progressing  Intervention: Optimize Skin Protection  Recent Flowsheet Documentation  Taken 4/2/2025 2000 by Grace Harvey, RN  Activity Management: activity encouraged   Goal Outcome Evaluation:

## 2025-04-03 NOTE — PROGRESS NOTES
[facility] Progress Note      Patient Name: Bill Landry  : 1945  MRN: [id number]  Primary Care Physician:  Bharathi De La Torre MD  Date of admission: 3/23/2025    Subjective     Interval History:   ESRD on HD MWF  Tunneled dialysis catheter removed  No new issues noted today.  Afebrile.  Denies any nausea or vomiting.  No fever no chills      Review of Systems:   As noted above    Objective     Vitals:   Temp:  [97.3 °F (36.3 °C)-98.2 °F (36.8 °C)] 97.7 °F (36.5 °C)  Heart Rate:  [60-72] 60  Resp:  [16] 16  BP: (104-122)/(55-67) 104/67  Flow (L/min) (Oxygen Therapy):  [2] 2    Intake/Output Summary (Last 24 hours) at 4/3/2025 1001  Last data filed at 4/3/2025 0548  Gross per 24 hour   Intake 400 ml   Output 2000 ml   Net -1600 ml       Physical Exam:    General Appearance: alert, oriented x 3, no acute distress   Skin: warm and dry  HEENT: oral mucosa normal, nonicteric sclera  Neck: supple, no JVD  Lungs: CTA  Heart: RRR, normal S1 and S2  Abdomen: soft, nontender, nondistended  : no palpable bladder  Extremities: Bilateral lower extremity edema  Neuro: normal speech and mental status   Left upper extremity AV graft.  Left upper extremity wound covered    Scheduled Meds:     b complex-vitamin c-folic acid, 1 tablet, Oral, Daily  ceFAZolin, 1,000 mg, Intravenous, Q24H  cetirizine, 10 mg, Oral, Daily  cholecalciferol, 1,000 Units, Oral, Daily  famotidine, 20 mg, Oral, Daily  latanoprost, 1 drop, Both Eyes, Nightly  mupirocin, 1 Application, Topical, Q12H  sevelamer, 800 mg, Oral, TID With Meals  silver nitrate, 1 Application, Topical, Once  sodium chloride, 10 mL, Intravenous, Q12H  sodium chloride, 10 mL, Intravenous, Q12H  tamsulosin, 0.4 mg, Oral, Nightly      IV Meds:          Results Reviewed:   I have personally reviewed the results from the time of this admission to 4/3/2025 10:01 EDT     Results from last 7 days   Lab Units 25  0435 25  0339 25  0347  03/31/25  0407 03/30/25  0423   SODIUM mmol/L 134* 131* 133*   < > 129*   POTASSIUM mmol/L 4.1 4.7 4.2   < > 5.1   CHLORIDE mmol/L 102 100 100   < > 98   CO2 mmol/L 20.0* 19.7* 21.9*   < > 19.0*   BUN mg/dL 32* 51* 35*   < > 47*   CREATININE mg/dL 4.87* 6.93* 5.53*   < > 6.25*   CALCIUM mg/dL 8.0* 8.0* 7.8*   < > 8.1*   BILIRUBIN mg/dL  --   --   --   --  0.6   ALK PHOS U/L  --   --   --   --  251*   ALT (SGPT) U/L  --   --   --   --  <5   AST (SGOT) U/L  --   --   --   --  36   GLUCOSE mg/dL 121* 130* 125*   < > 115*    < > = values in this interval not displayed.       Estimated Creatinine Clearance: 14.1 mL/min (A) (by C-G formula based on SCr of 4.87 mg/dL (H)).    Results from last 7 days   Lab Units 04/03/25  0435 04/02/25  0339 04/01/25  0347   PHOSPHORUS mg/dL 4.4 5.2* 4.7*             Results from last 7 days   Lab Units 04/03/25  0435 04/02/25  0339 04/01/25  0347 03/31/25  0407 03/30/25  0423   WBC 10*3/mm3 3.19* 4.00 3.38* 4.67 4.75   HEMOGLOBIN g/dL 7.9* 8.0* 7.3* 7.9* 8.1*   PLATELETS 10*3/mm3 88* 73* 80* 73* 76*       Results from last 7 days   Lab Units 03/29/25  1648   INR  1.50*       Assessment / Plan     ASSESSMENT:  ESRD: On maintenance hemodialysis on Monday Wednesday and Friday   Left arm AV graft with recent bleeding and later drainage from incision site (had had recent shuntogram).  Elevated lactate and procalcitonin; normal white blood cell count (though chronic leukopenia).  CT scan of the left arm does reveal a fluid density that was cultured by vascular surgery  status post incision and drainage on 3/25/2024  Crohn's disease s/p bowel resection with ileostomy  Cirrhosis with chronic leukopenia and thrombocytopenia  Atrial fibrillation  Chronic hypotension of ESRD:  on midodrine  Hyponatremia secondary to volume overload.  Improving on fluid restriction  Thrombocytopenia chronic   Bleeding from tunneled dialysis catheter.  Resolved after removal of tunneled dialysis  catheter        PLAN:  Okay to discharge home from nephrology standpoint  Continue midodrine as needed and on dialysis days  Surveillance labs  Thank you for involving us in the care of Bill Landry.  Please feel free to call with any questions.    Myra Moore MD  04/03/25  10:01 EDT    [facility]  [phone]    Parts of this note may be an electronic transcription/translation of spoken language to printed text using the Dragon dictation system.

## 2025-04-03 NOTE — CASE MANAGEMENT/SOCIAL WORK
Case Management Discharge Note      Final Note: Home with wife and Boogie MARTÍNEZ. Continue HD MWF at [facility]. Notified [facility] that pt is D/C today and will be at HD there tomorrow. Verified they have antibiotic orders and antibiotic. Per ID, antibiotics with HD. 2 g cefazolin Monday after dialysis and 2 g cefazolin Wednesday after dialysis. Dose on Friday would be 3 g cefazolin after dialysis. End date will be May 6. Pt walked 150 ft with PT this AM. Family transport home.    Provided Post Acute Provider List?: N/A  N/A Provider List Comment: current with Boogie   Provided Post Acute Provider Quality & Resource List?: N/A  N/A Quality & Resource List Comment: current with Boogie     Selected Continued Care - Admitted Since 3/23/2025       Destination    No services have been selected for the patient.                Durable Medical Equipment    No services have been selected for the patient.                Dialysis/Infusion Coordination complete.      Service Provider Services Address Phone Fax Patient Preferred    [facility] Hem[facility] [address] [id number] [phone] --              Home Medical Care Coordination complete.      Service Provider Services Address Phone Fax Patient Preferred    [facility] Services, Home Rehabilitation, Home Nursing [address] [place] [id number] [phone] --              Therapy    No services have been selected for the patient.                Community Resources    No services have been selected for the patient.                [facility]    No services have been selected for the patient.                    Selected Continued Care - Prior Encounters Includes continued care and service providers with selected services from prior encounters from 12/23/2024 to 4/3/2025      Discharged on 1/31/2025 Admission date: 1/25/2025 -  Discharge disposition: Skilled Nursing Facility (DC - External)      Destination       Service Provider Services Address Phone Fax Patient Preferred    [facility] [address], [facility] [id number] [phone] --                          Transportation Services  Private: Car    Final Discharge Disposition Code: 06 - home with home health care

## 2025-04-03 NOTE — OUTREACH NOTE
Prep Survey      Flowsheet Row Responses   [facility] patient discharged from? [place]   Is LACE score < 7 ? No   Eligibility [facility]   Date of Admission 03/23/25   Date of Discharge 04/03/25   Discharge Disposition Home-Health Care [facility]   Discharge diagnosis Sepsis due to cellulitis   Does the patient have one of the following disease processes/diagnoses(primary or secondary)? Sepsis   Does the patient have Home health ordered? Yes   What is the Home health agency?  [facility]   Prep survey completed? Yes            Ronna ROTHMAN - Registered Nurse

## 2025-04-03 NOTE — PLAN OF CARE
No significant overnight events. No active bleeding. Continue iv abx outpatient with dialysis. Plan for d/c today. Needs met at this time.

## 2025-04-03 NOTE — PLAN OF CARE
Goal Outcome Evaluation:  Plan of Care Reviewed With: patient           Outcome Evaluation: Patient seen for PT reeval this AM prior to d/c due to family concerns with mobility. Patient completed all bed mobility mod(I). Patient stood and ambulated 150ft around unit with rwx and SBA. Gait steady with no overt LOB noted. Patient sitting UIC at end of session. Patient is current with HHPT per spouse at bedside. Recommend continue at d/c. Acute PT will sign off.    Anticipated Discharge Disposition (PT): home with assist, home with home health

## 2025-04-03 NOTE — PROGRESS NOTES
[facility]   Surgical Progress Note    Patient Name: Bill Landry  : 1945  MRN: [id number]  Date of admission: 3/23/2025  Surgical Procedures Since Admission:  Procedure(s):  HEMODIALYSIS CATHETER INSERTION, left arm I&D  INCISION AND DRAINAGE UPPER EXTREMITY  Surgeon:  Bill Deal MD  Status:  9 Days Post-Op  -------------------    Subjective   Subjective     Chief Complaint: Possible left AV graft infection    History of Present Illness   79-year-old gentleman with possible left AV graft infection now status post I&D with no visible graft at the base of the wound.  Cultures showed MSSA.  Arm wound is almost healed.  Will continue Bactroban to this area and skin tears to keep her more bleeding.  Overall looking very good for discharge with the use of AV graft going forward.    Review of Systems was okay.  Awaiting clearance by physical therapy  Objective   Objective     Vitals:   Temp:  [97.7 °F (36.5 °C)-98.2 °F (36.8 °C)] 97.7 °F (36.5 °C)  Heart Rate:  [60-72] 60  Resp:  [16] 16  BP: (104-122)/(55-67) 104/67  Flow (L/min) (Oxygen Therapy):  [2] 2  Output by Drain (mL) 25 0701 - 25 1900 25 1901 - 25 0700 25 0701 - 25 1335 Range Total   Ileostomy LLQ 50   50       Physical Exam, right chest clear.  Left arm with no real packable site. Clear without cellulitis or drainage    Result Review    Result Review:  I have personally reviewed the results from the time of this admission to 4/3/2025 13:35 EDT and agree with these findings:  [x]  Laboratory list / accordion  [x]  Microbiology  []  Radiology  [x]  EKG/Telemetry   []  Cardiology/Vascular   []  Pathology  []  Old records  []  Other:  Most notable findings include: Hemoglobin 8  Results from last 7 days   Lab Units 25  0435 25  0339 25  0347 25  0407 25  0423 25  1010   WBC 10*3/mm3 3.19* 4.00 3.38* 4.67 4.75 4.35   HEMOGLOBIN g/dL 7.9* 8.0* 7.3* 7.9* 8.1* 8.4*  8.4*    PLATELETS 10*3/mm3 88* 73* 80* 73* 76* 95*     Results from last 7 days   Lab Units 04/03/25  0435 04/02/25  0339 04/01/25  0347 03/31/25  0407 03/30/25  0423   SODIUM mmol/L 134* 131* 133* 127* 129*   POTASSIUM mmol/L 4.1 4.7 4.2 5.1 5.1   CHLORIDE mmol/L 102 100 100 95* 98   CO2 mmol/L 20.0* 19.7* 21.9* 17.9* 19.0*   BUN mg/dL 32* 51* 35* 59* 47*   CREATININE mg/dL 4.87* 6.93* 5.53* 7.10* 6.25*   GLUCOSE mg/dL 121* 130* 125* 135* 115*   PHOSPHORUS mg/dL 4.4 5.2* 4.7* 6.0*  --    Estimated Creatinine Clearance: 14.1 mL/min (A) (by C-G formula based on SCr of 4.87 mg/dL (H)).  Results from last 7 days   Lab Units 03/29/25  1648   PROTIME Seconds 18.1*   INR  1.50*     Lab Results   Component Value Date    HGBA1C 5.3 02/03/2025    HGBA1C 4.80 01/26/2025    HGBA1C 5.40 09/17/2024     Glucose   Date/Time Value Ref Range Status   03/31/2025 1706 181 (H) 70 - 130 mg/dL Final         Assessment & Plan   Assessment / Plan     Brief Patient Summary:  Bill Landry is a 79 y.o. male who appears to have staph growing from wound that does not appear to directly communicate to his AV graft.  We are going to try to sterilize it with the 6 weeks of antibiotic therapy.  Chest drainage is stopped with catheter out.  Did well with dialysis yesterday.  Plans for discharge today.    Will transition to Bactroban for the wound itself.  Active Hospital Problems:   Active Hospital Problems    Diagnosis     **Sepsis due to cellulitis     ESRD on dialysis     Problem with vascular access     Anemia due to stage 4 chronic kidney disease treated with erythropoietin     Cirrhosis of liver without ascites     Permanent atrial fibrillation      Plan:   Discharge plan per [facility].  We will sign off.  Follow-up in 1 month.    VTE Prophylaxis:  Mechanical VTE prophylaxis orders are present.        Bill Deal MD

## 2025-04-04 ENCOUNTER — TRANSITIONAL CARE MANAGEMENT TELEPHONE ENCOUNTER (OUTPATIENT)
Dept: CALL CENTER | Facility: HOSPITAL | Age: 80
End: 2025-04-04
Payer: MEDICARE

## 2025-04-04 NOTE — OUTREACH NOTE
Call Center TCM Note      Flowsheet Row Responses   [facility] patient discharged from? [place]   Does the patient have one of the following disease processes/diagnoses(primary or secondary)? Sepsis   TCM attempt successful? Yes   Call start time 1139   Call end time 1144   Discharge diagnosis Sepsis due to cellulitis   Meds reviewed with patient/caregiver? Yes   Is the patient having any side effects they believe may be caused by any medication additions or changes? No   Does the patient have all medications related to this admission filled (includes all antibiotics, inhalers, nebulizers,steroids,etc.) Yes   Is the patient taking all medications as directed (includes completed medication regime)? Yes   Comments HOSP DC FU appt 4/11/25 1130 am   Does the patient have an appointment with their PCP within 7-14 days of discharge? Yes   What is the Home health agency?  [facility]   Has home health visited the patient within 72 hours of discharge? Call prior to 72 hours   Psychosocial issues? No   Did the patient receive a copy of their discharge instructions? Yes   Nursing interventions Reviewed instructions with patient   What is the patient's perception of their health status since discharge? Improving   Nursing interventions Nurse provided patient education   Is the patient/caregiver able to teach back TIME? M ental Decline - confused, sleepy, difficult to arouse, I nfection - may have signs and symptoms of an infection, T emperature - higher or lower than normal, E xtremely Ill - severe pain, discomfort, shortness of breath   Nursing interventions Nurse provided patient education   Is patient/caregiver able to teach back steps to recovery at home? Set small, achievable goals for return to baseline health, Rest and regain strength, Eat a balanced diet   Is the patient/caregiver able to teach back signs and symptoms of worsening condition: Fever, Hyperthermia, Rapid heart rate (>90),  Shortness of breath/rapid respiratory rate, Altered mental status(confusion/coma)   Is the patient/caregiver able to teach back the hierarchy of who to call/visit for symptoms/problems? PCP, Specialist, Home health nurse, Urgent Care, ED, 911 Yes   TCM call completed? Yes   Wrap up additional comments Wife reports Improvement with Pt. No needs.   Call end time 1144            SARAHY STEPHENSON - Registered Nurse    4/4/2025, 11:44 EDT

## 2025-04-07 ENCOUNTER — TELEPHONE (OUTPATIENT)
Age: 80
End: 2025-04-07
Payer: MEDICARE

## 2025-04-07 NOTE — TELEPHONE ENCOUNTER
I spoke with Gillian and let her know that there is not a scan needed with the f/u apt on 4/17/25, as this apt is for a wound check from the I&D while at [facility].

## 2025-04-07 NOTE — TELEPHONE ENCOUNTER
Caller: Gillian Landry    Relationship to patient: Emergency Contact    Best call back number: [phone]     Chief complaint: PT TRYING TO SCHEDULE POST OP AND SCAN FOR 1 MONTH FOLLOW UP FROM 4/3/25 NO AVAILABILITY UNTIL AFTER 1 MONTH- PT SPOUSE ALSO THINKS PT WILL NEED A SCAN WITH POST OP- NO MENTION OF THIS IN DISCHARGE NOTES OR CURRENT ORDERS PLACED AT THIS TIME. PT SPOUSE WOULD APPRECIATE A CALL BACK- THANK YOU     Type of visit: POST OP

## 2025-04-11 ENCOUNTER — NURSE TRIAGE (OUTPATIENT)
Dept: CALL CENTER | Facility: HOSPITAL | Age: 80
End: 2025-04-11
Payer: MEDICARE

## 2025-04-11 NOTE — TELEPHONE ENCOUNTER
Patient called back for further clarification. Reviewed prescription again with patient's wife. Advised to clarify with Infectious Disease.

## 2025-04-11 NOTE — TELEPHONE ENCOUNTER
Patient was prescribed Cefazolin IV at discharge. Reviewed prescription with patient's wife. Prescription was written for 39 doses, given every 24 hours. He received 6 doses while in the hospital, which would leave 33 subsequent doses to receive at dialysis. He has dialysis M-W-F.      Reason for Disposition   [1] Caller has NON-URGENT medicine question about med that PCP prescribed AND [2] triager unable to answer question    Additional Information   Negative: [1] Intentional drug overdose AND [2] suicidal thoughts or ideas   Negative: Drug overdose and triager unable to answer question   Negative: Caller requesting a renewal or refill of a medicine patient is currently taking   Negative: Caller requesting information unrelated to medicine   Negative: Caller requesting information about COVID-19 Vaccine   Negative: Caller requesting information about Emergency Contraception   Negative: Caller requesting information about Combined Birth Control Pills   Negative: Caller requesting information about Progestin Birth Control Pills   Negative: Caller requesting information about Post-Op pain or medicines   Negative: Caller requesting a prescription antibiotic (such as Penicillin) for Strep throat and has a positive culture result   Negative: Caller requesting a prescription anti-viral med (such as Tamiflu) and has influenza (flu) symptoms   Negative: Immunization reaction suspected   Negative: Rash while taking a medicine or within 3 days of stopping it   Negative: [1] Asthma and [2] having symptoms of asthma (cough, wheezing, etc.)   Negative: [1] Symptom of illness (e.g., headache, abdominal pain, earache, vomiting) AND [2] more than mild   Negative: Breastfeeding questions about mother's medicines and diet   Negative: MORE THAN A DOUBLE DOSE of a prescription or over-the-counter (OTC) drug   Negative: [1] DOUBLE DOSE (an extra dose or lesser amount) of prescription drug AND [2] any symptoms (e.g., dizziness, nausea,  "pain, sleepiness)   Negative: [1] DOUBLE DOSE (an extra dose or lesser amount) of over-the-counter (OTC) drug AND [2] any symptoms (e.g., dizziness, nausea, pain, sleepiness)   Negative: Took another person's prescription drug   Negative: [1] DOUBLE DOSE (an extra dose or lesser amount) of prescription drug AND [2] NO symptoms  (Exception: A double dose of antibiotics.)   Negative: Diabetes drug error or overdose (e.g., took wrong type of insulin or took extra dose)   Negative: [1] Prescription not at pharmacy AND [2] was prescribed by PCP recently (Exception: Triager has access to EMR and prescription is recorded there. Go to Home Care and confirm for pharmacy.)   Negative: [1] Pharmacy calling with prescription question AND [2] triager unable to answer question   Negative: [1] Caller has URGENT medicine question about med that PCP or specialist prescribed AND [2] triager unable to answer question   Negative: Medicine patch causing local rash or itching   Negative: [1] Caller has medicine question about med NOT prescribed by PCP AND [2] triager unable to answer question (e.g., compatibility with other med, storage)   Negative: Prescription request for new medicine (not a refill)    Answer Assessment - Initial Assessment Questions  1. NAME of MEDICINE: \"What medicine(s) are you calling about?\"      Cefazolin  2. QUESTION: \"What is your question?\" (e.g., double dose of medicine, side effect)      Unsure of how many doses he is supposed to receive at dialysis  3. PRESCRIBER: \"Who prescribed the medicine?\" Reason: if prescribed by specialist, call should be referred to that group.      Dr. Aburto  4. SYMPTOMS: \"Do you have any symptoms?\" If Yes, ask: \"What symptoms are you having?\"  \"How bad are the symptoms (e.g., mild, moderate, severe)      Vascular graft infection  5. PREGNANCY:  \"Is there any chance that you are pregnant?\" \"When was your last menstrual period?\"      N/A    Protocols used: Medication Question " Call-ADULT-AH

## 2025-04-14 ENCOUNTER — READMISSION MANAGEMENT (OUTPATIENT)
Dept: CALL CENTER | Facility: HOSPITAL | Age: 80
End: 2025-04-14
Payer: MEDICARE

## 2025-04-14 ENCOUNTER — TELEPHONE (OUTPATIENT)
Dept: INFECTIOUS DISEASES | Facility: CLINIC | Age: 80
End: 2025-04-14
Payer: MEDICARE

## 2025-04-14 NOTE — TELEPHONE ENCOUNTER
Per discharge summary:     He will be discharged on IV cefazolin, 1 g every 24 hours for 6-week close of antibiotic therapy to be completed on May 6.

## 2025-04-14 NOTE — TELEPHONE ENCOUNTER
After receiving Dr. Pacheco's reply about IV Abx dosage I called patient's wife and informed her that patient is to receive the IV abx 3 times a week at dialysis center after his dialysis and not every day as the [facility] d/c summary had indicated. She said that she did know that but was curious about the end date.  She indicated that there was confusion about the end date for the Abx and so I reviewed Dr. Sotelo's last progress note and told her that Dr. Sotelo indicated the last day for patient to receive the IV Abx is 5/6/25. She stated understanding.

## 2025-04-14 NOTE — OUTREACH NOTE
Sepsis Week 2 Survey      Flowsheet Row Responses   [facility] patient discharged from? [place]   Does the patient have one of the following disease processes/diagnoses(primary or secondary)? Sepsis   Week 2 attempt successful? No   Unsuccessful attempts Attempt 1   Revoke Kimberly STEPHENSON - Registered Nurse

## 2025-04-14 NOTE — TELEPHONE ENCOUNTER
I just wanted to clarify since a hosp.  who had not been working on the case sent message this morning to our office about dosage & frequency of IV Abx. Hospitalist indicated in D/C summary IV Abx to be given 1g every 24 hr.    Your last progress note indicates IV Cefazolin is to be given 2g Mon & Wed post dialysis, & 3g Fri post dialysis; and the patient had been set up w/ dialysis for this.  Please advise.

## 2025-04-17 ENCOUNTER — TELEPHONE (OUTPATIENT)
Dept: INFECTIOUS DISEASES | Facility: CLINIC | Age: 80
End: 2025-04-17
Payer: MEDICARE

## 2025-04-17 ENCOUNTER — OFFICE VISIT (OUTPATIENT)
Age: 80
End: 2025-04-17
Payer: MEDICARE

## 2025-04-17 VITALS
HEIGHT: 70 IN | RESPIRATION RATE: 16 BRPM | HEART RATE: 60 BPM | SYSTOLIC BLOOD PRESSURE: 117 MMHG | BODY MASS INDEX: 25.89 KG/M2 | DIASTOLIC BLOOD PRESSURE: 65 MMHG

## 2025-04-17 DIAGNOSIS — Z78.9 PROBLEM WITH VASCULAR ACCESS: ICD-10-CM

## 2025-04-17 DIAGNOSIS — I72.1: Primary | ICD-10-CM

## 2025-04-17 DIAGNOSIS — D69.6 THROMBOCYTOPENIA: ICD-10-CM

## 2025-04-17 RX ORDER — SACCHAROMYCES BOULARDII 250 MG
250 CAPSULE ORAL 2 TIMES DAILY
Qty: 60 CAPSULE | Refills: 1 | Status: SHIPPED | OUTPATIENT
Start: 2025-04-17

## 2025-04-17 RX ORDER — ONDANSETRON 4 MG/1
4 TABLET, FILM COATED ORAL EVERY 12 HOURS PRN
Qty: 15 TABLET | Refills: 0 | Status: SHIPPED | OUTPATIENT
Start: 2025-04-17

## 2025-04-17 NOTE — TELEPHONE ENCOUNTER
"I returned wife's call. Informed her, per Dr. Sotelo, that he has sent in a script for Zofran to help with the nausea. She will also get some Immodium OTC and Gatorade. She asked about eating yogurt to help with the \"good bacteria\" and I told her it's fine to try some yogurt if he feels like eating it but not sure how much it will help at this point. They verbalized understanding of all above info. RALPH RN  "

## 2025-04-17 NOTE — TELEPHONE ENCOUNTER
Wife calling to report a lot of nausea and loose watery stool through his Illeostomy with the ABX. Wonders if they could get some Zofran called in and he will use Immodium AD and gatorade for the loose stools. Please advise. RALPH, RN

## 2025-04-17 NOTE — PROGRESS NOTES
Patient Name: Bill Landry    MRN: [id number] Encounter Date: 04/17/2025      Consulting Service: Vascular Surgery    Referring Provider: No ref. provider found       CHIEF COMPLAINT:  Chief Complaint   Patient presents with    Hemodialysis Access     Post op    Wound Check       Subjective    HPI: Bill Landry is a 79 y.o. male is being seen for evaluation/management of follow-up for end-stage renal failure and dialysis access.  Patient's current dialysis access is arteriovenous graft.  They report no current issues with their access including no issues with bleeding or poor volume flows.  Testing today includes none.  Current recommendations include long-term follow-up for stable access.    PAST MEDICAL HISTORY:   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    Aortic stenosis     Bicuspid aortic valve 07/20/2022    Calculus of kidney     Chronic leukopenia and thrombocytopenia     Cirrhosis of liver without ascites 07/24/2017    Congestive splenomegaly 07/24/2017    Crohn disease     with ileostomy    Decreased white blood cell count, unspecified     Diverticula of colon     ESRD on hemodialysis 04/05/2018    M-W-F FRESENIUS    Fatty liver disease, nonalcoholic     Fistula 04/05/2018    Other Specified Complication    Gastrointestinal hemorrhage, unspecified     GERD (gastroesophageal reflux disease)     GI bleed     Glaucoma     H/O Gallstone pancreatitis     H/O Pericardial effusion     H/O sinus bradycardia     History of hypertension     resolved    History of UTI     Hx of renal calculi     Ileostomy present     Iron deficiency     Leakage of heart valve prosthesis, subsequent encounter     MGUS (monoclonal gammopathy of unknown significance)     TATE (obstructive sleep apnea)     Other  hemoglobinopathies     Pericardial effusion (noninflammatory) 1/18/2018    Permanent atrial fibrillation     Scarlet fever, uncomplicated     Stenosis of other vascular prosthetic devices, implants and grafts, initial encounter 02/14/2022    Systemic lupus erythematosus, unspecified     Thrombocytopenia     undpecified    Type 2 diabetes mellitus     CURRENTLY TAKES NO MED    Urinary retention     Post-OP      PAST SURGICAL HISTORY:   Past Surgical History:   Procedure Laterality Date    APPENDECTOMY  06/1968    ARTERIOVENOUS FISTULA/SHUNT SURGERY Left 08/08/2017    Procedure: LEFT BRACHIAL CEPHALIC AV FISTULA FORMATION WITH CEPHALIC VEIN TRANSPOSITION ;  Surgeon: Bill Deal MD;  Location: [facility] OR;  Service:     ARTERIOVENOUS FISTULA/SHUNT SURGERY Left 05/27/2022    Procedure: OPEN REVISION LEFT ARTERIOVENOUS INTERPOSITION GRAFT PLACEMENT;  Surgeon: Bill Deal MD;  Location: [facility] OR;  Service: Vascular;  Laterality: Left;    ARTERIOVENOUS FISTULA/SHUNT SURGERY Left 11/19/2024    Procedure: LEFT ARTERIOVENOUS SHUNT GRAFT REVISION;  Surgeon: Bill Deal MD;  Location: [facility] OR;  Service: Vascular;  Laterality: Left;    ARTERIOVENOUS FISTULA/SHUNT SURGERY W/ HEMODIALYSIS CATHETER INSERTION Left 02/15/2022    Procedure: OPEN LEFT ARM ARTERIOVENOUS FISTULA THROMBECTOMY WITH CEPHALIC VEIN ARTHROPLASTY AND STENT/GRAFT PLACEMENT;  Surgeon: Bill Deal MD;  Location: [facility] OR 18/19;  Service: Vascular;  Laterality: Left;    CATARACT EXTRACTION WITH INTRAOCULAR LENS IMPLANT Bilateral 2004    CHOLECYSTECTOMY  2011    COLECTOMY PARTIAL / TOTAL      History of inflammatory bowel disease with status post colectomy with ileostomy many years ago in the [facility],  18 YEARS OF AGE    COLON SURGERY      Colon resection with colostomy    COLON SURGERY      COLONOSCOPY  01/2018    CYSTOSCOPY LITHOLAPAXY BLADDER STONE EXTRACTION      ENDOSCOPY  01/16/2015    gastritis    ENDOSCOPY   01/17/2018    Procedure: ESOPHAGOGASTRODUODENOSCOPY;  Surgeon: Warner Neville MD;  Location: [facility] ENDOSCOPY;  Service:     ILEOSCOPY  01/16/2015    normal    ILEOSCOPY N/A 01/17/2018    Procedure: ILEOSCOPY;  Surgeon: Warner Neville MD;  Location: [facility] ENDOSCOPY;  Service:     ILEOSTOMY  12/1968    loop ostomy bypass    INCISION AND DRAINAGE ARM Left 10/27/2023    Procedure: LEFT ARM INCISION AND DRAINAGE;  Surgeon: Bill Deal MD;  Location: [facility] MAIN OR;  Service: Vascular;  Laterality: Left;    INCISION AND DRAINAGE ARM Left 3/25/2025    Procedure: INCISION AND DRAINAGE UPPER EXTREMITY;  Surgeon: Bill Deal MD;  Location: [facility] HYBRID OR;  Service: Vascular;  Laterality: Left;    INSERTION HEMODIALYSIS CATHETER Right 11/13/2024    Procedure: Tunnelled HEMODIALYSIS CATHETER INSERTION;  Surgeon: Ben Barth MD;  Location: [facility] HYBRID OR;  Service: Vascular;  Laterality: Right;    INSERTION HEMODIALYSIS CATHETER N/A 3/25/2025    Procedure: HEMODIALYSIS CATHETER INSERTION, left arm I&D;  Surgeon: Bill Deal MD;  Location: [facility] HYBRID OR;  Service: Vascular;  Laterality: N/A;    OTHER SURGICAL HISTORY  2009    kidney stones x3    PSEUDOANEURYSM REPAIR Left 10/27/2023    TONSILLECTOMY  1950      FAMILY HISTORY:   Family History   Problem Relation Age of Onset    Heart failure Mother 78    Hypertension Mother     Heart disease Mother     Hyperlipidemia Mother     Hypertension Father     Other Father 82        MVI    Malig Hyperthermia Neg Hx       SOCIAL HISTORY:   Social History     Tobacco Use    Smoking status: Never     Passive exposure: Never    Smokeless tobacco: Never   Vaping Use    Vaping status: Never Used   Substance Use Topics    Alcohol use: No     Comment: caffeine use: none    Drug use: No      MEDICATIONS:   Current Outpatient Medications on File Prior to Visit   Medication Sig Dispense Refill    acetaminophen (TYLENOL) 325 MG tablet Take 2 tablets by mouth Every  4 (Four) Hours As Needed for Mild Pain.      alfuzosin (UROXATRAL) 10 MG 24 hr tablet Take 1 tablet by mouth Every Other Day. Does not take on Sundays Tuesdays or Thursdays   Indications: Benign Enlargement of Prostate      aspirin 81 MG tablet Take 1 tablet by mouth Every Night. INSTRUCTED TO CONTINUE THIS FOR SURGERY PER DR. BERMEO'S OFFICE  Indications: Disease involving Lipid Deposits in the Arteries      B Complex-C-Folic Acid (Umm-Peter) tablet Take 1 tablet by mouth Daily.      ceFAZolin 1000 mg IVPB in 100 mL NS (MBP) Infuse 1,000 mg into a venous catheter Daily for 39 doses. Indications: Vascular graft infection      Cholecalciferol 25 MCG (1000 UT) tablet Take 1 tablet by mouth Daily. Indications: Vitamin D Deficiency      famotidine (Pepcid AC) 10 MG tablet Take 1 tablet by mouth Daily As Needed for Heartburn (take as needed after HD on dialysis days). Indications: Heartburn      Iron Sucrose (VENOFER IV) Infuse 1 dose into a venous catheter As Needed.      latanoprost (XALATAN) 0.005 % ophthalmic solution Administer 1 drop to both eyes Every Night. Indications: Wide-Angle Glaucoma      lidocaine-prilocaine (EMLA) 2.5-2.5 % cream Apply 1 Application topically to the appropriate area as directed As Needed. [id number]-[id number]:  ONE HOUR PRIOR TO DIALYSIS  Indications: Anesthesia to a Specific Part of the Body  3    Loratadine 10 MG capsule Take 1 capsule by mouth Daily. Indications: Hayfever      Methoxy PEG-Epoetin Beta (MIRCERA IJ) Inject 1 dose as directed Every 30 (Thirty) Days.      midodrine (PROAMATINE) 2.5 MG tablet Take 1 tablet by mouth 3 (Three) Times a Day As Needed (on dialysis days, and if systolic blood pressure is below 100). 90 tablet 0    mupirocin (BACTROBAN) 2 % ointment Apply 1 Application topically to the appropriate area as directed 3 (Three) Times a Day. Indications: Wound Infection 30 g 2    polyethylene glycol (MIRALAX) 17 g packet Take 17 g by mouth Daily As Needed (Use if  "senna-docusate is ineffective).      sennosides-docusate (PERICOLACE) 8.6-50 MG per tablet Take 2 tablets by mouth 2 (Two) Times a Day As Needed for Constipation.      sevelamer (RENAGEL) 800 MG tablet Take 1 tablet by mouth 3 (Three) Times a Day With Meals.      sodium chloride 0.65 % nasal spray Administer 2 sprays into the nostril(s) as directed by provider Every Night.       No current facility-administered medications on file prior to visit.       ALLERGIES: Ace inhibitors, Contrast dye (echo or unknown ct/mr), Iodine, Angiotensin receptor blockers, Eliquis [apixaban], Filgrastim, Heparin, Keflex [cephalexin], and Other       Objective   Vitals:    04/17/25 1105   BP: 117/65   BP Location: Right arm   Patient Position: Sitting   Cuff Size: Adult   Pulse: 60   Resp: 16   Height: 177 cm (69.69\")     Body mass index is 25.89 kg/m².          PHYSICAL EXAM:   Physical Exam   Arm wound has healed.  No cellulitis drainage or problems with the shunt.  Right chest wound also healed and sutures removed.  Result Review   LABS:    CBC          4/1/2025    03:47 4/2/2025    03:39 4/3/2025    04:35   CBC   WBC 3.38  4.00  3.19    RBC 2.24  2.43  2.37    Hemoglobin 7.3  8.0  7.9    Hematocrit 21.9  24.0  23.4    MCV 97.8  98.8  98.7    MCH 32.6  32.9  33.3    MCHC 33.3  33.3  33.8    RDW 14.4  14.5  14.7    Platelets 80  73  88      BMP          4/4/2025    00:00 4/7/2025    00:00 4/14/2025    00:00   BMP   Creatinine 6.17     6.32     5.61          Details          This result is from an external source.             Lipid Panel          9/17/2024    08:12   Lipid Panel   Total Cholesterol 141    Triglycerides 82    HDL Cholesterol 59    VLDL Cholesterol 16    LDL Cholesterol  66       INR          11/13/2024    09:03 11/17/2024    11:25 1/25/2025    04:32 3/23/2025    15:27 3/29/2025    16:48   Common Labs   INR 1.35  1.35  1.35  1.36  1.50       A1C Last 3 Results          9/17/2024    08:12 1/26/2025    04:11 2/3/2025    " 00:00   HGBA1C Last 3 Results   Hemoglobin A1C 5.40  4.80  5.3          Details          This result is from an external source.                Results Review:       I reviewed the patient's new clinical results.    The following radiologic or non-invasive studies have been reviewed by me:   Duplex Hemodialysis Access CAR 02/13/2025    Interpretation Summary    Patent arteriovenous fistula/graft with marginal flow volumes.  Outflow at the axillary vein and with elevated velocities noted.     XR Chest 1 View  Result Date: 3/25/2025   1. Interval placement of right IJ catheter with the catheter tip in the distal SVC. No pneumothorax. 2. Vascular congestion and possible edema. 3. Elevated right hemidiaphragm with suspected atelectasis at the right lung base  This report was finalized on 3/25/2025 12:31 PM by Dr. Bao Drake M.D on Workstation: [id number]      CT Upper Extremity Left With Contrast  Result Date: 3/23/2025  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 Proximal most portions of the fistulas are outside the exam field-of-view. A 3.6 x 2.3 cm focus of subcutaneous fluid density in the inner mid left upper arm extends from the thickened skin surface to abut the patent fistula. No enhancing wall or internal locules of air. Recommend clinical correlation for findings to suggest infection at this site. In the absence of recent surgery, fluid collection raises concern for infection. The other thrombosed fistula is stented proximally and appears ligated distally. 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  This report was finalized on 3/23/2025 6:16 PM by Dr. Bill Minaya M.D on Workstation: [id number]      XR Chest 1 View  Result Date: 3/23/2025   Redemonstrated mild cardiomegaly and marked elevation of the right hemidiaphragm.  Mild bilateral central vascular congestion and interstitial prominence, no pneumothorax.  Question small right effusion.  This report was  finalized on 3/23/2025 5:45 PM by Dr. Tab Tolbert M.D on Workstation: [id number]                    ASSESSMENT/PLAN:   Diagnoses and all orders for this visit:    1. Aneurysm artery, upper extremity (Primary)    2. Thrombocytopenia    3. Problem with vascular access    Other orders  -     saccharomyces boulardii (Florastor) 250 MG capsule; Take 1 capsule by mouth 2 (Two) Times a Day.  Dispense: 60 capsule; Refill: 1       79 y.o. male with with indolent left arm infection that did not appear to be involving the graft but we are treating him for presumed AV graft infection with 6 weeks of IV Vanco.  He is doing overall well but is having a little bit of diarrhea.  It does not sound explosive but it is green.  He will watch and if it gets worse he will call back to Dr. Sotelo's office.  In the meantime I will start him on probiotics and he is felicia add cultured yogurt.  We will see him back for his planned scan on 522 and I will see him thereafter.    I discussed the plan with the patient and family who are agreeable to the plan of care at this point. Thank you for this consult.   Follow Up  Return in about 6 weeks (around 5/29/2025).    Bill Deal MD   04/17/25

## 2025-05-01 ENCOUNTER — TELEPHONE (OUTPATIENT)
Dept: INTERNAL MEDICINE | Facility: CLINIC | Age: 80
End: 2025-05-01

## 2025-05-01 NOTE — TELEPHONE ENCOUNTER
Caller: LETI ARIAS    Relationship to patient: Home Health    Best call back number: [phone]    Patient is needing: CALLING TO INFORM Bharathi De La Torre MD THAT THEY COULDN'T GET PATIENT TO ANSWER THE DOOR, LEFT A NOTE, HOPING HE WILL CALL.

## 2025-05-02 NOTE — TELEPHONE ENCOUNTER
Caller: LETI LIZARRAGA    Relationship to patient: Home Health    Best call back number: [phone]     Patient is needing: PATIENT'S WIFE CALLED IN TO RELAY THAT PATIENT IS DOING WELL, AND NOT IN NEED OF THEIR SERVICES AT PRESENT. THEY WILL NOT BE MOVING FORWARD WITH CARE UNLESS REACHED BACK OUT TO, AT WHICH POINT IT MAY BE A NEW ORDER.    PATIENT IS ON IV ANTIBIOTICS FROM THE HOSPITAL.

## 2025-05-29 ENCOUNTER — OFFICE VISIT (OUTPATIENT)
Age: 80
End: 2025-05-29
Payer: MEDICARE

## 2025-05-29 ENCOUNTER — HOSPITAL ENCOUNTER (OUTPATIENT)
Facility: HOSPITAL | Age: 80
Discharge: HOME | End: 2025-05-29
Admitting: SURGERY
Payer: MEDICARE

## 2025-05-29 VITALS
SYSTOLIC BLOOD PRESSURE: 136 MMHG | RESPIRATION RATE: 17 BRPM | BODY MASS INDEX: 25.48 KG/M2 | HEART RATE: 72 BPM | DIASTOLIC BLOOD PRESSURE: 66 MMHG | HEIGHT: 70 IN | WEIGHT: 178 LBS

## 2025-05-29 DIAGNOSIS — I72.1: Primary | ICD-10-CM

## 2025-05-29 DIAGNOSIS — N18.6 ESRD (END STAGE RENAL DISEASE) ON DIALYSIS: ICD-10-CM

## 2025-05-29 DIAGNOSIS — Z99.2 ESRD (END STAGE RENAL DISEASE) ON DIALYSIS: ICD-10-CM

## 2025-05-29 DIAGNOSIS — Z78.9 PROBLEM WITH VASCULAR ACCESS: ICD-10-CM

## 2025-05-29 DIAGNOSIS — N18.6 ESRD ON DIALYSIS: ICD-10-CM

## 2025-05-29 DIAGNOSIS — D69.6 THROMBOCYTOPENIA: ICD-10-CM

## 2025-05-29 DIAGNOSIS — Z99.2 ESRD ON DIALYSIS: ICD-10-CM

## 2025-05-29 PROCEDURE — 93990 DOPPLER FLOW TESTING: CPT

## 2025-05-29 RX ORDER — SEVELAMER CARBONATE 800 MG/1
1 TABLET, FILM COATED ORAL 3 TIMES DAILY
COMMUNITY
Start: 2025-02-17 | End: 2026-03-07

## 2025-05-29 NOTE — PROGRESS NOTES
Patient Name: Bill Landry    MRN: [id number] Encounter Date: 05/29/2025      Consulting Service: Vascular Surgery    Referring Provider: No ref. provider found       CHIEF COMPLAINT:  Chief Complaint   Patient presents with    Hemodialysis Access       Subjective    HPI: Bill Landry is a 79 y.o. male being seen for evaluation/management of follow-up for end-stage renal failure and dialysis access.  Patient's current dialysis access is arteriovenous graft.  They report no current issues with their access including no issues with bleeding or poor volume flows.  Testing today includes arteriovenous fistula or graft duplex study.  Current recommendations include fistula or shuntogram.    PAST MEDICAL HISTORY:   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    Aortic stenosis     Bicuspid aortic valve 07/20/2022    Calculus of kidney     Chronic leukopenia and thrombocytopenia     Cirrhosis of liver without ascites 07/24/2017    Congestive splenomegaly 07/24/2017    Crohn disease     with ileostomy    Decreased white blood cell count, unspecified     Diverticula of colon     ESRD on hemodialysis 04/05/2018    M-W-F FRESENIUS    Fatty liver disease, nonalcoholic     Fistula 04/05/2018    Other Specified Complication    Gastrointestinal hemorrhage, unspecified     GERD (gastroesophageal reflux disease)     GI bleed     Glaucoma     H/O Gallstone pancreatitis     H/O Pericardial effusion     H/O sinus bradycardia     History of hypertension     resolved    History of UTI     Hx of renal calculi     Ileostomy present     Iron deficiency     Leakage of heart valve prosthesis, subsequent encounter     MGUS (monoclonal gammopathy of unknown significance)     TATE (obstructive sleep apnea)     Other hemoglobinopathies      Pericardial effusion (noninflammatory) 1/18/2018    Permanent atrial fibrillation     Scarlet fever, uncomplicated     Stenosis of other vascular prosthetic devices, implants and grafts, initial encounter 02/14/2022    Systemic lupus erythematosus, unspecified     Thrombocytopenia     undpecified    Type 2 diabetes mellitus     CURRENTLY TAKES NO MED    Urinary retention     Post-OP      PAST SURGICAL HISTORY:   Past Surgical History:   Procedure Laterality Date    APPENDECTOMY  06/1968    ARTERIOVENOUS FISTULA/SHUNT SURGERY Left 08/08/2017    Procedure: LEFT BRACHIAL CEPHALIC AV FISTULA FORMATION WITH CEPHALIC VEIN TRANSPOSITION ;  Surgeon: Bill Deal MD;  Location: [facility] OR;  Service:     ARTERIOVENOUS FISTULA/SHUNT SURGERY Left 05/27/2022    Procedure: OPEN REVISION LEFT ARTERIOVENOUS INTERPOSITION GRAFT PLACEMENT;  Surgeon: Bill Deal MD;  Location: [facility] OR;  Service: Vascular;  Laterality: Left;    ARTERIOVENOUS FISTULA/SHUNT SURGERY Left 11/19/2024    Procedure: LEFT ARTERIOVENOUS SHUNT GRAFT REVISION;  Surgeon: Bill Deal MD;  Location: [facility] OR;  Service: Vascular;  Laterality: Left;    ARTERIOVENOUS FISTULA/SHUNT SURGERY W/ HEMODIALYSIS CATHETER INSERTION Left 02/15/2022    Procedure: OPEN LEFT ARM ARTERIOVENOUS FISTULA THROMBECTOMY WITH CEPHALIC VEIN ARTHROPLASTY AND STENT/GRAFT PLACEMENT;  Surgeon: Bill Deal MD;  Location: [facility] OR 18/19;  Service: Vascular;  Laterality: Left;    CATARACT EXTRACTION WITH INTRAOCULAR LENS IMPLANT Bilateral 2004    CHOLECYSTECTOMY  2011    COLECTOMY PARTIAL / TOTAL      History of inflammatory bowel disease with status post colectomy with ileostomy many years ago in the [facility],  18 YEARS OF AGE    COLON SURGERY      Colon resection with colostomy    COLON SURGERY      COLONOSCOPY  01/2018    CYSTOSCOPY LITHOLAPAXY BLADDER STONE EXTRACTION      ENDOSCOPY  01/16/2015    gastritis    ENDOSCOPY  01/17/2018     Procedure: ESOPHAGOGASTRODUODENOSCOPY;  Surgeon: Warner Neville MD;  Location: [facility] ENDOSCOPY;  Service:     ILEOSCOPY  01/16/2015    normal    ILEOSCOPY N/A 01/17/2018    Procedure: ILEOSCOPY;  Surgeon: Warner Neville MD;  Location: [facility] ENDOSCOPY;  Service:     ILEOSTOMY  12/1968    loop ostomy bypass    INCISION AND DRAINAGE ARM Left 10/27/2023    Procedure: LEFT ARM INCISION AND DRAINAGE;  Surgeon: Bill Deal MD;  Location: [facility] MAIN OR;  Service: Vascular;  Laterality: Left;    INCISION AND DRAINAGE ARM Left 3/25/2025    Procedure: INCISION AND DRAINAGE UPPER EXTREMITY;  Surgeon: Bill Deal MD;  Location: [facility] HYBRID OR;  Service: Vascular;  Laterality: Left;    INSERTION HEMODIALYSIS CATHETER Right 11/13/2024    Procedure: Tunnelled HEMODIALYSIS CATHETER INSERTION;  Surgeon: Ben Barth MD;  Location: [facility] HYBRID OR;  Service: Vascular;  Laterality: Right;    INSERTION HEMODIALYSIS CATHETER N/A 3/25/2025    Procedure: HEMODIALYSIS CATHETER INSERTION, left arm I&D;  Surgeon: Bill Deal MD;  Location: [facility] HYBRID OR;  Service: Vascular;  Laterality: N/A;    OTHER SURGICAL HISTORY  2009    kidney stones x3    PSEUDOANEURYSM REPAIR Left 10/27/2023    TONSILLECTOMY  1950      FAMILY HISTORY:   Family History   Problem Relation Age of Onset    Heart failure Mother 78    Hypertension Mother     Heart disease Mother     Hyperlipidemia Mother     Hypertension Father     Other Father 82        MVI    Malig Hyperthermia Neg Hx       SOCIAL HISTORY:   Social History     Tobacco Use    Smoking status: Never     Passive exposure: Never    Smokeless tobacco: Never   Vaping Use    Vaping status: Never Used   Substance Use Topics    Alcohol use: No     Comment: caffeine use: none    Drug use: No      MEDICATIONS:   Current Outpatient Medications on File Prior to Visit   Medication Sig Dispense Refill    acetaminophen (TYLENOL) 325 MG tablet Take 2 tablets by mouth Every 4 (Four) Hours  As Needed for Mild Pain.      alfuzosin (UROXATRAL) 10 MG 24 hr tablet Take 1 tablet by mouth Every Other Day. Does not take on Sundays Tuesdays or Thursdays   Indications: Benign Enlargement of Prostate      aspirin 81 MG tablet Take 1 tablet by mouth Every Night. INSTRUCTED TO CONTINUE THIS FOR SURGERY PER DR. BERMEO'S OFFICE  Indications: Disease involving Lipid Deposits in the Arteries      B Complex-C-Folic Acid (Umm-Peter) tablet Take 1 tablet by mouth Daily.      Cholecalciferol 25 MCG (1000 UT) tablet Take 1 tablet by mouth Daily. Indications: Vitamin D Deficiency      famotidine (Pepcid AC) 10 MG tablet Take 1 tablet by mouth Daily As Needed for Heartburn (take as needed after HD on dialysis days). Indications: Heartburn      Iron Sucrose (VENOFER IV) Infuse 1 dose into a venous catheter As Needed.      latanoprost (XALATAN) 0.005 % ophthalmic solution Administer 1 drop to both eyes Every Night. Indications: Wide-Angle Glaucoma      lidocaine-prilocaine (EMLA) 2.5-2.5 % cream Apply 1 Application topically to the appropriate area as directed As Needed. Iqtfdn-Ktoqezaab-Umcrlu:  ONE HOUR PRIOR TO DIALYSIS  Indications: Anesthesia to a Specific Part of the Body  3    Loratadine 10 MG capsule Take 1 capsule by mouth Daily. Indications: Hayfever      Methoxy PEG-Epoetin Beta (MIRCERA IJ) Inject 1 dose as directed Every 30 (Thirty) Days.      midodrine (PROAMATINE) 2.5 MG tablet Take 1 tablet by mouth 3 (Three) Times a Day As Needed (on dialysis days, and if systolic blood pressure is below 100). 90 tablet 0    mupirocin (BACTROBAN) 2 % ointment Apply 1 Application topically to the appropriate area as directed 3 (Three) Times a Day. Indications: Wound Infection 30 g 2    ondansetron (Zofran) 4 MG tablet Take 1 tablet by mouth Every 12 (Twelve) Hours As Needed for Nausea or Vomiting. 15 tablet 0    polyethylene glycol (MIRALAX) 17 g packet Take 17 g by mouth Daily As Needed (Use if senna-docusate is ineffective).    "   saccharomyces boulardii (Florastor) 250 MG capsule Take 1 capsule by mouth 2 (Two) Times a Day. 60 capsule 1    sennosides-docusate (PERICOLACE) 8.6-50 MG per tablet Take 2 tablets by mouth 2 (Two) Times a Day As Needed for Constipation.      sevelamer (RENAGEL) 800 MG tablet Take 1 tablet by mouth 3 (Three) Times a Day With Meals.      sevelamer (RENVELA) 800 MG tablet 1 tablet 3 (Three) Times a Day.      sodium chloride 0.65 % nasal spray Administer 2 sprays into the nostril(s) as directed by provider Every Night.       No current facility-administered medications on file prior to visit.       ALLERGIES: Ace inhibitors, Contrast dye (echo or unknown ct/mr), Iodine, Angiotensin receptor blockers, Eliquis [apixaban], Filgrastim, Heparin, Keflex [cephalexin], and Other       Objective   Vitals:    05/29/25 1533   BP: 136/66   BP Location: Right arm   Patient Position: Sitting   Cuff Size: Adult   Pulse: 72   Resp: 17   Weight: 80.7 kg (178 lb)   Height: 177 cm (69.69\")     Body mass index is 25.77 kg/m².          PHYSICAL EXAM:   Physical Exam  Constitutional:       Appearance: Normal appearance. He is normal weight.   HENT:      Head: Normocephalic and atraumatic.      Nose: Nose normal.   Eyes:      Extraocular Movements: Extraocular movements intact.      Pupils: Pupils are equal, round, and reactive to light.   Cardiovascular:      Rate and Rhythm: Normal rate.      Pulses:           Carotid pulses are 2+ on the right side and 2+ on the left side.       Radial pulses are 2+ on the right side and 2+ on the left side.        Femoral pulses are 2+ on the right side and 2+ on the left side.       Popliteal pulses are 2+ on the right side and 2+ on the left side.        Dorsalis pedis pulses are 2+ on the right side and 2+ on the left side.        Posterior tibial pulses are 2+ on the right side and 2+ on the left side.      Heart sounds: Normal heart sounds.   Pulmonary:      Effort: Pulmonary effort is normal.    "   Breath sounds: Normal breath sounds.   Abdominal:      General: Abdomen is flat. Bowel sounds are normal.      Palpations: Abdomen is soft.   Musculoskeletal:         General: Normal range of motion.      Cervical back: Normal range of motion and neck supple.      Right lower leg: No edema.      Left lower leg: No edema.   Skin:     General: Skin is warm and dry.   Neurological:      General: No focal deficit present.      Mental Status: He is alert and oriented to person, place, and time. Mental status is at baseline.   Psychiatric:         Mood and Affect: Mood normal.         Thought Content: Thought content normal.          Result Review   LABS:    CBC          4/30/2025    00:00 5/7/2025    00:00 5/21/2025    00:00   CBC   Hemoglobin 9.6        28.8     9.5        28.5     10.8        32.4          Details          This result is from an external source.             BMP          4/28/2025    00:00 5/5/2025    00:00 5/19/2025    00:00   BMP   BUN   56       Creatinine 6.66     6.1           Details          This result is from an external source.             Lipid Panel          9/17/2024    08:12   Lipid Panel   Total Cholesterol 141    Triglycerides 82    HDL Cholesterol 59    VLDL Cholesterol 16    LDL Cholesterol  66       INR          11/13/2024    09:03 11/17/2024    11:25 1/25/2025    04:32 3/23/2025    15:27 3/29/2025    16:48   Common Labs   INR 1.35  1.35  1.35  1.36  1.50       A1C Last 3 Results          9/17/2024    08:12 1/26/2025    04:11 2/3/2025    00:00   HGBA1C Last 3 Results   Hemoglobin A1C 5.40  4.80  5.3          Details          This result is from an external source.                Results Review:       I reviewed the patient's new clinical results.    The following radiologic or non-invasive studies have been reviewed by me: Fistula or shunt duplex shows proximal stenosis and venous outflow stenosis of the axillary anastomosis.  Duplex Hemodialysis Access CAR  02/13/2025    Interpretation Summary    Patent arteriovenous fistula/graft with marginal flow volumes.  Outflow at the axillary vein and with elevated velocities noted.     No radiology results for the last 30 days.                ASSESSMENT/PLAN:   Diagnoses and all orders for this visit:    1. Aneurysm artery, upper extremity (Primary)  -     Dialysis Circuit Angiography (Fistulogram); Future    2. Problem with vascular access  -     Dialysis Circuit Angiography (Fistulogram); Future    3. Thrombocytopenia  -     Dialysis Circuit Angiography (Fistulogram); Future    4. ESRD (end stage renal disease) on dialysis  -     Dialysis Circuit Angiography (Fistulogram); Future       79 y.o. male with successful prior intervention on the left arm and clearance of infection but now unfortunately on duplex even though his bowel flow volumes are good I think there is a recurrence of his venous outflow stenosis and proximal possible proximal stenosis.  Will pursue a AV shuntogram to keep these flowing..  He is felicia stay on his his aspirin and we will try to get this on the schedule at a reasonable timeframe for him.  He has done overall very well.  He does not have any signs of residual infection at this juncture or not going to pursue any testing unless he becomes symptomatic in someway with fevers drainage or localized complaints.    I discussed the plan with the patient and family who are agreeable to the plan of care at this point. Thank you for this consult.   Follow Up  Return in about 2 weeks (around 6/12/2025).    Bill Deal MD   05/29/25

## 2025-05-30 ENCOUNTER — TELEPHONE (OUTPATIENT)
Dept: ONCOLOGY | Facility: CLINIC | Age: 80
End: 2025-05-30
Payer: MEDICARE

## 2025-05-30 NOTE — TELEPHONE ENCOUNTER
Caller: Gillian Landry - NOT ON  [facility] -WIFE     Relationship to patient: Emergency Contact    Best call back number: [phone]    Chief complaint: CANC. - R/S - DUE TO A PROCEDURE HE HAS TO HAVE ON THIS DATE.    Type of visit: LAB & F/U 1     Requested date: END OF JULY     If rescheduling, when is the original appointment: 6/3/25

## 2025-06-02 ENCOUNTER — TELEPHONE (OUTPATIENT)
Age: 80
End: 2025-06-02
Payer: MEDICARE

## 2025-06-02 LAB
BH CV VAS DIALYSIS ARTERIAL ANASTOMOSIS DIAMETER: 0.5 CM
BH CV VAS DIALYSIS ARTERIAL ANASTOMOSIS EDV: 57.2 CM/SEC
BH CV VAS DIALYSIS ARTERIAL ANASTOMOSIS PSV: 132.6 CM/SEC
BH CV VAS DIALYSIS CONDUIT DIST DEPTH: 0.6 CM
BH CV VAS DIALYSIS CONDUIT DIST DIAMETER: 0.5 CM
BH CV VAS DIALYSIS CONDUIT DIST EDV: 129 CM/SEC
BH CV VAS DIALYSIS CONDUIT DIST FLOW VOL: 914 ML/MIN
BH CV VAS DIALYSIS CONDUIT DIST PSV: 218 CM/SEC
BH CV VAS DIALYSIS CONDUIT MID DEPTH: 0.3 CM
BH CV VAS DIALYSIS CONDUIT MID DIAMETER: 0.5 CM
BH CV VAS DIALYSIS CONDUIT MID EDV: 112.9 CM/SEC
BH CV VAS DIALYSIS CONDUIT MID FLOW VOL: 1014 ML/MIN
BH CV VAS DIALYSIS CONDUIT MID PSV: 207 CM/SEC
BH CV VAS DIALYSIS CONDUIT MID/DIST DEPTH: 0.2 CM
BH CV VAS DIALYSIS CONDUIT MID/DIST DIAMETER: 0.4 CM
BH CV VAS DIALYSIS CONDUIT MID/DIST EDV: 116 CM/SEC
BH CV VAS DIALYSIS CONDUIT MID/DIST PSV: 202.3 CM/SEC
BH CV VAS DIALYSIS CONDUIT PROX DEPTH: 0.4 CM
BH CV VAS DIALYSIS CONDUIT PROX DIAMETER: 0.2 CM
BH CV VAS DIALYSIS CONDUIT PROX EDV: 281.7 CM/SEC
BH CV VAS DIALYSIS CONDUIT PROX PSV: 530.5 CM/SEC
BH CV VAS DIALYSIS CONDUIT PROX/MID DEPTH: 0.4 CM
BH CV VAS DIALYSIS CONDUIT PROX/MID DIAMETER: 0.4 CM
BH CV VAS DIALYSIS CONDUIT PROX/MID EDV: 173.3 CM/SEC
BH CV VAS DIALYSIS CONDUIT PROX/MID PSV: 282.5 CM/SEC
BH CV VAS DIALYSIS PRE-INFLOW BRACHIAL DIAMETER: 0.8 CM
BH CV VAS DIALYSIS PRE-INFLOW BRACHIAL EDV: 39.8 CM/SEC
BH CV VAS DIALYSIS PRE-INFLOW BRACHIAL FLOW VOL: 1283 ML/MIN
BH CV VAS DIALYSIS PRE-INFLOW BRACHIAL PSV: 96.9 CM/SEC
BH CV VAS DIALYSIS VENOUS ANASTOMOSIS DIAMETER: 0.7 CM
BH CV VAS DIALYSIS VENOUS ANASTOMOSIS EDV: 159 CM/SEC
BH CV VAS DIALYSIS VENOUS ANASTOMOSIS PSV: 279 CM/SEC
BH CV VAS DIALYSIS VENOUS OUTFLOW AXILLARY DIAMETER: 0.4 CM
BH CV VAS DIALYSIS VENOUS OUTFLOW AXILLARY EDV: 466 CM/SEC
BH CV VAS DIALYSIS VENOUS OUTFLOW AXILLARY PSV: 691 CM/SEC
BH CV VAS DIALYSIS VENOUS OUTFLOW SUBCLAVIAN DIAMETER: 0.9 CM
BH CV VAS DIALYSIS VENOUS OUTFLOW SUBCLAVIAN EDV: 64.9 CM/SEC
BH CV VAS DIALYSIS VENOUS OUTFLOW SUBCLAVIAN PSV: 122 CM/SEC

## 2025-06-02 NOTE — TELEPHONE ENCOUNTER
Spoke to pt wife to clarify pt allergies, pt does not have allergy to contrast( renal issues) is why he avoids contrast, pt has had reaction to heparin in the past, pt informed [place]30 [place] [facility] [place] verbalized understanding.

## 2025-06-03 ENCOUNTER — TELEPHONE (OUTPATIENT)
Age: 80
End: 2025-06-03
Payer: MEDICARE

## 2025-06-03 DIAGNOSIS — Z99.2 ESRD ON HEMODIALYSIS: Primary | ICD-10-CM

## 2025-06-03 DIAGNOSIS — N18.6 ESRD ON HEMODIALYSIS: Primary | ICD-10-CM

## 2025-06-03 NOTE — TELEPHONE ENCOUNTER
Spoke to pt wife and she reports small amout bleeding on guaze, they have not applied pressure , advised to hold pressure over guaze area for 15 minutes and to review D/C instructions on paper if continues.

## 2025-06-03 NOTE — TELEPHONE ENCOUNTER
Patient had a fistulogram this morning and is having some bleeding through the bandage.  Is this normal?

## 2025-06-19 ENCOUNTER — OFFICE VISIT (OUTPATIENT)
Dept: INTERNAL MEDICINE | Facility: CLINIC | Age: 80
End: 2025-06-19
Payer: MEDICARE

## 2025-06-19 VITALS
DIASTOLIC BLOOD PRESSURE: 62 MMHG | SYSTOLIC BLOOD PRESSURE: 136 MMHG | WEIGHT: 181.2 LBS | OXYGEN SATURATION: 96 % | BODY MASS INDEX: 25.94 KG/M2 | HEART RATE: 71 BPM | HEIGHT: 70 IN

## 2025-06-19 DIAGNOSIS — I10 ESSENTIAL (PRIMARY) HYPERTENSION: ICD-10-CM

## 2025-06-19 DIAGNOSIS — D69.6 THROMBOCYTOPENIA: ICD-10-CM

## 2025-06-19 DIAGNOSIS — D63.1 ANEMIA DUE TO STAGE 4 CHRONIC KIDNEY DISEASE TREATED WITH ERYTHROPOIETIN: ICD-10-CM

## 2025-06-19 DIAGNOSIS — D72.819 CHRONIC LEUKOPENIA: ICD-10-CM

## 2025-06-19 DIAGNOSIS — R73.02 IMPAIRED GLUCOSE TOLERANCE: Chronic | ICD-10-CM
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
## 2025-06-19 DIAGNOSIS — H61.23 EXCESSIVE CERUMEN IN BOTH EAR CANALS: Primary | ICD-10-CM

## 2025-06-19 DIAGNOSIS — Z87.2 HISTORY OF CELLULITIS: ICD-10-CM

## 2025-06-19 DIAGNOSIS — I48.21 PERMANENT ATRIAL FIBRILLATION: ICD-10-CM

## 2025-06-19 DIAGNOSIS — Z99.2 ESRD ON HEMODIALYSIS: ICD-10-CM

## 2025-06-19 DIAGNOSIS — N18.6 ESRD ON HEMODIALYSIS: ICD-10-CM

## 2025-06-19 DIAGNOSIS — N18.4 ANEMIA DUE TO STAGE 4 CHRONIC KIDNEY DISEASE TREATED WITH ERYTHROPOIETIN: ICD-10-CM

## 2025-06-19 RX ORDER — SACCHAROMYCES BOULARDII 250 MG
250 CAPSULE ORAL 2 TIMES DAILY
Qty: 60 CAPSULE | Refills: 1 | Status: SHIPPED | OUTPATIENT
Start: 2025-06-19

## 2025-06-19 NOTE — ASSESSMENT & PLAN NOTE
Fell after dialysis  Went to [facility]  Walks with walker now  Offered advice to prevent falls  - always use handrails on the stairs  - always wear shoes or slippers with non-slip sole  - install bathroom grab bars  - keep a flashlight by the bed  - keep cell phone with me always  - make an emergency alert plan in case I fall  -  clutter from the floors  - remove throw rugs or use nonslip pads  - always wear low-heeled or flat shoes or slippers with nonskid soles

## 2025-06-19 NOTE — ASSESSMENT & PLAN NOTE
Orders:    Hemoglobin A1c    saccharomyces boulardii (Florastor) 250 MG capsule; Take 1 capsule by mouth 2 (Two) Times a Day.    Comprehensive Metabolic Panel    CBC & Differential

## 2025-06-19 NOTE — ASSESSMENT & PLAN NOTE
- Currently undergoing hemodialysis.  - Previous hospitalization in 03/2025 for vascular access issues.      Orders:    Comprehensive Metabolic Panel

## 2025-06-19 NOTE — PROGRESS NOTES
Bharathi De La Torre M.D.  Internal Medicine  [facility]  [address], [address]  [place] [address]  [phone]      Chief Complaint  Hospital Follow Up Visit (Hosp F/U Cellulitis )    SUBJECTIVE    History of Present Illness    Bill Landry is a 79 y.o. male  with past medical history significant for permanent A-fib, hypertension, ESRD on Monday Wednesday Friday dialysis, heart failure, bicuspid aortic valve, hyperparathyroidism, Crohn's disease, chronic anemia and leukopenia, TATE, nephrolithiasis who presents to the office today as an established patient that last saw me on 8/15/2024.     Can't hear of right ear off and on  History of Present Illness   He first went to the hospital in 03/2025 for vascular access issues. He is here for a hospital follow-up, although it has been a while since his hospitalization.    He was noted to have cellulitis back in 03/2025 and was treated with IV antibiotics. Blood cultures grew MSSA, and he was evaluated by infectious disease specialists. He was diagnosed with a vascular graft infection and was followed by vascular surgery. He was taken to the operating room where he had a right-sided tunneled dialysis catheter placed for hemodialysis and underwent left axillary wound exploration with irrigation and debridement. Afterwards, he started oozing from the site of his tunneled dialysis catheter, which was removed, and the graft was used for dialysis access. He has chronic thrombocytopenia. He was discharged on IV cefazolin for a 6-week course and then transferred to rehabilitation.    His primary concern today is that his ear feels full, and he cannot hear from right ear. He reports no history of hearing loss.    He experienced some diarrhea with the antibiotic and has been consuming yogurt, although it is high in phosphorus.     He also had a fall and a hip fracture in the last year, was discharged to rehab, and has been using a walker for  "ambulation.    He is requesting a lipid panel today.       Review of Systems    Allergies   Allergen Reactions    Ace Inhibitors Other (See Comments)     RENAL FAILURE/ raised creatinine    Contrast Dye (Echo Or Unknown Ct/Mr) Other (See Comments) and Anaphylaxis     RENAL FAILURE    Iodine Anaphylaxis    Angiotensin Receptor Blockers Swelling    Eliquis [Apixaban] Arrhythmia     BLEEDING ISSUES; PT CANNOT TAKE ANY ANTICOAGULANTS DUE TO LOW PLATELETS EXCEPT ASPIRIN      Filgrastim GI Intolerance and Confusion     Chest pain    Heparin Other (See Comments)     \"It causes me to bleed out\"    Keflex [Cephalexin] Diarrhea     RENAL FAILURE    Other Angioedema     IVP Dye        Outpatient Medications Marked as Taking for the 6/19/25 encounter (Office Visit) with Bharathi De La Torre MD   Medication Sig Dispense Refill    saccharomyces boulardii (Florastor) 250 MG capsule Take 1 capsule by mouth 2 (Two) Times a Day. 60 capsule 1        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    Aortic stenosis     Bicuspid aortic valve 07/20/2022    Calculus of kidney     Chronic leukopenia and thrombocytopenia     Cirrhosis of liver without ascites 07/24/2017    Congestive splenomegaly 07/24/2017    Crohn disease     with ileostomy    Decreased white blood cell count, unspecified     Diverticula of colon     ESRD on hemodialysis 04/05/2018    M-W-F FRESENIUS    Fatty liver disease, nonalcoholic     Fistula 04/05/2018    Other Specified Complication    Gastrointestinal hemorrhage, unspecified     GERD (gastroesophageal reflux disease)     GI bleed     Glaucoma     H/O Gallstone pancreatitis     H/O Pericardial effusion     H/O sinus bradycardia     History of hypertension     resolved    History of UTI     Hx of renal calculi     Ileostomy " present     Iron deficiency     Leakage of heart valve prosthesis, subsequent encounter     MGUS (monoclonal gammopathy of unknown significance)     TATE (obstructive sleep apnea)     Other hemoglobinopathies     Pericardial effusion (noninflammatory) 1/18/2018    Permanent atrial fibrillation     Scarlet fever, uncomplicated     Stenosis of other vascular prosthetic devices, implants and grafts, initial encounter 02/14/2022    Systemic lupus erythematosus, unspecified     Thrombocytopenia     undpecified    Type 2 diabetes mellitus     CURRENTLY TAKES NO MED    Urinary retention     Post-OP     Past Surgical History:   Procedure Laterality Date    APPENDECTOMY  06/1968    ARTERIOVENOUS FISTULA/SHUNT SURGERY Left 08/08/2017    Procedure: LEFT BRACHIAL CEPHALIC AV FISTULA FORMATION WITH CEPHALIC VEIN TRANSPOSITION ;  Surgeon: Bill Deal MD;  Location: [facility] OR;  Service:     ARTERIOVENOUS FISTULA/SHUNT SURGERY Left 05/27/2022    Procedure: OPEN REVISION LEFT ARTERIOVENOUS INTERPOSITION GRAFT PLACEMENT;  Surgeon: Bill Deal MD;  Location: [facility] OR;  Service: Vascular;  Laterality: Left;    ARTERIOVENOUS FISTULA/SHUNT SURGERY Left 11/19/2024    Procedure: LEFT ARTERIOVENOUS SHUNT GRAFT REVISION;  Surgeon: Bill Deal MD;  Location: [facility] OR;  Service: Vascular;  Laterality: Left;    ARTERIOVENOUS FISTULA/SHUNT SURGERY W/ HEMODIALYSIS CATHETER INSERTION Left 02/15/2022    Procedure: OPEN LEFT ARM ARTERIOVENOUS FISTULA THROMBECTOMY WITH CEPHALIC VEIN ARTHROPLASTY AND STENT/GRAFT PLACEMENT;  Surgeon: Bill Deal MD;  Location: [facility] OR 18/19;  Service: Vascular;  Laterality: Left;    CATARACT EXTRACTION WITH INTRAOCULAR LENS IMPLANT Bilateral 2004    CHOLECYSTECTOMY  2011    COLECTOMY PARTIAL / TOTAL      History of inflammatory bowel disease with status post colectomy with ileostomy many years ago in the [facility],  18 YEARS OF AGE    COLON SURGERY      Colon resection  "with colostomy    COLON SURGERY      COLONOSCOPY  01/2018    CYSTOSCOPY LITHOLAPAXY BLADDER STONE EXTRACTION      ENDOSCOPY  01/16/2015    gastritis    ENDOSCOPY  01/17/2018    Procedure: ESOPHAGOGASTRODUODENOSCOPY;  Surgeon: Warner Neville MD;  Location: [facility] ENDOSCOPY;  Service:     ILEOSCOPY  01/16/2015    normal    ILEOSCOPY N/A 01/17/2018    Procedure: ILEOSCOPY;  Surgeon: Warner Neville MD;  Location: [facility] ENDOSCOPY;  Service:     ILEOSTOMY  12/1968    loop ostomy bypass    INCISION AND DRAINAGE ARM Left 10/27/2023    Procedure: LEFT ARM INCISION AND DRAINAGE;  Surgeon: Bill Deal MD;  Location: [facility] MAIN OR;  Service: Vascular;  Laterality: Left;    INCISION AND DRAINAGE ARM Left 3/25/2025    Procedure: INCISION AND DRAINAGE UPPER EXTREMITY;  Surgeon: Bill Deal MD;  Location: [facility] HYBRID OR;  Service: Vascular;  Laterality: Left;    INSERTION HEMODIALYSIS CATHETER Right 11/13/2024    Procedure: Tunnelled HEMODIALYSIS CATHETER INSERTION;  Surgeon: Ben Barth MD;  Location: [facility] HYBRID OR;  Service: Vascular;  Laterality: Right;    INSERTION HEMODIALYSIS CATHETER N/A 3/25/2025    Procedure: HEMODIALYSIS CATHETER INSERTION, left arm I&D;  Surgeon: Bill Deal MD;  Location: [facility] HYBRID OR;  Service: Vascular;  Laterality: N/A;    OTHER SURGICAL HISTORY  2009    kidney stones x3    PSEUDOANEURYSM REPAIR Left 10/27/2023    TONSILLECTOMY  1950     Family History   Problem Relation Age of Onset    Heart failure Mother 78    Hypertension Mother     Heart disease Mother     Hyperlipidemia Mother     Hypertension Father     Other Father 82        MVI    Malig Hyperthermia Neg Hx     reports that he has never smoked. He has never been exposed to tobacco smoke. He has never used smokeless tobacco. He reports that he does not drink alcohol and does not use drugs.    OBJECTIVE    Vital Signs:   /62   Pulse 71   Ht 177 cm (69.69\")   Wt 82.2 kg (181 lb 3.2 oz)   SpO2 96%  "  BMI 26.24 kg/m²     Physical Exam  Constitutional:       Appearance: Normal appearance.   HENT:      Ears:      Comments: Excessive cerumen  Cardiovascular:      Rate and Rhythm: Normal rate and regular rhythm.      Heart sounds: Normal heart sounds. No murmur heard.  Pulmonary:      Effort: Pulmonary effort is normal.      Breath sounds: Normal breath sounds.   Musculoskeletal:      Comments: Tace edema. Left AV fistula   Skin:     General: Skin is warm and dry.   Neurological:      Mental Status: He is alert.   Psychiatric:         Mood and Affect: Mood normal.         Behavior: Behavior normal.         Thought Content: Thought content normal.          Physical Exam      The following data was reviewed by: Bharathi De La Torre MD on 06/19/2025:  CMP          4/28/2025    00:00 5/5/2025    00:00 5/19/2025    00:00   CMP   BUN   56       Creatinine 6.66     6.1           Details          This result is from an external source.             CBC w/diff          5/21/2025    00:00 5/28/2025    00:00 6/4/2025    00:00   CBC w/Diff   Hemoglobin 10.8        32.4     10.8        32.4     11.6        34.8          Details          This result is from an external source.             Lipid Panel          9/17/2024    08:12   Lipid Panel   Total Cholesterol 141    Triglycerides 82    HDL Cholesterol 59    VLDL Cholesterol 16    LDL Cholesterol  66        A1C Last 3 Results          9/17/2024    08:12 1/26/2025    04:11 2/3/2025    00:00   HGBA1C Last 3 Results   Hemoglobin A1C 5.40  4.80  5.3          Details          This result is from an external source.             Data reviewed : Recent hospitalization notes from March             ASSESSMENT & PLAN        Excessive cerumen in both ear canals  - Reports ear fullness and inability to hear from it.  - Denies history of hearing loss.  - cerumen removal today  - advised to follow up with audiology if no improvement       History of cellulitis  - Vascular graft infection.  - Evaluated  by infectious disease and vascular surgery.  - Underwent left axillary wound exploration with irrigation and debridement.  - Discharged to rehabilitation.  Orders:    CBC & Differential    Fall, subsequent encounter  Fell after dialysis  Went to [facility] home  Walks with walker now  Offered advice to prevent falls  - always use handrails on the stairs  - always wear shoes or slippers with non-slip sole  - install bathroom grab bars  - keep a flashlight by the bed  - keep cell phone with me always  - make an emergency alert plan in case I fall  -  clutter from the floors  - remove throw rugs or use nonslip pads  - always wear low-heeled or flat shoes or slippers with nonskid soles        Chronic leukopenia    Orders:    CBC & Differential    Thrombocytopenia    Orders:    Comprehensive Metabolic Panel    CBC & Differential    Permanent atrial fibrillation    Orders:    Hemoglobin A1c    saccharomyces boulardii (Florastor) 250 MG capsule; Take 1 capsule by mouth 2 (Two) Times a Day.    Comprehensive Metabolic Panel    CBC & Differential    ESRD on hemodialysis  - Currently undergoing hemodialysis.  - Previous hospitalization in 03/2025 for vascular access issues.      Orders:    Comprehensive Metabolic Panel    Impaired glucose tolerance  - A1c test to be conducted today to monitor blood sugar levels.  Orders:    Hemoglobin A1c    Anemia due to stage 4 chronic kidney disease treated with erythropoietin      Orders:    CBC & Differential    Essential (primary) hypertension  - Blood pressure controlled at 136/62.  Orders:    Lipid Panel With LDL / HDL Ratio    Comprehensive Metabolic Panel      Assessment & Plan        Health Maintenance Due   Topic Date Due    ZOSTER VACCINE (1 of 2) Never done    RSV Vaccine - Adults (1 - 1-dose 75+ series) Never done    DIABETIC FOOT EXAM  08/25/2023    COVID-19 Vaccine (5 - 2024-25 season) 09/01/2024    HEMOGLOBIN A1C  08/03/2025    ANNUAL WELLNESS VISIT  08/15/2025         Follow Up  No follow-ups on file.    Patient/family had no further questions at this time and verbalized understanding of the plan discussed today.     Patient or patient representative verbalized consent for the use of Ambient Listening during the visit with  Bharathi De La Torre MD for chart documentation. 6/19/2025  20:38 EDT

## 2025-06-24 ENCOUNTER — TELEPHONE (OUTPATIENT)
Dept: INTERNAL MEDICINE | Facility: CLINIC | Age: 80
End: 2025-06-24
Payer: MEDICARE

## 2025-06-24 NOTE — TELEPHONE ENCOUNTER
Pts wife, Gillian, calling in regards to pts lab appt that's scheduled on 6/26/2025- She states pt has dialysis and gets labs drawn regularly with another provider- Pt is wanting to move labs closer to AWV which is on 10/30/2025- Please call pt to verify what he needs to come in for and when- Please call Gillian as soon as possible due to him being scheduled for labs on 6/26/2025.    [phone]

## 2025-06-24 NOTE — TELEPHONE ENCOUNTER
He needs cholesterol check. These are usually done fasting and usually not drawn with dialysis labs. It is okay if he wants to wait until October.

## 2025-07-02 ENCOUNTER — TELEPHONE (OUTPATIENT)
Dept: INTERNAL MEDICINE | Facility: CLINIC | Age: 80
End: 2025-07-02
Payer: MEDICARE

## 2025-07-02 ENCOUNTER — HOSPITAL ENCOUNTER (OUTPATIENT)
Dept: INFUSION THERAPY | Facility: HOSPITAL | Age: 80
Discharge: HOME | End: 2025-07-02
Admitting: STUDENT IN AN ORGANIZED HEALTH CARE EDUCATION/TRAINING PROGRAM
Payer: MEDICARE

## 2025-07-02 ENCOUNTER — TRANSCRIBE ORDERS (OUTPATIENT)
Dept: WOUND CARE | Facility: HOSPITAL | Age: 80
End: 2025-07-02
Payer: MEDICARE

## 2025-07-02 DIAGNOSIS — Z71.89 OSTOMY NURSE CONSULTATION: Primary | ICD-10-CM

## 2025-07-02 PROCEDURE — G0463 HOSPITAL OUTPT CLINIC VISIT: HCPCS

## 2025-07-02 NOTE — NURSING NOTE
CWOCN- outpatient visit per patient request and PCP Dr De La Torre. Patient's wife called this am about bleeding around the stoma. She confirmed that there was no bloody stool but that it is at the skin/mucosal edge. This has happened in the past and patient had a stitch placed by Dr Howard many years ago. Patient also reports low platelets, 68, he thinks.     I assessed patient. There was 600ml of loose liquid and bloody drainage + clots in his pouch. However, when I removed the appliance, there was no active bleeding. I can see an area at the lower edge of the stoma where the mucosa was irritated and fragile appearing. Patient also has a hernia. The peristomal skin has a dark purple discoloration noted. I had patient stand and try to move around a little to see if I could observe bleeding. No active bleeding noted.     I applied silver nitrate to the mucocutaneous edge. Additionally, recommend to patient to use a barrier ring as this is a gentle edge to help keep a seal but not irritate the stoma. Recommend to cut the wafer a little bit larger also to make room for the stoma when it likely bulges from the hernia. I bet that as he moves around there is friction on the stoma edges from the hernia and pressure to the capillaries. In conjunction with the low platelets, he is bleeding.     Son taking note to discuss using silver nitrate at home with PCP. Also, monitor platelets. Patient needs to share his symptoms with his provider any time he has bleeding. Patient reports that in the past he has received platelets with dialysis. Reviewed cutting the wafer a little larger.     Performed the care. Reviewed expectation for f/u with his providers. If patient experiencing pouching issues, consult wound/ostomy.     I will share the above info with Dr De La Torre, routing note to her..

## 2025-07-02 NOTE — TELEPHONE ENCOUNTER
Patients wife called stating the nurse at the [facility] that put patients stoma in said to contact the patients PCP and have them order silver nitrate applicators for the place under the stoma that keeps bleeding.     These do not show in our system as a prescription.     Please advise.

## 2025-07-03 NOTE — PROGRESS NOTES
I called Bill Landry and talked to Gillian at [phone] at 16:53 EDT     Requesting silver nitrate because are around stoma bleeding and she is worried he will have bleeding over weekend.  Prescription sent.  She is very anxious she is not going to be able to  the prescription.  If he has heavy bleeding over the weekend I think it would be most appropriate for him to go to the emergency department.  Discussed patients usually do not self administer silver nitrate but she has watched ostomy nurse do it and feels comfortable.  She is very worried that he will not get good care in the emergency department if they have to go over the holiday.  All questions answered.

## 2025-07-07 ENCOUNTER — TELEPHONE (OUTPATIENT)
Dept: ONCOLOGY | Facility: CLINIC | Age: 80
End: 2025-07-07
Payer: MEDICARE

## 2025-07-07 ENCOUNTER — TELEPHONE (OUTPATIENT)
Dept: INTERNAL MEDICINE | Facility: CLINIC | Age: 80
End: 2025-07-07
Payer: MEDICARE

## 2025-07-07 ENCOUNTER — TRANSCRIBE ORDERS (OUTPATIENT)
Dept: WOUND CARE | Facility: HOSPITAL | Age: 80
End: 2025-07-07
Payer: MEDICARE

## 2025-07-07 DIAGNOSIS — D69.6 THROMBOCYTOPENIA: ICD-10-CM

## 2025-07-07 DIAGNOSIS — Z93.9 HISTORY OF CREATION OF OSTOMY: Primary | ICD-10-CM

## 2025-07-07 DIAGNOSIS — Z71.89 OSTOMY NURSE CONSULTATION: Primary | ICD-10-CM

## 2025-07-07 DIAGNOSIS — R16.1 SPLENOMEGALY, NOT ELSEWHERE CLASSIFIED: Primary | ICD-10-CM

## 2025-07-07 NOTE — TELEPHONE ENCOUNTER
Returned call to patient's spouse. Patient has had ileostomy since he was a teenager. He does not have an established surgeon  The bleeding started last Monday or Tuesday (6/30 or 7/1). He saw a wound care/ostomy nurse on Wednesday and they state she cauterized it and the bleeding stopped through Th, but then picked up again on Friday. Discussed w/ Dr. Pacheco and pt should f/u with PCP for possible sx referral, but we will check cbc tomorrow morning as plt were 68 on 6/12. Denies dizziness, weakness, SOA, or more significant bruising than what is common for patient. Pt's spouse v/u. Message sent to Carter. Akila Washington RN

## 2025-07-07 NOTE — TELEPHONE ENCOUNTER
Patient wife called and stated that the ostomy nurse saw him and stated he would need to be referred to a General Surgeon due to the bleeding. They called Dr. Pacheco office but he does not put in referral and was advised to contact pcp office to put in order.    Can you please put in order for patient to General Surgery and they would like to see Dr. Luiz Horan

## 2025-07-07 NOTE — TELEPHONE ENCOUNTER
Provider: Dr. Pacheco  Caller: Rachel Landry  Relationship to Patient: Spouse  Call Back Phone Number: [phone]  Reason for Call: Pt has bleeding from ileostomy. Has been to ileostomy clinic. Nurse cauterized the area but there is still bleeding. Believes pt may have to go to General Surgeon but wants to let Dr. Pacheco know bleeding is happening, needs advise on next steps.

## 2025-07-08 ENCOUNTER — CLINICAL SUPPORT (OUTPATIENT)
Dept: ONCOLOGY | Facility: HOSPITAL | Age: 80
End: 2025-07-08
Payer: MEDICARE

## 2025-07-08 ENCOUNTER — HOSPITAL ENCOUNTER (OUTPATIENT)
Facility: HOSPITAL | Age: 80
Setting detail: OBSERVATION
Discharge: HOME | End: 2025-07-10
Attending: EMERGENCY MEDICINE | Admitting: HOSPITALIST
Payer: MEDICARE

## 2025-07-08 ENCOUNTER — LAB (OUTPATIENT)
Dept: LAB | Facility: HOSPITAL | Age: 80
End: 2025-07-08
Payer: MEDICARE

## 2025-07-08 ENCOUNTER — TELEPHONE (OUTPATIENT)
Dept: WOUND CARE | Facility: HOSPITAL | Age: 80
End: 2025-07-08
Payer: MEDICARE

## 2025-07-08 ENCOUNTER — APPOINTMENT (OUTPATIENT)
Dept: CT IMAGING | Facility: HOSPITAL | Age: 80
End: 2025-07-08
Payer: MEDICARE

## 2025-07-08 DIAGNOSIS — K92.2 GASTROINTESTINAL HEMORRHAGE, UNSPECIFIED GASTROINTESTINAL HEMORRHAGE TYPE: Primary | ICD-10-CM

## 2025-07-08 DIAGNOSIS — D64.9 ACUTE ON CHRONIC ANEMIA: ICD-10-CM

## 2025-07-08 DIAGNOSIS — Z99.2 ESRD ON DIALYSIS: ICD-10-CM

## 2025-07-08 DIAGNOSIS — D69.6 THROMBOCYTOPENIA: ICD-10-CM

## 2025-07-08 DIAGNOSIS — R16.1 SPLENOMEGALY, NOT ELSEWHERE CLASSIFIED: ICD-10-CM

## 2025-07-08 DIAGNOSIS — N18.6 ESRD ON DIALYSIS: ICD-10-CM

## 2025-07-08 PROBLEM — D62 ACUTE BLOOD LOSS ANEMIA: Status: ACTIVE | Noted: 2025-07-08

## 2025-07-08 LAB
ABO GROUP BLD: NORMAL
ALBUMIN SERPL-MCNC: 2.6 G/DL (ref 3.5–5.2)
ALBUMIN/GLOB SERPL: 0.6 G/DL
ALP SERPL-CCNC: 152 U/L (ref 39–117)
ALT SERPL W P-5'-P-CCNC: 18 U/L (ref 1–41)
ANION GAP SERPL CALCULATED.3IONS-SCNC: 13 MMOL/L (ref 5–15)
APTT PPP: 35 SECONDS (ref 22.7–35.4)
AST SERPL-CCNC: 52 U/L (ref 1–40)
BASOPHILS # BLD AUTO: 0.04 10*3/MM3 (ref 0–0.2)
BASOPHILS NFR BLD AUTO: 0.7 % (ref 0–1.5)
BILIRUB SERPL-MCNC: 0.6 MG/DL (ref 0–1.2)
BLD GP AB SCN SERPL QL: NEGATIVE
BUN SERPL-MCNC: 36 MG/DL (ref 8–23)
BUN/CREAT SERPL: 6.1 (ref 7–25)
CALCIUM SPEC-SCNC: 8.1 MG/DL (ref 8.6–10.5)
CHLORIDE SERPL-SCNC: 98 MMOL/L (ref 98–107)
CO2 SERPL-SCNC: 25 MMOL/L (ref 22–29)
CREAT SERPL-MCNC: 5.93 MG/DL (ref 0.76–1.27)
DEPRECATED RDW RBC AUTO: 61.8 FL (ref 37–54)
EGFRCR SERPLBLD CKD-EPI 2021: 9 ML/MIN/1.73
EOSINOPHIL # BLD AUTO: 0.12 10*3/MM3 (ref 0–0.4)
EOSINOPHIL NFR BLD AUTO: 2.2 % (ref 0.3–6.2)
ERYTHROCYTE [DISTWIDTH] IN BLOOD BY AUTOMATED COUNT: 17.4 % (ref 12.3–15.4)
FERRITIN SERPL-MCNC: 720 NG/ML (ref 30–400)
FOLATE SERPL-MCNC: >20 NG/ML (ref 4.78–24.2)
GLOBULIN UR ELPH-MCNC: 4.1 GM/DL
GLUCOSE SERPL-MCNC: 152 MG/DL (ref 65–99)
HAPTOGLOB SERPL-MCNC: 59 MG/DL (ref 30–200)
HCT VFR BLD AUTO: 24.3 % (ref 37.5–51)
HGB BLD-MCNC: 7.8 G/DL (ref 13–17.7)
IMM GRANULOCYTES # BLD AUTO: 0.04 10*3/MM3 (ref 0–0.05)
IMM GRANULOCYTES NFR BLD AUTO: 0.7 % (ref 0–0.5)
INR PPP: 1.44 (ref 0.9–1.1)
IRON 24H UR-MRATE: 122 MCG/DL (ref 59–158)
IRON SATN MFR SERPL: 48 % (ref 20–50)
LDH SERPL-CCNC: 239 U/L (ref 135–225)
LYMPHOCYTES # BLD AUTO: 1.08 10*3/MM3 (ref 0.7–3.1)
LYMPHOCYTES NFR BLD AUTO: 19.4 % (ref 19.6–45.3)
MCH RBC QN AUTO: 31.8 PG (ref 26.6–33)
MCHC RBC AUTO-ENTMCNC: 32.1 G/DL (ref 31.5–35.7)
MCV RBC AUTO: 99.2 FL (ref 79–97)
MONOCYTES # BLD AUTO: 0.5 10*3/MM3 (ref 0.1–0.9)
MONOCYTES NFR BLD AUTO: 9 % (ref 5–12)
NEUTROPHILS NFR BLD AUTO: 3.78 10*3/MM3 (ref 1.7–7)
NEUTROPHILS NFR BLD AUTO: 68 % (ref 42.7–76)
NRBC BLD AUTO-RTO: 0 /100 WBC (ref 0–0.2)
PLATELET # BLD AUTO: 110 10*3/MM3 (ref 140–450)
PMV BLD AUTO: 8.5 FL (ref 6–12)
POTASSIUM SERPL-SCNC: 3.5 MMOL/L (ref 3.5–5.2)
POTASSIUM SERPL-SCNC: 3.8 MMOL/L (ref 3.5–5.2)
PROT SERPL-MCNC: 6.7 G/DL (ref 6–8.5)
PROTHROMBIN TIME: 17.6 SECONDS (ref 11.7–14.2)
RBC # BLD AUTO: 2.45 10*6/MM3 (ref 4.14–5.8)
RETICS # AUTO: 0.08 10*6/MM3 (ref 0.02–0.13)
RETICS/RBC NFR AUTO: 3.33 % (ref 0.7–1.9)
RH BLD: POSITIVE
SODIUM SERPL-SCNC: 136 MMOL/L (ref 136–145)
T&S EXPIRATION DATE: NORMAL
TIBC SERPL-MCNC: 252 MCG/DL (ref 298–536)
TRANSFERRIN SERPL-MCNC: 169 MG/DL (ref 200–360)
VIT B12 BLD-MCNC: 692 PG/ML (ref 211–946)
WBC NRBC COR # BLD AUTO: 5.56 10*3/MM3 (ref 3.4–10.8)

## 2025-07-08 PROCEDURE — 85025 COMPLETE CBC W/AUTO DIFF WBC: CPT

## 2025-07-08 PROCEDURE — G0378 HOSPITAL OBSERVATION PER HR: HCPCS

## 2025-07-08 PROCEDURE — 36415 COLL VENOUS BLD VENIPUNCTURE: CPT

## 2025-07-08 PROCEDURE — 25010000002 POTASSIUM CHLORIDE 10 MEQ/100ML SOLUTION: Performed by: HOSPITALIST

## 2025-07-08 PROCEDURE — 86922 COMPATIBILITY TEST ANTIGLOB: CPT

## 2025-07-08 PROCEDURE — 85610 PROTHROMBIN TIME: CPT | Performed by: EMERGENCY MEDICINE

## 2025-07-08 PROCEDURE — 82607 VITAMIN B-12: CPT | Performed by: HOSPITALIST

## 2025-07-08 PROCEDURE — 85730 THROMBOPLASTIN TIME PARTIAL: CPT | Performed by: EMERGENCY MEDICINE

## 2025-07-08 PROCEDURE — 86850 RBC ANTIBODY SCREEN: CPT | Performed by: EMERGENCY MEDICINE

## 2025-07-08 PROCEDURE — P9016 RBC LEUKOCYTES REDUCED: HCPCS

## 2025-07-08 PROCEDURE — 99285 EMERGENCY DEPT VISIT HI MDM: CPT

## 2025-07-08 PROCEDURE — 86920 COMPATIBILITY TEST SPIN: CPT

## 2025-07-08 PROCEDURE — G0463 HOSPITAL OUTPT CLINIC VISIT: HCPCS

## 2025-07-08 PROCEDURE — 80053 COMPREHEN METABOLIC PANEL: CPT | Performed by: EMERGENCY MEDICINE

## 2025-07-08 PROCEDURE — 86900 BLOOD TYPING SEROLOGIC ABO: CPT | Performed by: EMERGENCY MEDICINE

## 2025-07-08 PROCEDURE — 83615 LACTATE (LD) (LDH) ENZYME: CPT | Performed by: HOSPITALIST

## 2025-07-08 PROCEDURE — 86900 BLOOD TYPING SEROLOGIC ABO: CPT

## 2025-07-08 PROCEDURE — 86902 BLOOD TYPE ANTIGEN DONOR EA: CPT

## 2025-07-08 PROCEDURE — 84132 ASSAY OF SERUM POTASSIUM: CPT | Performed by: HOSPITALIST

## 2025-07-08 PROCEDURE — 85045 AUTOMATED RETICULOCYTE COUNT: CPT | Performed by: HOSPITALIST

## 2025-07-08 PROCEDURE — 84466 ASSAY OF TRANSFERRIN: CPT | Performed by: HOSPITALIST

## 2025-07-08 PROCEDURE — 82746 ASSAY OF FOLIC ACID SERUM: CPT | Performed by: HOSPITALIST

## 2025-07-08 PROCEDURE — 82728 ASSAY OF FERRITIN: CPT | Performed by: HOSPITALIST

## 2025-07-08 PROCEDURE — 96365 THER/PROPH/DIAG IV INF INIT: CPT

## 2025-07-08 PROCEDURE — 86901 BLOOD TYPING SEROLOGIC RH(D): CPT | Performed by: EMERGENCY MEDICINE

## 2025-07-08 PROCEDURE — 74176 CT ABD & PELVIS W/O CONTRAST: CPT

## 2025-07-08 PROCEDURE — 99214 OFFICE O/P EST MOD 30 MIN: CPT

## 2025-07-08 PROCEDURE — 83010 ASSAY OF HAPTOGLOBIN QUANT: CPT | Performed by: HOSPITALIST

## 2025-07-08 PROCEDURE — 83540 ASSAY OF IRON: CPT | Performed by: HOSPITALIST

## 2025-07-08 PROCEDURE — 36430 TRANSFUSION BLD/BLD COMPNT: CPT

## 2025-07-08 RX ORDER — SODIUM CHLORIDE 9 MG/ML
40 INJECTION, SOLUTION INTRAVENOUS AS NEEDED
Status: DISCONTINUED | OUTPATIENT
Start: 2025-07-08 | End: 2025-07-10 | Stop reason: HOSPADM

## 2025-07-08 RX ORDER — POTASSIUM CHLORIDE 7.45 MG/ML
10 INJECTION INTRAVENOUS
Status: COMPLETED | OUTPATIENT
Start: 2025-07-08 | End: 2025-07-08

## 2025-07-08 RX ORDER — BISACODYL 5 MG/1
5 TABLET, DELAYED RELEASE ORAL DAILY PRN
Status: DISCONTINUED | OUTPATIENT
Start: 2025-07-08 | End: 2025-07-10 | Stop reason: HOSPADM

## 2025-07-08 RX ORDER — BISACODYL 10 MG
10 SUPPOSITORY, RECTAL RECTAL DAILY PRN
Status: DISCONTINUED | OUTPATIENT
Start: 2025-07-08 | End: 2025-07-10 | Stop reason: HOSPADM

## 2025-07-08 RX ORDER — SODIUM CHLORIDE 0.9 % (FLUSH) 0.9 %
10 SYRINGE (ML) INJECTION AS NEEDED
Status: DISCONTINUED | OUTPATIENT
Start: 2025-07-08 | End: 2025-07-10 | Stop reason: HOSPADM

## 2025-07-08 RX ORDER — ONDANSETRON 4 MG/1
4 TABLET, ORALLY DISINTEGRATING ORAL EVERY 6 HOURS PRN
Status: DISCONTINUED | OUTPATIENT
Start: 2025-07-08 | End: 2025-07-10 | Stop reason: HOSPADM

## 2025-07-08 RX ORDER — ACETAMINOPHEN 325 MG/1
650 TABLET ORAL EVERY 4 HOURS PRN
Status: DISCONTINUED | OUTPATIENT
Start: 2025-07-08 | End: 2025-07-10 | Stop reason: HOSPADM

## 2025-07-08 RX ORDER — ACETAMINOPHEN 160 MG/5ML
650 SOLUTION ORAL EVERY 4 HOURS PRN
Status: DISCONTINUED | OUTPATIENT
Start: 2025-07-08 | End: 2025-07-10 | Stop reason: HOSPADM

## 2025-07-08 RX ORDER — ONDANSETRON 2 MG/ML
4 INJECTION INTRAMUSCULAR; INTRAVENOUS EVERY 6 HOURS PRN
Status: DISCONTINUED | OUTPATIENT
Start: 2025-07-08 | End: 2025-07-10 | Stop reason: HOSPADM

## 2025-07-08 RX ORDER — ACETAMINOPHEN 650 MG/1
650 SUPPOSITORY RECTAL EVERY 4 HOURS PRN
Status: DISCONTINUED | OUTPATIENT
Start: 2025-07-08 | End: 2025-07-10 | Stop reason: HOSPADM

## 2025-07-08 RX ORDER — AMOXICILLIN 250 MG
2 CAPSULE ORAL 2 TIMES DAILY PRN
Status: DISCONTINUED | OUTPATIENT
Start: 2025-07-08 | End: 2025-07-10 | Stop reason: HOSPADM

## 2025-07-08 RX ORDER — ASPIRIN 81 MG/1
81 TABLET ORAL DAILY
Status: DISCONTINUED | OUTPATIENT
Start: 2025-07-08 | End: 2025-07-10 | Stop reason: HOSPADM

## 2025-07-08 RX ORDER — TAMSULOSIN HYDROCHLORIDE 0.4 MG/1
0.4 CAPSULE ORAL NIGHTLY
Status: DISCONTINUED | OUTPATIENT
Start: 2025-07-08 | End: 2025-07-10 | Stop reason: HOSPADM

## 2025-07-08 RX ORDER — SODIUM CHLORIDE 0.9 % (FLUSH) 0.9 %
10 SYRINGE (ML) INJECTION EVERY 12 HOURS SCHEDULED
Status: DISCONTINUED | OUTPATIENT
Start: 2025-07-08 | End: 2025-07-10 | Stop reason: HOSPADM

## 2025-07-08 RX ORDER — MIDODRINE HYDROCHLORIDE 2.5 MG/1
2.5 TABLET ORAL 3 TIMES DAILY PRN
Status: DISCONTINUED | OUTPATIENT
Start: 2025-07-08 | End: 2025-07-08

## 2025-07-08 RX ORDER — NITROGLYCERIN 0.4 MG/1
0.4 TABLET SUBLINGUAL
Status: DISCONTINUED | OUTPATIENT
Start: 2025-07-08 | End: 2025-07-10 | Stop reason: HOSPADM

## 2025-07-08 RX ORDER — MIDODRINE HYDROCHLORIDE 2.5 MG/1
2.5 TABLET ORAL 3 TIMES DAILY PRN
Status: DISCONTINUED | OUTPATIENT
Start: 2025-07-08 | End: 2025-07-10 | Stop reason: HOSPADM

## 2025-07-08 RX ORDER — POLYETHYLENE GLYCOL 3350 17 G/17G
17 POWDER, FOR SOLUTION ORAL DAILY PRN
Status: DISCONTINUED | OUTPATIENT
Start: 2025-07-08 | End: 2025-07-10 | Stop reason: HOSPADM

## 2025-07-08 RX ADMIN — Medication 10 ML: at 20:27

## 2025-07-08 RX ADMIN — Medication 10 ML: at 18:33

## 2025-07-08 RX ADMIN — POTASSIUM CHLORIDE 10 MEQ: 7.46 INJECTION, SOLUTION INTRAVENOUS at 16:17

## 2025-07-08 RX ADMIN — POTASSIUM CHLORIDE 10 MEQ: 7.46 INJECTION, SOLUTION INTRAVENOUS at 17:21

## 2025-07-08 NOTE — PLAN OF CARE
Problem: Adult Inpatient Plan of Care  Goal: Plan of Care Review  Outcome: Progressing  Goal: Patient-Specific Goal (Individualized)  Outcome: Progressing  Goal: Absence of Hospital-Acquired Illness or Injury  Outcome: Progressing  Goal: Optimal Comfort and Wellbeing  Outcome: Progressing  Intervention: Provide Person-Centered Care  Recent Flowsheet Documentation  Taken 7/8/2025 1325 by Faye Tan, RN  Trust Relationship/Rapport:   care explained   choices provided  Goal: Readiness for Transition of Care  Outcome: Progressing  Intervention: Mutually Develop Transition Plan  Recent Flowsheet Documentation  Taken 7/8/2025 1406 by Faye Tan, RN  Transportation Anticipated: family or friend will provide  Transportation Concerns: (may need rides to dialysis) rides, unreliable from others  Patient/Family Anticipated Services at Transition:   Patient/Family Anticipates Transition to: home with family  Taken 7/8/2025 1405 by Faye Tan, RN  Equipment Currently Used at Home:   shower chair   walker, rolling  Taken 7/8/2025 1403 by Faye Tan, RN  Equipment Currently Used at Home:   shower chair   walker, rolling   cpap   Goal Outcome Evaluation:

## 2025-07-08 NOTE — PROGRESS NOTES
Patient is here for lab review with RN.  CBC reviewed w/ Dr. Pahceco. Patient c/o fatigue, weakness, and feeling run down. Hgb dropped 3g in 6 days. He is not established w/ a surgeon. Giving bleeding ileostomy and patient's symptoms, Dr. Pacheco recommending ER. Pt and son v/u. Copy of labs given to patient and follow up appointment reviewed. Akila Washington RN

## 2025-07-08 NOTE — CONSULTS
[facility] Consult Note      Patient Name: Bill Landry  : 1945  MRN: [id number]  Primary Care Physician:  Bharathi De La Torre MD  Referring Physician: Juan Ramon Aburto MD  Date of admission: 2025    Subjective     Reason for Consult: ESRD    HPI:   Bill Landry is a 79 y.o. male with ESRD on HD (MWF, via LUE AVG, at [facility], whom I follow outpatient), Crohn's s/p bowel resection with ileostomy, HTN, cirrhosis, A-fib, anemia, and a chronic leukopenia & thrombocytopenia, who presented to the hospital today for bleeding from ostomy and acute low hemoglobin (hemoglobin was 11.5 on , down to 7.8 today).    Last dialysis was yesterday without issues.  Electrolytes and volume status stable.  Denies any chest pain, shortness of breath, N/V/D.    Review of Systems:   14 point review of systems is otherwise negative except for mentioned above on HPI    Personal History     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    Aortic stenosis     Bicuspid aortic valve 2022    Calculus of kidney     Chronic leukopenia and thrombocytopenia     Cirrhosis of liver without ascites 2017    Congestive splenomegaly 2017    Crohn disease     with ileostomy    Decreased white blood cell count, unspecified     Diverticula of colon     ESRD on hemodialysis 2018    M-W-F FRESENIUS    Fatty liver disease, nonalcoholic     Fistula 2018    Other Specified Complication    Gastrointestinal hemorrhage, unspecified     GERD (gastroesophageal reflux disease)     GI bleed     Glaucoma     H/O Gallstone pancreatitis     H/O Pericardial effusion     H/O sinus bradycardia     History of hypertension     resolved    History of UTI     Hx of renal calculi     Ileostomy present      Iron deficiency     Leakage of heart valve prosthesis, subsequent encounter     MGUS (monoclonal gammopathy of unknown significance)     TATE (obstructive sleep apnea)     Other hemoglobinopathies     Pericardial effusion (noninflammatory) 1/18/2018    Permanent atrial fibrillation     Scarlet fever, uncomplicated     Stenosis of other vascular prosthetic devices, implants and grafts, initial encounter 02/14/2022    Systemic lupus erythematosus, unspecified     Thrombocytopenia     undpecified    Type 2 diabetes mellitus     CURRENTLY TAKES NO MED    Urinary retention     Post-OP       Past Surgical History:   Procedure Laterality Date    APPENDECTOMY  06/1968    ARTERIOVENOUS FISTULA/SHUNT SURGERY Left 08/08/2017    Procedure: LEFT BRACHIAL CEPHALIC AV FISTULA FORMATION WITH CEPHALIC VEIN TRANSPOSITION ;  Surgeon: Bill Deal MD;  Location: [facility] OR;  Service:     ARTERIOVENOUS FISTULA/SHUNT SURGERY Left 05/27/2022    Procedure: OPEN REVISION LEFT ARTERIOVENOUS INTERPOSITION GRAFT PLACEMENT;  Surgeon: Bill Deal MD;  Location: [facility] OR;  Service: Vascular;  Laterality: Left;    ARTERIOVENOUS FISTULA/SHUNT SURGERY Left 11/19/2024    Procedure: LEFT ARTERIOVENOUS SHUNT GRAFT REVISION;  Surgeon: Bill Deal MD;  Location: [facility] OR;  Service: Vascular;  Laterality: Left;    ARTERIOVENOUS FISTULA/SHUNT SURGERY W/ HEMODIALYSIS CATHETER INSERTION Left 02/15/2022    Procedure: OPEN LEFT ARM ARTERIOVENOUS FISTULA THROMBECTOMY WITH CEPHALIC VEIN ARTHROPLASTY AND STENT/GRAFT PLACEMENT;  Surgeon: Bill Deal MD;  [facility];  Service: Vascular;  Laterality: Left;    CATARACT EXTRACTION WITH INTRAOCULAR LENS IMPLANT Bilateral 2004    CHOLECYSTECTOMY  2011    COLECTOMY PARTIAL / TOTAL      History of inflammatory bowel disease with status post colectomy with ileostomy many years ago in the [facility],  18 YEARS OF AGE    COLON SURGERY      Colon resection with  colostomy    COLON SURGERY      COLONOSCOPY  01/2018    CYSTOSCOPY LITHOLAPAXY BLADDER STONE EXTRACTION      ENDOSCOPY  01/16/2015    gastritis    ENDOSCOPY  01/17/2018    Procedure: ESOPHAGOGASTRODUODENOSCOPY;  Surgeon: Warner Neville MD;  Location: [facility] ENDOSCOPY;  Service:     ILEOSCOPY  01/16/2015    normal    ILEOSCOPY N/A 01/17/2018    Procedure: ILEOSCOPY;  Surgeon: Warner Neville MD;  Location: [facility] ENDOSCOPY;  Service:     ILEOSTOMY  12/1968    loop ostomy bypass    INCISION AND DRAINAGE ARM Left 10/27/2023    Procedure: LEFT ARM INCISION AND DRAINAGE;  Surgeon: Bill Deal MD;  Location: [facility] MAIN OR;  Service: Vascular;  Laterality: Left;    INCISION AND DRAINAGE ARM Left 3/25/2025    Procedure: INCISION AND DRAINAGE UPPER EXTREMITY;  Surgeon: Bill Deal MD;  Location: [facility] HYBRID OR;  Service: Vascular;  Laterality: Left;    INSERTION HEMODIALYSIS CATHETER Right 11/13/2024    Procedure: Tunnelled HEMODIALYSIS CATHETER INSERTION;  Surgeon: Ben Barth MD;  Location: [facility] HYBRID OR;  Service: Vascular;  Laterality: Right;    INSERTION HEMODIALYSIS CATHETER N/A 3/25/2025    Procedure: HEMODIALYSIS CATHETER INSERTION, left arm I&D;  Surgeon: Bill Deal MD;  Location: [facility] OR;  Service: Vascular;  Laterality: N/A;    OTHER SURGICAL HISTORY  2009    kidney stones x3    PSEUDOANEURYSM REPAIR Left 10/27/2023    TONSILLECTOMY  1950       Family History: family history includes Heart disease in his mother; Heart failure (age of onset: 78) in his mother; Hyperlipidemia in his mother; Hypertension in his father and mother; Other (age of onset: 82) in his father.    Social History:  reports that he has never smoked. He has never been exposed to tobacco smoke. He has never used smokeless tobacco. He reports that he does not drink alcohol and does not use drugs.    Home Medications:  Prior to Admission medications    Medication Sig Start Date End Date Taking? Authorizing  Provider   acetaminophen (TYLENOL) 325 MG tablet Take 2 tablets by mouth Every 4 (Four) Hours As Needed for Mild Pain. 1/31/25  Yes Juan Daniel Randall MD   alfuzosin (UROXATRAL) 10 MG 24 hr tablet Take 1 tablet by mouth Every Other Day. Does not take on Sundays Tuesdays or Thursdays   Indications: Benign Enlargement of Prostate   Yes Elvia Nicholson MD   aspirin 81 MG tablet Take 1 tablet by mouth Every Night. Indications: Disease involving Lipid Deposits in the Arteries   Yes Elvia Nicholson MD   B Complex-C-Folic Acid (Umm-Peter) tablet Take 1 tablet by mouth Daily. 9/27/23  Yes Elvia Nicholson MD   Cholecalciferol 25 MCG (1000 UT) tablet Take 1 tablet by mouth Daily. Indications: Vitamin D Deficiency   Yes Elvia Nicholson MD   famotidine (Pepcid AC) 10 MG tablet Take 1 tablet by mouth Daily As Needed for Heartburn (take as needed after HD on dialysis days). Indications: Heartburn   Yes Elvia Nicholson MD   Iron Sucrose (VENOFER IV) Infuse 1 dose into a venous catheter As Needed.   Yes Elvia Nicholson MD   latanoprost (XALATAN) 0.005 % ophthalmic solution Administer 1 drop to both eyes Every Night. Indications: Wide-Angle Glaucoma   Yes Elvia Nicholson MD   lidocaine-prilocaine (EMLA) 2.5-2.5 % cream Apply 1 Application topically to the appropriate area as directed As Needed. Cefkym-Tftuvljxk-Rcnikb:  ONE HOUR PRIOR TO DIALYSIS  Indications: Anesthesia to a Specific Part of the Body 2/5/18  Yes Elvia Nicholson MD   Loratadine 10 MG capsule Take 1 capsule by mouth Daily. Indications: Hayfever   Yes Elvia Nicholson MD   Methoxy PEG-Epoetin Beta (MIRCERA IJ) Inject 1 dose as directed Every 30 (Thirty) Days.   Yes Elvia Nicholson MD   midodrine (PROAMATINE) 2.5 MG tablet Take 1 tablet by mouth 3 (Three) Times a Day As Needed (on dialysis days, and if systolic blood pressure is below 100). 4/3/25  Yes Shaquille Cosby MD   mupirocin (BACTROBAN) 2 % ointment Apply 1  "Application topically to the appropriate area as directed 3 (Three) Times a Day. Indications: Wound Infection 4/3/25  Yes Shaquille Cosby MD   ondansetron (Zofran) 4 MG tablet Take 1 tablet by mouth Every 12 (Twelve) Hours As Needed for Nausea or Vomiting. 4/17/25  Yes John Sotelo,    polyethylene glycol (MIRALAX) 17 g packet Take 17 g by mouth Daily As Needed (Use if senna-docusate is ineffective). 1/31/25  Yes Juan Daniel Randall MD   saccharomyces boulardii (Florastor) 250 MG capsule Take 1 capsule by mouth 2 (Two) Times a Day. 6/19/25  Yes Bharathi De La Torre MD   sennosides-docusate (PERICOLACE) 8.6-50 MG per tablet Take 2 tablets by mouth 2 (Two) Times a Day As Needed for Constipation. 1/31/25  Yes Juan Daniel Randall MD   sevelamer (RENVELA) 800 MG tablet Take 1 tablet by mouth 3 (Three) Times a Day. 2/17/25 3/7/26 Yes Elvia Nicholson MD   silver nitrate 75-25 % applicator Apply 1 each topically to the appropriate area as directed 3 (Three) Times a Week. 7/4/25  Yes Bharathi De La Torre MD   sodium chloride 0.65 % nasal spray Administer 2 sprays into the nostril(s) as directed by provider Every Night.   Yes Elvia Nicholson MD   sevelamer (RENAGEL) 800 MG tablet Take 1 tablet by mouth 3 (Three) Times a Day With Meals.  7/8/25  Elvia Nicholson MD       Allergies:  Allergies   Allergen Reactions    Ace Inhibitors Other (See Comments)     RENAL FAILURE/ raised creatinine    Contrast Dye (Echo Or Unknown Ct/Mr) Other (See Comments) and Anaphylaxis     RENAL FAILURE    Iodine Anaphylaxis    Angiotensin Receptor Blockers Swelling    Eliquis [Apixaban] Arrhythmia     BLEEDING ISSUES; PT CANNOT TAKE ANY ANTICOAGULANTS DUE TO LOW PLATELETS EXCEPT ASPIRIN      Filgrastim GI Intolerance and Confusion     Chest pain    Heparin Other (See Comments)     \"It causes me to bleed out\"    Keflex [Cephalexin] Diarrhea     RENAL FAILURE    Other Angioedema     IVP Dye       Objective     Vitals:   Temp:  [97.5 °F (36.4 °C)-98.3 " °F (36.8 °C)] 97.5 °F (36.4 °C)  Heart Rate:  [64-97] 68  Resp:  [16-18] 18  BP: (105-122)/(51-65) 105/65  No intake or output data in the 24 hours ending 07/08/25 1535    Physical Exam:   Constitutional: Awake, chronically ill, no acute distress.  HEENT: Sclera anicteric, no conjunctival injection  Neck: No JVD  Respiratory: Clear to auscultation bilaterally, nonlabored respiration  Cardiovascular: Irregular irregular, 2/6 murmurs, no rubs  Gastrointestinal: BS +, abdomen is soft, nontender and nondistended, + acute ileostomy  : No palpable bladder  Musculoskeletal: No edema, no clubbing or cyanosis; LUE AVG patent  Psychiatric: Appropriate affect, cooperative  Neurologic: Oriented x3, moving all extremities, normal speech and mental status  Skin: Warm and dry       Scheduled Meds:     [Held by provider] aspirin, 81 mg, Oral, Daily  potassium chloride, 10 mEq, Intravenous, Q1H  sodium chloride, 10 mL, Intravenous, Q12H  tamsulosin, 0.4 mg, Oral, Nightly      IV Meds:        Results Reviewed:   I have personally reviewed the results from the time of this admission to 7/8/2025 15:35 EDT     Lab Results   Component Value Date    GLUCOSE 152 (H) 07/08/2025    CALCIUM 8.1 (L) 07/08/2025     07/08/2025    K 3.5 07/08/2025    CO2 25.0 07/08/2025    CL 98 07/08/2025    BUN 36.0 (H) 07/08/2025    CREATININE 5.93 (H) 07/08/2025    EGFRIFAFRI  02/14/2022      Comment:      <15 Indicative of kidney failure.    EGFRIFNONA 7 (L) 02/14/2022    BCR 6.1 (L) 07/08/2025    ANIONGAP 13.0 07/08/2025      Lab Results   Component Value Date    MG 2.1 03/24/2025    PHOS 4.4 04/03/2025    ALBUMIN 2.6 (L) 07/08/2025           Assessment / Plan       GI bleed    Permanent atrial fibrillation    Thrombocytopenia    Cirrhosis of liver without ascites    ESRD on hemodialysis    Crohn's disease, unspecified, without complications    TATE (obstructive sleep apnea)    Nonrheumatic aortic valve stenosis    Pulmonary hypertension    Ileostomy  present    Hypertension    Acute blood loss anemia      ASSESSMENT:  ESRD on HD, MWF, via LUE AVG.  Last dialysis was yesterday.  Electrolytes and volume status stable  GI bleed/anemia, bright red blood was seen in ostomy. Hemoglobin dropped from 11.5 to 7.8 within a week.  Awaiting general surgery evaluation  Crohn's disease s/p bowel resection with ileostomy  Cirrhosis with chronic leukopenia thrombocytopenia  Atrial fibrillation, on baby aspirin  Chronic hypotension of ESRD, on midodrine 2.5 TID on dialysis days    PLAN:  Continue MWF dialysis schedule with next treatment tomorrow  Surveillance labs    Thank you for involving us in the care of Bill Landry.  Please feel free to call with any questions.    Myra Moore MD  07/08/25  15:35 EDT    [facility]  [phone]      Please note that portions of this note were completed with a voice recognition program.

## 2025-07-08 NOTE — CASE MANAGEMENT/SOCIAL WORK
Discharge Planning Assessment  [facility]     Patient Name: Bill Landry  MRN: [id number]  Today's Date: 7/8/2025    Admit Date: 7/8/2025    Plan: Home with spouse   Discharge Needs Assessment       Row Name 07/08/25 1542       Living Environment    People in Home spouse    Current Living Arrangements home    Potentially Unsafe Housing Conditions none    In the past 12 months has the electric, gas, oil, or water company threatened to shut off services in your home? No    Primary Care Provided by self    Provides Primary Care For no one    Family Caregiver if Needed spouse;child(gerard), adult    Quality of Family Relationships helpful;involved;supportive    Able to Return to Prior Arrangements yes       Resource/Environmental Concerns    Resource/Environmental Concerns none    Transportation Concerns none       Transportation Needs    In the past 12 months, has lack of transportation kept you from medical appointments or from getting medications? no    In the past 12 months, has lack of transportation kept you from meetings, work, or from getting things needed for daily living? No       Food Insecurity    Within the past 12 months, you worried that your food would run out before you got the money to buy more. Never true    Within the past 12 months, the food you bought just didn't last and you didn't have money to get more. Never true       Transition Planning    Patient/Family Anticipates Transition to home    Patient/Family Anticipated Services at Transition none    Transportation Anticipated family or friend will provide       Discharge Needs Assessment    Readmission Within the Last 30 Days no previous admission in last 30 days    Current Outpatient/Agency/Support Group outpatient hemodialysis    Equipment Currently Used at Home colostomy/ostomy supplies;walker, rolling;shower chair;cpap;bp cuff    Concerns to be Addressed denies needs/concerns at this time    Do you want help finding or keeping work or a  job? I do not need or want help    Do you want help with school or training? For example, starting or completing job training or getting a high school diploma, GED or equivalent No    Anticipated Changes Related to Illness none    Equipment Needed After Discharge none    Provided Post Acute Provider List? N/A    Provided Post Acute Provider Quality & Resource List? N/A                   Discharge Plan       Row Name 07/08/25 1543       Plan    Plan Home with spouse    Plan Comments S/W pt at bedside.  Facesheet info confirmed.  Pt lives in a bilevel house w/ his wife Gillian who can assist as needed.  Their 2 sons live locally and can assist .  Pt goes to HD on a MWF schedule at [facility].  His sons provide transport.  Home DME includes ostomy supplies, walker, shower chair, CPAP and BP cuff.  Pt has used [facility]  recently and has been to [facility] for SNF.  He and his wife asked about other transportation options so his sons would not have to take him to HD every time..  List of transportation resources given.  Pt denies difficulty affording meds or basic needs.   Pt plans to return home upon DC and denies any DC needs at this time.  CCP will continue to follow. .........Brittani LUNDBERG/ WILFRIDO.                  Continued Care and Services - Admitted Since 7/8/2025    No active coordination exists.          Demographic Summary       Row Name 07/08/25 1546       General Information    Admission Type observation    Arrived From home    Required Notices Provided Observation Status Notice    Referral Source admission list    Reason for Consult discharge planning    Preferred Language English                   Functional Status       Row Name 07/08/25 1541       Functional Status    Usual Activity Tolerance moderate       Functional Status, IADL    Medications independent    Meal Preparation independent;assistive person    Housekeeping independent;assistive person    Laundry independent;assistive person     Shopping independent;assistive person    If for any reason you need help with day-to-day activities such as bathing, preparing meals, shopping, managing finances, etc., do you get the help you need? I get all the help I need       Mental Status    General Appearance WDL WDL       Mental Status Summary    Recent Changes in Mental Status/Cognitive Functioning no changes       Employment/    Employment Status retired                               Brittani Brown, NINOSKA

## 2025-07-08 NOTE — PLAN OF CARE
Goal Outcome Evaluation:         Pt a/ox4 when arived to the unit vss respirations even and unlabored no signs of distress pt given 1unit of prbc no reactions pt falls risk bractlet placed pt educated on falls risk verbalized understanding Dialysis pt MWF pt ostomy clean and intake bed locked in lowest positions call light in reach pt educated on how to use no other concerns as present plan of care on going

## 2025-07-08 NOTE — ED NOTES
"Nursing report ED to floor  Bill Landry  79 y.o.  male    HPI :  HPI  Stated Reason for Visit: abnormal lab (hgb 7.8)  History Obtained From: patient, family    Chief Complaint  Chief Complaint   Patient presents with    Abnormal Lab       Admitting doctor:   Juan Ramon Aburto MD    Admitting diagnosis:   The primary encounter diagnosis was Gastrointestinal hemorrhage, unspecified gastrointestinal hemorrhage type. Diagnoses of Acute on chronic anemia and ESRD on dialysis were also pertinent to this visit.    Code status:   Current Code Status       Date Active Code Status Order ID Comments User Context       Prior            Allergies:   Ace inhibitors, Contrast dye (echo or unknown ct/mr), Iodine, Angiotensin receptor blockers, Eliquis [apixaban], Filgrastim, Heparin, Keflex [cephalexin], and Other    Isolation:   No active isolations    Intake and Output  No intake or output data in the 24 hours ending 07/08/25 1131    Weight:       07/08/25  0859   Weight: 82.2 kg (181 lb 3.5 oz)       Most recent vitals:   Vitals:    07/08/25 0845 07/08/25 0859 07/08/25 0912 07/08/25 1000   BP:   110/61 111/61   Patient Position:    Lying   Pulse: 75  69 69   Resp:       Temp:       SpO2: 96%  98% 95%   Weight:  82.2 kg (181 lb 3.5 oz)     Height:  177 cm (69.69\")         Active LDAs/IV Access:   Lines, Drains & Airways       Active LDAs       Name Placement date Placement time Site Days    Peripheral IV 07/08/25 0858 20 G Anterior;Distal;Right Forearm 07/08/25  0858  Forearm  less than 1    Ileostomy LLQ --  present upon arrival to [facility]  --  LLQ  --                    Labs (abnormal labs have a star):   Labs Reviewed   PROTIME-INR - Abnormal; Notable for the following components:       Result Value    Protime 17.6 (*)     INR 1.44 (*)     All other components within normal limits   COMPREHENSIVE METABOLIC PANEL - Abnormal; Notable for the following components:    Glucose 152 (*)     BUN 36.0 (*)     Creatinine 5.93 (*) "     Calcium 8.1 (*)     Albumin 2.6 (*)     AST (SGOT) 52 (*)     Alkaline Phosphatase 152 (*)     BUN/Creatinine Ratio 6.1 (*)     eGFR 9.0 (*)     All other components within normal limits    Narrative:     GFR Categories in Chronic Kidney Disease (CKD)              GFR Category          GFR (mL/min/1.73)    Interpretation  G1                    90 or greater        Normal or high (1)  G2                    60-89                Mild decrease (1)  G3a                   45-59                Mild to moderate decrease  G3b                   30-44                Moderate to severe decrease  G4                    15-29                Severe decrease  G5                    14 or less           Kidney failure    (1)In the absence of evidence of kidney disease, neither GFR category G1 or G2 fulfill the criteria for CKD.    eGFR calculation 2021 CKD-EPI creatinine equation, which does not include race as a factor   FERRITIN - Abnormal; Notable for the following components:    Ferritin 720.00 (*)     All other components within normal limits    Narrative:     Results may be falsely decreased if patient taking Biotin.     IRON PROFILE - Abnormal; Notable for the following components:    Transferrin 169 (*)     TIBC 252 (*)     All other components within normal limits   LACTATE DEHYDROGENASE - Abnormal; Notable for the following components:     (*)     All other components within normal limits   APTT - Normal   HAPTOGLOBIN - Normal   VITAMIN B12   FOLATE   RETICULOCYTES   TYPE AND SCREEN   PREPARE RBC   PREPARE RBC       EKG:   No orders to display       Meds given in ED:   Medications   sodium chloride 0.9 % flush 10 mL (has no administration in time range)   midodrine (PROAMATINE) tablet 2.5 mg (has no administration in time range)       Imaging results:  No radiology results for the last day    Ambulatory status:   - has not ambulated in ED    Social issues:   Social History     Socioeconomic History    Marital  status:      Spouse name: Gillian    Number of children: 2    Years of education: College   Tobacco Use    Smoking status: Never     Passive exposure: Never    Smokeless tobacco: Never   Vaping Use    Vaping status: Never Used   Substance and Sexual Activity    Alcohol use: No     Comment: caffeine use: none    Drug use: No    Sexual activity: Defer       Peripheral Neurovascular  Peripheral Neurovascular (Adult)  Peripheral Neurovascular WDL: WDL    Neuro Cognitive  Neuro Cognitive (Adult)  Cognitive/Neuro/Behavioral WDL: WDL    Learning  Learning Assessment  Learning Readiness and Ability: no barriers identified  Education Provided  Person Taught: patient    Respiratory  Respiratory WDL  Respiratory WDL: WDL    Abdominal Pain       Pain Assessments  Pain (Adult)  (0-10) Pain Rating: Rest: 0    NIH Stroke Scale       Angela Shelby RN  07/08/25 11:31 EDT

## 2025-07-08 NOTE — ED PROVIDER NOTES
EMERGENCY DEPARTMENT ENCOUNTER  Room Number:  26/26  PCP: Bharathi De La Torre MD  Independent Historians: Patient and Family      HPI:  Chief Complaint: had concerns including Abnormal Lab.       Context: Bill Landry is a 79 y.o. male with a medical history of end-stage renal disease on Monday Wednesday Friday dialysis, Crohn's disease, anemia, chronic leukopenia, chronic thrombocytopenia who presents to the ED c/o acute low hemoglobin.  The patient reports that for the last week he has had bleeding from his ostomy.  He states he has not been passing stool from his ostomy.  He reports his ostomy has been present since he was 18 years old.  He reports he called his hematologist and he asked him to come in for laboratory evaluation.  He had labs drawn today and was found to be anemic.  He was sent here for further evaluation.  He states that he recently saw his ostomy nurse who told him that he they thought he might of developed an internal hernia.  He does not have a local surgeon.  Dr. Clark is his nephrologist.  He last had dialysis yesterday.      Review of prior external notes (non-ED) -and- Review of prior external test results outside of this encounter: Laboratory evaluation earlier today shows a CBC with hemoglobin 7.8    Prescription drug monitoring program review:         PAST MEDICAL HISTORY  Active Ambulatory Problems     Diagnosis Date Noted    Permanent atrial fibrillation 05/02/2016    Thrombocytopenia 05/02/2016    Chronic leukopenia 05/02/2016    Cirrhosis of liver without ascites 07/24/2017    Congestive splenomegaly 07/24/2017    Anemia due to stage 4 chronic kidney disease treated with erythropoietin 10/04/2017    Esophageal abnormality 01/18/2018    ESRD on hemodialysis 03/21/2019    Other specified glaucoma 05/21/2019    Arteriovenous fistula for hemodialysis in place, secondary 09/03/2020    Crohn's disease, unspecified, without complications 01/18/2018    Deficiency of other specified B group  vitamins 01/19/2018    Dermatitis, unspecified 01/18/2018    Encounter for immunization 02/19/2018    History of urinary stone 01/18/2018    Hyperparathyroidism, unspecified 01/19/2018    Other disorders of phosphorus metabolism 02/11/2021    Other iron deficiency anemias 02/10/2018    Pure hypertriglyceridemia 01/18/2018    Renal osteodystrophy 01/18/2018    Secondary hyperparathyroidism of renal origin 01/18/2018    Splenomegaly, not elsewhere classified 01/18/2018    Bilateral pseudophakia 05/05/2021    Dermatochalasis of eyelid 05/05/2021    Primary open-angle glaucoma, bilateral, moderate stage 05/05/2021    Puckering of macula, left eye 05/05/2021    Secondary cataract 05/05/2021    AV fistula occlusion, initial encounter 02/15/2022    TATE (obstructive sleep apnea)     Aneurysm artery, 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    Nonrheumatic aortic valve stenosis 07/20/2022    Bicuspid aortic valve 07/20/2022    Hyperuricemia without signs of inflammatory arthritis and tophaceous disease 08/03/2022    Presbyopia 05/13/2021    Impaired glucose tolerance 05/12/2023    Hypofibrinogenemia 10/29/2023    Pulmonary hypertension     Skin change 04/08/2024    ESRD on dialysis 11/13/2024    Problem with vascular access 11/13/2024    AV graft thrombosis, initial encounter 11/19/2024    AV shunt malfunction, initial encounter 12/12/2024    Left arm swelling 12/12/2024    Acetabular fracture 01/25/2025    Anemia 01/25/2025    Cirrhosis of liver 01/25/2025    Atrial fibrillation 01/25/2025    Fall 01/25/2025    Ileostomy present 01/25/2025    Weakness 01/25/2025    Hypertension 01/25/2025    Pain in left hip 01/25/2025    Acute pain due to trauma 01/25/2025    Transaminitis 01/25/2025    Hypoxemia 01/25/2025    Hyperglycemia 01/25/2025    Sepsis due to cellulitis 03/23/2025     Resolved Ambulatory Problems     Diagnosis Date Noted    Anemia 01/12/2018    Pneumonia of both lungs due to infectious organism 01/19/2018    At risk for fluid  volume overload 03/21/2019    Allergy, unspecified, initial encounter 10/09/2020    Anaphylactic shock, unspecified, initial encounter 10/09/2020    Essential (primary) hypertension 01/18/2018    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, with stage 1 through stage 4 chronic kidney disease, or unspecified chronic kidney disease 10/18/2019    Moderate protein-calorie malnutrition 12/16/2020    Pericardial effusion (noninflammatory) 01/18/2018    Sleep apnea, unspecified 01/18/2018    Unspecified atrial fibrillation 01/19/2018    Shortness of breath 02/19/2022    Acute hypoxemic respiratory failure due to COVID-19 05/11/2023    Acute on chronic diastolic heart failure 05/11/2023    Acute cough 05/17/2023    Bleeding 10/29/2023     Past Medical History:   Diagnosis Date    Abnormal serum protein electrophoresis     Acquired absence of other specified parts of digestive tract     Anemia in chronic kidney disease     Aneurysm of artery of arm     Aortic stenosis     Calculus of kidney     Crohn disease     Decreased white blood cell count, unspecified     Diverticula of colon     Fatty liver disease, nonalcoholic     Fistula 04/05/2018    Gastrointestinal hemorrhage, unspecified     GERD (gastroesophageal reflux disease)     GI bleed     Glaucoma     H/O Gallstone pancreatitis     H/O Pericardial effusion     H/O sinus bradycardia     History of hypertension     History of UTI     Hx of renal calculi     Iron deficiency     Leakage of heart valve prosthesis, subsequent encounter     MGUS (monoclonal gammopathy of unknown significance)     Other hemoglobinopathies     Scarlet fever, uncomplicated     Stenosis of other vascular prosthetic devices, implants and grafts, initial encounter 02/14/2022    Systemic lupus erythematosus, unspecified     Type 2 diabetes mellitus     Urinary retention          PAST SURGICAL HISTORY  Past Surgical History:   Procedure Laterality Date    APPENDECTOMY  06/1968     ARTERIOVENOUS FISTULA/SHUNT SURGERY Left 08/08/2017    Procedure: LEFT BRACHIAL CEPHALIC AV FISTULA FORMATION WITH CEPHALIC VEIN TRANSPOSITION ;  Surgeon: Bill Deal MD;  Location: [facility] OR;  Service:     ARTERIOVENOUS FISTULA/SHUNT SURGERY Left 05/27/2022    Procedure: OPEN REVISION LEFT ARTERIOVENOUS INTERPOSITION GRAFT PLACEMENT;  Surgeon: Bill Deal MD;  Location: [facility] MAIN OR;  Service: Vascular;  Laterality: Left;    ARTERIOVENOUS FISTULA/SHUNT SURGERY Left 11/19/2024    Procedure: LEFT ARTERIOVENOUS SHUNT GRAFT REVISION;  Surgeon: Bill Deal MD;  Location: [facility] MAIN OR;  Service: Vascular;  Laterality: Left;    ARTERIOVENOUS FISTULA/SHUNT SURGERY W/ HEMODIALYSIS CATHETER INSERTION Left 02/15/2022    Procedure: OPEN LEFT ARM ARTERIOVENOUS FISTULA THROMBECTOMY WITH CEPHALIC VEIN ARTHROPLASTY AND STENT/GRAFT PLACEMENT;  Surgeon: Bill Deal MD;  Location: [facility] OR 18/19;  Service: Vascular;  Laterality: Left;    CATARACT EXTRACTION WITH INTRAOCULAR LENS IMPLANT Bilateral 2004    CHOLECYSTECTOMY  2011    COLECTOMY PARTIAL / TOTAL      History of inflammatory bowel disease with status post colectomy with ileostomy many years ago in the [facility],  18 YEARS OF AGE    COLON SURGERY      Colon resection with colostomy    COLON SURGERY      COLONOSCOPY  01/2018    CYSTOSCOPY LITHOLAPAXY BLADDER STONE EXTRACTION      ENDOSCOPY  01/16/2015    gastritis    ENDOSCOPY  01/17/2018    Procedure: ESOPHAGOGASTRODUODENOSCOPY;  Surgeon: Warner Neville MD;  Location: [facility] ENDOSCOPY;  Service:     ILEOSCOPY  01/16/2015    normal    ILEOSCOPY N/A 01/17/2018    Procedure: ILEOSCOPY;  Surgeon: Warner Neville MD;  Location: [facility] ENDOSCOPY;  Service:     ILEOSTOMY  12/1968    loop ostomy bypass    INCISION AND DRAINAGE ARM Left 10/27/2023    Procedure: LEFT ARM INCISION AND DRAINAGE;  Surgeon: Bill Deal MD;  Location: [facility] OR;  Service: Vascular;  Laterality: Left;     INCISION AND DRAINAGE ARM Left 3/25/2025    Procedure: INCISION AND DRAINAGE UPPER EXTREMITY;  Surgeon: Bill Deal MD;  Location: [facility];  Service: Vascular;  Laterality: Left;    INSERTION HEMODIALYSIS CATHETER Right 11/13/2024    Procedure: Tunnelled HEMODIALYSIS CATHETER INSERTION;  Surgeon: Ben Barth MD;  Location: [facility];  Service: Vascular;  Laterality: Right;    INSERTION HEMODIALYSIS CATHETER N/A 3/25/2025    Procedure: HEMODIALYSIS CATHETER INSERTION, left arm I&D;  Surgeon: Bill Deal MD;  Location: [facility];  Service: Vascular;  Laterality: N/A;    OTHER SURGICAL HISTORY  2009    kidney stones x3    PSEUDOANEURYSM REPAIR Left 10/27/2023    TONSILLECTOMY  1950         FAMILY HISTORY  Family History   Problem Relation Age of Onset    Heart failure Mother 78    Hypertension Mother     Heart disease Mother     Hyperlipidemia Mother     Hypertension Father     Other Father 82        MVI    Malig Hyperthermia Neg Hx          SOCIAL HISTORY  Social History     Socioeconomic History    Marital status:      Spouse name: Gillian    Number of children: 2    Years of education: College   Tobacco Use    Smoking status: Never     Passive exposure: Never    Smokeless tobacco: Never   Vaping Use    Vaping status: Never Used   Substance and Sexual Activity    Alcohol use: No     Comment: caffeine use: none    Drug use: No    Sexual activity: Defer       Chronic or social conditions impacting patient care (Social Determinants of Health):  Social Drivers of Health     Tobacco Use: Low Risk  (5/29/2025)    Patient History     Smoking Tobacco Use: Never     Smokeless Tobacco Use: Never     Passive Exposure: Never   Alcohol Use: Not At Risk (3/23/2025)    AUDIT-C     Frequency of Alcohol Consumption: Never     Average Number of Drinks: Patient does not drink     Frequency of Binge Drinking: Never   Financial Resource Strain: Not on file   Food Insecurity: No Food  Insecurity (5/11/2023)    Hunger Vital Sign     Worried About Running Out of Food in the Last Year: Never true     Ran Out of Food in the Last Year: Never true   Transportation Needs: No Transportation Needs (1/3/2025)    OASIS : Transportation     Lack of Transportation (Medical): No     Lack of Transportation (Non-Medical): No     Patient Unable or Declines to Respond: No   Physical Activity: Not on file   Stress: Not on file   Social Connections: Feeling Socially Integrated (1/3/2025)    OASIS : Social Isolation     Frequency of experiencing loneliness or isolation: Never   Interpersonal Safety: Not At Risk (7/8/2025)    Abuse Screen     Unsafe at Home or Work/School: no     Feels Threatened by Someone?: no     Does Anyone Keep You from Contacting Others or Doint Things Outside the Home?: no     Physical Sign of Abuse Present: no   Depression: Not at risk (6/19/2025)    PHQ-2     PHQ-2 Score: 0   Housing Stability: Not At Risk (3/26/2025)    Housing Stability     Current Living Arrangements: home     Potentially Unsafe Housing Conditions: none   Utilities: Not on file   Health Literacy: Unknown (3/24/2025)    Education     Help with school or training?: Not on file     Preferred Language: English   Employment: Not on file   Disabilities: At Risk (3/23/2025)    Disabilities     Concentrating, Remembering, or Making Decisions Difficulty: no     Doing Errands Independently Difficulty: yes       ALLERGIES  Ace inhibitors, Contrast dye (echo or unknown ct/mr), Iodine, Angiotensin receptor blockers, Eliquis [apixaban], Filgrastim, Heparin, Keflex [cephalexin], and Other      REVIEW OF SYSTEMS  Review of Systems  Included in HPI  All systems reviewed and negative except for those discussed in HPI.      PHYSICAL EXAM    I have reviewed the triage vital signs and nursing notes.    ED Triage Vitals   Temp Heart Rate Resp BP SpO2   07/08/25 0833 07/08/25 0833 07/08/25 0834 07/08/25 0836 07/08/25 0833   98.3 °F  (36.8 °C) 90 18 113/52 97 %      Temp src Heart Rate Source Patient Position BP Location FiO2 (%)   -- -- -- -- --              Physical Exam  GENERAL: Awake, alert, no acute distress  SKIN: Warm, dry  HENT: Normocephalic, atraumatic  EYES: no scleral icterus  CV: regular rhythm, regular rate  RESPIRATORY: normal effort, lungs clear  ABDOMEN: soft, nontender, nondistended.  Ostomy is present with some fullness around the ostomy.  Not significantly tender  MUSCULOSKELETAL: no deformity  NEURO: alert, moves all extremities, follows commands            LAB RESULTS  Recent Results (from the past 24 hours)   CBC Auto Differential    Collection Time: 07/08/25  8:01 AM    Specimen: Blood   Result Value Ref Range    WBC 5.56 3.40 - 10.80 10*3/mm3    RBC 2.45 (L) 4.14 - 5.80 10*6/mm3    Hemoglobin 7.8 (C) 13.0 - 17.7 g/dL    Hematocrit 24.3 (L) 37.5 - 51.0 %    MCV 99.2 (H) 79.0 - 97.0 fL    MCH 31.8 26.6 - 33.0 pg    MCHC 32.1 31.5 - 35.7 g/dL    RDW 17.4 (H) 12.3 - 15.4 %    RDW-SD 61.8 (H) 37.0 - 54.0 fl    MPV 8.5 6.0 - 12.0 fL    Platelets 110 (L) 140 - 450 10*3/mm3    Neutrophil % 68.0 42.7 - 76.0 %    Lymphocyte % 19.4 (L) 19.6 - 45.3 %    Monocyte % 9.0 5.0 - 12.0 %    Eosinophil % 2.2 0.3 - 6.2 %    Basophil % 0.7 0.0 - 1.5 %    Immature Grans % 0.7 (H) 0.0 - 0.5 %    Neutrophils, Absolute 3.78 1.70 - 7.00 10*3/mm3    Lymphocytes, Absolute 1.08 0.70 - 3.10 10*3/mm3    Monocytes, Absolute 0.50 0.10 - 0.90 10*3/mm3    Eosinophils, Absolute 0.12 0.00 - 0.40 10*3/mm3    Basophils, Absolute 0.04 0.00 - 0.20 10*3/mm3    Immature Grans, Absolute 0.04 0.00 - 0.05 10*3/mm3    nRBC 0.0 0.0 - 0.2 /100 WBC   Protime-INR    Collection Time: 07/08/25  8:55 AM    Specimen: Blood   Result Value Ref Range    Protime 17.6 (H) 11.7 - 14.2 Seconds    INR 1.44 (H) 0.90 - 1.10   aPTT    Collection Time: 07/08/25  8:55 AM    Specimen: Blood   Result Value Ref Range    PTT 35.0 22.7 - 35.4 seconds   Type & Screen    Collection Time:  07/08/25  8:55 AM    Specimen: Blood   Result Value Ref Range    ABO Type O     RH type Positive     Antibody Screen Negative     T&S Expiration Date 7/11/2025 11:59:59 PM    Comprehensive Metabolic Panel    Collection Time: 07/08/25  8:55 AM    Specimen: Blood   Result Value Ref Range    Glucose 152 (H) 65 - 99 mg/dL    BUN 36.0 (H) 8.0 - 23.0 mg/dL    Creatinine 5.93 (H) 0.76 - 1.27 mg/dL    Sodium 136 136 - 145 mmol/L    Potassium 3.5 3.5 - 5.2 mmol/L    Chloride 98 98 - 107 mmol/L    CO2 25.0 22.0 - 29.0 mmol/L    Calcium 8.1 (L) 8.6 - 10.5 mg/dL    Total Protein 6.7 6.0 - 8.5 g/dL    Albumin 2.6 (L) 3.5 - 5.2 g/dL    ALT (SGPT) 18 1 - 41 U/L    AST (SGOT) 52 (H) 1 - 40 U/L    Alkaline Phosphatase 152 (H) 39 - 117 U/L    Total Bilirubin 0.6 0.0 - 1.2 mg/dL    Globulin 4.1 gm/dL    A/G Ratio 0.6 g/dL    BUN/Creatinine Ratio 6.1 (L) 7.0 - 25.0    Anion Gap 13.0 5.0 - 15.0 mmol/L    eGFR 9.0 (L) >60.0 mL/min/1.73   Ferritin    Collection Time: 07/08/25  8:55 AM    Specimen: Blood   Result Value Ref Range    Ferritin 720.00 (H) 30.00 - 400.00 ng/mL   Haptoglobin    Collection Time: 07/08/25  8:55 AM    Specimen: Blood   Result Value Ref Range    Haptoglobin 59 30 - 200 mg/dL   Iron Profile w/o Ferritin    Collection Time: 07/08/25  8:55 AM    Specimen: Blood   Result Value Ref Range    Iron 122 59 - 158 mcg/dL    Iron Saturation (TSAT) 48 20 - 50 %    Transferrin 169 (L) 200 - 360 mg/dL    TIBC 252 (L) 298 - 536 mcg/dL   Lactate Dehydrogenase    Collection Time: 07/08/25  8:55 AM    Specimen: Blood   Result Value Ref Range     (H) 135 - 225 U/L         RADIOLOGY  CT Abdomen Pelvis Without Contrast  Result Date: 7/8/2025  CT ABDOMEN AND PELVIS WITHOUT IV CONTRAST  HISTORY: 79-year-old male with bleeding ostomy. Evaluate for a parastomal hernia. Renal failure, on hemodialysis. Bowel surgery for inflammatory bowel disease in the past. Appendectomy and cholecystectomy in the past as well.  TECHNIQUE: Radiation  dose reduction techniques were utilized, including automated exposure control and exposure modulation based on body size. 3 mm images were obtained through the abdomen and pelvis without the administration of IV contrast. Compared with noncontrasted CT abdomen/pelvis 07/17/2017.  FINDINGS: 1. There is no significant change in the size of the left parastomal hernia containing fat and a small volume of ascites. There is no bowel ileus or obstruction. Limited evaluation of the bowel in the absence of IV contrast, but there is no convincing evidence for an acute colitis or enteritis. There is no bowel ileus or obstruction.  2. Liver has become grossly cirrhotic in morphology and smaller in volume. There is a new small volume of perihepatic ascites. Splenomegaly is without significant change. Noncontrasted pancreas and adrenals appear unremarkable.  Kidneys have become significantly atrophied and contain multiple stones, and there are stones within the dependent aspect of the right renal pelvis, with no hydronephrosis; intermittent obstruction at the right UPJ cannot be excluded. There are multiple stones within the urinary bladder. Prostate is less enlarged than previously.  3. There is greater elevation of the right hemidiaphragm with significantly more compressive atelectasis at the right lower lobe.            MEDICATIONS GIVEN IN ER  Medications   sodium chloride 0.9 % flush 10 mL (has no administration in time range)   midodrine (PROAMATINE) tablet 2.5 mg (has no administration in time range)         ORDERS PLACED DURING THIS VISIT:  Orders Placed This Encounter   Procedures    Critical Care    CT Abdomen Pelvis Without Contrast    Protime-INR    aPTT    Comprehensive Metabolic Panel    Vitamin B12    Folate    Ferritin    Haptoglobin    Reticulocytes    Iron Profile w/o Ferritin    Lactate Dehydrogenase    Verify Informed Consent    Nephrology (on -call MD unless specified)    Surgery (on-call MD unless specified)     [facility] (on-call MD unless specified) Details    Type & Screen    Prepare RBC, 1 Units    Prepare RBC, 1 Units    Insert Peripheral IV    Hemodialysis Inpatient    Initiate Observation Status         OUTPATIENT MEDICATION MANAGEMENT:  Current Facility-Administered Medications Ordered in Epic   Medication Dose Route Frequency Provider Last Rate Last Admin    midodrine (PROAMATINE) tablet 2.5 mg  2.5 mg Oral TID PRN Sergei Vasquez APRN        sodium chloride 0.9 % flush 10 mL  10 mL Intravenous PRN Beto Booth MD         Current Outpatient Medications Ordered in Epic   Medication Sig Dispense Refill    acetaminophen (TYLENOL) 325 MG tablet Take 2 tablets by mouth Every 4 (Four) Hours As Needed for Mild Pain.      alfuzosin (UROXATRAL) 10 MG 24 hr tablet Take 1 tablet by mouth Every Other Day. Does not take on Sundays Tuesdays or Thursdays   Indications: Benign Enlargement of Prostate      aspirin 81 MG tablet Take 1 tablet by mouth Every Night. Indications: Disease involving Lipid Deposits in the Arteries      B Complex-C-Folic Acid (Umm-Peter) tablet Take 1 tablet by mouth Daily.      Cholecalciferol 25 MCG (1000 UT) tablet Take 1 tablet by mouth Daily. Indications: Vitamin D Deficiency      famotidine (Pepcid AC) 10 MG tablet Take 1 tablet by mouth Daily As Needed for Heartburn (take as needed after HD on dialysis days). Indications: Heartburn      Iron Sucrose (VENOFER IV) Infuse 1 dose into a venous catheter As Needed.      latanoprost (XALATAN) 0.005 % ophthalmic solution Administer 1 drop to both eyes Every Night. Indications: Wide-Angle Glaucoma      lidocaine-prilocaine (EMLA) 2.5-2.5 % cream Apply 1 Application topically to the appropriate area as directed As Needed. Szuqcd-Rxqiuxtps-Qfjqnh:  ONE HOUR PRIOR TO DIALYSIS  Indications: Anesthesia to a Specific Part of the Body  3    Loratadine 10 MG capsule Take 1 capsule by mouth Daily. Indications: Hayfever      Methoxy PEG-Epoetin Beta (MIRCERA IJ) Inject  1 dose as directed Every 30 (Thirty) Days.      midodrine (PROAMATINE) 2.5 MG tablet Take 1 tablet by mouth 3 (Three) Times a Day As Needed (on dialysis days, and if systolic blood pressure is below 100). 90 tablet 0    mupirocin (BACTROBAN) 2 % ointment Apply 1 Application topically to the appropriate area as directed 3 (Three) Times a Day. Indications: Wound Infection 30 g 2    ondansetron (Zofran) 4 MG tablet Take 1 tablet by mouth Every 12 (Twelve) Hours As Needed for Nausea or Vomiting. 15 tablet 0    polyethylene glycol (MIRALAX) 17 g packet Take 17 g by mouth Daily As Needed (Use if senna-docusate is ineffective).      saccharomyces boulardii (Florastor) 250 MG capsule Take 1 capsule by mouth 2 (Two) Times a Day. 60 capsule 1    sennosides-docusate (PERICOLACE) 8.6-50 MG per tablet Take 2 tablets by mouth 2 (Two) Times a Day As Needed for Constipation.      sevelamer (RENVELA) 800 MG tablet Take 1 tablet by mouth 3 (Three) Times a Day.      silver nitrate 75-25 % applicator Apply 1 each topically to the appropriate area as directed 3 (Three) Times a Week. 100 each 0    sodium chloride 0.65 % nasal spray Administer 2 sprays into the nostril(s) as directed by provider Every Night.           PROCEDURES  Critical Care    Performed by: Beto Booth MD  Authorized by: Beto Booth MD    Critical care provider statement:     Critical care time (minutes):  32    Critical care time was exclusive of:  Separately billable procedures and treating other patients    Critical care was necessary to treat or prevent imminent or life-threatening deterioration of the following conditions:  Circulatory failure    Critical care was time spent personally by me on the following activities:  Blood draw for specimens, development of treatment plan with patient or surrogate, evaluation of patient's response to treatment, examination of patient, obtaining history from patient or surrogate, ordering and performing treatments and  interventions, ordering and review of laboratory studies, ordering and review of radiographic studies, pulse oximetry, re-evaluation of patient's condition, review of old charts and discussions with consultants              PROGRESS, DATA ANALYSIS, CONSULTS, AND MEDICAL DECISION MAKING  All labs have been independently interpreted by me.  All radiology studies have been reviewed by me. All EKG's have been independently viewed and interpreted by me.  Discussion below represents my analysis of pertinent findings related to patient's condition, differential diagnosis, treatment plan and final disposition.    Differential diagnosis includes but is not limited to GI bleed, internal hernia, symptomatic anemia, GI bleed, Crohn's exacerbation.    Clinical Scores:                                       ED Course as of 07/08/25 1044   Tue Jul 08, 2025   0910 CT Abdomen Pelvis Without Contrast  My independent interpretation of the imaging study is fluid collection surrounding the ostomy [TR]   0934 INR(!): 1.44 [TR]   0934 BUN(!): 36.0 [TR]   0959 Discussed with Dr. Aburto with [facility].  He agrees to admit. [TR]   1002 I updated the patient and family at the bedside.  They are agreeable to admission.  I have a call out to nephrology as well as general surgery to help manage.  We are pending CT interpretation by the radiologist. [TR]   1003 Nephrology is at the bedside to evaluate. [TR]   1043 Discussing with Dr. Matthews with [facility].  He agrees to admit. [TR]      ED Course User Index  [TR] Beto Booth MD             AS OF 10:44 EDT VITALS:    BP - 111/61  HR - 69  TEMP - 98.3 °F (36.8 °C)  O2 SATS - 95%    COMPLEXITY OF CARE  The patient requires admission.      DIAGNOSIS  Final diagnoses:   Gastrointestinal hemorrhage, unspecified gastrointestinal hemorrhage type   Acute on chronic anemia   ESRD on dialysis         DISPOSITION  ED Disposition       ED Disposition   Decision to Admit    Condition   --    Comment   Level of Care:  Telemetry [5]   Diagnosis: GI bleed [131892]   Admitting Physician: ANKITA CARRIZALES [[id number]]   Attending Physician: ANKITA CARRIZALES [[id number]]   Is patient appropriate for Inpatient Observation Unit?: Yes [1]                  Please note that portions of this document were completed with a voice recognition program.    Note Disclaimer: At [facility], we believe that sharing information builds trust and better relationships. You are receiving this note because you recently visited [facility]. It is possible you will see health information before a provider has talked with you about it. This kind of information can be easy to misunderstand. To help you fully understand what it means for your health, we urge you to discuss this note with your provider.         Beto Booth MD  07/08/25 1043

## 2025-07-08 NOTE — CONSULTS
General Surgery     Summary:     Bill Landry is a 79 y.o. year old with Hgb of 7.8, Hct 24.3, and reports of BRB from around his stoma site. No acute findings on CT, no evidence of current bleeding. Stoma site is pink, functioning, no evidence of bleeding at this time on my exam. Abdominal exam benign. There is no leukocytosis, he is afebrile with normal vitals.     Plan:   No indications for surgical intervention at this time, AM CBC, okay for diet, Dr. Matthews to follow up     Chief Complaint:    Decreased Hgb, bleeding from around stoma     History of Present Illness:     Bill Landry is a 79 y.o. year old with history of Crohns disease s/p colectomy with ileostomy when he was 18 years old, who presents to the ED with noticeable bright red blood coming from around his ostomy. Reports this has happened before in 2018, he was seen by Dr. Dey who noticed a 2cm ulcerated area to the right lower edge of the stoma that was oozing, he sutured with good hemostasis and patient reports no issues until now. Reports he only notices this blood after he receives his hemodialysis. Patient also with thrombocytopenia, last platelet count with dialysis was 67. No evidence of bloody stools. Denies pain, fever, or chills. Prior abdominal surgeries include appendectomy, cholecystectomy, and colectomy with ileostomy.     Last Ileoscopy in 2018 with Dr. Neville: normal, no specimens collected       Past Medical History:   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    Aortic stenosis     Bicuspid aortic valve 07/20/2022    Calculus of kidney     Chronic leukopenia and thrombocytopenia     Cirrhosis of liver without ascites 07/24/2017    Congestive splenomegaly 07/24/2017    Crohn disease     with ileostomy    Decreased white  blood cell count, unspecified     Diverticula of colon     ESRD on hemodialysis 04/05/2018    M-W-F FRESENIUS    Fatty liver disease, nonalcoholic     Fistula 04/05/2018    Other Specified Complication    Gastrointestinal hemorrhage, unspecified     GERD (gastroesophageal reflux disease)     GI bleed     Glaucoma     H/O Gallstone pancreatitis     H/O Pericardial effusion     H/O sinus bradycardia     History of hypertension     resolved    History of UTI     Hx of renal calculi     Ileostomy present     Iron deficiency     Leakage of heart valve prosthesis, subsequent encounter     MGUS (monoclonal gammopathy of unknown significance)     TATE (obstructive sleep apnea)     Other hemoglobinopathies     Pericardial effusion (noninflammatory) 1/18/2018    Permanent atrial fibrillation     Scarlet fever, uncomplicated     Stenosis of other vascular prosthetic devices, implants and grafts, initial encounter 02/14/2022    Systemic lupus erythematosus, unspecified     Thrombocytopenia     undpecified    Type 2 diabetes mellitus     CURRENTLY TAKES NO MED    Urinary retention     Post-OP          Past Surgical History:    Past Surgical History:   Procedure Laterality Date    APPENDECTOMY  06/1968    ARTERIOVENOUS FISTULA/SHUNT SURGERY Left 08/08/2017    Procedure: LEFT BRACHIAL CEPHALIC AV FISTULA FORMATION WITH CEPHALIC VEIN TRANSPOSITION ;  Surgeon: Bill Deal MD;  Location: [facility] OR;  Service:     ARTERIOVENOUS FISTULA/SHUNT SURGERY Left 05/27/2022    Procedure: OPEN REVISION LEFT ARTERIOVENOUS INTERPOSITION GRAFT PLACEMENT;  Surgeon: Bill Deal MD;  Location: [facility] OR;  Service: Vascular;  Laterality: Left;    ARTERIOVENOUS FISTULA/SHUNT SURGERY Left 11/19/2024    Procedure: LEFT ARTERIOVENOUS SHUNT GRAFT REVISION;  Surgeon: Bill Deal MD;  Location: [facility] OR;  Service: Vascular;  Laterality: Left;    ARTERIOVENOUS FISTULA/SHUNT SURGERY W/ HEMODIALYSIS CATHETER INSERTION Left 02/15/2022     Procedure: OPEN LEFT ARM ARTERIOVENOUS FISTULA THROMBECTOMY WITH CEPHALIC VEIN ARTHROPLASTY AND STENT/GRAFT PLACEMENT;  Surgeon: Bill Deal MD;  Location: [facility] OR 18/19;  Service: Vascular;  Laterality: Left;    CATARACT EXTRACTION WITH INTRAOCULAR LENS IMPLANT Bilateral 2004    CHOLECYSTECTOMY  2011    COLECTOMY PARTIAL / TOTAL      History of inflammatory bowel disease with status post colectomy with ileostomy many years ago in the [facility],  18 YEARS OF AGE    COLON SURGERY      Colon resection with colostomy    COLON SURGERY      COLONOSCOPY  01/2018    CYSTOSCOPY LITHOLAPAXY BLADDER STONE EXTRACTION      ENDOSCOPY  01/16/2015    gastritis    ENDOSCOPY  01/17/2018    Procedure: ESOPHAGOGASTRODUODENOSCOPY;  Surgeon: Warner Neville MD;  Location: [facility] ENDOSCOPY;  Service:     ILEOSCOPY  01/16/2015    normal    ILEOSCOPY N/A 01/17/2018    Procedure: ILEOSCOPY;  Surgeon: Warner Neville MD;  Location: [facility] ENDOSCOPY;  Service:     ILEOSTOMY  12/1968    loop ostomy bypass    INCISION AND DRAINAGE ARM Left 10/27/2023    Procedure: LEFT ARM INCISION AND DRAINAGE;  Surgeon: Bill Deal MD;  Location: [facility] MAIN OR;  Service: Vascular;  Laterality: Left;    INCISION AND DRAINAGE ARM Left 3/25/2025    Procedure: INCISION AND DRAINAGE UPPER EXTREMITY;  Surgeon: Bill Deal MD;  Location: [facility] OR;  Service: Vascular;  Laterality: Left;    INSERTION HEMODIALYSIS CATHETER Right 11/13/2024    Procedure: Tunnelled HEMODIALYSIS CATHETER INSERTION;  Surgeon: Ben Barth MD;  Location: [facility] OR;  Service: Vascular;  Laterality: Right;    INSERTION HEMODIALYSIS CATHETER N/A 3/25/2025    Procedure: HEMODIALYSIS CATHETER INSERTION, left arm I&D;  Surgeon: Bill Deal MD;  Location: [facility] OR;  Service: Vascular;  Laterality: N/A;    OTHER SURGICAL HISTORY  2009    kidney stones x3    PSEUDOANEURYSM REPAIR Left 10/27/2023    TONSILLECTOMY  1950  "      Family History:    Family History   Problem Relation Age of Onset    Heart failure Mother 78    Hypertension Mother     Heart disease Mother     Hyperlipidemia Mother     Hypertension Father     Other Father 82        MVI    Malig Hyperthermia Neg Hx          Social History:    Social History     Socioeconomic History    Marital status:      Spouse name: Gillian    Number of children: 2    Years of education: College   Tobacco Use    Smoking status: Never     Passive exposure: Never    Smokeless tobacco: Never   Vaping Use    Vaping status: Never Used   Substance and Sexual Activity    Alcohol use: No     Comment: caffeine use: none    Drug use: No    Sexual activity: Defer         Allergies:   Allergies   Allergen Reactions    Ace Inhibitors Other (See Comments)     RENAL FAILURE/ raised creatinine    Contrast Dye (Echo Or Unknown Ct/Mr) Other (See Comments) and Anaphylaxis     RENAL FAILURE    Iodine Anaphylaxis    Angiotensin Receptor Blockers Swelling    Eliquis [Apixaban] Arrhythmia     BLEEDING ISSUES; PT CANNOT TAKE ANY ANTICOAGULANTS DUE TO LOW PLATELETS EXCEPT ASPIRIN      Filgrastim GI Intolerance and Confusion     Chest pain    Heparin Other (See Comments)     \"It causes me to bleed out\"    Keflex [Cephalexin] Diarrhea     RENAL FAILURE    Other Angioedema     IVP Dye       Medications:     Current Facility-Administered Medications:     acetaminophen (TYLENOL) tablet 650 mg, 650 mg, Oral, Q4H PRN **OR** acetaminophen (TYLENOL) 160 MG/5ML oral solution 650 mg, 650 mg, Oral, Q4H PRN **OR** acetaminophen (TYLENOL) suppository 650 mg, 650 mg, Rectal, Q4H PRN, Juan Ramon Aburto MD    [Held by provider] aspirin EC tablet 81 mg, 81 mg, Oral, Daily, Juan Ramon Aburto MD    sennosides-docusate (PERICOLACE) 8.6-50 MG per tablet 2 tablet, 2 tablet, Oral, BID PRN **AND** polyethylene glycol (MIRALAX) packet 17 g, 17 g, Oral, Daily PRN **AND** bisacodyl (DULCOLAX) EC tablet 5 mg, 5 mg, Oral, Daily " PRN **AND** bisacodyl (DULCOLAX) suppository 10 mg, 10 mg, Rectal, Daily PRN, Juan Ramon Aburto MD    Calcium Replacement - Follow Nurse / BPA Driven Protocol, , Not Applicable, PRCriselda VOGT Stephen J, MD    Magnesium Standard Dose Replacement - Follow Nurse / BPA Driven Protocol, , Not Applicable, Criselda FARRAR Stephen J, MD    midodrine (PROAMATINE) tablet 2.5 mg, 2.5 mg, Oral, TID PRN, Juan Ramon Aburto MD    nitroglycerin (NITROSTAT) SL tablet 0.4 mg, 0.4 mg, Sublingual, Q5 Min PRN, Juan Ramon Aburto MD    ondansetron ODT (ZOFRAN-ODT) disintegrating tablet 4 mg, 4 mg, Oral, Q6H PRN **OR** ondansetron (ZOFRAN) injection 4 mg, 4 mg, Intravenous, Q6H PRN, Juan Ramon Aburto MD    Phosphorus Replacement - Follow Nurse / BPA Driven Protocol, , Not Applicable, PRN, Juan Ramon Aburto MD    Potassium Replacement - Follow Nurse / BPA Driven Protocol, , Not Applicable, PRCriselda VOGT Stephen J, MD    [COMPLETED] Insert Peripheral IV, , , Once **AND** sodium chloride 0.9 % flush 10 mL, 10 mL, Intravenous, PRN, Beto Booth MD    sodium chloride 0.9 % flush 10 mL, 10 mL, Intravenous, Q12H, Juan Ramon Aburto MD    sodium chloride 0.9 % flush 10 mL, 10 mL, Intravenous, PRN, Juan Ramon Aburto MD    sodium chloride 0.9 % infusion 40 mL, 40 mL, Intravenous, PRN, Juan Ramon Aburto MD    tamsulosin (FLOMAX) 24 hr capsule 0.4 mg, 0.4 mg, Oral, Nightly, Juan Ramon Aburto MD      Radiology/Endoscopy:    CT ABDOMEN AND PELVIS WITHOUT IV CONTRAST 7/8/25     HISTORY: 79-year-old male with bleeding ostomy. Evaluate for a  parastomal hernia. Renal failure, on hemodialysis. Bowel surgery for  inflammatory bowel disease in the past. Appendectomy and cholecystectomy  in the past as well.     FINDINGS:  1. There is no significant change in the size of the left parastomal  hernia containing fat and a small volume of ascites. There is no bowel  ileus or obstruction. Limited evaluation of the bowel in the absence  of  IV contrast, but there is no convincing evidence for an acute colitis or  enteritis. There is no bowel ileus or obstruction.     2. Liver has become grossly cirrhotic in morphology and smaller in  volume. There is a new small volume of perihepatic ascites. Splenomegaly  is without significant change. Noncontrasted pancreas and adrenals  appear unremarkable.     Kidneys have become significantly atrophied and contain multiple stones,  and there are stones within the dependent aspect of the right renal  pelvis, with no hydronephrosis; intermittent obstruction at the right  UPJ cannot be excluded. There are multiple stones within the urinary  bladder. Prostate is less enlarged than previously.     3. There is greater elevation of the right hemidiaphragm with  significantly more compressive atelectasis at the right lower lobe.    Labs:    Results from last 7 days   Lab Units 07/08/25  0801   WBC 10*3/mm3 5.56   HEMOGLOBIN g/dL 7.8*   HEMATOCRIT % 24.3*   PLATELETS 10*3/mm3 110*     Results from last 7 days   Lab Units 07/08/25  0855   SODIUM mmol/L 136   POTASSIUM mmol/L 3.5   CHLORIDE mmol/L 98   CO2 mmol/L 25.0   BUN mg/dL 36.0*   CREATININE mg/dL 5.93*   CALCIUM mg/dL 8.1*   BILIRUBIN mg/dL 0.6   ALK PHOS U/L 152*   ALT (SGPT) U/L 18   AST (SGOT) U/L 52*   GLUCOSE mg/dL 152*   PT 17.6  INR 1.44    BMI 25.15  Weight 78.8 kg    Vitals  Temp 97.5  HR 68  RR 18  /65  SpO2 96    Review of Systems   Constitutional:  Negative for appetite change, chills and fever.   Gastrointestinal:  Negative for abdominal pain, blood in stool, constipation, diarrhea, nausea and vomiting.        Physical Exam  Constitutional:       General: He is not in acute distress.     Appearance: He is not ill-appearing.   Abdominal:      General: There is no distension.      Palpations: Abdomen is soft. There is no mass.      Tenderness: There is no abdominal tenderness. There is no guarding or rebound.      Hernia: No hernia is present.    Neurological:      Mental Status: He is alert.                     JAME Soto   General and Endoscopic Surgery  [facility]    [address], [address]  [place], [place], [address]  P: [phone]  F: [phone]

## 2025-07-08 NOTE — H&P
Name: Bill Landry ADMIT: 2025   : 1945  PCP: Bharathi De La Torre MD    MRN: [id number] LOS: 0 days   AGE/SEX: 79 y.o. male  ROOM:      Chief Complaint   Patient presents with    Abnormal Lab       Subjective   Patient is a 79 y.o. male who presents to [facility] with the above chief complaint.  He was doing pretty well until last week.  Last week he started noticed blood in his ostomy.  It was not in the stool so much it was leaking from the outside the ostomy itself.  He went to see the ostomy nurse on Wednesday and had his ostomy changed they did not see any blood in the stool that was coming out at that time.  He then did not have any further bleeding until Friday.  He noticed on Friday he had quite a bit of blood coming out.  He and his wife changed his ostomy at that time and the bleeding did get a little better for a day but has been bleeding again over the last week.  He has been feeling increasingly weak and tired and called his hematologist to come and have labs drawn.  He went to their office and was found to be pretty significantly anemic with a 3 to 4 g drop in his hemoglobin over the last week.  In the emergency room he was evaluated for the anemia found to be slightly tachycardic and symptomatic and was transfused 1 unit of packed red blood cells and admitted to our service for further evaluation  He is had an issue similar to this in the past.  Back in  he had some bleeding into his ostomy appliance from an ulcerated area at the lower edge of the stoma that was sutured at the bedside and bleeding resolved.  At that time bleeding was intermittent as well and had issues determining where the blood was coming from    History of Present Illness    Past Medical History:   Diagnosis Date    Abnormal serum protein electrophoresis     Acquired absence of other specified parts of digestive tract     History of colectomy    Anemia in chronic kidney disease     Aneurysm of  artery of arm     let arm    Aortic stenosis     Bicuspid aortic valve 07/20/2022    Calculus of kidney     Chronic leukopenia and thrombocytopenia     Cirrhosis of liver without ascites 07/24/2017    Congestive splenomegaly 07/24/2017    Crohn disease     with ileostomy    Decreased white blood cell count, unspecified     Diverticula of colon     ESRD on hemodialysis 04/05/2018    M-W-F FRESENIUS    Fatty liver disease, nonalcoholic     Fistula 04/05/2018    Other Specified Complication    Gastrointestinal hemorrhage, unspecified     GERD (gastroesophageal reflux disease)     GI bleed     Glaucoma     H/O Gallstone pancreatitis     H/O Pericardial effusion     H/O sinus bradycardia     History of hypertension     resolved    History of UTI     Hx of renal calculi     Ileostomy present     Iron deficiency     Leakage of heart valve prosthesis, subsequent encounter     MGUS (monoclonal gammopathy of unknown significance)     TATE (obstructive sleep apnea)     Other hemoglobinopathies     Pericardial effusion (noninflammatory) 1/18/2018    Permanent atrial fibrillation     Scarlet fever, uncomplicated     Stenosis of other vascular prosthetic devices, implants and grafts, initial encounter 02/14/2022    Systemic lupus erythematosus, unspecified     Thrombocytopenia     undpecified    Type 2 diabetes mellitus     CURRENTLY TAKES NO MED    Urinary retention     Post-OP     Past Surgical History:   Procedure Laterality Date    APPENDECTOMY  06/1968    ARTERIOVENOUS FISTULA/SHUNT SURGERY Left 08/08/2017    Procedure: LEFT BRACHIAL CEPHALIC AV FISTULA FORMATION WITH CEPHALIC VEIN TRANSPOSITION ;  Surgeon: Bill Deal MD;  Location: [facility] OR;  Service:     ARTERIOVENOUS FISTULA/SHUNT SURGERY Left 05/27/2022    Procedure: OPEN REVISION LEFT ARTERIOVENOUS INTERPOSITION GRAFT PLACEMENT;  Surgeon: Bill Deal MD;  Location: [facility] OR;  Service: Vascular;  Laterality: Left;    ARTERIOVENOUS FISTULA/SHUNT  SURGERY Left 11/19/2024    Procedure: LEFT ARTERIOVENOUS SHUNT GRAFT REVISION;  Surgeon: Bill Deal MD;  Location: [facility] MAIN OR;  Service: Vascular;  Laterality: Left;    ARTERIOVENOUS FISTULA/SHUNT SURGERY W/ HEMODIALYSIS CATHETER INSERTION Left 02/15/2022    Procedure: OPEN LEFT ARM ARTERIOVENOUS FISTULA THROMBECTOMY WITH CEPHALIC VEIN ARTHROPLASTY AND STENT/GRAFT PLACEMENT;  Surgeon: Bill Deal MD;  Location: [facility] OR 18/19;  Service: Vascular;  Laterality: Left;    CATARACT EXTRACTION WITH INTRAOCULAR LENS IMPLANT Bilateral 2004    CHOLECYSTECTOMY  2011    COLECTOMY PARTIAL / TOTAL      History of inflammatory bowel disease with status post colectomy with ileostomy many years ago in the [facility],  18 YEARS OF AGE    COLON SURGERY      Colon resection with colostomy    COLON SURGERY      COLONOSCOPY  01/2018    CYSTOSCOPY LITHOLAPAXY BLADDER STONE EXTRACTION      ENDOSCOPY  01/16/2015    gastritis    ENDOSCOPY  01/17/2018    Procedure: ESOPHAGOGASTRODUODENOSCOPY;  Surgeon: Warner Neville MD;  Location: [facility] ENDOSCOPY;  Service:     ILEOSCOPY  01/16/2015    normal    ILEOSCOPY N/A 01/17/2018    Procedure: ILEOSCOPY;  Surgeon: Warner Neville MD;  Location: [facility] ENDOSCOPY;  Service:     ILEOSTOMY  12/1968    loop ostomy bypass    INCISION AND DRAINAGE ARM Left 10/27/2023    Procedure: LEFT ARM INCISION AND DRAINAGE;  Surgeon: Bill Deal MD;  Location: [facility] MAIN OR;  Service: Vascular;  Laterality: Left;    INCISION AND DRAINAGE ARM Left 3/25/2025    Procedure: INCISION AND DRAINAGE UPPER EXTREMITY;  Surgeon: Bill Deal MD;  Location: [facility] OR;  Service: Vascular;  Laterality: Left;    INSERTION HEMODIALYSIS CATHETER Right 11/13/2024    Procedure: Tunnelled HEMODIALYSIS CATHETER INSERTION;  Surgeon: Ben Barth MD;  Location: [facility] OR;  Service: Vascular;  Laterality: Right;    INSERTION HEMODIALYSIS CATHETER N/A 3/25/2025    Procedure:  HEMODIALYSIS CATHETER INSERTION, left arm I&D;  Surgeon: Bill Deal MD;  Location: [facility] OR;  Service: Vascular;  Laterality: N/A;    OTHER SURGICAL HISTORY  2009    kidney stones x3    PSEUDOANEURYSM REPAIR Left 10/27/2023    TONSILLECTOMY  1950     Family History   Problem Relation Age of Onset    Heart failure Mother 78    Hypertension Mother     Heart disease Mother     Hyperlipidemia Mother     Hypertension Father     Other Father 82        MVI    Malig Hyperthermia Neg Hx      Social History     Tobacco Use    Smoking status: Never     Passive exposure: Never    Smokeless tobacco: Never   Vaping Use    Vaping status: Never Used   Substance Use Topics    Alcohol use: No     Comment: caffeine use: none    Drug use: No     (Not in a hospital admission)    Allergies:  Ace inhibitors, Contrast dye (echo or unknown ct/mr), Iodine, Angiotensin receptor blockers, Eliquis [apixaban], Filgrastim, Heparin, Keflex [cephalexin], and Other    Review of Systems     Objective    Vital Signs  Temp:  [98.3 °F (36.8 °C)] 98.3 °F (36.8 °C)  Heart Rate:  [64-97] 64  Resp:  [16-18] 16  BP: (109-113)/(51-61) 109/54  SpO2:  [95 %-98 %] 96 %  on   ;      Body mass index is 26.24 kg/m².    Physical Exam  Awake alert sitting up on the stretcher in no acute distress seen in the emergency room wife present at the bedside  Irregularly irregular with murmur noted rate controlled  Respirations even nonlabored clear to auscultation anteriorly  Abdomen soft nontender nondistended ostomy present, bag was just changed I did not take the apparatus down due to extensive tape across the belly.  Trace to 1+ edema in lower extremities right greater than left  Results Review:   I reviewed the patient's new clinical results.  Results from last 7 days   Lab Units 07/08/25  0801 07/02/25  0000   WBC 10*3/mm3 5.56  --    HEMOGLOBIN g/dL 7.8* 11.5*  34.5*   PLATELETS 10*3/mm3 110*  --      Results from last 7 days   Lab Units 07/08/25  0855  "  SODIUM mmol/L 136   POTASSIUM mmol/L 3.5   CHLORIDE mmol/L 98   CO2 mmol/L 25.0   BUN mg/dL 36.0*   CREATININE mg/dL 5.93*   GLUCOSE mg/dL 152*   ALBUMIN g/dL 2.6*   BILIRUBIN mg/dL 0.6   ALK PHOS U/L 152*   AST (SGOT) U/L 52*   ALT (SGPT) U/L 18   Estimated Creatinine Clearance: 11.7 mL/min (A) (by C-G formula based on SCr of 5.93 mg/dL (H)).  Results from last 7 days   Lab Units 07/08/25  0855   INR  1.44*         Invalid input(s): \"LDLCALC\"    CT Abdomen Pelvis Without Contrast           Assessment & Plan       GI bleed    Permanent atrial fibrillation    Thrombocytopenia    Cirrhosis of liver without ascites    ESRD on hemodialysis    Crohn's disease, unspecified, without complications    TATE (obstructive sleep apnea)    Nonrheumatic aortic valve stenosis    Pulmonary hypertension    Ileostomy present    Hypertension    Acute blood loss anemia      Assessment & Plan  This is a 79-year-old gentleman with a history of end-stage renal disease, anemia chronic disease, hypertension, Crohn's disease status post ileostomy, obstructive sleep apnea, atrial fibrillation and pulmonary hypertension who presents to the hospital with bleeding from his ostomy and is found to have acute blood loss anemia  Based on their description it sounds like this is similar to his episode in 2018.  Last general surgery to evaluate the ostomy as well as the parastomal hernia and will get their input and opinion.    If there is no bleeding noted around her from the ostomy apparatus itself will need to get GI to evaluate for possible endoscopic evaluation  Agree with transfusing 1 unit of packed red blood cells noted in the emergency room  Iron studies have been sent  Nephrology consulted for assistance with dialysis  Home medications reviewed and reordered      I discussed the patients findings and my recommendations with patient, family, and nursing staff.          Juan Ramon Aburto MD  [facility]  07/08/25  11:42 EDT

## 2025-07-08 NOTE — TELEPHONE ENCOUNTER
GARETH- spoke with patient's wife on 7/7/25 at 0925 am. She called inquiring about the silver nitrate sticks. She said she had a prescription for them but that she could not find a pharmacy that had them. She requested an outpatient ostomy visit. She also advised that over the weekend there had been additional bleeding from the stoma off and on but not like last week.     I asked wife if they had labs drawn yet. She replied that HD would draw labs Wed. I reminded her that when I saw him, I was not able to pinpoint the source of the bleeding as it was not actively bleeding then. I advised that silver nitrate may help some but that it does not get to the source of the bleeding. I advised that patient needs to f/u about what the source is with his hematologist, a GI doc, or a General Surgeon. She was very adamant to make an ostomy appt. I advised that I can assist with pouching but that he does not have a pouching issue. I advised that if he is bleeding, he needs to go to the ER or be assessed by one of his providers. She said she will make a General Surgery appt but in the mean time could they come to the ostomy clinic. I reiterated the above re: source of bleeding/medical reasons for bleeding, and my role as an ostomy nurse.     Spoke with wife for about 30 mins. Conclusion was to reach out to get labs drawn, monitor bleeding, go to ER if bleeding, and make f/u appts with Providers. Will make an ostomy appt later in the week if needed.

## 2025-07-08 NOTE — PROGRESS NOTES
Clinical Pharmacy Services: Medication History    Bill Landry is a 79 y.o. male presenting to [facility] for   Chief Complaint   Patient presents with    Abnormal Lab       He  has a past medical history of Abnormal serum protein electrophoresis, Acquired absence of other specified parts of digestive tract, Anemia in chronic kidney disease, Aneurysm of artery of arm, Aortic stenosis, Bicuspid aortic valve (07/20/2022), Calculus of kidney, Chronic leukopenia and thrombocytopenia, Cirrhosis of liver without ascites (07/24/2017), Congestive splenomegaly (07/24/2017), Crohn disease, Decreased white blood cell count, unspecified, Diverticula of colon, ESRD on hemodialysis (04/05/2018), Fatty liver disease, nonalcoholic, Fistula (04/05/2018), Gastrointestinal hemorrhage, unspecified, GERD (gastroesophageal reflux disease), GI bleed, Glaucoma, H/O Gallstone pancreatitis, H/O Pericardial effusion, H/O sinus bradycardia, History of hypertension, History of UTI, renal calculi, Ileostomy present, Iron deficiency, Leakage of heart valve prosthesis, subsequent encounter, MGUS (monoclonal gammopathy of unknown significance), TATE (obstructive sleep apnea), Other hemoglobinopathies, Pericardial effusion (noninflammatory) (1/18/2018), Permanent atrial fibrillation, Scarlet fever, uncomplicated, Stenosis of other vascular prosthetic devices, implants and grafts, initial encounter (02/14/2022), Systemic lupus erythematosus, unspecified, Thrombocytopenia, Type 2 diabetes mellitus, and Urinary retention.    Allergies as of 07/08/2025 - Reviewed 07/08/2025   Allergen Reaction Noted    Ace inhibitors Other (See Comments) 03/02/2016    Contrast dye (echo or unknown ct/mr) Other (See Comments) and Anaphylaxis 07/11/2017    Iodine Anaphylaxis 05/13/2021    Angiotensin receptor blockers Swelling 05/13/2021    Eliquis [apixaban] Arrhythmia 07/11/2017    Filgrastim GI Intolerance and Confusion 08/08/2017    Heparin Other (See  Comments) 01/27/2025    Keflex [cephalexin] Diarrhea 03/02/2016    Other Angioedema 03/02/2016       Medication information was obtained from: Pharmacy  Pharmacy and Phone Number:   [id number] - [place], [place] - [address] [facility] - [phone]  - [phone]   [address] [address]  Phone: [phone] Fax: [phone]    [facility]  [address] [facility] [address]  Phone: [phone] Fax: [phone]        Prior to Admission Medications       Prescriptions Last Dose Informant Patient Reported? Taking?    acetaminophen (TYLENOL) 325 MG tablet  Self No Yes    Take 2 tablets by mouth Every 4 (Four) Hours As Needed for Mild Pain.    alfuzosin (UROXATRAL) 10 MG 24 hr tablet  Self, Pharmacy Yes Yes    Take 1 tablet by mouth Every Other Day. Does not take on Sundays Tuesdays or Thursdays   Indications: Benign Enlargement of Prostate    aspirin 81 MG tablet  Self Yes Yes    Take 1 tablet by mouth Every Night. Indications: Disease involving Lipid Deposits in the Arteries    B Complex-C-Folic Acid (Umm-Peter) tablet  Pharmacy, Self Yes Yes    Take 1 tablet by mouth Daily.    Cholecalciferol 25 MCG (1000 UT) tablet  Self Yes Yes    Take 1 tablet by mouth Daily. Indications: Vitamin D Deficiency    famotidine (Pepcid AC) 10 MG tablet  Self Yes Yes    Take 1 tablet by mouth Daily As Needed for Heartburn (take as needed after HD on dialysis days). Indications: Heartburn    Iron Sucrose (VENOFER IV)  Self Yes Yes    Infuse 1 dose into a venous catheter As Needed.    latanoprost (XALATAN) 0.005 % ophthalmic solution  Self, Pharmacy Yes Yes    Administer 1 drop to both eyes Every Night. Indications: Wide-Angle Glaucoma    lidocaine-prilocaine (EMLA) 2.5-2.5 % cream  Self, Pharmacy Yes Yes    Apply 1 Application topically to the appropriate area as directed As Needed. Uzmely-Gliemfsil-Dxvtel:  ONE HOUR PRIOR TO  DIALYSIS  Indications: Anesthesia to a Specific Part of the Body    Loratadine 10 MG capsule  Pharmacy Yes Yes    Take 1 capsule by mouth Daily. Indications: Hayfever    Methoxy PEG-Epoetin Beta (MIRCERA IJ)  Self Yes Yes    Inject 1 dose as directed Every 30 (Thirty) Days.    midodrine (PROAMATINE) 2.5 MG tablet   No Yes    Take 1 tablet by mouth 3 (Three) Times a Day As Needed (on dialysis days, and if systolic blood pressure is below 100).    mupirocin (BACTROBAN) 2 % ointment  Self No Yes    Apply 1 Application topically to the appropriate area as directed 3 (Three) Times a Day. Indications: Wound Infection    ondansetron (Zofran) 4 MG tablet  Self No Yes    Take 1 tablet by mouth Every 12 (Twelve) Hours As Needed for Nausea or Vomiting.    polyethylene glycol (MIRALAX) 17 g packet  Self No Yes    Take 17 g by mouth Daily As Needed (Use if senna-docusate is ineffective).    saccharomyces boulardii (Florastor) 250 MG capsule  Self No Yes    Take 1 capsule by mouth 2 (Two) Times a Day.    sennosides-docusate (PERICOLACE) 8.6-50 MG per tablet  Self No Yes    Take 2 tablets by mouth 2 (Two) Times a Day As Needed for Constipation.    sevelamer (RENVELA) 800 MG tablet  Pharmacy Yes Yes    Take 1 tablet by mouth 3 (Three) Times a Day.    silver nitrate 75-25 % applicator  Self No Yes    Apply 1 each topically to the appropriate area as directed 3 (Three) Times a Week.    sodium chloride 0.65 % nasal spray  Self Yes Yes    Administer 2 sprays into the nostril(s) as directed by provider Every Night.              Medication notes:     This medication list is complete to the best of my knowledge as of 7/8/2025    Please call if questions.    Alex Medley  Medication History Technician  [phone]    7/8/2025 09:43 EDT

## 2025-07-09 ENCOUNTER — TELEPHONE (OUTPATIENT)
Dept: ONCOLOGY | Facility: CLINIC | Age: 80
End: 2025-07-09
Payer: MEDICARE

## 2025-07-09 LAB
ANION GAP SERPL CALCULATED.3IONS-SCNC: 9.4 MMOL/L (ref 5–15)
BH BB BLOOD EXPIRATION DATE: NORMAL
BH BB BLOOD TYPE BARCODE: 5100
BH BB DISPENSE STATUS: NORMAL
BH BB PRODUCT CODE: NORMAL
BH BB UNIT NUMBER: NORMAL
BUN SERPL-MCNC: 49 MG/DL (ref 8–23)
BUN/CREAT SERPL: 7 (ref 7–25)
CALCIUM SPEC-SCNC: 7.9 MG/DL (ref 8.6–10.5)
CHLORIDE SERPL-SCNC: 97 MMOL/L (ref 98–107)
CO2 SERPL-SCNC: 25.6 MMOL/L (ref 22–29)
CREAT SERPL-MCNC: 7 MG/DL (ref 0.76–1.27)
CROSSMATCH INTERPRETATION: NORMAL
DEPRECATED RDW RBC AUTO: 54.7 FL (ref 37–54)
EGFRCR SERPLBLD CKD-EPI 2021: 7.4 ML/MIN/1.73
ERYTHROCYTE [DISTWIDTH] IN BLOOD BY AUTOMATED COUNT: 15.7 % (ref 12.3–15.4)
GLUCOSE SERPL-MCNC: 82 MG/DL (ref 65–99)
HCT VFR BLD AUTO: 23 % (ref 37.5–51)
HCT VFR BLD AUTO: 27.1 % (ref 37.5–51)
HGB BLD-MCNC: 7.6 G/DL (ref 13–17.7)
HGB BLD-MCNC: 8.9 G/DL (ref 13–17.7)
MAGNESIUM SERPL-MCNC: 2 MG/DL (ref 1.6–2.4)
MCH RBC QN AUTO: 31.8 PG (ref 26.6–33)
MCHC RBC AUTO-ENTMCNC: 33 G/DL (ref 31.5–35.7)
MCV RBC AUTO: 96.2 FL (ref 79–97)
PHOSPHATE SERPL-MCNC: 6.7 MG/DL (ref 2.5–4.5)
PLATELET # BLD AUTO: 71 10*3/MM3 (ref 140–450)
PMV BLD AUTO: 8.8 FL (ref 6–12)
POTASSIUM SERPL-SCNC: 3.7 MMOL/L (ref 3.5–5.2)
RBC # BLD AUTO: 2.39 10*6/MM3 (ref 4.14–5.8)
SODIUM SERPL-SCNC: 132 MMOL/L (ref 136–145)
UNIT  ABO: NORMAL
UNIT  RH: NORMAL
WBC NRBC COR # BLD AUTO: 3.79 10*3/MM3 (ref 3.4–10.8)

## 2025-07-09 PROCEDURE — G0378 HOSPITAL OBSERVATION PER HR: HCPCS

## 2025-07-09 PROCEDURE — 85018 HEMOGLOBIN: CPT | Performed by: HOSPITALIST

## 2025-07-09 PROCEDURE — 84100 ASSAY OF PHOSPHORUS: CPT | Performed by: HOSPITALIST

## 2025-07-09 PROCEDURE — 99231 SBSQ HOSP IP/OBS SF/LOW 25: CPT | Performed by: SURGERY

## 2025-07-09 PROCEDURE — 83735 ASSAY OF MAGNESIUM: CPT | Performed by: HOSPITALIST

## 2025-07-09 PROCEDURE — 36430 TRANSFUSION BLD/BLD COMPNT: CPT

## 2025-07-09 PROCEDURE — 25010000002 EPOETIN ALFA-EPBX 3000 UNIT/ML SOLUTION: Performed by: HOSPITALIST

## 2025-07-09 PROCEDURE — 86900 BLOOD TYPING SEROLOGIC ABO: CPT

## 2025-07-09 PROCEDURE — P9016 RBC LEUKOCYTES REDUCED: HCPCS

## 2025-07-09 PROCEDURE — 85027 COMPLETE CBC AUTOMATED: CPT | Performed by: HOSPITALIST

## 2025-07-09 PROCEDURE — G0257 UNSCHED DIALYSIS ESRD PT HOS: HCPCS

## 2025-07-09 PROCEDURE — 85014 HEMATOCRIT: CPT | Performed by: HOSPITALIST

## 2025-07-09 PROCEDURE — 80048 BASIC METABOLIC PNL TOTAL CA: CPT | Performed by: HOSPITALIST

## 2025-07-09 RX ORDER — SEVELAMER CARBONATE 800 MG/1
1600 TABLET, FILM COATED ORAL
Status: DISCONTINUED | OUTPATIENT
Start: 2025-07-09 | End: 2025-07-10 | Stop reason: HOSPADM

## 2025-07-09 RX ADMIN — Medication 10 ML: at 09:28

## 2025-07-09 RX ADMIN — Medication 10 ML: at 20:51

## 2025-07-09 RX ADMIN — EPOETIN ALFA-EPBX 3000 UNITS: 3000 INJECTION, SOLUTION INTRAVENOUS; SUBCUTANEOUS at 18:14

## 2025-07-09 RX ADMIN — SEVELAMER CARBONATE 1600 MG: 800 TABLET, FILM COATED ORAL at 20:50

## 2025-07-09 RX ADMIN — SEVELAMER CARBONATE 1600 MG: 800 TABLET, FILM COATED ORAL at 11:57

## 2025-07-09 NOTE — SIGNIFICANT NOTE
07/09/25 1533   OTHER   Discipline physical therapist   Rehab Time/Intention   Session Not Performed patient unavailable for evaluation  (pt at HD and to receive blood)   Recommendation   PT - Next Appointment 07/10/25

## 2025-07-09 NOTE — PROGRESS NOTES
Chief Complaint:    Bleeding from colostomy    Subjective:    The patient has intermittent bleeding from the colostomy that usually occurs after dialysis.  He has no complaints today with no bleeding while on dialysis.    Objective:    Temp:  [97.3 °F (36.3 °C)-98.1 °F (36.7 °C)] 97.3 °F (36.3 °C)  Heart Rate:  [66-76] 76  Resp:  [16-18] 16  BP: (101-111)/(48-62) 106/59    Physical Exam  Constitutional:       Appearance: He is not ill-appearing or toxic-appearing.   Abdominal:      Palpations: Abdomen is soft.      Tenderness: There is no abdominal tenderness.   Neurological:      Mental Status: He is alert.   Psychiatric:         Behavior: Behavior is cooperative.           Results:    WBC is 3.79.  H/H is 7.6/23.0.  BUN is 49.0 and creatinine is 7.00.    Assessment/Plan:    The patient has intermittent bleeding from the colostomy but it is not currently bleeding.  If it bleeds, we will try to oversew the bleeding point.    Jairo Matthews Jr., M.D.

## 2025-07-09 NOTE — PROGRESS NOTES
[facility] Progress Note      Patient Name: Bill Landry  : 1945  MRN: [id number]  Primary Care Physician:  Bharathi De La Torre MD  Date of admission: 2025    Subjective     Interval History:   The patient was seen and examined today for follow-up on end-stage renal disease.  Did not notice any blood in in the ostomy bag today.  Denies any nausea or vomiting.  No fever no chills.  Plan noted for dialysis today.    Review of Systems:   As noted above    Objective     Vitals:   Temp:  [97.5 °F (36.4 °C)-98.1 °F (36.7 °C)] 97.5 °F (36.4 °C)  Heart Rate:  [64-76] 70  Resp:  [16-18] 16  BP: (101-122)/(48-65) 101/48    Intake/Output Summary (Last 24 hours) at 2025 0931  Last data filed at 2025 0359  Gross per 24 hour   Intake 355 ml   Output 590 ml   Net -235 ml       Physical Exam:    Constitutional: Awake, chronically ill, no acute distress.  HEENT: Sclera anicteric, no conjunctival injection  Neck: No JVD  Respiratory: Clear to auscultation bilaterally, nonlabored respiration  Cardiovascular: Irregular irregular, 2/6 murmurs, no rubs  Gastrointestinal: BS +, abdomen is soft, nontender and nondistended, + acute ileostomy  : No palpable bladder  Musculoskeletal: No edema, no clubbing or cyanosis; LUE AVG patent  Psychiatric: Appropriate affect, cooperative  Neurologic: Oriented x3, moving all extremities, normal speech and mental status  Skin: Warm and dry        Scheduled Meds:     [Held by provider] aspirin, 81 mg, Oral, Daily  sodium chloride, 10 mL, Intravenous, Q12H  tamsulosin, 0.4 mg, Oral, Nightly      IV Meds:        Results Reviewed:   I have personally reviewed the results from the time of this admission to 2025 09:31 EDT     Results from last 7 days   Lab Units 25  0348 25  1858 25  0855   SODIUM mmol/L 132*  --  136   POTASSIUM mmol/L 3.7 3.8 3.5   CHLORIDE mmol/L 97*  --  98   CO2 mmol/L 25.6  --  25.0   BUN mg/dL 49.0*  --  36.0*    CREATININE mg/dL 7.00*  --  5.93*   CALCIUM mg/dL 7.9*  --  8.1*   BILIRUBIN mg/dL  --   --  0.6   ALK PHOS U/L  --   --  152*   ALT (SGPT) U/L  --   --  18   AST (SGOT) U/L  --   --  52*   GLUCOSE mg/dL 82  --  152*       Estimated Creatinine Clearance: 9.7 mL/min (A) (by C-G formula based on SCr of 7 mg/dL (H)).    Results from last 7 days   Lab Units 07/09/25  0348   MAGNESIUM mg/dL 2.0   PHOSPHORUS mg/dL 6.7*             Results from last 7 days   Lab Units 07/09/25  0348 07/08/25  0801   WBC 10*3/mm3 3.79 5.56   HEMOGLOBIN g/dL 7.6* 7.8*   PLATELETS 10*3/mm3 71* 110*       Results from last 7 days   Lab Units 07/08/25  0855   INR  1.44*       Assessment / Plan     ASSESSMENT:  ESRD on HD, MWF, via LUE AVG.  Last dialysis was yesterday.  Electrolytes and volume status stable  GI bleed/anemia, bright red blood was seen in ostomy. Hemoglobin dropped from 11.5 to 7.8 within a week.  Awaiting general surgery evaluation.  Iron panel not in favor of iron-deficiency anemia  Crohn's disease s/p bowel resection with ileostomy  Cirrhosis with chronic leukopenia thrombocytopenia  Atrial fibrillation, on baby aspirin  Chronic hypotension of ESRD, on midodrine 2.5 TID on dialysis days  Hyperphosphatemia  Chronic hypotension on midodrine          PLAN:  Will dialyze today per Monday Wednesday and Friday schedule  Will start Epogen with dialysis  Increase sevelamer to 1600 cc 3 times daily  Continue midodrine  Surveillance labs       Thank you for involving us in the care of Bill Landry.  Please feel free to call with any questions.    Myra Moore MD  07/09/25  09:31 EDT    [facility]  [phone]    Parts of this note may be an electronic transcription/translation of spoken language to printed text using the Dragon dictation system.

## 2025-07-09 NOTE — TELEPHONE ENCOUNTER
Provider: Dr Pacheco  Caller: Gillian Landry  Relationship to Patient: Spouse  Call Back Phone Number: [phone]  Reason for Call: Pt's spouse Gillian called. Spouse wanted to let Mayra and Dr. Pacheco know pt's Hgb is now 7.6 after infusion. I did tell spouse Dr. Shelton and Dr. Rausch are at hospital rounding this week. Pt is on the list to be seen today and there are 40 patients IP this week.

## 2025-07-09 NOTE — TELEPHONE ENCOUNTER
Addended by: LAVINIA BINGHAM on: 12/3/2020 10:16 AM     Modules accepted: Orders     Returned call. No answer. Left voicemail to return call at [phone]. Akila Washington RN

## 2025-07-09 NOTE — NURSING NOTE
Patient stable and tolerated hd tx.  Patient removed 1000 mL.   Patient received 1 unit of blood.      Post Vitals  BP  105/60  HR   65    Damian Ureña RN  [facility]

## 2025-07-09 NOTE — PLAN OF CARE
Goal Outcome Evaluation:    Pt remains weak and tired. Hemoglobin dropped dropped slightly from admission. Still above 7. Vitals remain stable. Possible Dialysis today.   Care ongoing

## 2025-07-09 NOTE — NURSING NOTE
CWOCN- consult for ostomy care and assessment.   Patient known to me from prior ostomy appts/inpatient hospitalizations. See outpatient note from last week and telephone call this week.     Unknown source of bleeding. We removed the appliance today- there is no blood in the stool and no bleeding from the stoma. I manipulated the stoma & peristomal skin gently to see if there were any abnormal findings but there are not. Peristomal skin less purple today, pink, dry.   Replaced patient's own 2 piece Convatec appliance with barrier ring. Continue to cut wafer a little bigger as suggested last week to wife. She has been doing this. We want to ensure that the stoma and wafer are not rubbing, causing trauma/bleeding to stoma.     WOC will follow up next week if patient remains inpatient. He has his own supplies in room. Wife can change the pouch.        07/09/25 1346   Ileostomy LLQ   Placement Date: (c)   Location: LLQ   Stomal Appliance 2 piece   Stoma Appearance round;moist;red   Peristomal Assessment Pink   Accessories/Skin Care cleansed with water;skin barrier ring   Stoma Function stool   Stool Color yellow   Stool Consistency creamy   Treatment Bag change   Output (mL) 200 mL

## 2025-07-09 NOTE — PROGRESS NOTES
[facility] Hospital [facility]    [phone]   LOS: 0 days     Name: Bill Landry  Age/Sex: 79 y.o. male  :  1945        PCP: Bharathi De La Torre MD  Chief Complaint   Patient presents with    Abnormal Lab      Subjective   Denies any other bleeding or issues overnight.  He ate his full dinner last evening and ate well for breakfast this morning.  His hemoglobin has dropped but he denies other new issues or complaints with no shortness of breath chest pain or palpitations today he is feeling better than when he came into the emergency room.  Seen by the surgeon yesterday but no active bleeding identified  General: No Fever or Chills, Cardiac: No Chest Pain or Palpitations, Resp: No Cough or SOA, and GI: No Nausea, Vomiting, or Diarrhea    [Held by provider] aspirin, 81 mg, Oral, Daily  sodium chloride, 10 mL, Intravenous, Q12H  tamsulosin, 0.4 mg, Oral, Nightly         Objective   Vital Signs  Temp:  [97.5 °F (36.4 °C)-98.3 °F (36.8 °C)] 97.5 °F (36.4 °C)  Heart Rate:  [64-97] 66  Resp:  [16-18] 18  BP: (102-122)/(48-65) 102/52  Body mass index is 25.6 kg/m².    Intake/Output Summary (Last 24 hours) at 2025 0805  Last data filed at 2025 0359  Gross per 24 hour   Intake 355 ml   Output 590 ml   Net -235 ml       Physical Exam  Awake alert sitting up in bed eating breakfast in no distress  Respirations even nonlabored  Irregularly irregular rate controlled positive murmur  Abdomen soft nontender      Results Review:       I reviewed the patient's new clinical results.  Results from last 7 days   Lab Units 25  0348 25  0801   WBC 10*3/mm3 3.79 5.56   HEMOGLOBIN g/dL 7.6* 7.8*   PLATELETS 10*3/mm3 71* 110*     Results from last 7 days   Lab Units 25  0348 25  1858 25  0855   SODIUM mmol/L 132*  --  136   POTASSIUM mmol/L 3.7 3.8 3.5   CHLORIDE mmol/L 97*  --  98   CO2 mmol/L 25.6  --  25.0   BUN mg/dL 49.0*  --  36.0*   CREATININE mg/dL 7.00*  --  5.93*   CALCIUM mg/dL 7.9*  --   8.1*   MAGNESIUM mg/dL 2.0  --   --    PHOSPHORUS mg/dL 6.7*  --   --    Estimated Creatinine Clearance: 9.7 mL/min (A) (by C-G formula based on SCr of 7 mg/dL (H)).      Assessment & Plan   Active Hospital Problems    Diagnosis  POA    **GI bleed [K92.2]  Yes    Acute blood loss anemia [D62]  Unknown    Ileostomy present [Z93.2]  Not Applicable    Hypertension [I10]  Yes    Pulmonary hypertension [I27.20]  Yes    Nonrheumatic aortic valve stenosis [I35.0]  Yes    TATE (obstructive sleep apnea) [G47.33]  Yes    ESRD on hemodialysis [N18.6, Z99.2]  Not Applicable    Crohn's disease, unspecified, without complications [K50.90]  Yes    Cirrhosis of liver without ascites [K74.60]  Yes    Thrombocytopenia [D69.6]  Yes    Permanent atrial fibrillation [I48.21]  Yes      Resolved Hospital Problems   No resolved problems to display.       ASSESSMENT  This is a 79-year-old gentleman with a history of end-stage renal disease, anemia chronic disease, hypertension, Crohn's disease status post ileostomy, obstructive sleep apnea, atrial fibrillation and pulmonary hypertension who presents to the hospital with bleeding from his ostomy and is found to have acute blood loss anemia  Edited by: Juan Ramon Aburto MD at 7/8/2025 1316   PLAN  Will transfuse in addition unit of packed or blood cells today.  Repeat H&H at 3 PM this afternoon.  It is unclear where his blood loss is from.  Continue to monitor and evaluate for evidence of bleeding.  His reticulocyte count being over 2 does support acute blood loss anemia.  His haptoglobin is within normal limits LDH is high.  This argues against hemolysis.  Appreciate nephrology's assistance with dialysis while inpatient he has a lot of questions regarding this but unfortunately I cannot given the specific time he will go for dialysis today.  He is a little frustrated by this.          VTE Prophylaxis:  No VTE prophylaxis order currently exists.      Code Status and Medical Interventions:  CPR (Attempt to Resuscitate); Full Support   Ordered at: 07/08/25 1142     Code Status (Patient has no pulse and is not breathing):    CPR (Attempt to Resuscitate)     Medical Interventions (Patient has pulse or is breathing):    Full Support     Level Of Support Discussed With:    Patient          Disposition  Expected discharge date/ time has not been documented.       Juan Ramon Aburto MD  [facility]  07/09/25  08:05 EDT

## 2025-07-10 ENCOUNTER — READMISSION MANAGEMENT (OUTPATIENT)
Dept: CALL CENTER | Facility: HOSPITAL | Age: 80
End: 2025-07-10
Payer: MEDICARE

## 2025-07-10 VITALS
RESPIRATION RATE: 16 BRPM | BODY MASS INDEX: 25.31 KG/M2 | SYSTOLIC BLOOD PRESSURE: 109 MMHG | HEART RATE: 73 BPM | WEIGHT: 176.81 LBS | TEMPERATURE: 97.3 F | OXYGEN SATURATION: 97 % | HEIGHT: 70 IN | DIASTOLIC BLOOD PRESSURE: 56 MMHG

## 2025-07-10 PROBLEM — R18.8 CIRRHOSIS OF LIVER WITH ASCITES: Status: ACTIVE | Noted: 2017-07-24

## 2025-07-10 LAB
ALBUMIN SERPL-MCNC: 2.3 G/DL (ref 3.5–5.2)
ANION GAP SERPL CALCULATED.3IONS-SCNC: 8.9 MMOL/L (ref 5–15)
BH BB BLOOD EXPIRATION DATE: NORMAL
BH BB BLOOD TYPE BARCODE: 5100
BH BB DISPENSE STATUS: NORMAL
BH BB PRODUCT CODE: NORMAL
BH BB UNIT NUMBER: NORMAL
BUN SERPL-MCNC: 28 MG/DL (ref 8–23)
BUN/CREAT SERPL: 5.9 (ref 7–25)
CALCIUM SPEC-SCNC: 7.8 MG/DL (ref 8.6–10.5)
CHLORIDE SERPL-SCNC: 103 MMOL/L (ref 98–107)
CO2 SERPL-SCNC: 23.1 MMOL/L (ref 22–29)
CREAT SERPL-MCNC: 4.71 MG/DL (ref 0.76–1.27)
CROSSMATCH INTERPRETATION: NORMAL
DEPRECATED RDW RBC AUTO: 52.6 FL (ref 37–54)
EGFRCR SERPLBLD CKD-EPI 2021: 11.9 ML/MIN/1.73
ERYTHROCYTE [DISTWIDTH] IN BLOOD BY AUTOMATED COUNT: 14.9 % (ref 12.3–15.4)
GLUCOSE SERPL-MCNC: 96 MG/DL (ref 65–99)
HCT VFR BLD AUTO: 26.6 % (ref 37.5–51)
HGB BLD-MCNC: 8.9 G/DL (ref 13–17.7)
MAGNESIUM SERPL-MCNC: 2 MG/DL (ref 1.6–2.4)
MCH RBC QN AUTO: 32.4 PG (ref 26.6–33)
MCHC RBC AUTO-ENTMCNC: 33.5 G/DL (ref 31.5–35.7)
MCV RBC AUTO: 96.7 FL (ref 79–97)
PHOSPHATE SERPL-MCNC: 4.3 MG/DL (ref 2.5–4.5)
PLATELET # BLD AUTO: 69 10*3/MM3 (ref 140–450)
PMV BLD AUTO: 9.3 FL (ref 6–12)
POTASSIUM SERPL-SCNC: 3.9 MMOL/L (ref 3.5–5.2)
RBC # BLD AUTO: 2.75 10*6/MM3 (ref 4.14–5.8)
SODIUM SERPL-SCNC: 135 MMOL/L (ref 136–145)
UNIT  ABO: NORMAL
UNIT  RH: NORMAL
WBC NRBC COR # BLD AUTO: 3.62 10*3/MM3 (ref 3.4–10.8)

## 2025-07-10 PROCEDURE — 80069 RENAL FUNCTION PANEL: CPT | Performed by: HOSPITALIST

## 2025-07-10 PROCEDURE — G0378 HOSPITAL OBSERVATION PER HR: HCPCS

## 2025-07-10 PROCEDURE — 85027 COMPLETE CBC AUTOMATED: CPT | Performed by: HOSPITALIST

## 2025-07-10 PROCEDURE — 97530 THERAPEUTIC ACTIVITIES: CPT

## 2025-07-10 PROCEDURE — 97162 PT EVAL MOD COMPLEX 30 MIN: CPT

## 2025-07-10 PROCEDURE — 83735 ASSAY OF MAGNESIUM: CPT | Performed by: HOSPITALIST

## 2025-07-10 PROCEDURE — 99231 SBSQ HOSP IP/OBS SF/LOW 25: CPT

## 2025-07-10 RX ORDER — SEVELAMER CARBONATE 800 MG/1
1600 TABLET, FILM COATED ORAL
Qty: 180 TABLET | Refills: 0 | Status: SHIPPED | OUTPATIENT
Start: 2025-07-10

## 2025-07-10 RX ORDER — LATANOPROST 50 UG/ML
1 SOLUTION/ DROPS OPHTHALMIC NIGHTLY
Status: DISCONTINUED | OUTPATIENT
Start: 2025-07-10 | End: 2025-07-10 | Stop reason: HOSPADM

## 2025-07-10 RX ADMIN — Medication 10 ML: at 08:06

## 2025-07-10 RX ADMIN — LATANOPROST 1 DROP: 50 SOLUTION/ DROPS OPHTHALMIC at 00:16

## 2025-07-10 RX ADMIN — SEVELAMER CARBONATE 1600 MG: 800 TABLET, FILM COATED ORAL at 12:31

## 2025-07-10 RX ADMIN — SEVELAMER CARBONATE 1600 MG: 800 TABLET, FILM COATED ORAL at 08:06

## 2025-07-10 NOTE — DISCHARGE SUMMARY
Patient Name: Bill Landry  : 1945  MRN: [id number]    Date of Admission: 2025  Date of Discharge:  7/10/2025  Primary Care Physician: Bharathi De La Torre MD      Chief Complaint:   Abnormal Lab      Discharge Diagnoses     Active Hospital Problems    Diagnosis  POA    **GI bleed [K92.2]  Yes    Acute blood loss anemia [D62]  Yes    Ileostomy present [Z93.2]  Not Applicable    Hypertension [I10]  Yes    Pulmonary hypertension [I27.20]  Yes    Nonrheumatic aortic valve stenosis [I35.0]  Yes    TATE (obstructive sleep apnea) [G47.33]  Yes    ESRD on hemodialysis [N18.6, Z99.2]  Not Applicable    Crohn's disease, unspecified, without complications [K50.90]  Yes    Splenomegaly, not elsewhere classified [R16.1]  Yes    Cirrhosis of liver with ascites [K74.60, R18.8]  Yes    Thrombocytopenia [D69.6]  Yes    Permanent atrial fibrillation [I48.21]  Yes      Resolved Hospital Problems   No resolved problems to display.        Hospital Course     Mr. Landry is a 79 y.o. gentleman with a history of end-stage renal disease, anemia chronic disease, hypertension, Crohn's disease status post ileostomy, obstructive sleep apnea, atrial fibrillation, and pulmonary hypertension who presents to the hospital with bleeding from his ostomy and is found to have acute blood loss anemia.      No bleeding since admission and etiology unclear.  History of ulcerated region near her ostomy with subsequent suturing for hemostasis and 2018.    General surgery consulted and noted no need for surgical intervention given no evidence of bleeding at ostomy.    Aspirin held since 2025, serum hemoglobin stable with trend following PRBC transfusion x2.    Discussed with patient and family recommendation for stool Hemoccult and or gastroenterology consultation for scope; however, patient and patient's wife are not currently interested in scope due to 'advanced age'; therefore, advised patient and family to follow-up with previously  established gastroenterologist in outpatient setting in approximately 4 weeks as well as primary care provider for continued monitoring of complete blood count.    Patient & family understood plan to remain off ASA until follow-up with cardiology after discharge.    Nephrology recommend continue hemodialysis Monday Wednesday Friday with Epogen at that time.  Phosphorus elevated here; therefore, sevelamer increased.    Patient tolerating intake and physical activity with PT--appears medically stable for discharge home with family on 7/10/2025 and agrees with plan for follow-up as previously discussed.      Day of Discharge       Physical Exam:  Temp:  [97.3 °F (36.3 °C)-97.9 °F (36.6 °C)] 97.3 °F (36.3 °C)  Heart Rate:  [65-78] 73  Resp:  [16] 16  BP: (103-112)/(55-63) 109/56  Body mass index is 25.6 kg/m².    Physical Exam  Constitutional:       General: He is not in acute distress.     Appearance: He is not toxic-appearing.      Comments: Generally weak   HENT:      Head: Normocephalic and atraumatic.   Eyes:      Extraocular Movements: Extraocular movements intact.      Conjunctiva/sclera: Conjunctivae normal.   Cardiovascular:      Rate and Rhythm: Normal rate. Rhythm irregular.   Pulmonary:      Effort: Pulmonary effort is normal.      Breath sounds: Normal breath sounds.   Abdominal:      General: There is no distension.      Tenderness: There is no abdominal tenderness. There is no guarding.   Musculoskeletal:      Cervical back: Normal range of motion and neck supple.      Right lower leg: No edema.      Left lower leg: No edema.   Skin:     General: Skin is warm and dry.   Neurological:      General: No focal deficit present.      Mental Status: He is alert and oriented to person, place, and time. Mental status is at baseline.   Psychiatric:         Mood and Affect: Mood normal.         Behavior: Behavior normal.         Consultants     Consult Orders (all) (From admission, onward)       Start     Ordered     07/10/25 1235  Inpatient Cardiology Consult  Once,   Status:  Canceled        Specialty:  Cardiology  Provider:  Dale Vázquez MD    07/10/25 1235    07/08/25 1410  Inpatient Case Management  Consult  Once        Provider:  (Not yet assigned)    07/08/25 1409    07/08/25 0944  [facility] (on-call MD unless specified) Details  Once        Specialty:  Hospitalist  Provider:  (Not yet assigned)    07/08/25 0943    07/08/25 0943  Nephrology (on -call MD unless specified)  Once        Specialty:  Nephrology  Provider:  Myra Moore MD    07/08/25 0942    07/08/25 0943  Surgery (on-call MD unless specified)  Once        Specialty:  General Surgery  Provider:  (Not yet assigned)    07/08/25 0943                  Procedures     * Surgery not found *    Imaging Results (All)       Procedure Component Value Units Date/Time    CT Abdomen Pelvis Without Contrast [[id number]] Collected: 07/08/25 1039     Updated: 07/09/25 0831    Narrative:      CT ABDOMEN AND PELVIS WITHOUT IV CONTRAST     HISTORY: 79-year-old male with bleeding ostomy. Evaluate for a  parastomal hernia. Renal failure, on hemodialysis. Bowel surgery for  inflammatory bowel disease in the past. Appendectomy and cholecystectomy  in the past as well.     TECHNIQUE: Radiation dose reduction techniques were utilized, including  automated exposure control and exposure modulation based on body size.   3 mm images were obtained through the abdomen and pelvis without the  administration of IV contrast. Compared with noncontrasted CT  abdomen/pelvis 07/17/2017.     FINDINGS:  1. There is no significant change in the size of the left parastomal  hernia containing fat and a small volume of ascites. There is no bowel  ileus or obstruction. Limited evaluation of the bowel in the absence of  IV contrast, but there is no convincing evidence for an acute colitis or  enteritis. There is no bowel ileus or obstruction.     2. Liver has become grossly cirrhotic in  morphology and smaller in  volume. There is a new small volume of perihepatic ascites. Splenomegaly  is without significant change. Noncontrasted pancreas and adrenals  appear unremarkable.     Kidneys have become significantly atrophied and contain multiple stones,  and there are stones within the dependent aspect of the right renal  pelvis, with no hydronephrosis; intermittent obstruction at the right  UPJ can't be excluded. There are multiple stones within the urinary  bladder. Prostate is less enlarged than previously.     3. There is greater elevation of the right hemidiaphragm with  significantly more compressive atelectasis at the right lower lobe.           This report was finalized on 7/9/2025 8:27 AM by Dr. Grace Bautista M.D on  Workstation: [id number]             Results for orders placed in visit on 06/03/25    Dialysis Circuit Angiography (Fistulogram) 06/03/2025  9:14 AM    Interpretation Summary    Duplex directed access with left arteriovenous shuntogram with angioplasty of arteriovenous shunt    Left central venogram with central venous angioplasty at the axillosubclavian vein.      Operative Note    OR Date: 6/3/2025    Pre-op Diagnosis:  Left AV shunt malfunction    Post-Op Diagnosis Codes:  Same    Procedure:  1) duplex directed access with left arteriovenous shuntogram with angioplasty of left arteriovenous shunt  2) left central venogram with central venous angioplasty at the axillary and subclavian vein    Surgeon: Feng Deal MD    Assistant: None    Anesthesia: Local  Staff:  [facility] staff    Estimated Blood Loss: minimal    Specimens: None    Complications: None    Findings: Anastomotic stenosis and venous outflow stenosis noted.  Arterial anastomosis and arterial inflow all appear widely patent on multiple views.    Indications:    The patient is an 79 y.o. male seen for evaluation which is felt to be increasing stenosis at the inflow and outflow on duplex scan for the patient with dropping  flow volumes.  The patient is undergoing AV shuntogram with angioplasty and understands risk benefits complications of the procedure and consents to the procedure    Procedure:    Patient was prepped and draped sterilely.  Using duplex directionally accessed the AV shunt proximally and passed a wire centrally.  After performing shuntogram I confirmed high-grade stenosis of the anastomotic segment at 80% and this was then crossed with a wire after placing a 6 Mongolian sheath.  Following this an 8 mm x 40 mm of access balloon was angioplastied to 15 jia across the segment relieving the stenosis completely.  Attention was then turned to the central venous angiograms which showed stenosis at 70% at the axillary subclavian junction which had been angioplastied with an 8 x 40 balloon and has a 10% residual stenosis.  Following this the patient had full angiograms of the proximal AV shunt anastomosis and multiple obliques and a views to make sure there is no stenosis given the high velocities on duplex.  There is no actual stenosis and I believe the high velocities are secondary to dilation of the anastomosis and then normal-sized proximal fistula flowing into a shunt.  With this completed and total resolution of outflow disease we removed catheters and wires and placed a bolster stitch and patient tolerated procedure well.    Radiographic Findings:  Angiographic portion of the procedure included proximal anastomosis is widely patent with a normal size fistula flowing into a patent shunt beyond it.  This explains increased velocities as there is no stenosis disease seen at the artery or arterial anastomosis.  Proximal shunt is normal sized and then the mid shunt is widely patent to the distal anastomosis of the shunt where there is a 80% outflow stenosis that is crossed with wire and angioplasty with 8 x 40 balloon with resolution completely.  Central venogram shows axillosubclavian 70% lesion which was angioplastied with a  8 x 40 balloon with less than 10% residual stenosis.  There is no stenosis at the thoracic outlet segment in the innominate vein is visualized and appears patent.  At completion there is no complications and excellent flow was read maintained through the shunt        Bill Deal MD    Date: 6/3/2025  Time: 09:14 EDT    Results for orders placed during the hospital encounter of 09/05/24    Adult Transthoracic Echo Complete W/ Cont if Necessary Per Protocol 09/05/2024  3:59 PM    Interpretation Summary    Left ventricular systolic function is normal. Calculated left ventricular EF = 68.8% Normal left ventricular cavity size and wall thickness noted. All left ventricular wall segments contract normally. Left ventricular diastolic function was indeterminate.    The aortic valve is abnormal in structure. There is severe thickening of the aortic valve. The aortic valve appears trileaflet. No significant aortic valve regurgitation is present. Mild to moderate aortic valve stenosis is present. Aortic valve area is 1.33 cm2. Peak velocity of the flow distal to the aortic valve is 279.9 cm/s. Aortic valve maximum pressure gradient is 31.3 mmHg. Aortic valve mean pressure gradient is 17.8 mmHg. Aortic valve dimensionless index is 0.30 .    Pertinent Labs     Results from last 7 days   Lab Units 07/10/25  0518 07/09/25  2055 07/09/25  0348 07/08/25  0801   WBC 10*3/mm3 3.62  --  3.79 5.56   HEMOGLOBIN g/dL 8.9* 8.9* 7.6* 7.8*   PLATELETS 10*3/mm3 69*  --  71* 110*     Results from last 7 days   Lab Units 07/10/25  0518 07/09/25  0348 07/08/25  1858 07/08/25  0855   SODIUM mmol/L 135* 132*  --  136   POTASSIUM mmol/L 3.9 3.7 3.8 3.5   CHLORIDE mmol/L 103 97*  --  98   CO2 mmol/L 23.1 25.6  --  25.0   BUN mg/dL 28.0* 49.0*  --  36.0*   CREATININE mg/dL 4.71* 7.00*  --  5.93*   GLUCOSE mg/dL 96 82  --  152*   EGFR mL/min/1.73 11.9* 7.4*  --  9.0*     Results from last 7 days   Lab Units 07/10/25  0518 07/08/25  0855   ALBUMIN  "g/dL 2.3* 2.6*   BILIRUBIN mg/dL  --  0.6   ALK PHOS U/L  --  152*   AST (SGOT) U/L  --  52*   ALT (SGPT) U/L  --  18     Results from last 7 days   Lab Units 07/10/25  0518 07/09/25  0348 07/08/25  0855   CALCIUM mg/dL 7.8* 7.9* 8.1*   ALBUMIN g/dL 2.3*  --  2.6*   MAGNESIUM mg/dL 2.0 2.0  --    PHOSPHORUS mg/dL 4.3 6.7*  --                Invalid input(s): \"LDLCALC\"          Test Results Pending at Discharge     Pending Results       None              Discharge Details        Discharge Medications        Changes to Medications        Instructions Start Date   sevelamer 800 MG tablet  Commonly known as: RENVELA  What changed:   how much to take  when to take this   1,600 mg, Oral, 3 Times Daily With Meals             Continue These Medications        Instructions Start Date   acetaminophen 325 MG tablet  Commonly known as: TYLENOL   650 mg, Oral, Every 4 Hours PRN      alfuzosin 10 MG 24 hr tablet  Commonly known as: UROXATRAL   1 tablet, Every Other Day      cholecalciferol 25 MCG (1000 UT) tablet  Commonly known as: VITAMIN D3   1 tablet, Daily      latanoprost 0.005 % ophthalmic solution  Commonly known as: XALATAN   1 drop, Nightly      lidocaine-prilocaine 2.5-2.5 % cream  Commonly known as: EMLA   1 Application, As Needed      Loratadine 10 MG capsule   1 capsule, Daily      midodrine 2.5 MG tablet  Commonly known as: PROAMATINE   Take 1 tablet by mouth 3 (Three) Times a Day As Needed (on dialysis days, and if systolic blood pressure is below 100).      MIRCERA IJ   1 dose, Every 30 Days      Pepcid AC 10 MG tablet  Generic drug: famotidine   1 tablet, Daily PRN      Umm-Peter tablet   1 tablet, Daily      sodium chloride 0.65 % nasal spray   2 sprays, Nightly      VENOFER IV   1 dose, As Needed             Stop These Medications      aspirin 81 MG tablet     mupirocin 2 % ointment  Commonly known as: BACTROBAN     ondansetron 4 MG tablet  Commonly known as: Zofran     polyethylene glycol 17 g " "packet  Commonly known as: MIRALAX     saccharomyces boulardii 250 MG capsule  Commonly known as: Florastor     sennosides-docusate 8.6-50 MG per tablet  Commonly known as: PERICOLACE     silver nitrate 75-25 % applicator              Allergies   Allergen Reactions    Ace Inhibitors Other (See Comments)     RENAL FAILURE/ raised creatinine    Contrast Dye (Echo Or Unknown Ct/Mr) Other (See Comments) and Anaphylaxis     RENAL FAILURE    Iodine Anaphylaxis    Angiotensin Receptor Blockers Swelling    Eliquis [Apixaban] Arrhythmia     BLEEDING ISSUES; PT CANNOT TAKE ANY ANTICOAGULANTS DUE TO LOW PLATELETS EXCEPT ASPIRIN      Filgrastim GI Intolerance and Confusion     Chest pain    Heparin Other (See Comments)     \"It causes me to bleed out\"    Keflex [Cephalexin] Diarrhea     RENAL FAILURE    Other Angioedema     IVP Dye       Discharge Disposition:  Home or Self Care      Discharge Diet:  Diet Order   Procedures    Diet: Regular/House; Fluid Consistency: Thin (IDDSI 0)       Discharge Activity:   Activity Instructions       Activity as Tolerated              CODE STATUS:    Code Status and Medical Interventions: CPR (Attempt to Resuscitate); Full Support   Ordered at: 07/08/25 1142     Code Status (Patient has no pulse and is not breathing):    CPR (Attempt to Resuscitate)     Medical Interventions (Patient has pulse or is breathing):    Full Support     Level Of Support Discussed With:    Patient       Future Appointments   Date Time Provider Department Center   7/15/2025  8:00 AM Bharathi De La Torre MD [facility]   8/5/2025  4:00 PM LAB CHAIR 3 [facility]  LAB [facility] [place]   8/5/2025  4:20 PM Theo Pacheco MD [facility] CBC [facility] [place]   9/4/2025  1:15 PM [facility] ECHO/VAS RM 1  [facility] ECHO [facility]   9/4/2025  1:45 PM Dale Vázquez MD [facility] CD [id number] [facility]   9/16/2025  9:15 AM [facility]   9/25/2025 11:00 AM ALEX OP VAS RM 3  [facility]EX [facility]   9/25/2025 11:45 AM Bill Deal MD [facility] VS [facility] "   10/30/2025  1:15 PM Bharathi De La Torre MD [facility] [place]     Additional Instructions for the Follow-ups that You Need to Schedule       Discharge Follow-up with PCP   As directed       Currently Documented PCP:    Bharathi De La Torre MD    PCP Phone Number:    [phone]     Follow Up Details: Please call to schedule a one week or earliest available follow-up appointment PCP; [facility]               Follow-up Information       Bharathi De La Torre MD Follow up on 7/15/2025.    Specialty: Internal Medicine  Why: Appointment will be Tuesday, July 15, 2025 at 8am  Contact information:  [address]  [place] [address]  [phone]               Bharathi De La Torre MD .    Specialty: Internal Medicine  Why: Please call to schedule a one week or earliest available follow-up appointment PCP; [facility]  Contact information:  [address]  [place] [id number] [phone]               Dale Vázquez MD. Schedule an appointment as soon as possible for a visit.    Specialty: Cardiology  Why: Please schedule an appointment with cardiology NP to discuss efficacy for previously ordered ASA  Contact information:  [address] [facility] [address]  [facility] [id number] [phone]                             Additional Instructions for the Follow-ups that You Need to Schedule       Discharge Follow-up with PCP   As directed       Currently Documented PCP:    Bharathi De La Torre MD    PCP Phone Number:    [phone]     Follow Up Details: Please call to schedule a one week or earliest available follow-up appointment PCP; [facility]            Time Spent on Discharge:  Greater than 30 minutes      JAME Díaz  [facility]  07/10/25  13:23 EDT

## 2025-07-10 NOTE — PROGRESS NOTES
"    Name: Bill Landry ADMIT: 2025   : 1945  PCP: Bharathi De La Torre MD    MRN: [id number] LOS: 0 days   AGE/SEX: 79 y.o. male  ROOM: [facility]     Subjective   Subjective   Patient appears generally weak, relatively comfortable, no apparent distress.  Wife at bedside stated no interest in scope here due to 'age' and also 'does not suspect blood from intestines'--patient agreed.    Wife also concerned for possible \"sepsis\" given recent WBC 5 & states patient usually runs low around \"3\".    Wife also described liquid blood in ostomy bag on Friday after HD.    Denies chest pain or trouble breathing.    Tolerating intake.       Objective   Objective   Vital Signs  Temp:  [97.3 °F (36.3 °C)-97.9 °F (36.6 °C)] 97.3 °F (36.3 °C)  Heart Rate:  [65-78] 73  Resp:  [16] 16  BP: (103-112)/(55-63) 109/56  SpO2:  [96 %-97 %] 97 %  on   ;   Device (Oxygen Therapy): room air  Body mass index is 25.6 kg/m².    Physical Exam  Constitutional:       General: He is not in acute distress.     Appearance: He is ill-appearing (chronic appearance). He is not toxic-appearing.   Cardiovascular:      Rate and Rhythm: Normal rate.      Heart sounds: Normal heart sounds.   Pulmonary:      Effort: Pulmonary effort is normal.      Breath sounds: Normal breath sounds.   Abdominal:      General: Bowel sounds are normal.      Palpations: Abdomen is soft.      Comments: Ostomy with clean, dry, & intact occlusive dressing in place    Musculoskeletal:      Right lower leg: No edema.      Left lower leg: No edema.   Skin:     General: Skin is warm.   Neurological:      General: No focal deficit present.      Mental Status: He is alert and oriented to person, place, and time. Mental status is at baseline.       Results Review     I reviewed the patient's new clinical results.  Results from last 7 days   Lab Units 07/10/25  0518 25  0348 25  0801   WBC 10*3/mm3 3.62  --  3.79 5.56   HEMOGLOBIN g/dL 8.9* 8.9* 7.6* 7.8* " "  PLATELETS 10*3/mm3 69*  --  71* 110*     Results from last 7 days   Lab Units 07/10/25  0518 07/09/25 0348 07/08/25  1858 07/08/25  0855   SODIUM mmol/L 135* 132*  --  136   POTASSIUM mmol/L 3.9 3.7 3.8 3.5   CHLORIDE mmol/L 103 97*  --  98   CO2 mmol/L 23.1 25.6  --  25.0   BUN mg/dL 28.0* 49.0*  --  36.0*   CREATININE mg/dL 4.71* 7.00*  --  5.93*   GLUCOSE mg/dL 96 82  --  152*   EGFR mL/min/1.73 11.9* 7.4*  --  9.0*     Results from last 7 days   Lab Units 07/10/25  0518 07/08/25  0855   ALBUMIN g/dL 2.3* 2.6*   BILIRUBIN mg/dL  --  0.6   ALK PHOS U/L  --  152*   AST (SGOT) U/L  --  52*   ALT (SGPT) U/L  --  18     Results from last 7 days   Lab Units 07/10/25  0518 07/09/25  0348 07/08/25  0855   CALCIUM mg/dL 7.8* 7.9* 8.1*   ALBUMIN g/dL 2.3*  --  2.6*   MAGNESIUM mg/dL 2.0 2.0  --    PHOSPHORUS mg/dL 4.3 6.7*  --        No results found for: \"HGBA1C\", \"POCGLU\"    No radiology results for the last day    I have personally reviewed all medications:  Scheduled Medications  [Held by provider] aspirin, 81 mg, Oral, Daily  epoetin tip/tip-epbx, 3,000 Units, Intravenous, Once per day on Monday Wednesday Friday  latanoprost, 1 drop, Both Eyes, Nightly  sevelamer, 1,600 mg, Oral, TID With Meals  sodium chloride, 10 mL, Intravenous, Q12H  tamsulosin, 0.4 mg, Oral, Nightly    Infusions   Diet  Diet: Regular/House; Fluid Consistency: Thin (IDDSI 0)    I have personally reviewed:  [x]  Laboratory   []  Microbiology   [x]  Radiology   [x]  EKG/Telemetry  [x]  Cardiology/Vascular   []  Pathology    []  Records       Assessment/Plan     Active Hospital Problems    Diagnosis  POA    **GI bleed [K92.2]  Yes    Acute blood loss anemia [D62]  Yes    Ileostomy present [Z93.2]  Not Applicable    Hypertension [I10]  Yes    Pulmonary hypertension [I27.20]  Yes    Nonrheumatic aortic valve stenosis [I35.0]  Yes    TATE (obstructive sleep apnea) [G47.33]  Yes    ESRD on hemodialysis [N18.6, Z99.2]  Not Applicable    Crohn's " disease, unspecified, without complications [K50.90]  Yes    Splenomegaly, not elsewhere classified [R16.1]  Yes    Cirrhosis of liver with ascites [K74.60, R18.8]  Yes    Thrombocytopenia [D69.6]  Yes    Permanent atrial fibrillation [I48.21]  Yes      Resolved Hospital Problems   No resolved problems to display.       79 y.o. male admitted with GI bleed.    General surgery consulted for GI bleed/acute blood loss anemia/status post colectomy with ileostomy at 18 years of age given history of similar occurrence in 2018 when 2 cm ulcerated area at right lower edge of stoma oozing blood & required suturing with subsequent good hemostasis and no issues thereafter  -s/p multiple blood transfusions here  -no active bleeding today  -General surgery signed off given no evidence of active bleeding and no indication for surgical intervention at this time    Permanent AFIB  -ASA for AFIB held since 7/4/2025 per patient  -consult cardiology to advise on use of ASA    IBD/Crohn's  -s/p colectomy with ileostomy at age 18 years  -follows with Dr. Neville  -patient & wife denied interest in stool hemoccult as they do not believe blood is from intestines & also are 'not interested in scopes due to advanced age'  -last ileoscopy 2018 with Dr. Neville--findings normal without specimen collected    Chronic thrombocytopenia / leukopenia  -most likey contributing to above  -follows with Dr. Theo Pacheco with CBC  -ASA held until cardiology advises    HTN  -BP soft; hemodynamically stable  -no antihypertensive meds    ESRD  -nephrology plan HD on 7/11/2025; therefore, patient's wife canceled [facility] appointment on that date  -epogen with HD    PT consulted & no recommendation thus far    SCDs for DVT prophylaxis.  Full code.  Discussed with patient and nursing staff.  Anticipate discharge home with family & therapies?  Expected Discharge Date: 7/11/2025; Expected Discharge Time:       JAME Díaz  [facility]  07/10/25  13:00 EDT

## 2025-07-10 NOTE — THERAPY EVALUATION
Patient Name: Bill Landry  : 1945    MRN: [id number]                              Today's Date: 7/10/2025       Admit Date: 2025    Visit Dx:     ICD-10-CM ICD-9-CM   1. Gastrointestinal hemorrhage, unspecified gastrointestinal hemorrhage type  K92.2 578.9   2. Acute on chronic anemia  D64.9 285.9   3. ESRD on dialysis  N18.6 585.6    Z99.2 V45.11     Patient Active Problem List   Diagnosis    Permanent atrial fibrillation    Thrombocytopenia    Chronic leukopenia    Cirrhosis of liver with ascites    Congestive splenomegaly    Anemia due to stage 4 chronic kidney disease treated with erythropoietin    Esophageal abnormality    ESRD on hemodialysis    Other specified glaucoma    Arteriovenous fistula for hemodialysis in place, secondary    Crohn's disease, unspecified, without complications    Deficiency of other specified B group vitamins    Dermatitis, unspecified    Encounter for immunization    History of urinary stone    Hyperparathyroidism, unspecified    Other disorders of phosphorus metabolism    Other iron deficiency anemias    Pure hypertriglyceridemia    Renal osteodystrophy    Secondary hyperparathyroidism of renal origin    Splenomegaly, not elsewhere classified    Bilateral pseudophakia    Dermatochalasis of eyelid    Primary open-angle glaucoma, bilateral, moderate stage    Puckering of macula, left eye    Secondary cataract    AV fistula occlusion, initial encounter    TATE (obstructive sleep apnea)    Aneurysm artery, upper extremity    Nonrheumatic aortic valve stenosis    Bicuspid aortic valve    Hyperuricemia without signs of inflammatory arthritis and tophaceous disease    Presbyopia    Impaired glucose tolerance    Hypofibrinogenemia    Pulmonary hypertension    Skin change    ESRD on dialysis    Problem with vascular access    AV graft thrombosis, initial encounter    AV shunt malfunction, initial encounter    Left arm swelling    Acetabular fracture    Anemia    Cirrhosis of  liver    Atrial fibrillation    Fall    Ileostomy present    Weakness    Hypertension    Pain in left hip    Acute pain due to trauma    Transaminitis    Hypoxemia    Hyperglycemia    Sepsis due to cellulitis    GI bleed    Acute blood loss anemia     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    Aortic stenosis     Bicuspid aortic valve 07/20/2022    Calculus of kidney     Chronic leukopenia and thrombocytopenia     Cirrhosis of liver without ascites 07/24/2017    Congestive splenomegaly 07/24/2017    Crohn disease     with ileostomy    Decreased white blood cell count, unspecified     Diverticula of colon     ESRD on hemodialysis 04/05/2018    M-W-F FRESENIUS    Fatty liver disease, nonalcoholic     Fistula 04/05/2018    Other Specified Complication    Gastrointestinal hemorrhage, unspecified     GERD (gastroesophageal reflux disease)     GI bleed     Glaucoma     H/O Gallstone pancreatitis     H/O Pericardial effusion     H/O sinus bradycardia     History of hypertension     resolved    History of UTI     Hx of renal calculi     Ileostomy present     Iron deficiency     Leakage of heart valve prosthesis, subsequent encounter     MGUS (monoclonal gammopathy of unknown significance)     TATE (obstructive sleep apnea)     Other hemoglobinopathies     Pericardial effusion (noninflammatory) 1/18/2018    Permanent atrial fibrillation     Scarlet fever, uncomplicated     Stenosis of other vascular prosthetic devices, implants and grafts, initial encounter 02/14/2022    Systemic lupus erythematosus, unspecified     Thrombocytopenia     undpecified    Type 2 diabetes mellitus     CURRENTLY TAKES NO MED    Urinary retention     Post-OP     Past Surgical History:   Procedure Laterality Date    APPENDECTOMY  06/1968     ARTERIOVENOUS FISTULA/SHUNT SURGERY Left 08/08/2017    Procedure: LEFT BRACHIAL CEPHALIC AV FISTULA FORMATION WITH CEPHALIC VEIN TRANSPOSITION ;  Surgeon: Bill Deal MD;  Location: [facility] OR;  Service:     ARTERIOVENOUS FISTULA/SHUNT SURGERY Left 05/27/2022    Procedure: OPEN REVISION LEFT ARTERIOVENOUS INTERPOSITION GRAFT PLACEMENT;  Surgeon: Bill Deal MD;  Location: [facility] MAIN OR;  Service: Vascular;  Laterality: Left;    ARTERIOVENOUS FISTULA/SHUNT SURGERY Left 11/19/2024    Procedure: LEFT ARTERIOVENOUS SHUNT GRAFT REVISION;  Surgeon: Bill Deal MD;  Location: [facility] MAIN OR;  Service: Vascular;  Laterality: Left;    ARTERIOVENOUS FISTULA/SHUNT SURGERY W/ HEMODIALYSIS CATHETER INSERTION Left 02/15/2022    Procedure: OPEN LEFT ARM ARTERIOVENOUS FISTULA THROMBECTOMY WITH CEPHALIC VEIN ARTHROPLASTY AND STENT/GRAFT PLACEMENT;  Surgeon: Bill Deal MD;  Location: [facility] OR 18/19;  Service: Vascular;  Laterality: Left;    CATARACT EXTRACTION WITH INTRAOCULAR LENS IMPLANT Bilateral 2004    CHOLECYSTECTOMY  2011    COLECTOMY PARTIAL / TOTAL      History of inflammatory bowel disease with status post colectomy with ileostomy many years ago in the [facility],  18 YEARS OF AGE    COLON SURGERY      Colon resection with colostomy    COLON SURGERY      COLONOSCOPY  01/2018    CYSTOSCOPY LITHOLAPAXY BLADDER STONE EXTRACTION      ENDOSCOPY  01/16/2015    gastritis    ENDOSCOPY  01/17/2018    Procedure: ESOPHAGOGASTRODUODENOSCOPY;  Surgeon: Warner Neville MD;  Location: [facility] ENDOSCOPY;  Service:     ILEOSCOPY  01/16/2015    normal    ILEOSCOPY N/A 01/17/2018    Procedure: ILEOSCOPY;  Surgeon: Warner Neville MD;  Location: [facility] ENDOSCOPY;  Service:     ILEOSTOMY  12/1968    loop ostomy bypass    INCISION AND DRAINAGE ARM Left 10/27/2023    Procedure: LEFT ARM INCISION AND DRAINAGE;  Surgeon: Bill Deal MD;  Location: [facility] OR;  Service: Vascular;  Laterality: Left;     INCISION AND DRAINAGE ARM Left 3/25/2025    Procedure: INCISION AND DRAINAGE UPPER EXTREMITY;  Surgeon: Bill Deal MD;  [facility];  Service: Vascular;  Laterality: Left;    INSERTION HEMODIALYSIS CATHETER Right 11/13/2024    Procedure: Tunnelled HEMODIALYSIS CATHETER INSERTION;  Surgeon: Ben Barth MD;  [facility];  Service: Vascular;  Laterality: Right;    INSERTION HEMODIALYSIS CATHETER N/A 3/25/2025    Procedure: HEMODIALYSIS CATHETER INSERTION, left arm I&D;  Surgeon: Bill Deal MD;  [facility];  Service: Vascular;  Laterality: N/A;    OTHER SURGICAL HISTORY  2009    kidney stones x3    PSEUDOANEURYSM REPAIR Left 10/27/2023    TONSILLECTOMY  1950      General Information       Row Name 07/10/25 1456          Physical Therapy Time and Intention    Document Type evaluation  -ER     Mode of Treatment individual therapy;physical therapy  -ER       Row Name 07/10/25 1456          General Information    Patient Profile Reviewed yes  -ER     Prior Level of Function min assist:;ADL's  Min A with ostomy  -ER     Existing Precautions/Restrictions fall  -ER     Barriers to Rehab previous functional deficit;medically complex  recieves dialysis  -ER       Row Name 07/10/25 1456          Living Environment    Current Living Arrangements home  -ER     People in Home spouse  -ER       Row Name 07/10/25 1456          Home Main Entrance    Number of Stairs, Main Entrance three  -ER     Stair Railings, Main Entrance railings safe and in good condition  -ER       Row Name 07/10/25 1456          Stairs Within Home, Primary    Number of Stairs, Within Home, Primary none  -ER       Row Name 07/10/25 1456          Cognition    Orientation Status (Cognition) oriented x 4  -ER       Row Name 07/10/25 1456          Safety Issues/Impairments Affecting Functional Mobility    Impairments Affecting Function (Mobility) balance;endurance/activity tolerance;strength  -ER      Comment, Safety Issues/Impairments (Mobility) Gait belt and non skid socks  -ER               User Key  (r) = Recorded By, (t) = Taken By, (c) = Cosigned By      Initials Name Provider Type    ER Saadia Ayon PT Physical Therapist                   Mobility       Row Name 07/10/25 1458          Bed Mobility    Bed Mobility bed mobility (all) activities  -ER     All Activities, [place] (Bed Mobility) supervision  -ER     Assistive Device (Bed Mobility) bed rails  -ER       Row Name 07/10/25 1458          Sit-Stand Transfer    Sit-Stand [place] (Transfers) supervision  -ER     Assistive Device (Sit-Stand Transfers) walker, front-wheeled  -ER       Row Name 07/10/25 1458          Gait/Stairs (Locomotion)    [place] Level (Gait) supervision  -ER     Assistive Device (Gait) walker, front-wheeled  -ER     Patient was able to Ambulate yes  -ER     Distance in Feet (Gait) 250  -ER     Deviations/Abnormal Patterns (Gait) bilateral deviations;gait speed decreased  -ER     Bilateral Gait Deviations forward flexed posture  -ER     [place] Level (Stairs) supervision  -ER     Handrail Location (Stairs) left side (ascending)  -ER     Number of Steps (Stairs) 3  -ER     Ascending Technique (Stairs) step-to-step  -ER     Descending Technique (Stairs) step-to-step  -ER               User Key  (r) = Recorded By, (t) = Taken By, (c) = Cosigned By      Initials Name Provider Type    ER Saadia Ayon PT Physical Therapist                   Obj/Interventions       Row Name 07/10/25 1458          Range of Motion Comprehensive    General Range of Motion no range of motion deficits identified  -ER       Row Name 07/10/25 1458          Strength Comprehensive (MMT)    Comment, General Manual Muscle Testing (MMT) Assessment Generalized weakness, Pt. spouse reports pt. had not been out of bed in 3 days prior  -ER       Row Name 07/10/25 1458          Balance    Balance Assessment sitting static balance;sitting dynamic  balance;standing static balance;standing dynamic balance  -ER     Static Sitting Balance standby assist  -ER     Dynamic Sitting Balance standby assist  -ER     Position, Sitting Balance sitting edge of bed;unsupported  -ER     Static Standing Balance supervision  -ER     Dynamic Standing Balance supervision  -ER     Position/Device Used, Standing Balance supported;walker, front-wheeled  -ER     Balance Interventions sitting;standing;sit to stand;supported;static;dynamic  -ER       Row Name 07/10/25 1458          Sensory Assessment (Somatosensory)    Sensory Assessment (Somatosensory) sensation intact  -ER               User Key  (r) = Recorded By, (t) = Taken By, (c) = Cosigned By      Initials Name Provider Type    ER Gabo, Saadia, PT Physical Therapist                   Goals/Plan    No documentation.                  Clinical Impression       Row Name 07/10/25 1452          Pain    Pretreatment Pain Rating 0/10 - no pain  -ER     Posttreatment Pain Rating 0/10 - no pain  -ER     Pre/Posttreatment Pain Comment Does not rate pain, just notes that he isnt feeling well overall  -ER       Row Name 07/10/25 1450          Plan of Care Review    Plan of Care Reviewed With patient;spouse  -ER     Outcome Evaluation Bill Landry is a 79 year old male seen for PT evaluation this afternoon. He notes that he lives with his spouse in a single level home with 3STE with rail. He uses a RW at baseline, and has hx of 1 fall while trying to  a shoe off the floor. Otherwise, he is indep at home, however noted that spouse assists him with emptying ostomy bag as needed. He performs bed mob with supervision A, STS with supervision A and RW, and ambulates 250 ft with supervision and RW. He goes up and down 3 steps with L ascending rail and supervision A. No overt LOB. pt. returns to supine and all needs met and within reach. Rec home with A. RN and MD aware.  -ER       Row Name 07/10/25 5418          Therapy Assessment/Plan (PT)     Criteria for Skilled Interventions Met (PT) no;no problems identified which require skilled intervention;does not meet criteria for skilled intervention  -ER     Therapy Frequency (PT) evaluation only  -ER       Row Name 07/10/25 1459          Positioning and Restraints    Pre-Treatment Position in bed  -ER     Post Treatment Position bed  -ER     In Bed notified nsg;call light within reach;encouraged to call for assist;exit alarm on;with family/caregiver  -ER               User Key  (r) = Recorded By, (t) = Taken By, (c) = Cosigned By      Initials Name Provider Type    ER Saadia Ayon, ERROL Physical Therapist                   Outcome Measures       Row Name 07/10/25 1501          How much help from another person do you currently need...    Turning from your back to your side while in flat bed without using bedrails? 4  -ER     Moving from lying on back to sitting on the side of a flat bed without bedrails? 4  -ER     Moving to and from a bed to a chair (including a wheelchair)? 3  -ER     Standing up from a chair using your arms (e.g., wheelchair, bedside chair)? 3  -ER     Climbing 3-5 steps with a railing? 3  -ER     To walk in hospital room? 3  -ER     AM-PAC 6 Clicks Score (PT) 20  -ER     Highest Level of Mobility Goal Walk 10 Steps or More-6  -ER       Row Name 07/10/25 1501          Functional Assessment    Outcome Measure Options AM-PAC 6 Clicks Basic Mobility (PT)  -ER               User Key  (r) = Recorded By, (t) = Taken By, (c) = Cosigned By      Initials Name Provider Type    ER Saadia Ayon PT Physical Therapist                                 Physical Therapy Education       Title: PT OT SLP Therapies (Done)       Topic: Physical Therapy (Done)       Point: Mobility training (Done)       Learning Progress Summary            Patient Acceptance, E, VU by ER at 7/10/2025 1502                      Point: Home exercise program (Done)       Learning Progress Summary            Patient Acceptance, E, VU  by ER at 7/10/2025 1502                      Point: Body mechanics (Done)       Learning Progress Summary            Patient Acceptance, E, VU by ER at 7/10/2025 1502                      Point: Precautions (Done)       Learning Progress Summary            Patient Acceptance, E, VU by ER at 7/10/2025 1502                                      User Key       Initials Effective Dates Name Provider Type Discipline    ER 10/15/23 -  Saadia Ayon, PT Physical Therapist PT                  PT Recommendation and Plan     Outcome Evaluation: Bill Landry is a 79 year old male seen for PT evaluation this afternoon. He notes that he lives with his spouse in a single level home with 3STE with rail. He uses a RW at baseline, and has hx of 1 fall while trying to  a shoe off the floor. Otherwise, he is indep at home, however noted that spouse assists him with emptying ostomy bag as needed. He performs bed mob with supervision A, STS with supervision A and RW, and ambulates 250 ft with supervision and RW. He goes up and down 3 steps with L ascending rail and supervision A. No overt LOB. pt. returns to supine and all needs met and within reach. Rec home with LAURA. RN and MD aware.     Time Calculation:         PT Charges       Row Name 07/10/25 1502             Time Calculation    Start Time 1337  -ER      Stop Time 1401  -ER      Time Calculation (min) 24 min  -ER      PT Received On 07/10/25  -ER         Time Calculation- PT    Total Timed Code Minutes- PT 15 minute(s)  -ER         Timed Charges    12509 - PT Therapeutic Activity Minutes 15  -ER         Total Minutes    Timed Charges Total Minutes 15  -ER       Total Minutes 15  -ER                User Key  (r) = Recorded By, (t) = Taken By, (c) = Cosigned By      Initials Name Provider Type    ER Saadia Ayon, PT Physical Therapist                  Therapy Charges for Today       Code Description Service Date Service Provider Modifiers Qty    71405809709  PT THERAPEUTIC ACT  EA 15 MIN 7/10/2025 Saadia Ayon, PT GP 1    03349290642 HC PT EVAL MOD COMPLEXITY 3 7/10/2025 Saadia Ayon, PT GP 1            PT G-Codes  Outcome Measure Options: AM-PAC 6 Clicks Basic Mobility (PT)  AM-PAC 6 Clicks Score (PT): 20  PT Discharge Summary  Anticipated Discharge Disposition (PT): home with 24/7 care, home with assist, home    Saadia Ayon, PT  7/10/2025

## 2025-07-10 NOTE — PROGRESS NOTES
Acute Care General Surgery Progress Note    Patient: Bill Landry  YOB: 1945  MRN: [id number]      Assessment  Bill Landry is a 79 y.o. male with thrombocytopenia, low H&H, who presented with concern about intermittent bleeding from around his stoma site. Reported usually after hemodialysis is when he notices this happening, he received dialysis yesterday but no evidence of bleeding so far. Ostomy site in good order, his wife takes good care of this at home for him. H&H has remained stable, AVSS.     Plan  There is currently no indications for surgical interventions, if he notices any bleeding, please call general surgery, we will sign off for now but remain available if needed      Subjective  Denies any bleeding from ostomy site, denies abdominal pain, ostomy functioning.     Objective    Vitals:    07/10/25 0750   BP: 109/56   Pulse: 73   Resp: 16   Temp: 97.3 °F (36.3 °C)   SpO2: 97%           Physical Exam  Constitutional: alert, oriented, in no acute distress  Respiratory: Normal work of breathing, Symmetric excursion  Cardiovascular: Well pefused, no jugular venous distention evident   Abdominal: soft, non-tender, ostomy intact with no current evidence of bleeding  Skin: warm ,dry      Laboratory Results  Results from last 7 days   Lab Units 07/10/25  0518 07/09/25 2055 07/09/25  0348 07/08/25  0801   WBC 10*3/mm3 3.62  --  3.79 5.56   HEMOGLOBIN g/dL 8.9*   < > 7.6* 7.8*   HEMATOCRIT % 26.6*   < > 23.0* 24.3*   PLATELETS 10*3/mm3 69*  --  71* 110*    < > = values in this interval not displayed.     Results from last 7 days   Lab Units 07/10/25  0518 07/09/25 0348 07/08/25  1858 07/08/25  0855   SODIUM mmol/L 135* 132*  --  136   POTASSIUM mmol/L 3.9 3.7 3.8 3.5   CHLORIDE mmol/L 103 97*  --  98   CO2 mmol/L 23.1 25.6  --  25.0   BUN mg/dL 28.0* 49.0*  --  36.0*   CREATININE mg/dL 4.71* 7.00*  --  5.93*   CALCIUM mg/dL 7.8* 7.9*  --  8.1*   BILIRUBIN mg/dL  --   --   --  0.6   ALK  PHOS U/L  --   --   --  152*   ALT (SGPT) U/L  --   --   --  18   AST (SGOT) U/L  --   --   --  52*   GLUCOSE mg/dL 96 82  --  152*       I have personally reviewed 7/10 labs       JAME Soto  Acute Care General Surgery  [facility]    [address], [address]  [place], [place], [address]  P: [phone]  F: [phone]

## 2025-07-10 NOTE — NURSING NOTE
Called Cardiology to cancel consult as requested per Dale Ying NP hospitalist patient to be discharged tooday no need for cardio to see.

## 2025-07-10 NOTE — PLAN OF CARE
Goal Outcome Evaluation:  Plan of Care Reviewed With: patient, spouse           Outcome Evaluation: Bill Landry is a 79 year old male seen for PT evaluation this afternoon. He notes that he lives with his spouse in a single level home with 3STE with rail. He uses a RW at baseline, and has hx of 1 fall while trying to  a shoe off the floor. Otherwise, he is indep at home, however noted that spouse assists him with emptying ostomy bag as needed. He performs bed mob with supervision A, STS with supervision A and RW, and ambulates 250 ft with supervision and RW. He goes up and down 3 steps with L ascending rail and supervision A. No overt LOB. pt. returns to supine and all needs met and within reach. Rec home with LAURA. RN and MD aware.    Anticipated Discharge Disposition (PT): home with 24/7 care, home with assist, home

## 2025-07-10 NOTE — PLAN OF CARE
Pt. is A&O X 4. No complain of pain, nausea, or vomiting. Spouse at bedside. Ileostomy tolerated well. A-Fib on Tele. RA. VSS. Pt. is in dialysis yesterday evening and Blood transfusing 1 Unit during dialysis. Hgb is 8.9 and hematocrit is 27.1 at night. Called Mari KILGORE for eye droop since family required. Call light within reach.     Goal Outcome Evaluation:  Plan of Care Reviewed With: patient, spouse  Progress: improving     Problem: Adult Inpatient Plan of Care  Goal: Plan of Care Review  Outcome: Progressing  Flowsheets (Taken 7/10/2025 0442)  Progress: improving  Plan of Care Reviewed With:   patient   spouse  Goal: Patient-Specific Goal (Individualized)  Outcome: Progressing  Goal: Absence of Hospital-Acquired Illness or Injury  Outcome: Progressing  Intervention: Identify and Manage Fall Risk  Recent Flowsheet Documentation  Taken 7/10/2025 0402 by Guillermo Matias RN  Safety Promotion/Fall Prevention:   safety round/check completed   room organization consistent   lighting adjusted   fall prevention program maintained   clutter free environment maintained   activity supervised  Taken 7/10/2025 0203 by Guillermo Matias RN  Safety Promotion/Fall Prevention:   safety round/check completed   room organization consistent   lighting adjusted   fall prevention program maintained   clutter free environment maintained   activity supervised  Taken 7/10/2025 0001 by Guillermo Matias RN  Safety Promotion/Fall Prevention:   safety round/check completed   room organization consistent   lighting adjusted   fall prevention program maintained   clutter free environment maintained   activity supervised  Taken 7/9/2025 2200 by Guillermo Matias RN  Safety Promotion/Fall Prevention:   safety round/check completed   room organization consistent   lighting adjusted   fall prevention program maintained   clutter free environment maintained   activity supervised  Taken 7/9/2025 2050 by Guillermo Matias RN  Safety Promotion/Fall Prevention:    safety round/check completed   room organization consistent   lighting adjusted   fall prevention program maintained   clutter free environment maintained   activity supervised  Intervention: Prevent Skin Injury  Recent Flowsheet Documentation  Taken 7/10/2025 0402 by Guillermo Matias RN  Body Position: position changed independently  Taken 7/10/2025 0203 by Guillermo Matias RN  Body Position: position changed independently  Taken 7/10/2025 0001 by Guillermo Matias RN  Body Position: position changed independently  Taken 7/9/2025 2200 by Guillermo Matias RN  Body Position: position changed independently  Taken 7/9/2025 2050 by Guillermo Matias RN  Body Position: position changed independently  Skin Protection: transparent dressing maintained  Intervention: Prevent and Manage VTE (Venous Thromboembolism) Risk  Recent Flowsheet Documentation  Taken 7/9/2025 2050 by Guillermo Matias RN  VTE Prevention/Management:   bilateral   SCDs (sequential compression devices) on  Intervention: Prevent Infection  Recent Flowsheet Documentation  Taken 7/10/2025 0402 by Guillermo Matias RN  Infection Prevention:   rest/sleep promoted   single patient room provided   hand hygiene promoted  Taken 7/10/2025 0203 by Guillermo Matias RN  Infection Prevention:   rest/sleep promoted   single patient room provided   hand hygiene promoted  Taken 7/10/2025 0001 by Guillermo Matias RN  Infection Prevention:   rest/sleep promoted   single patient room provided   hand hygiene promoted  Taken 7/9/2025 2200 by Guillermo Matias RN  Infection Prevention:   rest/sleep promoted   single patient room provided   hand hygiene promoted  Taken 7/9/2025 2050 by Guillermo Matias RN  Infection Prevention:   rest/sleep promoted   single patient room provided   hand hygiene promoted  Goal: Optimal Comfort and Wellbeing  Outcome: Progressing  Intervention: Provide Person-Centered Care  Recent Flowsheet Documentation  Taken 7/10/2025 0402 by Guillermo Matias RN  Trust Relationship/Rapport: emotional support provided  Taken  7/10/2025 0001 by Guillermo Matias RN  Trust Relationship/Rapport: emotional support provided  Taken 7/9/2025 2050 by Guillermo Matias RN  Trust Relationship/Rapport:   care explained   choices provided   emotional support provided   empathic listening provided   questions answered   questions encouraged  Goal: Readiness for Transition of Care  Outcome: Progressing     Problem: Fall Injury Risk  Goal: Absence of Fall and Fall-Related Injury  Outcome: Progressing  Intervention: Identify and Manage Contributors  Recent Flowsheet Documentation  Taken 7/10/2025 0016 by Guillermo Matias RN  Medication Review/Management: medications reviewed  Taken 7/9/2025 2050 by Guillermo Matias RN  Medication Review/Management: medications reviewed  Intervention: Promote Injury-Free Environment  Recent Flowsheet Documentation  Taken 7/10/2025 0402 by Guillermo Matias RN  Safety Promotion/Fall Prevention:   safety round/check completed   room organization consistent   lighting adjusted   fall prevention program maintained   clutter free environment maintained   activity supervised  Taken 7/10/2025 0203 by Guillermo Matias RN  Safety Promotion/Fall Prevention:   safety round/check completed   room organization consistent   lighting adjusted   fall prevention program maintained   clutter free environment maintained   activity supervised  Taken 7/10/2025 0001 by Guillermo Matias RN  Safety Promotion/Fall Prevention:   safety round/check completed   room organization consistent   lighting adjusted   fall prevention program maintained   clutter free environment maintained   activity supervised  Taken 7/9/2025 2200 by Guillermo Matias RN  Safety Promotion/Fall Prevention:   safety round/check completed   room organization consistent   lighting adjusted   fall prevention program maintained   clutter free environment maintained   activity supervised  Taken 7/9/2025 2050 by Guillermo Matias RN  Safety Promotion/Fall Prevention:   safety round/check completed   room organization  consistent   lighting adjusted   fall prevention program maintained   clutter free environment maintained   activity supervised     Problem: Electrolyte Imbalance  Goal: Electrolyte Balance  Outcome: Progressing     Problem: Gastrointestinal Bleeding  Goal: Optimal Coping with Acute Illness  Outcome: Progressing  Intervention: Optimize Psychosocial Response  Recent Flowsheet Documentation  Taken 7/9/2025 2050 by Guillermo Matias RN  Supportive Measures: active listening utilized  Goal: Hemostasis  Outcome: Progressing     Problem: Anemia  Goal: Anemia Symptom Improvement  Outcome: Progressing  Intervention: Monitor and Manage Anemia  Recent Flowsheet Documentation  Taken 7/10/2025 0402 by Guillermo Matias RN  Safety Promotion/Fall Prevention:   safety round/check completed   room organization consistent   lighting adjusted   fall prevention program maintained   clutter free environment maintained   activity supervised  Taken 7/10/2025 0203 by Guillermo Matias RN  Safety Promotion/Fall Prevention:   safety round/check completed   room organization consistent   lighting adjusted   fall prevention program maintained   clutter free environment maintained   activity supervised  Taken 7/10/2025 0001 by Guillermo Matias RN  Safety Promotion/Fall Prevention:   safety round/check completed   room organization consistent   lighting adjusted   fall prevention program maintained   clutter free environment maintained   activity supervised  Taken 7/9/2025 2200 by Guillermo Matias RN  Safety Promotion/Fall Prevention:   safety round/check completed   room organization consistent   lighting adjusted   fall prevention program maintained   clutter free environment maintained   activity supervised  Taken 7/9/2025 2050 by Guillermo Matias RN  Safety Promotion/Fall Prevention:   safety round/check completed   room organization consistent   lighting adjusted   fall prevention program maintained   clutter free environment maintained   activity supervised

## 2025-07-10 NOTE — OUTREACH NOTE
Prep Survey      Flowsheet Row Responses   [facility] facility patient discharged from? [place]   Is LACE score < 7 ? No   Eligibility [facility]   Date of Admission 07/08/25   Date of Discharge 07/10/25   Discharge Disposition Home or Self Care   Discharge diagnosis GI bleed   Does the patient have one of the following disease processes/diagnoses(primary or secondary)? Other   Does the patient have Home health ordered? No   Is there a DME ordered? No   Prep survey completed? Yes            MICKI A - Registered Nurse

## 2025-07-11 ENCOUNTER — TRANSITIONAL CARE MANAGEMENT TELEPHONE ENCOUNTER (OUTPATIENT)
Dept: CALL CENTER | Facility: HOSPITAL | Age: 80
End: 2025-07-11
Payer: MEDICARE

## 2025-07-11 NOTE — CASE MANAGEMENT/SOCIAL WORK
Case Management Discharge Note      Final Note: Home via private vehicle    Provided Post Acute Provider List?: N/A  Provided Post Acute Provider Quality & Resource List?: N/A    Selected Continued Care - Discharged on 7/10/2025 Admission date: 7/8/2025 - Discharge disposition: Home or Self Care      Destination    No services have been selected for the patient.                Durable Medical Equipment    No services have been selected for the patient.                Dialysis/Infusion    No services have been selected for the patient.                Home Medical Care    No services have been selected for the patient.                Therapy    No services have been selected for the patient.                Community Resources    No services have been selected for the patient.                Community & DME    No services have been selected for the patient.                    Transportation Services  Transportation: Private Transportation  Private: Car    Final Discharge Disposition Code: 01 - home or self-care

## 2025-07-11 NOTE — OUTREACH NOTE
Call Center TCM Note      Flowsheet Row Responses   [facility] patient discharged from? [place]   Does the patient have one of the following disease processes/diagnoses(primary or secondary)? Other   TCM attempt successful? Yes   Call start time 0955   Call end time 1006   Discharge diagnosis GI bleed   Person spoke with today (if not patient) and relationship spouse   Meds reviewed with patient/caregiver? Yes   Does the patient have all medications ordered at discharge? N/A   Prescription comments wife is aware of medication changes,  pt has stopped ASA for now   Is the patient taking all medications as directed (includes completed medication regime)? Yes   Comments Hospital f/u 7/15/25@0800am (appt in place at time of call)   Does the patient have an appointment with their PCP within 7-14 days of discharge? Yes   Nursing Interventions Confirmed date/time of appointment   Has home health visited the patient within 72 hours of discharge? N/A   Psychosocial issues? No   Did the patient receive a copy of their discharge instructions? Yes   Nursing interventions Reviewed instructions with patient   What is the patient's perception of their health status since discharge? Same  [Wife reports pt currently at HD,  reports no issues with bleeding at this time but aware to monitor for return of s/s.]   Is the patient/caregiver able to teach back signs and symptoms related to disease process for when to call PCP? Yes   Is the patient/caregiver able to teach back signs and symptoms related to disease process for when to call 911? Yes   TCM call completed? Yes   Call end time 1006            CANDIS HEBERT - Registered Nurse    7/11/2025, 10:06 EDT

## 2025-07-23 ENCOUNTER — READMISSION MANAGEMENT (OUTPATIENT)
Dept: CALL CENTER | Facility: HOSPITAL | Age: 80
End: 2025-07-23
Payer: MEDICARE

## 2025-07-23 NOTE — OUTREACH NOTE
Medical Week 2 Survey      Flowsheet Row Responses   [facility] patient discharged from? [place]   Does the patient have one of the following disease processes/diagnoses(primary or secondary)? Other   Week 2 attempt successful? Yes   Call start time 1226   Discharge diagnosis GI bleed   Call end time 1233   Is patient permission given to speak with other caregiver? Yes   Medication alerts for this patient Pt remains off the ASA per wife   Meds reviewed with patient/caregiver? Yes   Does the patient have a primary care provider?  Yes   Does the patient have an appointment with their PCP within 7 days of discharge? Greater than 7 days   Nursing Interventions Verified appointment date/time/provider   Has the patient kept scheduled appointments due by today? No   Comments Pt has rescheduled several appts as he can only have appointments on Tuesdays or Thursdays r/t HD, his wife reports this makes it difficult due to her appts and HD.   Comments Pt just arrived home from HD, per wife. Patient has had no further bleeding from ostomy per wife's report.   What is the patient's perception of their health status since discharge? Improving   Is the patient/caregiver able to teach back signs and symptoms related to disease process for when to call PCP? Yes   Is the patient/caregiver able to teach back signs and symptoms related to disease process for when to call 911? Yes   Week 2 Call Completed? Yes   Revoked No further contact(revokes)-requires comment   Call end time 1233            Meredith FLORES - Registered Nurse

## 2025-08-04 ENCOUNTER — TELEPHONE (OUTPATIENT)
Age: 80
End: 2025-08-04
Payer: MEDICARE

## 2025-08-04 ENCOUNTER — OFFICE VISIT (OUTPATIENT)
Age: 80
End: 2025-08-04
Payer: MEDICARE

## 2025-08-04 ENCOUNTER — HOSPITAL ENCOUNTER (OUTPATIENT)
Facility: HOSPITAL | Age: 80
Discharge: HOME | End: 2025-08-04
Admitting: SURGERY
Payer: MEDICARE

## 2025-08-04 VITALS
RESPIRATION RATE: 17 BRPM | WEIGHT: 176 LBS | SYSTOLIC BLOOD PRESSURE: 112 MMHG | DIASTOLIC BLOOD PRESSURE: 57 MMHG | HEIGHT: 70 IN | HEART RATE: 80 BPM | BODY MASS INDEX: 25.2 KG/M2

## 2025-08-04 DIAGNOSIS — Z99.2 ESRD ON DIALYSIS: ICD-10-CM

## 2025-08-04 DIAGNOSIS — T82.591A AV SHUNT MALFUNCTION, INITIAL ENCOUNTER: ICD-10-CM

## 2025-08-04 DIAGNOSIS — N18.6 ESRD ON HEMODIALYSIS: ICD-10-CM

## 2025-08-04 DIAGNOSIS — I72.1: Primary | ICD-10-CM

## 2025-08-04 DIAGNOSIS — Z99.2 ARTERIOVENOUS FISTULA FOR HEMODIALYSIS IN PLACE, SECONDARY: ICD-10-CM

## 2025-08-04 DIAGNOSIS — Z99.2 ESRD ON HEMODIALYSIS: ICD-10-CM

## 2025-08-04 DIAGNOSIS — N18.6 ESRD ON DIALYSIS: ICD-10-CM

## 2025-08-04 PROCEDURE — 99214 OFFICE O/P EST MOD 30 MIN: CPT | Performed by: SURGERY

## 2025-08-04 PROCEDURE — 1159F MED LIST DOCD IN RCRD: CPT | Performed by: SURGERY

## 2025-08-04 PROCEDURE — 1160F RVW MEDS BY RX/DR IN RCRD: CPT | Performed by: SURGERY

## 2025-08-04 PROCEDURE — 93990 DOPPLER FLOW TESTING: CPT

## 2025-08-04 PROCEDURE — 3074F SYST BP LT 130 MM HG: CPT | Performed by: SURGERY

## 2025-08-04 PROCEDURE — 3078F DIAST BP <80 MM HG: CPT | Performed by: SURGERY

## 2025-08-05 ENCOUNTER — LAB (OUTPATIENT)
Dept: LAB | Facility: HOSPITAL | Age: 80
End: 2025-08-05
Payer: MEDICARE

## 2025-08-05 ENCOUNTER — OFFICE VISIT (OUTPATIENT)
Dept: ONCOLOGY | Facility: CLINIC | Age: 80
End: 2025-08-05
Payer: MEDICARE

## 2025-08-05 VITALS
TEMPERATURE: 97.8 F | OXYGEN SATURATION: 99 % | BODY MASS INDEX: 24.94 KG/M2 | SYSTOLIC BLOOD PRESSURE: 118 MMHG | DIASTOLIC BLOOD PRESSURE: 61 MMHG | HEART RATE: 70 BPM | WEIGHT: 174.2 LBS | HEIGHT: 70 IN | RESPIRATION RATE: 16 BRPM

## 2025-08-05 DIAGNOSIS — D53.9 MACROCYTIC ANEMIA: ICD-10-CM

## 2025-08-05 DIAGNOSIS — D72.819 CHRONIC LEUKOPENIA: ICD-10-CM

## 2025-08-05 DIAGNOSIS — D69.6 THROMBOCYTOPENIA: Primary | ICD-10-CM

## 2025-08-05 DIAGNOSIS — R16.1 SPLENOMEGALY, NOT ELSEWHERE CLASSIFIED: ICD-10-CM

## 2025-08-05 DIAGNOSIS — D69.6 THROMBOCYTOPENIA: ICD-10-CM

## 2025-08-05 LAB
BASOPHILS # BLD AUTO: 0.04 10*3/MM3 (ref 0–0.2)
BASOPHILS NFR BLD AUTO: 1 % (ref 0–1.5)
DEPRECATED RDW RBC AUTO: 77.9 FL (ref 37–54)
EOSINOPHIL # BLD AUTO: 0.06 10*3/MM3 (ref 0–0.4)
EOSINOPHIL NFR BLD AUTO: 1.4 % (ref 0.3–6.2)
ERYTHROCYTE [DISTWIDTH] IN BLOOD BY AUTOMATED COUNT: 19.8 % (ref 12.3–15.4)
HCT VFR BLD AUTO: 32.5 % (ref 37.5–51)
HGB BLD-MCNC: 10.3 G/DL (ref 13–17.7)
HGB RETIC QN AUTO: 39.4 PG (ref 29.8–36.1)
IMM GRANULOCYTES # BLD AUTO: 0.01 10*3/MM3 (ref 0–0.05)
IMM GRANULOCYTES NFR BLD AUTO: 0.2 % (ref 0–0.5)
IMM RETICS NFR: 17.3 % (ref 3–15.8)
LYMPHOCYTES # BLD AUTO: 0.91 10*3/MM3 (ref 0.7–3.1)
LYMPHOCYTES NFR BLD AUTO: 21.6 % (ref 19.6–45.3)
MCH RBC QN AUTO: 33.4 PG (ref 26.6–33)
MCHC RBC AUTO-ENTMCNC: 31.7 G/DL (ref 31.5–35.7)
MCV RBC AUTO: 105.5 FL (ref 79–97)
MONOCYTES # BLD AUTO: 0.52 10*3/MM3 (ref 0.1–0.9)
MONOCYTES NFR BLD AUTO: 12.4 % (ref 5–12)
NEUTROPHILS NFR BLD AUTO: 2.67 10*3/MM3 (ref 1.7–7)
NEUTROPHILS NFR BLD AUTO: 63.4 % (ref 42.7–76)
NRBC BLD AUTO-RTO: 0 /100 WBC (ref 0–0.2)
PLATELET # BLD AUTO: 88 10*3/MM3 (ref 140–450)
PLATELETS.RETICULATED NFR BLD AUTO: 1.2 % (ref 0.9–6.5)
PMV BLD AUTO: 8.9 FL (ref 6–12)
RBC # BLD AUTO: 3.08 10*6/MM3 (ref 4.14–5.8)
RETICS # AUTO: 0.12 10*6/MM3 (ref 0.02–0.13)
RETICS/RBC NFR AUTO: 3.79 % (ref 0.7–1.9)
WBC NRBC COR # BLD AUTO: 4.21 10*3/MM3 (ref 3.4–10.8)

## 2025-08-05 PROCEDURE — 85055 RETICULATED PLATELET ASSAY: CPT

## 2025-08-05 PROCEDURE — 85025 COMPLETE CBC W/AUTO DIFF WBC: CPT

## 2025-08-05 PROCEDURE — 85046 RETICYTE/HGB CONCENTRATE: CPT

## 2025-08-05 PROCEDURE — 36415 COLL VENOUS BLD VENIPUNCTURE: CPT

## 2025-08-06 LAB
BH CV VAS DIALYSIS ARTERIAL ANASTOMOSIS DIAMETER: 0.6 CM
BH CV VAS DIALYSIS ARTERIAL ANASTOMOSIS EDV: 61 CM/SEC
BH CV VAS DIALYSIS ARTERIAL ANASTOMOSIS PSV: 184 CM/SEC
BH CV VAS DIALYSIS CONDUIT DIST DEPTH: 0.2 CM
BH CV VAS DIALYSIS CONDUIT DIST DIAMETER: 0.5 CM
BH CV VAS DIALYSIS CONDUIT DIST EDV: 73 CM/SEC
BH CV VAS DIALYSIS CONDUIT DIST FLOW VOL: 385 ML/MIN
BH CV VAS DIALYSIS CONDUIT DIST PSV: 133 CM/SEC
BH CV VAS DIALYSIS CONDUIT MID DEPTH: 0.2 CM
BH CV VAS DIALYSIS CONDUIT MID DIAMETER: 0.5 CM
BH CV VAS DIALYSIS CONDUIT MID EDV: 76 CM/SEC
BH CV VAS DIALYSIS CONDUIT MID PSV: 132 CM/SEC
BH CV VAS DIALYSIS CONDUIT MID/DIST DEPTH: 0.2 CM
BH CV VAS DIALYSIS CONDUIT MID/DIST DIAMETER: 0.4 CM
BH CV VAS DIALYSIS CONDUIT MID/DIST EDV: 99 CM/SEC
BH CV VAS DIALYSIS CONDUIT MID/DIST PSV: 146 CM/SEC
BH CV VAS DIALYSIS CONDUIT PROX DEPTH: 0.6 CM
BH CV VAS DIALYSIS CONDUIT PROX DIAMETER: 0.5 CM
BH CV VAS DIALYSIS CONDUIT PROX EDV: 121 CM/SEC
BH CV VAS DIALYSIS CONDUIT PROX PSV: 248 CM/SEC
BH CV VAS DIALYSIS CONDUIT PROX/MID DEPTH: 0.3 CM
BH CV VAS DIALYSIS CONDUIT PROX/MID DIAMETER: 0.5 CM
BH CV VAS DIALYSIS CONDUIT PROX/MID EDV: 78 CM/SEC
BH CV VAS DIALYSIS CONDUIT PROX/MID FLOW VOL: 688 ML/MIN
BH CV VAS DIALYSIS CONDUIT PROX/MID PSV: 144 CM/SEC
BH CV VAS DIALYSIS PRE-INFLOW BRACHIAL DIAMETER: 0.9 CM
BH CV VAS DIALYSIS PRE-INFLOW BRACHIAL EDV: 87 CM/SEC
BH CV VAS DIALYSIS PRE-INFLOW BRACHIAL FLOW VOL: 3556 ML/MIN
BH CV VAS DIALYSIS PRE-INFLOW BRACHIAL PSV: 211 CM/SEC
BH CV VAS DIALYSIS VENOUS ANASTOMOSIS DIAMETER: 0.4 CM
BH CV VAS DIALYSIS VENOUS ANASTOMOSIS EDV: 299 CM/SEC
BH CV VAS DIALYSIS VENOUS ANASTOMOSIS PSV: 543 CM/SEC
BH CV VAS DIALYSIS VENOUS OUTFLOW AXILLARY DIAMETER: 0.5 CM
BH CV VAS DIALYSIS VENOUS OUTFLOW AXILLARY EDV: 206 CM/SEC
BH CV VAS DIALYSIS VENOUS OUTFLOW AXILLARY PSV: 381 CM/SEC
BH CV VAS DIALYSIS VENOUS OUTFLOW SUBCLAVIAN DIAMETER: 1.1 CM
BH CV VAS DIALYSIS VENOUS OUTFLOW SUBCLAVIAN EDV: 27 CM/SEC
BH CV VAS DIALYSIS VENOUS OUTFLOW SUBCLAVIAN PSV: 76 CM/SEC

## 2025-08-12 DIAGNOSIS — Z99.2 ESRD ON DIALYSIS: Primary | ICD-10-CM

## 2025-08-12 DIAGNOSIS — N18.6 ESRD ON DIALYSIS: Primary | ICD-10-CM

## 2025-08-15 ENCOUNTER — TELEPHONE (OUTPATIENT)
Age: 80
End: 2025-08-15
Payer: MEDICARE

## 2025-08-15 ENCOUNTER — DOCUMENTATION (OUTPATIENT)
Age: 80
End: 2025-08-15
Payer: MEDICARE

## 2025-08-15 ENCOUNTER — PREP FOR SURGERY (OUTPATIENT)
Dept: OTHER | Facility: HOSPITAL | Age: 80
End: 2025-08-15
Payer: MEDICARE

## 2025-08-15 DIAGNOSIS — T82.591A AV SHUNT MALFUNCTION, INITIAL ENCOUNTER: Primary | ICD-10-CM

## 2025-08-15 DIAGNOSIS — N18.6 ESRD ON HEMODIALYSIS: ICD-10-CM

## 2025-08-15 DIAGNOSIS — Z99.2 ESRD ON HEMODIALYSIS: ICD-10-CM

## 2025-08-18 ENCOUNTER — ANESTHESIA (OUTPATIENT)
Dept: PERIOP | Facility: HOSPITAL | Age: 80
End: 2025-08-18
Payer: MEDICARE

## 2025-08-18 ENCOUNTER — ANESTHESIA EVENT (OUTPATIENT)
Dept: PERIOP | Facility: HOSPITAL | Age: 80
End: 2025-08-18
Payer: MEDICARE

## 2025-08-18 ENCOUNTER — APPOINTMENT (OUTPATIENT)
Dept: GENERAL RADIOLOGY | Facility: HOSPITAL | Age: 80
End: 2025-08-18
Payer: MEDICARE

## 2025-08-18 ENCOUNTER — HOSPITAL ENCOUNTER (OUTPATIENT)
Facility: HOSPITAL | Age: 80
Setting detail: HOSPITAL OUTPATIENT SURGERY
Discharge: HOME | End: 2025-08-18
Attending: SURGERY | Admitting: SURGERY
Payer: MEDICARE

## 2025-08-18 VITALS
SYSTOLIC BLOOD PRESSURE: 108 MMHG | RESPIRATION RATE: 16 BRPM | TEMPERATURE: 97.9 F | OXYGEN SATURATION: 96 % | DIASTOLIC BLOOD PRESSURE: 63 MMHG | HEART RATE: 64 BPM

## 2025-08-18 DIAGNOSIS — Z99.2 ESRD ON HEMODIALYSIS: ICD-10-CM

## 2025-08-18 DIAGNOSIS — T82.591A AV SHUNT MALFUNCTION, INITIAL ENCOUNTER: ICD-10-CM

## 2025-08-18 DIAGNOSIS — N18.6 ESRD ON HEMODIALYSIS: ICD-10-CM

## 2025-08-18 LAB
ANION GAP SERPL CALCULATED.3IONS-SCNC: 17 MMOL/L (ref 5–15)
BUN SERPL-MCNC: 75 MG/DL (ref 8–23)
BUN/CREAT SERPL: 7.5 (ref 7–25)
CALCIUM SPEC-SCNC: 8.4 MG/DL (ref 8.6–10.5)
CHLORIDE SERPL-SCNC: 97 MMOL/L (ref 98–107)
CO2 SERPL-SCNC: 18 MMOL/L (ref 22–29)
CREAT SERPL-MCNC: 9.95 MG/DL (ref 0.76–1.27)
DEPRECATED RDW RBC AUTO: 66.1 FL (ref 37–54)
EGFRCR SERPLBLD CKD-EPI 2021: 4.9 ML/MIN/1.73
ERYTHROCYTE [DISTWIDTH] IN BLOOD BY AUTOMATED COUNT: 17.2 % (ref 12.3–15.4)
GLUCOSE BLDC GLUCOMTR-MCNC: 95 MG/DL (ref 65–99)
GLUCOSE SERPL-MCNC: 122 MG/DL (ref 65–99)
HCT VFR BLD AUTO: 32.8 % (ref 37.5–51)
HGB BLD-MCNC: 10.6 G/DL (ref 13–17.7)
MCH RBC QN AUTO: 34.1 PG (ref 26.6–33)
MCHC RBC AUTO-ENTMCNC: 32.3 G/DL (ref 31.5–35.7)
MCV RBC AUTO: 105.5 FL (ref 79–97)
PLATELET # BLD AUTO: 73 10*3/MM3 (ref 140–450)
PMV BLD AUTO: 8.9 FL (ref 6–12)
POTASSIUM SERPL-SCNC: 4.5 MMOL/L (ref 3.5–5.2)
RBC # BLD AUTO: 3.11 10*6/MM3 (ref 4.14–5.8)
SODIUM SERPL-SCNC: 132 MMOL/L (ref 136–145)
WBC NRBC COR # BLD AUTO: 3.49 10*3/MM3 (ref 3.4–10.8)

## 2025-08-18 PROCEDURE — 25010000002 BUPIVACAINE (PF) 0.25 % SOLUTION 30 ML VIAL: Performed by: SURGERY

## 2025-08-18 PROCEDURE — 25010000002 FAMOTIDINE 10 MG/ML SOLUTION: Performed by: ANESTHESIOLOGY

## 2025-08-18 PROCEDURE — P9100 PATHOGEN TEST FOR PLATELETS: HCPCS

## 2025-08-18 PROCEDURE — 25810000003 SODIUM CHLORIDE 0.9 % SOLUTION: Performed by: SURGERY

## 2025-08-18 PROCEDURE — 25810000003 SODIUM CHLORIDE 0.9 % SOLUTION 250 ML FLEX CONT: Performed by: SURGERY

## 2025-08-18 PROCEDURE — 25010000002 LIDOCAINE 1 % SOLUTION 20 ML VIAL: Performed by: SURGERY

## 2025-08-18 PROCEDURE — 25010000002 PROTAMINE SULFATE PER 10 MG: Performed by: NURSE ANESTHETIST, CERTIFIED REGISTERED

## 2025-08-18 PROCEDURE — C1725 CATH, TRANSLUMIN NON-LASER: HCPCS | Performed by: SURGERY

## 2025-08-18 PROCEDURE — C1769 GUIDE WIRE: HCPCS | Performed by: SURGERY

## 2025-08-18 PROCEDURE — 25010000002 LIDOCAINE 2% SOLUTION: Performed by: NURSE ANESTHETIST, CERTIFIED REGISTERED

## 2025-08-18 PROCEDURE — 25010000002 ONDANSETRON PER 1 MG: Performed by: NURSE ANESTHETIST, CERTIFIED REGISTERED

## 2025-08-18 PROCEDURE — 80048 BASIC METABOLIC PNL TOTAL CA: CPT | Performed by: SURGERY

## 2025-08-18 PROCEDURE — P9035 PLATELET PHERES LEUKOREDUCED: HCPCS

## 2025-08-18 PROCEDURE — 36430 TRANSFUSION BLD/BLD COMPNT: CPT

## 2025-08-18 PROCEDURE — 25010000002 FENTANYL CITRATE (PF) 50 MCG/ML SOLUTION: Performed by: NURSE ANESTHETIST, CERTIFIED REGISTERED

## 2025-08-18 PROCEDURE — 85027 COMPLETE CBC AUTOMATED: CPT | Performed by: SURGERY

## 2025-08-18 PROCEDURE — 36833 AV FISTULA REVISION: CPT | Performed by: SURGERY

## 2025-08-18 PROCEDURE — 31500 INSERT EMERGENCY AIRWAY: CPT | Performed by: ANESTHESIOLOGY

## 2025-08-18 PROCEDURE — 25010000002 HEPARIN (PORCINE) PER 1000 UNITS: Performed by: NURSE ANESTHETIST, CERTIFIED REGISTERED

## 2025-08-18 PROCEDURE — C1894 INTRO/SHEATH, NON-LASER: HCPCS | Performed by: SURGERY

## 2025-08-18 PROCEDURE — C1757 CATH, THROMBECTOMY/EMBOLECT: HCPCS | Performed by: SURGERY

## 2025-08-18 PROCEDURE — 25010000002 HEPARIN (PORCINE) PER 1000 UNITS: Performed by: SURGERY

## 2025-08-18 PROCEDURE — 25010000002 PROPOFOL 200 MG/20ML EMULSION: Performed by: NURSE ANESTHETIST, CERTIFIED REGISTERED

## 2025-08-18 PROCEDURE — 25010000002 SUGAMMADEX 200 MG/2ML SOLUTION: Performed by: NURSE ANESTHETIST, CERTIFIED REGISTERED

## 2025-08-18 PROCEDURE — 82948 REAGENT STRIP/BLOOD GLUCOSE: CPT | Performed by: NURSE ANESTHETIST, CERTIFIED REGISTERED

## 2025-08-18 PROCEDURE — 25510000001 IOPAMIDOL PER 1 ML: Performed by: SURGERY

## 2025-08-18 PROCEDURE — 25010000002 VANCOMYCIN HCL 1.25 G RECONSTITUTED SOLUTION 1 EACH VIAL: Performed by: SURGERY

## 2025-08-18 PROCEDURE — C1876 STENT, NON-COA/NON-COV W/DEL: HCPCS | Performed by: SURGERY

## 2025-08-18 DEVICE — IMPLANTABLE DEVICE: Type: IMPLANTABLE DEVICE | Site: ARM | Status: FUNCTIONAL

## 2025-08-18 DEVICE — ABSORBABLE HEMOSTAT (OXIDIZED REGENERATED CELLULOSE, U.S.P.)
Type: IMPLANTABLE DEVICE | Site: ARM | Status: FUNCTIONAL
Brand: SURGICEL FIBRILLAR

## 2025-08-18 RX ORDER — DROPERIDOL 2.5 MG/ML
0.62 INJECTION, SOLUTION INTRAMUSCULAR; INTRAVENOUS
Status: DISCONTINUED | OUTPATIENT
Start: 2025-08-18 | End: 2025-08-18 | Stop reason: HOSPADM

## 2025-08-18 RX ORDER — SODIUM CHLORIDE, SODIUM LACTATE, POTASSIUM CHLORIDE, CALCIUM CHLORIDE 600; 310; 30; 20 MG/100ML; MG/100ML; MG/100ML; MG/100ML
9 INJECTION, SOLUTION INTRAVENOUS CONTINUOUS
Status: DISCONTINUED | OUTPATIENT
Start: 2025-08-18 | End: 2025-08-18 | Stop reason: HOSPADM

## 2025-08-18 RX ORDER — HYDRALAZINE HYDROCHLORIDE 20 MG/ML
5 INJECTION INTRAMUSCULAR; INTRAVENOUS
Status: DISCONTINUED | OUTPATIENT
Start: 2025-08-18 | End: 2025-08-18 | Stop reason: HOSPADM

## 2025-08-18 RX ORDER — NALOXONE HCL 0.4 MG/ML
0.2 VIAL (ML) INJECTION AS NEEDED
Status: DISCONTINUED | OUTPATIENT
Start: 2025-08-18 | End: 2025-08-18 | Stop reason: HOSPADM

## 2025-08-18 RX ORDER — PROMETHAZINE HYDROCHLORIDE 25 MG/1
25 TABLET ORAL ONCE AS NEEDED
Status: DISCONTINUED | OUTPATIENT
Start: 2025-08-18 | End: 2025-08-18 | Stop reason: HOSPADM

## 2025-08-18 RX ORDER — HEPARIN SODIUM 1000 [USP'U]/ML
INJECTION, SOLUTION INTRAVENOUS; SUBCUTANEOUS AS NEEDED
Status: DISCONTINUED | OUTPATIENT
Start: 2025-08-18 | End: 2025-08-18 | Stop reason: SURG

## 2025-08-18 RX ORDER — SODIUM CHLORIDE 9 MG/ML
9 INJECTION, SOLUTION INTRAVENOUS CONTINUOUS
Status: DISCONTINUED | OUTPATIENT
Start: 2025-08-18 | End: 2025-08-18 | Stop reason: HOSPADM

## 2025-08-18 RX ORDER — DIPHENHYDRAMINE HYDROCHLORIDE 50 MG/ML
12.5 INJECTION, SOLUTION INTRAMUSCULAR; INTRAVENOUS
Status: DISCONTINUED | OUTPATIENT
Start: 2025-08-18 | End: 2025-08-18 | Stop reason: HOSPADM

## 2025-08-18 RX ORDER — FENTANYL CITRATE 50 UG/ML
50 INJECTION, SOLUTION INTRAMUSCULAR; INTRAVENOUS ONCE AS NEEDED
Status: DISCONTINUED | OUTPATIENT
Start: 2025-08-18 | End: 2025-08-18 | Stop reason: HOSPADM

## 2025-08-18 RX ORDER — HYDROCODONE BITARTRATE AND ACETAMINOPHEN 5; 325 MG/1; MG/1
1 TABLET ORAL ONCE AS NEEDED
Status: COMPLETED | OUTPATIENT
Start: 2025-08-18 | End: 2025-08-18

## 2025-08-18 RX ORDER — SODIUM CHLORIDE 0.9 % (FLUSH) 0.9 %
3 SYRINGE (ML) INJECTION EVERY 12 HOURS SCHEDULED
Status: DISCONTINUED | OUTPATIENT
Start: 2025-08-18 | End: 2025-08-18 | Stop reason: HOSPADM

## 2025-08-18 RX ORDER — IOPAMIDOL 510 MG/ML
100 INJECTION, SOLUTION INTRAVASCULAR
Status: COMPLETED | OUTPATIENT
Start: 2025-08-18 | End: 2025-08-18

## 2025-08-18 RX ORDER — PROPOFOL 10 MG/ML
INJECTION, EMULSION INTRAVENOUS AS NEEDED
Status: DISCONTINUED | OUTPATIENT
Start: 2025-08-18 | End: 2025-08-18 | Stop reason: SURG

## 2025-08-18 RX ORDER — ROCURONIUM BROMIDE 10 MG/ML
INJECTION, SOLUTION INTRAVENOUS AS NEEDED
Status: DISCONTINUED | OUTPATIENT
Start: 2025-08-18 | End: 2025-08-18 | Stop reason: SURG

## 2025-08-18 RX ORDER — LIDOCAINE HYDROCHLORIDE 20 MG/ML
INJECTION, SOLUTION INFILTRATION; PERINEURAL AS NEEDED
Status: DISCONTINUED | OUTPATIENT
Start: 2025-08-18 | End: 2025-08-18 | Stop reason: SURG

## 2025-08-18 RX ORDER — HYDROMORPHONE HYDROCHLORIDE 1 MG/ML
0.25 INJECTION, SOLUTION INTRAMUSCULAR; INTRAVENOUS; SUBCUTANEOUS
Status: DISCONTINUED | OUTPATIENT
Start: 2025-08-18 | End: 2025-08-18 | Stop reason: HOSPADM

## 2025-08-18 RX ORDER — FENTANYL CITRATE 50 UG/ML
25 INJECTION, SOLUTION INTRAMUSCULAR; INTRAVENOUS
Status: DISCONTINUED | OUTPATIENT
Start: 2025-08-18 | End: 2025-08-18 | Stop reason: HOSPADM

## 2025-08-18 RX ORDER — ATROPINE SULFATE 0.4 MG/ML
0.4 INJECTION, SOLUTION INTRAMUSCULAR; INTRAVENOUS; SUBCUTANEOUS ONCE AS NEEDED
Status: DISCONTINUED | OUTPATIENT
Start: 2025-08-18 | End: 2025-08-18 | Stop reason: HOSPADM

## 2025-08-18 RX ORDER — FLUMAZENIL 0.1 MG/ML
0.2 INJECTION INTRAVENOUS AS NEEDED
Status: DISCONTINUED | OUTPATIENT
Start: 2025-08-18 | End: 2025-08-18 | Stop reason: HOSPADM

## 2025-08-18 RX ORDER — EPHEDRINE SULFATE 50 MG/ML
5 INJECTION, SOLUTION INTRAVENOUS ONCE AS NEEDED
Status: DISCONTINUED | OUTPATIENT
Start: 2025-08-18 | End: 2025-08-18 | Stop reason: HOSPADM

## 2025-08-18 RX ORDER — LIDOCAINE HYDROCHLORIDE 10 MG/ML
0.5 INJECTION, SOLUTION INFILTRATION; PERINEURAL ONCE AS NEEDED
Status: DISCONTINUED | OUTPATIENT
Start: 2025-08-18 | End: 2025-08-18 | Stop reason: HOSPADM

## 2025-08-18 RX ORDER — SODIUM CHLORIDE 0.9 % (FLUSH) 0.9 %
3-10 SYRINGE (ML) INJECTION AS NEEDED
Status: DISCONTINUED | OUTPATIENT
Start: 2025-08-18 | End: 2025-08-18 | Stop reason: HOSPADM

## 2025-08-18 RX ORDER — FENTANYL CITRATE 50 UG/ML
INJECTION, SOLUTION INTRAMUSCULAR; INTRAVENOUS AS NEEDED
Status: DISCONTINUED | OUTPATIENT
Start: 2025-08-18 | End: 2025-08-18 | Stop reason: SURG

## 2025-08-18 RX ORDER — ONDANSETRON 2 MG/ML
4 INJECTION INTRAMUSCULAR; INTRAVENOUS ONCE AS NEEDED
Status: DISCONTINUED | OUTPATIENT
Start: 2025-08-18 | End: 2025-08-18 | Stop reason: HOSPADM

## 2025-08-18 RX ORDER — FAMOTIDINE 10 MG/ML
20 INJECTION, SOLUTION INTRAVENOUS ONCE
Status: COMPLETED | OUTPATIENT
Start: 2025-08-18 | End: 2025-08-18

## 2025-08-18 RX ORDER — MIDAZOLAM HYDROCHLORIDE 1 MG/ML
0.5 INJECTION, SOLUTION INTRAMUSCULAR; INTRAVENOUS
Status: DISCONTINUED | OUTPATIENT
Start: 2025-08-18 | End: 2025-08-18 | Stop reason: HOSPADM

## 2025-08-18 RX ORDER — HYDROCODONE BITARTRATE AND ACETAMINOPHEN 7.5; 325 MG/1; MG/1
1 TABLET ORAL EVERY 4 HOURS PRN
Status: DISCONTINUED | OUTPATIENT
Start: 2025-08-18 | End: 2025-08-18 | Stop reason: HOSPADM

## 2025-08-18 RX ORDER — ONDANSETRON 2 MG/ML
INJECTION INTRAMUSCULAR; INTRAVENOUS AS NEEDED
Status: DISCONTINUED | OUTPATIENT
Start: 2025-08-18 | End: 2025-08-18 | Stop reason: SURG

## 2025-08-18 RX ORDER — IPRATROPIUM BROMIDE AND ALBUTEROL SULFATE 2.5; .5 MG/3ML; MG/3ML
3 SOLUTION RESPIRATORY (INHALATION) ONCE AS NEEDED
Status: DISCONTINUED | OUTPATIENT
Start: 2025-08-18 | End: 2025-08-18 | Stop reason: HOSPADM

## 2025-08-18 RX ORDER — PROTAMINE SULFATE 10 MG/ML
INJECTION, SOLUTION INTRAVENOUS AS NEEDED
Status: DISCONTINUED | OUTPATIENT
Start: 2025-08-18 | End: 2025-08-18 | Stop reason: SURG

## 2025-08-18 RX ORDER — PROMETHAZINE HYDROCHLORIDE 25 MG/1
25 SUPPOSITORY RECTAL ONCE AS NEEDED
Status: DISCONTINUED | OUTPATIENT
Start: 2025-08-18 | End: 2025-08-18 | Stop reason: HOSPADM

## 2025-08-18 RX ORDER — LABETALOL HYDROCHLORIDE 5 MG/ML
5 INJECTION, SOLUTION INTRAVENOUS
Status: DISCONTINUED | OUTPATIENT
Start: 2025-08-18 | End: 2025-08-18 | Stop reason: HOSPADM

## 2025-08-18 RX ADMIN — PROTAMINE SULFATE 30 MG: 10 INJECTION, SOLUTION INTRAVENOUS at 11:37

## 2025-08-18 RX ADMIN — SODIUM CHLORIDE 9 ML/HR: 9 INJECTION, SOLUTION INTRAVENOUS at 09:29

## 2025-08-18 RX ADMIN — ONDANSETRON 4 MG: 2 INJECTION, SOLUTION INTRAMUSCULAR; INTRAVENOUS at 11:33

## 2025-08-18 RX ADMIN — VANCOMYCIN HYDROCHLORIDE 1250 MG: 1.25 INJECTION, POWDER, LYOPHILIZED, FOR SOLUTION INTRAVENOUS at 09:30

## 2025-08-18 RX ADMIN — IOPAMIDOL 24 ML: 510 INJECTION, SOLUTION INTRAVASCULAR at 11:32

## 2025-08-18 RX ADMIN — HEPARIN SODIUM 5000 UNITS: 1000 INJECTION, SOLUTION INTRAVENOUS; SUBCUTANEOUS at 10:52

## 2025-08-18 RX ADMIN — PROPOFOL 150 MG: 10 INJECTION, EMULSION INTRAVENOUS at 10:34

## 2025-08-18 RX ADMIN — ROCURONIUM BROMIDE 15 MG: 10 INJECTION, SOLUTION INTRAVENOUS at 11:15

## 2025-08-18 RX ADMIN — FENTANYL CITRATE 25 MCG: 50 INJECTION, SOLUTION INTRAMUSCULAR; INTRAVENOUS at 11:41

## 2025-08-18 RX ADMIN — FAMOTIDINE 20 MG: 10 INJECTION INTRAVENOUS at 09:30

## 2025-08-18 RX ADMIN — ROCURONIUM BROMIDE 50 MG: 10 INJECTION, SOLUTION INTRAVENOUS at 10:34

## 2025-08-18 RX ADMIN — FENTANYL CITRATE 25 MCG: 50 INJECTION, SOLUTION INTRAMUSCULAR; INTRAVENOUS at 10:32

## 2025-08-18 RX ADMIN — LIDOCAINE HYDROCHLORIDE 5 ML: 20 INJECTION, SOLUTION INFILTRATION; PERINEURAL at 10:34

## 2025-08-18 RX ADMIN — HYDROCODONE BITARTRATE AND ACETAMINOPHEN 1 TABLET: 5; 325 TABLET ORAL at 12:28

## 2025-08-18 RX ADMIN — SUGAMMADEX 200 MG: 100 INJECTION, SOLUTION INTRAVENOUS at 11:54

## 2025-08-19 LAB
BH BB BLOOD EXPIRATION DATE: NORMAL
BH BB BLOOD TYPE BARCODE: 5100
BH BB DISPENSE STATUS: NORMAL
BH BB PRODUCT CODE: NORMAL
BH BB UNIT NUMBER: NORMAL
UNIT  ABO: NORMAL
UNIT  RH: NORMAL

## 2025-08-22 ENCOUNTER — TELEPHONE (OUTPATIENT)
Age: 80
End: 2025-08-22
Payer: MEDICARE

## (undated) DEVICE — INTENDED FOR TISSUE SEPARATION, AND OTHER PROCEDURES THAT REQUIRE A SHARP SURGICAL BLADE TO PUNCTURE OR CUT.: Brand: BARD-PARKER ® CARBON RIB-BACK BLADES

## (undated) DEVICE — PTA BALLOON DILATATION CATHETER: Brand: MUSTANG™

## (undated) DEVICE — ADHS SKIN SURG TISS VISC PREMIERPRO EXOFIN HI/VISC FAST/DRY

## (undated) DEVICE — PK ANGIO 40

## (undated) DEVICE — CVR PROB 96IN LF STRL

## (undated) DEVICE — ANTIBACTERIAL UNDYED BRAIDED (POLYGLACTIN 910), SYNTHETIC ABSORBABLE SUTURE: Brand: COATED VICRYL

## (undated) DEVICE — RADIFOCUS GLIDEWIRE: Brand: GLIDEWIRE

## (undated) DEVICE — SUT SILK 4/0 TIES 18IN A183H

## (undated) DEVICE — GLV SURG BIOGEL LTX PF 7 1/2

## (undated) DEVICE — BRUSH CYTO COLONOSCOPE 7F3MM 240CM RED

## (undated) DEVICE — ST ACC MICROPUNCTURE STFF/CANN PLAT/TP 4F 21G 40CM

## (undated) DEVICE — SUT PROLN 6/0 BV1 D/A 30IN 8709H

## (undated) DEVICE — SUT SILK 3/0 TIES 18IN A184H

## (undated) DEVICE — 3F 80 CM LEMAITRE EMBOLECTOMY CATHETER, EIFU: Brand: LEMAITRE EMBOLECTOMY CATHETER

## (undated) DEVICE — LOU MINOR PROCEDURE: Brand: MEDLINE INDUSTRIES, INC.

## (undated) DEVICE — SUT ETHLN 2/0 PS 18IN 585H

## (undated) DEVICE — SUT SILK 2/0 TIES 18IN A185H

## (undated) DEVICE — VESSEL LOOPS X-RAY DETECTABLE: Brand: DEROYAL

## (undated) DEVICE — SYR LUERLOK 5CC

## (undated) DEVICE — COVER,LIGHT HANDLE,FLX,2/PK: Brand: MEDLINE INDUSTRIES, INC.

## (undated) DEVICE — GAUZE,SPONGE,FLUFF,6"X6.75",STRL,10/TRAY: Brand: MEDLINE

## (undated) DEVICE — SOL NS 500ML

## (undated) DEVICE — BIOPATCH™ ANTIMICROBIAL DRESSING WITH CHLORHEXIDINE GLUCONATE IS A HYDROPHILLIC POLYURETHANE ABSORPTIVE FOAM WITH CHLORHEXIDINE GLUCONATE (CHG) WHICH INHIBITS BACTERIAL GROWTH UNDER THE DRESSING. THE DRESSING IS INTENDED TO BE USED TO ABSORB EXUDATE, COVER A WOUND CAUSED BY VASCULAR AND NONVASCULAR PERCUTANEOUS MEDICAL DEVICES DURING SURGERY, AS WELL AS REDUCE LOCAL INFECTION AND COLONIZATION OF MICROORGANISMS.: Brand: BIOPATCH

## (undated) DEVICE — 3M™ STERI-DRAPE™ INSTRUMENT POUCH 1018: Brand: STERI-DRAPE™

## (undated) DEVICE — SUT PROLN 5/0 RB1 D/A 36IN 8556H

## (undated) DEVICE — Device

## (undated) DEVICE — DRSNG WND BORDR/ADHS NONADHR/GZ LF 4X4IN STRL

## (undated) DEVICE — TUBING, SUCTION, 1/4" X 10', STRAIGHT: Brand: MEDLINE

## (undated) DEVICE — ST ACC MICROPUNCTURE STFF .018 ECHO/PLAT/TP 4F/10CM 21G/7CM

## (undated) DEVICE — TRAP FLD MINIVAC MEGADYNE 100ML

## (undated) DEVICE — TBG PENCL TELESCP MEGADYNE SMOKE EVAC 10FT

## (undated) DEVICE — SYR LUERLOK 20CC BX/50

## (undated) DEVICE — PK AV FISTL/SHNT 40

## (undated) DEVICE — PATIENT RETURN ELECTRODE, SINGLE-USE, CONTACT QUALITY MONITORING, ADULT, WITH 9FT CORD, FOR PATIENTS WEIGING OVER 33LBS. (15KG): Brand: MEGADYNE

## (undated) DEVICE — PENCL ES MEGADINE EZ/CLEAN BUTN W/HOLSTR 10FT

## (undated) DEVICE — PENCL E/S ULTRAVAC TELESCP NOSE HOLSTR 10FT

## (undated) DEVICE — GAUZE,XEROFORM,CURAD,OVERWRAP,5"X9",ST: Brand: CURAD

## (undated) DEVICE — THE TORRENT IRRIGATION SCOPE CONNECTOR IS USED WITH THE TORRENT IRRIGATION TUBING TO PROVIDE IRRIGATION FLUIDS SUCH AS STERILE WATER DURING GASTROINTESTINAL ENDOSCOPIC PROCEDURES WHEN USED IN CONJUNCTION WITH AN IRRIGATION PUMP (OR ELECTROSURGICAL UNIT).: Brand: TORRENT

## (undated) DEVICE — EXOFIN PRECISION PEN HIGH VISCOSITY TOPICAL SKIN ADHESIVE: Brand: EXOFIN PRECISION PEN, 1G

## (undated) DEVICE — ST. SORBAVIEW ULTIMATE IJ SYSTEM A,C: Brand: CENTURION

## (undated) DEVICE — SKIN PREP TRAY 4 COMPARTM TRAY: Brand: MEDLINE INDUSTRIES, INC.

## (undated) DEVICE — PREMIUM WET SKIN PREP TRAY: Brand: MEDLINE INDUSTRIES, INC.

## (undated) DEVICE — INFLATION DEVICE: Brand: ENCORE™ 26

## (undated) DEVICE — SYR LL TP 10ML STRL

## (undated) DEVICE — TBG 02 CRUSH RESIST LF CLR 7FT

## (undated) DEVICE — PINNACLE INTRODUCER SHEATH: Brand: PINNACLE

## (undated) DEVICE — SINGLE-USE BIOPSY FORCEPS: Brand: RADIAL JAW 4

## (undated) DEVICE — SUT SILK 3/0 SH CR8 18IN C013D

## (undated) DEVICE — DRESSING,GAUZE,XEROFORM,CURAD,1"X8",ST: Brand: CURAD

## (undated) DEVICE — ISOLATION BAG: Brand: CONVERTORS

## (undated) DEVICE — BG ISOL DRWSTR INVISISHIELD 20X20IN

## (undated) DEVICE — RADIFOCUS GLIDEWIRE ADVANTAGE GUIDEWIRE: Brand: GLIDEWIRE ADVANTAGE

## (undated) DEVICE — PINNACLE R/O II HIFLO INTRODUCER SHEATH WITH RADIOPAQUE MARKER: Brand: PINNACLE

## (undated) DEVICE — ADHS SKIN DERMABOND TOP ADVANCED

## (undated) DEVICE — SYRINGE KIT,PACKAGED,,150FT,MK 7(ANGIO-ARTERION, 150ML SYR KIT W/QFT,MC)(60729385): Brand: MEDRAD® MARK 7 ARTERION DISPOSABLE SYRINGE 150 ML WITH QUICK FILL TUBE

## (undated) DEVICE — SUT SILK 3/0 SH CR5 18IN C0135

## (undated) DEVICE — RADIFOCUS TORQUE DEVICE MULTI-TORQUE VISE: Brand: RADIFOCUS TORQUE DEVICE

## (undated) DEVICE — SYR LUERLOK 30CC

## (undated) DEVICE — DECANTER BAG 9": Brand: MEDLINE INDUSTRIES, INC.

## (undated) DEVICE — 5F PLUS 80CM OVER-THE-WIRE EMBOLECTOMY CATHETER: Brand: LEMAITRE EMBOLECTOMY CATHETER

## (undated) DEVICE — NDL HYPO PRECISIONGLIDE REG 25G 1 1/2

## (undated) DEVICE — PINNACLE R/O II INTRODUCER SHEATH WITH RADIOPAQUE MARKER: Brand: PINNACLE

## (undated) DEVICE — GOWN,NON-REINFORCED,SIRUS,SET IN SLV,XL: Brand: MEDLINE

## (undated) DEVICE — 4F 80 CM LEMAITRE EMBOLECTOMY CATHETER, EIFU: Brand: LEMAITRE EMBOLECTOMY CATHETER

## (undated) DEVICE — DRP C/ARM 41X74IN

## (undated) DEVICE — KT CATH TAL PALINDROME SAPPHIRE 14.5F23CM

## (undated) DEVICE — GLV SURG BIOGEL LTX PF 7

## (undated) DEVICE — PK ORTHO MINOR 40

## (undated) DEVICE — 1 ML TUBERCULIN SYRINGE REGULAR TIP: Brand: MONOJECT

## (undated) DEVICE — GAUZE,BORDER,4"X4",2.5"X2.5"PAD,STERILE: Brand: MEDLINE

## (undated) DEVICE — CANN NASL CO2 TRULINK W/O2 A/

## (undated) DEVICE — PENCL SMOKE/EVAC MEGADYNE TELESCP 10FT

## (undated) DEVICE — GLV SURG SENSICARE PI MIC PF SZ7.5 LF STRL

## (undated) DEVICE — Device: Brand: DEFENDO AIR/WATER/SUCTION AND BIOPSY VALVE

## (undated) DEVICE — BNDG,ELSTC,MATRIX,STRL,6"X5YD,LF,HOOK&LP: Brand: MEDLINE

## (undated) DEVICE — KT CATH TAL VENATRAC PALINDROM INSRT STY 23CM

## (undated) DEVICE — COVER,MAYO STAND,STERILE: Brand: MEDLINE

## (undated) DEVICE — BNDG,ELSTC,MATRIX,STRL,4"X5YD,LF,HOOK&LP: Brand: MEDLINE

## (undated) DEVICE — CONQUEST® PTA BALLOON DILATATION CATHETER 8 MM X 40 MM, 75 CM CATHETER: Brand: CONQUEST®